# Patient Record
Sex: FEMALE | Race: BLACK OR AFRICAN AMERICAN | NOT HISPANIC OR LATINO | Employment: OTHER | URBAN - METROPOLITAN AREA
[De-identification: names, ages, dates, MRNs, and addresses within clinical notes are randomized per-mention and may not be internally consistent; named-entity substitution may affect disease eponyms.]

---

## 2017-01-20 ENCOUNTER — TRANSCRIBE ORDERS (OUTPATIENT)
Dept: ENDOCRINOLOGY | Facility: CLINIC | Age: 72
End: 2017-01-20

## 2017-01-20 DIAGNOSIS — E10.9 DIABETES MELLITUS TYPE 1, UNCOMPLICATED (HCC): Primary | ICD-10-CM

## 2017-08-15 DIAGNOSIS — E55.9 VITAMIN D DEFICIENCY: ICD-10-CM

## 2017-08-15 RX ORDER — ERGOCALCIFEROL 1.25 MG/1
CAPSULE ORAL
Qty: 4 CAPSULE | Refills: 0 | Status: SHIPPED | OUTPATIENT
Start: 2017-08-15 | End: 2017-10-12 | Stop reason: SDUPTHER

## 2017-10-12 DIAGNOSIS — E55.9 VITAMIN D DEFICIENCY: ICD-10-CM

## 2017-10-13 RX ORDER — ERGOCALCIFEROL 1.25 MG/1
CAPSULE ORAL
Qty: 4 CAPSULE | Refills: 0 | Status: SHIPPED | OUTPATIENT
Start: 2017-10-13 | End: 2020-09-08

## 2017-10-19 ENCOUNTER — OFFICE VISIT (OUTPATIENT)
Dept: GASTROENTEROLOGY | Facility: CLINIC | Age: 72
End: 2017-10-19

## 2017-10-19 ENCOUNTER — PREP FOR SURGERY (OUTPATIENT)
Dept: OTHER | Facility: HOSPITAL | Age: 72
End: 2017-10-19

## 2017-10-19 VITALS
TEMPERATURE: 98.2 F | HEIGHT: 63 IN | DIASTOLIC BLOOD PRESSURE: 84 MMHG | RESPIRATION RATE: 18 BRPM | BODY MASS INDEX: 36.32 KG/M2 | WEIGHT: 205 LBS | HEART RATE: 86 BPM | SYSTOLIC BLOOD PRESSURE: 166 MMHG

## 2017-10-19 DIAGNOSIS — K59.00 CONSTIPATION, UNSPECIFIED CONSTIPATION TYPE: Chronic | ICD-10-CM

## 2017-10-19 DIAGNOSIS — R12 HEARTBURN: Chronic | ICD-10-CM

## 2017-10-19 DIAGNOSIS — R13.14 PHARYNGOESOPHAGEAL DYSPHAGIA: Primary | Chronic | ICD-10-CM

## 2017-10-19 DIAGNOSIS — R12 HEARTBURN: Primary | ICD-10-CM

## 2017-10-19 DIAGNOSIS — R13.14 PHARYNGOESOPHAGEAL DYSPHAGIA: ICD-10-CM

## 2017-10-19 PROCEDURE — 99214 OFFICE O/P EST MOD 30 MIN: CPT | Performed by: NURSE PRACTITIONER

## 2017-10-19 RX ORDER — SODIUM CHLORIDE 9 MG/ML
70 INJECTION, SOLUTION INTRAVENOUS CONTINUOUS PRN
Status: CANCELLED | OUTPATIENT
Start: 2017-10-19

## 2017-10-19 RX ORDER — LISINOPRIL 20 MG/1
20 TABLET ORAL DAILY
COMMUNITY
End: 2018-03-07 | Stop reason: HOSPADM

## 2017-10-19 NOTE — PROGRESS NOTES
"Chief Complaint   Patient presents with   • Heartburn   • Difficulty Swallowing   • Constipation     The patient has a long-standing history of heartburn. It occurs daily. It is gradually worsening. Heartburn can be severe. It does wake her at night. She has been taking Pantoprazole and Zantac as needed, but it does not help. There is no history of abdominal pain.    The patient has a long-standing history of difficulty swallowing. The patient has a difficult time swallowing solids. She feels it becomes \"lodged\" in the bottom of her esophagus and eventually she will \"throw up\" the food in her esophagus. This can occur daily, depending on her diet. There is no history of nausea with vomiting.    The patient has a long-standing history of constipation. The patient may have 1 hard bowel movement every other day or so. She will at times take laxatives for the constipation. There is no history of bright red blood per rectum or melena.    The patient's last colonosocopy was about 5 years ago. There is no family history of colon cancer.    Heartburn   She complains of heartburn. She reports no abdominal pain, no chest pain, no coughing or no nausea. This is a chronic problem. The current episode started more than 1 year ago. The problem occurs frequently. The problem has been gradually worsening. The heartburn duration is an hour. The heartburn is located in the substernum. The heartburn is of severe intensity. The heartburn wakes her from sleep. Nothing aggravates the symptoms. Associated symptoms include fatigue. Pertinent negatives include no melena. There are no known risk factors. She has tried a PPI and a histamine-2 antagonist for the symptoms. The treatment provided mild relief.   Difficulty Swallowing   This is a chronic problem. The current episode started more than 1 year ago. The problem occurs 2 to 4 times per day. The problem has been gradually worsening. Associated symptoms include arthralgias and fatigue. " Pertinent negatives include no abdominal pain, chest pain, chills, coughing, fever, headaches, joint swelling, myalgias, nausea, rash or vomiting. The symptoms are aggravated by eating. She has tried nothing for the symptoms.   Constipation   This is a chronic problem. The current episode started more than 1 year ago. The problem is unchanged. The stool is described as firm. The patient is not on a high fiber diet. There has not been adequate water intake. Associated symptoms include back pain. Pertinent negatives include no abdominal pain, diarrhea, fever, hematochezia, melena, nausea or vomiting. She has tried laxatives for the symptoms. The treatment provided moderate relief.     Review of Systems   Constitutional: Positive for appetite change and fatigue. Negative for chills, fever and unexpected weight change.   HENT: Positive for trouble swallowing. Negative for mouth sores and nosebleeds.    Eyes: Negative for discharge and redness.   Respiratory: Negative for apnea, cough and shortness of breath.    Cardiovascular: Negative for chest pain, palpitations and leg swelling.   Gastrointestinal: Positive for constipation and heartburn. Negative for abdominal distention, abdominal pain, anal bleeding, blood in stool, diarrhea, hematochezia, melena, nausea and vomiting.   Endocrine: Negative for cold intolerance, heat intolerance and polydipsia.   Genitourinary: Negative for dysuria, hematuria and urgency.   Musculoskeletal: Positive for arthralgias and back pain. Negative for joint swelling and myalgias.   Skin: Negative for rash.   Allergic/Immunologic: Negative for food allergies and immunocompromised state.   Neurological: Negative for dizziness, seizures, syncope and headaches.   Hematological: Negative for adenopathy. Does not bruise/bleed easily.   Psychiatric/Behavioral: Negative for dysphoric mood. The patient is nervous/anxious. The patient is not hyperactive.      Patient Active Problem List   Diagnosis    • Atopic rhinitis   • Arthritis   • Benign paroxysmal positional vertigo   • Neck pain   • Anxiety and depression   • Uncontrolled type 2 diabetes mellitus   • Edema   • Gastroesophageal reflux disease with esophagitis   • Multiple-type hyperlipidemia   • Vitamin D deficiency   • Benign essential hypertension   • Obesity (BMI 30-39.9)   • Unstable angina   • History of COPD   • Hypercholesterolemia   • History of cataract   • History of osteoporosis   • History of restless legs syndrome   • History of rheumatoid arthritis   • Elevated serum creatinine   • Heartburn   • Pharyngoesophageal dysphagia   • Constipation     Past Medical History:   Diagnosis Date   • Acute bronchitis with bronchospasm    • Acute exacerbation of chronic obstructive pulmonary disease (COPD)    • Anxiety    • Arthritis    • B12 deficiency    • Back pain    • Cataract    • Cervicalgia    • Colonic polyp     History of colonic polyps    • COPD (chronic obstructive pulmonary disease)    • Depression    • Diverticulitis    • Dysphagia    • Edema    • Esophageal reflux    • Folic acid deficiency    • Herpes zoster    • High cholesterol    • History of kidney infection    • History of recurrent urinary tract infection    • HTN (hypertension)    • Hypercholesterolemia    • Kidney infection    • Malignant hypertension 4/26/2015     Accelerated essential hypertension   • Measles     rubeola   • Muscle spasm    • Neoplasm of uncertain behavior of skin    • Osteoporosis    • Recurrent urinary tract infection    • Rheumatoid arthritis    • RLS (restless legs syndrome)    • Stomach ulcer    • Type 2 diabetes mellitus    • Vitamin D deficiency      Past Surgical History:   Procedure Laterality Date   • CATARACT EXTRACTION Left 02/11/2013    with lens implant   • CATARACT EXTRACTION Right 04/15/2013    with lens implant   • CATARACT EXTRACTION WITH INTRAOCULAR LENS IMPLANT Left 02/11/2013   • CATARACT EXTRACTION WITH INTRAOCULAR LENS IMPLANT Right  04/15/2013   • COLONOSCOPY  2012   • HYSTERECTOMY  1979   • UPPER GASTROINTESTINAL ENDOSCOPY  12/09/2013     Family History   Problem Relation Age of Onset   • Arthritis Mother    • Diabetes Mother    • Hypertension Mother    • Arthritis Other    • Arthritis Other    • Diabetes Other      DM   • Hypertension Other      Social History   Substance Use Topics   • Smoking status: Former Smoker   • Smokeless tobacco: Never Used      Comment:  Denied: History of smoking cigarettes   • Alcohol use Yes      Comment: Occassionally       Current Outpatient Prescriptions:   •  amLODIPine (NORVASC) 10 MG tablet, Take 1 tablet by mouth Daily., Disp: 30 tablet, Rfl: 5  •  fluticasone-salmeterol (ADVAIR HFA) 115-21 MCG/ACT inhaler, 2 (two) times a day., Disp: , Rfl:   •  Insulin Glargine (LANTUS SOLOSTAR) 100 UNIT/ML injection pen, Inject 30 Units under the skin 2 (two) times a day., Disp: 3 pen, Rfl: 0  •  Insulin Lispro 200 UNIT/ML solution pen-injector, Inject 28 Units under the skin 2 (two) times a day., Disp: 2 pen, Rfl: 0  •  lisinopril (PRINIVIL,ZESTRIL) 20 MG tablet, Take 20 mg by mouth Daily., Disp: , Rfl:   •  loratadine (CLARITIN) 10 MG tablet, Take 1 tablet by mouth Daily., Disp: 30 tablet, Rfl: 5  •  losartan (COZAAR) 100 MG tablet, Take 100 mg by mouth daily., Disp: , Rfl:   •  mometasone (NASONEX) 50 MCG/ACT nasal spray, into each nostril daily., Disp: , Rfl:   •  pantoprazole (PROTONIX) 40 MG EC tablet, Take 1 tablet by mouth Daily., Disp: 30 tablet, Rfl: 5  •  ranitidine (ZANTAC) 150 MG tablet, Take  by mouth., Disp: , Rfl:   •  sertraline (ZOLOFT) 50 MG tablet, TAKE ONE TABLET EVERY DAY, Disp: 30 tablet, Rfl: 5  •  spironolactone (ALDACTONE) 50 MG tablet, Take 1 tablet by mouth 2 (two) times a day. (Patient taking differently: Take 50 mg by mouth Daily.), Disp: 30 tablet, Rfl: 5  •  vitamin D (ERGOCALCIFEROL) 32576 units capsule capsule, TAKE ONE CAPSULE BY MOUTH ONCE A WEEK, Disp: 4 capsule, Rfl: 0  •   "hydrochlorothiazide (HYDRODIURIL) 12.5 MG tablet, Take 1 tablet by mouth daily., Disp: 30 tablet, Rfl: 5  •  Insulin Glargine 300 UNIT/ML solution pen-injector, Inject 55 Units under the skin daily for 90 days., Disp: 12 pen, Rfl: 3  •  Insulin Pen Needle 31G X 5 MM misc, Inject insulin three times daily, Disp: 100 each, Rfl: 11  •  Insulin Pen Needle 31G X 8 MM misc, 5 shots daily, Disp: 2 each, Rfl: 11  •  lovastatin (MEVACOR) 10 MG tablet, Take  by mouth daily., Disp: , Rfl:     Allergies   Allergen Reactions   • Penicillins    • Sulfa Antibiotics      /84  Pulse 86  Temp 98.2 °F (36.8 °C)  Resp 18  Ht 63\" (160 cm)  Wt 205 lb (93 kg)  BMI 36.31 kg/m2    Physical Exam   Constitutional: She is oriented to person, place, and time. She appears well-developed and well-nourished. No distress.   HENT:   Head: Normocephalic and atraumatic.   Right Ear: Hearing and external ear normal.   Left Ear: Hearing and external ear normal.   Nose: Nose normal.   Mouth/Throat: Oropharynx is clear and moist and mucous membranes are normal. Mucous membranes are not pale, not dry and not cyanotic. No oral lesions. No oropharyngeal exudate.   Eyes: Conjunctivae and EOM are normal. Right eye exhibits no discharge. Left eye exhibits no discharge.   Neck: Trachea normal. Neck supple. No JVD present. No edema present. No thyroid mass and no thyromegaly present.   Cardiovascular: Normal rate, regular rhythm, S2 normal and normal heart sounds.  Exam reveals no gallop, no S3 and no friction rub.    No murmur heard.  Pulmonary/Chest: Effort normal and breath sounds normal. No respiratory distress. She exhibits no tenderness.   Abdominal: Normal appearance and bowel sounds are normal. She exhibits no distension, no ascites and no mass. There is no splenomegaly or hepatomegaly. There is no tenderness. There is no rigidity, no rebound and no guarding. No hernia.       Vascular Status -  Her exam exhibits no right foot edema. Her exam " exhibits no left foot edema.  Lymphadenopathy:     She has no cervical adenopathy.        Left: No supraclavicular adenopathy present.   Neurological: She is alert and oriented to person, place, and time. She has normal strength. No cranial nerve deficit or sensory deficit.   Skin: No rash noted. She is not diaphoretic. No cyanosis. No pallor. Nails show no clubbing.   Psychiatric: She has a normal mood and affect.   Nursing note and vitals reviewed.  Stigmata of chronic liver disease:  None.  Asterixis:  None.    Laboratory Results:  Upon review of records:    Dated 10/18/2016 glucose 468 BUN 26 creatinine 1.5 sodium 136 potassium 4.4 chloride 97 CO2 28 calcium 9.6 abdomen 3.8 ALT 17 AST 16 alkaline phosphatase 68 total bilirubin 0.3    Dated 7/31/2017 sodium 140 potassium 4.3 chloride 102 CO2 25 glucose 201 BUN 22 creatinine 1.22 total bilirubin 0.2 calcium 9.6 albumin 3.7 alkaline phosphatase 56 ALT 20 AST 16 hemoglobin A1c 12.6 vitamin D 20.7 WBC 7.8 hemoglobin 12.9 hematocrit 40 platelet count 266 MCV 90    Procedures:  Upon review of records:    EGD dated 12/9/2013 reveals grade 3 distal esophagitis.  Clean-based ulceration at the gastroesophageal junction.  Small sliding hiatal hernia, less than 3 cm.  Erythematous gastritis.  Possible underlying Schatzki's cannot be excluded.  Possible underlying Arcos's could not be excluded.  So a polypoid appearance of the mucosa was noted in the distal esophagus close to the gastroesophageal junction.  This was not biopsied. Biopsy results not available    Assessment and Plan:    Valarie was seen today for heartburn, difficulty swallowing and constipation.    Diagnoses and all orders for this visit:    Pharyngoesophageal dysphagia  Comments:  Differentials include Schatzki's ring, esophagitis, esophageal dysmotility.    Heartburn  Comments:  History of long-standing heartburn.  Not controlled with Zantac.  Concerns for underlying Arcos's.    Constipation, unspecified  constipation type  Comments:  History of long-standing constipation.  Moderately controlled with laxatives.        Plan  and Patient Instructions:  Patient Instructions   1. Antireflux measures: Avoid fried, fatty foods, alcohol, chocolate, coffee, tea,  soft drinks, peppermint and spearmint, spicy foods, tomatoes and tomato based foods, onion based foods, and smoking. Other antireflux measures include weight reduction if overweight, avoiding tight clothing around the abdomen, elevating the head of the bed 6 inches with blocks under the head board, and don't drink or eat before going to bed and avoid lying down immediately after meals.  2. Pantoprazole 40 mg 1 tablet by mouth in the am 30 minutes before breakfast.  3. May take Zantac 150 mg po twice a day as needed. May take for 1-2 days, then have holiday for 1-2 days before taking again.  4. High fiber diet with liberal water intake. Discussed in detail.  5. Upper endoscopy-EGD: Description of the procedure, risks, benefits, alternatives and options, including nonoperative options, were discussed with the patient in detail. The patient understands and wishes to proceed.  6. Avoid stimulant laxatives.  7. Patient will need colonoscopy in the future.    JOSEE Aviles

## 2017-10-19 NOTE — PATIENT INSTRUCTIONS
1. Antireflux measures: Avoid fried, fatty foods, alcohol, chocolate, coffee, tea,  soft drinks, peppermint and spearmint, spicy foods, tomatoes and tomato based foods, onion based foods, and smoking. Other antireflux measures include weight reduction if overweight, avoiding tight clothing around the abdomen, elevating the head of the bed 6 inches with blocks under the head board, and don't drink or eat before going to bed and avoid lying down immediately after meals.  2. Pantoprazole 40 mg 1 tablet by mouth in the am 30 minutes before breakfast.  3. May take Zantac 150 mg po twice a day as needed. May take for 1-2 days, then have holiday for 1-2 days before taking again.  4. High fiber diet with liberal water intake. Discussed in detail.  5. Upper endoscopy-EGD: Description of the procedure, risks, benefits, alternatives and options, including nonoperative options, were discussed with the patient in detail. The patient understands and wishes to proceed.  6. Avoid stimulant laxatives.  7. Patient will need colonoscopy in the future.

## 2017-11-13 ENCOUNTER — HOSPITAL ENCOUNTER (OUTPATIENT)
Facility: HOSPITAL | Age: 72
Setting detail: HOSPITAL OUTPATIENT SURGERY
Discharge: HOME | End: 2017-11-13
Attending: INTERNAL MEDICINE | Admitting: INTERNAL MEDICINE

## 2017-11-13 ENCOUNTER — ANESTHESIA (OUTPATIENT)
Dept: GASTROENTEROLOGY | Facility: HOSPITAL | Age: 72
End: 2017-11-13

## 2017-11-13 ENCOUNTER — ANESTHESIA EVENT (OUTPATIENT)
Dept: GASTROENTEROLOGY | Facility: HOSPITAL | Age: 72
End: 2017-11-13

## 2017-11-13 VITALS
WEIGHT: 200 LBS | HEIGHT: 63 IN | TEMPERATURE: 97.8 F | SYSTOLIC BLOOD PRESSURE: 208 MMHG | DIASTOLIC BLOOD PRESSURE: 82 MMHG | HEART RATE: 83 BPM | RESPIRATION RATE: 18 BRPM | OXYGEN SATURATION: 96 % | BODY MASS INDEX: 35.44 KG/M2

## 2017-11-13 DIAGNOSIS — R12 HEARTBURN: ICD-10-CM

## 2017-11-13 DIAGNOSIS — R13.14 PHARYNGOESOPHAGEAL DYSPHAGIA: ICD-10-CM

## 2017-11-13 LAB — GLUCOSE BLDC GLUCOMTR-MCNC: 261 MG/DL (ref 70–130)

## 2017-11-13 PROCEDURE — C1726 CATH, BAL DIL, NON-VASCULAR: HCPCS | Performed by: INTERNAL MEDICINE

## 2017-11-13 PROCEDURE — 88305 TISSUE EXAM BY PATHOLOGIST: CPT | Performed by: INTERNAL MEDICINE

## 2017-11-13 PROCEDURE — 82962 GLUCOSE BLOOD TEST: CPT

## 2017-11-13 PROCEDURE — 88342 IMHCHEM/IMCYTCHM 1ST ANTB: CPT | Performed by: INTERNAL MEDICINE

## 2017-11-13 PROCEDURE — 25010000002 PROPOFOL 200 MG/20ML EMULSION: Performed by: NURSE ANESTHETIST, CERTIFIED REGISTERED

## 2017-11-13 PROCEDURE — 43249 ESOPH EGD DILATION <30 MM: CPT | Performed by: INTERNAL MEDICINE

## 2017-11-13 PROCEDURE — 43239 EGD BIOPSY SINGLE/MULTIPLE: CPT | Performed by: INTERNAL MEDICINE

## 2017-11-13 RX ORDER — PANTOPRAZOLE SODIUM 40 MG/1
40 TABLET, DELAYED RELEASE ORAL
Status: DISCONTINUED | OUTPATIENT
Start: 2017-11-13 | End: 2017-11-13 | Stop reason: HOSPADM

## 2017-11-13 RX ORDER — SODIUM CHLORIDE 9 MG/ML
70 INJECTION, SOLUTION INTRAVENOUS CONTINUOUS PRN
Status: DISCONTINUED | OUTPATIENT
Start: 2017-11-13 | End: 2017-11-13 | Stop reason: HOSPADM

## 2017-11-13 RX ORDER — PROPOFOL 10 MG/ML
INJECTION, EMULSION INTRAVENOUS AS NEEDED
Status: DISCONTINUED | OUTPATIENT
Start: 2017-11-13 | End: 2017-11-13 | Stop reason: SURG

## 2017-11-13 RX ORDER — LIDOCAINE HYDROCHLORIDE 20 MG/ML
INJECTION, SOLUTION INFILTRATION; PERINEURAL AS NEEDED
Status: DISCONTINUED | OUTPATIENT
Start: 2017-11-13 | End: 2017-11-13 | Stop reason: SURG

## 2017-11-13 RX ADMIN — PROPOFOL 50 MG: 10 INJECTION, EMULSION INTRAVENOUS at 08:06

## 2017-11-13 RX ADMIN — SODIUM CHLORIDE 70 ML/HR: 9 INJECTION, SOLUTION INTRAVENOUS at 07:16

## 2017-11-13 RX ADMIN — LIDOCAINE HYDROCHLORIDE 40 MG: 20 INJECTION, SOLUTION INFILTRATION; PERINEURAL at 08:02

## 2017-11-13 RX ADMIN — PROPOFOL 50 MG: 10 INJECTION, EMULSION INTRAVENOUS at 08:04

## 2017-11-13 RX ADMIN — PROPOFOL 50 MG: 10 INJECTION, EMULSION INTRAVENOUS at 08:02

## 2017-11-13 RX ADMIN — PROPOFOL 50 MG: 10 INJECTION, EMULSION INTRAVENOUS at 08:00

## 2017-11-13 RX ADMIN — PROPOFOL 50 MG: 10 INJECTION, EMULSION INTRAVENOUS at 08:08

## 2017-11-13 RX ADMIN — SODIUM CHLORIDE: 9 INJECTION, SOLUTION INTRAVENOUS at 07:45

## 2017-11-13 NOTE — OP NOTE
PROCEDURE:  Upper Endoscopy with serial dilation of the distal esophagus using 15-16.5-18 mm CRE balloon to 16.5 mm and biopsies.    DATE OF PROCEDURE: November 13, 2017.    REFERRING PROVIDER:  JOSEE Quintana.     INSTRUMENT:    Olympus GIF H180 J video endoscope.      INDICATIONS OF THE PROCEDURE: This is a 72-year-old -American female with history of recurrent reflux and dysphagia.  Currently undergoing upper endoscopy for further evaluation        BIOPSIES:  Gastric antrum and body of the stomach.  Duodenal bulb polypoid area.     MEDICATIONS:  MAC.     PHOTOGRAPHS:  Photographs were included in the medical records.     CONSENT/PREPROCEDURE EVALUATION:  Risks, benefits, alternatives and options of the procedure including risks of anesthesia/sedation were discussed and informed consent was obtained prior to the procedure. History and physical examination were performed and nothing precluded the test.     REPORT:  The patient was placed in left lateral decubitus position. Once under the influence of IV sedation, the instrument was inserted into the mouth and esophagus was intubated under direct vision without difficulty.    Visualized portions of the hypopharyngeal, laryngopharyngeal areas including partial view of vocal cords appeared to be within normal limits.     Esophagus:  Upper esophageal sphincter was noted to be somewhat tortuous.  A Schatzki's ring was seen in the distal esophagus.  Z line was noted to be around  35 cm.  A small sliding hiatal hernia less than 3 cm was noted.  No Arcos's esophagus was seen.     Stomach:  Antrum:  Erythematous-erosive gastritis.  Angulus, lesser and greater curves: Normal.  Retroflex examination: Sliding hiatal hernia.  Cardia and fundus:  Normal.     Body of the stomach: Erythematous-erosive gastritis.  Good distensibility of the stomach was achieved no giant folds were noted.   Biopsies were obtained from the gastric antrum, angularis and body of the  stomach.    Pylorus and pyloric channel:  normal.     Duodenum:  Bulb: A 4-5 mm polypoid area in the duodenal bulb with small opening was seen.  This location is too high for minor ampulla.  Biopsies were obtained from the area.   No scalloping was seen in the second portion of duodenum.  Biopsies were obtained from the second portion of duodenum.    Intervention:  Distal esophagus was dilated using 15-16.5-18 mm CRE balloon to 16 mm under vision without difficulty.  Some blood was noted no active bleeding was seen.     The upper GI tract was decompressed and the scope was pulled out of the patient. The patient tolerated the procedure well.     DIAGNOSES:     1. Schatzki's ring.  Status post serial dilation to 16.5 mm.    2. Small sliding hiatal hernia less than 3 cm.  3. Erythematous-erosive gastritis.    4. Duodenal bulb polypoid area.    RECOMMENDATIONS:  1.  Dietary instructions.  2.  Pantoprazole 40 mg 1 p.o. q.a.m. 1/2 hour before breakfast..  Discontinue Zantac.    3.  Follow biopsies.  4.  Follow up in office.     Thank you very much for letting me participate in the care of this patient. Please do not hesitate to call me if you have any questions.

## 2017-11-13 NOTE — ANESTHESIA POSTPROCEDURE EVALUATION
Patient: Valarie Camara    Procedure Summary     Date Anesthesia Start Anesthesia Stop Room / Location    11/13/17 0745 0813 [facility] ENDOSCOPY 2 / [facility] ENDOSCOPY       Procedure Diagnosis Surgeon Provider    ESOPHAGOGASTRODUODENOSCOPY with biopsies and esophageal balloon dilitation (N/A Esophagus) Heartburn; Pharyngoesophageal dysphagia  (Heartburn [R12]; Pharyngoesophageal dysphagia [R13.14]) MD Heriberto Longoria CRNA          Anesthesia Type: MAC  Last vitals  BP   (!) 216/87 (11/13/17 0703)   Temp   97 °F (36.1 °C) (11/13/17 0703)   Pulse   75 (11/13/17 0703)   Resp   20 (11/13/17 0703)     SpO2   97 % (11/13/17 0703)     Post Anesthesia Care and Evaluation    Patient location during evaluation: PACU  Patient participation: complete - patient participated  Level of consciousness: awake and alert  Pain score: 0  Pain management: satisfactory to patient  Airway patency: patent  Anesthetic complications: No anesthetic complications  PONV Status: none  Cardiovascular status: acceptable and hemodynamically stable  Respiratory status: acceptable  Hydration status: acceptable

## 2017-11-13 NOTE — ANESTHESIA PREPROCEDURE EVALUATION
Anesthesia Evaluation     Patient summary reviewed and Nursing notes reviewed   NPO Solid Status: > 8 hours  NPO Liquid Status: > 8 hours     Airway   Mallampati: II  TM distance: >3 FB  Neck ROM: full  possible difficult intubation  Dental - normal exam     Pulmonary - normal exam   (+) COPD, sleep apnea ( ? snores melissa),   Cardiovascular - normal exam    (+) hypertension, angina, hyperlipidemia      Neuro/Psych  (+) psychiatric history Anxiety and Depression, poor historian.,    GI/Hepatic/Renal/Endo    (+) obesity, morbid obesity, GERD, diabetes mellitus type 2 using insulin,     Musculoskeletal     (+) back pain, neck pain,   Abdominal  - normal exam   Substance History      OB/GYN          Other   (+) arthritis   history of cancer    ROS/Med Hx Other: fbs 261                                  Anesthesia Plan    ASA 3     MAC   (Risks and benefits discussed including risk of aspiration, recall and dental damage. All patient questions answered. Will continue with POC.)  intravenous induction   Anesthetic plan and risks discussed with patient.

## 2017-11-13 NOTE — H&P (VIEW-ONLY)
"Chief Complaint   Patient presents with   • Heartburn   • Difficulty Swallowing   • Constipation     The patient has a long-standing history of heartburn. It occurs daily. It is gradually worsening. Heartburn can be severe. It does wake her at night. She has been taking Pantoprazole and Zantac as needed, but it does not help. There is no history of abdominal pain.    The patient has a long-standing history of difficulty swallowing. The patient has a difficult time swallowing solids. She feels it becomes \"lodged\" in the bottom of her esophagus and eventually she will \"throw up\" the food in her esophagus. This can occur daily, depending on her diet. There is no history of nausea with vomiting.    The patient has a long-standing history of constipation. The patient may have 1 hard bowel movement every other day or so. She will at times take laxatives for the constipation. There is no history of bright red blood per rectum or melena.    The patient's last colonosocopy was about 5 years ago. There is no family history of colon cancer.    Heartburn   She complains of heartburn. She reports no abdominal pain, no chest pain, no coughing or no nausea. This is a chronic problem. The current episode started more than 1 year ago. The problem occurs frequently. The problem has been gradually worsening. The heartburn duration is an hour. The heartburn is located in the substernum. The heartburn is of severe intensity. The heartburn wakes her from sleep. Nothing aggravates the symptoms. Associated symptoms include fatigue. Pertinent negatives include no melena. There are no known risk factors. She has tried a PPI and a histamine-2 antagonist for the symptoms. The treatment provided mild relief.   Difficulty Swallowing   This is a chronic problem. The current episode started more than 1 year ago. The problem occurs 2 to 4 times per day. The problem has been gradually worsening. Associated symptoms include arthralgias and fatigue. " Pertinent negatives include no abdominal pain, chest pain, chills, coughing, fever, headaches, joint swelling, myalgias, nausea, rash or vomiting. The symptoms are aggravated by eating. She has tried nothing for the symptoms.   Constipation   This is a chronic problem. The current episode started more than 1 year ago. The problem is unchanged. The stool is described as firm. The patient is not on a high fiber diet. There has not been adequate water intake. Associated symptoms include back pain. Pertinent negatives include no abdominal pain, diarrhea, fever, hematochezia, melena, nausea or vomiting. She has tried laxatives for the symptoms. The treatment provided moderate relief.     Review of Systems   Constitutional: Positive for appetite change and fatigue. Negative for chills, fever and unexpected weight change.   HENT: Positive for trouble swallowing. Negative for mouth sores and nosebleeds.    Eyes: Negative for discharge and redness.   Respiratory: Negative for apnea, cough and shortness of breath.    Cardiovascular: Negative for chest pain, palpitations and leg swelling.   Gastrointestinal: Positive for constipation and heartburn. Negative for abdominal distention, abdominal pain, anal bleeding, blood in stool, diarrhea, hematochezia, melena, nausea and vomiting.   Endocrine: Negative for cold intolerance, heat intolerance and polydipsia.   Genitourinary: Negative for dysuria, hematuria and urgency.   Musculoskeletal: Positive for arthralgias and back pain. Negative for joint swelling and myalgias.   Skin: Negative for rash.   Allergic/Immunologic: Negative for food allergies and immunocompromised state.   Neurological: Negative for dizziness, seizures, syncope and headaches.   Hematological: Negative for adenopathy. Does not bruise/bleed easily.   Psychiatric/Behavioral: Negative for dysphoric mood. The patient is nervous/anxious. The patient is not hyperactive.      Patient Active Problem List   Diagnosis    • Atopic rhinitis   • Arthritis   • Benign paroxysmal positional vertigo   • Neck pain   • Anxiety and depression   • Uncontrolled type 2 diabetes mellitus   • Edema   • Gastroesophageal reflux disease with esophagitis   • Multiple-type hyperlipidemia   • Vitamin D deficiency   • Benign essential hypertension   • Obesity (BMI 30-39.9)   • Unstable angina   • History of COPD   • Hypercholesterolemia   • History of cataract   • History of osteoporosis   • History of restless legs syndrome   • History of rheumatoid arthritis   • Elevated serum creatinine   • Heartburn   • Pharyngoesophageal dysphagia   • Constipation     Past Medical History:   Diagnosis Date   • Acute bronchitis with bronchospasm    • Acute exacerbation of chronic obstructive pulmonary disease (COPD)    • Anxiety    • Arthritis    • B12 deficiency    • Back pain    • Cataract    • Cervicalgia    • Colonic polyp     History of colonic polyps    • COPD (chronic obstructive pulmonary disease)    • Depression    • Diverticulitis    • Dysphagia    • Edema    • Esophageal reflux    • Folic acid deficiency    • Herpes zoster    • High cholesterol    • History of kidney infection    • History of recurrent urinary tract infection    • HTN (hypertension)    • Hypercholesterolemia    • Kidney infection    • Malignant hypertension 4/26/2015     Accelerated essential hypertension   • Measles     rubeola   • Muscle spasm    • Neoplasm of uncertain behavior of skin    • Osteoporosis    • Recurrent urinary tract infection    • Rheumatoid arthritis    • RLS (restless legs syndrome)    • Stomach ulcer    • Type 2 diabetes mellitus    • Vitamin D deficiency      Past Surgical History:   Procedure Laterality Date   • CATARACT EXTRACTION Left 02/11/2013    with lens implant   • CATARACT EXTRACTION Right 04/15/2013    with lens implant   • CATARACT EXTRACTION WITH INTRAOCULAR LENS IMPLANT Left 02/11/2013   • CATARACT EXTRACTION WITH INTRAOCULAR LENS IMPLANT Right  04/15/2013   • COLONOSCOPY  2012   • HYSTERECTOMY  1979   • UPPER GASTROINTESTINAL ENDOSCOPY  12/09/2013     Family History   Problem Relation Age of Onset   • Arthritis Mother    • Diabetes Mother    • Hypertension Mother    • Arthritis Other    • Arthritis Other    • Diabetes Other      DM   • Hypertension Other      Social History   Substance Use Topics   • Smoking status: Former Smoker   • Smokeless tobacco: Never Used      Comment:  Denied: History of smoking cigarettes   • Alcohol use Yes      Comment: Occassionally       Current Outpatient Prescriptions:   •  amLODIPine (NORVASC) 10 MG tablet, Take 1 tablet by mouth Daily., Disp: 30 tablet, Rfl: 5  •  fluticasone-salmeterol (ADVAIR HFA) 115-21 MCG/ACT inhaler, 2 (two) times a day., Disp: , Rfl:   •  Insulin Glargine (LANTUS SOLOSTAR) 100 UNIT/ML injection pen, Inject 30 Units under the skin 2 (two) times a day., Disp: 3 pen, Rfl: 0  •  Insulin Lispro 200 UNIT/ML solution pen-injector, Inject 28 Units under the skin 2 (two) times a day., Disp: 2 pen, Rfl: 0  •  lisinopril (PRINIVIL,ZESTRIL) 20 MG tablet, Take 20 mg by mouth Daily., Disp: , Rfl:   •  loratadine (CLARITIN) 10 MG tablet, Take 1 tablet by mouth Daily., Disp: 30 tablet, Rfl: 5  •  losartan (COZAAR) 100 MG tablet, Take 100 mg by mouth daily., Disp: , Rfl:   •  mometasone (NASONEX) 50 MCG/ACT nasal spray, into each nostril daily., Disp: , Rfl:   •  pantoprazole (PROTONIX) 40 MG EC tablet, Take 1 tablet by mouth Daily., Disp: 30 tablet, Rfl: 5  •  ranitidine (ZANTAC) 150 MG tablet, Take  by mouth., Disp: , Rfl:   •  sertraline (ZOLOFT) 50 MG tablet, TAKE ONE TABLET EVERY DAY, Disp: 30 tablet, Rfl: 5  •  spironolactone (ALDACTONE) 50 MG tablet, Take 1 tablet by mouth 2 (two) times a day. (Patient taking differently: Take 50 mg by mouth Daily.), Disp: 30 tablet, Rfl: 5  •  vitamin D (ERGOCALCIFEROL) 33026 units capsule capsule, TAKE ONE CAPSULE BY MOUTH ONCE A WEEK, Disp: 4 capsule, Rfl: 0  •   "hydrochlorothiazide (HYDRODIURIL) 12.5 MG tablet, Take 1 tablet by mouth daily., Disp: 30 tablet, Rfl: 5  •  Insulin Glargine 300 UNIT/ML solution pen-injector, Inject 55 Units under the skin daily for 90 days., Disp: 12 pen, Rfl: 3  •  Insulin Pen Needle 31G X 5 MM misc, Inject insulin three times daily, Disp: 100 each, Rfl: 11  •  Insulin Pen Needle 31G X 8 MM misc, 5 shots daily, Disp: 2 each, Rfl: 11  •  lovastatin (MEVACOR) 10 MG tablet, Take  by mouth daily., Disp: , Rfl:     Allergies   Allergen Reactions   • Penicillins    • Sulfa Antibiotics      /84  Pulse 86  Temp 98.2 °F (36.8 °C)  Resp 18  Ht 63\" (160 cm)  Wt 205 lb (93 kg)  BMI 36.31 kg/m2    Physical Exam   Constitutional: She is oriented to person, place, and time. She appears well-developed and well-nourished. No distress.   HENT:   Head: Normocephalic and atraumatic.   Right Ear: Hearing and external ear normal.   Left Ear: Hearing and external ear normal.   Nose: Nose normal.   Mouth/Throat: Oropharynx is clear and moist and mucous membranes are normal. Mucous membranes are not pale, not dry and not cyanotic. No oral lesions. No oropharyngeal exudate.   Eyes: Conjunctivae and EOM are normal. Right eye exhibits no discharge. Left eye exhibits no discharge.   Neck: Trachea normal. Neck supple. No JVD present. No edema present. No thyroid mass and no thyromegaly present.   Cardiovascular: Normal rate, regular rhythm, S2 normal and normal heart sounds.  Exam reveals no gallop, no S3 and no friction rub.    No murmur heard.  Pulmonary/Chest: Effort normal and breath sounds normal. No respiratory distress. She exhibits no tenderness.   Abdominal: Normal appearance and bowel sounds are normal. She exhibits no distension, no ascites and no mass. There is no splenomegaly or hepatomegaly. There is no tenderness. There is no rigidity, no rebound and no guarding. No hernia.       Vascular Status -  Her exam exhibits no right foot edema. Her exam " exhibits no left foot edema.  Lymphadenopathy:     She has no cervical adenopathy.        Left: No supraclavicular adenopathy present.   Neurological: She is alert and oriented to person, place, and time. She has normal strength. No cranial nerve deficit or sensory deficit.   Skin: No rash noted. She is not diaphoretic. No cyanosis. No pallor. Nails show no clubbing.   Psychiatric: She has a normal mood and affect.   Nursing note and vitals reviewed.  Stigmata of chronic liver disease:  None.  Asterixis:  None.    Laboratory Results:  Upon review of records:    Dated 10/18/2016 glucose 468 BUN 26 creatinine 1.5 sodium 136 potassium 4.4 chloride 97 CO2 28 calcium 9.6 abdomen 3.8 ALT 17 AST 16 alkaline phosphatase 68 total bilirubin 0.3    Dated 7/31/2017 sodium 140 potassium 4.3 chloride 102 CO2 25 glucose 201 BUN 22 creatinine 1.22 total bilirubin 0.2 calcium 9.6 albumin 3.7 alkaline phosphatase 56 ALT 20 AST 16 hemoglobin A1c 12.6 vitamin D 20.7 WBC 7.8 hemoglobin 12.9 hematocrit 40 platelet count 266 MCV 90    Procedures:  Upon review of records:    EGD dated 12/9/2013 reveals grade 3 distal esophagitis.  Clean-based ulceration at the gastroesophageal junction.  Small sliding hiatal hernia, less than 3 cm.  Erythematous gastritis.  Possible underlying Schatzki's cannot be excluded.  Possible underlying Arcos's could not be excluded.  So a polypoid appearance of the mucosa was noted in the distal esophagus close to the gastroesophageal junction.  This was not biopsied. Biopsy results not available    Assessment and Plan:    Valarie was seen today for heartburn, difficulty swallowing and constipation.    Diagnoses and all orders for this visit:    Pharyngoesophageal dysphagia  Comments:  Differentials include Schatzki's ring, esophagitis, esophageal dysmotility.    Heartburn  Comments:  History of long-standing heartburn.  Not controlled with Zantac.  Concerns for underlying Arcos's.    Constipation, unspecified  constipation type  Comments:  History of long-standing constipation.  Moderately controlled with laxatives.        Plan  and Patient Instructions:  Patient Instructions   1. Antireflux measures: Avoid fried, fatty foods, alcohol, chocolate, coffee, tea,  soft drinks, peppermint and spearmint, spicy foods, tomatoes and tomato based foods, onion based foods, and smoking. Other antireflux measures include weight reduction if overweight, avoiding tight clothing around the abdomen, elevating the head of the bed 6 inches with blocks under the head board, and don't drink or eat before going to bed and avoid lying down immediately after meals.  2. Pantoprazole 40 mg 1 tablet by mouth in the am 30 minutes before breakfast.  3. May take Zantac 150 mg po twice a day as needed. May take for 1-2 days, then have holiday for 1-2 days before taking again.  4. High fiber diet with liberal water intake. Discussed in detail.  5. Upper endoscopy-EGD: Description of the procedure, risks, benefits, alternatives and options, including nonoperative options, were discussed with the patient in detail. The patient understands and wishes to proceed.  6. Avoid stimulant laxatives.  7. Patient will need colonoscopy in the future.    JOSEE Aviles

## 2017-11-13 NOTE — DISCHARGE INSTRUCTIONS
Postprocedure instructions:  Nothing by mouth until fully alert.  Bedrest until fully alert.  Vital signs as routine.    Diet:   Nothing by mouth for 30 minutes, then if no chest pains the patient may have Clear liquids diet (No Sodas) for 2 hours.  May advance to soft diet in 2 hours if no chest pains, Fever or chills, nausea vomiting or bleeding.    The patient may eat in upright position, chew well, take small bites and take medications in upright position.   The patient should drink water after 3-4 bites, and liberally with medications.   The patient should remain upright for about 10 minutes after eating and taking oral medications.      Blood Thinner and other medications Directions:  Avoid Aspirin & other NSAIDS.  Tylenol is okay.    Other instructions:  The patient should avoid medications that have a potential to cause pill esophagitis including potassium pills, tetracycline capsules, iron pills, NSAIDs and bisphosphonates.      Follow-up:    DR. JOEL MOREIRA in 4 weeks.Office phone # [phone].

## 2017-11-13 NOTE — PLAN OF CARE
Problem: GI Endoscopy (Adult)  Goal: Signs and Symptoms of Listed Potential Problems Will be Absent or Manageable (GI Endoscopy)  Outcome: Ongoing (interventions implemented as appropriate)    11/13/17 0649   GI Endoscopy   Problems Assessed (GI Endoscopy) all   Problems Present (GI Endoscopy) situational response

## 2017-11-16 LAB
LAB AP CASE REPORT: NORMAL
Lab: NORMAL
PATH REPORT.FINAL DX SPEC: NORMAL

## 2017-12-12 ENCOUNTER — HOSPITAL ENCOUNTER (OUTPATIENT)
Dept: DIABETES SERVICES | Facility: HOSPITAL | Age: 72
Discharge: HOME | End: 2017-12-12
Admitting: NURSE PRACTITIONER

## 2017-12-12 PROCEDURE — G0108 DIAB MANAGE TRN  PER INDIV: HCPCS

## 2017-12-13 ENCOUNTER — OFFICE VISIT (OUTPATIENT)
Dept: GASTROENTEROLOGY | Facility: CLINIC | Age: 72
End: 2017-12-13

## 2017-12-13 VITALS
RESPIRATION RATE: 15 BRPM | HEART RATE: 103 BPM | WEIGHT: 203 LBS | SYSTOLIC BLOOD PRESSURE: 226 MMHG | BODY MASS INDEX: 35.97 KG/M2 | DIASTOLIC BLOOD PRESSURE: 83 MMHG | HEIGHT: 63 IN | TEMPERATURE: 97.8 F

## 2017-12-13 DIAGNOSIS — A04.8 HELICOBACTER PYLORI INFECTION: ICD-10-CM

## 2017-12-13 DIAGNOSIS — K29.70 GASTRITIS WITHOUT BLEEDING, UNSPECIFIED CHRONICITY, UNSPECIFIED GASTRITIS TYPE: ICD-10-CM

## 2017-12-13 DIAGNOSIS — K22.2 SCHATZKI'S RING: ICD-10-CM

## 2017-12-13 DIAGNOSIS — R13.14 PHARYNGOESOPHAGEAL DYSPHAGIA: Chronic | ICD-10-CM

## 2017-12-13 DIAGNOSIS — R12 HEARTBURN: Chronic | ICD-10-CM

## 2017-12-13 DIAGNOSIS — K29.80 DUODENITIS: ICD-10-CM

## 2017-12-13 DIAGNOSIS — K59.00 CONSTIPATION, UNSPECIFIED CONSTIPATION TYPE: Primary | Chronic | ICD-10-CM

## 2017-12-13 DIAGNOSIS — Z12.11 COLON CANCER SCREENING: ICD-10-CM

## 2017-12-13 PROCEDURE — 99214 OFFICE O/P EST MOD 30 MIN: CPT | Performed by: NURSE PRACTITIONER

## 2017-12-13 RX ORDER — METRONIDAZOLE 250 MG/1
250 TABLET ORAL 4 TIMES DAILY
Qty: 56 TABLET | Refills: 0 | Status: SHIPPED | OUTPATIENT
Start: 2017-12-13 | End: 2018-03-07 | Stop reason: HOSPADM

## 2017-12-13 RX ORDER — PANTOPRAZOLE SODIUM 40 MG/1
TABLET, DELAYED RELEASE ORAL
Qty: 28 TABLET | Refills: 0 | Status: SHIPPED | OUTPATIENT
Start: 2017-12-13 | End: 2018-03-07 | Stop reason: HOSPADM

## 2017-12-13 RX ORDER — CLARITHROMYCIN 500 MG/1
500 TABLET, COATED ORAL 2 TIMES DAILY
Qty: 28 TABLET | Refills: 0 | Status: SHIPPED | OUTPATIENT
Start: 2017-12-13 | End: 2017-12-27

## 2017-12-13 NOTE — PROGRESS NOTES
Chief Complaint   Patient presents with   • Follow-up   • Difficulty Swallowing   • Constipation     The patient has a long-standing history of heartburn. It occurs daily. It is gradually improving. Heartburn can be severe at times.  It does not wake her at night. She has been taking Pantoprazole and Zantac as needed, with improvement of symptoms. There is no history of abdominal pain.     The patient has a long-standing history of difficulty swallowing. The patient has a difficult time swallowing solids. Swallowing has improved. She has been watching her diet and trying to avoid foods that will cause difficulty swallowing, with improvement. This occurs intermittently, depending on her diet. There is no history of nausea with vomiting.     The patient has a long-standing history of constipation. The patient may have 1 hard bowel movement every other day or so. She will at times take laxatives for the constipation. She has been taking Milk of Magnesia daily with some improvement of constipation. There is no history of bright red blood per rectum or melena.     The patient's last colonosocopy was about 5 years ago. There is no family history of colon cancer.    Heartburn   She complains of heartburn. She reports no abdominal pain, no chest pain, no coughing or no nausea. This is a chronic problem. The current episode started more than 1 year ago. The problem occurs occasionally. The problem has been gradually improving. The heartburn duration is an hour. The heartburn is located in the substernum. The heartburn is of mild intensity. The heartburn does not wake her from sleep. Nothing aggravates the symptoms. Associated symptoms include fatigue. Pertinent negatives include no melena. There are no known risk factors. She has tried a PPI and a histamine-2 antagonist for the symptoms. The treatment provided significant relief. Past procedures include an EGD (2017).   Difficulty Swallowing   This is a chronic problem. The  current episode started more than 1 year ago. The problem occurs intermittently. The problem has been gradually improving. Associated symptoms include arthralgias, fatigue and neck pain. Pertinent negatives include no abdominal pain, chest pain, chills, coughing, fever, headaches, joint swelling, myalgias, nausea, rash or vomiting. The symptoms are aggravated by eating. She has tried nothing for the symptoms.   Constipation   This is a chronic problem. The current episode started more than 1 year ago. The problem has been gradually improving since onset. The stool is described as firm. The patient is not on a high fiber diet. There has not been adequate water intake. Associated symptoms include back pain. Pertinent negatives include no abdominal pain, diarrhea, fever, hematochezia, melena, nausea or vomiting. She has tried laxatives, diet changes and fiber for the symptoms. The treatment provided moderate relief.     Review of Systems   Constitutional: Positive for fatigue. Negative for appetite change, chills, fever and unexpected weight change.   HENT: Negative for mouth sores, nosebleeds and trouble swallowing.    Eyes: Negative for discharge and redness.   Respiratory: Negative for apnea, cough and shortness of breath.    Cardiovascular: Negative for chest pain, palpitations and leg swelling.   Gastrointestinal: Positive for constipation and heartburn. Negative for abdominal distention, abdominal pain, anal bleeding, blood in stool, diarrhea, hematochezia, melena, nausea and vomiting.   Endocrine: Negative for cold intolerance, heat intolerance and polydipsia.   Genitourinary: Negative for dysuria, hematuria and urgency.   Musculoskeletal: Positive for arthralgias, back pain and neck pain. Negative for joint swelling and myalgias.   Skin: Negative for rash.   Allergic/Immunologic: Negative for food allergies and immunocompromised state.   Neurological: Negative for dizziness, seizures, syncope and headaches.    Hematological: Negative for adenopathy. Does not bruise/bleed easily.   Psychiatric/Behavioral: Negative for dysphoric mood. The patient is nervous/anxious. The patient is not hyperactive.      Patient Active Problem List   Diagnosis   • Atopic rhinitis   • Arthritis   • Benign paroxysmal positional vertigo   • Neck pain   • Anxiety and depression   • Uncontrolled type 2 diabetes mellitus   • Edema   • Gastroesophageal reflux disease with esophagitis   • Multiple-type hyperlipidemia   • Vitamin D deficiency   • Benign essential hypertension   • Obesity (BMI 30-39.9)   • Unstable angina   • History of COPD   • Hypercholesterolemia   • History of cataract   • History of osteoporosis   • History of restless legs syndrome   • History of rheumatoid arthritis   • Elevated serum creatinine   • Heartburn   • Pharyngoesophageal dysphagia   • Constipation   • Schatzki's ring   • Gastritis without bleeding   • Helicobacter pylori infection   • Duodenitis     Past Medical History:   Diagnosis Date   • Acute bronchitis with bronchospasm    • Acute exacerbation of chronic obstructive pulmonary disease (COPD)    • Anxiety    • Arthritis    • B12 deficiency    • Back pain    • Cataract    • Cervicalgia    • Colonic polyp     History of colonic polyps    • COPD (chronic obstructive pulmonary disease)    • Depression    • Diverticulitis    • Dysphagia    • Edema    • Esophageal reflux    • Folic acid deficiency    • Herpes zoster    • High cholesterol    • History of kidney infection    • History of recurrent urinary tract infection    • HTN (hypertension)    • Hypercholesterolemia    • Kidney infection    • Malignant hypertension 4/26/2015     Accelerated essential hypertension   • Measles     rubeola   • Muscle spasm    • Neoplasm of uncertain behavior of skin    • Osteoporosis    • Recurrent urinary tract infection    • Rheumatoid arthritis    • RLS (restless legs syndrome)    • Stomach ulcer    • Type 2 diabetes mellitus    •  Vitamin D deficiency      Past Surgical History:   Procedure Laterality Date   • CATARACT EXTRACTION Left 02/11/2013    with lens implant   • CATARACT EXTRACTION Right 04/15/2013    with lens implant   • CATARACT EXTRACTION WITH INTRAOCULAR LENS IMPLANT Left 02/11/2013   • CATARACT EXTRACTION WITH INTRAOCULAR LENS IMPLANT Right 04/15/2013   • COLONOSCOPY  2012   • ENDOSCOPY N/A 11/13/2017    Procedure: ESOPHAGOGASTRODUODENOSCOPY with biopsies and esophageal balloon dilitation;  Surgeon: Wesley Stovall MD;  Location: [facility] ENDOSCOPY;  Service:    • HYSTERECTOMY  1979   • UPPER GASTROINTESTINAL ENDOSCOPY  12/09/2013   • UPPER GASTROINTESTINAL ENDOSCOPY  11/13/2017     Family History   Problem Relation Age of Onset   • Arthritis Mother    • Diabetes Mother    • Hypertension Mother    • Arthritis Other    • Arthritis Other    • Diabetes Other      DM   • Hypertension Other    • Colon cancer Neg Hx      Social History   Substance Use Topics   • Smoking status: Former Smoker   • Smokeless tobacco: Never Used      Comment:  Denied: History of smoking cigarettes   • Alcohol use Yes      Comment: Occassionally       Current Outpatient Prescriptions:   •  amLODIPine (NORVASC) 10 MG tablet, Take 1 tablet by mouth Daily., Disp: 30 tablet, Rfl: 5  •  fluticasone-salmeterol (ADVAIR HFA) 115-21 MCG/ACT inhaler, 2 (two) times a day., Disp: , Rfl:   •  Insulin Glargine (LANTUS SOLOSTAR) 100 UNIT/ML injection pen, Inject 30 Units under the skin 2 (two) times a day., Disp: 3 pen, Rfl: 0  •  Insulin Lispro 200 UNIT/ML solution pen-injector, Inject 28 Units under the skin 2 (two) times a day., Disp: 2 pen, Rfl: 0  •  lisinopril (PRINIVIL,ZESTRIL) 20 MG tablet, Take 20 mg by mouth Daily., Disp: , Rfl:   •  loratadine (CLARITIN) 10 MG tablet, Take 1 tablet by mouth Daily., Disp: 30 tablet, Rfl: 5  •  losartan (COZAAR) 100 MG tablet, Take 100 mg by mouth daily., Disp: , Rfl:   •  lovastatin (MEVACOR) 10 MG tablet, Take  by mouth daily.,  "Disp: , Rfl:   •  pantoprazole (PROTONIX) 40 MG EC tablet, Take 1 tablet by mouth Daily., Disp: 30 tablet, Rfl: 5  •  sertraline (ZOLOFT) 50 MG tablet, TAKE ONE TABLET EVERY DAY, Disp: 30 tablet, Rfl: 5  •  spironolactone (ALDACTONE) 50 MG tablet, Take 1 tablet by mouth 2 (two) times a day. (Patient taking differently: Take 50 mg by mouth Daily.), Disp: 30 tablet, Rfl: 5  •  vitamin D (ERGOCALCIFEROL) 96650 units capsule capsule, TAKE ONE CAPSULE BY MOUTH ONCE A WEEK, Disp: 4 capsule, Rfl: 0  •  clarithromycin (BIAXIN) 500 MG tablet, Take 1 tablet by mouth 2 (Two) Times a Day for 14 days. Take 1 tablet twice a day with food, Disp: 28 tablet, Rfl: 0  •  hydrochlorothiazide (HYDRODIURIL) 12.5 MG tablet, Take 1 tablet by mouth daily., Disp: 30 tablet, Rfl: 5  •  Insulin Glargine 300 UNIT/ML solution pen-injector, Inject 55 Units under the skin daily for 90 days., Disp: 12 pen, Rfl: 3  •  Insulin Pen Needle 31G X 5 MM misc, Inject insulin three times daily, Disp: 100 each, Rfl: 11  •  Insulin Pen Needle 31G X 8 MM misc, 5 shots daily, Disp: 2 each, Rfl: 11  •  metroNIDAZOLE (FLAGYL) 250 MG tablet, Take 1 tablet by mouth 4 (Four) Times a Day. 1 tablet po four times daily for 14 days, Disp: 56 tablet, Rfl: 0  •  mometasone (NASONEX) 50 MCG/ACT nasal spray, into each nostril daily., Disp: , Rfl:   •  pantoprazole (PROTONIX) 40 MG EC tablet, Take 1 tablet twice a day 30 minutes before meals x 14 days, Disp: 28 tablet, Rfl: 0    Allergies   Allergen Reactions   • Penicillins    • Sulfa Antibiotics      BP (!) 226/83  Pulse 103  Temp 97.8 °F (36.6 °C)  Resp 15  Ht 160 cm (63\")  Wt 92.1 kg (203 lb)  BMI 35.96 kg/m2    Physical Exam   Constitutional: She is oriented to person, place, and time. She appears well-developed and well-nourished. No distress.   HENT:   Head: Normocephalic and atraumatic.   Right Ear: Hearing and external ear normal.   Left Ear: Hearing and external ear normal.   Nose: Nose normal. "   Mouth/Throat: Oropharynx is clear and moist and mucous membranes are normal. Mucous membranes are not pale, not dry and not cyanotic. No oral lesions. No oropharyngeal exudate.   Eyes: Conjunctivae and EOM are normal. Right eye exhibits no discharge. Left eye exhibits no discharge.   Neck: Trachea normal. Neck supple. No JVD present. No edema present. No thyroid mass and no thyromegaly present.   Cardiovascular: Normal rate, regular rhythm, S2 normal and normal heart sounds.  Exam reveals no gallop, no S3 and no friction rub.    No murmur heard.  Pulmonary/Chest: Effort normal and breath sounds normal. No respiratory distress. She exhibits no tenderness.   Abdominal: Normal appearance and bowel sounds are normal. She exhibits no distension, no ascites and no mass. There is no splenomegaly or hepatomegaly. There is no tenderness. There is no rigidity, no rebound and no guarding. No hernia.       Vascular Status -  Her exam exhibits no right foot edema. Her exam exhibits no left foot edema.  Lymphadenopathy:     She has no cervical adenopathy.        Left: No supraclavicular adenopathy present.   Neurological: She is alert and oriented to person, place, and time. She has normal strength. No cranial nerve deficit or sensory deficit.   Skin: No rash noted. She is not diaphoretic. No cyanosis. No pallor. Nails show no clubbing.   Psychiatric: She has a normal mood and affect.   Nursing note and vitals reviewed.  Stigmata of chronic liver disease:  None.  Asterixis:  None.    Laboratory Results:  Upon review of records:     Dated 10/18/2016 glucose 468 BUN 26 creatinine 1.5 sodium 136 potassium 4.4 chloride 97 CO2 28 calcium 9.6 abdomen 3.8 ALT 17 AST 16 alkaline phosphatase 68 total bilirubin 0.3     Dated 7/31/2017 sodium 140 potassium 4.3 chloride 102 CO2 25 glucose 201 BUN 22 creatinine 1.22 total bilirubin 0.2 calcium 9.6 albumin 3.7 alkaline phosphatase 56 ALT 20 AST 16 hemoglobin A1c 12.6 vitamin D 20.7 WBC 7.8  hemoglobin 12.9 hematocrit 40 platelet count 266 MCV 90     Procedures:  Upon review of records:     EGD dated 12/9/2013 reveals grade 3 distal esophagitis.  Clean-based ulceration at the gastroesophageal junction.  Small sliding hiatal hernia, less than 3 cm.  Erythematous gastritis.  Possible underlying Schatzki's cannot be excluded.  Possible underlying Arcos's could not be excluded.  So a polypoid appearance of the mucosa was noted in the distal esophagus close to the gastroesophageal junction.  This was not biopsied. Biopsy results not available    EGD dated 11/13/2017 reveals Schatzki's ring.  Status post serial dilation to 16.5 mm.  Small sliding hiatal hernia less than 3 cm.  Erythematous erosive gastritis.  Duodenal bulb polypoid area.  Duodenum bulb biopsy reveals focal acute chronic peptic duodenitis with focal gastric foveolar metaplasia, increased chronic inflammation within the lamina propria, reactive epithelial changes, focal acute inflammation and patchy villous blunting.  No evidence of increased intraepithelial lymphocytes, dysplasia or malignancy.  Antrum body biopsy reveals H. pylori positive.  No evidence of dysplasia or malignancy.    Notes:  1. The patient was noted to have elevated blood pressure in office today. The patient denies headaches, dizziness, shortness of breath or chest pain. She does have a history of hypertension.    Assessment and Plan:    Valarie was seen today for follow-up, difficulty swallowing and constipation.    Diagnoses and all orders for this visit:    Constipation, unspecified constipation type  Comments:  History of long-standing constipation.    Heartburn  Comments:  No Acros's.    Pharyngoesophageal dysphagia  Comments:  Improving.  Secondary to Schatzki's ring. Dilated to 16.5 mm    Schatzki's ring  Comments:  Dilated to 16.5 mm.    Gastritis without bleeding, unspecified chronicity, unspecified gastritis type  Comments:  Positive for H.  "pylori.    Duodenitis  Comments:  Biopsy with focal acute chronic peptic duodenitis.    Helicobacter pylori infection  Comments:  Biopsies positive for H. pylori.  Orders:  -     clarithromycin (BIAXIN) 500 MG tablet; Take 1 tablet by mouth 2 (Two) Times a Day for 14 days. Take 1 tablet twice a day with food  -     pantoprazole (PROTONIX) 40 MG EC tablet; Take 1 tablet twice a day 30 minutes before meals x 14 days  -     metroNIDAZOLE (FLAGYL) 250 MG tablet; Take 1 tablet by mouth 4 (Four) Times a Day. 1 tablet po four times daily for 14 days    Colon cancer screening  Comments:  Last colonoscopy was in 2012. No family history of colon cancer.        Plan  and Patient Instructions:  Patient Instructions   1. Antireflux measures: Avoid fried, fatty foods, alcohol, chocolate, coffee, tea,  soft drinks, peppermint and spearmint, spicy foods, tomatoes and tomato based foods, onion based foods, and smoking. Other antireflux measures include weight reduction if overweight, avoiding tight clothing around the abdomen, elevating the head of the bed 6 inches with blocks under the head board, and don't drink or eat before going to bed and avoid lying down immediately after meals.  2. Pantoprazole 40 mg 1 tablet by mouth in the am 30 minutes before breakfast.  3. Treatment for H. Pylori:       A.  Metronidazole 250 mg mouth four times a day x 14 days.       B. Clarithromycin 500 mg 1 tablet by mouth twice a day x 14 days.       C. Pantoprazole 40 mg 1 tablet by mouth twice a day x 14 days.  4. Patient may take probiotics while taking antibiotics.  5. High fiber diet with liberal water intake.  6. Colonoscopy: Description of the procedure, risks, benefits, alternatives and options, including nonoperative options, were discussed with the patient in detail. The patient understands and does not want to schedule at this time. She wants to \"pay off my bills\" first.  7. The patient may need further evaluation of high blood " pressure.  8. Follow up: The patient wants to call back to schedule    JOSEE Aviles

## 2017-12-13 NOTE — PATIENT INSTRUCTIONS
"1. Antireflux measures: Avoid fried, fatty foods, alcohol, chocolate, coffee, tea,  soft drinks, peppermint and spearmint, spicy foods, tomatoes and tomato based foods, onion based foods, and smoking. Other antireflux measures include weight reduction if overweight, avoiding tight clothing around the abdomen, elevating the head of the bed 6 inches with blocks under the head board, and don't drink or eat before going to bed and avoid lying down immediately after meals.  2. Pantoprazole 40 mg 1 tablet by mouth in the am 30 minutes before breakfast.  3. Treatment for H. Pylori:       A.  Metronidazole 250 mg mouth four times a day x 14 days.       B. Clarithromycin 500 mg 1 tablet by mouth twice a day x 14 days.       C. Pantoprazole 40 mg 1 tablet by mouth twice a day x 14 days.  4. Patient may take probiotics while taking antibiotics.  5. High fiber diet with liberal water intake.  6. Colonoscopy: Description of the procedure, risks, benefits, alternatives and options, including nonoperative options, were discussed with the patient in detail. The patient understands and does not want to schedule at this time. She wants to \"pay off my bills\" first.  7. The patient may need further evaluation of high blood pressure.  8. Follow up: The patient wants to call back to schedule  "

## 2017-12-14 ENCOUNTER — TELEPHONE (OUTPATIENT)
Dept: GASTROENTEROLOGY | Facility: CLINIC | Age: 72
End: 2017-12-14

## 2017-12-14 NOTE — TELEPHONE ENCOUNTER
Patient will take the medications as originally prescribed. Spoke to both patient and [facility].     ----- Message from JOSEE Frias sent at 12/14/2017 10:22 AM EST -----  Unfortunately, no. They do not treat H. Pylori.       ----- Message -----     From: Rafaela Armas MA     Sent: 12/14/2017  10:13 AM       To: JOSEE Frias    After speaking to [facility] pharmacy the only things she is not allergic to that is on the 0 copay list is Keflex and Cipro. Not sure if either one of these or both would work?     Thanks!    ----- Message -----     From: JOSEE Frias     Sent: 12/14/2017   9:57 AM       To: Rafaela Armas MA    Does she tolerate Amoxicillin without allergic reaction? We can switch the Flagyl to the Amoxicillin 500 mg 2 tablets twice a day x 14 days, but the alternative for clarithromycin would be much more expensive. If she has taken amoxicillin without any reactions, this can be called in and discontinue the Flagyl. See if there is a program that will cover her clarithromycin.   Thank you!!      ----- Message -----     From: Rafaela Armas MA     Sent: 12/14/2017   9:46 AM       To: JOSEE Frias    Amoxicillin (allergic to penicillin) and Keflex are on the patients no copay pharmacy plan. She states that if we could switch to these she would be able to afford them. I told her we might not be able to switch because different medicines treat different things. She understood. If we can't switch I will look into patient assistance to help her pay for them. Thanks!

## 2018-01-04 DIAGNOSIS — E55.9 VITAMIN D DEFICIENCY: ICD-10-CM

## 2018-01-08 RX ORDER — ERGOCALCIFEROL 1.25 MG/1
CAPSULE ORAL
Qty: 4 CAPSULE | OUTPATIENT
Start: 2018-01-08

## 2018-01-26 ENCOUNTER — DOCUMENTATION (OUTPATIENT)
Dept: DIABETES SERVICES | Facility: HOSPITAL | Age: 73
End: 2018-01-26

## 2018-01-26 NOTE — PROGRESS NOTES
DIABETES EDUCATION FOLLOW UP NOTES FROM 1/26/2018 WERE FAXED TO THE REFERRING PROVIDER ON 1/26/2018 FOR REVIEW

## 2018-01-30 ENCOUNTER — TRANSCRIBE ORDERS (OUTPATIENT)
Dept: ENDOCRINOLOGY | Facility: CLINIC | Age: 73
End: 2018-01-30

## 2018-01-30 DIAGNOSIS — E11.9 TYPE 2 DIABETES MELLITUS WITHOUT COMPLICATION, UNSPECIFIED LONG TERM INSULIN USE STATUS: Primary | ICD-10-CM

## 2018-02-21 ENCOUNTER — TRANSCRIBE ORDERS (OUTPATIENT)
Dept: ADMINISTRATIVE | Facility: HOSPITAL | Age: 73
End: 2018-02-21

## 2018-02-21 ENCOUNTER — APPOINTMENT (OUTPATIENT)
Dept: LAB | Facility: HOSPITAL | Age: 73
End: 2018-02-21

## 2018-02-21 DIAGNOSIS — I10 HYPERTENSION, UNSPECIFIED TYPE: Primary | ICD-10-CM

## 2018-02-21 DIAGNOSIS — M79.602 LEFT ARM PAIN: ICD-10-CM

## 2018-02-21 LAB
ALBUMIN SERPL-MCNC: 3.8 G/DL (ref 3.5–5)
ALBUMIN/GLOB SERPL: 1.3 G/DL (ref 1–2)
ALP SERPL-CCNC: 65 U/L (ref 38–126)
ALT SERPL W P-5'-P-CCNC: 32 U/L (ref 13–69)
ANION GAP SERPL CALCULATED.3IONS-SCNC: 16.2 MMOL/L
AST SERPL-CCNC: 18 U/L (ref 15–46)
BASOPHILS # BLD AUTO: 0.03 10*3/MM3 (ref 0–0.2)
BASOPHILS NFR BLD AUTO: 0.4 % (ref 0–2.5)
BILIRUB SERPL-MCNC: 0.4 MG/DL (ref 0.2–1.3)
BUN BLD-MCNC: 24 MG/DL (ref 7–20)
BUN/CREAT SERPL: 20 (ref 7.1–23.5)
CALCIUM SPEC-SCNC: 9.1 MG/DL (ref 8.4–10.2)
CHLORIDE SERPL-SCNC: 99 MMOL/L (ref 98–107)
CK MB SERPL-CCNC: 1.56 NG/ML (ref 0.2–2.4)
CO2 SERPL-SCNC: 26 MMOL/L (ref 26–30)
CREAT BLD-MCNC: 1.2 MG/DL (ref 0.6–1.3)
DEPRECATED RDW RBC AUTO: 39.8 FL (ref 37–54)
EOSINOPHIL # BLD AUTO: 0.08 10*3/MM3 (ref 0–0.7)
EOSINOPHIL NFR BLD AUTO: 1.1 % (ref 0–7)
ERYTHROCYTE [DISTWIDTH] IN BLOOD BY AUTOMATED COUNT: 12.9 % (ref 11.5–14.5)
GFR SERPL CREATININE-BSD FRML MDRD: 53 ML/MIN/1.73
GLOBULIN UR ELPH-MCNC: 2.9 GM/DL
GLUCOSE BLD-MCNC: 467 MG/DL (ref 74–98)
HCT VFR BLD AUTO: 38.3 % (ref 37–47)
HGB BLD-MCNC: 12.7 G/DL (ref 12–16)
IMM GRANULOCYTES # BLD: 0.02 10*3/MM3 (ref 0–0.06)
IMM GRANULOCYTES NFR BLD: 0.3 % (ref 0–0.6)
LYMPHOCYTES # BLD AUTO: 3.04 10*3/MM3 (ref 0.6–3.4)
LYMPHOCYTES NFR BLD AUTO: 42.6 % (ref 10–50)
MCH RBC QN AUTO: 28.1 PG (ref 27–31)
MCHC RBC AUTO-ENTMCNC: 33.2 G/DL (ref 30–37)
MCV RBC AUTO: 84.7 FL (ref 81–99)
MONOCYTES # BLD AUTO: 0.45 10*3/MM3 (ref 0–0.9)
MONOCYTES NFR BLD AUTO: 6.3 % (ref 0–12)
NEUTROPHILS # BLD AUTO: 3.52 10*3/MM3 (ref 2–6.9)
NEUTROPHILS NFR BLD AUTO: 49.3 % (ref 37–80)
NRBC BLD MANUAL-RTO: 0 /100 WBC (ref 0–0)
PLATELET # BLD AUTO: 272 10*3/MM3 (ref 130–400)
PMV BLD AUTO: 10.7 FL (ref 6–12)
POTASSIUM BLD-SCNC: 4.2 MMOL/L (ref 3.5–5.1)
PROT SERPL-MCNC: 6.7 G/DL (ref 6.3–8.2)
RBC # BLD AUTO: 4.52 10*6/MM3 (ref 4.2–5.4)
SODIUM BLD-SCNC: 137 MMOL/L (ref 137–145)
TROPONIN I SERPL-MCNC: <0.012 NG/ML (ref 0–0.03)
WBC NRBC COR # BLD: 7.14 10*3/MM3 (ref 4.8–10.8)

## 2018-02-21 PROCEDURE — 82553 CREATINE MB FRACTION: CPT | Performed by: NURSE PRACTITIONER

## 2018-02-21 PROCEDURE — 80053 COMPREHEN METABOLIC PANEL: CPT | Performed by: NURSE PRACTITIONER

## 2018-02-21 PROCEDURE — 84484 ASSAY OF TROPONIN QUANT: CPT | Performed by: NURSE PRACTITIONER

## 2018-02-21 PROCEDURE — 36415 COLL VENOUS BLD VENIPUNCTURE: CPT | Performed by: NURSE PRACTITIONER

## 2018-02-21 PROCEDURE — 85025 COMPLETE CBC W/AUTO DIFF WBC: CPT | Performed by: NURSE PRACTITIONER

## 2018-03-03 ENCOUNTER — APPOINTMENT (OUTPATIENT)
Dept: CARDIOLOGY | Facility: HOSPITAL | Age: 73
End: 2018-03-03
Attending: PSYCHIATRY & NEUROLOGY

## 2018-03-03 ENCOUNTER — HOSPITAL ENCOUNTER (INPATIENT)
Facility: HOSPITAL | Age: 73
LOS: 4 days | Discharge: NURSING, REHAB OR LONG-TERM CARE | End: 2018-03-07
Attending: INTERNAL MEDICINE | Admitting: INTERNAL MEDICINE

## 2018-03-03 ENCOUNTER — APPOINTMENT (OUTPATIENT)
Dept: MRI IMAGING | Facility: HOSPITAL | Age: 73
End: 2018-03-03

## 2018-03-03 DIAGNOSIS — Z78.9 IMPAIRED MOBILITY AND ADLS: ICD-10-CM

## 2018-03-03 DIAGNOSIS — Z74.09 IMPAIRED MOBILITY AND ADLS: ICD-10-CM

## 2018-03-03 DIAGNOSIS — I63.81 LACUNAR STROKE (HCC): ICD-10-CM

## 2018-03-03 DIAGNOSIS — Z74.09 IMPAIRED FUNCTIONAL MOBILITY, BALANCE, GAIT, AND ENDURANCE: ICD-10-CM

## 2018-03-03 DIAGNOSIS — R47.1 DYSARTHRIA: Primary | ICD-10-CM

## 2018-03-03 PROBLEM — I10 HTN (HYPERTENSION): Status: ACTIVE | Noted: 2018-03-03

## 2018-03-03 PROBLEM — E04.2 MULTIPLE THYROID NODULES: Status: ACTIVE | Noted: 2018-03-03

## 2018-03-03 PROBLEM — I63.9 STROKE: Status: ACTIVE | Noted: 2018-03-03

## 2018-03-03 PROBLEM — G81.91 RIGHT HEMIPARESIS (HCC): Status: ACTIVE | Noted: 2018-03-03

## 2018-03-03 LAB
ALBUMIN SERPL-MCNC: 4 G/DL (ref 3.2–4.8)
ALBUMIN/GLOB SERPL: 1.5 G/DL (ref 1.5–2.5)
ALP SERPL-CCNC: 58 U/L (ref 25–100)
ALT SERPL W P-5'-P-CCNC: 23 U/L (ref 7–40)
ANION GAP SERPL CALCULATED.3IONS-SCNC: 5 MMOL/L (ref 3–11)
APTT PPP: 24.7 SECONDS (ref 24–31)
AST SERPL-CCNC: 18 U/L (ref 0–33)
BILIRUB SERPL-MCNC: 0.4 MG/DL (ref 0.3–1.2)
BUN BLD-MCNC: 19 MG/DL (ref 9–23)
BUN/CREAT SERPL: 17.3 (ref 7–25)
CALCIUM SPEC-SCNC: 9.5 MG/DL (ref 8.7–10.4)
CHLORIDE SERPL-SCNC: 100 MMOL/L (ref 99–109)
CO2 SERPL-SCNC: 30 MMOL/L (ref 20–31)
CREAT BLD-MCNC: 1.1 MG/DL (ref 0.6–1.3)
DEPRECATED RDW RBC AUTO: 41 FL (ref 37–54)
ERYTHROCYTE [DISTWIDTH] IN BLOOD BY AUTOMATED COUNT: 13.3 % (ref 11.3–14.5)
GFR SERPL CREATININE-BSD FRML MDRD: 59 ML/MIN/1.73
GLOBULIN UR ELPH-MCNC: 2.7 GM/DL
GLUCOSE BLD-MCNC: 301 MG/DL (ref 70–100)
GLUCOSE BLDC GLUCOMTR-MCNC: 266 MG/DL (ref 70–130)
GLUCOSE BLDC GLUCOMTR-MCNC: 302 MG/DL (ref 70–130)
GLUCOSE BLDC GLUCOMTR-MCNC: 323 MG/DL (ref 70–130)
GLUCOSE BLDC GLUCOMTR-MCNC: 403 MG/DL (ref 70–130)
HBA1C MFR BLD: 12.3 % (ref 4.8–5.6)
HCT VFR BLD AUTO: 40.6 % (ref 34.5–44)
HGB BLD-MCNC: 13.4 G/DL (ref 11.5–15.5)
INR PPP: 0.89 (ref 0.91–1.09)
MCH RBC QN AUTO: 28.1 PG (ref 27–31)
MCHC RBC AUTO-ENTMCNC: 33 G/DL (ref 32–36)
MCV RBC AUTO: 85.1 FL (ref 80–99)
PLATELET # BLD AUTO: 261 10*3/MM3 (ref 150–450)
PMV BLD AUTO: 11 FL (ref 6–12)
POTASSIUM BLD-SCNC: 4.3 MMOL/L (ref 3.5–5.5)
PROT SERPL-MCNC: 6.7 G/DL (ref 5.7–8.2)
PROTHROMBIN TIME: 9.3 SECONDS (ref 9.6–11.5)
RBC # BLD AUTO: 4.77 10*6/MM3 (ref 3.89–5.14)
SODIUM BLD-SCNC: 135 MMOL/L (ref 132–146)
TSH SERPL DL<=0.05 MIU/L-ACNC: 0.77 MIU/ML (ref 0.35–5.35)
WBC NRBC COR # BLD: 5.63 10*3/MM3 (ref 3.5–10.8)

## 2018-03-03 PROCEDURE — 99223 1ST HOSP IP/OBS HIGH 75: CPT | Performed by: PSYCHIATRY & NEUROLOGY

## 2018-03-03 PROCEDURE — 25010000002 HYDRALAZINE PER 20 MG: Performed by: INTERNAL MEDICINE

## 2018-03-03 PROCEDURE — 80053 COMPREHEN METABOLIC PANEL: CPT | Performed by: INTERNAL MEDICINE

## 2018-03-03 PROCEDURE — 83036 HEMOGLOBIN GLYCOSYLATED A1C: CPT | Performed by: INTERNAL MEDICINE

## 2018-03-03 PROCEDURE — 92523 SPEECH SOUND LANG COMPREHEN: CPT

## 2018-03-03 PROCEDURE — 85610 PROTHROMBIN TIME: CPT | Performed by: INTERNAL MEDICINE

## 2018-03-03 PROCEDURE — 82962 GLUCOSE BLOOD TEST: CPT

## 2018-03-03 PROCEDURE — 63710000001 INSULIN LISPRO (HUMAN) PER 5 UNITS: Performed by: INTERNAL MEDICINE

## 2018-03-03 PROCEDURE — 93010 ELECTROCARDIOGRAM REPORT: CPT | Performed by: INTERNAL MEDICINE

## 2018-03-03 PROCEDURE — 84443 ASSAY THYROID STIM HORMONE: CPT | Performed by: INTERNAL MEDICINE

## 2018-03-03 PROCEDURE — 93005 ELECTROCARDIOGRAM TRACING: CPT | Performed by: INTERNAL MEDICINE

## 2018-03-03 PROCEDURE — 92610 EVALUATE SWALLOWING FUNCTION: CPT

## 2018-03-03 PROCEDURE — 93306 TTE W/DOPPLER COMPLETE: CPT | Performed by: INTERNAL MEDICINE

## 2018-03-03 PROCEDURE — 93306 TTE W/DOPPLER COMPLETE: CPT

## 2018-03-03 PROCEDURE — 85027 COMPLETE CBC AUTOMATED: CPT | Performed by: INTERNAL MEDICINE

## 2018-03-03 PROCEDURE — 85730 THROMBOPLASTIN TIME PARTIAL: CPT | Performed by: INTERNAL MEDICINE

## 2018-03-03 PROCEDURE — 70551 MRI BRAIN STEM W/O DYE: CPT

## 2018-03-03 PROCEDURE — 63710000001 INSULIN DETEMIR PER 5 UNITS: Performed by: INTERNAL MEDICINE

## 2018-03-03 PROCEDURE — 25010000002 ENOXAPARIN PER 10 MG: Performed by: INTERNAL MEDICINE

## 2018-03-03 PROCEDURE — 99223 1ST HOSP IP/OBS HIGH 75: CPT | Performed by: INTERNAL MEDICINE

## 2018-03-03 RX ORDER — ROSUVASTATIN CALCIUM 10 MG/1
10 TABLET, COATED ORAL NIGHTLY
COMMUNITY
End: 2018-03-07 | Stop reason: HOSPADM

## 2018-03-03 RX ORDER — DEXTROSE MONOHYDRATE 25 G/50ML
25 INJECTION, SOLUTION INTRAVENOUS
Status: DISCONTINUED | OUTPATIENT
Start: 2018-03-03 | End: 2018-03-07 | Stop reason: HOSPADM

## 2018-03-03 RX ORDER — ASPIRIN 325 MG
325 TABLET, DELAYED RELEASE (ENTERIC COATED) ORAL DAILY
Status: DISCONTINUED | OUTPATIENT
Start: 2018-03-03 | End: 2018-03-03

## 2018-03-03 RX ORDER — INSULIN GLARGINE 100 [IU]/ML
50 INJECTION, SOLUTION SUBCUTANEOUS DAILY
COMMUNITY
End: 2018-03-07 | Stop reason: HOSPADM

## 2018-03-03 RX ORDER — SODIUM CHLORIDE 0.9 % (FLUSH) 0.9 %
1-10 SYRINGE (ML) INJECTION AS NEEDED
Status: DISCONTINUED | OUTPATIENT
Start: 2018-03-03 | End: 2018-03-07 | Stop reason: HOSPADM

## 2018-03-03 RX ORDER — ATORVASTATIN CALCIUM 40 MG/1
80 TABLET, FILM COATED ORAL NIGHTLY
Status: DISCONTINUED | OUTPATIENT
Start: 2018-03-03 | End: 2018-03-07 | Stop reason: HOSPADM

## 2018-03-03 RX ORDER — ASPIRIN 81 MG/1
81 TABLET ORAL DAILY
COMMUNITY
End: 2021-02-03 | Stop reason: SDDI

## 2018-03-03 RX ORDER — VALSARTAN 40 MG/1
40 TABLET ORAL 2 TIMES DAILY
COMMUNITY
End: 2018-03-07 | Stop reason: HOSPADM

## 2018-03-03 RX ORDER — ACETAMINOPHEN 325 MG/1
650 TABLET ORAL EVERY 4 HOURS PRN
Status: DISCONTINUED | OUTPATIENT
Start: 2018-03-03 | End: 2018-03-07 | Stop reason: HOSPADM

## 2018-03-03 RX ORDER — ASPIRIN 300 MG/1
300 SUPPOSITORY RECTAL DAILY
Status: DISCONTINUED | OUTPATIENT
Start: 2018-03-03 | End: 2018-03-07 | Stop reason: HOSPADM

## 2018-03-03 RX ORDER — ATORVASTATIN CALCIUM 40 MG/1
80 TABLET, FILM COATED ORAL NIGHTLY
Status: DISCONTINUED | OUTPATIENT
Start: 2018-03-03 | End: 2018-03-03

## 2018-03-03 RX ORDER — HYDRALAZINE HYDROCHLORIDE 20 MG/ML
10 INJECTION INTRAMUSCULAR; INTRAVENOUS EVERY 4 HOURS PRN
Status: DISCONTINUED | OUTPATIENT
Start: 2018-03-03 | End: 2018-03-03

## 2018-03-03 RX ORDER — RANITIDINE 150 MG/1
150 TABLET ORAL DAILY
COMMUNITY
End: 2018-03-07 | Stop reason: HOSPADM

## 2018-03-03 RX ORDER — VALSARTAN 80 MG/1
40 TABLET ORAL
Status: DISCONTINUED | OUTPATIENT
Start: 2018-03-03 | End: 2018-03-03

## 2018-03-03 RX ORDER — ASPIRIN 81 MG/1
81 TABLET, CHEWABLE ORAL DAILY
Status: DISCONTINUED | OUTPATIENT
Start: 2018-03-03 | End: 2018-03-07 | Stop reason: HOSPADM

## 2018-03-03 RX ORDER — NICOTINE POLACRILEX 4 MG
15 LOZENGE BUCCAL
Status: DISCONTINUED | OUTPATIENT
Start: 2018-03-03 | End: 2018-03-07 | Stop reason: HOSPADM

## 2018-03-03 RX ORDER — AMLODIPINE BESYLATE 5 MG/1
5 TABLET ORAL
Status: DISCONTINUED | OUTPATIENT
Start: 2018-03-03 | End: 2018-03-03

## 2018-03-03 RX ORDER — AMLODIPINE BESYLATE 10 MG/1
10 TABLET ORAL
Status: DISCONTINUED | OUTPATIENT
Start: 2018-03-03 | End: 2018-03-03

## 2018-03-03 RX ORDER — HYDRALAZINE HYDROCHLORIDE 20 MG/ML
5 INJECTION INTRAMUSCULAR; INTRAVENOUS EVERY 4 HOURS PRN
Status: DISCONTINUED | OUTPATIENT
Start: 2018-03-03 | End: 2018-03-07 | Stop reason: HOSPADM

## 2018-03-03 RX ORDER — HYDROCHLOROTHIAZIDE 12.5 MG/1
12.5 TABLET ORAL DAILY
Status: DISCONTINUED | OUTPATIENT
Start: 2018-03-03 | End: 2018-03-03

## 2018-03-03 RX ORDER — BUDESONIDE AND FORMOTEROL FUMARATE DIHYDRATE 160; 4.5 UG/1; UG/1
2 AEROSOL RESPIRATORY (INHALATION)
Status: DISCONTINUED | OUTPATIENT
Start: 2018-03-03 | End: 2018-03-07 | Stop reason: HOSPADM

## 2018-03-03 RX ORDER — PANTOPRAZOLE SODIUM 40 MG/1
40 TABLET, DELAYED RELEASE ORAL DAILY
Status: DISCONTINUED | OUTPATIENT
Start: 2018-03-03 | End: 2018-03-07 | Stop reason: HOSPADM

## 2018-03-03 RX ORDER — CLOPIDOGREL BISULFATE 75 MG/1
75 TABLET ORAL DAILY
Status: DISCONTINUED | OUTPATIENT
Start: 2018-03-03 | End: 2018-03-07 | Stop reason: HOSPADM

## 2018-03-03 RX ORDER — DIPHENHYDRAMINE HCL 50 MG
50 CAPSULE ORAL NIGHTLY PRN
COMMUNITY
End: 2018-03-07 | Stop reason: HOSPADM

## 2018-03-03 RX ORDER — FENOFIBRATE 160 MG/1
160 TABLET ORAL DAILY
COMMUNITY
End: 2018-03-07 | Stop reason: HOSPADM

## 2018-03-03 RX ORDER — ACETAMINOPHEN 650 MG/1
650 SUPPOSITORY RECTAL EVERY 4 HOURS PRN
Status: DISCONTINUED | OUTPATIENT
Start: 2018-03-03 | End: 2018-03-07 | Stop reason: HOSPADM

## 2018-03-03 RX ADMIN — CLOPIDOGREL BISULFATE 75 MG: 75 TABLET ORAL at 14:46

## 2018-03-03 RX ADMIN — INSULIN LISPRO 9 UNITS: 100 INJECTION, SOLUTION INTRAVENOUS; SUBCUTANEOUS at 21:07

## 2018-03-03 RX ADMIN — INSULIN LISPRO 7 UNITS: 100 INJECTION, SOLUTION INTRAVENOUS; SUBCUTANEOUS at 17:11

## 2018-03-03 RX ADMIN — INSULIN LISPRO 7 UNITS: 100 INJECTION, SOLUTION INTRAVENOUS; SUBCUTANEOUS at 11:23

## 2018-03-03 RX ADMIN — INSULIN DETEMIR 20 UNITS: 100 INJECTION, SOLUTION SUBCUTANEOUS at 14:47

## 2018-03-03 RX ADMIN — SODIUM CHLORIDE 500 ML: 9 INJECTION, SOLUTION INTRAVENOUS at 14:58

## 2018-03-03 RX ADMIN — ATORVASTATIN CALCIUM 80 MG: 40 TABLET, FILM COATED ORAL at 20:40

## 2018-03-03 RX ADMIN — ASPIRIN 81 MG 81 MG: 81 TABLET ORAL at 14:47

## 2018-03-03 RX ADMIN — ENOXAPARIN SODIUM 30 MG: 30 INJECTION SUBCUTANEOUS at 14:46

## 2018-03-03 RX ADMIN — PANTOPRAZOLE SODIUM 40 MG: 40 TABLET, DELAYED RELEASE ORAL at 14:46

## 2018-03-03 RX ADMIN — Medication 10 MG: at 11:24

## 2018-03-03 NOTE — THERAPY EVALUATION
Acute Care - Speech Language Pathology Initial Evaluation  [facility]   Cognitive-Communication Evaluation  Clinical Swallow Evaluation     Patient Name: Valarie Camara  : 1945  MRN: [id number]  Today's Date: 3/3/2018               Admit Date: 3/3/2018     Visit Dx:    ICD-10-CM ICD-9-CM   1. Dysarthria R47.1 784.51     Patient Active Problem List   Diagnosis   • Atopic rhinitis   • Arthritis   • Benign paroxysmal positional vertigo   • Neck pain   • Anxiety and depression   • DM (diabetes mellitus)   • Edema   • Gastroesophageal reflux disease with esophagitis   • Multiple-type hyperlipidemia   • Vitamin D deficiency   • Benign essential hypertension   • Obesity (BMI 30-39.9)   • Unstable angina   • History of COPD   • Hypercholesterolemia   • History of cataract   • History of osteoporosis   • History of restless legs syndrome   • History of rheumatoid arthritis   • Elevated serum creatinine   • Heartburn   • Pharyngoesophageal dysphagia   • Constipation   • Schatzki's ring   • Gastritis without bleeding   • Helicobacter pylori infection   • Duodenitis   • Dysarthria   • Right hemiparesis   • HTN (hypertension)   • Stroke   • Multiple thyroid nodules     Past Medical History:   Diagnosis Date   • Acute bronchitis with bronchospasm    • Acute exacerbation of chronic obstructive pulmonary disease (COPD)    • Anxiety    • Arthritis    • B12 deficiency    • Back pain    • Cataract    • Cervicalgia    • Colonic polyp     History of colonic polyps    • COPD (chronic obstructive pulmonary disease)    • Depression    • Diverticulitis    • Dysphagia    • Edema    • Esophageal reflux    • Folic acid deficiency    • Herpes zoster    • High cholesterol    • History of kidney infection    • History of recurrent urinary tract infection    • HTN (hypertension)    • Hypercholesterolemia    • Kidney infection    • Malignant hypertension 2015     Accelerated essential hypertension   • Measles     rubeola   •  Muscle spasm    • Neoplasm of uncertain behavior of skin    • Osteoporosis    • Recurrent urinary tract infection    • Rheumatoid arthritis    • RLS (restless legs syndrome)    • Stomach ulcer    • Type 2 diabetes mellitus    • Vitamin D deficiency      Past Surgical History:   Procedure Laterality Date   • CATARACT EXTRACTION Left 02/11/2013    with lens implant   • CATARACT EXTRACTION Right 04/15/2013    with lens implant   • CATARACT EXTRACTION WITH INTRAOCULAR LENS IMPLANT Left 02/11/2013   • CATARACT EXTRACTION WITH INTRAOCULAR LENS IMPLANT Right 04/15/2013   • COLONOSCOPY  2012   • ENDOSCOPY N/A 11/13/2017    Procedure: ESOPHAGOGASTRODUODENOSCOPY with biopsies and esophageal balloon dilitation;  Surgeon: Welsey Stovall MD;  Location: [facility] ENDOSCOPY;  Service:    • HYSTERECTOMY  1979   • UPPER GASTROINTESTINAL ENDOSCOPY  12/09/2013   • UPPER GASTROINTESTINAL ENDOSCOPY  11/13/2017          SLP EVALUATION (last 72 hours)      SLP Evaluation       03/03/18 1300                Clinical Impression    Criteria for Skilled Therapeutic Interventions Met skilled criteria for speech language intervention met  -        Rehab Potential good, to achieve stated therapy goals  -        Therapy Frequency 5 times/wk  -        Cognitive Assessment/Intervention    Short/Long Term Memory intact short term memory;intact long term memory  -        Additional Documentation Cognitive Assessment Intervention (Group)  -        Cognitive Assessment Intervention    Behavior/Mood Observations behavior appropriate to situation, WNL/WFL  -        Attention WNL/WFL  -SM        Communication Assessment/Intervention    Additional Documentation Auditory Comprehen Assess/Intervention (Group);Verbal Expression Assess/Intervention (Group);Motor Speech Assessment/Intervention (Group)  -        Auditory Comprehen Assess/Intervention    Answers Yes/No Questions successful, basic questions;successful, closed questions;successful,  concrete questions;successful, personal questions  -SM        Able to Follow Commands success, 1 step commands  -        Verbal Expression Assess/Intervention    Conversational Speech WNL/WFL  -SM        Conversational Speech Comment at simple level  -SM        Motor Speech Assess/Intervention    Motor Speech- Apraxia WNL/WFL  -SM        Motor Speech- Dysarthria Comment Mild flaccid dysarthria - imprecise articulation  -        Communication Treatment Objective and Progress    Dysarthria Treatment Objectives Improve articulation  -SM        Improve articulation    Improve articulation: by over-articulating at word level;by over-articulating at phrase level;by over-articulating in connected speech  -        Status: Improve articulation New  -SM        Articulation Progress continue to address  -          User Key  (r) = Recorded By, (t) = Taken By, (c) = Cosigned By    Initials Name Effective Dates    LUIS CARLOS Salgado MS CCC-SLP 06/22/15 -            EDUCATION  The patient has been educated in the following areas:   Cognitive Impairment Communication Impairment.    SLP Recommendation and Plan        Rehab Potential: good, to achieve stated therapy goals  Criteria for Skilled Therapeutic Interventions Met: skilled criteria for speech language intervention met        Therapy Frequency: 5 times/wk          Plan of Care Review  Plan Of Care Reviewed With: patient, family  Outcome Summary/Follow up Plan: Dysphagia Eval: Swallow WFL, safe for regular diet and thin liquids. No dysphagia needs. Cog-Comm Eval initiatied: Mild flaccid dysarthria c/b imprecise articulation though good speech intelligibility. Simple level communication skills WFL (DNT reading/writing). Ox4, good attention and insight. Good recall. Pt quite hungry and lunch had just arrived, will continue dx tx at next session.          IP SLP Goals       03/03/18 1421          Dysarthria- Optimal Particpation in Care    Dysarthria Optimal  Participation in Care- SLP, Date Established 18  -      Dysarthria Optimal Participation in Care- SLP, Time to Achieve by discharge  -      Dysarthria Optimal Participation in Care- SLP, Outcome goal ongoing  -        User Key  (r) = Recorded By, (t) = Taken By, (c) = Cosigned By    Initials Name Provider Type    LUIS CARLOS Salgado MS CCC-SLP Speech and Language Pathologist              Time Calculation:         Time Calculation- SLP       18 1423          Time Calculation- SLP    SLP Start Time 1300  -      SLP Received On 18  -        User Key  (r) = Recorded By, (t) = Taken By, (c) = Cosigned By    Initials Name Provider Type    LUIS CARLOS Salgado MS CCC-SLP Speech and Language Pathologist          Therapy Charges for Today     Code Description Service Date Service Provider Modifiers Qty    11416843796 HC ST EVAL ORAL PHARYNG SWALLOW 2 3/3/2018 Jeannette Salgado MS CCC-SLP GN 1    [id number] HC ST EVAL SPEECH AND PROD W LANG  2 3/3/2018 Jeannette Salgado MS CCC-SLP GN 1                     Jeannette Salgado MS CCC-SLP  3/3/2018 and Acute Care - Speech Language Pathology   Swallow Initial Evaluation  David     Patient Name: Valarie Camara  : 1945  MRN: [id number]  Today's Date: 3/3/2018               Admit Date: 3/3/2018    Visit Dx:     ICD-10-CM ICD-9-CM   1. Dysarthria R47.1 784.51     Patient Active Problem List   Diagnosis   • Atopic rhinitis   • Arthritis   • Benign paroxysmal positional vertigo   • Neck pain   • Anxiety and depression   • DM (diabetes mellitus)   • Edema   • Gastroesophageal reflux disease with esophagitis   • Multiple-type hyperlipidemia   • Vitamin D deficiency   • Benign essential hypertension   • Obesity (BMI 30-39.9)   • Unstable angina   • History of COPD   • Hypercholesterolemia   • History of cataract   • History of osteoporosis   • History of restless legs syndrome   • History of rheumatoid arthritis   • Elevated  serum creatinine   • Heartburn   • Pharyngoesophageal dysphagia   • Constipation   • Schatzki's ring   • Gastritis without bleeding   • Helicobacter pylori infection   • Duodenitis   • Dysarthria   • Right hemiparesis   • HTN (hypertension)   • Stroke   • Multiple thyroid nodules     Past Medical History:   Diagnosis Date   • Acute bronchitis with bronchospasm    • Acute exacerbation of chronic obstructive pulmonary disease (COPD)    • Anxiety    • Arthritis    • B12 deficiency    • Back pain    • Cataract    • Cervicalgia    • Colonic polyp     History of colonic polyps    • COPD (chronic obstructive pulmonary disease)    • Depression    • Diverticulitis    • Dysphagia    • Edema    • Esophageal reflux    • Folic acid deficiency    • Herpes zoster    • High cholesterol    • History of kidney infection    • History of recurrent urinary tract infection    • HTN (hypertension)    • Hypercholesterolemia    • Kidney infection    • Malignant hypertension 4/26/2015     Accelerated essential hypertension   • Measles     rubeola   • Muscle spasm    • Neoplasm of uncertain behavior of skin    • Osteoporosis    • Recurrent urinary tract infection    • Rheumatoid arthritis    • RLS (restless legs syndrome)    • Stomach ulcer    • Type 2 diabetes mellitus    • Vitamin D deficiency      Past Surgical History:   Procedure Laterality Date   • CATARACT EXTRACTION Left 02/11/2013    with lens implant   • CATARACT EXTRACTION Right 04/15/2013    with lens implant   • CATARACT EXTRACTION WITH INTRAOCULAR LENS IMPLANT Left 02/11/2013   • CATARACT EXTRACTION WITH INTRAOCULAR LENS IMPLANT Right 04/15/2013   • COLONOSCOPY  2012   • ENDOSCOPY N/A 11/13/2017    Procedure: ESOPHAGOGASTRODUODENOSCOPY with biopsies and esophageal balloon dilitation;  Surgeon: Wesley Stovall MD;  Location: [facility] ENDOSCOPY;  Service:    • HYSTERECTOMY  1979   • UPPER GASTROINTESTINAL ENDOSCOPY  12/09/2013   • UPPER GASTROINTESTINAL ENDOSCOPY  11/13/2017           SWALLOW EVALUATION (last 72 hours)      Swallow Evaluation       03/03/18 1300                Rehab Evaluation    Document Type evaluation  -        Subjective Information agree to therapy  -        General Information    Patient Profile Review yes  -        Subjective Patient Observations Alert and cooperative  -        Pertinent History Of Current Problem R weakness and slurred speech, MRI pending  -        Current Diet Limitations NPO  -        Prior Level of Function- Communication functional in all spheres  -        Prior Level of Function- Swallowing safe, efficient swallowing in all situations  -        Plans/Goals Discussed With patient and family;agreed upon  -        Barriers to Rehab none identified  -        Clinical Impression    Patient's Goals For Discharge return to regular diet  -        SLP Diet Recommendation regular textures;thin liquids  -        Recommended Feeding/Eating Techniques maintain upright posture during/after eating for 30 mins  -        SLP Rec. for Method of Medication Administration meds whole with thin liquid  -        Pain Assessment    Pain Assessment 0-10  -        Pain Score 7  -        Post Pain Score 7  -        Pain Location Neck  -        Pain Intervention(s) --   Notified RN  -        Cognitive Assessment/Intervention    Current Cognitive/Communication Assessment impaired   dysarthria  -        Orientation Status oriented x 4  -        Follows Commands/Answers Questions 100% of the time  -        Oral Motor Structure and Function    Oral Musculature General Assessment WFL (within functional limits)  -        Clinical Swallow Exam    Oral Phase Results intact oral phase without signs of dysfunction  -        Pharyngeal Phase Results no signs/symptoms of pharyngeal impairment  -        Summary of Clinical Exam Swallow WFL  -          User Key  (r) = Recorded By, (t) = Taken By, (c) = Cosigned By    Initials Name  Effective Dates    LUIS CARLOS Salgado MS CCC-SLP 06/22/15 -         EDUCATION  The patient has been educated in the following areas:   Dysphagia (Swallowing Impairment).    SLP Recommendation and Plan     SLP Diet Recommendation: regular textures, thin liquids  Recommended Feeding/Eating Techniques: maintain upright posture during/after eating for 30 mins  SLP Rec. for Method of Medication Administration: meds whole with thin liquid                               Plan of Care Review  Plan Of Care Reviewed With: patient, family  Outcome Summary/Follow up Plan: Dysphagia Eval: Swallow WFL, safe for regular diet and thin liquids. No dysphagia needs. Cog-Comm Eval initiatied: Mild flaccid dysarthria c/b imprecise articulation though good speech intelligibility. Simple level communication skills WFL (DNT reading/writing). Ox4, good attention and insight. Good recall. Pt quite hungry and lunch had just arrived, will continue dx tx at next session.          IP SLP Goals       03/03/18 1421          Dysarthria- Optimal Particpation in Care    Dysarthria Optimal Participation in Care- SLP, Date Established 03/03/18  -      Dysarthria Optimal Participation in Care- SLP, Time to Achieve by discharge  -      Dysarthria Optimal Participation in Care- SLP, Outcome goal ongoing  -        User Key  (r) = Recorded By, (t) = Taken By, (c) = Cosigned By    Initials Name Provider Type    LUIS CARLOS Salgado MS CCC-SLP Speech and Language Pathologist               Time Calculation:         Time Calculation- SLP       03/03/18 1423          Time Calculation- SLP    SLP Start Time 1300  -      SLP Received On 03/03/18  -        User Key  (r) = Recorded By, (t) = Taken By, (c) = Cosigned By    Initials Name Provider Type    LUIS CARLOS Salgado MS CCC-SLP Speech and Language Pathologist          Therapy Charges for Today     Code Description Service Date Service Provider Modifiers Qty    31684764864 HC ST EVAL ORAL  PHARYNG SWALLOW 2 3/3/2018 Jeannette Salgado, MS CCC-SLP GN 1    [id number]  ST EVAL SPEECH AND PROD W LANG  2 3/3/2018 Jeannette Salgado, MS CCC-SLP GN 1               Jeannette Salgado, MS CCC-SLP  3/3/2018

## 2018-03-03 NOTE — PLAN OF CARE
Problem: Patient Care Overview (Adult)  Goal: Plan of Care Review  Outcome: Ongoing (interventions implemented as appropriate)   03/03/18 1421   Coping/Psychosocial Response Interventions   Plan Of Care Reviewed With patient;family   Outcome Evaluation   Outcome Summary/Follow up Plan Dysphagia Eval: Swallow WFL, safe for regular diet and thin liquids. No dysphagia needs. Cog-Comm Eval initiatied: Mild flaccid dysarthria c/b imprecise articulation though good speech intelligibility. Simple level communication skills WFL (DNT reading/writing). Ox4, good attention and insight. Good recall. Pt quite hungry and lunch had just arrived, will continue dx tx at next session.       Problem: Inpatient SLP  Goal: Dysarthria-Patient will improve motor speech skills to allow optimal participation in care  Outcome: Ongoing (interventions implemented as appropriate)   03/03/18 1421   Dysarthria- Optimal Particpation in Care   Dysarthria Optimal Participation in Care- SLP, Date Established 03/03/18   Dysarthria Optimal Participation in Care- SLP, Time to Achieve by discharge   Dysarthria Optimal Participation in Care- SLP, Outcome goal ongoing

## 2018-03-03 NOTE — CONSULTS
Subjective     CC: stroke    History of Present Illness   Valarie Camara is a 72 y.o. female is seen today in consultation at the request of Dr. Dunn for suspected stroke. She was well upon going to bed last evening at about 11 pm. However, upon awakening this morning she felt clumsy on the right side. She has noted associated dysarthria, but no other associated symptoms, pain or discomfort. Her symptoms have been unchanged since onset.     I have reviewed and confirmed the past family, social and medical history as accurate on 3/3/18.     Review of Systems   Constitutional: Negative.    Respiratory: Negative.    Cardiovascular: Negative.    Gastrointestinal: Negative.    Genitourinary: Negative.    All other systems reviewed and are negative.      Objective   General appearance today is normal.   Peripheral pulses were present and symmetric.   The ophthalmoscopic exam today is unremarkable. This discs and posterior elements are unremarkable.    BP (!) 191/97 (BP Location: Right arm)  Pulse 83  Temp 98.3 °F (36.8 °C) (Oral)   Resp 18  SpO2 96%    Physical Exam   Constitutional: She is oriented to person, place, and time.   Neurological: She is oriented to person, place, and time. She has normal strength. She has an abnormal Finger-Nose-Finger Test (dysmetric on the right).   Reflex Scores:       Tricep reflexes are 2+ on the right side and 2+ on the left side.       Bicep reflexes are 2+ on the right side and 2+ on the left side.       Brachioradialis reflexes are 2+ on the right side and 2+ on the left side.       Patellar reflexes are 2+ on the right side and 2+ on the left side.       Achilles reflexes are 2+ on the right side and 2+ on the left side.  Psychiatric: Her speech is slurred.        Neurologic Exam     Mental Status   Oriented to person, place, and time.   Registration: recalls 3 of 3 objects. Recall at 5 minutes: recalls 3 of 3 objects. Follows 3 step commands.   Attention: normal.   Speech:  slurred   Level of consciousness: alert  Knowledge: good.   Able to repeat. Normal comprehension.        Mild dysarthria     Cranial Nerves   Cranial nerves II through XII intact.     Motor Exam   Muscle bulk: normal  Overall muscle tone: normal    Strength   Strength 5/5 throughout.     Sensory Exam   Light touch normal.     Gait, Coordination, and Reflexes     Gait  Gait: (mildly ataxic)    Coordination   Finger to nose coordination: abnormal (dysmetric on the right)    Reflexes   Right brachioradialis: 2+  Left brachioradialis: 2+  Right biceps: 2+  Left biceps: 2+  Right triceps: 2+  Left triceps: 2+  Right patellar: 2+  Left patellar: 2+  Right achilles: 2+  Left achilles: 2+      Laboratory and radiological testing are notable for a head CT from [facility] which was normal (I reviewed images personally). CMP and CBC are unremarkable. CmdC4h=11.30.      Assessment/Plan       DISCUSSION    Valarie Camara is admitted with suspected stroke. Based on her exam I am concerned about a right cerebellar infarct, potentially related to small vessel atherosclerosis. I will add Plavix to her regimen for now while we await her MRI to confirm the underlying pathology, along with ECHO and Carotid Dopplers. She will also be seen by PT/OT/SLP. This was all discussed.      As part of this visit I reviewed prior lab results, reviewed radiology images and obtained additional history from the family which is incorporated in the HPI. Please see above for details.

## 2018-03-03 NOTE — H&P
"    [facility] Medicine Services  HISTORY AND PHYSICAL    Patient Name: Valarie Camara  : 1945  MRN: [id number]  Primary Care Physician: Jeny Neri    Subjective   Subjective     Chief Complaint:  Dysarthria, ataxia, right sided weakness    HPI:  Valarie Camara is a 72 y.o. female with htn, uncontrolled dm, gerd/shatzki ring who presented with right sided leg >arm weakness, slurred speech and ataxia \"dragging right leg\". Last known well last night 11pm. Woke up 7am family noted dragging right foot, slurred speech. Went to [facility]. U/a negative for infection, ct head no acute dz. Sinus rhythm, troponin negative. Contacted neurology and neurointerventional service who recommended ct angio head and neck prior to transfer. Received asa 325 at 09:01 prior to transfer. Currently, patient feels still word finding difficulty, right  and leg raise improved. Hasn't tried to walk here. No chest pain, no dysuria. Poor historian, can't definitively tell me her insulin nor antihypertensive regimen. Says she took norvasc this morning.    Review of Systems   No fever, no chills, no dysuria, ataxia falling a little to right    Otherwise 10-system ROS reviewed and is negative except as mentioned in the HPI.    Personal History     Past Medical History:   Diagnosis Date   • Acute bronchitis with bronchospasm    • Acute exacerbation of chronic obstructive pulmonary disease (COPD)    • Anxiety    • Arthritis    • B12 deficiency    • Back pain    • Cataract    • Cervicalgia    • Colonic polyp     History of colonic polyps    • COPD (chronic obstructive pulmonary disease)    • Depression    • Diverticulitis    • Dysphagia    • Edema    • Esophageal reflux    • Folic acid deficiency    • Herpes zoster    • High cholesterol    • History of kidney infection    • History of recurrent urinary tract infection    • HTN (hypertension)    • Hypercholesterolemia    • Kidney infection    • Malignant " hypertension 4/26/2015     Accelerated essential hypertension   • Measles     rubeola   • Muscle spasm    • Neoplasm of uncertain behavior of skin    • Osteoporosis    • Recurrent urinary tract infection    • Rheumatoid arthritis    • RLS (restless legs syndrome)    • Stomach ulcer    • Type 2 diabetes mellitus    • Vitamin D deficiency        Past Surgical History:   Procedure Laterality Date   • CATARACT EXTRACTION Left 02/11/2013    with lens implant   • CATARACT EXTRACTION Right 04/15/2013    with lens implant   • CATARACT EXTRACTION WITH INTRAOCULAR LENS IMPLANT Left 02/11/2013   • CATARACT EXTRACTION WITH INTRAOCULAR LENS IMPLANT Right 04/15/2013   • COLONOSCOPY  2012   • ENDOSCOPY N/A 11/13/2017    Procedure: ESOPHAGOGASTRODUODENOSCOPY with biopsies and esophageal balloon dilitation;  Surgeon: Wesley Stovall MD;  Location: [facility] ENDOSCOPY;  Service:    • HYSTERECTOMY  1979   • UPPER GASTROINTESTINAL ENDOSCOPY  12/09/2013   • UPPER GASTROINTESTINAL ENDOSCOPY  11/13/2017       Family History: family history includes Arthritis in her mother, other, and other; Diabetes in her mother and other; Hypertension in her mother and other. There is no history of Colon cancer.     Social History:  reports that she has quit smoking. She has never used smokeless tobacco. She reports that she drinks alcohol. She reports that she does not use illicit drugs.  Social History     Social History Narrative       Medications:  Prescriptions Prior to Admission   Medication Sig Dispense Refill Last Dose   • aspirin 81 MG EC tablet Take 81 mg by mouth Daily.      • diphenhydrAMINE (BENADRYL) 50 MG capsule Take 50 mg by mouth At Night As Needed for Sleep.      • fenofibrate 160 MG tablet Take 160 mg by mouth Daily.      • Insulin Glargine (BASAGLAR KWIKPEN) 100 UNIT/ML injection pen Inject 50 Units under the skin Daily.      • raNITIdine (ZANTAC) 150 MG tablet Take 150 mg by mouth Daily.      • rosuvastatin (CRESTOR) 10 MG tablet Take  10 mg by mouth Every Night.      • valsartan (DIOVAN) 40 MG tablet Take 40 mg by mouth 2 (Two) Times a Day.      • amLODIPine (NORVASC) 10 MG tablet Take 1 tablet by mouth Daily. 30 tablet 5 Taking   • fluticasone-salmeterol (ADVAIR HFA) 115-21 MCG/ACT inhaler 2 (two) times a day.   Taking   • hydrochlorothiazide (HYDRODIURIL) 12.5 MG tablet Take 1 tablet by mouth daily. 30 tablet 5 Not Taking   • Insulin Glargine (LANTUS SOLOSTAR) 100 UNIT/ML injection pen Inject 30 Units under the skin 2 (two) times a day. 3 pen 0 Taking   • Insulin Glargine 300 UNIT/ML solution pen-injector Inject 55 Units under the skin daily for 90 days. 12 pen 3 Taking   • Insulin Lispro 200 UNIT/ML solution pen-injector Inject 28 Units under the skin 2 (two) times a day. 2 pen 0 Taking   • Insulin Pen Needle 31G X 5 MM misc Inject insulin three times daily 100 each 11 11/12/2017 at 1330   • Insulin Pen Needle 31G X 8 MM misc 5 shots daily 2 each 11 Unknown at Unknown time   • lisinopril (PRINIVIL,ZESTRIL) 20 MG tablet Take 20 mg by mouth Daily.   Taking   • loratadine (CLARITIN) 10 MG tablet Take 1 tablet by mouth Daily. 30 tablet 5 Taking   • losartan (COZAAR) 100 MG tablet Take 100 mg by mouth daily.   Taking   • lovastatin (MEVACOR) 10 MG tablet Take  by mouth daily.   Taking   • metroNIDAZOLE (FLAGYL) 250 MG tablet Take 1 tablet by mouth 4 (Four) Times a Day. 1 tablet po four times daily for 14 days 56 tablet 0    • mometasone (NASONEX) 50 MCG/ACT nasal spray into each nostril daily.   Not Taking   • pantoprazole (PROTONIX) 40 MG EC tablet Take 1 tablet by mouth Daily. 30 tablet 5 Taking   • pantoprazole (PROTONIX) 40 MG EC tablet Take 1 tablet twice a day 30 minutes before meals x 14 days 28 tablet 0    • sertraline (ZOLOFT) 50 MG tablet TAKE ONE TABLET EVERY DAY 30 tablet 5 Taking   • spironolactone (ALDACTONE) 50 MG tablet Take 1 tablet by mouth 2 (two) times a day. (Patient taking differently: Take 50 mg by mouth 2 (Two) Times a Day.)  30 tablet 5 Taking   • vitamin D (ERGOCALCIFEROL) 46715 units capsule capsule TAKE ONE CAPSULE BY MOUTH ONCE A WEEK 4 capsule 0 Taking       Allergies   Allergen Reactions   • Penicillins    • Sulfa Antibiotics        Objective   Objective     Vital Signs:   Temp:  [98.3 °F (36.8 °C)] 98.3 °F (36.8 °C)  Heart Rate:  [83] 83  Resp:  [18] 18  BP: (191)/(97) 191/97   Total (NIH Stroke Scale): 2    Physical Exam   Alert, oriented x 3  Ncat, oroph clear  Dysarthria, slurred speech, equal  and leg raise, but abnormal finger to nose right extremity  rrr  ctab  abd soft, nontender  No cce  No extremity rash  Normal affect    Results Reviewed:  I have personally reviewed current lab, radiology, and data and agree.      Results from last 7 days  Lab Units 03/03/18  1050   WBC 10*3/mm3 5.63   HEMOGLOBIN g/dL 13.4   HEMATOCRIT % 40.6   PLATELETS 10*3/mm3 261   INR  0.89*       Results from last 7 days  Lab Units 03/03/18  1050   SODIUM mmol/L 135   POTASSIUM mmol/L 4.3   CHLORIDE mmol/L 100   CO2 mmol/L 30.0   BUN mg/dL 19   CREATININE mg/dL 1.10   GLUCOSE mg/dL 301*   CALCIUM mg/dL 9.5   ALT (SGPT) U/L 23   AST (SGOT) U/L 18     CrCl cannot be calculated (Unknown ideal weight.).  Brief Urine Lab Results     None        No results found for: BNP  No results found for: PHART  Imaging Results (last 24 hours)     ** No results found for the last 24 hours. **             Assessment/Plan   Assessment / Plan     Hospital Problem List     * (Principal)Dysarthria    DM (diabetes mellitus) (Chronic)    Overview Addendum 8/1/2016 10:46 PM by Yesi Linder     Samples provided of Humalog 75/25 , 50 units twice a day   Her diabetes mellitus type 2 is unstable. The patient is adherent with her medication regimen. She denies medication side effects.   Diabetes mellitus with microalbuminuria or microproteinuria    She is also to follow-up in sugar. She needs samples of insulin as her co-pay through insurances very high and she cannot  "afford it. S         Multiple-type hyperlipidemia (Chronic)    Overview Signed 8/1/2016 10:39 PM by Yesi Linder      Mixed hyperlipidemia          Heartburn    Schatzki's ring    Right hemiparesis    HTN (hypertension)    Stroke    Multiple thyroid nodules    Overview Signed 3/3/2018 12:12 PM by Danie Dunn MD     Seen incidentally on ct angio head and neck (performed at [facility]) 3/3/18             -ekg sinus rhythm  -U/a negataive outside hospital  -Troponin outside negative  -CT angio head and neck: 50% left ica, mild left mca dz, basilar a. Possible chronic occlusion    --------------------------------------------------------------------------    Assessment & Plan:  -suspect small vessel cva  -asa 325mg daily, high dose statin  -dr. Crespo to see, he states he is going to enter \"cva order set\" (future imaging including mri, echo, duplex per dr. Crespo)  -tsh (thyroid nodules)   -likely follow up with pcp outpatient for thyroid nodules  -insulin dosing unclear, try levemir 20units sq daily w/ sliding scale for now and titrate, check a1c    DVT prophylaxis: sq lovenox    CODE STATUS:  Full Code    Admission Status:  Inpatient status    Electronically signed by Danie Dunn MD, 03/03/18, 12:10 PM.    Addendum at 1:09 pm:  -rapid response for slurred speech, right facial tingling and arm weakness. sbp was 90's. Had nitro paste still on since other hospital. We had given only 10mg of hydralazine. I suspect patient not taking any of her home bp meds.   -stop all antihypertensives  -allow permissive hypertension (only prn hydralazine 5mg for sbp >210; perhaps add back some meds over next 48 hours   "

## 2018-03-03 NOTE — NURSING NOTE
"  ACC REVIEW REPORT: [facility]        PATIENT NAME: Valarie Camara    PATIENT ID: [id number]    BED: [id number]    BED TYPE: TELE    BED GIVEN TO: SYDNEY EDGAR RN     TIME BED GIVEN: 0900    YOB: 1945    AGE: 72 YRS    GENDER:FEMALE     PREVIOUS ADMIT TO [facility]:     PREVIOUS ADMISSION DATE:     PATIENT CLASS:     TODAY'S DATE: 3/3/2018    TRANSFER DATE: 03/03/18    ETA:     TRANSFERRING FACILITY: [facility]    TRANSFERRING FACILITY PHONE # : [phone]    TRANSFERRING MD: DR RIYA LAM    DATE/TIME REQUEST RECEIVED: 03/03/18 @ 0829    [facility] RN: RASHARD BERMUDEZ     REPORT FROM: SYDNEY EDGAR RB     TIME REPORT TAKEN: 0900    DIAGNOSIS: STROKE    REASON FOR TRANSFER TO [facility]: HIGHER LEVEL CARE    TRANSPORTATION: GROUND    CLINICAL REASON FOR TRANSFER TO [facility]: LAST KNOWN WELL WAS ABOUT 11 PM LAST NIGHT.  PATIENT SAID THAT SHE DID GET UP TO GO TO THE  IN THE NIGHT, BUT DIDN'T NOTICE ANYTHING UNUSUAL.  HOWEVER WHEN SHE AWAKENED THIS AM, HER RIGHT LEG WAS NOT WORKING RIGHT.  SHE CAN LIFT IT BUT IT'S STILL NOT MOVING RIGHT.  SHE HAS ATAXIA IN HER RIGHT ARM AND RIGHT LEG AND SHE HAD SLIGHTLY SLURRED SPEECH.    CT HEAD SHOWED NOTHING ACUTE.  SHE JUST FINISHED THE CTA AT [facility], BUT RESULTS WERE NOT AVAILABLE AT THE TIME OF THIS REPORT.  HER NIH IS A \"6.\"  CREAT. IS 1.2 AND OTHER LABS ARE WNL PER REPORT.  SHE IS RUNNING NSR WITH OCCASIONAL PVC'S.  HISTORY OF HTN., DIABETES AND HYPERLIPIDEMIA.  LAST GLUCOSE  AND PATIENT SAYS THAT SHE USUALLY RUNS AROUND 300.        CLINICAL INFORMATION    HEIGHT:     WEIGHT:     ALLERGIES: PCN & SULFA ANTIBIOTICS    URIBE:     INFECTIOUS DISEASE:     ISOLATION:     1ST VITAL SIGNS:   TIME:   TEMP:   PULSE:   B/P:   RESP:     LAST VITAL SIGNS:  TIME: 0900  TEMP: 98/3  PULSE: 80  B/P: 220/98  RESP: 18    LAB INFORMATION: CREAT IS 1.2    CULTURE INFORMATION:     MEDS/IV FLUIDS: # 18 G RAC     SHE HAS RECEIVED  MG AND NITROPASTE 1 INCH TO HER LEFT CHEST " "WALL.        CARDIAC SYSTEM:    CHEST PAIN:     RATE:     SCALE:     RHYTHM: NSR WITH OCCASIONAL PVC'S    Is patient taking or has patient been given any drugs that could increase bleeding? YES  (Plavix, Brilinta, Effient, Eliquis, Xarelto, Warfarin, Integrilin, Angiomax)    DRUG:  MG    DOSE/FREQUENCY:     CARDIAC ENZYMES:    DATE:   TIME:   CK:   CKMB:   MALCOLM:   TROP:     DATE:   TIME:   CK:   CKMB:   MALCOLM:   TROP:     CARDIAC NOTES:  NSR WITH OCCASIONAL PVC'S      RESPIRATORY SYSTEM:    LUNG SOUNDS:    CLEAR: YES  CRACKLES:   WHEEZES:   RHONCHI:   DIMINISHED:     ABG DATE:         ABG TIME:     ABG RESULTS:    PH:   PO2:   PCO2:   HCO3:   O2 SAT:     OXYGEN: NO    O2 SAT: 93-95%    ADMINISTRATION ROUTE:     IMAGING FINDINGS:     PNEUMO LOCATION:     PNEUMO SIZE:     PNEUMO CHEST TUBE SEAL TYPE:     RADIOLOGY RESULTS:     RESPIRATORY STATUS:       CNS/MUSCULOSKELETAL    ALERT AND ORIENTED:    PERSON: YES  PLACE: YES  TIME: YES    INJURY:NO    WHERE:       STROKE SCALE:  \"5\"    SIZE OF HEMORRHAGE:     SYMPTOMS: (CHOOSE APPROPRIATE)    ASPHASIA:   ATAXIA: YES RIGHT LEG/RIGHT ARM  DYSARTHRIA:   DYSPHASIA:   HEADACHE:   PARALYSIS:   SEIZURE:   SYNCOPE:   VERTIGO:   VISION CHANGE:        EXTREMITY WEAKNESS:    LEFT ARM:   RIGHT ARM: YES  LEFT LEG:   RIGHT LEG: YES    CAT SCAN RESULTS: NO ACUTE PROCESS    MRI RESULTS:     CNS/MUSCULOSKELETAL NOTES: HAVING TROUBLE MOVING HER RIGHT LEG, ALTHOUGH SHE IS ABLE TO LIFT IT.        GI//GY- WNL      ABDOMINAL PAIN:     VOMITING:     DIARRHEA:     NAUSEA:     BOWEL SOUNDS:     OCCULT STOOL:     VAGINAL BLEEDING:     TESTICULAR PAIN:     HEMATURIA:     NG TUBE:    SIZE:   DATE INSERTED:       ULTRASOUND:     ULTRASOUND RESULTS:       ACUTE ABDOMEN:     ACUTE ABDOMEN RESULTS:       CT SCAN:     CT SCAN RESULTS:       GI//GY NOTES:     PAST MEDICAL HISTORY: DIABETES, HTN., AND HYPERLIPIDEMIA.      OTHER SYMPTOM NOTES:     ADDITIONAL NOTES:           Tara Carrasco, " RN  3/3/2018  8:46 AM

## 2018-03-04 ENCOUNTER — APPOINTMENT (OUTPATIENT)
Dept: CARDIOLOGY | Facility: HOSPITAL | Age: 73
End: 2018-03-04
Attending: INTERNAL MEDICINE

## 2018-03-04 PROBLEM — IMO0002 DM (DIABETES MELLITUS), TYPE 2, UNCONTROLLED W/NEUROLOGIC COMPLICATION: Status: ACTIVE | Noted: 2018-03-04

## 2018-03-04 PROBLEM — I63.81 LACUNAR STROKE: Status: ACTIVE | Noted: 2018-03-04

## 2018-03-04 LAB
ARTICHOKE IGE QN: 101 MG/DL (ref 0–130)
AV HCM GRAD VALS: 22 MMHG
AV LVOT PEAK GRADIENT: 5 MMHG
BH CV ECHO MEAS - AO MAX PG (FULL): 10.3 MMHG
BH CV ECHO MEAS - AO MAX PG: 15 MMHG
BH CV ECHO MEAS - AO MEAN PG (FULL): 5.5 MMHG
BH CV ECHO MEAS - AO MEAN PG: 8 MMHG
BH CV ECHO MEAS - AO ROOT AREA (BSA CORRECTED): 1.5
BH CV ECHO MEAS - AO ROOT AREA: 6.4 CM^2
BH CV ECHO MEAS - AO ROOT DIAM: 2.9 CM
BH CV ECHO MEAS - AO V2 MAX: 195.5 CM/SEC
BH CV ECHO MEAS - AO V2 MEAN: 131.4 CM/SEC
BH CV ECHO MEAS - AO V2 VTI: 36.8 CM
BH CV ECHO MEAS - AVA(I,A): 2.2 CM^2
BH CV ECHO MEAS - AVA(I,D): 2.2 CM^2
BH CV ECHO MEAS - AVA(V,A): 2 CM^2
BH CV ECHO MEAS - AVA(V,D): 2 CM^2
BH CV ECHO MEAS - BSA(HAYCOCK): 2.1 M^2
BH CV ECHO MEAS - BSA: 1.9 M^2
BH CV ECHO MEAS - BZI_BMI: 36 KILOGRAMS/M^2
BH CV ECHO MEAS - BZI_METRIC_HEIGHT: 160 CM
BH CV ECHO MEAS - BZI_METRIC_WEIGHT: 92.1 KG
BH CV ECHO MEAS - CONTRAST EF (2CH): 73.7 ML/M^2
BH CV ECHO MEAS - CONTRAST EF 4CH: 73.1 ML/M^2
BH CV ECHO MEAS - EDV(CUBED): 35.4 ML
BH CV ECHO MEAS - EDV(MOD-SP2): 57 ML
BH CV ECHO MEAS - EDV(MOD-SP4): 52 ML
BH CV ECHO MEAS - EDV(TEICH): 43.6 ML
BH CV ECHO MEAS - EF(CUBED): 72.5 %
BH CV ECHO MEAS - EF(MOD-SP2): 73.7 %
BH CV ECHO MEAS - EF(MOD-SP4): 73.1 %
BH CV ECHO MEAS - EF(TEICH): 65.6 %
BH CV ECHO MEAS - ESV(CUBED): 9.7 ML
BH CV ECHO MEAS - ESV(MOD-SP2): 15 ML
BH CV ECHO MEAS - ESV(MOD-SP4): 14 ML
BH CV ECHO MEAS - ESV(TEICH): 15 ML
BH CV ECHO MEAS - FS: 35 %
BH CV ECHO MEAS - IVS/LVPW: 1.2
BH CV ECHO MEAS - IVSD: 1.6 CM
BH CV ECHO MEAS - LA DIMENSION: 3.3 CM
BH CV ECHO MEAS - LA/AO: 1.1
BH CV ECHO MEAS - LAT PEAK E' VEL: 3.6 CM/SEC
BH CV ECHO MEAS - LV DIASTOLIC VOL/BSA (35-75): 26.7 ML/M^2
BH CV ECHO MEAS - LV MASS(C)D: 172.1 GRAMS
BH CV ECHO MEAS - LV MASS(C)DI: 88.4 GRAMS/M^2
BH CV ECHO MEAS - LV MAX PG: 4.7 MMHG
BH CV ECHO MEAS - LV MEAN PG: 2.5 MMHG
BH CV ECHO MEAS - LV SYSTOLIC VOL/BSA (12-30): 7.2 ML/M^2
BH CV ECHO MEAS - LV V1 MAX: 108.1 CM/SEC
BH CV ECHO MEAS - LV V1 MEAN: 71.6 CM/SEC
BH CV ECHO MEAS - LV V1 VTI: 22.1 CM
BH CV ECHO MEAS - LVIDD: 3.3 CM
BH CV ECHO MEAS - LVIDS: 2.1 CM
BH CV ECHO MEAS - LVLD AP2: 6.7 CM
BH CV ECHO MEAS - LVLD AP4: 7.6 CM
BH CV ECHO MEAS - LVLS AP2: 5.6 CM
BH CV ECHO MEAS - LVLS AP4: 6 CM
BH CV ECHO MEAS - LVOT AREA (M): 3.5 CM^2
BH CV ECHO MEAS - LVOT AREA: 3.6 CM^2
BH CV ECHO MEAS - LVOT DIAM: 2.1 CM
BH CV ECHO MEAS - LVPWD: 1.3 CM
BH CV ECHO MEAS - MED PEAK E' VEL: 3.83 CM/SEC
BH CV ECHO MEAS - MV A MAX VEL: 102.2 CM/SEC
BH CV ECHO MEAS - MV DEC TIME: 0.27 SEC
BH CV ECHO MEAS - MV E MAX VEL: 74 CM/SEC
BH CV ECHO MEAS - MV E/A: 0.72
BH CV ECHO MEAS - PA ACC SLOPE: 774.1 CM/SEC^2
BH CV ECHO MEAS - PA ACC TIME: 0.11 SEC
BH CV ECHO MEAS - PA PR(ACCEL): 27.9 MMHG
BH CV ECHO MEAS - RVDD: 2.7 CM
BH CV ECHO MEAS - SI(AO): 121.8 ML/M^2
BH CV ECHO MEAS - SI(CUBED): 13.2 ML/M^2
BH CV ECHO MEAS - SI(LVOT): 41.1 ML/M^2
BH CV ECHO MEAS - SI(MOD-SP2): 21.6 ML/M^2
BH CV ECHO MEAS - SI(MOD-SP4): 19.5 ML/M^2
BH CV ECHO MEAS - SI(TEICH): 14.7 ML/M^2
BH CV ECHO MEAS - SV(AO): 237.1 ML
BH CV ECHO MEAS - SV(CUBED): 25.7 ML
BH CV ECHO MEAS - SV(LVOT): 80 ML
BH CV ECHO MEAS - SV(MOD-SP2): 42 ML
BH CV ECHO MEAS - SV(MOD-SP4): 38 ML
BH CV ECHO MEAS - SV(TEICH): 28.6 ML
BH CV ECHO MEAS - TAPSE (>1.6): 2.3 CM2
BH CV XLRA - RV BASE: 2.6 CM
BH CV XLRA - RV LENGTH: 7.1 CM
BH CV XLRA - RV MID: 2.3 CM
BH CV XLRA - TDI S': 17.1 CM/SEC
CHOLEST SERPL-MCNC: 170 MG/DL (ref 0–200)
E/E' RATIO: 19.9
GLUCOSE BLDC GLUCOMTR-MCNC: 138 MG/DL (ref 70–130)
GLUCOSE BLDC GLUCOMTR-MCNC: 283 MG/DL (ref 70–130)
GLUCOSE BLDC GLUCOMTR-MCNC: 319 MG/DL (ref 70–130)
GLUCOSE BLDC GLUCOMTR-MCNC: 330 MG/DL (ref 70–130)
HDLC SERPL-MCNC: 41 MG/DL (ref 40–60)
LEFT ATRIUM VOLUME INDEX: 21 ML/M2
LV EF 2D ECHO EST: 75 %
MAXIMAL PREDICTED HEART RATE: 147 BPM
STRESS TARGET HR: 125 BPM
TRIGL SERPL-MCNC: 146 MG/DL (ref 0–150)

## 2018-03-04 PROCEDURE — 93880 EXTRACRANIAL BILAT STUDY: CPT | Performed by: INTERNAL MEDICINE

## 2018-03-04 PROCEDURE — 97162 PT EVAL MOD COMPLEX 30 MIN: CPT

## 2018-03-04 PROCEDURE — 93880 EXTRACRANIAL BILAT STUDY: CPT

## 2018-03-04 PROCEDURE — 63710000001 INSULIN LISPRO (HUMAN) PER 5 UNITS: Performed by: INTERNAL MEDICINE

## 2018-03-04 PROCEDURE — 63710000001 INSULIN DETEMIR PER 5 UNITS: Performed by: INTERNAL MEDICINE

## 2018-03-04 PROCEDURE — 99232 SBSQ HOSP IP/OBS MODERATE 35: CPT | Performed by: PSYCHIATRY & NEUROLOGY

## 2018-03-04 PROCEDURE — 97530 THERAPEUTIC ACTIVITIES: CPT | Performed by: OCCUPATIONAL THERAPIST

## 2018-03-04 PROCEDURE — 97166 OT EVAL MOD COMPLEX 45 MIN: CPT | Performed by: OCCUPATIONAL THERAPIST

## 2018-03-04 PROCEDURE — 82962 GLUCOSE BLOOD TEST: CPT

## 2018-03-04 PROCEDURE — 99233 SBSQ HOSP IP/OBS HIGH 50: CPT | Performed by: INTERNAL MEDICINE

## 2018-03-04 PROCEDURE — 80061 LIPID PANEL: CPT | Performed by: INTERNAL MEDICINE

## 2018-03-04 PROCEDURE — 25010000002 ENOXAPARIN PER 10 MG: Performed by: INTERNAL MEDICINE

## 2018-03-04 RX ADMIN — ASPIRIN 81 MG 81 MG: 81 TABLET ORAL at 08:43

## 2018-03-04 RX ADMIN — PANTOPRAZOLE SODIUM 40 MG: 40 TABLET, DELAYED RELEASE ORAL at 08:43

## 2018-03-04 RX ADMIN — INSULIN LISPRO 7 UNITS: 100 INJECTION, SOLUTION INTRAVENOUS; SUBCUTANEOUS at 12:27

## 2018-03-04 RX ADMIN — INSULIN LISPRO 4 UNITS: 100 INJECTION, SOLUTION INTRAVENOUS; SUBCUTANEOUS at 20:30

## 2018-03-04 RX ADMIN — ENOXAPARIN SODIUM 30 MG: 30 INJECTION SUBCUTANEOUS at 14:10

## 2018-03-04 RX ADMIN — INSULIN DETEMIR 20 UNITS: 100 INJECTION, SOLUTION SUBCUTANEOUS at 14:07

## 2018-03-04 RX ADMIN — INSULIN LISPRO 5 UNITS: 100 INJECTION, SOLUTION INTRAVENOUS; SUBCUTANEOUS at 12:27

## 2018-03-04 RX ADMIN — INSULIN LISPRO 7 UNITS: 100 INJECTION, SOLUTION INTRAVENOUS; SUBCUTANEOUS at 17:43

## 2018-03-04 RX ADMIN — INSULIN LISPRO 5 UNITS: 100 INJECTION, SOLUTION INTRAVENOUS; SUBCUTANEOUS at 17:43

## 2018-03-04 RX ADMIN — ATORVASTATIN CALCIUM 80 MG: 40 TABLET, FILM COATED ORAL at 20:30

## 2018-03-04 RX ADMIN — CLOPIDOGREL BISULFATE 75 MG: 75 TABLET ORAL at 08:43

## 2018-03-04 NOTE — PLAN OF CARE
Problem: Patient Care Overview (Adult)  Goal: Plan of Care Review  Outcome: Ongoing (interventions implemented as appropriate)   03/04/18 1010   Coping/Psychosocial Response Interventions   Plan Of Care Reviewed With patient   Outcome Evaluation   Outcome Summary/Follow up Plan Pt presents with dysarthria and R sided GM and strength deficits impacting balance, ADL and IADL performance. Pt lives alone & has only SPC at home. Recommend tub bench and elevated t-seat for increased safety along with continued skilled OT services to address deficits to promote independence and safety.        Problem: Stroke (Ischemic) (Adult)  Goal: Signs and Symptoms of Listed Potential Problems Will be Absent or Manageable (Stroke)  Outcome: Ongoing (interventions implemented as appropriate)   03/04/18 1010   Stroke (Ischemic)   Problems Assessed (Stroke (Ischemic)/TIA) motor/sensory impairment   Problems Present (Stroke (Ischemic)/TIA) motor/sensory impairment       Problem: Inpatient Occupational Therapy  Goal: Transfer Training Goal 1 LTG- OT  Outcome: Ongoing (interventions implemented as appropriate)   03/04/18 1010   Transfer Training OT LTG   Transfer Training OT LTG, Time to Achieve 5 days   Transfer Training OT LTG, Activity Type toilet;tub   Transfer Training OT LTG, [place] Level contact guard assist   Transfer Training OT LTG, Outcome goal ongoing     Goal: Strength Goal LTG- OT  Outcome: Ongoing (interventions implemented as appropriate)   03/04/18 1010   Strength OT LTG   Strength Goal OT LTG, Time to Achieve 5 days   Strength Goal OT LTG, Functional Goal Pt to be independent with BUE strengthening HEP by d/c to increase engagement and independence with daily tasks.   Strength Goal OT LTG, Outcome goal ongoing     Goal: Coordination Goal LTG- OT  Outcome: Ongoing (interventions implemented as appropriate)   03/04/18 1010   Coordination OT LTG   Coordination OT LTG, Time to Achieve 5 days   Coordination OT LTG,  Activity Type GM written ex program   Coordination OT LTG, [place] Level independent   Coordination OT LTG, Outcome goal ongoing

## 2018-03-04 NOTE — PROGRESS NOTES
[facility] Medicine Services  PROGRESS NOTE    Patient Name: Valarie Camara  : 1945  MRN: [id number]    Date of Admission: 3/3/2018  Length of Stay: 1  Primary Care Physician: Jeny Neri    Subjective   Subjective     CC:  stroke    HPI:  Seen this morning. Still some dysarthria and mild right weakness but otherwise had good night. No n/v/d, no chest pain    Review of Systems  No f/c, no dysuria    Otherwise ROS is negative except as mentioned in the HPI.    Objective   Objective     Vital Signs:   Temp:  [98.4 °F (36.9 °C)-98.7 °F (37.1 °C)] 98.7 °F (37.1 °C)  Heart Rate:  [] 78  Resp:  [16-18] 16  BP: ()/(64-85) 183/73  Total (NIH Stroke Scale): 2     Physical Exam:  Alert, oriented x 3  Ncat, oroph clear  Dysarthria, slurred speech, perhaps mildly decreased right  compared to left, grossly equal leg strait raise bilaterally, gait not assessed; still abnormal right finger to nose  rrr  ctab  abd soft, nontender  No cce  No extremity rash  Normal affect    Results Reviewed:  I have personally reviewed current lab, radiology, and data and agree.      Results from last 7 days  Lab Units 18  1050   WBC 10*3/mm3 5.63   HEMOGLOBIN g/dL 13.4   HEMATOCRIT % 40.6   PLATELETS 10*3/mm3 261   INR  0.89*       Results from last 7 days  Lab Units 18  1050   SODIUM mmol/L 135   POTASSIUM mmol/L 4.3   CHLORIDE mmol/L 100   CO2 mmol/L 30.0   BUN mg/dL 19   CREATININE mg/dL 1.10   GLUCOSE mg/dL 301*   CALCIUM mg/dL 9.5   ALT (SGPT) U/L 23   AST (SGOT) U/L 18     Estimated Creatinine Clearance: 48.7 mL/min (by C-G formula based on Cr of 1.1).  No results found for: BNP  No results found for: PHART    Microbiology Results Abnormal     None          Imaging Results (last 24 hours)     Procedure Component Value Units Date/Time    MRI Brain Without Contrast [[id number]] Collected:  18     Updated:  18    Addenda:        THIS REPORT CONTAINS  FINDINGS THAT MAY BE CRITICAL TO PATIENT CARE. The   findings were verbally communicated via telephone conference with Dr. Julian at   7:55 PM EST on 3/3/2018. The findings were acknowledged and understood.    THIS DOCUMENT HAS BEEN ELECTRONICALLY SIGNED BY ALEKSEY MONTALVO MD  Signed:  03/03/18 1955 by Aleksey Montalvo MD    Narrative:       EXAM:    MR Head Without Intravenous Contrast    CLINICAL HISTORY:    72 years old, female; Dysarthria and anarthria; Signs and symptoms;   Hemiplegia and hemiparesis; Right side, dominance unknown; Patient HX: Stroke   suspected stroke. She was well upon going to bed last evening at about 11 pm.   Upon awakening this morning she felt clumsy on the right side. She has noted   associated dysarthria, but no other associated symptoms, pain or discomfort.    TECHNIQUE:    Magnetic resonance images of the head/brain without intravenous contrast in   multiple planes.    COMPARISON:    No relevant prior studies available.    FINDINGS:    Brain:  7 mm acute infarct left thalamus.  No hemorrhage.    Ventricles:  Unremarkable.  No ventriculomegaly.    Bones/joints:  Unremarkable.    Sinuses:  Unremarkable as visualized.  No acute sinusitis.    Mastoid air cells:  Unremarkable as visualized.  No mastoid effusion.    Orbits:  Unremarkable as visualized.    Other findings:  Mild age-related atrophy.      Impression:         7 mm acute infarct left thalamus.    THIS DOCUMENT HAS BEEN ELECTRONICALLY SIGNED BY ALEKSEY MONTALVO MD        Results for orders placed during the hospital encounter of 03/03/18   Adult Transthoracic Echo Complete W/ Cont if Necessary Per Protocol (With Agitated Saline)    Narrative · Left ventricular systolic function is hyperdynamic (EF > 70).  · Left ventricular wall thickness is consistent with moderate concentric   hypertrophy.  · The cardiac valves are anatomically and functionally normal.          I have reviewed the medications.    Assessment/Plan  "  Assessment / Plan     Hospital Problem List     * (Principal)Lacunar stroke    Overview Signed 3/4/2018 12:17 PM by Danie Dunn MD     Left thalamus         DM (diabetes mellitus), type 2, uncontrolled w/neurologic complication    Multiple-type hyperlipidemia (Chronic)    Overview Signed 8/1/2016 10:39 PM by Yesi Linder      Mixed hyperlipidemia          Hyperlipidemia (Chronic)    Heartburn    Schatzki's ring    HTN (hypertension)    Multiple thyroid nodules    Overview Signed 3/3/2018 12:12 PM by Danie Dunn MD     Seen incidentally on ct angio head and neck (performed at [facility]) 3/3/18                  Brief Hospital Course to date:  Valarie Camara is a 72 y.o. female with htn, uncontrolled dm, gerd/shatzki ring who presented with right sided leg >arm weakness, slurred speech and ataxia \"dragging right leg\". Woke up 7am family noted dragging right foot, slurred speech. Went to [facility]. U/a negative for infection, ct head no acute dz. Sinus rhythm, troponin negative. Contacted neurology and neurointerventional service who recommended ct angio head and neck prior to transfer. Received asa 325 at 09:01 prior to transfer. Currently, patient feels still word finding difficulty, right  and leg raise improved. Hasn't tried to walk here. No chest pain, no dysuria. Poor historian, can't definitively tell me her insulin nor antihypertensive regimen. Says she took norvasc this morning.  -the ct angio head and neck from outsided showed 50% left ica dz and mild mca dz but no high grade lesion  -mri brain here showed left thalamic cva      Assessment & Plan:  -mr c/w left thalamic stroke  -echo ok  -carotids pending  -asa, plavix, high dose statin  -titrate insulins for better diabetes control (a1c >12)  -permissive hypertension w/ slow addition of antihypertensives    -day of admission dropped blood pressure to 90's systolic with hydralazine 10mg iv, patient's daughters not sure " patient has been taking her home bp meds    -continue pt ot    -will benefit from rehab at discharge, possibly medically ready 1-2 days; c.m. yet to see since was weekend    DVT Prophylaxis:  Sq lovenox    CODE STATUS: Full Code    Disposition: I expect the patient to be discharged 1-2 days    Electronically signed by Danie Dunn MD, 03/04/18, 12:19 PM.

## 2018-03-04 NOTE — PROGRESS NOTES
"Valarie Camara    Subjective     CC: stroke    History of Present Illness     Valarie Camara is admitted with stroke. She continues to note right sided ataxia unchanged. She denies any new complaints.      There have been no other changes in the patient's interval history since my consult note of 3/3/18.    I have reviewed and confirmed the past family, social and medical history as accurate on 3/4/18.     Review of Systems   Constitutional: Negative.        Objective     BP (!) 198/82  Pulse 78  Temp 98.7 °F (37.1 °C)  Resp 18  Ht 160 cm (63\")  Wt 90.7 kg (200 lb)  SpO2 96%  BMI 35.43 kg/m2    Physical Exam   Constitutional: She is oriented to person, place, and time.   Neurological: She is oriented to person, place, and time. She has normal strength.   Psychiatric: Her speech is normal.        Neurologic Exam     Mental Status   Oriented to person, place, and time.   Attention: normal.   Speech: speech is normal   Level of consciousness: alert  Knowledge: good.   Normal comprehension.     Cranial Nerves   Cranial nerves II through XII intact.     Motor Exam   Muscle bulk: normal  Overall muscle tone: normal    Strength   Strength 5/5 throughout.     Gait, Coordination, and Reflexes        Decreased coordination (dysmetria) on the right       Laboratory and radiological testing is notable for an MRI confirming a left thalamic infarct (lacunar). ECHO and Dopplers are pending. [id number].     Assessment/Plan       Valarie Camara is admitted with stroke. I suspect small vessel atherosclerotic disease as the etiology, and she will continue on ASA/Plavix/Lipitor. I stressed the importance of her DM management. She will work with PT/OT/SLP.         As part of this visit I reviewed prior lab results, reviewed radiology images and reviewed records from the current hospitalization which is incorporated in the HPI.  "

## 2018-03-04 NOTE — PLAN OF CARE
Problem: Patient Care Overview (Adult)  Goal: Plan of Care Review  Outcome: Ongoing (interventions implemented as appropriate)   03/04/18 0315   Coping/Psychosocial Response Interventions   Plan Of Care Reviewed With patient   Patient Care Overview   Progress improving

## 2018-03-04 NOTE — PLAN OF CARE
Problem: Patient Care Overview (Adult)  Goal: Plan of Care Review  Outcome: Ongoing (interventions implemented as appropriate)   03/04/18 0520   Coping/Psychosocial Response Interventions   Plan Of Care Reviewed With patient   Outcome Evaluation   Outcome Summary/Follow up Plan Pt appears to have slept for most of the 7p shift. Pt ambulates with standby assist (moves quickly). NIH 2. Pt alert, pleasant. VSS   Patient Care Overview   Progress progress toward functional goals as expected       Problem: Stroke (Ischemic) (Adult)  Goal: Signs and Symptoms of Listed Potential Problems Will be Absent or Manageable (Stroke)  Outcome: Ongoing (interventions implemented as appropriate)

## 2018-03-04 NOTE — THERAPY EVALUATION
Acute Care - Physical Therapy Initial Evaluation  [facility]     Patient Name: Valarie Camara  : 1945  MRN: [id number]  Today's Date: 3/4/2018   Onset of Illness/Injury or Date of Surgery Date: 18  Date of Referral to PT: 18  Referring Physician: Dr Crespo      Admit Date: 3/3/2018     Visit Dx:    ICD-10-CM ICD-9-CM   1. Dysarthria R47.1 784.51   2. Impaired mobility and ADLs Z74.09 799.89   3. Impaired functional mobility, balance, gait, and endurance Z74.09 V49.89     Patient Active Problem List   Diagnosis   • Atopic rhinitis   • Arthritis   • Benign paroxysmal positional vertigo   • Neck pain   • Anxiety and depression   • DM (diabetes mellitus)   • Edema   • Gastroesophageal reflux disease with esophagitis   • Multiple-type hyperlipidemia   • Vitamin D deficiency   • Benign essential hypertension   • Obesity (BMI 30-39.9)   • Unstable angina   • History of COPD   • Hypercholesterolemia   • History of cataract   • History of osteoporosis   • History of restless legs syndrome   • History of rheumatoid arthritis   • Elevated serum creatinine   • Heartburn   • Pharyngoesophageal dysphagia   • Constipation   • Schatzki's ring   • Gastritis without bleeding   • Helicobacter pylori infection   • Duodenitis   • Dysarthria   • Right hemiparesis   • HTN (hypertension)   • Stroke   • Multiple thyroid nodules     Past Medical History:   Diagnosis Date   • Acute bronchitis with bronchospasm    • Acute exacerbation of chronic obstructive pulmonary disease (COPD)    • Anxiety    • Arthritis    • B12 deficiency    • Back pain    • Cataract    • Cervicalgia    • Colonic polyp     History of colonic polyps    • COPD (chronic obstructive pulmonary disease)    • Depression    • Diverticulitis    • Dysphagia    • Edema    • Esophageal reflux    • Folic acid deficiency    • Herpes zoster    • High cholesterol    • History of kidney infection    • History of recurrent urinary tract infection    • HTN  "(hypertension)    • Hypercholesterolemia    • Kidney infection    • Malignant hypertension 4/26/2015     Accelerated essential hypertension   • Measles     rubeola   • Muscle spasm    • Neoplasm of uncertain behavior of skin    • Osteoporosis    • Recurrent urinary tract infection    • Rheumatoid arthritis    • RLS (restless legs syndrome)    • Stomach ulcer    • Type 2 diabetes mellitus    • Vitamin D deficiency      Past Surgical History:   Procedure Laterality Date   • CATARACT EXTRACTION Left 02/11/2013    with lens implant   • CATARACT EXTRACTION Right 04/15/2013    with lens implant   • CATARACT EXTRACTION WITH INTRAOCULAR LENS IMPLANT Left 02/11/2013   • CATARACT EXTRACTION WITH INTRAOCULAR LENS IMPLANT Right 04/15/2013   • COLONOSCOPY  2012   • ENDOSCOPY N/A 11/13/2017    Procedure: ESOPHAGOGASTRODUODENOSCOPY with biopsies and esophageal balloon dilitation;  Surgeon: Wesley Stovall MD;  Location: [facility] ENDOSCOPY;  Service:    • HYSTERECTOMY  1979   • UPPER GASTROINTESTINAL ENDOSCOPY  12/09/2013   • UPPER GASTROINTESTINAL ENDOSCOPY  11/13/2017          PT ASSESSMENT (last 72 hours)      PT Evaluation       03/04/18 1111 03/04/18 0904    Rehab Evaluation    Document Type evaluation  -KM evaluation;therapy note (daily note)  -ST    Subjective Information agree to therapy  -KM no complaints;agree to therapy  -ST    Patient Effort, Rehab Treatment good  -KM good  -ST    Symptoms Noted Comment  pt states, \"I still feel like I can't get my words out right, but it's better...the walking still ain't good\"  -ST    General Information    Patient Profile Review yes  -KM yes  -ST    Onset of Illness/Injury or Date of Surgery Date 03/03/18  -KM 03/03/18  -ST    Referring Physician Dr Crespo  -SANTO Crespo MD  -ST    General Observations  pt supine upon arrival  -ST    Pertinent History Of Current Problem Pt presented to OSH with c/o R sided weakness and slurred speech.Transferred to [facility] with suspected cerebellar infarct. " CT angio of head/neck revealed 50% L ICA, mild disease L MCA and basilar a.  -KM Pt presents w/RUE/LE weakness, slurred speech and ataxia to [facility] then transferred to [facility] to have CVA work-up; suspected cerebellar infarct   -ST    Precautions/Limitations fall precautions  -KM fall precautions;other (see comments)   dysarthria  -ST    Prior Level of Function independent:;gait;transfer;bed mobility;ADL's  -KM independent:;all household mobility;community mobility;transfer;bed mobility;feeding;grooming;dressing;bathing;home management;cleaning;cooking;shopping;using stairs  -ST    Equipment Currently Used at Home cane, straight   community use  -KM cane, straight   uses outside   -ST    Plans/Goals Discussed With patient and family;agreed upon  -KM patient;agreed upon  -ST    Risks Reviewed patient and family:;LOB  -KM patient:;LOB;nausea/vomiting;dizziness;increased discomfort;change in vital signs  -ST    Benefits Reviewed patient and family:;improve function  -KM patient:;improve function;increase independence;increase strength;increase balance;decrease pain;increase knowledge  -ST    Barriers to Rehab none identified  -KM none identified  -ST    Living Environment    Lives With alone  -KM alone  -ST    Living Arrangements house  -KM house  -ST    Home Accessibility bed and bath on same level  -KM bed and bath on same level;stairs to enter home;tub/shower is not walk in  -ST    Number of Stairs to Enter Home 4  -KM 4  -ST    Stair Railings at Home none  -KM none  -ST    Living Environment Comment --   children can assist evenings  -KM children that can assist; both work during the day  -ST    Clinical Impression    Date of Referral to PT 03/03/18  -KM     PT Diagnosis impaired mobility  -KM     Patient/Family Goals Statement regain PLOF  -KM     Criteria for Skilled Therapeutic Interventions Met yes  -KM     Rehab Potential good, to achieve stated therapy goals  -KM     Pain Assessment    Pain Assessment  0-10   "-ST    Pain Score  0  -ST    Post Pain Score  0  -ST    Vision Assessment/Intervention    Visual Impairment Comment  states her eyes feel \"off\"  -ST    Visual Field  visual fields intact  -ST    Visual Scanning  scanning, objects within room;WNL  -ST    Cognitive Assessment/Intervention    Current Cognitive/Communication Assessment impaired   dysrthria  -KM impaired   dysarthria  -ST    Orientation Status oriented x 4  -KM oriented x 4  -ST    Follows Commands/Answers Questions able to follow single-step instructions;100% of the time  -% of the time;able to follow multi-step instructions  -ST    Personal Safety WNL/WFL  -KM WNL/WFL  -ST    Personal Safety Interventions fall prevention program maintained  -KM fall prevention program maintained;gait belt;nonskid shoes/slippers when out of bed  -ST    ROM (Range of Motion)    General ROM no range of motion deficits identified  -KM no range of motion deficits identified  -ST    MMT (Manual Muscle Testing)    General MMT Assessment lower extremity strength deficits identified   R l/e grossly 4-/5, L l/e 5/5  -KM upper extremity strength deficits identified  -ST    Left Shoulder    Flexion Gross Movement  (4+/5) good plus  -ST    ABduction Gross Movement  (4+/5) good plus  -ST    Right Shoulder    Flexion Gross Movement  (4/5) good  -ST    ABduction Gross Movement  (3+/5) fair plus  -ST    Upper Extremity    Upper Ext Manual Muscle Testing  left shoulder strength deficit;right shoulder strength deficit;left elbow/forearm strength deficit;right elbow/forearm strength deficit;left wrist strength deficit;right wrist strength deficit  -ST    Left Elbow/Forearm    Elbow Flexion Gross Movement  (5/5) normal  -ST    Elbow Extension Gross Movement  (5/5) normal  -ST    Right Elbow/Forearm    Elbow Flexion Gross Movement  (4/5) good  -ST    Elbow Extension Gross Movement  (4/5) good  -ST    Left Wrist    Wrist Flexion Gross Movement  (4+/5) good plus  -ST    Wrist Extension " Gross Movement  (4+/5) good plus  -ST    Right Wrist    Wrist Flexion Gross Movement  (4/5) good  -ST    Wrist Extension Gross Movement  (4+/5) good plus  -ST    Bed Mobility, Assessment/Treatment    Bed Mobility, Assistive Device  head of bed elevated  -ST    Bed Mobility, Scoot/Bridge, [place]  supervision required  -ST    Bed Mob, Supine to Sit, [place]  supervision required  -ST    Bed Mobility, Comment UIC  -KM increased time to complete however no assist required   -ST    Transfer Assessment/Treatment    Transfers, Sit-Stand [place] contact guard assist  -KM contact guard assist  -ST    Transfers, Stand-Sit [place] contact guard assist  -KM contact guard assist  -ST    Transfers, Sit-Stand-Sit, Assist Device rolling walker  -KM     Transfer, Comment  no instances LOB  -ST    Gait Assessment/Treatment    Gait, [place] Level contact guard assist  -KM     Gait, Assistive Device rolling walker  -KM     Gait, Distance (Feet) 250  -KM     Gait, Gait Deviations right:;decreased heel strike;stride length decreased   cues for heel strike and improved push off  -KM     Gait, Impairments motor control impaired   slight impairment R l/e  -KM     Motor Skills/Interventions    Additional Documentation Balance Skills Training (Group)  -KM Balance Skills Training (Group);Fine Motor Coordination Training (Group);Gross Motor Coordination Training (Group)  -ST    Balance Skills Training    Sitting-Level of Assistance Independent  -KM Independent  -ST    Standing-Level of Assistance Close supervision  -KM Close supervision  -ST    Gait Balance-Level of Assistance Contact guard  -KM Contact guard  -ST    Gait Balance Support assistive device  -KM     Fine Motor Coordination Training    Opposition  Right:;Left:;intact  -ST    Gross Motor Coordination Training    Gross Motor Skill, Impairments Detail --   R heel to L shin mild impairment  -KM rapid alternating B hands: WFL; finger to nose: eyes open/closed:  R impaired, L WFL   -ST    Sensory Assessment/Intervention    Light Touch LLE;RLE  -KM LUE;RUE;LLE  -ST    LUE Light Touch  WNL  -ST    RUE Light Touch  WNL  -ST    LLE Light Touch WNL  -KM --   states N/T  -ST    RLE Light Touch WNL  -KM     Positioning and Restraints    Pre-Treatment Position sitting in chair/recliner  -KM in bed  -ST    Post Treatment Position chair  -KM chair  -ST    In Chair sitting;call light within reach;exit alarm on;with family/caregiver  -KM notified nsg;reclined;call light within reach;encouraged to call for assist;exit alarm on  -ST      03/03/18 1300 03/03/18 1038    Rehab Evaluation    Document Type evaluation  -     Subjective Information agree to therapy  -     General Information    Equipment Currently Used at Home  cane, straight  -ARA    Pain Assessment    Pain Assessment 0-10  -SM     Pain Score 7  -SM     Post Pain Score 7  -SM     Pain Location Neck  -     Pain Intervention(s) --   Notified RN  -SM     Cognitive Assessment/Intervention    Current Cognitive/Communication Assessment impaired   dysarthria  -     Orientation Status oriented x 4  -SM     Follows Commands/Answers Questions 100% of the time  -     Short/Long Term Memory intact short term memory;intact long term memory  -     Additional Documentation Cognitive Assessment Intervention (Group)  -     Cognitive Assessment Intervention    Behavior/Mood Observations behavior appropriate to situation, WNL/WFL  -SM     Attention WNL/WFL  -SM       User Key  (r) = Recorded By, (t) = Taken By, (c) = Cosigned By    Initials Name Provider Type     Joelle Sharp OTR Occupational Therapist    SANTO Alvarado, PT Physical Therapist     Jeannette Salgado, MS CCC-SLP Speech and Language Pathologist    ARA Bernard, RN Registered Nurse          Physical Therapy Education     Title: PT OT SLP Therapies (Done)     Topic: Physical Therapy (Done)     Point: Mobility training (Done)    Learning Progress  Summary    Learner Readiness Method Response Comment Documented by Status   Patient Acceptance E VU discussed plan of care and d/c planning KM 03/04/18 1153 Done               Point: Home exercise program (Done)    Learning Progress Summary    Learner Readiness Method Response Comment Documented by Status   Patient Acceptance E VU discussed plan of care and d/c planning KM 03/04/18 1153 Done               Point: Body mechanics (Done)    Learning Progress Summary    Learner Readiness Method Response Comment Documented by Status   Patient Acceptance E VU discussed plan of care and d/c planning KM 03/04/18 1153 Done               Point: Precautions (Done)    Learning Progress Summary    Learner Readiness Method Response Comment Documented by Status   Patient Acceptance E VU discussed plan of care and d/c planning KM 03/04/18 1153 Done                      User Key     Initials Effective Dates Name Provider Type Discipline    KM 06/19/15 -  Veronica Alvarado PT Physical Therapist PT                PT Recommendation and Plan  Anticipated Discharge Disposition: inpatient rehabilitation facility  PT Frequency: daily  Plan of Care Review  Plan Of Care Reviewed With: patient  Outcome Summary/Follow up Plan: Pt with r sided weakness affecting functional mobility with decline from independent PLOF. Recommend skilled svcs to regain PLOF, anticipate inpt rehab referral prior to return home alone with family assist prn          IP PT Goals       03/04/18 1150          Bed Mobility PT LTG    Bed Mobility PT LTG, Date Established 03/04/18  -KM      Bed Mobility PT LTG, Time to Achieve 2 wks  -KM      Bed Mobility PT LTG, Activity Type all bed mobility  -KM      Bed Mobility PT LTG, [place] Level independent  -KM      Transfer Training PT LTG    Transfer Training PT LTG, Date Established 03/04/18  -KM      Transfer Training PT LTG, Time to Achieve 2 wks  -KM      Transfer Training PT LTG, Activity Type all transfers  -KM       Transfer Training PT LTG, [place] Level independent  -KM      Gait Training PT LTG    Gait Training Goal PT LTG, Date Established 03/04/18  -KM      Gait Training Goal PT LTG, Time to Achieve 2 wks  -KM      Gait Training Goal PT LTG, [place] Level independent  -KM      Gait Training Goal PT LTG, Assist Device cane, straight  -KM      Gait Training Goal PT LTG, Distance to Achieve 250  -KM        User Key  (r) = Recorded By, (t) = Taken By, (c) = Cosigned By    Initials Name Provider Type    SANTO Alvraado, PT Physical Therapist                Outcome Measures       03/04/18 1111 03/04/18 0904       How much help from another person do you currently need...    Turning from your back to your side while in flat bed without using bedrails? 4  -KM      Moving from lying on back to sitting on the side of a flat bed without bedrails? 4  -KM      Moving to and from a bed to a chair (including a wheelchair)? 3  -KM      Standing up from a chair using your arms (e.g., wheelchair, bedside chair)? 3  -KM      Climbing 3-5 steps with a railing? 3  -KM      To walk in hospital room? 3  -KM      AM-PAC 6 Clicks Score 20  -KM      How much help from another is currently needed...    Putting on and taking off regular lower body clothing?  3  -ST     Bathing (including washing, rinsing, and drying)  3  -ST     Toileting (which includes using toilet bed pan or urinal)  3  -ST     Putting on and taking off regular upper body clothing  3  -ST     Taking care of personal grooming (such as brushing teeth)  3  -ST     Eating meals  4  -ST     Score  19  -ST     Modified [place] Scale    Modified [place] Scale 3 - Moderate disability.  Requiring some help, but able to walk without assistance.  -KM 3 - Moderate disability.  Requiring some help, but able to walk without assistance.  -ST     Functional Assessment    Outcome Measure Options AM-PAC 6 Clicks Basic Mobility (PT)  -KM AM-PAC 6 Clicks Daily Activity  (OT);Modified [place]  -ST       User Key  (r) = Recorded By, (t) = Taken By, (c) = Cosigned By    Initials Name Provider Type    ST Joelle Sharp, OTR Occupational Therapist    SANTO Alvarado PT Physical Therapist           Time Calculation:         PT Charges       03/04/18 1149          Time Calculation    Start Time 1111  -KM      PT Received On 03/04/18  -KM      PT Goal Re-Cert Due Date 03/14/18  -KM        User Key  (r) = Recorded By, (t) = Taken By, (c) = Cosigned By    Initials Name Provider Type    SANTO Alvarado, PT Physical Therapist          Therapy Charges for Today     Code Description Service Date Service Provider Modifiers Qty    75514003809 HC PT EVAL MOD COMPLEXITY 4 3/4/2018 Veronica Alvarado, PT GP 1          PT G-Codes  Outcome Measure Options: AM-PAC 6 Clicks Basic Mobility (PT)      Veronica Alvarado, PT  3/4/2018

## 2018-03-04 NOTE — PLAN OF CARE
Problem: Patient Care Overview (Adult)  Goal: Plan of Care Review  Outcome: Ongoing (interventions implemented as appropriate)   03/04/18 1150   Coping/Psychosocial Response Interventions   Plan Of Care Reviewed With patient   Outcome Evaluation   Outcome Summary/Follow up Plan Pt with r sided weakness affecting functional mobility with decline from independent PLOF. Recommend skilled svcs to regain PLOF, anticipate inpt rehab referral prior to return home alone with family assist prn       Problem: Inpatient Physical Therapy  Goal: Bed Mobility Goal LTG- PT  Outcome: Ongoing (interventions implemented as appropriate)   03/04/18 1150   Bed Mobility PT LTG   Bed Mobility PT LTG, Date Established 03/04/18   Bed Mobility PT LTG, Time to Achieve 2 wks   Bed Mobility PT LTG, Activity Type all bed mobility   Bed Mobility PT LTG, [place] Level independent     Goal: Transfer Training Goal 1 LTG- PT  Outcome: Ongoing (interventions implemented as appropriate)   03/04/18 1150   Transfer Training PT LTG   Transfer Training PT LTG, Date Established 03/04/18   Transfer Training PT LTG, Time to Achieve 2 wks   Transfer Training PT LTG, Activity Type all transfers   Transfer Training PT LTG, [place] Level independent     Goal: Gait Training Goal LTG- PT  Outcome: Ongoing (interventions implemented as appropriate)   03/04/18 1150   Gait Training PT LTG   Gait Training Goal PT LTG, Date Established 03/04/18   Gait Training Goal PT LTG, Time to Achieve 2 wks   Gait Training Goal PT LTG, [place] Level independent   Gait Training Goal PT LTG, Assist Device cane, straight   Gait Training Goal PT LTG, Distance to Achieve 250

## 2018-03-04 NOTE — THERAPY EVALUATION
Acute Care - Occupational Therapy Initial Evaluation  [facility]     Patient Name: Valarie Camara  : 1945  MRN: [id number]  Today's Date: 3/4/2018  Onset of Illness/Injury or Date of Surgery Date: 18  Date of Referral to OT: 18  Referring Physician: MD Zak    Admit Date: 3/3/2018       ICD-10-CM ICD-9-CM   1. Dysarthria R47.1 784.51   2. Impaired mobility and ADLs Z74.09 799.89     Patient Active Problem List   Diagnosis   • Atopic rhinitis   • Arthritis   • Benign paroxysmal positional vertigo   • Neck pain   • Anxiety and depression   • DM (diabetes mellitus)   • Edema   • Gastroesophageal reflux disease with esophagitis   • Multiple-type hyperlipidemia   • Vitamin D deficiency   • Benign essential hypertension   • Obesity (BMI 30-39.9)   • Unstable angina   • History of COPD   • Hypercholesterolemia   • History of cataract   • History of osteoporosis   • History of restless legs syndrome   • History of rheumatoid arthritis   • Elevated serum creatinine   • Heartburn   • Pharyngoesophageal dysphagia   • Constipation   • Schatzki's ring   • Gastritis without bleeding   • Helicobacter pylori infection   • Duodenitis   • Dysarthria   • Right hemiparesis   • HTN (hypertension)   • Stroke   • Multiple thyroid nodules     Past Medical History:   Diagnosis Date   • Acute bronchitis with bronchospasm    • Acute exacerbation of chronic obstructive pulmonary disease (COPD)    • Anxiety    • Arthritis    • B12 deficiency    • Back pain    • Cataract    • Cervicalgia    • Colonic polyp     History of colonic polyps    • COPD (chronic obstructive pulmonary disease)    • Depression    • Diverticulitis    • Dysphagia    • Edema    • Esophageal reflux    • Folic acid deficiency    • Herpes zoster    • High cholesterol    • History of kidney infection    • History of recurrent urinary tract infection    • HTN (hypertension)    • Hypercholesterolemia    • Kidney infection    • Malignant hypertension  "4/26/2015     Accelerated essential hypertension   • Measles     rubeola   • Muscle spasm    • Neoplasm of uncertain behavior of skin    • Osteoporosis    • Recurrent urinary tract infection    • Rheumatoid arthritis    • RLS (restless legs syndrome)    • Stomach ulcer    • Type 2 diabetes mellitus    • Vitamin D deficiency      Past Surgical History:   Procedure Laterality Date   • CATARACT EXTRACTION Left 02/11/2013    with lens implant   • CATARACT EXTRACTION Right 04/15/2013    with lens implant   • CATARACT EXTRACTION WITH INTRAOCULAR LENS IMPLANT Left 02/11/2013   • CATARACT EXTRACTION WITH INTRAOCULAR LENS IMPLANT Right 04/15/2013   • COLONOSCOPY  2012   • ENDOSCOPY N/A 11/13/2017    Procedure: ESOPHAGOGASTRODUODENOSCOPY with biopsies and esophageal balloon dilitation;  Surgeon: Wesley Stovall MD;  Location: [facility] ENDOSCOPY;  Service:    • HYSTERECTOMY  1979   • UPPER GASTROINTESTINAL ENDOSCOPY  12/09/2013   • UPPER GASTROINTESTINAL ENDOSCOPY  11/13/2017          OT ASSESSMENT FLOWSHEET (last 72 hours)      OT Evaluation       03/04/18 0904 03/03/18 1300 03/03/18 1038          Rehab Evaluation    Document Type evaluation;therapy note (daily note)  -ST evaluation  -SM       Subjective Information no complaints;agree to therapy  -ST agree to therapy  -SM       Patient Effort, Rehab Treatment good  -ST        Symptoms Noted Comment pt states, \"I still feel like I can't get my words out right, but it's better...the walking still ain't good\"  -ST        General Information    Patient Profile Review yes  -ST        Onset of Illness/Injury or Date of Surgery Date 03/03/18  -ST        Referring Physician MD Zak  -ST        General Observations pt supine upon arrival  -ST        Pertinent History Of Current Problem Pt presents w/RUE/LE weakness, slurred speech and ataxia to [facility] then transferred to [facility] to have CVA work-up; suspected cerebellar infarct   -ST        Precautions/Limitations fall " precautions;other (see comments)   dysarthria  -ST        Prior Level of Function independent:;all household mobility;community mobility;transfer;bed mobility;feeding;grooming;dressing;bathing;home management;cleaning;cooking;shopping;using stairs  -ST        Equipment Currently Used at Home cane, straight   uses outside   -ST  cane, straight  -ARA      Plans/Goals Discussed With patient;agreed upon  -ST        Risks Reviewed patient:;LOB;nausea/vomiting;dizziness;increased discomfort;change in vital signs  -ST        Benefits Reviewed patient:;improve function;increase independence;increase strength;increase balance;decrease pain;increase knowledge  -ST        Barriers to Rehab none identified  -ST        Living Environment    Lives With alone  -ST        Living Arrangements house  -ST        Home Accessibility bed and bath on same level;stairs to enter home;tub/shower is not walk in  -ST        Number of Stairs to Enter Home 4  -ST        Stair Railings at Home none  -ST        Living Environment Comment children that can assist; both work during the day  -ST        Clinical Impression    Date of Referral to OT 03/04/18  -ST        OT Diagnosis impaired ADLs  -ST        Prognosis good  -ST        Patient/Family Goals Statement return home/PLOF  -ST        Criteria for Skilled Therapeutic Interventions Met yes  -ST        Rehab Potential good, to achieve stated therapy goals  -ST        Therapy Frequency daily  -ST        Anticipated Equipment Needs At Discharge tub bench;raised toilet seat  -ST        Anticipated Discharge Disposition inpatient rehabilitation facility;home with assist;home with outpatient services   if returns home will need 24/7 assist  -ST        Functional Level Prior    Ambulation   0-->independent  -ARA      Transferring   0-->independent  -ARA      Toileting   0-->independent  -ARA      Bathing   0-->independent  -ARA      Dressing   0-->independent  -ARA      Eating   0-->independent  -ARA       "Communication   0-->understands/communicates without difficulty  -ARA      Swallowing   0-->swallows foods/liquids without difficulty  -ARA      Pain Assessment    Pain Assessment 0-10  -ST 0-10  -SM       Pain Score 0  -ST 7  -SM       Post Pain Score 0  -ST 7  -SM       Pain Location  Neck  -       Pain Intervention(s)  --   Notified RN  -       Vision Assessment/Intervention    Visual Impairment Comment states her eyes feel \"off\"  -        Visual Field visual fields intact  -ST        Visual Scanning scanning, objects within room;WNL  -ST        Cognitive Assessment/Intervention    Current Cognitive/Communication Assessment impaired   dysarthria  -ST impaired   dysarthria  -       Orientation Status oriented x 4  -ST oriented x 4  -SM       Follows Commands/Answers Questions 100% of the time;able to follow multi-step instructions  - 100% of the time  -       Personal Safety WNL/WFL  -ST        Personal Safety Interventions fall prevention program maintained;gait belt;nonskid shoes/slippers when out of bed  -ST        Short/Long Term Memory  intact short term memory;intact long term memory  -       Additional Documentation  Cognitive Assessment Intervention (Group)  -       Cognitive Assessment Intervention    Behavior/Mood Observations  behavior appropriate to situation, WNL/WFL  -       Attention  WNL/WFL  -       ROM (Range of Motion)    General ROM no range of motion deficits identified  -ST        MMT (Manual Muscle Testing)    General MMT Assessment upper extremity strength deficits identified  -ST        Left Shoulder    Flexion Gross Movement (4+/5) good plus  -ST        ABduction Gross Movement (4+/5) good plus  -ST        Right Shoulder    Flexion Gross Movement (4/5) good  -ST        ABduction Gross Movement (3+/5) fair plus  -ST        Upper Extremity    Upper Ext Manual Muscle Testing left shoulder strength deficit;right shoulder strength deficit;left elbow/forearm strength " deficit;right elbow/forearm strength deficit;left wrist strength deficit;right wrist strength deficit  -ST        Left Elbow/Forearm    Elbow Flexion Gross Movement (5/5) normal  -ST        Elbow Extension Gross Movement (5/5) normal  -ST        Right Elbow/Forearm    Elbow Flexion Gross Movement (4/5) good  -ST        Elbow Extension Gross Movement (4/5) good  -ST        Left Wrist    Wrist Flexion Gross Movement (4+/5) good plus  -ST        Wrist Extension Gross Movement (4+/5) good plus  -ST        Right Wrist    Wrist Flexion Gross Movement (4/5) good  -ST        Wrist Extension Gross Movement (4+/5) good plus  -ST        Bed Mobility, Assessment/Treatment    Bed Mobility, Assistive Device head of bed elevated  -ST        Bed Mobility, Scoot/Bridge, [place] supervision required  -ST        Bed Mob, Supine to Sit, [place] supervision required  -ST        Bed Mobility, Comment increased time to complete however no assist required   -ST        Transfer Assessment/Treatment    Transfers, Sit-Stand [place] contact guard assist  -ST        Transfers, Stand-Sit [place] contact guard assist  -ST        Transfer, Comment no instances LOB  -ST        Functional Mobility    Functional Mobility- Ind. Level contact guard assist  -ST        Functional Mobility- Device --   RUE support   -ST        Functional Mobility-Distance (Feet) 10  -ST        Functional Mobility- Comment in room, L lateral mild LOB, able to self-correct   -ST        Lower Body Dressing Assessment/Training    LB Dressing Assess/Train, Clothing Type doffing:;donning:  -ST        LB Dressing Assess/Train, Position edge of bed;sitting  -ST        LB Dressing Assess/Train, [place] independent  -ST        LB Dressing Assess/Train, Comment pt simulated LBD   -ST        Motor Skills/Interventions    Additional Documentation Balance Skills Training (Group);Fine Motor Coordination Training (Group);Gross Motor Coordination Training (Group)   -ST        Balance Skills Training    Sitting-Level of Assistance Independent  -ST        Standing-Level of Assistance Close supervision  -ST        Gait Balance-Level of Assistance Contact guard  -ST        Gross Motor Coordination Training    Gross Motor Skill, Impairments Detail rapid alternating B hands: WFL; finger to nose: eyes open/closed: R impaired, L WFL   -ST        Fine Motor Coordination Training    Opposition Right:;Left:;intact  -ST        Sensory Assessment/Intervention    Light Touch LUE;RUE;LLE  -ST        LUE Light Touch WNL  -ST        RUE Light Touch WNL  -ST        LLE Light Touch --   states N/T  -ST        General Therapy Interventions    Planned Therapy Interventions ADL retraining;IADL retraining;balance training;home exercise program;neuromuscular re-education;strengthening;transfer training  -ST        Positioning and Restraints    Pre-Treatment Position in bed  -ST        Post Treatment Position chair  -ST        In Chair notified nsg;reclined;call light within reach;encouraged to call for assist;exit alarm on  -ST          User Key  (r) = Recorded By, (t) = Taken By, (c) = Cosigned By    Initials Name Effective Dates     Joellecorinna Sharp, OTR 02/20/17 -     LUIS CARLOS Salgado, MS CCC-SLP 06/22/15 -     ARA Bernard RN 06/16/16 -            Occupational Therapy Education     Title: PT OT SLP Therapies (Done)     Topic: Occupational Therapy (Done)     Point: ADL training (Done)    Description: Instruct learner(s) on proper safety adaptation and remediation techniques during self care or transfers.   Instruct in proper use of assistive devices.    Learning Progress Summary    Learner Readiness Method Response Comment Documented by Status   Patient Acceptance E,TB,D KARLA,MARIUM role of OT, benefits of activity, safety w/transfers, mobility, POC ST 03/04/18 1009 Done               Point: Home exercise program (Done)    Description: Instruct learner(s) on appropriate technique for  monitoring, assisting and/or progressing therapeutic exercises/activities.    Learning Progress Summary    Learner Readiness Method Response Comment Documented by Status   Patient Acceptance E,TB,ASH ACOSTA,DU role of OT, benefits of activity, safety w/transfers, mobility, POC ST 03/04/18 1009 Done               Point: Precautions (Done)    Description: Instruct learner(s) on prescribed precautions during self-care and functional transfers.    Learning Progress Summary    Learner Readiness Method Response Comment Documented by Status   Patient Acceptance E,TB,D KARLA,DU role of OT, benefits of activity, safety w/transfers, mobility, POC ST 03/04/18 1009 Done               Point: Body mechanics (Done)    Description: Instruct learner(s) on proper positioning and spine alignment during self-care, functional mobility activities and/or exercises.    Learning Progress Summary    Learner Readiness Method Response Comment Documented by Status   Patient Acceptance E,TB,ASH ACOSTA,DU role of OT, benefits of activity, safety w/transfers, mobility, POC ST 03/04/18 1009 Done                      User Key     Initials Effective Dates Name Provider Type Discipline    ST 02/20/17 -  Joelle Sharp OTR Occupational Therapist OT                  OT Recommendation and Plan  Anticipated Equipment Needs At Discharge: tub bench, raised toilet seat  Anticipated Discharge Disposition: inpatient rehabilitation facility, home with assist, home with outpatient services (if returns home will need 24/7 assist)  Planned Therapy Interventions: ADL retraining, IADL retraining, balance training, home exercise program, neuromuscular re-education, strengthening, transfer training  Therapy Frequency: daily  Plan of Care Review  Plan Of Care Reviewed With: patient  Outcome Summary/Follow up Plan: Pt presents with dysarthria and R sided GM and strength deficits impacting balance, ADL and IADL performance. Pt lives alone & has only SPC at home. Recommend tub bench  and elevated t-seat for increased safety along with continued skilled OT services to address deficits to promote independence and safety.           OT Goals       03/04/18 1010          Transfer Training OT LTG    Transfer Training OT LTG, Time to Achieve 5 days  -ST      Transfer Training OT LTG, Activity Type toilet;tub  -ST      Transfer Training OT LTG, [place] Level contact guard assist  -ST      Transfer Training OT LTG, Outcome goal ongoing  -ST      Strength OT LTG    Strength Goal OT LTG, Time to Achieve 5 days  -ST      Strength Goal OT LTG, Functional Goal Pt to be independent with BUE strengthening HEP by d/c to increase engagement and independence with daily tasks.  -ST      Strength Goal OT LTG, Outcome goal ongoing  -ST      Coordination OT LTG    Coordination OT LTG, Time to Achieve 5 days  -ST      Coordination OT LTG, Activity Type GM written ex program  -ST      Coordination OT LTG, [place] Level independent  -ST      Coordination OT LTG, Outcome goal ongoing  -ST        User Key  (r) = Recorded By, (t) = Taken By, (c) = Cosigned By    Initials Name Provider Type    LIAS Gama Occupational Therapist                Outcome Measures       03/04/18 0904          How much help from another is currently needed...    Putting on and taking off regular lower body clothing? 3  -ST      Bathing (including washing, rinsing, and drying) 3  -ST      Toileting (which includes using toilet bed pan or urinal) 3  -ST      Putting on and taking off regular upper body clothing 3  -ST      Taking care of personal grooming (such as brushing teeth) 3  -ST      Eating meals 4  -ST      Score 19  -ST      Modified Liz Scale    Modified Johnson Scale 3 - Moderate disability.  Requiring some help, but able to walk without assistance.  -ST      Functional Assessment    Outcome Measure Options AM-PAC 6 Clicks Daily Activity (OT);Modified Johnson  -ST        User Key  (r) = Recorded By, (t) = Taken  By, (c) = Cosigned By    Initials Name Provider Type     Joelle Sharp, OTR Occupational Therapist          Time Calculation:   OT Start Time: 0904    Therapy Charges for Today     Code Description Service Date Service Provider Modifiers Qty    [id number]  OT THERAPEUTIC ACT EA 15 MIN 3/4/2018 Joelle Sharp OTR GO 1    [id number]  OT EVAL MOD COMPLEXITY 3 3/4/2018 LISA Martin GO 1               LISA Lundberg  3/4/2018

## 2018-03-05 LAB
BH CV ECHO MEAS - BSA(HAYCOCK): 2 M^2
BH CV ECHO MEAS - BSA: 1.9 M^2
BH CV ECHO MEAS - BZI_BMI: 35.4 KILOGRAMS/M^2
BH CV ECHO MEAS - BZI_METRIC_HEIGHT: 160 CM
BH CV ECHO MEAS - BZI_METRIC_WEIGHT: 90.7 KG
BH CV XLRA MEAS LEFT CCA RATIO VEL: 65.6 CM/SEC
BH CV XLRA MEAS LEFT DIST CCA EDV: 17.1 CM/SEC
BH CV XLRA MEAS LEFT DIST CCA PSV: 65.1 CM/SEC
BH CV XLRA MEAS LEFT DIST ICA EDV: 31.4 CM/SEC
BH CV XLRA MEAS LEFT DIST ICA PSV: 113.6 CM/SEC
BH CV XLRA MEAS LEFT ICA RATIO VEL: 110 CM/SEC
BH CV XLRA MEAS LEFT ICA/CCA RATIO: 1.7
BH CV XLRA MEAS LEFT MID CCA EDV: 16 CM/SEC
BH CV XLRA MEAS LEFT MID CCA PSV: 66.2 CM/SEC
BH CV XLRA MEAS LEFT MID ICA EDV: 40.8 CM/SEC
BH CV XLRA MEAS LEFT MID ICA PSV: 110.8 CM/SEC
BH CV XLRA MEAS LEFT PROX CCA EDV: 20.3 CM/SEC
BH CV XLRA MEAS LEFT PROX CCA PSV: 101.3 CM/SEC
BH CV XLRA MEAS LEFT PROX ECA PSV: 143.2 CM/SEC
BH CV XLRA MEAS LEFT PROX ICA EDV: 38.4 CM/SEC
BH CV XLRA MEAS LEFT PROX ICA PSV: 110 CM/SEC
BH CV XLRA MEAS LEFT PROX SCLA PSV: 224.7 CM/SEC
BH CV XLRA MEAS LEFT VERTEBRAL A EDV: 18.7 CM/SEC
BH CV XLRA MEAS LEFT VERTEBRAL A PSV: 98.2 CM/SEC
BH CV XLRA MEAS RIGHT CCA RATIO VEL: 66.2 CM/SEC
BH CV XLRA MEAS RIGHT DIST CCA EDV: 12.7 CM/SEC
BH CV XLRA MEAS RIGHT DIST CCA PSV: 68.9 CM/SEC
BH CV XLRA MEAS RIGHT DIST ICA EDV: 22.6 CM/SEC
BH CV XLRA MEAS RIGHT DIST ICA PSV: 62.9 CM/SEC
BH CV XLRA MEAS RIGHT ICA RATIO VEL: 84.4 CM/SEC
BH CV XLRA MEAS RIGHT ICA/CCA RATIO: 1.3
BH CV XLRA MEAS RIGHT MID CCA EDV: 14.9 CM/SEC
BH CV XLRA MEAS RIGHT MID CCA PSV: 66.7 CM/SEC
BH CV XLRA MEAS RIGHT MID ICA EDV: 30.9 CM/SEC
BH CV XLRA MEAS RIGHT MID ICA PSV: 84.9 CM/SEC
BH CV XLRA MEAS RIGHT PROX CCA EDV: 15.7 CM/SEC
BH CV XLRA MEAS RIGHT PROX CCA PSV: 89.6 CM/SEC
BH CV XLRA MEAS RIGHT PROX ECA PSV: 176 CM/SEC
BH CV XLRA MEAS RIGHT PROX ICA EDV: 23.2 CM/SEC
BH CV XLRA MEAS RIGHT PROX ICA PSV: 68.4 CM/SEC
BH CV XLRA MEAS RIGHT PROX SCLA PSV: 189.4 CM/SEC
BH CV XLRA MEAS RIGHT VERTEBRAL A EDV: 19.3 CM/SEC
BH CV XLRA MEAS RIGHT VERTEBRAL A PSV: 84.4 CM/SEC
GLUCOSE BLDC GLUCOMTR-MCNC: 100 MG/DL (ref 70–130)
GLUCOSE BLDC GLUCOMTR-MCNC: 201 MG/DL (ref 70–130)
GLUCOSE BLDC GLUCOMTR-MCNC: 201 MG/DL (ref 70–130)
GLUCOSE BLDC GLUCOMTR-MCNC: 315 MG/DL (ref 70–130)
GLUCOSE BLDC GLUCOMTR-MCNC: 347 MG/DL (ref 70–130)
LEFT ARM BP: NORMAL MMHG

## 2018-03-05 PROCEDURE — 99232 SBSQ HOSP IP/OBS MODERATE 35: CPT | Performed by: PSYCHIATRY & NEUROLOGY

## 2018-03-05 PROCEDURE — 97110 THERAPEUTIC EXERCISES: CPT

## 2018-03-05 PROCEDURE — G0108 DIAB MANAGE TRN  PER INDIV: HCPCS | Performed by: REGISTERED NURSE

## 2018-03-05 PROCEDURE — 94640 AIRWAY INHALATION TREATMENT: CPT

## 2018-03-05 PROCEDURE — 99233 SBSQ HOSP IP/OBS HIGH 50: CPT | Performed by: NURSE PRACTITIONER

## 2018-03-05 PROCEDURE — 82962 GLUCOSE BLOOD TEST: CPT

## 2018-03-05 PROCEDURE — 92507 TX SP LANG VOICE COMM INDIV: CPT

## 2018-03-05 PROCEDURE — 97116 GAIT TRAINING THERAPY: CPT

## 2018-03-05 PROCEDURE — 97530 THERAPEUTIC ACTIVITIES: CPT

## 2018-03-05 PROCEDURE — 63710000001 INSULIN DETEMIR PER 5 UNITS: Performed by: INTERNAL MEDICINE

## 2018-03-05 PROCEDURE — 25010000002 ENOXAPARIN PER 10 MG: Performed by: INTERNAL MEDICINE

## 2018-03-05 RX ORDER — BISACODYL 5 MG/1
10 TABLET, DELAYED RELEASE ORAL DAILY PRN
Status: DISCONTINUED | OUTPATIENT
Start: 2018-03-05 | End: 2018-03-07 | Stop reason: HOSPADM

## 2018-03-05 RX ORDER — DOCUSATE SODIUM 100 MG/1
100 CAPSULE, LIQUID FILLED ORAL 2 TIMES DAILY
Status: DISCONTINUED | OUTPATIENT
Start: 2018-03-05 | End: 2018-03-07 | Stop reason: HOSPADM

## 2018-03-05 RX ADMIN — DOCUSATE SODIUM 100 MG: 100 CAPSULE, LIQUID FILLED ORAL at 20:49

## 2018-03-05 RX ADMIN — INSULIN LISPRO 4 UNITS: 100 INJECTION, SOLUTION INTRAVENOUS; SUBCUTANEOUS at 08:51

## 2018-03-05 RX ADMIN — PANTOPRAZOLE SODIUM 40 MG: 40 TABLET, DELAYED RELEASE ORAL at 08:52

## 2018-03-05 RX ADMIN — INSULIN LISPRO 7 UNITS: 100 INJECTION, SOLUTION INTRAVENOUS; SUBCUTANEOUS at 12:33

## 2018-03-05 RX ADMIN — BISACODYL 10 MG: 5 TABLET, COATED ORAL at 06:46

## 2018-03-05 RX ADMIN — POLYETHYLENE GLYCOL 3350 17 G: 17 POWDER, FOR SOLUTION ORAL at 06:46

## 2018-03-05 RX ADMIN — INSULIN LISPRO 5 UNITS: 100 INJECTION, SOLUTION INTRAVENOUS; SUBCUTANEOUS at 17:31

## 2018-03-05 RX ADMIN — DOCUSATE SODIUM 100 MG: 100 CAPSULE, LIQUID FILLED ORAL at 08:52

## 2018-03-05 RX ADMIN — INSULIN LISPRO 5 UNITS: 100 INJECTION, SOLUTION INTRAVENOUS; SUBCUTANEOUS at 12:32

## 2018-03-05 RX ADMIN — INSULIN LISPRO 5 UNITS: 100 INJECTION, SOLUTION INTRAVENOUS; SUBCUTANEOUS at 08:54

## 2018-03-05 RX ADMIN — INSULIN LISPRO 7 UNITS: 100 INJECTION, SOLUTION INTRAVENOUS; SUBCUTANEOUS at 17:32

## 2018-03-05 RX ADMIN — INSULIN LISPRO 3 UNITS: 100 INJECTION, SOLUTION INTRAVENOUS; SUBCUTANEOUS at 20:50

## 2018-03-05 RX ADMIN — ASPIRIN 81 MG 81 MG: 81 TABLET ORAL at 08:52

## 2018-03-05 RX ADMIN — INSULIN DETEMIR 20 UNITS: 100 INJECTION, SOLUTION SUBCUTANEOUS at 08:50

## 2018-03-05 RX ADMIN — ENOXAPARIN SODIUM 30 MG: 30 INJECTION SUBCUTANEOUS at 14:17

## 2018-03-05 RX ADMIN — ATORVASTATIN CALCIUM 80 MG: 40 TABLET, FILM COATED ORAL at 20:48

## 2018-03-05 RX ADMIN — CLOPIDOGREL BISULFATE 75 MG: 75 TABLET ORAL at 08:52

## 2018-03-05 RX ADMIN — BUDESONIDE AND FORMOTEROL FUMARATE DIHYDRATE 2 PUFF: 160; 4.5 AEROSOL RESPIRATORY (INHALATION) at 08:34

## 2018-03-05 NOTE — THERAPY TREATMENT NOTE
Acute Care - Physical Therapy Treatment Note  [facility]     Patient Name: Valarie Camara  : 1945  MRN: [id number]  Today's Date: 3/5/2018  Onset of Illness/Injury or Date of Surgery Date: 18  Date of Referral to PT: 18  Referring Physician: Dr Crespo    Admit Date: 3/3/2018    Visit Dx:    ICD-10-CM ICD-9-CM   1. Dysarthria R47.1 784.51   2. Impaired mobility and ADLs Z74.09 799.89   3. Impaired functional mobility, balance, gait, and endurance Z74.09 V49.89     Patient Active Problem List   Diagnosis   • Atopic rhinitis   • Arthritis   • Benign paroxysmal positional vertigo   • Neck pain   • Anxiety and depression   • DM (diabetes mellitus)   • Edema   • Gastroesophageal reflux disease with esophagitis   • Multiple-type hyperlipidemia   • Vitamin D deficiency   • Benign essential hypertension   • Obesity (BMI 30-39.9)   • Unstable angina   • History of COPD   • Hyperlipidemia   • History of cataract   • History of osteoporosis   • History of restless legs syndrome   • History of rheumatoid arthritis   • Elevated serum creatinine   • Heartburn   • Pharyngoesophageal dysphagia   • Constipation   • Schatzki's ring   • Gastritis without bleeding   • Helicobacter pylori infection   • Duodenitis   • Dysarthria   • Right hemiparesis   • HTN (hypertension)   • Stroke   • Multiple thyroid nodules   • Lacunar stroke   • DM (diabetes mellitus), type 2, uncontrolled w/neurologic complication               Adult Rehabilitation Note       18 1050 18 0901 18 0900    Rehab Assessment/Intervention    Discipline physical therapist  -SC --  -AN occupational therapist  -AN    Document Type therapy note (daily note)  -SC --  -AN therapy note (daily note)  -AN    Subjective Information complains of;fatigue  -SC --  -AN no complaints;agree to therapy  -AN    Patient Effort, Rehab Treatment good  -SC --  -AN good  -AN    Symptoms Noted During/After Treatment significant change in vital  signs;fatigue   elevated bp   -SC --  -AN none  -AN    Precautions/Limitations fall precautions  -SC --  -AN fall precautions  -AN    Specific Treatment Considerations  --  -AN some dysmetria R hand  -AN    Recorded by [SC] Frederick Hwang, PT [AN] Aniya Harden, OT [AN] Aniya Harden, OT    Vital Signs    Post Systolic BP Rehab 177  -SC      Post Treatment Diastolic   -SC      Recorded by [SC] Frederick Hwang, PT      Pain Assessment    Pain Assessment No/denies pain  -SC --  -AN No/denies pain  -AN    Pain Score 0  -SC      Recorded by [SC] Frederick Hwang, PT [AN] Aniya Harden, OT [AN] Aniya Harden, OT    Cognitive Assessment/Intervention    Current Cognitive/Communication Assessment functional  -SC --  -AN impaired   much improved speech, minimal dysarthria present  -AN    Orientation Status oriented x 4  -SC --  -AN oriented x 4  -AN    Follows Commands/Answers Questions 100% of the time  -SC --  -% of the time  -AN    Personal Safety WNL/WFL  -SC --  -AN WNL/WFL  -AN    Personal Safety Interventions fall prevention program maintained;gait belt  -SC      Recorded by [SC] Frederick Hwang, PT [AN] Aniya Harden, OT [AN] Aniya Harden, OT    Cognitive Assessment Intervention    Behavior/Mood Observations alert;cooperative  -SC      Recorded by [SC] Frederick Hwang, PT      Right Elbow/Forearm    Elbow Flexion Gross Movement   (4+/5) good plus  -AN    Elbow Extension Gross Movement   (4+/5) good plus  -AN    Recorded by   [AN] Aniya Harden, OT    Bed Mobility, Assessment/Treatment    Bed Mobility, Comment uic  -SC  pt up in chair  -AN    Recorded by [SC] Frederick Hwang, PT  [AN] Aniya Harden, OT    Transfer Assessment/Treatment    Transfers, Sit-Stand [place] verbal cues required;supervision required  -SC  supervision required  -AN    Transfers, Stand-Sit [place] supervision required;verbal cues required  -SC  supervision required  -AN    Transfers, Sit-Stand-Sit, Assist Device rolling walker   -SC  rolling walker  -AN    Bathtub Transfer, [place]   minimum assist (75% patient effort)   simulated with grab bars  -AN    Transfer, Safety Issues balance decreased during turns  -SC      Transfer, Impairments coordination impaired;impaired balance  -SC      Transfer, Comment required some cues for safety when sitting down  -SC  pt needs cues for keeping walker close to body during transitions  -AN    Recorded by [SC] Frederick Hwang, PT  [LOU] Aniya Harden OT    Gait Assessment/Treatment    Gait, [place] Level contact guard assist  -SC      Gait, Assistive Device rolling walker  -SC      Gait, Distance (Feet) 350   100 feet with hand held assist  -SC      Gait, Gait Pattern Analysis swing-through gait   x3 standing breaks  -SC      Gait, Gait Deviations other (see comments)   sways R   -SC      Gait, Safety Issues balance decreased during turns  -SC      Gait, Impairments coordination impaired;impaired balance  -SC      Gait, Comment cues requried to walk slower to keep her balance. Sways R at times  -SC      Recorded by [SC] Frederick Hwang, PT      Functional Mobility    Functional Mobility- Ind. Level   supervision required;verbal cues required  -AN    Functional Mobility- Device   rolling walker  -AN    Functional Mobility-Distance (Feet)   20  -AN    Functional Mobility- Comment   cues to keep RW with her with position changes and doing IADL' tasks  -AN    Recorded by   [LOU] Aniya Harden OT    ADL Assessment/Intervention    Additional Documentation   --    and carry trash can, relocate small objects with sup  -AN    Recorded by   [LOU] Aniya Harden OT    Therapy Exercises    Bilateral Lower Extremities AROM:;10 reps;ankle pumps/circles;LAQ;hip flexion  -SC      Bilateral Upper Extremity 10 reps;AROM:;sitting;shoulder abduction/adduction;shoulder extension/flexion  -SC      BUE Resistance   theraputty;other (comment)   foam block ex  -AN    Recorded by [SC] Frederick Hwang, PT  [LOU] Aniya  AUDELIA Harden OT    Gross Motor Coordination Training    Gross Motor Skill, Impairments Detail   still impaired R finger to nose; some dysmetria with reaching for objects; performed slower speed reaching tasks with increased accuracy  -AN    Recorded by   [LOU] Aniya Harden OT    Fine Motor Coordination Training    Opposition   --   FM HEP reviewed  -AN    Recorded by   [LOU] Aniya Harden OT    Positioning and Restraints    Pre-Treatment Position   sitting in chair/recliner  -AN    Post Treatment Position chair  -SC  chair  -AN    In Chair sitting;call light within reach;encouraged to call for assist;with family/caregiver  -SC  reclined;call light within reach;encouraged to call for assist;with family/caregiver  -AN    Recorded by [SC] Frederick Hwang, PT  [AN] Aniya Harden OT      User Key  (r) = Recorded By, (t) = Taken By, (c) = Cosigned By    Initials Name Effective Dates    SC Frederick Hwang, PT 06/19/15 -     AN Aniya Harden OT 06/22/15 -                 IP PT Goals       03/05/18 1245 03/04/18 1150       Bed Mobility PT LTG    Bed Mobility PT LTG, Date Established  03/04/18  -KM     Bed Mobility PT LTG, Time to Achieve  2 wks  -KM     Bed Mobility PT LTG, Activity Type  all bed mobility  -KM     Bed Mobility PT LTG, [place] Level  independent  -KM     Bed Mobility PT LTG, Outcome goal ongoing  -SC      Transfer Training PT LTG    Transfer Training PT LTG, Date Established  03/04/18  -KM     Transfer Training PT LTG, Time to Achieve  2 wks  -KM     Transfer Training PT LTG, Activity Type  all transfers  -KM     Transfer Training PT LTG, [place] Level  independent  -KM     Transfer Training PT LTG, Outcome goal ongoing  -SC      Gait Training PT LTG    Gait Training Goal PT LTG, Date Established  03/04/18  -KM     Gait Training Goal PT LTG, Time to Achieve 5 days  -SC 2 wks  -KM     Gait Training Goal PT LTG, [place] Level  independent  -KM     Gait Training Goal PT LTG, Assist Device  cane,  straight  -KM     Gait Training Goal PT LTG, Distance to Achieve  250  -KM     Gait Training Goal PT LTG, Outcome goal ongoing  -SC        User Key  (r) = Recorded By, (t) = Taken By, (c) = Cosigned By    Initials Name Provider Type    SC Frederick Hwang, PT Physical Therapist     Veronica Alvarado, PT Physical Therapist          Physical Therapy Education     Title: PT OT SLP Therapies (Done)     Topic: Physical Therapy (Done)     Point: Mobility training (Done)    Learning Progress Summary    Learner Readiness Method Response Comment Documented by Status   Patient Eager E MARIUM ACOSTA,NR reviewed safety with mobility SC 03/05/18 1245 Done    Acceptance E VU discussed plan of care and d/c planning  03/04/18 1153 Done               Point: Home exercise program (Done)    Learning Progress Summary    Learner Readiness Method Response Comment Documented by Status   Patient Eager E MARIUM ACOSTA,NR reviewed safety with mobility SC 03/05/18 1245 Done    Acceptance E VU discussed plan of care and d/c planning  03/04/18 1153 Done               Point: Body mechanics (Done)    Learning Progress Summary    Learner Readiness Method Response Comment Documented by Status   Patient Eager E MARIUM ACOSTA,NR reviewed safety with mobility SC 03/05/18 1245 Done    Acceptance E VU discussed plan of care and d/c planning  03/04/18 1153 Done               Point: Precautions (Done)    Learning Progress Summary    Learner Readiness Method Response Comment Documented by Status   Patient Eager E MARIUM ACOSTA,NR reviewed safety with mobility SC 03/05/18 1245 Done    Acceptance E VU discussed plan of care and d/c planning  03/04/18 1153 Done                      User Key     Initials Effective Dates Name Provider Type Discipline    SC 06/19/15 -  Frederick Hwang, PT Physical Therapist PT     06/19/15 -  Veronica Alvarado PT Physical Therapist PT                    PT Recommendation and Plan  Anticipated Discharge Disposition: inpatient rehabilitation  facility  PT Frequency: daily  Plan of Care Review  Plan Of Care Reviewed With: patient  Progress: improving  Outcome Summary/Follow up Plan: Still required assist for safety reminders. Fatigues after 150 feet requiring standing breaks.          Outcome Measures       03/05/18 1050 03/05/18 0901 03/04/18 1111    How much help from another person do you currently need...    Turning from your back to your side while in flat bed without using bedrails? 4  -SC  4  -KM    Moving from lying on back to sitting on the side of a flat bed without bedrails? 4  -SC  4  -KM    Moving to and from a bed to a chair (including a wheelchair)? 3  -SC  3  -KM    Standing up from a chair using your arms (e.g., wheelchair, bedside chair)? 3  -SC  3  -KM    Climbing 3-5 steps with a railing? 3  -SC  3  -KM    To walk in hospital room? 3  -SC  3  -KM    AM-PAC 6 Clicks Score 20  -SC  20  -KM    How much help from another is currently needed...    Putting on and taking off regular lower body clothing?  3  -AN     Bathing (including washing, rinsing, and drying)  3  -AN     Toileting (which includes using toilet bed pan or urinal)  3  -AN     Putting on and taking off regular upper body clothing  3  -AN     Taking care of personal grooming (such as brushing teeth)  3  -AN     Eating meals  4  -AN     Score  19  -AN     Modified Liz Scale    Modified [place] Scale   3 - Moderate disability.  Requiring some help, but able to walk without assistance.  -KM    Functional Assessment    Outcome Measure Options AM-PAC 6 Clicks Basic Mobility (PT)  -SC  AM-PAC 6 Clicks Basic Mobility (PT)  -KM      03/04/18 0904          How much help from another is currently needed...    Putting on and taking off regular lower body clothing? 3  -ST      Bathing (including washing, rinsing, and drying) 3  -ST      Toileting (which includes using toilet bed pan or urinal) 3  -ST      Putting on and taking off regular upper body clothing 3  -ST      Taking care of  personal grooming (such as brushing teeth) 3  -ST      Eating meals 4  -ST      Score 19  -ST      Modified Liz Scale    Modified [place] Scale 3 - Moderate disability.  Requiring some help, but able to walk without assistance.  -ST      Functional Assessment    Outcome Measure Options AM-PAC 6 Clicks Daily Activity (OT);Modified [place]  -ST        User Key  (r) = Recorded By, (t) = Taken By, (c) = Cosigned By    Initials Name Provider Type    SC Frederick Hwang, PT Physical Therapist    ST Joelle Sharp, OTR Occupational Therapist    SANTO Alvarado, PT Physical Therapist    LOU Harden, OT Occupational Therapist           Time Calculation:         PT Charges       03/05/18 1248          Time Calculation    Start Time 1050  -SC      PT Received On 03/05/18  -SC      PT Goal Re-Cert Due Date 03/14/18  -SC      Time Calculation- PT    Total Timed Code Minutes- PT 23 minute(s)  -SC        User Key  (r) = Recorded By, (t) = Taken By, (c) = Cosigned By    Initials Name Provider Type    SC Frederick Hwang, PT Physical Therapist          Therapy Charges for Today     Code Description Service Date Service Provider Modifiers Qty    06310632931 HC GAIT TRAINING EA 15 MIN 3/5/2018 Frederick Hwang, PT GP 1    78311630293 HC PT THER PROC EA 15 MIN 3/5/2018 Frederick Hwang, PT GP 1          PT G-Codes  Outcome Measure Options: AM-PAC 6 Clicks Basic Mobility (PT)    Frederick Hwang PT  3/5/2018

## 2018-03-05 NOTE — PROGRESS NOTES
"Valarie Camara    Subjective     CC: stroke    History of Present Illness     Valarie Camara is admitted with stroke. She continues to note right sided ataxia unchanged. She denies any new complaints.      There have been no other changes in the patient's interval history since my consult note of 3/4/18.    I have reviewed and confirmed the past family, social and medical history as accurate on 3/5/18.     Review of Systems   Constitutional: Negative.        Objective     /78  Pulse 92  Temp 97.6 °F (36.4 °C)  Resp 16  Ht 160 cm (63\")  Wt 90.7 kg (200 lb)  SpO2 92%  BMI 35.43 kg/m2    Physical Exam   Constitutional: She is oriented to person, place, and time.   Neurological: She is oriented to person, place, and time. She has normal strength. She has an abnormal Finger-Nose-Finger Test (Mild dysmetria on the right, though significantly improved).   Psychiatric: Her speech is normal.        Neurologic Exam     Mental Status   Oriented to person, place, and time.   Attention: normal.   Speech: speech is normal   Level of consciousness: alert  Knowledge: good.   Normal comprehension.     Cranial Nerves   Cranial nerves II through XII intact.     Motor Exam   Muscle bulk: normal  Overall muscle tone: normal    Strength   Strength 5/5 throughout.     Gait, Coordination, and Reflexes     Coordination   Finger to nose coordination: abnormal (Mild dysmetria on the right, though significantly improved)      Laboratory and radiological testing is notable for an MRI confirming a left thalamic infarct (lacunar). NPY=439. ECHO shows no source of cardioembolism and Carotid Dopplers show 0-49% ICA stenosis bilaterally.    Assessment/Plan       Valarie Camara is admitted with lacunar stroke. I continue to suspect small vessel atherosclerotic disease as the etiology, and she will continue on ASA/Plavix/Lipitor. I stressed the importance of her DM management, along with vascular risk factor control in " general. She will work with PT/OT/SLP. Her workup is now complete and she could potentially be discharged to rehab (inpt or outpt) at any time from my standpoint.        As part of this visit I reviewed radiology results and reviewed records from the current hospitalization which is incorporated in the HPI.

## 2018-03-05 NOTE — PLAN OF CARE
Problem: Patient Care Overview (Adult)  Goal: Plan of Care Review  Outcome: Ongoing (interventions implemented as appropriate)   03/05/18 1245   Coping/Psychosocial Response Interventions   Plan Of Care Reviewed With patient   Outcome Evaluation   Outcome Summary/Follow up Plan Still required assist for safety reminders. Fatigues after 150 feet requiring standing breaks.   Patient Care Overview   Progress improving       Problem: Inpatient Physical Therapy  Goal: Bed Mobility Goal LTG- PT   03/04/18 1150 03/05/18 1245   Bed Mobility PT LTG   Bed Mobility PT LTG, Date Established 03/04/18 --    Bed Mobility PT LTG, Time to Achieve 2 wks --    Bed Mobility PT LTG, Activity Type all bed mobility --    Bed Mobility PT LTG, [place] Level independent --    Bed Mobility PT LTG, Outcome --  goal ongoing     Goal: Transfer Training Goal 1 LTG- PT  Outcome: Ongoing (interventions implemented as appropriate)   03/04/18 1150 03/05/18 1245   Transfer Training PT LTG   Transfer Training PT LTG, Date Established 03/04/18 --    Transfer Training PT LTG, Time to Achieve 2 wks --    Transfer Training PT LTG, Activity Type all transfers --    Transfer Training PT LTG, [place] Level independent --    Transfer Training PT LTG, Outcome --  goal ongoing     Goal: Gait Training Goal LTG- PT  Outcome: Ongoing (interventions implemented as appropriate)   03/04/18 1150 03/05/18 1245   Gait Training PT LTG   Gait Training Goal PT LTG, Date Established 03/04/18 --    Gait Training Goal PT LTG, Time to Achieve --  5 days   Gait Training Goal PT LTG, [place] Level independent --    Gait Training Goal PT LTG, Assist Device cane, straight --    Gait Training Goal PT LTG, Distance to Achieve 250 --    Gait Training Goal PT LTG, Outcome --  goal ongoing

## 2018-03-05 NOTE — PLAN OF CARE
Problem: Patient Care Overview (Adult)  Goal: Plan of Care Review  Outcome: Ongoing (interventions implemented as appropriate)   03/05/18 1600   Coping/Psychosocial Response Interventions   Plan Of Care Reviewed With patient   Outcome Evaluation   Outcome Summary/Follow up Plan Saw for cog/comm dx/tx. Pt's reading and writing skills were found to be WFL. Able to use overarticulation strategy to improve speech intelligibilty w/ 70% accuracy w/ inconsistent cues. Pt reports that she has the most difficulty w/ speech when on the phone and that current skills are not at baseline. Pt also reports occasional word-finding difficulty in conversation that is new since stroke. Needed consistent cues for divergent naming task, added new goal to address. Cont cog/comm tx.    Patient Care Overview   Progress improving       Problem: Inpatient SLP  Goal: Dysarthria-Patient will improve motor speech skills to allow optimal participation in care  Outcome: Ongoing (interventions implemented as appropriate)   03/03/18 1421 03/05/18 1600   Dysarthria- Optimal Particpation in Care   Dysarthria Optimal Participation in Care- SLP, Date Established 03/03/18 --    Dysarthria Optimal Participation in Care- SLP, Time to Achieve by discharge --    Dysarthria Optimal Participation in Care- SLP, Date Goal Reviewed --  03/05/18   Dysarthria Optimal Participation in Care- SLP, Outcome --  goal ongoing     Goal: Expressive-Patient will improve expressive language skills to allow optimal participation in care  Outcome: Ongoing (interventions implemented as appropriate)   03/05/18 1600   Expressive- Optimal Participation in Care   Expressive Optimal Participation in Care- SLP, Date Established 03/05/18   Expressive Optimal Participation in Care- SLP, Time to Achieve by discharge   Expressive Optimal Participation in Care- SLP, Activity Level Patient will improve word retrieval skills   Expressive Optimal Participation in Care- SLP, Date Goal  Reviewed 03/05/18   Expressive Optimal Participation in Care- SLP, Outcome goal ongoing

## 2018-03-05 NOTE — CONSULTS
"Diabetes Education  Assessment/Teaching    Patient Name:  Valarie Camara  YOB: 1945  MRN: [id number]  Admit Date:  3/3/2018      Assessment Date:  3/5/2018       Most Recent Value    General Information      Referral From:  A1c, Blood glucose, MD order    Height  160 cm (63\")    Weight  90.7 kg (200 lb)    Pregnancy Assessment     Diabetes History     What type of diabetes do you have?  Type 2    Length of Diabetes Diagnosis  10 + years    Current DM knowledge  fair    Have you had diabetes education/teaching in the past?  yes    Do you test your blood sugar at home?  yes    Frequency of checks  2 times daily    Who performs the test?  Self    Typical readings  300s    Have you had low blood sugar? (<70mg/dl)  yes    How often do you have low blood sugar?  rare    Have you had high blood sugar? (>140mg/dl)  yes    How often do you have high blood sugar?  frequently    Education Preferences     Nutrition Information     Assessment Topics     Healthy Eating - Assessment  Needs education    Being Active - Assessment  Needs education    Reducing Risk - Assessment  Needs education    DM Goals     Contact Plan  Follow-up medical care, Phone call, 0-30 days from discharge [FU with PCP at discharge]               Most Recent Value    DM Education Needs     Meter  Has own    Frequency of Testing  4 times a day    Blood Glucose Target Range  Ranges per ADA guidelines    Medication  Insulin    Problem Solving  Hypoglycemia, Hyperglycemia, Sick days, Signs, Symptoms    Reducing Risks  A1C testing, Lipids, Eye exam, Retinopathy, Immunizations, Foot care, Blood pressure    Physical Activity  None    Healthy Coping  Appropriate    Motivation  Moderate    Teaching Method  Explanation, Discussion, Handouts, Teach back    Patient Response  Verbalized understanding, Needs reinforcement        Ms. Camara and her daughter, who is also insulin dependent, were present for diabetes education. Ms. Camara states she " has been a Type 2 diabetic for 20+ years and on insulin for about 10 years. She is currently on Basaglar and Humalog at home. Her current A1c is 12.3%, discussed the significance of her result. She states her A1c usually runs around 12. She reports she checks her BG levels twice daily and runs in the 300s fasting and non fasting.  Discussed and taught patient about type 2 diabetes self-management, risk factors, and importance of blood glucose control to reduce complications. Target blood glucose readings and A1c goals per ADA were reviewed.Signs, symptoms and treatment of hyperglycemia and hypoglycemia were discussed. Lifestyle changes such as physical activity with MD approval and healthy eating were encouraged.Pt instructed to  check blood sugar 4 times per day and to call PCP if  Blood glucose is higher than 180 two times or more.  Patient was encouraged to keep record of blood glucose readings to take to follow up appointment with PCP.        Electronically signed by:  Saira Rich RN  03/05/18 1:28 PM

## 2018-03-05 NOTE — PROGRESS NOTES
Discharge Planning Assessment  Psychiatric     Patient Name: Valarie Camara  MRN: [id number]  Today's Date: 3/5/2018    Admit Date: 3/3/2018          Discharge Needs Assessment       03/05/18 1234    Living Environment    Lives With alone    Living Arrangements house    Home Accessibility bed and bath on same level;stairs to enter home   Daughter's home    Number of Stairs to Enter Home 4    Transportation Available car;family or friend will provide    Living Environment    Quality Of Family Relationships supportive;helpful;involved    Able to Return to Prior Living Arrangements yes    Living Arrangement Comments Ms. Camara lives alone in a one story home in [place].  She will be staying at her daughter's home, Cleo, in [place], after discharge.  Address is [address] [place] [address].    Discharge Needs Assessment    Readmission Within The Last 30 Days no previous admission in last 30 days    Equipment Currently Used at Home cane, straight;walker, rolling   Access to a rolling walker, but did not use PTA.    Equipment Needed After Discharge none    Discharge Facility/Level Of Care Needs home with home health    Discharge Disposition home healthcare service    Discharge Contact Information if Applicable DaughterCleo,  [phone]            Discharge Plan       03/05/18 1239    Case Management/Social Work Plan    Plan Home with [facility]    Patient/Family In Agreement With Plan yes    Additional Comments Met with Ms. Camara and her daughter, Cleo, at the bedside for discharge planning.  Ms. Camara is ambulating with PT approx. 350 feet this morning.  Patient and daughter requested [facility] for inpatient rehab.  Referred patient to [place] with [facility] for evaluation, but she will likely not be accepted by [facility] for admission or be approved by [employer] for rehab.  Ms. Camara will be staying at her daughter's home after discharge for Cleo to assist her.  No DME needs.   Referred Ms. Camara to [facility] with [facility] and [facility] will follow for discharge date.   will continue to follow.        Discharge Placement     No information found                Demographic Summary       03/05/18 1229    Primary Care Physician Information    Name Jeny TRIPP            Functional Status       03/05/18 1232    Functional Status Prior    Ambulation 0-->independent    Transferring 0-->independent    Toileting 0-->independent    Bathing 0-->independent    Dressing 0-->independent    Eating 0-->independent    Communication 0-->understands/communicates without difficulty    Swallowing 0-->swallows foods/liquids without difficulty    IADL    Medications independent    Meal Preparation independent    Housekeeping independent    Laundry independent    Shopping independent    Oral Care independent    Activity Tolerance    Current Activity Limitations --   See PT notes    Usual Activity Tolerance good    Cognitive/Perceptual/Developmental    Current Mental Status/Cognitive Functioning no deficits noted    Employment/Financial    Employment/Finance Comments Ms. Camara has prescription drug coverage with [employer].  Verified insurance with patient.  She fills scripts at [facility].            Psychosocial     None            Abuse/Neglect     None            Legal       03/05/18 1233    Legal    Legal Comments Copy of advance directives requested.            Substance Abuse       03/05/18 1233    Substance Use, Patient    Substance Use past tobacco use;current alcohol use    Tobacco Type cigarettes, tobacco            Patient Forms     None          Shirley Lucio

## 2018-03-05 NOTE — PLAN OF CARE
Problem: Patient Care Overview (Adult)  Goal: Plan of Care Review  Outcome: Ongoing (interventions implemented as appropriate)   03/05/18 0933   Coping/Psychosocial Response Interventions   Plan Of Care Reviewed With patient   Outcome Evaluation   Outcome Summary/Follow up Plan Pt increasing R UE strength and coordination. Pt Supv with transfers and mobiity this date. Pt demonstrates slight decreased hand/eye coordination in R UE. Pt anticipates IRF at discharge at this time.    Patient Care Overview   Progress improving       Problem: Stroke (Ischemic) (Adult)  Goal: Signs and Symptoms of Listed Potential Problems Will be Absent or Manageable (Stroke)  Outcome: Ongoing (interventions implemented as appropriate)   03/04/18 1010   Stroke (Ischemic)   Problems Assessed (Stroke (Ischemic)/TIA) motor/sensory impairment   Problems Present (Stroke (Ischemic)/TIA) motor/sensory impairment

## 2018-03-05 NOTE — PROGRESS NOTES
[facility] Medicine Services  PROGRESS NOTE    Patient Name: Valarie Camara  : 1945  MRN: [id number]    Date of Admission: 3/3/2018  Length of Stay: 2  Primary Care Physician: Jeny Neri    Subjective   Subjective     CC:  Hospital follow up for stroke    HPI:  Daughter at bedside.  No issues overnight    Review of Systems  Gen- No fevers, chills  CV- No chest pain, palpitations  Resp- No cough, dyspnea  GI- No N/V/D, abd pain      Otherwise ROS is negative except as mentioned in the HPI.    Objective   Objective     Vital Signs:   Temp:  [97.6 °F (36.4 °C)-98.6 °F (37 °C)] 98.4 °F (36.9 °C)  Heart Rate:  [86-92] 88  Resp:  [16] 16  BP: (161-197)/(78-98) 183/87  Total (NIH Stroke Scale): 2     Physical Exam:  Constitutional: No acute distress, awake, alert, up in chair  HENT: NCAT, mucous membranes moist  Respiratory: Clear to auscultation bilaterally, respiratory effort normal   Cardiovascular: RRR, no murmurs, rubs, or gallops, palpable pedal pulses bilaterally  Gastrointestinal: Positive bowel sounds, soft, nontender, nondistended  Musculoskeletal: No bilateral ankle edema  Psychiatric: Appropriate affect, cooperative  Neurologic: Oriented x 3, strength symmetric in all extremities, speech clear  Skin: No rashes      Results Reviewed:  I have personally reviewed current lab, radiology, and data and agree.      Results from last 7 days  Lab Units 18  1050   WBC 10*3/mm3 5.63   HEMOGLOBIN g/dL 13.4   HEMATOCRIT % 40.6   PLATELETS 10*3/mm3 261   INR  0.89*       Results from last 7 days  Lab Units 18  1050   SODIUM mmol/L 135   POTASSIUM mmol/L 4.3   CHLORIDE mmol/L 100   CO2 mmol/L 30.0   BUN mg/dL 19   CREATININE mg/dL 1.10   GLUCOSE mg/dL 301*   CALCIUM mg/dL 9.5   ALT (SGPT) U/L 23   AST (SGOT) U/L 18     Estimated Creatinine Clearance: 48.7 mL/min (by C-G formula based on Cr of 1.1).      Microbiology Results Abnormal     None          Imaging Results (last  "24 hours)     ** No results found for the last 24 hours. **        Results for orders placed during the hospital encounter of 03/03/18   Adult Transthoracic Echo Complete W/ Cont if Necessary Per Protocol (With Agitated Saline)    Narrative · Left ventricular systolic function is hyperdynamic (EF > 70).  · Left ventricular wall thickness is consistent with moderate concentric   hypertrophy.  · The cardiac valves are anatomically and functionally normal.        Bilateral Carotid Duplex:  · Right internal carotid artery stenosis of 0-49%.  · Proximal right internal carotid artery plaque without significant stenosis.  · Left internal carotid artery stenosis of 0-49%.  · Proximal left internal carotid artery plaque without significant stenosis    I have reviewed the medications.    Assessment/Plan   Assessment / Plan     Hospital Problem List     * (Principal)Lacunar stroke    Overview Signed 3/4/2018 12:17 PM by Danie Dunn MD     Left thalamus         Multiple-type hyperlipidemia (Chronic)    Overview Signed 8/1/2016 10:39 PM by Yesi Linder      Mixed hyperlipidemia          Hyperlipidemia (Chronic)    Heartburn    Schatzki's ring    HTN (hypertension)    Multiple thyroid nodules    Overview Signed 3/3/2018 12:12 PM by Danie Dunn MD     Seen incidentally on ct angio head and neck (performed at [facility]) 3/3/18         DM (diabetes mellitus), type 2, uncontrolled w/neurologic complication             Brief Hospital Course to date:  Valarie Camara is a 72 y.o. female with htn, uncontrolled dm, gerd/shatzki ring who presented with right sided leg >arm weakness, slurred speech and ataxia \"dragging right leg\". Woke up 7am family noted dragging right foot, slurred speech. Went to [facility]. U/a negative for infection, ct head no acute dz. Sinus rhythm, troponin negative. Contacted neurology and neurointerventional service who recommended ct angio head and neck prior to transfer. Received " asa 325 at 09:01 prior to transfer. Poor historian  -the ct angio head and neck from outsided showed 50% left ica dz and mild mca dz but no high grade lesion        Assessment & Plan:  -mr c/w left thalamic stroke  -echo ok  -carotids unremarkable  -asa, plavix, high dose statin  -titrate insulins for better diabetes control (a1c >12)  -permissive hypertension w/ slow addition of antihypertensives   -day of admission dropped blood pressure to 90's systolic with hydralazine 10mg iv, patient's daughters not sure patient has been taking her home bp meds    -continue pt/ot      DVT Prophylaxis:  SQ lovenox    CODE STATUS: Full Code    Disposition: I expect the patient to be discharged 1-2 days to [facility]    Electronically signed by JOSEE Kimble, 03/05/18, 12:50 PM.

## 2018-03-05 NOTE — PLAN OF CARE
Problem: Fall Risk (Adult)  Goal: Identify Related Risk Factors and Signs and Symptoms  Outcome: Outcome(s) achieved Date Met: 03/05/18 03/05/18 1812   Fall Risk   Fall Risk: Related Risk Factors age-related changes;environment unfamiliar;history of falls;objects hard to reach   Fall Risk: Signs and Symptoms presence of risk factors     Goal: Absence of Falls  Outcome: Ongoing (interventions implemented as appropriate)  call light within reach,  slipper socks on when up, bed exit and pad alarms in use. Pt instructed to call for help when needed. Pt instructed no to get up without assistance and verbalizes understanding   03/05/18 1812   Fall Risk (Adult)   Absence of Falls making progress toward outcome

## 2018-03-05 NOTE — PAYOR COMM NOTE
"Valarie Barajas (72 y.o. Female)     Date of Birth Social Security Number Address Home Phone MRN    1945  [address] [facility] [phone] [id number]    Zoroastrianism Marital Status          Druze        Admission Date Admission Type Admitting Provider Attending Provider Department, Room/Bed    3/3/18 Urgent Mirna Lopez II, Mirna Wilson II, DO [facility] 3G, S355/1    Discharge Date Discharge Disposition Discharge Destination                      Attending Provider: Mirna Lopez II, DO     Allergies:  Penicillins, Sulfa Antibiotics    Isolation:  None   Infection:  None   Code Status:  FULL    Ht:  160 cm (63\")   Wt:  90.7 kg (200 lb)    Admission Cmt:  None   Principal Problem:  Lacunar stroke [I63.9] More...                 Active Insurance as of 3/3/2018     Primary Coverage     Payor Plan Insurance Group Employer/Plan Group    [employer] [place] MEDICARE REPLACEMENT [facility] REPL      Payor Plan Address Payor Plan Phone Number Effective From Effective To    [address] [address] [phone] 2018     [place] [address]       Subscriber Name Subscriber Birth Date Member ID       VALARIE BARAJAS 1945 [id number]                 Emergency Contacts      (Rel.) Home Phone Work Phone Mobile Phone    Cleo Barajas (Daughter) [phone] -- --    Cleo Avalos Or (Daughter) [phone] -- --               History & Physical      Danie Dunn MD at 3/3/2018 12:10 PM              [facility] Medicine Services  HISTORY AND PHYSICAL    Patient Name: Valarie Barajas  : 1945  MRN: [id number]  Primary Care Physician: Jeny Neri    Subjective   Subjective     Chief Complaint:  Dysarthria, ataxia, right sided weakness    HPI:  Valarie Barajas is a 72 y.o. female with htn, uncontrolled dm, gerd/shatzki ring who presented with right sided leg >arm weakness, slurred speech and ataxia \"dragging right " "leg\". Last known well last night 11pm. Woke up 7am family noted dragging right foot, slurred speech. Went to [facility]. U/a negative for infection, ct head no acute dz. Sinus rhythm, troponin negative. Contacted neurology and neurointerventional service who recommended ct angio head and neck prior to transfer. Received asa 325 at 09:01 prior to transfer. Currently, patient feels still word finding difficulty, right  and leg raise improved. Hasn't tried to walk here. No chest pain, no dysuria. Poor historian, can't definitively tell me her insulin nor antihypertensive regimen. Says she took norvasc this morning.    Review of Systems   No fever, no chills, no dysuria, ataxia falling a little to right    Otherwise 10-system ROS reviewed and is negative except as mentioned in the HPI.    Personal History     Past Medical History:   Diagnosis Date   • Acute bronchitis with bronchospasm    • Acute exacerbation of chronic obstructive pulmonary disease (COPD)    • Anxiety    • Arthritis    • B12 deficiency    • Back pain    • Cataract    • Cervicalgia    • Colonic polyp     History of colonic polyps    • COPD (chronic obstructive pulmonary disease)    • Depression    • Diverticulitis    • Dysphagia    • Edema    • Esophageal reflux    • Folic acid deficiency    • Herpes zoster    • High cholesterol    • History of kidney infection    • History of recurrent urinary tract infection    • HTN (hypertension)    • Hypercholesterolemia    • Kidney infection    • Malignant hypertension 4/26/2015     Accelerated essential hypertension   • Measles     rubeola   • Muscle spasm    • Neoplasm of uncertain behavior of skin    • Osteoporosis    • Recurrent urinary tract infection    • Rheumatoid arthritis    • RLS (restless legs syndrome)    • Stomach ulcer    • Type 2 diabetes mellitus    • Vitamin D deficiency        Past Surgical History:   Procedure Laterality Date   • CATARACT EXTRACTION Left 02/11/2013    with lens implant "   • CATARACT EXTRACTION Right 04/15/2013    with lens implant   • CATARACT EXTRACTION WITH INTRAOCULAR LENS IMPLANT Left 02/11/2013   • CATARACT EXTRACTION WITH INTRAOCULAR LENS IMPLANT Right 04/15/2013   • COLONOSCOPY  2012   • ENDOSCOPY N/A 11/13/2017    Procedure: ESOPHAGOGASTRODUODENOSCOPY with biopsies and esophageal balloon dilitation;  Surgeon: Wesley Stovall MD;  Location: [facility] ENDOSCOPY;  Service:    • HYSTERECTOMY  1979   • UPPER GASTROINTESTINAL ENDOSCOPY  12/09/2013   • UPPER GASTROINTESTINAL ENDOSCOPY  11/13/2017       Family History: family history includes Arthritis in her mother, other, and other; Diabetes in her mother and other; Hypertension in her mother and other. There is no history of Colon cancer.     Social History:  reports that she has quit smoking. She has never used smokeless tobacco. She reports that she drinks alcohol. She reports that she does not use illicit drugs.  Social History     Social History Narrative       Medications:  Prescriptions Prior to Admission   Medication Sig Dispense Refill Last Dose   • aspirin 81 MG EC tablet Take 81 mg by mouth Daily.      • diphenhydrAMINE (BENADRYL) 50 MG capsule Take 50 mg by mouth At Night As Needed for Sleep.      • fenofibrate 160 MG tablet Take 160 mg by mouth Daily.      • Insulin Glargine (BASAGLAR KWIKPEN) 100 UNIT/ML injection pen Inject 50 Units under the skin Daily.      • raNITIdine (ZANTAC) 150 MG tablet Take 150 mg by mouth Daily.      • rosuvastatin (CRESTOR) 10 MG tablet Take 10 mg by mouth Every Night.      • valsartan (DIOVAN) 40 MG tablet Take 40 mg by mouth 2 (Two) Times a Day.      • amLODIPine (NORVASC) 10 MG tablet Take 1 tablet by mouth Daily. 30 tablet 5 Taking   • fluticasone-salmeterol (ADVAIR HFA) 115-21 MCG/ACT inhaler 2 (two) times a day.   Taking   • hydrochlorothiazide (HYDRODIURIL) 12.5 MG tablet Take 1 tablet by mouth daily. 30 tablet 5 Not Taking   • Insulin Glargine (LANTUS SOLOSTAR) 100 UNIT/ML injection  pen Inject 30 Units under the skin 2 (two) times a day. 3 pen 0 Taking   • Insulin Glargine 300 UNIT/ML solution pen-injector Inject 55 Units under the skin daily for 90 days. 12 pen 3 Taking   • Insulin Lispro 200 UNIT/ML solution pen-injector Inject 28 Units under the skin 2 (two) times a day. 2 pen 0 Taking   • Insulin Pen Needle 31G X 5 MM misc Inject insulin three times daily 100 each 11 11/12/2017 at 1330   • Insulin Pen Needle 31G X 8 MM misc 5 shots daily 2 each 11 Unknown at Unknown time   • lisinopril (PRINIVIL,ZESTRIL) 20 MG tablet Take 20 mg by mouth Daily.   Taking   • loratadine (CLARITIN) 10 MG tablet Take 1 tablet by mouth Daily. 30 tablet 5 Taking   • losartan (COZAAR) 100 MG tablet Take 100 mg by mouth daily.   Taking   • lovastatin (MEVACOR) 10 MG tablet Take  by mouth daily.   Taking   • metroNIDAZOLE (FLAGYL) 250 MG tablet Take 1 tablet by mouth 4 (Four) Times a Day. 1 tablet po four times daily for 14 days 56 tablet 0    • mometasone (NASONEX) 50 MCG/ACT nasal spray into each nostril daily.   Not Taking   • pantoprazole (PROTONIX) 40 MG EC tablet Take 1 tablet by mouth Daily. 30 tablet 5 Taking   • pantoprazole (PROTONIX) 40 MG EC tablet Take 1 tablet twice a day 30 minutes before meals x 14 days 28 tablet 0    • sertraline (ZOLOFT) 50 MG tablet TAKE ONE TABLET EVERY DAY 30 tablet 5 Taking   • spironolactone (ALDACTONE) 50 MG tablet Take 1 tablet by mouth 2 (two) times a day. (Patient taking differently: Take 50 mg by mouth 2 (Two) Times a Day.) 30 tablet 5 Taking   • vitamin D (ERGOCALCIFEROL) 70899 units capsule capsule TAKE ONE CAPSULE BY MOUTH ONCE A WEEK 4 capsule 0 Taking       Allergies   Allergen Reactions   • Penicillins    • Sulfa Antibiotics        Objective   Objective     Vital Signs:   Temp:  [98.3 °F (36.8 °C)] 98.3 °F (36.8 °C)  Heart Rate:  [83] 83  Resp:  [18] 18  BP: (191)/(97) 191/97   Total (NIH Stroke Scale): 2    Physical Exam   Alert, oriented x 3  Ncat, oroph  clear  Dysarthria, slurred speech, equal  and leg raise, but abnormal finger to nose right extremity  rrr  ctab  abd soft, nontender  No cce  No extremity rash  Normal affect    Results Reviewed:  I have personally reviewed current lab, radiology, and data and agree.      Results from last 7 days  Lab Units 03/03/18  1050   WBC 10*3/mm3 5.63   HEMOGLOBIN g/dL 13.4   HEMATOCRIT % 40.6   PLATELETS 10*3/mm3 261   INR  0.89*       Results from last 7 days  Lab Units 03/03/18  1050   SODIUM mmol/L 135   POTASSIUM mmol/L 4.3   CHLORIDE mmol/L 100   CO2 mmol/L 30.0   BUN mg/dL 19   CREATININE mg/dL 1.10   GLUCOSE mg/dL 301*   CALCIUM mg/dL 9.5   ALT (SGPT) U/L 23   AST (SGOT) U/L 18     CrCl cannot be calculated (Unknown ideal weight.).  Brief Urine Lab Results     None        No results found for: BNP  No results found for: PHART  Imaging Results (last 24 hours)     ** No results found for the last 24 hours. **             Assessment/Plan   Assessment / Plan     Hospital Problem List     * (Principal)Dysarthria    DM (diabetes mellitus) (Chronic)    Overview Addendum 8/1/2016 10:46 PM by Yesi Linder     Samples provided of Humalog 75/25 , 50 units twice a day   Her diabetes mellitus type 2 is unstable. The patient is adherent with her medication regimen. She denies medication side effects.   Diabetes mellitus with microalbuminuria or microproteinuria    She is also to follow-up in sugar. She needs samples of insulin as her co-pay through insurances very high and she cannot afford it. S         Multiple-type hyperlipidemia (Chronic)    Overview Signed 8/1/2016 10:39 PM by Yesi Linder      Mixed hyperlipidemia          Heartburn    Schatzki's ring    Right hemiparesis    HTN (hypertension)    Stroke    Multiple thyroid nodules    Overview Signed 3/3/2018 12:12 PM by Danie Dunn MD     Seen incidentally on ct angio head and neck (performed at [facility]) 3/3/18             -ekg sinus rhythm  -U/a  "negataive outside hospital  -Troponin outside negative  -CT angio head and neck: 50% left ica, mild left mca dz, basilar a. Possible chronic occlusion    --------------------------------------------------------------------------    Assessment & Plan:  -suspect small vessel cva  -asa 325mg daily, high dose statin  -dr. Crespo to see, he states he is going to enter \"cva order set\" (future imaging including mri, echo, duplex per dr. Crespo)  -tsh (thyroid nodules)   -likely follow up with pcp outpatient for thyroid nodules  -insulin dosing unclear, try levemir 20units sq daily w/ sliding scale for now and titrate, check a1c    DVT prophylaxis: sq lovenox    CODE STATUS:  Full Code    Admission Status:  Inpatient status    Electronically signed by Danie Dunn MD, 03/03/18, 12:10 PM.    Addendum at 1:09 pm:  -rapid response for slurred speech, right facial tingling and arm weakness. sbp was 90's. Had nitro paste still on since other hospital. We had given only 10mg of hydralazine. I suspect patient not taking any of her home bp meds.   -stop all antihypertensives  -allow permissive hypertension (only prn hydralazine 5mg for sbp >210; perhaps add back some meds over next 48 hours      Electronically signed by Danie Dunn MD at 3/3/2018  1:10 PM        Vital Signs (last 72 hrs)       03/02 0700  -  03/03 0659 03/03 0700  -  03/04 0659 03/04 0700  -  03/05 0659 03/05 0700  -  03/05 1044   Most Recent    Temp (°F)   98.3 -  98.6    98.2 -  98.7      97.6     97.6 (36.4)    Heart Rate   61 -  108    78 -  91    86 -  92     92    Resp     18      16       16    BP   92/64 -  (!) 191/97    173/86 -  (!) 198/82      161/78     161/78    SpO2 (%)   95 -  100    95 -  98      92     92          Hospital Medications (all)       Dose Frequency Start End    acetaminophen (TYLENOL) suppository 650 mg 650 mg Every 4 Hours PRN 3/3/2018     Sig - Route: Insert 1 suppository into the rectum Every 4 (Four) Hours As Needed " "for Mild Pain  or Fever (Temperature greater than or equal to 37 C). - Rectal    Linked Group 1:  \"Or\" Linked Group Details        acetaminophen (TYLENOL) tablet 650 mg 650 mg Every 4 Hours PRN 3/3/2018     Sig - Route: Take 2 tablets by mouth Every 4 (Four) Hours As Needed for Mild Pain  or Fever (Temperature greater than or equal to 37 C). - Oral    Linked Group 1:  \"Or\" Linked Group Details        aspirin chewable tablet 81 mg 81 mg Daily 3/3/2018     Sig - Route: Chew 1 tablet Daily. - Oral    Linked Group 2:  \"Or\" Linked Group Details        aspirin suppository 300 mg 300 mg Daily 3/3/2018     Sig - Route: Insert 1 suppository into the rectum Daily. - Rectal    Linked Group 2:  \"Or\" Linked Group Details        atorvastatin (LIPITOR) tablet 80 mg 80 mg Nightly 3/3/2018     Sig - Route: Take 2 tablets by mouth Every Night. - Oral    bisacodyl (DULCOLAX) EC tablet 10 mg 10 mg Daily PRN 3/5/2018     Sig - Route: Take 2 tablets by mouth Daily As Needed for Constipation. - Oral    budesonide-formoterol (SYMBICORT) 160-4.5 MCG/ACT inhaler 2 puff 2 puff 2 Times Daily - RT 3/3/2018     Sig - Route: Inhale 2 puffs 2 (Two) Times a Day. - Inhalation    clopidogrel (PLAVIX) tablet 75 mg 75 mg Daily 3/3/2018     Sig - Route: Take 1 tablet by mouth Daily. - Oral    dextrose (D50W) solution 25 g 25 g Every 15 Minutes PRN 3/3/2018     Sig - Route: Infuse 50 mL into a venous catheter Every 15 (Fifteen) Minutes As Needed for Low Blood Sugar (Blood Sugar Less Than 70). - Intravenous    dextrose (GLUTOSE) oral gel 15 g 15 g Every 15 Minutes PRN 3/3/2018     Sig - Route: Take 15 g by mouth Every 15 (Fifteen) Minutes As Needed for Low Blood Sugar (Blood sugar less than 70). - Oral    docusate sodium (COLACE) capsule 100 mg 100 mg 2 Times Daily 3/5/2018     Sig - Route: Take 1 capsule by mouth 2 (Two) Times a Day. - Oral    enoxaparin (LOVENOX) syringe 30 mg 30 mg Every 24 Hours 3/3/2018     Sig - Route: Inject 0.3 mL under the skin " Daily. - Subcutaneous    glucagon (human recombinant) (GLUCAGEN DIAGNOSTIC) injection 1 mg 1 mg As Needed 3/3/2018     Sig - Route: Inject 1 mg under the skin As Needed (Blood Glucose Less Than 70). - Subcutaneous    hydrALAZINE (APRESOLINE) injection 5 mg 5 mg Every 4 Hours PRN 3/3/2018     Sig - Route: Infuse 0.25 mL into a venous catheter Every 4 (Four) Hours As Needed for High Blood Pressure. - Intravenous    influenza vac split quad (FLUZONE,FLUARIX,AFLURIA) injection 0.5 mL 0.5 mL During Hospitalization 3/3/2018     Sig - Route: Inject 0.5 mL into the shoulder, thigh, or buttocks During Hospitalization for Immunization. - Intramuscular    Cosign for Ordering: Accepted by Danie Dunn MD on 3/3/2018  9:28 PM    insulin detemir (LEVEMIR) injection 20 Units 20 Units Once 3/3/2018 3/3/2018    Sig - Route: Inject 20 Units under the skin 1 (One) Time. - Subcutaneous    insulin detemir (LEVEMIR) injection 20 Units 20 Units Daily 3/4/2018     Sig - Route: Inject 20 Units under the skin Daily. - Subcutaneous    insulin lispro (humaLOG) injection 0-7 Units 0-7 Units Nightly 3/3/2018     Sig - Route: Inject 0-7 Units under the skin Every Night. - Subcutaneous    insulin lispro (humaLOG) injection 0-9 Units 0-9 Units 3 Times Daily With Meals 3/3/2018     Sig - Route: Inject 0-9 Units under the skin 3 (Three) Times a Day With Meals. - Subcutaneous    insulin lispro (humaLOG) injection 5 Units 5 Units 3 Times Daily With Meals 3/4/2018     Sig - Route: Inject 5 Units under the skin 3 (Three) Times a Day With Meals. - Subcutaneous    pantoprazole (PROTONIX) EC tablet 40 mg 40 mg Daily 3/3/2018     Sig - Route: Take 1 tablet by mouth Daily. - Oral    pneumococcal polysaccharide 23-valent (PNEUMOVAX-23) vaccine 0.5 mL 0.5 mL During Hospitalization 3/3/2018     Sig - Route: Inject 0.5 mL into the shoulder, thigh, or buttocks During Hospitalization for Immunization. - Intramuscular    Cosign for Ordering: Accepted by  Danie Dunn MD on 3/3/2018  9:28 PM    polyethylene glycol 3350 powder (packet) 17 g Daily PRN 3/5/2018     Sig - Route: Take 17 g by mouth Daily As Needed for Constipation. - Oral    sodium chloride 0.9 % bolus 1,000 mL 1,000 mL Once 3/3/2018 3/3/2018    Sig - Route: Infuse 1,000 mL into a venous catheter 1 (One) Time. - Intravenous    sodium chloride 0.9 % flush 1-10 mL 1-10 mL As Needed 3/3/2018     Sig - Route: Infuse 1-10 mL into a venous catheter As Needed for Line Care. - Intravenous    sodium chloride 0.9 % flush 1-10 mL 1-10 mL As Needed 3/3/2018     Sig - Route: Infuse 1-10 mL into a venous catheter As Needed for Line Care. - Intravenous    amLODIPine (NORVASC) tablet 10 mg (Discontinued) 10 mg Every 24 Hours Scheduled 3/3/2018 3/3/2018    Sig - Route: Take 1 tablet by mouth Daily. - Oral    amLODIPine (NORVASC) tablet 5 mg (Discontinued) 5 mg Every 24 Hours Scheduled 3/3/2018 3/3/2018    Sig - Route: Take 1 tablet by mouth Daily. - Oral    aspirin EC tablet 325 mg (Discontinued) 325 mg Daily 3/3/2018 3/3/2018    Sig - Route: Take 1 tablet by mouth Daily. - Oral    atorvastatin (LIPITOR) tablet 80 mg (Discontinued) 80 mg Nightly 3/3/2018 3/3/2018    Sig - Route: Take 2 tablets by mouth Every Night. - Oral    hydrALAZINE (APRESOLINE) injection 10 mg (Discontinued) 10 mg Every 4 Hours PRN 3/3/2018 3/3/2018    Sig - Route: Infuse 0.5 mL into a venous catheter Every 4 (Four) Hours As Needed for High Blood Pressure. - Intravenous    hydrochlorothiazide (HYDRODIURIL) tablet 12.5 mg (Discontinued) 12.5 mg Daily 3/3/2018 3/3/2018    Sig - Route: Take 1 tablet by mouth Daily. - Oral    insulin detemir (LEVEMIR) injection 20 Units (Discontinued) 20 Units Nightly 3/4/2018 3/4/2018    Sig - Route: Inject 20 Units under the skin Every Night. - Subcutaneous    Pharmacy to Dose enoxaparin (LOVENOX) (Discontinued)  Continuous PRN 3/3/2018 3/3/2018    Sig - Route: Continuous As Needed for Consult. - Does not apply     valsartan (DIOVAN) tablet 40 mg (Discontinued) 40 mg Every 24 Hours Scheduled 3/3/2018 3/3/2018    Sig - Route: Take 0.5 tablets by mouth Daily. - Oral          Lab Results (last 72 hours)     Procedure Component Value Units Date/Time    POC Glucose Once [[id number]]  (Abnormal) Collected:  03/03/18 1106    Specimen:  Blood Updated:  03/03/18 1107     Glucose 323 (H) mg/dL     Narrative:       Meter: [id number] : [id number] Jimenez Ridley    CBC (No Diff) [[id number]]  (Normal) Collected:  03/03/18 1050    Specimen:  Blood Updated:  03/03/18 1144     WBC 5.63 10*3/mm3      RBC 4.77 10*6/mm3      Hemoglobin 13.4 g/dL      Hematocrit 40.6 %      MCV 85.1 fL      MCH 28.1 pg      MCHC 33.0 g/dL      RDW 13.3 %      RDW-SD 41.0 fl      MPV 11.0 fL      Platelets 261 10*3/mm3     Comprehensive Metabolic Panel [[id number]]  (Abnormal) Collected:  03/03/18 1050    Specimen:  Blood Updated:  03/03/18 1155     Glucose 301 (H) mg/dL      BUN 19 mg/dL      Creatinine 1.10 mg/dL      Sodium 135 mmol/L      Potassium 4.3 mmol/L      Chloride 100 mmol/L      CO2 30.0 mmol/L      Calcium 9.5 mg/dL      Total Protein 6.7 g/dL      Albumin 4.00 g/dL      ALT (SGPT) 23 U/L      AST (SGOT) 18 U/L      Alkaline Phosphatase 58 U/L      Total Bilirubin 0.4 mg/dL      eGFR   Amer 59 (L) mL/min/1.73      Globulin 2.7 gm/dL      A/G Ratio 1.5 g/dL      BUN/Creatinine Ratio 17.3     Anion Gap 5.0 mmol/L     Narrative:       National Kidney Foundation Guidelines    Stage     Description        GFR  1         Normal or High     90+  2         Mild decrease      60-89  3         Moderate decrease  30-59  4         Severe decrease    15-29  5         Kidney failure     <15    Protime-INR [[id number]]  (Abnormal) Collected:  03/03/18 1050    Specimen:  Blood Updated:  03/03/18 1201     Protime 9.3 (L) Seconds      INR 0.89 (L)    aPTT [[id number]]  (Normal) Collected:  03/03/18 1050    Specimen:  Blood Updated:  03/03/18 1201     PTT  24.7 seconds     Narrative:       PTT = The equivalent PTT values for the therapeutic range of heparin levels at 0.3 to 0.5 U/ml are 55 to 70 seconds.    Hemoglobin A1c [[id number]]  (Abnormal) Collected:  03/03/18 1050    Specimen:  Blood Updated:  03/03/18 1217     Hemoglobin A1C 12.30 (H) %     Narrative:       The American Diabetes Association recommends maintenance of Hemoglobin A1C at 7.0% or lower. Goals for Hemoglobin A1C reduction may need to be modified if hypoglycemia is a problem.    POC Glucose Once [[id number]]  (Abnormal) Collected:  03/03/18 1232    Specimen:  Blood Updated:  03/03/18 1245     Glucose 266 (H) mg/dL     Narrative:       Meter: [id number] : [id number] Jimenez Ridley    TSH [[id number]]  (Normal) Collected:  03/03/18 1050    Specimen:  Blood Updated:  03/03/18 1251     TSH 0.765 mIU/mL     POC Glucose Once [[id number]]  (Abnormal) Collected:  03/03/18 1612    Specimen:  Blood Updated:  03/03/18 1613     Glucose 302 (H) mg/dL     Narrative:       Meter: [id number] : [id number] Jimneez Ridley    POC Glucose Once [[id number]]  (Abnormal) Collected:  03/03/18 2058    Specimen:  Blood Updated:  03/03/18 2111     Glucose 403 (H) mg/dL     Narrative:       Meter: [id number] : [id number] Arcadio Espinosa    Lipid Panel [[id number]]  (Normal) Collected:  03/04/18 0527    Specimen:  Blood Updated:  03/04/18 0622     Total Cholesterol 170 mg/dL      Triglycerides 146 mg/dL      HDL Cholesterol 41 mg/dL      LDL Cholesterol  101 mg/dL     Narrative:       Cholesterol Reference Ranges:   Desirable       < 200 mg/dL   Borderline    200-239 mg/dL   High Risk       > 239 mg/dL    Triglyceride Reference Ranges:   Normal          < 150 mg/dL   Borderline    150-199 mg/dL   High          200-499 mg/dL   Very High       > 499 mg/dL    HDL Reference Ranges:   Low              < 40 mg/dL   High             > 59 mg/dL    LDL Reference Ranges:   Optimal         < 100 mg/dL   Near Optimal  100-129 mg/dL    Borderline    130-159 mg/dL   High          160-189 mg/dL   Very High       > 189 mg/dL    POC Glucose Once [[id number]]  (Abnormal) Collected:  03/04/18 0703    Specimen:  Blood Updated:  03/04/18 0724     Glucose 138 (H) mg/dL     Narrative:       Meter: [id number] : [id number] [employer]    POC Glucose Once [[id number]]  (Abnormal) Collected:  03/04/18 1104    Specimen:  Blood Updated:  03/04/18 1114     Glucose 330 (H) mg/dL     Narrative:       Verify with Lab Meter: [id number] : [id number] flck.meberly    POC Glucose Once [[id number]]  (Abnormal) Collected:  03/04/18 1605    Specimen:  Blood Updated:  03/04/18 1610     Glucose 319 (H) mg/dL     Narrative:       Meter: [id number] : [id number] [employer]ck[employer]meberly    POC Glucose Once [[id number]]  (Abnormal) Collected:  03/04/18 1955    Specimen:  Blood Updated:  03/04/18 2015     Glucose 283 (H) mg/dL     Narrative:       Meter: [id number] : [id number] De Luna  [place]    POC Glucose Once [[id number]]  (Normal) Collected:  03/05/18 0205    Specimen:  Blood Updated:  03/05/18 0208     Glucose 100 mg/dL     Narrative:       Meter: [id number] : [id number] De Luna  [place]    POC Glucose Once [[id number]]  (Abnormal) Collected:  03/05/18 0728    Specimen:  Blood Updated:  03/05/18 0744     Glucose 201 (H) mg/dL     Narrative:       Verify with Lab Meter: [id number] : [id number] [employer]          Imaging Results (last 72 hours)     Procedure Component Value Units Date/Time    MRI Brain Without Contrast [[id number]] Collected:  03/03/18 1218     Updated:  03/03/18 1955    Addenda:        THIS REPORT CONTAINS FINDINGS THAT MAY BE CRITICAL TO PATIENT CARE. The   findings were verbally communicated via telephone conference with Dr. Julian at   7:55 PM EST on 3/3/2018. The findings were acknowledged and understood.    THIS DOCUMENT HAS BEEN ELECTRONICALLY SIGNED BY ALEKSEY MONTALVO MD  Signed:  03/03/18 1955 by Aleksey Montalvo MD    Narrative:     "   EXAM:    MR Head Without Intravenous Contrast    CLINICAL HISTORY:    72 years old, female; Dysarthria and anarthria; Signs and symptoms;   Hemiplegia and hemiparesis; Right side, dominance unknown; Patient HX: Stroke   suspected stroke. She was well upon going to bed last evening at about 11 pm.   Upon awakening this morning she felt clumsy on the right side. She has noted   associated dysarthria, but no other associated symptoms, pain or discomfort.    TECHNIQUE:    Magnetic resonance images of the head/brain without intravenous contrast in   multiple planes.    COMPARISON:    No relevant prior studies available.    FINDINGS:    Brain:  7 mm acute infarct left thalamus.  No hemorrhage.    Ventricles:  Unremarkable.  No ventriculomegaly.    Bones/joints:  Unremarkable.    Sinuses:  Unremarkable as visualized.  No acute sinusitis.    Mastoid air cells:  Unremarkable as visualized.  No mastoid effusion.    Orbits:  Unremarkable as visualized.    Other findings:  Mild age-related atrophy.      Impression:         7 mm acute infarct left thalamus.    THIS DOCUMENT HAS BEEN ELECTRONICALLY SIGNED BY ALEKSEY LEDEZMA MD             Physician Progress Notes (last 72 hours) (Notes from 3/2/2018 10:44 AM through 3/5/2018 10:44 AM)      Vic Crespo MD at 3/4/2018  8:07 AM  Version 1 of 1         Valarie Camara    Subjective     CC: stroke    History of Present Illness     Valarie Camara is admitted with stroke. She continues to note right sided ataxia unchanged. She denies any new complaints.      There have been no other changes in the patient's interval history since my consult note of 3/3/18.    I have reviewed and confirmed the past family, social and medical history as accurate on 3/4/18.     Review of Systems   Constitutional: Negative.        Objective     BP (!) 198/82  Pulse 78  Temp 98.7 °F (37.1 °C)  Resp 18  Ht 160 cm (63\")  Wt 90.7 kg (200 lb)  SpO2 96%  BMI 35.43 kg/m2    Physical Exam "   Constitutional: She is oriented to person, place, and time.   Neurological: She is oriented to person, place, and time. She has normal strength.   Psychiatric: Her speech is normal.        Neurologic Exam     Mental Status   Oriented to person, place, and time.   Attention: normal.   Speech: speech is normal   Level of consciousness: alert  Knowledge: good.   Normal comprehension.     Cranial Nerves   Cranial nerves II through XII intact.     Motor Exam   Muscle bulk: normal  Overall muscle tone: normal    Strength   Strength 5/5 throughout.     Gait, Coordination, and Reflexes        Decreased coordination (dysmetria) on the right       Laboratory and radiological testing is notable for an MRI confirming a left thalamic infarct (lacunar). ECHO and Dopplers are pending. EGC=930.     Assessment/Plan       Valarie Camara is admitted with stroke. I suspect small vessel atherosclerotic disease as the etiology, and she will continue on ASA/Plavix/Lipitor. I stressed the importance of her DM management. She will work with PT/OT/SLP.         As part of this visit I reviewed prior lab results, reviewed radiology images and reviewed records from the current hospitalization which is incorporated in the HPI.     Electronically signed by Vic Crespo MD at 3/4/2018  8:13 AM      Danie Dunn MD at 3/4/2018 12:19 PM  Version 1 of 1             [facility] Medicine Services  PROGRESS NOTE    Patient Name: Valarie Camara  : 1945  MRN: [id number]    Date of Admission: 3/3/2018  Length of Stay: 1  Primary Care Physician: Jeny Neri    Subjective   Subjective     CC:  stroke    HPI:  Seen this morning. Still some dysarthria and mild right weakness but otherwise had good night. No n/v/d, no chest pain    Review of Systems  No f/c, no dysuria    Otherwise ROS is negative except as mentioned in the HPI.    Objective   Objective     Vital Signs:   Temp:  [98.4 °F (36.9 °C)-98.7 °F  (37.1 °C)] 98.7 °F (37.1 °C)  Heart Rate:  [] 78  Resp:  [16-18] 16  BP: ()/(64-85) 183/73  Total (NIH Stroke Scale): 2     Physical Exam:  Alert, oriented x 3  Ncat, oroph clear  Dysarthria, slurred speech, perhaps mildly decreased right  compared to left, grossly equal leg strait raise bilaterally, gait not assessed; still abnormal right finger to nose  rrr  ctab  abd soft, nontender  No cce  No extremity rash  Normal affect    Results Reviewed:  I have personally reviewed current lab, radiology, and data and agree.      Results from last 7 days  Lab Units 03/03/18  1050   WBC 10*3/mm3 5.63   HEMOGLOBIN g/dL 13.4   HEMATOCRIT % 40.6   PLATELETS 10*3/mm3 261   INR  0.89*       Results from last 7 days  Lab Units 03/03/18  1050   SODIUM mmol/L 135   POTASSIUM mmol/L 4.3   CHLORIDE mmol/L 100   CO2 mmol/L 30.0   BUN mg/dL 19   CREATININE mg/dL 1.10   GLUCOSE mg/dL 301*   CALCIUM mg/dL 9.5   ALT (SGPT) U/L 23   AST (SGOT) U/L 18     Estimated Creatinine Clearance: 48.7 mL/min (by C-G formula based on Cr of 1.1).  No results found for: BNP  No results found for: PHART    Microbiology Results Abnormal     None          Imaging Results (last 24 hours)     Procedure Component Value Units Date/Time    MRI Brain Without Contrast [[id number]] Collected:  03/03/18 1218     Updated:  03/03/18 1955    Addenda:        THIS REPORT CONTAINS FINDINGS THAT MAY BE CRITICAL TO PATIENT CARE. The   findings were verbally communicated via telephone conference with Dr. Julian at   7:55 PM EST on 3/3/2018. The findings were acknowledged and understood.    THIS DOCUMENT HAS BEEN ELECTRONICALLY SIGNED BY ALEKSEY MONTALVO MD  Signed:  03/03/18 1955 by Aleksey Montalvo MD    Narrative:       EXAM:    MR Head Without Intravenous Contrast    CLINICAL HISTORY:    72 years old, female; Dysarthria and anarthria; Signs and symptoms;   Hemiplegia and hemiparesis; Right side, dominance unknown; Patient HX: Stroke   suspected  stroke. She was well upon going to bed last evening at about 11 pm.   Upon awakening this morning she felt clumsy on the right side. She has noted   associated dysarthria, but no other associated symptoms, pain or discomfort.    TECHNIQUE:    Magnetic resonance images of the head/brain without intravenous contrast in   multiple planes.    COMPARISON:    No relevant prior studies available.    FINDINGS:    Brain:  7 mm acute infarct left thalamus.  No hemorrhage.    Ventricles:  Unremarkable.  No ventriculomegaly.    Bones/joints:  Unremarkable.    Sinuses:  Unremarkable as visualized.  No acute sinusitis.    Mastoid air cells:  Unremarkable as visualized.  No mastoid effusion.    Orbits:  Unremarkable as visualized.    Other findings:  Mild age-related atrophy.      Impression:         7 mm acute infarct left thalamus.    THIS DOCUMENT HAS BEEN ELECTRONICALLY SIGNED BY ALEKSEY LEDEZMA MD        Results for orders placed during the hospital encounter of 03/03/18   Adult Transthoracic Echo Complete W/ Cont if Necessary Per Protocol (With Agitated Saline)    Narrative · Left ventricular systolic function is hyperdynamic (EF > 70).  · Left ventricular wall thickness is consistent with moderate concentric   hypertrophy.  · The cardiac valves are anatomically and functionally normal.          I have reviewed the medications.    Assessment/Plan   Assessment / Plan     Hospital Problem List     * (Principal)Lacunar stroke    Overview Signed 3/4/2018 12:17 PM by Danie Dunn MD     Left thalamus         DM (diabetes mellitus), type 2, uncontrolled w/neurologic complication    Multiple-type hyperlipidemia (Chronic)    Overview Signed 8/1/2016 10:39 PM by Yesi Linder      Mixed hyperlipidemia          Hyperlipidemia (Chronic)    Heartburn    Schatzki's ring    HTN (hypertension)    Multiple thyroid nodules    Overview Signed 3/3/2018 12:12 PM by Danie Dunn MD     Seen incidentally on ct angio head and  "neck (performed at [facility]) 3/3/18                  Brief Hospital Course to date:  Valarie Camara is a 72 y.o. female with htn, uncontrolled dm, gerd/shatzki ring who presented with right sided leg >arm weakness, slurred speech and ataxia \"dragging right leg\". Woke up 7am family noted dragging right foot, slurred speech. Went to [facility]. U/a negative for infection, ct head no acute dz. Sinus rhythm, troponin negative. Contacted neurology and neurointerventional service who recommended ct angio head and neck prior to transfer. Received asa 325 at 09:01 prior to transfer. Currently, patient feels still word finding difficulty, right  and leg raise improved. Hasn't tried to walk here. No chest pain, no dysuria. Poor historian, can't definitively tell me her insulin nor antihypertensive regimen. Says she took norvasc this morning.  -the ct angio head and neck from outsided showed 50% left ica dz and mild mca dz but no high grade lesion  -mri brain here showed left thalamic cva      Assessment & Plan:  -mr c/w left thalamic stroke  -echo ok  -carotids pending  -asa, plavix, high dose statin  -titrate insulins for better diabetes control (a1c >12)  -permissive hypertension w/ slow addition of antihypertensives    -day of admission dropped blood pressure to 90's systolic with hydralazine 10mg iv, patient's daughters not sure patient has been taking her home bp meds    -continue pt ot    -will benefit from rehab at discharge, possibly medically ready 1-2 days; c.m. yet to see since was weekend    DVT Prophylaxis:  Sq lovenox    CODE STATUS: Full Code    Disposition: I expect the patient to be discharged 1-2 days    Electronically signed by Danie Dunn MD, 03/04/18, 12:19 PM.         Electronically signed by Danie Dunn MD at 3/4/2018 12:29 PM      Vic Crespo MD at 3/5/2018  8:41 AM  Version 1 of 1         Valarie Camara    Subjective     CC: stroke    History of Present Illness " "    Valarie Camara is admitted with stroke. She continues to note right sided ataxia unchanged. She denies any new complaints.      There have been no other changes in the patient's interval history since my consult note of 3/4/18.    I have reviewed and confirmed the past family, social and medical history as accurate on 3/5/18.     Review of Systems   Constitutional: Negative.        Objective     /78  Pulse 92  Temp 97.6 °F (36.4 °C)  Resp 16  Ht 160 cm (63\")  Wt 90.7 kg (200 lb)  SpO2 92%  BMI 35.43 kg/m2    Physical Exam   Constitutional: She is oriented to person, place, and time.   Neurological: She is oriented to person, place, and time. She has normal strength. She has an abnormal Finger-Nose-Finger Test (Mild dysmetria on the right, though significantly improved).   Psychiatric: Her speech is normal.        Neurologic Exam     Mental Status   Oriented to person, place, and time.   Attention: normal.   Speech: speech is normal   Level of consciousness: alert  Knowledge: good.   Normal comprehension.     Cranial Nerves   Cranial nerves II through XII intact.     Motor Exam   Muscle bulk: normal  Overall muscle tone: normal    Strength   Strength 5/5 throughout.     Gait, Coordination, and Reflexes     Coordination   Finger to nose coordination: abnormal (Mild dysmetria on the right, though significantly improved)      Laboratory and radiological testing is notable for an MRI confirming a left thalamic infarct (lacunar). VXM=842. ECHO shows no source of cardioembolism and Carotid Dopplers show 0-49% ICA stenosis bilaterally.    Assessment/Plan       Valarie Camara is admitted with lacunar stroke. I continue to suspect small vessel atherosclerotic disease as the etiology, and she will continue on ASA/Plavix/Lipitor. I stressed the importance of her DM management, along with vascular risk factor control in general. She will work with PT/OT/SLP. Her workup is now complete and she could " potentially be discharged to rehab (inpt or outpt) at any time from my standpoint.        As part of this visit I reviewed radiology results and reviewed records from the current hospitalization which is incorporated in the HPI.     Electronically signed by Vic Crespo MD at 3/5/2018  8:45 AM           Consult Notes (last 72 hours) (Notes from 3/2/2018 10:44 AM through 3/5/2018 10:44 AM)      Vic Crespo MD at 3/3/2018 12:25 PM  Version 1 of 1         Subjective     CC: stroke    History of Present Illness   Valarie Camara is a 72 y.o. female is seen today in consultation at the request of Dr. Dunn for suspected stroke. She was well upon going to bed last evening at about 11 pm. However, upon awakening this morning she felt clumsy on the right side. She has noted associated dysarthria, but no other associated symptoms, pain or discomfort. Her symptoms have been unchanged since onset.     I have reviewed and confirmed the past family, social and medical history as accurate on 3/3/18.     Review of Systems   Constitutional: Negative.    Respiratory: Negative.    Cardiovascular: Negative.    Gastrointestinal: Negative.    Genitourinary: Negative.    All other systems reviewed and are negative.      Objective   General appearance today is normal.   Peripheral pulses were present and symmetric.   The ophthalmoscopic exam today is unremarkable. This discs and posterior elements are unremarkable.    BP (!) 191/97 (BP Location: Right arm)  Pulse 83  Temp 98.3 °F (36.8 °C) (Oral)   Resp 18  SpO2 96%    Physical Exam   Constitutional: She is oriented to person, place, and time.   Neurological: She is oriented to person, place, and time. She has normal strength. She has an abnormal Finger-Nose-Finger Test (dysmetric on the right).   Reflex Scores:       Tricep reflexes are 2+ on the right side and 2+ on the left side.       Bicep reflexes are 2+ on the right side and 2+ on the left side.        Brachioradialis reflexes are 2+ on the right side and 2+ on the left side.       Patellar reflexes are 2+ on the right side and 2+ on the left side.       Achilles reflexes are 2+ on the right side and 2+ on the left side.  Psychiatric: Her speech is slurred.        Neurologic Exam     Mental Status   Oriented to person, place, and time.   Registration: recalls 3 of 3 objects. Recall at 5 minutes: recalls 3 of 3 objects. Follows 3 step commands.   Attention: normal.   Speech: slurred   Level of consciousness: alert  Knowledge: good.   Able to repeat. Normal comprehension.        Mild dysarthria     Cranial Nerves   Cranial nerves II through XII intact.     Motor Exam   Muscle bulk: normal  Overall muscle tone: normal    Strength   Strength 5/5 throughout.     Sensory Exam   Light touch normal.     Gait, Coordination, and Reflexes     Gait  Gait: (mildly ataxic)    Coordination   Finger to nose coordination: abnormal (dysmetric on the right)    Reflexes   Right brachioradialis: 2+  Left brachioradialis: 2+  Right biceps: 2+  Left biceps: 2+  Right triceps: 2+  Left triceps: 2+  Right patellar: 2+  Left patellar: 2+  Right achilles: 2+  Left achilles: 2+      Laboratory and radiological testing are notable for a head CT from [facility] which was normal (I reviewed images personally). CMP and CBC are unremarkable. LpnO8p=99.30.      Assessment/Plan       DISCUSSION    Valarie Camara is admitted with suspected stroke. Based on her exam I am concerned about a right cerebellar infarct, potentially related to small vessel atherosclerosis. I will add Plavix to her regimen for now while we await her MRI to confirm the underlying pathology, along with ECHO and Carotid Dopplers. She will also be seen by PT/OT/SLP. This was all discussed.      As part of this visit I reviewed prior lab results, reviewed radiology images and obtained additional history from the family which is incorporated in the HPI. Please see above for  details.     Electronically signed by Vic Crespo MD at 3/3/2018 12:40 PM

## 2018-03-05 NOTE — THERAPY TREATMENT NOTE
Acute Care - Speech Language Pathology Treatment Note  [facility]     Patient Name: Valarie Camara  : 1945  MRN: [id number]  Today's Date: 3/5/2018         Admit Date: 3/3/2018    Visit Dx:      ICD-10-CM ICD-9-CM   1. Dysarthria R47.1 784.51   2. Impaired mobility and ADLs Z74.09 799.89   3. Impaired functional mobility, balance, gait, and endurance Z74.09 V49.89   4. Lacunar stroke I63.9 434.91     Patient Active Problem List   Diagnosis   • Atopic rhinitis   • Arthritis   • Benign paroxysmal positional vertigo   • Neck pain   • Anxiety and depression   • DM (diabetes mellitus)   • Edema   • Gastroesophageal reflux disease with esophagitis   • Multiple-type hyperlipidemia   • Vitamin D deficiency   • Benign essential hypertension   • Obesity (BMI 30-39.9)   • Unstable angina   • History of COPD   • Hyperlipidemia   • History of cataract   • History of osteoporosis   • History of restless legs syndrome   • History of rheumatoid arthritis   • Elevated serum creatinine   • Heartburn   • Pharyngoesophageal dysphagia   • Constipation   • Schatzki's ring   • Gastritis without bleeding   • Helicobacter pylori infection   • Duodenitis   • Dysarthria   • Right hemiparesis   • HTN (hypertension)   • Stroke   • Multiple thyroid nodules   • Lacunar stroke   • DM (diabetes mellitus), type 2, uncontrolled w/neurologic complication              Adult Rehabilitation Note       18 1415 18 1050 18 0901    Rehab Assessment/Intervention    Discipline speech language pathologist  -RD physical therapist  -SC --  -AN    Document Type therapy note (daily note)  -RD therapy note (daily note)  -SC --  -AN    Subjective Information no complaints;agree to therapy  -RD complains of;fatigue  -SC --  -AN    Patient Effort, Rehab Treatment good  -RD good  -SC --  -AN    Symptoms Noted During/After Treatment  significant change in vital signs;fatigue   elevated bp   -SC --  -AN    Precautions/Limitations  fall  precautions  -SC --  -AN    Specific Treatment Considerations   --  -AN    Recorded by [RD] Jodi Lynne, MS CCC-SLP [SC] Frederick Hwang, PT [AN] Aniya Harden, OT    Vital Signs    Post Systolic BP Rehab  177  -SC     Post Treatment Diastolic BP  105  -SC     Recorded by  [SC] Frederick Hwang, PT     Pain Assessment    Pain Assessment No/denies pain  -RD No/denies pain  -SC --  -AN    Pain Score  0  -SC     Recorded by [RD] Jodi Lynne, MS CCC-SLP [SC] Frederick Hwang, PT [AN] Aniya Harden, OT    Cognitive Assessment/Intervention    Current Cognitive/Communication Assessment  functional  -SC --  -AN    Orientation Status  oriented x 4  -SC --  -AN    Follows Commands/Answers Questions  100% of the time  -SC --  -AN    Personal Safety  WNL/WFL  -SC --  -AN    Personal Safety Interventions  fall prevention program maintained;gait belt  -SC     Recorded by  [SC] Frederick Hwang, PT [AN] Aniya Harden, OT    Cognitive Assessment Intervention    Behavior/Mood Observations  alert;cooperative  -SC     Recorded by  [SC] Frederick Hwang, PT     Verbal Expression Assess/Intervention    Automatic Speech WNL/WFL  -RD      Speech Repetition WNL/WFL  -RD      Speech Fluency fluent speech  -RD      Conversational Speech mild impairment;other (see comments)   occasional word-finding difficulty  -RD      Recorded by [RD] Jodi Lynne, MS CCC-SLP      Reading Assessment/Intervention    Reading Skills WNL/WFL  -RD      Oral Reading Ability WNL/WFL  -RD      Reading Comprehension WNL/WFL  -RD      Recorded by [RD] Jodi Lynne, MS CCC-SLP      Writing Assessment/Intervention    Writing Skills WNL/WFL  -RD      Recorded by [RD] Jodi Lynne, MS CCC-SLP      Improve word retrieval skills    Improve word retrieval skills by: completing functional word finding tasks;completing a divergent task;90%;without cues  -RD      Status: Improve word retrieval skills New  -RD      Word Retrieval Skills Progress continue to  address  -RD      Comments: Improve word retrieval skills 50%-divergent taks; reports occasional word-finding difficulty in conversation.  -RD      Recorded by [RD] Jodi Lynne MS CCC-SLP      Improve articulation    Improve articulation: by over-articulating at word level;by over-articulating at phrase level;by over-articulating in connected speech  -RD      Status: Improve articulation Progressing as expected  -RD      Articulation Progress 70%;with inconsistent cues;continue to address  -RD      Recorded by [RD] Jodi Lynne MS CCC-SLP      Bed Mobility, Assessment/Treatment    Bed Mobility, Comment  uic  -SC     Recorded by  [SC] Frederick Hwang, PT     Transfer Assessment/Treatment    Transfers, Sit-Stand Heard  verbal cues required;supervision required  -SC     Transfers, Stand-Sit Heard  supervision required;verbal cues required  -SC     Transfers, Sit-Stand-Sit, Assist Device  rolling walker  -SC     Transfer, Safety Issues  balance decreased during turns  -SC     Transfer, Impairments  coordination impaired;impaired balance  -SC     Transfer, Comment  required some cues for safety when sitting down  -SC     Recorded by  [SC] Frederick Hwagn, PT     Gait Assessment/Treatment    Gait, Heard Level  contact guard assist  -SC     Gait, Assistive Device  rolling walker  -SC     Gait, Distance (Feet)  350   100 feet with hand held assist  -SC     Gait, Gait Pattern Analysis  swing-through gait   x3 standing breaks  -SC     Gait, Gait Deviations  other (see comments)   sways R   -SC     Gait, Safety Issues  balance decreased during turns  -SC     Gait, Impairments  coordination impaired;impaired balance  -SC     Gait, Comment  cues requried to walk slower to keep her balance. Sways R at times  -SC     Recorded by  [SC] Frederick Hwang, PT     Therapy Exercises    Bilateral Lower Extremities  AROM:;10 reps;ankle pumps/circles;LAQ;hip flexion  -SC     Bilateral Upper Extremity  10  reps;AROM:;sitting;shoulder abduction/adduction;shoulder extension/flexion  -SC     Recorded by  [SC] Frederick Hwang, PT     Positioning and Restraints    Post Treatment Position  chair  -SC     In Chair  sitting;call light within reach;encouraged to call for assist;with family/caregiver  -SC     Recorded by  [SC] Frederick Hwang, PT       03/05/18 0900          Rehab Assessment/Intervention    Discipline occupational therapist  -AN      Document Type therapy note (daily note)  -AN      Subjective Information no complaints;agree to therapy  -AN      Patient Effort, Rehab Treatment good  -AN      Symptoms Noted During/After Treatment none  -AN      Precautions/Limitations fall precautions  -AN      Specific Treatment Considerations some dysmetria R hand  -AN      Recorded by [LOU] Aniya Harden OT      Pain Assessment    Pain Assessment No/denies pain  -AN      Recorded by [LOU] Aniya Harden OT      Cognitive Assessment/Intervention    Current Cognitive/Communication Assessment impaired   much improved speech, minimal dysarthria present  -AN      Orientation Status oriented x 4  -AN      Follows Commands/Answers Questions 100% of the time  -AN      Personal Safety WNL/WFL  -AN      Recorded by [LOU] Aniya Harden OT      Right Elbow/Forearm    Elbow Flexion Gross Movement (4+/5) good plus  -AN      Elbow Extension Gross Movement (4+/5) good plus  -AN      Recorded by [LOU] Aniya Harden OT      Bed Mobility, Assessment/Treatment    Bed Mobility, Comment pt up in chair  -AN      Recorded by [LOU] Aniya Harden OT      Transfer Assessment/Treatment    Transfers, Sit-Stand [place] supervision required  -AN      Transfers, Stand-Sit [place] supervision required  -AN      Transfers, Sit-Stand-Sit, Assist Device rolling walker  -AN      Bathtub Transfer, [place] minimum assist (75% patient effort)   simulated with grab bars  -AN      Transfer, Comment pt needs cues for keeping walker close to body during  transitions  -AN      Recorded by [LUO] Aniya Harden OT      Functional Mobility    Functional Mobility- Ind. Level supervision required;verbal cues required  -AN      Functional Mobility- Device rolling walker  -AN      Functional Mobility-Distance (Feet) 20  -AN      Functional Mobility- Comment cues to keep RW with her with position changes and doing IADL' tasks  -AN      Recorded by [LOU] Aniya Harden OT      ADL Assessment/Intervention    Additional Documentation --    and carry trash can, relocate small objects with sup  -AN      Recorded by [LOU] Aniya Harden OT      Therapy Exercises    BUE Resistance theraputty;other (comment)   foam block ex  -AN      Recorded by [LOU] Aniya Harden OT      Gross Motor Coordination Training    Gross Motor Skill, Impairments Detail still impaired R finger to nose; some dysmetria with reaching for objects; performed slower speed reaching tasks with increased accuracy  -AN      Recorded by [LOU] Aniya Harden OT      Fine Motor Coordination Training    Opposition --   FM HEP reviewed  -AN      Recorded by [LOU] Aniya Harden OT      Positioning and Restraints    Pre-Treatment Position sitting in chair/recliner  -AN      Post Treatment Position chair  -AN      In Chair reclined;call light within reach;encouraged to call for assist;with family/caregiver  -AN      Recorded by [LOU] Aniya Harden OT        User Key  (r) = Recorded By, (t) = Taken By, (c) = Cosigned By    Initials Name Effective Dates    ARLEY Hwang, PT 06/19/15 -     AN Aniya Harden OT 06/22/15 -     RD Jodi Lynne MS CCC-SLP 09/27/17 -               IP SLP Goals       03/05/18 1600 03/03/18 1421       Expressive- Optimal Participation in Care    Expressive Optimal Participation in Care- SLP, Date Established 03/05/18  -RD      Expressive Optimal Participation in Care- SLP, Time to Achieve by discharge  -RD      Expressive Optimal Participation in Care- SLP, Activity Level Patient will  improve word retrieval skills  -RD      Expressive Optimal Participation in Care- SLP, Date Goal Reviewed 03/05/18  -RD      Expressive Optimal Participation in Care- SLP, Outcome goal ongoing  -RD      Dysarthria- Optimal Particpation in Care    Dysarthria Optimal Participation in Care- SLP, Date Established  03/03/18  -SM     Dysarthria Optimal Participation in Care- SLP, Time to Achieve  by discharge  -SM     Dysarthria Optimal Participation in Care- SLP, Date Goal Reviewed 03/05/18  -RD      Dysarthria Optimal Participation in Care- SLP, Outcome goal ongoing  -RD goal ongoing  -SM       User Key  (r) = Recorded By, (t) = Taken By, (c) = Cosigned By    Initials Name Provider Type    LUIS CARLOS Salgado, MS CCC-SLP Speech and Language Pathologist    GEORGETTE Lynne MS CCC-SLP Speech and Language Pathologist          EDUCATION  The patient has been educated in the following areas:   Cognitive Impairment Communication Impairment.    SLP Recommendation and Plan                               Plan of Care Review  Plan Of Care Reviewed With: patient  Progress: improving  Outcome Summary/Follow up Plan: Saw for cog/comm dx/tx. Pt's reading and writing skills were found to be WFL. Able to use overarticulation strategy to improve speech intelligibilty w/ 70% accuracy w/ inconsistent cues. Pt reports that she has the most difficulty on the phone and that current skills are not at baseline. Pt also reports occasional word-finding difficulty in conversation. Needed consistent cues for divergent naming task, added new goal to address. Cont cog/comm tx.           Time Calculation:         Time Calculation- SLP       03/05/18 1617          Time Calculation- SLP    SLP Start Time 1415  -RD      SLP Received On 03/05/18  -RD        User Key  (r) = Recorded By, (t) = Taken By, (c) = Cosigned By    Initials Name Provider Type    GEORGETTE Lynne, MS CCC-SLP Speech and Language Pathologist          Therapy Charges for  Today     Code Description Service Date Service Provider Modifiers Qty    [id number]  ST TREATMENT SPEECH 4 3/5/2018 Jodi Lynne MS CCC-SLP GN 1               Jodi Lynne MS CCC-SLP  3/5/2018

## 2018-03-05 NOTE — THERAPY TREATMENT NOTE
Acute Care - Occupational Therapy Treatment Note  [facility]     Patient Name: Valarie Camara  : 1945  MRN: [id number]  Today's Date: 3/5/2018  Onset of Illness/Injury or Date of Surgery Date: 18  Date of Referral to OT: 18  Referring Physician: Dr Crespo      Admit Date: 3/3/2018    Visit Dx:     ICD-10-CM ICD-9-CM   1. Dysarthria R47.1 784.51   2. Impaired mobility and ADLs Z74.09 799.89   3. Impaired functional mobility, balance, gait, and endurance Z74.09 V49.89     Patient Active Problem List   Diagnosis   • Atopic rhinitis   • Arthritis   • Benign paroxysmal positional vertigo   • Neck pain   • Anxiety and depression   • DM (diabetes mellitus)   • Edema   • Gastroesophageal reflux disease with esophagitis   • Multiple-type hyperlipidemia   • Vitamin D deficiency   • Benign essential hypertension   • Obesity (BMI 30-39.9)   • Unstable angina   • History of COPD   • Hyperlipidemia   • History of cataract   • History of osteoporosis   • History of restless legs syndrome   • History of rheumatoid arthritis   • Elevated serum creatinine   • Heartburn   • Pharyngoesophageal dysphagia   • Constipation   • Schatzki's ring   • Gastritis without bleeding   • Helicobacter pylori infection   • Duodenitis   • Dysarthria   • Right hemiparesis   • HTN (hypertension)   • Stroke   • Multiple thyroid nodules   • Lacunar stroke   • DM (diabetes mellitus), type 2, uncontrolled w/neurologic complication             Adult Rehabilitation Note       18 0901 18 0900       Rehab Assessment/Intervention    Discipline --  -AN occupational therapist  -AN     Document Type --  -AN therapy note (daily note)  -AN     Subjective Information --  -AN no complaints;agree to therapy  -AN     Patient Effort, Rehab Treatment --  -AN good  -AN     Symptoms Noted During/After Treatment --  -AN none  -AN     Precautions/Limitations --  -AN fall precautions  -AN     Specific Treatment Considerations --  -AN some  dysmetria R hand  -AN     Recorded by [LOU] Aniya Harden OT [AN] Aniya Harden OT     Pain Assessment    Pain Assessment --  -AN No/denies pain  -AN     Recorded by [LOU] Aniya Harden OT [AN] Aniya Harden OT     Cognitive Assessment/Intervention    Current Cognitive/Communication Assessment --  -AN impaired   much improved speech, minimal dysarthria present  -AN     Orientation Status --  -AN oriented x 4  -AN     Follows Commands/Answers Questions --  -% of the time  -AN     Personal Safety --  -AN WNL/WFL  -AN     Recorded by [LOU] Aniya Harden OT [LOU] Aniya Harden OT     Right Elbow/Forearm    Elbow Flexion Gross Movement  (4+/5) good plus  -AN     Elbow Extension Gross Movement  (4+/5) good plus  -AN     Recorded by  [LOU] Aniya Harden OT     Bed Mobility, Assessment/Treatment    Bed Mobility, Comment  pt up in chair  -AN     Recorded by  [LOU] Aniya Harden OT     Transfer Assessment/Treatment    Transfers, Sit-Stand [place]  supervision required  -AN     Transfers, Stand-Sit [place]  supervision required  -AN     Transfers, Sit-Stand-Sit, Assist Device  rolling walker  -AN     Bathtub Transfer, [place]  minimum assist (75% patient effort)   simulated with grab bars  -AN     Transfer, Comment  pt needs cues for keeping walker close to body during transitions  -AN     Recorded by  [LOU] Aniya Harden OT     Functional Mobility    Functional Mobility- Ind. Level  supervision required;verbal cues required  -AN     Functional Mobility- Device  rolling walker  -AN     Functional Mobility-Distance (Feet)  20  -AN     Functional Mobility- Comment  cues to keep RW with her with position changes and doing IADL' tasks  -AN     Recorded by  [LOU] Aniya Harden OT     ADL Assessment/Intervention    Additional Documentation  --    and carry trash can, relocate small objects with sup  -AN     Recorded by  [LOU] Aniya Harden OT     Therapy Exercises    BUE Resistance  theraputty;other  (comment)   foam block ex  -AN     Recorded by  [LOU] Aniya Harden OT     Gross Motor Coordination Training    Gross Motor Skill, Impairments Detail  still impaired R finger to nose; some dysmetria with reaching for objects; performed slower speed reaching tasks with increased accuracy  -AN     Recorded by  [LOU] Aniya Harden OT     Fine Motor Coordination Training    Opposition  --   FM HEP reviewed  -AN     Recorded by  [LOU] Aniya Harden, CARYN     Positioning and Restraints    Pre-Treatment Position  sitting in chair/recliner  -AN     Post Treatment Position  chair  -AN     In Chair  reclined;call light within reach;encouraged to call for assist;with family/caregiver  -AN     Recorded by  [LOU] Aniya Harden, OT       User Key  (r) = Recorded By, (t) = Taken By, (c) = Cosigned By    Initials Name Effective Dates    AN Aniya Harden OT 06/22/15 -                 OT Goals       03/05/18 0932 03/04/18 1010       Transfer Training OT LTG    Transfer Training OT LTG, Time to Achieve  5 days  -ST     Transfer Training OT LTG, Activity Type  toilet;tub  -ST     Transfer Training OT LTG, [place] Level  contact guard assist  -ST     Transfer Training OT LTG, Outcome (P)  goal ongoing  -AN goal ongoing  -ST     Strength OT LTG    Strength Goal OT LTG, Time to Achieve  5 days  -ST     Strength Goal OT LTG, Functional Goal  Pt to be independent with BUE strengthening HEP by d/c to increase engagement and independence with daily tasks.  -ST     Strength Goal OT LTG, Outcome (P)  --   HEP introduced  -AN goal ongoing  -ST     Coordination OT LTG    Coordination OT LTG, Time to Achieve  5 days  -ST     Coordination OT LTG, Activity Type  GM written ex program  -ST     Coordination OT LTG, Wright Level  independent  -ST     Coordination OT LTG, Outcome (P)  goal ongoing   introduced HEP's this date  -AN goal ongoing  -ST       User Key  (r) = Recorded By, (t) = Taken By, (c) = Cosigned By    Initials Name Provider  Type    ST Joelle Sharp OTR Occupational Therapist    LOU Harden, OT Occupational Therapist          Occupational Therapy Education     Title: PT OT SLP Therapies (Done)     Topic: Occupational Therapy (Done)     Point: ADL training (Done)    Description: Instruct learner(s) on proper safety adaptation and remediation techniques during self care or transfers.   Instruct in proper use of assistive devices.    Learning Progress Summary    Learner Readiness Method Response Comment Documented by Status   Patient Acceptance E,D,TB VU,NR,DU Reviewed txfrs, IADL practice with RW, safety in home and DME for home. AN 03/05/18 0931 Done    Acceptance E,TB,D VU,DU role of OT, benefits of activity, safety w/transfers, mobility, POC ST 03/04/18 1009 Done   Family Acceptance E,D,TB VU,NR,DU Reviewed txfrs, IADL practice with RW, safety in home and DME for home. AN 03/05/18 0931 Done               Point: Home exercise program (Done)    Description: Instruct learner(s) on appropriate technique for monitoring, assisting and/or progressing therapeutic exercises/activities.    Learning Progress Summary    Learner Readiness Method Response Comment Documented by Status   Patient Acceptance E,D,TB VU,NR,DU Reviewed txfrs, IADL practice with RW, safety in home and DME for home. AN 03/05/18 0931 Done    Acceptance E,TB,D VU,DU role of OT, benefits of activity, safety w/transfers, mobility, POC ST 03/04/18 1009 Done   Family Acceptance E,D,TB VU,NR,DU Reviewed txfrs, IADL practice with RW, safety in home and DME for home. AN 03/05/18 0931 Done               Point: Precautions (Done)    Description: Instruct learner(s) on prescribed precautions during self-care and functional transfers.    Learning Progress Summary    Learner Readiness Method Response Comment Documented by Status   Patient Acceptance E,TB,D VU,DU role of OT, benefits of activity, safety w/transfers, mobility, POC ST 03/04/18 1009 Done               Point: Body  mechanics (Done)    Description: Instruct learner(s) on proper positioning and spine alignment during self-care, functional mobility activities and/or exercises.    Learning Progress Summary    Learner Readiness Method Response Comment Documented by Status   Patient Acceptance ASH ANTUNEZ TB VU,NOÉ,MARIUM Reviewed txfrs, IADL practice with RW, safety in home and DME for home. AN 03/05/18 0931 Done    Acceptance CHET ANTUNEZ D VU, DU role of OT, benefits of activity, safety w/transfers, mobility, POC ST 03/04/18 1009 Done   Family Acceptance ASH ANTUNEZ TB VU,NOÉ,MARIUM Reviewed txfrs, IADL practice with RW, safety in home and DME for home. AN 03/05/18 0931 Done                      User Key     Initials Effective Dates Name Provider Type Discipline     02/20/17 -  Joelle Sharp OTR Occupational Therapist OT     06/22/15 -  Aniya Harden, OT Occupational Therapist OT                  OT Recommendation and Plan  Anticipated Equipment Needs At Discharge: tub bench, raised toilet seat  Anticipated Discharge Disposition: inpatient rehabilitation facility, home with assist, home with outpatient services (if returns home will need 24/7 assist)  Planned Therapy Interventions: ADL retraining, IADL retraining, balance training, home exercise program, neuromuscular re-education, strengthening, transfer training  Therapy Frequency: daily  Plan of Care Review  Plan Of Care Reviewed With: patient  Progress: improving  Outcome Summary/Follow up Plan: Pt increasing R UE strength and coordination. Pt Supv with transfers and mobiity this date. Pt demonstrates slight decreased hand/eye coordination in R UE. Pt anticipates IRF at discharge at this time.         Outcome Measures       03/05/18 0901 03/04/18 1111 03/04/18 0904    How much help from another person do you currently need...    Turning from your back to your side while in flat bed without using bedrails?  4  -KM     Moving from lying on back to sitting on the side of a flat bed without bedrails?  4   -KM     Moving to and from a bed to a chair (including a wheelchair)?  3  -KM     Standing up from a chair using your arms (e.g., wheelchair, bedside chair)?  3  -KM     Climbing 3-5 steps with a railing?  3  -KM     To walk in hospital room?  3  -KM     AM-PAC 6 Clicks Score  20  -KM     How much help from another is currently needed...    Putting on and taking off regular lower body clothing? 3  -AN  3  -ST    Bathing (including washing, rinsing, and drying) 3  -AN  3  -ST    Toileting (which includes using toilet bed pan or urinal) 3  -AN  3  -ST    Putting on and taking off regular upper body clothing 3  -AN  3  -ST    Taking care of personal grooming (such as brushing teeth) 3  -AN  3  -ST    Eating meals 4  -AN  4  -ST    Score 19  -AN  19  -ST    Modified [place] Scale    Modified Liz Scale  3 - Moderate disability.  Requiring some help, but able to walk without assistance.  -KM 3 - Moderate disability.  Requiring some help, but able to walk without assistance.  -ST    Functional Assessment    Outcome Measure Options  AM-PAC 6 Clicks Basic Mobility (PT)  -KM AM-PAC 6 Clicks Daily Activity (OT);Modified [place]  -ST      User Key  (r) = Recorded By, (t) = Taken By, (c) = Cosigned By    Initials Name Provider Type    ST Joelle Sharp, OTR Occupational Therapist     Veronica Alvarado, PT Physical Therapist    LOU Harden OT Occupational Therapist           Time Calculation:         Time Calculation- OT       03/05/18 0938          Time Calculation- OT    OT Start Time 0900  -AN      Total Timed Code Minutes- OT 24 minute(s)  -AN      OT Received On 03/05/18  -AN      OT Goal Re-Cert Due Date 03/14/18  -AN        User Key  (r) = Recorded By, (t) = Taken By, (c) = Cosigned By    Initials Name Provider Type    LOU Harden OT Occupational Therapist           Therapy Charges for Today     Code Description Service Date Service Provider Modifiers Qty    [id number]  OT THERAPEUTIC ACT EA 15 MIN  3/5/2018 Aniya Harden, OT GO 2               Aniya Harden, OT  3/5/2018

## 2018-03-06 LAB
GLUCOSE BLDC GLUCOMTR-MCNC: 172 MG/DL (ref 70–130)
GLUCOSE BLDC GLUCOMTR-MCNC: 280 MG/DL (ref 70–130)
GLUCOSE BLDC GLUCOMTR-MCNC: 294 MG/DL (ref 70–130)
GLUCOSE BLDC GLUCOMTR-MCNC: 341 MG/DL (ref 70–130)

## 2018-03-06 PROCEDURE — 82962 GLUCOSE BLOOD TEST: CPT

## 2018-03-06 PROCEDURE — 97530 THERAPEUTIC ACTIVITIES: CPT

## 2018-03-06 PROCEDURE — 99231 SBSQ HOSP IP/OBS SF/LOW 25: CPT | Performed by: PSYCHIATRY & NEUROLOGY

## 2018-03-06 PROCEDURE — 97110 THERAPEUTIC EXERCISES: CPT

## 2018-03-06 PROCEDURE — 99233 SBSQ HOSP IP/OBS HIGH 50: CPT | Performed by: INTERNAL MEDICINE

## 2018-03-06 PROCEDURE — 92507 TX SP LANG VOICE COMM INDIV: CPT

## 2018-03-06 PROCEDURE — 25010000002 ENOXAPARIN PER 10 MG: Performed by: INTERNAL MEDICINE

## 2018-03-06 PROCEDURE — 97116 GAIT TRAINING THERAPY: CPT

## 2018-03-06 PROCEDURE — 63710000001 INSULIN DETEMIR PER 5 UNITS: Performed by: INTERNAL MEDICINE

## 2018-03-06 RX ORDER — CARVEDILOL 6.25 MG/1
6.25 TABLET ORAL EVERY 12 HOURS SCHEDULED
Status: DISCONTINUED | OUTPATIENT
Start: 2018-03-06 | End: 2018-03-07 | Stop reason: HOSPADM

## 2018-03-06 RX ORDER — AMLODIPINE BESYLATE 10 MG/1
10 TABLET ORAL
Status: DISCONTINUED | OUTPATIENT
Start: 2018-03-07 | End: 2018-03-07 | Stop reason: HOSPADM

## 2018-03-06 RX ORDER — AMLODIPINE BESYLATE 5 MG/1
5 TABLET ORAL
Status: DISCONTINUED | OUTPATIENT
Start: 2018-03-06 | End: 2018-03-06

## 2018-03-06 RX ORDER — VALSARTAN 160 MG/1
240 TABLET ORAL
Status: DISCONTINUED | OUTPATIENT
Start: 2018-03-06 | End: 2018-03-07 | Stop reason: HOSPADM

## 2018-03-06 RX ORDER — LISINOPRIL 20 MG/1
20 TABLET ORAL
Status: DISCONTINUED | OUTPATIENT
Start: 2018-03-06 | End: 2018-03-06

## 2018-03-06 RX ADMIN — ATORVASTATIN CALCIUM 80 MG: 40 TABLET, FILM COATED ORAL at 20:05

## 2018-03-06 RX ADMIN — VALSARTAN 240 MG: 160 TABLET, FILM COATED ORAL at 10:10

## 2018-03-06 RX ADMIN — ENOXAPARIN SODIUM 30 MG: 30 INJECTION SUBCUTANEOUS at 14:27

## 2018-03-06 RX ADMIN — INSULIN DETEMIR 20 UNITS: 100 INJECTION, SOLUTION SUBCUTANEOUS at 09:43

## 2018-03-06 RX ADMIN — INSULIN LISPRO 4 UNITS: 100 INJECTION, SOLUTION INTRAVENOUS; SUBCUTANEOUS at 08:07

## 2018-03-06 RX ADMIN — ASPIRIN 81 MG 81 MG: 81 TABLET ORAL at 09:43

## 2018-03-06 RX ADMIN — DOCUSATE SODIUM 100 MG: 100 CAPSULE, LIQUID FILLED ORAL at 09:43

## 2018-03-06 RX ADMIN — AMLODIPINE BESYLATE 5 MG: 5 TABLET ORAL at 10:10

## 2018-03-06 RX ADMIN — CARVEDILOL 6.25 MG: 6.25 TABLET, FILM COATED ORAL at 14:27

## 2018-03-06 RX ADMIN — CARVEDILOL 6.25 MG: 6.25 TABLET, FILM COATED ORAL at 20:05

## 2018-03-06 RX ADMIN — CLOPIDOGREL BISULFATE 75 MG: 75 TABLET ORAL at 09:43

## 2018-03-06 RX ADMIN — DOCUSATE SODIUM 100 MG: 100 CAPSULE, LIQUID FILLED ORAL at 20:05

## 2018-03-06 RX ADMIN — PANTOPRAZOLE SODIUM 40 MG: 40 TABLET, DELAYED RELEASE ORAL at 09:43

## 2018-03-06 RX ADMIN — INSULIN LISPRO 7 UNITS: 100 INJECTION, SOLUTION INTRAVENOUS; SUBCUTANEOUS at 17:21

## 2018-03-06 RX ADMIN — INSULIN LISPRO 7 UNITS: 100 INJECTION, SOLUTION INTRAVENOUS; SUBCUTANEOUS at 12:20

## 2018-03-06 RX ADMIN — INSULIN LISPRO 5 UNITS: 100 INJECTION, SOLUTION INTRAVENOUS; SUBCUTANEOUS at 12:21

## 2018-03-06 RX ADMIN — INSULIN LISPRO 5 UNITS: 100 INJECTION, SOLUTION INTRAVENOUS; SUBCUTANEOUS at 08:07

## 2018-03-06 NOTE — PROGRESS NOTES
Continued Stay Note  [facility]     Patient Name: Valarie Camara  MRN: [id number]  Today's Date: 3/6/2018    Admit Date: 3/3/2018          Discharge Plan       03/06/18 1359    Case Management/Social Work Plan    Plan [facility]     Patient/Family In Agreement With Plan yes    Additional Comments Ms. Camara has an inpatient rehab bed at [facility] tomorrow, Wednesday, 3/7/18, if medically ready.  Emailed extenders.  Notified Ms. Camara.  Ms. Camara said that her daughter, Cleo, will be transporting her to the facility.   Please call report tomorrow to [facility]roke [facility] at ph [phone].  Please have a copy of the transfer summary and any scripts in the discharge packet.  Thank you.              Discharge Codes     None            Shirley Lucio

## 2018-03-06 NOTE — THERAPY TREATMENT NOTE
Acute Care - Speech Language Pathology Treatment Note  [facility]     Patient Name: Valarie Camara  : 1945  MRN: [id number]  Today's Date: 3/6/2018         Admit Date: 3/3/2018    Visit Dx:      ICD-10-CM ICD-9-CM   1. Dysarthria R47.1 784.51   2. Impaired mobility and ADLs Z74.09 799.89   3. Impaired functional mobility, balance, gait, and endurance Z74.09 V49.89   4. Lacunar stroke I63.9 434.91     Patient Active Problem List   Diagnosis   • Atopic rhinitis   • Arthritis   • Benign paroxysmal positional vertigo   • Neck pain   • Anxiety and depression   • DM (diabetes mellitus)   • Edema   • Gastroesophageal reflux disease with esophagitis   • Multiple-type hyperlipidemia   • Vitamin D deficiency   • Benign essential hypertension   • Obesity (BMI 30-39.9)   • Unstable angina   • History of COPD   • Hyperlipidemia   • History of cataract   • History of osteoporosis   • History of restless legs syndrome   • History of rheumatoid arthritis   • Elevated serum creatinine   • Heartburn   • Pharyngoesophageal dysphagia   • Constipation   • Schatzki's ring   • Gastritis without bleeding   • Helicobacter pylori infection   • Duodenitis   • Dysarthria   • Right hemiparesis   • HTN (hypertension)   • Stroke   • Multiple thyroid nodules   • Lacunar stroke   • DM (diabetes mellitus), type 2, uncontrolled w/neurologic complication              Adult Rehabilitation Note       18 1325 18 0925 18 1415    Rehab Assessment/Intervention    Discipline physical therapist  -CD speech language pathologist  -LS speech language pathologist  -RD    Document Type therapy note (daily note)  -CD  therapy note (daily note)  -RD    Subjective Information no complaints  -CD no complaints;agree to therapy  -LS no complaints;agree to therapy  -RD    Patient Effort, Rehab Treatment good  -CD  good  -RD    Symptoms Noted During/After Treatment significant change in vital signs   /91 AFTER GAIT X 350 FEET. NSG  NOTIFIED AND TO RECHECK  -CD      Precautions/Limitations fall precautions  -CD      Recorded by [CD] Gracy Millan, PT [LS] Lizeth Jenkins, MS CCC-SLP [RD] Jodi Lynne, MS CCC-SLP    Vital Signs    Pre Systolic BP Rehab 176  -CD      Pre Treatment Diastolic BP 98  -CD      Post Systolic BP Rehab 225  -CD      Post Treatment Diastolic BP 91  -CD      Pretreatment Heart Rate (beats/min) 83  -CD      Posttreatment Heart Rate (beats/min) 79  -CD      Recorded by [CD] Gracy Millan PT      Pain Assessment    Pain Assessment 0-10  -CD No/denies pain  -LS No/denies pain  -RD    Pain Score 2  -CD      Post Pain Score 2  -CD      Pain Type Acute pain  -CD      Pain Location Head  -CD      Pain Intervention(s) Ambulation/increased activity  -CD      Response to Interventions TOLERATED.   -CD      Recorded by [CD] Gracy Mlilan, PT [LS] Lizeth Jenkins, MS CCC-SLP [RD] Jodi Lynne, MS CCC-SLP    Cognitive Assessment/Intervention    Current Cognitive/Communication Assessment functional  -CD      Orientation Status oriented x 4  -CD      Follows Commands/Answers Questions 100% of the time  -CD      Personal Safety WNL/WFL  -CD      Personal Safety Interventions gait belt;fall prevention program maintained;muscle strengthening facilitated;nonskid shoes/slippers when out of bed;supervised activity  -CD      Recorded by [CD] Gracy Millan PT      Verbal Expression Assess/Intervention    Automatic Speech   WNL/WFL  -RD    Speech Repetition   WNL/WFL  -RD    Speech Fluency   fluent speech  -RD    Conversational Speech   mild impairment;other (see comments)   occasional word-finding difficulty  -RD    Recorded by   [RD] Jodi Lynne MS CCC-SLP    Reading Assessment/Intervention    Reading Skills   WNL/WFL  -RD    Oral Reading Ability   WNL/WFL  -RD    Reading Comprehension   WNL/WFL  -RD    Recorded by   [RD] Jodi Lynne MS CCC-SLP    Writing Assessment/Intervention    Writing Skills   WNL/WFL  -RD     Recorded by   [RD] Jodi Lynne, MS CCC-SLP    Improve word retrieval skills    Improve word retrieval skills by:  completing functional word finding tasks;completing a divergent task;90%;without cues  -LS completing functional word finding tasks;completing a divergent task;90%;without cues  -RD    Status: Improve word retrieval skills  Progressing as expected  -LS New  -RD    Word Retrieval Skills Progress  50%;60%;with inconsistent cues  -LS continue to address  -RD    Comments: Improve word retrieval skills  50% divergent tasks,   -LS 50%-divergent taks; reports occasional word-finding difficulty in conversation.  -RD    Recorded by  [LS] Lizeth Jenkins, MS CCC-SLP [RD] Jodi Lynne, MS CCC-SLP    Improve articulation    Improve articulation:  by over-articulating at word level;by over-articulating at phrase level;by over-articulating in connected speech  -LS by over-articulating at word level;by over-articulating at phrase level;by over-articulating in connected speech  -RD    Status: Improve articulation  Progressing as expected  -LS Progressing as expected  -RD    Articulation Progress  80%;without cues  -LS 70%;with inconsistent cues;continue to address  -RD    Comments: Improve articulation  Pt intelligable in word and conversation level.   -LS     Recorded by  [LS] Lizeth Jenkins, MS CCC-SLP [RD] Jodi Lynne, MS CCC-SLP    Bed Mobility, Assessment/Treatment    Bed Mobility, Comment PT UIC AND RETURNED TO CHAIR.   -CD      Recorded by [CD] Gracy Millan, PT      Transfer Assessment/Treatment    Transfers, Sit-Stand [place] supervision required  -CD      Transfers, Stand-Sit [place] supervision required  -CD      Transfers, Sit-Stand-Sit, Assist Device rolling walker  -CD      Toilet Transfer, [place] supervision required  -CD      Toilet Transfer, Assistive Device elevated toilet seat;rolling walker  -CD      Transfer, Safety Issues balance decreased during turns;step length  decreased  -CD      Transfer, Impairments impaired balance  -CD      Recorded by [CD] Gracy Millan, PT      Gait Assessment/Treatment    Gait, [place] Level contact guard assist  -CD      Gait, Assistive Device rolling walker  -CD      Gait, Distance (Feet) 350   100 FEET WITH STRAIGHT CANE AND CGA.   -CD      Gait, Gait Deviations tyra decreased;step length decreased   VEERS R.   -CD      Gait, Safety Issues balance decreased during turns  -CD      Gait, Impairments impaired balance  -CD      Gait, Comment QUALITY OF GAIT BETTER WITH R WALKER VS CANE. RECOMMEND R WALKER AT D/C WITH PROGRESSION TO CANE.   -CD      Recorded by [CD] Gracy Millan PT      Motor Skills/Interventions    Additional Documentation Balance Skills Training (Group)  -CD      Recorded by [CD] Gracy Millan PT      Balance Skills Training    Sitting-Level of Assistance Independent  -CD      Standing-Level of Assistance Close supervision  -CD      Gait Balance-Level of Assistance Contact guard  -CD      Gait Balance Support assistive device  -CD      Recorded by [CD] Gracy Millan PT      Therapy Exercises    Bilateral Lower Extremities AROM:;10 reps;sitting;ankle pumps/circles;hip flexion;LAQ  -CD      Recorded by [CD] Gracy Millan PT      Positioning and Restraints    Pre-Treatment Position sitting in chair/recliner  -CD      Post Treatment Position chair  -CD      In Chair reclined;call light within reach;with family/caregiver;notified nsg;legs elevated;exit alarm on;encouraged to call for assist  -CD      Recorded by [CD] Gracy Millan PT        03/05/18 1050 03/05/18 0901 03/05/18 0900    Rehab Assessment/Intervention    Discipline physical therapist  -SC --  -AN occupational therapist  -AN    Document Type therapy note (daily note)  -SC --  -AN therapy note (daily note)  -AN    Subjective Information complains of;fatigue  -SC --  -AN no complaints;agree to therapy  -AN    Patient Effort, Rehab Treatment good  -SC --  -AN good   -AN    Symptoms Noted During/After Treatment significant change in vital signs;fatigue   elevated bp   -SC --  -AN none  -AN    Precautions/Limitations fall precautions  -SC --  -AN fall precautions  -AN    Specific Treatment Considerations  --  -AN some dysmetria R hand  -AN    Recorded by [SC] Frederick Hwang, PT [AN] Aniya Harden, OT [AN] Aniya Harden, OT    Vital Signs    Post Systolic BP Rehab 177  -SC      Post Treatment Diastolic   -SC      Recorded by [SC] Frederick Hwang, PT      Pain Assessment    Pain Assessment No/denies pain  -SC --  -AN No/denies pain  -AN    Pain Score 0  -SC      Recorded by [SC] Frederick Hwang, PT [AN] Aniay Harden, OT [AN] Aniya Harden, OT    Cognitive Assessment/Intervention    Current Cognitive/Communication Assessment functional  -SC --  -AN impaired   much improved speech, minimal dysarthria present  -AN    Orientation Status oriented x 4  -SC --  -AN oriented x 4  -AN    Follows Commands/Answers Questions 100% of the time  -SC --  -% of the time  -AN    Personal Safety WNL/WFL  -SC --  -AN WNL/WFL  -AN    Personal Safety Interventions fall prevention program maintained;gait belt  -SC      Recorded by [SC] Frederick Hwang, PT [AN] Aniya Harden, OT [AN] Aniya Harden, OT    Cognitive Assessment Intervention    Behavior/Mood Observations alert;cooperative  -SC      Recorded by [SC] Frederick Hwang, PT      Right Elbow/Forearm    Elbow Flexion Gross Movement   (4+/5) good plus  -AN    Elbow Extension Gross Movement   (4+/5) good plus  -AN    Recorded by   [AN] Aniya Harden, OT    Bed Mobility, Assessment/Treatment    Bed Mobility, Comment ui  -SC  pt up in chair  -AN    Recorded by [SC] Frederick Hwang, PT  [AN] Aniya Harden, OT    Transfer Assessment/Treatment    Transfers, Sit-Stand [place] verbal cues required;supervision required  -SC  supervision required  -AN    Transfers, Stand-Sit [place] supervision required;verbal cues required  -SC  supervision  required  -AN    Transfers, Sit-Stand-Sit, Assist Device rolling walker  -SC  rolling walker  -AN    Bathtub Transfer, [place]   minimum assist (75% patient effort)   simulated with grab bars  -AN    Transfer, Safety Issues balance decreased during turns  -SC      Transfer, Impairments coordination impaired;impaired balance  -SC      Transfer, Comment required some cues for safety when sitting down  -SC  pt needs cues for keeping walker close to body during transitions  -AN    Recorded by [SC] Frederick Hwang, PT  [AN] Aniya Harden OT    Gait Assessment/Treatment    Gait, [place] Level contact guard assist  -SC      Gait, Assistive Device rolling walker  -SC      Gait, Distance (Feet) 350   100 feet with hand held assist  -SC      Gait, Gait Pattern Analysis swing-through gait   x3 standing breaks  -SC      Gait, Gait Deviations other (see comments)   sways R   -SC      Gait, Safety Issues balance decreased during turns  -SC      Gait, Impairments coordination impaired;impaired balance  -SC      Gait, Comment cues requried to walk slower to keep her balance. Sways R at times  -SC      Recorded by [SC] Frederick Hwang, PT      Functional Mobility    Functional Mobility- Ind. Level   supervision required;verbal cues required  -AN    Functional Mobility- Device   rolling walker  -AN    Functional Mobility-Distance (Feet)   20  -AN    Functional Mobility- Comment   cues to keep RW with her with position changes and doing IADL' tasks  -AN    Recorded by   [LOU] Aniya Harden OT    ADL Assessment/Intervention    Additional Documentation   --    and carry trash can, relocate small objects with sup  -AN    Recorded by   [LOU] Aniya Harden OT    Therapy Exercises    Bilateral Lower Extremities AROM:;10 reps;ankle pumps/circles;LAQ;hip flexion  -SC      Bilateral Upper Extremity 10 reps;AROM:;sitting;shoulder abduction/adduction;shoulder extension/flexion  -SC      BUE Resistance   theraputty;other (comment)    foam block ex  -AN    Recorded by [SC] Frederick Hwang, PT  [AN] Aniya Harden, OT    Gross Motor Coordination Training    Gross Motor Skill, Impairments Detail   still impaired R finger to nose; some dysmetria with reaching for objects; performed slower speed reaching tasks with increased accuracy  -AN    Recorded by   [AN] Aniya Harden, OT    Fine Motor Coordination Training    Opposition   --   FM HEP reviewed  -AN    Recorded by   [LOU] Aniya Harden, CARYN    Positioning and Restraints    Pre-Treatment Position   sitting in chair/recliner  -AN    Post Treatment Position chair  -SC  chair  -AN    In Chair sitting;call light within reach;encouraged to call for assist;with family/caregiver  -SC  reclined;call light within reach;encouraged to call for assist;with family/caregiver  -AN    Recorded by [SC] Frederick Hwang, PT  [AN] Aniya Harden, OT      User Key  (r) = Recorded By, (t) = Taken By, (c) = Cosigned By    Initials Name Effective Dates    SC Frederick Hwang, PT 06/19/15 -     CD Gracy Millan, PT 06/19/15 -     AN Aniya Harden OT 06/22/15 -     LS Lizeth Jenkins, MS CCC-SLP 06/22/15 -     RD Jodi Lynne, MS CCC-SLP 09/27/17 -               IP SLP Goals       03/06/18 1408 03/05/18 1600 03/03/18 1421    Expressive- Optimal Participation in Care    Expressive Optimal Participation in Care- SLP, Date Established  03/05/18  -RD     Expressive Optimal Participation in Care- SLP, Time to Achieve  by discharge  -RD     Expressive Optimal Participation in Care- SLP, Activity Level  Patient will improve word retrieval skills  -RD     Expressive Optimal Participation in Care- SLP, Date Goal Reviewed 03/06/18  -LS 03/05/18  -RD     Expressive Optimal Participation in Care- SLP, Outcome  goal ongoing  -RD     Dysarthria- Optimal Particpation in Care    Dysarthria Optimal Participation in Care- SLP, Date Established   03/03/18  -SM    Dysarthria Optimal Participation in Care- SLP, Time to Achieve   by discharge   -SM    Dysarthria Optimal Participation in Care- SLP, Date Goal Reviewed  03/05/18  -RD     Dysarthria Optimal Participation in Care- SLP, Outcome  goal ongoing  -RD goal ongoing  -SM      User Key  (r) = Recorded By, (t) = Taken By, (c) = Cosigned By    Initials Name Provider Type    LUIS CARLOS Salgado, MS CCC-SLP Speech and Language Pathologist    HOWARD Jenkins, MS CCC-SLP Speech and Language Pathologist    GEORGETTE Lynne, MS CCC-SLP Speech and Language Pathologist          EDUCATION  The patient has been educated in the following areas:   Communication Impairment.    SLP Recommendation and Plan                               Plan of Care Review  Plan Of Care Reviewed With: patient, daughter  Progress: progress toward functional goals as expected  Outcome Summary/Follow up Plan: Communication tx complete. Pt is very motivated to return to previous roles/activities. Good participation in tx. Pt continues to have difficulty w/ expressive aphasia c/b word finding in conversation and structured divergent tasks. Dysarthria is improving  and intelligible at conversation level. Pt would benefit from continued SLP services on this admit and at discharge.          Time Calculation:         Time Calculation- SLP       03/06/18 1410          Time Calculation- SLP    SLP Start Time 0920  -      SLP Received On 03/06/18  -        User Key  (r) = Recorded By, (t) = Taken By, (c) = Cosigned By    Initials Name Provider Type    HOWARD Jenkins MS CCC-SLP Speech and Language Pathologist          Therapy Charges for Today     Code Description Service Date Service Provider Modifiers Qty    [id number]  ST TREATMENT SPEECH 3 3/6/2018 Lizeth Jenkins MS CCC-SLP GN 1               Lizeth Jenkins MS CCC-SLP  3/6/2018

## 2018-03-06 NOTE — PLAN OF CARE
Problem: Patient Care Overview (Adult)  Goal: Plan of Care Review  Outcome: Ongoing (interventions implemented as appropriate)   03/06/18 1408   Coping/Psychosocial Response Interventions   Plan Of Care Reviewed With patient;daughter   Outcome Evaluation   Outcome Summary/Follow up Plan Communication tx complete. Pt is very motivated to return to previous roles/activities. Good participation in tx. Pt continues to have difficulty w/ expressive aphasia c/b word finding in conversation and structured divergent tasks. Dysarthria is improving and intelligible at conversation level. Pt would benefit from continued SLP services on this admit and at discharge.   Patient Care Overview   Progress progress toward functional goals as expected       Problem: Inpatient SLP  Goal: Dysarthria-Patient will improve motor speech skills to allow optimal participation in care  Outcome: Ongoing (interventions implemented as appropriate)   03/03/18 1421 03/05/18 1600   Dysarthria- Optimal Particpation in Care   Dysarthria Optimal Participation in Care- SLP, Date Established 03/03/18 --    Dysarthria Optimal Participation in Care- SLP, Time to Achieve by discharge --    Dysarthria Optimal Participation in Care- SLP, Date Goal Reviewed --  03/05/18   Dysarthria Optimal Participation in Care- SLP, Outcome --  goal ongoing     Goal: Expressive-Patient will improve expressive language skills to allow optimal participation in care  Outcome: Ongoing (interventions implemented as appropriate)   03/05/18 1600 03/06/18 1408   Expressive- Optimal Participation in Care   Expressive Optimal Participation in Care- SLP, Date Established 03/05/18 --    Expressive Optimal Participation in Care- SLP, Time to Achieve by discharge --    Expressive Optimal Participation in Care- SLP, Activity Level Patient will improve word retrieval skills --    Expressive Optimal Participation in Care- SLP, Date Goal Reviewed --  03/06/18   Expressive Optimal Participation  in Care- SLP, Outcome goal ongoing --

## 2018-03-06 NOTE — PROGRESS NOTES
"Valarie Camara    Subjective     CC: stroke    History of Present Illness     Valarie Camara is admitted with stroke. She continues to note right sided ataxia, though is overall improving. She denies new complaints this morning.      There have been no other changes in the patient's interval history since my consult note of 3/5/18.    I have reviewed and confirmed the past family, social and medical history as accurate on 3/6/18.     Review of Systems   Constitutional: Negative.        Objective     BP (!) 188/101 (BP Location: Right arm, Patient Position: Lying)  Pulse 87  Temp 98 °F (36.7 °C) (Oral)   Resp 16  Ht 160 cm (63\")  Wt 90.7 kg (200 lb)  SpO2 97%  BMI 35.43 kg/m2    Physical Exam   Constitutional: She is oriented to person, place, and time.   Neurological: She is oriented to person, place, and time. She has normal strength. She has an abnormal Finger-Nose-Finger Test (Mild dysmetria on the right, though significantly improved).   Psychiatric: Her speech is normal.        Neurologic Exam     Mental Status   Oriented to person, place, and time.   Attention: normal.   Speech: speech is normal   Level of consciousness: alert  Knowledge: good.   Normal comprehension.     Cranial Nerves   Cranial nerves II through XII intact.     Motor Exam   Muscle bulk: normal  Overall muscle tone: normal    Strength   Strength 5/5 throughout.     Gait, Coordination, and Reflexes     Coordination   Finger to nose coordination: abnormal (Mild dysmetria on the right, though significantly improved)      Laboratory and radiological testing is notable for an MRI confirming a left thalamic infarct (lacunar). QNP=001. ECHO shows no source of cardioembolism and Carotid Dopplers show 0-49% ICA stenosis bilaterally.    Assessment/Plan       Valarie Camara is admitted with lacunar stroke. I continue to suspect small vessel atherosclerotic disease as the etiology, and she will continue on ASA/Plavix/Lipitor. I " stressed the importance of her DM management, along with vascular risk factor control in general. She will work with PT/OT/SLP. Her workup is now complete and she could potentially be discharged to rehab (inpt or outpt) at any time from my standpoint. I have nothing further to add and so will sign off for now.        As part of this visit I reviewed records from the current hospitalization which is incorporated in the HPI.

## 2018-03-06 NOTE — THERAPY TREATMENT NOTE
Acute Care - Occupational Therapy Treatment Note  [facility]     Patient Name: Valarie Camara  : 1945  MRN: [id number]  Today's Date: 3/6/2018  Onset of Illness/Injury or Date of Surgery Date: 18  Date of Referral to OT: 18  Referring Physician: Dr Crespo      Admit Date: 3/3/2018    Visit Dx:     ICD-10-CM ICD-9-CM   1. Dysarthria R47.1 784.51   2. Impaired mobility and ADLs Z74.09 799.89   3. Impaired functional mobility, balance, gait, and endurance Z74.09 V49.89   4. Lacunar stroke I63.9 434.91     Patient Active Problem List   Diagnosis   • Atopic rhinitis   • Arthritis   • Benign paroxysmal positional vertigo   • Neck pain   • Anxiety and depression   • DM (diabetes mellitus)   • Edema   • Gastroesophageal reflux disease with esophagitis   • Multiple-type hyperlipidemia   • Vitamin D deficiency   • Benign essential hypertension   • Obesity (BMI 30-39.9)   • Unstable angina   • History of COPD   • Hyperlipidemia   • History of cataract   • History of osteoporosis   • History of restless legs syndrome   • History of rheumatoid arthritis   • Elevated serum creatinine   • Heartburn   • Pharyngoesophageal dysphagia   • Constipation   • Schatzki's ring   • Gastritis without bleeding   • Helicobacter pylori infection   • Duodenitis   • Dysarthria   • Right hemiparesis   • HTN (hypertension)   • Stroke   • Multiple thyroid nodules   • Lacunar stroke   • DM (diabetes mellitus), type 2, uncontrolled w/neurologic complication             Adult Rehabilitation Note       18 1325 18 1238 18 0925    Rehab Assessment/Intervention    Discipline physical therapist  -CD (P)  occupational therapist  -SS speech language pathologist  -LS    Document Type therapy note (daily note)  -CD (P)  therapy note (daily note)  -SS     Subjective Information no complaints  -CD (P)  no complaints;agree to therapy  -SS no complaints;agree to therapy  -LS    Patient Effort, Rehab Treatment good  -CD (P)   good  -SS     Symptoms Noted During/After Treatment significant change in vital signs   /91 AFTER GAIT X 350 FEET. NSG NOTIFIED AND TO RECHECK  -CD (P)  none  -SS     Precautions/Limitations fall precautions  -CD (P)  fall precautions  -SS     Patient Response to Treatment  (P)  Tolerated.  -SS     Recorded by [CD] Gracy Millan, PT [SS] Rex Enamorado, OT Student [LS] Lizeth Jenkins, MS CCC-SLP    Vital Signs    Pre Systolic BP Rehab 176  -CD (P)  --   Vitals stable during treatment. RN approved treatment.  -SS     Pre Treatment Diastolic BP 98  -CD      Post Systolic BP Rehab 225  -CD (P)  176  -SS     Post Treatment Diastolic BP 91  -CD (P)  98  -SS     Pretreatment Heart Rate (beats/min) 83  -CD      Posttreatment Heart Rate (beats/min) 79  -CD      Posttreatment Resp Rate (breaths/min)  (P)  83  -SS     Recorded by [CD] Gracy Millan, PT [SS] Rex Enamorado, OT Student     Pain Assessment    Pain Assessment 0-10  -CD (P)  0-10  -SS No/denies pain  -LS    [place] FACES Pain Rating  (P)  2  -SS     Pain Score 2  -CD (P)  7  -SS     Post Pain Score 2  -CD (P)  7  -SS     Pain Type Acute pain  -CD (P)  Acute pain  -SS     Pain Location Head  -CD (P)  Neck  -SS     Pain Intervention(s) Ambulation/increased activity  -CD (P)  Ambulation/increased activity;Repositioned  -SS     Response to Interventions TOLERATED.   -CD (P)  Tolerated  -SS     Recorded by [CD] Gracy Millan, PT [SS] Rex Enamorado, OT Student [LS] Lizeth Jenkins, MS CCC-SLP    Cognitive Assessment/Intervention    Current Cognitive/Communication Assessment functional  -CD (P)  functional  -SS     Orientation Status oriented x 4  -CD (P)  oriented x 4  -SS     Follows Commands/Answers Questions 100% of the time  -CD (P)  100% of the time;able to follow single-step instructions  -SS     Personal Safety WNL/WFL  -CD (P)  mild impairment;decreased awareness, need for safety;decreased insight to deficits;decreased awareness,  need for assist  -SS     Personal Safety Interventions gait belt;fall prevention program maintained;muscle strengthening facilitated;nonskid shoes/slippers when out of bed;supervised activity  -CD (P)  gait belt;muscle strengthening facilitated;fall prevention program maintained;supervised activity  -SS     Recorded by [CD] Gracy Millan, PT [SS] Rex Enamorado, OT Student     Improve word retrieval skills    Improve word retrieval skills by:   completing functional word finding tasks;completing a divergent task;90%;without cues  -LS    Status: Improve word retrieval skills   Progressing as expected  -LS    Word Retrieval Skills Progress   50%;60%;with inconsistent cues  -LS    Comments: Improve word retrieval skills   50% divergent tasks,   -LS    Recorded by   [LS] Lizeth Jenkins MS CCC-SLP    Improve articulation    Improve articulation:   by over-articulating at word level;by over-articulating at phrase level;by over-articulating in connected speech  -LS    Status: Improve articulation   Progressing as expected  -LS    Articulation Progress   80%;without cues  -LS    Comments: Improve articulation   Pt intelligable in word and conversation level.   -LS    Recorded by   [LS] Lizeth Jenkins MS CCC-SLP    Bed Mobility, Assessment/Treatment    Bed Mobility, Comment PT UIC AND RETURNED TO CHAIR.   -CD (P)  Pt UIC on arrival  -SS     Recorded by [CD] Gracy Millan, PT [SS] Rex Enamorado, OT Student     Transfer Assessment/Treatment    Transfers, Sit-Stand [place] supervision required  -CD (P)  supervision required  -SS     Transfers, Stand-Sit [place] supervision required  -CD (P)  supervision required  -SS     Transfers, Sit-Stand-Sit, Assist Device rolling walker  -CD      Toilet Transfer, [place] supervision required  -CD (P)  supervision required  -SS     Toilet Transfer, Assistive Device elevated toilet seat;rolling walker  -CD (P)  elevated toilet seat  -SS     Transfer, Safety  Issues balance decreased during turns;step length decreased  -CD (P)  step length decreased;balance decreased during turns  -SS     Transfer, Impairments impaired balance  -CD (P)  impaired balance  -SS     Transfer, Comment  (P)  Cues for hand placement during transfers.  -SS     Recorded by [CD] Gracy Millan, PT [SS] Rex Enamorado, OT Student     Gait Assessment/Treatment    Gait, [place] Level contact guard assist  -CD      Gait, Assistive Device rolling walker  -CD      Gait, Distance (Feet) 350   100 FEET WITH STRAIGHT CANE AND CGA.   -CD      Gait, Gait Deviations tyra decreased;step length decreased   VEERS R.   -CD      Gait, Safety Issues balance decreased during turns  -CD      Gait, Impairments impaired balance  -CD      Gait, Comment QUALITY OF GAIT BETTER WITH R WALKER VS CANE. RECOMMEND R WALKER AT D/C WITH PROGRESSION TO CANE.   -CD      Recorded by [CD] Gracy Millan, PT      Functional Mobility    Functional Mobility- Ind. Level  (P)  contact guard assist  -SS     Functional Mobility-Distance (Feet)  (P)  20  -SS     Functional Mobility- Safety Issues  (P)  step length decreased;balance decreased during turns  -     Functional Mobility- Comment  (P)  Functional mobility to bathroom and back to chair. Moments of unsteadiness requiring CGA. No RW used for functional mobility per pt request.  -SS     Recorded by  [SS] Rex Enamorado, OT Student     Lower Body Dressing Assessment/Training    LB Dressing Assess/Train, Clothing Type  (P)  donning:;socks  -     LB Dressing Assess/Train, Position  (P)  sitting  -     LB Dressing Assess/Train, [place]  (P)  conditional independence  -     LB Dressing Assess/Train, Comment  (P)  Pt able to safely don socks from recliner  -     Recorded by  [SS] Rex Enamorado, OT Student     Motor Skills/Interventions    Additional Documentation Balance Skills Training (Group)  -CD (P)  Balance Skills Training (Group)  -      Recorded by [CD] Gracy Millan, PT [SS] Rex Enamorado, OT Student     Balance Skills Training    Sitting-Level of Assistance Independent  -CD (P)  Independent  -SS     Sitting-Balance Support  (P)  Feet supported  -SS     Sitting-Balance Activities  (P)  Forward lean;Right UE Weight Bearing;Left UE Weight Bearing  -SS     Sitting # of Minutes  (P)  10  -SS     Standing-Level of Assistance Close supervision  -CD (P)  Contact guard  -SS     Static Standing Balance Support  (P)  No upper extremity supported  -SS     Standing-Balance Activities  (P)  Weight Shift A-P;Weight Shift R-L;Forward lean  -SS     Standing Balance # of Minutes  (P)  3  -SS     Gait Balance-Level of Assistance Contact guard  -CD      Gait Balance Support assistive device  -CD      Recorded by [CD] Gracy Millan, PT [SS] Rex Enamorado, OT Student     Therapy Exercises    Bilateral Lower Extremities AROM:;10 reps;sitting;ankle pumps/circles;hip flexion;LAQ  -CD      Bilateral Upper Extremity  (P)  AROM:;10 reps;elbow flexion/extension;shoulder extension/flexion;shoulder rolls/shrugs;shoulder protraction/retraction;hand pumps  -SS     Recorded by [CD] Gracy Millan, PT [SS] Rex Enamorado, OT Student     Gross Motor Coordination Training    Gross Motor Skill, Impairments Detail  (P)  alternating movements with both hands. finger to nose w/ eyes open/closed.  -SS     Recorded by  [SS] Rex Enamorado, OT Student     Fine Motor Coordination Training    Opposition  (P)  --   Pt demonstarted use of theraputy to facilitate fine motor  -SS     Recorded by  [SS] Rex Enamorado, OT Student     Positioning and Restraints    Pre-Treatment Position sitting in chair/recliner  -CD (P)  sitting in chair/recliner  -SS     Post Treatment Position chair  -CD (P)  chair  -SS     In Chair reclined;call light within reach;with family/caregiver;notified nsg;legs elevated;exit alarm on;encouraged to call for assist  -CD (P)  call light  within reach;encouraged to call for assist;exit alarm on;legs elevated  -SS     Recorded by [CD] Gracy Millan, PT [SS] Rex Enamorado OT Student       03/05/18 1415 03/05/18 1050 03/05/18 0901    Rehab Assessment/Intervention    Discipline speech language pathologist  -RD physical therapist  -SC --  -AN    Document Type therapy note (daily note)  -RD therapy note (daily note)  -SC --  -AN    Subjective Information no complaints;agree to therapy  -RD complains of;fatigue  -SC --  -AN    Patient Effort, Rehab Treatment good  -RD good  -SC --  -AN    Symptoms Noted During/After Treatment  significant change in vital signs;fatigue   elevated bp   -SC --  -AN    Precautions/Limitations  fall precautions  -SC --  -AN    Specific Treatment Considerations   --  -AN    Recorded by [RD] Jodi Lynne, MS CCC-SLP [SC] Frederick Hwang, PT [AN] Aniya Harden, OT    Vital Signs    Post Systolic BP Rehab  177  -SC     Post Treatment Diastolic BP  105  -SC     Recorded by  [SC] Frederick Hwang, PT     Pain Assessment    Pain Assessment No/denies pain  -RD No/denies pain  -SC --  -AN    Pain Score  0  -SC     Recorded by [RD] Jodi Lynne, MS CCC-SLP [SC] Frederick Hwang, PT [AN] Aniya Harden, OT    Cognitive Assessment/Intervention    Current Cognitive/Communication Assessment  functional  -SC --  -AN    Orientation Status  oriented x 4  -SC --  -AN    Follows Commands/Answers Questions  100% of the time  -SC --  -AN    Personal Safety  WNL/WFL  -SC --  -AN    Personal Safety Interventions  fall prevention program maintained;gait belt  -SC     Recorded by  [SC] Frederick Hwang, PT [AN] Aniya Harden, OT    Cognitive Assessment Intervention    Behavior/Mood Observations  alert;cooperative  -SC     Recorded by  [SC] Frederick Hwnag, PT     Verbal Expression Assess/Intervention    Automatic Speech WNL/WFL  -RD      Speech Repetition WNL/WFL  -RD      Speech Fluency fluent speech  -RD      Conversational Speech mild  impairment;other (see comments)   occasional word-finding difficulty  -RD      Recorded by [RD] Jodi Lynne MS CCC-SLP      Reading Assessment/Intervention    Reading Skills WNL/WFL  -RD      Oral Reading Ability WNL/WFL  -RD      Reading Comprehension WNL/WFL  -RD      Recorded by [GEORGETTE] Jodi Lynne MS CCC-SLP      Writing Assessment/Intervention    Writing Skills WNL/WFL  -RD      Recorded by [RD] Jodi Lynne MS CCC-SLP      Improve word retrieval skills    Improve word retrieval skills by: completing functional word finding tasks;completing a divergent task;90%;without cues  -RD      Status: Improve word retrieval skills New  -RD      Word Retrieval Skills Progress continue to address  -RD      Comments: Improve word retrieval skills 50%-divergent taks; reports occasional word-finding difficulty in conversation.  -RD      Recorded by [RD] Jodi Lynne MS CCC-SLP      Improve articulation    Improve articulation: by over-articulating at word level;by over-articulating at phrase level;by over-articulating in connected speech  -RD      Status: Improve articulation Progressing as expected  -RD      Articulation Progress 70%;with inconsistent cues;continue to address  -RD      Recorded by [RD] Jodi Lynne MS CCC-SLP      Bed Mobility, Assessment/Treatment    Bed Mobility, Comment  uic  -SC     Recorded by  [SC] Frederick Hwang, PT     Transfer Assessment/Treatment    Transfers, Sit-Stand [place]  verbal cues required;supervision required  -SC     Transfers, Stand-Sit [place]  supervision required;verbal cues required  -SC     Transfers, Sit-Stand-Sit, Assist Device  rolling walker  -SC     Transfer, Safety Issues  balance decreased during turns  -SC     Transfer, Impairments  coordination impaired;impaired balance  -SC     Transfer, Comment  required some cues for safety when sitting down  -SC     Recorded by  [SC] Frederick Hwang, PT     Gait Assessment/Treatment    Gait,  [place] Level  contact guard assist  -SC     Gait, Assistive Device  rolling walker  -SC     Gait, Distance (Feet)  350   100 feet with hand held assist  -SC     Gait, Gait Pattern Analysis  swing-through gait   x3 standing breaks  -SC     Gait, Gait Deviations  other (see comments)   sways R   -SC     Gait, Safety Issues  balance decreased during turns  -SC     Gait, Impairments  coordination impaired;impaired balance  -SC     Gait, Comment  cues requried to walk slower to keep her balance. Sways R at times  -SC     Recorded by  [SC] Frederick Hwang, PT     Therapy Exercises    Bilateral Lower Extremities  AROM:;10 reps;ankle pumps/circles;LAQ;hip flexion  -SC     Bilateral Upper Extremity  10 reps;AROM:;sitting;shoulder abduction/adduction;shoulder extension/flexion  -SC     Recorded by  [SC] Frederick Hwang PT     Positioning and Restraints    Post Treatment Position  chair  -SC     In Chair  sitting;call light within reach;encouraged to call for assist;with family/caregiver  -SC     Recorded by  [SC] Frederick Hwang PT       03/05/18 0900          Rehab Assessment/Intervention    Discipline occupational therapist  -AN      Document Type therapy note (daily note)  -AN      Subjective Information no complaints;agree to therapy  -AN      Patient Effort, Rehab Treatment good  -AN      Symptoms Noted During/After Treatment none  -AN      Precautions/Limitations fall precautions  -AN      Specific Treatment Considerations some dysmetria R hand  -AN      Recorded by [LOU] Aniya Harden OT      Pain Assessment    Pain Assessment No/denies pain  -AN      Recorded by [OLU] Aniya Harden OT      Cognitive Assessment/Intervention    Current Cognitive/Communication Assessment impaired   much improved speech, minimal dysarthria present  -AN      Orientation Status oriented x 4  -AN      Follows Commands/Answers Questions 100% of the time  -AN      Personal Safety WNL/WFL  -AN      Recorded by [LOU] Aniya Harden OT       Right Elbow/Forearm    Elbow Flexion Gross Movement (4+/5) good plus  -AN      Elbow Extension Gross Movement (4+/5) good plus  -AN      Recorded by [LOU] Aniya Harden OT      Bed Mobility, Assessment/Treatment    Bed Mobility, Comment pt up in chair  -AN      Recorded by [LOU] Aniya Harden OT      Transfer Assessment/Treatment    Transfers, Sit-Stand [place] supervision required  -AN      Transfers, Stand-Sit [place] supervision required  -AN      Transfers, Sit-Stand-Sit, Assist Device rolling walker  -AN      Bathtub Transfer, [place] minimum assist (75% patient effort)   simulated with grab bars  -AN      Transfer, Comment pt needs cues for keeping walker close to body during transitions  -AN      Recorded by [LOU] Aniya Harden OT      Functional Mobility    Functional Mobility- Ind. Level supervision required;verbal cues required  -AN      Functional Mobility- Device rolling walker  -AN      Functional Mobility-Distance (Feet) 20  -AN      Functional Mobility- Comment cues to keep RW with her with position changes and doing IADL' tasks  -AN      Recorded by [LOU] Aniya Harden OT      ADL Assessment/Intervention    Additional Documentation --    and carry trash can, relocate small objects with sup  -AN      Recorded by [LOU] Aniya Harden OT      Therapy Exercises    BUE Resistance theraputty;other (comment)   foam block ex  -AN      Recorded by [LOU] Aniya Harden OT      Gross Motor Coordination Training    Gross Motor Skill, Impairments Detail still impaired R finger to nose; some dysmetria with reaching for objects; performed slower speed reaching tasks with increased accuracy  -AN      Recorded by [LOU] Aniya Harden OT      Fine Motor Coordination Training    Opposition --   FM HEP reviewed  -AN      Recorded by [LOU] Aniya Harden OT      Positioning and Restraints    Pre-Treatment Position sitting in chair/recliner  -AN      Post Treatment Position chair  -AN      In Chair  reclined;call light within reach;encouraged to call for assist;with family/caregiver  -AN      Recorded by [AN] Aniya Harden, OT        User Key  (r) = Recorded By, (t) = Taken By, (c) = Cosigned By    Initials Name Effective Dates    SC Frederick Hwang, PT 06/19/15 -     CD Gracy Millan, PT 06/19/15 -     AN Aniya Harden, OT 06/22/15 -     LS Lizeth Jenkins, MS CCC-SLP 06/22/15 -     RD Jodi Lynne, MS CCC-SLP 09/27/17 -     SS Rex Enamorado, OT Student 12/11/17 -                 OT Goals       03/06/18 1451 03/05/18 0932 03/04/18 1010    Transfer Training OT LTG    Transfer Training OT LTG, Time to Achieve   5 days  -ST    Transfer Training OT LTG, Activity Type   toilet;tub  -ST    Transfer Training OT LTG, [place] Level   contact guard assist  -ST    Transfer Training OT LTG, Date Goal Reviewed (P)  --   Pt supervision with StoS transfer.  -SS      Transfer Training OT LTG, Outcome (P)  goal ongoing  -SS (P)  goal ongoing  -AN goal ongoing  -ST    Strength OT LTG    Strength Goal OT LTG, Time to Achieve   5 days  -ST    Strength Goal OT LTG, Functional Goal   Pt to be independent with BUE strengthening HEP by d/c to increase engagement and independence with daily tasks.  -ST    Strength Goal OT LTG, Outcome (P)  goal ongoing  -SS (P)  --   HEP introduced  -AN goal ongoing  -ST    Coordination OT LTG    Coordination OT LTG, Time to Achieve   5 days  -ST    Coordination OT LTG, Activity Type   GM written ex program  -ST    Coordination OT LTG, [place] Level   independent  -ST    Coordination OT LTG, Outcome (P)  goal ongoing  -SS (P)  goal ongoing   introduced HEP's this date  -AN goal ongoing  -ST      User Key  (r) = Recorded By, (t) = Taken By, (c) = Cosigned By    Initials Name Provider Type    ST Joelle Sharp, OTR Occupational Therapist    AN Aniya Harden, OT Occupational Therapist    SS Rex Enamorado, OT Student OT Student          Occupational Therapy Education      Title: PT OT SLP Therapies (Done)     Topic: Occupational Therapy (Done)     Point: ADL training (Done)    Description: Instruct learner(s) on proper safety adaptation and remediation techniques during self care or transfers.   Instruct in proper use of assistive devices.    Learning Progress Summary    Learner Readiness Method Response Comment Documented by Status   Patient Acceptance E VU Pt educated on body mechanics during transfers, AROM, GM/FM HEP to facilitate participation in ADL's.  03/06/18 1450 Done    Acceptance E,D,TB VU,NR,DU Reviewed txfrs, IADL practice with RW, safety in home and DME for home. AN 03/05/18 0931 Done    Acceptance E,TB,D VU,DU role of OT, benefits of activity, safety w/transfers, mobility, POC ST 03/04/18 1009 Done   Family Acceptance E,D,TB VU,NR,DU Reviewed txfrs, IADL practice with RW, safety in home and DME for home. AN 03/05/18 0931 Done               Point: Home exercise program (Done)    Description: Instruct learner(s) on appropriate technique for monitoring, assisting and/or progressing therapeutic exercises/activities.    Learning Progress Summary    Learner Readiness Method Response Comment Documented by Status   Patient Acceptance E VU Pt educated on body mechanics during transfers, AROM, GM/FM HEP to facilitate participation in ADL's.  03/06/18 1450 Done    Acceptance E,D,TB VU,NR,DU Reviewed txfrs, IADL practice with RW, safety in home and DME for home. AN 03/05/18 0931 Done    Acceptance E,TB,D VU,DU role of OT, benefits of activity, safety w/transfers, mobility, POC ST 03/04/18 1009 Done   Family Acceptance E,D,TB VU,NR,DU Reviewed txfrs, IADL practice with RW, safety in home and DME for home. AN 03/05/18 0931 Done               Point: Precautions (Done)    Description: Instruct learner(s) on prescribed precautions during self-care and functional transfers.    Learning Progress Summary    Learner Readiness Method Response Comment Documented by Status   Patient  Acceptance E,TB,D VU,DU role of OT, benefits of activity, safety w/transfers, mobility, POC  03/04/18 1009 Done               Point: Body mechanics (Done)    Description: Instruct learner(s) on proper positioning and spine alignment during self-care, functional mobility activities and/or exercises.    Learning Progress Summary    Learner Readiness Method Response Comment Documented by Status   Patient Acceptance E VU Pt educated on body mechanics during transfers, AROM, GM/FM HEP to facilitate participation in ADL's.  03/06/18 1450 Done    Acceptance E,D,TB VU,NR,DU Reviewed txfrs, IADL practice with RW, safety in home and DME for home.  03/05/18 0931 Done    Acceptance E,TB,D VU,DU role of OT, benefits of activity, safety w/transfers, mobility, POC  03/04/18 1009 Done   Family Acceptance E,D,TB VU,NR,DU Reviewed txfrs, IADL practice with RW, safety in home and DME for home.  03/05/18 0931 Done                      User Key     Initials Effective Dates Name Provider Type Discipline     02/20/17 -  Joelle Sharp, OTR Occupational Therapist OT     06/22/15 -  Aniya Harden, OT Occupational Therapist OT     12/11/17 -  Rex Enamorado, OT Student OT Student OT                  OT Recommendation and Plan  Anticipated Equipment Needs At Discharge: tub bench, raised toilet seat  Anticipated Discharge Disposition: inpatient rehabilitation facility, home with assist, home with outpatient services (if returns home will need 24/7 assist)  Planned Therapy Interventions: ADL retraining, IADL retraining, balance training, home exercise program, neuromuscular re-education, strengthening, transfer training  Therapy Frequency: daily  Plan of Care Review  Outcome Summary/Follow up Plan: (P) Pt supervision for StoS transfers, CGA for functional mobility and able to don socks with conditional I this session. Recommend IRF at discharge. Continue OT per POC.         Outcome Measures       03/06/18 1325 03/06/18  1238 03/05/18 1050    How much help from another person do you currently need...    Turning from your back to your side while in flat bed without using bedrails? 4  -CD  4  -SC    Moving from lying on back to sitting on the side of a flat bed without bedrails? 4  -CD  4  -SC    Moving to and from a bed to a chair (including a wheelchair)? 3  -CD  3  -SC    Standing up from a chair using your arms (e.g., wheelchair, bedside chair)? 3  -CD  3  -SC    Climbing 3-5 steps with a railing? 3  -CD  3  -SC    To walk in hospital room? 3  -CD  3  -SC    AM-PAC 6 Clicks Score 20  -CD  20  -SC    How much help from another is currently needed...    Putting on and taking off regular lower body clothing?  (P)  3  -SS     Bathing (including washing, rinsing, and drying)  (P)  3  -SS     Toileting (which includes using toilet bed pan or urinal)  (P)  3  -SS     Putting on and taking off regular upper body clothing  (P)  4  -SS     Taking care of personal grooming (such as brushing teeth)  (P)  4  -SS     Eating meals  (P)  4  -SS     Score  (P)  21  -SS     Modified Oakwood Scale    Modified Oakwood Scale 3 - Moderate disability.  Requiring some help, but able to walk without assistance.  -CD (P)  3 - Moderate disability.  Requiring some help, but able to walk without assistance.  -SS     Functional Assessment    Outcome Measure Options AM-PAC 6 Clicks Basic Mobility (PT);Modified Liz  -CD  -PAC 6 Clicks Basic Mobility (PT)  -SC      03/05/18 0901 03/04/18 1111 03/04/18 0904    How much help from another person do you currently need...    Turning from your back to your side while in flat bed without using bedrails?  4  -KM     Moving from lying on back to sitting on the side of a flat bed without bedrails?  4  -KM     Moving to and from a bed to a chair (including a wheelchair)?  3  -KM     Standing up from a chair using your arms (e.g., wheelchair, bedside chair)?  3  -KM     Climbing 3-5 steps with a railing?  3  -KM     To  walk in hospital room?  3  -KM     AM-PAC 6 Clicks Score  20  -KM     How much help from another is currently needed...    Putting on and taking off regular lower body clothing? 3  -AN  3  -ST    Bathing (including washing, rinsing, and drying) 3  -AN  3  -ST    Toileting (which includes using toilet bed pan or urinal) 3  -AN  3  -ST    Putting on and taking off regular upper body clothing 3  -AN  3  -ST    Taking care of personal grooming (such as brushing teeth) 3  -AN  3  -ST    Eating meals 4  -AN  4  -ST    Score 19  -AN  19  -ST    Modified Phelps Scale    Modified Liz Scale  3 - Moderate disability.  Requiring some help, but able to walk without assistance.  -KM 3 - Moderate disability.  Requiring some help, but able to walk without assistance.  -ST    Functional Assessment    Outcome Measure Options  AM-PAC 6 Clicks Basic Mobility (PT)  -KM AM-PAC 6 Clicks Daily Activity (OT);Modified Phelps  -ST      User Key  (r) = Recorded By, (t) = Taken By, (c) = Cosigned By    Initials Name Provider Type    SC Frederick Hwang, PT Physical Therapist    CD Gracy Millan, PT Physical Therapist    ST Joelle Sharp, OTR Occupational Therapist    KM Veronica Alvarado, PT Physical Therapist    LOU Harden, OT Occupational Therapist    KALINA Enamorado, OT Student OT Student           Time Calculation:         Time Calculation- OT       03/06/18 1454          Time Calculation- OT    OT Start Time (P)  1238  -      Total Timed Code Minutes- OT (P)  0 minute(s)  -      OT Received On (P)  03/06/18  -      OT Goal Re-Cert Due Date (P)  03/14/18  -        User Key  (r) = Recorded By, (t) = Taken By, (c) = Cosigned By    Initials Name Provider Type    SS Rex Enamorado, OT Student OT Student           Therapy Charges for Today     Code Description Service Date Service Provider Modifiers Qty    [id number]  OT THERAPEUTIC ACT EA 15 MIN 3/6/2018 Rex Enamorado OT Student GO 1                Rex Enamorado, OT Student  3/6/2018

## 2018-03-06 NOTE — PLAN OF CARE
Problem: Patient Care Overview (Adult)  Goal: Plan of Care Review  Outcome: Ongoing (interventions implemented as appropriate)   03/06/18 1358   Coping/Psychosocial Response Interventions   Plan Of Care Reviewed With patient   Outcome Evaluation   Outcome Summary/Follow up Plan PT IS STEADIER WITH R WALKER VS CANE AT THIS TIME. AMBULATED 350 FEET WITH CGA AND R WALKER. RECOMMEND D/C HOME WITH DTR AND HHPT.    Patient Care Overview   Progress improving       Problem: Stroke (Ischemic) (Adult)  Goal: Signs and Symptoms of Listed Potential Problems Will be Absent or Manageable (Stroke)  Outcome: Ongoing (interventions implemented as appropriate)   03/06/18 1358   Stroke (Ischemic)   Problems Assessed (Stroke (Ischemic)/TIA) motor/sensory impairment   Problems Present (Stroke (Ischemic)/TIA) motor/sensory impairment       Problem: Inpatient Physical Therapy  Goal: Bed Mobility Goal LTG- PT  Outcome: Ongoing (interventions implemented as appropriate)   03/04/18 1150 03/06/18 1358   Bed Mobility PT LTG   Bed Mobility PT LTG, Date Established 03/04/18 --    Bed Mobility PT LTG, Time to Achieve 2 wks --    Bed Mobility PT LTG, Activity Type all bed mobility --    Bed Mobility PT LTG, Williams Level independent --    Bed Mobility PT LTG, Outcome --  goal ongoing     Goal: Transfer Training Goal 1 LTG- PT  Outcome: Ongoing (interventions implemented as appropriate)    Goal: Gait Training Goal LTG- PT  Outcome: Ongoing (interventions implemented as appropriate)   03/04/18 1150 03/05/18 1245 03/06/18 1358   Gait Training PT LTG   Gait Training Goal PT LTG, Date Established 03/04/18 --  --    Gait Training Goal PT LTG, Time to Achieve --  5 days --    Gait Training Goal PT LTG, Williams Level independent --  --    Gait Training Goal PT LTG, Assist Device cane, straight --  --    Gait Training Goal PT LTG, Distance to Achieve 250 --  --    Gait Training Goal PT LTG, Outcome --  --  goal ongoing

## 2018-03-06 NOTE — PLAN OF CARE
Problem: Patient Care Overview (Adult)  Goal: Plan of Care Review  Outcome: Ongoing (interventions implemented as appropriate)   03/06/18 0339   Coping/Psychosocial Response Interventions   Plan Of Care Reviewed With patient   Outcome Evaluation   Outcome Summary/Follow up Plan Pt VSS. NIH of 2. NSR on tele. Rested well.    Patient Care Overview   Progress improving       Problem: Stroke (Ischemic) (Adult)  Goal: Signs and Symptoms of Listed Potential Problems Will be Absent or Manageable (Stroke)  Outcome: Ongoing (interventions implemented as appropriate)      Problem: Fall Risk (Adult)  Goal: Absence of Falls  Outcome: Ongoing (interventions implemented as appropriate)

## 2018-03-06 NOTE — THERAPY TREATMENT NOTE
Acute Care - Physical Therapy Treatment Note  [facility]     Patient Name: Valarie Camara  : 1945  MRN: [id number]  Today's Date: 3/6/2018  Onset of Illness/Injury or Date of Surgery Date: 18  Date of Referral to PT: 18  Referring Physician: Dr Crespo    Admit Date: 3/3/2018    Visit Dx:    ICD-10-CM ICD-9-CM   1. Dysarthria R47.1 784.51   2. Impaired mobility and ADLs Z74.09 799.89   3. Impaired functional mobility, balance, gait, and endurance Z74.09 V49.89   4. Lacunar stroke I63.9 434.91     Patient Active Problem List   Diagnosis   • Atopic rhinitis   • Arthritis   • Benign paroxysmal positional vertigo   • Neck pain   • Anxiety and depression   • DM (diabetes mellitus)   • Edema   • Gastroesophageal reflux disease with esophagitis   • Multiple-type hyperlipidemia   • Vitamin D deficiency   • Benign essential hypertension   • Obesity (BMI 30-39.9)   • Unstable angina   • History of COPD   • Hyperlipidemia   • History of cataract   • History of osteoporosis   • History of restless legs syndrome   • History of rheumatoid arthritis   • Elevated serum creatinine   • Heartburn   • Pharyngoesophageal dysphagia   • Constipation   • Schatzki's ring   • Gastritis without bleeding   • Helicobacter pylori infection   • Duodenitis   • Dysarthria   • Right hemiparesis   • HTN (hypertension)   • Stroke   • Multiple thyroid nodules   • Lacunar stroke   • DM (diabetes mellitus), type 2, uncontrolled w/neurologic complication               Adult Rehabilitation Note       18 1325 18 1415 18 1050    Rehab Assessment/Intervention    Discipline physical therapist  -CD speech language pathologist  -RD physical therapist  -SC    Document Type therapy note (daily note)  -CD therapy note (daily note)  -RD therapy note (daily note)  -SC    Subjective Information no complaints  -CD no complaints;agree to therapy  -RD complains of;fatigue  -SC    Patient Effort, Rehab Treatment good  -CD good   -RD good  -SC    Symptoms Noted During/After Treatment significant change in vital signs   /91 AFTER GAIT X 350 FEET. NSG NOTIFIED AND TO RECHECK  -CD  significant change in vital signs;fatigue   elevated bp   -SC    Precautions/Limitations fall precautions  -CD  fall precautions  -SC    Recorded by [CD] Gracy Millan, PT [RD] Jodi Lynne, MS CCC-SLP [SC] Frederick Hwang, PT    Vital Signs    Pre Systolic BP Rehab 176  -CD      Pre Treatment Diastolic BP 98  -CD      Post Systolic BP Rehab 225  -CD  177  -SC    Post Treatment Diastolic BP 91  -CD  105  -SC    Pretreatment Heart Rate (beats/min) 83  -CD      Posttreatment Heart Rate (beats/min) 79  -CD      Recorded by [CD] Gracy Millan PT  [SC] Frederick Hwang PT    Pain Assessment    Pain Assessment 0-10  -CD No/denies pain  -RD No/denies pain  -SC    Pain Score 2  -CD  0  -SC    Post Pain Score 2  -CD      Pain Type Acute pain  -CD      Pain Location Head  -CD      Pain Intervention(s) Ambulation/increased activity  -CD      Response to Interventions TOLERATED.   -CD      Recorded by [CD] Gracy Millan, PT [RD] Jodi Lynne, MS CCC-SLP [SC] Frederick Hwang PT    Cognitive Assessment/Intervention    Current Cognitive/Communication Assessment functional  -CD  functional  -SC    Orientation Status oriented x 4  -CD  oriented x 4  -SC    Follows Commands/Answers Questions 100% of the time  -CD  100% of the time  -SC    Personal Safety WNL/WFL  -CD  WNL/WFL  -SC    Personal Safety Interventions gait belt;fall prevention program maintained;muscle strengthening facilitated;nonskid shoes/slippers when out of bed;supervised activity  -CD  fall prevention program maintained;gait belt  -SC    Recorded by [CD] Gracy Millan, PT  [SC] Frederick Hwang PT    Cognitive Assessment Intervention    Behavior/Mood Observations   alert;cooperative  -SC    Recorded by   [SC] Frederick Hwang PT    Verbal Expression Assess/Intervention    Automatic Speech  WNL/WFL  -RD      Speech Repetition  WNL/WFL  -RD     Speech Fluency  fluent speech  -RD     Conversational Speech  mild impairment;other (see comments)   occasional word-finding difficulty  -RD     Recorded by  [RD] Jodi Lynne MS CCC-SLP     Reading Assessment/Intervention    Reading Skills  WNL/WFL  -RD     Oral Reading Ability  WNL/WFL  -RD     Reading Comprehension  WNL/WFL  -RD     Recorded by  [GEORGETTE] Jodi Lynne MS CCC-SLP     Writing Assessment/Intervention    Writing Skills  WNL/WFL  -RD     Recorded by  [RD] Jodi Lynne MS CCC-SLP     Improve word retrieval skills    Improve word retrieval skills by:  completing functional word finding tasks;completing a divergent task;90%;without cues  -RD     Status: Improve word retrieval skills  New  -RD     Word Retrieval Skills Progress  continue to address  -RD     Comments: Improve word retrieval skills  50%-divergent taks; reports occasional word-finding difficulty in conversation.  -RD     Recorded by  [RD] Jodi Lynne MS CCC-SLP     Improve articulation    Improve articulation:  by over-articulating at word level;by over-articulating at phrase level;by over-articulating in connected speech  -RD     Status: Improve articulation  Progressing as expected  -RD     Articulation Progress  70%;with inconsistent cues;continue to address  -RD     Recorded by  [RD] Jodi Lynne MS CCC-SLP     Bed Mobility, Assessment/Treatment    Bed Mobility, Comment PT UIC AND RETURNED TO CHAIR.   -CD  uic  -SC    Recorded by [CD] Gracy Millan, PT  [SC] Frederick Hwang, PT    Transfer Assessment/Treatment    Transfers, Sit-Stand [place] supervision required  -CD  verbal cues required;supervision required  -SC    Transfers, Stand-Sit [place] supervision required  -CD  supervision required;verbal cues required  -SC    Transfers, Sit-Stand-Sit, Assist Device rolling walker  -CD  rolling walker  -SC    Toilet Transfer, [place] supervision required  -CD       Toilet Transfer, Assistive Device elevated toilet seat;rolling walker  -CD      Transfer, Safety Issues balance decreased during turns;step length decreased  -CD  balance decreased during turns  -SC    Transfer, Impairments impaired balance  -CD  coordination impaired;impaired balance  -SC    Transfer, Comment   required some cues for safety when sitting down  -SC    Recorded by [CD] Gracy Millan, PT  [SC] Frederick Hwang, PT    Gait Assessment/Treatment    Gait, [place] Level contact guard assist  -CD  contact guard assist  -SC    Gait, Assistive Device rolling walker  -CD  rolling walker  -SC    Gait, Distance (Feet) 350   100 FEET WITH STRAIGHT CANE AND CGA.   -CD  350   100 feet with hand held assist  -SC    Gait, Gait Pattern Analysis   swing-through gait   x3 standing breaks  -SC    Gait, Gait Deviations tyra decreased;step length decreased   VEERS R.   -CD  other (see comments)   sways R   -SC    Gait, Safety Issues balance decreased during turns  -CD  balance decreased during turns  -SC    Gait, Impairments impaired balance  -CD  coordination impaired;impaired balance  -SC    Gait, Comment QUALITY OF GAIT BETTER WITH R WALKER VS CANE. RECOMMEND R WALKER AT D/C WITH PROGRESSION TO CANE.   -CD  cues requried to walk slower to keep her balance. Sways R at times  -SC    Recorded by [CD] Gracy Millan PT  [SC] Frederick Hwang, PT    Motor Skills/Interventions    Additional Documentation Balance Skills Training (Group)  -CD      Recorded by [CD] Gracy Millan PT      Balance Skills Training    Sitting-Level of Assistance Independent  -CD      Standing-Level of Assistance Close supervision  -CD      Gait Balance-Level of Assistance Contact guard  -CD      Gait Balance Support assistive device  -CD      Recorded by [CD] Gracy Millan PT      Therapy Exercises    Bilateral Lower Extremities AROM:;10 reps;sitting;ankle pumps/circles;hip flexion;LAQ  -CD  AROM:;10 reps;ankle pumps/circles;LAQ;hip flexion  -SC     Bilateral Upper Extremity   10 reps;AROM:;sitting;shoulder abduction/adduction;shoulder extension/flexion  -SC    Recorded by [CD] Gracy Millan, PT  [SC] Frederick Hwang PT    Positioning and Restraints    Pre-Treatment Position sitting in chair/recliner  -CD      Post Treatment Position chair  -CD  chair  -SC    In Chair reclined;call light within reach;with family/caregiver;notified nsg;legs elevated;exit alarm on;encouraged to call for assist  -CD  sitting;call light within reach;encouraged to call for assist;with family/caregiver  -SC    Recorded by [CD] Gracy Millan PT  [SC] Frederick Hwang, MARY      03/05/18 0901 03/05/18 0900       Rehab Assessment/Intervention    Discipline --  -AN occupational therapist  -AN     Document Type --  -AN therapy note (daily note)  -AN     Subjective Information --  -AN no complaints;agree to therapy  -AN     Patient Effort, Rehab Treatment --  -AN good  -AN     Symptoms Noted During/After Treatment --  -AN none  -AN     Precautions/Limitations --  -AN fall precautions  -AN     Specific Treatment Considerations --  -AN some dysmetria R hand  -AN     Recorded by [LOU] Aniya Harden OT [AN] Aniya Harden, OT     Pain Assessment    Pain Assessment --  -AN No/denies pain  -AN     Recorded by [LOU] Aniya Harden OT [AN] Aniya Harden OT     Cognitive Assessment/Intervention    Current Cognitive/Communication Assessment --  -AN impaired   much improved speech, minimal dysarthria present  -AN     Orientation Status --  -AN oriented x 4  -AN     Follows Commands/Answers Questions --  -% of the time  -AN     Personal Safety --  -AN WNL/WFL  -AN     Recorded by [LOU] Aniya Harden OT [AN] Aniya Harden OT     Right Elbow/Forearm    Elbow Flexion Gross Movement  (4+/5) good plus  -AN     Elbow Extension Gross Movement  (4+/5) good plus  -AN     Recorded by  [LOU] Aniya Harden OT     Bed Mobility, Assessment/Treatment    Bed Mobility, Comment  pt up in chair  -AN     Recorded  by  [LOU] Aniya Harden OT     Transfer Assessment/Treatment    Transfers, Sit-Stand [place]  supervision required  -AN     Transfers, Stand-Sit [place]  supervision required  -AN     Transfers, Sit-Stand-Sit, Assist Device  rolling walker  -AN     Bathtub Transfer, [place]  minimum assist (75% patient effort)   simulated with grab bars  -AN     Transfer, Comment  pt needs cues for keeping walker close to body during transitions  -AN     Recorded by  [LOU] Aniya Harden OT     Functional Mobility    Functional Mobility- Ind. Level  supervision required;verbal cues required  -AN     Functional Mobility- Device  rolling walker  -AN     Functional Mobility-Distance (Feet)  20  -AN     Functional Mobility- Comment  cues to keep RW with her with position changes and doing IADL' tasks  -AN     Recorded by  [LOU] Aniya Harden OT     ADL Assessment/Intervention    Additional Documentation  --    and carry trash can, relocate small objects with sup  -AN     Recorded by  [LOU] Aniya Harden OT     Therapy Exercises    BUE Resistance  theraputty;other (comment)   foam block ex  -AN     Recorded by  [LOU] Aniya Harden OT     Gross Motor Coordination Training    Gross Motor Skill, Impairments Detail  still impaired R finger to nose; some dysmetria with reaching for objects; performed slower speed reaching tasks with increased accuracy  -AN     Recorded by  [LOU] Aniya Harden OT     Fine Motor Coordination Training    Opposition  --   FM HEP reviewed  -AN     Recorded by  [LOU] Aniya Harden OT     Positioning and Restraints    Pre-Treatment Position  sitting in chair/recliner  -AN     Post Treatment Position  chair  -AN     In Chair  reclined;call light within reach;encouraged to call for assist;with family/caregiver  -AN     Recorded by  [LOU] Aniya Harden OT       User Key  (r) = Recorded By, (t) = Taken By, (c) = Cosigned By    Initials Name Effective Dates    ARLEY Hwang, PT 06/19/15 -     CD  Gracy Millan, PT 06/19/15 -     LOU Harden, OT 06/22/15 -     GEORGETTE Lynne, MS CCC-SLP 09/27/17 -                 IP PT Goals       03/06/18 1358 03/05/18 1245 03/04/18 1150    Bed Mobility PT LTG    Bed Mobility PT LTG, Date Established   03/04/18  -KM    Bed Mobility PT LTG, Time to Achieve   2 wks  -KM    Bed Mobility PT LTG, Activity Type   all bed mobility  -KM    Bed Mobility PT LTG, [place] Level   independent  -KM    Bed Mobility PT LTG, Outcome goal ongoing  -CD goal ongoing  -SC     Transfer Training PT LTG    Transfer Training PT LTG, Date Established   03/04/18  -KM    Transfer Training PT LTG, Time to Achieve   2 wks  -KM    Transfer Training PT LTG, Activity Type   all transfers  -KM    Transfer Training PT LTG, [place] Level   independent  -KM    Transfer Training PT LTG, Outcome goal ongoing  -CD goal ongoing  -SC     Gait Training PT LTG    Gait Training Goal PT LTG, Date Established   03/04/18  -KM    Gait Training Goal PT LTG, Time to Achieve  5 days  -SC 2 wks  -KM    Gait Training Goal PT LTG, [place] Level   independent  -KM    Gait Training Goal PT LTG, Assist Device   cane, straight  -KM    Gait Training Goal PT LTG, Distance to Achieve   250  -KM    Gait Training Goal PT LTG, Outcome goal ongoing  -CD goal ongoing  -SC       User Key  (r) = Recorded By, (t) = Taken By, (c) = Cosigned By    Initials Name Provider Type    SC Frederick Hwang, PT Physical Therapist    CD Gracy Millan, PT Physical Therapist     Veronica Alvarado, PT Physical Therapist          Physical Therapy Education     Title: PT OT SLP Therapies (Done)     Topic: Physical Therapy (Done)     Point: Mobility training (Done)    Learning Progress Summary    Learner Readiness Method Response Comment Documented by Status   Patient Acceptance TULIO ACOSTANR SAFETY WITH MOBILITY, REC'S FOR D/C TO INCLUDE R WALKER AND HHPT. GAIT TRAINING WITH R WALKER AND CANE. CD 03/06/18 1357 Done    Eager MARIUM FLORES,NR  reviewed safety with mobility SC 03/05/18 1245 Done    Acceptance E VU discussed plan of care and d/c planning  03/04/18 1153 Done   Caregiver Acceptance E VU,NR SAFETY WITH MOBILITY, REC'S FOR D/C TO INCLUDE R WALKER AND HHPT. GAIT TRAINING WITH R WALKER AND CANE.  03/06/18 1357 Done               Point: Home exercise program (Done)    Learning Progress Summary    Learner Readiness Method Response Comment Documented by Status   Patient Acceptance E VU,NR SAFETY WITH MOBILITY, REC'S FOR D/C TO INCLUDE R WALKER AND HHPT. GAIT TRAINING WITH R WALKER AND CANE.  03/06/18 1357 Done    Eager E KARLA,MARIUM,NR reviewed safety with mobility SC 03/05/18 1245 Done    Acceptance E VU discussed plan of care and d/c planning  03/04/18 1153 Done   Caregiver Acceptance E VU,NR SAFETY WITH MOBILITY, REC'S FOR D/C TO INCLUDE R WALKER AND HHPT. GAIT TRAINING WITH R WALKER AND CANE.  03/06/18 1357 Done               Point: Body mechanics (Done)    Learning Progress Summary    Learner Readiness Method Response Comment Documented by Status   Patient Acceptance E VU,NR SAFETY WITH MOBILITY, REC'S FOR D/C TO INCLUDE R WALKER AND HHPT. GAIT TRAINING WITH R WALKER AND CANE.  03/06/18 1357 Done    Eager E MARIUM ACOSTA,NR reviewed safety with mobility SC 03/05/18 1245 Done    Acceptance E VU discussed plan of care and d/c planning  03/04/18 1153 Done   Caregiver Acceptance E VU,NR SAFETY WITH MOBILITY, REC'S FOR D/C TO INCLUDE R WALKER AND HHPT. GAIT TRAINING WITH R WALKER AND CANE.  03/06/18 1357 Done               Point: Precautions (Done)    Learning Progress Summary    Learner Readiness Method Response Comment Documented by Status   Patient Acceptance E VU,NR SAFETY WITH MOBILITY, REC'S FOR D/C TO INCLUDE R WALKER AND HHPT. GAIT TRAINING WITH R WALKER AND CANE.  03/06/18 1357 Done    Eager E KARLA,MARIUM,NR reviewed safety with mobility SC 03/05/18 1245 Done    Acceptance E VU discussed plan of care and d/c planning  03/04/18 1153 Done    Caregiver Acceptance E VU,NR SAFETY WITH MOBILITY, REC'S FOR D/C TO INCLUDE R WALKER AND HHPT. GAIT TRAINING WITH R WALKER AND CANE. CD 03/06/18 1357 Done                      User Key     Initials Effective Dates Name Provider Type Discipline    SC 06/19/15 -  Frederick Hwang, PT Physical Therapist PT    CD 06/19/15 -  Gracy Millan, PT Physical Therapist PT     06/19/15 -  Veronica Alvarado, PT Physical Therapist PT                    PT Recommendation and Plan  Anticipated Discharge Disposition: inpatient rehabilitation facility  PT Frequency: daily  Plan of Care Review  Plan Of Care Reviewed With: patient  Progress: improving  Outcome Summary/Follow up Plan: PT IS STEADIER WITH R WALKER VS CANE AT THIS TIME. AMBULATED 350 FEET WITH CGA AND R WALKER. RECOMMEND D/C HOME WITH DTR AND HHPT.           Outcome Measures       03/06/18 1325 03/05/18 1050 03/05/18 0901    How much help from another person do you currently need...    Turning from your back to your side while in flat bed without using bedrails? 4  -CD 4  -SC     Moving from lying on back to sitting on the side of a flat bed without bedrails? 4  -CD 4  -SC     Moving to and from a bed to a chair (including a wheelchair)? 3  -CD 3  -SC     Standing up from a chair using your arms (e.g., wheelchair, bedside chair)? 3  -CD 3  -SC     Climbing 3-5 steps with a railing? 3  -CD 3  -SC     To walk in hospital room? 3  -CD 3  -SC     AM-PAC 6 Clicks Score 20  -CD 20  -SC     How much help from another is currently needed...    Putting on and taking off regular lower body clothing?   3  -AN    Bathing (including washing, rinsing, and drying)   3  -AN    Toileting (which includes using toilet bed pan or urinal)   3  -AN    Putting on and taking off regular upper body clothing   3  -AN    Taking care of personal grooming (such as brushing teeth)   3  -AN    Eating meals   4  -AN    Score   19  -AN    Modified Liz Scale    Modified [place] Scale 3 - Moderate  disability.  Requiring some help, but able to walk without assistance.  -CD      Functional Assessment    Outcome Measure Options AM-PAC 6 Clicks Basic Mobility (PT);Modified [place]  -CD AM-PAC 6 Clicks Basic Mobility (PT)  -SC       03/04/18 1111 03/04/18 0904       How much help from another person do you currently need...    Turning from your back to your side while in flat bed without using bedrails? 4  -KM      Moving from lying on back to sitting on the side of a flat bed without bedrails? 4  -KM      Moving to and from a bed to a chair (including a wheelchair)? 3  -KM      Standing up from a chair using your arms (e.g., wheelchair, bedside chair)? 3  -KM      Climbing 3-5 steps with a railing? 3  -KM      To walk in hospital room? 3  -KM      AM-PAC 6 Clicks Score 20  -KM      How much help from another is currently needed...    Putting on and taking off regular lower body clothing?  3  -ST     Bathing (including washing, rinsing, and drying)  3  -ST     Toileting (which includes using toilet bed pan or urinal)  3  -ST     Putting on and taking off regular upper body clothing  3  -ST     Taking care of personal grooming (such as brushing teeth)  3  -ST     Eating meals  4  -ST     Score  19  -ST     Modified [place] Scale    Modified [place] Scale 3 - Moderate disability.  Requiring some help, but able to walk without assistance.  -KM 3 - Moderate disability.  Requiring some help, but able to walk without assistance.  -ST     Functional Assessment    Outcome Measure Options AM-PAC 6 Clicks Basic Mobility (PT)  -KM AM-PAC 6 Clicks Daily Activity (OT);Modified [place]  -ST       User Key  (r) = Recorded By, (t) = Taken By, (c) = Cosigned By    Initials Name Provider Type    SC Frederick Hwang, PT Physical Therapist    ADRIAN Millan, PT Physical Therapist    ST Joelle Sharp, OTR Occupational Therapist    SANTO Alvarado, PT Physical Therapist    LOU Harden, OT Occupational Therapist            Time Calculation:         PT Charges       03/06/18 1400          Time Calculation    Start Time 1325  -CD      PT Received On 03/06/18  -CD      PT Goal Re-Cert Due Date 03/14/18  -CD      Time Calculation- PT    Total Timed Code Minutes- PT 23 minute(s)  -CD        User Key  (r) = Recorded By, (t) = Taken By, (c) = Cosigned By    Initials Name Provider Type    CD Gracy Millan, PT Physical Therapist          Therapy Charges for Today     Code Description Service Date Service Provider Modifiers Qty    36951169135 HC GAIT TRAINING EA 15 MIN 3/6/2018 Gracy Millan, PT GP 1    10938893272 HC PT THER PROC EA 15 MIN 3/6/2018 Gracy Millan, PT GP 1          PT G-Codes  Outcome Measure Options: AM-PAC 6 Clicks Basic Mobility (PT), Modified Liz Millan, PT  3/6/2018

## 2018-03-06 NOTE — PLAN OF CARE
Problem: Patient Care Overview (Adult)  Goal: Plan of Care Review  Outcome: Ongoing (interventions implemented as appropriate)   03/06/18 1451   Outcome Evaluation   Outcome Summary/Follow up Plan Pt supervision for StoS transfers, CGA for functional mobility and able to don socks with conditional I this session. Recommend IRF at discharge. Continue OT per POC.        Problem: Inpatient Occupational Therapy  Goal: Transfer Training Goal 1 LTG- OT  Outcome: Ongoing (interventions implemented as appropriate)   03/04/18 1010 03/06/18 1451   Transfer Training OT LTG   Transfer Training OT LTG, Time to Achieve 5 days --    Transfer Training OT LTG, Activity Type toilet;tub --    Transfer Training OT LTG, [place] Level contact guard assist --    Transfer Training OT LTG, Date Goal Reviewed --  (Pt supervision with StoS transfer.)   Transfer Training OT LTG, Outcome --  goal ongoing     Goal: Strength Goal LTG- OT  Outcome: Ongoing (interventions implemented as appropriate)   03/04/18 1010 03/06/18 1451   Strength OT LTG   Strength Goal OT LTG, Time to Achieve 5 days --    Strength Goal OT LTG, Functional Goal Pt to be independent with BUE strengthening HEP by d/c to increase engagement and independence with daily tasks. --    Strength Goal OT LTG, Outcome --  goal ongoing     Goal: Coordination Goal LTG- OT  Outcome: Ongoing (interventions implemented as appropriate)   03/04/18 1010 03/06/18 1451   Coordination OT LTG   Coordination OT LTG, Time to Achieve 5 days --    Coordination OT LTG, Activity Type GM written ex program --    Coordination OT LTG, [place] Level independent --    Coordination OT LTG, Outcome --  goal ongoing

## 2018-03-06 NOTE — PROGRESS NOTES
"    [facility] Medicine Services  PROGRESS NOTE    Patient Name: Valarie Camara  : 1945  MRN: [id number]    Date of Admission: 3/3/2018  Length of Stay: 3  Primary Care Physician: Jeny Neri    Subjective   Subjective     CC: f/u CVA    HPI: Sitting up on edge of bed. Doing very well aside from right face \"feels funny.\"     Review of Systems  Gen- No fevers, chills  CV- No chest pain, palpitations  Resp- No cough, dyspnea  GI- No N/V/D, abd pain    Otherwise ROS is negative except as mentioned in the HPI.    Objective   Objective     Vital Signs:   Temp:  [97.6 °F (36.4 °C)-98.3 °F (36.8 °C)] 98.2 °F (36.8 °C)  Heart Rate:  [78-93] 78  Resp:  [16] 16  BP: (165-198)/() 176/98  Total (NIH Stroke Scale): 2     Physical Exam:  Constitutional: No acute distress, awake, alert, obese  HENT: NCAT, mucous membranes moist  Respiratory: Clear to auscultation bilaterally, respiratory effort normal   Cardiovascular: RRR, no murmurs, rubs, or gallops, palpable pedal pulses bilaterally  Gastrointestinal: Positive bowel sounds, soft, nontender, nondistended  Musculoskeletal: No bilateral ankle edema  Psychiatric: Appropriate affect, cooperative  Neurologic: Oriented x 3, strength symmetric in all extremities, Cranial Nerves grossly intact to confrontation, speech clear  Skin: No rashes    Results Reviewed:  I have personally reviewed current lab, radiology, and data and agree.      Results from last 7 days  Lab Units 18  1050   WBC 10*3/mm3 5.63   HEMOGLOBIN g/dL 13.4   HEMATOCRIT % 40.6   PLATELETS 10*3/mm3 261   INR  0.89*       Results from last 7 days  Lab Units 18  1050   SODIUM mmol/L 135   POTASSIUM mmol/L 4.3   CHLORIDE mmol/L 100   CO2 mmol/L 30.0   BUN mg/dL 19   CREATININE mg/dL 1.10   GLUCOSE mg/dL 301*   CALCIUM mg/dL 9.5   ALT (SGPT) U/L 23   AST (SGOT) U/L 18     Estimated Creatinine Clearance: 48.7 mL/min (by C-G formula based on Cr of 1.1).  No results found " "for: BNP  No results found for: PHART    Microbiology Results Abnormal     None        Personally reviewed MRI brain with tiny left sided infarct. Agree with interpretation.     Results for orders placed during the hospital encounter of 03/03/18   Adult Transthoracic Echo Complete W/ Cont if Necessary Per Protocol (With Agitated Saline)    Narrative · Left ventricular systolic function is hyperdynamic (EF > 70).  · Left ventricular wall thickness is consistent with moderate concentric   hypertrophy.  · The cardiac valves are anatomically and functionally normal.          I have reviewed the medications.    Assessment/Plan   Assessment / Plan     Hospital Problem List     * (Principal)Lacunar stroke    Overview Signed 3/4/2018 12:17 PM by Danie Dunn MD     Left thalamus         Multiple-type hyperlipidemia (Chronic)    Overview Signed 8/1/2016 10:39 PM by Yesi Linder      Mixed hyperlipidemia          Hyperlipidemia (Chronic)    Heartburn    Schatzki's ring    HTN (hypertension)    Multiple thyroid nodules    Overview Signed 3/3/2018 12:12 PM by Danie Dunn MD     Seen incidentally on ct angio head and neck (performed at [facility]) 3/3/18         DM (diabetes mellitus), type 2, uncontrolled w/neurologic complication        Brief Hospital Course to date:  Valarie Camara is a 72 y.o. female with htn, uncontrolled dm, gerd/shatzki ring who presented with right sided leg >arm weakness, slurred speech and ataxia \"dragging right leg\". Woke up 7am family noted dragging right foot, slurred speech. Went to [facility]. U/a negative for infection, ct head no acute dz. Noted to have thalamic infarct on MRI brain.     Assessment & Plan:    Left thalamic infarct  -On asa, plavix, lipitor. Increase BP control as needed. Neuro has seen and has signed off.    HTN  --Amlodipine, valsartan. Added coreg.    DM2  --Increase basal bolus insulin again today.    Obesity  Medical noncompliance     I think " patient is well enough to go home however [facility] considering rehab. PT/OT recommends home.     DVT Prophylaxis:  SQ lovenox     CODE STATUS: Full Code     Disposition: I expect the patient to be discharged 1 days to [facility] vs home.    Electronically signed by Mirna Lopez II, DO, 03/06/18, 1:04 PM.

## 2018-03-07 VITALS
TEMPERATURE: 98.5 F | DIASTOLIC BLOOD PRESSURE: 72 MMHG | RESPIRATION RATE: 17 BRPM | BODY MASS INDEX: 35.44 KG/M2 | SYSTOLIC BLOOD PRESSURE: 164 MMHG | WEIGHT: 200 LBS | HEART RATE: 74 BPM | OXYGEN SATURATION: 94 % | HEIGHT: 63 IN

## 2018-03-07 PROBLEM — R12 HEARTBURN: Status: RESOLVED | Noted: 2017-10-19 | Resolved: 2018-03-07

## 2018-03-07 LAB
GLUCOSE BLDC GLUCOMTR-MCNC: 244 MG/DL (ref 70–130)
GLUCOSE BLDC GLUCOMTR-MCNC: 292 MG/DL (ref 70–130)

## 2018-03-07 PROCEDURE — 99239 HOSP IP/OBS DSCHRG MGMT >30: CPT | Performed by: NURSE PRACTITIONER

## 2018-03-07 PROCEDURE — 63710000001 INSULIN DETEMIR PER 5 UNITS: Performed by: INTERNAL MEDICINE

## 2018-03-07 PROCEDURE — 92507 TX SP LANG VOICE COMM INDIV: CPT

## 2018-03-07 PROCEDURE — 82962 GLUCOSE BLOOD TEST: CPT

## 2018-03-07 RX ORDER — VALSARTAN 80 MG/1
240 TABLET ORAL
Start: 2018-03-08 | End: 2019-11-28 | Stop reason: HOSPADM

## 2018-03-07 RX ORDER — CARVEDILOL 6.25 MG/1
6.25 TABLET ORAL EVERY 12 HOURS SCHEDULED
Start: 2018-03-07 | End: 2019-11-28 | Stop reason: HOSPADM

## 2018-03-07 RX ORDER — ATORVASTATIN CALCIUM 80 MG/1
80 TABLET, FILM COATED ORAL NIGHTLY
Start: 2018-03-07 | End: 2018-04-09

## 2018-03-07 RX ORDER — PSEUDOEPHEDRINE HCL 30 MG
100 TABLET ORAL 2 TIMES DAILY
Status: ON HOLD
Start: 2018-03-07 | End: 2019-11-24

## 2018-03-07 RX ORDER — HYDROCHLOROTHIAZIDE 12.5 MG/1
12.5 TABLET ORAL DAILY
Status: DISCONTINUED | OUTPATIENT
Start: 2018-03-07 | End: 2018-03-07 | Stop reason: HOSPADM

## 2018-03-07 RX ORDER — CLOPIDOGREL BISULFATE 75 MG/1
75 TABLET ORAL DAILY
Start: 2018-03-08 | End: 2020-09-08 | Stop reason: SDUPTHER

## 2018-03-07 RX ADMIN — VALSARTAN 240 MG: 160 TABLET, FILM COATED ORAL at 08:52

## 2018-03-07 RX ADMIN — CLOPIDOGREL BISULFATE 75 MG: 75 TABLET ORAL at 08:52

## 2018-03-07 RX ADMIN — DOCUSATE SODIUM 100 MG: 100 CAPSULE, LIQUID FILLED ORAL at 08:52

## 2018-03-07 RX ADMIN — INSULIN LISPRO 6 UNITS: 100 INJECTION, SOLUTION INTRAVENOUS; SUBCUTANEOUS at 12:13

## 2018-03-07 RX ADMIN — INSULIN LISPRO 4 UNITS: 100 INJECTION, SOLUTION INTRAVENOUS; SUBCUTANEOUS at 08:51

## 2018-03-07 RX ADMIN — INSULIN DETEMIR 25 UNITS: 100 INJECTION, SOLUTION SUBCUTANEOUS at 09:02

## 2018-03-07 RX ADMIN — PANTOPRAZOLE SODIUM 40 MG: 40 TABLET, DELAYED RELEASE ORAL at 08:52

## 2018-03-07 RX ADMIN — ASPIRIN 81 MG 81 MG: 81 TABLET ORAL at 08:52

## 2018-03-07 RX ADMIN — HYDROCHLOROTHIAZIDE 12.5 MG: 12.5 TABLET ORAL at 12:13

## 2018-03-07 RX ADMIN — CARVEDILOL 6.25 MG: 6.25 TABLET, FILM COATED ORAL at 08:52

## 2018-03-07 RX ADMIN — AMLODIPINE BESYLATE 10 MG: 10 TABLET ORAL at 08:52

## 2018-03-07 NOTE — PROGRESS NOTES
Continued Stay Note  [facility]     Patient Name: Valarie Camara  MRN: [id number]  Today's Date: 3/7/2018    Admit Date: 3/3/2018          Discharge Plan     Consent obtained for the participation in the Psychiatric Transitions Program. Shala Rivera RN                Discharge Codes       03/07/18 1206    Discharge Codes    Discharge Codes 62  Discharged/transferred to an inpatient rehabilitation facility including distinct parts of units of a hospital        Expected Discharge Date and Time     Expected Discharge Date Expected Discharge Time    Mar 7, 2018             Shala Rivera RN

## 2018-03-07 NOTE — PLAN OF CARE
Problem: Patient Care Overview (Adult)  Goal: Plan of Care Review  Outcome: Ongoing (interventions implemented as appropriate)   03/07/18 1023   Coping/Psychosocial Response Interventions   Plan Of Care Reviewed With patient   Outcome Evaluation   Outcome Summary/Follow up Plan S/L tx completed this am. Pt alert and cooperative, highly motivated to participate in tx. Dysarthria appears to be resolved and pt expressed that she feels that her speech has returned to baseline. Pt demonstrates con't difficulty w/ word finding in conversation and when completing divergent tasks, however states that she feels this has improved as well. More successful w/ divergent tasks when prompted to consider more specific categories (ex: when naming animals, think of zoo animals, farm animals, etc). Pt would benefit from con't ST following d/c.    Patient Care Overview   Progress progress toward functional goals as expected       Problem: Inpatient SLP  Goal: Dysarthria-Patient will improve motor speech skills to allow optimal participation in care  Outcome: Outcome(s) achieved Date Met: 03/07/18 03/03/18 1421 03/07/18 1023   Dysarthria- Optimal Particpation in Care   Dysarthria Optimal Participation in Care- SLP, Date Established 03/03/18 --    Dysarthria Optimal Participation in Care- SLP, Time to Achieve by discharge --    Dysarthria Optimal Participation in Care- SLP, Date Goal Reviewed --  03/07/18   Dysarthria Optimal Participation in Care- SLP, Outcome --  goal met     Goal: Expressive-Patient will improve expressive language skills to allow optimal participation in care  Outcome: Ongoing (interventions implemented as appropriate)   03/05/18 1600 03/07/18 1023   Expressive- Optimal Participation in Care   Expressive Optimal Participation in Care- SLP, Date Established 03/05/18 --    Expressive Optimal Participation in Care- SLP, Time to Achieve by discharge --    Expressive Optimal Participation in Care- SLP, Activity Level --   Patient will improve word retrieval skills   Expressive Optimal Participation in Care- SLP, Date Goal Reviewed --  03/07/18   Expressive Optimal Participation in Care- SLP, Outcome --  goal ongoing       Problem: Stroke (Ischemic) (Adult)  Goal: Signs and Symptoms of Listed Potential Problems Will be Absent or Manageable (Stroke)  Outcome: Ongoing (interventions implemented as appropriate)   03/07/18 1023   Stroke (Ischemic)   Problems Assessed (Stroke (Ischemic)/TIA) communication impairment   Problems Present (Stroke (Ischemic)/TIA) communication impairment

## 2018-03-07 NOTE — THERAPY TREATMENT NOTE
Acute Care - Speech Language Pathology Treatment Note  [facility]     Patient Name: Valarie Camara  : 1945  MRN: [id number]  Today's Date: 3/7/2018         Admit Date: 3/3/2018    Visit Dx:      ICD-10-CM ICD-9-CM   1. Dysarthria R47.1 784.51   2. Impaired mobility and ADLs Z74.09 799.89   3. Impaired functional mobility, balance, gait, and endurance Z74.09 V49.89   4. Lacunar stroke I63.9 434.91     Patient Active Problem List   Diagnosis   • Atopic rhinitis   • Arthritis   • Benign paroxysmal positional vertigo   • Neck pain   • Anxiety and depression   • DM (diabetes mellitus)   • Edema   • Gastroesophageal reflux disease with esophagitis   • Multiple-type hyperlipidemia   • Vitamin D deficiency   • Benign essential hypertension   • Obesity (BMI 30-39.9)   • Unstable angina   • History of COPD   • Hyperlipidemia   • History of cataract   • History of osteoporosis   • History of restless legs syndrome   • History of rheumatoid arthritis   • Elevated serum creatinine   • Heartburn   • Pharyngoesophageal dysphagia   • Constipation   • Schatzki's ring   • Gastritis without bleeding   • Helicobacter pylori infection   • Duodenitis   • Dysarthria   • Right hemiparesis   • HTN (hypertension)   • Stroke   • Multiple thyroid nodules   • Lacunar stroke   • DM (diabetes mellitus), type 2, uncontrolled w/neurologic complication              Adult Rehabilitation Note       18 1000 18 1325 18 1238    Rehab Assessment/Intervention    Discipline (P)  speech language pathologist  -MD physical therapist  -CD occupational therapist  -AN,SS,AN2    Document Type (P)  therapy note (daily note)  -MD therapy note (daily note)  -CD therapy note (daily note)  -AN,SS,AN2    Subjective Information (P)  no complaints;agree to therapy  -MD no complaints  -CD no complaints;agree to therapy  -AN,SS,AN2    Patient Effort, Rehab Treatment (P)  excellent  -MD good  -CD good  -AN,SS,AN2    Symptoms Noted During/After  Treatment  significant change in vital signs   /91 AFTER GAIT X 350 FEET. NSG NOTIFIED AND TO RECHECK  -CD none  -AN,SS,AN2    Precautions/Limitations  fall precautions  -CD fall precautions  -AN,SS,AN2    Patient Response to Treatment   Tolerated.  -AN,SS,AN2    Recorded by [MD] Benita Reilly, Speech Therapy Student [CD] Gracy Millan PT [AN,SS,AN2] Aniya Harden OT (r) Rex Enamorado OT Student (t) Aniya Harden OT (c)    Vital Signs    Pre Systolic BP Rehab  176  -CD --   Vitals stable during treatment. RN approved treatment.  -AN,SS,AN2    Pre Treatment Diastolic BP  98  -CD     Post Systolic BP Rehab  225  -  -AN,SS,AN2    Post Treatment Diastolic BP  91  -CD 98  -AN,SS,AN2    Pretreatment Heart Rate (beats/min)  83  -CD     Posttreatment Heart Rate (beats/min)  79  -CD     Posttreatment Resp Rate (breaths/min)   83  -AN,SS,AN2    Recorded by  [CD] Gracy Millan, PT [AN,SS,AN2] Aniya Harden OT (r) Rex Enamorado OT Student (t) Aniya Harden OT (c)    Pain Assessment    Pain Assessment (P)  GracielaCroft DEWAYNE  -MD 0-10  -CD 0-10  -AN,SS,AN2    Merrill-Croft FACES Pain Rating (P)  0  -MD  2  -AN,SS,AN2    Pain Score  2  -CD 7  -AN,SS,AN2    Post Pain Score  2  -CD 7  -AN,SS,AN2    Pain Type  Acute pain  -CD Acute pain  -AN,SS,AN2    Pain Location  Head  -CD Neck  -AN,SS,AN2    Pain Intervention(s)  Ambulation/increased activity  -CD Ambulation/increased activity;Repositioned  -AN,SS,AN2    Response to Interventions  TOLERATED.   -CD Tolerated  -AN,SS,AN2    Recorded by [MD] Benita Reilly, Speech Therapy Student [CD] Gracy Millan PT [AN,SS,AN2] Aniya Harden OT (r) Rex Enamorado OT Student (t) Aniya Harden OT (c)    Cognitive Assessment/Intervention    Current Cognitive/Communication Assessment  functional  -CD functional  -AN,SS,AN2    Orientation Status  oriented x 4  -CD oriented x 4  -AN,SS,AN2    Follows Commands/Answers Questions  100% of the time  -% of  the time;able to follow single-step instructions  -AN,SS,AN2    Personal Safety  WNL/WFL  -CD mild impairment;decreased awareness, need for safety;decreased insight to deficits;decreased awareness, need for assist  -AN,SS,AN2    Personal Safety Interventions  gait belt;fall prevention program maintained;muscle strengthening facilitated;nonskid shoes/slippers when out of bed;supervised activity  -CD gait belt;muscle strengthening facilitated;fall prevention program maintained;supervised activity  -AN,SS,AN2    Recorded by  [CD] Gracy Millan, PT [AN,SS,AN2] Aniya Harden, OT (r) Rex Enamorado, OT Student (t) Aniya Harden OT (c)    Improve word retrieval skills    Improve word retrieval skills by: (P)  completing functional word finding tasks;completing a divergent task;90%;without cues  -MD      Status: Improve word retrieval skills (P)  Progressing as expected  -MD      Word Retrieval Skills Progress (P)  70%;80%;with inconsistent cues;continue to address  -MD      Comments: Improve word retrieval skills (P)  75% w/ divergent tasks; improved w/ basic cues. Some residual word-finding difficulty in conversation.  -MD      Recorded by [MD] Benita Reilly, Speech Therapy Student      Improve articulation    Improve articulation: (P)  by over-articulating at word level;by over-articulating at phrase level;by over-articulating in connected speech  -MD      Status: Improve articulation (P)  Achieved  -MD      Articulation Progress (P)  100%;without cues  -MD      Comments: Improve articulation (P)  pt intelligible in conversation  -MD      Recorded by [MD] Benita Reilly Speech Therapy Student      Bed Mobility, Assessment/Treatment    Bed Mobility, Comment  PT UIC AND RETURNED TO CHAIR.   -CD Pt UIC on arrival  -AN,SS,AN2    Recorded by  [CD] Gracy Millan, PT [AN,SS,AN2] Aniya Harden, OT (r) Rex Enamorado, CARYN Student (t) Aniya Harden OT (c)    Transfer Assessment/Treatment    Transfers, Sit-Stand  [place]  supervision required  -CD supervision required  -AN,SS,AN2    Transfers, Stand-Sit [place]  supervision required  -CD supervision required  -AN,SS,AN2    Transfers, Sit-Stand-Sit, Assist Device  rolling walker  -CD     Toilet Transfer, [place]  supervision required  -CD supervision required  -AN,SS,AN2    Toilet Transfer, Assistive Device  elevated toilet seat;rolling walker  -CD elevated toilet seat  -AN,SS,AN2    Transfer, Safety Issues  balance decreased during turns;step length decreased  -CD step length decreased;balance decreased during turns  -AN,SS,AN2    Transfer, Impairments  impaired balance  -CD impaired balance  -AN,SS,AN2    Transfer, Comment   Cues for hand placement during transfers.  -AN,SS,AN2    Recorded by  [CD] Gracy Millan, PT [AN,SS,AN2] Aniya Harden, OT (r) Rex Enamorado OT Student (t) Aniya Harden, OT (c)    Gait Assessment/Treatment    Gait, [place] Level  contact guard assist  -CD     Gait, Assistive Device  rolling walker  -CD     Gait, Distance (Feet)  350   100 FEET WITH STRAIGHT CANE AND CGA.   -CD     Gait, Gait Deviations  tyra decreased;step length decreased   VEERS R.   -CD     Gait, Safety Issues  balance decreased during turns  -CD     Gait, Impairments  impaired balance  -CD     Gait, Comment  QUALITY OF GAIT BETTER WITH R WALKER VS CANE. RECOMMEND R WALKER AT D/C WITH PROGRESSION TO CANE.   -CD     Recorded by  [CD] Gracy Millan, PT     Functional Mobility    Functional Mobility- Ind. Level   contact guard assist  -AN,SS,AN2    Functional Mobility-Distance (Feet)   20  -AN,SS,AN2    Functional Mobility- Safety Issues   step length decreased;balance decreased during turns  -AN,SS,AN2    Functional Mobility- Comment   Functional mobility to bathroom and back to chair. Moments of unsteadiness requiring CGA. No RW used for functional mobility per pt request.  -AN,SS,AN2    Recorded by   [AN,SS,AN2] Aniya Harden OT (r) Rex Claudio  CARYN Enamorado Student (t) Aniya Harden OT (c)    Lower Body Dressing Assessment/Training    LB Dressing Assess/Train, Clothing Type   donning:;socks  -AN,SS,AN2    LB Dressing Assess/Train, Position   sitting  -AN,SS,AN2    LB Dressing Assess/Train, [place]   conditional independence  -AN,SS,AN2    LB Dressing Assess/Train, Comment   Pt able to safely don socks from recliner  -AN,SS,AN2    Recorded by   [AN,SS,AN2] Aniya Harden OT (r) Rex Enamorado OT Student (t) Aniya Harden OT (c)    Motor Skills/Interventions    Additional Documentation  Balance Skills Training (Group)  -CD Balance Skills Training (Group)  -AN,SS,AN2    Recorded by  [CD] Gracy Millan, PT [AN,SS,AN2] Aniya Harden OT (r) Rex Enamorado OT Student (t) Aniya Harden OT (c)    Balance Skills Training    Sitting-Level of Assistance  Independent  -CD Independent  -AN,SS,AN2    Sitting-Balance Support   Feet supported  -AN,SS,AN2    Sitting-Balance Activities   Forward lean;Right UE Weight Bearing;Left UE Weight Bearing  -AN,SS,AN2    Sitting # of Minutes   10  -AN,SS,AN2    Standing-Level of Assistance  Close supervision  -CD Contact guard  -AN,SS,AN2    Static Standing Balance Support   No upper extremity supported  -AN,SS,AN2    Standing-Balance Activities   Weight Shift A-P;Weight Shift R-L;Forward lean  -AN,SS,AN2    Standing Balance # of Minutes   3  -AN,SS,AN2    Gait Balance-Level of Assistance  Contact guard  -CD     Gait Balance Support  assistive device  -CD     Recorded by  [CD] Gracy Millan, PT [AN,SS,AN2] Aniya Harden OT (r) Rex Enamorado OT Student (t) Aniya Harden OT (c)    Therapy Exercises    Bilateral Lower Extremities  AROM:;10 reps;sitting;ankle pumps/circles;hip flexion;LAQ  -CD     Bilateral Upper Extremity   AROM:;10 reps;elbow flexion/extension;shoulder extension/flexion;shoulder rolls/shrugs;shoulder protraction/retraction;hand pumps  -AN,SS,AN2    Recorded by  [CD] Gracy Millan, PT  [AN,SS,AN2] Aniya Harden OT (r) Rex Enamorado OT Student (t) Aniya Harden OT (c)    Gross Motor Coordination Training    Gross Motor Skill, Impairments Detail   alternating movements with both hands. finger to nose w/ eyes open/closed.  -AN,SS,AN2    Recorded by   [AN,SS,AN2] Aniya Harden OT (r) Rex Enamorado OT Student (t) Aniya Harden OT (c)    Fine Motor Coordination Training    Opposition   --   Pt demonstarted use of theraputy to facilitate fine motor  -AN,SS,AN2    Recorded by   [AN,SS,AN2] Aniya Harden OT (r) Rex Enamorado OT Student (t) Aniya Harden OT (c)    Positioning and Restraints    Pre-Treatment Position  sitting in chair/recliner  -CD sitting in chair/recliner  -AN,SS,AN2    Post Treatment Position  chair  -CD chair  -AN,SS,AN2    In Chair  reclined;call light within reach;with family/caregiver;notified nsg;legs elevated;exit alarm on;encouraged to call for assist  -CD call light within reach;encouraged to call for assist;exit alarm on;legs elevated  -AN,SS,AN2    Recorded by  [CD] Gracy Millan, PT [AN,SS,AN2] Aniya Harden OT (r) Rex Enamorado OT Student (t) Aniya Harden OT (c)      03/06/18 0925 03/05/18 1415 03/05/18 1050    Rehab Assessment/Intervention    Discipline speech language pathologist  -LS speech language pathologist  -RD physical therapist  -SC    Document Type  therapy note (daily note)  -RD therapy note (daily note)  -SC    Subjective Information no complaints;agree to therapy  -LS no complaints;agree to therapy  -RD complains of;fatigue  -SC    Patient Effort, Rehab Treatment  good  -RD good  -SC    Symptoms Noted During/After Treatment   significant change in vital signs;fatigue   elevated bp   -SC    Precautions/Limitations   fall precautions  -SC    Recorded by [LS] Lizeth Jenkins, MS CCC-SLP [RD] Jodi Lynne, MS CCC-SLP [SC] Frederick Hwang, PT    Vital Signs    Post Systolic BP Rehab   177  -SC    Post Treatment Diastolic BP    105  -SC    Recorded by   [SC] Frederick Hwang, PT    Pain Assessment    Pain Assessment No/denies pain  -LS No/denies pain  -RD No/denies pain  -SC    Pain Score   0  -SC    Recorded by [LS] Lizeth Jenkins, MS CCC-SLP [RD] Jodi Lynne, MS CCC-SLP [SC] Frederick Hwang, PT    Cognitive Assessment/Intervention    Current Cognitive/Communication Assessment   functional  -SC    Orientation Status   oriented x 4  -SC    Follows Commands/Answers Questions   100% of the time  -SC    Personal Safety   WNL/WFL  -SC    Personal Safety Interventions   fall prevention program maintained;gait belt  -SC    Recorded by   [SC] Frederick Hwang, PT    Cognitive Assessment Intervention    Behavior/Mood Observations   alert;cooperative  -SC    Recorded by   [SC] Frederick Hwang, PT    Verbal Expression Assess/Intervention    Automatic Speech  WNL/WFL  -RD     Speech Repetition  WNL/WFL  -RD     Speech Fluency  fluent speech  -RD     Conversational Speech  mild impairment;other (see comments)   occasional word-finding difficulty  -RD     Recorded by  [RD] Jodi Lynne, MS CCC-SLP     Reading Assessment/Intervention    Reading Skills  WNL/WFL  -RD     Oral Reading Ability  WNL/WFL  -RD     Reading Comprehension  WNL/WFL  -RD     Recorded by  [RD] Jodi Lynne, MS CCC-SLP     Writing Assessment/Intervention    Writing Skills  WNL/WFL  -RD     Recorded by  [RD] Jodi Lynne, MS CCC-SLP     Improve word retrieval skills    Improve word retrieval skills by: completing functional word finding tasks;completing a divergent task;90%;without cues  -LS completing functional word finding tasks;completing a divergent task;90%;without cues  -RD     Status: Improve word retrieval skills Progressing as expected  -LS New  -RD     Word Retrieval Skills Progress 50%;60%;with inconsistent cues  -LS continue to address  -RD     Comments: Improve word retrieval skills 50% divergent tasks,   -LS 50%-divergent taks; reports occasional  word-finding difficulty in conversation.  -RD     Recorded by [LS] Lizeth Jenkins, MS CCC-SLP [RD] Jodi Lynne, MS CCC-SLP     Improve articulation    Improve articulation: by over-articulating at word level;by over-articulating at phrase level;by over-articulating in connected speech  -LS by over-articulating at word level;by over-articulating at phrase level;by over-articulating in connected speech  -RD     Status: Improve articulation Progressing as expected  -LS Progressing as expected  -RD     Articulation Progress 80%;without cues  -LS 70%;with inconsistent cues;continue to address  -RD     Comments: Improve articulation Pt intelligable in word and conversation level.   -LS      Recorded by [LS] Lizeth Jenkins, MS CCC-SLP [RD] Jodi Lynne, MS CCC-SLP     Bed Mobility, Assessment/Treatment    Bed Mobility, Comment   uic  -SC    Recorded by   [SC] Frederick Hwang, PT    Transfer Assessment/Treatment    Transfers, Sit-Stand [place]   verbal cues required;supervision required  -SC    Transfers, Stand-Sit [place]   supervision required;verbal cues required  -SC    Transfers, Sit-Stand-Sit, Assist Device   rolling walker  -SC    Transfer, Safety Issues   balance decreased during turns  -SC    Transfer, Impairments   coordination impaired;impaired balance  -SC    Transfer, Comment   required some cues for safety when sitting down  -SC    Recorded by   [SC] Frederick Hwang, PT    Gait Assessment/Treatment    Gait, [place] Level   contact guard assist  -SC    Gait, Assistive Device   rolling walker  -SC    Gait, Distance (Feet)   350   100 feet with hand held assist  -SC    Gait, Gait Pattern Analysis   swing-through gait   x3 standing breaks  -SC    Gait, Gait Deviations   other (see comments)   sways R   -SC    Gait, Safety Issues   balance decreased during turns  -SC    Gait, Impairments   coordination impaired;impaired balance  -SC    Gait, Comment   cues requried to walk slower to keep  her balance. Sways R at times  -SC    Recorded by   [SC] Frederick Hwang, PT    Therapy Exercises    Bilateral Lower Extremities   AROM:;10 reps;ankle pumps/circles;LAQ;hip flexion  -SC    Bilateral Upper Extremity   10 reps;AROM:;sitting;shoulder abduction/adduction;shoulder extension/flexion  -SC    Recorded by   [SC] Frederick Hwang, PT    Positioning and Restraints    Post Treatment Position   chair  -SC    In Chair   sitting;call light within reach;encouraged to call for assist;with family/caregiver  -SC    Recorded by   [SC] Frederick Hwang PT      03/05/18 0901 03/05/18 0900       Rehab Assessment/Intervention    Discipline --  -AN occupational therapist  -AN     Document Type --  -AN therapy note (daily note)  -AN     Subjective Information --  -AN no complaints;agree to therapy  -AN     Patient Effort, Rehab Treatment --  -AN good  -AN     Symptoms Noted During/After Treatment --  -AN none  -AN     Precautions/Limitations --  -AN fall precautions  -AN     Specific Treatment Considerations --  -AN some dysmetria R hand  -AN     Recorded by [LOU] Aniya Harden OT [LOU] Aniya Harden OT     Pain Assessment    Pain Assessment --  -AN No/denies pain  -AN     Recorded by [LOU] Aniya Harden OT [LOU] Aniya Harden OT     Cognitive Assessment/Intervention    Current Cognitive/Communication Assessment --  -AN impaired   much improved speech, minimal dysarthria present  -AN     Orientation Status --  -AN oriented x 4  -AN     Follows Commands/Answers Questions --  -% of the time  -AN     Personal Safety --  -AN WNL/WFL  -AN     Recorded by [LOU] Aniya Harden OT [LOU] Aniya Harden OT     Right Elbow/Forearm    Elbow Flexion Gross Movement  (4+/5) good plus  -AN     Elbow Extension Gross Movement  (4+/5) good plus  -AN     Recorded by  [LOU] Aniya Harden OT     Bed Mobility, Assessment/Treatment    Bed Mobility, Comment  pt up in chair  -AN     Recorded by  [LOU] Aniya Harden OT     Transfer  Assessment/Treatment    Transfers, Sit-Stand [place]  supervision required  -AN     Transfers, Stand-Sit [place]  supervision required  -AN     Transfers, Sit-Stand-Sit, Assist Device  rolling walker  -AN     Bathtub Transfer, [place]  minimum assist (75% patient effort)   simulated with grab bars  -AN     Transfer, Comment  pt needs cues for keeping walker close to body during transitions  -AN     Recorded by  [LOU] Aniya Harden OT     Functional Mobility    Functional Mobility- Ind. Level  supervision required;verbal cues required  -AN     Functional Mobility- Device  rolling walker  -AN     Functional Mobility-Distance (Feet)  20  -AN     Functional Mobility- Comment  cues to keep RW with her with position changes and doing IADL' tasks  -AN     Recorded by  [LOU] Aniya Harden OT     ADL Assessment/Intervention    Additional Documentation  --    and carry trash can, relocate small objects with sup  -AN     Recorded by  [LOU] Aniya Harden OT     Therapy Exercises    BUE Resistance  theraputty;other (comment)   foam block ex  -AN     Recorded by  [LOU] Aniya Harden OT     Gross Motor Coordination Training    Gross Motor Skill, Impairments Detail  still impaired R finger to nose; some dysmetria with reaching for objects; performed slower speed reaching tasks with increased accuracy  -AN     Recorded by  [LOU] Aniya Harden OT     Fine Motor Coordination Training    Opposition  --   FM HEP reviewed  -AN     Recorded by  [LOU] Aniya Harden OT     Positioning and Restraints    Pre-Treatment Position  sitting in chair/recliner  -AN     Post Treatment Position  chair  -AN     In Chair  reclined;call light within reach;encouraged to call for assist;with family/caregiver  -AN     Recorded by  [LOU] Aniya Harden OT       User Key  (r) = Recorded By, (t) = Taken By, (c) = Cosigned By    Initials Name Effective Dates    ARLEY Hwang, PT 06/19/15 -     ADRIAN Millan, PT 06/19/15 -     AN  Aniya Harden, OT 06/22/15 -     LS Lizeth Jenkins, MS CCC-SLP 06/22/15 -     RD Jodi Lynne, MS CCC-SLP 09/27/17 -     SS Rex Enamorado, OT Student 12/11/17 -     MD Benita Reilly, Speech Therapy Student 01/05/18 -               IP SLP Goals       03/07/18 1023 03/06/18 1408 03/05/18 1600    Expressive- Optimal Participation in Care    Expressive Optimal Participation in Care- SLP, Date Established   03/05/18  -RD    Expressive Optimal Participation in Care- SLP, Time to Achieve   by discharge  -RD    Expressive Optimal Participation in Care- SLP, Activity Level (P)  Patient will improve word retrieval skills  -MD  Patient will improve word retrieval skills  -RD    Expressive Optimal Participation in Care- SLP, Date Goal Reviewed (P)  03/07/18  -MD 03/06/18  -LS 03/05/18  -RD    Expressive Optimal Participation in Care- SLP, Outcome (P)  goal ongoing  -MD  goal ongoing  -RD    Dysarthria- Optimal Particpation in Care    Dysarthria Optimal Participation in Care- SLP, Date Goal Reviewed (P)  03/07/18  -MD  03/05/18  -RD    Dysarthria Optimal Participation in Care- SLP, Outcome (P)  goal met  -MD  goal ongoing  -RD      03/03/18 1421          Dysarthria- Optimal Particpation in Care    Dysarthria Optimal Participation in Care- SLP, Date Established 03/03/18  -SM      Dysarthria Optimal Participation in Care- SLP, Time to Achieve by discharge  -SM      Dysarthria Optimal Participation in Care- SLP, Outcome goal ongoing  -SM        User Key  (r) = Recorded By, (t) = Taken By, (c) = Cosigned By    Initials Name Provider Type    SM Jeannette Salgado, MS CCC-SLP Speech and Language Pathologist    HOWARD Jenkins, MS CCC-SLP Speech and Language Pathologist    RD Jodi Lynne, MS CCC-SLP Speech and Language Pathologist    MD Benita Reilly, Speech Therapy Student Speech Therapy Student          EDUCATION  The patient has been educated in the following areas:   Communication Impairment.    SLP  Recommendation and Plan                               Plan of Care Review  Plan Of Care Reviewed With: (P) patient  Progress: (P) progress toward functional goals as expected  Outcome Summary/Follow up Plan: (P) S/L tx completed this am. Pt alert and cooperative, highly motivated to participate in tx. Dysarthria appears to be resolved and pt expressed that she feels that her speech has returned to baseline. Pt demonstrates con't difficulty w/ word finding in conversation and when completing divergent tasks, however states that she feels this has improved as well. More successful w/ divergent tasks when prompted to consider more specific categories (ex: when naming animals, think of zoo animals, farm animals, etc). Pt would benefit from con't ST following d/c.           Time Calculation:         Time Calculation- SLP       03/07/18 1031          Time Calculation- SLP    SLP Start Time (P)  1000  -MD      SLP Received On (P)  03/07/18  -MD        User Key  (r) = Recorded By, (t) = Taken By, (c) = Cosigned By    Initials Name Provider Type    MD Benita Reilly, Speech Therapy Student Speech Therapy Student          Therapy Charges for Today     Code Description Service Date Service Provider Modifiers Qty    [id number]  ST TREATMENT SPEECH 2 3/7/2018 Benita Reilly, Speech Therapy Student GN 1               Benita Reilly Speech Therapy Student  3/7/2018

## 2018-03-07 NOTE — PLAN OF CARE
Problem: Patient Care Overview (Adult)  Goal: Plan of Care Review  Outcome: Ongoing (interventions implemented as appropriate)   03/07/18 0438   Coping/Psychosocial Response Interventions   Plan Of Care Reviewed With patient   Outcome Evaluation   Outcome Summary/Follow up Plan Pt VSS. BP better this shift. NIH of 2. Rested well.    Patient Care Overview   Progress progress toward functional goals as expected       Problem: Stroke (Ischemic) (Adult)  Goal: Signs and Symptoms of Listed Potential Problems Will be Absent or Manageable (Stroke)  Outcome: Ongoing (interventions implemented as appropriate)      Problem: Fall Risk (Adult)  Goal: Absence of Falls  Outcome: Ongoing (interventions implemented as appropriate)

## 2018-03-07 NOTE — PLAN OF CARE
Problem: Stroke (Ischemic) (Adult)  Goal: Signs and Symptoms of Listed Potential Problems Will be Absent or Manageable (Stroke)  Outcome: Ongoing (interventions implemented as appropriate)      Problem: Fall Risk (Adult)  Goal: Absence of Falls  Outcome: Ongoing (interventions implemented as appropriate)

## 2018-03-08 NOTE — PAYOR COMM NOTE
"Valarie Barajas (72 y.o. Female)     Date of Birth Social Security Number Address Home Phone MRN    1945  [address]  [facility] [address] [phone] [id number]    Latter day Marital Status          Latter-day        Admission Date Admission Type Admitting Provider Attending Provider Department, Room/Bed    3/3/18 Urgent Mirna Lopez II, DO  [facility] 3G, S355/1    Discharge Date Discharge Disposition Discharge Destination        3/7/2018 Rehab Facility or Unit (DC - External)             Attending Provider: (none)    Allergies:  Penicillins, Sulfa Antibiotics    Isolation:  None   Infection:  None   Code Status:  Prior    Ht:  160 cm (63\")   Wt:  90.7 kg (200 lb)    Admission Cmt:  None   Principal Problem:  Lacunar stroke [I63.9] More...                 Active Insurance as of 3/3/2018     Primary Coverage     Payor Plan Insurance Group Employer/Plan Group    [employer] [place] MEDICARE REPLACEMENT [facility] REPL      Payor Plan Address Payor Plan Phone Number Effective From Effective To    [address] [address] [phone] 2018     [place] [address]       Subscriber Name Subscriber Birth Date Member ID       VALARIE BARAJAS 1945 [id number]                 Emergency Contacts      (Rel.) Home Phone Work Phone Mobile Phone    Cleo Barajas (Daughter) [phone] -- --    Cleo Avalos Or (Daughter) [phone] -- --               Discharge Summary      JOSEE Kimble at 3/7/2018 10:30 AM     Attestation signed by Mirna Lopez II, DO at 3/7/2018  1:45 PM        Attending Giovana CHAO supervised care of the patient on day of discharge with direct care provided by the advanced care provider (APC).    Electronically signed by Mirna Lopez II, DO, 18, 1:45 PM.                                     [facility] Medicine Services  DISCHARGE SUMMARY    Patient Name: Valarie Barajas  : 1945  MRN: " "[id number]    Date of Admission: 3/3/2018  Date of Discharge: 3/7/2018  Primary Care Physician: Jeny Neri    Consults     Date and Time Order Name Status Description    3/3/2018 1038 Inpatient Neurology Consult          Hospital Course     Presenting Problem:   Stroke [I63.9]    Active Hospital Problems (** Indicates Principal Problem)    Diagnosis Date Noted   • **Lacunar stroke [I63.9] 03/04/2018   • DM (diabetes mellitus), type 2, uncontrolled w/neurologic complication [E11.49, E11.65] 03/04/2018   • HTN (hypertension) [I10] 03/03/2018   • Multiple thyroid nodules [E04.2] 03/03/2018   • Schatzki's ring [K22.2] 12/13/2017   • Hyperlipidemia [E78.5]    • Multiple-type hyperlipidemia [E78.4] 04/26/2016      Resolved Hospital Problems    Diagnosis Date Noted Date Resolved   • Heartburn [R12] 10/19/2017 03/07/2018          Hospital Course:  Valarie Camara is a 72 y.o. female  female with htn, uncontrolled dm, gerd/shatzki ring who presented with right sided leg >arm weakness, slurred speech and ataxia \"dragging right leg\". Last known well last night 11pm. OSH imaging was negative (other than multiple thyroid nodules which she should follow up with PCP for) but transferred to our facility for further neuro work-up.  MRI confirmed left thalamus infarct.  ECHO/carotids were unremarkable.  Neurology was consulted and recommended asa/plavix/statin.  Her symptoms have continued to improve but is still not at baseline.  Her BP remains slightly elevated and meds have been adjusted.  It is unclear whether patient was compliant with medications at home as her home med list included 3 ARBs and an ace inhibitor.  She is felt to be stable and ready for transfer to [facility] for continued rehab.  Patient is in agreement with plan.            Day of Discharge     HPI:   Hospital follow up for stroke  Reports she feels her symptoms are improving but still having trouble recognizing familiar voices on the phone.      Review of " Systems  Gen- No fevers, chills  CV- No chest pain, palpitations  Resp- No cough, dyspnea  GI- No N/V/D, abd pain      Otherwise ROS is negative except as mentioned in the HPI.    Vital Signs:   Temp:  [97.8 °F (36.6 °C)-98.9 °F (37.2 °C)] 98.5 °F (36.9 °C)  Heart Rate:  [71-91] 71  Resp:  [16-18] 17  BP: (144-214)/() 186/76     Physical Exam:  Constitutional: No acute distress, awake, alert, up in bed  HENT: NCAT, mucous membranes moist  Respiratory: Clear to auscultation bilaterally, respiratory effort normal   Cardiovascular: RRR, no murmurs, rubs, or gallops, palpable pedal pulses bilaterally  Gastrointestinal: Positive bowel sounds, soft, nontender, nondistended  Musculoskeletal: No bilateral ankle edema  Psychiatric: Appropriate affect, cooperative  Neurologic: Oriented x 3, minimal right sided weakness, mild dysarthria  Skin: No rashes      Pertinent  and/or Most Recent Results       Results from last 7 days  Lab Units 03/03/18  1050   WBC 10*3/mm3 5.63   HEMOGLOBIN g/dL 13.4   HEMATOCRIT % 40.6   PLATELETS 10*3/mm3 261   SODIUM mmol/L 135   POTASSIUM mmol/L 4.3   CHLORIDE mmol/L 100   CO2 mmol/L 30.0   BUN mg/dL 19   CREATININE mg/dL 1.10   GLUCOSE mg/dL 301*   CALCIUM mg/dL 9.5       Results from last 7 days  Lab Units 03/03/18  1050   BILIRUBIN mg/dL 0.4   ALK PHOS U/L 58   ALT (SGPT) U/L 23   AST (SGOT) U/L 18   PROTIME Seconds 9.3*   INR  0.89*   APTT seconds 24.7       Results from last 7 days  Lab Units 03/04/18  0527   CHOLESTEROL mg/dL 170   TRIGLYCERIDES mg/dL 146   HDL CHOL mg/dL 41       Results from last 7 days  Lab Units 03/03/18  1050   TSH mIU/mL 0.765   HEMOGLOBIN A1C % 12.30*       Imaging Results (all)     Procedure Component Value Units Date/Time    MRI Brain Without Contrast [[id number]] Collected:  03/03/18 1218     Updated:  03/03/18 1955    Addenda:        THIS REPORT CONTAINS FINDINGS THAT MAY BE CRITICAL TO PATIENT CARE. The   findings were verbally communicated via telephone  conference with Dr. Julian at   7:55 PM EST on 3/3/2018. The findings were acknowledged and understood.    THIS DOCUMENT HAS BEEN ELECTRONICALLY SIGNED BY ALEKSEY MONTALVO MD  Signed:  03/03/18 1955 by Aleksey Montalvo MD    Narrative:       EXAM:    MR Head Without Intravenous Contrast    CLINICAL HISTORY:    72 years old, female; Dysarthria and anarthria; Signs and symptoms;   Hemiplegia and hemiparesis; Right side, dominance unknown; Patient HX: Stroke   suspected stroke. She was well upon going to bed last evening at about 11 pm.   Upon awakening this morning she felt clumsy on the right side. She has noted   associated dysarthria, but no other associated symptoms, pain or discomfort.    TECHNIQUE:    Magnetic resonance images of the head/brain without intravenous contrast in   multiple planes.    COMPARISON:    No relevant prior studies available.    FINDINGS:    Brain:  7 mm acute infarct left thalamus.  No hemorrhage.    Ventricles:  Unremarkable.  No ventriculomegaly.    Bones/joints:  Unremarkable.    Sinuses:  Unremarkable as visualized.  No acute sinusitis.    Mastoid air cells:  Unremarkable as visualized.  No mastoid effusion.    Orbits:  Unremarkable as visualized.    Other findings:  Mild age-related atrophy.      Impression:         7 mm acute infarct left thalamus.    THIS DOCUMENT HAS BEEN ELECTRONICALLY SIGNED BY ALEKSEY MONTALVO MD          Results for orders placed during the hospital encounter of 03/03/18   Duplex Carotid Ultrasound CAR    Narrative · Right internal carotid artery stenosis of 0-49%.  · Proximal right internal carotid artery plaque without significant   stenosis.  · Left internal carotid artery stenosis of 0-49%.  · Proximal left internal carotid artery plaque without significant   stenosis.          Results for orders placed during the hospital encounter of 03/03/18   Duplex Carotid Ultrasound CAR    Narrative · Right internal carotid artery stenosis of 0-49%.  · Proximal  right internal carotid artery plaque without significant   stenosis.  · Left internal carotid artery stenosis of 0-49%.  · Proximal left internal carotid artery plaque without significant   stenosis.          Results for orders placed during the hospital encounter of 03/03/18   Adult Transthoracic Echo Complete W/ Cont if Necessary Per Protocol (With Agitated Saline)    Narrative · Left ventricular systolic function is hyperdynamic (EF > 70).  · Left ventricular wall thickness is consistent with moderate concentric   hypertrophy.  · The cardiac valves are anatomically and functionally normal.            Discharge Details      Valarie Camara   Home Medication Instructions VIGNESH:[id number]    Printed on:03/07/18 1126   Medication Information                      amLODIPine (NORVASC) 10 MG tablet  Take 1 tablet by mouth Daily.             aspirin 81 MG EC tablet  Take 81 mg by mouth Daily.             atorvastatin (LIPITOR) 80 MG tablet  Take 1 tablet by mouth Every Night.             carvedilol (COREG) 6.25 MG tablet  Take 1 tablet by mouth Every 12 (Twelve) Hours.             clopidogrel (PLAVIX) 75 MG tablet  Take 1 tablet by mouth Daily.             docusate sodium 100 MG capsule  Take 100 mg by mouth 2 (Two) Times a Day.             fluticasone-salmeterol (ADVAIR HFA) 115-21 MCG/ACT inhaler  2 (two) times a day.             hydrochlorothiazide (HYDRODIURIL) 12.5 MG tablet  Take 1 tablet by mouth daily.             insulin detemir (LEVEMIR) 100 UNIT/ML injection  Inject 25 Units under the skin Daily.             insulin lispro (humaLOG) 100 UNIT/ML injection  Inject 10 Units under the skin 3 (Three) Times a Day With Meals.             insulin lispro (humaLOG) 100 UNIT/ML injection  Inject 0-9 Units under the skin 3 (Three) Times a Day With Meals.             Insulin Pen Needle 31G X 5 MM misc  Inject insulin three times daily             Insulin Pen Needle 31G X 8 MM misc  5 shots daily             mometasone  (NASONEX) 50 MCG/ACT nasal spray  into each nostril daily.             pantoprazole (PROTONIX) 40 MG EC tablet  Take 1 tablet by mouth Daily.             sertraline (ZOLOFT) 50 MG tablet  TAKE ONE TABLET EVERY DAY             valsartan (DIOVAN) 80 MG tablet  Take 3 tablets by mouth Daily.             vitamin D (ERGOCALCIFEROL) 66553 units capsule capsule  TAKE ONE CAPSULE BY MOUTH ONCE A WEEK                   Discharge Disposition:  Rehab Facility or Unit (DC - External)    Discharge Diet:  Diet Instructions     Diet: Regular, Consistent Carbohydrate, Renal; Thin       Discharge Diet:   Regular  Consistent Carbohydrate  Renal      Fluid Consistency:  Thin                 Discharge Activity:   Activity Instructions     Activity as Tolerated                       No future appointments.    Additional Instructions for the Follow-ups that You Need to Schedule     Ambulatory Referral to Home Health    As directed    Face to Face Visit Date:  3/5/2018    Follow-up Provider for Plan of Care?:  I treated the patient in an acute care facility and will not continue treatment after discharge.    Follow-up Provider:  CARTER RODGERS [[id number]]    Reason/Clinical Findings:  s/p CVA    Describe mobility limitations that make leaving home difficult:  Impaired functional mobility, balance, gait and endurance    Nursing/Therapeutic Services Requested:  Physical Therapy    PT orders:  Therapeutic exercise Gait Training Transfer training Strengthening Home safety assessment    Weight Bearing Status:  As Tolerated    Frequency:  Other           Discharge Follow-up with PCP    As directed    Follow Up Details:  after dc from rehab           Discharge Follow-up with Specified Provider: Alka Philip neurology; 3 Weeks    As directed    To:  leah Melendrez    Follow Up:  3 Weeks                     Time Spent on Discharge:  40 minutes    Electronically signed by JOSEE Kimble, 03/07/18, 11:26 AM.       Electronically signed  by Mirna Lopez II, DO at 3/7/2018  1:45 PM

## 2018-04-09 ENCOUNTER — OFFICE VISIT (OUTPATIENT)
Dept: NEUROLOGY | Facility: CLINIC | Age: 73
End: 2018-04-09

## 2018-04-09 VITALS
OXYGEN SATURATION: 98 % | SYSTOLIC BLOOD PRESSURE: 148 MMHG | WEIGHT: 204 LBS | HEART RATE: 77 BPM | BODY MASS INDEX: 36.14 KG/M2 | DIASTOLIC BLOOD PRESSURE: 72 MMHG

## 2018-04-09 DIAGNOSIS — E11.40 UNCONTROLLED TYPE 2 DIABETES MELLITUS WITH DIABETIC NEUROPATHY, UNSPECIFIED LONG TERM INSULIN USE STATUS: ICD-10-CM

## 2018-04-09 DIAGNOSIS — G81.91 RIGHT HEMIPARESIS (HCC): ICD-10-CM

## 2018-04-09 DIAGNOSIS — I63.81 LACUNAR STROKE (HCC): Primary | ICD-10-CM

## 2018-04-09 DIAGNOSIS — E11.65 UNCONTROLLED TYPE 2 DIABETES MELLITUS WITH DIABETIC NEUROPATHY, UNSPECIFIED LONG TERM INSULIN USE STATUS: ICD-10-CM

## 2018-04-09 PROCEDURE — 99214 OFFICE O/P EST MOD 30 MIN: CPT | Performed by: NURSE PRACTITIONER

## 2018-04-09 RX ORDER — LOSARTAN POTASSIUM AND HYDROCHLOROTHIAZIDE 25; 100 MG/1; MG/1
1 TABLET ORAL DAILY
COMMUNITY
End: 2019-03-19

## 2018-04-09 RX ORDER — LOSARTAN POTASSIUM 50 MG/1
100 TABLET ORAL DAILY
Status: ON HOLD | COMMUNITY
End: 2019-11-24

## 2018-04-09 RX ORDER — ROSUVASTATIN CALCIUM 20 MG/1
20 TABLET, COATED ORAL DAILY
Status: ON HOLD | COMMUNITY
End: 2019-11-24

## 2018-04-09 NOTE — PROGRESS NOTES
Subjective:     Patient ID: Valarie Camara is a 72 y.o. female.    CC:   Chief Complaint   Patient presents with   • Stroke       HPI:   History of Present Illness     This is Jax is a 72-year-old patient here for hospital follow-up for CVA.  She was admitted to [facility] on 3/3/18-3/7/18 with complaints of right-sided facial droop, right-sided upper and lower extremity weakness and slurred speech.  Inpatient workup included echocardiogram showing hyperdynamic left ventricular function and concentric LVH with normal valvular structures.  Carotid artery studies revealed 0-49% stenosis.  She was thought to have small vessel atherosclerotic left lacunar CVA.  Upon discharge from the hospital she did have inpatient rehabilitation at [facility] for 7 days.  She reports that overall she is doing well however she still has some issues with mild speech hesitancy and slurred speech at times with some right-sided residual weakness mostly in the evenings when she is tired.  She is getting around well at home and staying with her daughter.  She is currently receiving home PT and OT.  She denies any dysphasia or any new stroke symptoms.  Since discharge she has been taking aspirin, Plavix and atorvastatin regularly.  She and her daughter admit that prior to her CVA she was not taking her prescribed medications regularly.  Hemoglobin A1c while inpatient was 12.3%.  They report to me that since her discharge from the hospital her blood sugars have been running in the 100s to highs of 140s.  The following portions of the patient's history were reviewed and updated as appropriate: allergies, current medications, past family history, past medical history, past social history, past surgical history and problem list.    Past Medical History:   Diagnosis Date   • Acute bronchitis with bronchospasm    • Acute exacerbation of chronic obstructive pulmonary disease (COPD)    • Anxiety    • Arthritis    • B12 deficiency     • Back pain    • Cataract    • Cervicalgia    • Colonic polyp     History of colonic polyps    • COPD (chronic obstructive pulmonary disease)    • Depression    • Diverticulitis    • Dysphagia    • Edema    • Esophageal reflux    • Folic acid deficiency    • Herpes zoster    • High cholesterol    • History of kidney infection    • History of recurrent urinary tract infection    • HTN (hypertension)    • Hypercholesterolemia    • Kidney infection    • Malignant hypertension 4/26/2015     Accelerated essential hypertension   • Measles     rubeola   • Muscle spasm    • Neoplasm of uncertain behavior of skin    • Osteoporosis    • Recurrent urinary tract infection    • Rheumatoid arthritis    • RLS (restless legs syndrome)    • Stomach ulcer    • Type 2 diabetes mellitus    • Vitamin D deficiency        Past Surgical History:   Procedure Laterality Date   • CATARACT EXTRACTION Left 02/11/2013    with lens implant   • CATARACT EXTRACTION Right 04/15/2013    with lens implant   • CATARACT EXTRACTION WITH INTRAOCULAR LENS IMPLANT Left 02/11/2013   • CATARACT EXTRACTION WITH INTRAOCULAR LENS IMPLANT Right 04/15/2013   • COLONOSCOPY  2012   • ENDOSCOPY N/A 11/13/2017    Procedure: ESOPHAGOGASTRODUODENOSCOPY with biopsies and esophageal balloon dilitation;  Surgeon: Wesley Stovall MD;  Location: [facility] ENDOSCOPY;  Service:    • HYSTERECTOMY  1979   • UPPER GASTROINTESTINAL ENDOSCOPY  12/09/2013   • UPPER GASTROINTESTINAL ENDOSCOPY  11/13/2017       Social History     Social History   • Marital status:      Spouse name: N/A   • Number of children: N/A   • Years of education: N/A     Occupational History   • Not on file.     Social History Main Topics   • Smoking status: Former Smoker   • Smokeless tobacco: Never Used      Comment:  Denied: History of smoking cigarettes   • Alcohol use Yes      Comment: Occassionally   • Drug use: No   • Sexual activity: Defer     Other Topics Concern   • Not on file     Social  History Narrative   • No narrative on file       Family History   Problem Relation Age of Onset   • Arthritis Mother    • Diabetes Mother    • Hypertension Mother    • Arthritis Other    • Arthritis Other    • Diabetes Other      DM   • Hypertension Other    • Colon cancer Neg Hx         Review of Systems   Eyes: Negative.    Respiratory: Negative.  Negative for cough, chest tightness, shortness of breath and wheezing.    Cardiovascular: Negative.  Negative for chest pain, palpitations and leg swelling.   Gastrointestinal: Negative.    Endocrine: Negative.    Genitourinary: Negative.    Musculoskeletal: Negative.    Skin: Negative.    Allergic/Immunologic: Negative.    Neurological: Positive for weakness. Negative for dizziness, tremors, seizures, syncope, facial asymmetry, speech difficulty, light-headedness, numbness and headaches.   Hematological: Negative.    Psychiatric/Behavioral: Negative.         Objective:    Neurologic Exam     Mental Status   Oriented to person, place, and time.   Registration: recalls 3 of 3 objects. Follows 3 step commands.   Attention: normal. Concentration: normal.   Speech: speech is normal   Level of consciousness: alert  Knowledge: consistent with education.   Able to read. Able to write. Normal comprehension.     Cranial Nerves   Cranial nerves II through XII intact.     CN III, IV, VI   Pupils are equal, round, and reactive to light.  Extraocular motions are normal.     Motor Exam   Muscle bulk: normal  Overall muscle tone: normal  Right arm tone: normal  Left arm tone: normal  Right arm pronator drift: absent  Left arm pronator drift: absent  Right leg tone: normal  Left leg tone: normal    Strength   Strength 5/5 throughout.     Sensory Exam   Light touch normal.   Vibration normal.     Gait, Coordination, and Reflexes     Gait  Gait: normal    Coordination   Romberg: negative  Finger to nose coordination: normal  Heel to shin coordination: normal  Tandem walking coordination:  normal    Tremor   Resting tremor: absent  Intention tremor: absent  Action tremor: absent    Reflexes   Reflexes 2+ except as noted.   Right plantar: normal  Left plantar: normal  Right Ruiz: absent  Left Ruiz: absent      Physical Exam   Constitutional: She is oriented to person, place, and time. She appears well-developed and well-nourished. No distress.   HENT:   Head: Normocephalic and atraumatic.   Eyes: EOM are normal. Pupils are equal, round, and reactive to light.   Neck: Normal range of motion. Neck supple.   Cardiovascular: Normal rate and regular rhythm.    Pulmonary/Chest: Effort normal and breath sounds normal. No respiratory distress.   Musculoskeletal: Normal range of motion.   Neurological: She is alert and oriented to person, place, and time. She has normal strength and normal reflexes. She displays normal reflexes. No cranial nerve deficit. She exhibits normal muscle tone. She has a normal Finger-Nose-Finger Test, a normal Heel to Shin Test, a normal Romberg Test and a normal Tandem Gait Test. Gait normal.   Psychiatric: She has a normal mood and affect. Her speech is normal and behavior is normal. Judgment and thought content normal.   Vitals reviewed.      Assessment/Plan:       Valarie was seen today for stroke.    Diagnoses and all orders for this visit:    Lacunar stroke    Right hemiparesis    Uncontrolled type 2 diabetes mellitus with diabetic neuropathy, unspecified long term insulin use status    Mrs. Camara is a very pleasant 72-year-old here for hospital follow-up for CVA.  She tells me she feels a bit weak on the right side with some persistent dysarthria mostly in the evenings when she started however I see no deficits on exam at all today.  She is actually quite active and dances around the room with gait.  She is doing well overall and has continued outpatient physical and occupational therapy, she is no longer doing speech therapy at this point.  I have advised her that is  very important she maintain strict glucose control as well as blood pressure and control of her hyperlipidemia.  She had her daughter voiced that she has been taking her medications consistently since discharge from the hospital, her PCP has switched her from atorvastatin 80 to Crestor 20 mg.  She is following closely with her new PCP Dr. Brown at [facility].  I recommend that she meet with a diabetes educator and nutritionist, she has seen one in the past and would like to hold on this for now.  We did discuss secondary stroke prevention as noted above.  I will see her back in 6 months or sooner if needed.   Discussed signs and symptoms of stroke and when to call 911. Instructed to follow a low fat diet including the Mediterranean diet. Instructed to take all medications daily as prescribed. Encouraged 30 minutes of exercise 3-4 times a week as tolerated. Stay well hydrated. Discussed potential side effects of new medications and signs and symptoms to report.. Reviewed stroke risk factors and stroke prevention plan. Patient and/or family verbalizes understanding and agrees with plan.   Reviewed medications, potential side effects and signs and symptoms to report. Discussed risk versus benefits of treatment plan with patient and/or family-including medications, labs and radiology that may be ordered. Addressed questions and concerns during visit. Patient and/or family verbalized understanding and agree with plan.  During this visit the following were done:  Labs Reviewed [x]    Labs Ordered []    Radiology Reports Reviewed [x]    Radiology Ordered []    PCP Records Reviewed []    Referring Provider Records Reviewed []    ER Records Reviewed [x]    Hospital Records Reviewed [x]    History Obtained From Family []    Radiology Images Reviewed [x]    Other Reviewed []    Records Requested []       EMR Dragon/Transcription Disclaimer:  Much of this encounter note is an electronic transcription of spoken language to  printed text. Electronic transcription of spoken language may permit erroneous words or phrases to be inadvertently transcribed. Although I have reviewed the note for such errors, some may still exist in this documentation.           Steffi Oreilly, APRN  4/9/2018

## 2018-04-10 ENCOUNTER — HOSPITAL ENCOUNTER (EMERGENCY)
Facility: HOSPITAL | Age: 73
Discharge: HOME | End: 2018-04-10
Attending: STUDENT IN AN ORGANIZED HEALTH CARE EDUCATION/TRAINING PROGRAM | Admitting: STUDENT IN AN ORGANIZED HEALTH CARE EDUCATION/TRAINING PROGRAM

## 2018-04-10 VITALS
DIASTOLIC BLOOD PRESSURE: 79 MMHG | SYSTOLIC BLOOD PRESSURE: 186 MMHG | BODY MASS INDEX: 35.44 KG/M2 | OXYGEN SATURATION: 97 % | TEMPERATURE: 98.7 F | HEART RATE: 74 BPM | HEIGHT: 63 IN | RESPIRATION RATE: 18 BRPM | WEIGHT: 200 LBS

## 2018-04-10 DIAGNOSIS — I15.9 SECONDARY HYPERTENSION: Primary | ICD-10-CM

## 2018-04-10 PROCEDURE — 96374 THER/PROPH/DIAG INJ IV PUSH: CPT

## 2018-04-10 PROCEDURE — 99283 EMERGENCY DEPT VISIT LOW MDM: CPT

## 2018-04-10 PROCEDURE — 25010000002 HYDRALAZINE PER 20 MG: Performed by: NURSE PRACTITIONER

## 2018-04-10 RX ORDER — HYDRALAZINE HYDROCHLORIDE 20 MG/ML
10 INJECTION INTRAMUSCULAR; INTRAVENOUS ONCE
Status: COMPLETED | OUTPATIENT
Start: 2018-04-10 | End: 2018-04-10

## 2018-04-10 RX ORDER — ATORVASTATIN CALCIUM 80 MG/1
80 TABLET, FILM COATED ORAL DAILY
COMMUNITY
End: 2020-09-08 | Stop reason: SDUPTHER

## 2018-04-10 RX ADMIN — Medication 10 MG: at 16:54

## 2018-04-10 NOTE — ED PROVIDER NOTES
Subjective   History of Present Illness  This is a 72 year old female who comes in today complaining of hypertension. She reports she had a TIA on March 3rd this year. She since that time has had [facility] coming to her home for bp monitoring. Today when they came out her blood pressure was elevated and they sent her here for evaluation. She denies any symptoms. She reports yesterday she saw her neurologist and her blood pressure was her normal around 150 systolic.   Review of Systems   Constitutional: Negative.    HENT: Negative.    Eyes: Negative.    Respiratory: Negative.    Cardiovascular: Negative.    Gastrointestinal: Negative.    Endocrine: Negative.    Genitourinary: Negative.    Musculoskeletal: Negative.    Skin: Negative.    Allergic/Immunologic: Negative.    Neurological: Negative.    Hematological: Negative.    Psychiatric/Behavioral: Negative.    All other systems reviewed and are negative.      Past Medical History:   Diagnosis Date   • Acute bronchitis with bronchospasm    • Acute exacerbation of chronic obstructive pulmonary disease (COPD)    • Anxiety    • Arthritis    • B12 deficiency    • Back pain    • Cataract    • Cervicalgia    • Colonic polyp     History of colonic polyps    • COPD (chronic obstructive pulmonary disease)    • Depression    • Diverticulitis    • Dysphagia    • Edema    • Esophageal reflux    • Folic acid deficiency    • Herpes zoster    • High cholesterol    • History of kidney infection    • History of recurrent urinary tract infection    • HTN (hypertension)    • Hypercholesterolemia    • Kidney infection    • Malignant hypertension 4/26/2015     Accelerated essential hypertension   • Measles     rubeola   • Muscle spasm    • Neoplasm of uncertain behavior of skin    • Osteoporosis    • Recurrent urinary tract infection    • Rheumatoid arthritis    • RLS (restless legs syndrome)    • Stomach ulcer    • Type 2 diabetes mellitus    • Vitamin D deficiency        Allergies    Allergen Reactions   • Penicillins Rash   • Sulfa Antibiotics Rash       Past Surgical History:   Procedure Laterality Date   • CATARACT EXTRACTION Left 02/11/2013    with lens implant   • CATARACT EXTRACTION Right 04/15/2013    with lens implant   • CATARACT EXTRACTION WITH INTRAOCULAR LENS IMPLANT Left 02/11/2013   • CATARACT EXTRACTION WITH INTRAOCULAR LENS IMPLANT Right 04/15/2013   • COLONOSCOPY  2012   • ENDOSCOPY N/A 11/13/2017    Procedure: ESOPHAGOGASTRODUODENOSCOPY with biopsies and esophageal balloon dilitation;  Surgeon: Wesley Stovall MD;  Location: [facility] ENDOSCOPY;  Service:    • HYSTERECTOMY  1979   • UPPER GASTROINTESTINAL ENDOSCOPY  12/09/2013   • UPPER GASTROINTESTINAL ENDOSCOPY  11/13/2017       Family History   Problem Relation Age of Onset   • Arthritis Mother    • Diabetes Mother    • Hypertension Mother    • Arthritis Other    • Arthritis Other    • Diabetes Other      DM   • Hypertension Other    • Colon cancer Neg Hx        Social History     Social History   • Marital status:      Social History Main Topics   • Smoking status: Former Smoker   • Smokeless tobacco: Never Used      Comment:  Denied: History of smoking cigarettes   • Alcohol use Yes      Comment: Occassionally   • Drug use: No   • Sexual activity: Defer     Other Topics Concern   • Not on file           Objective   Physical Exam   Constitutional: She appears well-developed and well-nourished.   Nursing note and vitals reviewed.  GEN: No acute distress  Head: Normocephalic, atraumatic  Eyes: Pupils equal round reactive to light  ENT: Posterior pharynx normal in appearance, oral mucosa is moist  Chest: Nontender to palpation  Cardiovascular: Regular rate  Lungs: Clear to auscultation bilaterally  Abdomen: Soft, nontender, nondistended, no peritoneal signs  Extremities: No edema, normal appearance  Neuro: GCS 15  Psych: Mood and affect are appropriate      Procedures         ED Course  ED Course                   MDM  Number of Diagnoses or Management Options  Diagnosis management comments: Given her recent history we will go ahead and treat her blood pressure today. Since she is not having any symptoms no further testing is neccessary. I will have her follow up with her PCP tomorrow for possible medication adjustment.        Amount and/or Complexity of Data Reviewed  Review and summarize past medical records: yes  Discuss the patient with other providers: yes    Risk of Complications, Morbidity, and/or Mortality  Presenting problems: low  Diagnostic procedures: low  Management options: minimal        Final diagnoses:   Secondary hypertension            Candelaria Lux, JOSEE  04/10/18 2957

## 2018-08-06 ENCOUNTER — DOCUMENTATION (OUTPATIENT)
Dept: DIABETES SERVICES | Facility: HOSPITAL | Age: 73
End: 2018-08-06

## 2018-08-06 NOTE — PROGRESS NOTES
NOTE WAS SENT/FAXED TO THE REFERRING PROVIDER TODAY INFORMING THEM THAT WE HAVE BEEN UNABLE TO REACH THE PATIENT BY PHONE OR BY MAIL FOR DIABETES FOLLOW UP AND THAT THE DM CHART IS BEING CLOSED AT THIS TIME.

## 2019-03-19 ENCOUNTER — HOSPITAL ENCOUNTER (EMERGENCY)
Facility: HOSPITAL | Age: 74
Discharge: HOME | End: 2019-03-19
Attending: EMERGENCY MEDICINE | Admitting: EMERGENCY MEDICINE

## 2019-03-19 ENCOUNTER — APPOINTMENT (OUTPATIENT)
Dept: ULTRASOUND IMAGING | Facility: HOSPITAL | Age: 74
End: 2019-03-19

## 2019-03-19 ENCOUNTER — APPOINTMENT (OUTPATIENT)
Dept: GENERAL RADIOLOGY | Facility: HOSPITAL | Age: 74
End: 2019-03-19

## 2019-03-19 VITALS
HEART RATE: 74 BPM | HEIGHT: 63 IN | OXYGEN SATURATION: 90 % | RESPIRATION RATE: 18 BRPM | DIASTOLIC BLOOD PRESSURE: 73 MMHG | SYSTOLIC BLOOD PRESSURE: 171 MMHG | TEMPERATURE: 98.5 F | BODY MASS INDEX: 35.86 KG/M2 | WEIGHT: 202.4 LBS

## 2019-03-19 DIAGNOSIS — I10 ESSENTIAL HYPERTENSION: Primary | ICD-10-CM

## 2019-03-19 LAB
ALBUMIN SERPL-MCNC: 3.7 G/DL (ref 3.5–5)
ALBUMIN/GLOB SERPL: 1.2 G/DL (ref 1–2)
ALP SERPL-CCNC: 41 U/L (ref 38–126)
ALT SERPL W P-5'-P-CCNC: 27 U/L (ref 13–69)
ANION GAP SERPL CALCULATED.3IONS-SCNC: 7.8 MMOL/L (ref 10–20)
AST SERPL-CCNC: 25 U/L (ref 15–46)
BASOPHILS # BLD AUTO: 0.04 10*3/MM3 (ref 0–0.2)
BASOPHILS NFR BLD AUTO: 0.5 % (ref 0–2.5)
BILIRUB SERPL-MCNC: 0.3 MG/DL (ref 0.2–1.3)
BUN BLD-MCNC: 19 MG/DL (ref 7–20)
BUN/CREAT SERPL: 13.6 (ref 7.1–23.5)
CALCIUM SPEC-SCNC: 9.3 MG/DL (ref 8.4–10.2)
CHLORIDE SERPL-SCNC: 108 MMOL/L (ref 98–107)
CO2 SERPL-SCNC: 28 MMOL/L (ref 26–30)
CREAT BLD-MCNC: 1.4 MG/DL (ref 0.6–1.3)
DEPRECATED RDW RBC AUTO: 43.9 FL (ref 37–54)
EOSINOPHIL # BLD AUTO: 0.35 10*3/MM3 (ref 0–0.7)
EOSINOPHIL NFR BLD AUTO: 4.5 % (ref 0–7)
ERYTHROCYTE [DISTWIDTH] IN BLOOD BY AUTOMATED COUNT: 13.9 % (ref 11.5–14.5)
GFR SERPL CREATININE-BSD FRML MDRD: 45 ML/MIN/1.73
GLOBULIN UR ELPH-MCNC: 3.2 GM/DL
GLUCOSE BLD-MCNC: 79 MG/DL (ref 74–98)
HCT VFR BLD AUTO: 34 % (ref 37–47)
HGB BLD-MCNC: 11.1 G/DL (ref 12–16)
IMM GRANULOCYTES # BLD AUTO: 0.01 10*3/MM3 (ref 0–0.06)
IMM GRANULOCYTES NFR BLD AUTO: 0.1 % (ref 0–0.6)
LIPASE SERPL-CCNC: 222 U/L (ref 23–300)
LYMPHOCYTES # BLD AUTO: 2.82 10*3/MM3 (ref 0.6–3.4)
LYMPHOCYTES NFR BLD AUTO: 36.3 % (ref 10–50)
MCH RBC QN AUTO: 28.4 PG (ref 27–31)
MCHC RBC AUTO-ENTMCNC: 32.6 G/DL (ref 30–37)
MCV RBC AUTO: 87 FL (ref 81–99)
MONOCYTES # BLD AUTO: 0.67 10*3/MM3 (ref 0–0.9)
MONOCYTES NFR BLD AUTO: 8.6 % (ref 0–12)
NEUTROPHILS # BLD AUTO: 3.88 10*3/MM3 (ref 2–6.9)
NEUTROPHILS NFR BLD AUTO: 50 % (ref 37–80)
NRBC BLD AUTO-RTO: 0 /100 WBC (ref 0–0)
PLATELET # BLD AUTO: 353 10*3/MM3 (ref 130–400)
PMV BLD AUTO: 9.8 FL (ref 6–12)
POTASSIUM BLD-SCNC: 3.8 MMOL/L (ref 3.5–5.1)
PROT SERPL-MCNC: 6.9 G/DL (ref 6.3–8.2)
RBC # BLD AUTO: 3.91 10*6/MM3 (ref 4.2–5.4)
SODIUM BLD-SCNC: 140 MMOL/L (ref 137–145)
TROPONIN I SERPL-MCNC: <0.012 NG/ML (ref 0–0.03)
WBC NRBC COR # BLD: 7.77 10*3/MM3 (ref 4.8–10.8)

## 2019-03-19 PROCEDURE — 80053 COMPREHEN METABOLIC PANEL: CPT | Performed by: PHYSICIAN ASSISTANT

## 2019-03-19 PROCEDURE — 96376 TX/PRO/DX INJ SAME DRUG ADON: CPT

## 2019-03-19 PROCEDURE — 85025 COMPLETE CBC W/AUTO DIFF WBC: CPT | Performed by: PHYSICIAN ASSISTANT

## 2019-03-19 PROCEDURE — 99284 EMERGENCY DEPT VISIT MOD MDM: CPT

## 2019-03-19 PROCEDURE — 83690 ASSAY OF LIPASE: CPT | Performed by: PHYSICIAN ASSISTANT

## 2019-03-19 PROCEDURE — 25010000002 HYDRALAZINE PER 20 MG: Performed by: PHYSICIAN ASSISTANT

## 2019-03-19 PROCEDURE — 96361 HYDRATE IV INFUSION ADD-ON: CPT

## 2019-03-19 PROCEDURE — 71046 X-RAY EXAM CHEST 2 VIEWS: CPT

## 2019-03-19 PROCEDURE — 93975 VASCULAR STUDY: CPT

## 2019-03-19 PROCEDURE — 93005 ELECTROCARDIOGRAM TRACING: CPT | Performed by: PHYSICIAN ASSISTANT

## 2019-03-19 PROCEDURE — 96374 THER/PROPH/DIAG INJ IV PUSH: CPT

## 2019-03-19 PROCEDURE — 84484 ASSAY OF TROPONIN QUANT: CPT | Performed by: PHYSICIAN ASSISTANT

## 2019-03-19 RX ORDER — VALSARTAN 80 MG/1
40 TABLET ORAL ONCE
Status: COMPLETED | OUTPATIENT
Start: 2019-03-19 | End: 2019-03-19

## 2019-03-19 RX ORDER — CARVEDILOL 6.25 MG/1
6.25 TABLET ORAL ONCE
Status: COMPLETED | OUTPATIENT
Start: 2019-03-19 | End: 2019-03-19

## 2019-03-19 RX ORDER — SODIUM CHLORIDE 9 MG/ML
125 INJECTION, SOLUTION INTRAVENOUS CONTINUOUS
Status: DISCONTINUED | OUTPATIENT
Start: 2019-03-19 | End: 2019-03-19 | Stop reason: HOSPADM

## 2019-03-19 RX ORDER — AMLODIPINE BESYLATE 5 MG/1
5 TABLET ORAL ONCE
Status: COMPLETED | OUTPATIENT
Start: 2019-03-19 | End: 2019-03-19

## 2019-03-19 RX ORDER — HYDRALAZINE HYDROCHLORIDE 20 MG/ML
5 INJECTION INTRAMUSCULAR; INTRAVENOUS ONCE
Status: COMPLETED | OUTPATIENT
Start: 2019-03-19 | End: 2019-03-19

## 2019-03-19 RX ORDER — HYDRALAZINE HYDROCHLORIDE 20 MG/ML
10 INJECTION INTRAMUSCULAR; INTRAVENOUS ONCE
Status: COMPLETED | OUTPATIENT
Start: 2019-03-19 | End: 2019-03-19

## 2019-03-19 RX ORDER — SODIUM CHLORIDE 0.9 % (FLUSH) 0.9 %
10 SYRINGE (ML) INJECTION AS NEEDED
Status: DISCONTINUED | OUTPATIENT
Start: 2019-03-19 | End: 2019-03-19 | Stop reason: HOSPADM

## 2019-03-19 RX ADMIN — HYDRALAZINE HYDROCHLORIDE 10 MG: 20 INJECTION INTRAMUSCULAR; INTRAVENOUS at 18:24

## 2019-03-19 RX ADMIN — CARVEDILOL 6.25 MG: 6.25 TABLET, FILM COATED ORAL at 17:10

## 2019-03-19 RX ADMIN — HYDRALAZINE HYDROCHLORIDE 5 MG: 20 INJECTION INTRAMUSCULAR; INTRAVENOUS at 18:02

## 2019-03-19 RX ADMIN — AMLODIPINE BESYLATE 5 MG: 5 TABLET ORAL at 18:26

## 2019-03-19 RX ADMIN — HYDRALAZINE HYDROCHLORIDE 5 MG: 20 INJECTION INTRAMUSCULAR; INTRAVENOUS at 16:25

## 2019-03-19 RX ADMIN — SODIUM CHLORIDE 125 ML/HR: 9 INJECTION, SOLUTION INTRAVENOUS at 16:26

## 2019-03-19 RX ADMIN — VALSARTAN 40 MG: 80 TABLET, FILM COATED ORAL at 18:02

## 2019-03-19 NOTE — ED PROVIDER NOTES
Subjective   The patient is here with complaint of elevated blood pressure patient denies any chest pain shortness of air no headache patient was at the doctor's office and apparently he had medication clonidine which was all he had to give and apparently she has sensitivity to this so he sent her here for evaluation again she denies any systemic complaints no abdominal pain nausea or vomiting reported .....patient apparently has not taken some of her blood pressure medication today apparently scheduled to take some at 5:00.        History provided by:  Patient and relative      Review of Systems   Constitutional: Negative.    HENT: Negative.    Eyes: Negative.  Negative for photophobia and pain.   Respiratory: Negative.  Negative for chest tightness.    Cardiovascular: Negative.  Negative for chest pain.   Gastrointestinal: Negative.  Negative for abdominal pain and nausea.   Musculoskeletal: Positive for arthralgias.   Skin: Negative.    Neurological: Negative for weakness, numbness and headaches.   Psychiatric/Behavioral: The patient is nervous/anxious.    All other systems reviewed and are negative.      Past Medical History:   Diagnosis Date   • Acute bronchitis with bronchospasm    • Acute exacerbation of chronic obstructive pulmonary disease (COPD) (CMS/[facility])    • Anxiety    • Arthritis    • B12 deficiency    • Back pain    • Cataract    • Cervicalgia    • Colonic polyp     History of colonic polyps    • COPD (chronic obstructive pulmonary disease) (CMS/[facility])    • Depression    • Diverticulitis    • Dysphagia    • Edema    • Esophageal reflux    • Folic acid deficiency    • Herpes zoster    • High cholesterol    • History of kidney infection    • History of recurrent urinary tract infection    • HTN (hypertension)    • Hypercholesterolemia    • Kidney infection    • Malignant hypertension 4/26/2015     Accelerated essential hypertension   • Measles     rubeola   • Muscle spasm    • Neoplasm of uncertain  behavior of skin    • Osteoporosis    • Recurrent urinary tract infection    • Rheumatoid arthritis (CMS/HCC)    • RLS (restless legs syndrome)    • Stomach ulcer    • Type 2 diabetes mellitus (CMS/HCC)    • Vitamin D deficiency        Allergies   Allergen Reactions   • Clonidine Derivatives Other (See Comments)     Pt reports extreme hypotension     • Penicillins Rash   • Sulfa Antibiotics Rash       Past Surgical History:   Procedure Laterality Date   • CATARACT EXTRACTION Left 02/11/2013    with lens implant   • CATARACT EXTRACTION Right 04/15/2013    with lens implant   • CATARACT EXTRACTION WITH INTRAOCULAR LENS IMPLANT Left 02/11/2013   • CATARACT EXTRACTION WITH INTRAOCULAR LENS IMPLANT Right 04/15/2013   • COLONOSCOPY  2012   • ENDOSCOPY N/A 11/13/2017    Procedure: ESOPHAGOGASTRODUODENOSCOPY with biopsies and esophageal balloon dilitation;  Surgeon: Wesley Stovall MD;  Location: [facility] ENDOSCOPY;  Service:    • HYSTERECTOMY  1979   • UPPER GASTROINTESTINAL ENDOSCOPY  12/09/2013   • UPPER GASTROINTESTINAL ENDOSCOPY  11/13/2017       Family History   Problem Relation Age of Onset   • Arthritis Mother    • Diabetes Mother    • Hypertension Mother    • Arthritis Other    • Arthritis Other    • Diabetes Other         DM   • Hypertension Other    • Colon cancer Neg Hx        Social History     Socioeconomic History   • Marital status:      Spouse name: Not on file   • Number of children: Not on file   • Years of education: Not on file   • Highest education level: Not on file   Tobacco Use   • Smoking status: Former Smoker   • Smokeless tobacco: Never Used   • Tobacco comment:  Denied: History of smoking cigarettes   Substance and Sexual Activity   • Alcohol use: Yes     Comment: Occassionally   • Drug use: No   • Sexual activity: Defer           Objective   Physical Exam   Constitutional: She is oriented to person, place, and time. She appears well-developed and well-nourished. No distress.   Afebrile  nontoxic no acute distress   HENT:   Head: Normocephalic and atraumatic.   Mouth/Throat: Oropharynx is clear and moist.   Eyes: Conjunctivae and EOM are normal. Pupils are equal, round, and reactive to light.   Neck: Normal range of motion. Neck supple. No tracheal deviation present.   Cardiovascular: Normal rate, regular rhythm and intact distal pulses.   Pulmonary/Chest: Effort normal and breath sounds normal.   Abdominal: Soft. There is no tenderness.   Musculoskeletal: Normal range of motion. She exhibits no edema.   Lymphadenopathy:     She has no cervical adenopathy.   Neurological: She is alert and oriented to person, place, and time. She has normal strength. No cranial nerve deficit or sensory deficit. She exhibits normal muscle tone. Coordination normal. GCS eye subscore is 4. GCS verbal subscore is 5. GCS motor subscore is 6.   Skin: Skin is warm and dry. Capillary refill takes less than 2 seconds. No rash noted. She is not diaphoretic. No erythema.   Psychiatric: She has a normal mood and affect. Her behavior is normal. Judgment and thought content normal.   Nursing note and vitals reviewed.      Procedures           ED Course  ED Course as of Mar 19 2203   Tue Mar 19, 2019   1647 EKG reviewed Dr Downing  [SC]   1723 Patient resting comfortably no distress  [SC]   1723 Patient case labs management reviewed with Dr. Downing  [SC]   1819 Dr Downing recommends giving 10 more hydralazine and 5 of Norvasc  [SC]   1833 Pt resting no acute distress  [SC]   1848 Patient resting no acute distress  [SC]   1926 Patient's case management reviewed with Dr. Chávez including ultrasound report will have patient follow-up with PCP... continue current meds return here if there is any problems with worsening hypertension or any chest pain or shortness or other systemic complaints patient agreeable with plan  [SC]   1931 Patient advised not to take any further blood pressure medication this evening follow-up with Dr. Brown  tomorrow  [SC]      ED Course User Index  [SC] Gregory Jacome, LESLIE                  MDM  Number of Diagnoses or Management Options     Amount and/or Complexity of Data Reviewed  Decide to obtain previous medical records or to obtain history from someone other than the patient: yes  Review and summarize past medical records: yes  Discuss the patient with other providers: yes    Risk of Complications, Morbidity, and/or Mortality  Presenting problems: moderate  Diagnostic procedures: low  Management options: low          Final diagnoses:   Essential hypertension            Gregory Jacome PA-C  03/19/19 1928       Gregory Jacome PA-C  03/19/19 2203

## 2019-08-21 ENCOUNTER — TRANSCRIBE ORDERS (OUTPATIENT)
Dept: ULTRASOUND IMAGING | Facility: HOSPITAL | Age: 74
End: 2019-08-21

## 2019-08-21 DIAGNOSIS — I15.9 SECONDARY HYPERTENSION: Primary | ICD-10-CM

## 2019-08-21 DIAGNOSIS — I70.1 BENIGN SECONDARY HYPERTENSION DUE TO RENAL ARTERY STENOSIS (HCC): ICD-10-CM

## 2019-08-21 DIAGNOSIS — I15.0 BENIGN SECONDARY HYPERTENSION DUE TO RENAL ARTERY STENOSIS (HCC): ICD-10-CM

## 2019-08-27 ENCOUNTER — APPOINTMENT (OUTPATIENT)
Dept: ULTRASOUND IMAGING | Facility: HOSPITAL | Age: 74
End: 2019-08-27

## 2019-09-11 ENCOUNTER — TRANSCRIBE ORDERS (OUTPATIENT)
Dept: ADMINISTRATIVE | Facility: HOSPITAL | Age: 74
End: 2019-09-11

## 2019-09-11 DIAGNOSIS — I70.1 LEFT RENAL ARTERY STENOSIS (HCC): Primary | ICD-10-CM

## 2019-09-12 ENCOUNTER — APPOINTMENT (OUTPATIENT)
Dept: CT IMAGING | Facility: HOSPITAL | Age: 74
End: 2019-09-12

## 2019-09-20 ENCOUNTER — TRANSCRIBE ORDERS (OUTPATIENT)
Dept: LAB | Facility: HOSPITAL | Age: 74
End: 2019-09-20

## 2019-09-20 ENCOUNTER — LAB (OUTPATIENT)
Dept: LAB | Facility: HOSPITAL | Age: 74
End: 2019-09-20

## 2019-09-20 DIAGNOSIS — I70.1 LEFT RENAL ARTERY STENOSIS (HCC): Primary | ICD-10-CM

## 2019-09-20 DIAGNOSIS — I70.1 LEFT RENAL ARTERY STENOSIS (HCC): ICD-10-CM

## 2019-09-20 LAB
ALBUMIN SERPL-MCNC: 3.9 G/DL (ref 3.5–5.2)
ALBUMIN/GLOB SERPL: 1.5 G/DL
ALP SERPL-CCNC: 38 U/L (ref 39–117)
ALT SERPL W P-5'-P-CCNC: 11 U/L (ref 1–33)
ANION GAP SERPL CALCULATED.3IONS-SCNC: 13 MMOL/L (ref 5–15)
AST SERPL-CCNC: 13 U/L (ref 1–32)
BILIRUB SERPL-MCNC: 0.2 MG/DL (ref 0.2–1.2)
BUN BLD-MCNC: 28 MG/DL (ref 8–23)
BUN/CREAT SERPL: 15 (ref 7–25)
CALCIUM SPEC-SCNC: 8.9 MG/DL (ref 8.6–10.5)
CHLORIDE SERPL-SCNC: 103 MMOL/L (ref 98–107)
CO2 SERPL-SCNC: 24 MMOL/L (ref 22–29)
CREAT BLD-MCNC: 1.87 MG/DL (ref 0.57–1)
GFR SERPL CREATININE-BSD FRML MDRD: 32 ML/MIN/1.73
GLOBULIN UR ELPH-MCNC: 2.6 GM/DL
GLUCOSE BLD-MCNC: 181 MG/DL (ref 65–99)
POTASSIUM BLD-SCNC: 4.6 MMOL/L (ref 3.5–5.2)
PROT SERPL-MCNC: 6.5 G/DL (ref 6–8.5)
SODIUM BLD-SCNC: 140 MMOL/L (ref 136–145)

## 2019-09-20 PROCEDURE — 80053 COMPREHEN METABOLIC PANEL: CPT

## 2019-09-20 PROCEDURE — 36415 COLL VENOUS BLD VENIPUNCTURE: CPT

## 2019-10-07 ENCOUNTER — LAB (OUTPATIENT)
Dept: LAB | Facility: HOSPITAL | Age: 74
End: 2019-10-07

## 2019-10-07 ENCOUNTER — TRANSCRIBE ORDERS (OUTPATIENT)
Dept: LAB | Facility: HOSPITAL | Age: 74
End: 2019-10-07

## 2019-10-07 DIAGNOSIS — D64.9 ANEMIA, UNSPECIFIED TYPE: ICD-10-CM

## 2019-10-07 DIAGNOSIS — E11.9 DIABETES MELLITUS WITHOUT COMPLICATION (HCC): ICD-10-CM

## 2019-10-07 DIAGNOSIS — N18.9 CHRONIC KIDNEY DISEASE, UNSPECIFIED CKD STAGE: ICD-10-CM

## 2019-10-07 DIAGNOSIS — D64.9 ANEMIA, UNSPECIFIED TYPE: Primary | ICD-10-CM

## 2019-10-07 LAB
ALBUMIN SERPL-MCNC: 3.7 G/DL (ref 3.5–5.2)
ANION GAP SERPL CALCULATED.3IONS-SCNC: 8.3 MMOL/L (ref 5–15)
BACTERIA UR QL AUTO: ABNORMAL /HPF
BASOPHILS # BLD AUTO: 0.03 10*3/MM3 (ref 0–0.2)
BASOPHILS NFR BLD AUTO: 0.5 % (ref 0–1.5)
BILIRUB UR QL STRIP: NEGATIVE
BUN BLD-MCNC: 29 MG/DL (ref 8–23)
BUN/CREAT SERPL: 17.3 (ref 7–25)
CALCIUM SPEC-SCNC: 8.8 MG/DL (ref 8.6–10.5)
CHLORIDE SERPL-SCNC: 104 MMOL/L (ref 98–107)
CLARITY UR: CLEAR
CO2 SERPL-SCNC: 27.7 MMOL/L (ref 22–29)
COLOR UR: YELLOW
CREAT BLD-MCNC: 1.68 MG/DL (ref 0.57–1)
CREAT UR-MCNC: 128 MG/DL
DEPRECATED RDW RBC AUTO: 45.3 FL (ref 37–54)
EOSINOPHIL # BLD AUTO: 0.26 10*3/MM3 (ref 0–0.4)
EOSINOPHIL NFR BLD AUTO: 4.5 % (ref 0.3–6.2)
ERYTHROCYTE [DISTWIDTH] IN BLOOD BY AUTOMATED COUNT: 14.3 % (ref 12.3–15.4)
FERRITIN SERPL-MCNC: 127 NG/ML (ref 13–150)
GFR SERPL CREATININE-BSD FRML MDRD: 36 ML/MIN/1.73
GLUCOSE BLD-MCNC: 203 MG/DL (ref 65–99)
GLUCOSE UR STRIP-MCNC: NEGATIVE MG/DL
HBA1C MFR BLD: 10 % (ref 4.8–5.6)
HCT VFR BLD AUTO: 32.9 % (ref 34–46.6)
HGB BLD-MCNC: 10.4 G/DL (ref 12–15.9)
HGB UR QL STRIP.AUTO: NEGATIVE
HYALINE CASTS UR QL AUTO: ABNORMAL /LPF
IMM GRANULOCYTES # BLD AUTO: 0.01 10*3/MM3 (ref 0–0.05)
IMM GRANULOCYTES NFR BLD AUTO: 0.2 % (ref 0–0.5)
IRON 24H UR-MRATE: 67 MCG/DL (ref 37–145)
IRON SATN MFR SERPL: 15 % (ref 20–50)
KETONES UR QL STRIP: NEGATIVE
LEUKOCYTE ESTERASE UR QL STRIP.AUTO: ABNORMAL
LYMPHOCYTES # BLD AUTO: 2.59 10*3/MM3 (ref 0.7–3.1)
LYMPHOCYTES NFR BLD AUTO: 45.3 % (ref 19.6–45.3)
MCH RBC QN AUTO: 27.4 PG (ref 26.6–33)
MCHC RBC AUTO-ENTMCNC: 31.6 G/DL (ref 31.5–35.7)
MCV RBC AUTO: 86.6 FL (ref 79–97)
MONOCYTES # BLD AUTO: 0.47 10*3/MM3 (ref 0.1–0.9)
MONOCYTES NFR BLD AUTO: 8.2 % (ref 5–12)
NEUTROPHILS # BLD AUTO: 2.36 10*3/MM3 (ref 1.7–7)
NEUTROPHILS NFR BLD AUTO: 41.3 % (ref 42.7–76)
NITRITE UR QL STRIP: NEGATIVE
NRBC BLD AUTO-RTO: 0 /100 WBC (ref 0–0.2)
PH UR STRIP.AUTO: 5.5 [PH] (ref 5–8)
PHOSPHATE SERPL-MCNC: 3 MG/DL (ref 2.5–4.5)
PLATELET # BLD AUTO: 291 10*3/MM3 (ref 140–450)
PMV BLD AUTO: 10.7 FL (ref 6–12)
POTASSIUM BLD-SCNC: 4.7 MMOL/L (ref 3.5–5.2)
PROT UR QL STRIP: ABNORMAL
PROT UR-MCNC: 70 MG/DL
RBC # BLD AUTO: 3.8 10*6/MM3 (ref 3.77–5.28)
RBC # UR: ABNORMAL /HPF
REF LAB TEST METHOD: ABNORMAL
SODIUM BLD-SCNC: 140 MMOL/L (ref 136–145)
SP GR UR STRIP: 1.02 (ref 1–1.03)
SQUAMOUS #/AREA URNS HPF: ABNORMAL /HPF
TIBC SERPL-MCNC: 437 MCG/DL (ref 298–536)
TRANSFERRIN SERPL-MCNC: 293 MG/DL (ref 200–360)
UROBILINOGEN UR QL STRIP: ABNORMAL
VIT B12 BLD-MCNC: 252 PG/ML (ref 211–946)
WBC NRBC COR # BLD: 5.72 10*3/MM3 (ref 3.4–10.8)
WBC UR QL AUTO: ABNORMAL /HPF

## 2019-10-07 PROCEDURE — 84166 PROTEIN E-PHORESIS/URINE/CSF: CPT

## 2019-10-07 PROCEDURE — 36415 COLL VENOUS BLD VENIPUNCTURE: CPT

## 2019-10-07 PROCEDURE — 83540 ASSAY OF IRON: CPT

## 2019-10-07 PROCEDURE — 84466 ASSAY OF TRANSFERRIN: CPT

## 2019-10-07 PROCEDURE — 84156 ASSAY OF PROTEIN URINE: CPT

## 2019-10-07 PROCEDURE — 85025 COMPLETE CBC W/AUTO DIFF WBC: CPT

## 2019-10-07 PROCEDURE — 81001 URINALYSIS AUTO W/SCOPE: CPT

## 2019-10-07 PROCEDURE — 82607 VITAMIN B-12: CPT

## 2019-10-07 PROCEDURE — 80069 RENAL FUNCTION PANEL: CPT

## 2019-10-07 PROCEDURE — 82728 ASSAY OF FERRITIN: CPT

## 2019-10-07 PROCEDURE — 86335 IMMUNFIX E-PHORSIS/URINE/CSF: CPT

## 2019-10-07 PROCEDURE — 82570 ASSAY OF URINE CREATININE: CPT

## 2019-10-07 PROCEDURE — 83036 HEMOGLOBIN GLYCOSYLATED A1C: CPT

## 2019-10-09 LAB
ALBUMIN 24H MFR UR ELPH: 61.9 %
ALPHA1 GLOB 24H MFR UR ELPH: 3.2 %
ALPHA2 GLOB 24H MFR UR ELPH: 7.8 %
B-GLOBULIN MFR UR ELPH: 15 %
GAMMA GLOB 24H MFR UR ELPH: 12.1 %
HIV 1 & 2 AB SER-IMP: NORMAL
INTERPRETATION UR IFE-IMP: NORMAL
M PROTEIN 24H MFR UR ELPH: NORMAL %
PROT UR-MCNC: 70.6 MG/DL

## 2019-10-14 ENCOUNTER — LAB (OUTPATIENT)
Dept: LAB | Facility: HOSPITAL | Age: 74
End: 2019-10-14

## 2019-10-14 ENCOUNTER — TRANSCRIBE ORDERS (OUTPATIENT)
Dept: INTERVENTIONAL RADIOLOGY/VASCULAR | Facility: HOSPITAL | Age: 74
End: 2019-10-14

## 2019-10-14 DIAGNOSIS — R82.81 PYURIA: ICD-10-CM

## 2019-10-14 DIAGNOSIS — R82.81 PYURIA: Primary | ICD-10-CM

## 2019-10-14 PROCEDURE — 87086 URINE CULTURE/COLONY COUNT: CPT

## 2019-10-15 LAB — BACTERIA SPEC AEROBE CULT: NORMAL

## 2019-11-23 ENCOUNTER — APPOINTMENT (OUTPATIENT)
Dept: GENERAL RADIOLOGY | Facility: HOSPITAL | Age: 74
End: 2019-11-23

## 2019-11-23 ENCOUNTER — HOSPITAL ENCOUNTER (INPATIENT)
Facility: HOSPITAL | Age: 74
LOS: 5 days | Discharge: HOME HEALTH | End: 2019-11-28
Attending: EMERGENCY MEDICINE | Admitting: INTERNAL MEDICINE

## 2019-11-23 ENCOUNTER — APPOINTMENT (OUTPATIENT)
Dept: CT IMAGING | Facility: HOSPITAL | Age: 74
End: 2019-11-23

## 2019-11-23 DIAGNOSIS — K21.00 REFLUX ESOPHAGITIS: ICD-10-CM

## 2019-11-23 DIAGNOSIS — Z87.39 HISTORY OF RHEUMATOID ARTHRITIS: Chronic | ICD-10-CM

## 2019-11-23 DIAGNOSIS — D64.9 ACUTE ON CHRONIC ANEMIA: ICD-10-CM

## 2019-11-23 DIAGNOSIS — R13.10 DYSPHAGIA, UNSPECIFIED TYPE: ICD-10-CM

## 2019-11-23 DIAGNOSIS — Z79.01 CHRONIC ANTICOAGULATION: ICD-10-CM

## 2019-11-23 DIAGNOSIS — G81.91 RIGHT HEMIPARESIS (HCC): ICD-10-CM

## 2019-11-23 DIAGNOSIS — D64.9 ANEMIA, UNSPECIFIED TYPE: ICD-10-CM

## 2019-11-23 DIAGNOSIS — R11.2 NAUSEA VOMITING AND DIARRHEA: ICD-10-CM

## 2019-11-23 DIAGNOSIS — Z79.4 TYPE 2 DIABETES MELLITUS WITHOUT COMPLICATION, WITH LONG-TERM CURRENT USE OF INSULIN (HCC): ICD-10-CM

## 2019-11-23 DIAGNOSIS — Z74.09 IMPAIRED FUNCTIONAL MOBILITY, BALANCE, GAIT, AND ENDURANCE: ICD-10-CM

## 2019-11-23 DIAGNOSIS — Z74.09 IMPAIRED MOBILITY AND ADLS: ICD-10-CM

## 2019-11-23 DIAGNOSIS — R19.7 NAUSEA VOMITING AND DIARRHEA: ICD-10-CM

## 2019-11-23 DIAGNOSIS — E11.9 TYPE 2 DIABETES MELLITUS WITHOUT COMPLICATION, WITH LONG-TERM CURRENT USE OF INSULIN (HCC): ICD-10-CM

## 2019-11-23 DIAGNOSIS — I95.1 ORTHOSTATIC HYPOTENSION: ICD-10-CM

## 2019-11-23 DIAGNOSIS — Z86.19 HISTORY OF HELICOBACTER PYLORI INFECTION: ICD-10-CM

## 2019-11-23 DIAGNOSIS — R19.5 POSITIVE OCCULT STOOL BLOOD TEST: ICD-10-CM

## 2019-11-23 DIAGNOSIS — Z78.9 IMPAIRED MOBILITY AND ADLS: ICD-10-CM

## 2019-11-23 DIAGNOSIS — E86.0 DEHYDRATION: ICD-10-CM

## 2019-11-23 DIAGNOSIS — R55 SYNCOPE AND COLLAPSE: Primary | ICD-10-CM

## 2019-11-23 DIAGNOSIS — R13.19 ESOPHAGEAL DYSPHAGIA: ICD-10-CM

## 2019-11-23 DIAGNOSIS — Z86.73 HISTORY OF STROKE: ICD-10-CM

## 2019-11-23 PROBLEM — I69.30 HISTORY OF HEMORRHAGIC CEREBROVASCULAR ACCIDENT (CVA) WITH RESIDUAL DEFICIT: Status: ACTIVE | Noted: 2018-03-03

## 2019-11-23 PROBLEM — M79.672 LEFT FOOT PAIN: Status: ACTIVE | Noted: 2019-11-23

## 2019-11-23 PROBLEM — R47.1 DYSARTHRIA: Status: RESOLVED | Noted: 2018-03-03 | Resolved: 2019-11-23

## 2019-11-23 LAB
ALBUMIN SERPL-MCNC: 3.4 G/DL (ref 3.5–5.2)
ALBUMIN/GLOB SERPL: 1.3 G/DL
ALP SERPL-CCNC: 27 U/L (ref 39–117)
ALT SERPL W P-5'-P-CCNC: 21 U/L (ref 1–33)
ANION GAP SERPL CALCULATED.3IONS-SCNC: 12 MMOL/L (ref 5–15)
AST SERPL-CCNC: 24 U/L (ref 1–32)
BACTERIA UR QL AUTO: ABNORMAL /HPF
BASOPHILS # BLD AUTO: 0.04 10*3/MM3 (ref 0–0.2)
BASOPHILS NFR BLD AUTO: 0.4 % (ref 0–1.5)
BILIRUB SERPL-MCNC: 0.3 MG/DL (ref 0.2–1.2)
BILIRUB UR QL STRIP: NEGATIVE
BUN BLD-MCNC: 33 MG/DL (ref 8–23)
BUN/CREAT SERPL: 16.3 (ref 7–25)
CALCIUM SPEC-SCNC: 8.8 MG/DL (ref 8.6–10.5)
CHLORIDE SERPL-SCNC: 107 MMOL/L (ref 98–107)
CLARITY UR: CLEAR
CO2 SERPL-SCNC: 21 MMOL/L (ref 22–29)
COLOR UR: YELLOW
CREAT BLD-MCNC: 2.03 MG/DL (ref 0.57–1)
DEPRECATED RDW RBC AUTO: 50.4 FL (ref 37–54)
DEVELOPER EXPIRATION DATE: ABNORMAL
DEVELOPER LOT NUMBER: ABNORMAL
EOSINOPHIL # BLD AUTO: 0.12 10*3/MM3 (ref 0–0.4)
EOSINOPHIL NFR BLD AUTO: 1.3 % (ref 0.3–6.2)
ERYTHROCYTE [DISTWIDTH] IN BLOOD BY AUTOMATED COUNT: 15 % (ref 12.3–15.4)
EXPIRATION DATE: ABNORMAL
FECAL OCCULT BLOOD SCREEN, POC: POSITIVE
GFR SERPL CREATININE-BSD FRML MDRD: 29 ML/MIN/1.73
GLOBULIN UR ELPH-MCNC: 2.6 GM/DL
GLUCOSE BLD-MCNC: 145 MG/DL (ref 65–99)
GLUCOSE UR STRIP-MCNC: NEGATIVE MG/DL
HCT VFR BLD AUTO: 29.8 % (ref 34–46.6)
HGB BLD-MCNC: 9.3 G/DL (ref 12–15.9)
HGB UR QL STRIP.AUTO: NEGATIVE
HOLD SPECIMEN: NORMAL
HOLD SPECIMEN: NORMAL
HYALINE CASTS UR QL AUTO: ABNORMAL /LPF
IMM GRANULOCYTES # BLD AUTO: 0.04 10*3/MM3 (ref 0–0.05)
IMM GRANULOCYTES NFR BLD AUTO: 0.4 % (ref 0–0.5)
KETONES UR QL STRIP: NEGATIVE
LEUKOCYTE ESTERASE UR QL STRIP.AUTO: NEGATIVE
LYMPHOCYTES # BLD AUTO: 3.1 10*3/MM3 (ref 0.7–3.1)
LYMPHOCYTES NFR BLD AUTO: 33.1 % (ref 19.6–45.3)
Lab: ABNORMAL
MAGNESIUM SERPL-MCNC: 1.6 MG/DL (ref 1.6–2.4)
MCH RBC QN AUTO: 28.4 PG (ref 26.6–33)
MCHC RBC AUTO-ENTMCNC: 31.2 G/DL (ref 31.5–35.7)
MCV RBC AUTO: 91.1 FL (ref 79–97)
MONOCYTES # BLD AUTO: 0.59 10*3/MM3 (ref 0.1–0.9)
MONOCYTES NFR BLD AUTO: 6.3 % (ref 5–12)
NEGATIVE CONTROL: NEGATIVE
NEUTROPHILS # BLD AUTO: 5.48 10*3/MM3 (ref 1.7–7)
NEUTROPHILS NFR BLD AUTO: 58.5 % (ref 42.7–76)
NITRITE UR QL STRIP: NEGATIVE
NRBC BLD AUTO-RTO: 0 /100 WBC (ref 0–0.2)
PH UR STRIP.AUTO: <=5 [PH] (ref 5–8)
PLATELET # BLD AUTO: 305 10*3/MM3 (ref 140–450)
PMV BLD AUTO: 10.3 FL (ref 6–12)
POSITIVE CONTROL: POSITIVE
POTASSIUM BLD-SCNC: 5 MMOL/L (ref 3.5–5.2)
PROT SERPL-MCNC: 6 G/DL (ref 6–8.5)
PROT UR QL STRIP: ABNORMAL
RBC # BLD AUTO: 3.27 10*6/MM3 (ref 3.77–5.28)
RBC # UR: ABNORMAL /HPF
REF LAB TEST METHOD: ABNORMAL
SODIUM BLD-SCNC: 140 MMOL/L (ref 136–145)
SP GR UR STRIP: 1.01 (ref 1–1.03)
SQUAMOUS #/AREA URNS HPF: ABNORMAL /HPF
TROPONIN T SERPL-MCNC: 0.01 NG/ML (ref 0–0.03)
TROPONIN T SERPL-MCNC: <0.01 NG/ML (ref 0–0.03)
UROBILINOGEN UR QL STRIP: ABNORMAL
WBC NRBC COR # BLD: 9.37 10*3/MM3 (ref 3.4–10.8)
WBC UR QL AUTO: ABNORMAL /HPF
WHOLE BLOOD HOLD SPECIMEN: NORMAL
WHOLE BLOOD HOLD SPECIMEN: NORMAL

## 2019-11-23 PROCEDURE — 73630 X-RAY EXAM OF FOOT: CPT

## 2019-11-23 PROCEDURE — 85025 COMPLETE CBC W/AUTO DIFF WBC: CPT | Performed by: EMERGENCY MEDICINE

## 2019-11-23 PROCEDURE — 70450 CT HEAD/BRAIN W/O DYE: CPT

## 2019-11-23 PROCEDURE — 84443 ASSAY THYROID STIM HORMONE: CPT | Performed by: NURSE PRACTITIONER

## 2019-11-23 PROCEDURE — 80053 COMPREHEN METABOLIC PANEL: CPT | Performed by: EMERGENCY MEDICINE

## 2019-11-23 PROCEDURE — 71045 X-RAY EXAM CHEST 1 VIEW: CPT

## 2019-11-23 PROCEDURE — 80061 LIPID PANEL: CPT | Performed by: NURSE PRACTITIONER

## 2019-11-23 PROCEDURE — 82270 OCCULT BLOOD FECES: CPT | Performed by: PHYSICIAN ASSISTANT

## 2019-11-23 PROCEDURE — 99285 EMERGENCY DEPT VISIT HI MDM: CPT

## 2019-11-23 PROCEDURE — 25010000002 ONDANSETRON PER 1 MG: Performed by: EMERGENCY MEDICINE

## 2019-11-23 PROCEDURE — 93005 ELECTROCARDIOGRAM TRACING: CPT | Performed by: EMERGENCY MEDICINE

## 2019-11-23 PROCEDURE — 83880 ASSAY OF NATRIURETIC PEPTIDE: CPT | Performed by: NURSE PRACTITIONER

## 2019-11-23 PROCEDURE — 93005 ELECTROCARDIOGRAM TRACING: CPT | Performed by: PHYSICIAN ASSISTANT

## 2019-11-23 PROCEDURE — 83735 ASSAY OF MAGNESIUM: CPT | Performed by: EMERGENCY MEDICINE

## 2019-11-23 PROCEDURE — 84484 ASSAY OF TROPONIN QUANT: CPT | Performed by: EMERGENCY MEDICINE

## 2019-11-23 PROCEDURE — 84484 ASSAY OF TROPONIN QUANT: CPT | Performed by: PHYSICIAN ASSISTANT

## 2019-11-23 PROCEDURE — 81001 URINALYSIS AUTO W/SCOPE: CPT | Performed by: EMERGENCY MEDICINE

## 2019-11-23 RX ORDER — ONDANSETRON 2 MG/ML
4 INJECTION INTRAMUSCULAR; INTRAVENOUS ONCE
Status: COMPLETED | OUTPATIENT
Start: 2019-11-23 | End: 2019-11-23

## 2019-11-23 RX ORDER — SODIUM CHLORIDE 0.9 % (FLUSH) 0.9 %
10 SYRINGE (ML) INJECTION AS NEEDED
Status: DISCONTINUED | OUTPATIENT
Start: 2019-11-23 | End: 2019-11-28 | Stop reason: HOSPADM

## 2019-11-23 RX ADMIN — SODIUM CHLORIDE 1000 ML: 9 INJECTION, SOLUTION INTRAVENOUS at 22:41

## 2019-11-23 RX ADMIN — ONDANSETRON 4 MG: 2 INJECTION INTRAMUSCULAR; INTRAVENOUS at 20:34

## 2019-11-23 RX ADMIN — SODIUM CHLORIDE 1000 ML: 9 INJECTION, SOLUTION INTRAVENOUS at 20:05

## 2019-11-24 ENCOUNTER — APPOINTMENT (OUTPATIENT)
Dept: CARDIOLOGY | Facility: HOSPITAL | Age: 74
End: 2019-11-24

## 2019-11-24 PROBLEM — E83.42 HYPOMAGNESEMIA: Status: ACTIVE | Noted: 2019-11-24

## 2019-11-24 LAB
ABO GROUP BLD: NORMAL
ABO GROUP BLD: NORMAL
ANION GAP SERPL CALCULATED.3IONS-SCNC: 12 MMOL/L (ref 5–15)
B PARAPERT DNA SPEC QL NAA+PROBE: NOT DETECTED
B PERT DNA SPEC QL NAA+PROBE: NOT DETECTED
BASOPHILS # BLD AUTO: 0.02 10*3/MM3 (ref 0–0.2)
BASOPHILS NFR BLD AUTO: 0.2 % (ref 0–1.5)
BLD GP AB SCN SERPL QL: NEGATIVE
BUN BLD-MCNC: 29 MG/DL (ref 8–23)
BUN/CREAT SERPL: 16.4 (ref 7–25)
C PNEUM DNA NPH QL NAA+NON-PROBE: NOT DETECTED
CALCIUM SPEC-SCNC: 7.8 MG/DL (ref 8.6–10.5)
CHLORIDE SERPL-SCNC: 110 MMOL/L (ref 98–107)
CHOLEST SERPL-MCNC: 110 MG/DL (ref 0–200)
CO2 SERPL-SCNC: 18 MMOL/L (ref 22–29)
CREAT BLD-MCNC: 1.77 MG/DL (ref 0.57–1)
DEPRECATED RDW RBC AUTO: 50.9 FL (ref 37–54)
EOSINOPHIL # BLD AUTO: 0.03 10*3/MM3 (ref 0–0.4)
EOSINOPHIL NFR BLD AUTO: 0.3 % (ref 0.3–6.2)
ERYTHROCYTE [DISTWIDTH] IN BLOOD BY AUTOMATED COUNT: 14.9 % (ref 12.3–15.4)
FLUAV H1 2009 PAND RNA NPH QL NAA+PROBE: NOT DETECTED
FLUAV H1 HA GENE NPH QL NAA+PROBE: NOT DETECTED
FLUAV H3 RNA NPH QL NAA+PROBE: NOT DETECTED
FLUAV SUBTYP SPEC NAA+PROBE: NOT DETECTED
FLUBV RNA ISLT QL NAA+PROBE: NOT DETECTED
GFR SERPL CREATININE-BSD FRML MDRD: 34 ML/MIN/1.73
GLUCOSE BLD-MCNC: 81 MG/DL (ref 65–99)
GLUCOSE BLDC GLUCOMTR-MCNC: 124 MG/DL (ref 70–130)
GLUCOSE BLDC GLUCOMTR-MCNC: 188 MG/DL (ref 70–130)
GLUCOSE BLDC GLUCOMTR-MCNC: 214 MG/DL (ref 70–130)
GLUCOSE BLDC GLUCOMTR-MCNC: 83 MG/DL (ref 70–130)
HADV DNA SPEC NAA+PROBE: NOT DETECTED
HCOV 229E RNA SPEC QL NAA+PROBE: NOT DETECTED
HCOV HKU1 RNA SPEC QL NAA+PROBE: NOT DETECTED
HCOV NL63 RNA SPEC QL NAA+PROBE: NOT DETECTED
HCOV OC43 RNA SPEC QL NAA+PROBE: NOT DETECTED
HCT VFR BLD AUTO: 26.2 % (ref 34–46.6)
HCT VFR BLD AUTO: 26.4 % (ref 34–46.6)
HCT VFR BLD AUTO: 28.2 % (ref 34–46.6)
HCT VFR BLD AUTO: 30.2 % (ref 34–46.6)
HDLC SERPL-MCNC: 33 MG/DL (ref 40–60)
HGB BLD-MCNC: 8 G/DL (ref 12–15.9)
HGB BLD-MCNC: 8.3 G/DL (ref 12–15.9)
HGB BLD-MCNC: 8.4 G/DL (ref 12–15.9)
HGB BLD-MCNC: 9.3 G/DL (ref 12–15.9)
HMPV RNA NPH QL NAA+NON-PROBE: NOT DETECTED
HPIV1 RNA SPEC QL NAA+PROBE: NOT DETECTED
HPIV2 RNA SPEC QL NAA+PROBE: NOT DETECTED
HPIV3 RNA NPH QL NAA+PROBE: NOT DETECTED
HPIV4 P GENE NPH QL NAA+PROBE: NOT DETECTED
IMM GRANULOCYTES # BLD AUTO: 0.03 10*3/MM3 (ref 0–0.05)
IMM GRANULOCYTES NFR BLD AUTO: 0.3 % (ref 0–0.5)
LDLC SERPL CALC-MCNC: 60 MG/DL (ref 0–100)
LDLC/HDLC SERPL: 1.81 {RATIO}
LYMPHOCYTES # BLD AUTO: 1.21 10*3/MM3 (ref 0.7–3.1)
LYMPHOCYTES NFR BLD AUTO: 11 % (ref 19.6–45.3)
M PNEUMO IGG SER IA-ACNC: NOT DETECTED
MAGNESIUM SERPL-MCNC: 1.9 MG/DL (ref 1.6–2.4)
MCH RBC QN AUTO: 29.3 PG (ref 26.6–33)
MCHC RBC AUTO-ENTMCNC: 31.4 G/DL (ref 31.5–35.7)
MCV RBC AUTO: 93.3 FL (ref 79–97)
MONOCYTES # BLD AUTO: 0.63 10*3/MM3 (ref 0.1–0.9)
MONOCYTES NFR BLD AUTO: 5.7 % (ref 5–12)
NEUTROPHILS # BLD AUTO: 9.07 10*3/MM3 (ref 1.7–7)
NEUTROPHILS NFR BLD AUTO: 82.5 % (ref 42.7–76)
NRBC BLD AUTO-RTO: 0 /100 WBC (ref 0–0.2)
NT-PROBNP SERPL-MCNC: 518.4 PG/ML (ref 5–1800)
PLATELET # BLD AUTO: 242 10*3/MM3 (ref 140–450)
PMV BLD AUTO: 10.4 FL (ref 6–12)
POTASSIUM BLD-SCNC: 4.4 MMOL/L (ref 3.5–5.2)
RBC # BLD AUTO: 2.83 10*6/MM3 (ref 3.77–5.28)
RH BLD: POSITIVE
RH BLD: POSITIVE
RHINOVIRUS RNA SPEC NAA+PROBE: DETECTED
RSV RNA NPH QL NAA+NON-PROBE: NOT DETECTED
SODIUM BLD-SCNC: 140 MMOL/L (ref 136–145)
T&S EXPIRATION DATE: NORMAL
TRIGL SERPL-MCNC: 87 MG/DL (ref 0–150)
TSH SERPL DL<=0.05 MIU/L-ACNC: 1.27 UIU/ML (ref 0.27–4.2)
VLDLC SERPL-MCNC: 17.4 MG/DL
WBC NRBC COR # BLD: 10.99 10*3/MM3 (ref 3.4–10.8)

## 2019-11-24 PROCEDURE — 25010000002 MAGNESIUM SULFATE 2 GM/50ML SOLUTION: Performed by: NURSE PRACTITIONER

## 2019-11-24 PROCEDURE — 92610 EVALUATE SWALLOWING FUNCTION: CPT

## 2019-11-24 PROCEDURE — 82962 GLUCOSE BLOOD TEST: CPT

## 2019-11-24 PROCEDURE — 93306 TTE W/DOPPLER COMPLETE: CPT

## 2019-11-24 PROCEDURE — 97116 GAIT TRAINING THERAPY: CPT

## 2019-11-24 PROCEDURE — 99223 1ST HOSP IP/OBS HIGH 75: CPT | Performed by: INTERNAL MEDICINE

## 2019-11-24 PROCEDURE — 85025 COMPLETE CBC W/AUTO DIFF WBC: CPT | Performed by: NURSE PRACTITIONER

## 2019-11-24 PROCEDURE — 86900 BLOOD TYPING SEROLOGIC ABO: CPT | Performed by: INTERNAL MEDICINE

## 2019-11-24 PROCEDURE — 99222 1ST HOSP IP/OBS MODERATE 55: CPT | Performed by: INTERNAL MEDICINE

## 2019-11-24 PROCEDURE — 85018 HEMOGLOBIN: CPT | Performed by: INTERNAL MEDICINE

## 2019-11-24 PROCEDURE — G0378 HOSPITAL OBSERVATION PER HR: HCPCS

## 2019-11-24 PROCEDURE — 94799 UNLISTED PULMONARY SVC/PX: CPT

## 2019-11-24 PROCEDURE — 93880 EXTRACRANIAL BILAT STUDY: CPT

## 2019-11-24 PROCEDURE — 86900 BLOOD TYPING SEROLOGIC ABO: CPT

## 2019-11-24 PROCEDURE — 97530 THERAPEUTIC ACTIVITIES: CPT

## 2019-11-24 PROCEDURE — 93306 TTE W/DOPPLER COMPLETE: CPT | Performed by: INTERNAL MEDICINE

## 2019-11-24 PROCEDURE — 99222 1ST HOSP IP/OBS MODERATE 55: CPT | Performed by: ORTHOPAEDIC SURGERY

## 2019-11-24 PROCEDURE — 86901 BLOOD TYPING SEROLOGIC RH(D): CPT

## 2019-11-24 PROCEDURE — 86901 BLOOD TYPING SEROLOGIC RH(D): CPT | Performed by: INTERNAL MEDICINE

## 2019-11-24 PROCEDURE — 97162 PT EVAL MOD COMPLEX 30 MIN: CPT

## 2019-11-24 PROCEDURE — 94640 AIRWAY INHALATION TREATMENT: CPT

## 2019-11-24 PROCEDURE — 83735 ASSAY OF MAGNESIUM: CPT | Performed by: NURSE PRACTITIONER

## 2019-11-24 PROCEDURE — 97110 THERAPEUTIC EXERCISES: CPT

## 2019-11-24 PROCEDURE — 80048 BASIC METABOLIC PNL TOTAL CA: CPT | Performed by: NURSE PRACTITIONER

## 2019-11-24 PROCEDURE — 0100U HC BIOFIRE FILMARRAY RESP PANEL 2: CPT | Performed by: NURSE PRACTITIONER

## 2019-11-24 PROCEDURE — 85014 HEMATOCRIT: CPT | Performed by: INTERNAL MEDICINE

## 2019-11-24 PROCEDURE — 86850 RBC ANTIBODY SCREEN: CPT | Performed by: INTERNAL MEDICINE

## 2019-11-24 PROCEDURE — 63710000001 INSULIN LISPRO (HUMAN) PER 5 UNITS: Performed by: NURSE PRACTITIONER

## 2019-11-24 RX ORDER — FERROUS SULFATE 325(65) MG
325 TABLET ORAL EVERY OTHER DAY
COMMUNITY
End: 2020-09-08

## 2019-11-24 RX ORDER — BENZONATATE 100 MG/1
100 CAPSULE ORAL 3 TIMES DAILY PRN
Status: DISCONTINUED | OUTPATIENT
Start: 2019-11-24 | End: 2019-11-28 | Stop reason: HOSPADM

## 2019-11-24 RX ORDER — BISACODYL 10 MG
10 SUPPOSITORY, RECTAL RECTAL DAILY PRN
Status: DISCONTINUED | OUTPATIENT
Start: 2019-11-24 | End: 2019-11-28 | Stop reason: HOSPADM

## 2019-11-24 RX ORDER — CAPSAICIN 0.75 MG/G
CREAM TOPICAL EVERY 8 HOURS PRN
Status: DISCONTINUED | OUTPATIENT
Start: 2019-11-24 | End: 2019-11-28 | Stop reason: HOSPADM

## 2019-11-24 RX ORDER — SODIUM CHLORIDE 0.9 % (FLUSH) 0.9 %
10 SYRINGE (ML) INJECTION EVERY 12 HOURS SCHEDULED
Status: DISCONTINUED | OUTPATIENT
Start: 2019-11-24 | End: 2019-11-28 | Stop reason: HOSPADM

## 2019-11-24 RX ORDER — CARVEDILOL 6.25 MG/1
6.25 TABLET ORAL EVERY 12 HOURS SCHEDULED
Status: DISCONTINUED | OUTPATIENT
Start: 2019-11-24 | End: 2019-11-24

## 2019-11-24 RX ORDER — MAGNESIUM SULFATE HEPTAHYDRATE 40 MG/ML
2 INJECTION, SOLUTION INTRAVENOUS ONCE
Status: COMPLETED | OUTPATIENT
Start: 2019-11-24 | End: 2019-11-24

## 2019-11-24 RX ORDER — DEXTROSE MONOHYDRATE 25 G/50ML
25 INJECTION, SOLUTION INTRAVENOUS
Status: DISCONTINUED | OUTPATIENT
Start: 2019-11-24 | End: 2019-11-28 | Stop reason: HOSPADM

## 2019-11-24 RX ORDER — METOPROLOL TARTRATE 5 MG/5ML
5 INJECTION INTRAVENOUS ONCE AS NEEDED
Status: COMPLETED | OUTPATIENT
Start: 2019-11-24 | End: 2019-11-25

## 2019-11-24 RX ORDER — ATORVASTATIN CALCIUM 40 MG/1
80 TABLET, FILM COATED ORAL DAILY
Status: DISCONTINUED | OUTPATIENT
Start: 2019-11-24 | End: 2019-11-28 | Stop reason: HOSPADM

## 2019-11-24 RX ORDER — SPIRONOLACTONE 50 MG/1
50 TABLET, FILM COATED ORAL DAILY
COMMUNITY
End: 2019-11-28 | Stop reason: HOSPADM

## 2019-11-24 RX ORDER — ACETAMINOPHEN 650 MG/1
650 SUPPOSITORY RECTAL EVERY 4 HOURS PRN
Status: DISCONTINUED | OUTPATIENT
Start: 2019-11-24 | End: 2019-11-28 | Stop reason: HOSPADM

## 2019-11-24 RX ORDER — SODIUM CHLORIDE 0.9 % (FLUSH) 0.9 %
10 SYRINGE (ML) INJECTION AS NEEDED
Status: DISCONTINUED | OUTPATIENT
Start: 2019-11-24 | End: 2019-11-28 | Stop reason: HOSPADM

## 2019-11-24 RX ORDER — CARVEDILOL 12.5 MG/1
12.5 TABLET ORAL EVERY 12 HOURS SCHEDULED
Status: DISCONTINUED | OUTPATIENT
Start: 2019-11-24 | End: 2019-11-28 | Stop reason: HOSPADM

## 2019-11-24 RX ORDER — IPRATROPIUM BROMIDE AND ALBUTEROL SULFATE 2.5; .5 MG/3ML; MG/3ML
3 SOLUTION RESPIRATORY (INHALATION)
Status: DISCONTINUED | OUTPATIENT
Start: 2019-11-24 | End: 2019-11-28 | Stop reason: HOSPADM

## 2019-11-24 RX ORDER — LOSARTAN POTASSIUM 50 MG/1
100 TABLET ORAL DAILY
Status: DISCONTINUED | OUTPATIENT
Start: 2019-11-24 | End: 2019-11-24

## 2019-11-24 RX ORDER — ACETAMINOPHEN 325 MG/1
650 TABLET ORAL EVERY 4 HOURS PRN
Status: DISCONTINUED | OUTPATIENT
Start: 2019-11-24 | End: 2019-11-28 | Stop reason: HOSPADM

## 2019-11-24 RX ORDER — ACETAMINOPHEN 160 MG/5ML
650 SOLUTION ORAL EVERY 4 HOURS PRN
Status: DISCONTINUED | OUTPATIENT
Start: 2019-11-24 | End: 2019-11-28 | Stop reason: HOSPADM

## 2019-11-24 RX ORDER — CHOLECALCIFEROL (VITAMIN D3) 125 MCG
5 CAPSULE ORAL NIGHTLY PRN
Status: DISCONTINUED | OUTPATIENT
Start: 2019-11-24 | End: 2019-11-28 | Stop reason: HOSPADM

## 2019-11-24 RX ORDER — SODIUM CHLORIDE 9 MG/ML
100 INJECTION, SOLUTION INTRAVENOUS CONTINUOUS
Status: DISCONTINUED | OUTPATIENT
Start: 2019-11-24 | End: 2019-11-28 | Stop reason: HOSPADM

## 2019-11-24 RX ORDER — HYDRALAZINE HYDROCHLORIDE 20 MG/ML
10 INJECTION INTRAMUSCULAR; INTRAVENOUS EVERY 6 HOURS PRN
Status: DISCONTINUED | OUTPATIENT
Start: 2019-11-24 | End: 2019-11-24

## 2019-11-24 RX ORDER — NICOTINE POLACRILEX 4 MG
15 LOZENGE BUCCAL
Status: DISCONTINUED | OUTPATIENT
Start: 2019-11-24 | End: 2019-11-28 | Stop reason: HOSPADM

## 2019-11-24 RX ORDER — PANTOPRAZOLE SODIUM 40 MG/10ML
40 INJECTION, POWDER, LYOPHILIZED, FOR SOLUTION INTRAVENOUS EVERY 12 HOURS SCHEDULED
Status: DISCONTINUED | OUTPATIENT
Start: 2019-11-24 | End: 2019-11-28 | Stop reason: HOSPADM

## 2019-11-24 RX ADMIN — PANTOPRAZOLE SODIUM 40 MG: 40 INJECTION, POWDER, FOR SOLUTION INTRAVENOUS at 01:58

## 2019-11-24 RX ADMIN — IPRATROPIUM BROMIDE AND ALBUTEROL SULFATE 3 ML: 2.5; .5 SOLUTION RESPIRATORY (INHALATION) at 19:40

## 2019-11-24 RX ADMIN — CARVEDILOL 12.5 MG: 12.5 TABLET, FILM COATED ORAL at 20:31

## 2019-11-24 RX ADMIN — SODIUM CHLORIDE, PRESERVATIVE FREE 10 ML: 5 INJECTION INTRAVENOUS at 08:47

## 2019-11-24 RX ADMIN — IPRATROPIUM BROMIDE AND ALBUTEROL SULFATE 3 ML: 2.5; .5 SOLUTION RESPIRATORY (INHALATION) at 15:28

## 2019-11-24 RX ADMIN — IPRATROPIUM BROMIDE AND ALBUTEROL SULFATE 3 ML: 2.5; .5 SOLUTION RESPIRATORY (INHALATION) at 08:54

## 2019-11-24 RX ADMIN — DOXYCYCLINE 100 MG: 100 INJECTION, POWDER, LYOPHILIZED, FOR SOLUTION INTRAVENOUS at 01:57

## 2019-11-24 RX ADMIN — PANTOPRAZOLE SODIUM 40 MG: 40 INJECTION, POWDER, FOR SOLUTION INTRAVENOUS at 20:31

## 2019-11-24 RX ADMIN — CARVEDILOL 12.5 MG: 12.5 TABLET, FILM COATED ORAL at 08:42

## 2019-11-24 RX ADMIN — SODIUM CHLORIDE, PRESERVATIVE FREE 10 ML: 5 INJECTION INTRAVENOUS at 01:59

## 2019-11-24 RX ADMIN — INSULIN LISPRO 4 UNITS: 100 INJECTION, SOLUTION INTRAVENOUS; SUBCUTANEOUS at 20:31

## 2019-11-24 RX ADMIN — INSULIN LISPRO 2 UNITS: 100 INJECTION, SOLUTION INTRAVENOUS; SUBCUTANEOUS at 18:06

## 2019-11-24 RX ADMIN — PANTOPRAZOLE SODIUM 40 MG: 40 INJECTION, POWDER, FOR SOLUTION INTRAVENOUS at 08:47

## 2019-11-24 RX ADMIN — GUAIFENESIN 200 MG: 200 SOLUTION ORAL at 01:59

## 2019-11-24 RX ADMIN — DOXYCYCLINE 100 MG: 100 INJECTION, POWDER, LYOPHILIZED, FOR SOLUTION INTRAVENOUS at 12:06

## 2019-11-24 RX ADMIN — SERTRALINE HYDROCHLORIDE 50 MG: 50 TABLET ORAL at 08:42

## 2019-11-24 RX ADMIN — SODIUM CHLORIDE 100 ML/HR: 9 INJECTION, SOLUTION INTRAVENOUS at 00:47

## 2019-11-24 RX ADMIN — IPRATROPIUM BROMIDE AND ALBUTEROL SULFATE 3 ML: 2.5; .5 SOLUTION RESPIRATORY (INHALATION) at 11:44

## 2019-11-24 RX ADMIN — SODIUM CHLORIDE, PRESERVATIVE FREE 10 ML: 5 INJECTION INTRAVENOUS at 20:32

## 2019-11-24 RX ADMIN — MAGNESIUM SULFATE 2 G: 2 INJECTION INTRAVENOUS at 03:01

## 2019-11-24 RX ADMIN — ATORVASTATIN CALCIUM 80 MG: 40 TABLET, FILM COATED ORAL at 08:42

## 2019-11-25 ENCOUNTER — ANESTHESIA EVENT (OUTPATIENT)
Dept: GASTROENTEROLOGY | Facility: HOSPITAL | Age: 74
End: 2019-11-25

## 2019-11-25 ENCOUNTER — ANESTHESIA (OUTPATIENT)
Dept: GASTROENTEROLOGY | Facility: HOSPITAL | Age: 74
End: 2019-11-25

## 2019-11-25 PROBLEM — D64.9 ACUTE ON CHRONIC ANEMIA: Status: ACTIVE | Noted: 2019-11-23

## 2019-11-25 PROBLEM — K21.00 REFLUX ESOPHAGITIS: Status: ACTIVE | Noted: 2019-11-23

## 2019-11-25 PROBLEM — R13.19 ESOPHAGEAL DYSPHAGIA: Status: ACTIVE | Noted: 2019-11-23

## 2019-11-25 LAB
ANION GAP SERPL CALCULATED.3IONS-SCNC: 10 MMOL/L (ref 5–15)
BH CV ECHO MEAS - AO MAX PG (FULL): 9.7 MMHG
BH CV ECHO MEAS - AO MAX PG: 13.3 MMHG
BH CV ECHO MEAS - AO MEAN PG (FULL): 4.9 MMHG
BH CV ECHO MEAS - AO MEAN PG: 6.9 MMHG
BH CV ECHO MEAS - AO ROOT AREA (BSA CORRECTED): 1.6
BH CV ECHO MEAS - AO ROOT AREA: 7.4 CM^2
BH CV ECHO MEAS - AO ROOT DIAM: 3.1 CM
BH CV ECHO MEAS - AO V2 MAX: 182.2 CM/SEC
BH CV ECHO MEAS - AO V2 MEAN: 120.6 CM/SEC
BH CV ECHO MEAS - AO V2 VTI: 41.2 CM
BH CV ECHO MEAS - ASC AORTA: 3.3 CM
BH CV ECHO MEAS - AVA(I,A): 2.3 CM^2
BH CV ECHO MEAS - AVA(I,D): 2.3 CM^2
BH CV ECHO MEAS - AVA(V,A): 1.8 CM^2
BH CV ECHO MEAS - AVA(V,D): 1.8 CM^2
BH CV ECHO MEAS - BSA(HAYCOCK): 2 M^2
BH CV ECHO MEAS - BSA(HAYCOCK): 2 M^2
BH CV ECHO MEAS - BSA: 1.9 M^2
BH CV ECHO MEAS - BSA: 1.9 M^2
BH CV ECHO MEAS - BZI_BMI: 34.9 KILOGRAMS/M^2
BH CV ECHO MEAS - BZI_BMI: 35.4 KILOGRAMS/M^2
BH CV ECHO MEAS - BZI_METRIC_HEIGHT: 160 CM
BH CV ECHO MEAS - BZI_METRIC_HEIGHT: 160 CM
BH CV ECHO MEAS - BZI_METRIC_WEIGHT: 89.4 KG
BH CV ECHO MEAS - BZI_METRIC_WEIGHT: 90.7 KG
BH CV ECHO MEAS - EDV(CUBED): 56.1 ML
BH CV ECHO MEAS - EDV(TEICH): 63.1 ML
BH CV ECHO MEAS - EF(CUBED): 82.7 %
BH CV ECHO MEAS - EF(TEICH): 76.2 %
BH CV ECHO MEAS - ESV(CUBED): 9.7 ML
BH CV ECHO MEAS - ESV(TEICH): 15 ML
BH CV ECHO MEAS - FS: 44.3 %
BH CV ECHO MEAS - IVS/LVPW: 1.1
BH CV ECHO MEAS - IVSD: 1.3 CM
BH CV ECHO MEAS - LA DIMENSION: 4 CM
BH CV ECHO MEAS - LA/AO: 1.3
BH CV ECHO MEAS - LAD MAJOR: 4.4 CM
BH CV ECHO MEAS - LAT PEAK E' VEL: 6.9 CM/SEC
BH CV ECHO MEAS - LATERAL E/E' RATIO: 10.5
BH CV ECHO MEAS - LV MASS(C)D: 157.2 GRAMS
BH CV ECHO MEAS - LV MASS(C)DI: 81.8 GRAMS/M^2
BH CV ECHO MEAS - LV MAX PG: 3.6 MMHG
BH CV ECHO MEAS - LV MEAN PG: 2 MMHG
BH CV ECHO MEAS - LV V1 MAX: 93.5 CM/SEC
BH CV ECHO MEAS - LV V1 MEAN: 64.1 CM/SEC
BH CV ECHO MEAS - LV V1 VTI: 27.2 CM
BH CV ECHO MEAS - LVIDD: 3.8 CM
BH CV ECHO MEAS - LVIDS: 2.1 CM
BH CV ECHO MEAS - LVOT AREA (M): 3.5 CM^2
BH CV ECHO MEAS - LVOT AREA: 3.5 CM^2
BH CV ECHO MEAS - LVOT DIAM: 2.1 CM
BH CV ECHO MEAS - LVPWD: 1.2 CM
BH CV ECHO MEAS - MED PEAK E' VEL: 7 CM/SEC
BH CV ECHO MEAS - MEDIAL E/E' RATIO: 10.4
BH CV ECHO MEAS - MV A MAX VEL: 90.5 CM/SEC
BH CV ECHO MEAS - MV DEC TIME: 0.13 SEC
BH CV ECHO MEAS - MV E MAX VEL: 74.7 CM/SEC
BH CV ECHO MEAS - MV E/A: 0.83
BH CV ECHO MEAS - PA ACC SLOPE: 706.9 CM/SEC^2
BH CV ECHO MEAS - PA ACC TIME: 0.13 SEC
BH CV ECHO MEAS - PA MAX PG: 5.9 MMHG
BH CV ECHO MEAS - PA PR(ACCEL): 18.8 MMHG
BH CV ECHO MEAS - PA V2 MAX: 120.8 CM/SEC
BH CV ECHO MEAS - RAP SYSTOLE: 8 MMHG
BH CV ECHO MEAS - RVDD: 2.4 CM
BH CV ECHO MEAS - RVSP: 36 MMHG
BH CV ECHO MEAS - SI(AO): 158.4 ML/M^2
BH CV ECHO MEAS - SI(CUBED): 24.2 ML/M^2
BH CV ECHO MEAS - SI(LVOT): 49.1 ML/M^2
BH CV ECHO MEAS - SI(TEICH): 25 ML/M^2
BH CV ECHO MEAS - SV(AO): 304.3 ML
BH CV ECHO MEAS - SV(CUBED): 46.4 ML
BH CV ECHO MEAS - SV(LVOT): 94.4 ML
BH CV ECHO MEAS - SV(TEICH): 48.1 ML
BH CV ECHO MEAS - TR MAX PG: 28 MMHG
BH CV ECHO MEAS - TR MAX VEL: 263.6 CM/SEC
BH CV ECHO MEAS - TV MAX PG: 1.1 MMHG
BH CV ECHO MEAS - TV V2 MAX: 51.5 CM/SEC
BH CV ECHO MEASUREMENTS AVERAGE E/E' RATIO: 10.75
BH CV VAS BP RIGHT ARM: NORMAL MMHG
BH CV XLRA - RV BASE: 3.7 CM
BH CV XLRA - RV MID: 2.7 CM
BH CV XLRA MEAS LEFT DIST CCA EDV: 20.4 CM/SEC
BH CV XLRA MEAS LEFT DIST CCA PSV: 91 CM/SEC
BH CV XLRA MEAS LEFT DIST ICA EDV: 39.3 CM/SEC
BH CV XLRA MEAS LEFT DIST ICA PSV: 124.9 CM/SEC
BH CV XLRA MEAS LEFT ICA/CCA RATIO: 2.2
BH CV XLRA MEAS LEFT MID CCA EDV: 21.5 CM/SEC
BH CV XLRA MEAS LEFT MID CCA PSV: 91.5 CM/SEC
BH CV XLRA MEAS LEFT MID ICA EDV: 39.3 CM/SEC
BH CV XLRA MEAS LEFT MID ICA PSV: 200.6 CM/SEC
BH CV XLRA MEAS LEFT PROX CCA EDV: 21.5 CM/SEC
BH CV XLRA MEAS LEFT PROX CCA PSV: 95.4 CM/SEC
BH CV XLRA MEAS LEFT PROX ECA EDV: 0 CM/SEC
BH CV XLRA MEAS LEFT PROX ECA PSV: 125 CM/SEC
BH CV XLRA MEAS LEFT PROX ICA EDV: 46.3 CM/SEC
BH CV XLRA MEAS LEFT PROX ICA PSV: 144.5 CM/SEC
BH CV XLRA MEAS LEFT PROX SCLA EDV: 0 CM/SEC
BH CV XLRA MEAS LEFT PROX SCLA PSV: 82.2 CM/SEC
BH CV XLRA MEAS LEFT VERTEBRAL A EDV: 29.5 CM/SEC
BH CV XLRA MEAS LEFT VERTEBRAL A PSV: 102.4 CM/SEC
BH CV XLRA MEAS RIGHT DIST CCA EDV: 15.4 CM/SEC
BH CV XLRA MEAS RIGHT DIST CCA PSV: 76.6 CM/SEC
BH CV XLRA MEAS RIGHT DIST ICA EDV: 19.6 CM/SEC
BH CV XLRA MEAS RIGHT DIST ICA PSV: 71.6 CM/SEC
BH CV XLRA MEAS RIGHT ICA/CCA RATIO: 1.6
BH CV XLRA MEAS RIGHT MID CCA EDV: 23.7 CM/SEC
BH CV XLRA MEAS RIGHT MID CCA PSV: 90.4 CM/SEC
BH CV XLRA MEAS RIGHT MID ICA EDV: 43.7 CM/SEC
BH CV XLRA MEAS RIGHT MID ICA PSV: 123.1 CM/SEC
BH CV XLRA MEAS RIGHT PROX CCA EDV: 18.2 CM/SEC
BH CV XLRA MEAS RIGHT PROX CCA PSV: 97.6 CM/SEC
BH CV XLRA MEAS RIGHT PROX ECA EDV: 14 CM/SEC
BH CV XLRA MEAS RIGHT PROX ECA PSV: 151.9 CM/SEC
BH CV XLRA MEAS RIGHT PROX ICA EDV: 26.5 CM/SEC
BH CV XLRA MEAS RIGHT PROX ICA PSV: 82.7 CM/SEC
BH CV XLRA MEAS RIGHT PROX SCLA EDV: 0 CM/SEC
BH CV XLRA MEAS RIGHT PROX SCLA PSV: 86 CM/SEC
BH CV XLRA MEAS RIGHT VERTEBRAL A EDV: 17.5 CM/SEC
BH CV XLRA MEAS RIGHT VERTEBRAL A PSV: 81.2 CM/SEC
BUN BLD-MCNC: 30 MG/DL (ref 8–23)
BUN/CREAT SERPL: 17.4 (ref 7–25)
CALCIUM SPEC-SCNC: 7.9 MG/DL (ref 8.6–10.5)
CHLORIDE SERPL-SCNC: 112 MMOL/L (ref 98–107)
CO2 SERPL-SCNC: 19 MMOL/L (ref 22–29)
CREAT BLD-MCNC: 1.72 MG/DL (ref 0.57–1)
DEPRECATED RDW RBC AUTO: 51.6 FL (ref 37–54)
ERYTHROCYTE [DISTWIDTH] IN BLOOD BY AUTOMATED COUNT: 15.2 % (ref 12.3–15.4)
GFR SERPL CREATININE-BSD FRML MDRD: 35 ML/MIN/1.73
GLUCOSE BLD-MCNC: 125 MG/DL (ref 65–99)
GLUCOSE BLDC GLUCOMTR-MCNC: 108 MG/DL (ref 70–130)
GLUCOSE BLDC GLUCOMTR-MCNC: 117 MG/DL (ref 70–130)
GLUCOSE BLDC GLUCOMTR-MCNC: 123 MG/DL (ref 70–130)
GLUCOSE BLDC GLUCOMTR-MCNC: 143 MG/DL (ref 70–130)
GLUCOSE BLDC GLUCOMTR-MCNC: 223 MG/DL (ref 70–130)
HCT VFR BLD AUTO: 24.6 % (ref 34–46.6)
HGB BLD-MCNC: 7.5 G/DL (ref 12–15.9)
MAXIMAL PREDICTED HEART RATE: 145 BPM
MCH RBC QN AUTO: 28.2 PG (ref 26.6–33)
MCHC RBC AUTO-ENTMCNC: 30.5 G/DL (ref 31.5–35.7)
MCV RBC AUTO: 92.5 FL (ref 79–97)
PLATELET # BLD AUTO: 217 10*3/MM3 (ref 140–450)
PMV BLD AUTO: 10.1 FL (ref 6–12)
POTASSIUM BLD-SCNC: 4.4 MMOL/L (ref 3.5–5.2)
RBC # BLD AUTO: 2.66 10*6/MM3 (ref 3.77–5.28)
SODIUM BLD-SCNC: 141 MMOL/L (ref 136–145)
STRESS TARGET HR: 123 BPM
WBC NRBC COR # BLD: 7.3 10*3/MM3 (ref 3.4–10.8)

## 2019-11-25 PROCEDURE — 85027 COMPLETE CBC AUTOMATED: CPT | Performed by: HOSPITALIST

## 2019-11-25 PROCEDURE — 99232 SBSQ HOSP IP/OBS MODERATE 35: CPT | Performed by: INTERNAL MEDICINE

## 2019-11-25 PROCEDURE — 88342 IMHCHEM/IMCYTCHM 1ST ANTB: CPT | Performed by: INTERNAL MEDICINE

## 2019-11-25 PROCEDURE — 99231 SBSQ HOSP IP/OBS SF/LOW 25: CPT | Performed by: ORTHOPAEDIC SURGERY

## 2019-11-25 PROCEDURE — 94799 UNLISTED PULMONARY SVC/PX: CPT

## 2019-11-25 PROCEDURE — 97110 THERAPEUTIC EXERCISES: CPT

## 2019-11-25 PROCEDURE — 0DB58ZX EXCISION OF ESOPHAGUS, VIA NATURAL OR ARTIFICIAL OPENING ENDOSCOPIC, DIAGNOSTIC: ICD-10-PCS | Performed by: INTERNAL MEDICINE

## 2019-11-25 PROCEDURE — 97165 OT EVAL LOW COMPLEX 30 MIN: CPT

## 2019-11-25 PROCEDURE — 82962 GLUCOSE BLOOD TEST: CPT

## 2019-11-25 PROCEDURE — 97116 GAIT TRAINING THERAPY: CPT

## 2019-11-25 PROCEDURE — 92610 EVALUATE SWALLOWING FUNCTION: CPT

## 2019-11-25 PROCEDURE — 80048 BASIC METABOLIC PNL TOTAL CA: CPT | Performed by: HOSPITALIST

## 2019-11-25 PROCEDURE — 25010000002 PROPOFOL 10 MG/ML EMULSION: Performed by: NURSE ANESTHETIST, CERTIFIED REGISTERED

## 2019-11-25 PROCEDURE — 88305 TISSUE EXAM BY PATHOLOGIST: CPT | Performed by: INTERNAL MEDICINE

## 2019-11-25 PROCEDURE — 0DB68ZX EXCISION OF STOMACH, VIA NATURAL OR ARTIFICIAL OPENING ENDOSCOPIC, DIAGNOSTIC: ICD-10-PCS | Performed by: INTERNAL MEDICINE

## 2019-11-25 RX ORDER — PEG-3350, SODIUM SULFATE, SODIUM CHLORIDE, POTASSIUM CHLORIDE, SODIUM ASCORBATE AND ASCORBIC ACID 7.5-2.691G
1000 KIT ORAL
Status: COMPLETED | OUTPATIENT
Start: 2019-11-25 | End: 2019-11-25

## 2019-11-25 RX ORDER — LIDOCAINE HYDROCHLORIDE 10 MG/ML
INJECTION, SOLUTION EPIDURAL; INFILTRATION; INTRACAUDAL; PERINEURAL AS NEEDED
Status: DISCONTINUED | OUTPATIENT
Start: 2019-11-25 | End: 2019-11-25 | Stop reason: SURG

## 2019-11-25 RX ORDER — ONDANSETRON 2 MG/ML
4 INJECTION INTRAMUSCULAR; INTRAVENOUS ONCE AS NEEDED
Status: DISCONTINUED | OUTPATIENT
Start: 2019-11-25 | End: 2019-11-25 | Stop reason: HOSPADM

## 2019-11-25 RX ORDER — HYDRALAZINE HYDROCHLORIDE 20 MG/ML
10 INJECTION INTRAMUSCULAR; INTRAVENOUS EVERY 6 HOURS PRN
Status: DISCONTINUED | OUTPATIENT
Start: 2019-11-25 | End: 2019-11-28 | Stop reason: HOSPADM

## 2019-11-25 RX ORDER — PEG-3350, SODIUM SULFATE, SODIUM CHLORIDE, POTASSIUM CHLORIDE, SODIUM ASCORBATE AND ASCORBIC ACID 7.5-2.691G
1000 KIT ORAL
Status: ACTIVE | OUTPATIENT
Start: 2019-11-26 | End: 2019-11-26

## 2019-11-25 RX ORDER — FENTANYL CITRATE 50 UG/ML
50 INJECTION, SOLUTION INTRAMUSCULAR; INTRAVENOUS
Status: DISCONTINUED | OUTPATIENT
Start: 2019-11-25 | End: 2019-11-25 | Stop reason: HOSPADM

## 2019-11-25 RX ORDER — AMLODIPINE BESYLATE 10 MG/1
5 TABLET ORAL
Status: DISCONTINUED | OUTPATIENT
Start: 2019-11-26 | End: 2019-11-26

## 2019-11-25 RX ORDER — AMLODIPINE BESYLATE 10 MG/1
5 TABLET ORAL ONCE
Status: COMPLETED | OUTPATIENT
Start: 2019-11-25 | End: 2019-11-25

## 2019-11-25 RX ORDER — AMLODIPINE BESYLATE 10 MG/1
5 TABLET ORAL ONCE
Status: COMPLETED | OUTPATIENT
Start: 2019-11-26 | End: 2019-11-25

## 2019-11-25 RX ADMIN — SODIUM CHLORIDE, PRESERVATIVE FREE 10 ML: 5 INJECTION INTRAVENOUS at 08:52

## 2019-11-25 RX ADMIN — DOXYCYCLINE 100 MG: 100 INJECTION, POWDER, LYOPHILIZED, FOR SOLUTION INTRAVENOUS at 00:53

## 2019-11-25 RX ADMIN — ACETAMINOPHEN 650 MG: 325 TABLET ORAL at 23:55

## 2019-11-25 RX ADMIN — AMLODIPINE BESYLATE 5 MG: 10 TABLET ORAL at 23:55

## 2019-11-25 RX ADMIN — INSULIN LISPRO 4 UNITS: 100 INJECTION, SOLUTION INTRAVENOUS; SUBCUTANEOUS at 20:23

## 2019-11-25 RX ADMIN — SERTRALINE HYDROCHLORIDE 50 MG: 50 TABLET ORAL at 08:52

## 2019-11-25 RX ADMIN — AMLODIPINE BESYLATE 5 MG: 10 TABLET ORAL at 22:10

## 2019-11-25 RX ADMIN — CARVEDILOL 12.5 MG: 12.5 TABLET, FILM COATED ORAL at 20:23

## 2019-11-25 RX ADMIN — CARVEDILOL 12.5 MG: 12.5 TABLET, FILM COATED ORAL at 08:51

## 2019-11-25 RX ADMIN — IPRATROPIUM BROMIDE AND ALBUTEROL SULFATE 3 ML: 2.5; .5 SOLUTION RESPIRATORY (INHALATION) at 16:12

## 2019-11-25 RX ADMIN — PANTOPRAZOLE SODIUM 40 MG: 40 INJECTION, POWDER, FOR SOLUTION INTRAVENOUS at 08:51

## 2019-11-25 RX ADMIN — SODIUM CHLORIDE, PRESERVATIVE FREE 10 ML: 5 INJECTION INTRAVENOUS at 20:24

## 2019-11-25 RX ADMIN — METOPROLOL TARTRATE 5 MG: 5 INJECTION INTRAVENOUS at 20:23

## 2019-11-25 RX ADMIN — IPRATROPIUM BROMIDE AND ALBUTEROL SULFATE 3 ML: 2.5; .5 SOLUTION RESPIRATORY (INHALATION) at 07:59

## 2019-11-25 RX ADMIN — LIDOCAINE HYDROCHLORIDE 50 MG: 10 INJECTION, SOLUTION EPIDURAL; INFILTRATION; INTRACAUDAL; PERINEURAL at 13:49

## 2019-11-25 RX ADMIN — PANTOPRAZOLE SODIUM 40 MG: 40 INJECTION, POWDER, FOR SOLUTION INTRAVENOUS at 20:23

## 2019-11-25 RX ADMIN — PROPOFOL 150 MCG/KG/MIN: 10 INJECTION, EMULSION INTRAVENOUS at 13:49

## 2019-11-25 RX ADMIN — IPRATROPIUM BROMIDE AND ALBUTEROL SULFATE 3 ML: 2.5; .5 SOLUTION RESPIRATORY (INHALATION) at 20:47

## 2019-11-25 RX ADMIN — ATORVASTATIN CALCIUM 80 MG: 40 TABLET, FILM COATED ORAL at 08:52

## 2019-11-25 RX ADMIN — POLYETHYLENE GLYCOL 3350, SODIUM SULFATE, SODIUM CHLORIDE, POTASSIUM CHLORIDE, ASCORBIC ACID, SODIUM ASCORBATE 1000 ML: KIT at 17:58

## 2019-11-25 RX ADMIN — DOXYCYCLINE 100 MG: 100 INJECTION, POWDER, LYOPHILIZED, FOR SOLUTION INTRAVENOUS at 12:03

## 2019-11-25 NOTE — ANESTHESIA POSTPROCEDURE EVALUATION
Patient: Valarie Camara    Procedure Summary     Date:  11/25/19 Room / Location:   [facility] /  [facility]    Anesthesia Start:  1343 Anesthesia Stop:      Procedure:  ESOPHAGOGASTRODUODENOSCOPY (N/A ) Diagnosis:       Acute on chronic anemia      Esophageal dysphagia      Reflux esophagitis      History of Helicobacter pylori infection      (Acute on chronic anemia [D64.9])      (Esophageal dysphagia [R13.10])      (Reflux esophagitis [K21.0])      (History of Helicobacter pylori infection [Z86.19])    Surgeon:  Chidi Downing MD Provider:  Chi Mendez MD    Anesthesia Type:  general ASA Status:  3          Anesthesia Type: general  Last vitals  BP   160/70 (11/25/19 1335)   Temp   97 °F (36.1 °C) (11/25/19 1420)   Pulse   77 (11/25/19 1420)   Resp   16 (11/25/19 1420)     SpO2   100 % (11/25/19 1420)     Post Anesthesia Care and Evaluation    Patient location during evaluation: PACU  Patient participation: complete - patient participated  Level of consciousness: awake and alert  Pain management: adequate  Airway patency: patent  Anesthetic complications: No anesthetic complications  PONV Status: none  Cardiovascular status: acceptable  Respiratory status: acceptable  Hydration status: acceptable

## 2019-11-25 NOTE — ANESTHESIA PREPROCEDURE EVALUATION
Anesthesia Evaluation     Patient summary reviewed and Nursing notes reviewed                Airway   Mallampati: II  TM distance: >3 FB  Neck ROM: full  No difficulty expected  Dental - normal exam   (+) upper dentures and lower dentures    Pulmonary - normal exam   (+) COPD,   Cardiovascular - normal exam    (+) hypertension, hyperlipidemia,       Neuro/Psych- negative ROS  GI/Hepatic/Renal/Endo    (+) morbid obesity, GERD,  renal disease, diabetes mellitus,     Musculoskeletal     Abdominal  - normal exam    Bowel sounds: normal.   Substance History - negative use     OB/GYN negative ob/gyn ROS         Other   arthritis,                      Anesthesia Plan    ASA 3     general     intravenous induction     Anesthetic plan, all risks, benefits, and alternatives have been provided, discussed and informed consent has been obtained with: patient.    Plan discussed with CRNA.

## 2019-11-26 ENCOUNTER — ANESTHESIA (OUTPATIENT)
Dept: GASTROENTEROLOGY | Facility: HOSPITAL | Age: 74
End: 2019-11-26

## 2019-11-26 ENCOUNTER — ANESTHESIA EVENT (OUTPATIENT)
Dept: GASTROENTEROLOGY | Facility: HOSPITAL | Age: 74
End: 2019-11-26

## 2019-11-26 ENCOUNTER — APPOINTMENT (OUTPATIENT)
Dept: GASTROENTEROLOGY | Facility: HOSPITAL | Age: 74
End: 2019-11-26

## 2019-11-26 LAB
ANION GAP SERPL CALCULATED.3IONS-SCNC: 11 MMOL/L (ref 5–15)
BUN BLD-MCNC: 23 MG/DL (ref 8–23)
BUN/CREAT SERPL: 16.1 (ref 7–25)
CALCIUM SPEC-SCNC: 8.2 MG/DL (ref 8.6–10.5)
CHLORIDE SERPL-SCNC: 109 MMOL/L (ref 98–107)
CO2 SERPL-SCNC: 19 MMOL/L (ref 22–29)
CREAT BLD-MCNC: 1.43 MG/DL (ref 0.57–1)
DEPRECATED RDW RBC AUTO: 49.1 FL (ref 37–54)
ERYTHROCYTE [DISTWIDTH] IN BLOOD BY AUTOMATED COUNT: 14.6 % (ref 12.3–15.4)
GFR SERPL CREATININE-BSD FRML MDRD: 43 ML/MIN/1.73
GLUCOSE BLD-MCNC: 114 MG/DL (ref 65–99)
GLUCOSE BLDC GLUCOMTR-MCNC: 106 MG/DL (ref 70–130)
GLUCOSE BLDC GLUCOMTR-MCNC: 116 MG/DL (ref 70–130)
GLUCOSE BLDC GLUCOMTR-MCNC: 116 MG/DL (ref 70–130)
GLUCOSE BLDC GLUCOMTR-MCNC: 118 MG/DL (ref 70–130)
GLUCOSE BLDC GLUCOMTR-MCNC: 119 MG/DL (ref 70–130)
HCT VFR BLD AUTO: 25.9 % (ref 34–46.6)
HGB BLD-MCNC: 8.1 G/DL (ref 12–15.9)
MCH RBC QN AUTO: 28.7 PG (ref 26.6–33)
MCHC RBC AUTO-ENTMCNC: 31.3 G/DL (ref 31.5–35.7)
MCV RBC AUTO: 91.8 FL (ref 79–97)
PLATELET # BLD AUTO: 239 10*3/MM3 (ref 140–450)
PMV BLD AUTO: 10.4 FL (ref 6–12)
POTASSIUM BLD-SCNC: 4 MMOL/L (ref 3.5–5.2)
RBC # BLD AUTO: 2.82 10*6/MM3 (ref 3.77–5.28)
SODIUM BLD-SCNC: 139 MMOL/L (ref 136–145)
WBC NRBC COR # BLD: 6.27 10*3/MM3 (ref 3.4–10.8)

## 2019-11-26 PROCEDURE — 25010000002 HYDRALAZINE PER 20 MG: Performed by: NURSE PRACTITIONER

## 2019-11-26 PROCEDURE — 25010000002 PROPOFOL 10 MG/ML EMULSION: Performed by: NURSE ANESTHETIST, CERTIFIED REGISTERED

## 2019-11-26 PROCEDURE — 97116 GAIT TRAINING THERAPY: CPT

## 2019-11-26 PROCEDURE — 85027 COMPLETE CBC AUTOMATED: CPT | Performed by: INTERNAL MEDICINE

## 2019-11-26 PROCEDURE — 82962 GLUCOSE BLOOD TEST: CPT

## 2019-11-26 PROCEDURE — 0DJD8ZZ INSPECTION OF LOWER INTESTINAL TRACT, VIA NATURAL OR ARTIFICIAL OPENING ENDOSCOPIC: ICD-10-PCS | Performed by: INTERNAL MEDICINE

## 2019-11-26 PROCEDURE — 25010000002 ONDANSETRON PER 1 MG: Performed by: NURSE PRACTITIONER

## 2019-11-26 PROCEDURE — 25010000003 LIDOCAINE 1 % SOLUTION: Performed by: NURSE ANESTHETIST, CERTIFIED REGISTERED

## 2019-11-26 PROCEDURE — 99232 SBSQ HOSP IP/OBS MODERATE 35: CPT | Performed by: INTERNAL MEDICINE

## 2019-11-26 PROCEDURE — 97110 THERAPEUTIC EXERCISES: CPT

## 2019-11-26 PROCEDURE — 91110 GI TRC IMG INTRAL ESOPH-ILE: CPT

## 2019-11-26 PROCEDURE — 80048 BASIC METABOLIC PNL TOTAL CA: CPT | Performed by: INTERNAL MEDICINE

## 2019-11-26 PROCEDURE — 0DJ07ZZ INSPECTION OF UPPER INTESTINAL TRACT, VIA NATURAL OR ARTIFICIAL OPENING: ICD-10-PCS | Performed by: INTERNAL MEDICINE

## 2019-11-26 PROCEDURE — 94799 UNLISTED PULMONARY SVC/PX: CPT

## 2019-11-26 PROCEDURE — 99222 1ST HOSP IP/OBS MODERATE 55: CPT | Performed by: INTERNAL MEDICINE

## 2019-11-26 RX ORDER — SODIUM CHLORIDE 0.9 % (FLUSH) 0.9 %
3-10 SYRINGE (ML) INJECTION AS NEEDED
Status: DISCONTINUED | OUTPATIENT
Start: 2019-11-26 | End: 2019-11-28 | Stop reason: HOSPADM

## 2019-11-26 RX ORDER — PROPOFOL 10 MG/ML
VIAL (ML) INTRAVENOUS AS NEEDED
Status: DISCONTINUED | OUTPATIENT
Start: 2019-11-26 | End: 2019-11-26 | Stop reason: SURG

## 2019-11-26 RX ORDER — SODIUM CHLORIDE, SODIUM LACTATE, POTASSIUM CHLORIDE, CALCIUM CHLORIDE 600; 310; 30; 20 MG/100ML; MG/100ML; MG/100ML; MG/100ML
INJECTION, SOLUTION INTRAVENOUS CONTINUOUS PRN
Status: DISCONTINUED | OUTPATIENT
Start: 2019-11-26 | End: 2019-11-26 | Stop reason: SURG

## 2019-11-26 RX ORDER — SODIUM CHLORIDE, SODIUM LACTATE, POTASSIUM CHLORIDE, CALCIUM CHLORIDE 600; 310; 30; 20 MG/100ML; MG/100ML; MG/100ML; MG/100ML
9 INJECTION, SOLUTION INTRAVENOUS CONTINUOUS
Status: DISCONTINUED | OUTPATIENT
Start: 2019-11-26 | End: 2019-11-28 | Stop reason: HOSPADM

## 2019-11-26 RX ORDER — AMLODIPINE BESYLATE 10 MG/1
10 TABLET ORAL
Status: DISCONTINUED | OUTPATIENT
Start: 2019-11-26 | End: 2019-11-28 | Stop reason: HOSPADM

## 2019-11-26 RX ORDER — PROPOFOL 10 MG/ML
VIAL (ML) INTRAVENOUS CONTINUOUS PRN
Status: DISCONTINUED | OUTPATIENT
Start: 2019-11-26 | End: 2019-11-26 | Stop reason: SURG

## 2019-11-26 RX ORDER — SIMETHICONE 20 MG/.3ML
200 EMULSION ORAL ONCE
Status: COMPLETED | OUTPATIENT
Start: 2019-11-26 | End: 2019-11-26

## 2019-11-26 RX ORDER — SODIUM CHLORIDE 0.9 % (FLUSH) 0.9 %
3 SYRINGE (ML) INJECTION EVERY 12 HOURS SCHEDULED
Status: DISCONTINUED | OUTPATIENT
Start: 2019-11-26 | End: 2019-11-28 | Stop reason: HOSPADM

## 2019-11-26 RX ORDER — LIDOCAINE HYDROCHLORIDE 10 MG/ML
INJECTION, SOLUTION INFILTRATION; PERINEURAL AS NEEDED
Status: DISCONTINUED | OUTPATIENT
Start: 2019-11-26 | End: 2019-11-26 | Stop reason: SURG

## 2019-11-26 RX ORDER — HYDRALAZINE HYDROCHLORIDE 10 MG/1
10 TABLET, FILM COATED ORAL ONCE
Status: COMPLETED | OUTPATIENT
Start: 2019-11-26 | End: 2019-11-26

## 2019-11-26 RX ORDER — HYDRALAZINE HYDROCHLORIDE 20 MG/ML
10 INJECTION INTRAMUSCULAR; INTRAVENOUS ONCE
Status: COMPLETED | OUTPATIENT
Start: 2019-11-26 | End: 2019-11-26

## 2019-11-26 RX ORDER — ONDANSETRON 2 MG/ML
4 INJECTION INTRAMUSCULAR; INTRAVENOUS ONCE
Status: COMPLETED | OUTPATIENT
Start: 2019-11-26 | End: 2019-11-26

## 2019-11-26 RX ORDER — LIDOCAINE HYDROCHLORIDE 10 MG/ML
0.5 INJECTION, SOLUTION EPIDURAL; INFILTRATION; INTRACAUDAL; PERINEURAL ONCE AS NEEDED
Status: DISCONTINUED | OUTPATIENT
Start: 2019-11-26 | End: 2019-11-28 | Stop reason: HOSPADM

## 2019-11-26 RX ADMIN — HYDRALAZINE HYDROCHLORIDE 10 MG: 10 TABLET ORAL at 02:18

## 2019-11-26 RX ADMIN — SODIUM CHLORIDE, PRESERVATIVE FREE 3 ML: 5 INJECTION INTRAVENOUS at 20:18

## 2019-11-26 RX ADMIN — IPRATROPIUM BROMIDE AND ALBUTEROL SULFATE 3 ML: 2.5; .5 SOLUTION RESPIRATORY (INHALATION) at 19:28

## 2019-11-26 RX ADMIN — IPRATROPIUM BROMIDE AND ALBUTEROL SULFATE 3 ML: 2.5; .5 SOLUTION RESPIRATORY (INHALATION) at 07:22

## 2019-11-26 RX ADMIN — SERTRALINE HYDROCHLORIDE 50 MG: 50 TABLET ORAL at 09:09

## 2019-11-26 RX ADMIN — PROPOFOL 100 MCG/KG/MIN: 10 INJECTION, EMULSION INTRAVENOUS at 16:21

## 2019-11-26 RX ADMIN — AMLODIPINE BESYLATE 10 MG: 10 TABLET ORAL at 09:08

## 2019-11-26 RX ADMIN — DOXYCYCLINE 100 MG: 100 INJECTION, POWDER, LYOPHILIZED, FOR SOLUTION INTRAVENOUS at 11:47

## 2019-11-26 RX ADMIN — PANTOPRAZOLE SODIUM 40 MG: 40 INJECTION, POWDER, FOR SOLUTION INTRAVENOUS at 09:09

## 2019-11-26 RX ADMIN — ATORVASTATIN CALCIUM 80 MG: 40 TABLET, FILM COATED ORAL at 09:08

## 2019-11-26 RX ADMIN — HYDRALAZINE HYDROCHLORIDE 10 MG: 20 INJECTION INTRAMUSCULAR; INTRAVENOUS at 20:54

## 2019-11-26 RX ADMIN — SODIUM CHLORIDE, PRESERVATIVE FREE 10 ML: 5 INJECTION INTRAVENOUS at 20:18

## 2019-11-26 RX ADMIN — CARVEDILOL 12.5 MG: 12.5 TABLET, FILM COATED ORAL at 20:17

## 2019-11-26 RX ADMIN — ONDANSETRON 4 MG: 2 INJECTION INTRAMUSCULAR; INTRAVENOUS at 21:53

## 2019-11-26 RX ADMIN — IPRATROPIUM BROMIDE AND ALBUTEROL SULFATE 3 ML: 2.5; .5 SOLUTION RESPIRATORY (INHALATION) at 11:49

## 2019-11-26 RX ADMIN — LIDOCAINE HYDROCHLORIDE 50 MG: 10 INJECTION, SOLUTION INFILTRATION; PERINEURAL at 16:22

## 2019-11-26 RX ADMIN — SODIUM CHLORIDE, PRESERVATIVE FREE 10 ML: 5 INJECTION INTRAVENOUS at 09:09

## 2019-11-26 RX ADMIN — CARVEDILOL 12.5 MG: 12.5 TABLET, FILM COATED ORAL at 09:09

## 2019-11-26 RX ADMIN — SIMETHICONE 200 MG: 20 SUSPENSION/ DROPS ORAL at 17:18

## 2019-11-26 RX ADMIN — PROPOFOL 100 MG: 10 INJECTION, EMULSION INTRAVENOUS at 16:22

## 2019-11-26 RX ADMIN — SODIUM CHLORIDE, POTASSIUM CHLORIDE, SODIUM LACTATE AND CALCIUM CHLORIDE: 600; 310; 30; 20 INJECTION, SOLUTION INTRAVENOUS at 16:22

## 2019-11-26 RX ADMIN — HYDRALAZINE HYDROCHLORIDE 10 MG: 20 INJECTION INTRAMUSCULAR; INTRAVENOUS at 19:33

## 2019-11-26 RX ADMIN — DOXYCYCLINE 100 MG: 100 INJECTION, POWDER, LYOPHILIZED, FOR SOLUTION INTRAVENOUS at 02:18

## 2019-11-26 RX ADMIN — ACETAMINOPHEN 650 MG: 325 TABLET ORAL at 21:53

## 2019-11-26 RX ADMIN — PANTOPRAZOLE SODIUM 40 MG: 40 INJECTION, POWDER, FOR SOLUTION INTRAVENOUS at 20:17

## 2019-11-26 NOTE — ANESTHESIA PREPROCEDURE EVALUATION
Anesthesia Evaluation     Patient summary reviewed and Nursing notes reviewed   NPO Solid Status: > 8 hours             Airway   Mallampati: II  TM distance: >3 FB  Neck ROM: full  No difficulty expected  Dental      Pulmonary    (+) COPD, decreased breath sounds,   Cardiovascular     ECG reviewed  Rhythm: regular  Rate: normal    (+) hypertension, hyperlipidemia,       Neuro/Psych  GI/Hepatic/Renal/Endo    (+) obesity,  GERD,  renal disease, diabetes mellitus using insulin,     Musculoskeletal     (+) neck pain,   Abdominal    Substance History      OB/GYN          Other   arthritis,      ROS/Med Hx Other: TTE; normal EF                  Anesthesia Plan    ASA 3     general   total IV anesthesia  intravenous induction     Anesthetic plan, all risks, benefits, and alternatives have been provided, discussed and informed consent has been obtained with: patient.    Plan discussed with CRNA.

## 2019-11-26 NOTE — ANESTHESIA POSTPROCEDURE EVALUATION
Patient: Valarie Camara    Procedure Summary     Date:  11/26/19 [facility]    Anesthesia Start:  1620 Anesthesia Stop:  1648    Procedure:  COLONOSCOPY (N/A ) Diagnosis:       Acute on chronic anemia      (Acute on chronic anemia [D64.9])    Surgeon:  Chidi Downing MD Provider:  Sergio Wilson MD    Anesthesia Type:  general ASA Status:  3          Anesthesia Type: general  Last vitals  BP   97/78   Temp   99.2   Pulse   77   Resp   18    SpO2   98     Post Anesthesia Care and Evaluation    Patient location during evaluation: PACU  Patient participation: complete - patient participated  Level of consciousness: awake and alert  Pain score: 0  Pain management: adequate  Airway patency: patent  Anesthetic complications: No anesthetic complications  PONV Status: none  Cardiovascular status: hemodynamically stable and acceptable  Respiratory status: nonlabored ventilation, acceptable and nasal cannula  Hydration status: acceptable

## 2019-11-27 ENCOUNTER — APPOINTMENT (OUTPATIENT)
Dept: GENERAL RADIOLOGY | Facility: HOSPITAL | Age: 74
End: 2019-11-27

## 2019-11-27 LAB
CYTO UR: NORMAL
GLUCOSE BLDC GLUCOMTR-MCNC: 101 MG/DL (ref 70–130)
GLUCOSE BLDC GLUCOMTR-MCNC: 120 MG/DL (ref 70–130)
GLUCOSE BLDC GLUCOMTR-MCNC: 197 MG/DL (ref 70–130)
GLUCOSE BLDC GLUCOMTR-MCNC: 203 MG/DL (ref 70–130)
LAB AP CASE REPORT: NORMAL
LAB AP CLINICAL INFORMATION: NORMAL
PATH REPORT.FINAL DX SPEC: NORMAL
PATH REPORT.GROSS SPEC: NORMAL

## 2019-11-27 PROCEDURE — 97530 THERAPEUTIC ACTIVITIES: CPT

## 2019-11-27 PROCEDURE — 99232 SBSQ HOSP IP/OBS MODERATE 35: CPT | Performed by: INTERNAL MEDICINE

## 2019-11-27 PROCEDURE — 99232 SBSQ HOSP IP/OBS MODERATE 35: CPT | Performed by: ORTHOPAEDIC SURGERY

## 2019-11-27 PROCEDURE — 73630 X-RAY EXAM OF FOOT: CPT

## 2019-11-27 PROCEDURE — 82962 GLUCOSE BLOOD TEST: CPT

## 2019-11-27 PROCEDURE — 99231 SBSQ HOSP IP/OBS SF/LOW 25: CPT | Performed by: INTERNAL MEDICINE

## 2019-11-27 PROCEDURE — 92526 ORAL FUNCTION THERAPY: CPT

## 2019-11-27 PROCEDURE — 94799 UNLISTED PULMONARY SVC/PX: CPT

## 2019-11-27 RX ORDER — HYDRALAZINE HYDROCHLORIDE 25 MG/1
25 TABLET, FILM COATED ORAL EVERY 8 HOURS SCHEDULED
Status: DISCONTINUED | OUTPATIENT
Start: 2019-11-27 | End: 2019-11-28

## 2019-11-27 RX ORDER — FERROUS SULFATE 325(65) MG
325 TABLET ORAL
Status: DISCONTINUED | OUTPATIENT
Start: 2019-11-28 | End: 2019-11-28 | Stop reason: HOSPADM

## 2019-11-27 RX ADMIN — AMLODIPINE BESYLATE 10 MG: 10 TABLET ORAL at 08:30

## 2019-11-27 RX ADMIN — IPRATROPIUM BROMIDE AND ALBUTEROL SULFATE 3 ML: 2.5; .5 SOLUTION RESPIRATORY (INHALATION) at 07:37

## 2019-11-27 RX ADMIN — HYDRALAZINE HYDROCHLORIDE 25 MG: 25 TABLET, FILM COATED ORAL at 13:14

## 2019-11-27 RX ADMIN — CARVEDILOL 12.5 MG: 12.5 TABLET, FILM COATED ORAL at 21:35

## 2019-11-27 RX ADMIN — CARVEDILOL 12.5 MG: 12.5 TABLET, FILM COATED ORAL at 08:29

## 2019-11-27 RX ADMIN — SERTRALINE HYDROCHLORIDE 50 MG: 50 TABLET ORAL at 08:29

## 2019-11-27 RX ADMIN — INSULIN LISPRO 4 UNITS: 100 INJECTION, SOLUTION INTRAVENOUS; SUBCUTANEOUS at 16:50

## 2019-11-27 RX ADMIN — ATORVASTATIN CALCIUM 80 MG: 40 TABLET, FILM COATED ORAL at 08:29

## 2019-11-27 RX ADMIN — PANTOPRAZOLE SODIUM 40 MG: 40 INJECTION, POWDER, FOR SOLUTION INTRAVENOUS at 08:30

## 2019-11-27 RX ADMIN — IPRATROPIUM BROMIDE AND ALBUTEROL SULFATE 3 ML: 2.5; .5 SOLUTION RESPIRATORY (INHALATION) at 12:41

## 2019-11-27 RX ADMIN — SODIUM CHLORIDE 100 ML/HR: 9 INJECTION, SOLUTION INTRAVENOUS at 16:13

## 2019-11-27 RX ADMIN — IPRATROPIUM BROMIDE AND ALBUTEROL SULFATE 3 ML: 2.5; .5 SOLUTION RESPIRATORY (INHALATION) at 19:09

## 2019-11-27 RX ADMIN — SODIUM CHLORIDE, PRESERVATIVE FREE 3 ML: 5 INJECTION INTRAVENOUS at 21:35

## 2019-11-27 RX ADMIN — HYDRALAZINE HYDROCHLORIDE 25 MG: 25 TABLET, FILM COATED ORAL at 21:35

## 2019-11-27 RX ADMIN — HYDRALAZINE HYDROCHLORIDE 25 MG: 25 TABLET, FILM COATED ORAL at 09:50

## 2019-11-27 RX ADMIN — PANTOPRAZOLE SODIUM 40 MG: 40 INJECTION, POWDER, FOR SOLUTION INTRAVENOUS at 21:35

## 2019-11-27 RX ADMIN — IPRATROPIUM BROMIDE AND ALBUTEROL SULFATE 3 ML: 2.5; .5 SOLUTION RESPIRATORY (INHALATION) at 16:17

## 2019-11-27 RX ADMIN — INSULIN LISPRO 2 UNITS: 100 INJECTION, SOLUTION INTRAVENOUS; SUBCUTANEOUS at 21:40

## 2019-11-28 VITALS
SYSTOLIC BLOOD PRESSURE: 178 MMHG | DIASTOLIC BLOOD PRESSURE: 78 MMHG | WEIGHT: 197 LBS | RESPIRATION RATE: 16 BRPM | TEMPERATURE: 98.6 F | HEIGHT: 63 IN | BODY MASS INDEX: 34.91 KG/M2 | HEART RATE: 79 BPM | OXYGEN SATURATION: 97 %

## 2019-11-28 LAB
ANION GAP SERPL CALCULATED.3IONS-SCNC: 12 MMOL/L (ref 5–15)
BUN BLD-MCNC: 27 MG/DL (ref 8–23)
BUN/CREAT SERPL: 18.6 (ref 7–25)
CALCIUM SPEC-SCNC: 8.3 MG/DL (ref 8.6–10.5)
CHLORIDE SERPL-SCNC: 110 MMOL/L (ref 98–107)
CO2 SERPL-SCNC: 18 MMOL/L (ref 22–29)
CREAT BLD-MCNC: 1.45 MG/DL (ref 0.57–1)
FERRITIN SERPL-MCNC: 195.1 NG/ML (ref 13–150)
GFR SERPL CREATININE-BSD FRML MDRD: 43 ML/MIN/1.73
GLUCOSE BLD-MCNC: 205 MG/DL (ref 65–99)
GLUCOSE BLDC GLUCOMTR-MCNC: 182 MG/DL (ref 70–130)
GLUCOSE BLDC GLUCOMTR-MCNC: 221 MG/DL (ref 70–130)
IRON 24H UR-MRATE: 31 MCG/DL (ref 37–145)
IRON SATN MFR SERPL: 12 % (ref 20–50)
POTASSIUM BLD-SCNC: 4 MMOL/L (ref 3.5–5.2)
SODIUM BLD-SCNC: 140 MMOL/L (ref 136–145)
TIBC SERPL-MCNC: 265 MCG/DL (ref 298–536)
TRANSFERRIN SERPL-MCNC: 178 MG/DL (ref 200–360)

## 2019-11-28 PROCEDURE — 84466 ASSAY OF TRANSFERRIN: CPT | Performed by: INTERNAL MEDICINE

## 2019-11-28 PROCEDURE — 82962 GLUCOSE BLOOD TEST: CPT

## 2019-11-28 PROCEDURE — 94799 UNLISTED PULMONARY SVC/PX: CPT

## 2019-11-28 PROCEDURE — 99239 HOSP IP/OBS DSCHRG MGMT >30: CPT | Performed by: INTERNAL MEDICINE

## 2019-11-28 PROCEDURE — 82728 ASSAY OF FERRITIN: CPT | Performed by: INTERNAL MEDICINE

## 2019-11-28 PROCEDURE — 80048 BASIC METABOLIC PNL TOTAL CA: CPT | Performed by: INTERNAL MEDICINE

## 2019-11-28 PROCEDURE — 83540 ASSAY OF IRON: CPT | Performed by: INTERNAL MEDICINE

## 2019-11-28 RX ORDER — HYDRALAZINE HYDROCHLORIDE 50 MG/1
50 TABLET, FILM COATED ORAL EVERY 8 HOURS SCHEDULED
Qty: 90 TABLET | Refills: 0 | Status: SHIPPED | OUTPATIENT
Start: 2019-11-28 | End: 2019-11-28 | Stop reason: HOSPADM

## 2019-11-28 RX ORDER — HYDRALAZINE HYDROCHLORIDE 50 MG/1
50 TABLET, FILM COATED ORAL EVERY 8 HOURS SCHEDULED
Status: DISCONTINUED | OUTPATIENT
Start: 2019-11-28 | End: 2019-11-28 | Stop reason: HOSPADM

## 2019-11-28 RX ORDER — CARVEDILOL 12.5 MG/1
12.5 TABLET ORAL 2 TIMES DAILY WITH MEALS
Qty: 60 TABLET | Refills: 0 | Status: SHIPPED | OUTPATIENT
Start: 2019-11-28 | End: 2020-08-17 | Stop reason: ALTCHOICE

## 2019-11-28 RX ORDER — HYDRALAZINE HYDROCHLORIDE 25 MG/1
25 TABLET, FILM COATED ORAL ONCE
Status: DISCONTINUED | OUTPATIENT
Start: 2019-11-28 | End: 2019-11-28 | Stop reason: HOSPADM

## 2019-11-28 RX ORDER — HYDRALAZINE HYDROCHLORIDE 25 MG/1
25 TABLET, FILM COATED ORAL EVERY 8 HOURS
Qty: 90 TABLET | Refills: 0 | Status: SHIPPED | OUTPATIENT
Start: 2019-11-28 | End: 2020-08-26 | Stop reason: SDUPTHER

## 2019-11-28 RX ORDER — AMLODIPINE BESYLATE 10 MG/1
10 TABLET ORAL
Qty: 30 TABLET | Refills: 0 | Status: SHIPPED | OUTPATIENT
Start: 2019-11-29 | End: 2020-08-17 | Stop reason: ALTCHOICE

## 2019-11-28 RX ADMIN — IPRATROPIUM BROMIDE AND ALBUTEROL SULFATE 3 ML: 2.5; .5 SOLUTION RESPIRATORY (INHALATION) at 06:18

## 2019-11-28 RX ADMIN — HYDRALAZINE HYDROCHLORIDE 25 MG: 25 TABLET, FILM COATED ORAL at 05:28

## 2019-11-28 RX ADMIN — ATORVASTATIN CALCIUM 80 MG: 40 TABLET, FILM COATED ORAL at 08:00

## 2019-11-28 RX ADMIN — INSULIN LISPRO 2 UNITS: 100 INJECTION, SOLUTION INTRAVENOUS; SUBCUTANEOUS at 08:01

## 2019-11-28 RX ADMIN — FERROUS SULFATE TAB 325 MG (65 MG ELEMENTAL FE) 325 MG: 325 (65 FE) TAB at 08:00

## 2019-11-28 RX ADMIN — PANTOPRAZOLE SODIUM 40 MG: 40 INJECTION, POWDER, FOR SOLUTION INTRAVENOUS at 08:01

## 2019-11-28 RX ADMIN — SERTRALINE HYDROCHLORIDE 50 MG: 50 TABLET ORAL at 08:00

## 2019-11-28 RX ADMIN — AMLODIPINE BESYLATE 10 MG: 10 TABLET ORAL at 08:00

## 2019-11-28 RX ADMIN — CARVEDILOL 12.5 MG: 12.5 TABLET, FILM COATED ORAL at 08:00

## 2019-11-29 ENCOUNTER — READMISSION MANAGEMENT (OUTPATIENT)
Dept: CALL CENTER | Facility: HOSPITAL | Age: 74
End: 2019-11-29

## 2019-11-30 NOTE — OUTREACH NOTE
Prep Survey      Responses   Facility patient discharged from?  [place]   Is patient eligible?  Yes   Discharge diagnosis  syncope & collapse, left toe fractures   Does the patient have one of the following disease processes/diagnoses(primary or secondary)?  Other   Does the patient have Home health ordered?  Yes   What is the Home health agency?   [facility]   Is there a DME ordered?  Yes   What DME was ordered?  rolling walker from [employer]   Prep survey completed?  Yes          Nidhi Saunders RN

## 2019-12-02 ENCOUNTER — TELEPHONE (OUTPATIENT)
Dept: TELEMETRY | Facility: HOSPITAL | Age: 74
End: 2019-12-02

## 2019-12-02 ENCOUNTER — READMISSION MANAGEMENT (OUTPATIENT)
Dept: CALL CENTER | Facility: HOSPITAL | Age: 74
End: 2019-12-02

## 2019-12-02 NOTE — OUTREACH NOTE
Medical Week 1 Survey      Responses   Facility patient discharged from?  [place]   Does the patient have one of the following disease processes/diagnoses(primary or secondary)?  Other   Is there a successful TCM telephone encounter documented?  No   Week 1 attempt successful?  Yes   Call start time  0745   Rescheduled  Revoked   Revoke  Decline to participate [Number for nurse call center given.]   Discharge diagnosis  syncope & collapse, left toe fractures          Deisy Damon RN

## 2019-12-09 ENCOUNTER — LAB (OUTPATIENT)
Dept: LAB | Facility: HOSPITAL | Age: 74
End: 2019-12-09

## 2019-12-09 ENCOUNTER — TRANSCRIBE ORDERS (OUTPATIENT)
Dept: LAB | Facility: HOSPITAL | Age: 74
End: 2019-12-09

## 2019-12-09 DIAGNOSIS — Z01.812 PRE-OPERATIVE LABORATORY EXAMINATION: Primary | ICD-10-CM

## 2019-12-09 DIAGNOSIS — Z01.812 PRE-OPERATIVE LABORATORY EXAMINATION: ICD-10-CM

## 2019-12-09 LAB
BASOPHILS # BLD AUTO: 0.03 10*3/MM3 (ref 0–0.2)
BASOPHILS NFR BLD AUTO: 0.5 % (ref 0–1.5)
DEPRECATED RDW RBC AUTO: 43.7 FL (ref 37–54)
EOSINOPHIL # BLD AUTO: 0.23 10*3/MM3 (ref 0–0.4)
EOSINOPHIL NFR BLD AUTO: 4.2 % (ref 0.3–6.2)
ERYTHROCYTE [DISTWIDTH] IN BLOOD BY AUTOMATED COUNT: 13.6 % (ref 12.3–15.4)
FERRITIN SERPL-MCNC: 125.9 NG/ML (ref 13–150)
HCT VFR BLD AUTO: 27.2 % (ref 34–46.6)
HGB BLD-MCNC: 8.8 G/DL (ref 12–15.9)
IMM GRANULOCYTES # BLD AUTO: 0.02 10*3/MM3 (ref 0–0.05)
IMM GRANULOCYTES NFR BLD AUTO: 0.4 % (ref 0–0.5)
IRON 24H UR-MRATE: 48 MCG/DL (ref 37–145)
IRON SATN MFR SERPL: 14 % (ref 20–50)
LYMPHOCYTES # BLD AUTO: 2.18 10*3/MM3 (ref 0.7–3.1)
LYMPHOCYTES NFR BLD AUTO: 39.4 % (ref 19.6–45.3)
MCH RBC QN AUTO: 28.9 PG (ref 26.6–33)
MCHC RBC AUTO-ENTMCNC: 32.4 G/DL (ref 31.5–35.7)
MCV RBC AUTO: 89.5 FL (ref 79–97)
MONOCYTES # BLD AUTO: 0.53 10*3/MM3 (ref 0.1–0.9)
MONOCYTES NFR BLD AUTO: 9.6 % (ref 5–12)
NEUTROPHILS # BLD AUTO: 2.54 10*3/MM3 (ref 1.7–7)
NEUTROPHILS NFR BLD AUTO: 45.9 % (ref 42.7–76)
NRBC BLD AUTO-RTO: 0 /100 WBC (ref 0–0.2)
PLATELET # BLD AUTO: 401 10*3/MM3 (ref 140–450)
PMV BLD AUTO: 10.1 FL (ref 6–12)
RBC # BLD AUTO: 3.04 10*6/MM3 (ref 3.77–5.28)
RETICS # AUTO: 0.1 10*6/MM3 (ref 0.02–0.13)
RETICS/RBC NFR AUTO: 3.32 % (ref 0.7–1.9)
TIBC SERPL-MCNC: 355 MCG/DL (ref 298–536)
TRANSFERRIN SERPL-MCNC: 238 MG/DL (ref 200–360)
WBC NRBC COR # BLD: 5.53 10*3/MM3 (ref 3.4–10.8)

## 2019-12-09 PROCEDURE — 82728 ASSAY OF FERRITIN: CPT

## 2019-12-09 PROCEDURE — 84466 ASSAY OF TRANSFERRIN: CPT

## 2019-12-09 PROCEDURE — 36415 COLL VENOUS BLD VENIPUNCTURE: CPT

## 2019-12-09 PROCEDURE — 82668 ASSAY OF ERYTHROPOIETIN: CPT

## 2019-12-09 PROCEDURE — 83540 ASSAY OF IRON: CPT

## 2019-12-09 PROCEDURE — 85045 AUTOMATED RETICULOCYTE COUNT: CPT

## 2019-12-09 PROCEDURE — 85025 COMPLETE CBC W/AUTO DIFF WBC: CPT

## 2019-12-10 LAB — ETHNIC BACKGROUND STATED: 30 MIU/ML (ref 2.6–18.5)

## 2019-12-13 ENCOUNTER — APPOINTMENT (OUTPATIENT)
Dept: CT IMAGING | Facility: HOSPITAL | Age: 74
End: 2019-12-13

## 2019-12-13 ENCOUNTER — HOSPITAL ENCOUNTER (EMERGENCY)
Facility: HOSPITAL | Age: 74
Discharge: HOME | End: 2019-12-13
Attending: EMERGENCY MEDICINE | Admitting: EMERGENCY MEDICINE

## 2019-12-13 VITALS
OXYGEN SATURATION: 98 % | TEMPERATURE: 98.1 F | RESPIRATION RATE: 18 BRPM | HEART RATE: 75 BPM | BODY MASS INDEX: 34.05 KG/M2 | DIASTOLIC BLOOD PRESSURE: 68 MMHG | SYSTOLIC BLOOD PRESSURE: 146 MMHG | WEIGHT: 192.2 LBS | HEIGHT: 63 IN

## 2019-12-13 DIAGNOSIS — K62.5 RECTAL BLEEDING: Primary | ICD-10-CM

## 2019-12-13 LAB
ALBUMIN SERPL-MCNC: 3.3 G/DL (ref 3.5–5.2)
ALBUMIN/GLOB SERPL: 1.1 G/DL
ALP SERPL-CCNC: 35 U/L (ref 39–117)
ALT SERPL W P-5'-P-CCNC: 11 U/L (ref 1–33)
ANION GAP SERPL CALCULATED.3IONS-SCNC: 11.1 MMOL/L (ref 5–15)
AST SERPL-CCNC: 15 U/L (ref 1–32)
BASOPHILS # BLD AUTO: 0.04 10*3/MM3 (ref 0–0.2)
BASOPHILS NFR BLD AUTO: 0.7 % (ref 0–1.5)
BILIRUB SERPL-MCNC: 0.3 MG/DL (ref 0.2–1.2)
BUN BLD-MCNC: 31 MG/DL (ref 8–23)
BUN/CREAT SERPL: 22 (ref 7–25)
CALCIUM SPEC-SCNC: 8.8 MG/DL (ref 8.6–10.5)
CHLORIDE SERPL-SCNC: 106 MMOL/L (ref 98–107)
CO2 SERPL-SCNC: 23.9 MMOL/L (ref 22–29)
CREAT BLD-MCNC: 1.41 MG/DL (ref 0.57–1)
DEPRECATED RDW RBC AUTO: 51.9 FL (ref 37–54)
EOSINOPHIL # BLD AUTO: 0.26 10*3/MM3 (ref 0–0.4)
EOSINOPHIL NFR BLD AUTO: 4.8 % (ref 0.3–6.2)
ERYTHROCYTE [DISTWIDTH] IN BLOOD BY AUTOMATED COUNT: 15.7 % (ref 12.3–15.4)
GFR SERPL CREATININE-BSD FRML MDRD: 44 ML/MIN/1.73
GLOBULIN UR ELPH-MCNC: 2.9 GM/DL
GLUCOSE BLD-MCNC: 120 MG/DL (ref 65–99)
HCT VFR BLD AUTO: 27.2 % (ref 34–46.6)
HGB BLD-MCNC: 8.7 G/DL (ref 12–15.9)
IMM GRANULOCYTES # BLD AUTO: 0.02 10*3/MM3 (ref 0–0.05)
IMM GRANULOCYTES NFR BLD AUTO: 0.4 % (ref 0–0.5)
LYMPHOCYTES # BLD AUTO: 2.29 10*3/MM3 (ref 0.7–3.1)
LYMPHOCYTES NFR BLD AUTO: 42.1 % (ref 19.6–45.3)
MCH RBC QN AUTO: 29.4 PG (ref 26.6–33)
MCHC RBC AUTO-ENTMCNC: 32 G/DL (ref 31.5–35.7)
MCV RBC AUTO: 91.9 FL (ref 79–97)
MONOCYTES # BLD AUTO: 0.48 10*3/MM3 (ref 0.1–0.9)
MONOCYTES NFR BLD AUTO: 8.8 % (ref 5–12)
NEUTROPHILS # BLD AUTO: 2.35 10*3/MM3 (ref 1.7–7)
NEUTROPHILS NFR BLD AUTO: 43.2 % (ref 42.7–76)
NRBC BLD AUTO-RTO: 0 /100 WBC (ref 0–0.2)
PLATELET # BLD AUTO: 355 10*3/MM3 (ref 140–450)
PMV BLD AUTO: 9.7 FL (ref 6–12)
POTASSIUM BLD-SCNC: 4.2 MMOL/L (ref 3.5–5.2)
PROT SERPL-MCNC: 6.2 G/DL (ref 6–8.5)
RBC # BLD AUTO: 2.96 10*6/MM3 (ref 3.77–5.28)
SODIUM BLD-SCNC: 141 MMOL/L (ref 136–145)
WBC NRBC COR # BLD: 5.44 10*3/MM3 (ref 3.4–10.8)

## 2019-12-13 PROCEDURE — 80053 COMPREHEN METABOLIC PANEL: CPT | Performed by: EMERGENCY MEDICINE

## 2019-12-13 PROCEDURE — 99283 EMERGENCY DEPT VISIT LOW MDM: CPT

## 2019-12-13 PROCEDURE — 85025 COMPLETE CBC W/AUTO DIFF WBC: CPT | Performed by: EMERGENCY MEDICINE

## 2019-12-13 PROCEDURE — 74176 CT ABD & PELVIS W/O CONTRAST: CPT

## 2019-12-13 RX ORDER — SODIUM CHLORIDE 0.9 % (FLUSH) 0.9 %
10 SYRINGE (ML) INJECTION AS NEEDED
Status: DISCONTINUED | OUTPATIENT
Start: 2019-12-13 | End: 2019-12-13 | Stop reason: HOSPADM

## 2019-12-13 NOTE — ED PROVIDER NOTES
Subjective   History of Present Illness    Chief Complaint: Rectal bleeding  History of Present Illness: Bright red blood per rectum, similar episode over [place] was admitted had EGD and colonoscopy at [facility]. Recurrent bleeding today.   Onset: this am.   Duration: mulitple episodes.   Exacerbating / Alleviating factors: none  Associated symptoms: lower abd. cRamping.   Reports maroon blood with clots.     Nurses Notes reviewed and agree, including vitals, allergies, social history and prior medical history.     REVIEW OF SYSTEMS: All systems reviewed and not pertinent unless noted.    Positive for:  Rectal bleeding with clots.     Negative for fever. Flank pain.   Review of Systems    Past Medical History:   Diagnosis Date   • Acute bronchitis with bronchospasm    • Acute exacerbation of chronic obstructive pulmonary disease (COPD) (CMS/HCC)    • Anxiety    • Arthritis    • B12 deficiency    • Back pain    • Cataract    • Cervicalgia    • Colonic polyp     History of colonic polyps    • COPD (chronic obstructive pulmonary disease) (CMS/HCC)    • Depression    • Diverticulitis    • Dysphagia    • Edema    • Esophageal reflux    • Folic acid deficiency    • Herpes zoster    • High cholesterol    • History of kidney infection    • History of recurrent urinary tract infection    • HTN (hypertension)    • Hypercholesterolemia    • Kidney infection    • Malignant hypertension 4/26/2015     Accelerated essential hypertension   • Measles     rubeola   • Muscle spasm    • Neoplasm of uncertain behavior of skin    • Osteoporosis    • Recurrent urinary tract infection    • Rheumatoid arthritis (CMS/HCC)    • RLS (restless legs syndrome)    • Stomach ulcer    • Type 2 diabetes mellitus (CMS/HCC)    • Vitamin D deficiency        Allergies   Allergen Reactions   • Clonidine Derivatives Other (See Comments)     Pt reports extreme hypotension     • Penicillins Rash   • Sulfa Antibiotics Rash       Past Surgical  History:   Procedure Laterality Date   • CATARACT EXTRACTION Left 02/11/2013    with lens implant   • CATARACT EXTRACTION Right 04/15/2013    with lens implant   • CATARACT EXTRACTION WITH INTRAOCULAR LENS IMPLANT Left 02/11/2013   • CATARACT EXTRACTION WITH INTRAOCULAR LENS IMPLANT Right 04/15/2013   • COLONOSCOPY  2012   • COLONOSCOPY N/A 11/26/2019    Procedure: COLONOSCOPY;  Surgeon: Chidi Downing MD;  Location:  [facility];  Service: Gastroenterology   • ENDOSCOPY N/A 11/13/2017    Procedure: ESOPHAGOGASTRODUODENOSCOPY with biopsies and esophageal balloon dilitation;  Surgeon: Wesley Stovall MD;  Location:  [facility];  Service:    • ENDOSCOPY N/A 11/25/2019    Procedure: ESOPHAGOGASTRODUODENOSCOPY;  Surgeon: Chidi Downing MD;  Location:  [facility];  Service: Gastroenterology   • HYSTERECTOMY  1979   • UPPER GASTROINTESTINAL ENDOSCOPY  12/09/2013   • UPPER GASTROINTESTINAL ENDOSCOPY  11/13/2017       Family History   Problem Relation Age of Onset   • Arthritis Mother    • Diabetes Mother    • Hypertension Mother    • Arthritis Other    • Arthritis Other    • Diabetes Other         DM   • Hypertension Other    • Colon cancer Neg Hx        Social History     Socioeconomic History   • Marital status:      Spouse name: Not on file   • Number of children: Not on file   • Years of education: Not on file   • Highest education level: Not on file   Tobacco Use   • Smoking status: Former Smoker   • Smokeless tobacco: Never Used   • Tobacco comment:  Denied: History of smoking cigarettes   Substance and Sexual Activity   • Alcohol use: Yes     Comment: Occassionally   • Drug use: No   • Sexual activity: Defer           Objective   Physical Exam      GENERAL APPEARANCE: Well developed, well nourished, in no acute distress.  VITAL SIGNS: per nursing, reviewed and noted  SKIN: no rashes, ulcerations or petechiae.  Head: Normocephalic, atraumatic.   EYES: perrla. EOMI.  ENT:  TM clear, posterior  pharynx patent.  LUNGS:  normal breath sounds. No retractions.   CARDIOVASCULAR:  regular rate and rhythm, no murmurs.  Good Peripheral pulses.  ABDOMEN: Soft, nontender, normal bowel sounds. No hernia. No ascites.  MUSCULOSKELETAL:  No tenderness. Full ROM. Strength and tone normal.  NEUROLOGIC: Alert, oriented x 3. No gross deficits.   NECK: Supple, symmetric. No tenderness, no masses. Full ROM  Back: full rom, no paraspinal spasm. No CVA tenderness.   PSYCH: appropriate affect, no suicidal or homicidal ideation.  : no bladder tenderness or distention, no CVA tenderness      Procedures     No attending provider procedures were performed      ED Course  ED Course as of Dec 13 0710   Fri Dec 13, 2019   0704 Glucose(!): 120 [PF]   0704 BUN(!): 31 [PF]   0704 Creatinine(!): 1.41 [PF]   0704 Albumin(!): 3.30 [PF]   0704 WBC: 5.44 [PF]   0704 Hemoglobin(!): 8.7 [PF]   0704 Hematocrit(!): 27.2 [PF]   0704 Platelets: 355 [PF]      ED Course User Index  [PF] Zurdo Massey,                       No data recorded                        MDM  Discussed with hospitalist at family request.  Patient had extensive work-up done on last admission.  No indications for blood transfusion was hemodynamically stable.  Dr. Craven recommended outpatient follow-up and return if progressive worsening symptoms  Final diagnoses:   Rectal bleeding              Zurdo Msasey,   12/13/19 0710

## 2020-01-02 ENCOUNTER — APPOINTMENT (OUTPATIENT)
Dept: GENERAL RADIOLOGY | Facility: HOSPITAL | Age: 75
End: 2020-01-02

## 2020-01-02 ENCOUNTER — HOSPITAL ENCOUNTER (EMERGENCY)
Facility: HOSPITAL | Age: 75
Discharge: HOME | End: 2020-01-02
Attending: EMERGENCY MEDICINE | Admitting: EMERGENCY MEDICINE

## 2020-01-02 VITALS
RESPIRATION RATE: 18 BRPM | DIASTOLIC BLOOD PRESSURE: 89 MMHG | OXYGEN SATURATION: 95 % | HEART RATE: 70 BPM | TEMPERATURE: 98.4 F | BODY MASS INDEX: 35.33 KG/M2 | WEIGHT: 199.4 LBS | SYSTOLIC BLOOD PRESSURE: 208 MMHG | HEIGHT: 63 IN

## 2020-01-02 DIAGNOSIS — I10 HYPERTENSION, UNSPECIFIED TYPE: Primary | ICD-10-CM

## 2020-01-02 LAB
ALBUMIN SERPL-MCNC: 3.4 G/DL (ref 3.5–5.2)
ALBUMIN/GLOB SERPL: 1.3 G/DL
ALP SERPL-CCNC: 32 U/L (ref 39–117)
ALT SERPL W P-5'-P-CCNC: 9 U/L (ref 1–33)
ANION GAP SERPL CALCULATED.3IONS-SCNC: 11.9 MMOL/L (ref 5–15)
AST SERPL-CCNC: 16 U/L (ref 1–32)
BASOPHILS # BLD AUTO: 0.03 10*3/MM3 (ref 0–0.2)
BASOPHILS NFR BLD AUTO: 0.6 % (ref 0–1.5)
BILIRUB SERPL-MCNC: 0.3 MG/DL (ref 0.2–1.2)
BUN BLD-MCNC: 14 MG/DL (ref 8–23)
BUN/CREAT SERPL: 10.4 (ref 7–25)
CALCIUM SPEC-SCNC: 8.7 MG/DL (ref 8.6–10.5)
CHLORIDE SERPL-SCNC: 103 MMOL/L (ref 98–107)
CO2 SERPL-SCNC: 23.1 MMOL/L (ref 22–29)
CREAT BLD-MCNC: 1.34 MG/DL (ref 0.57–1)
DEPRECATED RDW RBC AUTO: 55.4 FL (ref 37–54)
EOSINOPHIL # BLD AUTO: 0.22 10*3/MM3 (ref 0–0.4)
EOSINOPHIL NFR BLD AUTO: 4.3 % (ref 0.3–6.2)
ERYTHROCYTE [DISTWIDTH] IN BLOOD BY AUTOMATED COUNT: 15.7 % (ref 12.3–15.4)
GFR SERPL CREATININE-BSD FRML MDRD: 47 ML/MIN/1.73
GLOBULIN UR ELPH-MCNC: 2.7 GM/DL
GLUCOSE BLD-MCNC: 147 MG/DL (ref 65–99)
HCT VFR BLD AUTO: 25.1 % (ref 34–46.6)
HGB BLD-MCNC: 7.5 G/DL (ref 12–15.9)
HOLD SPECIMEN: NORMAL
HOLD SPECIMEN: NORMAL
HYPOCHROMIA BLD QL: NORMAL
IMM GRANULOCYTES # BLD AUTO: 0.01 10*3/MM3 (ref 0–0.05)
IMM GRANULOCYTES NFR BLD AUTO: 0.2 % (ref 0–0.5)
LYMPHOCYTES # BLD AUTO: 1.51 10*3/MM3 (ref 0.7–3.1)
LYMPHOCYTES NFR BLD AUTO: 29.8 % (ref 19.6–45.3)
MCH RBC QN AUTO: 28.8 PG (ref 26.6–33)
MCHC RBC AUTO-ENTMCNC: 29.9 G/DL (ref 31.5–35.7)
MCV RBC AUTO: 96.5 FL (ref 79–97)
MONOCYTES # BLD AUTO: 0.44 10*3/MM3 (ref 0.1–0.9)
MONOCYTES NFR BLD AUTO: 8.7 % (ref 5–12)
NEUTROPHILS # BLD AUTO: 2.86 10*3/MM3 (ref 1.7–7)
NEUTROPHILS NFR BLD AUTO: 56.4 % (ref 42.7–76)
NRBC BLD AUTO-RTO: 0 /100 WBC (ref 0–0.2)
NT-PROBNP SERPL-MCNC: 864.8 PG/ML (ref 5–1800)
PLATELET # BLD AUTO: 302 10*3/MM3 (ref 140–450)
PMV BLD AUTO: 9.6 FL (ref 6–12)
POLYCHROMASIA BLD QL SMEAR: NORMAL
POTASSIUM BLD-SCNC: 4.2 MMOL/L (ref 3.5–5.2)
PROT SERPL-MCNC: 6.1 G/DL (ref 6–8.5)
RBC # BLD AUTO: 2.6 10*6/MM3 (ref 3.77–5.28)
SMALL PLATELETS BLD QL SMEAR: ADEQUATE
SODIUM BLD-SCNC: 138 MMOL/L (ref 136–145)
TROPONIN T SERPL-MCNC: 0.01 NG/ML (ref 0–0.03)
WBC MORPH BLD: NORMAL
WBC NRBC COR # BLD: 5.07 10*3/MM3 (ref 3.4–10.8)
WHOLE BLOOD HOLD SPECIMEN: NORMAL
WHOLE BLOOD HOLD SPECIMEN: NORMAL

## 2020-01-02 PROCEDURE — 83880 ASSAY OF NATRIURETIC PEPTIDE: CPT

## 2020-01-02 PROCEDURE — 85007 BL SMEAR W/DIFF WBC COUNT: CPT

## 2020-01-02 PROCEDURE — 85025 COMPLETE CBC W/AUTO DIFF WBC: CPT

## 2020-01-02 PROCEDURE — 80053 COMPREHEN METABOLIC PANEL: CPT

## 2020-01-02 PROCEDURE — 84484 ASSAY OF TROPONIN QUANT: CPT

## 2020-01-02 PROCEDURE — 99284 EMERGENCY DEPT VISIT MOD MDM: CPT

## 2020-01-02 PROCEDURE — 71046 X-RAY EXAM CHEST 2 VIEWS: CPT

## 2020-01-02 PROCEDURE — 93005 ELECTROCARDIOGRAM TRACING: CPT

## 2020-01-02 RX ORDER — AMLODIPINE BESYLATE 5 MG/1
10 TABLET ORAL
Status: DISCONTINUED | OUTPATIENT
Start: 2020-01-02 | End: 2020-01-02 | Stop reason: HOSPADM

## 2020-01-02 RX ORDER — SODIUM CHLORIDE 0.9 % (FLUSH) 0.9 %
10 SYRINGE (ML) INJECTION AS NEEDED
Status: DISCONTINUED | OUTPATIENT
Start: 2020-01-02 | End: 2020-01-02 | Stop reason: HOSPADM

## 2020-01-02 RX ORDER — HYDRALAZINE HYDROCHLORIDE 20 MG/ML
10 INJECTION INTRAMUSCULAR; INTRAVENOUS ONCE
Status: DISCONTINUED | OUTPATIENT
Start: 2020-01-02 | End: 2020-01-02 | Stop reason: HOSPADM

## 2020-01-02 RX ADMIN — AMLODIPINE BESYLATE 10 MG: 5 TABLET ORAL at 12:14

## 2020-01-02 NOTE — ED PROVIDER NOTES
Subjective   74-year-old female presents to the ED for chief complaint of elevated blood pressure.  She states that this morning she took her medications and noticed that her systolic blood pressure was greater than 200.  At that time she states that she was having very minimal/mild shortness of air.  She does know that she has had a cough for the last few days.  She denies chest pain.  She also notes that she felt slightly dizzy and has some right-sided ear pain.  She states that currently she is not experiencing any symptoms.  She denies nausea vomiting diarrhea or abdominal pain.  No fever chills.  No prior treatments or limiting factors.  No other complaints at this time.          Review of Systems   Constitutional: Negative for fatigue and fever.   Respiratory: Positive for cough and shortness of breath.    Neurological: Positive for dizziness.   All other systems reviewed and are negative.      Past Medical History:   Diagnosis Date   • Acute bronchitis with bronchospasm    • Acute exacerbation of chronic obstructive pulmonary disease (COPD) (CMS/[facility])    • Anxiety    • Arthritis    • B12 deficiency    • Back pain    • Cataract    • Cervicalgia    • Colonic polyp     History of colonic polyps    • COPD (chronic obstructive pulmonary disease) (CMS/[facility])    • Depression    • Diverticulitis    • Dysphagia    • Edema    • Esophageal reflux    • Folic acid deficiency    • Herpes zoster    • High cholesterol    • History of kidney infection    • History of recurrent urinary tract infection    • HTN (hypertension)    • Hypercholesterolemia    • Kidney infection    • Malignant hypertension 4/26/2015     Accelerated essential hypertension   • Measles     rubeola   • Muscle spasm    • Neoplasm of uncertain behavior of skin    • Osteoporosis    • Recurrent urinary tract infection    • Rheumatoid arthritis (CMS/[facility])    • RLS (restless legs syndrome)    • Stomach ulcer    • Type 2 diabetes mellitus (CMS/[facility])    • Vitamin D  deficiency        Allergies   Allergen Reactions   • Clonidine Derivatives Other (See Comments)     Pt reports extreme hypotension     • Penicillins Rash   • Sulfa Antibiotics Rash       Past Surgical History:   Procedure Laterality Date   • CATARACT EXTRACTION Left 02/11/2013    with lens implant   • CATARACT EXTRACTION Right 04/15/2013    with lens implant   • CATARACT EXTRACTION WITH INTRAOCULAR LENS IMPLANT Left 02/11/2013   • CATARACT EXTRACTION WITH INTRAOCULAR LENS IMPLANT Right 04/15/2013   • COLONOSCOPY  2012   • COLONOSCOPY N/A 11/26/2019    Procedure: COLONOSCOPY;  Surgeon: Chidi Downing MD;  Location:  [facility];  Service: Gastroenterology   • ENDOSCOPY N/A 11/13/2017    Procedure: ESOPHAGOGASTRODUODENOSCOPY with biopsies and esophageal balloon dilitation;  Surgeon: Wesley Stovall MD;  Location:  [facility];  Service:    • ENDOSCOPY N/A 11/25/2019    Procedure: ESOPHAGOGASTRODUODENOSCOPY;  Surgeon: Chidi Downing MD;  Location:  [facility];  Service: Gastroenterology   • HYSTERECTOMY  1979   • UPPER GASTROINTESTINAL ENDOSCOPY  12/09/2013   • UPPER GASTROINTESTINAL ENDOSCOPY  11/13/2017       Family History   Problem Relation Age of Onset   • Arthritis Mother    • Diabetes Mother    • Hypertension Mother    • Arthritis Other    • Arthritis Other    • Diabetes Other         DM   • Hypertension Other    • Colon cancer Neg Hx        Social History     Socioeconomic History   • Marital status:      Spouse name: Not on file   • Number of children: Not on file   • Years of education: Not on file   • Highest education level: Not on file   Tobacco Use   • Smoking status: Former Smoker   • Smokeless tobacco: Never Used   • Tobacco comment:  Denied: History of smoking cigarettes   Substance and Sexual Activity   • Alcohol use: Yes     Comment: Occassionally   • Drug use: No   • Sexual activity: Defer           Objective   Physical Exam   Constitutional: She is oriented to person, place,  and time. No distress.   Elderly appearing   HENT:   Head: Normocephalic and atraumatic.   Nose: Nose normal.   Eyes: Conjunctivae and EOM are normal.   Cardiovascular: Normal rate, regular rhythm and intact distal pulses.   Pulmonary/Chest: Effort normal and breath sounds normal. No respiratory distress.   Abdominal: Soft. She exhibits no distension. There is no tenderness. There is no guarding.   Musculoskeletal: She exhibits no edema or deformity.   Neurological: She is alert and oriented to person, place, and time. She displays normal reflexes. No cranial nerve deficit.   NIHSS of 0   Skin: She is not diaphoretic.   Nursing note and vitals reviewed.      Procedures           ED Course  ED Course as of Jan 03 0712   Thu Jan 02, 2020   1140 Hgb near baseline.     [CG]   1145 EKG interpreted by me.  Sinus rhythm.  Rate of 68.  Normal sinus rhythm.  Nonspecific T wave changes.  No ST segment changes.  Abnormal EKG    [CG]   1218 Patient stating that she is currently asymptomatic.  Blood pressure did increase to 208/90.  At this point patient states that she has not been taking all of her antihypertensive medications secondary to her blood pressure running low so.  She has not seen a PCP recently.  She denies chest pain or shortness of breath.  Work-up was unremarkable.  Hemoglobin was at baseline.  She is appropriate for discharge at this time.  Strict return precautions given.  Follow-up as needed..  Family agreeable to this plan.    [CG]      ED Course User Index  [CG] Brendan Brooks, DO                                               MDM  74-year-old female presented for elevated blood pressure.  She noted that she had some mild dizziness and shortness of breath earlier during the day.  EKG nonischemic.  Physical exam unremarkable.  Troponin negative.  Chest x-ray negative for acute process.  Hemodynamically stable.  Blood pressure slightly elevated.  Patient admits to me that she has not been taking her Norvasc  at home.  We will give her Norvasc and discharged to follow-up as needed.  Patient and family are agreeable to this plan.      Final diagnoses:   Hypertension, unspecified type            Brendna Brooks, DO  01/03/20 0712

## 2020-01-02 NOTE — ED NOTES
Pts SBP 200s. Dr. Brooks notified and awaiting new orders.      Angelina Carrillo, RN  01/02/20 9083

## 2020-08-17 ENCOUNTER — OFFICE VISIT (OUTPATIENT)
Dept: INTERNAL MEDICINE | Facility: CLINIC | Age: 75
End: 2020-08-17

## 2020-08-17 VITALS
HEART RATE: 61 BPM | OXYGEN SATURATION: 100 % | RESPIRATION RATE: 18 BRPM | TEMPERATURE: 98.4 F | DIASTOLIC BLOOD PRESSURE: 90 MMHG | SYSTOLIC BLOOD PRESSURE: 184 MMHG | BODY MASS INDEX: 36.57 KG/M2 | WEIGHT: 206.4 LBS | HEIGHT: 63 IN

## 2020-08-17 DIAGNOSIS — E78.2 MIXED HYPERLIPIDEMIA: ICD-10-CM

## 2020-08-17 DIAGNOSIS — I10 ACCELERATED ESSENTIAL HYPERTENSION: Primary | ICD-10-CM

## 2020-08-17 DIAGNOSIS — R60.0 LOCALIZED EDEMA: ICD-10-CM

## 2020-08-17 DIAGNOSIS — N18.30 CKD (CHRONIC KIDNEY DISEASE) STAGE 3, GFR 30-59 ML/MIN (HCC): ICD-10-CM

## 2020-08-17 DIAGNOSIS — E11.65 UNCONTROLLED TYPE 2 DIABETES MELLITUS WITH HYPERGLYCEMIA (HCC): ICD-10-CM

## 2020-08-17 PROCEDURE — 99204 OFFICE O/P NEW MOD 45 MIN: CPT | Performed by: INTERNAL MEDICINE

## 2020-08-17 RX ORDER — OXYBUTYNIN CHLORIDE 10 MG/1
10 TABLET, EXTENDED RELEASE ORAL DAILY
COMMUNITY
End: 2020-09-08

## 2020-08-17 RX ORDER — GLIMEPIRIDE 4 MG/1
4 TABLET ORAL
COMMUNITY
End: 2020-09-08

## 2020-08-17 RX ORDER — CARVEDILOL 25 MG/1
25 TABLET ORAL 2 TIMES DAILY WITH MEALS
COMMUNITY
End: 2020-08-18 | Stop reason: SDUPTHER

## 2020-08-17 RX ORDER — FUROSEMIDE 20 MG/1
20 TABLET ORAL 2 TIMES DAILY PRN
COMMUNITY

## 2020-08-17 RX ORDER — FENOFIBRATE 160 MG/1
160 TABLET ORAL DAILY
COMMUNITY
End: 2020-09-08

## 2020-08-17 RX ORDER — OMEPRAZOLE 40 MG/1
40 CAPSULE, DELAYED RELEASE ORAL DAILY
COMMUNITY
End: 2020-09-08

## 2020-08-17 NOTE — PROGRESS NOTES
Subjective   Valarie Camara is a 75 y.o. female.     Chief Complaint   Patient presents with   • Establish Care     DM, history of stroke, edema in legs,    • Hypertension   • Hyperlipidemia   • Diabetes       History of Present Illness   HPI: Patient is here for an initial visit, patient is here to follow up on the blood pressure which is noted to be elevated, she states she did not take her blood pressure pills but the patient states that she is taking the blood pressure medications as prescribed and has had no side effects. The patient is also here to follow up on the cholesterol and sugar is trying to follow a diet. The patient  is  due to get lab work done . The patient also needs refills on medications .  Patient states she also has history of stroke a few years ago, she also complains of chronic leg swelling and she is on a diuretic , last labs also show anemia and she underwent EGD and colonoscopy during her hospitalization in 2019  Hypertension   Pertinent negatives include no chest pain, palpitations or shortness of breath.   Hyperlipidemia   Pertinent negatives include no chest pain or shortness of breath.   Diabetes   Pertinent negatives for hypoglycemia include no dizziness, nervousness/anxiousness or pallor. Pertinent negatives for diabetes include no chest pain, no shortness of breath  Patient is on acei/arb     Review of Systems   Constitutional: Negative for appetite change, fatigue and fever.   HENT: Negative for congestion, ear discharge, ear pain, sinus pressure and sore throat.    Eyes: Negative for pain and discharge.   Respiratory: Negative for cough, chest tightness, shortness of breath and wheezing.    Cardiovascular: Positive for leg swelling. Negative for chest pain and palpitations.   Gastrointestinal: Negative for abdominal pain, blood in stool, constipation, diarrhea and nausea.   Endocrine: Negative for cold intolerance and heat intolerance.   Genitourinary: Negative for dysuria,  flank pain and frequency.   Musculoskeletal: Negative for back pain and joint swelling.   Skin: Negative for color change.   Allergic/Immunologic: Negative for environmental allergies and food allergies.   Neurological: Negative for dizziness, weakness, numbness and headaches.   Hematological: Negative for adenopathy. Does not bruise/bleed easily.   Psychiatric/Behavioral: Negative for behavioral problems and dysphoric mood. The patient is not nervous/anxious.        Past Medical History:   Diagnosis Date   • Acute bronchitis with bronchospasm    • Acute exacerbation of chronic obstructive pulmonary disease (COPD) (CMS/[facility])    • Anxiety    • Arthritis    • B12 deficiency    • Back pain    • Cataract    • Cervicalgia    • Colonic polyp     History of colonic polyps    • COPD (chronic obstructive pulmonary disease) (CMS/[facility])    • Depression    • Diverticulitis    • Dysphagia    • Edema    • Esophageal reflux    • Folic acid deficiency    • Herpes zoster    • High cholesterol    • History of kidney infection    • History of recurrent urinary tract infection    • HTN (hypertension)    • Hypercholesterolemia    • Kidney infection    • Malignant hypertension 4/26/2015     Accelerated essential hypertension   • Measles     rubeola   • Muscle spasm    • Neoplasm of uncertain behavior of skin    • Osteoporosis    • Recurrent urinary tract infection    • Rheumatoid arthritis (CMS/H[facility])    • RLS (restless legs syndrome)    • Stomach ulcer    • Type 2 diabetes mellitus (CMS/[facility])    • Vitamin D deficiency        Past Surgical History:   Procedure Laterality Date   • CATARACT EXTRACTION Left 02/11/2013    with lens implant   • CATARACT EXTRACTION Right 04/15/2013    with lens implant   • CATARACT EXTRACTION WITH INTRAOCULAR LENS IMPLANT Left 02/11/2013   • CATARACT EXTRACTION WITH INTRAOCULAR LENS IMPLANT Right 04/15/2013   • COLONOSCOPY  2012   • COLONOSCOPY N/A 11/26/2019    Procedure: COLONOSCOPY;  Surgeon: Chidi Downing MD;   Location:  [facility];  Service: Gastroenterology   • ENDOSCOPY N/A 11/13/2017    Procedure: ESOPHAGOGASTRODUODENOSCOPY with biopsies and esophageal balloon dilitation;  Surgeon: Wesley Stovall MD;  Location:  [facility];  Service:    • ENDOSCOPY N/A 11/25/2019    Procedure: ESOPHAGOGASTRODUODENOSCOPY;  Surgeon: Chidi Downing MD;  Location:  [facility];  Service: Gastroenterology   • HYSTERECTOMY  1979   • UPPER GASTROINTESTINAL ENDOSCOPY  12/09/2013   • UPPER GASTROINTESTINAL ENDOSCOPY  11/13/2017       Family History   Problem Relation Age of Onset   • Arthritis Mother    • Diabetes Mother    • Hypertension Mother    • Arthritis Other    • Arthritis Other    • Diabetes Other         DM   • Hypertension Other    • Colon cancer Neg Hx         reports that she quit smoking about 8 years ago. She has a 15.00 pack-year smoking history. She has never used smokeless tobacco. She reports that she drinks alcohol. She reports that she does not use drugs.    Allergies   Allergen Reactions   • Penicillin G Anaphylaxis   • Clonidine Derivatives Other (See Comments)     Pt reports extreme hypotension     • Penicillins Rash   • Sulfa Antibiotics Rash           Current Outpatient Medications:   •  aspirin 81 MG EC tablet, Take 81 mg by mouth Daily., Disp: , Rfl:   •  atorvastatin (LIPITOR) 80 MG tablet, Take 80 mg by mouth Daily., Disp: , Rfl:   •  carvedilol (COREG) 25 MG tablet, Take 25 mg by mouth 2 (Two) Times a Day With Meals., Disp: , Rfl:   •  fenofibrate 160 MG tablet, Take 160 mg by mouth Daily., Disp: , Rfl:   •  ferrous sulfate 325 (65 FE) MG tablet, Take 325 mg by mouth Every Other Day., Disp: , Rfl:   •  furosemide (LASIX) 20 MG tablet, Take 20 mg by mouth 2 (Two) Times a Day As Needed (edema)., Disp: , Rfl:   •  glimepiride (AMARYL) 4 MG tablet, Take 4 mg by mouth Every Morning Before Breakfast., Disp: , Rfl:   •  insulin degludec (Tresiba FlexTouch) 100 UNIT/ML solution pen-injector injection,  "Inject 20 Units under the skin into the appropriate area as directed 2 (two) times a day. PT HAS BEEN USING SAMPLES, Disp: , Rfl:   •  Insulin Pen Needle 31G X 5 MM misc, Inject insulin three times daily, Disp: 100 each, Rfl: 11  •  omeprazole (priLOSEC) 40 MG capsule, Take 40 mg by mouth Daily., Disp: , Rfl:   •  oxybutynin XL (DITROPAN-XL) 10 MG 24 hr tablet, Take 10 mg by mouth Daily., Disp: , Rfl:   •  vitamin D (ERGOCALCIFEROL) 45470 units capsule capsule, TAKE ONE CAPSULE BY MOUTH ONCE A WEEK, Disp: 4 capsule, Rfl: 0  •  clopidogrel (PLAVIX) 75 MG tablet, Take 1 tablet by mouth Daily., Disp: , Rfl:   •  hydrALAZINE (APRESOLINE) 25 MG tablet, Take 1 tablet by mouth Every 8 (Eight) Hours., Disp: 90 tablet, Rfl: 0  •  insulin lispro (humaLOG) 100 UNIT/ML injection, Inject 10 Units under the skin 3 (Three) Times a Day With Meals., Disp: , Rfl:       Objective   Blood pressure (!) 184/90, pulse 61, temperature 98.4 °F (36.9 °C), resp. rate 18, height 160 cm (63\"), weight 93.6 kg (206 lb 6.4 oz), SpO2 100 %, not currently breastfeeding.  Vitals:    08/17/20 1122 08/17/20 1158   BP: (!) 233/98 (!) 184/90   Pulse: 61    Resp: 18    Temp: 98.4 °F (36.9 °C)    SpO2: 100%    Weight: 93.6 kg (206 lb 6.4 oz)    Height: 160 cm (63\")        Physical Exam   Constitutional: She is oriented to person, place, and time. She appears well-developed and well-nourished. No distress.   HENT:   Head: Normocephalic and atraumatic.   Right Ear: External ear normal.   Left Ear: External ear normal.   Nose: Nose normal.   Mouth/Throat: Oropharynx is clear and moist.   Eyes: Pupils are equal, round, and reactive to light. Conjunctivae and EOM are normal.   Neck: Neck supple. No thyromegaly present.   Cardiovascular: Normal rate, regular rhythm and normal heart sounds.   Pulmonary/Chest: Effort normal and breath sounds normal. No respiratory distress.   Abdominal: Soft. Bowel sounds are normal. She exhibits no distension. There is no " tenderness. There is no rebound.   Musculoskeletal: She exhibits edema and deformity.   Lymphadenopathy:     She has no cervical adenopathy.   Neurological: She is alert and oriented to person, place, and time.   right hemiparesis   Skin: Skin is warm. She is not diaphoretic.   Psychiatric: She has a normal mood and affect.   Nursing note and vitals reviewed.      Patient's Body mass index is 36.56 kg/m². BMI is above normal parameters. Recommendations include: educational material, exercise counseling and nutrition counseling.      Results for orders placed or performed during the hospital encounter of 01/02/20   Comprehensive Metabolic Panel   Result Value Ref Range    Glucose 147 (H) 65 - 99 mg/dL    BUN 14 8 - 23 mg/dL    Creatinine 1.34 (H) 0.57 - 1.00 mg/dL    Sodium 138 136 - 145 mmol/L    Potassium 4.2 3.5 - 5.2 mmol/L    Chloride 103 98 - 107 mmol/L    CO2 23.1 22.0 - 29.0 mmol/L    Calcium 8.7 8.6 - 10.5 mg/dL    Total Protein 6.1 6.0 - 8.5 g/dL    Albumin 3.40 (L) 3.50 - 5.20 g/dL    ALT (SGPT) 9 1 - 33 U/L    AST (SGOT) 16 1 - 32 U/L    Alkaline Phosphatase 32 (L) 39 - 117 U/L    Total Bilirubin 0.3 0.2 - 1.2 mg/dL    eGFR  African Amer 47 (L) >60 mL/min/1.73    Globulin 2.7 gm/dL    A/G Ratio 1.3 g/dL    BUN/Creatinine Ratio 10.4 7.0 - 25.0    Anion Gap 11.9 5.0 - 15.0 mmol/L   BNP   Result Value Ref Range    proBNP 864.8 5.0-1,800.0 pg/mL   Troponin   Result Value Ref Range    Troponin T 0.015 0.000 - 0.030 ng/mL   CBC Auto Differential   Result Value Ref Range    WBC 5.07 3.40 - 10.80 10*3/mm3    RBC 2.60 (L) 3.77 - 5.28 10*6/mm3    Hemoglobin 7.5 (L) 12.0 - 15.9 g/dL    Hematocrit 25.1 (L) 34.0 - 46.6 %    MCV 96.5 79.0 - 97.0 fL    MCH 28.8 26.6 - 33.0 pg    MCHC 29.9 (L) 31.5 - 35.7 g/dL    RDW 15.7 (H) 12.3 - 15.4 %    RDW-SD 55.4 (H) 37.0 - 54.0 fl    MPV 9.6 6.0 - 12.0 fL    Platelets 302 140 - 450 10*3/mm3    Neutrophil % 56.4 42.7 - 76.0 %    Lymphocyte % 29.8 19.6 - 45.3 %    Monocyte % 8.7  5.0 - 12.0 %    Eosinophil % 4.3 0.3 - 6.2 %    Basophil % 0.6 0.0 - 1.5 %    Immature Grans % 0.2 0.0 - 0.5 %    Neutrophils, Absolute 2.86 1.70 - 7.00 10*3/mm3    Lymphocytes, Absolute 1.51 0.70 - 3.10 10*3/mm3    Monocytes, Absolute 0.44 0.10 - 0.90 10*3/mm3    Eosinophils, Absolute 0.22 0.00 - 0.40 10*3/mm3    Basophils, Absolute 0.03 0.00 - 0.20 10*3/mm3    Immature Grans, Absolute 0.01 0.00 - 0.05 10*3/mm3    nRBC 0.0 0.0 - 0.2 /100 WBC   Scan Slide   Result Value Ref Range    Hypochromia Mod/2+ None Seen    Polychromasia Slight/1+ None Seen    WBC Morphology Normal Normal    Platelet Estimate Adequate Normal   Light Blue Top   Result Value Ref Range    Extra Tube hold for add-on    Green Top (Gel)   Result Value Ref Range    Extra Tube Hold for add-ons.    Lavender Top   Result Value Ref Range    Extra Tube hold for add-on    Gold Top - SST   Result Value Ref Range    Extra Tube Hold for add-ons.          Assessment/Plan   Valarie was seen today for establish care, hypertension, hyperlipidemia and diabetes.    Diagnoses and all orders for this visit:    Accelerated essential hypertension  -     Ambulatory Referral to Cardiology    Mixed hyperlipidemia  -     CBC & Differential  -     Comprehensive Metabolic Panel  -     Lipid Panel    Uncontrolled type 2 diabetes mellitus with hyperglycemia (CMS/[facility])  -     Hemoglobin A1c  -     Microalbumin / Creatinine Urine Ratio - Urine, Clean Catch    CKD (chronic kidney disease) stage 3, GFR 30-59 ml/min (CMS/[facility])  -     Ambulatory Referral to Nephrology    Localized edema  -     Ambulatory Referral to Nephrology    Plan:  1.  Accelerated essential hypertension: Will continue current medication, low-sodium diet advised, Counseled to regularly check BP at home with goal averaging <130/80.  Refer patient to cardiology  2.mixed hyperlipidemia: will obtain   fasting CMP and lipid panel.  Diet and exercise counseled,  Will continue current medications  3. Diabetes  Mellitus   : will obtain   fasting CMP  and hba1c 10.0 October 2019, diet and exercise counseled , Will continue current medications  4.  CRI stage III: We will refer patient to nephrology  5.  Edema: On diuretics, We will refer patient to nephrology  6.  Anemia: We will obtain labs         Arminda Evans MD

## 2020-08-18 ENCOUNTER — TELEPHONE (OUTPATIENT)
Dept: INTERNAL MEDICINE | Facility: CLINIC | Age: 75
End: 2020-08-18

## 2020-08-18 RX ORDER — CARVEDILOL 25 MG/1
25 TABLET ORAL 2 TIMES DAILY WITH MEALS
Qty: 180 TABLET | Refills: 0 | Status: SHIPPED | OUTPATIENT
Start: 2020-08-18 | End: 2020-09-08 | Stop reason: SDUPTHER

## 2020-08-18 RX ORDER — HYDRALAZINE HYDROCHLORIDE 25 MG/1
25 TABLET, FILM COATED ORAL EVERY 8 HOURS
Qty: 90 TABLET | Refills: 0 | Status: CANCELLED | OUTPATIENT
Start: 2020-08-18

## 2020-08-19 LAB
ALBUMIN SERPL-MCNC: 3.7 G/DL (ref 3.5–5.2)
ALBUMIN/CREAT UR: 1254 MG/G CREAT (ref 0–29)
ALBUMIN/GLOB SERPL: 1.8 G/DL
ALP SERPL-CCNC: 38 U/L (ref 39–117)
ALT SERPL-CCNC: 20 U/L (ref 1–33)
AST SERPL-CCNC: 18 U/L (ref 1–32)
BASOPHILS # BLD AUTO: 0.03 10*3/MM3 (ref 0–0.2)
BASOPHILS NFR BLD AUTO: 0.6 % (ref 0–1.5)
BILIRUB SERPL-MCNC: 0.3 MG/DL (ref 0–1.2)
BUN SERPL-MCNC: 26 MG/DL (ref 8–23)
BUN/CREAT SERPL: 17 (ref 7–25)
CALCIUM SERPL-MCNC: 8.7 MG/DL (ref 8.6–10.5)
CHLORIDE SERPL-SCNC: 104 MMOL/L (ref 98–107)
CHOLEST SERPL-MCNC: 157 MG/DL (ref 0–200)
CO2 SERPL-SCNC: 27.8 MMOL/L (ref 22–29)
CREAT SERPL-MCNC: 1.53 MG/DL (ref 0.57–1)
CREAT UR-MCNC: 45.4 MG/DL
EOSINOPHIL # BLD AUTO: 0.19 10*3/MM3 (ref 0–0.4)
EOSINOPHIL NFR BLD AUTO: 3.9 % (ref 0.3–6.2)
ERYTHROCYTE [DISTWIDTH] IN BLOOD BY AUTOMATED COUNT: 12.7 % (ref 12.3–15.4)
GLOBULIN SER CALC-MCNC: 2.1 GM/DL
GLUCOSE SERPL-MCNC: 93 MG/DL (ref 65–99)
HBA1C MFR BLD: 9.6 % (ref 4.8–5.6)
HCT VFR BLD AUTO: 34.8 % (ref 34–46.6)
HDLC SERPL-MCNC: 50 MG/DL (ref 40–60)
HGB BLD-MCNC: 11.5 G/DL (ref 12–15.9)
IMM GRANULOCYTES # BLD AUTO: 0 10*3/MM3 (ref 0–0.05)
IMM GRANULOCYTES NFR BLD AUTO: 0 % (ref 0–0.5)
LDLC SERPL CALC-MCNC: 95 MG/DL (ref 0–100)
LYMPHOCYTES # BLD AUTO: 1.76 10*3/MM3 (ref 0.7–3.1)
LYMPHOCYTES NFR BLD AUTO: 36.3 % (ref 19.6–45.3)
MCH RBC QN AUTO: 27.8 PG (ref 26.6–33)
MCHC RBC AUTO-ENTMCNC: 33 G/DL (ref 31.5–35.7)
MCV RBC AUTO: 84.1 FL (ref 79–97)
MICROALBUMIN UR-MCNC: 569.1 UG/ML
MONOCYTES # BLD AUTO: 0.5 10*3/MM3 (ref 0.1–0.9)
MONOCYTES NFR BLD AUTO: 10.3 % (ref 5–12)
NEUTROPHILS # BLD AUTO: 2.37 10*3/MM3 (ref 1.7–7)
NEUTROPHILS NFR BLD AUTO: 48.9 % (ref 42.7–76)
NRBC BLD AUTO-RTO: 0 /100 WBC (ref 0–0.2)
PLATELET # BLD AUTO: 301 10*3/MM3 (ref 140–450)
POTASSIUM SERPL-SCNC: 4.2 MMOL/L (ref 3.5–5.2)
PROT SERPL-MCNC: 5.8 G/DL (ref 6–8.5)
RBC # BLD AUTO: 4.14 10*6/MM3 (ref 3.77–5.28)
SODIUM SERPL-SCNC: 140 MMOL/L (ref 136–145)
TRIGL SERPL-MCNC: 58 MG/DL (ref 0–150)
VLDLC SERPL CALC-MCNC: 11.6 MG/DL
WBC # BLD AUTO: 4.85 10*3/MM3 (ref 3.4–10.8)

## 2020-08-26 ENCOUNTER — TELEPHONE (OUTPATIENT)
Dept: INTERNAL MEDICINE | Facility: CLINIC | Age: 75
End: 2020-08-26

## 2020-08-26 RX ORDER — HYDRALAZINE HYDROCHLORIDE 25 MG/1
25 TABLET, FILM COATED ORAL EVERY 8 HOURS
Qty: 90 TABLET | Refills: 4 | Status: SHIPPED | OUTPATIENT
Start: 2020-08-26 | End: 2020-09-08 | Stop reason: SDUPTHER

## 2020-08-26 NOTE — TELEPHONE ENCOUNTER
Caller: Valarie Camara    Relationship: Self    Best call back number: [phone]     Medication needed:   Requested Prescriptions     Pending Prescriptions Disp Refills   • hydrALAZINE (APRESOLINE) 25 MG tablet 90 tablet 0     Sig: Take 1 tablet by mouth Every 8 (Eight) Hours.       When do you need the refill by: TODAY    What details did the patient provide when requesting the medication: PT STATED THAT SHE IS OUT OF THE MED.     Does the patient have less than a 3 day supply:  [x] Yes  [] No    What is the patient's preferred pharmacy: [facility] - [facility] [address] - [phone]  - [phone] FX       PT ALSO WANTED TO KNOW HOW SHE CAN GET HOME HEALTH.  PT STATED SHE NEEDS SOME HELP.

## 2020-08-31 ENCOUNTER — TELEPHONE (OUTPATIENT)
Dept: INTERNAL MEDICINE | Facility: CLINIC | Age: 75
End: 2020-08-31

## 2020-08-31 RX ORDER — FUROSEMIDE 20 MG/1
20 TABLET ORAL 2 TIMES DAILY PRN
Status: CANCELLED | OUTPATIENT
Start: 2020-08-31

## 2020-08-31 NOTE — TELEPHONE ENCOUNTER
Patient requested a call back.    Patient would like to know if she could get a referral to home health for physical therapy. Patient stated she has pain in her right leg, hip, and back that has been gradually getting worse over the last 1-2 years. Patient stated this is causing her trouble walking intermittently and she thinks she would benefit from physical therapy.    Patient requested a refill of furosemide (LASIX) 20 MG tablet.    Please call and advise. Patient call back [phone]    [facility] - [facility] [address] - [phone] [facility] [phone] FX

## 2020-09-08 ENCOUNTER — OFFICE VISIT (OUTPATIENT)
Dept: INTERNAL MEDICINE | Facility: CLINIC | Age: 75
End: 2020-09-08

## 2020-09-08 VITALS
TEMPERATURE: 97.3 F | DIASTOLIC BLOOD PRESSURE: 110 MMHG | OXYGEN SATURATION: 98 % | SYSTOLIC BLOOD PRESSURE: 200 MMHG | HEIGHT: 63 IN | HEART RATE: 69 BPM | RESPIRATION RATE: 16 BRPM | BODY MASS INDEX: 37.39 KG/M2 | WEIGHT: 211 LBS

## 2020-09-08 DIAGNOSIS — E78.2 MIXED HYPERLIPIDEMIA: ICD-10-CM

## 2020-09-08 DIAGNOSIS — E11.65 UNCONTROLLED TYPE 2 DIABETES MELLITUS WITH HYPERGLYCEMIA (HCC): ICD-10-CM

## 2020-09-08 DIAGNOSIS — N18.30 CKD (CHRONIC KIDNEY DISEASE) STAGE 3, GFR 30-59 ML/MIN (HCC): ICD-10-CM

## 2020-09-08 DIAGNOSIS — I10 ACCELERATED ESSENTIAL HYPERTENSION: Primary | ICD-10-CM

## 2020-09-08 PROCEDURE — 99214 OFFICE O/P EST MOD 30 MIN: CPT | Performed by: INTERNAL MEDICINE

## 2020-09-08 RX ORDER — CARVEDILOL 25 MG/1
25 TABLET ORAL 2 TIMES DAILY WITH MEALS
Qty: 180 TABLET | Refills: 0 | Status: SHIPPED | OUTPATIENT
Start: 2020-09-08 | End: 2020-10-19 | Stop reason: SDUPTHER

## 2020-09-08 RX ORDER — HYDRALAZINE HYDROCHLORIDE 100 MG/1
100 TABLET, FILM COATED ORAL 3 TIMES DAILY
Qty: 90 TABLET | Refills: 4 | Status: SHIPPED | OUTPATIENT
Start: 2020-09-08 | End: 2021-02-03 | Stop reason: SDDI

## 2020-09-08 RX ORDER — CLOPIDOGREL BISULFATE 75 MG/1
75 TABLET ORAL DAILY
Qty: 90 TABLET | Refills: 0 | Status: SHIPPED | OUTPATIENT
Start: 2020-09-08 | End: 2020-10-19 | Stop reason: SDUPTHER

## 2020-09-08 RX ORDER — ATORVASTATIN CALCIUM 80 MG/1
80 TABLET, FILM COATED ORAL DAILY
Qty: 90 TABLET | Refills: 0 | Status: SHIPPED | OUTPATIENT
Start: 2020-09-08 | End: 2020-10-19 | Stop reason: SDUPTHER

## 2020-09-08 NOTE — PROGRESS NOTES
Subjective   Valarie Camara is a 75 y.o. female.     Chief Complaint   Patient presents with   • Hypertension   • Hyperlipidemia   • Diabetes   • Chronic Kidney Disease       History of Present Illness   Patient is here to follow-up on her blood pressure, her blood pressure was noted to be elevated, she states has not been taking her blood pressure pills, she is also to follow-up on sugar and had lab work, patient is also to follow-up on CRI and states that she has not seen any of the specialist that she was referred to, I have stressed with the patient the importance of compliance with prescribed medication and specialists    The following portions of the patient's history were reviewed and updated as appropriate: allergies, current medications, past family history, past medical history, past social history, past surgical history and problem list.    Review of Systems   Constitutional: Negative for appetite change, fatigue and fever.   HENT: Negative for congestion, ear discharge, ear pain, sinus pressure and sore throat.    Eyes: Negative for pain and discharge.   Respiratory: Negative for cough, chest tightness, shortness of breath and wheezing.    Cardiovascular: Negative for chest pain, palpitations and leg swelling.   Gastrointestinal: Negative for abdominal pain, blood in stool, constipation, diarrhea and nausea.   Endocrine: Negative for cold intolerance and heat intolerance.   Genitourinary: Negative for dysuria, flank pain and frequency.   Musculoskeletal: Negative for back pain and joint swelling.   Skin: Negative for color change.   Allergic/Immunologic: Negative for environmental allergies and food allergies.   Neurological: Negative for dizziness, weakness, numbness and headaches.   Hematological: Negative for adenopathy. Does not bruise/bleed easily.   Psychiatric/Behavioral: Negative for behavioral problems and dysphoric mood. The patient is not nervous/anxious.          Current Outpatient  "Medications:   •  aspirin 81 MG EC tablet, Take 81 mg by mouth Daily., Disp: , Rfl:   •  atorvastatin (LIPITOR) 80 MG tablet, Take 1 tablet by mouth Daily., Disp: 90 tablet, Rfl: 0  •  carvedilol (COREG) 25 MG tablet, Take 1 tablet by mouth 2 (Two) Times a Day With Meals., Disp: 180 tablet, Rfl: 0  •  clopidogrel (PLAVIX) 75 MG tablet, Take 1 tablet by mouth Daily., Disp: 90 tablet, Rfl: 0  •  furosemide (LASIX) 20 MG tablet, Take 20 mg by mouth 2 (Two) Times a Day As Needed (edema)., Disp: , Rfl:   •  hydrALAZINE (APRESOLINE) 100 MG tablet, Take 1 tablet by mouth 3 (Three) Times a Day., Disp: 90 tablet, Rfl: 4  •  insulin degludec (Tresiba FlexTouch) 100 UNIT/ML solution pen-injector injection, Inject 25 Units under the skin into the appropriate area as directed every night at bedtime., Disp: 5 pen, Rfl: 0  •  Insulin Pen Needle 31G X 5 MM misc, Inject insulin three times daily, Disp: 100 each, Rfl: 11  •  Insulin Lispro (HumaLOG KwikPen) 200 UNIT/ML solution pen-injector, Inject 25 Units under the skin into the appropriate area as directed 3 (Three) Times a Day., Disp: 5 pen, Rfl: 0    Objective     Blood pressure (!) 200/110, pulse 69, temperature 97.3 °F (36.3 °C), resp. rate 16, height 160 cm (62.99\"), weight 95.7 kg (211 lb), SpO2 98 %, not currently breastfeeding.    Physical Exam   Constitutional: She is oriented to person, place, and time. She appears well-developed and well-nourished. No distress.   HENT:   Head: Normocephalic and atraumatic.   Right Ear: External ear normal.   Left Ear: External ear normal.   Nose: Nose normal.   Mouth/Throat: Oropharynx is clear and moist.   Eyes: Pupils are equal, round, and reactive to light. Conjunctivae and EOM are normal.   Neck: Neck supple. No thyromegaly present.   Cardiovascular: Normal rate, regular rhythm and normal heart sounds.   Pulmonary/Chest: Effort normal and breath sounds normal. No respiratory distress.   Abdominal: Soft. Bowel sounds are normal. She " exhibits no distension. There is no tenderness. There is no rebound.   Musculoskeletal: Normal range of motion. She exhibits no edema.   Lymphadenopathy:     She has no cervical adenopathy.   Neurological: She is alert and oriented to person, place, and time.   Skin: Skin is warm. She is not diaphoretic.   Psychiatric: She has a normal mood and affect.   Nursing note and vitals reviewed.    Patient's Body mass index is 37.39 kg/m². BMI is above normal parameters. Recommendations include: educational material, exercise counseling and nutrition counseling.      Results for orders placed or performed in visit on 08/17/20   Comprehensive Metabolic Panel   Result Value Ref Range    Glucose 93 65 - 99 mg/dL    BUN 26 (H) 8 - 23 mg/dL    Creatinine 1.53 (H) 0.57 - 1.00 mg/dL    eGFR Non African Am 33 (L) >60 mL/min/1.73    eGFR African Am 40 (L) >60 mL/min/1.73    BUN/Creatinine Ratio 17.0 7.0 - 25.0    Sodium 140 136 - 145 mmol/L    Potassium 4.2 3.5 - 5.2 mmol/L    Chloride 104 98 - 107 mmol/L    Total CO2 27.8 22.0 - 29.0 mmol/L    Calcium 8.7 8.6 - 10.5 mg/dL    Total Protein 5.8 (L) 6.0 - 8.5 g/dL    Albumin 3.70 3.50 - 5.20 g/dL    Globulin 2.1 gm/dL    A/G Ratio 1.8 g/dL    Total Bilirubin 0.3 0.0 - 1.2 mg/dL    Alkaline Phosphatase 38 (L) 39 - 117 U/L    AST (SGOT) 18 1 - 32 U/L    ALT (SGPT) 20 1 - 33 U/L   Lipid Panel   Result Value Ref Range    Total Cholesterol 157 0 - 200 mg/dL    Triglycerides 58 0 - 150 mg/dL    HDL Cholesterol 50 40 - 60 mg/dL    VLDL Cholesterol 11.6 mg/dL    LDL Cholesterol  95 0 - 100 mg/dL   Hemoglobin A1c   Result Value Ref Range    Hemoglobin A1C 9.60 (H) 4.80 - 5.60 %   Microalbumin / Creatinine Urine Ratio - Urine, Clean Catch   Result Value Ref Range    Creatinine, Urine 45.4 Not Estab. mg/dL    Microalbumin, Urine 569.1 Not Estab. ug/mL    Microalbumin/Creatinine Ratio 1,254 (H) 0 - 29 mg/g creat   CBC & Differential   Result Value Ref Range    WBC 4.85 3.40 - 10.80 10*3/mm3     RBC 4.14 3.77 - 5.28 10*6/mm3    Hemoglobin 11.5 (L) 12.0 - 15.9 g/dL    Hematocrit 34.8 34.0 - 46.6 %    MCV 84.1 79.0 - 97.0 fL    MCH 27.8 26.6 - 33.0 pg    MCHC 33.0 31.5 - 35.7 g/dL    RDW 12.7 12.3 - 15.4 %    Platelets 301 140 - 450 10*3/mm3    Neutrophil Rel % 48.9 42.7 - 76.0 %    Lymphocyte Rel % 36.3 19.6 - 45.3 %    Monocyte Rel % 10.3 5.0 - 12.0 %    Eosinophil Rel % 3.9 0.3 - 6.2 %    Basophil Rel % 0.6 0.0 - 1.5 %    Neutrophils Absolute 2.37 1.70 - 7.00 10*3/mm3    Lymphocytes Absolute 1.76 0.70 - 3.10 10*3/mm3    Monocytes Absolute 0.50 0.10 - 0.90 10*3/mm3    Eosinophils Absolute 0.19 0.00 - 0.40 10*3/mm3    Basophils Absolute 0.03 0.00 - 0.20 10*3/mm3    Immature Granulocyte Rel % 0.0 0.0 - 0.5 %    Immature Grans Absolute 0.00 0.00 - 0.05 10*3/mm3    nRBC 0.0 0.0 - 0.2 /100 WBC         Assessment/Plan   Valarie was seen today for hypertension, hyperlipidemia, diabetes and chronic kidney disease.    Diagnoses and all orders for this visit:    Accelerated essential hypertension  -     Ambulatory Referral to Cardiology    Mixed hyperlipidemia    Uncontrolled type 2 diabetes mellitus with hyperglycemia (CMS/[facility])  -     Insulin Pen Needle 31G X 5 MM misc; Inject insulin three times daily    CKD (chronic kidney disease) stage 3, GFR 30-59 ml/min (CMS/[facility])  -     Ambulatory Referral to Nephrology    Other orders  -     insulin degludec (Tresiba FlexTouch) 100 UNIT/ML solution pen-injector injection; Inject 25 Units under the skin into the appropriate area as directed every night at bedtime.  -     hydrALAZINE (APRESOLINE) 100 MG tablet; Take 1 tablet by mouth 3 (Three) Times a Day.  -     Insulin Lispro (HumaLOG KwikPen) 200 UNIT/ML solution pen-injector; Inject 25 Units under the skin into the appropriate area as directed 3 (Three) Times a Day.  -     atorvastatin (LIPITOR) 80 MG tablet; Take 1 tablet by mouth Daily.  -     carvedilol (COREG) 25 MG tablet; Take 1 tablet by mouth 2 (Two) Times a Day  With Meals.  -     clopidogrel (PLAVIX) 75 MG tablet; Take 1 tablet by mouth Daily.    Plan:  1.  Accelerated essential hypertension: Will continue current medication, low-sodium diet advised, Counseled to regularly check BP at home with goal averaging <130/80.  Refer patient to cardiology, plan ,stressed with prescribed medication and keeping appointments  2.mixed hyperlipidemia: Reviewed fasting CMP and lipid panel.  Diet and exercise counseled,  Will continue current medications  3. Diabetes  Mellitus  : Reviewed fasting CMP  and hba1c 9.6, diet and exercise counseled , Will start Tresiba 25 units at bedtime and Humalog 25 units 3 times daily  4.  CRI stage III: Labs reviewed, refer patient to nephrology, oral hydration             Arminda Evans MD

## 2020-10-06 RX ORDER — CLOPIDOGREL BISULFATE 75 MG/1
75 TABLET ORAL DAILY
Qty: 90 TABLET | Refills: 0 | Status: CANCELLED | OUTPATIENT
Start: 2020-10-06

## 2020-10-06 RX ORDER — INSULIN DEGLUDEC INJECTION 100 U/ML
25 INJECTION, SOLUTION SUBCUTANEOUS
Qty: 5 PEN | Refills: 0 | Status: CANCELLED | OUTPATIENT
Start: 2020-10-06

## 2020-10-06 RX ORDER — CARVEDILOL 25 MG/1
25 TABLET ORAL 2 TIMES DAILY WITH MEALS
Qty: 180 TABLET | Refills: 0 | Status: CANCELLED | OUTPATIENT
Start: 2020-10-06

## 2020-10-06 RX ORDER — ATORVASTATIN CALCIUM 80 MG/1
80 TABLET, FILM COATED ORAL DAILY
Qty: 90 TABLET | Refills: 0 | Status: CANCELLED | OUTPATIENT
Start: 2020-10-06

## 2020-10-06 NOTE — TELEPHONE ENCOUNTER
PATIENT IS REQUESTING A CALL BACK TO DISCUSS HER MEDICATIONS    PATIENT HAS NOT RECEIVED HER REFILLS THROUGH MAIL ORDER.    PATIENT IS NEEDING TO DISCUSS WITH SOMEONE ABOUT  A MEDICATION THAT IS NOT WORKING WELL FOR HER    PATIENT IS CURRENTLY OUT OF SOME MEDICATIONS     PLEASE ADVISE    PATIENT CALL BACK NUMBER [phone]

## 2020-10-06 NOTE — TELEPHONE ENCOUNTER
Caller: Valarie Camara    Relationship: Self    Best call back number: [phone]    Medication needed:   Requested Prescriptions     Pending Prescriptions Disp Refills   • carvedilol (COREG) 25 MG tablet 180 tablet 0     Sig: Take 1 tablet by mouth 2 (Two) Times a Day With Meals.   • clopidogrel (PLAVIX) 75 MG tablet 90 tablet 0     Sig: Take 1 tablet by mouth Daily.   • atorvastatin (LIPITOR) 80 MG tablet 90 tablet 0     Sig: Take 1 tablet by mouth Daily.   • insulin degludec (Tresiba FlexTouch) 100 UNIT/ML solution pen-injector injection 5 pen 0     Sig: Inject 25 Units under the skin into the appropriate area as directed every night at bedtime.       When do you need the refill by: ASAP    What details did the patient provide when requesting the medication: PATIENT WANTS THESE SENT TO THE [facility] IN [place] AND STATED SHE WOULD LIKE TO PICK THEM UP TODAY IF POSSIBLE    Does the patient have less than a 3 day supply:  [x] Yes  [] No    What is the patient's preferred pharmacy: [facility] [address] [place] [address] [address] [place] [address] [phone] [facility] [phone] FX

## 2020-10-06 NOTE — TELEPHONE ENCOUNTER
Pt stated that Hydralazine makes her feel funny  I advised pt to contact pharmacy on her prescriptions, pt verbalized understanding

## 2020-10-07 NOTE — TELEPHONE ENCOUNTER
Please inform the patient to call her pharmacy as these were all sent as a 90-day supply to the pharmacy 4 weeks ago

## 2020-10-16 NOTE — TELEPHONE ENCOUNTER
Caller: Valarie Camara    Relationship: Self    Best call back number: [phone]    Medication needed:   PATIENT COULD NOT NAME THE MEDICATIONS BUT STATES ONE WAS A MIST, ONE WAS CALLED IN THE LAST APPOINTMENT AND WAS A NEW MEDICATION TO THE PATIENT, AND insulin degludec (Tresiba FlexTouch) 100 UNIT/ML solution pen-injector injection  When do you need the refill by: ASAP    What details did the patient provide when requesting the medication: PATIENT HAS BEEN TRYING TO GET THESE MEDICATIONS FOR A WEEK. PATIENT HAS BEEN OUT THE TRISEBA AND MIST FOR 2 WEEKS.    Does the patient have less than a 3 day supply:  [x] Yes  [] No    What is the patient's preferred pharmacy: [facility] [address] [place] [address] [phone] [facility] [phone] FX     PLEASE GIVE THE PATIENT A PHONE CALL WHEN THE MEDICATION IS SEN TO THE PHARMACY

## 2020-10-19 RX ORDER — ATORVASTATIN CALCIUM 80 MG/1
80 TABLET, FILM COATED ORAL DAILY
Qty: 90 TABLET | Refills: 0 | Status: SHIPPED | OUTPATIENT
Start: 2020-10-19

## 2020-10-19 RX ORDER — CLOPIDOGREL BISULFATE 75 MG/1
75 TABLET ORAL DAILY
Qty: 90 TABLET | Refills: 0 | Status: SHIPPED | OUTPATIENT
Start: 2020-10-19 | End: 2021-03-05

## 2020-10-19 RX ORDER — CARVEDILOL 25 MG/1
25 TABLET ORAL 2 TIMES DAILY WITH MEALS
Qty: 180 TABLET | Refills: 0 | Status: SHIPPED | OUTPATIENT
Start: 2020-10-19

## 2020-10-19 RX ORDER — INSULIN DEGLUDEC INJECTION 100 U/ML
25 INJECTION, SOLUTION SUBCUTANEOUS
Qty: 5 PEN | Refills: 0 | Status: SHIPPED | OUTPATIENT
Start: 2020-10-19 | End: 2020-10-20

## 2020-10-19 NOTE — TELEPHONE ENCOUNTER
Patient requesting these refills be sent to [facility] instead of [facility]. Patient has switched pharmacies.

## 2020-10-20 ENCOUNTER — TELEPHONE (OUTPATIENT)
Dept: INTERNAL MEDICINE | Facility: CLINIC | Age: 75
End: 2020-10-20

## 2020-10-20 DIAGNOSIS — E11.65 UNCONTROLLED TYPE 2 DIABETES MELLITUS WITH HYPERGLYCEMIA: Primary | ICD-10-CM

## 2020-10-20 RX ORDER — HUMAN INSULIN 100 [USP'U]/ML
50 INJECTION, SUSPENSION SUBCUTANEOUS 2 TIMES DAILY WITH MEALS
Qty: 10 ML | Refills: 12 | Status: SHIPPED | OUTPATIENT
Start: 2020-10-20 | End: 2021-02-03 | Stop reason: SDDI

## 2020-10-20 RX ORDER — ALBUTEROL SULFATE 90 UG/1
2 AEROSOL, METERED RESPIRATORY (INHALATION) EVERY 4 HOURS PRN
Qty: 18 G | Refills: 5 | Status: SHIPPED | OUTPATIENT
Start: 2020-10-20 | End: 2021-05-25 | Stop reason: HOSPADM

## 2020-10-20 NOTE — TELEPHONE ENCOUNTER
Pt stated she can not afford her insulin, I relayed earlier message per MARTIN Rivero of [facility] or endo, pt refused stating she can not afford the medication that she used to be able to get samples from the [facility] and she can not get anymore  Stated if medication was more than 10.00 she could not afford it  Stated she was going to talk with someone else to see if they could help her get her medicine cheaper

## 2020-10-20 NOTE — TELEPHONE ENCOUNTER
PT CALLED BACK REGARDING MEDICATION    SHE IS REQUESTING A CALL BACK FROM CLINICAL STAFF    [phone]

## 2020-10-20 NOTE — TELEPHONE ENCOUNTER
Patient has been informed several times that we dont have samples     We can send rx for her to get [facility] - as discussed    Or refer her to endo - as discussed

## 2020-10-20 NOTE — TELEPHONE ENCOUNTER
I spoke with Cleo Sheppard was unavailable.    I requested they think about the options then give us a call back to let us know how they want to proceed

## 2020-10-20 NOTE — TELEPHONE ENCOUNTER
Pt said she is on Tresiba and can't afford it.  Pt is wanting to know if the office has samples she can have.  Pt requesting call back to discuss further.

## 2020-10-20 NOTE — TELEPHONE ENCOUNTER
Spoke with patient and told her that we did not have any samples of Tresiba at this time. She said that she is not able to afford this medication as it's about $70/month. Wanted to know if there was anything else that she could be switched to that was more affordable. She also was requesting a Rx for Primeatene Mist be sent to her pharmacy. When I asked why she needed this medication she states that at times she was having shortness of breath when she exerted herself and that this had been going on for several months. Please advise.

## 2020-10-26 ENCOUNTER — TELEPHONE (OUTPATIENT)
Dept: INTERNAL MEDICINE | Facility: CLINIC | Age: 75
End: 2020-10-26

## 2020-10-28 ENCOUNTER — TELEPHONE (OUTPATIENT)
Dept: INTERNAL MEDICINE | Facility: CLINIC | Age: 75
End: 2020-10-28

## 2020-10-28 NOTE — TELEPHONE ENCOUNTER
CALLER: Valarie DAVILA (Self)  PHONE: [phone] (H)    PATIENT WOULD LIKE TO KNOW IF SHE CAN GET ANY SAMPLES OF TRESIBA.   SHE CANNOT AFFORD THE TRESIBA BUT STATES IT THE BEST MEDICATION TO MANAGE HER DIABETES.     PATIENT WOULD TO KNOW IF THERE IS ANOTHER INSULIN THAT CAN BE CALLED INTO hER PHARMACY THAT WOULD BE MORE COST EFFECTIVE FOR HER. SHE STATES that SHE NEEDS SOMETHING WITH A ZERO DOLLAR CO PAY.        PATIENT WOULD LIKE TO INFORM YOU THAT SHE HAS SWITCHED FROM [facility] TO [facility].

## 2020-11-04 NOTE — TELEPHONE ENCOUNTER
Tried calling patient, no answer, and no vm to leave a message. If patient calls back please inform that Dr. Villa wants patient to see Endocrinology to discuss her insulin. We also do not have any tresiba samples at this time.  Hub may deliver message.

## 2020-11-05 NOTE — TELEPHONE ENCOUNTER
Patient called back, patient is asking for another referral to be placed to endocrinology so she can follow up with them.

## 2020-11-19 ENCOUNTER — TELEPHONE (OUTPATIENT)
Dept: INTERNAL MEDICINE | Facility: CLINIC | Age: 75
End: 2020-11-19

## 2020-11-19 DIAGNOSIS — E11.65 UNCONTROLLED TYPE 2 DIABETES MELLITUS WITH HYPERGLYCEMIA (HCC): Primary | ICD-10-CM

## 2020-11-19 RX ORDER — BLOOD-GLUCOSE METER
1 KIT MISCELLANEOUS DAILY
Qty: 1 EACH | Refills: 1 | Status: SHIPPED | OUTPATIENT
Start: 2020-11-19

## 2020-11-19 NOTE — TELEPHONE ENCOUNTER
MIREILLE FROM [facility] CALLED WANTING TO KNOW IF A FAX HAS BEEN RECEIVED FOR A PRESCRIPTION REQUEST FOR SUPPLIES. MIREILLE STATED THIS WAS FAXED 11/13 AND NEEDS TO BE FILLED OUT AND RETURNED AS SOON AS POSSBLE.     CALL BACK [phone]

## 2020-12-12 ENCOUNTER — TELEPHONE (OUTPATIENT)
Dept: INTERNAL MEDICINE | Facility: CLINIC | Age: 75
End: 2020-12-12

## 2020-12-12 NOTE — TELEPHONE ENCOUNTER
PATIENT IS WANTING TO KNOW IF SHE CAN GET SOMETHING CALLED IN FOR LEG CRAMPS. SHE IS EXPERIENCING THEM AT NIGHT.

## 2020-12-14 ENCOUNTER — TELEPHONE (OUTPATIENT)
Dept: INTERNAL MEDICINE | Facility: CLINIC | Age: 75
End: 2020-12-14

## 2020-12-14 NOTE — TELEPHONE ENCOUNTER
Pt scheduled, wants to know if she can change to a telephone visit, not comfortable coming to office

## 2020-12-14 NOTE — TELEPHONE ENCOUNTER
After Visit Summary   12/4/2018    Cricket Miguel    MRN: [id number]           Patient Information     Date Of Birth          1954        Visit Information        Provider Department      12/4/2018 12:00 PM Dipak Gordillo MD [facility] [place]        Today's Diagnoses     Congestive heart failure, NYHA class 2, unspecified congestive heart failure type (H)    -  1    Endocarditis and heart valve disorders in diseases classified elsewhere        Hypertension goal BP (blood pressure) < 140/90        Major depressive disorder, single episode, moderate (HCC)        COPD, mild (H)        Hyperlipidemia LDL goal <70        S/P AVR (aortic valve replacement)        H/O aortic root repair        Cerebrovascular accident (CVA), unspecified mechanism (H)        Anxiety        History of CVA (cerebrovascular accident)        History of endocarditis        Constipation, unspecified constipation type        Medication monitoring encounter        Need for pneumococcal vaccination          Care Instructions    Recommend 2 cap fulls of Miralax every day for a week for constipation management. Recommend Zofran use 30 minutes before PT. Coreg dosage increased to 12.5 mg or 1 tablet twice per day. Received Prevnar 13. Recommend Shingrix vaccine for shingles. Check with insurance to see where the Shingrix vaccine is the cheapest. Follow up 2-6 months after receiving the first shot for the second shot.    [facility] [place]                        To reach your care team during and after hours:   [phone]  To reach our pharmacy:        [phone]    Clinic Hours                        Our clinic hours are:    Monday   7:30 am to 7:00 pm                  Tuesday through Friday 7:30 am to 5:00 pm                             Saturday   8:00 am to 12:00 pm      Sunday   Closed      Pharmacy Hours                        Our pharmacy hours are:    Monday   8:30 am to 7:00 pm       Tuesday to  Triplicate request please see other messages   Friday  8:30 am to 6:00 pm                       Saturday    9:00 am to 1:00 pm              Sunday    Closed              There is also information available at our web site:  [email or web address]    If your provider ordered any lab tests and you do not receive the results within 10 business days, please call the clinic.    If you need a medication refill please contact your pharmacy.  Please allow 2-3 business days for your refill to be completed.    Our clinic offers telephone visits and e visits.  Please ask one of your team members to explain more.      Use [employer] (secure email communication and access to your chart) to send your primary care provider a message or make an appointment. Ask someone on your Team how to sign up for [employer].  Immunizations                      Immunization History   Administered Date(s) Administered     Influenza (High Dose) 3 valent vaccine 09/04/2018     Influenza Vaccine IM 3yrs+ 4 Valent IIV4 10/28/2017     Pneumococcal 23 valent 11/04/2017     TD (ADULT, 7+) 04/14/1999     TDAP Vaccine (Adacel) 10/08/2018        Health Maintenance                         Health Maintenance Due   Topic Date Due     Heart Failure Action Plan Reviewed Every 3 Years  1954     COPD ACTION PLAN Q1 YR  1954     Microalbumin Lab - yearly  02/07/2013     Pneumococcal Vaccine (1 of 2 - PCV13) 11/04/2018     Depression Assessment - every 6 months  11/15/2018               Follow-ups after your visit        Follow-up notes from your care team     Return in about 2 months (around 2/4/2019), or if symptoms worsen or fail to improve, for Medication Recheck.      Your next 10 appointments already scheduled     Jan 02, 2019 12:45 PM CST   Echo Complete with [id number]   [facility] CV Echocardiography (Cardiovascular Imaging at [facility])    [address] [place] [id number] [phone]           1. Please bring or wear a comfortable two-piece outfit. 2. You  "may eat, drink and take your normal medicines. 3. For any questions that cannot be answered, please contact the ordering physician            Jan 02, 2019  3:50 PM CST   Core Return with BIBI Bettencourt CNP   [facility] ([facility])    [address] [place], [place] [address]  [facility] [address] [phone] OPT 2              Who to contact     If you have questions or need follow up information about today's clinic visit or your schedule please contact [facility] directly at [phone].  Normal or non-critical lab and imaging results will be communicated to you by [employer], letter or phone within 4 business days after the clinic has received the results. If you do not hear from us within 7 days, please contact the clinic through [employer] or phone. If you have a critical or abnormal lab result, we will notify you by phone as soon as possible.  Submit refill requests through [employer] or call your pharmacy and they will forward the refill request to us. Please allow 3 business days for your refill to be completed.          Additional Information About Your Visit        MyChart Information     [employer] gives you secure access to your electronic health record. If you see a primary care provider, you can also send messages to your care team and make appointments. If you have questions, please call your primary care clinic.  If you do not have a primary care provider, please call [phone] and they will assist you.        Care EveryWhere ID     This is your Care EveryWhere ID. This could be used by other organizations to access your [place] medical records  [id number]        Your Vitals Were     Pulse Temperature Height Pulse Oximetry BMI (Body Mass Index)       57 97  F (36.1  C) (Tympanic) 5' 7\" (1.702 m) 98% 28.82 kg/m2        Blood Pressure from Last 3 Encounters:   12/04/18 (!) 154/99   10/24/18 (!) 143/92   10/08/18 130/84    Weight from Last " 3 Encounters:   12/04/18 184 lb (83.5 kg)   10/24/18 183 lb 11.2 oz (83.3 kg)   10/08/18 173 lb (78.5 kg)              We Performed the Following     PNEUMOCOCCAL CONJ VACCINE 13 VALENT IM          Today's Medication Changes          These changes are accurate as of 12/4/18  1:06 PM.  If you have any questions, ask your nurse or doctor.               These medicines have changed or have updated prescriptions.        Dose/Directions    carvedilol 12.5 MG tablet   Commonly known as:  COREG   This may have changed:  how much to take   Used for:  Endocarditis and heart valve disorders in diseases classified elsewhere, Hypertension goal BP (blood pressure) < 140/90   Changed by:  Dipak Gordillo MD        Dose:  12.5 mg   Take 1 tablet (12.5 mg) by mouth 2 times daily   Quantity:  180 tablet   Refills:  3            Where to get your medicines      These medications were sent to [facility] [id number] [facility] - [place], [place] - [address] [facility] [address]  [address] [facility] [address] [facility] [address]     Phone:  [phone]     carvedilol 12.5 MG tablet                Primary Care Provider Office Phone # Fax #    Dipak Gordillo [phone]       [address] [place] [address]        Equal Access to Services     DANIEL BILLS : Hadii jenise nova hadasho Soomaali, waaxda luqadaha, qaybta kaalmada adeegyada, freddie waggoner. So [facility] [phone].    ATENCIÓN: Si habla español, tiene a mendiola disposición servicios gratuitos de asistencia lingüística. Llame al [phone].    We comply with applicable federal civil rights laws and [place] laws. We do not discriminate on the basis of race, color, national origin, age, disability, sex, sexual orientation, or gender identity.            Thank you!     Thank you for choosing [facility]  for your care. Our goal is always to provide you with excellent care. Hearing back from our patients is one way we can continue to improve our services. Please take  a few minutes to complete the written survey that you may receive in the mail after your visit with us. Thank you!             Your Updated Medication List - Protect others around you: Learn how to safely use, store and throw away your medicines at www.disposemymeds.org.          This list is accurate as of 12/4/18  1:06 PM.  Always use your most recent med list.                   Brand Name Dispense Instructions for use Diagnosis    albuterol 108 (90 Base) MCG/ACT inhaler    PROAIR HFA/PROVENTIL HFA/VENTOLIN HFA    1 Inhaler    Inhale 2 puffs into the lungs every 6 hours as needed for shortness of breath / dyspnea or wheezing    Upper respiratory tract infection, unspecified type       aspirin 81 MG chewable tablet    ASA    60 tablet    1 tablet (81 mg) by Oral or Feeding Tube route daily    Endocarditis and heart valve disorders in diseases classified elsewhere, Cerebrovascular accident (CVA) due to other mechanism       atorvastatin 40 MG tablet    LIPITOR    60 tablet    Take 1 tablet (40 mg) by mouth daily    Cerebrovascular accident (CVA) due to other mechanism       Carboxymethylcellulose Sod PF 0.5 % Soln ophthalmic solution    REFRESH PLUS    1 Bottle    Place 1 drop Into the left eye 3 times daily as needed (to flush debris from eye)    Endocarditis and heart valve disorders in diseases classified elsewhere       carvedilol 12.5 MG tablet    COREG    180 tablet    Take 1 tablet (12.5 mg) by mouth 2 times daily    Endocarditis and heart valve disorders in diseases classified elsewhere, Hypertension goal BP (blood pressure) < 140/90       dantrolene 25 MG capsule    DANTRIUM     Take 25 mg by mouth every other day        famotidine 20 MG tablet    PEPCID    90 tablet    Take 1 tablet (20 mg) by mouth daily    Medication monitoring encounter       FLUoxetine 20 MG capsule    PROzac    90 capsule    Take 1 capsule (20 mg) by mouth daily    Major depressive disorder, single episode, moderate (H), Anxiety        losartan 50 MG tablet    COZAAR    180 tablet    Take 1 tablet (50 mg) by mouth 2 times daily    Hypertension goal BP (blood pressure) < 140/90, Congestive heart failure, NYHA class 2, unspecified congestive heart failure type (H), Cardiomyopathy, unspecified type (H)       multivitamin w/minerals tablet     30 each    Take 1 tablet by mouth daily    Cerebrovascular accident (CVA) due to other mechanism       ondansetron 4 MG tablet    ZOFRAN    20 tablet    Take 1-2 tablets (4-8mg) every 8 hours as needed for nausea    Nausea

## 2020-12-14 NOTE — TELEPHONE ENCOUNTER
Caller: Valarie Camara    Relationship: Self    Best call back number: [phone]    What medication are you requesting: N/A    What are your current symptoms: Leg cramps     How long have you been experiencing symptoms:  Roughly a month     Have you had these symptoms before:    [x] Yes  [] No    Have you been treated for these symptoms before:   [] Yes  [x] No    If a prescription is needed, what is your preferred pharmacy and phone number: [facility] [address] [place] [address] [phone] [facility] [phone]      Additional notes: Patient states she has been having leg cramps for a month now but they are slowly getting worse. The patient is curious if there is a medication that she can take to help with this issue. Please advise.

## 2020-12-14 NOTE — TELEPHONE ENCOUNTER
Caller: Valarie Camara    Relationship: Self    Best call back number: [phone]    What medication are you requesting: something to help with cramps    What are your current symptoms: leg cramps    How long have you been experiencing symptoms: 2 weeks    Have you had these symptoms before:    [] Yes  [x] No    Have you been treated for these symptoms before:   [] Yes  [x] No    If a prescription is needed, what is your preferred pharmacy and phone number: [facility] [address] [place] [address] [phone] [facility] [phone] FX     Additional notes:  Patient would like a call back about this request. Patient stated she has requested this multiple times and not heard back.

## 2020-12-17 ENCOUNTER — OFFICE VISIT (OUTPATIENT)
Dept: INTERNAL MEDICINE | Facility: CLINIC | Age: 75
End: 2020-12-17

## 2020-12-17 VITALS
HEART RATE: 88 BPM | RESPIRATION RATE: 16 BRPM | WEIGHT: 203 LBS | DIASTOLIC BLOOD PRESSURE: 102 MMHG | TEMPERATURE: 97.7 F | HEIGHT: 63 IN | OXYGEN SATURATION: 95 % | BODY MASS INDEX: 35.97 KG/M2 | SYSTOLIC BLOOD PRESSURE: 200 MMHG

## 2020-12-17 DIAGNOSIS — M62.838 MUSCLE SPASM: ICD-10-CM

## 2020-12-17 DIAGNOSIS — N39.0 ACUTE URINARY TRACT INFECTION: ICD-10-CM

## 2020-12-17 DIAGNOSIS — E11.65 UNCONTROLLED TYPE 2 DIABETES MELLITUS WITH HYPERGLYCEMIA (HCC): ICD-10-CM

## 2020-12-17 DIAGNOSIS — I10 ACCELERATED ESSENTIAL HYPERTENSION: Primary | ICD-10-CM

## 2020-12-17 DIAGNOSIS — E78.2 MIXED HYPERLIPIDEMIA: ICD-10-CM

## 2020-12-17 DIAGNOSIS — N18.30 STAGE 3 CHRONIC KIDNEY DISEASE, UNSPECIFIED WHETHER STAGE 3A OR 3B CKD (HCC): ICD-10-CM

## 2020-12-17 PROCEDURE — 99214 OFFICE O/P EST MOD 30 MIN: CPT | Performed by: INTERNAL MEDICINE

## 2020-12-17 RX ORDER — LOSARTAN POTASSIUM 50 MG/1
50 TABLET ORAL 2 TIMES DAILY
Qty: 60 TABLET | Refills: 5 | Status: SHIPPED | OUTPATIENT
Start: 2020-12-17 | End: 2021-05-25 | Stop reason: HOSPADM

## 2020-12-17 NOTE — PROGRESS NOTES
Subjective   Valarie Camara is a 75 y.o. female.     Chief Complaint   Patient presents with   • Spasms     legs bilateral   • Hypertension   • Hyperlipidemia   • Diabetes   • Chronic Kidney Disease   • Urinary Tract Infection       History of Present Illness   HPI: Patient is here to follow up on the blood pressure  The patient is taking the blood pressure medications as prescribed and has had no side effects. The patient is also here to follow up on the cholesterol and is trying to follow a diet. The patient is also here to follow on sugar and cri  and is  due to get lab work done .  Patient was referred to several specialist but she has not seen any of them as yet, she is advised to keep all her specialist appointments, the patient also needs refills on medications .  She also complains of pain in both her legs which is mostly in her muscles and she describes it as muscle spasm, patient also complains of problems urinating with burning and frequency  Hypertension   Pertinent negatives include no chest pain, palpitations or shortness of breath.   Hyperlipidemia   Pertinent negatives include no chest pain or shortness of breath.   Diabetes   Pertinent negatives for hypoglycemia include no dizziness, nervousness/anxiousness or pallor. Pertinent negatives for diabetes include no chest pain, no shortness of breath  Patient is on acei/arb     The following portions of the patient's history were reviewed and updated as appropriate: allergies, current medications, past family history, past medical history, past social history, past surgical history and problem list.    Review of Systems   Constitutional: Negative for appetite change, fatigue and fever.   HENT: Negative for congestion, ear discharge, ear pain, sinus pressure and sore throat.    Eyes: Negative for pain and discharge.   Respiratory: Negative for cough, chest tightness, shortness of breath and wheezing.    Cardiovascular: Negative for chest pain,  palpitations and leg swelling.   Gastrointestinal: Negative for abdominal pain, blood in stool, constipation, diarrhea and nausea.   Endocrine: Negative for cold intolerance and heat intolerance.   Genitourinary: Positive for dysuria and frequency. Negative for flank pain.   Musculoskeletal: Negative for back pain and joint swelling.        Muscle spasm   Skin: Negative for color change.   Allergic/Immunologic: Negative for environmental allergies and food allergies.   Neurological: Negative for dizziness, weakness, numbness and headaches.   Hematological: Negative for adenopathy. Does not bruise/bleed easily.   Psychiatric/Behavioral: Negative for behavioral problems and dysphoric mood. The patient is not nervous/anxious.          Current Outpatient Medications:   •  albuterol sulfate  (90 Base) MCG/ACT inhaler, Inhale 2 puffs Every 4 (Four) Hours As Needed for Wheezing., Disp: 18 g, Rfl: 5  •  aspirin 81 MG EC tablet, Take 81 mg by mouth Daily., Disp: , Rfl:   •  atorvastatin (LIPITOR) 80 MG tablet, Take 1 tablet by mouth Daily., Disp: 90 tablet, Rfl: 0  •  carvedilol (COREG) 25 MG tablet, Take 1 tablet by mouth 2 (Two) Times a Day With Meals., Disp: 180 tablet, Rfl: 0  •  clopidogrel (PLAVIX) 75 MG tablet, Take 1 tablet by mouth Daily., Disp: 90 tablet, Rfl: 0  •  furosemide (LASIX) 20 MG tablet, Take 20 mg by mouth 2 (Two) Times a Day As Needed (edema)., Disp: , Rfl:   •  glucose blood test strip, Check sugar once a day, Disp: 30 each, Rfl: 12  •  glucose monitor monitoring kit, 1 each Daily., Disp: 1 each, Rfl: 1  •  hydrALAZINE (APRESOLINE) 100 MG tablet, Take 1 tablet by mouth 3 (Three) Times a Day., Disp: 90 tablet, Rfl: 4  •  Insulin Lispro (HumaLOG KwikPen) 200 UNIT/ML solution pen-injector, Inject 25 Units under the skin into the appropriate area as directed 3 (Three) Times a Day., Disp: 5 pen, Rfl: 0  •  insulin NPH-insulin regular (NovoLIN 70/30) (70-30) 100 UNIT/ML injection, Inject 50 Units  "under the skin into the appropriate area as directed 2 (Two) Times a Day With Meals., Disp: 10 mL, Rfl: 12  •  Insulin Pen Needle 31G X 5 MM misc, Inject insulin three times daily, Disp: 100 each, Rfl: 11  •  Lancets 30G misc, Check sugar daily, Disp: 30 each, Rfl: 12  •  baclofen (LIORESAL) 10 MG tablet, Take 1 tablet by mouth At Night As Needed for Muscle Spasms., Disp: 30 tablet, Rfl: 3  •  losartan (Cozaar) 50 MG tablet, Take 1 tablet by mouth 2 (Two) Times a Day., Disp: 60 tablet, Rfl: 5  •  nitrofurantoin, macrocrystal-monohydrate, (Macrobid) 100 MG capsule, Take 1 capsule by mouth 2 (Two) Times a Day., Disp: 14 capsule, Rfl: 0    Objective     Blood pressure (!) 200/102, pulse 88, temperature 97.7 °F (36.5 °C), resp. rate 16, height 160 cm (62.99\"), weight 92.1 kg (203 lb), SpO2 95 %, not currently breastfeeding.    Physical Exam  Vitals signs and nursing note reviewed.   Constitutional:       General: She is not in acute distress.     Appearance: Normal appearance. She is not diaphoretic.   HENT:      Head: Normocephalic and atraumatic.      Right Ear: External ear normal.      Left Ear: External ear normal.      Nose: Nose normal.   Eyes:      Extraocular Movements: Extraocular movements intact.      Conjunctiva/sclera: Conjunctivae normal.   Neck:      Musculoskeletal: Neck supple.      Trachea: Trachea normal.   Cardiovascular:      Rate and Rhythm: Normal rate and regular rhythm.      Heart sounds: Normal heart sounds.   Pulmonary:      Effort: Pulmonary effort is normal. No respiratory distress.      Breath sounds: Normal breath sounds.   Abdominal:      General: Abdomen is flat.   Musculoskeletal:      Comments: Moves all limbs   Skin:     General: Skin is warm.   Neurological:      Mental Status: She is alert and oriented to person, place, and time.      Comments: No gross motor or sensory deficits       Patient's Body mass index is 35.97 kg/m². BMI is above normal parameters. Recommendations include: " educational material, exercise counseling and nutrition counseling.      Results for orders placed or performed during the hospital encounter of 10/28/20   COVID-19,[facility] ROUTINE, NP/OP SWAB IN TRANSPORT MEDIA OR ESWAB 72 HR TAT - Swab, Nasopharynx    Specimen: Nasopharynx; Swab   Result Value Ref Range    SARS-CoV-2, ROSANA Not Detected Not Detected         Assessment/Plan   Diagnoses and all orders for this visit:    1. Accelerated essential hypertension (Primary)  -     Ambulatory Referral to Cardiology    2. Uncontrolled type 2 diabetes mellitus with hyperglycemia (CMS/[facility])    3. Stage 3 chronic kidney disease, unspecified whether stage 3a or 3b CKD  -     Ambulatory Referral to Nephrology    4. Mixed hyperlipidemia  -     CBC & Differential  -     Comprehensive Metabolic Panel  -     Lipid Panel    5. Muscle spasm    6. Acute urinary tract infection  -     Urine Culture - Urine, Urine, Clean Catch    Other orders  -     losartan (Cozaar) 50 MG tablet; Take 1 tablet by mouth 2 (Two) Times a Day.  Dispense: 60 tablet; Refill: 5  -     baclofen (LIORESAL) 10 MG tablet; Take 1 tablet by mouth At Night As Needed for Muscle Spasms.  Dispense: 30 tablet; Refill: 3      Plan:  1.  Benign essential hypertension: Will continue current medication and increase to Cozaar 50 mg p.o. twice daily, low-sodium diet advised, Counseled to regularly check BP at home with goal averaging <130/80.  Patient has been referred to cardiology and she is advised to keep the appointment  2.mixed hyperlipidemia: will obtain   fasting CMP and lipid panel.  Diet and exercise counseled,  Will continue current medications  3. Diabetes  Mellitus  : will obtain   fasting CMP  and hba1c  , diet and exercise counseled , Will continue current medications, keep endocrinology appointment  4.  CRI stage III: We will refer patient to nephrology and obtain labs, oral hydration  5.  Muscle spasm: Trial with baclofen  6.  Urinary tract infection: We will  obtain urine culture, oral hydration, will start oral antibiotics         Arminda Evans MD

## 2020-12-17 NOTE — PATIENT INSTRUCTIONS
MyPlate from USDA    MyPlate is an outline of a general healthy diet based on the 2010 Dietary Guidelines for Americans, from the U.S. Department of Agriculture (USDA). It sets guidelines for how much food you should eat from each food group based on your age, sex, and level of physical activity.  What are tips for following MyPlate?  To follow MyPlate recommendations:  · Eat a wide variety of fruits and vegetables, grains, and protein foods.  · Serve smaller portions and eat less food throughout the day.  · Limit portion sizes to avoid overeating.  · Enjoy your food.  · Get at least 150 minutes of exercise every week. This is about 30 minutes each day, 5 or more days per week.  It can be difficult to have every meal look like MyPlate. Think about MyPlate as eating guidelines for an entire day, rather than each individual meal.  Fruits and vegetables  · Make half of your plate fruits and vegetables.  · Eat many different colors of fruits and vegetables each day.  · For a 2,000 calorie daily food plan, eat:  ? 2½ cups of vegetables every day.  ? 2 cups of fruit every day.  · 1 cup is equal to:  ? 1 cup raw or cooked vegetables.  ? 1 cup raw fruit.  ? 1 medium-sized orange, apple, or banana.  ? 1 cup 100% fruit or vegetable juice.  ? 2 cups raw leafy greens, such as lettuce, spinach, or kale.  ? ½ cup dried fruit.  Grains  · One fourth of your plate should be grains.  · Make at least half of the grains you eat each day whole grains.  · For a 2,000 calorie daily food plan, eat 6 oz of grains every day.  · 1 oz is equal to:  ? 1 slice bread.  ? 1 cup cereal.  ? ½ cup cooked rice, cereal, or pasta.  Protein  · One fourth of your plate should be protein.  · Eat a wide variety of protein foods, including meat, poultry, fish, eggs, beans, nuts, and tofu.  · For a 2,000 calorie daily food plan, eat 5½ oz of protein every day.  · 1 oz is equal to:  ? 1 oz meat, poultry, or fish.  ? ¼ cup cooked beans.  ? 1 egg.  ? ½ oz nuts  or seeds.  ? 1 Tbsp peanut butter.  Dairy  · Drink fat-free or low-fat (1%) milk.  · Eat or drink dairy as a side to meals.  · For a 2,000 calorie daily food plan, eat or drink 3 cups of dairy every day.  · 1 cup is equal to:  ? 1 cup milk, yogurt, cottage cheese, or soy milk (soy beverage).  ? 2 oz processed cheese.  ? 1½ oz natural cheese.  Fats, oils, salt, and sugars  · Only small amounts of oils are recommended.  · Avoid foods that are high in calories and low in nutritional value (empty calories), like foods high in fat or added sugars.  · Choose foods that are low in salt (sodium). Choose foods that have less than 140 milligrams (mg) of sodium per serving.  · Drink water instead of sugary drinks. Drink enough water each day to keep your urine pale yellow.  Where to find support  · Work with your health care provider or a nutrition specialist (dietitian) to develop a customized eating plan that is right for you.  · Download an bryce (mobile application) to help you track your daily food intake.  Where to find more information  · Go to ChooseMyPlate.gov for more information.  Summary  · MyPlate is a general guideline for healthy eating from the USDA. It is based on the 2010 Dietary Guidelines for Americans.  · In general, fruits and vegetables should take up ½ of your plate, grains should take up ¼ of your plate, and protein should take up ¼ of your plate.  This information is not intended to replace advice given to you by your health care provider. Make sure you discuss any questions you have with your health care provider.  Document Revised: 05/21/2020 Document Reviewed: 03/19/2018  Elsevier Patient Education © 2020 [employer]vier Inc.

## 2020-12-18 ENCOUNTER — TELEPHONE (OUTPATIENT)
Dept: INTERNAL MEDICINE | Facility: CLINIC | Age: 75
End: 2020-12-18

## 2020-12-18 ENCOUNTER — CLINICAL SUPPORT (OUTPATIENT)
Dept: INTERNAL MEDICINE | Facility: CLINIC | Age: 75
End: 2020-12-18

## 2020-12-18 DIAGNOSIS — E78.5 HYPERLIPIDEMIA, UNSPECIFIED HYPERLIPIDEMIA TYPE: Primary | ICD-10-CM

## 2020-12-19 LAB
ALBUMIN SERPL-MCNC: 3.1 G/DL (ref 3.5–5.2)
ALBUMIN/GLOB SERPL: 1.1 G/DL
ALP SERPL-CCNC: 70 U/L (ref 39–117)
ALT SERPL-CCNC: 21 U/L (ref 1–33)
AST SERPL-CCNC: 17 U/L (ref 1–32)
BASOPHILS # BLD AUTO: 0.04 10*3/MM3 (ref 0–0.2)
BASOPHILS NFR BLD AUTO: 0.7 % (ref 0–1.5)
BILIRUB SERPL-MCNC: 0.4 MG/DL (ref 0–1.2)
BUN SERPL-MCNC: 19 MG/DL (ref 8–23)
BUN/CREAT SERPL: 12.3 (ref 7–25)
CALCIUM SERPL-MCNC: 8.9 MG/DL (ref 8.6–10.5)
CHLORIDE SERPL-SCNC: 99 MMOL/L (ref 98–107)
CHOLEST SERPL-MCNC: 163 MG/DL (ref 0–200)
CO2 SERPL-SCNC: 28.3 MMOL/L (ref 22–29)
CREAT SERPL-MCNC: 1.54 MG/DL (ref 0.57–1)
EOSINOPHIL # BLD AUTO: 0.19 10*3/MM3 (ref 0–0.4)
EOSINOPHIL NFR BLD AUTO: 3.5 % (ref 0.3–6.2)
ERYTHROCYTE [DISTWIDTH] IN BLOOD BY AUTOMATED COUNT: 12.4 % (ref 12.3–15.4)
GLOBULIN SER CALC-MCNC: 2.8 GM/DL
GLUCOSE SERPL-MCNC: 400 MG/DL (ref 65–99)
HCT VFR BLD AUTO: 36.4 % (ref 34–46.6)
HDLC SERPL-MCNC: 52 MG/DL (ref 40–60)
HGB BLD-MCNC: 12.2 G/DL (ref 12–15.9)
IMM GRANULOCYTES # BLD AUTO: 0.02 10*3/MM3 (ref 0–0.05)
IMM GRANULOCYTES NFR BLD AUTO: 0.4 % (ref 0–0.5)
LDLC SERPL CALC-MCNC: 93 MG/DL (ref 0–100)
LYMPHOCYTES # BLD AUTO: 1.73 10*3/MM3 (ref 0.7–3.1)
LYMPHOCYTES NFR BLD AUTO: 32 % (ref 19.6–45.3)
MCH RBC QN AUTO: 28 PG (ref 26.6–33)
MCHC RBC AUTO-ENTMCNC: 33.5 G/DL (ref 31.5–35.7)
MCV RBC AUTO: 83.7 FL (ref 79–97)
MONOCYTES # BLD AUTO: 0.46 10*3/MM3 (ref 0.1–0.9)
MONOCYTES NFR BLD AUTO: 8.5 % (ref 5–12)
NEUTROPHILS # BLD AUTO: 2.97 10*3/MM3 (ref 1.7–7)
NEUTROPHILS NFR BLD AUTO: 54.9 % (ref 42.7–76)
NRBC BLD AUTO-RTO: 0 /100 WBC (ref 0–0.2)
PLATELET # BLD AUTO: 230 10*3/MM3 (ref 140–450)
POTASSIUM SERPL-SCNC: 4.6 MMOL/L (ref 3.5–5.2)
PROT SERPL-MCNC: 5.9 G/DL (ref 6–8.5)
RBC # BLD AUTO: 4.35 10*6/MM3 (ref 3.77–5.28)
SODIUM SERPL-SCNC: 133 MMOL/L (ref 136–145)
TRIGL SERPL-MCNC: 100 MG/DL (ref 0–150)
VLDLC SERPL CALC-MCNC: 18 MG/DL (ref 5–40)
WBC # BLD AUTO: 5.41 10*3/MM3 (ref 3.4–10.8)

## 2020-12-20 LAB
BACTERIA UR CULT: ABNORMAL
BACTERIA UR CULT: ABNORMAL
OTHER ANTIBIOTIC SUSC ISLT: ABNORMAL

## 2020-12-20 RX ORDER — BACLOFEN 10 MG/1
10 TABLET ORAL NIGHTLY PRN
Qty: 30 TABLET | Refills: 3 | Status: ON HOLD | OUTPATIENT
Start: 2020-12-20 | End: 2021-05-20

## 2020-12-20 RX ORDER — NITROFURANTOIN 25; 75 MG/1; MG/1
100 CAPSULE ORAL 2 TIMES DAILY
Qty: 14 CAPSULE | Refills: 0 | Status: SHIPPED | OUTPATIENT
Start: 2020-12-20 | End: 2021-01-13 | Stop reason: SDUPTHER

## 2021-01-05 ENCOUNTER — TELEPHONE (OUTPATIENT)
Dept: INTERNAL MEDICINE | Facility: CLINIC | Age: 76
End: 2021-01-05

## 2021-01-05 NOTE — TELEPHONE ENCOUNTER
PATIENT HAS CALLED REQUESTING FOR A PRESCRIPTION FOR VITAMIN D 22843. TO BE CALLED INTO PHARMACY.    PATIENT USES THE [facility] [address]    PATIENT IS REQUESTING A CALL BACK ONCE PRESCRIPTION IS CALLED IN    CALL BACK NUMBER [phone]

## 2021-01-07 ENCOUNTER — TELEPHONE (OUTPATIENT)
Dept: INTERNAL MEDICINE | Facility: CLINIC | Age: 76
End: 2021-01-07

## 2021-01-07 NOTE — TELEPHONE ENCOUNTER
Angelina requested a call back. Angelina stated she has sent multiple faxes over the last month requesting an order for a continuous glucose monitor for the patient. Angelina would like to make sure this has been received and would like to know if this will be returned for the patient. Angelina stated she will need a prescription and chart notes from the last 6 months.    Please call and advise. Angelina's call back [phone]  fax number [phone]

## 2021-01-07 NOTE — TELEPHONE ENCOUNTER
Caller: Valarie Camara    Relationship: Self    Best call back number: [phone]    What medication are you requesting: VITAMIN D    What are your current symptoms: NA    How long have you been experiencing symptoms: NA    Have you had these symptoms before:    [x] Yes  [] No    Have you been treated for these symptoms before:   [x] Yes  [] No    If a prescription is needed, what is your preferred pharmacy and phone number:  [facility] [address] [place] [address] [phone] [facility] [phone]         Additional notes: PATIENT REPORTS SHE HAS BEEN TAKING VITAMIN D FOR ABOUT 2 YEARS AND NOW HER PRESCRIPTION HAS .

## 2021-01-11 ENCOUNTER — TRANSCRIBE ORDERS (OUTPATIENT)
Dept: ADMINISTRATIVE | Facility: HOSPITAL | Age: 76
End: 2021-01-11

## 2021-01-11 ENCOUNTER — LAB (OUTPATIENT)
Dept: LAB | Facility: HOSPITAL | Age: 76
End: 2021-01-11

## 2021-01-11 DIAGNOSIS — D50.9 IRON DEFICIENCY ANEMIA, UNSPECIFIED IRON DEFICIENCY ANEMIA TYPE: Primary | ICD-10-CM

## 2021-01-11 DIAGNOSIS — D50.9 IRON DEFICIENCY ANEMIA, UNSPECIFIED IRON DEFICIENCY ANEMIA TYPE: ICD-10-CM

## 2021-01-11 DIAGNOSIS — E09.22: ICD-10-CM

## 2021-01-11 LAB
ALBUMIN SERPL-MCNC: 3.2 G/DL (ref 3.5–5.2)
ALBUMIN/GLOB SERPL: 1.3 G/DL
ALP SERPL-CCNC: 63 U/L (ref 39–117)
ALT SERPL W P-5'-P-CCNC: 14 U/L (ref 1–33)
ANION GAP SERPL CALCULATED.3IONS-SCNC: 9.1 MMOL/L (ref 5–15)
AST SERPL-CCNC: 17 U/L (ref 1–32)
BACTERIA UR QL AUTO: ABNORMAL /HPF
BASOPHILS # BLD AUTO: 0.03 10*3/MM3 (ref 0–0.2)
BASOPHILS NFR BLD AUTO: 0.7 % (ref 0–1.5)
BILIRUB SERPL-MCNC: 0.4 MG/DL (ref 0–1.2)
BILIRUB UR QL STRIP: NEGATIVE
BUN SERPL-MCNC: 25 MG/DL (ref 8–23)
BUN/CREAT SERPL: 17.5 (ref 7–25)
CALCIUM SPEC-SCNC: 8.7 MG/DL (ref 8.6–10.5)
CHLORIDE SERPL-SCNC: 102 MMOL/L (ref 98–107)
CHOLEST SERPL-MCNC: 147 MG/DL (ref 0–200)
CLARITY UR: ABNORMAL
CO2 SERPL-SCNC: 25.9 MMOL/L (ref 22–29)
COLOR UR: YELLOW
CREAT SERPL-MCNC: 1.43 MG/DL (ref 0.57–1)
DEPRECATED RDW RBC AUTO: 38.2 FL (ref 37–54)
EOSINOPHIL # BLD AUTO: 0.19 10*3/MM3 (ref 0–0.4)
EOSINOPHIL NFR BLD AUTO: 4.1 % (ref 0.3–6.2)
ERYTHROCYTE [DISTWIDTH] IN BLOOD BY AUTOMATED COUNT: 12.4 % (ref 12.3–15.4)
FERRITIN SERPL-MCNC: 172 NG/ML (ref 13–150)
GFR SERPL CREATININE-BSD FRML MDRD: 43 ML/MIN/1.73
GLOBULIN UR ELPH-MCNC: 2.4 GM/DL
GLUCOSE SERPL-MCNC: 343 MG/DL (ref 65–99)
GLUCOSE UR STRIP-MCNC: ABNORMAL MG/DL
HCT VFR BLD AUTO: 36.2 % (ref 34–46.6)
HDLC SERPL-MCNC: 44 MG/DL (ref 40–60)
HGB BLD-MCNC: 12 G/DL (ref 12–15.9)
HGB UR QL STRIP.AUTO: ABNORMAL
HYALINE CASTS UR QL AUTO: ABNORMAL /LPF
IMM GRANULOCYTES # BLD AUTO: 0.01 10*3/MM3 (ref 0–0.05)
IMM GRANULOCYTES NFR BLD AUTO: 0.2 % (ref 0–0.5)
IRON 24H UR-MRATE: 63 MCG/DL (ref 37–145)
IRON SATN MFR SERPL: 23 % (ref 20–50)
KETONES UR QL STRIP: NEGATIVE
LDLC SERPL CALC-MCNC: 86 MG/DL (ref 0–100)
LDLC/HDLC SERPL: 1.93 {RATIO}
LEUKOCYTE ESTERASE UR QL STRIP.AUTO: ABNORMAL
LYMPHOCYTES # BLD AUTO: 1.61 10*3/MM3 (ref 0.7–3.1)
LYMPHOCYTES NFR BLD AUTO: 35.1 % (ref 19.6–45.3)
MCH RBC QN AUTO: 28.3 PG (ref 26.6–33)
MCHC RBC AUTO-ENTMCNC: 33.1 G/DL (ref 31.5–35.7)
MCV RBC AUTO: 85.4 FL (ref 79–97)
MONOCYTES # BLD AUTO: 0.41 10*3/MM3 (ref 0.1–0.9)
MONOCYTES NFR BLD AUTO: 8.9 % (ref 5–12)
NEUTROPHILS NFR BLD AUTO: 2.34 10*3/MM3 (ref 1.7–7)
NEUTROPHILS NFR BLD AUTO: 51 % (ref 42.7–76)
NITRITE UR QL STRIP: NEGATIVE
NRBC BLD AUTO-RTO: 0 /100 WBC (ref 0–0.2)
PH UR STRIP.AUTO: 5.5 [PH] (ref 5–8)
PHOSPHATE SERPL-MCNC: 3.5 MG/DL (ref 2.5–4.5)
PLATELET # BLD AUTO: 215 10*3/MM3 (ref 140–450)
PMV BLD AUTO: 11.9 FL (ref 6–12)
POTASSIUM SERPL-SCNC: 4.6 MMOL/L (ref 3.5–5.2)
PROT SERPL-MCNC: 5.6 G/DL (ref 6–8.5)
PROT UR QL STRIP: ABNORMAL
RBC # BLD AUTO: 4.24 10*6/MM3 (ref 3.77–5.28)
RBC # UR: ABNORMAL /HPF
REF LAB TEST METHOD: ABNORMAL
SODIUM SERPL-SCNC: 137 MMOL/L (ref 136–145)
SP GR UR STRIP: 1.02 (ref 1–1.03)
SQUAMOUS #/AREA URNS HPF: ABNORMAL /HPF
TIBC SERPL-MCNC: 274 MCG/DL (ref 298–536)
TRANSFERRIN SERPL-MCNC: 184 MG/DL (ref 200–360)
TRIGL SERPL-MCNC: 90 MG/DL (ref 0–150)
UROBILINOGEN UR QL STRIP: ABNORMAL
VLDLC SERPL-MCNC: 17 MG/DL (ref 5–40)
WBC # BLD AUTO: 4.59 10*3/MM3 (ref 3.4–10.8)
WBC UR QL AUTO: ABNORMAL /HPF

## 2021-01-11 PROCEDURE — 87186 SC STD MICRODIL/AGAR DIL: CPT | Performed by: INTERNAL MEDICINE

## 2021-01-11 PROCEDURE — 83540 ASSAY OF IRON: CPT

## 2021-01-11 PROCEDURE — 81001 URINALYSIS AUTO W/SCOPE: CPT

## 2021-01-11 PROCEDURE — 82728 ASSAY OF FERRITIN: CPT

## 2021-01-11 PROCEDURE — 84466 ASSAY OF TRANSFERRIN: CPT

## 2021-01-11 PROCEDURE — 80061 LIPID PANEL: CPT | Performed by: INTERNAL MEDICINE

## 2021-01-11 PROCEDURE — 84100 ASSAY OF PHOSPHORUS: CPT | Performed by: INTERNAL MEDICINE

## 2021-01-11 PROCEDURE — 80053 COMPREHEN METABOLIC PANEL: CPT | Performed by: INTERNAL MEDICINE

## 2021-01-11 PROCEDURE — 36415 COLL VENOUS BLD VENIPUNCTURE: CPT | Performed by: INTERNAL MEDICINE

## 2021-01-11 PROCEDURE — 85025 COMPLETE CBC W/AUTO DIFF WBC: CPT | Performed by: INTERNAL MEDICINE

## 2021-01-11 PROCEDURE — 87077 CULTURE AEROBIC IDENTIFY: CPT | Performed by: INTERNAL MEDICINE

## 2021-01-11 PROCEDURE — 87086 URINE CULTURE/COLONY COUNT: CPT | Performed by: INTERNAL MEDICINE

## 2021-01-13 LAB — BACTERIA SPEC AEROBE CULT: ABNORMAL

## 2021-01-13 RX ORDER — NITROFURANTOIN 25; 75 MG/1; MG/1
100 CAPSULE ORAL 2 TIMES DAILY
Qty: 14 CAPSULE | Refills: 0 | Status: ON HOLD | OUTPATIENT
Start: 2021-01-13 | End: 2021-05-20

## 2021-01-14 ENCOUNTER — IMMUNIZATION (OUTPATIENT)
Dept: VACCINE CLINIC | Facility: HOSPITAL | Age: 76
End: 2021-01-14

## 2021-01-14 PROCEDURE — 0011A: CPT | Performed by: INTERNAL MEDICINE

## 2021-01-14 PROCEDURE — 91301 HC SARSCO02 VAC 100MCG/0.5ML IM: CPT | Performed by: INTERNAL MEDICINE

## 2021-01-18 RX ORDER — NITROFURANTOIN 25; 75 MG/1; MG/1
CAPSULE ORAL
Qty: 14 CAPSULE | Refills: 0 | OUTPATIENT
Start: 2021-01-18

## 2021-01-21 ENCOUNTER — TELEPHONE (OUTPATIENT)
Dept: INTERNAL MEDICINE | Facility: CLINIC | Age: 76
End: 2021-01-21

## 2021-01-21 RX ORDER — OMEPRAZOLE 40 MG/1
40 CAPSULE, DELAYED RELEASE ORAL DAILY
Qty: 90 CAPSULE | Refills: 3 | Status: CANCELLED | OUTPATIENT
Start: 2021-01-21

## 2021-01-21 NOTE — TELEPHONE ENCOUNTER
Please advise; this is not currently on active med list and was discontinued 09/08/2020 (therapy complete)      Medication pended for provider to review and sign if appropriate.

## 2021-01-21 NOTE — TELEPHONE ENCOUNTER
PT CALLED TO REQUEST REFILL FOR RX  OMEPRAZOLE AND WOULD LIKE TO KNOW THE REASON IT WAS PRESCRIBED TO HER.  PT STATED THAT DR PRABHAKAR PRESCRIBED HER RX.    PLEASE ADVISE.  CALL BACK:[phone]      [facility] [address] [place] [address] - [address] [facility]

## 2021-02-03 ENCOUNTER — CONSULT (OUTPATIENT)
Dept: CARDIOLOGY | Facility: CLINIC | Age: 76
End: 2021-02-03

## 2021-02-03 VITALS
SYSTOLIC BLOOD PRESSURE: 224 MMHG | HEART RATE: 81 BPM | WEIGHT: 197 LBS | OXYGEN SATURATION: 94 % | DIASTOLIC BLOOD PRESSURE: 104 MMHG | BODY MASS INDEX: 34.91 KG/M2 | HEIGHT: 63 IN

## 2021-02-03 DIAGNOSIS — I10 ESSENTIAL HYPERTENSION: Primary | ICD-10-CM

## 2021-02-03 DIAGNOSIS — E78.00 PURE HYPERCHOLESTEROLEMIA: ICD-10-CM

## 2021-02-03 DIAGNOSIS — E11.65 TYPE 2 DIABETES MELLITUS WITH HYPERGLYCEMIA, WITH LONG-TERM CURRENT USE OF INSULIN (HCC): ICD-10-CM

## 2021-02-03 DIAGNOSIS — I67.9 CVD (CEREBROVASCULAR DISEASE): ICD-10-CM

## 2021-02-03 DIAGNOSIS — Z79.4 TYPE 2 DIABETES MELLITUS WITH HYPERGLYCEMIA, WITH LONG-TERM CURRENT USE OF INSULIN (HCC): ICD-10-CM

## 2021-02-03 PROCEDURE — 93000 ELECTROCARDIOGRAM COMPLETE: CPT | Performed by: INTERNAL MEDICINE

## 2021-02-03 PROCEDURE — 99213 OFFICE O/P EST LOW 20 MIN: CPT | Performed by: INTERNAL MEDICINE

## 2021-02-03 RX ORDER — INSULIN GLARGINE 100 [IU]/ML
20 INJECTION, SOLUTION SUBCUTANEOUS 2 TIMES DAILY
Status: ON HOLD | COMMUNITY
End: 2021-05-20

## 2021-02-03 RX ORDER — AMLODIPINE BESYLATE 5 MG/1
5 TABLET ORAL DAILY
Qty: 90 TABLET | Refills: 3 | Status: SHIPPED | OUTPATIENT
Start: 2021-02-03 | End: 2021-02-17 | Stop reason: SINTOL

## 2021-02-03 NOTE — PROGRESS NOTES
"[facility] Cardiology  Consultation H&P  Valarie Camara  1945  [address] [address]     VISIT DATE:  02/03/21    PCP: Arminda Evans MD  [address] [facility] [address]  [place] [address]    IDENTIFICATION: A 75 y.o. female retired , black female from [place], [place].     PROBLEM LIST:  1.  Chest pain:  a. [facility], 02/26/2012, with near normal coronaries and  normal LVEF.   2. Accelerated hypertension. The patient is noncompliant  secondary to finances:  a. Improved pressures on hydrochlorothiazide.   Patient continues inconsistent dosages secondary to finances.    b. 11/19 echo EF 65% LVH rvsp 36  c. 3/19 RA us \"equivocal\" LRAS distal velocity 250cm/s  3.  Dyslipidemia:   a. In 2012, total cholesterol 171, triglycerides  145, HDL 37, .   b. In October 2015, total cholesterol 192, triglycerides 144, HDL 56, .   4.  Diabetes, type 2, onset 1990, insulin initiation 2007:   a. In February 2012, hemoglobin A1c 7.7.  b. In 8/20 A1c 9.6  5. Chronic  obstructive pulmonary disease.  6. 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 HL  1/21 147/90/44/86  8. Surgery history:  a.  Hysterectomy.  b. Cataract removal.       CC:  Chief Complaint   Patient presents with   • Hypertension       Allergies  Allergies   Allergen Reactions   • Penicillin G Anaphylaxis   • Clonidine Derivatives Other (See Comments)     Pt reports extreme hypotension     • Penicillins Rash   • Sulfa Antibiotics Rash       Current Medications    Current Outpatient Medications:   •  albuterol sulfate  (90 Base) MCG/ACT inhaler, Inhale 2 puffs Every 4 (Four) Hours As Needed for Wheezing., Disp: 18 g, Rfl: 5  •  atorvastatin (LIPITOR) 80 MG tablet, Take 1 tablet by mouth Daily., Disp: 90 tablet, Rfl: 0  •  baclofen (LIORESAL) 10 MG tablet, Take 1 tablet by mouth At Night As Needed for Muscle Spasms., Disp: 30 tablet, Rfl: 3  •  carvedilol (COREG) 25 MG tablet, Take 1 tablet by mouth 2 " (Two) Times a Day With Meals., Disp: 180 tablet, Rfl: 0  •  clopidogrel (PLAVIX) 75 MG tablet, Take 1 tablet by mouth Daily., Disp: 90 tablet, Rfl: 0  •  furosemide (LASIX) 20 MG tablet, Take 20 mg by mouth 2 (Two) Times a Day As Needed (edema)., Disp: , Rfl:   •  glucose blood test strip, Check sugar once a day, Disp: 30 each, Rfl: 12  •  glucose monitor monitoring kit, 1 each Daily., Disp: 1 each, Rfl: 1  •  insulin glargine (LANTUS, SEMGLEE) 100 UNIT/ML injection, Inject 20 Units under the skin into the appropriate area as directed 2 (Two) Times a Day., Disp: , Rfl:   •  Insulin Pen Needle 31G X 5 MM misc, Inject insulin three times daily, Disp: 100 each, Rfl: 11  •  Lancets 30G misc, Check sugar daily, Disp: 30 each, Rfl: 12  •  losartan (Cozaar) 50 MG tablet, Take 1 tablet by mouth 2 (Two) Times a Day., Disp: 60 tablet, Rfl: 5  •  nitrofurantoin, macrocrystal-monohydrate, (Macrobid) 100 MG capsule, Take 1 capsule by mouth 2 (Two) Times a Day., Disp: 14 capsule, Rfl: 0     History of Present Illness   HPI  Valarie Camara is a 75 y.o. year old female with the above mentioned PMH who presents for consult from Arminda Evans MD for evaluation for accelerated hypertension.  It has been 5 years since her last visit.    She states that she intermittently checks blood pressure at home and is not controlled.  She states the only medication that drop her blood pressure historically was clonidine and this made her pass out.  She is continues to have residual right sided numbness and weakness after her CVA        ROS  ROS    SOCIAL HX  Social History     Socioeconomic History   • Marital status:      Spouse name: Not on file   • Number of children: Not on file   • Years of education: Not on file   • Highest education level: Not on file   Tobacco Use   • Smoking status: Former Smoker     Packs/day: 1.00     Years: 15.00     Pack years: 15.00     Quit date:      Years since quittin.0   • Smokeless tobacco:  "Never Used   • Tobacco comment:  Denied: History of smoking cigarettes   Substance and Sexual Activity   • Alcohol use: Yes     Comment: Occassionally   • Drug use: No   • Sexual activity: Defer       FAMILY HX  Family History   Problem Relation Age of Onset   • Arthritis Mother    • Diabetes Mother    • Hypertension Mother    • Arthritis Other    • Arthritis Other    • Diabetes Other         DM   • Hypertension Other    • Colon cancer Neg Hx        Vitals:    02/03/21 1507   BP: (!) 224/104   BP Location: Right arm   Patient Position: Sitting   Pulse: 81   SpO2: 94%   Weight: 89.4 kg (197 lb)   Height: 160 cm (63\")     Body mass index is 34.9 kg/m².     PHYSICAL EXAMINATION:  Constitutional:       Appearance: Healthy appearance. Not in distress.   Neck:      Vascular: No JVR. JVD normal.   Pulmonary:      Effort: Pulmonary effort is normal.      Breath sounds: Normal breath sounds. No wheezing. No rhonchi. No rales.   Chest:      Chest wall: Not tender to palpatation.   Cardiovascular:      PMI at left midclavicular line. Normal rate. Regular rhythm. Normal S1. Normal S2.      Murmurs: There is no murmur.      No gallop. No click. No rub.   Pulses:     Intact distal pulses.   Edema:     Peripheral edema absent.   Abdominal:      General: Bowel sounds are normal.      Palpations: Abdomen is soft.      Tenderness: There is no abdominal tenderness.   Musculoskeletal: Normal range of motion.         General: No tenderness.   Skin:     General: Skin is warm and dry.   Neurological:      General: No focal deficit present.      Mental Status: Alert and oriented to person, place and time.         Diagnostic Data:    ECG 12 Lead    Date/Time: 2/3/2021 3:18 PM  Performed by: Vidal Low MD  Authorized by: Vidal Low MD   Previous ECG: no previous ECG available  Rhythm: sinus rhythm  BPM: 78  Other findings: poor R wave progression             Lab Results   Component Value Date    CHLPL 163 12/18/2020    TRIG 90 " 01/11/2021    HDL 44 01/11/2021     Lab Results   Component Value Date    GLUCOSE 343 (H) 01/11/2021    BUN 25 (H) 01/11/2021    CREATININE 1.43 (H) 01/11/2021     01/11/2021    K 4.6 01/11/2021     01/11/2021    CO2 25.9 01/11/2021     Lab Results   Component Value Date    HGBA1C 9.60 (H) 08/18/2020     Lab Results   Component Value Date    WBC 4.59 01/11/2021    HGB 12.0 01/11/2021    HCT 36.2 01/11/2021     01/11/2021       ASSESSMENT:   Diagnosis Plan   1. Essential hypertension     2. Pure hypercholesterolemia     3. Type 2 diabetes mellitus with hyperglycemia, with long-term current use of insulin (CMS/[facility])     4. CVD (cerebrovascular disease)         PLAN:  Hypertension uncontrolled I discussed with Ms. Camara at length today regarding her high risk for recurrent stroke if we do not get her blood pressure controlled.  She is not interested in rechallenge with clonidine.  Trial of amlodipine 5 mg and she will follow her blood pressure 1 week and contact me.    Dyslipidemia on statin therapy    Diabetes poorly controlled insulin requiring    CVD prior stroke again aggressive risk factor modification medical plan        Arminda Evans MD, thank you for referring Ms. Camara for evaluation.  I have forwarded my electronically generated recommendations to you for review.  Please do not hesitate to call with any questions.      Vidal Low MD, [facility]

## 2021-02-04 ENCOUNTER — TELEPHONE (OUTPATIENT)
Dept: INTERNAL MEDICINE | Facility: CLINIC | Age: 76
End: 2021-02-04

## 2021-02-04 NOTE — TELEPHONE ENCOUNTER
Patient requested a call back. Patient would like to know if a FreeStyle Nabil glucometer could be prescribed for her.    Please call and advise. Patient call back [phone]    [address] [phone] [facility] [phone] FX

## 2021-02-05 NOTE — TELEPHONE ENCOUNTER
PT CALLED AGAIN ASKING FOR HER DIABETIC SUPPLIES, HUNG UP WHILE TRYING TO GET AN ANSWER FOR HER.   PLEASE ADVISE.

## 2021-02-05 NOTE — TELEPHONE ENCOUNTER
I TOLD PATIENT THAT DR PARK WOULD NOT PRESCRIBE THE METER. SHE ASKED WHY AND I STATED THAT I WASN'T SURE. SHE ALSO ASKED HOW SHE WAS SUPPOSED TO GET ONE AND WHAT SHE SHOULD DO? PLEASE ADVISE.

## 2021-02-08 NOTE — TELEPHONE ENCOUNTER
Pt advised per Dr. MARTIN Rivero that insurance does not cover this meter, pt verbalized understanding

## 2021-02-12 ENCOUNTER — TELEPHONE (OUTPATIENT)
Dept: CARDIOLOGY | Facility: CLINIC | Age: 76
End: 2021-02-12

## 2021-02-12 NOTE — TELEPHONE ENCOUNTER
PT called and is unhappy with the amlodipine as it is making her legs swell and she would like a new script for something else. Please advise

## 2021-02-15 ENCOUNTER — TELEPHONE (OUTPATIENT)
Dept: CARDIOLOGY | Facility: CLINIC | Age: 76
End: 2021-02-15

## 2021-02-15 NOTE — TELEPHONE ENCOUNTER
"Rn called to advised patient to cut amlodipine in half and she states \" nope I ain't doing that sharon emilee been on this drug before and id rather be on something totally different and preferably something cheap as well sent [facility] the [facility] please\". Please advise  "

## 2021-02-17 ENCOUNTER — IMMUNIZATION (OUTPATIENT)
Dept: VACCINE CLINIC | Facility: HOSPITAL | Age: 76
End: 2021-02-17

## 2021-02-17 PROCEDURE — 0012A: CPT | Performed by: INTERNAL MEDICINE

## 2021-02-17 PROCEDURE — 91301 HC SARSCO02 VAC 100MCG/0.5ML IM: CPT | Performed by: INTERNAL MEDICINE

## 2021-02-17 RX ORDER — NIFEDIPINE 60 MG/1
60 TABLET, EXTENDED RELEASE ORAL DAILY
Qty: 90 TABLET | Refills: 0 | Status: SHIPPED | OUTPATIENT
Start: 2021-02-17 | End: 2021-05-25 | Stop reason: HOSPADM

## 2021-02-18 ENCOUNTER — APPOINTMENT (OUTPATIENT)
Dept: VACCINE CLINIC | Facility: HOSPITAL | Age: 76
End: 2021-02-18

## 2021-02-23 ENCOUNTER — TELEPHONE (OUTPATIENT)
Dept: INTERNAL MEDICINE | Facility: CLINIC | Age: 76
End: 2021-02-23

## 2021-02-23 NOTE — TELEPHONE ENCOUNTER
Pt appears to be somewhat noncompliant. Did not go to endocrinology referral. A1C >9 x 3 years. I decline.

## 2021-02-23 NOTE — TELEPHONE ENCOUNTER
Caller: Valarie Camara    Relationship: Self    Best call back number: [phone]     Who is your current provider: DR AYALA    Who would you like your new provider to be: DR VAUGHN    What are your reasons for transferring care: PATIENT STATED THAT SHE IS NOT GETTING ANYTHING DONE WITH DR AYALA.     Additional notes: PATIENT WANTS TO MAKE AN APPOINTMENT WITH DR VAUGHN AS SOON AS POSSIBLE.

## 2021-02-24 NOTE — TELEPHONE ENCOUNTER
Patient says that she is wanting a female provider. Can you ask Dr. Vermeesch if she is willing to accept this patient since Dr. Qiu declined. Thanks.

## 2021-03-05 RX ORDER — CLOPIDOGREL BISULFATE 75 MG/1
TABLET ORAL
Qty: 90 TABLET | Refills: 0 | Status: SHIPPED | OUTPATIENT
Start: 2021-03-05

## 2021-03-16 ENCOUNTER — TELEPHONE (OUTPATIENT)
Dept: INTERNAL MEDICINE | Facility: CLINIC | Age: 76
End: 2021-03-16

## 2021-03-16 DIAGNOSIS — E11.65 UNCONTROLLED TYPE 2 DIABETES MELLITUS WITH HYPERGLYCEMIA (HCC): Primary | ICD-10-CM

## 2021-03-16 DIAGNOSIS — E78.2 MIXED HYPERLIPIDEMIA: ICD-10-CM

## 2021-03-16 NOTE — TELEPHONE ENCOUNTER
PATIENT WOULD LIKE A SCRIPT FOR TRESIBA FOR HER DIABETES. PATIENT WAS ON IT BEFORE. PATIENT CURRENTLY TAKES LISPRO AND WOULD LIKE TO GO BACK ON TRESIBA.  PATIENT IS ALMOST OUT OF INSULIN.    [facility] IN [place]    ANY QUESTIONS CAN CALL PATIENT BACK [phone]

## 2021-03-17 NOTE — TELEPHONE ENCOUNTER
"Pt reports it was Rx'd by another Dr. HARRELL while back but she doesn't remember who, did not keep Endo appointment because she stated \"she didn't know she had one\". Also stated that she keeps getting a paper in the mail about Tresiba but she can seem to get it?  "

## 2021-03-17 NOTE — TELEPHONE ENCOUNTER
"Detailed information relayed to pt, states she just had blood work done with Dr. Low's office and for us to contact their office for those results. Pt stated she is not well and does not want to go to  Office or for blood work with the \"[place] running around\". Stated that Dr. Low's office called her today about \"Tresiba or something\" so call that office to get the blood work because she doesn't want to be out running around. When advised of Endo referral she asked why? I advised for her diabetes and she questioned why Dr. MARTIN Rivero just couldn't write the Rx? I reiterated that we needed blood work pt then said \"umm ok bye\"   "

## 2021-04-19 ENCOUNTER — TELEPHONE (OUTPATIENT)
Dept: INTERNAL MEDICINE | Facility: CLINIC | Age: 76
End: 2021-04-19

## 2021-04-19 NOTE — TELEPHONE ENCOUNTER
Attempted to contact pt for telehealth visit, spoke with daughter who stated that pt was not at home and she had probably forgotten about appointment.

## 2021-05-20 ENCOUNTER — APPOINTMENT (OUTPATIENT)
Dept: ULTRASOUND IMAGING | Facility: HOSPITAL | Age: 76
End: 2021-05-20

## 2021-05-20 ENCOUNTER — HOSPITAL ENCOUNTER (INPATIENT)
Facility: HOSPITAL | Age: 76
LOS: 5 days | Discharge: HOME | End: 2021-05-25
Attending: EMERGENCY MEDICINE | Admitting: EMERGENCY MEDICINE

## 2021-05-20 ENCOUNTER — APPOINTMENT (OUTPATIENT)
Dept: GENERAL RADIOLOGY | Facility: HOSPITAL | Age: 76
End: 2021-05-20

## 2021-05-20 ENCOUNTER — APPOINTMENT (OUTPATIENT)
Dept: CT IMAGING | Facility: HOSPITAL | Age: 76
End: 2021-05-20

## 2021-05-20 ENCOUNTER — APPOINTMENT (OUTPATIENT)
Dept: CARDIOLOGY | Facility: HOSPITAL | Age: 76
End: 2021-05-20

## 2021-05-20 DIAGNOSIS — I10 HYPERTENSION, UNSPECIFIED TYPE: ICD-10-CM

## 2021-05-20 DIAGNOSIS — R77.8 ELEVATED TROPONIN: ICD-10-CM

## 2021-05-20 DIAGNOSIS — N39.0 URINARY TRACT INFECTION WITHOUT HEMATURIA, SITE UNSPECIFIED: ICD-10-CM

## 2021-05-20 DIAGNOSIS — R55 SYNCOPE, UNSPECIFIED SYNCOPE TYPE: Primary | ICD-10-CM

## 2021-05-20 LAB
ALBUMIN SERPL-MCNC: 3.3 G/DL (ref 3.5–5.2)
ALBUMIN/GLOB SERPL: 1.1 G/DL
ALP SERPL-CCNC: 83 U/L (ref 39–117)
ALT SERPL W P-5'-P-CCNC: 14 U/L (ref 1–33)
ANION GAP SERPL CALCULATED.3IONS-SCNC: 9.3 MMOL/L (ref 5–15)
AST SERPL-CCNC: 14 U/L (ref 1–32)
BACTERIA UR QL AUTO: ABNORMAL /HPF
BASOPHILS # BLD AUTO: 0.04 10*3/MM3 (ref 0–0.2)
BASOPHILS NFR BLD AUTO: 0.7 % (ref 0–1.5)
BH CV ECHO MEAS - % IVS THICK: 37 %
BH CV ECHO MEAS - % LVPW THICK: 5.7 %
BH CV ECHO MEAS - AO MAX PG (FULL): 12.1 MMHG
BH CV ECHO MEAS - AO MAX PG: 16 MMHG
BH CV ECHO MEAS - AO MEAN PG (FULL): 6.3 MMHG
BH CV ECHO MEAS - AO MEAN PG: 8.3 MMHG
BH CV ECHO MEAS - AO ROOT AREA (BSA CORRECTED): 1.4
BH CV ECHO MEAS - AO ROOT AREA: 6 CM^2
BH CV ECHO MEAS - AO ROOT DIAM: 2.8 CM
BH CV ECHO MEAS - AO V2 MAX: 198.3 CM/SEC
BH CV ECHO MEAS - AO V2 MEAN: 135.7 CM/SEC
BH CV ECHO MEAS - AO V2 VTI: 45.4 CM
BH CV ECHO MEAS - ASC AORTA: 3 CM
BH CV ECHO MEAS - AVA(I,A): 1.6 CM^2
BH CV ECHO MEAS - AVA(I,D): 1.6 CM^2
BH CV ECHO MEAS - AVA(V,A): 1.6 CM^2
BH CV ECHO MEAS - AVA(V,D): 1.6 CM^2
BH CV ECHO MEAS - BSA(HAYCOCK): 2 M^2
BH CV ECHO MEAS - BSA: 1.9 M^2
BH CV ECHO MEAS - BZI_BMI: 35.4 KILOGRAMS/M^2
BH CV ECHO MEAS - BZI_METRIC_HEIGHT: 160 CM
BH CV ECHO MEAS - BZI_METRIC_WEIGHT: 90.7 KG
BH CV ECHO MEAS - EDV(CUBED): 33.4 ML
BH CV ECHO MEAS - EDV(MOD-SP2): 88.8 ML
BH CV ECHO MEAS - EDV(MOD-SP4): 108 ML
BH CV ECHO MEAS - EDV(TEICH): 41.6 ML
BH CV ECHO MEAS - EF(CUBED): 85.8 %
BH CV ECHO MEAS - EF(MOD-BP): 51.8 %
BH CV ECHO MEAS - EF(MOD-SP2): 54.3 %
BH CV ECHO MEAS - EF(MOD-SP4): 47.8 %
BH CV ECHO MEAS - EF(TEICH): 80.4 %
BH CV ECHO MEAS - ESV(CUBED): 4.7 ML
BH CV ECHO MEAS - ESV(MOD-SP2): 40.6 ML
BH CV ECHO MEAS - ESV(MOD-SP4): 56.4 ML
BH CV ECHO MEAS - ESV(TEICH): 8.1 ML
BH CV ECHO MEAS - FS: 47.8 %
BH CV ECHO MEAS - IVS/LVPW: 0.59
BH CV ECHO MEAS - IVSD: 1.5 CM
BH CV ECHO MEAS - IVSS: 2.1 CM
BH CV ECHO MEAS - LA DIMENSION: 4.6 CM
BH CV ECHO MEAS - LA/AO: 1.7
BH CV ECHO MEAS - LAD MAJOR: 6.2 CM
BH CV ECHO MEAS - LV DIASTOLIC VOL/BSA (35-75): 55.8 ML/M^2
BH CV ECHO MEAS - LV MASS(C)D: 308.6 GRAMS
BH CV ECHO MEAS - LV MASS(C)DI: 159.6 GRAMS/M^2
BH CV ECHO MEAS - LV MASS(C)S: 232.6 GRAMS
BH CV ECHO MEAS - LV MASS(C)SI: 120.3 GRAMS/M^2
BH CV ECHO MEAS - LV MAX PG: 3.9 MMHG
BH CV ECHO MEAS - LV MEAN PG: 2 MMHG
BH CV ECHO MEAS - LV SYSTOLIC VOL/BSA (12-30): 29.2 ML/M^2
BH CV ECHO MEAS - LV V1 MAX: 99.1 CM/SEC
BH CV ECHO MEAS - LV V1 MEAN: 69.1 CM/SEC
BH CV ECHO MEAS - LV V1 VTI: 22.4 CM
BH CV ECHO MEAS - LVIDD: 3.2 CM
BH CV ECHO MEAS - LVIDS: 1.7 CM
BH CV ECHO MEAS - LVLD AP2: 8.5 CM
BH CV ECHO MEAS - LVLD AP4: 8.3 CM
BH CV ECHO MEAS - LVLS AP2: 7.2 CM
BH CV ECHO MEAS - LVLS AP4: 7.1 CM
BH CV ECHO MEAS - LVOT AREA (M): 3.3 CM^2
BH CV ECHO MEAS - LVOT AREA: 3.3 CM^2
BH CV ECHO MEAS - LVOT DIAM: 2 CM
BH CV ECHO MEAS - LVPWD: 2.6 CM
BH CV ECHO MEAS - LVPWS: 2.8 CM
BH CV ECHO MEAS - MV A MAX VEL: 130.5 CM/SEC
BH CV ECHO MEAS - MV DEC TIME: 0.28 SEC
BH CV ECHO MEAS - MV E MAX VEL: 63.9 CM/SEC
BH CV ECHO MEAS - MV E/A: 0.49
BH CV ECHO MEAS - MV MAX PG: 6.3 MMHG
BH CV ECHO MEAS - MV MEAN PG: 2 MMHG
BH CV ECHO MEAS - MV V2 MAX: 125 CM/SEC
BH CV ECHO MEAS - MV V2 MEAN: 66.8 CM/SEC
BH CV ECHO MEAS - MV V2 VTI: 24.8 CM
BH CV ECHO MEAS - MVA(VTI): 2.9 CM^2
BH CV ECHO MEAS - PA ACC TIME: 0.09 SEC
BH CV ECHO MEAS - PA MAX PG (FULL): 1.2 MMHG
BH CV ECHO MEAS - PA MAX PG: 2.8 MMHG
BH CV ECHO MEAS - PA MEAN PG (FULL): 1 MMHG
BH CV ECHO MEAS - PA MEAN PG: 2 MMHG
BH CV ECHO MEAS - PA PR(ACCEL): 40.8 MMHG
BH CV ECHO MEAS - PA V2 MAX: 83.2 CM/SEC
BH CV ECHO MEAS - PA V2 MEAN: 59 CM/SEC
BH CV ECHO MEAS - PA V2 VTI: 21.9 CM
BH CV ECHO MEAS - RAP SYSTOLE: 3 MMHG
BH CV ECHO MEAS - RV MAX PG: 1.6 MMHG
BH CV ECHO MEAS - RV MEAN PG: 1 MMHG
BH CV ECHO MEAS - RV V1 MAX: 62.5 CM/SEC
BH CV ECHO MEAS - RV V1 MEAN: 45.9 CM/SEC
BH CV ECHO MEAS - RV V1 VTI: 18.4 CM
BH CV ECHO MEAS - RVDD: 0.82 CM
BH CV ECHO MEAS - SI(AO): 140.4 ML/M^2
BH CV ECHO MEAS - SI(CUBED): 14.8 ML/M^2
BH CV ECHO MEAS - SI(LVOT): 37.8 ML/M^2
BH CV ECHO MEAS - SI(MOD-SP2): 24.9 ML/M^2
BH CV ECHO MEAS - SI(MOD-SP4): 26.7 ML/M^2
BH CV ECHO MEAS - SI(TEICH): 17.3 ML/M^2
BH CV ECHO MEAS - SV(AO): 271.4 ML
BH CV ECHO MEAS - SV(CUBED): 28.6 ML
BH CV ECHO MEAS - SV(LVOT): 73.1 ML
BH CV ECHO MEAS - SV(MOD-SP2): 48.2 ML
BH CV ECHO MEAS - SV(MOD-SP4): 51.6 ML
BH CV ECHO MEAS - SV(TEICH): 33.4 ML
BH CV ECHO MEAS - TAPSE (>1.6): 3 CM
BH CV XLRA - RV BASE: 4 CM
BH CV XLRA - RV LENGTH: 7.1 CM
BH CV XLRA - RV MID: 2.7 CM
BILIRUB SERPL-MCNC: 0.2 MG/DL (ref 0–1.2)
BILIRUB UR QL STRIP: NEGATIVE
BUN SERPL-MCNC: 31 MG/DL (ref 8–23)
BUN/CREAT SERPL: 20.3 (ref 7–25)
CALCIUM SPEC-SCNC: 8.7 MG/DL (ref 8.6–10.5)
CHLORIDE SERPL-SCNC: 101 MMOL/L (ref 98–107)
CHOLEST SERPL-MCNC: 195 MG/DL (ref 0–200)
CLARITY UR: ABNORMAL
CO2 SERPL-SCNC: 26.7 MMOL/L (ref 22–29)
COLOR UR: YELLOW
CREAT SERPL-MCNC: 1.53 MG/DL (ref 0.57–1)
DEPRECATED RDW RBC AUTO: 40.7 FL (ref 37–54)
EOSINOPHIL # BLD AUTO: 0.24 10*3/MM3 (ref 0–0.4)
EOSINOPHIL NFR BLD AUTO: 3.9 % (ref 0.3–6.2)
ERYTHROCYTE [DISTWIDTH] IN BLOOD BY AUTOMATED COUNT: 12.9 % (ref 12.3–15.4)
GFR SERPL CREATININE-BSD FRML MDRD: 40 ML/MIN/1.73
GLOBULIN UR ELPH-MCNC: 2.9 GM/DL
GLUCOSE BLDC GLUCOMTR-MCNC: 231 MG/DL (ref 70–130)
GLUCOSE BLDC GLUCOMTR-MCNC: 282 MG/DL (ref 70–130)
GLUCOSE BLDC GLUCOMTR-MCNC: 288 MG/DL (ref 70–130)
GLUCOSE SERPL-MCNC: 345 MG/DL (ref 65–99)
GLUCOSE UR STRIP-MCNC: ABNORMAL MG/DL
HBA1C MFR BLD: 13.2 % (ref 4.8–5.6)
HCT VFR BLD AUTO: 36.7 % (ref 34–46.6)
HDLC SERPL-MCNC: 54 MG/DL (ref 40–60)
HGB BLD-MCNC: 12.1 G/DL (ref 12–15.9)
HGB UR QL STRIP.AUTO: ABNORMAL
HYALINE CASTS UR QL AUTO: ABNORMAL /LPF
IMM GRANULOCYTES # BLD AUTO: 0.02 10*3/MM3 (ref 0–0.05)
IMM GRANULOCYTES NFR BLD AUTO: 0.3 % (ref 0–0.5)
KETONES UR QL STRIP: NEGATIVE
LDLC SERPL CALC-MCNC: 119 MG/DL (ref 0–100)
LDLC/HDLC SERPL: 2.15 {RATIO}
LEFT ATRIUM VOLUME INDEX: 37.1 ML/M^2
LEFT ATRIUM VOLUME: 71.8 ML
LEUKOCYTE ESTERASE UR QL STRIP.AUTO: ABNORMAL
LYMPHOCYTES # BLD AUTO: 2.25 10*3/MM3 (ref 0.7–3.1)
LYMPHOCYTES NFR BLD AUTO: 36.8 % (ref 19.6–45.3)
MAGNESIUM SERPL-MCNC: 1.5 MG/DL (ref 1.6–2.4)
MAXIMAL PREDICTED HEART RATE: 144 BPM
MCH RBC QN AUTO: 28.5 PG (ref 26.6–33)
MCHC RBC AUTO-ENTMCNC: 33 G/DL (ref 31.5–35.7)
MCV RBC AUTO: 86.6 FL (ref 79–97)
MONOCYTES # BLD AUTO: 0.58 10*3/MM3 (ref 0.1–0.9)
MONOCYTES NFR BLD AUTO: 9.5 % (ref 5–12)
NEUTROPHILS NFR BLD AUTO: 2.99 10*3/MM3 (ref 1.7–7)
NEUTROPHILS NFR BLD AUTO: 48.8 % (ref 42.7–76)
NITRITE UR QL STRIP: POSITIVE
NRBC BLD AUTO-RTO: 0 /100 WBC (ref 0–0.2)
NT-PROBNP SERPL-MCNC: 211.2 PG/ML (ref 0–1800)
PH UR STRIP.AUTO: 6 [PH] (ref 5–8)
PLATELET # BLD AUTO: 215 10*3/MM3 (ref 140–450)
PMV BLD AUTO: 11.2 FL (ref 6–12)
POTASSIUM SERPL-SCNC: 4 MMOL/L (ref 3.5–5.2)
PROCALCITONIN SERPL-MCNC: 0.07 NG/ML (ref 0–0.25)
PROT SERPL-MCNC: 6.2 G/DL (ref 6–8.5)
PROT UR QL STRIP: ABNORMAL
RBC # BLD AUTO: 4.24 10*6/MM3 (ref 3.77–5.28)
RBC # UR: ABNORMAL /HPF
REF LAB TEST METHOD: ABNORMAL
SODIUM SERPL-SCNC: 137 MMOL/L (ref 136–145)
SP GR UR STRIP: 1.01 (ref 1–1.03)
SQUAMOUS #/AREA URNS HPF: ABNORMAL /HPF
STRESS TARGET HR: 122 BPM
TRIGL SERPL-MCNC: 124 MG/DL (ref 0–150)
TROPONIN T SERPL-MCNC: 0.04 NG/ML (ref 0–0.03)
TROPONIN T SERPL-MCNC: 0.05 NG/ML (ref 0–0.03)
TSH SERPL DL<=0.05 MIU/L-ACNC: 2.08 UIU/ML (ref 0.27–4.2)
UROBILINOGEN UR QL STRIP: ABNORMAL
VLDLC SERPL-MCNC: 22 MG/DL (ref 5–40)
WBC # BLD AUTO: 6.12 10*3/MM3 (ref 3.4–10.8)
WBC UR QL AUTO: ABNORMAL /HPF

## 2021-05-20 PROCEDURE — 71045 X-RAY EXAM CHEST 1 VIEW: CPT

## 2021-05-20 PROCEDURE — 84484 ASSAY OF TROPONIN QUANT: CPT | Performed by: EMERGENCY MEDICINE

## 2021-05-20 PROCEDURE — 93306 TTE W/DOPPLER COMPLETE: CPT

## 2021-05-20 PROCEDURE — 73610 X-RAY EXAM OF ANKLE: CPT

## 2021-05-20 PROCEDURE — 99284 EMERGENCY DEPT VISIT MOD MDM: CPT

## 2021-05-20 PROCEDURE — 99223 1ST HOSP IP/OBS HIGH 75: CPT | Performed by: EMERGENCY MEDICINE

## 2021-05-20 PROCEDURE — 83735 ASSAY OF MAGNESIUM: CPT | Performed by: EMERGENCY MEDICINE

## 2021-05-20 PROCEDURE — 87086 URINE CULTURE/COLONY COUNT: CPT | Performed by: EMERGENCY MEDICINE

## 2021-05-20 PROCEDURE — 83036 HEMOGLOBIN GLYCOSYLATED A1C: CPT | Performed by: EMERGENCY MEDICINE

## 2021-05-20 PROCEDURE — 63710000001 INSULIN DETEMIR PER 5 UNITS: Performed by: EMERGENCY MEDICINE

## 2021-05-20 PROCEDURE — 85025 COMPLETE CBC W/AUTO DIFF WBC: CPT | Performed by: EMERGENCY MEDICINE

## 2021-05-20 PROCEDURE — 63710000001 INSULIN ASPART PER 5 UNITS: Performed by: EMERGENCY MEDICINE

## 2021-05-20 PROCEDURE — 80053 COMPREHEN METABOLIC PANEL: CPT | Performed by: EMERGENCY MEDICINE

## 2021-05-20 PROCEDURE — 84484 ASSAY OF TROPONIN QUANT: CPT | Performed by: NURSE PRACTITIONER

## 2021-05-20 PROCEDURE — 93306 TTE W/DOPPLER COMPLETE: CPT | Performed by: INTERNAL MEDICINE

## 2021-05-20 PROCEDURE — 70450 CT HEAD/BRAIN W/O DYE: CPT

## 2021-05-20 PROCEDURE — 80061 LIPID PANEL: CPT | Performed by: EMERGENCY MEDICINE

## 2021-05-20 PROCEDURE — 93971 EXTREMITY STUDY: CPT

## 2021-05-20 PROCEDURE — 81001 URINALYSIS AUTO W/SCOPE: CPT | Performed by: EMERGENCY MEDICINE

## 2021-05-20 PROCEDURE — 84443 ASSAY THYROID STIM HORMONE: CPT | Performed by: EMERGENCY MEDICINE

## 2021-05-20 PROCEDURE — 25010000002 ENOXAPARIN PER 10 MG: Performed by: EMERGENCY MEDICINE

## 2021-05-20 PROCEDURE — 93005 ELECTROCARDIOGRAM TRACING: CPT | Performed by: EMERGENCY MEDICINE

## 2021-05-20 PROCEDURE — 84145 PROCALCITONIN (PCT): CPT | Performed by: EMERGENCY MEDICINE

## 2021-05-20 PROCEDURE — 82962 GLUCOSE BLOOD TEST: CPT

## 2021-05-20 PROCEDURE — 83880 ASSAY OF NATRIURETIC PEPTIDE: CPT | Performed by: EMERGENCY MEDICINE

## 2021-05-20 RX ORDER — LABETALOL HYDROCHLORIDE 5 MG/ML
10 INJECTION, SOLUTION INTRAVENOUS ONCE
Status: COMPLETED | OUTPATIENT
Start: 2021-05-20 | End: 2021-05-20

## 2021-05-20 RX ORDER — ACETAMINOPHEN 160 MG/5ML
650 SOLUTION ORAL EVERY 4 HOURS PRN
Status: DISCONTINUED | OUTPATIENT
Start: 2021-05-20 | End: 2021-05-25 | Stop reason: HOSPADM

## 2021-05-20 RX ORDER — IPRATROPIUM BROMIDE AND ALBUTEROL SULFATE 2.5; .5 MG/3ML; MG/3ML
3 SOLUTION RESPIRATORY (INHALATION) EVERY 4 HOURS PRN
Status: DISCONTINUED | OUTPATIENT
Start: 2021-05-20 | End: 2021-05-25 | Stop reason: HOSPADM

## 2021-05-20 RX ORDER — NIFEDIPINE 30 MG/1
60 TABLET, EXTENDED RELEASE ORAL DAILY
Status: DISCONTINUED | OUTPATIENT
Start: 2021-05-20 | End: 2021-05-23

## 2021-05-20 RX ORDER — DEXTROSE MONOHYDRATE 25 G/50ML
25 INJECTION, SOLUTION INTRAVENOUS
Status: DISCONTINUED | OUTPATIENT
Start: 2021-05-20 | End: 2021-05-25 | Stop reason: HOSPADM

## 2021-05-20 RX ORDER — HYDRALAZINE HYDROCHLORIDE 20 MG/ML
10 INJECTION INTRAMUSCULAR; INTRAVENOUS EVERY 4 HOURS PRN
Status: DISCONTINUED | OUTPATIENT
Start: 2021-05-20 | End: 2021-05-20

## 2021-05-20 RX ORDER — LOSARTAN POTASSIUM 50 MG/1
50 TABLET ORAL ONCE
Status: COMPLETED | OUTPATIENT
Start: 2021-05-20 | End: 2021-05-20

## 2021-05-20 RX ORDER — SODIUM CHLORIDE 0.9 % (FLUSH) 0.9 %
10 SYRINGE (ML) INJECTION EVERY 12 HOURS SCHEDULED
Status: DISCONTINUED | OUTPATIENT
Start: 2021-05-20 | End: 2021-05-25 | Stop reason: HOSPADM

## 2021-05-20 RX ORDER — CARVEDILOL 25 MG/1
25 TABLET ORAL 2 TIMES DAILY WITH MEALS
Status: DISCONTINUED | OUTPATIENT
Start: 2021-05-20 | End: 2021-05-25 | Stop reason: HOSPADM

## 2021-05-20 RX ORDER — LABETALOL HYDROCHLORIDE 5 MG/ML
10 INJECTION, SOLUTION INTRAVENOUS EVERY 4 HOURS PRN
Status: DISCONTINUED | OUTPATIENT
Start: 2021-05-20 | End: 2021-05-25 | Stop reason: HOSPADM

## 2021-05-20 RX ORDER — BACLOFEN 10 MG/1
10 TABLET ORAL NIGHTLY PRN
Status: DISCONTINUED | OUTPATIENT
Start: 2021-05-20 | End: 2021-05-25 | Stop reason: HOSPADM

## 2021-05-20 RX ORDER — ATORVASTATIN CALCIUM 80 MG/1
80 TABLET, FILM COATED ORAL NIGHTLY
Status: DISCONTINUED | OUTPATIENT
Start: 2021-05-20 | End: 2021-05-25 | Stop reason: HOSPADM

## 2021-05-20 RX ORDER — SODIUM CHLORIDE 0.9 % (FLUSH) 0.9 %
10 SYRINGE (ML) INJECTION AS NEEDED
Status: DISCONTINUED | OUTPATIENT
Start: 2021-05-20 | End: 2021-05-25 | Stop reason: HOSPADM

## 2021-05-20 RX ORDER — ACETAMINOPHEN 325 MG/1
650 TABLET ORAL EVERY 4 HOURS PRN
Status: DISCONTINUED | OUTPATIENT
Start: 2021-05-20 | End: 2021-05-25 | Stop reason: HOSPADM

## 2021-05-20 RX ORDER — NITROFURANTOIN 25; 75 MG/1; MG/1
100 CAPSULE ORAL ONCE
Status: COMPLETED | OUTPATIENT
Start: 2021-05-20 | End: 2021-05-20

## 2021-05-20 RX ORDER — NICOTINE POLACRILEX 4 MG
1 LOZENGE BUCCAL
Status: DISCONTINUED | OUTPATIENT
Start: 2021-05-20 | End: 2021-05-25 | Stop reason: HOSPADM

## 2021-05-20 RX ORDER — ONDANSETRON 2 MG/ML
4 INJECTION INTRAMUSCULAR; INTRAVENOUS EVERY 6 HOURS PRN
Status: DISCONTINUED | OUTPATIENT
Start: 2021-05-20 | End: 2021-05-25 | Stop reason: HOSPADM

## 2021-05-20 RX ORDER — CARVEDILOL 25 MG/1
25 TABLET ORAL ONCE
Status: COMPLETED | OUTPATIENT
Start: 2021-05-20 | End: 2021-05-20

## 2021-05-20 RX ORDER — CLOPIDOGREL BISULFATE 75 MG/1
75 TABLET ORAL DAILY
Status: DISCONTINUED | OUTPATIENT
Start: 2021-05-20 | End: 2021-05-25 | Stop reason: HOSPADM

## 2021-05-20 RX ORDER — ACETAMINOPHEN 650 MG/1
650 SUPPOSITORY RECTAL EVERY 4 HOURS PRN
Status: DISCONTINUED | OUTPATIENT
Start: 2021-05-20 | End: 2021-05-25 | Stop reason: HOSPADM

## 2021-05-20 RX ADMIN — INSULIN ASPART 5 UNITS: 100 INJECTION, SOLUTION INTRAVENOUS; SUBCUTANEOUS at 12:49

## 2021-05-20 RX ADMIN — LOSARTAN POTASSIUM 50 MG: 50 TABLET, FILM COATED ORAL at 06:36

## 2021-05-20 RX ADMIN — SODIUM CHLORIDE, PRESERVATIVE FREE 10 ML: 5 INJECTION INTRAVENOUS at 20:37

## 2021-05-20 RX ADMIN — CARVEDILOL 25 MG: 25 TABLET, FILM COATED ORAL at 06:36

## 2021-05-20 RX ADMIN — INSULIN DETEMIR 15 UNITS: 100 INJECTION, SOLUTION SUBCUTANEOUS at 20:37

## 2021-05-20 RX ADMIN — CARVEDILOL 25 MG: 25 TABLET, FILM COATED ORAL at 17:46

## 2021-05-20 RX ADMIN — NITROFURANTOIN MONOHYDRATE/MACROCRYSTALLINE 100 MG: 25; 75 CAPSULE ORAL at 05:57

## 2021-05-20 RX ADMIN — LABETALOL 20 MG/4 ML (5 MG/ML) INTRAVENOUS SYRINGE 10 MG: at 10:12

## 2021-05-20 RX ADMIN — ATORVASTATIN CALCIUM 80 MG: 80 TABLET, FILM COATED ORAL at 20:37

## 2021-05-20 RX ADMIN — SODIUM CHLORIDE, PRESERVATIVE FREE 10 ML: 5 INJECTION INTRAVENOUS at 08:50

## 2021-05-20 RX ADMIN — LABETALOL 20 MG/4 ML (5 MG/ML) INTRAVENOUS SYRINGE 10 MG: at 06:36

## 2021-05-20 RX ADMIN — INSULIN ASPART 5 UNITS: 100 INJECTION, SOLUTION INTRAVENOUS; SUBCUTANEOUS at 17:47

## 2021-05-20 RX ADMIN — CARVEDILOL 25 MG: 25 TABLET, FILM COATED ORAL at 08:48

## 2021-05-20 RX ADMIN — INSULIN ASPART 8 UNITS: 100 INJECTION, SOLUTION INTRAVENOUS; SUBCUTANEOUS at 12:50

## 2021-05-20 RX ADMIN — CLOPIDOGREL BISULFATE 75 MG: 75 TABLET ORAL at 08:48

## 2021-05-20 RX ADMIN — INSULIN ASPART 10 UNITS: 100 INJECTION, SOLUTION INTRAVENOUS; SUBCUTANEOUS at 08:49

## 2021-05-20 RX ADMIN — INSULIN ASPART 8 UNITS: 100 INJECTION, SOLUTION INTRAVENOUS; SUBCUTANEOUS at 17:46

## 2021-05-20 RX ADMIN — NIFEDIPINE 60 MG: 30 TABLET, FILM COATED, EXTENDED RELEASE ORAL at 08:48

## 2021-05-20 RX ADMIN — ENOXAPARIN SODIUM 40 MG: 40 INJECTION SUBCUTANEOUS at 08:47

## 2021-05-20 RX ADMIN — SODIUM CHLORIDE 1000 ML: 9 INJECTION, SOLUTION INTRAVENOUS at 04:30

## 2021-05-21 ENCOUNTER — APPOINTMENT (OUTPATIENT)
Dept: ULTRASOUND IMAGING | Facility: HOSPITAL | Age: 76
End: 2021-05-21

## 2021-05-21 LAB
ANION GAP SERPL CALCULATED.3IONS-SCNC: 9 MMOL/L (ref 5–15)
BACTERIA SPEC AEROBE CULT: NORMAL
BACTERIA UR QL AUTO: ABNORMAL /HPF
BASOPHILS # BLD AUTO: 0.03 10*3/MM3 (ref 0–0.2)
BASOPHILS NFR BLD AUTO: 0.5 % (ref 0–1.5)
BILIRUB UR QL STRIP: NEGATIVE
BUN SERPL-MCNC: 30 MG/DL (ref 8–23)
BUN/CREAT SERPL: 21.1 (ref 7–25)
CALCIUM SPEC-SCNC: 8.8 MG/DL (ref 8.6–10.5)
CHLORIDE SERPL-SCNC: 107 MMOL/L (ref 98–107)
CLARITY UR: ABNORMAL
CO2 SERPL-SCNC: 26 MMOL/L (ref 22–29)
COLOR UR: YELLOW
CREAT SERPL-MCNC: 1.42 MG/DL (ref 0.57–1)
DEPRECATED RDW RBC AUTO: 40.4 FL (ref 37–54)
EOSINOPHIL # BLD AUTO: 0.18 10*3/MM3 (ref 0–0.4)
EOSINOPHIL NFR BLD AUTO: 2.9 % (ref 0.3–6.2)
ERYTHROCYTE [DISTWIDTH] IN BLOOD BY AUTOMATED COUNT: 13.1 % (ref 12.3–15.4)
GFR SERPL CREATININE-BSD FRML MDRD: 44 ML/MIN/1.73
GLUCOSE BLDC GLUCOMTR-MCNC: 104 MG/DL (ref 70–130)
GLUCOSE BLDC GLUCOMTR-MCNC: 150 MG/DL (ref 70–130)
GLUCOSE BLDC GLUCOMTR-MCNC: 219 MG/DL (ref 70–130)
GLUCOSE BLDC GLUCOMTR-MCNC: 385 MG/DL (ref 70–130)
GLUCOSE SERPL-MCNC: 105 MG/DL (ref 65–99)
GLUCOSE UR STRIP-MCNC: NEGATIVE MG/DL
HCT VFR BLD AUTO: 33.7 % (ref 34–46.6)
HGB BLD-MCNC: 11.3 G/DL (ref 12–15.9)
HGB UR QL STRIP.AUTO: ABNORMAL
HYALINE CASTS UR QL AUTO: ABNORMAL /LPF
IMM GRANULOCYTES # BLD AUTO: 0.02 10*3/MM3 (ref 0–0.05)
IMM GRANULOCYTES NFR BLD AUTO: 0.3 % (ref 0–0.5)
KETONES UR QL STRIP: NEGATIVE
LEUKOCYTE ESTERASE UR QL STRIP.AUTO: ABNORMAL
LYMPHOCYTES # BLD AUTO: 2.13 10*3/MM3 (ref 0.7–3.1)
LYMPHOCYTES NFR BLD AUTO: 34.6 % (ref 19.6–45.3)
MCH RBC QN AUTO: 28.4 PG (ref 26.6–33)
MCHC RBC AUTO-ENTMCNC: 33.5 G/DL (ref 31.5–35.7)
MCV RBC AUTO: 84.7 FL (ref 79–97)
MONOCYTES # BLD AUTO: 0.58 10*3/MM3 (ref 0.1–0.9)
MONOCYTES NFR BLD AUTO: 9.4 % (ref 5–12)
NEUTROPHILS NFR BLD AUTO: 3.22 10*3/MM3 (ref 1.7–7)
NEUTROPHILS NFR BLD AUTO: 52.3 % (ref 42.7–76)
NITRITE UR QL STRIP: POSITIVE
NRBC BLD AUTO-RTO: 0 /100 WBC (ref 0–0.2)
PH UR STRIP.AUTO: <=5 [PH] (ref 5–8)
PLATELET # BLD AUTO: 219 10*3/MM3 (ref 140–450)
PMV BLD AUTO: 11.2 FL (ref 6–12)
POTASSIUM SERPL-SCNC: 3.7 MMOL/L (ref 3.5–5.2)
PROT UR QL STRIP: ABNORMAL
RBC # BLD AUTO: 3.98 10*6/MM3 (ref 3.77–5.28)
RBC # UR: ABNORMAL /HPF
REF LAB TEST METHOD: ABNORMAL
SARS-COV-2 RNA PNL SPEC NAA+PROBE: NOT DETECTED
SODIUM SERPL-SCNC: 142 MMOL/L (ref 136–145)
SP GR UR STRIP: 1.02 (ref 1–1.03)
SQUAMOUS #/AREA URNS HPF: ABNORMAL /HPF
UROBILINOGEN UR QL STRIP: ABNORMAL
WBC # BLD AUTO: 6.16 10*3/MM3 (ref 3.4–10.8)
WBC UR QL AUTO: ABNORMAL /HPF

## 2021-05-21 PROCEDURE — 87077 CULTURE AEROBIC IDENTIFY: CPT | Performed by: NURSE PRACTITIONER

## 2021-05-21 PROCEDURE — 85025 COMPLETE CBC W/AUTO DIFF WBC: CPT | Performed by: EMERGENCY MEDICINE

## 2021-05-21 PROCEDURE — 87186 SC STD MICRODIL/AGAR DIL: CPT | Performed by: NURSE PRACTITIONER

## 2021-05-21 PROCEDURE — 81001 URINALYSIS AUTO W/SCOPE: CPT | Performed by: NURSE PRACTITIONER

## 2021-05-21 PROCEDURE — 25010000002 ENOXAPARIN PER 10 MG: Performed by: EMERGENCY MEDICINE

## 2021-05-21 PROCEDURE — 63710000001 INSULIN ASPART PER 5 UNITS: Performed by: EMERGENCY MEDICINE

## 2021-05-21 PROCEDURE — 87086 URINE CULTURE/COLONY COUNT: CPT | Performed by: NURSE PRACTITIONER

## 2021-05-21 PROCEDURE — 63710000001 INSULIN DETEMIR PER 5 UNITS: Performed by: EMERGENCY MEDICINE

## 2021-05-21 PROCEDURE — 99232 SBSQ HOSP IP/OBS MODERATE 35: CPT | Performed by: NURSE PRACTITIONER

## 2021-05-21 PROCEDURE — 93975 VASCULAR STUDY: CPT

## 2021-05-21 PROCEDURE — 82962 GLUCOSE BLOOD TEST: CPT

## 2021-05-21 PROCEDURE — 87635 SARS-COV-2 COVID-19 AMP PRB: CPT | Performed by: EMERGENCY MEDICINE

## 2021-05-21 PROCEDURE — 97161 PT EVAL LOW COMPLEX 20 MIN: CPT

## 2021-05-21 PROCEDURE — 80048 BASIC METABOLIC PNL TOTAL CA: CPT | Performed by: EMERGENCY MEDICINE

## 2021-05-21 RX ORDER — NITROFURANTOIN 25; 75 MG/1; MG/1
100 CAPSULE ORAL EVERY 12 HOURS SCHEDULED
Status: COMPLETED | OUTPATIENT
Start: 2021-05-21 | End: 2021-05-23

## 2021-05-21 RX ADMIN — INSULIN ASPART 5 UNITS: 100 INJECTION, SOLUTION INTRAVENOUS; SUBCUTANEOUS at 12:24

## 2021-05-21 RX ADMIN — NIFEDIPINE 60 MG: 30 TABLET, FILM COATED, EXTENDED RELEASE ORAL at 08:42

## 2021-05-21 RX ADMIN — INSULIN ASPART 5 UNITS: 100 INJECTION, SOLUTION INTRAVENOUS; SUBCUTANEOUS at 16:53

## 2021-05-21 RX ADMIN — CARVEDILOL 25 MG: 25 TABLET, FILM COATED ORAL at 18:21

## 2021-05-21 RX ADMIN — SODIUM CHLORIDE, PRESERVATIVE FREE 10 ML: 5 INJECTION INTRAVENOUS at 20:26

## 2021-05-21 RX ADMIN — NITROFURANTOIN MONOHYDRATE/MACROCRYSTALLINE 100 MG: 25; 75 CAPSULE ORAL at 20:10

## 2021-05-21 RX ADMIN — INSULIN ASPART 5 UNITS: 100 INJECTION, SOLUTION INTRAVENOUS; SUBCUTANEOUS at 18:19

## 2021-05-21 RX ADMIN — NITROFURANTOIN MONOHYDRATE/MACROCRYSTALLINE 100 MG: 25; 75 CAPSULE ORAL at 10:50

## 2021-05-21 RX ADMIN — ENOXAPARIN SODIUM 40 MG: 40 INJECTION SUBCUTANEOUS at 08:43

## 2021-05-21 RX ADMIN — ATORVASTATIN CALCIUM 80 MG: 80 TABLET, FILM COATED ORAL at 20:10

## 2021-05-21 RX ADMIN — CARVEDILOL 25 MG: 25 TABLET, FILM COATED ORAL at 08:43

## 2021-05-21 RX ADMIN — LABETALOL 20 MG/4 ML (5 MG/ML) INTRAVENOUS SYRINGE 10 MG: at 04:55

## 2021-05-21 RX ADMIN — CLOPIDOGREL BISULFATE 75 MG: 75 TABLET ORAL at 08:43

## 2021-05-21 RX ADMIN — INSULIN DETEMIR 15 UNITS: 100 INJECTION, SOLUTION SUBCUTANEOUS at 20:10

## 2021-05-21 RX ADMIN — SODIUM CHLORIDE, PRESERVATIVE FREE 10 ML: 5 INJECTION INTRAVENOUS at 08:43

## 2021-05-22 LAB
GLUCOSE BLDC GLUCOMTR-MCNC: 270 MG/DL (ref 70–130)
GLUCOSE BLDC GLUCOMTR-MCNC: 279 MG/DL (ref 70–130)
GLUCOSE BLDC GLUCOMTR-MCNC: 323 MG/DL (ref 70–130)
GLUCOSE BLDC GLUCOMTR-MCNC: 346 MG/DL (ref 70–130)

## 2021-05-22 PROCEDURE — 25010000002 ENOXAPARIN PER 10 MG: Performed by: EMERGENCY MEDICINE

## 2021-05-22 PROCEDURE — 97116 GAIT TRAINING THERAPY: CPT

## 2021-05-22 PROCEDURE — 63710000001 INSULIN DETEMIR PER 5 UNITS: Performed by: EMERGENCY MEDICINE

## 2021-05-22 PROCEDURE — 99233 SBSQ HOSP IP/OBS HIGH 50: CPT | Performed by: EMERGENCY MEDICINE

## 2021-05-22 PROCEDURE — 97110 THERAPEUTIC EXERCISES: CPT

## 2021-05-22 PROCEDURE — 63710000001 INSULIN ASPART PER 5 UNITS: Performed by: EMERGENCY MEDICINE

## 2021-05-22 PROCEDURE — 82962 GLUCOSE BLOOD TEST: CPT

## 2021-05-22 RX ORDER — LOSARTAN POTASSIUM 50 MG/1
50 TABLET ORAL 2 TIMES DAILY
Status: DISCONTINUED | OUTPATIENT
Start: 2021-05-22 | End: 2021-05-23

## 2021-05-22 RX ADMIN — NITROFURANTOIN MONOHYDRATE/MACROCRYSTALLINE 100 MG: 25; 75 CAPSULE ORAL at 20:30

## 2021-05-22 RX ADMIN — SODIUM CHLORIDE, PRESERVATIVE FREE 10 ML: 5 INJECTION INTRAVENOUS at 20:34

## 2021-05-22 RX ADMIN — ENOXAPARIN SODIUM 40 MG: 40 INJECTION SUBCUTANEOUS at 08:41

## 2021-05-22 RX ADMIN — NIFEDIPINE 60 MG: 30 TABLET, FILM COATED, EXTENDED RELEASE ORAL at 08:40

## 2021-05-22 RX ADMIN — LOSARTAN POTASSIUM 50 MG: 50 TABLET, FILM COATED ORAL at 08:40

## 2021-05-22 RX ADMIN — SODIUM CHLORIDE, PRESERVATIVE FREE 10 ML: 5 INJECTION INTRAVENOUS at 08:41

## 2021-05-22 RX ADMIN — LOSARTAN POTASSIUM 50 MG: 50 TABLET, FILM COATED ORAL at 20:30

## 2021-05-22 RX ADMIN — INSULIN ASPART 5 UNITS: 100 INJECTION, SOLUTION INTRAVENOUS; SUBCUTANEOUS at 17:56

## 2021-05-22 RX ADMIN — CARVEDILOL 25 MG: 25 TABLET, FILM COATED ORAL at 08:40

## 2021-05-22 RX ADMIN — ATORVASTATIN CALCIUM 80 MG: 80 TABLET, FILM COATED ORAL at 20:30

## 2021-05-22 RX ADMIN — INSULIN ASPART 8 UNITS: 100 INJECTION, SOLUTION INTRAVENOUS; SUBCUTANEOUS at 07:23

## 2021-05-22 RX ADMIN — INSULIN ASPART 10 UNITS: 100 INJECTION, SOLUTION INTRAVENOUS; SUBCUTANEOUS at 17:56

## 2021-05-22 RX ADMIN — CARVEDILOL 25 MG: 25 TABLET, FILM COATED ORAL at 17:56

## 2021-05-22 RX ADMIN — INSULIN ASPART 5 UNITS: 100 INJECTION, SOLUTION INTRAVENOUS; SUBCUTANEOUS at 13:04

## 2021-05-22 RX ADMIN — INSULIN DETEMIR 15 UNITS: 100 INJECTION, SOLUTION SUBCUTANEOUS at 20:30

## 2021-05-22 RX ADMIN — INSULIN ASPART 5 UNITS: 100 INJECTION, SOLUTION INTRAVENOUS; SUBCUTANEOUS at 08:41

## 2021-05-22 RX ADMIN — CLOPIDOGREL BISULFATE 75 MG: 75 TABLET ORAL at 08:40

## 2021-05-22 RX ADMIN — NITROFURANTOIN MONOHYDRATE/MACROCRYSTALLINE 100 MG: 25; 75 CAPSULE ORAL at 08:40

## 2021-05-22 NOTE — PLAN OF CARE
Goal Outcome Evaluation:        Outcome Summary: Patient stood from recliner with SBA and ambulated to and from the bathroom with HHA.  Ambulated in the hallway 142 feet with HHA. (cane was unavailable for trial)  Enaged patient in B LE therex in sitting.

## 2021-05-22 NOTE — PROGRESS NOTES
[facility] HOSPITALIST    PROGRESS NOTE    Name:  Valarie Camara   Age:  75 y.o.  Sex:  female  :  1945  MRN:  [id number]   Visit Number:  [id number]  Admission Date:  2021  Date Of Service:  21  Primary Care Physician:  Provider, No Known     LOS: 2 days :  Chief Complaint:  Syncope    Subjective: Patient seen and examined this morning sitting on the recliner.  She is still weak, unsteady on her feet, only ambulating to the door of her room.  However, she is not interested in rehab.  I have encouraged her to discuss it with her daughter further.  She still has some dysuria.  Her A1c was 13; I discussed this with her and she attributes it to lack of Tresiba which she used to get from her doctor's office, but is unable to afford it now.  She has no chest pain.  Her last stress test was a few years ago at [facility] and she cannot recall the results.    Hospital Course: 75 F with h/o CVA, COPD, uncontrolled DM complicated by neuropathy/retinopathy who was brought into the ED by her daughter whom she lives with after a syncopal episode at home.  Per the ED physician's report, daughter had a different story compared to the pt. Patient reports that she was walking when her left ankle twisted on her and she landed on it. She denied any cp, dizziness, cough, fever, or other acute symptoms leading to the fall.  She has been vaccinated against COVID-19. The daughter reports that the patient passed out, became diaphoretic, and then woke up and then passed out again. Pt is alert and oriented and cannot recall this.  Her BP was noted to be very elevated in the ED, requiring IV antihypertensives and her home p.o. meds to be restarted. She had a LLE ultrasound to rule out DVT, left foot x-ray which was negative for fracture.  She admits to polyuria and some dysuria. CTH was also obtained, no acute abnormality. CXR revealed pulmonary htn, however, this was not found on patient's  echo.  Her BP has been elevated. Renal ultrasound rule out renal artery stenosis.    Review of Systems: All systems were reviewed and negative except as mentioned in subjective, assessment and plan.    Vital Signs:Temp:  [97.9 °F (36.6 °C)-98.9 °F (37.2 °C)] 98.4 °F (36.9 °C)  Heart Rate:  [70-74] 70  Resp:  [16-22] 20  BP: (155-188)/(64-92) 182/65    Intake and output:  I/O last 3 completed shifts:  In: 440 [P.O.:440]  Out: 1050 [Urine:1050]  No intake/output data recorded.    Physical Exam:   General: NAD, resting in bed  HEENT: EOMI, NC/AT  Heart: regular  Lungs: nonlabored  Abdomen: Soft, nondistended, nontender  Extremities: No edema  Neurological: A&O x3, moves all extremities  Psychological: Mood and affect appropriate, speech is coherent  Skin: warm, dry    Laboratory results:    Results from last 7 days   Lab Units 05/21/21  0622 05/20/21  0432   SODIUM mmol/L 142 137   POTASSIUM mmol/L 3.7 4.0   CHLORIDE mmol/L 107 101   CO2 mmol/L 26.0 26.7   BUN mg/dL 30* 31*   CREATININE mg/dL 1.42* 1.53*   CALCIUM mg/dL 8.8 8.7   BILIRUBIN mg/dL  --  0.2   ALK PHOS U/L  --  83   ALT (SGPT) U/L  --  14   AST (SGOT) U/L  --  14   GLUCOSE mg/dL 105* 345*     Results from last 7 days   Lab Units 05/21/21  0622 05/20/21  0432   WBC 10*3/mm3 6.16 6.12   HEMOGLOBIN g/dL 11.3* 12.1   HEMATOCRIT % 33.7* 36.7   PLATELETS 10*3/mm3 219 215         Results from last 7 days   Lab Units 05/20/21  1016 05/20/21  0432   TROPONIN T ng/mL 0.038* 0.048*     Results from last 7 days   Lab Units 05/20/21  0448   URINECX  >100,000 CFU/mL Mixed Makenzie Isolated   I have reviewed the patient's laboratory results.    Radiology results:Adult Transthoracic Echo Complete W/ Cont if Necessary Per Protocol    Result Date: 5/20/2021  1.  Normal left ventricular size and systolic function, LVEF 55-60%. 2.  Moderate concentric LVH. 3.  Grade 1 diastolic dysfunction. 4.  Moderately increased left atrial volume index. 5.  Normal right ventricular size and  systolic function. 6.  Mild calcification of aortic valve without significant stenosis. 7.  Small circumferential pericardial effusion without tamponade physiology.    US Renal Artery Complete    Result Date: 5/21/2021  PROCEDURE: US RENAL ARTERY COMPLETE-  HISTORY: rule out left renal artery stenosis; R55-Syncope and collapse; I10-Essential (primary) hypertension; N39.0-Urinary tract infection, site not specified; R77.8-Other specified abnormalities of plasma proteins  PROCEDURE: Ultrasound images of the kidneys were obtained with color flow and postoperative used to assess the vasculature.  FINDINGS:.  Limited grayscale images of the kidneys demonstrate renal cysts. There is no hydronephrosis. Normal low resistance waveforms are seen in the renal arteries. The systolic velocities are within the range of normal. The renal artery to aortic ratio measures 1.4 on the right and 1.3 on the left. The resistive indices average 0.64 on the right and 0.66 on the left.      Impression: No sonographic evidence of renal artery stenosis.  This report was finalized on 5/21/2021 12:20 PM by Adina Frausto M.D..    I have reviewed the patient's radiology reports.    Medication Review: I have reviewed the patient's active and prn medications.     Problem List:  Syncope    Arthritis    Anxiety and depression    Gastroesophageal reflux disease with esophagitis    Multiple-type hyperlipidemia    Benign essential hypertension    Obesity (BMI 30-39.9)    History of COPD    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    DM (diabetes mellitus), type 2, uncontrolled w/neurologic complication (CMS/HCC)    Assessment:  Syncope  Hypertensive crisis  Elevated troponin  UTI  Type 2 Diabetes Mellitus complicated by hyperglycemia and neuropathy  History of stroke  Obesity BMI 35  CKD 3B    Plan:  -Given the fact that she has evidence of microvascular disease already, with some elevated troponins, and unclear  source of syncope, I think it is prudent we rule out underlying coronary artery disease.  We will get a stress test. If this is negative, and she continues to want to go home, this will be arranged in the morning.  -Diabetes education, insulin, case management consult for insulin at home  -Orthostatics pending, continue Macrobid for UTI  -Echo reviewed reveals normal EF, mild diastolic dysfunction  -resume home ARB with her antihypertensive regimen    DVT Prophylaxis: Lovenox  Code Status: Full Code  Diet: Consistent Carb  Discharge Plan: Home PT OT vs rehab    Gail Peña DO  05/22/21  07:43 EDT    Dictated utilizing Dragon dictation.

## 2021-05-22 NOTE — PLAN OF CARE
Goal Outcome Evaluation:         A&O x 3. Educated on risk of falls. Reminded to call for assistance as needed. Verbalized understanding. Denies pain. VSS. BP remains slightly elevated. Remains on antibiotic therapy related to UTI with no adverse reactions noted. Continue to monitor patient progress and vital signs.

## 2021-05-22 NOTE — NURSING NOTE
Orthostatic blood pressures-    Lyin/77 HR 78  Sittin/85 HR 73  Standin/99 HR 76    Pt nonsymptomatic when went from sitting to standing.

## 2021-05-22 NOTE — THERAPY TREATMENT NOTE
Patient Name: Valarie Camara  : 1945    MRN: [id number]                              Today's Date: 2021       Admit Date: 2021    Visit Dx:     ICD-10-CM ICD-9-CM   1. Syncope, unspecified syncope type  R55 780.2   2. Hypertension, unspecified type  I10 401.9   3. Urinary tract infection without hematuria, site unspecified  N39.0 599.0   4. Elevated troponin  R77.8 790.6     Patient Active Problem List   Diagnosis   • Atopic rhinitis   • Arthritis   • Chronic neck pain   • Anxiety and depression   • Edema   • Gastroesophageal reflux disease with esophagitis   • Multiple-type hyperlipidemia   • Vitamin D deficiency   • Benign essential hypertension   • Obesity (BMI 30-39.9)   • History of COPD   • History of cataract   • History of osteoporosis   • History of restless legs syndrome   • History of rheumatoid arthritis   • Elevated serum creatinine   • Pharyngoesophageal dysphagia   • Constipation   • Schatzki's ring   • Gastritis without bleeding   • History of Helicobacter pylori infection   • Duodenitis   • Right hemiparesis (CMS/HCC)   • 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   • Multiple thyroid nodules   • Lacunar stroke (CMS/HCC)   • DM (diabetes mellitus), type 2, uncontrolled w/neurologic complication (CMS/HCC)   • Syncope and collapse   • Positive fecal occult blood test   • Left foot pain   • Hypomagnesemia   • Acute on chronic anemia   • Esophageal dysphagia   • Reflux esophagitis   • Syncope     Past Medical History:   Diagnosis Date   • Acute bronchitis with bronchospasm    • Acute exacerbation of chronic obstructive pulmonary disease (COPD) (CMS/HCC)    • Anxiety    • Arthritis    • B12 deficiency    • Back pain    • Cataract    • Cervicalgia    • Colonic polyp     History of colonic polyps    • COPD (chronic obstructive pulmonary disease) (CMS/HCC)    • Depression    • Diverticulitis    • Dysphagia    • Edema    • Esophageal reflux    • Folic  acid deficiency    • Herpes zoster    • High cholesterol    • History of kidney infection    • History of recurrent urinary tract infection    • HTN (hypertension)    • Hypercholesterolemia    • Impaired functional mobility, balance, gait, and endurance    • Kidney infection    • Malignant hypertension 4/26/2015     Accelerated essential hypertension   • Measles     rubeola   • Muscle spasm    • Neoplasm of uncertain behavior of skin    • Osteoporosis    • Recurrent urinary tract infection    • Rheumatoid arthritis (CMS/HCC)    • RLS (restless legs syndrome)    • Stomach ulcer    • Type 2 diabetes mellitus (CMS/HCC)    • Vitamin D deficiency      Past Surgical History:   Procedure Laterality Date   • CATARACT EXTRACTION Left 02/11/2013    with lens implant   • CATARACT EXTRACTION Right 04/15/2013    with lens implant   • CATARACT EXTRACTION WITH INTRAOCULAR LENS IMPLANT Left 02/11/2013   • CATARACT EXTRACTION WITH INTRAOCULAR LENS IMPLANT Right 04/15/2013   • COLONOSCOPY  2012   • COLONOSCOPY N/A 11/26/2019    Procedure: COLONOSCOPY;  Surgeon: Chidi Downing MD;  Location: [facility] ENDOSCOPY;  Service: Gastroenterology   • ENDOSCOPY N/A 11/13/2017    Procedure: ESOPHAGOGASTRODUODENOSCOPY with biopsies and esophageal balloon dilitation;  Surgeon: Wesley Stovall MD;  Location:  [facility] ENDOSCOPY;  Service:    • ENDOSCOPY N/A 11/25/2019    Procedure: ESOPHAGOGASTRODUODENOSCOPY;  Surgeon: Chidi Downing MD;  Location: [facility] ENDOSCOPY;  Service: Gastroenterology   • HYSTERECTOMY  1979   • UPPER GASTROINTESTINAL ENDOSCOPY  12/09/2013   • UPPER GASTROINTESTINAL ENDOSCOPY  11/13/2017     General Information     Row Name 05/22/21 1237          Physical Therapy Time and Intention    Document Type  therapy note (daily note)  -LI     Mode of Treatment  physical therapy  -     Row Name 05/22/21 1237          General Information    Patient Profile Reviewed  yes  -LI     Existing Precautions/Restrictions  fall  -LI       User  Key  (r) = Recorded By, (t) = Taken By, (c) = Cosigned By    Initials Name Provider Type    Palmira Calvert PTA Physical Therapy Assistant        Mobility     Row Name 05/22/21 1237          Sit-Stand Transfer    Sit-Stand [place] (Transfers)  verbal cues;standby assist  -LI     Assistive Device (Sit-Stand Transfers)  -- HHA, cane unavailable  -LI     Row Name 05/22/21 1237          Gait/Stairs (Locomotion)    [place] Level (Gait)  verbal cues;contact guard  -HAYLEE     Assistive Device (Gait)  -- HHA, cane unavailable  -LI     Distance in Feet (Gait)  162 feet  -LI     Bilateral Gait Deviations  heel strike decreased;weight shift ability decreased  -HAYLEE       User Key  (r) = Recorded By, (t) = Taken By, (c) = Cosigned By    Initials Name Provider Type    Palmira Calvert PTA Physical Therapy Assistant        Obj/Interventions     Row Name 05/22/21 1239          Motor Skills    Therapeutic Exercise  hip;knee;ankle Engaged patient in B LE therex in sitting  -HAYLEE       User Key  (r) = Recorded By, (t) = Taken By, (c) = Cosigned By    Initials Name Provider Type    Palmira Calvert PTA Physical Therapy Assistant        Goals/Plan    No documentation.       Clinical Impression     Row Name 05/22/21 1239          Pain Scale: Numbers Pre/Post-Treatment    Pretreatment Pain Rating  0/10 - no pain  -LI     Posttreatment Pain Rating  0/10 - no pain  -LI     Row Name 05/22/21 1241          Plan of Care Review    Outcome Summary  Patient stood from recliner with SBA and ambulated to and from the bathroom with HHA.  Ambulated in the hallway 142 feet with HHA. (cane was unavailable for trial)  Enaged patient in B LE therex in sitting.  -LI     Row Name 05/22/21 1241          Vital Signs    Pre Patient Position  Sitting  -LI     Intra Patient Position  Standing  -LI     Post Patient Position  Sitting  -LI     [facility] Name 05/22/21 1241          Positioning and Restraints    Pre-Treatment Position  sitting in chair/recliner   -LI     Post Treatment Position  chair  -LI     In Chair  reclined;call light within reach;encouraged to call for assist;exit alarm on  -LI       User Key  (r) = Recorded By, (t) = Taken By, (c) = Cosigned By    Initials Name Provider Type    Palmira Calvert PTA Physical Therapy Assistant        Outcome Measures     Row Name 05/22/21 1239          How much help from another person do you currently need...    Turning from your back to your side while in flat bed without using bedrails?  3  -LI     Moving from lying on back to sitting on the side of a flat bed without bedrails?  3  -LI     Moving to and from a bed to a chair (including a wheelchair)?  3  -LI     Standing up from a chair using your arms (e.g., wheelchair, bedside chair)?  3  -LI     Climbing 3-5 steps with a railing?  3  -LI     To walk in hospital room?  3  -LI     AM-PAC 6 Clicks Score (PT)  18  -LI       User Key  (r) = Recorded By, (t) = Taken By, (c) = Cosigned By    Initials Name Provider Type    Palmira Calvert PTA Physical Therapy Assistant        Physical Therapy Education                 Title: PT OT SLP Therapies (In Progress)     Topic: Physical Therapy (Done)     Point: Mobility training (Done)     Learning Progress Summary           Patient Acceptance, E,TB,D, VU,DU,NR by HAYLEE at 5/22/2021 1240    Comment: Safety awareness with ambulation    Acceptance, E,TB, VU by CHARLES at 5/21/2021 1411    Comment: Purpose of PT/POC.                   Point: Home exercise program (Done)     Learning Progress Summary           Patient Acceptance, E,TB,D, VU,DU,NR by HAYLEE at 5/22/2021 1240    Comment: Safety awareness with ambulation    Acceptance, E,TB, VU by CHARLES at 5/21/2021 1411    Comment: Purpose of PT/POC.                   Point: Body mechanics (Done)     Learning Progress Summary           Patient Acceptance, E,TB,D, VU,DU,NR by HAYLEE at 5/22/2021 1240    Comment: Safety awareness with ambulation                   Point: Precautions (Done)     Learning  Progress Summary           Patient Acceptance, E,TB,D, VU,DU,NR by HAYLEE at 5/22/2021 1240    Comment: Safety awareness with ambulation    Acceptance, E,TB, VU by CHARLES at 5/21/2021 1411    Comment: Purpose of PT/POC.                               User Key     Initials Effective Dates Name Provider Type Discipline     04/03/18 -  Brissa Doe, PT Physical Therapist PT    HAYLEE 02/12/18 -  Palmira Brady PTA Physical Therapy Assistant PT              PT Recommendation and Plan     Outcome Summary: Patient stood from recliner with SBA and ambulated to and from the bathroom with HHA.  Ambulated in the hallway 142 feet with HHA. (cane was unavailable for trial)  Enaged patient in B LE therex in sitting.     Time Calculation:   PT Charges     Row Name 05/22/21 1245 05/22/21 1241          Time Calculation    Start Time  --  1036  -LI     Stop Time  --  1110  -LI     Time Calculation (min)  --  34 min  -LI     PT Received On  --  05/22/21  -LI        Timed Charges    45181 - PT Therapeutic Exercise Minutes  21  -LI  --     31038 - Gait Training Minutes   13  -LI  --        Total Minutes    Timed Charges Total Minutes  34  -LI  --      Total Minutes  34  -LI  --       User Key  (r) = Recorded By, (t) = Taken By, (c) = Cosigned By    Initials Name Provider Type    Palmira Calvert PTA Physical Therapy Assistant        Therapy Charges for Today     Code Description Service Date Service Provider Modifiers Qty    50166175629 HC PT THER PROC EA 15 MIN 5/22/2021 Palmira Brady, EVELYN GP 1    71961089036 HC GAIT TRAINING EA 15 MIN 5/22/2021 Palmira Brady PTA GP 1          PT G-Codes  Outcome Measure Options: AM-PAC 6 Clicks Basic Mobility (PT)  AM-PAC 6 Clicks Score (PT): 18    Palmira Brady PTA  5/22/2021

## 2021-05-23 LAB
ALBUMIN SERPL-MCNC: 3 G/DL (ref 3.5–5.2)
ANION GAP SERPL CALCULATED.3IONS-SCNC: 8.8 MMOL/L (ref 5–15)
BACTERIA SPEC AEROBE CULT: ABNORMAL
BASOPHILS # BLD AUTO: 0.03 10*3/MM3 (ref 0–0.2)
BASOPHILS NFR BLD AUTO: 0.5 % (ref 0–1.5)
BUN SERPL-MCNC: 33 MG/DL (ref 8–23)
BUN/CREAT SERPL: 22.6 (ref 7–25)
CALCIUM SPEC-SCNC: 9.2 MG/DL (ref 8.6–10.5)
CHLORIDE SERPL-SCNC: 106 MMOL/L (ref 98–107)
CO2 SERPL-SCNC: 25.2 MMOL/L (ref 22–29)
CREAT SERPL-MCNC: 1.46 MG/DL (ref 0.57–1)
DEPRECATED RDW RBC AUTO: 41 FL (ref 37–54)
EOSINOPHIL # BLD AUTO: 0.2 10*3/MM3 (ref 0–0.4)
EOSINOPHIL NFR BLD AUTO: 3.7 % (ref 0.3–6.2)
ERYTHROCYTE [DISTWIDTH] IN BLOOD BY AUTOMATED COUNT: 13.2 % (ref 12.3–15.4)
GFR SERPL CREATININE-BSD FRML MDRD: 42 ML/MIN/1.73
GLUCOSE BLDC GLUCOMTR-MCNC: 185 MG/DL (ref 70–130)
GLUCOSE BLDC GLUCOMTR-MCNC: 185 MG/DL (ref 70–130)
GLUCOSE BLDC GLUCOMTR-MCNC: 190 MG/DL (ref 70–130)
GLUCOSE BLDC GLUCOMTR-MCNC: 276 MG/DL (ref 70–130)
GLUCOSE SERPL-MCNC: 205 MG/DL (ref 65–99)
HCT VFR BLD AUTO: 36.5 % (ref 34–46.6)
HGB BLD-MCNC: 12.1 G/DL (ref 12–15.9)
IMM GRANULOCYTES # BLD AUTO: 0.02 10*3/MM3 (ref 0–0.05)
IMM GRANULOCYTES NFR BLD AUTO: 0.4 % (ref 0–0.5)
LYMPHOCYTES # BLD AUTO: 2.13 10*3/MM3 (ref 0.7–3.1)
LYMPHOCYTES NFR BLD AUTO: 39 % (ref 19.6–45.3)
MCH RBC QN AUTO: 28.2 PG (ref 26.6–33)
MCHC RBC AUTO-ENTMCNC: 33.2 G/DL (ref 31.5–35.7)
MCV RBC AUTO: 85.1 FL (ref 79–97)
MONOCYTES # BLD AUTO: 0.47 10*3/MM3 (ref 0.1–0.9)
MONOCYTES NFR BLD AUTO: 8.6 % (ref 5–12)
NEUTROPHILS NFR BLD AUTO: 2.61 10*3/MM3 (ref 1.7–7)
NEUTROPHILS NFR BLD AUTO: 47.8 % (ref 42.7–76)
NRBC BLD AUTO-RTO: 0 /100 WBC (ref 0–0.2)
PHOSPHATE SERPL-MCNC: 4 MG/DL (ref 2.5–4.5)
PLATELET # BLD AUTO: 224 10*3/MM3 (ref 140–450)
PMV BLD AUTO: 10.7 FL (ref 6–12)
POTASSIUM SERPL-SCNC: 4 MMOL/L (ref 3.5–5.2)
RBC # BLD AUTO: 4.29 10*6/MM3 (ref 3.77–5.28)
SODIUM SERPL-SCNC: 140 MMOL/L (ref 136–145)
WBC # BLD AUTO: 5.46 10*3/MM3 (ref 3.4–10.8)

## 2021-05-23 PROCEDURE — 25010000002 ENOXAPARIN PER 10 MG: Performed by: EMERGENCY MEDICINE

## 2021-05-23 PROCEDURE — 97116 GAIT TRAINING THERAPY: CPT

## 2021-05-23 PROCEDURE — 80069 RENAL FUNCTION PANEL: CPT | Performed by: EMERGENCY MEDICINE

## 2021-05-23 PROCEDURE — 85025 COMPLETE CBC W/AUTO DIFF WBC: CPT | Performed by: EMERGENCY MEDICINE

## 2021-05-23 PROCEDURE — 63710000001 INSULIN ASPART PER 5 UNITS: Performed by: EMERGENCY MEDICINE

## 2021-05-23 PROCEDURE — 25010000002 ERTAPENEM PER 500 MG: Performed by: INTERNAL MEDICINE

## 2021-05-23 PROCEDURE — 82962 GLUCOSE BLOOD TEST: CPT

## 2021-05-23 PROCEDURE — 63710000001 INSULIN DETEMIR PER 5 UNITS: Performed by: EMERGENCY MEDICINE

## 2021-05-23 PROCEDURE — 99233 SBSQ HOSP IP/OBS HIGH 50: CPT | Performed by: INTERNAL MEDICINE

## 2021-05-23 RX ORDER — AMLODIPINE BESYLATE 5 MG/1
10 TABLET ORAL
Status: DISCONTINUED | OUTPATIENT
Start: 2021-05-23 | End: 2021-05-25 | Stop reason: HOSPADM

## 2021-05-23 RX ORDER — CEFEPIME HYDROCHLORIDE 2 G/50ML
2 INJECTION, SOLUTION INTRAVENOUS ONCE
Status: DISCONTINUED | OUTPATIENT
Start: 2021-05-23 | End: 2021-05-23 | Stop reason: ALTCHOICE

## 2021-05-23 RX ORDER — CEFEPIME HYDROCHLORIDE 1 G/50ML
1 INJECTION, SOLUTION INTRAVENOUS EVERY 8 HOURS
Status: DISCONTINUED | OUTPATIENT
Start: 2021-05-23 | End: 2021-05-23 | Stop reason: ALTCHOICE

## 2021-05-23 RX ADMIN — INSULIN ASPART 3 UNITS: 100 INJECTION, SOLUTION INTRAVENOUS; SUBCUTANEOUS at 07:25

## 2021-05-23 RX ADMIN — INSULIN ASPART 5 UNITS: 100 INJECTION, SOLUTION INTRAVENOUS; SUBCUTANEOUS at 09:16

## 2021-05-23 RX ADMIN — LOSARTAN POTASSIUM 50 MG: 50 TABLET, FILM COATED ORAL at 09:15

## 2021-05-23 RX ADMIN — NITROFURANTOIN MONOHYDRATE/MACROCRYSTALLINE 100 MG: 25; 75 CAPSULE ORAL at 09:15

## 2021-05-23 RX ADMIN — LOSARTAN POTASSIUM: 50 TABLET, FILM COATED ORAL at 13:30

## 2021-05-23 RX ADMIN — CARVEDILOL 25 MG: 25 TABLET, FILM COATED ORAL at 09:15

## 2021-05-23 RX ADMIN — INSULIN ASPART 3 UNITS: 100 INJECTION, SOLUTION INTRAVENOUS; SUBCUTANEOUS at 17:28

## 2021-05-23 RX ADMIN — CLOPIDOGREL BISULFATE 75 MG: 75 TABLET ORAL at 09:16

## 2021-05-23 RX ADMIN — INSULIN ASPART 5 UNITS: 100 INJECTION, SOLUTION INTRAVENOUS; SUBCUTANEOUS at 17:28

## 2021-05-23 RX ADMIN — ERTAPENEM SODIUM 1 G: 1 INJECTION, POWDER, LYOPHILIZED, FOR SOLUTION INTRAMUSCULAR; INTRAVENOUS at 13:29

## 2021-05-23 RX ADMIN — SODIUM CHLORIDE, PRESERVATIVE FREE 10 ML: 5 INJECTION INTRAVENOUS at 09:16

## 2021-05-23 RX ADMIN — CARVEDILOL 25 MG: 25 TABLET, FILM COATED ORAL at 17:28

## 2021-05-23 RX ADMIN — INSULIN ASPART 8 UNITS: 100 INJECTION, SOLUTION INTRAVENOUS; SUBCUTANEOUS at 12:28

## 2021-05-23 RX ADMIN — AMLODIPINE BESYLATE 10 MG: 5 TABLET ORAL at 12:27

## 2021-05-23 RX ADMIN — INSULIN DETEMIR 15 UNITS: 100 INJECTION, SOLUTION SUBCUTANEOUS at 20:03

## 2021-05-23 RX ADMIN — ENOXAPARIN SODIUM 40 MG: 40 INJECTION SUBCUTANEOUS at 09:16

## 2021-05-23 RX ADMIN — ATORVASTATIN CALCIUM 80 MG: 80 TABLET, FILM COATED ORAL at 20:03

## 2021-05-23 RX ADMIN — NIFEDIPINE 60 MG: 30 TABLET, FILM COATED, EXTENDED RELEASE ORAL at 09:15

## 2021-05-23 RX ADMIN — INSULIN ASPART 5 UNITS: 100 INJECTION, SOLUTION INTRAVENOUS; SUBCUTANEOUS at 12:27

## 2021-05-23 NOTE — PROGRESS NOTES
[facility]    Name:  Valarie Camara   Age:  75 y.o.  Sex:  female  :  1945  MRN:  [id number]   Visit Number:  [id number]  Admission Date:  2021  Date Of Service:  21  Primary Care Physician:  Provider, No Known     LOS: 3 days :  Chief Complaint:  Syncope    Subjective: Patient seen and examined today on May 23.  She has been having uncontrolled hypertension through the course of stay and still her blood pressure is 160/76 on the current medications.  Patient is able to walk better there has been no dizziness and she did walk with the therapist or more than 200 feet.    Hospital Course: 75 F with h/o CVA, COPD, uncontrolled DM complicated by neuropathy/retinopathy who was brought into the ED by her daughter whom she lives with after a syncopal episode at home.  Per the ED physician's report, daughter had a different story compared to the pt. Patient reports that she was walking when her left ankle twisted on her and she landed on it. She denied any cp, dizziness, cough, fever, or other acute symptoms leading to the fall.  She has been vaccinated against COVID-19. The daughter reports that the patient passed out, became diaphoretic, and then woke up and then passed out again. Pt is alert and oriented and cannot recall this.  Her BP was noted to be very elevated in the ED, requiring IV antihypertensives and her home p.o. meds to be restarted. She had a LLE ultrasound to rule out DVT, left foot x-ray which was negative for fracture.  She admits to polyuria and some dysuria. CTH was also obtained, no acute abnormality. CXR revealed pulmonary htn, however, this was not found on patient's echo.  Her BP has been elevated. Renal ultrasound rule out renal artery stenosis.    Review of Systems: All systems were reviewed and negative except as mentioned in subjective, assessment and plan.    Vital Signs:Temp:  [97.7 °F (36.5 °C)-98.2 °F (36.8 °C)] 97.9 °F  (36.6 °C)  Heart Rate:  [69-76] 76  Resp:  [18-20] 18  BP: (144-209)/(55-77) 160/76    Intake and output:  I/O last 3 completed shifts:  In: 720 [P.O.:720]  Out: 1000 [Urine:1000]  I/O this shift:  In: 480 [P.O.:480]  Out: -     Physical Exam:   General: NAD, resting in bed  HEENT: EOMI, NC/AT  Heart: regular  Lungs: nonlabored  Abdomen: Soft, nondistended, nontender  Extremities: No edema  Neurological: A&O x3, moves all extremities  Psychological: Mood and affect appropriate, speech is coherent  Skin: warm, dry    Laboratory results:    Results from last 7 days   Lab Units 05/23/21  0721 05/21/21  0622 05/20/21  0432   SODIUM mmol/L 140 142 137   POTASSIUM mmol/L 4.0 3.7 4.0   CHLORIDE mmol/L 106 107 101   CO2 mmol/L 25.2 26.0 26.7   BUN mg/dL 33* 30* 31*   CREATININE mg/dL 1.46* 1.42* 1.53*   CALCIUM mg/dL 9.2 8.8 8.7   BILIRUBIN mg/dL  --   --  0.2   ALK PHOS U/L  --   --  83   ALT (SGPT) U/L  --   --  14   AST (SGOT) U/L  --   --  14   GLUCOSE mg/dL 205* 105* 345*     Results from last 7 days   Lab Units 05/23/21  0721 05/21/21  0622 05/20/21  0432   WBC 10*3/mm3 5.46 6.16 6.12   HEMOGLOBIN g/dL 12.1 11.3* 12.1   HEMATOCRIT % 36.5 33.7* 36.7   PLATELETS 10*3/mm3 224 219 215         Results from last 7 days   Lab Units 05/20/21  1016 05/20/21  0432   TROPONIN T ng/mL 0.038* 0.048*     Results from last 7 days   Lab Units 05/21/21  1347 05/20/21  0448   URINECX  >100,000 CFU/mL Gram Negative Bacilli* >100,000 CFU/mL Mixed Makenzie Isolated   I have reviewed the patient's laboratory results.    Radiology results:No radiology results from the last 24 hrs  I have reviewed the patient's radiology reports.    Medication Review: I have reviewed the patient's active and prn medications.     Problem List:  Syncope    Arthritis    Anxiety and depression    Gastroesophageal reflux disease with esophagitis    Multiple-type hyperlipidemia    Benign essential hypertension    Obesity (BMI 30-39.9)    History of COPD    History of  hemorrhagic cerebrovascular accident (CVA) with residual deficit    DM (diabetes mellitus), type 2, uncontrolled w/neurologic complication (CMS/HCC)    Assessment:  Syncope  Hypertensive crisis  Elevated troponin  UTI secondary to E. coli which is multidrug-resistant  Type 2 Diabetes Mellitus complicated by hyperglycemia and neuropathy  History of stroke  Obesity BMI 35  CKD 3B    Plan:  Syncope etiology unclear and the history is also not clear whether it was a true syncope and there is no other issues with walking high.  There has been no other postural dizziness or any cardiac arrhythmias noted during the course of stay.  Will need further evaluation she has further symptom    Hypertension uncontrolled-at present she is on max dose of Coreg along with Procardia and losartan 100 mg.  It is not controlled well so we will stop the Procardia with losartan and change it to Hyzaar and Norvasc along with higher dose of Coreg.  If she remains stable and pressures are in the 150 range and below she should be stable to be discharged in a.m.    UTI due to E. coli-this is multidrug-resistant.  Patient has been on Macrobid and it is resistant so I will change her to cefepime while she is inpatient and based on the sensitivity she cannot even tolerate Bactrim due to sulfa allergies and would do possible oral tetracycline on discharge with close follow-up    Diabetes type 2-uncontrolled with A1c of 13.  She needs to be aggressively treated as outpatient with diet changes and close follow-up.    Outpatient follow-up with her primary care to be set up and patient stated that would want to follow-up with me and I will be glad to see her in the office and to further management and changes.  She can have a follow-up appointment done upon discharge with me    DVT Prophylaxis: Lovenox  Code Status: Full Code  Diet: Consistent Carb  Discharge Plan: Home PT OT vs rehab    Omar Romero MD  05/23/21  11:38 EDT    Dictated utilizing  Dianne dictation.

## 2021-05-23 NOTE — THERAPY TREATMENT NOTE
Patient Name: Valarie Camara  : 1945    MRN: [id number]                              Today's Date: 2021       Admit Date: 2021    Visit Dx:     ICD-10-CM ICD-9-CM   1. Syncope, unspecified syncope type  R55 780.2   2. Hypertension, unspecified type  I10 401.9   3. Urinary tract infection without hematuria, site unspecified  N39.0 599.0   4. Elevated troponin  R77.8 790.6     Patient Active Problem List   Diagnosis   • Atopic rhinitis   • Arthritis   • Chronic neck pain   • Anxiety and depression   • Edema   • Gastroesophageal reflux disease with esophagitis   • Multiple-type hyperlipidemia   • Vitamin D deficiency   • Benign essential hypertension   • Obesity (BMI 30-39.9)   • History of COPD   • History of cataract   • History of osteoporosis   • History of restless legs syndrome   • History of rheumatoid arthritis   • Elevated serum creatinine   • Pharyngoesophageal dysphagia   • Constipation   • Schatzki's ring   • Gastritis without bleeding   • History of Helicobacter pylori infection   • Duodenitis   • Right hemiparesis (CMS/HCC)   • 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   • Multiple thyroid nodules   • Lacunar stroke (CMS/HCC)   • DM (diabetes mellitus), type 2, uncontrolled w/neurologic complication (CMS/HCC)   • Syncope and collapse   • Positive fecal occult blood test   • Left foot pain   • Hypomagnesemia   • Acute on chronic anemia   • Esophageal dysphagia   • Reflux esophagitis   • Syncope     Past Medical History:   Diagnosis Date   • Acute bronchitis with bronchospasm    • Acute exacerbation of chronic obstructive pulmonary disease (COPD) (CMS/HCC)    • Anxiety    • Arthritis    • B12 deficiency    • Back pain    • Cataract    • Cervicalgia    • Colonic polyp     History of colonic polyps    • COPD (chronic obstructive pulmonary disease) (CMS/HCC)    • Depression    • Diverticulitis    • Dysphagia    • Edema    • Esophageal reflux    • Folic  acid deficiency    • Herpes zoster    • High cholesterol    • History of kidney infection    • History of recurrent urinary tract infection    • HTN (hypertension)    • Hypercholesterolemia    • Impaired functional mobility, balance, gait, and endurance    • Kidney infection    • Malignant hypertension 4/26/2015     Accelerated essential hypertension   • Measles     rubeola   • Muscle spasm    • Neoplasm of uncertain behavior of skin    • Osteoporosis    • Recurrent urinary tract infection    • Rheumatoid arthritis (CMS/HCC)    • RLS (restless legs syndrome)    • Stomach ulcer    • Type 2 diabetes mellitus (CMS/HCC)    • Vitamin D deficiency      Past Surgical History:   Procedure Laterality Date   • CATARACT EXTRACTION Left 02/11/2013    with lens implant   • CATARACT EXTRACTION Right 04/15/2013    with lens implant   • CATARACT EXTRACTION WITH INTRAOCULAR LENS IMPLANT Left 02/11/2013   • CATARACT EXTRACTION WITH INTRAOCULAR LENS IMPLANT Right 04/15/2013   • COLONOSCOPY  2012   • COLONOSCOPY N/A 11/26/2019    Procedure: COLONOSCOPY;  Surgeon: Chidi Downing MD;  Location:  [facility];  Service: Gastroenterology   • ENDOSCOPY N/A 11/13/2017    Procedure: ESOPHAGOGASTRODUODENOSCOPY with biopsies and esophageal balloon dilitation;  Surgeon: Wesley Stovall MD;  Location:  [facility];  Service:    • ENDOSCOPY N/A 11/25/2019    Procedure: ESOPHAGOGASTRODUODENOSCOPY;  Surgeon: Chidi Downing MD;  Location:  [facility];  Service: Gastroenterology   • HYSTERECTOMY  1979   • UPPER GASTROINTESTINAL ENDOSCOPY  12/09/2013   • UPPER GASTROINTESTINAL ENDOSCOPY  11/13/2017     General Information     Row Name 05/23/21 1014          Physical Therapy Time and Intention    Document Type  therapy note (daily note)  -ERNIE     Mode of Treatment  physical therapy  -ERNIE     Row Name 05/23/21 1014          General Information    Patient Profile Reviewed  yes  -ERNIE       User Key  (r) = Recorded By, (t) = Taken By, (c) =  Cosigned By    Initials Name Provider Type    Vidal Hansen Physical Therapy Assistant        Mobility     Row Name 05/23/21 1014          Sit-Stand Transfer    Sit-Stand [place] (Transfers)  supervision  -     Assistive Device (Sit-Stand Transfers)  cane, straight  -ERNIE     Row Name 05/23/21 1014          Gait/Stairs (Locomotion)    [place] Level (Gait)  supervision  -ERNIE     Assistive Device (Gait)  cane, straight  -ERNIE     Distance in Feet (Gait)  210ft  -ERNIE     Deviations/Abnormal Patterns (Gait)  base of support, wide  -ERNIE     [place] Level (Stairs)  stand by assist  -ERNIE     Assistive Device (Stairs)  cane, straight  -ERNIE     Handrail Location (Stairs)  left side (descending);right side (ascending)  -     Number of Steps (Stairs)  9 stairs up and down completed.  -ERNIE     Ascending Technique (Stairs)  step-over-step  -ERNIE     Descending Technique (Stairs)  step-over-step  -ERNIE       User Key  (r) = Recorded By, (t) = Taken By, (c) = Cosigned By    Initials Name Provider Type    Vidal Hansen Physical Therapy Assistant        Obj/Interventions     Row Name 05/23/21 1017          Motor Skills    Therapeutic Exercise  ankle;knee  -[facility] Name 05/23/21 1017          Knee (Therapeutic Exercise)    Knee (Therapeutic Exercise)  AROM (active range of motion)  -     Knee AROM (Therapeutic Exercise)  LAQ (long arc quad);10 repetitions  -[facility] Name 05/23/21 1017          Ankle (Therapeutic Exercise)    Ankle (Therapeutic Exercise)  AROM (active range of motion)  -     Ankle AROM (Therapeutic Exercise)  bilateral;dorsiflexion;plantarflexion  -     Row Name 05/23/21 1017          Balance    Balance Assessment  sitting static balance;sitting dynamic balance  -     Static Sitting Balance  WFL  -ERNIE     Dynamic Sitting Balance  WFL  -ERNIE     Static Standing Balance  WFL  -ERNIE     Dynamic Standing Balance  WFL  -ERNIE       User Key  (r) = Recorded By, (t) = Taken By, (c) = Cosigned By    Initials  Name Provider Type    Vidal Hansen Physical Therapy Assistant        Goals/Plan    No documentation.       Clinical Impression     Row Name 05/23/21 1018          Pain Scale: Numbers Pre/Post-Treatment    Pretreatment Pain Rating  0/10 - no pain  -ERNIE     Posttreatment Pain Rating  0/10 - no pain  -ERNIE     Row Name 05/23/21 1018          Plan of Care Review    Outcome Summary  pt tolerated treatment well today, seated LE ther ex to promote warm up and circulation, pt performed sit to stand from chair with SUP, and performed ambulation of ~160ft with spc, pt demos steady tyra speed and consistent step length. Practiced 9 stairs, pt descend using L hand rail and SBA, ascend with R hand rail and SBA.  -ERNIE     Row Name 05/23/21 1018          Positioning and Restraints    Pre-Treatment Position  sitting in chair/recliner  -ERNIE     Post Treatment Position  chair  -ERNIE     In Chair  notified nsg;sitting;encouraged to call for assist  -ERNIE       User Key  (r) = Recorded By, (t) = Taken By, (c) = Cosigned By    Initials Name Provider Type    Vidal Hansen Physical Therapy Assistant        Outcome Measures     Row Name 05/23/21 1021          How much help from another person do you currently need...    Turning from your back to your side while in flat bed without using bedrails?  3  -ERNIE     Moving from lying on back to sitting on the side of a flat bed without bedrails?  3  -ERNIE     Moving to and from a bed to a chair (including a wheelchair)?  4  -ERNIE     Standing up from a chair using your arms (e.g., wheelchair, bedside chair)?  4  -ERNIE     Climbing 3-5 steps with a railing?  4  -ERNIE     To walk in hospital room?  4  -ERNIE     AM-PAC 6 Clicks Score (PT)  22  -ERNIE     Row Name 05/23/21 1021          Functional Assessment    Outcome Measure Options  AM-PAC 6 Clicks Basic Mobility (PT)  -ERNIE       User Key  (r) = Recorded By, (t) = Taken By, (c) = Cosigned By    Initials Name Provider Type    Vidal Hansen Physical Therapy  Assistant        Physical Therapy Education                 Title: PT OT SLP Therapies (In Progress)     Topic: Physical Therapy (Done)     Point: Mobility training (Done)     Learning Progress Summary           Patient Acceptance, E, VU by ERNIE at 5/23/2021 1022    Acceptance, E,TB,D, VU,DU,NR by HAYLEE at 5/22/2021 1240    Comment: Safety awareness with ambulation    Acceptance, E,TB, VU by CHARLES at 5/21/2021 1411    Comment: Purpose of PT/POC.                   Point: Home exercise program (Done)     Learning Progress Summary           Patient Acceptance, E, VU by ERNIE at 5/23/2021 1022    Acceptance, E,TB,D, VU,DU,NR by HAYLEE at 5/22/2021 1240    Comment: Safety awareness with ambulation    Acceptance, E,TB, VU by CHARLES at 5/21/2021 1411    Comment: Purpose of PT/POC.                   Point: Body mechanics (Done)     Learning Progress Summary           Patient Acceptance, E, VU by ERNIE at 5/23/2021 1022    Acceptance, E,TB,D, VU,DU,NR by HAYLEE at 5/22/2021 1240    Comment: Safety awareness with ambulation                   Point: Precautions (Done)     Learning Progress Summary           Patient Acceptance, E, VU by ERNIE at 5/23/2021 1022    Acceptance, E,TB,D, VU,DU,NR by HAYLEE at 5/22/2021 1240    Comment: Safety awareness with ambulation    Acceptance, E,TB, VU by  at 5/21/2021 1411    Comment: Purpose of PT/POC.                               User Key     Initials Effective Dates Name Provider Type Discipline     04/03/18 -  Brissa Doe, PT Physical Therapist PT    HAYLEE 02/12/18 -  Palmira Brady PTA Physical Therapy Assistant PT    ERNIE 03/29/21 -  Vidal Rush Physical Therapy Assistant PT              PT Recommendation and Plan     Outcome Summary: pt tolerated treatment well today, seated LE ther ex to promote warm up and circulation, pt performed sit to stand from chair with SUP, and performed ambulation of ~160ft with spc, pt demos steady tyra speed and consistent step length. Practiced 9 stairs, pt descend using L hand  rail and SBA, ascend with R hand rail and SBA.     Time Calculation:   PT Charges     Row Name 05/23/21 1022             Time Calculation    Start Time  0924  -ERNIE      Stop Time  0942  -ERNIE      Time Calculation (min)  18 min  -ERNIE      PT Received On  05/23/21  -ERNIE      PT Goal Re-Cert Due Date  05/31/21  -ERNIE        User Key  (r) = Recorded By, (t) = Taken By, (c) = Cosigned By    Initials Name Provider Type    Vidal Hansen Physical Therapy Assistant        Therapy Charges for Today     Code Description Service Date Service Provider Modifiers Qty    [id number] HC GAIT TRAINING EA 15 MIN 5/23/2021 Vidal Rush GP 1          PT G-Codes  Outcome Measure Options: AM-PAC 6 Clicks Basic Mobility (PT)  AM-PAC 6 Clicks Score (PT): 22    Vidal Rush  5/23/2021

## 2021-05-23 NOTE — PLAN OF CARE
Problem: Diabetes Comorbidity  Goal: Blood Glucose Level Within Desired Range  Outcome: Ongoing, Progressing   Goal Outcome Evaluation:  Plan of Care Reviewed With: patient     Outcome Summary: NO C/O.NO DISTRESS .RESTED WELL THIS SHIFT.

## 2021-05-23 NOTE — PLAN OF CARE
Problem: Adult Inpatient Plan of Care  Goal: Plan of Care Review  Recent Flowsheet Documentation  Taken 5/23/2021 1018 by Vidal Rush  Outcome Summary: pt tolerated treatment well today, seated LE ther ex to promote warm up and circulation, pt performed sit to stand from chair with SUP, and performed ambulation of ~160ft with spc, pt demos steady tyra speed and consistent step length. Practiced 9 stairs, pt descend using L hand rail and SBA, ascend with R hand rail and SBA.   Goal Outcome Evaluation:        Outcome Summary: pt tolerated treatment well today, seated LE ther ex to promote warm up and circulation, pt performed sit to stand from chair with SUP, and performed ambulation of ~160ft with spc, pt demos steady tyra speed and consistent step length. Practiced 9 stairs, pt descend using L hand rail and SBA, ascend with R hand rail and SBA.

## 2021-05-23 NOTE — PLAN OF CARE
Problem: Diabetes Comorbidity  Goal: Blood Glucose Level Within Desired Range  5/23/2021 0055 by He Amaya, RN  Outcome: Ongoing, Progressing   Goal Outcome Evaluation:  Plan of Care Reviewed With: patient     Outcome Summary: NO C/O.NO DISTRESS .RESTED WELL THIS SHIFT.

## 2021-05-24 ENCOUNTER — APPOINTMENT (OUTPATIENT)
Dept: NUCLEAR MEDICINE | Facility: HOSPITAL | Age: 76
End: 2021-05-24

## 2021-05-24 LAB
GLUCOSE BLDC GLUCOMTR-MCNC: 135 MG/DL (ref 70–130)
GLUCOSE BLDC GLUCOMTR-MCNC: 300 MG/DL (ref 70–130)

## 2021-05-24 PROCEDURE — 25010000002 ERTAPENEM PER 500 MG: Performed by: INTERNAL MEDICINE

## 2021-05-24 PROCEDURE — 63710000001 INSULIN DETEMIR PER 5 UNITS: Performed by: INTERNAL MEDICINE

## 2021-05-24 PROCEDURE — 82962 GLUCOSE BLOOD TEST: CPT

## 2021-05-24 PROCEDURE — 93017 CV STRESS TEST TRACING ONLY: CPT

## 2021-05-24 PROCEDURE — 99231 SBSQ HOSP IP/OBS SF/LOW 25: CPT | Performed by: INTERNAL MEDICINE

## 2021-05-24 PROCEDURE — 25010000002 ENOXAPARIN PER 10 MG: Performed by: EMERGENCY MEDICINE

## 2021-05-24 PROCEDURE — 25010000002 REGADENOSON 0.4 MG/5ML SOLUTION: Performed by: EMERGENCY MEDICINE

## 2021-05-24 PROCEDURE — 78452 HT MUSCLE IMAGE SPECT MULT: CPT

## 2021-05-24 PROCEDURE — A9500 TC99M SESTAMIBI: HCPCS | Performed by: EMERGENCY MEDICINE

## 2021-05-24 PROCEDURE — 0 TECHNETIUM SESTAMIBI: Performed by: EMERGENCY MEDICINE

## 2021-05-24 PROCEDURE — 63710000001 INSULIN ASPART PER 5 UNITS: Performed by: EMERGENCY MEDICINE

## 2021-05-24 RX ADMIN — SODIUM CHLORIDE, PRESERVATIVE FREE 10 ML: 5 INJECTION INTRAVENOUS at 10:54

## 2021-05-24 RX ADMIN — CARVEDILOL 25 MG: 25 TABLET, FILM COATED ORAL at 10:04

## 2021-05-24 RX ADMIN — AMLODIPINE BESYLATE 10 MG: 5 TABLET ORAL at 10:04

## 2021-05-24 RX ADMIN — SODIUM CHLORIDE, PRESERVATIVE FREE 10 ML: 5 INJECTION INTRAVENOUS at 20:34

## 2021-05-24 RX ADMIN — SODIUM CHLORIDE, PRESERVATIVE FREE 10 ML: 5 INJECTION INTRAVENOUS at 04:21

## 2021-05-24 RX ADMIN — TECHNETIUM TC 99M SESTAMIBI 1 DOSE: 1 INJECTION INTRAVENOUS at 11:21

## 2021-05-24 RX ADMIN — INSULIN ASPART 5 UNITS: 100 INJECTION, SOLUTION INTRAVENOUS; SUBCUTANEOUS at 10:11

## 2021-05-24 RX ADMIN — ERTAPENEM SODIUM 1 G: 1 INJECTION, POWDER, LYOPHILIZED, FOR SOLUTION INTRAMUSCULAR; INTRAVENOUS at 13:42

## 2021-05-24 RX ADMIN — INSULIN ASPART 5 UNITS: 100 INJECTION, SOLUTION INTRAVENOUS; SUBCUTANEOUS at 17:55

## 2021-05-24 RX ADMIN — CARVEDILOL 25 MG: 25 TABLET, FILM COATED ORAL at 17:54

## 2021-05-24 RX ADMIN — REGADENOSON 0.4 MG: 0.08 INJECTION, SOLUTION INTRAVENOUS at 11:21

## 2021-05-24 RX ADMIN — INSULIN ASPART 8 UNITS: 100 INJECTION, SOLUTION INTRAVENOUS; SUBCUTANEOUS at 17:53

## 2021-05-24 RX ADMIN — ATORVASTATIN CALCIUM 80 MG: 80 TABLET, FILM COATED ORAL at 20:34

## 2021-05-24 RX ADMIN — ENOXAPARIN SODIUM 40 MG: 40 INJECTION SUBCUTANEOUS at 10:04

## 2021-05-24 RX ADMIN — LABETALOL 20 MG/4 ML (5 MG/ML) INTRAVENOUS SYRINGE 10 MG: at 04:21

## 2021-05-24 RX ADMIN — LOSARTAN POTASSIUM: 50 TABLET, FILM COATED ORAL at 10:04

## 2021-05-24 RX ADMIN — TECHNETIUM TC 99M SESTAMIBI 1 DOSE: 1 INJECTION INTRAVENOUS at 09:30

## 2021-05-24 RX ADMIN — INSULIN DETEMIR 20 UNITS: 100 INJECTION, SOLUTION SUBCUTANEOUS at 20:34

## 2021-05-24 RX ADMIN — INSULIN ASPART 8 UNITS: 100 INJECTION, SOLUTION INTRAVENOUS; SUBCUTANEOUS at 17:54

## 2021-05-24 RX ADMIN — CLOPIDOGREL BISULFATE 75 MG: 75 TABLET ORAL at 10:04

## 2021-05-24 NOTE — PROGRESS NOTES
[facility]    Name:  Valarie Camara   Age:  75 y.o.  Sex:  female  :  1945  MRN:  [id number]   Visit Number:  [id number]  Admission Date:  2021  Date Of Service:  21  Primary Care Physician:  Provider, No Known     LOS: 4 days :  Chief Complaint:  Syncope    Subjective:     Patient seen and examined today on May 24th.  Patient has been having better blood pressure control with the changes yesterday.  She was started on cefepime because of the multidrug-resistant UTI and she has had no reaction to the cefepime    Hospital Course: 75 F with h/o CVA, COPD, uncontrolled DM complicated by neuropathy/retinopathy who was brought into the ED by her daughter whom she lives with after a syncopal episode at home.  Per the ED physician's report, daughter had a different story compared to the pt. Patient reports that she was walking when her left ankle twisted on her and she landed on it. She denied any cp, dizziness, cough, fever, or other acute symptoms leading to the fall.  She has been vaccinated against COVID-19. The daughter reports that the patient passed out, became diaphoretic, and then woke up and then passed out again. Pt is alert and oriented and cannot recall this.  Her BP was noted to be very elevated in the ED, requiring IV antihypertensives and her home p.o. meds to be restarted. She had a LLE ultrasound to rule out DVT, left foot x-ray which was negative for fracture.  She admits to polyuria and some dysuria. CTH was also obtained, no acute abnormality. CXR revealed pulmonary htn, however, this was not found on patient's echo.  Her BP has been elevated. Renal ultrasound rule out renal artery stenosis.    Review of Systems: All systems were reviewed and negative except as mentioned in subjective, assessment and plan.    Vital Signs:Temp:  [96.9 °F (36.1 °C)-99 °F (37.2 °C)] 99 °F (37.2 °C)  Heart Rate:  [67-80] 80  Resp:  [16-18] 16  BP:  (156-183)/(58-76) 183/71    Intake and output:  I/O last 3 completed shifts:  In: 1440 [P.O.:1440]  Out: 400 [Urine:400]  No intake/output data recorded.    Physical Exam:   General: NAD, resting in bed  HEENT: EOMI, NC/AT  Heart: regular  Lungs: nonlabored  Abdomen: Soft, nondistended, nontender  Extremities: No edema  Neurological: A&O x3, moves all extremities  Psychological: Mood and affect appropriate, speech is coherent  Skin: warm, dry    Laboratory results:    Results from last 7 days   Lab Units 05/23/21  0721 05/21/21  0622 05/20/21  0432   SODIUM mmol/L 140 142 137   POTASSIUM mmol/L 4.0 3.7 4.0   CHLORIDE mmol/L 106 107 101   CO2 mmol/L 25.2 26.0 26.7   BUN mg/dL 33* 30* 31*   CREATININE mg/dL 1.46* 1.42* 1.53*   CALCIUM mg/dL 9.2 8.8 8.7   BILIRUBIN mg/dL  --   --  0.2   ALK PHOS U/L  --   --  83   ALT (SGPT) U/L  --   --  14   AST (SGOT) U/L  --   --  14   GLUCOSE mg/dL 205* 105* 345*     Results from last 7 days   Lab Units 05/23/21  0721 05/21/21  0622 05/20/21  0432   WBC 10*3/mm3 5.46 6.16 6.12   HEMOGLOBIN g/dL 12.1 11.3* 12.1   HEMATOCRIT % 36.5 33.7* 36.7   PLATELETS 10*3/mm3 224 219 215         Results from last 7 days   Lab Units 05/20/21  1016 05/20/21  0432   TROPONIN T ng/mL 0.038* 0.048*     Results from last 7 days   Lab Units 05/21/21  1347 05/20/21  0448   URINECX  >100,000 CFU/mL Escherichia coli* >100,000 CFU/mL Mixed Makenzie Isolated   I have reviewed the patient's laboratory results.    Radiology results:No radiology results from the last 24 hrs  I have reviewed the patient's radiology reports.    Medication Review: I have reviewed the patient's active and prn medications.     Problem List:  Syncope    Arthritis    Anxiety and depression    Gastroesophageal reflux disease with esophagitis    Multiple-type hyperlipidemia    Benign essential hypertension    Obesity (BMI 30-39.9)    History of COPD    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    DM  (diabetes mellitus), type 2, uncontrolled w/neurologic complication (CMS/[facility])    Assessment:  Syncope  Hypertensive crisis  Elevated troponin  UTI secondary to E. coli which is multidrug-resistant  Type 2 Diabetes Mellitus complicated by hyperglycemia and neuropathy  History of stroke  Obesity BMI 35  CKD 3B    Plan:  Syncope etiology unclear and the history is also not clear whether it was a true syncope and there is no other issues with walking high.  There has been no other postural dizziness or any cardiac arrhythmias noted during the course of stay.  Will need further evaluation she has further symptom    Hypertension uncontrolled-at present she is on max dose of Coreg along with her Norvasc and Hyzaar max dose.  Her blood pressure is coming down is is in the 150s    UTI due to E. coli-this is multidrug-resistant.  Patient has been on Macrobid and it is resistant so I will change her to cefepime while she is inpatient and based on the sensitivity she cannot even tolerate Bactrim due to sulfa allergies and would do possible oral tetracycline on discharge with close follow-up.  She has tolerated cefepime well    Diabetes type 2-uncontrolled with A1c of 13.  She needs to be aggressively treated as outpatient with diet changes and close follow-up..  We will start with 20 units of Levemir and adjust the dose as per the daily today's needs    If patient remains steady and stable will be able to discharge her in the next 24hours after she gets today's dose of cefepime which will complete 36 hours of IV antibiotic    DVT Prophylaxis: Lovenox  Code Status: Full Code  Diet: Consistent Carb  Discharge Plan: Home PT OT vs rehab    Omar Romero MD  05/24/21  08:09 EDT    Dictated utilizing Dragon dictation.

## 2021-05-24 NOTE — PLAN OF CARE
Goal Outcome Evaluation:        Outcome Summary: Pt resting comfortably through the night. No acute episodes

## 2021-05-25 VITALS
SYSTOLIC BLOOD PRESSURE: 201 MMHG | RESPIRATION RATE: 20 BRPM | TEMPERATURE: 98.5 F | WEIGHT: 204.15 LBS | HEART RATE: 77 BPM | BODY MASS INDEX: 36.17 KG/M2 | HEIGHT: 63 IN | DIASTOLIC BLOOD PRESSURE: 69 MMHG | OXYGEN SATURATION: 99 %

## 2021-05-25 LAB
GLUCOSE BLDC GLUCOMTR-MCNC: 173 MG/DL (ref 70–130)
GLUCOSE BLDC GLUCOMTR-MCNC: 278 MG/DL (ref 70–130)

## 2021-05-25 PROCEDURE — 63710000001 INSULIN ASPART PER 5 UNITS: Performed by: EMERGENCY MEDICINE

## 2021-05-25 PROCEDURE — 97110 THERAPEUTIC EXERCISES: CPT

## 2021-05-25 PROCEDURE — 25010000002 ERTAPENEM PER 500 MG: Performed by: INTERNAL MEDICINE

## 2021-05-25 PROCEDURE — 25010000002 ENOXAPARIN PER 10 MG: Performed by: EMERGENCY MEDICINE

## 2021-05-25 PROCEDURE — 82962 GLUCOSE BLOOD TEST: CPT

## 2021-05-25 PROCEDURE — 99238 HOSP IP/OBS DSCHRG MGMT 30/<: CPT | Performed by: NURSE PRACTITIONER

## 2021-05-25 RX ORDER — AMLODIPINE BESYLATE 10 MG/1
10 TABLET ORAL
Qty: 30 TABLET | Refills: 0 | Status: SHIPPED | OUTPATIENT
Start: 2021-05-26 | End: 2021-06-25

## 2021-05-25 RX ADMIN — AMLODIPINE BESYLATE 10 MG: 5 TABLET ORAL at 08:32

## 2021-05-25 RX ADMIN — ERTAPENEM SODIUM 1 G: 1 INJECTION, POWDER, LYOPHILIZED, FOR SOLUTION INTRAMUSCULAR; INTRAVENOUS at 12:31

## 2021-05-25 RX ADMIN — ENOXAPARIN SODIUM 40 MG: 40 INJECTION SUBCUTANEOUS at 08:33

## 2021-05-25 RX ADMIN — LABETALOL 20 MG/4 ML (5 MG/ML) INTRAVENOUS SYRINGE 10 MG: at 15:45

## 2021-05-25 RX ADMIN — SODIUM CHLORIDE, PRESERVATIVE FREE 10 ML: 5 INJECTION INTRAVENOUS at 08:32

## 2021-05-25 RX ADMIN — INSULIN ASPART 3 UNITS: 100 INJECTION, SOLUTION INTRAVENOUS; SUBCUTANEOUS at 07:12

## 2021-05-25 RX ADMIN — INSULIN ASPART 5 UNITS: 100 INJECTION, SOLUTION INTRAVENOUS; SUBCUTANEOUS at 07:12

## 2021-05-25 RX ADMIN — LOSARTAN POTASSIUM: 50 TABLET, FILM COATED ORAL at 08:32

## 2021-05-25 RX ADMIN — INSULIN ASPART 8 UNITS: 100 INJECTION, SOLUTION INTRAVENOUS; SUBCUTANEOUS at 12:28

## 2021-05-25 RX ADMIN — CLOPIDOGREL BISULFATE 75 MG: 75 TABLET ORAL at 08:32

## 2021-05-25 RX ADMIN — INSULIN ASPART 5 UNITS: 100 INJECTION, SOLUTION INTRAVENOUS; SUBCUTANEOUS at 12:29

## 2021-05-25 RX ADMIN — CARVEDILOL 25 MG: 25 TABLET, FILM COATED ORAL at 08:32

## 2021-05-25 NOTE — DISCHARGE SUMMARY
[facility]   DISCHARGE SUMMARY      Name:  Valarie Camara   Age:  75 y.o.  Sex:  female  :  1945  MRN:  [id number]   Visit Number:  [id number]    Admission Date:  2021  Date of Discharge:  2021  Primary Care Physician:  Provider, No Known    Important issues to note:    1.  Admitted for syncope and hypertensive crisis.    2.  Noted to have UTI on admission that was multi-drug resistant.  Treated with IV Invantz.    Discharge Diagnoses:     Active Hospital Problems    Diagnosis  POA   • **Syncope [R55]  Yes   • DM (diabetes mellitus), type 2, uncontrolled w/neurologic complication (CMS/HCC) [E11.49, E11.65]  Yes   • 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 [I69.30]  Not Applicable   • Arthritis [M19.90]  Yes   • Anxiety and depression [F41.9, F32.9]  Yes   • Gastroesophageal reflux disease with esophagitis [K21.00]  Yes   • Multiple-type hyperlipidemia [E78.2]  Yes   • Benign essential hypertension [I10]  Yes   • Obesity (BMI 30-39.9) [E66.9]  Yes   • History of COPD [Z87.09]  Not Applicable      Resolved Hospital Problems   No resolved problems to display.     Presenting Problem:    Chief Complaint   Patient presents with   • Leg Pain      Consults:     Consulting Physician(s)             None            History of presenting illness/Hospital Course:    75 F with h/o CVA, COPD, uncontrolled DM complicated by neuropathy/retinopathy who was brought into the ED by her daughter whom she lives with after a syncopal episode at home.  Per the ED physician's report, daughter had a different story compared to the pt. Patient reported that she was walking when her left ankle twisted on her and she landed on it. She denied any cp, dizziness, cough, fever, or other acute symptoms leading to the fall.  She has been vaccinated against COVID-19. The daughter reported that the patient passed out, became diaphoretic, and then woke up and then  passed out again. Pt is alert and oriented and cannot recall this.  Her BP was noted to be very elevated in the ED, requiring IV antihypertensives and her home p.o. meds to be restarted. She had a LLE ultrasound to rule out DVT, left foot x-ray which was negative for fracture.  She admits to polyuria and some dysuria. CT of the head was also obtained, no acute abnormality. CXR revealed pulmonary htn, however, this was not found on patient's echo.  Her BP has been elevated. Renal ultrasound was done to rule out renal artery stenosis and was negative.    During admission patient was found to have UTI and started on oral antibiotics, however, sensitivities revealed multi-drug resistant UTI.  Patient received 3 days of IV Invantz for UTI treatment and will need a repeat UA this week at PCP follow-up.  Patient's syncope work up was negative for any cardiac or neurologic etiology.  Echo noted LVEF-50-55%.  Creatinine does appear to be at baseline at this time.  Patient's likely had vasovagal syncope prior to admission.  Troponins were mildly elevated and trended down, and this was likely due to poorly controlled hypertension.  Patient is stable for discharge home today with close PCP follow up with week.  She was started on Amlodipine 10mg daily, and Losartan-HCTZ increased to 100mg-25 daily.  Patient will need a UA recheck.  Patient will be instructed to keep blood pressure log and take to PCP follow up.  Patient will also need to keep a blood sugar log to take to same follow up.  Previously noted to only be on short-acting.  Long acting insulin initiated at 20 units and will need to be up-titrated to effect.  Patient admittedly noncompliant with diabetic diet and did meet with diabetes educator as well.  Patient is stable for discharge home today with daughter with close PCP follow up this week.      Vital Signs:    Temp:  [96.8 °F (36 °C)-98.4 °F (36.9 °C)] 98.4 °F (36.9 °C)  Heart Rate:  [67-85] 85  Resp:  [16-20]  20  BP: (157-193)/(54-81) 167/64    Physical Exam:    General Appearance:  Alert and cooperative, resting in recliner on exam without any complaints.  Eager for discharge home, pleasant elderly female   Head:  Atraumatic and normocephalic.   Eyes: Conjunctivae and sclerae normal, no icterus. No pallor.   Ears:  Ears with no abnormalities noted.   Throat: No oral lesions, no thrush, oral mucosa moist.   Neck: Supple, trachea midline   Back:   No kyphoscoliosis present. No tenderness to palpation.   Lungs:   Breath sounds heard bilaterally equally.  No crackles or wheezing. No Pleural rub or bronchial breathing.   Heart:  Normal S1 and S2, no murmur, no gallop, no rub. No JVD.   Abdomen:   Normal bowel sounds, no masses, no organomegaly. Soft, nontender, nondistended, no rebound tenderness.   Extremities: Supple, no edema, no cyanosis, no clubbing.   Pulses: Pulses palpable bilaterally.   Skin: No bleeding or rash.   Neurologic: Alert and oriented x 3. No facial asymmetry. Moves all four limbs. No tremors.     Pertinent Lab Results:     Results from last 7 days   Lab Units 05/23/21  0721 05/21/21  0622 05/20/21  0432   SODIUM mmol/L 140 142 137   POTASSIUM mmol/L 4.0 3.7 4.0   CHLORIDE mmol/L 106 107 101   CO2 mmol/L 25.2 26.0 26.7   BUN mg/dL 33* 30* 31*   CREATININE mg/dL 1.46* 1.42* 1.53*   CALCIUM mg/dL 9.2 8.8 8.7   BILIRUBIN mg/dL  --   --  0.2   ALK PHOS U/L  --   --  83   ALT (SGPT) U/L  --   --  14   AST (SGOT) U/L  --   --  14   GLUCOSE mg/dL 205* 105* 345*     Results from last 7 days   Lab Units 05/23/21  0721 05/21/21  0622 05/20/21  0432   WBC 10*3/mm3 5.46 6.16 6.12   HEMOGLOBIN g/dL 12.1 11.3* 12.1   HEMATOCRIT % 36.5 33.7* 36.7   PLATELETS 10*3/mm3 224 219 215         Results from last 7 days   Lab Units 05/20/21  1016 05/20/21  0432   TROPONIN T ng/mL 0.038* 0.048*     Results from last 7 days   Lab Units 05/20/21  0432   PROBNP pg/mL 211.2                 Results from last 7 days   Lab Units  05/21/21  1347 05/20/21  0448   URINECX  >100,000 CFU/mL Escherichia coli* >100,000 CFU/mL Mixed Makenzie Isolated       Pertinent Radiology Results:    Imaging Results (All)     Procedure Component Value Units Date/Time    US Renal Artery Complete [[id number]] Collected: 05/21/21 1219     Updated: 05/21/21 1222    Narrative:      PROCEDURE: US RENAL ARTERY COMPLETE-     HISTORY: rule out left renal artery stenosis; R55-Syncope and collapse;  I10-Essential (primary) hypertension; N39.0-Urinary tract infection,  site not specified; R77.8-Other specified abnormalities of plasma  proteins     PROCEDURE: Ultrasound images of the kidneys were obtained with color  flow and postoperative used to assess the vasculature.     FINDINGS:.    Limited grayscale images of the kidneys demonstrate renal cysts. There  is no hydronephrosis. Normal low resistance waveforms are seen in the  renal arteries. The systolic velocities are within the range of normal.  The renal artery to aortic ratio measures 1.4 on the right and 1.3 on  the left. The resistive indices average 0.64 on the right and 0.66 on  the left.       Impression:      No sonographic evidence of renal artery stenosis.     This report was finalized on 5/21/2021 12:20 PM by Adina Frausto M.D..    XR Ankle 3+ View Left [[id number]] Collected: 05/20/21 0942     Updated: 05/20/21 1211    Narrative:      PROCEDURE:  XR CHEST 1 VW-        HISTORY: ankle pain, fall     COMPARISON: January 2020.     FINDINGS: The heart is enlarged. There is pulmonary venous hypertension.  There is no pneumothorax. There are no acute osseous abnormalities.       Impression:      Pulmonary venous hypertension.                 PROCEDURE: XR ANKLE 3+ VW LEFT-, XR CHEST 1 VW-     History: ankle pain, fall     COMPARISON: None.     FINDINGS:  A 3 view exam demonstrates no acute fracture or dislocation.  The joint spaces are preserved. There is soft tissue swelling.     IMPRESSION: No acute fracture.  Soft tissue swelling.                    Images were reviewed, interpreted, and dictated by Dr. Adina Frausto M.D.  Transcribed by Lisa Lyons PA-C.     This report was finalized on 5/20/2021 12:09 PM by Adina Frausto M.D..    XR Chest 1 View [[id number]] Collected: 05/20/21 0942     Updated: 05/20/21 1211    Narrative:      PROCEDURE:  XR CHEST 1 VW-        HISTORY: ankle pain, fall     COMPARISON: January 2020.     FINDINGS: The heart is enlarged. There is pulmonary venous hypertension.  There is no pneumothorax. There are no acute osseous abnormalities.       Impression:      Pulmonary venous hypertension.                 PROCEDURE: XR ANKLE 3+ VW LEFT-, XR CHEST 1 VW-     History: ankle pain, fall     COMPARISON: None.     FINDINGS:  A 3 view exam demonstrates no acute fracture or dislocation.  The joint spaces are preserved. There is soft tissue swelling.     IMPRESSION: No acute fracture. Soft tissue swelling.                    Images were reviewed, interpreted, and dictated by Dr. Adina Frausto M.D.  Transcribed by Lisa Lyons PA-C.     This report was finalized on 5/20/2021 12:09 PM by Adina Frausto M.D..    CT Head Without Contrast [[id number]] Collected: 05/20/21 0545     Updated: 05/20/21 0546    Narrative:      FINAL REPORT    TECHNIQUE:  null    CLINICAL HISTORY:  syncope, weakness    COMPARISON:  null    FINDINGS:  CT head without contrast    Comparison: None    Findings:    Likely old lacunar infarct within the left thalamus.    No intracranial mass, midline shift, hydrocephalus, or acute hemorrhage.    Subcortical/paraventricular white matter hypoattenuation statistically representing changes of chronic microvascular ischemia.  Global parenchymal volume loss which is commensurate with reported age.    The visualized paranasal sinuses and mastoid air cells are normal.    Osteoma within the left frontal sinus.    The orbits are unremarkable.    No skull fracture.       Impression:      IMPRESSION:    1. No acute intracranial process.    2. Subcortical/paraventricular white matter hypoattenuation statistically representing changes of chronic microvascular ischemia.  Global parenchymal volume loss which is commensurate with reported age.  Likely old lacunar infarct involving the left   thalamus.    Authenticated by Cornelio Cochran DO on 05/20/2021 05:45:04 AM    US Venous Doppler Lower Extremity Left (duplex) [[id number]] Collected: 05/20/21 0514     Updated: 05/20/21 0515    Narrative:      FINAL REPORT    TECHNIQUE:  null    CLINICAL HISTORY:  LLE EDEMA.    COMPARISON:  null    FINDINGS:  Venous duplex ultrasound left lower extremity    Comparison: None    Findings:    The visualized deep veins are fully compressible with normal flow and waveforms.    No popliteal cyst.      Impression:      IMPRESSION:    1. Negative for left lower extremity deep vein thrombosis    Authenticated by Cornelio Cochran DO on 05/20/2021 05:14:09 AM          Echo:    Results for orders placed during the hospital encounter of 05/20/21    Adult Transthoracic Echo Complete W/ Cont if Necessary Per Protocol    Interpretation Summary  1.  Normal left ventricular size and systolic function, LVEF 55-60%.  2.  Moderate concentric LVH.  3.  Grade 1 diastolic dysfunction.  4.  Moderately increased left atrial volume index.  5.  Normal right ventricular size and systolic function.  6.  Mild calcification of aortic valve without significant stenosis.  7.  Small circumferential pericardial effusion without tamponade physiology.    Condition on Discharge:      Stable.    Code status during the hospital stay:    Code Status and Medical Interventions:   Ordered at: 05/20/21 0650     Level Of Support Discussed With:    Patient     Code Status:    CPR     Medical Interventions (Level of Support Prior to Arrest):    Full     Discharge Disposition:    Home or Self Care    Discharge Medications:       Discharge Medications       New Medications      Instructions Start Date   amLODIPine 10 MG tablet  Commonly known as: NORVASC   10 mg, Oral, Every 24 Hours Scheduled   Start Date: May 26, 2021     insulin detemir 100 UNIT/ML injection  Commonly known as: LEVEMIR   20 Units, Subcutaneous, Nightly      losartan 50 MG tablet 100 mg, hydroCHLOROthiazide 25 MG tablet 25 mg   1 dose, Oral, Daily   Start Date: May 26, 2021        Continue These Medications      Instructions Start Date   atorvastatin 80 MG tablet  Commonly known as: LIPITOR   80 mg, Oral, Daily      carvedilol 25 MG tablet  Commonly known as: COREG   25 mg, Oral, 2 Times Daily With Meals      clopidogrel 75 MG tablet  Commonly known as: PLAVIX   Take 1 tablet by mouth once daily      furosemide 20 MG tablet  Commonly known as: LASIX   20 mg, Oral, 2 Times Daily PRN      glucose blood test strip   Check sugar once a day      glucose monitor monitoring kit   1 each, Does not apply, Daily      insulin lispro 100 UNIT/ML injection  Commonly known as: humaLOG   15 Units, Subcutaneous, 2 Times Daily With Meals      Insulin Pen Needle 31G X 5 MM misc   Inject insulin three times daily      Lancets 30G misc   Check sugar daily         Stop These Medications    albuterol sulfate  (90 Base) MCG/ACT inhaler  Commonly known as: PROVENTIL HFA;VENTOLIN HFA;PROAIR HFA     losartan 50 MG tablet  Commonly known as: Cozaar     NIFEdipine XL 60 MG 24 hr tablet  Commonly known as: PROCARDIA XL          Discharge Diet:     Diet Instructions     Advance Diet As Tolerated          Activity at Discharge:     Activity Instructions     Activity as Tolerated      Measure Blood Pressure          Follow-up Appointments:    Follow-up Information     Provider, No Known Follow up.    Why: [facility]--Needs a mandatory follow up this week--please schedule prior to discharge  Contact information:  [facility] [id number] [phone]                 Future Appointments   Date Time  Provider Department [place]   9/15/2021 10:00 AM Vidal Low MD [facility]            Yesi Hancock, APRN  05/25/21  14:52 EDT    Time: I spent 28 minutes on this discharge activity which included: face-to-face encounter with the patient, reviewing the data in the system, coordination of the care with the nursing staff as well as consultants, documentation, and entering orders.     Dictated utilizing Dragon dictation.

## 2021-05-25 NOTE — PLAN OF CARE
Goal Outcome Evaluation:  Plan of Care Reviewed With: patient  Progress: improving  Outcome Summary: Pt tolerated treatment well. Pt was able to tolerate ambulation 412' sba without AAD. See flowsheet for details.

## 2021-05-25 NOTE — DISCHARGE INSTRUCTIONS
You are being discharged home today.  Continue medications as directed.  You have been started on long acting insulin at night.  Continue your short acting that you were taking.  Keep a log of your sugars to take to your PCP this week for a recheck.  Your A1C was 13.  Follow a diabetic diet.  Continue your blood pressure medications as directed.  Keep a blood pressure log and take this to your PCP follow up as well.  You have been started on Amlodipine 10mg daily and increased your Losartan to 100-25 daily.  Use caution when changing positions and take your time when getting up from a seated position.

## 2021-05-25 NOTE — PROGRESS NOTES
Adult Nutrition  Assessment/PES    Patient Name:  Valarie Camara  YOB: 1945  MRN: [id number]  Admit Date:  5/20/2021    Assessment Date:  5/25/2021    Comments:    Recommend:  1. Consider adding cardiac modifications to current diet order as medically appropriate and tolerated.  2. Maintain PO intake. Average intake ~78% x 9 meals.   3. Consider a multivitamin with minerals daily.      RD to follow pt and available PRN.    Reason for Assessment     Row Name 05/25/21 1400          Reason for Assessment    Reason For Assessment  follow-up protocol     Diagnosis  diabetes diagnosis/complications;pulmonary disease;other (see comments);infection/sepsis COPD, DM 2, Syncope, Asymptomatic bacteriuria     Identified At Risk by Screening Criteria  BMI             Labs/Tests/Procedures/Meds     Row Name 05/25/21 1400          Labs/Procedures/Meds    Lab Results Reviewed  reviewed, pertinent     Lab Results Comments  Low: Mg, Alb; High: BUN, HgbA1c, Gluc, Cr        Medications    Pertinent Medications Reviewed  reviewed, pertinent     Pertinent Medications Comments  Lipitor, Novolog, Levemir         Physical Findings     Row Name 05/25/21 1401          Physical Findings    Overall Physical Appearance  obese           Nutrition Prescription Ordered     Row Name 05/25/21 1401          Nutrition Prescription PO    Current PO Diet  Regular     Common Modifiers  Consistent Carbohydrate         Evaluation of Received Nutrient/Fluid Intake     Row Name 05/25/21 1401          PO Evaluation    Number of Days PO Intake Evaluated  3 days     Number of Meals  9     % PO Intake  78               Problem/Interventions:  Problem 1     Row Name 05/25/21 1403          Nutrition Diagnoses Problem 1    Problem 1  Impaired Nutrient Utilization     Etiology (related to)  Medical Diagnosis     Endocrine  DM2     Signs/Symptoms (evidenced by)  Biochemical     Specific Labs Noted  Glucose;HgbA1C         Problem 2     Row Name  05/25/21 1403          Nutrition Diagnoses Problem 2    Problem 2  Overweight/Obesity     Etiology (related to)  Factors Affecting Nutrition     Food Habit/Preferences  Large Meals     Signs/Symptoms (evidenced by)  BMI     BMI  30 - 34.9             Intervention Goal     Row Name 05/25/21 1403          Intervention Goal    General  Meet nutritional needs for age/condition;Improved nutrition related lab(s)     PO  Meet estimated needs;Maintain intake     Weight  No significant weight loss         Nutrition Intervention     Row Name 05/25/21 1403          Nutrition Intervention    RD/Tech Action  Follow Tx progress;Encourage intake;Recommend/ordered     Recommended/Ordered  Diet         Nutrition Prescription     Row Name 05/25/21 1403          Nutrition Prescription PO    PO Prescription  Begin/change diet     Begin/Change Diet to  Regular     Common Modifiers  Cardiac;Consistent Carbohydrate     New PO Prescription Ordered?  No, recommended        Other Orders    Obtain Weight  Daily     Obtain Weight Ordered?  No, recommended     Supplement  Vitamin mineral supplement     Supplement Ordered?  No, recommended         Education/Evaluation     Row Name 05/25/21 1404          Education    Education  Will Instruct as appropriate        Monitor/Evaluation    Monitor  Per protocol;I&O;PO intake;Pertinent labs;Weight;Skin status           Electronically signed by:  Penny Kincaid RD  05/25/21 14:04 EDT

## 2021-05-25 NOTE — THERAPY TREATMENT NOTE
Patient Name: Valarie Camara  : 1945    MRN: [id number]                              Today's Date: 2021       Admit Date: 2021    Visit Dx:     ICD-10-CM ICD-9-CM   1. Syncope, unspecified syncope type  R55 780.2   2. Hypertension, unspecified type  I10 401.9   3. Urinary tract infection without hematuria, site unspecified  N39.0 599.0   4. Elevated troponin  R77.8 790.6     Patient Active Problem List   Diagnosis   • Atopic rhinitis   • Arthritis   • Chronic neck pain   • Anxiety and depression   • Edema   • Gastroesophageal reflux disease with esophagitis   • Multiple-type hyperlipidemia   • Vitamin D deficiency   • Benign essential hypertension   • Obesity (BMI 30-39.9)   • History of COPD   • History of cataract   • History of osteoporosis   • History of restless legs syndrome   • History of rheumatoid arthritis   • Elevated serum creatinine   • Pharyngoesophageal dysphagia   • Constipation   • Schatzki's ring   • Gastritis without bleeding   • History of Helicobacter pylori infection   • Duodenitis   • Right hemiparesis (CMS/HCC)   • 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   • Multiple thyroid nodules   • Lacunar stroke (CMS/HCC)   • DM (diabetes mellitus), type 2, uncontrolled w/neurologic complication (CMS/HCC)   • Syncope and collapse   • Positive fecal occult blood test   • Left foot pain   • Hypomagnesemia   • Acute on chronic anemia   • Esophageal dysphagia   • Reflux esophagitis   • Syncope     Past Medical History:   Diagnosis Date   • Acute bronchitis with bronchospasm    • Acute exacerbation of chronic obstructive pulmonary disease (COPD) (CMS/HCC)    • Anxiety    • Arthritis    • B12 deficiency    • Back pain    • Cataract    • Cervicalgia    • Colonic polyp     History of colonic polyps    • COPD (chronic obstructive pulmonary disease) (CMS/HCC)    • Depression    • Diverticulitis    • Dysphagia    • Edema    • Esophageal reflux    • Folic  acid deficiency    • Herpes zoster    • High cholesterol    • History of kidney infection    • History of recurrent urinary tract infection    • HTN (hypertension)    • Hypercholesterolemia    • Impaired functional mobility, balance, gait, and endurance    • Kidney infection    • Malignant hypertension 4/26/2015     Accelerated essential hypertension   • Measles     rubeola   • Muscle spasm    • Neoplasm of uncertain behavior of skin    • Osteoporosis    • Recurrent urinary tract infection    • Rheumatoid arthritis (CMS/HCC)    • RLS (restless legs syndrome)    • Stomach ulcer    • Type 2 diabetes mellitus (CMS/HCC)    • Vitamin D deficiency      Past Surgical History:   Procedure Laterality Date   • CATARACT EXTRACTION Left 02/11/2013    with lens implant   • CATARACT EXTRACTION Right 04/15/2013    with lens implant   • CATARACT EXTRACTION WITH INTRAOCULAR LENS IMPLANT Left 02/11/2013   • CATARACT EXTRACTION WITH INTRAOCULAR LENS IMPLANT Right 04/15/2013   • COLONOSCOPY  2012   • COLONOSCOPY N/A 11/26/2019    Procedure: COLONOSCOPY;  Surgeon: Chidi Downing MD;  Location: [facility];  Service: Gastroenterology   • ENDOSCOPY N/A 11/13/2017    Procedure: ESOPHAGOGASTRODUODENOSCOPY with biopsies and esophageal balloon dilitation;  Surgeon: Wesley Stovall MD;  Location:  [facility];  Service:    • ENDOSCOPY N/A 11/25/2019    Procedure: ESOPHAGOGASTRODUODENOSCOPY;  Surgeon: Chidi Downing MD;  Location:  [facility];  Service: Gastroenterology   • HYSTERECTOMY  1979   • UPPER GASTROINTESTINAL ENDOSCOPY  12/09/2013   • UPPER GASTROINTESTINAL ENDOSCOPY  11/13/2017     General Information     Row Name 05/25/21 1533          Physical Therapy Time and Intention    Document Type  therapy note (daily note)  -RM     Mode of Treatment  physical therapy  -RM     Row Name 05/25/21 1533          General Information    Patient Profile Reviewed  yes  -RM     Existing Precautions/Restrictions  fall  -RM     Row Name  05/25/21 1533          Cognition    Orientation Status (Cognition)  oriented x 4  -RM     Row Name 05/25/21 1533          Safety Issues, Functional Mobility    Safety Issues Affecting Function (Mobility)  safety precaution awareness  -RM     Impairments Affecting Function (Mobility)  balance  -RM       User Key  (r) = Recorded By, (t) = Taken By, (c) = Cosigned By    Initials Name Provider Type    Jessee De La Torre, EVELYN Physical Therapy Assistant        Mobility     Row Name 05/25/21 1533          Sit-Stand Transfer    Sit-Stand [place] (Transfers)  supervision  -RM     Row Name 05/25/21 1533          Gait/Stairs (Locomotion)    [place] Level (Gait)  supervision  -RM     Assistive Device (Gait)  other (see comments) gt belt  -RM     Distance in Feet (Gait)  412'  -RM     Deviations/Abnormal Patterns (Gait)  base of support, wide  -RM       User Key  (r) = Recorded By, (t) = Taken By, (c) = Cosigned By    Initials Name Provider Type    Jessee De La Torre, PTA Physical Therapy Assistant        Obj/Interventions    No documentation.       Goals/Plan    No documentation.       Clinical Impression     Row Name 05/25/21 1534          Pain Scale: Numbers Pre/Post-Treatment    Pretreatment Pain Rating  0/10 - no pain  -RM     Posttreatment Pain Rating  0/10 - no pain  -RM     Row Name 05/25/21 1534          Plan of Care Review    Plan of Care Reviewed With  patient  -RM     Progress  improving  -RM     Outcome Summary  Pt tolerated treatment well. Pt was able to tolerate ambulation 412' sba without AAD. See flowsheet for details.  -RM     Row Name 05/25/21 1534          Positioning and Restraints    Pre-Treatment Position  sitting in chair/recliner  -RM     Post Treatment Position  chair  -RM     In Chair  sitting;call light within reach;encouraged to call for assist  -RM       User Key  (r) = Recorded By, (t) = Taken By, (c) = Cosigned By    Initials Name Provider Type    Jessee De La Torre, EVELYN Physical  Therapy Assistant        Outcome Measures     Row Name 05/25/21 1536          How much help from another person do you currently need...    Turning from your back to your side while in flat bed without using bedrails?  4  -RM     Moving from lying on back to sitting on the side of a flat bed without bedrails?  4  -RM     Moving to and from a bed to a chair (including a wheelchair)?  4  -RM     Standing up from a chair using your arms (e.g., wheelchair, bedside chair)?  4  -RM     Climbing 3-5 steps with a railing?  4  -RM     To walk in hospital room?  4  -RM     AM-PAC 6 Clicks Score (PT)  24  -RM     Row Name 05/25/21 1536          Functional Assessment    Outcome Measure Options  AM-PAC 6 Clicks Basic Mobility (PT)  -RM       User Key  (r) = Recorded By, (t) = Taken By, (c) = Cosigned By    Initials Name Provider Type    Jessee De La Torre, PTA Physical Therapy Assistant        Physical Therapy Education                 Title: PT OT SLP Therapies (In Progress)     Topic: Physical Therapy (Done)     Point: Mobility training (Done)     Learning Progress Summary           Patient Acceptance, E,TB,D, VU,NR by  at 5/25/2021 1536    Comment: Decrease pace during ambulation    Acceptance, E, VU by ERNIE at 5/23/2021 1022    Acceptance, E,TB,D, VU,DU,NR by HAYLEE at 5/22/2021 1240    Comment: Safety awareness with ambulation    Acceptance, E,TB, VU by CHARLES at 5/21/2021 1411    Comment: Purpose of PT/POC.                   Point: Home exercise program (Done)     Learning Progress Summary           Patient Acceptance, E, VU by ERNIE at 5/23/2021 1022    Acceptance, E,TB,D, VU,DU,NR by HAYLEE at 5/22/2021 1240    Comment: Safety awareness with ambulation    Acceptance, E,TB, VU by CHARLES at 5/21/2021 1411    Comment: Purpose of PT/POC.                   Point: Body mechanics (Done)     Learning Progress Summary           Patient Acceptance, E, VU by ERNIE at 5/23/2021 1022    Acceptance, E,TB,D, VU,DU,NR by HAYLEE at 5/22/2021 1240    Comment:  Safety awareness with ambulation                   Point: Precautions (Done)     Learning Progress Summary           Patient Acceptance, E, VU by ERNIE at 5/23/2021 1022    Acceptance, E,TB,D, VU,DU,NR by HAYLEE at 5/22/2021 1240    Comment: Safety awareness with ambulation    Acceptance, E,TB, VU by CHARLES at 5/21/2021 1411    Comment: Purpose of PT/POC.                               User Key     Initials Effective Dates Name Provider Type Discipline     04/03/18 -  Brissa Doe, PT Physical Therapist PT    HAYLEE 02/12/18 -  Palmira Brady, EVELYN Physical Therapy Assistant PT     03/07/18 -  Jessee Vegas, EVELYN Physical Therapy Assistant PT    ERNIE 03/29/21 -  Vidal Rush Physical Therapy Assistant PT              PT Recommendation and Plan     Plan of Care Reviewed With: patient  Progress: improving  Outcome Summary: Pt tolerated treatment well. Pt was able to tolerate ambulation 412' sba without AAD. See flowsheet for details.     Time Calculation:   PT Charges     Row Name 05/25/21 1536             Time Calculation    Start Time  1346  -RM      Stop Time  1354  -RM      Time Calculation (min)  8 min  -RM      PT Received On  05/25/21  -RM      PT Goal Re-Cert Due Date  05/31/21  -RM         Time Calculation- PT    Total Timed Code Minutes- PT  8 minute(s)  -RM         Timed Charges    89071 - PT Therapeutic Exercise Minutes  8  -RM         Total Minutes    Timed Charges Total Minutes  8  -RM       Total Minutes  8  -RM        User Key  (r) = Recorded By, (t) = Taken By, (c) = Cosigned By    Initials Name Provider Type     Jessee Vegas, EVELYN Physical Therapy Assistant        Therapy Charges for Today     Code Description Service Date Service Provider Modifiers Qty    [id number] HC PT THER PROC EA 15 MIN 5/25/2021 Jessee Vegas, PTA GP 1          PT G-Codes  Outcome Measure Options: AM-PAC 6 Clicks Basic Mobility (PT)  AM-PAC 6 Clicks Score (PT): 24    Jessee Vegas PTA  5/25/2021

## 2021-05-25 NOTE — PLAN OF CARE
Goal Outcome Evaluation:  Plan of Care Reviewed With: patient     Outcome Summary: Pt resting comfortably through the night. No acute episodes

## 2021-05-25 NOTE — CASE MANAGEMENT/SOCIAL WORK
Continued Stay Note  WILLIAM Bethea     Patient Name: Valarie Camara  MRN: [id number]  Today's Date: 5/25/2021    Admit Date: 5/20/2021    Discharge Plan     Row Name 05/25/21 1115       Plan    Plan  spoke to pt regarding discharge plans she is planning on discharge ing home under care of family Daughter to transport home        Discharge Codes    No documentation.       Expected Discharge Date and Time     Expected Discharge Date Expected Discharge Time    May 24, 2021             Christine Fisher RN

## 2021-05-26 ENCOUNTER — READMISSION MANAGEMENT (OUTPATIENT)
Dept: CALL CENTER | Facility: HOSPITAL | Age: 76
End: 2021-05-26

## 2021-05-26 NOTE — OUTREACH NOTE
Prep Survey      Responses   [facility]?  [facility]   Is LACE score < 7 ?  No   Emergency Room discharge w/ pulse ox?  No   Eligibility  Readm Mgmt   Discharge diagnosis  Admitted for syncope and hypertensive crisis   Does the patient have one of the following disease processes/diagnoses(primary or secondary)?  Other   Does the patient have Home health ordered?  No   Is there a DME ordered?  No   Prep survey completed?  Yes          Katerin Romero RN

## 2021-06-01 ENCOUNTER — READMISSION MANAGEMENT (OUTPATIENT)
Dept: CALL CENTER | Facility: HOSPITAL | Age: 76
End: 2021-06-01

## 2021-06-01 NOTE — OUTREACH NOTE
Medical Week 1 Survey      Responses   [facility] patient discharged from?  [facility]   Does the patient have one of the following disease processes/diagnoses(primary or secondary)?  Other   Week 1 attempt successful?  No   Unsuccessful attempts  Attempt 1          Tisha Burr RN  
Full/Upper/Lower

## 2021-06-02 ENCOUNTER — READMISSION MANAGEMENT (OUTPATIENT)
Dept: CALL CENTER | Facility: HOSPITAL | Age: 76
End: 2021-06-02

## 2021-06-02 NOTE — OUTREACH NOTE
Medical Week 1 Survey      Responses   [facility] patient discharged from?  [facility]   Does the patient have one of the following disease processes/diagnoses(primary or secondary)?  Other   Week 1 attempt successful?  Yes   Call start time  1326   Rescheduled  Rescheduled-pt requested   Call end time  1327          Gloria Farmer RN

## 2021-06-07 ENCOUNTER — READMISSION MANAGEMENT (OUTPATIENT)
Dept: CALL CENTER | Facility: HOSPITAL | Age: 76
End: 2021-06-07

## 2021-06-07 NOTE — OUTREACH NOTE
Medical Week 1 Survey      Responses   [facility] patient discharged from?  [facility]   Does the patient have one of the following disease processes/diagnoses(primary or secondary)?  Other   Week 1 attempt successful?  Yes   Call start time  1243   Call end time  1302   Discharge diagnosis  Admitted for syncope and hypertensive crisis   Meds reviewed with patient/caregiver?  Yes   Is the patient having any side effects they believe may be caused by any medication additions or changes?  No   Does the patient have all medications ordered at discharge?  Yes   Prescription comments  antibiotic added 6/4/21 for UTI   Is the patient taking all medications as directed (includes completed medication regime)?  Yes   Does the patient have a primary care provider?   Yes   Does the patient have an appointment with their PCP within 7 days of discharge?  Yes   Has the patient kept scheduled appointments due by today?  Yes   Has home health visited the patient within 72 hours of discharge?  N/A   Psychosocial issues?  Yes   Psychosocial comments  states current home having environmental issues, is being worked on, states interested in HH when returning to home alone, states will need assistance preparing meals, advised pt to inquire with insurance for eligibility for meals, and to discuss HH with [facility], pt's PCP, pt agreed with plan   Comments  pt states has been guarded in activities due to syncopy, has been staying with others to help watch for dizziness   Did the patient receive a copy of their discharge instructions?  Yes   Nursing interventions  Reviewed instructions with patient   What is the patient's perception of their health status since discharge?  Improving   Is the patient/caregiver able to teach back signs and symptoms related to disease process for when to call PCP?  Yes   Is the patient/caregiver able to teach back signs and symptoms related to disease process for when to call 911?  Yes   Is the  patient/caregiver able to teach back the hierarchy of who to call/visit for symptoms/problems? PCP, Specialist, Home health nurse, Urgent Care, ED, 911  Yes   If the patient is a current smoker, are they able to teach back resources for cessation?  Not a smoker   Additional teach back comments  pt states is being treated for uTI with antibiotics, asked what can be done to prevent future UTI's, advised pt to drink copious amounts of water and add cranberry juice, pt states has been drinking diet pomegranate juice, states glucoses run high up to 400, advised pt to watch diet to be more DM controlled and discuss glucoses with PCP at next appt, pt agreed with plan   Week 1 call completed?  Yes          Vanessa Duncan RN

## 2021-06-15 ENCOUNTER — READMISSION MANAGEMENT (OUTPATIENT)
Dept: CALL CENTER | Facility: HOSPITAL | Age: 76
End: 2021-06-15

## 2021-06-15 NOTE — OUTREACH NOTE
Medical Week 2 Survey      Responses   [facility] patient discharged from?  [facility]   Does the patient have one of the following disease processes/diagnoses(primary or secondary)?  Other   Week 2 attempt successful?  No   Unsuccessful attempts  Attempt 1   Discharge diagnosis  Admitted for syncope and hypertensive crisis          Tisha Burr RN

## 2021-06-16 ENCOUNTER — READMISSION MANAGEMENT (OUTPATIENT)
Dept: CALL CENTER | Facility: HOSPITAL | Age: 76
End: 2021-06-16

## 2021-06-16 NOTE — OUTREACH NOTE
Medical Week 2 Survey      Responses   [facility] patient discharged from?  [facility]   Does the patient have one of the following disease processes/diagnoses(primary or secondary)?  Other   Week 2 attempt successful?  No   Unsuccessful attempts  Attempt 2          Richelle Gilbert LPN

## 2021-11-24 ENCOUNTER — OFFICE VISIT (OUTPATIENT)
Dept: GASTROENTEROLOGY | Facility: CLINIC | Age: 76
End: 2021-11-24

## 2021-11-24 VITALS
BODY MASS INDEX: 33.66 KG/M2 | TEMPERATURE: 97.5 F | HEART RATE: 82 BPM | SYSTOLIC BLOOD PRESSURE: 183 MMHG | DIASTOLIC BLOOD PRESSURE: 91 MMHG | HEIGHT: 63 IN | RESPIRATION RATE: 16 BRPM | WEIGHT: 190 LBS

## 2021-11-24 DIAGNOSIS — Z12.11 ENCOUNTER FOR SCREENING FOR MALIGNANT NEOPLASM OF COLON: ICD-10-CM

## 2021-11-24 DIAGNOSIS — K21.00 GASTROESOPHAGEAL REFLUX DISEASE WITH ESOPHAGITIS WITHOUT HEMORRHAGE: ICD-10-CM

## 2021-11-24 DIAGNOSIS — R13.19 ESOPHAGEAL DYSPHAGIA: Primary | ICD-10-CM

## 2021-11-24 DIAGNOSIS — K59.00 CONSTIPATION, UNSPECIFIED CONSTIPATION TYPE: ICD-10-CM

## 2021-11-24 DIAGNOSIS — R11.0 NAUSEA: ICD-10-CM

## 2021-11-24 DIAGNOSIS — IMO0002 DM (DIABETES MELLITUS), TYPE 2, UNCONTROLLED W/NEUROLOGIC COMPLICATION: ICD-10-CM

## 2021-11-24 DIAGNOSIS — K22.2 SCHATZKI'S RING: ICD-10-CM

## 2021-11-24 DIAGNOSIS — Z01.812 PRE-PROCEDURE LAB EXAM: Primary | ICD-10-CM

## 2021-11-24 DIAGNOSIS — R10.13 EPIGASTRIC PAIN: ICD-10-CM

## 2021-11-24 PROCEDURE — 99214 OFFICE O/P EST MOD 30 MIN: CPT | Performed by: NURSE PRACTITIONER

## 2021-11-24 RX ORDER — AMLODIPINE BESYLATE 10 MG/1
10 TABLET ORAL DAILY
COMMUNITY

## 2021-11-24 RX ORDER — PANTOPRAZOLE SODIUM 40 MG/1
TABLET, DELAYED RELEASE ORAL
Qty: 30 TABLET | Refills: 3 | Status: SHIPPED | OUTPATIENT
Start: 2021-11-24 | End: 2022-12-05

## 2021-11-24 RX ORDER — ERGOCALCIFEROL 1.25 MG/1
50000 CAPSULE ORAL WEEKLY
COMMUNITY

## 2021-11-24 RX ORDER — DOCUSATE SODIUM 100 MG/1
100 CAPSULE, LIQUID FILLED ORAL 2 TIMES DAILY
COMMUNITY

## 2021-11-24 RX ORDER — ONDANSETRON 4 MG/1
4 TABLET, FILM COATED ORAL EVERY 8 HOURS PRN
COMMUNITY
End: 2021-11-24 | Stop reason: SDUPTHER

## 2021-11-24 RX ORDER — ONDANSETRON 4 MG/1
4 TABLET, FILM COATED ORAL EVERY 8 HOURS PRN
Qty: 30 TABLET | Refills: 1 | Status: SHIPPED | OUTPATIENT
Start: 2021-11-24

## 2021-11-24 RX ORDER — SODIUM CHLORIDE 9 MG/ML
70 INJECTION, SOLUTION INTRAVENOUS CONTINUOUS PRN
Status: CANCELLED | OUTPATIENT
Start: 2021-11-24

## 2021-11-24 RX ORDER — OMEPRAZOLE 20 MG/1
20 CAPSULE, DELAYED RELEASE ORAL DAILY
COMMUNITY
End: 2021-11-24 | Stop reason: SDUPTHER

## 2021-11-24 NOTE — PATIENT INSTRUCTIONS
Antireflux measures: Avoid fried, fatty foods, alcohol, chocolate, coffee, tea,  soft drinks, peppermint and spearmint, spicy foods, tomatoes and tomato based foods, onion based foods, and smoking.   Other antireflux measures include weight reduction if overweight, avoiding tight clothing around the abdomen, elevating the head of the bed 6 inches with blocks under the head board, and don't drink or eat before going to bed and avoid lying down immediately after meals.  Pantoprazole 40 mg 1 po daily 30 minutes before breakfast.   Stop Omeprazole OTC.  Zofran 4 mg 1 po every 8 hours as needed for nausea.   The patient should eat 4-5 very small meals throughout the day. Avoid large meals.   It is recommended to eat a softer diet. Meats are best consumed ground. Fruits and vegetables are best consumed cooked or steamed and then mashed.   The patient should eat in upright position and chew well.  The patient should drink water after 2-3 bites and take medications with liberal amounts of water. Furthermore after eating and taking medications, the patient should remain in upright position for 5-10 minutes.  Low fiber, low fat diet with liberal water intake.   Metamucil 1 scoop daily.   May take stool softeners 1-2 per day for constipation.   Upper endoscopy-EGD: The indications, technique, alternatives and potential risk and complications were discussed with the patient including but not limited to bleeding, perforations, missing lesions and anesthetic complications. The patient understands and wishes to proceed with the procedure and has given their verbal consent. Written patient education information was given to the patient.   The patient will call if they have further questions before procedure.   COVID-19 testing prior to procedure. The patient will need to self-quarantine after testing until the procedure. Instructions given to patient.

## 2021-11-24 NOTE — PROGRESS NOTES
Follow Up Note     Date: 2021   Patient Name: Valarie Camara  MRN: [id number]  : 1945     Primary Care Provider: Zurdo Ricks MD     Chief Complaint   Patient presents with   • Difficulty Swallowing     History of present illness:   2021  Valarie Camara is a 76 y.o. female who is here today for follow up for difficulty swallowing.    She has been having difficulty swallowing for several years and it has recently worsened. She has a difficult time getting food down and getting ti stay down when she eats breakfast. She typically does not have difficulty swallowing the rest of the day. She has a history of reflux that is severe.She will wake at night with severe reflux. She is taking Omeprazole OTC daily but it doesn't help much. She has nausea that is worse when she lays down and she has increased reflux.  She has a history of epigastric pain that is worse with eating. No history of melena or bright red blood per rectum.      She has a history of constipation for many years. She takes docusate and Metamucil as needed with reasonable control.     Interval History:  2019 per Dr. Chidi Roman  Valarie Camara is a 74 y.o. female who was admitted with syncope. Had Pill Cam yesterday.  Feels better . No further BM .     2019 per Dr. Chidi Roman  Colon - pan diverticulosis.  No blood or bleeding site  Pill Cam to follow    2019 per Dr. Chidi Downing  EGD- small HH with Schatzki's ring and LA Grade A GERD.  Erosive gastritis.  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 Chidi Downing  Valarie Camara is a 74 y.o. female who is admitted after 2 syncopal episodes at home.  There was associated injury to her left foot.  Patient has had near syncopal episodes in the last month.  There has recent fatigue and diarrhea in the past week.  Symptoms have improved with IV hydration.  She cannot describe her stools (does  not check them), though she is heme-positive in the emergency room.  The patient had a EGD by Dr. batres in November 2017, which showed Schatzki's ring (balloon dilated to 16.5 mm), a small hiatal hernia, and erosive H. pylori-associated gastritis.  EGD in 2013 by Dr. Stovall showed grade 3 reflux esophagitis, clean-based ulcers at the GE junction, and erythematous gastritis.  Patient denies use of OTC NSAIDs.  She is on dual antiplatelet therapy.  Last colonoscopy was apparently in 2012.    12/13/2017  The patient has a long-standing history of heartburn. It occurs daily. It is gradually improving. Heartburn can be severe at times.  It does not wake her at night. She has been taking Pantoprazole and Zantac as needed, with improvement of symptoms. There is no history of abdominal pain.     The patient has a long-standing history of difficulty swallowing. The patient has a difficult time swallowing solids. Swallowing has improved. She has been watching her diet and trying to avoid foods that will cause difficulty swallowing, with improvement. This occurs intermittently, depending on her diet. There is no history of nausea with vomiting.     The patient has a long-standing history of constipation. The patient may have 1 hard bowel movement every other day or so. She will at times take laxatives for the constipation. She has been taking Milk of Magnesia daily with some improvement of constipation. There is no history of bright red blood per rectum or melena.     The patient's last colonosocopy was about 5 years ago. There is no family history of colon cancer.    Subjective      Past Medical History:   Diagnosis Date   • Acute bronchitis with bronchospasm    • Acute exacerbation of chronic obstructive pulmonary disease (COPD) (HCC)    • Anxiety    • Arthritis    • B12 deficiency    • Back pain    • Cataract    • Cervicalgia    • Colonic polyp     History of colonic polyps    • COPD (chronic obstructive pulmonary disease)  (HCC)    • Depression    • Diverticulitis    • Dysphagia    • Edema    • Esophageal reflux    • Folic acid deficiency    • Herpes zoster    • High cholesterol    • History of kidney infection    • History of recurrent urinary tract infection    • HTN (hypertension)    • Hypercholesterolemia    • Impaired functional mobility, balance, gait, and endurance    • Kidney infection    • Malignant hypertension 4/26/2015     Accelerated essential hypertension   • Measles     rubeola   • Muscle spasm    • Neoplasm of uncertain behavior of skin    • Osteoporosis    • Recurrent urinary tract infection    • Rheumatoid arthritis (HCC)    • RLS (restless legs syndrome)    • Stomach ulcer    • Type 2 diabetes mellitus (HCC)    • Vitamin D deficiency      Past Surgical History:   Procedure Laterality Date   • CATARACT EXTRACTION Left 02/11/2013    with lens implant   • CATARACT EXTRACTION Right 04/15/2013    with lens implant   • CATARACT EXTRACTION WITH INTRAOCULAR LENS IMPLANT Left 02/11/2013   • CATARACT EXTRACTION WITH INTRAOCULAR LENS IMPLANT Right 04/15/2013   • COLONOSCOPY  2012   • COLONOSCOPY N/A 11/26/2019    Procedure: COLONOSCOPY;  Surgeon: Chidi Downing MD;  Location: [facility] ENDOSCOPY;  Service: Gastroenterology   • ENDOSCOPY N/A 11/13/2017    Procedure: ESOPHAGOGASTRODUODENOSCOPY with biopsies and esophageal balloon dilitation;  Surgeon: Wesley Stovall MD;  Location:  [facility];  Service:    • ENDOSCOPY N/A 11/25/2019    Procedure: ESOPHAGOGASTRODUODENOSCOPY;  Surgeon: Chidi Downing MD;  Location:  [facility];  Service: Gastroenterology   • HYSTERECTOMY  1979   • UPPER GASTROINTESTINAL ENDOSCOPY  12/09/2013   • UPPER GASTROINTESTINAL ENDOSCOPY  11/13/2017     Family History   Problem Relation Age of Onset   • Arthritis Mother    • Diabetes Mother    • Hypertension Mother    • Arthritis Other    • Arthritis Other    • Diabetes Other         DM   • Hypertension Other    • Colon cancer Neg Hx      Social  History     Socioeconomic History   • Marital status:    Tobacco Use   • Smoking status: Former Smoker     Packs/day: 1.00     Years: 15.00     Pack years: 15.00     Quit date:      Years since quittin.9   • Smokeless tobacco: Never Used   • Tobacco comment:  Denied: History of smoking cigarettes   Vaping Use   • Vaping Use: Never used   Substance and Sexual Activity   • Alcohol use: Yes     Comment: Occassionally   • Drug use: No   • Sexual activity: Defer       Current Outpatient Medications:   •  amLODIPine (NORVASC) 10 MG tablet, Take 10 mg by mouth Daily., Disp: , Rfl:   •  atorvastatin (LIPITOR) 80 MG tablet, Take 1 tablet by mouth Daily., Disp: 90 tablet, Rfl: 0  •  carvedilol (COREG) 25 MG tablet, Take 1 tablet by mouth 2 (Two) Times a Day With Meals., Disp: 180 tablet, Rfl: 0  •  clopidogrel (PLAVIX) 75 MG tablet, Take 1 tablet by mouth once daily, Disp: 90 tablet, Rfl: 0  •  docusate sodium (COLACE) 100 MG capsule, Take 100 mg by mouth 2 (Two) Times a Day., Disp: , Rfl:   •  furosemide (LASIX) 20 MG tablet, Take 20 mg by mouth 2 (Two) Times a Day As Needed (edema)., Disp: , Rfl:   •  Insulin Glargine (LANTUS SOLOSTAR SC), Inject  under the skin into the appropriate area as directed., Disp: , Rfl:   •  insulin lispro (humaLOG) 100 UNIT/ML injection, Inject 15 Units under the skin into the appropriate area as directed 2 (Two) Times a Day With Meals., Disp: , Rfl:   •  losartan-hydrochlorothiazide (HYZAAR) 100-25 MG per tablet, Take 1 tablet by mouth Daily., Disp: 30 tablet, Rfl: 0  •  POTASSIUM CHLORIDE PO, Take 10 mg by mouth., Disp: , Rfl:   •  vitamin D (ERGOCALCIFEROL) 1.25 MG (52128 UT) capsule capsule, Take 50,000 Units by mouth 1 (One) Time Per Week., Disp: , Rfl:   •  glucose blood test strip, Check sugar once a day, Disp: 30 each, Rfl: 12  •  glucose monitor monitoring kit, 1 each Daily., Disp: 1 each, Rfl: 1  •  insulin detemir (LEVEMIR) 100 UNIT/ML injection, Inject 20 Units under  the skin into the appropriate area as directed Every Night, Disp: 10 mL, Rfl: 0  •  Insulin Pen Needle 31G X 5 MM misc, Inject insulin three times daily, Disp: 100 each, Rfl: 11  •  Lancets 30G misc, Check sugar daily, Disp: 30 each, Rfl: 12  •  ondansetron (ZOFRAN) 4 MG tablet, Take 1 tablet by mouth Every 8 (Eight) Hours As Needed for Nausea or Vomiting., Disp: 30 tablet, Rfl: 1  •  pantoprazole (PROTONIX) 40 MG EC tablet, 1 po daily in the am 30 minutes before breakfast, Disp: 30 tablet, Rfl: 3     Allergies   Allergen Reactions   • Penicillin G Anaphylaxis   • Clonidine Derivatives Other (See Comments)     Pt reports extreme hypotension     • Hydralazine Nausea And Vomiting   • Penicillins Rash   • Sulfa Antibiotics Rash     The following portions of the patient's history were reviewed and updated as appropriate: allergies, current medications, past family history, past medical history, past social history, past surgical history and problem list.  Objective     Physical Exam  Vitals and nursing note reviewed.   Constitutional:       General: She is not in acute distress.     Appearance: Normal appearance. She is well-developed.   HENT:      Head: Normocephalic and atraumatic.      Right Ear: Hearing normal.      Left Ear: Hearing normal.      Mouth/Throat:      Mouth: Mucous membranes are not pale, not dry and not cyanotic.   Eyes:      General: Lids are normal.      Conjunctiva/sclera: Conjunctivae normal.   Neck:      Trachea: Trachea normal.   Cardiovascular:      Rate and Rhythm: Normal rate and regular rhythm.      Heart sounds: Normal heart sounds.   Pulmonary:      Effort: Pulmonary effort is normal. No respiratory distress.      Breath sounds: Normal breath sounds.   Abdominal:      General: Bowel sounds are normal.      Palpations: Abdomen is soft. There is no mass.      Tenderness: There is no abdominal tenderness.      Hernia: No hernia is present.   Skin:     General: Skin is warm and dry.  "  Neurological:      Mental Status: She is alert and oriented to person, place, and time.   Psychiatric:         Mood and Affect: Mood normal.         Speech: Speech normal.         Behavior: Behavior normal. Behavior is cooperative.       Vitals:    11/24/21 1543   BP: (!) 183/91   Pulse: 82   Resp: 16   Temp: 97.5 °F (36.4 °C)   Weight: 86.2 kg (190 lb)   Height: 160 cm (63\")     Body mass index is 33.66 kg/m².     Results Review:   I reviewed the patient's new clinical results.    No visits with results within 90 Day(s) from this visit.   Latest known visit with results is:   No results displayed because visit has over 200 results.         Comprehensive Metabolic Panel (05/20/2021 04:32)  CBC & Differential (05/20/2021 04:32)  TSH (05/20/2021 04:32)     CT Abdomen Pelvis Without Contrast (12/13/2019 05:58)    EGD dated 11/25/2019 per Dr. Chidi Downing  - LA Grade A reflux esophagitis. Biopsied.  - Mild Schatzki ring.  - Gastritis. Biopsied.  - Normal examined duodenum.  -Gastric antrum biopsies with moderate chronic gastritis, H. pylori negative.  Distal esophagus biopsies with mild chronic gastritis, negative for intestinal metaplasia.    Colonoscopy dated 11/26/2019 per Dr. Chidi Downing  - The entire examined colon is normal.  - Diverticulosis in the sigmoid colon, in the descending colon, in the transverse colon and in the ascending colon.  - No specimens collected.    PillCam 11/26/2019 per Dr. Chidi Downing, results are not available in the chart. Program was unable to load after procedure per chart.    Assessment / Plan      1. Esophageal dysphagia  2. Schatzki's ring  3. Gastroesophageal reflux disease with esophagitis without hemorrhage  She has a history of reflux for several years that can be severe.  She has a history of difficulty swallowing solids for several years that is gradually worsened.  When she eats breakfast, she has a difficult time getting food to go down and stay down.  She typically does " not have difficulty swallowing the rest of the day.  She had EGD per Dr. Chidi Downing in 2019 with mild Schatzki's ring noted.  She was advised at that time to have EGD with possible dilation in the future if symptoms continued according to records. She is on Plavix.  Antireflux measures.  Pantoprazole 40 mg daily.  Advised to eat a softer diet, chew food very well, take sips of water between bites.  EGD to rule out esophageal stricture.    - Case Request; Standing  - Case Request  - pantoprazole (PROTONIX) 40 MG EC tablet; 1 po daily in the am 30 minutes before breakfast  Dispense: 30 tablet; Refill: 3    4. Epigastric pain  5. Nausea  She has a history of nausea and epigastric pain for the past few years that is unchanged.  Eating makes symptoms worse.  No history of melena.  Basic labs unremarkable.  TSH normal. She is a longstanding diabetic and is not well controlled.  EGD in 2019 with gastric biopsies with moderate chronic gastritis, H. pylori negative.  She had capsule endoscopy in November 2019 per Dr. Spurling, but results are not available. Suspect symptoms related to diabetes/diabetes medications, with or without gastroparesis.  Advised to eat a softer diet with 4-5 very small meals throughout the day.  Zofran 4 mg one p.o. every 8 hours as needed for nausea.    - ondansetron (ZOFRAN) 4 MG tablet; Take 1 tablet by mouth Every 8 (Eight) Hours As Needed for Nausea or Vomiting.  Dispense: 30 tablet; Refill: 1    6. Constipation, unspecified constipation type  Patient has a history of constipation for many years.  She takes docusate and Metamucil as needed with reasonable control.  No history of rectal bleeding.  Colonoscopy in 2019 per Dr. Downing unremarkable.  High-fiber, low-fat diet with liberal water intake.  Metamucil one scoop daily.  Stool softeners 1 to 2/day as needed for constipation.    7. Encounter for screening for malignant neoplasm of colon  The patient had a colonoscopy dated 11/26/2019 per  Dr. Chidi Downing with entire examined colon normal.  No specimens were collected.  No family history of colon cancer.  Colonoscopy for screening in 10 years, 2029, depending on health status.    8. DM (diabetes mellitus), type 2, uncontrolled w/neurologic complication (HCC)    Patient Instructions   Antireflux measures: Avoid fried, fatty foods, alcohol, chocolate, coffee, tea,  soft drinks, peppermint and spearmint, spicy foods, tomatoes and tomato based foods, onion based foods, and smoking.   Other antireflux measures include weight reduction if overweight, avoiding tight clothing around the abdomen, elevating the head of the bed 6 inches with blocks under the head board, and don't drink or eat before going to bed and avoid lying down immediately after meals.  Pantoprazole 40 mg 1 po daily 30 minutes before breakfast.   Stop Omeprazole OTC.  Zofran 4 mg 1 po every 8 hours as needed for nausea.   The patient should eat 4-5 very small meals throughout the day. Avoid large meals.   It is recommended to eat a softer diet. Meats are best consumed ground. Fruits and vegetables are best consumed cooked or steamed and then mashed.   The patient should eat in upright position and chew well.  The patient should drink water after 2-3 bites and take medications with liberal amounts of water. Furthermore after eating and taking medications, the patient should remain in upright position for 5-10 minutes.  Low fiber, low fat diet with liberal water intake.   Metamucil 1 scoop daily.   May take stool softeners 1-2 per day for constipation.   Upper endoscopy-EGD: The indications, technique, alternatives and potential risk and complications were discussed with the patient including but not limited to bleeding, perforations, missing lesions and anesthetic complications. The patient understands and wishes to proceed with the procedure and has given their verbal consent. Written patient education information was given to the patient.    The patient will call if they have further questions before procedure.   COVID-19 testing prior to procedure. The patient will need to self-quarantine after testing until the procedure. Instructions given to patient.    Jocelin Roper, APRN  11/24/2021    Please note that portions of this note may have been completed with a voice recognition program. Efforts were made to edit the dictations, but occasionally words are mistranscribed.

## 2021-11-26 ENCOUNTER — LAB (OUTPATIENT)
Dept: LAB | Facility: HOSPITAL | Age: 76
End: 2021-11-26

## 2021-11-26 DIAGNOSIS — Z01.812 PRE-PROCEDURE LAB EXAM: ICD-10-CM

## 2021-11-26 LAB — SARS-COV-2 RNA PNL SPEC NAA+PROBE: NOT DETECTED

## 2021-11-26 PROCEDURE — U0004 COV-19 TEST NON-CDC HGH THRU: HCPCS

## 2021-11-26 PROCEDURE — C9803 HOPD COVID-19 SPEC COLLECT: HCPCS

## 2021-11-29 ENCOUNTER — HOSPITAL ENCOUNTER (OUTPATIENT)
Facility: HOSPITAL | Age: 76
Setting detail: HOSPITAL OUTPATIENT SURGERY
Discharge: HOME | End: 2021-11-29
Attending: INTERNAL MEDICINE | Admitting: INTERNAL MEDICINE

## 2021-11-29 ENCOUNTER — ANESTHESIA (OUTPATIENT)
Dept: GASTROENTEROLOGY | Facility: HOSPITAL | Age: 76
End: 2021-11-29

## 2021-11-29 ENCOUNTER — TELEPHONE (OUTPATIENT)
Dept: GASTROENTEROLOGY | Facility: CLINIC | Age: 76
End: 2021-11-29

## 2021-11-29 ENCOUNTER — ANESTHESIA EVENT (OUTPATIENT)
Dept: GASTROENTEROLOGY | Facility: HOSPITAL | Age: 76
End: 2021-11-29

## 2021-11-29 VITALS
DIASTOLIC BLOOD PRESSURE: 74 MMHG | HEIGHT: 63 IN | SYSTOLIC BLOOD PRESSURE: 169 MMHG | WEIGHT: 195 LBS | HEART RATE: 70 BPM | RESPIRATION RATE: 18 BRPM | BODY MASS INDEX: 34.55 KG/M2 | OXYGEN SATURATION: 97 % | TEMPERATURE: 97 F

## 2021-11-29 DIAGNOSIS — R13.19 ESOPHAGEAL DYSPHAGIA: ICD-10-CM

## 2021-11-29 DIAGNOSIS — IMO0002 DM (DIABETES MELLITUS), TYPE 2, UNCONTROLLED W/NEUROLOGIC COMPLICATION: ICD-10-CM

## 2021-11-29 PROCEDURE — 88305 TISSUE EXAM BY PATHOLOGIST: CPT | Performed by: INTERNAL MEDICINE

## 2021-11-29 PROCEDURE — 25010000002 PROPOFOL 1000 MG/100ML EMULSION: Performed by: NURSE ANESTHETIST, CERTIFIED REGISTERED

## 2021-11-29 PROCEDURE — 43239 EGD BIOPSY SINGLE/MULTIPLE: CPT | Performed by: INTERNAL MEDICINE

## 2021-11-29 PROCEDURE — 63710000001 INSULIN REGULAR HUMAN PER 5 UNITS: Performed by: NURSE ANESTHETIST, CERTIFIED REGISTERED

## 2021-11-29 PROCEDURE — 45349 SIGMOIDOSCOPY W/RESECTION: CPT | Performed by: INTERNAL MEDICINE

## 2021-11-29 PROCEDURE — C1726 CATH, BAL DIL, NON-VASCULAR: HCPCS | Performed by: INTERNAL MEDICINE

## 2021-11-29 PROCEDURE — 25010000002 FENTANYL CITRATE (PF) 100 MCG/2ML SOLUTION: Performed by: NURSE ANESTHETIST, CERTIFIED REGISTERED

## 2021-11-29 RX ORDER — SODIUM CHLORIDE 9 MG/ML
70 INJECTION, SOLUTION INTRAVENOUS CONTINUOUS PRN
Status: DISCONTINUED | OUTPATIENT
Start: 2021-11-29 | End: 2021-11-29 | Stop reason: HOSPADM

## 2021-11-29 RX ORDER — PROPOFOL 10 MG/ML
INJECTION, EMULSION INTRAVENOUS AS NEEDED
Status: DISCONTINUED | OUTPATIENT
Start: 2021-11-29 | End: 2021-11-29 | Stop reason: SURG

## 2021-11-29 RX ORDER — NICOTINE POLACRILEX 4 MG
1 LOZENGE BUCCAL
Status: DISCONTINUED | OUTPATIENT
Start: 2021-11-29 | End: 2021-11-29 | Stop reason: HOSPADM

## 2021-11-29 RX ORDER — DEXTROSE MONOHYDRATE 25 G/50ML
25 INJECTION, SOLUTION INTRAVENOUS
Status: DISCONTINUED | OUTPATIENT
Start: 2021-11-29 | End: 2021-11-29 | Stop reason: HOSPADM

## 2021-11-29 RX ORDER — FENTANYL CITRATE 50 UG/ML
INJECTION, SOLUTION INTRAMUSCULAR; INTRAVENOUS AS NEEDED
Status: DISCONTINUED | OUTPATIENT
Start: 2021-11-29 | End: 2021-11-29 | Stop reason: SURG

## 2021-11-29 RX ORDER — LIDOCAINE HYDROCHLORIDE 20 MG/ML
INJECTION, SOLUTION INTRAVENOUS AS NEEDED
Status: DISCONTINUED | OUTPATIENT
Start: 2021-11-29 | End: 2021-11-29 | Stop reason: SURG

## 2021-11-29 RX ADMIN — FENTANYL CITRATE 50 MCG: 50 INJECTION, SOLUTION INTRAMUSCULAR; INTRAVENOUS at 07:50

## 2021-11-29 RX ADMIN — FENTANYL CITRATE 50 MCG: 50 INJECTION, SOLUTION INTRAMUSCULAR; INTRAVENOUS at 07:40

## 2021-11-29 RX ADMIN — PROPOFOL 100 MG: 10 INJECTION, EMULSION INTRAVENOUS at 07:47

## 2021-11-29 RX ADMIN — SODIUM CHLORIDE 70 ML/HR: 9 INJECTION, SOLUTION INTRAVENOUS at 07:13

## 2021-11-29 RX ADMIN — HUMAN INSULIN 14 UNITS: 100 INJECTION, SOLUTION SUBCUTANEOUS at 08:25

## 2021-11-29 RX ADMIN — PROPOFOL 50 MG: 10 INJECTION, EMULSION INTRAVENOUS at 07:40

## 2021-11-29 RX ADMIN — LIDOCAINE HYDROCHLORIDE 40 MG: 20 INJECTION, SOLUTION INTRAVENOUS at 07:40

## 2021-11-29 RX ADMIN — HUMAN INSULIN 12 UNITS: 100 INJECTION, SOLUTION SUBCUTANEOUS at 07:16

## 2021-11-29 NOTE — ANESTHESIA PREPROCEDURE EVALUATION
Anesthesia Evaluation     Patient summary reviewed and Nursing notes reviewed   NPO Solid Status: > 8 hours  NPO Liquid Status: > 8 hours           Airway   Mallampati: II  TM distance: >3 FB  Neck ROM: full  possible difficult intubation  Dental - normal exam     Pulmonary - normal exam   (+) COPD moderate, sleep apnea ( ? snores melissa) on CPAP,   Cardiovascular - normal exam    (+) hypertension well controlled 2 medications or greater, angina, hyperlipidemia,       Neuro/Psych  (+) psychiatric history Anxiety and Depression, poor historian.,     GI/Hepatic/Renal/Endo    (+) obesity,  GERD well controlled, PUD,  renal disease, diabetes mellitus type 2 using insulin,     Musculoskeletal     (+) back pain, neck pain,   Abdominal  - normal exam   Substance History      OB/GYN          Other   arthritis,    history of cancer    ROS/Med Hx Other: fbs 261                      Anesthesia Plan    ASA 3     MAC   (Risks and benefits discussed including risk of aspiration, recall and dental damage. All patient questions answered. Will continue with POC.)  intravenous induction     Anesthetic plan, all risks, benefits, and alternatives have been provided, discussed and informed consent has been obtained with: patient.

## 2021-11-29 NOTE — TELEPHONE ENCOUNTER
Pharmacy told  ----- Message from JOSEE Frias sent at 11/29/2021 10:37 AM EST -----  Yes, Protonix was refilled as it is the only PPI that can be taken with Plavix.   ----- Message -----  From: Zabrina Watson MA  Sent: 11/29/2021  10:12 AM EST  To: JOSEE Frias    [facility] pharmacy called and ask if you knew patient was on plavix, because of the protonix script

## 2021-11-29 NOTE — ANESTHESIA POSTPROCEDURE EVALUATION
Patient: Valarie Camara    Procedure Summary     Date: 11/29/21 Room / Location: [facility] ENDOSCOPY 2 / [facility] ENDOSCOPY    Anesthesia Start: 0742 Anesthesia Stop:     Procedure: ESOPHAGOGASTRODUODENOSCOPY with dilation and biopsies (N/A Esophagus) Diagnosis:       Esophageal dysphagia      (Esophageal dysphagia [R13.19])    Surgeons: Gonzalez Zapata MD Provider: Stew Marquez CRNA    Anesthesia Type: MAC ASA Status: 3          Anesthesia Type: MAC    Vitals  Vitals Value Taken Time   /62 11/29/21 0806   Temp 97 °F (36.1 °C) 11/29/21 0806   Pulse 71 11/29/21 0806   Resp 18 11/29/21 0806   SpO2 97 % 11/29/21 0806           Post Anesthesia Care and Evaluation    Patient location during evaluation: bedside  Patient participation: complete - patient participated  Level of consciousness: awake  Pain score: 0  Pain management: adequate  Airway patency: patent  Anesthetic complications: No anesthetic complications  PONV Status: controlled  Cardiovascular status: acceptable and stable  Respiratory status: acceptable and room air  Hydration status: acceptable

## 2021-12-01 LAB
LAB AP CASE REPORT: NORMAL
PATH REPORT.FINAL DX SPEC: NORMAL

## 2021-12-02 ENCOUNTER — TELEPHONE (OUTPATIENT)
Dept: GASTROENTEROLOGY | Facility: CLINIC | Age: 76
End: 2021-12-02

## 2021-12-02 DIAGNOSIS — A04.8 H. PYLORI INFECTION: Primary | ICD-10-CM

## 2021-12-02 RX ORDER — NICOTINE POLACRILEX 4 MG/1
20 GUM, CHEWING ORAL
Qty: 28 EACH | Refills: 0 | Status: SHIPPED | OUTPATIENT
Start: 2021-12-02 | End: 2021-12-16

## 2021-12-02 RX ORDER — METRONIDAZOLE 250 MG/1
250 TABLET ORAL 4 TIMES DAILY
Qty: 56 TABLET | Refills: 0 | Status: SHIPPED | OUTPATIENT
Start: 2021-12-02 | End: 2021-12-16

## 2021-12-02 RX ORDER — TETRACYCLINE HYDROCHLORIDE 500 MG/1
500 CAPSULE ORAL 4 TIMES DAILY
Qty: 56 CAPSULE | Refills: 0 | Status: SHIPPED | OUTPATIENT
Start: 2021-12-02 | End: 2021-12-16

## 2021-12-02 NOTE — TELEPHONE ENCOUNTER
Patient informed, however the patient would like for me to speak with her daughter about the medications.  My name and number given to patient for this reason.

## 2021-12-02 NOTE — TELEPHONE ENCOUNTER
Patient has H pylori on EGD biopsies. She has been treated for this back in 2017 but not sure of the regimen she took. She is allergic to PCN. I have prescribed quadruple therapy for her. It is important that she take this exactly as prescribed for treatment. She will hold protonix during the treatment and resume after.

## 2021-12-03 NOTE — TELEPHONE ENCOUNTER
Pharmacy notified, chewable tablets ok.  Spoke with patient's daughter, information given.  Patient is currently on antibiotic nitrofurantoin for uti.  Explained to the daughter that patient can start the treatment for H. Pylori once she is done with current antibiotic, which is due to be complete by Saturday.  Daughter states understanding.

## 2022-01-27 LAB
BH CV REST NUCLEAR ISOTOPE DOSE: 10.5 MCI
BH CV STRESS COMMENTS STAGE 1: NORMAL
BH CV STRESS DOSE REGADENOSON STAGE 1: 0.4
BH CV STRESS DURATION MIN STAGE 1: 0
BH CV STRESS DURATION SEC STAGE 1: 10
BH CV STRESS NUCLEAR ISOTOPE DOSE: 33.4 MCI
BH CV STRESS PROTOCOL 1: NORMAL
BH CV STRESS RECOVERY BP: NORMAL MMHG
BH CV STRESS RECOVERY HR: 71 BPM
BH CV STRESS STAGE 1: 1
LV EF NUC BP: 71 %
MAXIMAL PREDICTED HEART RATE: 144 BPM
PERCENT MAX PREDICTED HR: 57.64 %
STRESS BASELINE BP: NORMAL MMHG
STRESS BASELINE HR: 69 BPM
STRESS PERCENT HR: 68 %
STRESS POST PEAK BP: NORMAL MMHG
STRESS POST PEAK HR: 83 BPM
STRESS TARGET HR: 122 BPM

## 2022-01-31 ENCOUNTER — OFFICE VISIT (OUTPATIENT)
Dept: GASTROENTEROLOGY | Facility: CLINIC | Age: 77
End: 2022-01-31

## 2022-01-31 VITALS
BODY MASS INDEX: 34.16 KG/M2 | SYSTOLIC BLOOD PRESSURE: 166 MMHG | WEIGHT: 192.8 LBS | TEMPERATURE: 98.2 F | DIASTOLIC BLOOD PRESSURE: 72 MMHG | HEIGHT: 63 IN

## 2022-01-31 DIAGNOSIS — R13.19 ESOPHAGEAL DYSPHAGIA: ICD-10-CM

## 2022-01-31 DIAGNOSIS — K58.1 IRRITABLE BOWEL SYNDROME WITH CONSTIPATION: ICD-10-CM

## 2022-01-31 DIAGNOSIS — A04.8 H. PYLORI INFECTION: ICD-10-CM

## 2022-01-31 DIAGNOSIS — R11.0 NAUSEA: ICD-10-CM

## 2022-01-31 DIAGNOSIS — R10.13 EPIGASTRIC PAIN: Primary | ICD-10-CM

## 2022-01-31 DIAGNOSIS — K21.00 GASTROESOPHAGEAL REFLUX DISEASE WITH ESOPHAGITIS WITHOUT HEMORRHAGE: ICD-10-CM

## 2022-01-31 DIAGNOSIS — Z12.11 ENCOUNTER FOR SCREENING FOR MALIGNANT NEOPLASM OF COLON: ICD-10-CM

## 2022-01-31 PROCEDURE — 99214 OFFICE O/P EST MOD 30 MIN: CPT | Performed by: NURSE PRACTITIONER

## 2022-01-31 RX ORDER — PEN NEEDLE, DIABETIC 31 GX5/16"
NEEDLE, DISPOSABLE MISCELLANEOUS
COMMUNITY
Start: 2022-01-26

## 2022-01-31 RX ORDER — NITROFURANTOIN 25; 75 MG/1; MG/1
CAPSULE ORAL
COMMUNITY
Start: 2021-11-29

## 2022-01-31 RX ORDER — INSULIN LISPRO 100 [IU]/ML
INJECTION, SOLUTION INTRAVENOUS; SUBCUTANEOUS
COMMUNITY
Start: 2022-01-26

## 2022-01-31 RX ORDER — INSULIN GLARGINE 100 [IU]/ML
INJECTION, SOLUTION SUBCUTANEOUS
COMMUNITY
Start: 2022-01-26

## 2022-01-31 NOTE — PATIENT INSTRUCTIONS
Obtain recent labs from PCP office.   Antireflux measures: Avoid fried, fatty foods, alcohol, chocolate, coffee, tea,  soft drinks, peppermint and spearmint, spicy foods, tomatoes and tomato based foods, onions, peppers, and smoking.   Other antireflux measures include weight reduction if overweight, avoiding tight clothing around the abdomen, elevating the head of the bed 6 inches with blocks under the head board, and don't drink or eat before going to bed and avoid lying down immediately after meals.  Pantoprazole 40 mg 1 by mouth in the am 30 minutes before breakfast. Hold for 14 days prior to testing for H. Pylori clearance, may restart after completing breath test at lab.  Zofran 4 mg 1 po every 8 hours as needed for nausea.   The patient should eat 4-5 very small meals throughout the day. Avoid large meals.   It is recommended to eat a softer diet. Meats are best consumed ground. Fruits and vegetables are best consumed cooked or steamed and then mashed.   Low fiber, low fat diet with liberal water intake.   May take stool softeners as needed for constipation.   Advised to exercise 30 minutes 4-5 days per week.   Advised to lose 20 pounds in the next 6-12 months.  H. Pylori breath test - see directions below  Follow up: 6 months or sooner if needed    H. Pylori Breath Test Instructions:  -If you have had a recent treatment of H. pylori, you should do the breath testing 4 to 6 weeks after finishing the antibiotics.    General Recommendations Before Testing:    • Do not take PPI (Protonix-pantoprazole) for 2 weeks prior to doing the test.    • Do not take any antibiotics for 2 weeks prior to testing.  If you need to take an antibiotic, do not do the testing until 2 weeks after you have finished the antibiotics.      • It is okay to take Pepcid before the testing only if needed.    • Do not eat or drink anything 1 hour prior to doing the test.    To Complete the test:    • Go to the back of the hospital to  outpatient registration and let them know you need to complete the lab ordered for you.  The registration and lab staff will assist you from this point forward.      Heartburn  Heartburn is a type of pain or discomfort that can happen in the throat or chest. It is often described as a burning pain. It may also cause a bad, acid-like taste in the mouth. Heartburn may feel worse when you lie down or bend over, and it is often worse at night. Heartburn may be caused by stomach contents that move back up into the esophagus (reflux).  Follow these instructions at home:  Eating and drinking    · Avoid certain foods and drinks as told by your health care provider. This may include:  ? Coffee and tea, with or without caffeine.  ? Drinks that contain alcohol.  ? Energy drinks and sports drinks.  ? Carbonated drinks or sodas.  ? Chocolate and cocoa.  ? Peppermint and mint flavorings.  ? Garlic and onions.  ? Horseradish.  ? Spicy and acidic foods, including peppers, chili powder, gutierrez powder, vinegar, hot sauces, and barbecue sauce.  ? Citrus fruit juices and citrus fruits, such as oranges, kiran, and limes.  ? Tomato-based foods, such as red sauce, chili, salsa, and pizza with red sauce.  ? Fried and fatty foods, such as donuts, french fries, potato chips, and high-fat dressings.  ? High-fat meats, such as hot dogs and fatty cuts of red and white meats, such as rib eye steak, sausage, ham, and briceño.  ? High-fat dairy items, such as whole milk, butter, and cream cheese.  · Eat small, frequent meals instead of large meals.  · Avoid drinking large amounts of liquid with your meals.  · Avoid eating meals during the 2-3 hours before bedtime.  · Avoid lying down right after you eat.  · Do not exercise right after you eat.    Lifestyle         · If you are overweight, reduce your weight to an amount that is healthy for you. Ask your health care provider for guidance about a safe weight loss goal.  · Do not use any products that  contain nicotine or tobacco. These products include cigarettes, chewing tobacco, and vaping devices, such as e-cigarettes. These can make symptoms worse. If you need help quitting, ask your health care provider.  · Wear loose-fitting clothing. Do not wear anything tight around your waist that causes pressure on your abdomen.  · Raise (elevate) the head of your bed about 6 inches (15 cm) when you sleep. You can use a wedge to do this.  · Try to reduce your stress, such as with yoga or meditation. If you need help reducing stress, ask your health care provider.  Medicines  · Take over-the-counter and prescription medicines only as told by your health care provider.  · Do not take aspirin or NSAIDs, such as ibuprofen, unless your health care provider told you to do so.  · Stop medicines only as told by your health care provider. If you stop taking some medicines too quickly, your symptoms may get worse.  General instructions  · Pay attention to any changes in your symptoms.  · Keep all follow-up visits. This is important.  Contact a health care provider if:  · You have new symptoms.  · You have unexplained weight loss.  · You have difficulty swallowing, or it hurts to swallow.  · You have wheezing or a persistent cough.  · Your symptoms do not improve with treatment.  · You have frequent heartburn for more than 2 weeks.  Get help right away if:  · You suddenly have pain in your arms, neck, jaw, teeth, or back.  · You suddenly feel sweaty, dizzy, or light-headed.  · You have chest pain or shortness of breath.  · You vomit and your vomit looks like blood or coffee grounds.  · Your stool is bloody or black.  These symptoms may represent a serious problem that is an emergency. Do not wait to see if the symptoms will go away. Get medical help right away. Call your local emergency services (911 in the [place] Do not drive yourself to the hospital.  Summary  · Heartburn is a type of pain or discomfort that can happen in the  throat or chest. It is often described as a burning pain. It may also cause a bad, acid-like taste in the mouth.  · Avoid certain foods and drinks as told by your health care provider.  · Take over-the-counter and prescription medicines only as told by your health care provider. Do not take aspirin or NSAIDs, such as ibuprofen, unless your health care provider told you to do so.  · Contact a health care provider if your symptoms do not improve or they get worse.  This information is not intended to replace advice given to you by your health care provider. Make sure you discuss any questions you have with your health care provider.  Document Revised: 06/23/2021 Document Reviewed: 06/23/2021  [employer] Patient Education © 2021 [employer]

## 2022-01-31 NOTE — PROGRESS NOTES
Follow Up Note     Date: 2022   Patient Name: Valarie Camara  MRN: [id number]  : 1945     Primary Care Provider: Zurdo Ricks MD     Chief Complaint   Patient presents with   • Follow-up     History of present illness:   2022  Valarie Camara is a 76 y.o. female who is here today for follow up for difficulty swallowing.     Swallowing is doing better. Reflux is better with Pantoprazole. She completed treatment of H. Pylori about 2 weeks ago. Constipation is better. No rectal bleeding. She had labs recently at PCP office, but does not know the results.     Interval History:  2021  She has been having difficulty swallowing for several years and it has recently worsened. She has a difficult time getting food down and getting ti stay down when she eats breakfast. She typically does not have difficulty swallowing the rest of the day. She has a history of reflux that is severe.She will wake at night with severe reflux. She is taking Omeprazole OTC daily but it doesn't help much. She has nausea that is worse when she lays down and she has increased reflux.  She has a history of epigastric pain that is worse with eating. No history of melena or bright red blood per rectum.       She has a history of constipation for many years. She takes docusate and Metamucil as needed with reasonable control.     2019 per Dr. Chidi DAVILA Jax is a 74 y.o. female who was admitted with syncope. Had Pill Cam yesterday.  Feels better . No further BM .      2019 per Dr. Chidi Roman  Colon - pan diverticulosis.  No blood or bleeding site  Pill Cam to follow     2019 per Dr. Chidi Downing  EGD- small HH with Schatzki's ring and LA Grade A GERD.  Erosive gastritis.  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 Chidi Littleinocente Camara is a 74 y.o. female who is admitted after 2 syncopal episodes at home.   There was associated injury to her left foot.  Patient has had near syncopal episodes in the last month.  There has recent fatigue and diarrhea in the past week.  Symptoms have improved with IV hydration.  She cannot describe her stools (does not check them), though she is heme-positive in the emergency room.  The patient had a EGD by Dr. batres in November 2017, which showed Schatzki's ring (balloon dilated to 16.5 mm), a small hiatal hernia, and erosive H. pylori-associated gastritis.  EGD in 2013 by Dr. Stovall showed grade 3 reflux esophagitis, clean-based ulcers at the GE junction, and erythematous gastritis.  Patient denies use of OTC NSAIDs.  She is on dual antiplatelet therapy.  Last colonoscopy was apparently in 2012.     12/13/2017  The patient has a long-standing history of heartburn. It occurs daily. It is gradually improving. Heartburn can be severe at times.  It does not wake her at night. She has been taking Pantoprazole and Zantac as needed, with improvement of symptoms. There is no history of abdominal pain.     The patient has a long-standing history of difficulty swallowing. The patient has a difficult time swallowing solids. Swallowing has improved. She has been watching her diet and trying to avoid foods that will cause difficulty swallowing, with improvement. This occurs intermittently, depending on her diet. There is no history of nausea with vomiting.     The patient has a long-standing history of constipation. The patient may have 1 hard bowel movement every other day or so. She will at times take laxatives for the constipation. She has been taking Milk of Magnesia daily with some improvement of constipation. There is no history of bright red blood per rectum or melena.     The patient's last colonosocopy was about 5 years ago. There is no family history of colon cancer.    Subjective      Past Medical History:   Diagnosis Date   • Acute bronchitis with bronchospasm    • Acute exacerbation of  "chronic obstructive pulmonary disease (COPD) ([facility])    • Anxiety    • Arthritis    • B12 deficiency    • Back pain    • Body piercing     ears   • Cataract    • Cervicalgia    • Colonic polyp     History of colonic polyps    • Constipation    • COPD (chronic obstructive pulmonary disease) ([facility])    • Depression    • Diverticulitis    • Dysphagia     Patient reported \"it won't go all the way down\" when eating solid foods first thing in the mornings   • Edema    • Elevated cholesterol    • Esophageal reflux    • Folic acid deficiency    • Full dentures     Advised no adhesives DOS   • Herpes zoster    • High cholesterol    • History of kidney infection    • History of recurrent urinary tract infection    • HTN (hypertension)    • Hypercholesterolemia    • Impaired functional mobility, balance, gait, and endurance    • Kidney infection    • Malignant hypertension 4/26/2015     Accelerated essential hypertension   • Measles     rubeola   • Muscle spasm    • Neoplasm of uncertain behavior of skin    • Osteoporosis    • Poor historian    • Recurrent urinary tract infection    • Rheumatoid arthritis ([facility])    • RLS (restless legs syndrome)    • Stomach ulcer    • Stroke ([facility])     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   • Type 2 diabetes mellitus ([facility])    • Vitamin D deficiency      Past Surgical History:   Procedure Laterality Date   • CATARACT EXTRACTION Left 02/11/2013    with lens implant   • CATARACT EXTRACTION Right 04/15/2013    with lens implant   • CATARACT EXTRACTION WITH INTRAOCULAR LENS IMPLANT Left 02/11/2013   • CATARACT EXTRACTION WITH INTRAOCULAR LENS IMPLANT Right 04/15/2013   • COLONOSCOPY  2012   • COLONOSCOPY N/A 11/26/2019    Procedure: COLONOSCOPY;  Surgeon: Chidi Downing MD;  Location: [facility] ENDOSCOPY;  Service: Gastroenterology   • ENDOSCOPY N/A 11/13/2017    Procedure: ESOPHAGOGASTRODUODENOSCOPY with biopsies and esophageal balloon dilitation;  Surgeon: Wesley" JULIO CESAR Stovall MD;  Location: [facility] ENDOSCOPY;  Service:    • ENDOSCOPY N/A 11/25/2019    Procedure: ESOPHAGOGASTRODUODENOSCOPY;  Surgeon: Chidi Downing MD;  Location: [facility] ENDOSCOPY;  Service: Gastroenterology   • ENDOSCOPY N/A 11/29/2021    Procedure: ESOPHAGOGASTRODUODENOSCOPY with dilation and biopsies;  Surgeon: Gonzalez Zapata MD;  Location: [facility] ENDOSCOPY;  Service: Gastroenterology;  Laterality: N/A;   • HYSTERECTOMY  1979   • UPPER GASTROINTESTINAL ENDOSCOPY  12/09/2013   • UPPER GASTROINTESTINAL ENDOSCOPY  11/13/2017     Family History   Problem Relation Age of Onset   • Arthritis Mother    • Diabetes Mother    • Hypertension Mother    • Arthritis Other    • Arthritis Other    • Diabetes Other         DM   • Hypertension Other    • Colon cancer Neg Hx      Social History     Socioeconomic History   • Marital status:    Tobacco Use   • Smoking status: Former Smoker     Packs/day: 1.00     Years: 15.00     Pack years: 15.00     Quit date: 2012     Years since quitting: 10.0   • Smokeless tobacco: Never Used   • Tobacco comment:  Denied: History of smoking cigarettes   Vaping Use   • Vaping Use: Never used   Substance and Sexual Activity   • Alcohol use: Yes     Comment: Occassionally   • Drug use: No   • Sexual activity: Defer       Current Outpatient Medications:   •  amLODIPine (NORVASC) 10 MG tablet, Take 10 mg by mouth Daily., Disp: , Rfl:   •  atorvastatin (LIPITOR) 80 MG tablet, Take 1 tablet by mouth Daily., Disp: 90 tablet, Rfl: 0  •  carvedilol (COREG) 25 MG tablet, Take 1 tablet by mouth 2 (Two) Times a Day With Meals., Disp: 180 tablet, Rfl: 0  •  clopidogrel (PLAVIX) 75 MG tablet, Take 1 tablet by mouth once daily (Patient taking differently: Take 75 mg by mouth Daily.), Disp: 90 tablet, Rfl: 0  •  docusate sodium (COLACE) 100 MG capsule, Take 100 mg by mouth 2 (Two) Times a Day., Disp: , Rfl:   •  Easy Touch Pen Needles 31G X 8 MM misc, , Disp: , Rfl:   •  furosemide (LASIX) 20  MG tablet, Take 20 mg by mouth 2 (Two) Times a Day As Needed (edema)., Disp: , Rfl:   •  glucose blood test strip, Check sugar once a day (Patient taking differently: 1 each by Other route As Needed (FBS). Check sugar once a day), Disp: 30 each, Rfl: 12  •  glucose monitor monitoring kit, 1 each Daily., Disp: 1 each, Rfl: 1  •  HumaLOG KwikPen 100 UNIT/ML solution pen-injector, , Disp: , Rfl:   •  Insulin Glargine (LANTUS SOLOSTAR SC), Inject 20 Units under the skin into the appropriate area as directed Every Night., Disp: , Rfl:   •  insulin lispro (humaLOG) 100 UNIT/ML injection, Inject 10 Units under the skin into the appropriate area as directed 3 (Three) Times a Day Before Meals., Disp: , Rfl:   •  insulin lispro (humaLOG) 100 UNIT/ML injection, Inject 15 Units under the skin into the appropriate area as directed., Disp: , Rfl:   •  Insulin Pen Needle 31G X 5 MM misc, Inject insulin three times daily, Disp: 100 each, Rfl: 11  •  Lancets 30G misc, Check sugar daily, Disp: 30 each, Rfl: 12  •  Lantus SoloStar 100 UNIT/ML injection pen, , Disp: , Rfl:   •  losartan-hydrochlorothiazide (HYZAAR) 100-25 MG per tablet, Take 1 tablet by mouth Daily., Disp: 30 tablet, Rfl: 0  •  nitrofurantoin, macrocrystal-monohydrate, (MACROBID) 100 MG capsule, , Disp: , Rfl:   •  ondansetron (ZOFRAN) 4 MG tablet, Take 1 tablet by mouth Every 8 (Eight) Hours As Needed for Nausea or Vomiting., Disp: 30 tablet, Rfl: 1  •  pantoprazole (PROTONIX) 40 MG EC tablet, 1 po daily in the am 30 minutes before breakfast (Patient taking differently: Take 40 mg by mouth Daily. 1 po daily in the am 30 minutes before breakfast), Disp: 30 tablet, Rfl: 3  •  POTASSIUM CHLORIDE PO, Take 10 mEq by mouth Take As Directed. To take as directed when taking Lasix, Disp: , Rfl:   •  vitamin D (ERGOCALCIFEROL) 1.25 MG (69629 UT) capsule capsule, Take 50,000 Units by mouth 1 (One) Time Per Week., Disp: , Rfl:   •  insulin detemir (LEVEMIR) 100 UNIT/ML injection,  "Inject 20 Units under the skin into the appropriate area as directed Every Night, Disp: 10 mL, Rfl: 0  Allergies   Allergen Reactions   • Penicillins Shortness Of Breath and Rash   • Clonidine Derivatives Other (See Comments)     Pt reports extreme hypotension     • Hydralazine Nausea And Vomiting   • Sulfa Antibiotics Rash     The following portions of the patient's history were reviewed and updated as appropriate: allergies, current medications, past family history, past medical history, past social history, past surgical history and problem list.  Objective     Physical Exam  Vitals and nursing note reviewed.   Constitutional:       General: She is not in acute distress.     Appearance: Normal appearance. She is well-developed.   HENT:      Head: Normocephalic and atraumatic.      Mouth/Throat:      Mouth: Mucous membranes are not pale, not dry and not cyanotic.   Eyes:      General: Lids are normal.   Neck:      Trachea: Trachea normal.   Cardiovascular:      Rate and Rhythm: Normal rate.      Heart sounds: Normal heart sounds.   Pulmonary:      Effort: Pulmonary effort is normal. No respiratory distress.      Breath sounds: Normal breath sounds.   Abdominal:      General: Bowel sounds are normal.      Palpations: Abdomen is soft. There is no mass.      Tenderness: There is no abdominal tenderness.      Hernia: No hernia is present.   Skin:     General: Skin is warm and dry.   Neurological:      Mental Status: She is alert and oriented to person, place, and time.   Psychiatric:         Mood and Affect: Mood normal.         Speech: Speech normal.         Behavior: Behavior normal. Behavior is cooperative.       Vitals:    01/31/22 1433   BP: 166/72   BP Location: Right arm   Patient Position: Sitting   Cuff Size: Adult   Temp: 98.2 °F (36.8 °C)   Weight: 87.5 kg (192 lb 12.8 oz)   Height: 160 cm (63\")     Body mass index is 34.15 kg/m².     Results Review:   I reviewed the patient's new clinical " results.    Admission on 11/29/2021, Discharged on 11/29/2021   Component Date Value Ref Range Status   • Case Report 11/29/2021    Final                    Value:Surgical Pathology Report                         Case: [id number]                                  Authorizing Provider:  Gonzalez Zapata MD  Collected:           11/29/2021 07:49 AM          Ordering Location:     [facility] Received:            11/29/2021 02:37 PM          Pathologist:           Cornelio Rubin MD                                                             Specimens:   1) - Small Intestine, duodenum for change in bowel habits and rule out celiac                       2) - Small Intestine, duodenal bulb?pancreatic crest biopsy                                         3) - Gastric, Antrum, gastric antrum biopsy for h. pylori                                           4) - Esophagus, esophageal random biopsies distal, mid and proximal for dysphagia         • Final Diagnosis 11/29/2021    Final                    Value:This result contains rich text formatting which cannot be displayed here.   Lab on 11/26/2021   Component Date Value Ref Range Status   • COVID19 11/26/2021 Not Detected  Not Detected - Ref. Range Final       EGD dated 11/25/2019 per Dr. Chidi Downing  - LA Grade A reflux esophagitis. Biopsied.  - Mild Schatzki ring.  - Gastritis. Biopsied.  - Normal examined duodenum.  -Gastric antrum biopsies with moderate chronic gastritis, H. pylori negative.  Distal esophagus biopsies with mild chronic gastritis, negative for intestinal metaplasia.    Colonoscopy dated 11/26/2019 per Dr. Chidi Downing  - The entire examined colon is normal.  - Diverticulosis in the sigmoid colon, in the descending colon, in the transverse colon and in the ascending colon.  - No specimens collected.    PillCam 11/26/2019 per Dr. Chidi Downing, results are not available in the chart.    EGD dated 11/29/2021 per Dr. Zapata  - Normal  oropharynx.  - Z-line regular, 35 cm from the incisors.  - 2 cm hiatal hernia.  - LA Grade A reflux esophagitis with no bleeding.  - Low-grade of narrowing and mild Schatzki ring. Dilated. Biopsied.  - Erythematous mucosa in the posterior wall of the stomach, incisura and antrum. Biopsied.  - A single lesion suspicious for aberrant pancreas was found in the duodenum. Biopsied.  - Normal first portion of the duodenum, second portion of the duodenum and third portion of the duodenum. Biopsied.  - Patient may have some degree of esophageal dysmotility associated with DM  - Duodenum biopsy unremarkable. Duodenum biopsy bulb?/pancreatic crest biopsy with typical duodenal bulb site changes. Gastric antrum biopsy with H. pylori, no intestinal metaplasia. Esophagus biopsy random distal, mid and proximal with features suggestive of reflux esophagitis.    Assessment / Plan      1. Epigastric pain  2. Nausea  3. H. pylori infection  Epigastric pain and nausea improved. EGD with biopsies consistent with H. Pylori. She has completed 2 week quadruple therapy a couple of weeks ago.   H. Pylori breath test to check for clearance - hold Pantoprazole for 14 days prior to going to lab to collect sample.  Zofran 4 mg 1 po every 8 hours as needed for nausea.    - H. Pylori Breath Test - Breath, Lung; Future    11/24/2021  She has a history of nausea and epigastric pain for the past few years that is unchanged.  Eating makes symptoms worse.  No history of melena.  Basic labs unremarkable.  TSH normal. She is a longstanding diabetic and is not well controlled.  EGD in 2019 with gastric biopsies with moderate chronic gastritis, H. pylori negative.  She had capsule endoscopy in November 2019 per Dr. Spurling, but results are not available. Suspect symptoms related to diabetes/diabetes medications, with or without gastroparesis.  Advised to eat a softer diet with 4-5 very small meals throughout the day.  Zofran 4 mg one p.o. every 8 hours as  needed for nausea.    4. Esophageal dysphagia  5. Gastroesophageal reflux disease with esophagitis without hemorrhage  Reflux is better with pantoprazole.  Difficulty swallowing is better.  EGD with low-grade narrowing and mild Schatzki's ring, dilated.  Biopsies suggestive of reflux esophagitis, no Arcos's.  Antireflux measures.  Continue pantoprazole 40 mg daily for now.  Patient may have some degree of esophageal dysmotility associated with diabetes.  Advised to eat a softer diet, chew food very well and take sips of water between bites.    11/24/2021  She has a history of reflux for several years that can be severe.  She has a history of difficulty swallowing solids for several years that is gradually worsened.  When she eats breakfast, she has a difficult time getting food to go down and stay down.  She typically does not have difficulty swallowing the rest of the day.  She had EGD per Dr. Chidi Downing in 2019 with mild Schatzki's ring noted.  She was advised at that time to have EGD with possible dilation in the future if symptoms continued according to records. She is on Plavix.  Antireflux measures.  Pantoprazole 40 mg daily.  Advised to eat a softer diet, chew food very well, take sips of water between bites.  EGD to rule out esophageal stricture.    6. Irritable bowel syndrome with constipation  Constipation is better with stool softeners. Continue same. No rectal bleeding.  High fiber, low fat diet with liberal water intake.  Monitor for now.    11/24/2021  Patient has a history of constipation for many years.  She takes docusate and Metamucil as needed with reasonable control.  No history of rectal bleeding.  Colonoscopy in 2019 per Dr. Downing unremarkable.  High-fiber, low-fat diet with liberal water intake.  Metamucil one scoop daily.  Stool softeners 1 to 2/day as needed for constipation.    7. Encounter for screening for malignant neoplasm of colon  The patient had a colonoscopy dated 11/26/2019  per Dr. Chidi Downing with entire examined colon normal.  No specimens were collected.  No family history of colon cancer.  Colonoscopy for screening in 10 years, 2029, depending on health status.    Patient Instructions   Obtain recent labs from PCP office.   Antireflux measures: Avoid fried, fatty foods, alcohol, chocolate, coffee, tea,  soft drinks, peppermint and spearmint, spicy foods, tomatoes and tomato based foods, onions, peppers, and smoking.   Other antireflux measures include weight reduction if overweight, avoiding tight clothing around the abdomen, elevating the head of the bed 6 inches with blocks under the head board, and don't drink or eat before going to bed and avoid lying down immediately after meals.  Pantoprazole 40 mg 1 by mouth in the am 30 minutes before breakfast. Hold for 14 days prior to testing for H. Pylori clearance, may restart after completing breath test at lab.  Zofran 4 mg 1 po every 8 hours as needed for nausea.   The patient should eat 4-5 very small meals throughout the day. Avoid large meals.   It is recommended to eat a softer diet. Meats are best consumed ground. Fruits and vegetables are best consumed cooked or steamed and then mashed.   Low fiber, low fat diet with liberal water intake.   May take stool softeners as needed for constipation.   Advised to exercise 30 minutes 4-5 days per week.   Advised to lose 20 pounds in the next 6-12 months.  H. Pylori breath test - see directions below  Follow up: 6 months or sooner if needed    H. Pylori Breath Test Instructions:  -If you have had a recent treatment of H. pylori, you should do the breath testing 4 to 6 weeks after finishing the antibiotics.    General Recommendations Before Testing:    • Do not take PPI (Protonix-pantoprazole) for 2 weeks prior to doing the test.    • Do not take any antibiotics for 2 weeks prior to testing.  If you need to take an antibiotic, do not do the testing until 2 weeks after you have finished  the antibiotics.      • It is okay to take Pepcid before the testing only if needed.    • Do not eat or drink anything 1 hour prior to doing the test.    To Complete the test:    • Go to the back of the hospital to outpatient registration and let them know you need to complete the lab ordered for you.  The registration and lab staff will assist you from this point forward.      Heartburn  Heartburn is a type of pain or discomfort that can happen in the throat or chest. It is often described as a burning pain. It may also cause a bad, acid-like taste in the mouth. Heartburn may feel worse when you lie down or bend over, and it is often worse at night. Heartburn may be caused by stomach contents that move back up into the esophagus (reflux).  Follow these instructions at home:  Eating and drinking    · Avoid certain foods and drinks as told by your health care provider. This may include:  ? Coffee and tea, with or without caffeine.  ? Drinks that contain alcohol.  ? Energy drinks and sports drinks.  ? Carbonated drinks or sodas.  ? Chocolate and cocoa.  ? Peppermint and mint flavorings.  ? Garlic and onions.  ? Horseradish.  ? Spicy and acidic foods, including peppers, chili powder, gutierrez powder, vinegar, hot sauces, and barbecue sauce.  ? Citrus fruit juices and citrus fruits, such as oranges, kiran, and limes.  ? Tomato-based foods, such as red sauce, chili, salsa, and pizza with red sauce.  ? Fried and fatty foods, such as donuts, french fries, potato chips, and high-fat dressings.  ? High-fat meats, such as hot dogs and fatty cuts of red and white meats, such as rib eye steak, sausage, ham, and briceño.  ? High-fat dairy items, such as whole milk, butter, and cream cheese.  · Eat small, frequent meals instead of large meals.  · Avoid drinking large amounts of liquid with your meals.  · Avoid eating meals during the 2-3 hours before bedtime.  · Avoid lying down right after you eat.  · Do not exercise right after you  eat.    Lifestyle         · If you are overweight, reduce your weight to an amount that is healthy for you. Ask your health care provider for guidance about a safe weight loss goal.  · Do not use any products that contain nicotine or tobacco. These products include cigarettes, chewing tobacco, and vaping devices, such as e-cigarettes. These can make symptoms worse. If you need help quitting, ask your health care provider.  · Wear loose-fitting clothing. Do not wear anything tight around your waist that causes pressure on your abdomen.  · Raise (elevate) the head of your bed about 6 inches (15 cm) when you sleep. You can use a wedge to do this.  · Try to reduce your stress, such as with yoga or meditation. If you need help reducing stress, ask your health care provider.  Medicines  · Take over-the-counter and prescription medicines only as told by your health care provider.  · Do not take aspirin or NSAIDs, such as ibuprofen, unless your health care provider told you to do so.  · Stop medicines only as told by your health care provider. If you stop taking some medicines too quickly, your symptoms may get worse.  General instructions  · Pay attention to any changes in your symptoms.  · Keep all follow-up visits. This is important.  Contact a health care provider if:  · You have new symptoms.  · You have unexplained weight loss.  · You have difficulty swallowing, or it hurts to swallow.  · You have wheezing or a persistent cough.  · Your symptoms do not improve with treatment.  · You have frequent heartburn for more than 2 weeks.  Get help right away if:  · You suddenly have pain in your arms, neck, jaw, teeth, or back.  · You suddenly feel sweaty, dizzy, or light-headed.  · You have chest pain or shortness of breath.  · You vomit and your vomit looks like blood or coffee grounds.  · Your stool is bloody or black.  These symptoms may represent a serious problem that is an emergency. Do not wait to see if the symptoms  will go away. Get medical help right away. Call your local emergency services (911 in the [place] Do not drive yourself to the hospital.  Summary  · Heartburn is a type of pain or discomfort that can happen in the throat or chest. It is often described as a burning pain. It may also cause a bad, acid-like taste in the mouth.  · Avoid certain foods and drinks as told by your health care provider.  · Take over-the-counter and prescription medicines only as told by your health care provider. Do not take aspirin or NSAIDs, such as ibuprofen, unless your health care provider told you to do so.  · Contact a health care provider if your symptoms do not improve or they get worse.  This information is not intended to replace advice given to you by your health care provider. Make sure you discuss any questions you have with your health care provider.  Document Revised: 06/23/2021 Document Reviewed: 06/23/2021  [employer] Patient Education © 2021 [employer]    Jocelin Roper, APRN  1/31/2022    Please note that portions of this note may have been completed with a voice recognition program. Efforts were made to edit the dictations, but occasionally words are mistranscribed.

## 2022-07-22 ENCOUNTER — TELEPHONE (OUTPATIENT)
Dept: GASTROENTEROLOGY | Facility: CLINIC | Age: 77
End: 2022-07-22

## 2022-07-22 NOTE — TELEPHONE ENCOUNTER
Patient called and left voicemail stating that she wants to know exactly what she is being treated for before she will schedule her follow up. Returned call to patient. Phone rang multiple times. No answer and no voicemail.

## 2022-10-27 ENCOUNTER — TELEPHONE (OUTPATIENT)
Dept: GASTROENTEROLOGY | Facility: CLINIC | Age: 77
End: 2022-10-27

## 2022-10-27 DIAGNOSIS — A04.8 H. PYLORI INFECTION: ICD-10-CM

## 2022-12-02 DIAGNOSIS — K21.00 GASTROESOPHAGEAL REFLUX DISEASE WITH ESOPHAGITIS WITHOUT HEMORRHAGE: ICD-10-CM

## 2022-12-05 RX ORDER — PANTOPRAZOLE SODIUM 40 MG/1
TABLET, DELAYED RELEASE ORAL
Qty: 30 TABLET | Refills: 0 | Status: SHIPPED | OUTPATIENT
Start: 2022-12-05

## 2023-02-13 DIAGNOSIS — K21.00 GASTROESOPHAGEAL REFLUX DISEASE WITH ESOPHAGITIS WITHOUT HEMORRHAGE: ICD-10-CM

## 2023-02-13 RX ORDER — PANTOPRAZOLE SODIUM 40 MG/1
TABLET, DELAYED RELEASE ORAL
Qty: 30 TABLET | Refills: 0 | OUTPATIENT
Start: 2023-02-13

## 2023-03-23 ENCOUNTER — TRANSCRIBE ORDERS (OUTPATIENT)
Dept: ADMINISTRATIVE | Facility: HOSPITAL | Age: 78
End: 2023-03-23
Payer: MEDICARE

## 2023-03-23 DIAGNOSIS — N18.32 CHRONIC KIDNEY DISEASE (CKD) STAGE G3B/A1, MODERATELY DECREASED GLOMERULAR FILTRATION RATE (GFR) BETWEEN 30-44 ML/MIN/1.73 SQUARE METER AND ALBUMINURIA CREATININE RATIO LESS THAN 30 MG/G (CMS/H*: Primary | ICD-10-CM

## 2023-03-23 DIAGNOSIS — D50.8 IRON DEFICIENCY ANEMIA SECONDARY TO INADEQUATE DIETARY IRON INTAKE: ICD-10-CM

## 2023-08-17 ENCOUNTER — OFFICE VISIT (OUTPATIENT)
Dept: GASTROENTEROLOGY | Facility: CLINIC | Age: 78
End: 2023-08-17
Payer: MEDICARE

## 2023-08-17 VITALS
WEIGHT: 201 LBS | DIASTOLIC BLOOD PRESSURE: 96 MMHG | TEMPERATURE: 98.1 F | OXYGEN SATURATION: 94 % | RESPIRATION RATE: 16 BRPM | HEART RATE: 76 BPM | BODY MASS INDEX: 35.61 KG/M2 | SYSTOLIC BLOOD PRESSURE: 182 MMHG | HEIGHT: 63 IN

## 2023-08-17 DIAGNOSIS — K21.00 GASTROESOPHAGEAL REFLUX DISEASE WITH ESOPHAGITIS WITHOUT HEMORRHAGE: Primary | Chronic | ICD-10-CM

## 2023-08-17 DIAGNOSIS — R13.19 ESOPHAGEAL DYSPHAGIA: Chronic | ICD-10-CM

## 2023-08-17 DIAGNOSIS — Z12.11 ENCOUNTER FOR SCREENING FOR MALIGNANT NEOPLASM OF COLON: ICD-10-CM

## 2023-08-17 DIAGNOSIS — Z86.19 HISTORY OF HELICOBACTER PYLORI INFECTION: ICD-10-CM

## 2023-08-17 DIAGNOSIS — K58.1 IRRITABLE BOWEL SYNDROME WITH CONSTIPATION: Chronic | ICD-10-CM

## 2023-08-17 PROCEDURE — 99214 OFFICE O/P EST MOD 30 MIN: CPT | Performed by: NURSE PRACTITIONER

## 2023-08-17 PROCEDURE — 3077F SYST BP >= 140 MM HG: CPT | Performed by: NURSE PRACTITIONER

## 2023-08-17 PROCEDURE — 1159F MED LIST DOCD IN RCRD: CPT | Performed by: NURSE PRACTITIONER

## 2023-08-17 PROCEDURE — 1160F RVW MEDS BY RX/DR IN RCRD: CPT | Performed by: NURSE PRACTITIONER

## 2023-08-17 PROCEDURE — 3080F DIAST BP >= 90 MM HG: CPT | Performed by: NURSE PRACTITIONER

## 2023-08-17 RX ORDER — FERROUS SULFATE 325(65) MG
325 TABLET ORAL
COMMUNITY

## 2023-08-17 RX ORDER — PANTOPRAZOLE SODIUM 40 MG/1
TABLET, DELAYED RELEASE ORAL
Qty: 30 TABLET | Refills: 5 | Status: SHIPPED | OUTPATIENT
Start: 2023-08-17

## 2023-08-17 NOTE — PROGRESS NOTES
Follow Up Note     Date: 2023   Patient Name: Valarie Camara  MRN: [id number]  : 1945     Primary Care Provider: Zurdo Ricks MD     Chief Complaint   Patient presents with    Heartburn     History of present illness:   2023  Valarie Camara is a 78 y.o. female who is here today for follow up for reflux. She is taking Pantoprazole and some days she still has some reflux. It is worse at bedtime or if she eats a large meals. She still feels like she can't swallow. It is not any better and not any worse. It comes and goes. No epigastric pain or nausea at this time. She did not get the testing to check for clearance of H. Pylori after treatment. She still has some constipation. She is taking a stool softener on occasion, does not take anything on a regular basis. Has a bowel movement every 1-3 days. No GI bleeding.     Interval History:  2022  Valarie Camara is a 76 y.o. female who is here today for follow up for difficulty swallowing.    Swallowing is doing better. Reflux is better with Pantoprazole. She completed treatment of H. Pylori about 2 weeks ago. Constipation is better. No rectal bleeding. She had labs recently at PCP office, but does not know the results.      2017  The patient has a long-standing history of heartburn. It occurs daily. It is gradually improving. Heartburn can be severe at times.  It does not wake her at night. She has been taking Pantoprazole and Zantac as needed, with improvement of symptoms. There is no history of abdominal pain.     The patient has a long-standing history of difficulty swallowing. The patient has a difficult time swallowing solids. Swallowing has improved. She has been watching her diet and trying to avoid foods that will cause difficulty swallowing, with improvement. This occurs intermittently, depending on her diet. There is no history of nausea with vomiting.     The patient has a long-standing history of  "constipation. The patient may have 1 hard bowel movement every other day or so. She will at times take laxatives for the constipation. She has been taking Milk of Magnesia daily with some improvement of constipation. There is no history of bright red blood per rectum or melena.     The patient's last colonosocopy was about 5 years ago. There is no family history of colon cancer.    Subjective      Past Medical History:   Diagnosis Date    Acute bronchitis with bronchospasm     Acute exacerbation of chronic obstructive pulmonary disease (COPD)     Anxiety     Arthritis     B12 deficiency     Back pain     Body piercing     ears    Cataract     Cervicalgia     Colonic polyp     History of colonic polyps     Constipation     COPD (chronic obstructive pulmonary disease)     Depression     Diverticulitis     Dysphagia     Patient reported \"it won't go all the way down\" when eating solid foods first thing in the mornings    Edema     Elevated cholesterol     Esophageal reflux     Folic acid deficiency     Full dentures     Advised no adhesives DOS    Herpes zoster     High cholesterol     History of kidney infection     History of recurrent urinary tract infection     HTN (hypertension)     Hypercholesterolemia     Impaired functional mobility, balance, gait, and endurance     Kidney infection     Malignant hypertension 4/26/2015     Accelerated essential hypertension    Measles     rubeola    Muscle spasm     Neoplasm of uncertain behavior of skin     Osteoporosis     Poor historian     Recurrent urinary tract infection     Rheumatoid arthritis     RLS (restless legs syndrome)     Stomach ulcer     Stroke     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    Type 2 diabetes mellitus     Vitamin D deficiency      Past Surgical History:   Procedure Laterality Date    CATARACT EXTRACTION Left 02/11/2013    with lens implant    CATARACT EXTRACTION Right 04/15/2013    with lens implant    " CATARACT EXTRACTION WITH INTRAOCULAR LENS IMPLANT Left 2013    CATARACT EXTRACTION WITH INTRAOCULAR LENS IMPLANT Right 04/15/2013    COLONOSCOPY  2012    COLONOSCOPY N/A 2019    Procedure: COLONOSCOPY;  Surgeon: Chidi Downing MD;  Location:  [facility];  Service: Gastroenterology    ENDOSCOPY N/A 2017    Procedure: ESOPHAGOGASTRODUODENOSCOPY with biopsies and esophageal balloon dilitation;  Surgeon: Wesley Stovall MD;  Location:  [facility];  Service:     ENDOSCOPY N/A 2019    Procedure: ESOPHAGOGASTRODUODENOSCOPY;  Surgeon: Cihdi Downing MD;  Location:  [facility];  Service: Gastroenterology    ENDOSCOPY N/A 2021    Procedure: ESOPHAGOGASTRODUODENOSCOPY with dilation and biopsies;  Surgeon: Gonzalez Zapata MD;  Location: [facility];  Service: Gastroenterology;  Laterality: N/A;    HYSTERECTOMY  1979    UPPER GASTROINTESTINAL ENDOSCOPY  2013    UPPER GASTROINTESTINAL ENDOSCOPY  2017     Family History   Problem Relation Age of Onset    Arthritis Mother     Diabetes Mother     Hypertension Mother     Arthritis Other     Arthritis Other     Diabetes Other         DM    Hypertension Other     Colon cancer Neg Hx      Social History     Socioeconomic History    Marital status:    Tobacco Use    Smoking status: Former     Packs/day: 1.00     Years: 15.00     Pack years: 15.00     Types: Cigarettes     Quit date:      Years since quittin.6    Smokeless tobacco: Never    Tobacco comments:      Denied: History of smoking cigarettes   Vaping Use    Vaping Use: Never used   Substance and Sexual Activity    Alcohol use: Yes     Comment: Occassionally    Drug use: No    Sexual activity: Defer       Current Outpatient Medications:     atorvastatin (LIPITOR) 80 MG tablet, Take 1 tablet by mouth Daily., Disp: 90 tablet, Rfl: 0    carvedilol (COREG) 25 MG tablet, Take 1 tablet by mouth 2 (Two) Times a Day With Meals., Disp: 180 tablet, Rfl:  0    clopidogrel (PLAVIX) 75 MG tablet, Take 1 tablet by mouth once daily (Patient taking differently: Take 1 tablet by mouth Daily.), Disp: 90 tablet, Rfl: 0    docusate sodium (COLACE) 100 MG capsule, Take 1 capsule by mouth 2 (Two) Times a Day., Disp: , Rfl:     Easy Touch Pen Needles 31G X 8 MM misc, , Disp: , Rfl:     ferrous sulfate 325 (65 FE) MG tablet, Take 1 tablet by mouth., Disp: , Rfl:     glucose blood test strip, Check sugar once a day (Patient taking differently: 1 each by Other route As Needed (FBS). Check sugar once a day), Disp: 30 each, Rfl: 12    glucose monitor monitoring kit, 1 each Daily., Disp: 1 each, Rfl: 1    HumaLOG KwikPen 100 UNIT/ML solution pen-injector, , Disp: , Rfl:     Insulin Glargine (LANTUS SOLOSTAR SC), Inject 20 Units under the skin into the appropriate area as directed Every Night., Disp: , Rfl:     insulin lispro (humaLOG) 100 UNIT/ML injection, Inject 10 Units under the skin into the appropriate area as directed 3 (Three) Times a Day Before Meals., Disp: , Rfl:     insulin lispro (humaLOG) 100 UNIT/ML injection, Inject 15 Units under the skin into the appropriate area as directed., Disp: , Rfl:     Insulin Pen Needle 31G X 5 MM misc, Inject insulin three times daily, Disp: 100 each, Rfl: 11    Lancets 30G misc, Check sugar daily, Disp: 30 each, Rfl: 12    Lantus SoloStar 100 UNIT/ML injection pen, , Disp: , Rfl:     losartan-hydrochlorothiazide (HYZAAR) 100-25 MG per tablet, Take 1 tablet by mouth Daily., Disp: 30 tablet, Rfl: 0    nitrofurantoin, macrocrystal-monohydrate, (MACROBID) 100 MG capsule, , Disp: , Rfl:     pantoprazole (PROTONIX) 40 MG EC tablet, TAKE 1 TABLET BY MOUTH 30 MINUTES BEFORE BREAKFAST. Needs follow up appointment for any further refills., Disp: 30 tablet, Rfl: 5    POTASSIUM CHLORIDE PO, Take 10 mEq by mouth Take As Directed. To take as directed when taking Lasix, Disp: , Rfl:     vitamin D (ERGOCALCIFEROL) 1.25 MG (40214 UT) capsule capsule, Take  "1 capsule by mouth 1 (One) Time Per Week., Disp: , Rfl:     Allergies   Allergen Reactions    Penicillins Rash, Shortness Of Breath, Anaphylaxis and Other (See Comments)    Clonidine Derivatives Other (See Comments)     Pt reports extreme hypotension    Pt reports extreme hypotension   Pt reports extreme hypotension   Pt reports extreme hypotension    Hydralazine Nausea And Vomiting and Other (See Comments)    Elemental Sulfur Hives    Sulfa Antibiotics Rash     The following portions of the patient's history were reviewed and updated as appropriate: allergies, current medications, past family history, past medical history, past social history, past surgical history and problem list.  Objective     Physical Exam  Vitals and nursing note reviewed.   Constitutional:       General: She is not in acute distress.     Appearance: Normal appearance. She is well-developed.   HENT:      Head: Normocephalic and atraumatic.      Mouth/Throat:      Mouth: Mucous membranes are not pale, not dry and not cyanotic.   Eyes:      General: Lids are normal.   Neck:      Trachea: Trachea normal.   Cardiovascular:      Rate and Rhythm: Normal rate.   Pulmonary:      Effort: Pulmonary effort is normal. No respiratory distress.      Breath sounds: Normal breath sounds.   Abdominal:      Tenderness: There is no abdominal tenderness.   Skin:     General: Skin is warm and dry.   Neurological:      Mental Status: She is alert and oriented to person, place, and time.   Psychiatric:         Mood and Affect: Mood normal.         Speech: Speech normal.         Behavior: Behavior normal. Behavior is cooperative.     Vitals:    08/17/23 1331   BP: (!) 182/96   Pulse: 76   Resp: 16   Temp: 98.1 øF (36.7 øC)   SpO2: 94%   Weight: 91.2 kg (201 lb)   Height: 160 cm (63\")     Body mass index is 35.61 kg/mý.     Results Review:   I reviewed the patient's new clinical results.    No visits with results within 90 Day(s) from this visit.   Latest known visit " with results is:   Admission on 11/29/2021, Discharged on 11/29/2021   Component Date Value Ref Range Status    Case Report 11/29/2021    Final                    Value:Surgical Pathology Report                         Case: [id number]                                  Authorizing Provider:  Gonzalez Zapata MD  Collected:           11/29/2021 07:49 AM          Ordering Location:     [facility] OR Received:            11/29/2021 02:37 PM          Pathologist:           Cornelio Rubin MD                                                             Specimens:   1) - Small Intestine, duodenum for change in bowel habits and rule out celiac                       2) - Small Intestine, duodenal bulb?pancreatic crest biopsy                                         3) - Gastric, Antrum, gastric antrum biopsy for h. pylori                                           4) - Esophagus, esophageal random biopsies distal, mid and proximal for dysphagia          Final Diagnosis 11/29/2021    Final                    Value:This result contains rich text formatting which cannot be displayed here.      SCANNED - LABS (05/05/2022)     Dated 3/23/2023 Hgb 12.9, Hct 40.0, platelets 293, iron 90, iron saturation 37%    EGD dated 11/25/2019 per Dr. Chidi Downing  - LA Grade A reflux esophagitis. Biopsied.  - Mild Schatzki ring.  - Gastritis. Biopsied.  - Normal examined duodenum.  -Gastric antrum biopsies with moderate chronic gastritis, H. pylori negative.  Distal esophagus biopsies with mild chronic gastritis, negative for intestinal metaplasia.     Colonoscopy dated 11/26/2019 per Dr. Chidi Downing  - The entire examined colon is normal.  - Diverticulosis in the sigmoid colon, in the descending colon, in the transverse colon and in the ascending colon.  - No specimens collected.     PillCam 11/26/2019 per Dr. Chidi Downing, results are not available in the chart.     EGD dated 11/29/2021 per Dr. Zapata  - Normal  oropharynx.  - Z-line regular, 35 cm from the incisors.  - 2 cm hiatal hernia.  - LA Grade A reflux esophagitis with no bleeding.  - Low-grade of narrowing and mild Schatzki ring. Dilated. Biopsied.  - Erythematous mucosa in the posterior wall of the stomach, incisura and antrum. Biopsied.  - A single lesion suspicious for aberrant pancreas was found in the duodenum. Biopsied.  - Normal first portion of the duodenum, second portion of the duodenum and third portion of the duodenum. Biopsied.  - Patient may have some degree of esophageal dysmotility associated with DM  - Duodenum biopsy unremarkable. Duodenum biopsy bulb?/pancreatic crest biopsy with typical duodenal bulb site changes. Gastric antrum biopsy with H. pylori, no intestinal metaplasia. Esophagus biopsy random distal, mid and proximal with features suggestive of reflux esophagitis.    Assessment / Plan      1. Gastroesophageal reflux disease with esophagitis without hemorrhage  2. Esophageal dysphagia  She has a history of reflux for many years and still has breakthrough at bedtime on occasion or if she eats large meals.  She has a history of difficulty swallowing since 2017 per records that is unchanged.  EGD dated 11/29/2021 with a low-grade narrowing and mild Schatzki's ring dilated.  Biopsies with no Arcos's.  Patient may have some degree of esophageal dysmotility associated with her diabetes, as well as age-related changes.  Long discussion regarding antireflux measures.  Advise eat a softer diet, chew food very well and take sips of water between bites.  Esophagram  EGD if stricture noted on esophagram or if no improvement/symptoms worsen.    - pantoprazole (PROTONIX) 40 MG EC tablet; TAKE 1 TABLET BY MOUTH 30 MINUTES BEFORE BREAKFAST. Needs follow up appointment for any further refills.  Dispense: 30 tablet; Refill: 5  - FL Esophagram Complete Single Contrast; Future    3. History of Helicobacter pylori infection  She is not having any nausea or  epigastric pain at this time.  She was treated for H. pylori after her EGD and November 2021.  Testing for clearance was ordered at last visit, but she did not have this done.  Discussed H. pylori breath test for H. pylori clearance-patient wishes to pursue this through PCP office.    4. Irritable bowel syndrome with constipation  She is having a bowel movement every 1 to 3 days.  She does not take anything on a regular basis for constipation.  She takes a stool softener on occasion and it helps.  Denies rectal bleeding.  Colonoscopy dated 11/26/2019 unremarkable.  Low FODMAP diet-avoid all dairy  Stool softeners 2/day.  May take MiraLAX 17 g daily as needed for constipation.    5. Encounter for screening for malignant neoplasm of colon  The patient had a colonoscopy dated 11/26/2019 per Dr. Chidi Downing with entire examined colon normal.  No specimens were collected.  No family history of colon cancer.  Colonoscopy for screening in 10 years, 2029, depending on health status, or sooner if needed.    Patient Instructions   Antireflux measures: Avoid fried, fatty foods, alcohol, chocolate, coffee, tea,  soft drinks, all carbonated beverages (including sparkling water), peppermint and spearmint, spicy foods, tomatoes and tomato based foods, onions, peppers, and garlic.   Other antireflux measures include weight reduction if overweight, avoiding tight clothing around the abdomen, elevating the head of the bed 6 inches with blocks under the head board, and don't drink or eat before going to bed and avoid lying down immediately after meals.  Pantoprazole 40 mg 1 by mouth in the am 30 minutes before breakfast.   Eat relatively soft diet, eat in upright position and chew food well.    Drink water after 2-3 bites and take medications with liberal amounts of water.   After eating and taking medications, remain in upright position for 5-10 minutes.  High fiber, low fat diet with liberal water intake.   Stool softeners 2 per  day.   Miralax 17 grams daily as needed for constipation.  Low FODMAP diet - avoid all dairy. May use lactose free/dairy free alternatives such as almond milk, rice milk, oat milk, etc.   Esophagram  Follow up: 3 months      Low-FODMAP Eating Plan    FODMAP stands for fermentable oligosaccharides, disaccharides, monosaccharides, and polyols. These are sugars that are hard for some people to digest. A low-FODMAP eating plan may help some people who have irritable bowel syndrome (IBS) and certain other bowel (intestinal) diseases to manage their symptoms.  This meal plan can be complicated to follow. Work with a diet and nutrition specialist (dietitian) to make a low-FODMAP eating plan that is right for you. A dietitian can help make sure that you get enough nutrition from this diet.  What are tips for following this plan?  Reading food labels  Check labels for hidden FODMAPs such as:  High-fructose syrup.  Honey.  Agave.  Natural fruit flavors.  Onion or garlic powder.  Choose low-FODMAP foods that contain 3-4 grams of fiber per serving.  Check food labels for serving sizes. Eat only one serving at a time to make sure FODMAP levels stay low.  Shopping  Shop with a list of foods that are recommended on this diet and make a meal plan.  Meal planning  Follow a low-FODMAP eating plan for up to 6 weeks, or as told by your health care provider or dietitian.  To follow the eating plan:  Eliminate high-FODMAP foods from your diet completely. Choose only low-FODMAP foods to eat. You will do this for 2-6 weeks.  Gradually reintroduce high-FODMAP foods into your diet one at a time. Most people should wait a few days before introducing the next new high-FODMAP food into their meal plan. Your dietitian can recommend how quickly you may reintroduce foods.  Keep a daily record of what and how much you eat and drink. Make note of any symptoms that you have after eating.  Review your daily record with a dietitian regularly to  "identify which foods you can eat and which foods you should avoid.  General tips  Drink enough fluid each day to keep your urine pale yellow.  Avoid processed foods. These often have added sugar and may be high in FODMAPs.  Avoid most dairy products, whole grains, and sweeteners.  Work with a dietitian to make sure you get enough fiber in your diet.  Avoid high FODMAP foods at meals to manage symptoms.    Recommended foods  Fruits  Bananas, oranges, tangerines, kiran, limes, blueberries, raspberries, strawberries, grapes, cantaloupe, honeydew melon, kiwi, papaya, passion fruit, and pineapple. Limited amounts of dried cranberries, banana chips, and shredded coconut.  Vegetables  Eggplant, zucchini, cucumber, peppers, green beans, bean sprouts, lettuce, arugula, kale, Swiss chard, spinach, mark greens, bok imelda, summer squash, potato, and tomato. Limited amounts of corn, carrot, and sweet potato. Green parts of scallions.  Grains  Gluten-free grains, such as rice, oats, buckwheat, quinoa, corn, polenta, and millet. Gluten-free pasta, bread, or cereal. Rice noodles. Corn tortillas.  Meats and other proteins  Unseasoned beef, pork, poultry, or fish. Eggs. Dave. Tofu (firm) and tempeh. Limited amounts of nuts and seeds, such as almonds, walnuts, brazil nuts, pecans, peanuts, nut butters, pumpkin seeds, ralph seeds, and sunflower seeds.  Dairy  Lactose-free milk, yogurt, and kefir. Lactose-free cottage cheese and ice cream. Non-dairy milks, such as almond, coconut, hemp, and rice milk. Non-dairy yogurt. Limited amounts of goat cheese, brie, mozzarella, parmesan, swiss, and other hard cheeses.  Fats and oils  Butter-free spreads. Vegetable oils, such as olive, canola, and sunflower oil.  Seasoning and other foods  Artificial sweeteners with names that do not end in \"ol,\" such as aspartame, saccharine, and stevia. Maple syrup, white table sugar, raw sugar, brown sugar, and molasses. Mayonnaise, soy sauce, and tamari. " Fresh basil, coriander, parsley, rosemary, and thyme.  Beverages  Water and mineral water. Sugar-sweetened soft drinks. Small amounts of orange juice or cranberry juice. Black and green tea. Most dry augustina. Coffee.  The items listed above may not be a complete list of foods and beverages you can eat. Contact a dietitian for more information.    Foods to avoid  Fruits  Fresh, dried, and juiced forms of apple, pear, watermelon, peach, plum, cherries, apricots, blackberries, boysenberries, figs, nectarines, and derrell. Avocado.  Vegetables  Chicory root, artichoke, asparagus, cabbage, snow peas, [place] sprouts, broccoli, sugar snap peas, mushrooms, celery, and cauliflower. Onions, garlic, leeks, and the white part of scallions.  Grains  Wheat, including kamut, durum, and semolina. Barley and bulgur. Couscous. Wheat-based cereals. Wheat noodles, bread, crackers, and pastries.  Meats and other proteins  Fried or fatty meat. Sausage. Cashews and pistachios. Soybeans, baked beans, black beans, chickpeas, kidney beans, leydi beans, navy beans, lentils, black-eyed peas, and split peas.  Dairy  Milk, yogurt, ice cream, and soft cheese. Cream and sour cream. Milk-based sauces. Custard. Buttermilk. Soy milk.  Seasoning and other foods  Any sugar-free gum or candy. Foods that contain artificial sweeteners such as sorbitol, mannitol, isomalt, or xylitol. Foods that contain honey, high-fructose corn syrup, or agave. Bouillon, vegetable stock, beef stock, and chicken stock. Garlic and onion powder. Condiments made with onion, such as hummus, chutney, pickles, relish, salad dressing, and salsa. Tomato paste.  Beverages  Chicory-based drinks. Coffee substitutes. Chamomile tea. Fennel tea. Sweet or fortified augustina such as port or tereza. Diet soft drinks made with isomalt, mannitol, maltitol, sorbitol, or xylitol. Apple, pear, and derrell juice. Juices with high-fructose corn syrup.  The items listed above may not be a complete list of  foods and beverages you should avoid. Contact a dietitian for more information.    Summary  FODMAP stands for fermentable oligosaccharides, disaccharides, monosaccharides, and polyols. These are sugars that are hard for some people to digest.  A low-FODMAP eating plan is a short-term diet that helps to ease symptoms of certain bowel diseases.  The eating plan usually lasts up to 6 weeks. After that, high-FODMAP foods are reintroduced gradually and one at a time. This can help you find out which foods may be causing symptoms.  A low-FODMAP eating plan can be complicated. It is best to work with a dietitian who has experience with this type of plan.  This information is not intended to replace advice given to you by your health care provider. Make sure you discuss any questions you have with your health care provider.  Document Revised: 05/06/2021 Document Reviewed: 05/06/2021  [employer] Patient Education c 2023 [employer]    Jocelin Roper, APRN  8/17/2023    Please note that portions of this note were completed with a voice recognition program.

## 2023-08-17 NOTE — PATIENT INSTRUCTIONS
Antireflux measures: Avoid fried, fatty foods, alcohol, chocolate, coffee, tea,  soft drinks, all carbonated beverages (including sparkling water), peppermint and spearmint, spicy foods, tomatoes and tomato based foods, onions, peppers, and garlic.   Other antireflux measures include weight reduction if overweight, avoiding tight clothing around the abdomen, elevating the head of the bed 6 inches with blocks under the head board, and don't drink or eat before going to bed and avoid lying down immediately after meals.  Pantoprazole 40 mg 1 by mouth in the am 30 minutes before breakfast.   Eat relatively soft diet, eat in upright position and chew food well.    Drink water after 2-3 bites and take medications with liberal amounts of water.   After eating and taking medications, remain in upright position for 5-10 minutes.  High fiber, low fat diet with liberal water intake.   Stool softeners 2 per day.   Miralax 17 grams daily as needed for constipation.  Low FODMAP diet - avoid all dairy. May use lactose free/dairy free alternatives such as almond milk, rice milk, oat milk, etc.   Esophagram  Follow up: 3 months      Low-FODMAP Eating Plan    FODMAP stands for fermentable oligosaccharides, disaccharides, monosaccharides, and polyols. These are sugars that are hard for some people to digest. A low-FODMAP eating plan may help some people who have irritable bowel syndrome (IBS) and certain other bowel (intestinal) diseases to manage their symptoms.  This meal plan can be complicated to follow. Work with a diet and nutrition specialist (dietitian) to make a low-FODMAP eating plan that is right for you. A dietitian can help make sure that you get enough nutrition from this diet.  What are tips for following this plan?  Reading food labels  Check labels for hidden FODMAPs such as:  High-fructose syrup.  Honey.  Agave.  Natural fruit flavors.  Onion or garlic powder.  Choose low-FODMAP foods that contain 3-4 grams of fiber  per serving.  Check food labels for serving sizes. Eat only one serving at a time to make sure FODMAP levels stay low.  Shopping  Shop with a list of foods that are recommended on this diet and make a meal plan.  Meal planning  Follow a low-FODMAP eating plan for up to 6 weeks, or as told by your health care provider or dietitian.  To follow the eating plan:  Eliminate high-FODMAP foods from your diet completely. Choose only low-FODMAP foods to eat. You will do this for 2-6 weeks.  Gradually reintroduce high-FODMAP foods into your diet one at a time. Most people should wait a few days before introducing the next new high-FODMAP food into their meal plan. Your dietitian can recommend how quickly you may reintroduce foods.  Keep a daily record of what and how much you eat and drink. Make note of any symptoms that you have after eating.  Review your daily record with a dietitian regularly to identify which foods you can eat and which foods you should avoid.  General tips  Drink enough fluid each day to keep your urine pale yellow.  Avoid processed foods. These often have added sugar and may be high in FODMAPs.  Avoid most dairy products, whole grains, and sweeteners.  Work with a dietitian to make sure you get enough fiber in your diet.  Avoid high FODMAP foods at meals to manage symptoms.    Recommended foods  Fruits  Bananas, oranges, tangerines, kiran, limes, blueberries, raspberries, strawberries, grapes, cantaloupe, honeydew melon, kiwi, papaya, passion fruit, and pineapple. Limited amounts of dried cranberries, banana chips, and shredded coconut.  Vegetables  Eggplant, zucchini, cucumber, peppers, green beans, bean sprouts, lettuce, arugula, kale, Swiss chard, spinach, mark greens, bok imelda, summer squash, potato, and tomato. Limited amounts of corn, carrot, and sweet potato. Green parts of scallions.  Grains  Gluten-free grains, such as rice, oats, buckwheat, quinoa, corn, polenta, and millet. Gluten-free  "pasta, bread, or cereal. Rice noodles. Corn tortillas.  Meats and other proteins  Unseasoned beef, pork, poultry, or fish. Eggs. Dave. Tofu (firm) and tempeh. Limited amounts of nuts and seeds, such as almonds, walnuts, brazil nuts, pecans, peanuts, nut butters, pumpkin seeds, ralph seeds, and sunflower seeds.  Dairy  Lactose-free milk, yogurt, and kefir. Lactose-free cottage cheese and ice cream. Non-dairy milks, such as almond, coconut, hemp, and rice milk. Non-dairy yogurt. Limited amounts of goat cheese, brie, mozzarella, parmesan, swiss, and other hard cheeses.  Fats and oils  Butter-free spreads. Vegetable oils, such as olive, canola, and sunflower oil.  Seasoning and other foods  Artificial sweeteners with names that do not end in \"ol,\" such as aspartame, saccharine, and stevia. Maple syrup, white table sugar, raw sugar, brown sugar, and molasses. Mayonnaise, soy sauce, and tamari. Fresh basil, coriander, parsley, rosemary, and thyme.  Beverages  Water and mineral water. Sugar-sweetened soft drinks. Small amounts of orange juice or cranberry juice. Black and green tea. Most dry augustina. Coffee.  The items listed above may not be a complete list of foods and beverages you can eat. Contact a dietitian for more information.    Foods to avoid  Fruits  Fresh, dried, and juiced forms of apple, pear, watermelon, peach, plum, cherries, apricots, blackberries, boysenberries, figs, nectarines, and derrell. Avocado.  Vegetables  Chicory root, artichoke, asparagus, cabbage, snow peas, [place] sprouts, broccoli, sugar snap peas, mushrooms, celery, and cauliflower. Onions, garlic, leeks, and the white part of scallions.  Grains  Wheat, including kamut, durum, and semolina. Barley and bulgur. Couscous. Wheat-based cereals. Wheat noodles, bread, crackers, and pastries.  Meats and other proteins  Fried or fatty meat. Sausage. Cashews and pistachios. Soybeans, baked beans, black beans, chickpeas, kidney beans, leydi beans, navy " beans, lentils, black-eyed peas, and split peas.  Dairy  Milk, yogurt, ice cream, and soft cheese. Cream and sour cream. Milk-based sauces. Custard. Buttermilk. Soy milk.  Seasoning and other foods  Any sugar-free gum or candy. Foods that contain artificial sweeteners such as sorbitol, mannitol, isomalt, or xylitol. Foods that contain honey, high-fructose corn syrup, or agave. Bouillon, vegetable stock, beef stock, and chicken stock. Garlic and onion powder. Condiments made with onion, such as hummus, chutney, pickles, relish, salad dressing, and salsa. Tomato paste.  Beverages  Chicory-based drinks. Coffee substitutes. Chamomile tea. Fennel tea. Sweet or fortified augustina such as port or tereza. Diet soft drinks made with isomalt, mannitol, maltitol, sorbitol, or xylitol. Apple, pear, and derrell juice. Juices with high-fructose corn syrup.  The items listed above may not be a complete list of foods and beverages you should avoid. Contact a dietitian for more information.    Summary  FODMAP stands for fermentable oligosaccharides, disaccharides, monosaccharides, and polyols. These are sugars that are hard for some people to digest.  A low-FODMAP eating plan is a short-term diet that helps to ease symptoms of certain bowel diseases.  The eating plan usually lasts up to 6 weeks. After that, high-FODMAP foods are reintroduced gradually and one at a time. This can help you find out which foods may be causing symptoms.  A low-FODMAP eating plan can be complicated. It is best to work with a dietitian who has experience with this type of plan.  This information is not intended to replace advice given to you by your health care provider. Make sure you discuss any questions you have with your health care provider.  Document Revised: 05/06/2021 Document Reviewed: 05/06/2021  [email or web address] Patient Education c 2023 [email or web address]

## 2023-08-21 ENCOUNTER — APPOINTMENT (OUTPATIENT)
Dept: GENERAL RADIOLOGY | Facility: HOSPITAL | Age: 78
End: 2023-08-21
Payer: MEDICARE

## 2023-08-21 ENCOUNTER — HOSPITAL ENCOUNTER (EMERGENCY)
Facility: HOSPITAL | Age: 78
Discharge: HOME | End: 2023-08-21
Attending: FAMILY MEDICINE
Payer: MEDICARE

## 2023-08-21 VITALS
BODY MASS INDEX: 35.44 KG/M2 | WEIGHT: 200 LBS | DIASTOLIC BLOOD PRESSURE: 85 MMHG | OXYGEN SATURATION: 94 % | TEMPERATURE: 98 F | HEART RATE: 70 BPM | HEIGHT: 63 IN | RESPIRATION RATE: 18 BRPM | SYSTOLIC BLOOD PRESSURE: 191 MMHG

## 2023-08-21 DIAGNOSIS — I10 HYPERTENSION, UNSPECIFIED TYPE: ICD-10-CM

## 2023-08-21 DIAGNOSIS — N18.9 CHRONIC KIDNEY DISEASE, UNSPECIFIED CKD STAGE: Primary | ICD-10-CM

## 2023-08-21 LAB
ALBUMIN SERPL-MCNC: 3.6 G/DL (ref 3.5–5.2)
ALBUMIN/GLOB SERPL: 1.2 G/DL
ALP SERPL-CCNC: 74 U/L (ref 39–117)
ALT SERPL W P-5'-P-CCNC: 13 U/L (ref 1–33)
ANION GAP SERPL CALCULATED.3IONS-SCNC: 11.4 MMOL/L (ref 5–15)
AST SERPL-CCNC: 19 U/L (ref 1–32)
BASOPHILS # BLD AUTO: 0.07 10*3/MM3 (ref 0–0.2)
BASOPHILS NFR BLD AUTO: 0.8 % (ref 0–1.5)
BILIRUB SERPL-MCNC: 0.2 MG/DL (ref 0–1.2)
BUN SERPL-MCNC: 40 MG/DL (ref 8–23)
BUN/CREAT SERPL: 20.6 (ref 7–25)
CALCIUM SPEC-SCNC: 9.3 MG/DL (ref 8.6–10.5)
CHLORIDE SERPL-SCNC: 105 MMOL/L (ref 98–107)
CO2 SERPL-SCNC: 23.6 MMOL/L (ref 22–29)
CREAT SERPL-MCNC: 1.94 MG/DL (ref 0.57–1)
DEPRECATED RDW RBC AUTO: 44.7 FL (ref 37–54)
EGFRCR SERPLBLD CKD-EPI 2021: 25.9 ML/MIN/1.73
EOSINOPHIL # BLD AUTO: 0.23 10*3/MM3 (ref 0–0.4)
EOSINOPHIL NFR BLD AUTO: 2.7 % (ref 0.3–6.2)
ERYTHROCYTE [DISTWIDTH] IN BLOOD BY AUTOMATED COUNT: 13.7 % (ref 12.3–15.4)
GEN 5 2HR TROPONIN T REFLEX: 68 NG/L
GLOBULIN UR ELPH-MCNC: 3.1 GM/DL
GLUCOSE SERPL-MCNC: 54 MG/DL (ref 65–99)
HCT VFR BLD AUTO: 39.5 % (ref 34–46.6)
HGB BLD-MCNC: 12.9 G/DL (ref 12–15.9)
HOLD SPECIMEN: NORMAL
HOLD SPECIMEN: NORMAL
IMM GRANULOCYTES # BLD AUTO: 0.03 10*3/MM3 (ref 0–0.05)
IMM GRANULOCYTES NFR BLD AUTO: 0.4 % (ref 0–0.5)
LYMPHOCYTES # BLD AUTO: 4.23 10*3/MM3 (ref 0.7–3.1)
LYMPHOCYTES NFR BLD AUTO: 49.6 % (ref 19.6–45.3)
MCH RBC QN AUTO: 29.1 PG (ref 26.6–33)
MCHC RBC AUTO-ENTMCNC: 32.7 G/DL (ref 31.5–35.7)
MCV RBC AUTO: 89 FL (ref 79–97)
MONOCYTES # BLD AUTO: 0.84 10*3/MM3 (ref 0.1–0.9)
MONOCYTES NFR BLD AUTO: 9.8 % (ref 5–12)
NEUTROPHILS NFR BLD AUTO: 3.13 10*3/MM3 (ref 1.7–7)
NEUTROPHILS NFR BLD AUTO: 36.7 % (ref 42.7–76)
NRBC BLD AUTO-RTO: 0 /100 WBC (ref 0–0.2)
PLATELET # BLD AUTO: 300 10*3/MM3 (ref 140–450)
PMV BLD AUTO: 9.9 FL (ref 6–12)
POTASSIUM SERPL-SCNC: 4 MMOL/L (ref 3.5–5.2)
PROT SERPL-MCNC: 6.7 G/DL (ref 6–8.5)
RBC # BLD AUTO: 4.44 10*6/MM3 (ref 3.77–5.28)
SODIUM SERPL-SCNC: 140 MMOL/L (ref 136–145)
TROPONIN T DELTA: -9 NG/L
TROPONIN T SERPL HS-MCNC: 77 NG/L
WBC NRBC COR # BLD: 8.53 10*3/MM3 (ref 3.4–10.8)
WHOLE BLOOD HOLD COAG: NORMAL
WHOLE BLOOD HOLD SPECIMEN: NORMAL

## 2023-08-21 PROCEDURE — 80053 COMPREHEN METABOLIC PANEL: CPT | Performed by: FAMILY MEDICINE

## 2023-08-21 PROCEDURE — 99284 EMERGENCY DEPT VISIT MOD MDM: CPT

## 2023-08-21 PROCEDURE — 85025 COMPLETE CBC W/AUTO DIFF WBC: CPT | Performed by: FAMILY MEDICINE

## 2023-08-21 PROCEDURE — 36415 COLL VENOUS BLD VENIPUNCTURE: CPT

## 2023-08-21 PROCEDURE — 93005 ELECTROCARDIOGRAM TRACING: CPT

## 2023-08-21 PROCEDURE — 93005 ELECTROCARDIOGRAM TRACING: CPT | Performed by: FAMILY MEDICINE

## 2023-08-21 PROCEDURE — 84484 ASSAY OF TROPONIN QUANT: CPT | Performed by: FAMILY MEDICINE

## 2023-08-21 PROCEDURE — 71045 X-RAY EXAM CHEST 1 VIEW: CPT

## 2023-08-21 RX ORDER — NIFEDIPINE 30 MG/1
30 TABLET, EXTENDED RELEASE ORAL ONCE
Status: COMPLETED | OUTPATIENT
Start: 2023-08-21 | End: 2023-08-21

## 2023-08-21 RX ORDER — SODIUM CHLORIDE 0.9 % (FLUSH) 0.9 %
10 SYRINGE (ML) INJECTION AS NEEDED
Status: DISCONTINUED | OUTPATIENT
Start: 2023-08-21 | End: 2023-08-21 | Stop reason: HOSPADM

## 2023-08-21 RX ORDER — LOSARTAN POTASSIUM 50 MG/1
100 TABLET ORAL
Status: DISCONTINUED | OUTPATIENT
Start: 2023-08-21 | End: 2023-08-21 | Stop reason: HOSPADM

## 2023-08-21 RX ORDER — CARVEDILOL 25 MG/1
25 TABLET ORAL ONCE
Status: COMPLETED | OUTPATIENT
Start: 2023-08-21 | End: 2023-08-21

## 2023-08-21 RX ORDER — NIFEDIPINE 30 MG/1
30 TABLET, EXTENDED RELEASE ORAL DAILY
Qty: 7 TABLET | Refills: 0 | Status: SHIPPED | OUTPATIENT
Start: 2023-08-21 | End: 2023-08-28

## 2023-08-21 RX ORDER — HYDROCHLOROTHIAZIDE 25 MG/1
25 TABLET ORAL
Status: DISCONTINUED | OUTPATIENT
Start: 2023-08-21 | End: 2023-08-21 | Stop reason: HOSPADM

## 2023-08-21 RX ORDER — ASPIRIN 325 MG
325 TABLET ORAL ONCE
Status: COMPLETED | OUTPATIENT
Start: 2023-08-21 | End: 2023-08-21

## 2023-08-21 RX ADMIN — NIFEDIPINE 30 MG: 30 TABLET, FILM COATED, EXTENDED RELEASE ORAL at 20:18

## 2023-08-21 RX ADMIN — LOSARTAN POTASSIUM 100 MG: 50 TABLET, FILM COATED ORAL at 17:09

## 2023-08-21 RX ADMIN — ASPIRIN 325 MG: 325 TABLET ORAL at 16:11

## 2023-08-21 RX ADMIN — HYDROCHLOROTHIAZIDE 25 MG: 25 TABLET ORAL at 17:10

## 2023-08-21 RX ADMIN — CARVEDILOL 25 MG: 25 TABLET, FILM COATED ORAL at 17:10

## 2023-08-21 NOTE — ED PROVIDER NOTES
"Subjective:  History of Present Illness:    Patient is a 78-year-old female with history of COPD, HLD, HTN, CKD and T2DM.  Patient presents to the ER today for hypertension x1 month.  Patient reports that she is not compliant with home blood pressure medications.  Is unsure if she took her blood pressure medications this morning.  She denies OTC medication or home remedy.  Denies alleviating or exacerbating factors.    Nurses Notes reviewed and agree, including vitals, allergies, social history and prior medical history.     REVIEW OF SYSTEMS: All systems reviewed and not pertinent unless noted.  Review of Systems   Cardiovascular:         Hypertension   All other systems reviewed and are negative.    Past Medical History:   Diagnosis Date    Acute bronchitis with bronchospasm     Acute exacerbation of chronic obstructive pulmonary disease (COPD)     Anxiety     Arthritis     B12 deficiency     Back pain     Body piercing     ears    Cataract     Cervicalgia     Colonic polyp     History of colonic polyps     Constipation     COPD (chronic obstructive pulmonary disease)     Depression     Diverticulitis     Dysphagia     Patient reported \"it won't go all the way down\" when eating solid foods first thing in the mornings    Edema     Elevated cholesterol     Esophageal reflux     Folic acid deficiency     Full dentures     Advised no adhesives DOS    Herpes zoster     High cholesterol     History of kidney infection     History of recurrent urinary tract infection     HTN (hypertension)     Hypercholesterolemia     Impaired functional mobility, balance, gait, and endurance     Kidney infection     Malignant hypertension 4/26/2015     Accelerated essential hypertension    Measles     rubeola    Muscle spasm     Neoplasm of uncertain behavior of skin     Osteoporosis     Poor historian     Recurrent urinary tract infection     Rheumatoid arthritis     RLS (restless legs syndrome)     Stomach ulcer     Stroke     Patient " 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 apx  and that she has residual right sided weakness    Type 2 diabetes mellitus     Vitamin D deficiency        Allergies:    Penicillins, Clonidine derivatives, Hydralazine, Elemental sulfur, and Sulfa antibiotics      Past Surgical History:   Procedure Laterality Date    CATARACT EXTRACTION Left 2013    with lens implant    CATARACT EXTRACTION Right 04/15/2013    with lens implant    CATARACT EXTRACTION WITH INTRAOCULAR LENS IMPLANT Left 2013    CATARACT EXTRACTION WITH INTRAOCULAR LENS IMPLANT Right 04/15/2013    COLONOSCOPY      COLONOSCOPY N/A 2019    Procedure: COLONOSCOPY;  Surgeon: Chidi Downing MD;  Location:  [facility];  Service: Gastroenterology    ENDOSCOPY N/A 2017    Procedure: ESOPHAGOGASTRODUODENOSCOPY with biopsies and esophageal balloon dilitation;  Surgeon: Wesley Stovall MD;  Location: [facility];  Service:     ENDOSCOPY N/A 2019    Procedure: ESOPHAGOGASTRODUODENOSCOPY;  Surgeon: Chidi Downing MD;  Location:  [facility];  Service: Gastroenterology    ENDOSCOPY N/A 2021    Procedure: ESOPHAGOGASTRODUODENOSCOPY with dilation and biopsies;  Surgeon: Gonzalez Zapata MD;  Location: [facility] ENDOSCOPY;  Service: Gastroenterology;  Laterality: N/A;    HYSTERECTOMY  1979    UPPER GASTROINTESTINAL ENDOSCOPY  2013    UPPER GASTROINTESTINAL ENDOSCOPY  2017         Social History     Socioeconomic History    Marital status:    Tobacco Use    Smoking status: Former     Packs/day: 1.00     Years: 15.00     Pack years: 15.00     Types: Cigarettes     Quit date:      Years since quittin.6    Smokeless tobacco: Never    Tobacco comments:      Denied: History of smoking cigarettes   Vaping Use    Vaping Use: Never used   Substance and Sexual Activity    Alcohol use: Yes     Comment: Occassionally    Drug use: No    Sexual activity: Defer         Family History   Problem Relation Age of Onset     "Arthritis Mother     Diabetes Mother     Hypertension Mother     Arthritis Other     Arthritis Other     Diabetes Other         DM    Hypertension Other     Colon cancer Neg Hx        Objective  Physical Exam:  BP (!) 191/85   Pulse 70   Temp 98 øF (36.7 øC) (Oral)   Resp 18   Ht 160 cm (63\")   Wt 90.7 kg (200 lb)   SpO2 94%   BMI 35.43 kg/mý      Physical Exam  Vitals and nursing note reviewed.   Constitutional:       Appearance: Normal appearance. She is normal weight.   HENT:      Head: Normocephalic and atraumatic.      Nose: Nose normal.      Mouth/Throat:      Mouth: Mucous membranes are moist.      Pharynx: Oropharynx is clear.   Eyes:      Extraocular Movements: Extraocular movements intact.      Conjunctiva/sclera: Conjunctivae normal.      Pupils: Pupils are equal, round, and reactive to light.   Cardiovascular:      Rate and Rhythm: Normal rate.   Pulmonary:      Effort: Pulmonary effort is normal.   Abdominal:      General: Abdomen is flat. Bowel sounds are normal.      Palpations: Abdomen is soft.   Musculoskeletal:         General: Normal range of motion.      Cervical back: Normal range of motion and neck supple.   Skin:     General: Skin is warm and dry.      Capillary Refill: Capillary refill takes less than 2 seconds.   Neurological:      General: No focal deficit present.      Mental Status: She is alert and oriented to person, place, and time. Mental status is at baseline.   Psychiatric:         Mood and Affect: Mood normal.         Behavior: Behavior normal.         Thought Content: Thought content normal.         Judgment: Judgment normal.       Procedures    ED Course:    ED Course as of 08/21/23 2012   Mon Aug 21, 2023   1555 EKG interpreted by me-sinus rhythm, rate 81 intervals within normal limits, no ST or T wave changes concerning for ischemia.  Normal EKG [HR]      ED Course User Index  [HR] Dean Salguero MD       Lab Results (last 24 hours)       Procedure Component Value " Units Date/Time    Comprehensive Metabolic Panel [[id number]]  (Abnormal) Collected: 08/21/23 1610    Specimen: Blood Updated: 08/21/23 1647     Glucose 54 mg/dL      BUN 40 mg/dL      Creatinine 1.94 mg/dL      Sodium 140 mmol/L      Potassium 4.0 mmol/L      Comment: Slight hemolysis detected by analyzer. Results may be affected.        Chloride 105 mmol/L      CO2 23.6 mmol/L      Calcium 9.3 mg/dL      Total Protein 6.7 g/dL      Albumin 3.6 g/dL      ALT (SGPT) 13 U/L      AST (SGOT) 19 U/L      Comment: Slight hemolysis detected by analyzer. Results may be affected.        Alkaline Phosphatase 74 U/L      Total Bilirubin 0.2 mg/dL      Globulin 3.1 gm/dL      A/G Ratio 1.2 g/dL      BUN/Creatinine Ratio 20.6     Anion Gap 11.4 mmol/L      eGFR 25.9 mL/min/1.73     Narrative:      GFR Normal >60  Chronic Kidney Disease <60  Kidney Failure <15    The GFR formula is only valid for adults with stable renal function between ages 18 and 70.    CBC & Differential [[id number]]  (Abnormal) Collected: 08/21/23 1610    Specimen: Blood Updated: 08/21/23 1617    Narrative:      The following orders were created for panel order CBC & Differential.  Procedure                               Abnormality         Status                     ---------                               -----------         ------                     CBC Auto Differential[[id number]]        Abnormal            Final result                 Please view results for these tests on the individual orders.    CBC Auto Differential [[id number]]  (Abnormal) Collected: 08/21/23 1610    Specimen: Blood Updated: 08/21/23 1617     WBC 8.53 10*3/mm3      RBC 4.44 10*6/mm3      Hemoglobin 12.9 g/dL      Hematocrit 39.5 %      MCV 89.0 fL      MCH 29.1 pg      MCHC 32.7 g/dL      RDW 13.7 %      RDW-SD 44.7 fl      MPV 9.9 fL      Platelets 300 10*3/mm3      Neutrophil % 36.7 %      Lymphocyte % 49.6 %      Monocyte % 9.8 %      Eosinophil % 2.7 %      Basophil % 0.8 %       Immature Grans % 0.4 %      Neutrophils, Absolute 3.13 10*3/mm3      Lymphocytes, Absolute 4.23 10*3/mm3      Monocytes, Absolute 0.84 10*3/mm3      Eosinophils, Absolute 0.23 10*3/mm3      Basophils, Absolute 0.07 10*3/mm3      Immature Grans, Absolute 0.03 10*3/mm3      nRBC 0.0 /100 WBC     High Sensitivity Troponin T [[id number]]  (Abnormal) Collected: 08/21/23 1610    Specimen: Blood Updated: 08/21/23 1657     HS Troponin T 77 ng/L     Narrative:      High Sensitive Troponin T Reference Range:  <10.0 ng/L- Negative Female for AMI  <15.0 ng/L- Negative Male for AMI  >=10 - Abnormal Female indicating possible myocardial injury.  >=15 - Abnormal Male indicating possible myocardial injury.   Clinicians would have to utilize clinical acumen, EKG, Troponin, and serial changes to determine if it is an Acute Myocardial Infarction or myocardial injury due to an underlying chronic condition.         High Sensitivity Troponin T 2Hr [[id number]]  (Abnormal) Collected: 08/21/23 1808    Specimen: Blood Updated: 08/21/23 1847     HS Troponin T 68 ng/L      Troponin T Delta -9 ng/L     Narrative:      High Sensitive Troponin T Reference Range:  <10.0 ng/L- Negative Female for AMI  <15.0 ng/L- Negative Male for AMI  >=10 - Abnormal Female indicating possible myocardial injury.  >=15 - Abnormal Male indicating possible myocardial injury.   Clinicians would have to utilize clinical acumen, EKG, Troponin, and serial changes to determine if it is an Acute Myocardial Infarction or myocardial injury due to an underlying chronic condition.                  XR Chest 1 View    Result Date: 8/21/2023  PROCEDURE: XR CHEST 1 VW-  HISTORY: Chest Pain Triage Protocol,  COMPARISON: 5/20/2021  FINDINGS:  Portable view of the chest demonstrates the lungs to be grossly clear. Tiny bilateral pleural effusions are noted. The mediastinum is unremarkable.  The heart size is normal.      Impression: Tiny bilateral pleural effusions without focal  infiltrate or obvious edema    This report was signed and finalized on 8/21/2023 4:29 PM by Cornelio You MD.          [facility]      Initial impression of presenting illness: Patient is a 78-year-old female with history of COPD, HLD, HTN, CKD and T2DM.  Patient presents to the ER today for hypertension x1 month.  Patient reports that she is not compliant with home blood pressure medications.  Is unsure if she took her blood pressure medications this morning.  She denies OTC medication or home remedy.  Denies alleviating or exacerbating factors.    DDX: includes but is not limited to: Renal stenosis, noncompliance with blood pressure medication, benign essential hypertension.    Patient arrives stable with vitals interpreted by myself.     Pertinent features from physical exam: Physical examination essentially unremarkable.  Breath sounds are clear bilaterally throughout.  Abdomen soft nontender.  Bowel sounds are within normal limit.  Cardiac sounds are within normal limit..    Initial diagnostic plan: CBC, CMP, troponin EKG, chest x-ray    Results from initial plan were reviewed and interpreted by me revealing Diarrhea CBC is within normal limit.  CMP shows mild elevation from baseline regarding creatinine level.  First troponin was 77.  Repeat troponin was 68 with a delta of -9.  Chest x-ray remarkable for tiny bilateral pleural effusions without local infiltrate or obvious edema.    Diagnostic information from other sources: Chart review    Interventions / Re-evaluation: Patient was given regular home blood pressure medications and also nifedipine 30.  Some improvement noted in blood pressure.    Results/clinical rationale were discussed with patient    Consultations/Discussion of results with other physicians: Spoke with Dr. Hendricks regarding elevation in troponin.  Dr. Hendricks believes this most likely due to renal etiology.  But also wants her to follow-up with him in outpatient clinic for cardiac work-up.    Disposition  plan: Patient is hemodynamically stable nontoxic-appearing and appropriate for discharge.  Patient has been historically noncompliant with home blood pressure medications.  While in ER patient was provided regular home blood pressure medication.  Was given 1 dose of nifedipine 30.  Have instructed patient to keep home log of blood pressure in the morning and at night.  We will send patient with 7 days worth of nifedipine until she can get back in with her primary care provider.  Please follow-up with PCP within the week.  Follow-up with ER as needed.  We will also have patient follow-up with Dr. Hendricks for outpatient cardiac work-up  -----        Final diagnoses:   Chronic kidney disease, unspecified CKD stage   Hypertension, unspecified type          Jarrett Mendez, APRN  08/21/23 2012

## 2023-08-22 NOTE — DISCHARGE INSTRUCTIONS
Please note there is elevation in troponin level.  This is most likely result of poor kidney function.  Please continue to drink plenty of fluid.  Please take your regular home blood pressure medications.  Have also added 7 days worth of nifedipine until you can get in with your primary care physician.  He believes you are okay to be discharged but want you to follow-up in his clinic for outpatient cardiac work-up.

## 2023-09-06 ENCOUNTER — HOSPITAL ENCOUNTER (OUTPATIENT)
Dept: GENERAL RADIOLOGY | Facility: HOSPITAL | Age: 78
Discharge: HOME | End: 2023-09-06
Admitting: NURSE PRACTITIONER
Payer: MEDICARE

## 2023-09-06 ENCOUNTER — PREP FOR SURGERY (OUTPATIENT)
Dept: OTHER | Facility: HOSPITAL | Age: 78
End: 2023-09-06
Payer: MEDICARE

## 2023-09-06 DIAGNOSIS — R13.19 ESOPHAGEAL DYSPHAGIA: Chronic | ICD-10-CM

## 2023-09-06 DIAGNOSIS — R13.19 ESOPHAGEAL DYSPHAGIA: Primary | ICD-10-CM

## 2023-09-06 PROCEDURE — 74220 X-RAY XM ESOPHAGUS 1CNTRST: CPT

## 2023-09-06 RX ORDER — SODIUM CHLORIDE 9 MG/ML
70 INJECTION, SOLUTION INTRAVENOUS CONTINUOUS PRN
OUTPATIENT
Start: 2023-09-06

## 2023-09-15 ENCOUNTER — TELEPHONE (OUTPATIENT)
Dept: GASTROENTEROLOGY | Facility: CLINIC | Age: 78
End: 2023-09-15
Payer: MEDICARE

## 2023-09-15 NOTE — TELEPHONE ENCOUNTER
----- Message from Lor Porter MA sent at 9/8/2023  2:16 PM EDT -----  Called patient, no answer, phone rings several times without roll over to .     ----- Message -----  From: Jocelin Roper APRN  Sent: 9/6/2023   1:09 PM EDT  To: Lor Porter MA    Can you call and let her know the esophagram shows she has a narrowing in the esophagus as well as moderate reflux and some dysmotility. I have put in orders for an EGD. She just needs to be scheduled.

## 2023-10-31 RX ORDER — DAPAGLIFLOZIN 10 MG/1
10 TABLET, FILM COATED ORAL DAILY
COMMUNITY
Start: 2023-03-23

## 2023-10-31 RX ORDER — NIFEDIPINE 90 MG/1
90 TABLET, EXTENDED RELEASE ORAL DAILY
COMMUNITY
Start: 2023-09-01

## 2023-10-31 RX ORDER — INSULIN GLARGINE 100 [IU]/ML
20 INJECTION, SOLUTION SUBCUTANEOUS 2 TIMES DAILY
COMMUNITY

## 2023-10-31 NOTE — PRE-PROCEDURE INSTRUCTIONS
PAT phone history completed with patient for upcoming procedure on 11/1/23 with Dr. Zapata.    PAT PASS reviewed with patient and they verbalize understanding of the following:     Do not eat or drink anything after midnight the night before procedure unless otherwise instructed by physician/surgeon's office, this includes no gum, candy, mints, tobacco products or e-cigarettes.  Do not shave the area to be operated on at least 48 hours prior to procedure.  Do not wear makeup, lotion, hair products, or nail polish.  Do not wear any jewelry and remove all piercings.  Do not wear any adhesive if you wear dentures.  Do not wear contacts; bring in glasses if needed.  Only take medications on the morning of procedure as instructed by PAT nurse per anesthesia guidelines or as instructed by physician's office.  If you are on any blood thinners reach out to the physician/surgeon's office for instructions on when/if they will need to be stopped prior to procedure.  Bring in picture ID and insurance card, advanced directive copies if applicable, CPAP/BIPAP/Inhalers if indicated morning of procedure, leave any other valuables at home.  Ensure you have arranged for someone to drive you home the day of your procedure and someone to care for you at home afterwards. It is recommended that you do not drive, drink alcohol, or make any major legal decisions for at least 24 hours after your procedure is complete.    Instructions given on hospital entrance and registration location.

## 2023-11-01 ENCOUNTER — ANESTHESIA EVENT (OUTPATIENT)
Dept: GASTROENTEROLOGY | Facility: HOSPITAL | Age: 78
End: 2023-11-01
Payer: MEDICARE

## 2023-11-01 ENCOUNTER — ANESTHESIA (OUTPATIENT)
Dept: GASTROENTEROLOGY | Facility: HOSPITAL | Age: 78
End: 2023-11-01
Payer: MEDICARE

## 2023-11-01 ENCOUNTER — HOSPITAL ENCOUNTER (OUTPATIENT)
Facility: HOSPITAL | Age: 78
Setting detail: HOSPITAL OUTPATIENT SURGERY
Discharge: HOME | End: 2023-11-01
Attending: INTERNAL MEDICINE | Admitting: INTERNAL MEDICINE
Payer: MEDICARE

## 2023-11-01 VITALS
OXYGEN SATURATION: 96 % | HEART RATE: 91 BPM | SYSTOLIC BLOOD PRESSURE: 194 MMHG | TEMPERATURE: 100 F | HEIGHT: 62 IN | WEIGHT: 200 LBS | RESPIRATION RATE: 16 BRPM | BODY MASS INDEX: 36.8 KG/M2 | DIASTOLIC BLOOD PRESSURE: 86 MMHG

## 2023-11-01 DIAGNOSIS — R13.19 ESOPHAGEAL DYSPHAGIA: ICD-10-CM

## 2023-11-01 LAB — GLUCOSE BLDC GLUCOMTR-MCNC: 123 MG/DL (ref 70–130)

## 2023-11-01 PROCEDURE — 43239 EGD BIOPSY SINGLE/MULTIPLE: CPT | Performed by: INTERNAL MEDICINE

## 2023-11-01 PROCEDURE — 88305 TISSUE EXAM BY PATHOLOGIST: CPT

## 2023-11-01 PROCEDURE — 25810000003 SODIUM CHLORIDE 0.9 % SOLUTION: Performed by: NURSE PRACTITIONER

## 2023-11-01 PROCEDURE — C1726 CATH, BAL DIL, NON-VASCULAR: HCPCS | Performed by: INTERNAL MEDICINE

## 2023-11-01 PROCEDURE — 82948 REAGENT STRIP/BLOOD GLUCOSE: CPT

## 2023-11-01 PROCEDURE — 43249 ESOPH EGD DILATION <30 MM: CPT | Performed by: INTERNAL MEDICINE

## 2023-11-01 PROCEDURE — 25010000002 PROPOFOL 200 MG/20ML EMULSION: Performed by: NURSE ANESTHETIST, CERTIFIED REGISTERED

## 2023-11-01 RX ORDER — PROPOFOL 10 MG/ML
INJECTION, EMULSION INTRAVENOUS AS NEEDED
Status: DISCONTINUED | OUTPATIENT
Start: 2023-11-01 | End: 2023-11-01 | Stop reason: SURG

## 2023-11-01 RX ORDER — LABETALOL HYDROCHLORIDE 5 MG/ML
10 INJECTION, SOLUTION INTRAVENOUS ONCE
Status: DISCONTINUED | OUTPATIENT
Start: 2023-11-01 | End: 2023-11-01

## 2023-11-01 RX ORDER — LIDOCAINE HCL/PF 100 MG/5ML
SYRINGE (ML) INJECTION AS NEEDED
Status: DISCONTINUED | OUTPATIENT
Start: 2023-11-01 | End: 2023-11-01 | Stop reason: SURG

## 2023-11-01 RX ORDER — SODIUM CHLORIDE 9 MG/ML
70 INJECTION, SOLUTION INTRAVENOUS CONTINUOUS PRN
Status: DISCONTINUED | OUTPATIENT
Start: 2023-11-01 | End: 2023-11-01 | Stop reason: HOSPADM

## 2023-11-01 RX ADMIN — PROPOFOL 200 MG: 10 INJECTION, EMULSION INTRAVENOUS at 14:04

## 2023-11-01 RX ADMIN — SODIUM CHLORIDE 70 ML/HR: 9 INJECTION, SOLUTION INTRAVENOUS at 11:55

## 2023-11-01 RX ADMIN — Medication 40 MG: at 14:04

## 2023-11-01 NOTE — DISCHARGE INSTRUCTIONS
- Discharge patient to home (ambulatory).   - Mechanical soft diet today.   - Continue present medications.   - PPI daily  - Antireflux measures  - Await pathology results.   - Return to my office in 8 weeks    No pushing, pulling, tugging,  heavy lifting, or strenuous activity.  No major decision making, driving, or drinking alcoholic beverages for 24 hours. ( due to the medications you have  received)  Always use good hand hygiene/washing techniques.  NO driving while taking pain medications.    To assist you in voiding:  Drink plenty of fluids  Listen to running water while attempting to void.    If you are unable to urinate and you have an uncomfortable urge to void or it has been   6 hours since you were discharged, return to the Emergency Room

## 2023-11-01 NOTE — H&P
"    [facility]  HISTORY AND PHYSICAL    Patient Name: Valarie Camara  : 1945  MRN: [id number]    Chief Complaint:   For EGD    History Of Presenting Illness:    Dysphagia      Past Medical History:   Diagnosis Date    Acute bronchitis with bronchospasm     Anxiety     Arthritis     Asthma     B12 deficiency     Back pain     Body piercing     ears    Cataract     Cervicalgia     Chronic kidney disease     stage 3b    Colonic polyp     History of colonic polyps     Constipation     COPD (chronic obstructive pulmonary disease)     Depression     Diverticulitis     Dysphagia     Patient reported \"it won't go all the way down\" when eating solid foods first thing in the mornings    Edema     Elevated cholesterol     Esophageal reflux     Folic acid deficiency     Full dentures     Advised no adhesives DOS    Herpes zoster     High cholesterol     History of kidney infection     History of recurrent urinary tract infection     HTN (hypertension)     Hypercholesterolemia     Impaired functional mobility, balance, gait, and endurance     Kidney infection     Malignant hypertension 2015     Accelerated essential hypertension    Measles     rubeola    Muscle spasm     Neoplasm of uncertain behavior of skin     Osteoporosis     Poor historian     Poor historian     Recurrent urinary tract infection     Rheumatoid arthritis     RLS (restless legs syndrome)     Stomach ulcer     Stroke     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  and that she has residual right sided weakness    Type 2 diabetes mellitus     Vitamin D deficiency        Past Surgical History:   Procedure Laterality Date    CATARACT EXTRACTION WITH INTRAOCULAR LENS IMPLANT Left 2013    CATARACT EXTRACTION WITH INTRAOCULAR LENS IMPLANT Right 04/15/2013    COLONOSCOPY  2012    COLONOSCOPY N/A 2019    Procedure: COLONOSCOPY;  Surgeon: Chidi Downing MD;  Location: [facility] ENDOSCOPY;  Service: Gastroenterology    ENDOSCOPY N/A " 2017    Procedure: ESOPHAGOGASTRODUODENOSCOPY with biopsies and esophageal balloon dilitation;  Surgeon: Wesley Stovall MD;  Location: [facility] ENDOSCOPY;  Service:     ENDOSCOPY N/A 2019    Procedure: ESOPHAGOGASTRODUODENOSCOPY;  Surgeon: Chidi Downing MD;  Location: [facility] ENDOSCOPY;  Service: Gastroenterology    ENDOSCOPY N/A 2021    Procedure: ESOPHAGOGASTRODUODENOSCOPY with dilation and biopsies;  Surgeon: Gonzalez Zapata MD;  Location: [facility] ENDOSCOPY;  Service: Gastroenterology;  Laterality: N/A;    HYSTERECTOMY  1979    UPPER GASTROINTESTINAL ENDOSCOPY  2013    UPPER GASTROINTESTINAL ENDOSCOPY  2017       Social History     Socioeconomic History    Marital status:    Tobacco Use    Smoking status: Former     Packs/day: 1.00     Years: 15.00     Additional pack years: 0.00     Total pack years: 15.00     Types: Cigarettes     Quit date:      Years since quittin.8    Smokeless tobacco: Never    Tobacco comments:      Denied: History of smoking cigarettes   Vaping Use    Vaping Use: Never used   Substance and Sexual Activity    Alcohol use: Not Currently     Comment: Occassionally    Drug use: Never    Sexual activity: Defer       Family History   Problem Relation Age of Onset    Arthritis Mother     Diabetes Mother     Hypertension Mother     Arthritis Other     Arthritis Other     Diabetes Other         DM    Hypertension Other     Colon cancer Neg Hx        Prior to Admission Medications:  Medications Prior to Admission   Medication Sig Dispense Refill Last Dose    atorvastatin (LIPITOR) 80 MG tablet Take 1 tablet by mouth Daily. 90 tablet 0 10/31/2023    carvedilol (COREG) 25 MG tablet Take 1 tablet by mouth 2 (Two) Times a Day With Meals. 180 tablet 0 2023 at 0800    Cholecalciferol 50 MCG ( UT) capsule Take 1 capsule by mouth Daily.   10/31/2023    Easy Touch Pen Needles 31G X 8 MM misc    10/31/2023    Farxiga 10 MG tablet Take 10 mg by  mouth Daily.   10/31/2023    ferrous sulfate 325 (65 FE) MG tablet Take 1 tablet by mouth Daily With Breakfast.   10/31/2023    glucose blood test strip Check sugar once a day (Patient taking differently: 1 each by Other route As Needed (FBS). Check sugar once a day) 30 each 12 10/31/2023    glucose monitor monitoring kit 1 each Daily. 1 each 1 10/31/2023    HumaLOG KwikPen 100 UNIT/ML solution pen-injector    10/31/2023    insulin glargine (LANTUS, SEMGLEE) 100 UNIT/ML injection Inject 20 Units under the skin into the appropriate area as directed 2 (Two) Times a Day. Based on sugar   11/1/2023    insulin lispro (humaLOG) 100 UNIT/ML injection Inject 15 Units under the skin into the appropriate area as directed 3 (Three) Times a Day Before Meals.   10/31/2023    Insulin Pen Needle 31G X 5 MM misc Inject insulin three times daily 100 each 11 10/31/2023    Lancets 30G misc Check sugar daily 30 each 12 10/31/2023    losartan-hydrochlorothiazide (HYZAAR) 100-25 MG per tablet Take 1 tablet by mouth Daily. 30 tablet 0 10/31/2023    NIFEdipine XL (PROCARDIA XL) 90 MG 24 hr tablet Take 1 tablet by mouth Daily.   10/31/2023    pantoprazole (PROTONIX) 40 MG EC tablet TAKE 1 TABLET BY MOUTH 30 MINUTES BEFORE BREAKFAST. Needs follow up appointment for any further refills. (Patient taking differently: Take 1 tablet by mouth Daily. TAKE 1 TABLET BY MOUTH 30 MINUTES BEFORE BREAKFAST. Needs follow up appointment for any further refills.) 30 tablet 5 10/31/2023       Allergies:  Allergies   Allergen Reactions    Penicillins Rash, Shortness Of Breath, Anaphylaxis and Other (See Comments)    Clonidine Derivatives Other (See Comments)     Pt reports extreme hypotension    Pt reports extreme hypotension   Pt reports extreme hypotension   Pt reports extreme hypotension    Hydralazine Nausea And Vomiting and Other (See Comments)    Sulfa Antibiotics Rash        Vitals: Temp:  [97.2 °F (36.2 °C)] 97.2 °F (36.2 °C)  Heart Rate:  [83]  83  Resp:  [20] 20  BP: (210)/(104) 210/104    Review Of Systems:  Constitutional:  Negative for chills, fever, and unexpected weight change.  Respiratory:  Negative for cough, chest tightness, shortness of breath, and wheezing.  Cardiovascular:  Negative for chest pain, palpitations, and leg swelling.  Gastrointestinal:  Negative for abdominal distention, abdominal pain, nausea, vomiting.  Neurological:  Negative for weakness, numbness, and headaches.     Physical Exam:    General Appearance:  Alert, cooperative, in no acute distress.   Lungs:   Clear to auscultation, respirations regular, even and                 unlabored.   Heart:  Regular rhythm and normal rate.   Abdomen:   Normal bowel sounds, no masses, no organomegaly. Soft, nontender, nondistended   Neurologic: Alert and oriented x 3. Moves all four limbs equally       Assessment & Plan     Assessment:  Principal Problem:    Esophageal dysphagia      Plan: ESOPHAGOGASTRODUODENOSCOPY (N/A)     Gonzalez Zapata MD  11/1/2023

## 2023-11-01 NOTE — NURSING NOTE
The patient informs me that she can not take Labetalol. She states her doctor took her off of the medication because it did not do any good for her. She refused the medication. I called Mohamud Head CRNA and informed him of what patient said. He said he would take care of it in the back.

## 2023-11-01 NOTE — ANESTHESIA POSTPROCEDURE EVALUATION
Patient: Valraie Camara    Procedure Summary       Date: 11/01/23 Room / Location: [facility] ENDOSCOPY 2 / [facility] ENDOSCOPY    Anesthesia Start: 1356 Anesthesia Stop: 1413    Procedure: ESOPHAGOGASTRODUODENOSCOPY WITH BIOPSY AND DILATATION (Esophagus) Diagnosis:       Esophageal dysphagia      (Esophageal dysphagia [R13.19])    Surgeons: Gonzalez Zapata MD Provider: Lan Ruiz CRNA    Anesthesia Type: MAC ASA Status: 3            Anesthesia Type: MAC    Vitals    Sat 100  /84  Resp 18  Temp 98      Post Anesthesia Care and Evaluation    Patient location during evaluation: bedside  Patient participation: complete - patient participated  Level of consciousness: awake and alert and sleepy but conscious  Pain score: 0  Pain management: adequate    Airway patency: patent  Anesthetic complications: No anesthetic complications  PONV Status: none  Cardiovascular status: acceptable  Respiratory status: acceptable and nasal cannula  Hydration status: acceptable

## 2023-11-01 NOTE — ANESTHESIA PREPROCEDURE EVALUATION
Anesthesia Evaluation     Patient summary reviewed and Nursing notes reviewed   no history of anesthetic complications:   NPO Solid Status: > 8 hours  NPO Liquid Status: > 8 hours           Airway   Mallampati: II  TM distance: >3 FB  Neck ROM: full  Possible difficult intubation  Dental - normal exam   (+) upper dentures and lower dentures    Pulmonary    (+) a smoker Former, COPD moderate, asthma,sleep apnea ( ? snores melissa) on CPAP, decreased breath sounds  Cardiovascular     PT is on anticoagulation therapy  Patient on routine beta blocker    (+) hypertension well controlled 2 medications or greater, angina, peripheral edema, hyperlipidemia      Neuro/Psych  (+) CVA, syncope, weakness (Right hemiparesis), psychiatric history Anxiety and Depression, poor historian.  GI/Hepatic/Renal/Endo    (+) obesity, GERD well controlled, PUD, renal disease-, diabetes mellitus type 2 using insulin, thyroid problem thyroid nodules    Musculoskeletal     (+) back pain, chronic pain, neck pain  Abdominal  - normal exam   Substance History      OB/GYN          Other   arthritis, blood dyscrasia anemia,   history of cancer                  Anesthesia Plan    ASA 3     MAC     (Risks and benefits discussed including risk of aspiration, recall and dental damage. All patient questions answered. Will continue with POC.)  intravenous induction     Anesthetic plan, risks, benefits, and alternatives have been provided, discussed and informed consent has been obtained with: patient.  Pre-procedure education provided

## 2023-11-03 LAB — REF LAB TEST METHOD: NORMAL

## 2023-12-04 ENCOUNTER — HOSPITAL ENCOUNTER (EMERGENCY)
Facility: HOSPITAL | Age: 78
Discharge: HOME | End: 2023-12-04
Attending: EMERGENCY MEDICINE | Admitting: EMERGENCY MEDICINE
Payer: MEDICARE

## 2023-12-04 VITALS
SYSTOLIC BLOOD PRESSURE: 131 MMHG | HEIGHT: 63 IN | OXYGEN SATURATION: 92 % | HEART RATE: 69 BPM | BODY MASS INDEX: 35.44 KG/M2 | WEIGHT: 200 LBS | RESPIRATION RATE: 16 BRPM | DIASTOLIC BLOOD PRESSURE: 62 MMHG | TEMPERATURE: 98.2 F

## 2023-12-04 DIAGNOSIS — U07.1 COVID-19: Primary | ICD-10-CM

## 2023-12-04 LAB
FLUAV SUBTYP SPEC NAA+PROBE: NOT DETECTED
FLUBV RNA ISLT QL NAA+PROBE: NOT DETECTED
SARS-COV-2 RNA RESP QL NAA+PROBE: DETECTED

## 2023-12-04 PROCEDURE — 99283 EMERGENCY DEPT VISIT LOW MDM: CPT

## 2023-12-04 PROCEDURE — 87636 SARSCOV2 & INF A&B AMP PRB: CPT | Performed by: EMERGENCY MEDICINE

## 2023-12-05 NOTE — ED PROVIDER NOTES
"Subjective   History of Present Illness  78-year-old female nasal congestion, fevers, chills, body aches patient concerned because she was exposed to COVID several days ago    History provided by:  Patient   used: No        Review of Systems   Constitutional:  Positive for chills and fever.   HENT:  Positive for congestion.    All other systems reviewed and are negative.      Past Medical History:   Diagnosis Date    Acute bronchitis with bronchospasm     Anxiety     Arthritis     Asthma     B12 deficiency     Back pain     Body piercing     ears    Cataract     Cervicalgia     Chronic kidney disease     stage 3b    Colonic polyp     History of colonic polyps     Constipation     COPD (chronic obstructive pulmonary disease)     Depression     Diverticulitis     Dysphagia     Patient reported \"it won't go all the way down\" when eating solid foods first thing in the mornings    Edema     Elevated cholesterol     Esophageal reflux     Folic acid deficiency     Full dentures     Advised no adhesives DOS    Herpes zoster     High cholesterol     History of kidney infection     History of recurrent urinary tract infection     HTN (hypertension)     Hypercholesterolemia     Impaired functional mobility, balance, gait, and endurance     Kidney infection     Malignant hypertension 04/26/2015     Accelerated essential hypertension    Measles     rubeola    Muscle spasm     Neoplasm of uncertain behavior of skin     Osteoporosis     Poor historian     Poor historian     Recurrent urinary tract infection     Rheumatoid arthritis     RLS (restless legs syndrome)     Stomach ulcer     Stroke     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    Type 2 diabetes mellitus     Vitamin D deficiency        Allergies   Allergen Reactions    Penicillins Rash, Shortness Of Breath, Anaphylaxis and Other (See Comments)    Clonidine Derivatives Other (See Comments)     Pt reports extreme " hypotension    Pt reports extreme hypotension   Pt reports extreme hypotension   Pt reports extreme hypotension    Hydralazine Nausea And Vomiting and Other (See Comments)    Sulfa Antibiotics Rash       Past Surgical History:   Procedure Laterality Date    CATARACT EXTRACTION WITH INTRAOCULAR LENS IMPLANT Left 02/11/2013    CATARACT EXTRACTION WITH INTRAOCULAR LENS IMPLANT Right 04/15/2013    COLONOSCOPY  2012    COLONOSCOPY N/A 11/26/2019    Procedure: COLONOSCOPY;  Surgeon: Chidi Downing MD;  Location:  [facility] ENDOSCOPY;  Service: Gastroenterology    ENDOSCOPY N/A 11/13/2017    Procedure: ESOPHAGOGASTRODUODENOSCOPY with biopsies and esophageal balloon dilitation;  Surgeon: Wesley Stovall MD;  Location: [facility] ENDOSCOPY;  Service:     ENDOSCOPY N/A 11/25/2019    Procedure: ESOPHAGOGASTRODUODENOSCOPY;  Surgeon: Chidi Downing MD;  Location:  [facility] ENDOSCOPY;  Service: Gastroenterology    ENDOSCOPY N/A 11/29/2021    Procedure: ESOPHAGOGASTRODUODENOSCOPY with dilation and biopsies;  Surgeon: Gonzalez Zapata MD;  Location: [facility] ENDOSCOPY;  Service: Gastroenterology;  Laterality: N/A;    ENDOSCOPY N/A 11/1/2023    Procedure: ESOPHAGOGASTRODUODENOSCOPY WITH BIOPSY AND DILATATION;  Surgeon: Gonzalez Zapata MD;  Location: [facility] ENDOSCOPY;  Service: Gastroenterology;  Laterality: N/A;    HYSTERECTOMY  1979    UPPER GASTROINTESTINAL ENDOSCOPY  12/09/2013    UPPER GASTROINTESTINAL ENDOSCOPY  11/13/2017       Family History   Problem Relation Age of Onset    Arthritis Mother     Diabetes Mother     Hypertension Mother     Arthritis Other     Arthritis Other     Diabetes Other         DM    Hypertension Other     Colon cancer Neg Hx        Social History     Socioeconomic History    Marital status:    Tobacco Use    Smoking status: Former     Packs/day: 1.00     Years: 15.00     Additional pack years: 0.00     Total pack years: 15.00     Types: Cigarettes     Quit date: 2012     Years since  quittin.9    Smokeless tobacco: Never    Tobacco comments:      Denied: History of smoking cigarettes   Vaping Use    Vaping Use: Never used   Substance and Sexual Activity    Alcohol use: Not Currently     Comment: Occassionally    Drug use: Never    Sexual activity: Defer           Objective   Physical Exam  Vitals and nursing note reviewed.   Constitutional:       Appearance: She is well-developed.   HENT:      Head: Normocephalic and atraumatic.   Cardiovascular:      Rate and Rhythm: Normal rate and regular rhythm.   Pulmonary:      Effort: Pulmonary effort is normal.      Breath sounds: Normal breath sounds.   Musculoskeletal:         General: Normal range of motion.      Cervical back: Normal range of motion and neck supple.   Skin:     General: Skin is warm and dry.   Neurological:      Mental Status: She is alert and oriented to person, place, and time.      Deep Tendon Reflexes: Reflexes are normal and symmetric.         Procedures           ED Course                                             Medical Decision Making      Final diagnoses:   COVID-19       ED Disposition  ED Disposition       ED Disposition   Discharge    Condition   Stable    Comment   --               [facility] EMERGENCY DEPARTMENT  [address] [place] [address] [phone]    If symptoms worsen         Medication List        Changed      glucose blood test strip  Check sugar once a day  What changed:   how much to take  how to take this  when to take this  reasons to take this     pantoprazole 40 MG EC tablet  Commonly known as: PROTONIX  TAKE 1 TABLET BY MOUTH 30 MINUTES BEFORE BREAKFAST. Needs follow up appointment for any further refills.  What changed:   how much to take  how to take this  when to take this                 Joe Nielsen Jr. PAWARREN  23 [phone]

## 2023-12-21 DIAGNOSIS — K21.00 GASTROESOPHAGEAL REFLUX DISEASE WITH ESOPHAGITIS WITHOUT HEMORRHAGE: Chronic | ICD-10-CM

## 2023-12-21 RX ORDER — PANTOPRAZOLE SODIUM 40 MG/1
TABLET, DELAYED RELEASE ORAL
Qty: 30 TABLET | Refills: 5 | Status: SHIPPED | OUTPATIENT
Start: 2023-12-21

## 2024-08-06 ENCOUNTER — HOSPITAL ENCOUNTER (OUTPATIENT)
Dept: ULTRASOUND IMAGING | Facility: HOSPITAL | Age: 79
Discharge: HOME | End: 2024-08-06
Admitting: FAMILY MEDICINE
Payer: MEDICARE

## 2024-08-06 ENCOUNTER — TRANSCRIBE ORDERS (OUTPATIENT)
Dept: ULTRASOUND IMAGING | Facility: HOSPITAL | Age: 79
End: 2024-08-06
Payer: MEDICARE

## 2024-08-06 DIAGNOSIS — R60.0 LOCALIZED EDEMA: Primary | ICD-10-CM

## 2024-08-06 DIAGNOSIS — R60.0 LOCALIZED EDEMA: ICD-10-CM

## 2024-08-06 PROCEDURE — 93971 EXTREMITY STUDY: CPT

## 2024-10-10 ENCOUNTER — HOSPITAL ENCOUNTER (OUTPATIENT)
Dept: GENERAL RADIOLOGY | Facility: HOSPITAL | Age: 79
Discharge: HOME | End: 2024-10-10
Admitting: FAMILY MEDICINE
Payer: MEDICARE

## 2024-10-10 ENCOUNTER — TRANSCRIBE ORDERS (OUTPATIENT)
Dept: GENERAL RADIOLOGY | Facility: HOSPITAL | Age: 79
End: 2024-10-10
Payer: MEDICARE

## 2024-10-10 ENCOUNTER — TRANSCRIBE ORDERS (OUTPATIENT)
Dept: ADMINISTRATIVE | Facility: HOSPITAL | Age: 79
End: 2024-10-10
Payer: MEDICARE

## 2024-10-10 DIAGNOSIS — G89.29 CHRONIC BILATERAL LOW BACK PAIN WITH RIGHT-SIDED SCIATICA: Primary | ICD-10-CM

## 2024-10-10 DIAGNOSIS — M54.41 CHRONIC BILATERAL LOW BACK PAIN WITH RIGHT-SIDED SCIATICA: Primary | ICD-10-CM

## 2024-10-10 DIAGNOSIS — M54.41 CHRONIC BILATERAL LOW BACK PAIN WITH RIGHT-SIDED SCIATICA: ICD-10-CM

## 2024-10-10 DIAGNOSIS — G89.29 CHRONIC BILATERAL LOW BACK PAIN WITH RIGHT-SIDED SCIATICA: ICD-10-CM

## 2024-10-10 PROCEDURE — 72100 X-RAY EXAM L-S SPINE 2/3 VWS: CPT

## 2024-10-21 ENCOUNTER — TRANSCRIBE ORDERS (OUTPATIENT)
Dept: ADMINISTRATIVE | Facility: HOSPITAL | Age: 79
End: 2024-10-21
Payer: MEDICARE

## 2024-10-21 DIAGNOSIS — M54.41 CHRONIC BILATERAL LOW BACK PAIN WITH RIGHT-SIDED SCIATICA: Primary | ICD-10-CM

## 2024-10-21 DIAGNOSIS — G89.29 CHRONIC BILATERAL LOW BACK PAIN WITH RIGHT-SIDED SCIATICA: Primary | ICD-10-CM

## 2024-11-04 NOTE — PROGRESS NOTES
[facility] Cardiology  Consultation H&P  Valarie Camara  1945  [address]  [place] [address]     VISIT DATE:  11/06/24    PCP: Tricia Nichols, JOSEE  [address] [place] [address]    IDENTIFICATION: A 79 y.o. female retired , from [place], [place].   JKC 2021    PROBLEM LIST:   Near syncope  PACs  CAD  2/12 LHC: near normal coronaries, normal LVEF  1/22 Maria G MPS: no ischemia, EF 71%  HTN, hx of noncompliance/financial  11/19 Echo: EF 65%, LVH, RVSP 36  2021 renal art US wnl  CVD  3/18 Left thalamic CVA  11/20 CUS: LICA 50-69% stenosis  HLD   2021 195/124/54/119  T2DM, onset 1990 2021 A1c 13.2    2024 >10  CKD 2.0   COPD  GERD, multiple esophageal dilatation  Surgical history:  Hysterectomy  Cataract extraction    CC:  Chief Complaint   Patient presents with    PAC       Allergies  Allergies   Allergen Reactions    Penicillins Rash, Shortness Of Breath, Anaphylaxis and Other (See Comments)    Clonidine Derivatives Other (See Comments)     Pt reports extreme hypotension    Pt reports extreme hypotension   Pt reports extreme hypotension   Pt reports extreme hypotension    Hydralazine Nausea And Vomiting and Other (See Comments)    Sulfa Antibiotics Rash       Current Medications  Current Outpatient Medications   Medication Instructions    atorvastatin (LIPITOR) 80 mg, Oral, Daily    carvedilol (COREG) 25 mg, Oral, 2 Times Daily With Meals    Cholecalciferol 2,000 Units, Daily    doxazosin (CARDURA) 2 mg, Oral, Daily PRN    Easy Touch Pen Needles 31G X 8 MM misc No dose, route, or frequency recorded.    empagliflozin (JARDIANCE) 10 mg, Daily    ferrous sulfate 325 mg, Daily With Breakfast    glucose blood test strip Check sugar once a day    glucose monitor monitoring kit 1 each, Does not apply, Daily    insulin lispro (HUMALOG) 15 Units, 3 Times Daily Before Meals    Insulin Pen Needle 31G X 5 MM misc Inject insulin three times daily    isosorbide mononitrate  (IMDUR) 60 MG 24 hr tablet     Januvia 25 mg    Lancets 30G misc Check sugar daily    losartan-hydrochlorothiazide (HYZAAR) 100-25 MG per tablet 1 tablet, Oral, Daily    NIFEdipine XL (PROCARDIA XL) 90 mg, Daily    pantoprazole (PROTONIX) 40 MG EC tablet TAKE 1 TABLET BY MOUTH 30 MINUTES BEFORE BREAKFAST.        History of Present Illness   HPI  Valarie Camara is a 79 y.o. year old female with the above mentioned PMH who presents for consult from Lay Cox MD for evaluation of palpitations.  Reevaluation after lost to follow-up with the last 4 years.  She is accompanied by her daughter.  She states she has labile blood pressures with systolic of 1 20-1 90.  Historically she has been intolerant to several agents.  She states she only feels bad when her blood pressure is controlled less than 130  Trouble controlling her diabetes states she enjoys eating banana pudding her most recent A1c to her knowledge was around the 8 range    Pt denies any chest pain, dyspnea at rest, dyspnea on exertion, orthopnea, PND,  lower extremity edema, or claudication. Pt denies history of CHF, DVT, PE, MI, CVA, TIA, or rheumatic fever.       ROS  Review of Systems   Cardiovascular:  Positive for palpitations.   Musculoskeletal:  Positive for joint pain.   All other systems reviewed and are negative.      SOCIAL HX  Social History     Socioeconomic History    Marital status:    Tobacco Use    Smoking status: Former     Current packs/day: 0.00     Average packs/day: 1 pack/day for 15.0 years (15.0 ttl pk-yrs)     Types: Cigarettes     Start date: 1997     Quit date: 2012     Years since quittin.8     Passive exposure: Past    Smokeless tobacco: Never    Tobacco comments:      Denied: History of smoking cigarettes   Vaping Use    Vaping status: Never Used   Substance and Sexual Activity    Alcohol use: Not Currently     Comment: Occassionally    Drug use: Never    Sexual activity: Not Currently  "      FAMILY HX  Family History   Problem Relation Age of Onset    Arthritis Mother     Diabetes Mother     Hypertension Mother     Arthritis Other     Arthritis Other     Diabetes Other         DM    Hypertension Other     Colon cancer Neg Hx        Vitals:    11/06/24 1049   BP: (!) 194/70   BP Location: Right arm   Patient Position: Sitting   Pulse: 62   SpO2: 97%   Weight: 80.6 kg (177 lb 12.8 oz)   Height: 160 cm (63\")     Body mass index is 31.5 kg/m².     PHYSICAL EXAMINATION:  Constitutional:       Appearance: Healthy appearance. Not in distress.   Neck:      Vascular: Carotid bruit present. No JVR. JVD normal.   Pulmonary:      Effort: Pulmonary effort is normal.      Breath sounds: Normal breath sounds. No wheezing. No rhonchi. No rales.   Chest:      Chest wall: Not tender to palpatation.   Cardiovascular:      PMI at left midclavicular line. Normal rate. Regular rhythm. Normal S1. Normal S2.       Murmurs: There is a systolic murmur.      No gallop.  No click. No rub.   Pulses:     Intact distal pulses.   Edema:     Peripheral edema absent.   Abdominal:      General: Bowel sounds are normal.      Palpations: Abdomen is soft.      Tenderness: There is no abdominal tenderness.   Musculoskeletal: Normal range of motion.         General: No tenderness. Skin:     General: Skin is warm and dry.   Neurological:      General: No focal deficit present.      Mental Status: Alert and oriented to person, place and time.         Diagnostic Data:  Procedures    3/13/2024  CMP  , BUN 48, CR 2.13, , K 4.2, CO2 23, ALB 4.0, ALT 8, AST 6    4/24/2024  RFP  GLU 73, BUN 38, CR 2.01, , K 3.8, PHOS 3.7, ALB 4.0  Lab Results   Component Value Date    WBC 8.74 04/24/2024    HGB 13.0 04/24/2024    HCT 40.9 04/24/2024    MCV 90 04/24/2024     04/24/2024     Lab Results   Component Value Date    HGBA1C 13.20 (H) 05/20/2021        Advance Care Planning   ACP discussion was held with the patient during this " visit. Patient has an advance directive (not in EMR), copy requested.         ASSESSMENT:     Diagnosis Plan   1. CVD (cerebrovascular disease)  Duplex Carotid Ultrasound CAR      2. Cerebral infarction due to unspecified occlusion or stenosis of right cerebellar artery  Duplex Carotid Ultrasound CAR      3. Primary hypertension        4. Mixed hyperlipidemia        5. Type 2 diabetes mellitus with hyperglycemia, with long-term current use of insulin            PLAN:    CVD with no recent evaluation redocument carotid duplex    Hypertension uncontrolled we will add as needed Cardura 2 mg systolic greater than 160    Mixed dyslipidemia controlled on statin therapy    Diabetes poorly controlled on insulin and Jardiance recommended dietary discretion          Lay Cox MD, thank you for referring Ms. Camara for evaluation.  I have forwarded my electronically generated recommendations to you for review.  Please do not hesitate to call with any questions.      Vidal Low MD, FACC

## 2024-11-06 ENCOUNTER — OFFICE VISIT (OUTPATIENT)
Dept: CARDIOLOGY | Facility: CLINIC | Age: 79
End: 2024-11-06
Payer: MEDICARE

## 2024-11-06 ENCOUNTER — PATIENT ROUNDING (BHMG ONLY) (OUTPATIENT)
Dept: CARDIOLOGY | Facility: CLINIC | Age: 79
End: 2024-11-06
Payer: MEDICARE

## 2024-11-06 VITALS
DIASTOLIC BLOOD PRESSURE: 70 MMHG | OXYGEN SATURATION: 97 % | HEART RATE: 62 BPM | WEIGHT: 177.8 LBS | BODY MASS INDEX: 31.5 KG/M2 | SYSTOLIC BLOOD PRESSURE: 194 MMHG | HEIGHT: 63 IN

## 2024-11-06 DIAGNOSIS — I63.541 CEREBRAL INFARCTION DUE TO UNSPECIFIED OCCLUSION OR STENOSIS OF RIGHT CEREBELLAR ARTERY: ICD-10-CM

## 2024-11-06 DIAGNOSIS — I10 PRIMARY HYPERTENSION: ICD-10-CM

## 2024-11-06 DIAGNOSIS — E11.65 TYPE 2 DIABETES MELLITUS WITH HYPERGLYCEMIA, WITH LONG-TERM CURRENT USE OF INSULIN: ICD-10-CM

## 2024-11-06 DIAGNOSIS — E78.2 MIXED HYPERLIPIDEMIA: ICD-10-CM

## 2024-11-06 DIAGNOSIS — I67.9 CVD (CEREBROVASCULAR DISEASE): Primary | ICD-10-CM

## 2024-11-06 DIAGNOSIS — Z79.4 TYPE 2 DIABETES MELLITUS WITH HYPERGLYCEMIA, WITH LONG-TERM CURRENT USE OF INSULIN: ICD-10-CM

## 2024-11-06 PROCEDURE — 99203 OFFICE O/P NEW LOW 30 MIN: CPT | Performed by: INTERNAL MEDICINE

## 2024-11-06 PROCEDURE — 3078F DIAST BP <80 MM HG: CPT | Performed by: INTERNAL MEDICINE

## 2024-11-06 PROCEDURE — 3077F SYST BP >= 140 MM HG: CPT | Performed by: INTERNAL MEDICINE

## 2024-11-06 RX ORDER — SITAGLIPTIN 25 MG/1
25 TABLET, FILM COATED ORAL
COMMUNITY
Start: 2024-08-06

## 2024-11-06 RX ORDER — HYDRALAZINE HYDROCHLORIDE 25 MG/1
25 TABLET, FILM COATED ORAL 2 TIMES DAILY PRN
Qty: 90 TABLET | Refills: 3 | Status: SHIPPED | OUTPATIENT
Start: 2024-11-06 | End: 2024-11-06

## 2024-11-06 RX ORDER — DOXAZOSIN 2 MG/1
2 TABLET ORAL DAILY PRN
Qty: 30 TABLET | Refills: 3 | Status: SHIPPED | OUTPATIENT
Start: 2024-11-06 | End: 2024-11-08 | Stop reason: SDUPTHER

## 2024-11-06 RX ORDER — ISOSORBIDE MONONITRATE 60 MG/1
TABLET, EXTENDED RELEASE ORAL
COMMUNITY
Start: 2024-10-21

## 2024-11-06 NOTE — PROGRESS NOTES
November 6, 2024    Hello, may I speak with Valarie Camara?    My name is STIVEN KABA      I am  with [facility]IOLOG[facility]  [address]  [facility] [address] [phone].    Before we get started may I verify your date of birth? 1945    I am calling to officially welcome you to our practice and ask about your recent visit. Is this a good time to talk? yes    Tell me about your visit with us. What things went well?  EVERYTHING WENT WELL TODAY.        We're always looking for ways to make our patients' experiences even better. Do you have recommendations on ways we may improve?  no    Overall were you satisfied with your first visit to our practice? yes       I appreciate you taking the time to speak with me today. Is there anything else I can do for you? no      Thank you, and have a great day.

## 2024-11-08 RX ORDER — DOXAZOSIN 2 MG/1
2 TABLET ORAL DAILY PRN
Qty: 30 TABLET | Refills: 3 | Status: SHIPPED | OUTPATIENT
Start: 2024-11-08

## 2024-11-26 ENCOUNTER — HOSPITAL ENCOUNTER (OUTPATIENT)
Facility: HOSPITAL | Age: 79
Discharge: HOME | End: 2024-11-26
Admitting: INTERNAL MEDICINE
Payer: MEDICARE

## 2024-11-26 ENCOUNTER — TELEPHONE (OUTPATIENT)
Dept: CARDIOLOGY | Facility: CLINIC | Age: 79
End: 2024-11-26

## 2024-11-26 DIAGNOSIS — I63.541 CEREBRAL INFARCTION DUE TO UNSPECIFIED OCCLUSION OR STENOSIS OF RIGHT CEREBELLAR ARTERY: ICD-10-CM

## 2024-11-26 DIAGNOSIS — I67.9 CVD (CEREBROVASCULAR DISEASE): ICD-10-CM

## 2024-11-26 LAB
BH CV XLRA MEAS LEFT DIST CCA EDV: 13.7 CM/SEC
BH CV XLRA MEAS LEFT DIST CCA PSV: 65.2 CM/SEC
BH CV XLRA MEAS LEFT DIST ICA EDV: 26.6 CM/SEC
BH CV XLRA MEAS LEFT DIST ICA PSV: 101 CM/SEC
BH CV XLRA MEAS LEFT ICA/CCA RATIO: 3.09
BH CV XLRA MEAS LEFT MID CCA EDV: 9.9 CM/SEC
BH CV XLRA MEAS LEFT MID CCA PSV: 67.7 CM/SEC
BH CV XLRA MEAS LEFT MID ICA EDV: 34.1 CM/SEC
BH CV XLRA MEAS LEFT MID ICA PSV: 144 CM/SEC
BH CV XLRA MEAS LEFT PROX CCA EDV: 15.5 CM/SEC
BH CV XLRA MEAS LEFT PROX CCA PSV: 77 CM/SEC
BH CV XLRA MEAS LEFT PROX ECA PSV: 115 CM/SEC
BH CV XLRA MEAS LEFT PROX ICA EDV: 73.2 CM/SEC
BH CV XLRA MEAS LEFT PROX ICA PSV: 209 CM/SEC
BH CV XLRA MEAS LEFT PROX SCLA PSV: 194 CM/SEC
BH CV XLRA MEAS LEFT VERTEBRAL A EDV: 14.7 CM/SEC
BH CV XLRA MEAS LEFT VERTEBRAL A PSV: 64.5 CM/SEC
BH CV XLRA MEAS RIGHT DIST CCA EDV: 12.4 CM/SEC
BH CV XLRA MEAS RIGHT DIST CCA PSV: 61.5 CM/SEC
BH CV XLRA MEAS RIGHT DIST ICA EDV: 25.5 CM/SEC
BH CV XLRA MEAS RIGHT DIST ICA PSV: 91.3 CM/SEC
BH CV XLRA MEAS RIGHT ICA/CCA RATIO: 1.59
BH CV XLRA MEAS RIGHT MID CCA EDV: 12.5 CM/SEC
BH CV XLRA MEAS RIGHT MID CCA PSV: 64.3 CM/SEC
BH CV XLRA MEAS RIGHT MID ICA EDV: 28.6 CM/SEC
BH CV XLRA MEAS RIGHT MID ICA PSV: 102 CM/SEC
BH CV XLRA MEAS RIGHT PROX CCA EDV: 15.5 CM/SEC
BH CV XLRA MEAS RIGHT PROX CCA PSV: 75.5 CM/SEC
BH CV XLRA MEAS RIGHT PROX ECA PSV: 180 CM/SEC
BH CV XLRA MEAS RIGHT PROX ICA EDV: 23 CM/SEC
BH CV XLRA MEAS RIGHT PROX ICA PSV: 94.4 CM/SEC
BH CV XLRA MEAS RIGHT PROX SCLA PSV: 194 CM/SEC
BH CV XLRA MEAS RIGHT VERTEBRAL A EDV: 10.6 CM/SEC
BH CV XLRA MEAS RIGHT VERTEBRAL A PSV: 49.7 CM/SEC
RIGHT ARM BP: NORMAL MMHG

## 2024-11-26 PROCEDURE — 93880 EXTRACRANIAL BILAT STUDY: CPT

## 2024-11-26 NOTE — TELEPHONE ENCOUNTER
Vidal Low MD Jackson, Kristina, RN  No high grade carotid stenosis    Pt called and informed of the above results, verbalized understanding.

## 2024-12-10 ENCOUNTER — PREP FOR SURGERY (OUTPATIENT)
Dept: OTHER | Facility: HOSPITAL | Age: 79
End: 2024-12-10
Payer: MEDICARE

## 2024-12-10 ENCOUNTER — TELEPHONE (OUTPATIENT)
Dept: GASTROENTEROLOGY | Facility: CLINIC | Age: 79
End: 2024-12-10
Payer: MEDICARE

## 2024-12-10 DIAGNOSIS — R13.19 ESOPHAGEAL DYSPHAGIA: Primary | ICD-10-CM

## 2024-12-10 RX ORDER — SODIUM CHLORIDE 9 MG/ML
70 INJECTION, SOLUTION INTRAVENOUS CONTINUOUS PRN
OUTPATIENT
Start: 2024-12-10 | End: 2024-12-11

## 2024-12-10 NOTE — TELEPHONE ENCOUNTER
----- Message from Jocelin Roper sent at 12/10/2024  1:32 PM EST -----  Regarding: FW: Possible EGD Without Office Visit  Can you call her and schedule her for EGD? Let her know Dr. Zapata said he can do her EGD if her insurance will approve. The  hospital does the authorization after it has been scheduled once it gets closer to the procedure date.  ----- Message -----  From: Radha Landis  Sent: 12/10/2024   1:16 PM EST  To: JOSEE Frias  Subject: RE: Possible EGD Without Office Visit            Forward to medical assistant for scheduling  ----- Message -----  From: Jocelin Roper APRN  Sent: 12/10/2024  12:55 PM EST  To: Radha Landis  Subject: FW: Possible EGD Without Office Visit            I went ahead and put in the case request. I'm not sure if you call the patient to schedule or if this needs to be forwarded to [facility] to call the patient to schedule.  ----- Message -----  From: Gonzalez Zapata MD  Sent: 12/10/2024  12:33 PM EST  To: JOSEE Frias; Radha Landis  Subject: RE: Possible EGD Without Office Visit            Yes we can schedule an EGD with esophageal dilatation if her insurance approves that  ----- Message -----  From: Jocelin Roper APRN  Sent: 12/10/2024  11:57 AM EST  To: Gonzalez Zapata MD  Subject: FW: Possible EGD Without Office Visit            The patient would like to have EGD without office visit... Just let me know  ----- Message -----  From: Lori Smith, [facility]  Sent: 12/10/2024  11:43 AM EST  To: JOSEE Frias  Subject: Possible EGD Without Office Visit                Jocelin    Spoke with patient to reschedule an office visit.  She states that she is on a fixed income and could not afford to pay for a lot.  She is asking if she could bypass an office visit and schedule a procedure.  She states she feels she is needing an upper endoscopy due to having trouble swallowing at times; sometimes the food will not go down and comes  back up.  Her last procedure was November 2023.  Her last office visit was 8/17/2023.    Thank you.  Lori

## 2025-01-05 ENCOUNTER — APPOINTMENT (OUTPATIENT)
Dept: CT IMAGING | Facility: HOSPITAL | Age: 80
DRG: 305 | End: 2025-01-05
Payer: MEDICARE

## 2025-01-05 ENCOUNTER — HOSPITAL ENCOUNTER (INPATIENT)
Facility: HOSPITAL | Age: 80
LOS: 1 days | Discharge: HOME HEALTH | DRG: 305 | End: 2025-01-06
Attending: EMERGENCY MEDICINE | Admitting: INTERNAL MEDICINE
Payer: MEDICARE

## 2025-01-05 ENCOUNTER — APPOINTMENT (OUTPATIENT)
Dept: GENERAL RADIOLOGY | Facility: HOSPITAL | Age: 80
DRG: 305 | End: 2025-01-05
Payer: MEDICARE

## 2025-01-05 DIAGNOSIS — I16.0 HYPERTENSIVE URGENCY: ICD-10-CM

## 2025-01-05 DIAGNOSIS — I50.32 DIASTOLIC CHF, CHRONIC: ICD-10-CM

## 2025-01-05 DIAGNOSIS — B37.49 CANDIDIASIS OF UROGENITAL SITE: ICD-10-CM

## 2025-01-05 DIAGNOSIS — N30.00 ACUTE CYSTITIS WITHOUT HEMATURIA: Primary | ICD-10-CM

## 2025-01-05 LAB
ALBUMIN SERPL-MCNC: 4 G/DL (ref 3.5–5.2)
ALBUMIN/GLOB SERPL: 1.3 G/DL
ALP SERPL-CCNC: 93 U/L (ref 39–117)
ALT SERPL W P-5'-P-CCNC: 6 U/L (ref 1–33)
ANION GAP SERPL CALCULATED.3IONS-SCNC: 11.3 MMOL/L (ref 5–15)
AST SERPL-CCNC: 11 U/L (ref 1–32)
BACTERIA UR QL AUTO: ABNORMAL /HPF
BASOPHILS # BLD AUTO: 0.03 10*3/MM3 (ref 0–0.2)
BASOPHILS NFR BLD AUTO: 0.5 % (ref 0–1.5)
BILIRUB SERPL-MCNC: 0.4 MG/DL (ref 0–1.2)
BILIRUB UR QL STRIP: NEGATIVE
BUN SERPL-MCNC: 28 MG/DL (ref 8–23)
BUN/CREAT SERPL: 16.1 (ref 7–25)
CALCIUM SPEC-SCNC: 9.2 MG/DL (ref 8.6–10.5)
CHLORIDE SERPL-SCNC: 104 MMOL/L (ref 98–107)
CLARITY UR: ABNORMAL
CO2 SERPL-SCNC: 24.7 MMOL/L (ref 22–29)
COLOR UR: YELLOW
CREAT SERPL-MCNC: 1.74 MG/DL (ref 0.57–1)
DEPRECATED RDW RBC AUTO: 40.4 FL (ref 37–54)
EGFRCR SERPLBLD CKD-EPI 2021: 29.4 ML/MIN/1.73
EOSINOPHIL # BLD AUTO: 0.08 10*3/MM3 (ref 0–0.4)
EOSINOPHIL NFR BLD AUTO: 1.4 % (ref 0.3–6.2)
ERYTHROCYTE [DISTWIDTH] IN BLOOD BY AUTOMATED COUNT: 12.9 % (ref 12.3–15.4)
FLUAV RNA RESP QL NAA+PROBE: NOT DETECTED
FLUBV RNA RESP QL NAA+PROBE: NOT DETECTED
GEN 5 1HR TROPONIN T REFLEX: 47 NG/L
GLOBULIN UR ELPH-MCNC: 3.2 GM/DL
GLUCOSE BLDC GLUCOMTR-MCNC: 141 MG/DL (ref 70–130)
GLUCOSE BLDC GLUCOMTR-MCNC: 175 MG/DL (ref 70–130)
GLUCOSE SERPL-MCNC: 178 MG/DL (ref 65–99)
GLUCOSE UR STRIP-MCNC: ABNORMAL MG/DL
HCT VFR BLD AUTO: 40.5 % (ref 34–46.6)
HGB BLD-MCNC: 13.3 G/DL (ref 12–15.9)
HGB UR QL STRIP.AUTO: ABNORMAL
HOLD SPECIMEN: NORMAL
HOLD SPECIMEN: NORMAL
HYALINE CASTS UR QL AUTO: ABNORMAL /LPF
IMM GRANULOCYTES # BLD AUTO: 0.01 10*3/MM3 (ref 0–0.05)
IMM GRANULOCYTES NFR BLD AUTO: 0.2 % (ref 0–0.5)
KETONES UR QL STRIP: NEGATIVE
LEUKOCYTE ESTERASE UR QL STRIP.AUTO: ABNORMAL
LYMPHOCYTES # BLD AUTO: 1.89 10*3/MM3 (ref 0.7–3.1)
LYMPHOCYTES NFR BLD AUTO: 32.9 % (ref 19.6–45.3)
MAGNESIUM SERPL-MCNC: 2 MG/DL (ref 1.6–2.4)
MCH RBC QN AUTO: 28.4 PG (ref 26.6–33)
MCHC RBC AUTO-ENTMCNC: 32.8 G/DL (ref 31.5–35.7)
MCV RBC AUTO: 86.5 FL (ref 79–97)
MONOCYTES # BLD AUTO: 0.47 10*3/MM3 (ref 0.1–0.9)
MONOCYTES NFR BLD AUTO: 8.2 % (ref 5–12)
NEUTROPHILS NFR BLD AUTO: 3.27 10*3/MM3 (ref 1.7–7)
NEUTROPHILS NFR BLD AUTO: 56.8 % (ref 42.7–76)
NITRITE UR QL STRIP: NEGATIVE
NRBC BLD AUTO-RTO: 0 /100 WBC (ref 0–0.2)
PH UR STRIP.AUTO: 7.5 [PH] (ref 5–8)
PLATELET # BLD AUTO: 262 10*3/MM3 (ref 140–450)
PMV BLD AUTO: 10 FL (ref 6–12)
POTASSIUM SERPL-SCNC: 4.1 MMOL/L (ref 3.5–5.2)
PROT SERPL-MCNC: 7.2 G/DL (ref 6–8.5)
PROT UR QL STRIP: ABNORMAL
RBC # BLD AUTO: 4.68 10*6/MM3 (ref 3.77–5.28)
RBC # UR STRIP: ABNORMAL /HPF
REF LAB TEST METHOD: ABNORMAL
RSV RNA RESP QL NAA+PROBE: NOT DETECTED
SARS-COV-2 RNA RESP QL NAA+PROBE: NOT DETECTED
SODIUM SERPL-SCNC: 140 MMOL/L (ref 136–145)
SP GR UR STRIP: 1.01 (ref 1–1.03)
SQUAMOUS #/AREA URNS HPF: ABNORMAL /HPF
T4 FREE SERPL-MCNC: 1.32 NG/DL (ref 0.92–1.68)
TROPONIN T % DELTA: -4 %
TROPONIN T NUMERIC DELTA: -2 NG/L
TROPONIN T SERPL HS-MCNC: 49 NG/L
TSH SERPL DL<=0.05 MIU/L-ACNC: 0.08 UIU/ML (ref 0.27–4.2)
UROBILINOGEN UR QL STRIP: ABNORMAL
WBC # UR STRIP: ABNORMAL /HPF
WBC NRBC COR # BLD AUTO: 5.75 10*3/MM3 (ref 3.4–10.8)
WHOLE BLOOD HOLD COAG: NORMAL
WHOLE BLOOD HOLD SPECIMEN: NORMAL
YEAST URNS QL MICRO: ABNORMAL /HPF

## 2025-01-05 PROCEDURE — 25010000002 NICARDIPINE 2.5 MG/ML SOLUTION 10 ML VIAL: Performed by: EMERGENCY MEDICINE

## 2025-01-05 PROCEDURE — 63710000001 INSULIN LISPRO (HUMAN) PER 5 UNITS: Performed by: INTERNAL MEDICINE

## 2025-01-05 PROCEDURE — 82948 REAGENT STRIP/BLOOD GLUCOSE: CPT

## 2025-01-05 PROCEDURE — 84439 ASSAY OF FREE THYROXINE: CPT | Performed by: EMERGENCY MEDICINE

## 2025-01-05 PROCEDURE — 83735 ASSAY OF MAGNESIUM: CPT | Performed by: EMERGENCY MEDICINE

## 2025-01-05 PROCEDURE — 25010000002 HEPARIN (PORCINE) PER 1000 UNITS: Performed by: INTERNAL MEDICINE

## 2025-01-05 PROCEDURE — 85025 COMPLETE CBC W/AUTO DIFF WBC: CPT | Performed by: EMERGENCY MEDICINE

## 2025-01-05 PROCEDURE — 80053 COMPREHEN METABOLIC PANEL: CPT | Performed by: EMERGENCY MEDICINE

## 2025-01-05 PROCEDURE — 81001 URINALYSIS AUTO W/SCOPE: CPT | Performed by: EMERGENCY MEDICINE

## 2025-01-05 PROCEDURE — 25810000003 SODIUM CHLORIDE 0.9 % SOLUTION: Performed by: EMERGENCY MEDICINE

## 2025-01-05 PROCEDURE — 84484 ASSAY OF TROPONIN QUANT: CPT | Performed by: EMERGENCY MEDICINE

## 2025-01-05 PROCEDURE — 84443 ASSAY THYROID STIM HORMONE: CPT | Performed by: EMERGENCY MEDICINE

## 2025-01-05 PROCEDURE — 93005 ELECTROCARDIOGRAM TRACING: CPT | Performed by: EMERGENCY MEDICINE

## 2025-01-05 PROCEDURE — 25010000002 NICARDIPINE 2.5 MG/ML SOLUTION 10 ML VIAL: Performed by: INTERNAL MEDICINE

## 2025-01-05 PROCEDURE — 25810000003 SODIUM CHLORIDE 0.9 % SOLUTION 250 ML FLEX CONT: Performed by: INTERNAL MEDICINE

## 2025-01-05 PROCEDURE — 99285 EMERGENCY DEPT VISIT HI MDM: CPT | Performed by: EMERGENCY MEDICINE

## 2025-01-05 PROCEDURE — 25810000003 SODIUM CHLORIDE 0.9 % SOLUTION 250 ML FLEX CONT: Performed by: EMERGENCY MEDICINE

## 2025-01-05 PROCEDURE — 70450 CT HEAD/BRAIN W/O DYE: CPT

## 2025-01-05 PROCEDURE — 99222 1ST HOSP IP/OBS MODERATE 55: CPT | Performed by: INTERNAL MEDICINE

## 2025-01-05 PROCEDURE — 71045 X-RAY EXAM CHEST 1 VIEW: CPT

## 2025-01-05 PROCEDURE — 87637 SARSCOV2&INF A&B&RSV AMP PRB: CPT | Performed by: EMERGENCY MEDICINE

## 2025-01-05 RX ORDER — TETRACAINE HYDROCHLORIDE 5 MG/ML
SOLUTION OPHTHALMIC
Status: COMPLETED
Start: 2025-01-05 | End: 2025-01-05

## 2025-01-05 RX ORDER — NIFEDIPINE 90 MG/1
90 TABLET, EXTENDED RELEASE ORAL DAILY
Status: DISCONTINUED | OUTPATIENT
Start: 2025-01-06 | End: 2025-01-06 | Stop reason: HOSPADM

## 2025-01-05 RX ORDER — CIPROFLOXACIN 500 MG/1
500 TABLET, FILM COATED ORAL ONCE
Status: COMPLETED | OUTPATIENT
Start: 2025-01-05 | End: 2025-01-05

## 2025-01-05 RX ORDER — ONDANSETRON 2 MG/ML
4 INJECTION INTRAMUSCULAR; INTRAVENOUS EVERY 6 HOURS PRN
Status: DISCONTINUED | OUTPATIENT
Start: 2025-01-05 | End: 2025-01-06 | Stop reason: HOSPADM

## 2025-01-05 RX ORDER — SODIUM CHLORIDE 0.9 % (FLUSH) 0.9 %
10 SYRINGE (ML) INJECTION AS NEEDED
Status: DISCONTINUED | OUTPATIENT
Start: 2025-01-05 | End: 2025-01-06 | Stop reason: HOSPADM

## 2025-01-05 RX ORDER — TERAZOSIN 1 MG/1
2 CAPSULE ORAL NIGHTLY
Status: DISCONTINUED | OUTPATIENT
Start: 2025-01-05 | End: 2025-01-06 | Stop reason: HOSPADM

## 2025-01-05 RX ORDER — PANTOPRAZOLE SODIUM 40 MG/1
40 TABLET, DELAYED RELEASE ORAL
Status: DISCONTINUED | OUTPATIENT
Start: 2025-01-06 | End: 2025-01-06 | Stop reason: HOSPADM

## 2025-01-05 RX ORDER — ISOSORBIDE MONONITRATE 60 MG/1
60 TABLET, EXTENDED RELEASE ORAL
Status: DISCONTINUED | OUTPATIENT
Start: 2025-01-05 | End: 2025-01-05

## 2025-01-05 RX ORDER — INSULIN LISPRO 100 [IU]/ML
1-200 INJECTION, SOLUTION INTRAVENOUS; SUBCUTANEOUS
Status: DISCONTINUED | OUTPATIENT
Start: 2025-01-05 | End: 2025-01-06 | Stop reason: HOSPADM

## 2025-01-05 RX ORDER — POLYETHYLENE GLYCOL 3350 17 G/17G
17 POWDER, FOR SOLUTION ORAL DAILY PRN
Status: DISCONTINUED | OUTPATIENT
Start: 2025-01-05 | End: 2025-01-06 | Stop reason: HOSPADM

## 2025-01-05 RX ORDER — FLUCONAZOLE 100 MG/1
200 TABLET ORAL ONCE
Status: COMPLETED | OUTPATIENT
Start: 2025-01-05 | End: 2025-01-05

## 2025-01-05 RX ORDER — SODIUM CHLORIDE 0.9 % (FLUSH) 0.9 %
10 SYRINGE (ML) INJECTION EVERY 12 HOURS SCHEDULED
Status: DISCONTINUED | OUTPATIENT
Start: 2025-01-05 | End: 2025-01-06 | Stop reason: HOSPADM

## 2025-01-05 RX ORDER — HEPARIN SODIUM 5000 [USP'U]/ML
5000 INJECTION, SOLUTION INTRAVENOUS; SUBCUTANEOUS EVERY 12 HOURS SCHEDULED
Status: DISCONTINUED | OUTPATIENT
Start: 2025-01-05 | End: 2025-01-06 | Stop reason: HOSPADM

## 2025-01-05 RX ORDER — ACETAMINOPHEN 650 MG/1
650 SUPPOSITORY RECTAL EVERY 4 HOURS PRN
Status: DISCONTINUED | OUTPATIENT
Start: 2025-01-05 | End: 2025-01-06 | Stop reason: HOSPADM

## 2025-01-05 RX ORDER — SODIUM CHLORIDE 9 MG/ML
40 INJECTION, SOLUTION INTRAVENOUS AS NEEDED
Status: DISCONTINUED | OUTPATIENT
Start: 2025-01-05 | End: 2025-01-06 | Stop reason: HOSPADM

## 2025-01-05 RX ORDER — ISOSORBIDE MONONITRATE 60 MG/1
60 TABLET, EXTENDED RELEASE ORAL
Status: DISCONTINUED | OUTPATIENT
Start: 2025-01-06 | End: 2025-01-06 | Stop reason: HOSPADM

## 2025-01-05 RX ORDER — INSULIN LISPRO 100 [IU]/ML
1-200 INJECTION, SOLUTION INTRAVENOUS; SUBCUTANEOUS AS NEEDED
Status: DISCONTINUED | OUTPATIENT
Start: 2025-01-05 | End: 2025-01-06 | Stop reason: HOSPADM

## 2025-01-05 RX ORDER — NICOTINE POLACRILEX 4 MG
15 LOZENGE BUCCAL
Status: DISCONTINUED | OUTPATIENT
Start: 2025-01-05 | End: 2025-01-06 | Stop reason: HOSPADM

## 2025-01-05 RX ORDER — ATORVASTATIN CALCIUM 80 MG/1
80 TABLET, FILM COATED ORAL DAILY
Status: DISCONTINUED | OUTPATIENT
Start: 2025-01-05 | End: 2025-01-06 | Stop reason: HOSPADM

## 2025-01-05 RX ORDER — IBUPROFEN 600 MG/1
1 TABLET ORAL
Status: DISCONTINUED | OUTPATIENT
Start: 2025-01-05 | End: 2025-01-06 | Stop reason: HOSPADM

## 2025-01-05 RX ORDER — AMOXICILLIN 250 MG
2 CAPSULE ORAL 2 TIMES DAILY
Status: DISCONTINUED | OUTPATIENT
Start: 2025-01-05 | End: 2025-01-06 | Stop reason: HOSPADM

## 2025-01-05 RX ORDER — CARVEDILOL 25 MG/1
25 TABLET ORAL 2 TIMES DAILY WITH MEALS
Status: DISCONTINUED | OUTPATIENT
Start: 2025-01-05 | End: 2025-01-06 | Stop reason: HOSPADM

## 2025-01-05 RX ORDER — DEXTROSE MONOHYDRATE 25 G/50ML
10-50 INJECTION, SOLUTION INTRAVENOUS
Status: DISCONTINUED | OUTPATIENT
Start: 2025-01-05 | End: 2025-01-06 | Stop reason: HOSPADM

## 2025-01-05 RX ORDER — ONDANSETRON 4 MG/1
4 TABLET, ORALLY DISINTEGRATING ORAL EVERY 6 HOURS PRN
Status: DISCONTINUED | OUTPATIENT
Start: 2025-01-05 | End: 2025-01-06 | Stop reason: HOSPADM

## 2025-01-05 RX ORDER — ACETAMINOPHEN 325 MG/1
650 TABLET ORAL EVERY 4 HOURS PRN
Status: DISCONTINUED | OUTPATIENT
Start: 2025-01-05 | End: 2025-01-06 | Stop reason: HOSPADM

## 2025-01-05 RX ORDER — FLUCONAZOLE 150 MG/1
150 TABLET ORAL ONCE
Qty: 1 TABLET | Refills: 0 | Status: SHIPPED | OUTPATIENT
Start: 2025-01-05 | End: 2025-01-05

## 2025-01-05 RX ORDER — NITROGLYCERIN 0.4 MG/1
0.4 TABLET SUBLINGUAL
Status: DISCONTINUED | OUTPATIENT
Start: 2025-01-05 | End: 2025-01-06 | Stop reason: HOSPADM

## 2025-01-05 RX ORDER — NIFEDIPINE 30 MG/1
60 TABLET, EXTENDED RELEASE ORAL
Status: DISCONTINUED | OUTPATIENT
Start: 2025-01-05 | End: 2025-01-05

## 2025-01-05 RX ORDER — BISACODYL 5 MG/1
5 TABLET, DELAYED RELEASE ORAL DAILY PRN
Status: DISCONTINUED | OUTPATIENT
Start: 2025-01-05 | End: 2025-01-06 | Stop reason: HOSPADM

## 2025-01-05 RX ORDER — TETRACAINE HYDROCHLORIDE 5 MG/ML
2 SOLUTION OPHTHALMIC ONCE
Status: COMPLETED | OUTPATIENT
Start: 2025-01-05 | End: 2025-01-05

## 2025-01-05 RX ORDER — CIPROFLOXACIN 500 MG/1
500 TABLET, FILM COATED ORAL 2 TIMES DAILY
Qty: 14 TABLET | Refills: 0 | Status: SHIPPED | OUTPATIENT
Start: 2025-01-05 | End: 2025-01-12

## 2025-01-05 RX ORDER — BISACODYL 10 MG
10 SUPPOSITORY, RECTAL RECTAL DAILY PRN
Status: DISCONTINUED | OUTPATIENT
Start: 2025-01-05 | End: 2025-01-06 | Stop reason: HOSPADM

## 2025-01-05 RX ADMIN — EMPAGLIFLOZIN 10 MG: 10 TABLET, FILM COATED ORAL at 12:51

## 2025-01-05 RX ADMIN — CARVEDILOL 25 MG: 25 TABLET, FILM COATED ORAL at 18:42

## 2025-01-05 RX ADMIN — TETRACAINE HYDROCHLORIDE 2 DROP: 5 SOLUTION OPHTHALMIC at 14:43

## 2025-01-05 RX ADMIN — SODIUM CHLORIDE 500 ML: 9 INJECTION, SOLUTION INTRAVENOUS at 11:34

## 2025-01-05 RX ADMIN — Medication 10 ML: at 21:29

## 2025-01-05 RX ADMIN — HEPARIN SODIUM 5000 UNITS: 5000 INJECTION INTRAVENOUS; SUBCUTANEOUS at 21:28

## 2025-01-05 RX ADMIN — NIFEDIPINE 60 MG: 30 TABLET, FILM COATED, EXTENDED RELEASE ORAL at 12:51

## 2025-01-05 RX ADMIN — SODIUM CHLORIDE 5 MG/HR: 0.9 INJECTION, SOLUTION INTRAVENOUS at 18:46

## 2025-01-05 RX ADMIN — INSULIN LISPRO 5 UNITS: 100 INJECTION, SOLUTION INTRAVENOUS; SUBCUTANEOUS at 18:44

## 2025-01-05 RX ADMIN — LINAGLIPTIN 5 MG: 5 TABLET, FILM COATED ORAL at 18:42

## 2025-01-05 RX ADMIN — ATORVASTATIN CALCIUM 80 MG: 80 TABLET, FILM COATED ORAL at 18:42

## 2025-01-05 RX ADMIN — FLUCONAZOLE 200 MG: 100 TABLET ORAL at 14:43

## 2025-01-05 RX ADMIN — SENNOSIDES AND DOCUSATE SODIUM 2 TABLET: 50; 8.6 TABLET ORAL at 21:29

## 2025-01-05 RX ADMIN — SODIUM CHLORIDE 5 MG/HR: 0.9 INJECTION, SOLUTION INTRAVENOUS at 15:07

## 2025-01-05 RX ADMIN — TERAZOSIN HYDROCHLORIDE 2 MG: 1 CAPSULE ORAL at 21:28

## 2025-01-05 RX ADMIN — CIPROFLOXACIN 500 MG: 500 TABLET, FILM COATED ORAL at 14:14

## 2025-01-05 RX ADMIN — ISOSORBIDE MONONITRATE 60 MG: 30 TABLET, EXTENDED RELEASE ORAL at 12:51

## 2025-01-05 RX ADMIN — MUPIROCIN 1 APPLICATION: 20 OINTMENT TOPICAL at 18:42

## 2025-01-05 NOTE — ED PROVIDER NOTES
"     [facility] EMERGENCY DEPARTMENT  Emergency Department Encounter  Emergency Medicine Physician Note     Pt Name:Valarie Camara  MRN: [id number]  Birthdate 1945  Date of evaluation: 1/5/2025  PCP:  Lay Cox MD  Note Started: 9:23 AM EST      CHIEF COMPLAINT       Chief Complaint   Patient presents with    Dizziness       HISTORY OF PRESENT ILLNESS  (Location/Symptom, Timing/Onset, Context/Setting, Quality, Duration, Modifying Factors, Severity.)      Valarie Camara is a 79 y.o. female who presents with multiple complaints.  Patient describes generalized fatigue, lightheadedness, overall myalgias, blurry vision, lightheadedness, and change in gait.  Patient describes weakness with walking, does not describe any neurologic changes and inability to move her legs.  Patient does describe some lateralization with the right leg.  Patient describes generalized chills, no specific fevers, no headache, cough, change in urination or bobs.  Patient denies any chest pain, does describe some generalized palpitations when asked about chest pain.    PAST MEDICAL / SURGICAL / SOCIAL / FAMILY HISTORY     Past Medical History:   Diagnosis Date    Acute bronchitis with bronchospasm     Anxiety     Arthritis     Asthma     B12 deficiency     Back pain     Body piercing     ears    Cataract     Cervicalgia     Chronic kidney disease     stage 3b    Colonic polyp     History of colonic polyps     Constipation     COPD (chronic obstructive pulmonary disease)     Depression     Diverticulitis     Dysphagia     Patient reported \"it won't go all the way down\" when eating solid foods first thing in the mornings    Edema     Elevated cholesterol     Esophageal reflux     Folic acid deficiency     Full dentures     Advised no adhesives DOS    Herpes zoster     High cholesterol     History of kidney infection     History of recurrent urinary tract infection     HTN (hypertension)     " Hypercholesterolemia     Impaired functional mobility, balance, gait, and endurance     Kidney infection     Malignant hypertension 04/26/2015     Accelerated essential hypertension    Measles     rubeola    Muscle spasm     Neoplasm of uncertain behavior of skin     Osteoporosis     Poor historian     Poor historian     Recurrent urinary tract infection     Rheumatoid arthritis     RLS (restless legs syndrome)     Stomach ulcer     Stroke     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    Type 2 diabetes mellitus     Vitamin D deficiency      No additional pertinent       Past Surgical History:   Procedure Laterality Date    CATARACT EXTRACTION WITH INTRAOCULAR LENS IMPLANT Left 02/11/2013    CATARACT EXTRACTION WITH INTRAOCULAR LENS IMPLANT Right 04/15/2013    COLONOSCOPY  2012    COLONOSCOPY N/A 11/26/2019    Procedure: COLONOSCOPY;  Surgeon: Chidi Downing MD;  Location:  [facility];  Service: Gastroenterology    ENDOSCOPY N/A 11/13/2017    Procedure: ESOPHAGOGASTRODUODENOSCOPY with biopsies and esophageal balloon dilitation;  Surgeon: Wesley Stovall MD;  Location: [facility];  Service:     ENDOSCOPY N/A 11/25/2019    Procedure: ESOPHAGOGASTRODUODENOSCOPY;  Surgeon: Chidi Downing MD;  Location:  [facility];  Service: Gastroenterology    ENDOSCOPY N/A 11/29/2021    Procedure: ESOPHAGOGASTRODUODENOSCOPY with dilation and biopsies;  Surgeon: Gonzalez Zapata MD;  Location:  [facility];  Service: Gastroenterology;  Laterality: N/A;    ENDOSCOPY N/A 11/1/2023    Procedure: ESOPHAGOGASTRODUODENOSCOPY WITH BIOPSY AND DILATATION;  Surgeon: Gonzalez Zapata MD;  Location: [facility];  Service: Gastroenterology;  Laterality: N/A;    HYSTERECTOMY  1979    UPPER GASTROINTESTINAL ENDOSCOPY  12/09/2013    UPPER GASTROINTESTINAL ENDOSCOPY  11/13/2017     No additional pertinent       Social History     Socioeconomic History    Marital status:     Tobacco Use    Smoking status: Former     Current packs/day: 0.00     Average packs/day: 1 pack/day for 15.0 years (15.0 ttl pk-yrs)     Types: Cigarettes     Start date: 1997     Quit date: 2012     Years since quittin.0     Passive exposure: Past    Smokeless tobacco: Never    Tobacco comments:      Denied: History of smoking cigarettes   Vaping Use    Vaping status: Never Used   Substance and Sexual Activity    Alcohol use: Not Currently     Comment: Occassionally    Drug use: Never    Sexual activity: Not Currently       Family History   Problem Relation Age of Onset    Arthritis Mother     Diabetes Mother     Hypertension Mother     Arthritis Other     Arthritis Other     Diabetes Other         DM    Hypertension Other     Colon cancer Neg Hx        Allergies:  Penicillins, Clonidine derivatives, Hydralazine, and Sulfa antibiotics    Home Medications:  Prior to Admission medications    Medication Sig Start Date End Date Taking? Authorizing Provider   doxazosin (Cardura) 2 MG tablet Take 1 tablet by mouth Daily As Needed (sbp > 160). 24   Vidal Low MD   atorvastatin (LIPITOR) 80 MG tablet Take 1 tablet by mouth Daily. 10/19/20   Arminda Evans MD   carvedilol (COREG) 25 MG tablet Take 1 tablet by mouth 2 (Two) Times a Day With Meals. 10/19/20   Arminda Evans MD   Cholecalciferol 50 MCG (2000 UT) capsule Take 1 capsule by mouth Daily.    Bruce Mata MD   Easy Touch Pen Needles 31G X 8 MM misc  22   Bruce Mata MD   empagliflozin (JARDIANCE) 10 MG tablet tablet Take 1 tablet by mouth Daily.    Bruce Mata MD   ferrous sulfate 325 (65 FE) MG tablet Take 1 tablet by mouth Daily With Breakfast.    Bruce Mata MD   glucose blood test strip Check sugar once a day  Patient taking differently: 1 each by Other route As Needed (FBS). Check sugar once a day 20   Arminda Evans MD   glucose monitor monitoring kit 1 each Daily. 20   D'Costa,  "MD Arminda   insulin lispro (humaLOG) 100 UNIT/ML injection Inject 15 Units under the skin into the appropriate area as directed 3 (Three) Times a Day Before Meals.    Bruce Mata MD   Insulin Pen Needle 31G X 5 MM misc Inject insulin three times daily 9/8/20   Arminda Evans MD   isosorbide mononitrate (IMDUR) 60 MG 24 hr tablet  10/21/24   Bruce Mata MD   Januvia 25 MG tablet 1 tablet. 8/6/24   Bruce Mata MD   Lancets 30G misc Check sugar daily 11/19/20   Arminda Evans MD   losartan-hydrochlorothiazide (HYZAAR) 100-25 MG per tablet Take 1 tablet by mouth Daily. 5/25/21   Yesi Hancock APRN   NIFEdipine XL (PROCARDIA XL) 90 MG 24 hr tablet Take 1 tablet by mouth Daily. 9/1/23   Bruce Mata MD   pantoprazole (PROTONIX) 40 MG EC tablet TAKE 1 TABLET BY MOUTH 30 MINUTES BEFORE BREAKFAST. 12/21/23   Jocelin Roper APRN         REVIEW OF SYSTEMS       Review of Systems   Constitutional:  Negative for diaphoresis and fever.   Eyes:  Positive for visual disturbance. Negative for pain, discharge and itching.   Respiratory:  Negative for chest tightness and shortness of breath.    Cardiovascular:  Negative for chest pain.   Gastrointestinal:  Negative for abdominal pain, nausea and vomiting.   Endocrine: Negative for polyuria.   Genitourinary:  Negative for difficulty urinating and frequency.   Neurological:  Positive for dizziness and weakness.   Psychiatric/Behavioral:  Negative for confusion.        PHYSICAL EXAM      INITIAL VITALS:   BP (!) 247/102   Pulse 67   Temp 98.5 °F (36.9 °C) (Oral)   Resp 18   Ht 160 cm (63\")   Wt 79.4 kg (175 lb 0.7 oz)   SpO2 97%   BMI 31.01 kg/m²     Physical Exam  Constitutional:       Appearance: Normal appearance.   HENT:      Head: Normocephalic and atraumatic.      Mouth/Throat:      Mouth: Mucous membranes are moist.      Pharynx: Oropharynx is clear.   Eyes:      Intraocular pressure: Right eye pressure is 10 mmHg. Left eye " pressure is 14 mmHg.   Cardiovascular:      Rate and Rhythm: Normal rate and regular rhythm.      Pulses: Normal pulses.   Pulmonary:      Effort: Pulmonary effort is normal.      Breath sounds: Normal breath sounds.   Abdominal:      General: Abdomen is flat. There is no distension.      Palpations: Abdomen is soft.      Tenderness: There is no abdominal tenderness. There is no guarding.   Musculoskeletal:         General: Normal range of motion.      Right lower leg: No edema.      Left lower leg: No edema.   Skin:     General: Skin is warm and dry.   Neurological:      General: No focal deficit present.      Mental Status: She is alert and oriented to person, place, and time.      Motor: No weakness.      Comments: INITIAL NIH STROKE SCALE    Time Performed: On arrival    Administer stroke scale items in the order listed. Record performance in each category after each subscale exam. Do not go back and change scores. Follow directions provided for each exam technique. Scores should reflect what the patient does, not what the clinician thinks the patient can do. The clinician should record answers while administering the exam and work quickly. Except where indicated, the patient should not be coached (i.e., repeated requests to patient to make a special effort).     1a.  Level of consciousness:  0  1b.  Level of consciousness questions:  0  1c.  Level of consciousness questions:  0  2.    Best Gaze:  0  3.    Visual:  0  4.    Facial Palsy:  0  5a.  Motor left arm:  0  5b.  Motor right arm:  0  6a.  Motor left le  6b.  Motor right le  7.    Limb Ataxia:  0  8.    Sensory:  0  9.    Best Language:  0  10.  Dysarthria:  0  11.  Extinction and Inattention:  0    TOTAL:  0    Psychiatric:         Mood and Affect: Mood normal.         Behavior: Behavior normal.           DDX/DIAGNOSTIC RESULTS / EMERGENCY DEPARTMENT COURSE / MDM     Differential Diagnosis included but not limited: Viral infection, pneumonia,  acute cystitis, glaucoma, intracranial mass or intracranial hemorrhage, CVA, hypertensive emergency versus urgency    Diagnoses Considered but Do Not Suspect: Acute CVA of low concern as patient is described insidious in symptoms over the last couple days to week with negative NIH scale.    Decision Rules/Scores utilized: N/A     Tests considered but not ordered and why:  N/A    MIPS: N/A     Code Status Discussion:  Not Discussed    Additional Patient Education Provided: None     Medical Decision Making    Medical Decision Making  Patient is a 79-year-old female presenting with generalized fatigue and weakness.  On initial evaluation her concern for more viral-like infectious process versus ACS.  Workup as demonstrated patient to be hypertensive after home medications that she did not take this morning.  Concern for hypertensive emergency causing bilateral blurry vision and generalized weakness.  No concerns for CVA ,Patient grossly neurologically tact with negative NIH.   Patient visual acuity 20/40 in 20/50 with 20/30 both eyes.  Patient with normal pressures bilaterally in her eye.  No obvious cause of patient's blurry vision other than diabetes and hypertension.  Patient with negative CT head other than old infarct that was noted on previous imaging.  Patient did demonstrate concerns for borderline acute cystitis and yeast infection of the urine, given Cipro and Diflucan.  Initially had plans to discharge her, blood pressure continued to be refractory to oral medications with what she described concerning of her blurry vision vehicle patient needs to come in for hypertensive emergency.      Problems Addressed:  Acute cystitis without hematuria: complicated acute illness or injury  Candidiasis of urogenital site: complicated acute illness or injury  Hypertensive urgency: complicated acute illness or injury    Amount and/or Complexity of Data Reviewed  External Data Reviewed: labs and notes.  Labs: ordered.  Decision-making details documented in ED Course.  Radiology: ordered. Decision-making details documented in ED Course.  ECG/medicine tests: ordered.  Discussion of management or test interpretation with external provider(s): Hospitalist for admission    Risk  Prescription drug management.  Decision regarding hospitalization.        See ED COURSE for additional MDM statements    EKG    EKG Interpretation    Evaluated and interpreted by emergency department physician    Rhythm: Normal Sinus Rhythm  Rate: Normal  Axis: Right  Ectopy: none  Conduction: Normal  ST Segments: Normal  T Waves: Nonspecific T wave flattening multiple leads  Q Waves: None    Clinical Impression: Normal Sinus Rhythm    Danie Tolliver,       All EKG's are interpreted by the Emergency Department Physician who either signs or Co-signs this chart in the absence of a cardiologist.    Additional Scores                   EMERGENCY DEPARTMENT COURSE:    ED Course as of 01/05/25 1538   Sun Jan 05, 2025   1013 Person evaluated the CT head, no signs of intracranial hemorrhage or obvious ischemia or masses [CR]   1013 Personally evaluated chest x-ray, no signs of obvious pneumonia, no hemopneumothorax, no signs of acute cardiopulmonary processes. [CR]   1026 HS Troponin T(!): 49  Improved from patient's baseline. [CR]   1026 Creatinine(!): 1.74  Improved from previous labs. [CR]   1107 BP(!): 189/81 [CR]   1140 WBC: 5.75  Patient with no leukocytosis or severe infectious processes. [CR]   1228 Discussed patient's results with patient and daughter.  Plan to give home medications that she was post take this morning she did not take.  Daughter requests evaluation of thyroid, we will add that on.  Plan to get urine.  Do feel patient stable at this time for discharge if negative additional workup. [CR]   1356 Free T4: 1.32  T4 noted be within normal limits. [CR]   1441 Patient still severely hypertensive, describing blurriness in her eyes, workup  grossly negative for hypertensive emergency I do feel neurologic changes the patient needs to be admitted at this time.  Plan to start IV drip as patient did not respond to oral medications. [CR]      ED Course User Index  [CR] Danie Tolliver,        PROCEDURES:  None Performed   Procedures    DATA FOR LAB AND RADIOLOGY TESTS ORDERED BELOW ARE REVIEWED BY THE ED CLINICIAN:    RADIOLOGY: All x-rays, CT, MRI, and formal ultrasound images (except ED bedside ultrasound) are read by the radiologist, see reports below, unless otherwise noted in MDM or here.  Reports below are reviewed by myself.  CT Head Without Contrast   Final Result   Stable chronic change.                   CTDI: 36.35 mGy   DLP:627.1 mGy.cm       This report was signed and finalized on 1/5/2025 10:29 AM by Genna Walker MD.          XR Chest 1 View   Final Result   Stable cardiomegaly without acute infiltrate..       .               This report was signed and finalized on 1/5/2025 10:26 AM by Genna Walker MD.              LABS: Lab orders shown below, the results are reviewed by myself, and all abnormals are listed below.  Labs Reviewed   COMPREHENSIVE METABOLIC PANEL - Abnormal; Notable for the following components:       Result Value    Glucose 178 (*)     BUN 28 (*)     Creatinine 1.74 (*)     eGFR 29.4 (*)     All other components within normal limits    Narrative:     GFR Categories in Chronic Kidney Disease (CKD)      GFR Category          GFR (mL/min/1.73)    Interpretation  G1                     90 or greater         Normal or high (1)  G2                      60-89                Mild decrease (1)  G3a                   45-59                Mild to moderate decrease  G3b                   30-44                Moderate to severe decrease  G4                    15-29                Severe decrease  G5                    14 or less           Kidney failure          (1)In the absence of evidence of kidney disease, neither GFR  category G1 or G2 fulfill the criteria for CKD.    eGFR calculation 2021 CKD-EPI creatinine equation, which does not include race as a factor   TROPONIN - Abnormal; Notable for the following components:    HS Troponin T 49 (*)     All other components within normal limits    Narrative:     High Sensitive Troponin T Reference Range:  <14.0 ng/L- Negative Female for AMI  <22.0 ng/L- Negative Male for AMI  >=14 - Abnormal Female indicating possible myocardial injury.  >=22 - Abnormal Male indicating possible myocardial injury.   Clinicians would have to utilize clinical acumen, EKG, Troponin, and serial changes to determine if it is an Acute Myocardial Infarction or myocardial injury due to an underlying chronic condition.        URINALYSIS W/ MICROSCOPIC IF INDICATED (NO CULTURE) - Abnormal; Notable for the following components:    Appearance, UA Cloudy (*)     Glucose, UA >=1000 mg/dL (3+) (*)     Blood, UA Small (1+) (*)     Protein,  mg/dL (2+) (*)     Leuk Esterase, UA Small (1+) (*)     All other components within normal limits   HIGH SENSITIVITIY TROPONIN T 1HR - Abnormal; Notable for the following components:    HS Troponin T 47 (*)     All other components within normal limits    Narrative:     High Sensitive Troponin T Reference Range:  <14.0 ng/L- Negative Female for AMI  <22.0 ng/L- Negative Male for AMI  >=14 - Abnormal Female indicating possible myocardial injury.  >=22 - Abnormal Male indicating possible myocardial injury.   Clinicians would have to utilize clinical acumen, EKG, Troponin, and serial changes to determine if it is an Acute Myocardial Infarction or myocardial injury due to an underlying chronic condition.        TSH RFX ON ABNORMAL TO FREE T4 - Abnormal; Notable for the following components:    TSH 0.083 (*)     All other components within normal limits   URINALYSIS, MICROSCOPIC ONLY - Abnormal; Notable for the following components:    WBC, UA 3-5 (*)     Bacteria, UA 3+ (*)      Yeast, UA Small/1+ Budding Yeast (*)     All other components within normal limits   POCT GLUCOSE FINGERSTICK - Abnormal; Notable for the following components:    Glucose 175 (*)     All other components within normal limits   COVID-19/FLUA&B/RSV, NP SWAB IN TRANSPORT MEDIA 1 HR TAT - Normal    Narrative:     Fact sheet for providers: https://www.fda.gov/media/140745/download    Fact sheet for patients: https://www.fda.gov/media/915037/download    Test performed by PCR.   MAGNESIUM - Normal   CBC WITH AUTO DIFFERENTIAL - Normal   T4, FREE - Normal   RAINBOW DRAW    Narrative:     The following orders were created for panel order [employer] Draw.  Procedure                               Abnormality         Status                     ---------                               -----------         ------                     Green Top (Gel)[[id number]]                                  Final result               Lavender Top[[id number]]                                     Final result               Gold Top - SST[[id number]]                                   Final result               Light Blue Top[[id number]]                                   Final result                 Please view results for these tests on the individual orders.   POCT GLUCOSE FINGERSTICK   CBC AND DIFFERENTIAL    Narrative:     The following orders were created for panel order CBC & Differential.  Procedure                               Abnormality         Status                     ---------                               -----------         ------                     CBC Auto Differential[[id number]]        Normal              Final result                 Please view results for these tests on the individual orders.   GREEN TOP   LAVENDER TOP   GOLD TOP - SST   LIGHT BLUE TOP       Vitals Reviewed:    Vitals:    01/05/25 1434 01/05/25 1437 01/05/25 1507 01/05/25 1515   BP: (!) 224/96  (!) 224/96 (!) 247/102   BP Location:       Patient Position:       Pulse:  63 68 70 67   Resp:       Temp:       TempSrc:       SpO2: 98% 96%  97%   Weight:       Height:           MEDICATIONS GIVEN TO PATIENT THIS ENCOUNTER:  Medications   sodium chloride 0.9 % flush 10 mL (has no administration in time range)   empagliflozin (JARDIANCE) tablet 10 mg (10 mg Oral Given 1/5/25 1251)   isosorbide mononitrate (IMDUR) 24 hr tablet 60 mg (60 mg Oral Given 1/5/25 1251)   niCARdipine (CARDENE) 25mg in 250mL NS vial to bag (5 mg/hr Intravenous New Bag 1/5/25 1507)   sodium chloride 0.9 % bolus 500 mL (0 mL Intravenous Stopped 1/5/25 1437)   fluconazole (DIFLUCAN) tablet 200 mg (200 mg Oral Given 1/5/25 1443)   ciprofloxacin (CIPRO) tablet 500 mg (500 mg Oral Given 1/5/25 1414)   tetracaine (ALTACAINE) 0.5 % ophthalmic solution 2 drop (2 drops Both Eyes Given 1/5/25 1443)       CONSULTS:  None    CRITICAL CARE:  There was significant risk of life threatening deterioration of patient's condition requiring my direct management. Critical care time 45 minutes, excluding any documented procedures.    FINAL IMPRESSION      1. Acute cystitis without hematuria    2. Candidiasis of urogenital site    3. Hypertensive urgency          DISPOSITION / PLAN     ED Disposition       ED Disposition   Decision to Admit    Condition   --    Comment   Level of Care: Critical Care [6]   Diagnosis: Hypertensive urgency [054762]   Certification: I Certify That Inpatient Hospital Services Are Medically Necessary For Greater Than 2 Midnights                 PATIENT REFERRED TO:  No follow-up provider specified.    DISCHARGE MEDICATIONS:     Medication List        START taking these medications      ciprofloxacin 500 MG tablet  Commonly known as: CIPRO  Take 1 tablet by mouth 2 (Two) Times a Day for 7 days.     fluconazole 150 MG tablet  Commonly known as: DIFLUCAN  Take 1 tablet by mouth 1 (One) Time for 1 dose.            CONTINUE taking these medications      glucose monitor monitoring kit  1 each Daily.     * Insulin  Pen Needle 31G X 5 MM misc  Inject insulin three times daily     * Easy Touch Pen Needles 31G X 8 MM misc  Generic drug: Insulin Pen Needle     Lancets 30G misc  Check sugar daily           * This list has 2 medication(s) that are the same as other medications prescribed for you. Read the directions carefully, and ask your doctor or other care provider to review them with you.                ASK your doctor about these medications      atorvastatin 80 MG tablet  Commonly known as: LIPITOR  Take 1 tablet by mouth Daily.     carvedilol 25 MG tablet  Commonly known as: COREG  Take 1 tablet by mouth 2 (Two) Times a Day With Meals.     Cholecalciferol 50 MCG (2000 UT) capsule     doxazosin 2 MG tablet  Commonly known as: Cardura  Take 1 tablet by mouth Daily As Needed (sbp > 160).     empagliflozin 10 MG tablet tablet  Commonly known as: JARDIANCE     ferrous sulfate 325 (65 FE) MG tablet     glucose blood test strip  Check sugar once a day     insulin lispro 100 UNIT/ML injection  Commonly known as: humaLOG     isosorbide mononitrate 60 MG 24 hr tablet  Commonly known as: IMDUR     Januvia 25 MG tablet  Generic drug: SITagliptin     losartan-hydrochlorothiazide 100-25 MG per tablet  Commonly known as: HYZAAR  Take 1 tablet by mouth Daily.     NIFEdipine XL 90 MG 24 hr tablet  Commonly known as: PROCARDIA XL     pantoprazole 40 MG EC tablet  Commonly known as: PROTONIX  TAKE 1 TABLET BY MOUTH 30 MINUTES BEFORE BREAKFAST.               Where to Get Your Medications        These medications were sent to [id number] - [place] - [address] AT [facility] - [phone] PH - [phone] FX  [address] [place] [address]      Phone: [phone]   ciprofloxacin 500 MG tablet  fluconazole 150 MG tablet         Electronically signed by Danie Tolliver DO, 01/05/25, 9:23 AM EST.    Emergency Medicine Physician  [facility]  (Please note that  portions of thisnote were completed with a voice recognition program.  Efforts were made to edit the dictations but occasionally words are mis-transcribed.)           Danie Tloliver,   01/05/25 1531       Danie Tolliver,   01/05/25 1538       Danie Tolliver,   01/06/25 0613

## 2025-01-05 NOTE — H&P
[facility]      Name:  Valarie Camara   Age:  79 y.o.  Sex:  female  :  1945  MRN:  [id number]   Visit Number:  [id number]  Admission Date:  2025  Date Of Service:  25  Primary Care Physician:  Lay Cox MD    Chief Complaint:     Multiple complaint    History Of Presenting Illness:      Patient is a chronically ill 79-year-old female with history significant for CKD, hypertension, hyperlipidemia, depression who presents to the emergency room from home with multiple complaints.  Patient stated that she was having significant fatigue, lightheadedness, blurry vision and bodyaches.  Denied focal deficits.  Stated vision has chronically been blurry.  Pressure in the eyes obtained in the emergency room which was appropriate bilaterally.  Denied fever, chest pain, shortness of breath, nausea/vomiting.  Also denied dysuria or change in urinary frequency.  No known sick contacts.  Patient did not take her medication prior to coming to the emergency room.    ED summary: Patient afebrile, hypertensive 247/102 and nonhypoxic on room air.  Troponins plateaued 49-47.  Creatinine 1.7 for which appears near baseline.  Glucose 178.  CBC unremarkable.  Urinalysis with presence of leukocytes and bacteria.  Patient denies symptoms.  COVID and flu negative.  CT head showed no acute process.  Chest x-ray reviewed, stable cardiomegaly without acute process.  Patient was given dose of her chronic antihypertensives and was planned to be discharged home.  Unfortunately, hypertension continued requiring initiation of a Cardene drip and hospitalist asked to admit.    Review Of Systems:    All systems were reviewed and negative except as mentioned in history of presenting illness, assessment and plan.    Past Medical History: Patient  has a past medical history of Acute bronchitis with bronchospasm, Anxiety, Arthritis, Asthma, B12 deficiency, Back  pain, Body piercing, Cataract, Cervicalgia, Chronic kidney disease, Colonic polyp, Constipation, COPD (chronic obstructive pulmonary disease), Depression, Diverticulitis, Dysphagia, Edema, Elevated cholesterol, Esophageal reflux, Folic acid deficiency, Full dentures, Herpes zoster, High cholesterol, History of kidney infection, History of recurrent urinary tract infection, HTN (hypertension), Hypercholesterolemia, Impaired functional mobility, balance, gait, and endurance, Kidney infection, Malignant hypertension (04/26/2015), Measles, Muscle spasm, Neoplasm of uncertain behavior of skin, Osteoporosis, Poor historian, Poor historian, Recurrent urinary tract infection, Rheumatoid arthritis, RLS (restless legs syndrome), Stomach ulcer, Stroke, Type 2 diabetes mellitus, and Vitamin D deficiency.    Past Surgical History: Patient  has a past surgical history that includes Hysterectomy (1979); Cataract extraction w/  intraocular lens implant (Left, 02/11/2013); Cataract extraction w/  intraocular lens implant (Right, 04/15/2013); Colonoscopy (2012); Esophagogastroduodenoscopy (N/A, 11/13/2017); Upper gastrointestinal endoscopy (12/09/2013); Upper gastrointestinal endoscopy (11/13/2017); Esophagogastroduodenoscopy (N/A, 11/25/2019); Colonoscopy (N/A, 11/26/2019); Esophagogastroduodenoscopy (N/A, 11/29/2021); and Esophagogastroduodenoscopy (N/A, 11/1/2023).    Social History: Patient  reports that she quit smoking about 13 years ago. Her smoking use included cigarettes. She started smoking about 28 years ago. She has a 15 pack-year smoking history. She has been exposed to tobacco smoke. She has never used smokeless tobacco. She reports that she does not currently use alcohol. She reports that she does not use drugs.    Family History:  Patient's family history has been reviewed and found to be noncontributory.     Allergies:      Penicillins, Clonidine derivatives, Hydralazine, and Sulfa antibiotics    Home  Medications:    Prior to Admission Medications       Prescriptions Last Dose Informant Patient Reported? Taking?    doxazosin (Cardura) 2 MG tablet   No No    Take 1 tablet by mouth Daily As Needed (sbp > 160).    atorvastatin (LIPITOR) 80 MG tablet  Self No No    Take 1 tablet by mouth Daily.    carvedilol (COREG) 25 MG tablet  Self No No    Take 1 tablet by mouth 2 (Two) Times a Day With Meals.    Cholecalciferol 50 MCG (2000 UT) capsule   Yes No    Take 1 capsule by mouth Daily.    Easy Touch Pen Needles 31G X 8 MM misc   Yes No    empagliflozin (JARDIANCE) 10 MG tablet tablet   Yes No    Take 1 tablet by mouth Daily.    ferrous sulfate 325 (65 FE) MG tablet   Yes No    Take 1 tablet by mouth Daily With Breakfast.    glucose blood test strip  Self No No    Check sugar once a day    Patient taking differently:  1 each by Other route As Needed (FBS). Check sugar once a day    glucose monitor monitoring kit   No No    1 each Daily.    insulin lispro (humaLOG) 100 UNIT/ML injection   Yes No    Inject 15 Units under the skin into the appropriate area as directed 3 (Three) Times a Day Before Meals.    Insulin Pen Needle 31G X 5 MM misc  Self No No    Inject insulin three times daily    isosorbide mononitrate (IMDUR) 60 MG 24 hr tablet   Yes No    Januvia 25 MG tablet   Yes No    1 tablet.    Lancets 30G misc  Self No No    Check sugar daily    losartan-hydrochlorothiazide (HYZAAR) 100-25 MG per tablet  Self No No    Take 1 tablet by mouth Daily.    NIFEdipine XL (PROCARDIA XL) 90 MG 24 hr tablet   Yes No    Take 1 tablet by mouth Daily.    pantoprazole (PROTONIX) 40 MG EC tablet   No No    TAKE 1 TABLET BY MOUTH 30 MINUTES BEFORE BREAKFAST.          ED Medications:    Medications   sodium chloride 0.9 % flush 10 mL (has no administration in time range)   empagliflozin (JARDIANCE) tablet 10 mg (10 mg Oral Given 1/5/25 1251)   isosorbide mononitrate (IMDUR) 24 hr tablet 60 mg (60 mg Oral Given 1/5/25 1251)   niCARdipine  "(CARDENE) 25mg in 250mL NS vial to bag (10 mg/hr Intravenous Rate/Dose Change 1/5/25 1601)   sodium chloride 0.9 % bolus 500 mL (0 mL Intravenous Stopped 1/5/25 1437)   fluconazole (DIFLUCAN) tablet 200 mg (200 mg Oral Given 1/5/25 1443)   ciprofloxacin (CIPRO) tablet 500 mg (500 mg Oral Given 1/5/25 1414)   tetracaine (ALTACAINE) 0.5 % ophthalmic solution 2 drop (2 drops Both Eyes Given 1/5/25 1443)     Vital Signs:  Temp:  [98.1 °F (36.7 °C)-98.5 °F (36.9 °C)] 98.4 °F (36.9 °C)  Heart Rate:  [56-75] 75  Resp:  [16-18] 16  BP: (189-247)/() 192/78        01/05/25  0921 01/05/25  1102   Weight: 79.4 kg (175 lb) 79.4 kg (175 lb 0.7 oz)     Body mass index is 31.01 kg/m².    Physical Exam:     Most recent vital Signs: BP (!) 192/78   Pulse 75   Temp 98.4 °F (36.9 °C) (Oral)   Resp 16   Ht 160 cm (63\")   Wt 79.4 kg (175 lb 0.7 oz)   SpO2 97%   BMI 31.01 kg/m²     Physical Exam  Vitals reviewed.   Constitutional:       General: She is not in acute distress.     Appearance: She is normal weight.   HENT:      Head: Normocephalic and atraumatic.      Right Ear: External ear normal.      Left Ear: External ear normal.      Mouth/Throat:      Mouth: Mucous membranes are moist.      Pharynx: Oropharynx is clear.   Eyes:      Extraocular Movements: Extraocular movements intact.      Conjunctiva/sclera: Conjunctivae normal.   Cardiovascular:      Rate and Rhythm: Normal rate and regular rhythm.      Pulses: Normal pulses.      Heart sounds: Normal heart sounds.   Pulmonary:      Effort: Pulmonary effort is normal.      Breath sounds: Normal breath sounds.   Abdominal:      General: Bowel sounds are normal.      Palpations: Abdomen is soft.   Musculoskeletal:      Right lower leg: No edema.      Left lower leg: No edema.   Skin:     General: Skin is warm and dry.   Neurological:      Mental Status: She is alert and oriented to person, place, and time. Mental status is at baseline.         Laboratory data:    I have " reviewed the labs done in the emergency room.    Results from last 7 days   Lab Units 01/05/25  0939   SODIUM mmol/L 140   POTASSIUM mmol/L 4.1   CHLORIDE mmol/L 104   CO2 mmol/L 24.7   BUN mg/dL 28*   CREATININE mg/dL 1.74*   CALCIUM mg/dL 9.2   BILIRUBIN mg/dL 0.4   ALK PHOS U/L 93   ALT (SGPT) U/L 6   AST (SGOT) U/L 11   GLUCOSE mg/dL 178*     Results from last 7 days   Lab Units 01/05/25  0939   WBC 10*3/mm3 5.75   HEMOGLOBIN g/dL 13.3   HEMATOCRIT % 40.5   PLATELETS 10*3/mm3 262         Results from last 7 days   Lab Units 01/05/25  1054 01/05/25  0939   HSTROP T ng/L 47* 49*                     Results from last 7 days   Lab Units 01/05/25  1300   COLOR UA  Yellow   GLUCOSE UA  >=1000 mg/dL (3+)*   KETONES UA  Negative   BLOOD UA  Small (1+)*   LEUKOCYTES UA  Small (1+)*   PH, URINE  7.5   BILIRUBIN UA  Negative   UROBILINOGEN UA  0.2 E.U./dL   RBC UA /HPF 0-2   WBC UA /HPF 3-5*       Pain Management Panel  More data may exist         Latest Ref Rng & Units 8/18/2020 10/7/2019   Pain Management Panel   Creatinine, Urine Not Estab. mg/dL 45.4  128.0       Details                   EKG:      EKG personally reviewed, sinus rhythm with a rate of 67 bpm.  No acute ST or T wave changes.    Radiology:    CT Head Without Contrast    Result Date: 1/5/2025  PROCEDURE: CT HEAD WO CONTRAST-  HISTORY: eval for many days of blurry vision and fatigue  COMPARISON: May 20, 2021..  TECHNIQUE: Multiple axial CT images were performed from the foramen magnum to the vertex. Individualized dose reduction techniques using automated exposure control or adjustment of mA and/or kV according to the patient size were employed.  FINDINGS: There is moderate, age-appropriate, stable generalized cerebral atrophy. The ventricles are enlarged. There is no evidence of edema or hemorrhage. There is a small area of encephalomalacia in the left thalamus consistent with remote CVA, stable from prior exam. No masses are identified. No extra-axial  fluid is seen. The paranasal sinuses are unremarkable except for a stable osteoma left frontal sinus..      Stable chronic change.     CTDI: 36.35 mGy DLP:627.1 mGy.cm  This report was signed and finalized on 1/5/2025 10:29 AM by Genna Walker MD.      XR Chest 1 View    Result Date: 1/5/2025   PROCEDURE: XR CHEST 1 VW-  HISTORY: Weak/Dizzy/AMS triage protocol, palpitations, hypertension, sciatica  COMPARISON: August 21, 2023..  FINDINGS: The heart is mildly enlarged, stable from prior.. The mediastinum is unremarkable. Decreased inspiratory effort noted with crowding of the lung markings in both lung bases. No acute infiltrate noted.. There is no pneumothorax.  There are no acute osseous abnormalities. There is evidence of old calcified granulomatous disease.      Stable cardiomegaly without acute infiltrate..  .    This report was signed and finalized on 1/5/2025 10:26 AM by Genna Walker MD.       Assessment:    Hypertensive urgency  Elevated troponin  Asymptomatic bacteriuria  CKD  Hyperlipidemia  Anxiety/depression  Impaired mobility and ADL    Plan:    Hypertensive urgency  Elevated troponin  Cardene drip; titrate off as able  Continue patient's chronic antihypertensive medication  Symptoms including lightheadedness and blurry vision already improving with normalization of her blood pressure  Suspect elevated troponin secondary to demand ischemia from hypertension.  Low suspicion for ACS  Asymptomatic bacteriuria  Hold on antibiotic      Risk Assessment: High  DVT Prophylaxis: Heparin sq  Code Status: Full  Diet: Cardiac/Diabetic             Ronny Lara DO  01/05/25  16:16 EST    Dictated utilizing Dragon dictation.

## 2025-01-06 ENCOUNTER — HOME HEALTH ADMISSION (OUTPATIENT)
Dept: HOME HEALTH SERVICES | Facility: HOME HEALTHCARE | Age: 80
End: 2025-01-06
Payer: MEDICARE

## 2025-01-06 ENCOUNTER — READMISSION MANAGEMENT (OUTPATIENT)
Dept: CALL CENTER | Facility: HOSPITAL | Age: 80
End: 2025-01-06
Payer: MEDICARE

## 2025-01-06 ENCOUNTER — DOCUMENTATION (OUTPATIENT)
Dept: HOME HEALTH SERVICES | Facility: HOME HEALTHCARE | Age: 80
End: 2025-01-06
Payer: COMMERCIAL

## 2025-01-06 VITALS
WEIGHT: 168.21 LBS | SYSTOLIC BLOOD PRESSURE: 145 MMHG | BODY MASS INDEX: 29.8 KG/M2 | TEMPERATURE: 98.2 F | DIASTOLIC BLOOD PRESSURE: 81 MMHG | HEART RATE: 74 BPM | OXYGEN SATURATION: 96 % | HEIGHT: 63 IN | RESPIRATION RATE: 18 BRPM

## 2025-01-06 LAB
ANION GAP SERPL CALCULATED.3IONS-SCNC: 9.6 MMOL/L (ref 5–15)
BUN SERPL-MCNC: 28 MG/DL (ref 8–23)
BUN/CREAT SERPL: 15.4 (ref 7–25)
CALCIUM SPEC-SCNC: 8.7 MG/DL (ref 8.6–10.5)
CHLORIDE SERPL-SCNC: 105 MMOL/L (ref 98–107)
CO2 SERPL-SCNC: 22.4 MMOL/L (ref 22–29)
CREAT SERPL-MCNC: 1.82 MG/DL (ref 0.57–1)
DEPRECATED RDW RBC AUTO: 41.1 FL (ref 37–54)
EGFRCR SERPLBLD CKD-EPI 2021: 27.8 ML/MIN/1.73
ERYTHROCYTE [DISTWIDTH] IN BLOOD BY AUTOMATED COUNT: 12.9 % (ref 12.3–15.4)
GLUCOSE BLDC GLUCOMTR-MCNC: 132 MG/DL (ref 70–130)
GLUCOSE BLDC GLUCOMTR-MCNC: 150 MG/DL (ref 70–130)
GLUCOSE SERPL-MCNC: 152 MG/DL (ref 65–99)
HCT VFR BLD AUTO: 34.3 % (ref 34–46.6)
HGB BLD-MCNC: 11.5 G/DL (ref 12–15.9)
MCH RBC QN AUTO: 29.3 PG (ref 26.6–33)
MCHC RBC AUTO-ENTMCNC: 33.5 G/DL (ref 31.5–35.7)
MCV RBC AUTO: 87.3 FL (ref 79–97)
PLATELET # BLD AUTO: 214 10*3/MM3 (ref 140–450)
PMV BLD AUTO: 10.1 FL (ref 6–12)
POTASSIUM SERPL-SCNC: 4.1 MMOL/L (ref 3.5–5.2)
RBC # BLD AUTO: 3.93 10*6/MM3 (ref 3.77–5.28)
SODIUM SERPL-SCNC: 137 MMOL/L (ref 136–145)
WBC NRBC COR # BLD AUTO: 5.79 10*3/MM3 (ref 3.4–10.8)

## 2025-01-06 PROCEDURE — 82948 REAGENT STRIP/BLOOD GLUCOSE: CPT

## 2025-01-06 PROCEDURE — 80048 BASIC METABOLIC PNL TOTAL CA: CPT | Performed by: INTERNAL MEDICINE

## 2025-01-06 PROCEDURE — 99239 HOSP IP/OBS DSCHRG MGMT >30: CPT | Performed by: INTERNAL MEDICINE

## 2025-01-06 PROCEDURE — 25010000002 HEPARIN (PORCINE) PER 1000 UNITS: Performed by: INTERNAL MEDICINE

## 2025-01-06 PROCEDURE — 85027 COMPLETE CBC AUTOMATED: CPT | Performed by: INTERNAL MEDICINE

## 2025-01-06 PROCEDURE — 63710000001 INSULIN LISPRO (HUMAN) PER 5 UNITS: Performed by: INTERNAL MEDICINE

## 2025-01-06 PROCEDURE — 82948 REAGENT STRIP/BLOOD GLUCOSE: CPT | Performed by: INTERNAL MEDICINE

## 2025-01-06 RX ORDER — DAPAGLIFLOZIN 10 MG/1
10 TABLET, FILM COATED ORAL DAILY
COMMUNITY
End: 2025-01-06 | Stop reason: HOSPADM

## 2025-01-06 RX ORDER — TRAZODONE HYDROCHLORIDE 50 MG/1
50 TABLET, FILM COATED ORAL NIGHTLY
COMMUNITY
Start: 2025-01-02 | End: 2025-01-06 | Stop reason: HOSPADM

## 2025-01-06 RX ORDER — CHLORTHALIDONE 50 MG/1
50 TABLET ORAL DAILY
COMMUNITY
End: 2025-01-06 | Stop reason: HOSPADM

## 2025-01-06 RX ORDER — LOSARTAN POTASSIUM AND HYDROCHLOROTHIAZIDE 25; 100 MG/1; MG/1
1 TABLET ORAL DAILY
Qty: 30 TABLET | Refills: 0 | Status: SHIPPED | OUTPATIENT
Start: 2025-01-06

## 2025-01-06 RX ORDER — HYDRALAZINE HYDROCHLORIDE 10 MG/1
10 TABLET, FILM COATED ORAL 3 TIMES DAILY
COMMUNITY
End: 2025-01-06 | Stop reason: HOSPADM

## 2025-01-06 RX ORDER — LOSARTAN POTASSIUM 100 MG/1
100 TABLET ORAL DAILY
COMMUNITY
End: 2025-01-06 | Stop reason: HOSPADM

## 2025-01-06 RX ADMIN — MUPIROCIN 1 APPLICATION: 20 OINTMENT TOPICAL at 08:36

## 2025-01-06 RX ADMIN — Medication 10 ML: at 08:38

## 2025-01-06 RX ADMIN — ISOSORBIDE MONONITRATE 60 MG: 60 TABLET, EXTENDED RELEASE ORAL at 08:36

## 2025-01-06 RX ADMIN — SENNOSIDES AND DOCUSATE SODIUM 2 TABLET: 50; 8.6 TABLET ORAL at 08:36

## 2025-01-06 RX ADMIN — ACETAMINOPHEN 650 MG: 325 TABLET, FILM COATED ORAL at 11:34

## 2025-01-06 RX ADMIN — ATORVASTATIN CALCIUM 80 MG: 80 TABLET, FILM COATED ORAL at 08:37

## 2025-01-06 RX ADMIN — PANTOPRAZOLE SODIUM 40 MG: 40 TABLET, DELAYED RELEASE ORAL at 06:52

## 2025-01-06 RX ADMIN — INSULIN LISPRO 2 UNITS: 100 INJECTION, SOLUTION INTRAVENOUS; SUBCUTANEOUS at 08:33

## 2025-01-06 RX ADMIN — INSULIN LISPRO 1 UNITS: 100 INJECTION, SOLUTION INTRAVENOUS; SUBCUTANEOUS at 11:33

## 2025-01-06 RX ADMIN — HEPARIN SODIUM 5000 UNITS: 5000 INJECTION INTRAVENOUS; SUBCUTANEOUS at 08:35

## 2025-01-06 RX ADMIN — ACETAMINOPHEN 650 MG: 325 TABLET, FILM COATED ORAL at 00:18

## 2025-01-06 RX ADMIN — NIFEDIPINE 90 MG: 90 TABLET, FILM COATED, EXTENDED RELEASE ORAL at 08:37

## 2025-01-06 RX ADMIN — CARVEDILOL 25 MG: 25 TABLET, FILM COATED ORAL at 08:36

## 2025-01-06 RX ADMIN — Medication 5 MG: at 01:09

## 2025-01-06 NOTE — CASE MANAGEMENT/SOCIAL WORK
"Discharge Planning Assessment  [facility]     Patient Name: Valarie Camara  MRN: [id number]  Today's Date: 1/6/2025    Admit Date: 1/5/2025    Plan: DCP is to return to her daughters house at [address] [place] [address] [place] [address]. Spoke with pt at bedside. Permission given to  discuss DCP with sharon Camara.  Pt confirmed demographics. States no to Living Will/POA. PCP; [facility], pharmacy is [facility] . Agreed to meds to bed. Pt requested a hospital bed, hospitalist placed order. Pt states is mostly independent with ADL's and mobility and her daughter assists if needed. Pt does have some financial strain and food insecurity. Pathways book provided and she will receive a food bag at discharge. Family to transport home when medically stable for discharge.   Discharge Needs Assessment       Row Name 01/06/25 1030       Living Environment    People in Home child(sindy), adult    Name(s) of People in Home Staying with sharon Camara    Current Living Arrangements home    Duration at Residence \"all my life\"    Potentially Unsafe Housing Conditions none    In the past 12 months has the electric, gas, oil, or water company threatened to shut off services in your home? No    Primary Care Provided by self    Provides Primary Care For no one, unable/limited ability to care for self    Family Caregiver if Needed child(sindy), adult    Family Caregiver Names sharon Camara    Quality of Family Relationships helpful;involved;supportive    Able to Return to Prior Arrangements yes       Resource/Environmental Concerns    Resource/Environmental Concerns none    Transportation Concerns none       Transportation Needs    In the past 12 months, has lack of transportation kept you from medical appointments or from getting medications? no    In the past 12 months, has lack of transportation kept you from meetings, work, or from getting things needed for daily living? No       Food Insecurity    Within the past " 12 months, you worried that your food would run out before you got the money to buy more. Sometimes    Within the past 12 months, the food you bought just didn't last and you didn't have money to get more. Sometimes       Transition Planning    Patient/Family Anticipates Transition to home with family    Patient/Family Anticipated Services at Transition home health care    Transportation Anticipated family or friend will provide       Discharge Needs Assessment    Readmission Within the Last 30 Days no previous admission in last 30 days    Equipment Currently Used at Home bp cuff;pulse ox;glucometer;cane, quad tip    Concerns to be Addressed discharge planning    Do you want help finding or keeping work or a job? I do not need or want help    Do you want help with school or training? For example, starting or completing job training or getting a high school diploma, GED or equivalent No    Anticipated Changes Related to Illness none    Equipment Needed After Discharge hospital bed    Outpatient/Agency/Support Group Needs homecare agency                   Discharge Plan       Row Name 01/06/25 1037       Plan    Plan DCP is to return to her daughters house at [address] [place] [address]. Spoke with pt at bedside. Permission given to  discuss DCP with daughter Cleo Camara.  Pt confirmed demographics. States no to Living Will/POA. PCP; [facility], pharmacy is [facility] . Agreed to meds to bed. Pt requested a hospital bed, hospitalist placed order. Pt states is mostly independent with ADL's and mobility and her daughter assists if needed. Pt does have some financial strain and food insecurity. Pathways book provided and she will receive a food bag at discharge. Family to transport home when medically stable for discharge.                  Continued Care and Services - Admitted Since 1/5/2025    No active coordination exists for this encounter.       Expected Discharge Date and Time       Expected Discharge Date  Expected Discharge Time    Jan 6, 2025            Demographic Summary       Row Name 01/06/25 1027       General Information    Admission Type inpatient    Arrived From emergency department    Required Notices Provided Important Message from Medicare  IMM delivered 1/5    Referral Source admission list    Reason for Consult discharge planning    Preferred Language English       Contact Information    Permission Granted to Share Info With ;family/designee                   Functional Status       Row Name 01/06/25 1028       Functional Status    Usual Activity Tolerance moderate    Current Activity Tolerance moderate       Physical Activity    On average, how many days per week do you engage in moderate to strenuous exercise (like a brisk walk)? 0 days    On average, how many minutes do you engage in exercise at this level? 0 min    Number of minutes of exercise per week 0       Assessment of Health Literacy    How often do you have someone help you read hospital materials? Never    How often do you have problems learning about your medical condition because of difficulty understanding written information? Never    How often do you have a problem understanding what is told to you about your medical condition? Never    How confident are you filling out medical forms by yourself? Extremely    Health Literacy Good       Functional Status, IADL    Medications independent    Meal Preparation independent;assistive person  Daughter assists sometimes    Housekeeping assistive person    Laundry assistive person    Shopping assistive person       Mental Status    General Appearance WDL WDL       Mental Status Summary    Recent Changes in Mental Status/Cognitive Functioning no changes       Employment/    Employment Status retired                   Psychosocial    No documentation.                  Abuse/Neglect    No documentation.                  Legal    No documentation.                  Substance Abuse     No documentation.                  Patient Forms    No documentation.                     Atif Zelaya, RN

## 2025-01-06 NOTE — PAYOR COMM NOTE
"TO:BC  FROM:DAMIR DUMAS, RN PHONE [phone]  CLINICALS REF# [id number]    Valarie Barajas (79 y.o. Female)       Date of Birth   1945    Social Security Number       Address   [address] [address]    Home Phone   [phone]    MRN   [id number]       Muslim   Anabaptism    Marital Status                               Admission Date   25    Admission Type   Emergency    Admitting Provider   Ronny Lara DO    Attending Provider   Wil Rader DO    Department, Room/Bed   [facility] INTENSIVE CARE, I04/       Discharge Date       Discharge Disposition       Discharge Destination                                 Attending Provider: Wil Rader DO    Allergies: Penicillins, Clonidine Derivatives, Hydralazine, Sulfa Antibiotics    Isolation: None   Infection: None   Code Status: CPR    Ht: 160 cm (63\")   Wt: 76.3 kg (168 lb 3.4 oz)    Admission Cmt: None   Principal Problem: Hypertensive urgency [I16.0]                   Active Insurance as of 2025       Primary Coverage       Payor Plan Insurance Group Employer/Plan Group    ANTHEM MEDICARE REPLACEMENT ANTHEM MED ADV SNP [id number]       Payor Plan Address Payor Plan Phone Number Payor Plan Fax Number Effective Dates    [id number] [phone]  2025 - None Entered    [facility] [address]         Subscriber Name Subscriber Birth Date Member ID       VALARIE BARAJAS 1945 [id number]                     Emergency Contacts        (Rel.) Home Phone Work Phone Mobile Phone    Cleo Barajas (Daughter) [phone] -- --    BARAJASELROY (Daughter) [phone] -- --                 History & Physical        Ronny Lara DO at [address] [facility]            [facility] HOSPITALIST   HISTORY AND PHYSICAL      Name:  Valarie Barajas   Age:  79 y.o.  Sex:  female  :  1945  MRN:  [id number]   Visit Number:  [id number]  Admission " Date:  1/5/2025  Date Of Service:  01/05/25  Primary Care Physician:  Lay Cox MD    Chief Complaint:     Multiple complaint    History Of Presenting Illness:      Patient is a chronically ill 79-year-old female with history significant for CKD, hypertension, hyperlipidemia, depression who presents to the emergency room from home with multiple complaints.  Patient stated that she was having significant fatigue, lightheadedness, blurry vision and bodyaches.  Denied focal deficits.  Stated vision has chronically been blurry.  Pressure in the eyes obtained in the emergency room which was appropriate bilaterally.  Denied fever, chest pain, shortness of breath, nausea/vomiting.  Also denied dysuria or change in urinary frequency.  No known sick contacts.  Patient did not take her medication prior to coming to the emergency room.    ED summary: Patient afebrile, hypertensive 247/102 and nonhypoxic on room air.  Troponins plateaued 49-47.  Creatinine 1.7 for which appears near baseline.  Glucose 178.  CBC unremarkable.  Urinalysis with presence of leukocytes and bacteria.  Patient denies symptoms.  COVID and flu negative.  CT head showed no acute process.  Chest x-ray reviewed, stable cardiomegaly without acute process.  Patient was given dose of her chronic antihypertensives and was planned to be discharged home.  Unfortunately, hypertension continued requiring initiation of a Cardene drip and hospitalist asked to admit.    Review Of Systems:    All systems were reviewed and negative except as mentioned in history of presenting illness, assessment and plan.    Past Medical History: Patient  has a past medical history of Acute bronchitis with bronchospasm, Anxiety, Arthritis, Asthma, B12 deficiency, Back pain, Body piercing, Cataract, Cervicalgia, Chronic kidney disease, Colonic polyp, Constipation, COPD (chronic obstructive pulmonary disease), Depression, Diverticulitis, Dysphagia, Edema, Elevated  cholesterol, Esophageal reflux, Folic acid deficiency, Full dentures, Herpes zoster, High cholesterol, History of kidney infection, History of recurrent urinary tract infection, HTN (hypertension), Hypercholesterolemia, Impaired functional mobility, balance, gait, and endurance, Kidney infection, Malignant hypertension (04/26/2015), Measles, Muscle spasm, Neoplasm of uncertain behavior of skin, Osteoporosis, Poor historian, Poor historian, Recurrent urinary tract infection, Rheumatoid arthritis, RLS (restless legs syndrome), Stomach ulcer, Stroke, Type 2 diabetes mellitus, and Vitamin D deficiency.    Past Surgical History: Patient  has a past surgical history that includes Hysterectomy (1979); Cataract extraction w/  intraocular lens implant (Left, 02/11/2013); Cataract extraction w/  intraocular lens implant (Right, 04/15/2013); Colonoscopy (2012); Esophagogastroduodenoscopy (N/A, 11/13/2017); Upper gastrointestinal endoscopy (12/09/2013); Upper gastrointestinal endoscopy (11/13/2017); Esophagogastroduodenoscopy (N/A, 11/25/2019); Colonoscopy (N/A, 11/26/2019); Esophagogastroduodenoscopy (N/A, 11/29/2021); and Esophagogastroduodenoscopy (N/A, 11/1/2023).    Social History: Patient  reports that she quit smoking about 13 years ago. Her smoking use included cigarettes. She started smoking about 28 years ago. She has a 15 pack-year smoking history. She has been exposed to tobacco smoke. She has never used smokeless tobacco. She reports that she does not currently use alcohol. She reports that she does not use drugs.    Family History:  Patient's family history has been reviewed and found to be noncontributory.     Allergies:      Penicillins, Clonidine derivatives, Hydralazine, and Sulfa antibiotics    Home Medications:    Prior to Admission Medications       Prescriptions Last Dose Informant Patient Reported? Taking?    doxazosin (Cardura) 2 MG tablet   No No    Take 1 tablet by mouth Daily As Needed (sbp > 160).     atorvastatin (LIPITOR) 80 MG tablet  Self No No    Take 1 tablet by mouth Daily.    carvedilol (COREG) 25 MG tablet  Self No No    Take 1 tablet by mouth 2 (Two) Times a Day With Meals.    Cholecalciferol 50 MCG (2000 UT) capsule   Yes No    Take 1 capsule by mouth Daily.    Easy Touch Pen Needles 31G X 8 MM misc   Yes No    empagliflozin (JARDIANCE) 10 MG tablet tablet   Yes No    Take 1 tablet by mouth Daily.    ferrous sulfate 325 (65 FE) MG tablet   Yes No    Take 1 tablet by mouth Daily With Breakfast.    glucose blood test strip  Self No No    Check sugar once a day    Patient taking differently:  1 each by Other route As Needed (FBS). Check sugar once a day    glucose monitor monitoring kit   No No    1 each Daily.    insulin lispro (humaLOG) 100 UNIT/ML injection   Yes No    Inject 15 Units under the skin into the appropriate area as directed 3 (Three) Times a Day Before Meals.    Insulin Pen Needle 31G X 5 MM misc  Self No No    Inject insulin three times daily    isosorbide mononitrate (IMDUR) 60 MG 24 hr tablet   Yes No    Januvia 25 MG tablet   Yes No    1 tablet.    Lancets 30G misc  Self No No    Check sugar daily    losartan-hydrochlorothiazide (HYZAAR) 100-25 MG per tablet  Self No No    Take 1 tablet by mouth Daily.    NIFEdipine XL (PROCARDIA XL) 90 MG 24 hr tablet   Yes No    Take 1 tablet by mouth Daily.    pantoprazole (PROTONIX) 40 MG EC tablet   No No    TAKE 1 TABLET BY MOUTH 30 MINUTES BEFORE BREAKFAST.          ED Medications:    Medications   sodium chloride 0.9 % flush 10 mL (has no administration in time range)   empagliflozin (JARDIANCE) tablet 10 mg (10 mg Oral Given 1/5/25 1251)   isosorbide mononitrate (IMDUR) 24 hr tablet 60 mg (60 mg Oral Given 1/5/25 1251)   niCARdipine (CARDENE) 25mg in 250mL NS vial to bag (10 mg/hr Intravenous Rate/Dose Change 1/5/25 1601)   sodium chloride 0.9 % bolus 500 mL (0 mL Intravenous Stopped 1/5/25 1437)   fluconazole (DIFLUCAN) tablet 200 mg (200 mg  "Oral Given 1/5/25 1443)   ciprofloxacin (CIPRO) tablet 500 mg (500 mg Oral Given 1/5/25 1414)   tetracaine (ALTACAINE) 0.5 % ophthalmic solution 2 drop (2 drops Both Eyes Given 1/5/25 1443)     Vital Signs:  Temp:  [98.1 °F (36.7 °C)-98.5 °F (36.9 °C)] 98.4 °F (36.9 °C)  Heart Rate:  [56-75] 75  Resp:  [16-18] 16  BP: (189-247)/() 192/78        01/05/25  0921 01/05/25  1102   Weight: 79.4 kg (175 lb) 79.4 kg (175 lb 0.7 oz)     Body mass index is 31.01 kg/m².    Physical Exam:     Most recent vital Signs: BP (!) 192/78   Pulse 75   Temp 98.4 °F (36.9 °C) (Oral)   Resp 16   Ht 160 cm (63\")   Wt 79.4 kg (175 lb 0.7 oz)   SpO2 97%   BMI 31.01 kg/m²     Physical Exam  Vitals reviewed.   Constitutional:       General: She is not in acute distress.     Appearance: She is normal weight.   HENT:      Head: Normocephalic and atraumatic.      Right Ear: External ear normal.      Left Ear: External ear normal.      Mouth/Throat:      Mouth: Mucous membranes are moist.      Pharynx: Oropharynx is clear.   Eyes:      Extraocular Movements: Extraocular movements intact.      Conjunctiva/sclera: Conjunctivae normal.   Cardiovascular:      Rate and Rhythm: Normal rate and regular rhythm.      Pulses: Normal pulses.      Heart sounds: Normal heart sounds.   Pulmonary:      Effort: Pulmonary effort is normal.      Breath sounds: Normal breath sounds.   Abdominal:      General: Bowel sounds are normal.      Palpations: Abdomen is soft.   Musculoskeletal:      Right lower leg: No edema.      Left lower leg: No edema.   Skin:     General: Skin is warm and dry.   Neurological:      Mental Status: She is alert and oriented to person, place, and time. Mental status is at baseline.         Laboratory data:    I have reviewed the labs done in the emergency room.    Results from last 7 days   Lab Units 01/05/25  0939   SODIUM mmol/L 140   POTASSIUM mmol/L 4.1   CHLORIDE mmol/L 104   CO2 mmol/L 24.7   BUN mg/dL 28*   CREATININE " mg/dL 1.74*   CALCIUM mg/dL 9.2   BILIRUBIN mg/dL 0.4   ALK PHOS U/L 93   ALT (SGPT) U/L 6   AST (SGOT) U/L 11   GLUCOSE mg/dL 178*     Results from last 7 days   Lab Units 01/05/25  0939   WBC 10*3/mm3 5.75   HEMOGLOBIN g/dL 13.3   HEMATOCRIT % 40.5   PLATELETS 10*3/mm3 262         Results from last 7 days   Lab Units 01/05/25  1054 01/05/25  0939   HSTROP T ng/L 47* 49*                     Results from last 7 days   Lab Units 01/05/25  1300   COLOR UA  Yellow   GLUCOSE UA  >=1000 mg/dL (3+)*   KETONES UA  Negative   BLOOD UA  Small (1+)*   LEUKOCYTES UA  Small (1+)*   PH, URINE  7.5   BILIRUBIN UA  Negative   UROBILINOGEN UA  0.2 E.U./dL   RBC UA /HPF 0-2   WBC UA /HPF 3-5*       Pain Management Panel  More data may exist         Latest Ref Rng & Units 8/18/2020 10/7/2019   Pain Management Panel   Creatinine, Urine Not Estab. mg/dL 45.4  128.0       Details                   EKG:      EKG personally reviewed, sinus rhythm with a rate of 67 bpm.  No acute ST or T wave changes.    Radiology:    CT Head Without Contrast    Result Date: 1/5/2025  PROCEDURE: CT HEAD WO CONTRAST-  HISTORY: eval for many days of blurry vision and fatigue  COMPARISON: May 20, 2021..  TECHNIQUE: Multiple axial CT images were performed from the foramen magnum to the vertex. Individualized dose reduction techniques using automated exposure control or adjustment of mA and/or kV according to the patient size were employed.  FINDINGS: There is moderate, age-appropriate, stable generalized cerebral atrophy. The ventricles are enlarged. There is no evidence of edema or hemorrhage. There is a small area of encephalomalacia in the left thalamus consistent with remote CVA, stable from prior exam. No masses are identified. No extra-axial fluid is seen. The paranasal sinuses are unremarkable except for a stable osteoma left frontal sinus..      Stable chronic change.     CTDI: 36.35 mGy DLP:627.1 mGy.cm  This report was signed and finalized on  1/5/2025 10:29 AM by Genna Walker MD.      XR Chest 1 View    Result Date: 1/5/2025   PROCEDURE: XR CHEST 1 VW-  HISTORY: Weak/Dizzy/AMS triage protocol, palpitations, hypertension, sciatica  COMPARISON: August 21, 2023..  FINDINGS: The heart is mildly enlarged, stable from prior.. The mediastinum is unremarkable. Decreased inspiratory effort noted with crowding of the lung markings in both lung bases. No acute infiltrate noted.. There is no pneumothorax.  There are no acute osseous abnormalities. There is evidence of old calcified granulomatous disease.      Stable cardiomegaly without acute infiltrate..  .    This report was signed and finalized on 1/5/2025 10:26 AM by Genna Walker MD.       Assessment:    Hypertensive urgency  Elevated troponin  Asymptomatic bacteriuria  CKD  Hyperlipidemia  Anxiety/depression  Impaired mobility and ADL    Plan:    Hypertensive urgency  Elevated troponin  Cardene drip; titrate off as able  Continue patient's chronic antihypertensive medication  Symptoms including lightheadedness and blurry vision already improving with normalization of her blood pressure  Suspect elevated troponin secondary to demand ischemia from hypertension.  Low suspicion for ACS  Asymptomatic bacteriuria  Hold on antibiotic      Risk Assessment: High  DVT Prophylaxis: Heparin sq  Code Status: Full  Diet: Cardiac/Diabetic             Ronny Lara DO  01/05/25  16:16 EST    Dictated utilizing Dragon dictation.      Electronically signed by Ronny Lara DO at 01/05/25 1816          Emergency Department Notes        Danie Tolliver DO at 01/05/25 0923                 [facility] EMERGENCY DEPARTMENT  Emergency Department Encounter  Emergency Medicine Physician Note     Pt Name:Valarie Camara  MRN: [id number]  Birthdate 1945  Date of evaluation: 1/5/2025  PCP:  Lay Cox MD  Note Started: 9:23 AM EST      CHIEF COMPLAINT       Chief Complaint   Patient  "presents with    Dizziness       HISTORY OF PRESENT ILLNESS  (Location/Symptom, Timing/Onset, Context/Setting, Quality, Duration, Modifying Factors, Severity.)      Valarie Camara is a 79 y.o. female who presents with multiple complaints.  Patient describes generalized fatigue, lightheadedness, overall myalgias, blurry vision, lightheadedness, and change in gait.  Patient describes weakness with walking, does not describe any neurologic changes and inability to move her legs.  Patient does describe some lateralization with the right leg.  Patient describes generalized chills, no specific fevers, no headache, cough, change in urination or bobs.  Patient denies any chest pain, does describe some generalized palpitations when asked about chest pain.    PAST MEDICAL / SURGICAL / SOCIAL / FAMILY HISTORY     Past Medical History:   Diagnosis Date    Acute bronchitis with bronchospasm     Anxiety     Arthritis     Asthma     B12 deficiency     Back pain     Body piercing     ears    Cataract     Cervicalgia     Chronic kidney disease     stage 3b    Colonic polyp     History of colonic polyps     Constipation     COPD (chronic obstructive pulmonary disease)     Depression     Diverticulitis     Dysphagia     Patient reported \"it won't go all the way down\" when eating solid foods first thing in the mornings    Edema     Elevated cholesterol     Esophageal reflux     Folic acid deficiency     Full dentures     Advised no adhesives DOS    Herpes zoster     High cholesterol     History of kidney infection     History of recurrent urinary tract infection     HTN (hypertension)     Hypercholesterolemia     Impaired functional mobility, balance, gait, and endurance     Kidney infection     Malignant hypertension 04/26/2015     Accelerated essential hypertension    Measles     rubeola    Muscle spasm     Neoplasm of uncertain behavior of skin     Osteoporosis     Poor historian     Poor historian     Recurrent urinary tract " infection     Rheumatoid arthritis     RLS (restless legs syndrome)     Stomach ulcer     Stroke     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  and that she has residual right sided weakness    Type 2 diabetes mellitus     Vitamin D deficiency      No additional pertinent       Past Surgical History:   Procedure Laterality Date    CATARACT EXTRACTION WITH INTRAOCULAR LENS IMPLANT Left 2013    CATARACT EXTRACTION WITH INTRAOCULAR LENS IMPLANT Right 04/15/2013    COLONOSCOPY  2012    COLONOSCOPY N/A 2019    Procedure: COLONOSCOPY;  Surgeon: Chidi Downing MD;  Location:  [facility];  Service: Gastroenterology    ENDOSCOPY N/A 2017    Procedure: ESOPHAGOGASTRODUODENOSCOPY with biopsies and esophageal balloon dilitation;  Surgeon: Wesley Stovall MD;  Location:  [facility];  Service:     ENDOSCOPY N/A 2019    Procedure: ESOPHAGOGASTRODUODENOSCOPY;  Surgeon: Chidi Downing MD;  Location:  [facility];  Service: Gastroenterology    ENDOSCOPY N/A 2021    Procedure: ESOPHAGOGASTRODUODENOSCOPY with dilation and biopsies;  Surgeon: Gonzalez Zapata MD;  Location: [facility];  Service: Gastroenterology;  Laterality: N/A;    ENDOSCOPY N/A 2023    Procedure: ESOPHAGOGASTRODUODENOSCOPY WITH BIOPSY AND DILATATION;  Surgeon: Gonzalez Zapata MD;  Location: [facility]OSCOPY;  Service: Gastroenterology;  Laterality: N/A;    HYSTERECTOMY  1979    UPPER GASTROINTESTINAL ENDOSCOPY  2013    UPPER GASTROINTESTINAL ENDOSCOPY  2017     No additional pertinent       Social History     Socioeconomic History    Marital status:    Tobacco Use    Smoking status: Former     Current packs/day: 0.00     Average packs/day: 1 pack/day for 15.0 years (15.0 ttl pk-yrs)     Types: Cigarettes     Start date: 1997     Quit date:      Years since quittin.0     Passive exposure: Past    Smokeless tobacco: Never    Tobacco comments:      Denied: History of smoking  cigarettes   Vaping Use    Vaping status: Never Used   Substance and Sexual Activity    Alcohol use: Not Currently     Comment: Occassionally    Drug use: Never    Sexual activity: Not Currently       Family History   Problem Relation Age of Onset    Arthritis Mother     Diabetes Mother     Hypertension Mother     Arthritis Other     Arthritis Other     Diabetes Other         DM    Hypertension Other     Colon cancer Neg Hx        Allergies:  Penicillins, Clonidine derivatives, Hydralazine, and Sulfa antibiotics    Home Medications:  Prior to Admission medications    Medication Sig Start Date End Date Taking? Authorizing Provider   doxazosin (Cardura) 2 MG tablet Take 1 tablet by mouth Daily As Needed (sbp > 160). 11/8/24   Vidal Low MD   atorvastatin (LIPITOR) 80 MG tablet Take 1 tablet by mouth Daily. 10/19/20   Arminda Evans MD   carvedilol (COREG) 25 MG tablet Take 1 tablet by mouth 2 (Two) Times a Day With Meals. 10/19/20   Arminda Evans MD   Cholecalciferol 50 MCG (2000 UT) capsule Take 1 capsule by mouth Daily.    Bruce Mata MD   Easy Touch Pen Needles 31G X 8 MM misc  1/26/22   Bruce Mata MD   empagliflozin (JARDIANCE) 10 MG tablet tablet Take 1 tablet by mouth Daily.    Bruce Mata MD   ferrous sulfate 325 (65 FE) MG tablet Take 1 tablet by mouth Daily With Breakfast.    Bruce Mata MD   glucose blood test strip Check sugar once a day  Patient taking differently: 1 each by Other route As Needed (FBS). Check sugar once a day 11/19/20   Arminda Evans MD   glucose monitor monitoring kit 1 each Daily. 11/19/20   Arminda Evans MD   insulin lispro (humaLOG) 100 UNIT/ML injection Inject 15 Units under the skin into the appropriate area as directed 3 (Three) Times a Day Before Meals.    Bruce Mata MD   Insulin Pen Needle 31G X 5 MM misc Inject insulin three times daily 9/8/20   Arminda Evans MD   isosorbide mononitrate (IMDUR) 60 MG 24 hr tablet   "10/21/24   Bruce Mata MD   Januvia 25 MG tablet 1 tablet. 8/6/24   Bruce Mata MD   Lancets 30G misc Check sugar daily 11/19/20   Arminda Evans MD   losartan-hydrochlorothiazide (HYZAAR) 100-25 MG per tablet Take 1 tablet by mouth Daily. 5/25/21   Yesi Hancock APRN   NIFEdipine XL (PROCARDIA XL) 90 MG 24 hr tablet Take 1 tablet by mouth Daily. 9/1/23   Bruce Mata MD   pantoprazole (PROTONIX) 40 MG EC tablet TAKE 1 TABLET BY MOUTH 30 MINUTES BEFORE BREAKFAST. 12/21/23   Jocelin Roper APRN         REVIEW OF SYSTEMS       Review of Systems   Constitutional:  Negative for diaphoresis and fever.   Eyes:  Positive for visual disturbance. Negative for pain, discharge and itching.   Respiratory:  Negative for chest tightness and shortness of breath.    Cardiovascular:  Negative for chest pain.   Gastrointestinal:  Negative for abdominal pain, nausea and vomiting.   Endocrine: Negative for polyuria.   Genitourinary:  Negative for difficulty urinating and frequency.   Neurological:  Positive for dizziness and weakness.   Psychiatric/Behavioral:  Negative for confusion.        PHYSICAL EXAM      INITIAL VITALS:   BP (!) 247/102   Pulse 67   Temp 98.5 °F (36.9 °C) (Oral)   Resp 18   Ht 160 cm (63\")   Wt 79.4 kg (175 lb 0.7 oz)   SpO2 97%   BMI 31.01 kg/m²     Physical Exam  Constitutional:       Appearance: Normal appearance.   HENT:      Head: Normocephalic and atraumatic.      Mouth/Throat:      Mouth: Mucous membranes are moist.      Pharynx: Oropharynx is clear.   Eyes:      Intraocular pressure: Right eye pressure is 10 mmHg. Left eye pressure is 14 mmHg.   Cardiovascular:      Rate and Rhythm: Normal rate and regular rhythm.      Pulses: Normal pulses.   Pulmonary:      Effort: Pulmonary effort is normal.      Breath sounds: Normal breath sounds.   Abdominal:      General: Abdomen is flat. There is no distension.      Palpations: Abdomen is soft.      Tenderness: There is " no abdominal tenderness. There is no guarding.   Musculoskeletal:         General: Normal range of motion.      Right lower leg: No edema.      Left lower leg: No edema.   Skin:     General: Skin is warm and dry.   Neurological:      General: No focal deficit present.      Mental Status: She is alert and oriented to person, place, and time.      Motor: No weakness.      Comments: INITIAL NIH STROKE SCALE    Time Performed: On arrival    Administer stroke scale items in the order listed. Record performance in each category after each subscale exam. Do not go back and change scores. Follow directions provided for each exam technique. Scores should reflect what the patient does, not what the clinician thinks the patient can do. The clinician should record answers while administering the exam and work quickly. Except where indicated, the patient should not be coached (i.e., repeated requests to patient to make a special effort).     1a.  Level of consciousness:  0  1b.  Level of consciousness questions:  0  1c.  Level of consciousness questions:  0  2.    Best Gaze:  0  3.    Visual:  0  4.    Facial Palsy:  0  5a.  Motor left arm:  0  5b.  Motor right arm:  0  6a.  Motor left le  6b.  Motor right le  7.    Limb Ataxia:  0  8.    Sensory:  0  9.    Best Language:  0  10.  Dysarthria:  0  11.  Extinction and Inattention:  0    TOTAL:  0    Psychiatric:         Mood and Affect: Mood normal.         Behavior: Behavior normal.           DDX/DIAGNOSTIC RESULTS / EMERGENCY DEPARTMENT COURSE / MDM     Differential Diagnosis included but not limited: Viral infection, pneumonia, acute cystitis, glaucoma, intracranial mass or intracranial hemorrhage, CVA, hypertensive emergency versus urgency    Diagnoses Considered but Do Not Suspect: Acute CVA of low concern as patient is described insidious in symptoms over the last couple days to week with negative NIH scale.    Decision Rules/Scores utilized: N/A     Tests  considered but not ordered and why:  N/A    MIPS: N/A     Code Status Discussion:  Not Discussed    Additional Patient Education Provided: None     Medical Decision Making    Medical Decision Making  Patient is a 79-year-old female presenting with generalized fatigue and weakness.  On initial evaluation her concern for more viral-like infectious process versus ACS.  Workup as demonstrated patient to be hypertensive after home medications that she did not take this morning.  Concern for hypertensive emergency causing bilateral blurry vision and generalized weakness.  No concerns for CVA ,Patient grossly neurologically tact with negative NIH.   Patient visual acuity 20/40 in 20/50 with 20/30 both eyes.  Patient with normal pressures bilaterally in her eye.  No obvious cause of patient's blurry vision other than diabetes and hypertension.  Patient with negative CT head other than old infarct that was noted on previous imaging.  Patient did demonstrate concerns for borderline acute cystitis and yeast infection of the urine, given Cipro and Diflucan.  Initially had plans to discharge her, blood pressure continued to be refractory to oral medications with what she described concerning of her blurry vision vehicle patient needs to come in for hypertensive emergency.      Problems Addressed:  Acute cystitis without hematuria: complicated acute illness or injury  Candidiasis of urogenital site: complicated acute illness or injury  Hypertensive urgency: complicated acute illness or injury    Amount and/or Complexity of Data Reviewed  External Data Reviewed: labs and notes.  Labs: ordered. Decision-making details documented in ED Course.  Radiology: ordered. Decision-making details documented in ED Course.  ECG/medicine tests: ordered.  Discussion of management or test interpretation with external provider(s): Hospitalist for admission    Risk  Prescription drug management.  Decision regarding hospitalization.        See ED  COURSE for additional MDM statements    EKG    EKG Interpretation    Evaluated and interpreted by emergency department physician    Rhythm: Normal Sinus Rhythm  Rate: Normal  Axis: Right  Ectopy: none  Conduction: Normal  ST Segments: Normal  T Waves: Nonspecific T wave flattening multiple leads  Q Waves: None    Clinical Impression: Normal Sinus Rhythm    Danie Tolliver DO      All EKG's are interpreted by the Emergency Department Physician who either signs or Co-signs this chart in the absence of a cardiologist.    Additional Scores                   EMERGENCY DEPARTMENT COURSE:    ED Course as of 01/05/25 1538   Sun Jan 05, 2025   1013 Person evaluated the CT head, no signs of intracranial hemorrhage or obvious ischemia or masses [CR]   1013 Personally evaluated chest x-ray, no signs of obvious pneumonia, no hemopneumothorax, no signs of acute cardiopulmonary processes. [CR]   1026 HS Troponin T(!): 49  Improved from patient's baseline. [CR]   1026 Creatinine(!): 1.74  Improved from previous labs. [CR]   1107 BP(!): 189/81 [CR]   1140 WBC: 5.75  Patient with no leukocytosis or severe infectious processes. [CR]   1228 Discussed patient's results with patient and daughter.  Plan to give home medications that she was post take this morning she did not take.  Daughter requests evaluation of thyroid, we will add that on.  Plan to get urine.  Do feel patient stable at this time for discharge if negative additional workup. [CR]   1356 Free T4: 1.32  T4 noted be within normal limits. [CR]   1441 Patient still severely hypertensive, describing blurriness in her eyes, workup grossly negative for hypertensive emergency I do feel neurologic changes the patient needs to be admitted at this time.  Plan to start IV drip as patient did not respond to oral medications. [CR]      ED Course User Index  [CR] Danie Tolliver DO       PROCEDURES:  None Performed   Procedures    DATA FOR LAB AND RADIOLOGY TESTS  ORDERED BELOW ARE REVIEWED BY THE ED CLINICIAN:    RADIOLOGY: All x-rays, CT, MRI, and formal ultrasound images (except ED bedside ultrasound) are read by the radiologist, see reports below, unless otherwise noted in MDM or here.  Reports below are reviewed by myself.  CT Head Without Contrast   Final Result   Stable chronic change.                   CTDI: 36.35 mGy   DLP:627.1 mGy.cm       This report was signed and finalized on 1/5/2025 10:29 AM by Genna Walker MD.          XR Chest 1 View   Final Result   Stable cardiomegaly without acute infiltrate..       .               This report was signed and finalized on 1/5/2025 10:26 AM by Genna Walker MD.              LABS: Lab orders shown below, the results are reviewed by myself, and all abnormals are listed below.  Labs Reviewed   COMPREHENSIVE METABOLIC PANEL - Abnormal; Notable for the following components:       Result Value    Glucose 178 (*)     BUN 28 (*)     Creatinine 1.74 (*)     eGFR 29.4 (*)     All other components within normal limits    Narrative:     GFR Categories in Chronic Kidney Disease (CKD)      GFR Category          GFR (mL/min/1.73)    Interpretation  G1                     90 or greater         Normal or high (1)  G2                      60-89                Mild decrease (1)  G3a                   45-59                Mild to moderate decrease  G3b                   30-44                Moderate to severe decrease  G4                    15-29                Severe decrease  G5                    14 or less           Kidney failure          (1)In the absence of evidence of kidney disease, neither GFR category G1 or G2 fulfill the criteria for CKD.    eGFR calculation 2021 CKD-EPI creatinine equation, which does not include race as a factor   TROPONIN - Abnormal; Notable for the following components:    HS Troponin T 49 (*)     All other components within normal limits    Narrative:     High Sensitive Troponin T Reference Range:  <14.0  ng/L- Negative Female for AMI  <22.0 ng/L- Negative Male for AMI  >=14 - Abnormal Female indicating possible myocardial injury.  >=22 - Abnormal Male indicating possible myocardial injury.   Clinicians would have to utilize clinical acumen, EKG, Troponin, and serial changes to determine if it is an Acute Myocardial Infarction or myocardial injury due to an underlying chronic condition.        URINALYSIS W/ MICROSCOPIC IF INDICATED (NO CULTURE) - Abnormal; Notable for the following components:    Appearance, UA Cloudy (*)     Glucose, UA >=1000 mg/dL (3+) (*)     Blood, UA Small (1+) (*)     Protein,  mg/dL (2+) (*)     Leuk Esterase, UA Small (1+) (*)     All other components within normal limits   HIGH SENSITIVITIY TROPONIN T 1HR - Abnormal; Notable for the following components:    HS Troponin T 47 (*)     All other components within normal limits    Narrative:     High Sensitive Troponin T Reference Range:  <14.0 ng/L- Negative Female for AMI  <22.0 ng/L- Negative Male for AMI  >=14 - Abnormal Female indicating possible myocardial injury.  >=22 - Abnormal Male indicating possible myocardial injury.   Clinicians would have to utilize clinical acumen, EKG, Troponin, and serial changes to determine if it is an Acute Myocardial Infarction or myocardial injury due to an underlying chronic condition.        TSH RFX ON ABNORMAL TO FREE T4 - Abnormal; Notable for the following components:    TSH 0.083 (*)     All other components within normal limits   URINALYSIS, MICROSCOPIC ONLY - Abnormal; Notable for the following components:    WBC, UA 3-5 (*)     Bacteria, UA 3+ (*)     Yeast, UA Small/1+ Budding Yeast (*)     All other components within normal limits   POCT GLUCOSE FINGERSTICK - Abnormal; Notable for the following components:    Glucose 175 (*)     All other components within normal limits   COVID-19/FLUA&B/RSV, NP SWAB IN TRANSPORT MEDIA 1 HR TAT - Normal    Narrative:     Fact sheet for providers:  https://www.fda.gov/media/750079/download    Fact sheet for patients: https://www.fda.gov/media/380221/download    Test performed by PCR.   MAGNESIUM - Normal   CBC WITH AUTO DIFFERENTIAL - Normal   T4, FREE - Normal   RAINBOW DRAW    Narrative:     The following orders were created for panel order [place] Draw.  Procedure                               Abnormality         Status                     ---------                               -----------         ------                     Green Top (Gel)[[id number]]                                  Final result               Lavender Top[[id number]]                                     Final result               Gold Top - SST[[id number]]                                   Final result               Light Blue Top[[id number]]                                   Final result                 Please view results for these tests on the individual orders.   POCT GLUCOSE FINGERSTICK   CBC AND DIFFERENTIAL    Narrative:     The following orders were created for panel order CBC & Differential.  Procedure                               Abnormality         Status                     ---------                               -----------         ------                     CBC Auto Differential[[id number]]        Normal              Final result                 Please view results for these tests on the individual orders.   GREEN TOP   LAVENDER TOP   GOLD TOP - SST   LIGHT BLUE TOP       Vitals Reviewed:    Vitals:    01/05/25 1434 01/05/25 1437 01/05/25 1507 01/05/25 1515   BP: (!) 224/96  (!) 224/96 (!) 247/102   BP Location:       Patient Position:       Pulse: 63 68 70 67   Resp:       Temp:       TempSrc:       SpO2: 98% 96%  97%   Weight:       Height:           MEDICATIONS GIVEN TO PATIENT THIS ENCOUNTER:  Medications   sodium chloride 0.9 % flush 10 mL (has no administration in time range)   empagliflozin (JARDIANCE) tablet 10 mg (10 mg Oral Given 1/5/25 1251)   isosorbide mononitrate  (IMDUR) 24 hr tablet 60 mg (60 mg Oral Given 1/5/25 1251)   niCARdipine (CARDENE) 25mg in 250mL NS vial to bag (5 mg/hr Intravenous New Bag 1/5/25 1507)   sodium chloride 0.9 % bolus 500 mL (0 mL Intravenous Stopped 1/5/25 1437)   fluconazole (DIFLUCAN) tablet 200 mg (200 mg Oral Given 1/5/25 1443)   ciprofloxacin (CIPRO) tablet 500 mg (500 mg Oral Given 1/5/25 1414)   tetracaine (ALTACAINE) 0.5 % ophthalmic solution 2 drop (2 drops Both Eyes Given 1/5/25 1443)       CONSULTS:  None    CRITICAL CARE:  There was significant risk of life threatening deterioration of patient's condition requiring my direct management. Critical care time 45 minutes, excluding any documented procedures.    FINAL IMPRESSION      1. Acute cystitis without hematuria    2. Candidiasis of urogenital site    3. Hypertensive urgency          DISPOSITION / PLAN     ED Disposition       ED Disposition   Decision to Admit    Condition   --    Comment   Level of Care: Critical Care [6]   Diagnosis: Hypertensive urgency [564960]   Certification: I Certify That Inpatient Hospital Services Are Medically Necessary For Greater Than 2 Midnights                 PATIENT REFERRED TO:  No follow-up provider specified.    DISCHARGE MEDICATIONS:     Medication List        START taking these medications      ciprofloxacin 500 MG tablet  Commonly known as: CIPRO  Take 1 tablet by mouth 2 (Two) Times a Day for 7 days.     fluconazole 150 MG tablet  Commonly known as: DIFLUCAN  Take 1 tablet by mouth 1 (One) Time for 1 dose.            CONTINUE taking these medications      glucose monitor monitoring kit  1 each Daily.     * Insulin Pen Needle 31G X 5 MM misc  Inject insulin three times daily     * Easy Touch Pen Needles 31G X 8 MM misc  Generic drug: Insulin Pen Needle     Lancets 30G misc  Check sugar daily           * This list has 2 medication(s) that are the same as other medications prescribed for you. Read the directions carefully, and ask your doctor or  other care provider to review them with you.                ASK your doctor about these medications      atorvastatin 80 MG tablet  Commonly known as: LIPITOR  Take 1 tablet by mouth Daily.     carvedilol 25 MG tablet  Commonly known as: COREG  Take 1 tablet by mouth 2 (Two) Times a Day With Meals.     Cholecalciferol 50 MCG (2000 UT) capsule     doxazosin 2 MG tablet  Commonly known as: Cardura  Take 1 tablet by mouth Daily As Needed (sbp > 160).     empagliflozin 10 MG tablet tablet  Commonly known as: JARDIANCE     ferrous sulfate 325 (65 FE) MG tablet     glucose blood test strip  Check sugar once a day     insulin lispro 100 UNIT/ML injection  Commonly known as: humaLOG     isosorbide mononitrate 60 MG 24 hr tablet  Commonly known as: IMDUR     Januvia 25 MG tablet  Generic drug: SITagliptin     losartan-hydrochlorothiazide 100-25 MG per tablet  Commonly known as: HYZAAR  Take 1 tablet by mouth Daily.     NIFEdipine XL 90 MG 24 hr tablet  Commonly known as: PROCARDIA XL     pantoprazole 40 MG EC tablet  Commonly known as: PROTONIX  TAKE 1 TABLET BY MOUTH 30 MINUTES BEFORE BREAKFAST.               Where to Get Your Medications        These medications were sent to [id number] - [place], [place] - [facility] [address] AT [facility] [address] - [phone] PH - [phone] FX  [address], [facility] [address]      Phone: [phone]   ciprofloxacin 500 MG tablet  fluconazole 150 MG tablet         Electronically signed by Danie Tolliver DO, 01/05/25, 9:23 AM EST.    Emergency Medicine Physician  [facility]  (Please note that portions of thisnote were completed with a voice recognition program.  Efforts were made to edit the dictations but occasionally words are mis-transcribed.)           Danie Tolliver DO  01/05/25 1531       Danie Tolliver DO  01/05/25 1538       Danie Tolliver DO  01/06/25 0615      Electronically  signed by Danie Tolliver DO at 01/06/25 0613       Vital Signs (last day)       Date/Time Temp Temp src Pulse Resp BP Patient Position SpO2    01/06/25 0836 -- -- 70 -- 128/69 -- --    01/06/25 0800 -- -- 75 -- 128/69 -- 94    01/06/25 0700 97.7 (36.5) Oral 80 -- 123/67 -- 96    01/06/25 0600 -- -- 71 -- 119/58 -- 95    01/06/25 0500 -- -- 61 -- 110/47 -- 92    01/06/25 0400 97.8 (36.6) -- 62 16 131/66 Lying 92    01/06/25 0300 -- -- 70 -- 119/64 -- 92    01/06/25 0200 -- -- 62 -- 126/64 -- 90    01/06/25 0100 -- -- 66 -- 124/52 -- 94    01/06/25 0000 -- -- 64 16 120/57 Lying 95    01/05/25 2300 -- -- 76 -- 123/63 -- 93    01/05/25 2200 -- -- 69 -- 134/61 -- 94    01/05/25 2100 -- -- 74 -- 134/62 -- 95    01/05/25 2000 98.4 (36.9) Oral 77 14 129/63 Lying 92    01/05/25 1900 -- -- 76 -- 129/63 -- 94    01/05/25 1800 -- -- 92 18 156/73 Lying 94    01/05/25 1745 -- -- -- -- 138/67 -- --    01/05/25 1730 -- -- -- -- 143/63 -- --    01/05/25 1715 -- -- -- -- 150/65 -- --    01/05/25 1700 98.6 (37) Oral 71 17 152/69 Lying 95    01/05/25 1645 -- -- -- -- 156/70 -- --    01/05/25 1628 -- -- -- 16 -- Lying --    01/05/25 1602 -- -- 75 -- 192/78 -- 97    01/05/25 15:59:36 98.4 (36.9) Oral -- 16 -- -- --    01/05/25 1559 -- -- 70 -- -- -- 94    01/05/25 1554 -- -- 70 -- -- -- 96    01/05/25 1549 -- -- 68 -- -- -- 97    01/05/25 1544 -- -- 66 -- 206/88 -- 97    01/05/25 1515 -- -- 67 -- 247/102 -- 97    01/05/25 1507 -- -- 70 -- 224/96 -- --    01/05/25 1437 -- -- 68 -- -- -- 96    01/05/25 1434 -- -- 63 -- 224/96 -- 98    01/05/25 1432 -- -- 65 -- 228/93 -- 95    01/05/25 1352 -- -- 65 -- -- -- 99    01/05/25 1333 -- -- 65 -- -- -- 98    01/05/25 1323 -- -- 67 -- -- -- 97    01/05/25 1253 -- -- 75 -- 229/89 -- 97    01/05/25 1251 -- -- 70 -- 229/89 -- --    01/05/25 1243 -- -- 66 -- -- -- 93    01/05/25 1232 -- -- 66 -- -- -- 96    01/05/25 1227 -- -- 68 -- -- -- --    01/05/25 1148 -- -- 56 -- -- -- 96    01/05/25  11:02:01 98.5 (36.9) Oral 62 18 189/81 Sitting 95    01/05/25 1051 -- -- 65 -- -- -- 95    01/05/25 0921 98.1 (36.7) -- 69 18 219/99 Sitting 98          Current Facility-Administered Medications   Medication Dose Route Frequency Provider Last Rate Last Admin    acetaminophen (TYLENOL) tablet 650 mg  650 mg Oral Q4H PRN Ronny Lara DO   650 mg at 01/06/25 0018    Or    acetaminophen (TYLENOL) suppository 650 mg  650 mg Rectal Q4H PRN Ronny Lara DO        atorvastatin (LIPITOR) tablet 80 mg  80 mg Oral Daily Ronny Lara DO   80 mg at 01/06/25 0837    sennosides-docusate (PERICOLACE) 8.6-50 MG per tablet 2 tablet  2 tablet Oral BID Ronny Lara DO   2 tablet at 01/06/25 0836    And    polyethylene glycol (MIRALAX) packet 17 g  17 g Oral Daily PRN Ronny Lara DO        And    bisacodyl (DULCOLAX) EC tablet 5 mg  5 mg Oral Daily PRN Ronny Lara DO        And    bisacodyl (DULCOLAX) suppository 10 mg  10 mg Rectal Daily PRN Ronny Lara DO        carvedilol (COREG) tablet 25 mg  25 mg Oral BID With Meals Ronny Lara DO   25 mg at 01/06/25 0836    dextrose (D50W) (25 g/50 mL) IV injection 10-50 mL  10-50 mL Intravenous Q15 Min PRN Ronny Lara DO        dextrose (GLUTOSE) oral gel 15 g  15 g Oral Q15 Min PRN Ronny Lara DO        empagliflozin (JARDIANCE) tablet 10 mg  10 mg Oral Daily Ronny Lara DO        Glucagon (GLUCAGEN) injection 1 mg  1 mg Intramuscular Q15 Min PRN Ronny Lara DO        heparin (porcine) 5000 UNIT/ML injection 5,000 Units  5,000 Units Subcutaneous Q12H Ronny Lara DO   5,000 Units at 01/06/25 0835    insulin glargine (LANTUS, SEMGLEE) injection 1-200 Units  1-200 Units Subcutaneous Nightly - [employer] Jane, Ronny, DO        insulin lispro (humaLOG) injection 1-200 Units  1-200 Units Subcutaneous 4x Daily With Meals & Nightly Ronny Lara DO   2 Units at 01/06/25 0833    insulin lispro (humaLOG) injection 1-200 Units  1-200  Units Subcutaneous PRN Ronny Lara DO        isosorbide mononitrate (IMDUR) 24 hr tablet 60 mg  60 mg Oral Q24H Ronny Lara DO   60 mg at 01/06/25 0836    linagliptin (TRADJENTA) tablet 5 mg  5 mg Oral Daily Ronny Lara DO   5 mg at 01/05/25 1842    melatonin tablet 5 mg  5 mg Oral Nightly PRN Ezequiel August MD   5 mg at 01/06/25 0109    mupirocin (BACTROBAN) 2 % nasal ointment 1 Application  1 Application Each Nare BID Ronny Lara DO   1 Application at 01/06/25 0836    niCARdipine (CARDENE) 25mg in 250mL NS vial to bag  5-15 mg/hr Intravenous Titrated Ronny Lara DO 50 mL/hr at 01/05/25 1846 5 mg/hr at 01/05/25 1846    NIFEdipine XL (PROCARDIA XL) 24 hr tablet 90 mg  90 mg Oral Daily Ronny Lara DO   90 mg at 01/06/25 0837    nitroglycerin (NITROSTAT) SL tablet 0.4 mg  0.4 mg Sublingual Q5 Min PRN Ronny Lara DO        ondansetron ODT (ZOFRAN-ODT) disintegrating tablet 4 mg  4 mg Oral Q6H PRN Ronny Lara DO        Or    ondansetron (ZOFRAN) injection 4 mg  4 mg Intravenous Q6H PRN Ronny Lara DO        pantoprazole (PROTONIX) EC tablet 40 mg  40 mg Oral Q AM Ronny Lara DO   40 mg at 01/06/25 0652    sodium chloride 0.9 % flush 10 mL  10 mL Intravenous PRN Danie Tolliver DO        sodium chloride 0.9 % flush 10 mL  10 mL Intravenous Q12H Ronny Lara DO   10 mL at 01/06/25 0838    sodium chloride 0.9 % flush 10 mL  10 mL Intravenous PRN Ronny Lara DO        sodium chloride 0.9 % infusion 40 mL  40 mL Intravenous PRN Ronny Lara DO        terazosin (HYTRIN) capsule 2 mg  2 mg Oral Nightly Ronny Lara DO   2 mg at 01/05/25 2128     Lab Results (last 24 hours)       Procedure Component Value Units Date/Time    POC Glucose 4x Daily Before Meals & at Bedtime [[id number]]  (Abnormal) Collected: 01/06/25 0726    Specimen: Blood Updated: 01/06/25 0736     Glucose 150 mg/dL      Comment: Serial Number: [id number]:  [id number]       Basic  Metabolic Panel [[id number]]  (Abnormal) Collected: 01/06/25 0425    Specimen: Blood Updated: 01/06/25 0540     Glucose 152 mg/dL      BUN 28 mg/dL      Creatinine 1.82 mg/dL      Sodium 137 mmol/L      Potassium 4.1 mmol/L      Chloride 105 mmol/L      CO2 22.4 mmol/L      Calcium 8.7 mg/dL      BUN/Creatinine Ratio 15.4     Anion Gap 9.6 mmol/L      eGFR 27.8 mL/min/1.73     Narrative:      GFR Categories in Chronic Kidney Disease (CKD)      GFR Category          GFR (mL/min/1.73)    Interpretation  G1                     90 or greater         Normal or high (1)  G2                      60-89                Mild decrease (1)  G3a                   45-59                Mild to moderate decrease  G3b                   30-44                Moderate to severe decrease  G4                    15-29                Severe decrease  G5                    14 or less           Kidney failure          (1)In the absence of evidence of kidney disease, neither GFR category G1 or G2 fulfill the criteria for CKD.    eGFR calculation 2021 CKD-EPI creatinine equation, which does not include race as a factor    CBC (No Diff) [[id number]]  (Abnormal) Collected: 01/06/25 0425    Specimen: Blood Updated: 01/06/25 0513     WBC 5.79 10*3/mm3      RBC 3.93 10*6/mm3      Hemoglobin 11.5 g/dL      Hematocrit 34.3 %      MCV 87.3 fL      MCH 29.3 pg      MCHC 33.5 g/dL      RDW 12.9 %      RDW-SD 41.1 fl      MPV 10.1 fL      Platelets 214 10*3/mm3     POC Glucose Once [[id number]]  (Abnormal) Collected: 01/05/25 1639    Specimen: Blood Updated: 01/05/25 1650     Glucose 141 mg/dL      Comment: Serial Number: [id number]:  [id number]       Urinalysis, Microscopic Only - Urine, Clean Catch [[id number]]  (Abnormal) Collected: 01/05/25 1300    Specimen: Urine, Clean Catch Updated: 01/05/25 1333     RBC, UA 0-2 /HPF      WBC, UA 3-5 /HPF      Bacteria, UA 3+ /HPF      Squamous Epithelial Cells, UA 0-2 /HPF      Yeast, UA Small/1+ Budding Yeast  /HPF      Hyaline Casts, UA None Seen /LPF      Methodology Manual Light Microscopy    T4, Free [[id number]]  (Normal) Collected: 01/05/25 1054    Specimen: Blood Updated: 01/05/25 1325     Free T4 1.32 ng/dL     Urinalysis With Microscopic If Indicated (No Culture) - Urine, Clean Catch [[id number]]  (Abnormal) Collected: 01/05/25 1300    Specimen: Urine, Clean Catch Updated: 01/05/25 1322     Color, UA Yellow     Appearance, UA Cloudy     pH, UA 7.5     Specific Gravity, UA 1.014     Glucose, UA >=1000 mg/dL (3+)     Ketones, UA Negative     Bilirubin, UA Negative     Blood, UA Small (1+)     Protein,  mg/dL (2+)     Leuk Esterase, UA Small (1+)     Nitrite, UA Negative     Urobilinogen, UA 0.2 E.U./dL    TSH Rfx On Abnormal To Free T4 [[id number]]  (Abnormal) Collected: 01/05/25 1054    Specimen: Blood Updated: 01/05/25 1301     TSH 0.083 uIU/mL     High Sensitivity Troponin T 1Hr [[id number]]  (Abnormal) Collected: 01/05/25 1054    Specimen: Blood Updated: 01/05/25 1126     HS Troponin T 47 ng/L      Troponin T Numeric Delta -2 ng/L      Troponin T % Delta -4 %     Narrative:      High Sensitive Troponin T Reference Range:  <14.0 ng/L- Negative Female for AMI  <22.0 ng/L- Negative Male for AMI  >=14 - Abnormal Female indicating possible myocardial injury.  >=22 - Abnormal Male indicating possible myocardial injury.   Clinicians would have to utilize clinical acumen, EKG, Troponin, and serial changes to determine if it is an Acute Myocardial Infarction or myocardial injury due to an underlying chronic condition.         COVID-19, FLU A/B, RSV PCR 1 HR TAT - Swab, Nasopharynx [[id number]]  (Normal) Collected: 01/05/25 0950    Specimen: Swab from Nasopharynx Updated: 01/05/25 1029     COVID19 Not Detected     Influenza A PCR Not Detected     Influenza B PCR Not Detected     RSV, PCR Not Detected    Narrative:      Fact sheet for providers: https://www.fda.gov/media/507056/download    Fact sheet for patients:  https://www.fda.gov/media/146716/download    Test performed by PCR.    High Sensitivity Troponin T [[id number]]  (Abnormal) Collected: 01/05/25 0939    Specimen: Blood Updated: 01/05/25 1017     HS Troponin T 49 ng/L     Narrative:      High Sensitive Troponin T Reference Range:  <14.0 ng/L- Negative Female for AMI  <22.0 ng/L- Negative Male for AMI  >=14 - Abnormal Female indicating possible myocardial injury.  >=22 - Abnormal Male indicating possible myocardial injury.   Clinicians would have to utilize clinical acumen, EKG, Troponin, and serial changes to determine if it is an Acute Myocardial Infarction or myocardial injury due to an underlying chronic condition.         Comprehensive Metabolic Panel [[id number]]  (Abnormal) Collected: 01/05/25 0939    Specimen: Blood Updated: 01/05/25 1015     Glucose 178 mg/dL      BUN 28 mg/dL      Creatinine 1.74 mg/dL      Sodium 140 mmol/L      Potassium 4.1 mmol/L      Chloride 104 mmol/L      CO2 24.7 mmol/L      Calcium 9.2 mg/dL      Total Protein 7.2 g/dL      Albumin 4.0 g/dL      ALT (SGPT) 6 U/L      AST (SGOT) 11 U/L      Alkaline Phosphatase 93 U/L      Total Bilirubin 0.4 mg/dL      Globulin 3.2 gm/dL      A/G Ratio 1.3 g/dL      BUN/Creatinine Ratio 16.1     Anion Gap 11.3 mmol/L      eGFR 29.4 mL/min/1.73     Narrative:      GFR Categories in Chronic Kidney Disease (CKD)      GFR Category          GFR (mL/min/1.73)    Interpretation  G1                     90 or greater         Normal or high (1)  G2                      60-89                Mild decrease (1)  G3a                   45-59                Mild to moderate decrease  G3b                   30-44                Moderate to severe decrease  G4                    15-29                Severe decrease  G5                    14 or less           Kidney failure          (1)In the absence of evidence of kidney disease, neither GFR category G1 or G2 fulfill the criteria for CKD.    eGFR calculation 2021  CKD-EPI creatinine equation, which does not include race as a factor    Magnesium [[id number]]  (Normal) Collected: 01/05/25 0939    Specimen: Blood Updated: 01/05/25 1015     Magnesium 2.0 mg/dL     [place] Draw [[id number]] Collected: 01/05/25 0939    Specimen: Blood Updated: 01/05/25 1000    Narrative:      The following orders were created for panel order [place] Draw.  Procedure                               Abnormality         Status                     ---------                               -----------         ------                     Green Top (Gel)[[id number]]                                  Final result               Lavender Top[[id number]]                                     Final result               Gold Top - SST[[id number]]                                   Final result               Light Blue Top[[id number]]                                   Final result                 Please view results for these tests on the individual orders.    Green Top (Gel) [[id number]] Collected: 01/05/25 0939    Specimen: Blood Updated: 01/05/25 1000     Extra Tube Hold for add-ons.     Comment: Auto resulted.       Lavender Top [[id number]] Collected: 01/05/25 0939    Specimen: Blood Updated: 01/05/25 1000     Extra Tube hold for add-on     Comment: Auto resulted       Gold Top - SST [[id number]] Collected: 01/05/25 0939    Specimen: Blood Updated: 01/05/25 1000     Extra Tube Hold for add-ons.     Comment: Auto resulted.       Light Blue Top [[id number]] Collected: 01/05/25 0939    Specimen: Blood Updated: 01/05/25 1000     Extra Tube Hold for add-ons.     Comment: Auto resulted       CBC & Differential [[id number]]  (Normal) Collected: 01/05/25 0939    Specimen: Blood Updated: 01/05/25 0955    Narrative:      The following orders were created for panel order CBC & Differential.  Procedure                               Abnormality         Status                     ---------                               -----------          ------                     CBC Auto Differential[[id number]]        Normal              Final result                 Please view results for these tests on the individual orders.    CBC Auto Differential [[id number]]  (Normal) Collected: 01/05/25 0939    Specimen: Blood Updated: 01/05/25 0955     WBC 5.75 10*3/mm3      RBC 4.68 10*6/mm3      Hemoglobin 13.3 g/dL      Hematocrit 40.5 %      MCV 86.5 fL      MCH 28.4 pg      MCHC 32.8 g/dL      RDW 12.9 %      RDW-SD 40.4 fl      MPV 10.0 fL      Platelets 262 10*3/mm3      Neutrophil % 56.8 %      Lymphocyte % 32.9 %      Monocyte % 8.2 %      Eosinophil % 1.4 %      Basophil % 0.5 %      Immature Grans % 0.2 %      Neutrophils, Absolute 3.27 10*3/mm3      Lymphocytes, Absolute 1.89 10*3/mm3      Monocytes, Absolute 0.47 10*3/mm3      Eosinophils, Absolute 0.08 10*3/mm3      Basophils, Absolute 0.03 10*3/mm3      Immature Grans, Absolute 0.01 10*3/mm3      nRBC 0.0 /100 WBC     POC Glucose Once [[id number]]  (Abnormal) Collected: 01/05/25 0926    Specimen: Blood Updated: 01/05/25 0928     Glucose 175 mg/dL      Comment: Serial Number: [id number]:  [id number]             Imaging Results (Last 24 Hours)       Procedure Component Value Units Date/Time    CT Head Without Contrast [[id number]] Collected: 01/05/25 1027     Updated: 01/05/25 1031    Narrative:      PROCEDURE: CT HEAD WO CONTRAST-     HISTORY: eval for many days of blurry vision and fatigue     COMPARISON: May 20, 2021..     TECHNIQUE: Multiple axial CT images were performed from the foramen  magnum to the vertex. Individualized dose reduction techniques using  automated exposure control or adjustment of mA and/or kV according to  the patient size were employed.     FINDINGS: There is moderate, age-appropriate, stable generalized  cerebral atrophy. The ventricles are enlarged. There is no evidence of  edema or hemorrhage. There is a small area of encephalomalacia in the  left thalamus consistent with remote  CVA, stable from prior exam. No  masses are identified. No extra-axial fluid is seen. The paranasal  sinuses are unremarkable except for a stable osteoma left frontal  sinus..       Impression:      Stable chronic change.              CTDI: 36.35 mGy  DLP:627.1 mGy.cm     This report was signed and finalized on 1/5/2025 10:29 AM by Genna Walker MD.       XR Chest 1 View [[id number]] Collected: 01/05/25 1025     Updated: 01/05/25 1028    Narrative:       PROCEDURE: XR CHEST 1 VW-     HISTORY: Weak/Dizzy/AMS triage protocol, palpitations, hypertension,  sciatica     COMPARISON: August 21, 2023..     FINDINGS: The heart is mildly enlarged, stable from prior.. The  mediastinum is unremarkable. Decreased inspiratory effort noted with  crowding of the lung markings in both lung bases. No acute infiltrate  noted.. There is no pneumothorax.  There are no acute osseous  abnormalities. There is evidence of old calcified granulomatous disease.       Impression:      Stable cardiomegaly without acute infiltrate..     .           This report was signed and finalized on 1/5/2025 10:26 AM by Genna Walker MD.             Physician Progress Notes (last 24 hours)  Notes from 01/05/25 0917 through 01/06/25 0917   No notes of this type exist for this encounter.       Consult Notes (last 24 hours)  Notes from 01/05/25 0917 through 01/06/25 0917   No notes of this type exist for this encounter.

## 2025-01-06 NOTE — DISCHARGE SUMMARY
[facility]   DISCHARGE SUMMARY      Name:  Valarie Camara   Age:  79 y.o.  Sex:  female  :  1945  MRN:  [id number]   Visit Number:  [id number]    Admission Date:  2025  Date of Discharge:  2025  Primary Care Physician:  Lay Cox MD    Important issues to note:    Start: Cipro, Tradjenta  Stop: Nothing  Follow up: PCP and cardiology  Brief Summary: Presented with hypertension due to unclear etiology. Initially had very high blood pressure requiring IV medicines which improved to normotensive by the time of discharge with only her home medication regimen..    Discharge Diagnoses:     Hypertensive urgency  Elevated troponin  Asymptomatic bacteriuria  CKD  Hyperlipidemia  Anxiety/depression  Impaired mobility and ADL    Problem List:     Active Hospital Problems    Diagnosis  POA    **Hypertensive urgency [I16.0]  Yes      Resolved Hospital Problems   No resolved problems to display.     Presenting Problem:    Chief Complaint   Patient presents with    Dizziness      Consults:     Consulting Physician(s)                     None                      DME:  The patient has a medical condition which requires frequent changes in body positioning in ways not feasibly achieved with an ordinary bed.     History Of Presenting Illness:       Patient is a chronically ill 79-year-old female with history significant for CKD, hypertension, hyperlipidemia, depression who presents to the emergency room from home with multiple complaints.  Patient stated that she was having significant fatigue, lightheadedness, blurry vision and bodyaches.  Denied focal deficits.  Stated vision has chronically been blurry.  Pressure in the eyes obtained in the emergency room which was appropriate bilaterally.  Denied fever, chest pain, shortness of breath, nausea/vomiting.  Also denied dysuria or change in urinary frequency.  No known sick contacts.  Patient did not take her  medication prior to coming to the emergency room.     ED summary: Patient afebrile, hypertensive 247/102 and nonhypoxic on room air.  Troponins plateaued 49-47.  Creatinine 1.7 for which appears near baseline.  Glucose 178.  CBC unremarkable.  Urinalysis with presence of leukocytes and bacteria.  Patient denies symptoms.  COVID and flu negative.  CT head showed no acute process.  Chest x-ray reviewed, stable cardiomegaly without acute process.  Patient was given dose of her chronic antihypertensives and was planned to be discharged home.  Unfortunately, hypertension continued requiring initiation of a Cardene drip and hospitalist asked to admit.    Hospital Course:       Hypertensive urgency  Elevated troponin  Cardene drip; titrated off   Continue patient's chronic antihypertensive medication  Symptoms including lightheadedness and blurry vision already improving with normalization of her blood pressure  Suspect elevated troponin secondary to demand ischemia from hypertension.  Low suspicion for ACS  1/6/2025: BP improved.  Weaned off Cardene.  Was able to ambulate around the unit.  Cystitis  Continue Cipro as prescribed     DVT Prophylaxis: Heparin sq  Code Status: Full  Diet: Cardiac/Diabetic  Disposition: home with home health and hospital bed due to CHF and significant dyspnea when laying flat.  Edited by: Wil Rader,  at 1/6/2025 0958      Vital Signs:    Temp:  [97.7 °F (36.5 °C)-98.6 °F (37 °C)] 97.7 °F (36.5 °C)  Heart Rate:  [56-92] 70  Resp:  [14-18] 16  BP: (110-247)/() 131/58    Physical Exam:    Constitutional: No acute distress, awake, alert  HENT: NCAT, mucous membranes moist  Respiratory: Clear to auscultation bilaterally, respiratory effort normal   Cardiovascular: RRR, murmur noted  Gastrointestinal: Positive bowel sounds, soft, nontender, nondistended  Musculoskeletal: No bilateral ankle edema, signs of prior swelling  Psychiatric: Appropriate affect, cooperative  Neurologic:  Oriented x 3, speech clear  Skin: No rashes  Edited by: Wil Rader DO at 1/6/2025 0702    Pertinent Lab Results:     Results from last 7 days   Lab Units 01/06/25  0425 01/05/25  0939   SODIUM mmol/L 137 140   POTASSIUM mmol/L 4.1 4.1   CHLORIDE mmol/L 105 104   CO2 mmol/L 22.4 24.7   BUN mg/dL 28* 28*   CREATININE mg/dL 1.82* 1.74*   CALCIUM mg/dL 8.7 9.2   BILIRUBIN mg/dL  --  0.4   ALK PHOS U/L  --  93   ALT (SGPT) U/L  --  6   AST (SGOT) U/L  --  11   GLUCOSE mg/dL 152* 178*     Results from last 7 days   Lab Units 01/06/25  0425 01/05/25  0939   WBC 10*3/mm3 5.79 5.75   HEMOGLOBIN g/dL 11.5* 13.3   HEMATOCRIT % 34.3 40.5   PLATELETS 10*3/mm3 214 262         Results from last 7 days   Lab Units 01/05/25  1054 01/05/25  0939   HSTROP T ng/L 47* 49*                           Pertinent Radiology Results:    Imaging Results (All)       Procedure Component Value Units Date/Time    CT Head Without Contrast [[id number]] Collected: 01/05/25 1027     Updated: 01/05/25 1031    Narrative:      PROCEDURE: CT HEAD WO CONTRAST-     HISTORY: eval for many days of blurry vision and fatigue     COMPARISON: May 20, 2021..     TECHNIQUE: Multiple axial CT images were performed from the foramen  magnum to the vertex. Individualized dose reduction techniques using  automated exposure control or adjustment of mA and/or kV according to  the patient size were employed.     FINDINGS: There is moderate, age-appropriate, stable generalized  cerebral atrophy. The ventricles are enlarged. There is no evidence of  edema or hemorrhage. There is a small area of encephalomalacia in the  left thalamus consistent with remote CVA, stable from prior exam. No  masses are identified. No extra-axial fluid is seen. The paranasal  sinuses are unremarkable except for a stable osteoma left frontal  sinus..       Impression:      Stable chronic change.              CTDI: 36.35 mGy  DLP:627.1 mGy.cm     This report was signed and finalized on  1/5/2025 10:29 AM by Genna Walker MD.       XR Chest 1 View [[id number]] Collected: 01/05/25 1025     Updated: 01/05/25 1028    Narrative:       PROCEDURE: XR CHEST 1 VW-     HISTORY: Weak/Dizzy/AMS triage protocol, palpitations, hypertension,  sciatica     COMPARISON: August 21, 2023..     FINDINGS: The heart is mildly enlarged, stable from prior.. The  mediastinum is unremarkable. Decreased inspiratory effort noted with  crowding of the lung markings in both lung bases. No acute infiltrate  noted.. There is no pneumothorax.  There are no acute osseous  abnormalities. There is evidence of old calcified granulomatous disease.       Impression:      Stable cardiomegaly without acute infiltrate..     .           This report was signed and finalized on 1/5/2025 10:26 AM by Genna Walker MD.               Echo:    Results for orders placed during the hospital encounter of 05/20/21    Adult Transthoracic Echo Complete W/ Cont if Necessary Per Protocol    Interpretation Summary  1.  Normal left ventricular size and systolic function, LVEF 55-60%.  2.  Moderate concentric LVH.  3.  Grade 1 diastolic dysfunction.  4.  Moderately increased left atrial volume index.  5.  Normal right ventricular size and systolic function.  6.  Mild calcification of aortic valve without significant stenosis.  7.  Small circumferential pericardial effusion without tamponade physiology.    Condition on Discharge:      Stable.    Code status during the hospital stay:    Code Status and Medical Interventions: CPR (Attempt to Resuscitate); Full Support   Ordered at: 01/05/25 1616     Code Status (Patient has no pulse and is not breathing):    CPR (Attempt to Resuscitate)     Medical Interventions (Patient has pulse or is breathing):    Full Support     Discharge Disposition:        Discharge Medications:       Discharge Medications        New Medications        Instructions Start Date   ciprofloxacin 500 MG tablet  Commonly known as: CIPRO   500  mg, Oral, 2 Times Daily             Continue These Medications        Instructions Start Date   atorvastatin 80 MG tablet  Commonly known as: LIPITOR   80 mg, Oral, Daily      carvedilol 25 MG tablet  Commonly known as: COREG   25 mg, Oral, 2 Times Daily With Meals      doxazosin 2 MG tablet  Commonly known as: Cardura   2 mg, Oral, Daily PRN      glucose monitor monitoring kit   1 each, Not Applicable, Daily      Insulin Pen Needle 31G X 5 MM misc   Inject insulin three times daily      Easy Touch Pen Needles 31G X 8 MM misc  Generic drug: Insulin Pen Needle   No dose, route, or frequency recorded.      Lancets 30G misc   Check sugar daily             Stop These Medications      dapagliflozin Propanediol 10 MG tablet     empagliflozin 10 MG tablet tablet  Commonly known as: JARDIANCE            ASK your doctor about these medications        Instructions Start Date   chlorthalidone 50 MG tablet  Commonly known as: HYGROTEN   50 mg, Oral, Daily      Cholecalciferol 50 MCG (2000 UT) capsule   2,000 Units, Daily      ferrous sulfate 325 (65 FE) MG tablet   325 mg, Daily With Breakfast      fluconazole 150 MG tablet  Commonly known as: DIFLUCAN  Ask about: Should I take this medication?   150 mg, Oral, Once      glucose blood test strip   Check sugar once a day      hydrALAZINE 10 MG tablet  Commonly known as: APRESOLINE   10 mg, Oral, 3 Times Daily      insulin lispro 100 UNIT/ML injection  Commonly known as: humaLOG   15 Units, 3 Times Daily Before Meals      isosorbide mononitrate 60 MG 24 hr tablet  Commonly known as: IMDUR       Januvia 25 MG tablet  Generic drug: SITagliptin   25 mg      Linzess 72 MCG capsule capsule  Generic drug: linaclotide   72 mcg, Oral, Every Morning Before Breakfast      losartan 100 MG tablet  Commonly known as: COZAAR   100 mg, Oral, Daily      losartan-hydrochlorothiazide 100-25 MG per tablet  Commonly known as: HYZAAR   1 tablet, Oral, Daily      NIFEdipine XL 90 MG 24 hr  tablet  Commonly known as: PROCARDIA XL   90 mg, Daily      pantoprazole 40 MG EC tablet  Commonly known as: PROTONIX   TAKE 1 TABLET BY MOUTH 30 MINUTES BEFORE BREAKFAST.      traZODone 50 MG tablet  Commonly known as: DESYREL   50 mg, Nightly             Discharge Diet:       Activity at Discharge:       Follow-up Appointments:    Additional Instructions for the Follow-ups that You Need to Schedule       Ambulatory Referral to Home Health (Hospital)   As directed      Face to Face Visit Date: 1/6/2025   Follow-up provider for Plan of Care?: I treated the patient in an acute care facility and will not continue treatment after discharge.   Follow-up provider: ADARSH HATFIELD [[id number]]   Reason/Clinical Findings: weakness deconditioning, hypertension, diastolic CHF   Describe mobility limitations that make leaving home difficult: she doesn't go out   Nursing/Therapeutic Services Requested: Skilled Nursing Physical Therapy Occupational Therapy   Skilled nursing orders: CHF management Medication education Cardiopulmonary assessments   PT orders: Strengthening Home safety assessment   Occupational orders: Strengthening Home safety assessment   Frequency: 1 Week 1              Future Appointments   Date Time Provider Department Center   3/2/2026  1:30 PM Vidal Low MD [facility]     Test Results Pending at Discharge:           Wil Rader DO  01/06/25  09:59 EST    Time: I spent 45 minutes on this discharge activity which included: face-to-face encounter with the patient, reviewing the data in the system, coordination of the care with the nursing staff as well as consultants, documentation, and entering orders.     Dictated utilizing Dragon dictation.

## 2025-01-06 NOTE — OUTREACH NOTE
Prep Survey      Flowsheet Row Responses   [facility] facility patient discharged from? [facility]   Is LACE score < 7 ? No   Eligibility Readm Mgmt   Discharge diagnosis Hypertensive urgency   Does the patient have one of the following disease processes/diagnoses(primary or secondary)? Other   Prep survey completed? Yes            Noa ALEMAN - Registered Nurse

## 2025-01-07 NOTE — CASE MANAGEMENT/SOCIAL WORK
Case Management Discharge Note      Final Note: Pt discharged home by daughter via private car. [facility]  will follow for HH/PT/OT. [employer] will deliver hospital bed to daughters address where pt is staying ([address] [place] [address] ). Daughter Cleo updated. Food bag and information on how to contact food bank delivered to pt at bedside prior to discharge.         Selected Continued Care - Discharged on 1/6/2025 Admission date: 1/5/2025 - Discharge disposition: Home-Health Care c      Destination    No services have been selected for the patient.                Durable Medical Equipment       Service Provider Services Address Phone Fax Patient Preferred    [facility] [address] SELF [address] [place] [address] [phone] --       Internal Comment last updated by Atif Zelaya, RN 1/7/2025 0934    [employer] to deliver hospital bed to daughters house at [address] [place] [address], where pt is staying.                         Dialysis/Infusion    No services have been selected for the patient.                Home Medical Care Coordination complete.      Service Provider Services Address Phone Fax Patient Preferred     [facility], Home Rehabilitation, Home Nursing [address] [facility] [address] [phone] --              Therapy    No services have been selected for the patient.                Community Resources Coordination complete.      Service Provider Services Address Phone Fax Patient Preferred    [facility] PATIENTS ONLY FOOD [employer], [employer] [address] [facility] [address] [phone] -- --              Community & DME    No services have been selected for the patient.                    Transportation Services  Private: Car    Final Discharge Disposition Code: 06 - home with home health care

## 2025-01-07 NOTE — PAYOR COMM NOTE
"To:  [place]  From: Adela Bowden RN  Phone: [phone]  Fax: [phone]  NPI: [id number]  TIN: [id number]  Member ID: [id number]   MRN: [id number]    Valarie Barajas (79 y.o. Female)       Date of Birth   1945    Social Security Number       Address   [address] [place] [address]    Home Phone   [phone]    MRN   [id number]       [facility]abaptism   [facility]    Marital Status                               Admission Date   25    Admission Type   Emergency    Admitting Provider   Ronny Lara DO    Attending Provider       Department, Room/Bed   [facility] INTENSIVE CARE, I04/       Discharge Date   2025    [place] [facility] [place]   [place]                              Attending Provider: (none)   Allergies: Penicillins, Clonidine Derivatives, Hydralazine, Sulfa Antibiotics    Isolation: None   Infection: None   Code Status: Prior    Ht: 160 cm (63\")   Wt: 76.3 kg (168 lb 3.4 oz)    Admission Cmt: None   Principal Problem: Hypertensive urgency [I16.0]                   Active Insurance as of 2025       Primary Coverage       Payor Plan Insurance Group Employer/Plan Group    ANTHEM MEDICARE REPLACEMENT ANTHEM MED ADV SNP [id number]       Payor Plan Address Payor Plan Phone Number Payor Plan Fax Number Effective Dates    [address] [phone]  2025 - None Entered    [facility] [id number]         Subscriber Name Subscriber Birth Date Member ID       VALARIE BARAJAS 1945 [id number]                     Emergency Contacts        (Rel.) Home Phone Work Phone Mobile Phone    Cleo Barajas (Daughter) [phone] -- --    ELROY BARAJAS (Daughter) [phone] -- --                 Discharge Summary        Wil Rader DO at 25 0959              [facility] HOSPITALIST   DISCHARGE SUMMARY      Name:  Valarie Barajas   Age:  79 y.o.  Sex:  female  :  1945  MRN: "  [id number]   Visit Number:  [id number]    Admission Date:  1/5/2025  Date of Discharge:  1/6/2025  Primary Care Physician:  Lay Cox MD    Important issues to note:    Start: Cipro, Tradjenta  Stop: Nothing  Follow up: PCP and cardiology  Brief Summary: Presented with hypertension due to unclear etiology. Initially had very high blood pressure requiring IV medicines which improved to normotensive by the time of discharge with only her home medication regimen..    Discharge Diagnoses:     Hypertensive urgency  Elevated troponin  Asymptomatic bacteriuria  CKD  Hyperlipidemia  Anxiety/depression  Impaired mobility and ADL    Problem List:     Active Hospital Problems    Diagnosis  POA    **Hypertensive urgency [I16.0]  Yes      Resolved Hospital Problems   No resolved problems to display.     Presenting Problem:    Chief Complaint   Patient presents with    Dizziness      Consults:     Consulting Physician(s)                     None                      DME:  The patient has a medical condition which requires frequent changes in body positioning in ways not feasibly achieved with an ordinary bed.     History Of Presenting Illness:       Patient is a chronically ill 79-year-old female with history significant for CKD, hypertension, hyperlipidemia, depression who presents to the emergency room from home with multiple complaints.  Patient stated that she was having significant fatigue, lightheadedness, blurry vision and bodyaches.  Denied focal deficits.  Stated vision has chronically been blurry.  Pressure in the eyes obtained in the emergency room which was appropriate bilaterally.  Denied fever, chest pain, shortness of breath, nausea/vomiting.  Also denied dysuria or change in urinary frequency.  No known sick contacts.  Patient did not take her medication prior to coming to the emergency room.     ED summary: Patient afebrile, hypertensive 247/102 and nonhypoxic on room air.  Troponins  plateaued 49-47.  Creatinine 1.7 for which appears near baseline.  Glucose 178.  CBC unremarkable.  Urinalysis with presence of leukocytes and bacteria.  Patient denies symptoms.  COVID and flu negative.  CT head showed no acute process.  Chest x-ray reviewed, stable cardiomegaly without acute process.  Patient was given dose of her chronic antihypertensives and was planned to be discharged home.  Unfortunately, hypertension continued requiring initiation of a Cardene drip and hospitalist asked to admit.    Hospital Course:       Hypertensive urgency  Elevated troponin  Cardene drip; titrated off   Continue patient's chronic antihypertensive medication  Symptoms including lightheadedness and blurry vision already improving with normalization of her blood pressure  Suspect elevated troponin secondary to demand ischemia from hypertension.  Low suspicion for ACS  1/6/2025: BP improved.  Weaned off Cardene.  Was able to ambulate around the unit.  Cystitis  Continue Cipro as prescribed     DVT Prophylaxis: Heparin sq  Code Status: Full  Diet: Cardiac/Diabetic  Disposition: home with home health and hospital bed due to CHF and significant dyspnea when laying flat.  Edited by: Wil Rader DO at 1/6/2025 0958      Vital Signs:    Temp:  [97.7 °F (36.5 °C)-98.6 °F (37 °C)] 97.7 °F (36.5 °C)  Heart Rate:  [56-92] 70  Resp:  [14-18] 16  BP: (110-247)/() 131/58    Physical Exam:    Constitutional: No acute distress, awake, alert  HENT: NCAT, mucous membranes moist  Respiratory: Clear to auscultation bilaterally, respiratory effort normal   Cardiovascular: RRR, murmur noted  Gastrointestinal: Positive bowel sounds, soft, nontender, nondistended  Musculoskeletal: No bilateral ankle edema, signs of prior swelling  Psychiatric: Appropriate affect, cooperative  Neurologic: Oriented x 3, speech clear  Skin: No rashes  Edited by: Wil Rader DO at 1/6/2025 0702    Pertinent Lab Results:     Results from last 7  days   Lab Units 01/06/25  0425 01/05/25  0939   SODIUM mmol/L 137 140   POTASSIUM mmol/L 4.1 4.1   CHLORIDE mmol/L 105 104   CO2 mmol/L 22.4 24.7   BUN mg/dL 28* 28*   CREATININE mg/dL 1.82* 1.74*   CALCIUM mg/dL 8.7 9.2   BILIRUBIN mg/dL  --  0.4   ALK PHOS U/L  --  93   ALT (SGPT) U/L  --  6   AST (SGOT) U/L  --  11   GLUCOSE mg/dL 152* 178*     Results from last 7 days   Lab Units 01/06/25  0425 01/05/25  0939   WBC 10*3/mm3 5.79 5.75   HEMOGLOBIN g/dL 11.5* 13.3   HEMATOCRIT % 34.3 40.5   PLATELETS 10*3/mm3 214 262         Results from last 7 days   Lab Units 01/05/25  1054 01/05/25  0939   HSTROP T ng/L 47* 49*                           Pertinent Radiology Results:    Imaging Results (All)       Procedure Component Value Units Date/Time    CT Head Without Contrast [[id number]] Collected: 01/05/25 1027     Updated: 01/05/25 1031    Narrative:      PROCEDURE: CT HEAD WO CONTRAST-     HISTORY: eval for many days of blurry vision and fatigue     COMPARISON: May 20, 2021..     TECHNIQUE: Multiple axial CT images were performed from the foramen  magnum to the vertex. Individualized dose reduction techniques using  automated exposure control or adjustment of mA and/or kV according to  the patient size were employed.     FINDINGS: There is moderate, age-appropriate, stable generalized  cerebral atrophy. The ventricles are enlarged. There is no evidence of  edema or hemorrhage. There is a small area of encephalomalacia in the  left thalamus consistent with remote CVA, stable from prior exam. No  masses are identified. No extra-axial fluid is seen. The paranasal  sinuses are unremarkable except for a stable osteoma left frontal  sinus..       Impression:      Stable chronic change.              CTDI: 36.35 mGy  DLP:627.1 mGy.cm     This report was signed and finalized on 1/5/2025 10:29 AM by Genna Walker MD.       XR Chest 1 View [[id number]] Collected: 01/05/25 1025     Updated: 01/05/25 1028    Narrative:        PROCEDURE: XR CHEST 1 VW-     HISTORY: Weak/Dizzy/AMS triage protocol, palpitations, hypertension,  sciatica     COMPARISON: August 21, 2023..     FINDINGS: The heart is mildly enlarged, stable from prior.. The  mediastinum is unremarkable. Decreased inspiratory effort noted with  crowding of the lung markings in both lung bases. No acute infiltrate  noted.. There is no pneumothorax.  There are no acute osseous  abnormalities. There is evidence of old calcified granulomatous disease.       Impression:      Stable cardiomegaly without acute infiltrate..     .           This report was signed and finalized on 1/5/2025 10:26 AM by Genna Walker MD.               Echo:    Results for orders placed during the hospital encounter of 05/20/21    Adult Transthoracic Echo Complete W/ Cont if Necessary Per Protocol    Interpretation Summary  1.  Normal left ventricular size and systolic function, LVEF 55-60%.  2.  Moderate concentric LVH.  3.  Grade 1 diastolic dysfunction.  4.  Moderately increased left atrial volume index.  5.  Normal right ventricular size and systolic function.  6.  Mild calcification of aortic valve without significant stenosis.  7.  Small circumferential pericardial effusion without tamponade physiology.    Condition on Discharge:      Stable.    Code status during the hospital stay:    Code Status and Medical Interventions: CPR (Attempt to Resuscitate); Full Support   Ordered at: 01/05/25 1616     Code Status (Patient has no pulse and is not breathing):    CPR (Attempt to Resuscitate)     Medical Interventions (Patient has pulse or is breathing):    Full Support     Discharge Disposition:        Discharge Medications:       Discharge Medications        New Medications        Instructions Start Date   ciprofloxacin 500 MG tablet  Commonly known as: CIPRO   500 mg, Oral, 2 Times Daily             Continue These Medications        Instructions Start Date   atorvastatin 80 MG tablet  Commonly known as:  LIPITOR   80 mg, Oral, Daily      carvedilol 25 MG tablet  Commonly known as: COREG   25 mg, Oral, 2 Times Daily With Meals      doxazosin 2 MG tablet  Commonly known as: Cardura   2 mg, Oral, Daily PRN      glucose monitor monitoring kit   1 each, Not Applicable, Daily      Insulin Pen Needle 31G X 5 MM misc   Inject insulin three times daily      Easy Touch Pen Needles 31G X 8 MM misc  Generic drug: Insulin Pen Needle   No dose, route, or frequency recorded.      Lancets 30G misc   Check sugar daily             Stop These Medications      dapagliflozin Propanediol 10 MG tablet     empagliflozin 10 MG tablet tablet  Commonly known as: JARDIANCE            ASK your doctor about these medications        Instructions Start Date   chlorthalidone 50 MG tablet  Commonly known as: HYGROTEN   50 mg, Oral, Daily      Cholecalciferol 50 MCG (2000 UT) capsule   2,000 Units, Daily      ferrous sulfate 325 (65 FE) MG tablet   325 mg, Daily With Breakfast      fluconazole 150 MG tablet  Commonly known as: DIFLUCAN  Ask about: Should I take this medication?   150 mg, Oral, Once      glucose blood test strip   Check sugar once a day      hydrALAZINE 10 MG tablet  Commonly known as: APRESOLINE   10 mg, Oral, 3 Times Daily      insulin lispro 100 UNIT/ML injection  Commonly known as: humaLOG   15 Units, 3 Times Daily Before Meals      isosorbide mononitrate 60 MG 24 hr tablet  Commonly known as: IMDUR       Januvia 25 MG tablet  Generic drug: SITagliptin   25 mg      Linzess 72 MCG capsule capsule  Generic drug: linaclotide   72 mcg, Oral, Every Morning Before Breakfast      losartan 100 MG tablet  Commonly known as: COZAAR   100 mg, Oral, Daily      losartan-hydrochlorothiazide 100-25 MG per tablet  Commonly known as: HYZAAR   1 tablet, Oral, Daily      NIFEdipine XL 90 MG 24 hr tablet  Commonly known as: PROCARDIA XL   90 mg, Daily      pantoprazole 40 MG EC tablet  Commonly known as: PROTONIX   TAKE 1 TABLET BY MOUTH 30 MINUTES  BEFORE BREAKFAST.      traZODone 50 MG tablet  Commonly known as: DESYREL   50 mg, Nightly             Discharge Diet:       Activity at Discharge:       Follow-up Appointments:    Additional Instructions for the Follow-ups that You Need to Schedule       Ambulatory Referral to Home Health (Hospital)   As directed      Face to Face Visit Date: 1/6/2025   Follow-up provider for Plan of Care?: I treated the patient in an acute care facility and will not continue treatment after discharge.   Follow-up provider: ADARSH HATFIELD [[id number]]   Reason/Clinical Findings: weakness deconditioning, hypertension, diastolic CHF   Describe mobility limitations that make leaving home difficult: she doesn't go out   Nursing/Therapeutic Services Requested: Skilled Nursing Physical Therapy Occupational Therapy   Skilled nursing orders: CHF management Medication education Cardiopulmonary assessments   PT orders: Strengthening Home safety assessment   Occupational orders: Strengthening Home safety assessment   Frequency: 1 Week 1              Future Appointments   Date Time Provider Department Center   3/2/2026  1:30 PM Vidal Low MD [facility]     Test Results Pending at Discharge:           Wil Rader DO  01/06/25  09:59 EST    Time: I spent 45 minutes on this discharge activity which included: face-to-face encounter with the patient, reviewing the data in the system, coordination of the care with the nursing staff as well as consultants, documentation, and entering orders.     Dictated utilizing Dragon dictation.        Electronically signed by Wil Rader DO at 01/06/25 0792

## 2025-01-08 LAB — GLUCOSE BLDC GLUCOMTR-MCNC: 164 MG/DL (ref 70–130)

## 2025-01-10 ENCOUNTER — READMISSION MANAGEMENT (OUTPATIENT)
Dept: CALL CENTER | Facility: HOSPITAL | Age: 80
End: 2025-01-10
Payer: MEDICARE

## 2025-01-10 ENCOUNTER — HOME CARE VISIT (OUTPATIENT)
Dept: HOME HEALTH SERVICES | Facility: HOME HEALTHCARE | Age: 80
End: 2025-01-10
Payer: COMMERCIAL

## 2025-01-10 ENCOUNTER — HOME CARE VISIT (OUTPATIENT)
Dept: HOME HEALTH SERVICES | Facility: HOME HEALTHCARE | Age: 80
End: 2025-01-10
Payer: MEDICARE

## 2025-01-10 VITALS
SYSTOLIC BLOOD PRESSURE: 136 MMHG | HEART RATE: 72 BPM | OXYGEN SATURATION: 96 % | DIASTOLIC BLOOD PRESSURE: 62 MMHG | RESPIRATION RATE: 16 BRPM | TEMPERATURE: 98.2 F

## 2025-01-10 VITALS
TEMPERATURE: 96.1 F | BODY MASS INDEX: 31.36 KG/M2 | HEIGHT: 63 IN | RESPIRATION RATE: 20 BRPM | HEART RATE: 79 BPM | OXYGEN SATURATION: 95 % | WEIGHT: 177 LBS | DIASTOLIC BLOOD PRESSURE: 70 MMHG | SYSTOLIC BLOOD PRESSURE: 160 MMHG

## 2025-01-10 PROCEDURE — G0152 HHCP-SERV OF OT,EA 15 MIN: HCPCS

## 2025-01-10 PROCEDURE — G0299 HHS/HOSPICE OF RN EA 15 MIN: HCPCS

## 2025-01-10 NOTE — OUTREACH NOTE
Medical Week 1 Survey      Flowsheet Row Responses   [facility] patient discharged from? [facility]   Does the patient have one of the following disease processes/diagnoses(primary or secondary)? Other   Week 1 attempt successful? Yes   Call start time 0957   Call end time 1002   Discharge diagnosis Hypertensive urgency   Person spoke with today (if not patient) and relationship Daughter- Cleo Hos reviewed with patient/caregiver? Yes   Does the patient have all medications ordered at discharge? Yes   Prescription comments No concerns or questions noted. BP WDL   Is the patient taking all medications as directed (includes completed medication regime)? Yes   Does the patient have a primary care provider?  Yes   Comments regarding PCP Monday 01/13   Has home health visited the patient within 72 hours of discharge? N/A   Psychosocial issues? No   Did the patient receive a copy of their discharge instructions? Yes   Nursing interventions Reviewed instructions with patient   What is the patient's perception of their health status since discharge? Improving   Is the patient/caregiver able to teach back signs and symptoms related to disease process for when to call PCP? Yes   Is the patient/caregiver able to teach back signs and symptoms related to disease process for when to call 911? Yes   Is the patient/caregiver able to teach back the hierarchy of who to call/visit for symptoms/problems? PCP, Specialist, Home health nurse, Urgent Care, ED, 911 Yes   Additional teach back comments Patient lost her sister yesterday.   Week 1 call completed? Yes   Graduated Yes   Wrap up additional comments Daughter reports patient is doing well. Patient just lost her sister and grieving. But overall doing well. Sees her pcp on monday. No concerns or questions noted.   Call end time 1002            Noa ALEMAN - Registered Nurse

## 2025-01-10 NOTE — Clinical Note
"SOC Note: Patient recently hospitilized at [facility] for HTN, with complaints of increased weakness, lightheadedness, bodyaches and blurred vision. Patient's BP at admission was 247/102. Patient was put on a cardine drip and eventually weaned off. BP today at  admission is 160/70. Patient also was positive for a UTI and was started on po ABX. Patient is without complaints of UTI at this time. Patient has complaints of pain in neck, constipation, and insomnia. Patient admitted to  services on this date 01/10/24, agreeable to services.     Home Health ordered for: disciplines SN, PT, OT    Reason for Hosp/Primary Dx/Co-morbidities: HTN, UTI, with Hx of CVA, T2 diabetes, anxiety, depression, arthritis, insomnia, chronic neck pain, constipation, COPD, restless legs, RA, CHF and esophageal dysphagia.     Focus of Care: Cardiopulmonary assessment, BP monitoring and recording, medication management, constipation management, pain management, stregthening, UTI management and education.     Patient's goal(s): \"I want to sleep, not have as much pain and be stronger\"    Current Functional status/mobility/DME: minimal assistance with mobility, uses cane.     HB status/Living Arrangements: Patient lives with daughter in a one-story home with steps for entry    Skin Integrity/wound status: Skin intact, no wounds.     Code Status: Full code    Fall Risk/Safety concerns: Fall risk due to age, weakness.     Educated on Emergency Plan, steps to take prior to going to the ER and when to Call Home Health First:  Education complete     Medication issues/Concerns: SN made recommendations for Patient's constipation. Daughter states med is suppose to be called in by PCP to help patient sleep and for depression,  Daughter unsure what it is. SN to follow-up and record.     Additional Problems/Concerns: None    SDOH Barriers (i.e. caregiver concerns, social isolation, transportation, food insecurity, environment, income etc.)/Need for MSW: none  "

## 2025-01-11 NOTE — HOME HEALTH
"SOC Note: Patient recently hospitilized at [facility] for HTN, with complaints of increased weakness, lightheadedness, bodyaches and blurred vision. Patient's BP at admission was 247/102. Patient was put on a cardine drip and eventually weaned off. BP today at  admission is 160/70. Patient also was positive for a UTI and was started on po ABX. Patient is without complaints of UTI at this time. Patient has complaints of pain in neck, constipation, and insomnia. Patient admitted to  services on this date 01/10/24, agreeable to services.     Home Health ordered for: disciplines SN, PT, OT    Reason for Hosp/Primary Dx/Co-morbidities: HTN, UTI, with Hx of CVA, T2 diabetes, anxiety, depression, arthritis, insomnia, chronic neck pain, constipation, COPD, restless legs, RA, CHF and esophageal dysphagia.     Focus of Care: Cardiopulmonary assessment, BP monitoring and recording, medication management, constipation management, pain management, stregthening, UTI management and education.     Patient's goal(s): \"I want to sleep, not have as much pain and be stronger\"    Current Functional status/mobility/DME: minimal assistance with mobility, uses cane.     HB status/Living Arrangements: Patient lives with daughter in a one-story home with steps for entry    Skin Integrity/wound status: Skin intact, no wounds.     Code Status: Full code    Fall Risk/Safety concerns: Fall risk due to age, weakness.     Educated on Emergency Plan, steps to take prior to going to the ER and when to Call Home Health First:  Education complete     Medication issues/Concerns: SN made recommendations for Patient's constipation. Daughter states med is suppose to be called in by PCP to help patient sleep and for depression,  Daughter unsure what it is. SN to follow-up and record.     Additional Problems/Concerns: None    SDOH Barriers (i.e. caregiver concerns, social isolation, transportation, food insecurity, environment, income etc.)/Need for MSW: none"

## 2025-01-13 ENCOUNTER — HOME CARE VISIT (OUTPATIENT)
Dept: HOME HEALTH SERVICES | Facility: HOME HEALTHCARE | Age: 80
End: 2025-01-13
Payer: MEDICARE

## 2025-01-13 VITALS
OXYGEN SATURATION: 99 % | SYSTOLIC BLOOD PRESSURE: 154 MMHG | TEMPERATURE: 97.6 F | DIASTOLIC BLOOD PRESSURE: 64 MMHG | HEART RATE: 64 BPM | RESPIRATION RATE: 16 BRPM

## 2025-01-13 PROCEDURE — G0151 HHCP-SERV OF PT,EA 15 MIN: HCPCS

## 2025-01-14 ENCOUNTER — HOME CARE VISIT (OUTPATIENT)
Dept: HOME HEALTH SERVICES | Facility: HOME HEALTHCARE | Age: 80
End: 2025-01-14
Payer: MEDICARE

## 2025-01-14 PROCEDURE — G0299 HHS/HOSPICE OF RN EA 15 MIN: HCPCS

## 2025-01-15 ENCOUNTER — HOME CARE VISIT (OUTPATIENT)
Dept: HOME HEALTH SERVICES | Facility: HOME HEALTHCARE | Age: 80
End: 2025-01-15
Payer: MEDICARE

## 2025-01-15 VITALS
RESPIRATION RATE: 16 BRPM | HEART RATE: 82 BPM | SYSTOLIC BLOOD PRESSURE: 150 MMHG | DIASTOLIC BLOOD PRESSURE: 70 MMHG | OXYGEN SATURATION: 97 % | TEMPERATURE: 97.9 F

## 2025-01-15 PROCEDURE — G0155 HHCP-SVS OF CSW,EA 15 MIN: HCPCS

## 2025-01-15 NOTE — HOME HEALTH
Met with MsLyn Jax and provided resources.  She is going to call the home delivered meals service in [place].  I also referred her for [facility]  and [facility] will call her.  Her daughter, Cleo, was there but works from home.  SHe said her other daughter, Bailey, lives across the street but works during the day.  She asked about grab bars for the shower and OT will bring her one tomorrow at her visit.  She also asked about a life chair.  I explained that the insurance doesn't cover the chair, usually only the motor.  She asked about the [facility] place for donation but they do not currently have one available.  She denies further needs or concerns at this time.

## 2025-01-16 ENCOUNTER — HOME CARE VISIT (OUTPATIENT)
Dept: HOME HEALTH SERVICES | Facility: HOME HEALTHCARE | Age: 80
End: 2025-01-16
Payer: MEDICARE

## 2025-01-20 ENCOUNTER — HOME CARE VISIT (OUTPATIENT)
Dept: HOME HEALTH SERVICES | Facility: HOME HEALTHCARE | Age: 80
End: 2025-01-20
Payer: MEDICARE

## 2025-01-20 PROCEDURE — G0299 HHS/HOSPICE OF RN EA 15 MIN: HCPCS

## 2025-01-21 ENCOUNTER — TRANSCRIBE ORDERS (OUTPATIENT)
Dept: ADMINISTRATIVE | Facility: HOSPITAL | Age: 80
End: 2025-01-21
Payer: MEDICARE

## 2025-01-21 VITALS
SYSTOLIC BLOOD PRESSURE: 160 MMHG | HEART RATE: 83 BPM | RESPIRATION RATE: 16 BRPM | DIASTOLIC BLOOD PRESSURE: 70 MMHG | OXYGEN SATURATION: 95 % | TEMPERATURE: 97.7 F

## 2025-01-21 DIAGNOSIS — R10.13 EPIGASTRIC ABDOMINAL PAIN: Primary | ICD-10-CM

## 2025-01-22 ENCOUNTER — HOME CARE VISIT (OUTPATIENT)
Dept: HOME HEALTH SERVICES | Facility: HOME HEALTHCARE | Age: 80
End: 2025-01-22
Payer: MEDICARE

## 2025-01-22 ENCOUNTER — HOME CARE VISIT (OUTPATIENT)
Dept: HOME HEALTH SERVICES | Facility: HOME HEALTHCARE | Age: 80
End: 2025-01-22
Payer: COMMERCIAL

## 2025-01-22 VITALS
OXYGEN SATURATION: 95 % | SYSTOLIC BLOOD PRESSURE: 133 MMHG | TEMPERATURE: 98 F | RESPIRATION RATE: 16 BRPM | HEART RATE: 85 BPM | DIASTOLIC BLOOD PRESSURE: 66 MMHG

## 2025-01-22 PROCEDURE — G0152 HHCP-SERV OF OT,EA 15 MIN: HCPCS

## 2025-01-23 ENCOUNTER — HOME CARE VISIT (OUTPATIENT)
Dept: HOME HEALTH SERVICES | Facility: HOME HEALTHCARE | Age: 80
End: 2025-01-23
Payer: MEDICARE

## 2025-01-23 NOTE — CASE COMMUNICATION
"Reports difficulty swallowing \"every night\" some coughing when you lay down. Pt reports discomfort in midline base of sternum. Discribes as pressure, not pain. Worsening, had \"for a while\" plans to see an MD about it. Does not know if she has a hiatal hernia.     Advised pt to call MD/or seek ED treatment with changes. Pt has our 24/7 number for our HHSN.    10/10 pain with movement in back sternum base, and rt hip. Pt told if she has 1 0/10 to ED. Pt states she is not going to do that.      \"it's the not sleeping thats really bothering me..\"    Reports have esophagus stretched in past and it may be time for that again...."

## 2025-01-24 RX ORDER — PANTOPRAZOLE SODIUM 40 MG/1
40 TABLET, DELAYED RELEASE ORAL DAILY
COMMUNITY

## 2025-01-24 NOTE — PRE-PROCEDURE INSTRUCTIONS
PAT phone history completed with patient for upcoming procedure on 1/27/25 with Dr. Zapata.    PAT PASS reviewed with patient and they verbalize understanding of the following:     Do not eat or drink anything after midnight the night before procedure unless otherwise instructed by physician/surgeon's office, this includes no gum, candy, mints, tobacco products or e-cigarettes.  Do not shave the area to be operated on at least 48 hours prior to procedure.  Do not wear makeup, lotion, hair products, or nail polish.  Do not wear any jewelry and remove all piercings.  Do not wear any adhesive if you wear dentures.  Do not wear contacts; bring in glasses if needed.  Only take medications on the morning of procedure as instructed by PAT nurse per anesthesia guidelines or as instructed by physician's office.  If you are on any blood thinners reach out to the physician/surgeon's office for instructions on when/if they will need to be stopped prior to procedure.  Bring in picture ID and insurance card, advanced directive copies if applicable, CPAP/BIPAP/Inhalers if indicated morning of procedure, leave any other valuables at home.  Ensure you have arranged for someone to drive you home the day of your procedure and someone to care for you at home afterwards. It is recommended that you do not drive, drink alcohol, or make any major legal decisions for at least 24 hours after your procedure is complete.  ERAS instructions given unless otherwise instructed per surgeon's orders.    Instructions given on hospital entrance and registration location.

## 2025-01-27 ENCOUNTER — ANESTHESIA (OUTPATIENT)
Dept: GASTROENTEROLOGY | Facility: HOSPITAL | Age: 80
End: 2025-01-27
Payer: MEDICARE

## 2025-01-27 ENCOUNTER — APPOINTMENT (OUTPATIENT)
Dept: GENERAL RADIOLOGY | Facility: HOSPITAL | Age: 80
End: 2025-01-27
Payer: MEDICARE

## 2025-01-27 ENCOUNTER — HOSPITAL ENCOUNTER (OUTPATIENT)
Facility: HOSPITAL | Age: 80
Setting detail: HOSPITAL OUTPATIENT SURGERY
Discharge: HOME | End: 2025-01-27
Attending: INTERNAL MEDICINE | Admitting: INTERNAL MEDICINE
Payer: MEDICARE

## 2025-01-27 ENCOUNTER — ANESTHESIA EVENT (OUTPATIENT)
Dept: GASTROENTEROLOGY | Facility: HOSPITAL | Age: 80
End: 2025-01-27
Payer: MEDICARE

## 2025-01-27 ENCOUNTER — HOSPITAL ENCOUNTER (EMERGENCY)
Facility: HOSPITAL | Age: 80
Discharge: HOME | End: 2025-01-27
Attending: EMERGENCY MEDICINE | Admitting: EMERGENCY MEDICINE
Payer: MEDICARE

## 2025-01-27 ENCOUNTER — APPOINTMENT (OUTPATIENT)
Dept: CT IMAGING | Facility: HOSPITAL | Age: 80
End: 2025-01-27
Payer: MEDICARE

## 2025-01-27 VITALS
SYSTOLIC BLOOD PRESSURE: 126 MMHG | TEMPERATURE: 97.4 F | BODY MASS INDEX: 29.41 KG/M2 | WEIGHT: 166 LBS | HEIGHT: 63 IN | HEART RATE: 80 BPM | RESPIRATION RATE: 16 BRPM | DIASTOLIC BLOOD PRESSURE: 74 MMHG | OXYGEN SATURATION: 96 %

## 2025-01-27 VITALS
HEART RATE: 94 BPM | BODY MASS INDEX: 30.12 KG/M2 | OXYGEN SATURATION: 98 % | HEIGHT: 63 IN | SYSTOLIC BLOOD PRESSURE: 148 MMHG | WEIGHT: 170 LBS | DIASTOLIC BLOOD PRESSURE: 92 MMHG | TEMPERATURE: 97.8 F | RESPIRATION RATE: 18 BRPM

## 2025-01-27 DIAGNOSIS — R13.19 ESOPHAGEAL DYSPHAGIA: ICD-10-CM

## 2025-01-27 DIAGNOSIS — K44.9 HIATAL HERNIA: Primary | ICD-10-CM

## 2025-01-27 LAB
ALBUMIN SERPL-MCNC: 3.8 G/DL (ref 3.5–5.2)
ALBUMIN/GLOB SERPL: 1.3 G/DL
ALP SERPL-CCNC: 80 U/L (ref 39–117)
ALT SERPL W P-5'-P-CCNC: 8 U/L (ref 1–33)
ANION GAP SERPL CALCULATED.3IONS-SCNC: 13.2 MMOL/L (ref 5–15)
AST SERPL-CCNC: 13 U/L (ref 1–32)
BASOPHILS # BLD AUTO: 0.04 10*3/MM3 (ref 0–0.2)
BASOPHILS NFR BLD AUTO: 0.5 % (ref 0–1.5)
BILIRUB SERPL-MCNC: 0.4 MG/DL (ref 0–1.2)
BUN SERPL-MCNC: 21 MG/DL (ref 8–23)
BUN/CREAT SERPL: 12 (ref 7–25)
CALCIUM SPEC-SCNC: 8.7 MG/DL (ref 8.6–10.5)
CHLORIDE SERPL-SCNC: 102 MMOL/L (ref 98–107)
CO2 SERPL-SCNC: 23.8 MMOL/L (ref 22–29)
CREAT SERPL-MCNC: 1.75 MG/DL (ref 0.57–1)
DEPRECATED RDW RBC AUTO: 39.3 FL (ref 37–54)
EGFRCR SERPLBLD CKD-EPI 2021: 29.2 ML/MIN/1.73
EOSINOPHIL # BLD AUTO: 0.15 10*3/MM3 (ref 0–0.4)
EOSINOPHIL NFR BLD AUTO: 1.8 % (ref 0.3–6.2)
ERYTHROCYTE [DISTWIDTH] IN BLOOD BY AUTOMATED COUNT: 12.8 % (ref 12.3–15.4)
GEN 5 1HR TROPONIN T REFLEX: 50 NG/L
GLOBULIN UR ELPH-MCNC: 2.9 GM/DL
GLUCOSE BLDC GLUCOMTR-MCNC: 165 MG/DL (ref 70–130)
GLUCOSE SERPL-MCNC: 238 MG/DL (ref 65–99)
HCT VFR BLD AUTO: 36.5 % (ref 34–46.6)
HGB BLD-MCNC: 12.7 G/DL (ref 12–15.9)
HOLD SPECIMEN: NORMAL
HOLD SPECIMEN: NORMAL
IMM GRANULOCYTES # BLD AUTO: 0.04 10*3/MM3 (ref 0–0.05)
IMM GRANULOCYTES NFR BLD AUTO: 0.5 % (ref 0–0.5)
LIPASE SERPL-CCNC: 52 U/L (ref 13–60)
LYMPHOCYTES # BLD AUTO: 2.66 10*3/MM3 (ref 0.7–3.1)
LYMPHOCYTES NFR BLD AUTO: 31.6 % (ref 19.6–45.3)
MCH RBC QN AUTO: 29.5 PG (ref 26.6–33)
MCHC RBC AUTO-ENTMCNC: 34.8 G/DL (ref 31.5–35.7)
MCV RBC AUTO: 84.9 FL (ref 79–97)
MONOCYTES # BLD AUTO: 0.67 10*3/MM3 (ref 0.1–0.9)
MONOCYTES NFR BLD AUTO: 8 % (ref 5–12)
NEUTROPHILS NFR BLD AUTO: 4.86 10*3/MM3 (ref 1.7–7)
NEUTROPHILS NFR BLD AUTO: 57.6 % (ref 42.7–76)
NRBC BLD AUTO-RTO: 0 /100 WBC (ref 0–0.2)
PLATELET # BLD AUTO: 272 10*3/MM3 (ref 140–450)
PMV BLD AUTO: 9.7 FL (ref 6–12)
POTASSIUM SERPL-SCNC: 3.6 MMOL/L (ref 3.5–5.2)
PROT SERPL-MCNC: 6.7 G/DL (ref 6–8.5)
RBC # BLD AUTO: 4.3 10*6/MM3 (ref 3.77–5.28)
SODIUM SERPL-SCNC: 139 MMOL/L (ref 136–145)
TROPONIN T % DELTA: -17
TROPONIN T NUMERIC DELTA: -10 NG/L
TROPONIN T SERPL HS-MCNC: 60 NG/L
WBC NRBC COR # BLD AUTO: 8.42 10*3/MM3 (ref 3.4–10.8)
WHOLE BLOOD HOLD COAG: NORMAL
WHOLE BLOOD HOLD SPECIMEN: NORMAL

## 2025-01-27 PROCEDURE — 43249 ESOPH EGD DILATION <30 MM: CPT | Performed by: INTERNAL MEDICINE

## 2025-01-27 PROCEDURE — 80053 COMPREHEN METABOLIC PANEL: CPT | Performed by: EMERGENCY MEDICINE

## 2025-01-27 PROCEDURE — 25810000003 SODIUM CHLORIDE 0.9 % SOLUTION

## 2025-01-27 PROCEDURE — 43239 EGD BIOPSY SINGLE/MULTIPLE: CPT | Performed by: INTERNAL MEDICINE

## 2025-01-27 PROCEDURE — 74176 CT ABD & PELVIS W/O CONTRAST: CPT

## 2025-01-27 PROCEDURE — 93005 ELECTROCARDIOGRAM TRACING: CPT

## 2025-01-27 PROCEDURE — 25010000002 PROPOFOL 10 MG/ML EMULSION: Performed by: NURSE ANESTHETIST, CERTIFIED REGISTERED

## 2025-01-27 PROCEDURE — 71045 X-RAY EXAM CHEST 1 VIEW: CPT

## 2025-01-27 PROCEDURE — 83690 ASSAY OF LIPASE: CPT | Performed by: EMERGENCY MEDICINE

## 2025-01-27 PROCEDURE — 85025 COMPLETE CBC W/AUTO DIFF WBC: CPT | Performed by: EMERGENCY MEDICINE

## 2025-01-27 PROCEDURE — C1726 CATH, BAL DIL, NON-VASCULAR: HCPCS | Performed by: INTERNAL MEDICINE

## 2025-01-27 PROCEDURE — 82948 REAGENT STRIP/BLOOD GLUCOSE: CPT

## 2025-01-27 PROCEDURE — 99284 EMERGENCY DEPT VISIT MOD MDM: CPT | Performed by: EMERGENCY MEDICINE

## 2025-01-27 PROCEDURE — 88305 TISSUE EXAM BY PATHOLOGIST: CPT

## 2025-01-27 PROCEDURE — 84484 ASSAY OF TROPONIN QUANT: CPT | Performed by: EMERGENCY MEDICINE

## 2025-01-27 RX ORDER — LIDOCAINE HCL/PF 100 MG/5ML
SYRINGE (ML) INJECTION AS NEEDED
Status: DISCONTINUED | OUTPATIENT
Start: 2025-01-27 | End: 2025-01-27 | Stop reason: SURG

## 2025-01-27 RX ORDER — PROPOFOL 10 MG/ML
VIAL (ML) INTRAVENOUS AS NEEDED
Status: DISCONTINUED | OUTPATIENT
Start: 2025-01-27 | End: 2025-01-27 | Stop reason: SURG

## 2025-01-27 RX ORDER — ONDANSETRON 2 MG/ML
4 INJECTION INTRAMUSCULAR; INTRAVENOUS ONCE
Status: DISCONTINUED | OUTPATIENT
Start: 2025-01-27 | End: 2025-01-27 | Stop reason: HOSPADM

## 2025-01-27 RX ORDER — SODIUM CHLORIDE 9 MG/ML
70 INJECTION, SOLUTION INTRAVENOUS CONTINUOUS PRN
Status: DISCONTINUED | OUTPATIENT
Start: 2025-01-27 | End: 2025-01-27 | Stop reason: HOSPADM

## 2025-01-27 RX ORDER — SODIUM CHLORIDE 0.9 % (FLUSH) 0.9 %
10 SYRINGE (ML) INJECTION AS NEEDED
Status: DISCONTINUED | OUTPATIENT
Start: 2025-01-27 | End: 2025-01-27 | Stop reason: HOSPADM

## 2025-01-27 RX ADMIN — Medication 60 MG: at 10:44

## 2025-01-27 RX ADMIN — PROPOFOL 50 MG: 10 INJECTION, EMULSION INTRAVENOUS at 10:48

## 2025-01-27 RX ADMIN — PROPOFOL 50 MG: 10 INJECTION, EMULSION INTRAVENOUS at 10:57

## 2025-01-27 RX ADMIN — PROPOFOL 50 MG: 10 INJECTION, EMULSION INTRAVENOUS at 10:50

## 2025-01-27 RX ADMIN — SODIUM CHLORIDE 1000 ML: 9 INJECTION, SOLUTION INTRAVENOUS at 02:00

## 2025-01-27 NOTE — ED PROVIDER NOTES
I evaluated this patient in conjunction with the OLU.  I personally performed a history and physical examination.  I agree with OLU's documented history, physical and medical decision making.    Patient presents emerged part complaining of abdominal pain.  Patient has past history significant for diverticulosis.    EKG obtained and based on my independent reading shows normal sinus rhythm with intermittent premature junctional complexes.  No acute ischemic changes.  Q waves in leads V1 through V3.  Q waves are old in comparison to her previous EKG from January 5 of this year.    Labs reviewed which demonstrate chronically elevated baseline high-sensitivity troponin.  Repeat has decreased.  Otherwise, hyperglycemic but no acidosis or elevated anion gap.  Creatinine at baseline.    CT imaging per radiology shows small hiatal hernia, small pericardial effusion and diverticulosis.    At the time of my evaluation patient is well-appearing and nontoxic.  Abdomen is soft with no peritonitis.  Patient is scheduled for EGD today.  Patient instructed to have this performed as scheduled.  Also instructed to discuss CT scan findings with her primary care provider.  Patient instructed to return to the emergency department before then for any concerning symptoms, worsening symptoms or new concerns.     Diagnosis Plan   1. Hiatal hernia            ED Disposition       ED Disposition   Discharge    Condition   Stable    Comment   --                  Julius Gupta MD  01/27/25 0641

## 2025-01-27 NOTE — H&P
"    [facility]  HISTORY AND PHYSICAL    Patient Name: Valarie Camara  : 1945  MRN: [id number]    Chief Complaint:   For EGD    History Of Presenting Illness:    Dysphagia   Abnormal esophagogram    Past Medical History:   Diagnosis Date    Acute bronchitis with bronchospasm     Anxiety     Arthritis     Asthma     B12 deficiency     Back pain     Body piercing     ears    Cataract     Cerebrovascular disease     Cervicalgia     Chronic kidney disease     stage 3b    Colonic polyp     History of colonic polyps     Constipation     COPD (chronic obstructive pulmonary disease)     Coronary artery disease     Depression     Diverticulitis     Dysphagia     Patient reported \"it won't go all the way down\" when eating solid foods first thing in the mornings    Edema     Elevated cholesterol     Esophageal reflux     Folic acid deficiency     Full dentures     Advised no adhesives DOS    Heart murmur     Herpes zoster     High cholesterol     History of kidney infection     History of recurrent urinary tract infection     HTN (hypertension)     Hypercholesterolemia     Impaired functional mobility, balance, gait, and endurance     Insomnia     Kidney infection     Malignant hypertension 2015     Accelerated essential hypertension    Measles     rubeola    Muscle spasm     Neoplasm of uncertain behavior of skin     dtr denies    Osteoporosis     Poor historian     Recurrent urinary tract infection     Rheumatoid arthritis     RLS (restless legs syndrome)     Stomach ulcer     Stroke     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  and that she has residual right sided weakness    Type 2 diabetes mellitus     Vitamin D deficiency        Past Surgical History:   Procedure Laterality Date    CATARACT EXTRACTION WITH INTRAOCULAR LENS IMPLANT Left 2013    CATARACT EXTRACTION WITH INTRAOCULAR LENS IMPLANT Right 04/15/2013    COLONOSCOPY      COLONOSCOPY N/A 2019    Procedure: COLONOSCOPY;  Surgeon: " Chidi Downing MD;  Location:  [facility];  Service: Gastroenterology    ENDOSCOPY N/A 2017    Procedure: ESOPHAGOGASTRODUODENOSCOPY with biopsies and esophageal balloon dilitation;  Surgeon: Wesley Stovall MD;  Location:  [facility];  Service:     ENDOSCOPY N/A 2019    Procedure: ESOPHAGOGASTRODUODENOSCOPY;  Surgeon: Chidi Downing MD;  Location:  [facility];  Service: Gastroenterology    ENDOSCOPY N/A 2021    Procedure: ESOPHAGOGASTRODUODENOSCOPY with dilation and biopsies;  Surgeon: Gonzalez Zapata MD;  Location:  [facility];  Service: Gastroenterology;  Laterality: N/A;    ENDOSCOPY N/A 2023    Procedure: ESOPHAGOGASTRODUODENOSCOPY WITH BIOPSY AND DILATATION;  Surgeon: Gonzalez Zapata MD;  Location: [facility] END[facility]COPY;  Service: Gastroenterology;  Laterality: N/A;    HYSTERECTOMY  1979    UPPER GASTROINTESTINAL ENDOSCOPY  2013    UPPER GASTROINTESTINAL ENDOSCOPY  2017       Social History     Socioeconomic History    Marital status:    Tobacco Use    Smoking status: Former     Current packs/day: 0.00     Average packs/day: 1 pack/day for 15.0 years (15.0 ttl pk-yrs)     Types: Cigarettes     Start date: 1997     Quit date:      Years since quittin.0     Passive exposure: Past    Smokeless tobacco: Never    Tobacco comments:      Denied: History of smoking cigarettes   Vaping Use    Vaping status: Never Used   Substance and Sexual Activity    Alcohol use: Not Currently    Drug use: Never    Sexual activity: Defer       Family History   Problem Relation Age of Onset    Arthritis Mother     Diabetes Mother     Hypertension Mother     Arthritis Other     Arthritis Other     Diabetes Other         DM    Hypertension Other     Colon cancer Neg Hx        Prior to Admission Medications:  Medications Prior to Admission   Medication Sig Dispense Refill Last Dose/Taking    atorvastatin (LIPITOR) 80 MG tablet Take 1 tablet by mouth  Daily. 90 tablet 0 1/26/2025 Evening    carvedilol (COREG) 25 MG tablet Take 1 tablet by mouth 2 (Two) Times a Day With Meals. 180 tablet 0 1/27/2025 at  6:45 AM    CHOLECALCIFEROL PO Take 5,000 Int'l Units/day by mouth Daily. Indications: Vitamin D Deficiency   1/26/2025 Morning    ferrous sulfate 325 (65 FE) MG tablet Take 1 tablet by mouth 3 (Three) Times a Week. Indications: Iron Deficiency, Restless Leg Syndrome   Past Week    insulin lispro (humaLOG) 100 UNIT/ML injection Inject 2 Units under the skin into the appropriate area as directed 3 (Three) Times a Day As Needed for High Blood Sugar. Daughter only gives if BS is greater than 200 two hrs after eating.    Indications: Type 2 Diabetes   1/26/2025 Evening    isosorbide mononitrate (IMDUR) 60 MG 24 hr tablet Take 1 tablet by mouth Daily. Indications: Stable Angina Pectoris   1/26/2025 Morning    linagliptin (TRADJENTA) 5 MG tablet tablet Take 1 tablet by mouth Daily. 30 tablet 0 1/26/2025 Morning    losartan-hydrochlorothiazide (HYZAAR) 100-25 MG per tablet Take 1 tablet by mouth Daily. (Patient taking differently: Take 1 tablet by mouth Daily.) 30 tablet 0 1/27/2025 Morning    NIFEdipine XL (PROCARDIA XL) 90 MG 24 hr tablet Take 1 tablet by mouth Daily. Indications: High Blood Pressure   1/26/2025 Morning    pantoprazole (PROTONIX) 40 MG EC tablet Take 1 tablet by mouth Daily.   1/26/2025 Morning    traZODone (DESYREL) 50 MG tablet Take 1 tablet by mouth At Night As Needed. Indications: Trouble Sleeping   1/26/2025 Evening    acetaminophen (TYLENOL) 325 MG tablet Take 1 tablet by mouth Every 6 (Six) Hours As Needed for Headache or Mild Pain. Indications: Pain   Unknown    Easy Touch Pen Needles 31G X 8 MM misc Indications: Diabetes   Unknown    glucose blood test strip Check sugar once a day (Patient taking differently: 1 each by Other route As Needed (FBS). Check sugar once a day) 30 each 12 Unknown    glucose monitor monitoring kit 1 each Daily. 1 each 1  Unknown    Insulin Pen Needle 31G X 5 MM misc Inject insulin three times daily 100 each 11 Unknown    Lancets 30G misc Check sugar daily 30 each 12 Unknown       Allergies:  Allergies   Allergen Reactions    Penicillins Rash, Shortness Of Breath, Anaphylaxis and Other (See Comments)    Clonidine Derivatives Other (See Comments)     Pt reports extreme hypotension      Hydralazine Nausea And Vomiting and Other (See Comments)    Sulfa Antibiotics Rash        Vitals: Temp:  [97.1 °F (36.2 °C)-97.8 °F (36.6 °C)] 97.1 °F (36.2 °C)  Heart Rate:  [78-97] 80  Resp:  [18] 18  BP: (146-179)/(67-92) 179/81    Review Of Systems:  Constitutional:  Negative for chills, fever, and unexpected weight change.  Respiratory:  Negative for cough, chest tightness, shortness of breath, and wheezing.  Cardiovascular:  Negative for chest pain, palpitations, and leg swelling.  Gastrointestinal:  Negative for abdominal distention, abdominal pain, nausea, vomiting.  Neurological:  Negative for weakness, numbness, and headaches.     Physical Exam:    General Appearance:  Alert, cooperative, in no acute distress.   Lungs:   Clear to auscultation, respirations regular, even and                 unlabored.   Heart:  Regular rhythm and normal rate.   Abdomen:   Normal bowel sounds, no masses, no organomegaly. Soft, nontender, nondistended   Neurologic: Alert and oriented x 3. Moves all four limbs equally       Assessment & Plan     Assessment:  Principal Problem:    Esophageal dysphagia      Plan: Esophagogastroduodenoscopy with possible biopsy, polypectomy, dilatation, endoscopic band ligation, ablation of arteriovenous malformations or control of bleeding (N/A)     Gonzalez Zapata MD  1/27/2025

## 2025-01-27 NOTE — ED PROVIDER NOTES
" EMERGENCY DEPARTMENT ENCOUNTER    Pt Name: Valarie Camara  MRN: [id number]  Pt :   1945  Room Number:    Date of encounter:  2025  PCP: Lay Cox MD  ED Provider: Bill Farah PA-C    Historian: Patient, patient's daughter at bedside, nursing      HPI:  Chief Complaint: Abdominal        Context: Valarie Camara is a 79 y.o. female who presents to the ED c/o abdominal pain for the past several weeks.  Patient states however the pain worsened suddenly during the night and described the pain is mostly epigastric.  She denies any chest pain or shortness of breath, lightheadedness or dizziness, numbness or tingling, radiation of pain to her back, dysuria, urinary frequency or urgency, or any other complaint today.      PAST MEDICAL HISTORY  Past Medical History:   Diagnosis Date    Acute bronchitis with bronchospasm     Anxiety     Arthritis     Asthma     B12 deficiency     Back pain     Body piercing     ears    Cataract     Cerebrovascular disease     Cervicalgia     Chronic kidney disease     stage 3b    Colonic polyp     History of colonic polyps     Constipation     COPD (chronic obstructive pulmonary disease)     Coronary artery disease     Depression     Diverticulitis     Dysphagia     Patient reported \"it won't go all the way down\" when eating solid foods first thing in the mornings    Edema     Elevated cholesterol     Esophageal reflux     Folic acid deficiency     Full dentures     Advised no adhesives DOS    Heart murmur     Herpes zoster     High cholesterol     History of kidney infection     History of recurrent urinary tract infection     HTN (hypertension)     Hypercholesterolemia     Impaired functional mobility, balance, gait, and endurance     Insomnia     Kidney infection     Malignant hypertension 2015     Accelerated essential hypertension    Measles     rubeola    Muscle spasm     Neoplasm of uncertain behavior of skin     dtr denies    " Osteoporosis     Poor historian     Recurrent urinary tract infection     Rheumatoid arthritis     RLS (restless legs syndrome)     Stomach ulcer     Stroke     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    Type 2 diabetes mellitus     Vitamin D deficiency          PAST SURGICAL HISTORY  Past Surgical History:   Procedure Laterality Date    CATARACT EXTRACTION WITH INTRAOCULAR LENS IMPLANT Left 02/11/2013    CATARACT EXTRACTION WITH INTRAOCULAR LENS IMPLANT Right 04/15/2013    COLONOSCOPY  2012    COLONOSCOPY N/A 11/26/2019    Procedure: COLONOSCOPY;  Surgeon: Chidi Downing MD;  Location:  [facility];  Service: Gastroenterology    ENDOSCOPY N/A 11/13/2017    Procedure: ESOPHAGOGASTRODUODENOSCOPY with biopsies and esophageal balloon dilitation;  Surgeon: Wesley Stovall MD;  Location:  [facility];  Service:     ENDOSCOPY N/A 11/25/2019    Procedure: ESOPHAGOGASTRODUODENOSCOPY;  Surgeon: Chidi Downing MD;  Location:  [facility];  Service: Gastroenterology    ENDOSCOPY N/A 11/29/2021    Procedure: ESOPHAGOGASTRODUODENOSCOPY with dilation and biopsies;  Surgeon: Gonzalez Zapata MD;  Location: [facility] END[facility]COPY;  Service: Gastroenterology;  Laterality: N/A;    ENDOSCOPY N/A 11/1/2023    Procedure: ESOPHAGOGASTRODUODENOSCOPY WITH BIOPSY AND DILATATION;  Surgeon: Gonzalez Zapata MD;  Location: [facility];  Service: Gastroenterology;  Laterality: N/A;    ENDOSCOPY N/A 1/27/2025    Procedure: Esophagogastroduodenoscopy with dilatation and biopsies;  Surgeon: Gonzalez Zapata MD;  Location: [facility] ENDOSCOPY;  Service: Gastroenterology;  Laterality: N/A;    HYSTERECTOMY  1979    UPPER GASTROINTESTINAL ENDOSCOPY  12/09/2013    UPPER GASTROINTESTINAL ENDOSCOPY  11/13/2017         FAMILY HISTORY  Family History   Problem Relation Age of Onset    Arthritis Mother     Diabetes Mother     Hypertension Mother     Arthritis Other     Arthritis Other      Diabetes Other         DM    Hypertension Other     Colon cancer Neg Hx          SOCIAL HISTORY  Social History     Socioeconomic History    Marital status:    Tobacco Use    Smoking status: Former     Current packs/day: 0.00     Average packs/day: 1 pack/day for 15.0 years (15.0 ttl pk-yrs)     Types: Cigarettes     Start date: 1997     Quit date:      Years since quittin.0     Passive exposure: Past    Smokeless tobacco: Never    Tobacco comments:      Denied: History of smoking cigarettes   Vaping Use    Vaping status: Never Used   Substance and Sexual Activity    Alcohol use: Not Currently    Drug use: Never    Sexual activity: Defer         ALLERGIES  Penicillins, Clonidine derivatives, Hydralazine, and Sulfa antibiotics        REVIEW OF SYSTEMS  Review of Systems   Constitutional:  Negative for chills and fever.   HENT:  Negative for congestion and sore throat.    Respiratory:  Negative for cough.    Cardiovascular:  Negative for chest pain.   Gastrointestinal:  Positive for abdominal pain. Negative for nausea and vomiting.   Genitourinary:  Negative for dysuria.   Musculoskeletal:  Negative for back pain.   Skin:  Negative for wound.   Neurological:  Negative for dizziness and headaches.   Psychiatric/Behavioral:  Negative for confusion.    All other systems reviewed and are negative.         All systems reviewed and negative except for those discussed in HPI.       PHYSICAL EXAM    I have reviewed the triage vital signs and nursing notes.    ED Triage Vitals [25 0139]   Temp Heart Rate Resp BP SpO2   97.8 °F (36.6 °C) 97 18 151/80 98 %      Temp src Heart Rate Source Patient Position BP Location FiO2 (%)   Oral -- Sitting Left arm --       Physical Exam  Vitals and nursing note reviewed.   Constitutional:       General: She is not in acute distress.     Appearance: She is not ill-appearing, toxic-appearing or diaphoretic.   HENT:      Head: Normocephalic and atraumatic.       Mouth/Throat:      Mouth: Mucous membranes are moist.      Pharynx: Oropharynx is clear.   Eyes:      Extraocular Movements: Extraocular movements intact.   Cardiovascular:      Rate and Rhythm: Normal rate.      Heart sounds: Normal heart sounds.   Pulmonary:      Effort: Pulmonary effort is normal. No respiratory distress.      Breath sounds: Normal breath sounds.   Abdominal:      Tenderness: There is abdominal tenderness in the epigastric area. There is no right CVA tenderness, left CVA tenderness, guarding or rebound.   Skin:     General: Skin is warm and dry.      Findings: No rash.   Neurological:      Mental Status: She is alert.             LAB RESULTS  No results found for this or any previous visit (from the past 24 hours).    If labs were ordered, I independently reviewed the results and considered them in treating the patient.        RADIOLOGY  No Radiology Exams Resulted Within Past 24 Hours    I ordered and independently reviewed the above noted radiographic studies.          PROCEDURES    Procedures    ECG 12 Lead Chest Pain   Final Result          MEDICATIONS GIVEN IN ER    Medications   sodium chloride 0.9 % bolus 1,000 mL (0 mL Intravenous Stopped 1/27/25 0230)         MEDICAL DECISION MAKING, PROGRESS, and CONSULTS    All labs, if obtained, have been independently reviewed by me.  All radiology studies, if obtained, have been reviewed by me and the radiologist dictating the report.  All EKG's, if obtained, have been independently viewed and interpreted by me/my attending physician.      Discussion below represents my analysis of pertinent findings related to patient's condition, differential diagnosis, treatment plan and final disposition.    Patient evaluating today for worsening epigastric pain which has been chronic issue over the last several weeks but worsened during the night tonight, patient is currently n.p.o. after midnight for expected EGD procedure by Dr. Zapata this morning at this  facility.  Her pain is solely epigastric no radiation no chest pain or shortness of breath reported and vital signs and pulse oximetry on arrival are stable and within normal limits.  Cardiac workup was initiated along with abdominal pain labs, CT abdomen pelvis and chest x-ray ordered.    Pending workup results, patient's care transferred to ED attending Dr. Gupta at 3 AM.                     Differential diagnosis:    Differential diagnosis included but was not limited to gastritis, GERD, peptic ulcer disease, ACS, anxiety, colitis      Additional sources:    - Discussed/ obtained information from independent historians: Daughter at bedside    - External (non-ED) record review: Previous medical records from gastroenterologist Dr. Zapata and hospital medicine records reviewed    - Chronic or social conditions impacting care: None    Orders placed during this visit:  Orders Placed This Encounter   Procedures    XR Chest 1 View    CT Abdomen Pelvis Without Contrast    Rowdy Draw    Comprehensive Metabolic Panel    Lipase    High Sensitivity Troponin T    CBC Auto Differential    High Sensitivity Troponin T 1Hr    Undress & Gown    Continuous Pulse Oximetry    Vital Signs    ECG 12 Lead Chest Pain    CBC & Differential    Green Top (Gel)    Lavender Top    Gold Top - SST    Light Blue Top         Additional orders considered but not ordered: None      ED Course:    Consultants: None    ED Course as of 01/29/25 1934 Mon Jan 27, 2025   0202 EKG obtained and based on my independent reading shows normal sinus rhythm with intermittent premature junctional complexes.  No acute ischemic changes.  Q waves in leads V1 through V3.  Q waves are old in comparison to her previous EKG from January 5 of this year. [WP]      ED Course User Index  [WP] Julius Gupta MD              Shared Decision Making:  After my consideration of clinical presentation and any laboratory/radiology studies obtained, I discussed the findings with  the patient/patient representative who is in agreement with the treatment plan and the final disposition.   Risks and benefits of discharge and/or observation/admission were discussed.       AS OF 19:34 EST VITALS:    BP - 148/92  HR - 94  TEMP - 97.8 °F (36.6 °C) (Oral)  O2 SATS - 98%                  DIAGNOSIS  Final diagnoses:   Hiatal hernia         DISPOSITION  Discharge      Please note that portions of this document were completed with voice recognition software.      Bill Farah PA-C  01/29/25 1935

## 2025-01-27 NOTE — DISCHARGE INSTRUCTIONS
- Discharge patient to home (ambulatory).   - Mechanical soft diet today.   - Antireflux measures  - Continue present medications.   - PPI daily  - Await pathology results.   - Return to my office in 8 weeks    To assist you in voiding:  Drink plenty of fluids  Listen to running water while attempting to void.    If you are unable to urinate and you have an uncomfortable urge to void or it has been   6 hours since you were discharged, return to the Emergency RoomRest today  No pushing,pulling,tugging,heavy lifting, or strenuous activity   No major decision making,driving,or drinking alcoholic beverages for 24 hours due to the sedation you received  Always use good hand hygiene/washing technique  No driving on pain medication.No pushing, pulling,tugging,  heavy lifting, or strenuous activity.    No major decision making, driving, or drinking alcoholic beverages for 24 hours. ( due to the medications you have  received)  Always use good hand hygiene/washing techniques.  NO driving while taking pain medications.    * if you have an incision:  Check your incision area every day for signs of infection.   Check for:  * more redness, swelling, or pain  *more fluid or blood  *warmth  *pus or bad smellNo pushing, pulling, tugging,  heavy lifting, or strenuous activity.  No major decision making, driving, or drinking alcoholic beverages for 24 hours. ( due to the medications you have  received)  Always use good hand hygiene/washing techniques.  NO driving while taking pain medications.    * if you have an incision:  Check your incision area every day for signs of infection.   Check for:  * more redness, swelling, or pain  *more fluid or blood  *warmth  *pus or bad smell

## 2025-01-27 NOTE — DISCHARGE INSTRUCTIONS
As we discussed, CT scan shows evidence of small fluid around your heart, hiatal hernia and diverticulosis.  It is important for you to discuss the CT scan findings with your primary care provider within 1 week.  Have your EGD today as scheduled.  Return to the Emergency Department before then for any concerning symptoms or new concerns.

## 2025-01-27 NOTE — ANESTHESIA PREPROCEDURE EVALUATION
Anesthesia Evaluation     Patient summary reviewed and Nursing notes reviewed   no history of anesthetic complications:   NPO Solid Status: > 8 hours  NPO Liquid Status: > 8 hours           Airway   Mallampati: II  TM distance: >3 FB  Neck ROM: full  Possible difficult intubation  Dental    (+) upper dentures and lower dentures    Pulmonary    (+) a smoker Former, COPD moderate, asthma,sleep apnea ( ? snores melissa) on CPAP, decreased breath sounds  Cardiovascular   Exercise tolerance: good (4-7 METS)    PT is on anticoagulation therapy  Patient on routine beta blocker    (+) hypertension well controlled 2 medications or greater, valvular problems/murmurs, CAD, angina, peripheral edema, hyperlipidemia    ROS comment: 1/27/25 EKG - sinus w/ frequent ectopic beats      Neuro/Psych  (+) CVA, syncope, weakness (Right hemiparesis), psychiatric history Anxiety and Depression, poor historian.  GI/Hepatic/Renal/Endo    (+) obesity, GERD well controlled, PUD, renal disease-, diabetes mellitus type 2 using insulin, thyroid problem thyroid nodules  (-) morbid obesity    Musculoskeletal     (+) back pain, chronic pain, neck pain  Abdominal  - normal exam   Substance History - negative use     OB/GYN negative ob/gyn ROS         Other   arthritis, blood dyscrasia anemia,   history of cancer                      Anesthesia Plan    ASA 3     MAC     (Risks and benefits discussed including risk of aspiration, recall and dental damage. All patient questions answered. Will continue with POC.)  intravenous induction     Anesthetic plan, risks, benefits, and alternatives have been provided, discussed and informed consent has been obtained with: patient.  Pre-procedure education provided

## 2025-01-27 NOTE — ANESTHESIA POSTPROCEDURE EVALUATION
Patient: Valarie Camara    Procedure Summary       Date: 01/27/25 Room / Location: [facility] ENDOSCOPY 2 / [facility]COPY    Anesthesia Start: 1044 Anesthesia Stop: 1103    Procedure: Esophagogastroduodenoscopy with dilatation and biopsies (Esophagus) Diagnosis:       Esophageal dysphagia      (Esophageal dysphagia [R13.19])    Surgeons: Gonzalez Zapata MD Provider: Aguila Be CRNA    Anesthesia Type: MAC ASA Status: 3            Anesthesia Type: MAC    Vitals  No vitals data found for the desired time range.          Post Anesthesia Care and Evaluation    Patient location during evaluation: bedside  Patient participation: complete - patient participated  Level of consciousness: awake  Pain score: 0  Pain management: adequate    Airway patency: patent  Anesthetic complications: No anesthetic complications  PONV Status: controlled  Cardiovascular status: acceptable and stable  Respiratory status: acceptable and room air  Hydration status: acceptable    Comments: See nursing documentation for post op vital signs

## 2025-01-28 ENCOUNTER — HOME CARE VISIT (OUTPATIENT)
Dept: HOME HEALTH SERVICES | Facility: HOME HEALTHCARE | Age: 80
End: 2025-01-28
Payer: COMMERCIAL

## 2025-01-28 ENCOUNTER — HOME CARE VISIT (OUTPATIENT)
Dept: HOME HEALTH SERVICES | Facility: HOME HEALTHCARE | Age: 80
End: 2025-01-28
Payer: MEDICARE

## 2025-01-28 VITALS
DIASTOLIC BLOOD PRESSURE: 60 MMHG | HEART RATE: 82 BPM | SYSTOLIC BLOOD PRESSURE: 130 MMHG | TEMPERATURE: 97.8 F | RESPIRATION RATE: 16 BRPM | OXYGEN SATURATION: 96 %

## 2025-01-28 LAB — REF LAB TEST METHOD: NORMAL

## 2025-01-28 PROCEDURE — G0299 HHS/HOSPICE OF RN EA 15 MIN: HCPCS

## 2025-01-29 ENCOUNTER — HOME CARE VISIT (OUTPATIENT)
Dept: HOME HEALTH SERVICES | Facility: HOME HEALTHCARE | Age: 80
End: 2025-01-29
Payer: COMMERCIAL

## 2025-02-03 ENCOUNTER — HOME CARE VISIT (OUTPATIENT)
Dept: HOME HEALTH SERVICES | Facility: HOME HEALTHCARE | Age: 80
End: 2025-02-03
Payer: MEDICARE

## 2025-02-04 ENCOUNTER — HOME CARE VISIT (OUTPATIENT)
Dept: HOME HEALTH SERVICES | Facility: HOME HEALTHCARE | Age: 80
End: 2025-02-04
Payer: MEDICARE

## 2025-02-04 VITALS
OXYGEN SATURATION: 98 % | RESPIRATION RATE: 16 BRPM | SYSTOLIC BLOOD PRESSURE: 122 MMHG | HEART RATE: 76 BPM | DIASTOLIC BLOOD PRESSURE: 56 MMHG | TEMPERATURE: 99.6 F

## 2025-02-04 PROCEDURE — G0153 HHCP-SVS OF S/L PATH,EA 15MN: HCPCS

## 2025-02-04 RX ORDER — DOXAZOSIN 2 MG/1
TABLET ORAL
Qty: 90 TABLET | OUTPATIENT
Start: 2025-02-04

## 2025-02-05 ENCOUNTER — HOME CARE VISIT (OUTPATIENT)
Dept: HOME HEALTH SERVICES | Facility: HOME HEALTHCARE | Age: 80
End: 2025-02-05
Payer: MEDICARE

## 2025-02-05 VITALS
SYSTOLIC BLOOD PRESSURE: 170 MMHG | DIASTOLIC BLOOD PRESSURE: 80 MMHG | HEART RATE: 82 BPM | TEMPERATURE: 97.8 F | OXYGEN SATURATION: 95 % | RESPIRATION RATE: 20 BRPM

## 2025-02-05 PROCEDURE — G0495 RN CARE TRAIN/EDU IN HH: HCPCS

## 2025-02-06 ENCOUNTER — HOME CARE VISIT (OUTPATIENT)
Dept: HOME HEALTH SERVICES | Facility: HOME HEALTHCARE | Age: 80
End: 2025-02-06
Payer: COMMERCIAL

## 2025-02-06 VITALS
SYSTOLIC BLOOD PRESSURE: 196 MMHG | RESPIRATION RATE: 16 BRPM | TEMPERATURE: 97 F | HEART RATE: 80 BPM | DIASTOLIC BLOOD PRESSURE: 82 MMHG | OXYGEN SATURATION: 100 %

## 2025-02-06 PROCEDURE — G0152 HHCP-SERV OF OT,EA 15 MIN: HCPCS

## 2025-02-08 ENCOUNTER — HOSPITAL ENCOUNTER (EMERGENCY)
Facility: HOSPITAL | Age: 80
Discharge: HOME | End: 2025-02-08
Attending: EMERGENCY MEDICINE
Payer: MEDICARE

## 2025-02-08 ENCOUNTER — APPOINTMENT (OUTPATIENT)
Dept: CT IMAGING | Facility: HOSPITAL | Age: 80
End: 2025-02-08
Payer: MEDICARE

## 2025-02-08 ENCOUNTER — APPOINTMENT (OUTPATIENT)
Dept: GENERAL RADIOLOGY | Facility: HOSPITAL | Age: 80
End: 2025-02-08
Payer: MEDICARE

## 2025-02-08 VITALS
WEIGHT: 169.97 LBS | RESPIRATION RATE: 20 BRPM | DIASTOLIC BLOOD PRESSURE: 81 MMHG | OXYGEN SATURATION: 97 % | SYSTOLIC BLOOD PRESSURE: 174 MMHG | BODY MASS INDEX: 30.12 KG/M2 | HEART RATE: 79 BPM | HEIGHT: 63 IN | TEMPERATURE: 98.8 F

## 2025-02-08 DIAGNOSIS — T88.7XXA MEDICATION SIDE EFFECT: Primary | ICD-10-CM

## 2025-02-08 LAB
ALBUMIN SERPL-MCNC: 3.3 G/DL (ref 3.5–5.2)
ALBUMIN/GLOB SERPL: 1.2 G/DL
ALP SERPL-CCNC: 64 U/L (ref 39–117)
ALT SERPL W P-5'-P-CCNC: 12 U/L (ref 1–33)
ANION GAP SERPL CALCULATED.3IONS-SCNC: 10.8 MMOL/L (ref 5–15)
AST SERPL-CCNC: 16 U/L (ref 1–32)
BASOPHILS # BLD AUTO: 0.04 10*3/MM3 (ref 0–0.2)
BASOPHILS NFR BLD AUTO: 0.7 % (ref 0–1.5)
BILIRUB SERPL-MCNC: 0.3 MG/DL (ref 0–1.2)
BILIRUB UR QL STRIP: NEGATIVE
BUN SERPL-MCNC: 34 MG/DL (ref 8–23)
BUN/CREAT SERPL: 17.4 (ref 7–25)
CALCIUM SPEC-SCNC: 8.7 MG/DL (ref 8.6–10.5)
CHLORIDE SERPL-SCNC: 102 MMOL/L (ref 98–107)
CLARITY UR: CLEAR
CO2 SERPL-SCNC: 24.2 MMOL/L (ref 22–29)
COLOR UR: YELLOW
CREAT SERPL-MCNC: 1.95 MG/DL (ref 0.57–1)
DEPRECATED RDW RBC AUTO: 41.2 FL (ref 37–54)
EGFRCR SERPLBLD CKD-EPI 2021: 25.6 ML/MIN/1.73
EOSINOPHIL # BLD AUTO: 0.15 10*3/MM3 (ref 0–0.4)
EOSINOPHIL NFR BLD AUTO: 2.6 % (ref 0.3–6.2)
ERYTHROCYTE [DISTWIDTH] IN BLOOD BY AUTOMATED COUNT: 13.3 % (ref 12.3–15.4)
FLUAV SUBTYP SPEC NAA+PROBE: NOT DETECTED
FLUBV RNA ISLT QL NAA+PROBE: NOT DETECTED
GLOBULIN UR ELPH-MCNC: 2.7 GM/DL
GLUCOSE SERPL-MCNC: 151 MG/DL (ref 65–99)
GLUCOSE UR STRIP-MCNC: NEGATIVE MG/DL
HCT VFR BLD AUTO: 33.5 % (ref 34–46.6)
HGB BLD-MCNC: 11.4 G/DL (ref 12–15.9)
HGB UR QL STRIP.AUTO: NEGATIVE
HOLD SPECIMEN: NORMAL
HOLD SPECIMEN: NORMAL
IMM GRANULOCYTES # BLD AUTO: 0.03 10*3/MM3 (ref 0–0.05)
IMM GRANULOCYTES NFR BLD AUTO: 0.5 % (ref 0–0.5)
KETONES UR QL STRIP: NEGATIVE
LEUKOCYTE ESTERASE UR QL STRIP.AUTO: NEGATIVE
LYMPHOCYTES # BLD AUTO: 2.24 10*3/MM3 (ref 0.7–3.1)
LYMPHOCYTES NFR BLD AUTO: 39.1 % (ref 19.6–45.3)
MCH RBC QN AUTO: 29.3 PG (ref 26.6–33)
MCHC RBC AUTO-ENTMCNC: 34 G/DL (ref 31.5–35.7)
MCV RBC AUTO: 86.1 FL (ref 79–97)
MONOCYTES # BLD AUTO: 0.62 10*3/MM3 (ref 0.1–0.9)
MONOCYTES NFR BLD AUTO: 10.8 % (ref 5–12)
NEUTROPHILS NFR BLD AUTO: 2.65 10*3/MM3 (ref 1.7–7)
NEUTROPHILS NFR BLD AUTO: 46.3 % (ref 42.7–76)
NITRITE UR QL STRIP: NEGATIVE
NRBC BLD AUTO-RTO: 0 /100 WBC (ref 0–0.2)
PH UR STRIP.AUTO: 6.5 [PH] (ref 5–8)
PLATELET # BLD AUTO: 216 10*3/MM3 (ref 140–450)
PMV BLD AUTO: 10.1 FL (ref 6–12)
POTASSIUM SERPL-SCNC: 3.9 MMOL/L (ref 3.5–5.2)
PROT SERPL-MCNC: 6 G/DL (ref 6–8.5)
PROT UR QL STRIP: NEGATIVE
RBC # BLD AUTO: 3.89 10*6/MM3 (ref 3.77–5.28)
SARS-COV-2 RNA RESP QL NAA+PROBE: NOT DETECTED
SODIUM SERPL-SCNC: 137 MMOL/L (ref 136–145)
SP GR UR STRIP: <=1.005 (ref 1–1.03)
UROBILINOGEN UR QL STRIP: NORMAL
WBC NRBC COR # BLD AUTO: 5.73 10*3/MM3 (ref 3.4–10.8)
WHOLE BLOOD HOLD COAG: NORMAL
WHOLE BLOOD HOLD SPECIMEN: NORMAL

## 2025-02-08 PROCEDURE — 93005 ELECTROCARDIOGRAM TRACING: CPT | Performed by: EMERGENCY MEDICINE

## 2025-02-08 PROCEDURE — 99284 EMERGENCY DEPT VISIT MOD MDM: CPT | Performed by: EMERGENCY MEDICINE

## 2025-02-08 PROCEDURE — 80053 COMPREHEN METABOLIC PANEL: CPT | Performed by: EMERGENCY MEDICINE

## 2025-02-08 PROCEDURE — 81003 URINALYSIS AUTO W/O SCOPE: CPT | Performed by: EMERGENCY MEDICINE

## 2025-02-08 PROCEDURE — 85025 COMPLETE CBC W/AUTO DIFF WBC: CPT | Performed by: EMERGENCY MEDICINE

## 2025-02-08 PROCEDURE — 70450 CT HEAD/BRAIN W/O DYE: CPT

## 2025-02-08 PROCEDURE — 87636 SARSCOV2 & INF A&B AMP PRB: CPT | Performed by: EMERGENCY MEDICINE

## 2025-02-08 PROCEDURE — 74176 CT ABD & PELVIS W/O CONTRAST: CPT

## 2025-02-08 PROCEDURE — 71045 X-RAY EXAM CHEST 1 VIEW: CPT

## 2025-02-08 RX ORDER — SODIUM CHLORIDE 0.9 % (FLUSH) 0.9 %
10 SYRINGE (ML) INJECTION AS NEEDED
Status: DISCONTINUED | OUTPATIENT
Start: 2025-02-08 | End: 2025-02-08 | Stop reason: HOSPADM

## 2025-02-08 RX ORDER — ACETAMINOPHEN 325 MG/1
975 TABLET ORAL ONCE
Status: COMPLETED | OUTPATIENT
Start: 2025-02-08 | End: 2025-02-08

## 2025-02-08 RX ADMIN — ACETAMINOPHEN 975 MG: 325 TABLET ORAL at 03:00

## 2025-02-08 NOTE — ED PROVIDER NOTES
" EMERGENCY DEPARTMENT ENCOUNTER    Pt Name: Valarie Camara  MRN: [id number]  Pt :   1945  Room Number:    Date of encounter:  2025  PCP: Lay Cox MD  ED Provider: Julius Gupta MD    Historian: Daughter, patient      HPI:  Chief Complaint: Altered mental status        Context: Valarie Camara is a 79 y.o. female who presents to the ED c/o altered mental status.  Daughter says patient was recently started on Seroquel for sleeping difficulty.  She says she woke up this morning sounding confused and complaining of throat tightness.  Patient complains of bilateral lower abdominal pain.  She denies vomiting or diarrhea.  Daughter also reports patient is currently being treated for UTI with oral antibiotics. Daughter reports patient is no longer confused.  Patient currently denies having any throat discomfort.      PAST MEDICAL HISTORY  Past Medical History:   Diagnosis Date    Acute bronchitis with bronchospasm     Anxiety     Arthritis     Asthma     B12 deficiency     Back pain     Body piercing     ears    Cataract     Cerebrovascular disease     Cervicalgia     Chronic kidney disease     stage 3b    Colonic polyp     History of colonic polyps     Constipation     COPD (chronic obstructive pulmonary disease)     Coronary artery disease     Depression     Diverticulitis     Dysphagia     Patient reported \"it won't go all the way down\" when eating solid foods first thing in the mornings    Edema     Elevated cholesterol     Esophageal reflux     Folic acid deficiency     Full dentures     Advised no adhesives DOS    Heart murmur     Herpes zoster     High cholesterol     History of kidney infection     History of recurrent urinary tract infection     HTN (hypertension)     Hypercholesterolemia     Impaired functional mobility, balance, gait, and endurance     Insomnia     Kidney infection     Malignant hypertension 2015     Accelerated essential hypertension    Measles "     rubeola    Muscle spasm     Neoplasm of uncertain behavior of skin     dtr denies    Osteoporosis     Poor historian     Recurrent urinary tract infection     Rheumatoid arthritis     RLS (restless legs syndrome)     Stomach ulcer     Stroke     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    Type 2 diabetes mellitus     Vitamin D deficiency          PAST SURGICAL HISTORY  Past Surgical History:   Procedure Laterality Date    CATARACT EXTRACTION WITH INTRAOCULAR LENS IMPLANT Left 02/11/2013    CATARACT EXTRACTION WITH INTRAOCULAR LENS IMPLANT Right 04/15/2013    COLONOSCOPY  2012    COLONOSCOPY N/A 11/26/2019    Procedure: COLONOSCOPY;  Surgeon: Chidi Downing MD;  Location:  [facility];  Service: Gastroenterology    ENDOSCOPY N/A 11/13/2017    Procedure: ESOPHAGOGASTRODUODENOSCOPY with biopsies and esophageal balloon dilitation;  Surgeon: Wesley Stovall MD;  Location: [facility];  Service:     ENDOSCOPY N/A 11/25/2019    Procedure: ESOPHAGOGASTRODUODENOSCOPY;  Surgeon: Chidi Downing MD;  Location:  [facility];  Service: Gastroenterology    ENDOSCOPY N/A 11/29/2021    Procedure: ESOPHAGOGASTRODUODENOSCOPY with dilation and biopsies;  Surgeon: Gonzalez Zapata MD;  Location: [facility];  Service: Gastroenterology;  Laterality: N/A;    ENDOSCOPY N/A 11/1/2023    Procedure: ESOPHAGOGASTRODUODENOSCOPY WITH BIOPSY AND DILATATION;  Surgeon: Gonzalez Zapata MD;  Location: [facility] ENDOSCOPY;  Service: Gastroenterology;  Laterality: N/A;    ENDOSCOPY N/A 1/27/2025    Procedure: Esophagogastroduodenoscopy with dilatation and biopsies;  Surgeon: Gonzalez Zapata MD;  Location: [facility];  Service: Gastroenterology;  Laterality: N/A;    HYSTERECTOMY  1979    UPPER GASTROINTESTINAL ENDOSCOPY  12/09/2013    UPPER GASTROINTESTINAL ENDOSCOPY  11/13/2017         FAMILY HISTORY  Family History   Problem Relation Age of Onset    Arthritis Mother      Diabetes Mother     Hypertension Mother     Arthritis Other     Arthritis Other     Diabetes Other         DM    Hypertension Other     Colon cancer Neg Hx          SOCIAL HISTORY  Social History     Socioeconomic History    Marital status:    Tobacco Use    Smoking status: Former     Current packs/day: 0.00     Average packs/day: 1 pack/day for 15.0 years (15.0 ttl pk-yrs)     Types: Cigarettes     Start date: 1997     Quit date:      Years since quittin.1     Passive exposure: Past    Smokeless tobacco: Never    Tobacco comments:      Denied: History of smoking cigarettes   Vaping Use    Vaping status: Never Used   Substance and Sexual Activity    Alcohol use: Not Currently    Drug use: Never    Sexual activity: Defer         ALLERGIES  Penicillins, Clonidine derivatives, Hydralazine, and Sulfa antibiotics        REVIEW OF SYSTEMS    All systems reviewed and negative except for those discussed in HPI.       PHYSICAL EXAM    I have reviewed the triage vital signs and nursing notes.    ED Triage Vitals [25 0115]   Temp Heart Rate Resp BP SpO2   98.8 °F (37.1 °C) 90 20 135/69 98 %      Temp src Heart Rate Source Patient Position BP Location FiO2 (%)   Oral Monitor Sitting Right arm --         General: no acute distress, well-appearing, non-toxic  Skin: normal color, warm and dry.  No rash.  No urticaria.  Head: normocephalic, atraumatic  Eyes: Pupils equally round and reactive to light.  Nose: normal nasal mucosa, no visible deformity.  Mouth: moist mucous membranes. No posterior pharyngeal erythema, swelling or exudates.  No angioedema.  Neck: supple.  Chest: no retractions, no visible deformity  Cardiovascular: Regular rate and rhythm.  Lungs: clear to auscultation bilaterally.  Back: no contusions, wounds or abrasions.  Abdomen: soft, minimal tenderness to palpation in the bilateral lower abdomen, non-distended. No rebound tenderness, no guarding.  Extremities: no cyanosis or edema.  Palpable radial pulses bilaterally.  Neuro:  alert and oriented x3, no focal neurological deficits.  Moves all extremity spontaneously.  5 out of 5 strength about the bilateral upper and lower extremities.  No dysarthria, no aphasia.  Symmetrical smile.  Psych:  appropriate mood and behavior.        LAB RESULTS  Recent Results (from the past 24 hours)   Comprehensive Metabolic Panel    Collection Time: 02/08/25  1:54 AM    Specimen: Blood   Result Value Ref Range    Glucose 151 (H) 65 - 99 mg/dL    BUN 34 (H) 8 - 23 mg/dL    Creatinine 1.95 (H) 0.57 - 1.00 mg/dL    Sodium 137 136 - 145 mmol/L    Potassium 3.9 3.5 - 5.2 mmol/L    Chloride 102 98 - 107 mmol/L    CO2 24.2 22.0 - 29.0 mmol/L    Calcium 8.7 8.6 - 10.5 mg/dL    Total Protein 6.0 6.0 - 8.5 g/dL    Albumin 3.3 (L) 3.5 - 5.2 g/dL    ALT (SGPT) 12 1 - 33 U/L    AST (SGOT) 16 1 - 32 U/L    Alkaline Phosphatase 64 39 - 117 U/L    Total Bilirubin 0.3 0.0 - 1.2 mg/dL    Globulin 2.7 gm/dL    A/G Ratio 1.2 g/dL    BUN/Creatinine Ratio 17.4 7.0 - 25.0    Anion Gap 10.8 5.0 - 15.0 mmol/L    eGFR 25.6 (L) >60.0 mL/min/1.73   Green Top (Gel)    Collection Time: 02/08/25  1:54 AM   Result Value Ref Range    Extra Tube Hold for add-ons.    Lavender Top    Collection Time: 02/08/25  1:54 AM   Result Value Ref Range    Extra Tube hold for add-on    Gold Top - SST    Collection Time: 02/08/25  1:54 AM   Result Value Ref Range    Extra Tube Hold for add-ons.    Light Blue Top    Collection Time: 02/08/25  1:54 AM   Result Value Ref Range    Extra Tube Hold for add-ons.    CBC Auto Differential    Collection Time: 02/08/25  1:54 AM    Specimen: Blood   Result Value Ref Range    WBC 5.73 3.40 - 10.80 10*3/mm3    RBC 3.89 3.77 - 5.28 10*6/mm3    Hemoglobin 11.4 (L) 12.0 - 15.9 g/dL    Hematocrit 33.5 (L) 34.0 - 46.6 %    MCV 86.1 79.0 - 97.0 fL    MCH 29.3 26.6 - 33.0 pg    MCHC 34.0 31.5 - 35.7 g/dL    RDW 13.3 12.3 - 15.4 %    RDW-SD 41.2 37.0 - 54.0 fl    MPV 10.1 6.0 -  12.0 fL    Platelets 216 140 - 450 10*3/mm3    Neutrophil % 46.3 42.7 - 76.0 %    Lymphocyte % 39.1 19.6 - 45.3 %    Monocyte % 10.8 5.0 - 12.0 %    Eosinophil % 2.6 0.3 - 6.2 %    Basophil % 0.7 0.0 - 1.5 %    Immature Grans % 0.5 0.0 - 0.5 %    Neutrophils, Absolute 2.65 1.70 - 7.00 10*3/mm3    Lymphocytes, Absolute 2.24 0.70 - 3.10 10*3/mm3    Monocytes, Absolute 0.62 0.10 - 0.90 10*3/mm3    Eosinophils, Absolute 0.15 0.00 - 0.40 10*3/mm3    Basophils, Absolute 0.04 0.00 - 0.20 10*3/mm3    Immature Grans, Absolute 0.03 0.00 - 0.05 10*3/mm3    nRBC 0.0 0.0 - 0.2 /100 WBC   Urinalysis With Microscopic If Indicated (No Culture) - Urine, Clean Catch    Collection Time: 02/08/25  2:32 AM    Specimen: Urine, Clean Catch   Result Value Ref Range    Color, UA Yellow Yellow, Straw    Appearance, UA Clear Clear    pH, UA 6.5 5.0 - 8.0    Specific Gravity, UA <=1.005 1.005 - 1.030    Glucose, UA Negative Negative    Ketones, UA Negative Negative    Bilirubin, UA Negative Negative    Blood, UA Negative Negative    Protein, UA Negative Negative    Leuk Esterase, UA Negative Negative    Nitrite, UA Negative Negative    Urobilinogen, UA 0.2 E.U./dL 0.2 - 1.0 E.U./dL   COVID-19 and FLU A/B PCR, 1 HR TAT - Swab, Nasopharynx    Collection Time: 02/08/25  2:32 AM    Specimen: Nasopharynx; Swab   Result Value Ref Range    COVID19 Not Detected Not Detected - Ref. Range    Influenza A PCR Not Detected Not Detected    Influenza B PCR Not Detected Not Detected       If labs were ordered, I independently reviewed the results and considered them in treating the patient.  See medical decision making discussion section for my interpretation of lab results.        RADIOLOGY  CT Abdomen Pelvis Without Contrast    Result Date: 2/8/2025  FINAL REPORT TECHNIQUE: null CLINICAL HISTORY: B/L lower abd pain, weakness, body aches COMPARISON: null FINDINGS: CT abdomen and pelvis without contrast Comparison: None Findings: Enlarged heart with small  ventral/dorsal pericardial effusion. Subsegmental atelectasis involving the lung bases. Moderate-sized hiatal hernia. Intraluminal fluid within the stomach. Atherosclerosis of the abdominal aorta extending into the iliac vasculature. A small hypodensity within the superior left lobe of the liver statistically representing a cyst. The gallbladder is mildly distended without radiopaque cholelithiasis. Pancreatic atrophy. The spleen is unremarkable. Chronic thickening of the adrenal glands. Renal atrophy with numerous bilateral renal cysts. No hydronephrosis. The contour of the urinary bladder is normal. The urinary bladder is mildly distended. Hysterectomy. No bowel obstruction, pneumoperitoneum, or pneumatosis. Scattered colonic diverticula without diverticulitis. Moderate stool burden throughout the colon. The appendix is not identified. The bones are intact. Degenerative changes of the lumbar spine.     IMPRESSION: 1. Moderate-sized hiatal hernia. 2. Renal atrophy with numerous bilateral renal cysts. No hydronephrosis. 3. Advanced scattered colonic diverticula without diverticulitis. 4. Moderate stool burden throughout the colon may represent constipation, no obstruction. 5. The appendix is not identified with certainty. Authenticated and Electronically Signed by Cornelio Cochran DO on 02/08/2025 03:30:43 AM    CT Head Without Contrast    Result Date: 2/8/2025  FINAL REPORT TECHNIQUE: null CLINICAL HISTORY: Altered mental status, weakness COMPARISON: null FINDINGS: CT head without contrast Comparison: CT/SR - CT HEAD WO CONTRAST - 1/5/25 10:16 EST Findings: No intracranial mass, midline shift, hydrocephalus, or acute hemorrhage. Subcortical/periventricular white matter hypoattenuation statistically representing changes of chronic microvascular ischemia. Age related global parenchymal volume loss. Old tiny lacunar infarct of the left thalamus Mild mucosal thickening of the right maxillary sinus. The orbits are  unremarkable. No skull fracture.     IMPRESSION: 1. No acute intracranial process. 2. Subcortical/periventricular white matter hypoattenuation statistically representing changes of chronic microvascular ischemia. Age related global parenchymal volume loss. Old tiny lacunar infarct of the left thalamus. Authenticated and Electronically Signed by Cornelio Cochran DO on 02/08/2025 03:24:53 AM     I ordered and independently reviewed the above noted radiographic studies.  See radiologist's dictation for official interpretation.    Per my independent reading: Chest radiograph shows cardiomegaly but otherwise negative for acute cardiopulmonary findings based on my independent reading.      PROCEDURES    Procedures    ECG 12 Lead Altered Mental Status   Final Result          MEDICATIONS GIVEN IN ER    Medications   sodium chloride 0.9 % flush 10 mL (has no administration in time range)   acetaminophen (TYLENOL) tablet 975 mg (975 mg Oral Given 2/8/25 0300)         MEDICAL DECISION MAKING, PROGRESS, and CONSULTS    All labs, if obtained, have been independently reviewed by me.  All radiology studies, if obtained, have been reviewed by me and the radiologist dictating the report.  All EKG's, if obtained, have been independently viewed and interpreted by me/my attending physician.      Discussion below represents my analysis of pertinent findings related to patient's condition, differential diagnosis, treatment plan and final disposition.                         Differential diagnosis:    Differential clues hypoglycemia, intra-abdominal surgical emergency, medication side effect, other acute emergency.    Medical Decision Making Discussion:    Vitals reviewed and are normal.    EKG obtained and based on my independent reading shows normal sinus rhythm.  No acute ischemic changes.    CBC shows chronic baseline anemia.  Chemistry shows baseline elevated BUN and creatinine.  UA negative for infection.  Viral panel negative.    Per  radiology head without negative for acute findings.  CT abdomen pelvis shows known hiatal hernia and otherwise negative for acute abdominal or pelvic findings.    Considered CVA given daughter's report of altered mental status this morning however patient has a normal neurological exam.  Daughter says the patient has since returned to baseline mental status.  Patient has been alert and oriented while in the emergency department.  Clinically, since patient is starting Seroquel I think the most likely etiology of her transient symptoms was adverse reaction to Seroquel given her advanced age.  Workup otherwise unremarkable.    Patient discharged home with instructions to follow-up closely with primary care and to return to the emergency department before then for any concerning symptoms, worsening symptoms or new concerns.      Additional sources:    - Discussed/ obtained information from independent historians: Daughter    - External (non-ED) record review: History physical from January 5, 2025 documenting chronic kidney disease, hypertension, hyperlipidemia and depression.  Patient hospitalized for hypertensive urgency and elevated troponin.    Reviewed discharge summary from January 6.  Patient presented with hypertension due to unclear etiology.  Home blood pressure medication regimen restarted with resolution of hypertension.    - Chronic or social conditions impacting care: Chronic kidney disease, hypertension    Shared Decision Making:  After my consideration of clinical presentation and any laboratory/radiology studies obtained, I discussed the findings with the patient/patient representative who is in agreement with the treatment plan and the final disposition.   Risks and benefits of discharge and/or observation/admission were discussed.    Orders placed during this visit:  Orders Placed This Encounter   Procedures    COVID-19 and FLU A/B PCR, 1 HR TAT - Swab, Nasopharynx    XR Chest 1 View    CT Head Without  Contrast    CT Abdomen Pelvis Without Contrast    [place] Draw    Comprehensive Metabolic Panel    Urinalysis With Microscopic If Indicated (No Culture) - Urine, Catheter    CBC Auto Differential    Continuous Pulse Oximetry    Vital Signs    Oxygen Therapy- Nasal Cannula; Titrate 1-6 LPM Per SpO2; 90 - 95%    POC Glucose Once    ECG 12 Lead Altered Mental Status    Insert Peripheral IV    Fall Precautions    CBC & Differential    Green Top (Gel)    Lavender Top    Gold Top - SST    Light Blue Top         AS OF 06:07 EST VITALS:    BP - 174/81  HR - 79  TEMP - 98.8 °F (37.1 °C) (Oral)  O2 SATS - 97%                  DIAGNOSIS  Final diagnoses:   Medication side effect         DISPOSITION  Discharge      Please note that portions of this document were completed with voice recognition software.        Julius Gupta MD  02/08/25 6050

## 2025-02-11 ENCOUNTER — HOME CARE VISIT (OUTPATIENT)
Dept: HOME HEALTH SERVICES | Facility: HOME HEALTHCARE | Age: 80
End: 2025-02-11
Payer: MEDICARE

## 2025-02-12 ENCOUNTER — HOME CARE VISIT (OUTPATIENT)
Dept: HOME HEALTH SERVICES | Facility: HOME HEALTHCARE | Age: 80
End: 2025-02-12
Payer: MEDICARE

## 2025-02-12 ENCOUNTER — HOME CARE VISIT (OUTPATIENT)
Dept: HOME HEALTH SERVICES | Facility: HOME HEALTHCARE | Age: 80
End: 2025-02-12
Payer: COMMERCIAL

## 2025-02-12 VITALS
TEMPERATURE: 98.8 F | RESPIRATION RATE: 16 BRPM | DIASTOLIC BLOOD PRESSURE: 84 MMHG | HEART RATE: 86 BPM | OXYGEN SATURATION: 98 % | SYSTOLIC BLOOD PRESSURE: 148 MMHG

## 2025-02-12 PROCEDURE — G0152 HHCP-SERV OF OT,EA 15 MIN: HCPCS

## 2025-02-12 NOTE — Clinical Note
Contacted pts PCP following visit in home, left message about cont'ed insomnia, anxiety, and interest in food delivery. Pt does not want any out of pocket expense and wishes to have support covered by her insurance.                        Pt is agreeble to having a counseling session, prefers in home if possible. Pt is greiving the loss of a family member as well.

## 2025-02-13 ENCOUNTER — HOME CARE VISIT (OUTPATIENT)
Dept: HOME HEALTH SERVICES | Facility: HOME HEALTHCARE | Age: 80
End: 2025-02-13
Payer: MEDICARE

## 2025-02-25 ENCOUNTER — HOME CARE VISIT (OUTPATIENT)
Dept: HOME HEALTH SERVICES | Facility: HOME HEALTHCARE | Age: 80
End: 2025-02-25
Payer: COMMERCIAL

## 2025-02-25 PROCEDURE — G0299 HHS/HOSPICE OF RN EA 15 MIN: HCPCS

## 2025-02-26 VITALS
DIASTOLIC BLOOD PRESSURE: 66 MMHG | SYSTOLIC BLOOD PRESSURE: 150 MMHG | HEART RATE: 83 BPM | RESPIRATION RATE: 16 BRPM | OXYGEN SATURATION: 96 % | TEMPERATURE: 97.4 F

## 2025-02-28 ENCOUNTER — APPOINTMENT (OUTPATIENT)
Dept: CT IMAGING | Facility: HOSPITAL | Age: 80
End: 2025-02-28
Payer: MEDICARE

## 2025-02-28 ENCOUNTER — APPOINTMENT (OUTPATIENT)
Dept: GENERAL RADIOLOGY | Facility: HOSPITAL | Age: 80
End: 2025-02-28
Payer: MEDICARE

## 2025-02-28 ENCOUNTER — HOSPITAL ENCOUNTER (OUTPATIENT)
Facility: HOSPITAL | Age: 80
Setting detail: OBSERVATION
Discharge: NURSING, REHAB OR LONG-TERM CARE | End: 2025-03-04
Attending: STUDENT IN AN ORGANIZED HEALTH CARE EDUCATION/TRAINING PROGRAM | Admitting: STUDENT IN AN ORGANIZED HEALTH CARE EDUCATION/TRAINING PROGRAM
Payer: MEDICARE

## 2025-02-28 ENCOUNTER — APPOINTMENT (OUTPATIENT)
Dept: CARDIOLOGY | Facility: HOSPITAL | Age: 80
End: 2025-02-28
Payer: MEDICARE

## 2025-02-28 DIAGNOSIS — I31.39 PERICARDIAL EFFUSION: ICD-10-CM

## 2025-02-28 DIAGNOSIS — R53.1 WEAKNESS: ICD-10-CM

## 2025-02-28 DIAGNOSIS — E11.65 UNCONTROLLED TYPE 2 DIABETES MELLITUS WITH HYPERGLYCEMIA: ICD-10-CM

## 2025-02-28 DIAGNOSIS — R26.89 DECREASED MOBILITY: ICD-10-CM

## 2025-02-28 DIAGNOSIS — I26.99 ACUTE PULMONARY EMBOLISM WITHOUT ACUTE COR PULMONALE, UNSPECIFIED PULMONARY EMBOLISM TYPE: Primary | ICD-10-CM

## 2025-02-28 DIAGNOSIS — I51.7 CARDIOMEGALY: ICD-10-CM

## 2025-02-28 LAB
ALBUMIN SERPL-MCNC: 3.9 G/DL (ref 3.5–5.2)
ALBUMIN/GLOB SERPL: 1.4 G/DL
ALP SERPL-CCNC: 54 U/L (ref 39–117)
ALT SERPL W P-5'-P-CCNC: 7 U/L (ref 1–33)
ANION GAP SERPL CALCULATED.3IONS-SCNC: 12.5 MMOL/L (ref 5–15)
APTT PPP: 26.8 SECONDS (ref 70–100)
AST SERPL-CCNC: 14 U/L (ref 1–32)
BACTERIA UR QL AUTO: ABNORMAL /HPF
BASOPHILS # BLD AUTO: 0.04 10*3/MM3 (ref 0–0.2)
BASOPHILS NFR BLD AUTO: 0.6 % (ref 0–1.5)
BILIRUB SERPL-MCNC: 0.4 MG/DL (ref 0–1.2)
BILIRUB UR QL STRIP: NEGATIVE
BUN SERPL-MCNC: 32 MG/DL (ref 8–23)
BUN/CREAT SERPL: 18.4 (ref 7–25)
CALCIUM SPEC-SCNC: 9.1 MG/DL (ref 8.6–10.5)
CHLORIDE SERPL-SCNC: 103 MMOL/L (ref 98–107)
CLARITY UR: CLEAR
CO2 SERPL-SCNC: 22.5 MMOL/L (ref 22–29)
COLOR UR: YELLOW
CREAT SERPL-MCNC: 1.74 MG/DL (ref 0.57–1)
DEPRECATED RDW RBC AUTO: 43.6 FL (ref 37–54)
EGFRCR SERPLBLD CKD-EPI 2021: 29.4 ML/MIN/1.73
EOSINOPHIL # BLD AUTO: 0.11 10*3/MM3 (ref 0–0.4)
EOSINOPHIL NFR BLD AUTO: 1.7 % (ref 0.3–6.2)
ERYTHROCYTE [DISTWIDTH] IN BLOOD BY AUTOMATED COUNT: 14 % (ref 12.3–15.4)
FLUAV RNA RESP QL NAA+PROBE: NOT DETECTED
FLUBV RNA RESP QL NAA+PROBE: NOT DETECTED
GEN 5 1HR TROPONIN T REFLEX: 61 NG/L
GLOBULIN UR ELPH-MCNC: 2.8 GM/DL
GLUCOSE BLDC GLUCOMTR-MCNC: 121 MG/DL (ref 70–130)
GLUCOSE SERPL-MCNC: 218 MG/DL (ref 65–99)
GLUCOSE UR STRIP-MCNC: NEGATIVE MG/DL
HCT VFR BLD AUTO: 38.2 % (ref 34–46.6)
HGB BLD-MCNC: 13.1 G/DL (ref 12–15.9)
HGB UR QL STRIP.AUTO: NEGATIVE
HOLD SPECIMEN: NORMAL
HOLD SPECIMEN: NORMAL
HYALINE CASTS UR QL AUTO: ABNORMAL /LPF
IMM GRANULOCYTES # BLD AUTO: 0.04 10*3/MM3 (ref 0–0.05)
IMM GRANULOCYTES NFR BLD AUTO: 0.6 % (ref 0–0.5)
INR PPP: 0.91 (ref 0.9–1.1)
KETONES UR QL STRIP: NEGATIVE
LEUKOCYTE ESTERASE UR QL STRIP.AUTO: NEGATIVE
LYMPHOCYTES # BLD AUTO: 2.24 10*3/MM3 (ref 0.7–3.1)
LYMPHOCYTES NFR BLD AUTO: 33.8 % (ref 19.6–45.3)
MAGNESIUM SERPL-MCNC: 1.7 MG/DL (ref 1.6–2.4)
MCH RBC QN AUTO: 29.1 PG (ref 26.6–33)
MCHC RBC AUTO-ENTMCNC: 34.3 G/DL (ref 31.5–35.7)
MCV RBC AUTO: 84.9 FL (ref 79–97)
MONOCYTES # BLD AUTO: 0.65 10*3/MM3 (ref 0.1–0.9)
MONOCYTES NFR BLD AUTO: 9.8 % (ref 5–12)
NEUTROPHILS NFR BLD AUTO: 3.54 10*3/MM3 (ref 1.7–7)
NEUTROPHILS NFR BLD AUTO: 53.5 % (ref 42.7–76)
NITRITE UR QL STRIP: NEGATIVE
NRBC BLD AUTO-RTO: 0 /100 WBC (ref 0–0.2)
PH UR STRIP.AUTO: 6 [PH] (ref 5–8)
PLATELET # BLD AUTO: 299 10*3/MM3 (ref 140–450)
PMV BLD AUTO: 10.1 FL (ref 6–12)
POTASSIUM SERPL-SCNC: 4 MMOL/L (ref 3.5–5.2)
PROT SERPL-MCNC: 6.7 G/DL (ref 6–8.5)
PROT UR QL STRIP: ABNORMAL
PROTHROMBIN TIME: 12.7 SECONDS (ref 12.3–15.1)
RBC # BLD AUTO: 4.5 10*6/MM3 (ref 3.77–5.28)
RBC # UR STRIP: ABNORMAL /HPF
REF LAB TEST METHOD: ABNORMAL
RSV RNA RESP QL NAA+PROBE: NOT DETECTED
SARS-COV-2 RNA RESP QL NAA+PROBE: NOT DETECTED
SODIUM SERPL-SCNC: 138 MMOL/L (ref 136–145)
SP GR UR STRIP: 1.02 (ref 1–1.03)
SQUAMOUS #/AREA URNS HPF: ABNORMAL /HPF
TROPONIN T % DELTA: 2
TROPONIN T NUMERIC DELTA: 1 NG/L
TROPONIN T SERPL HS-MCNC: 60 NG/L
UFH PPP CHRO-ACNC: 0.1 IU/ML (ref 0.3–0.7)
UFH PPP CHRO-ACNC: >1.1 IU/ML (ref 0.3–0.7)
UROBILINOGEN UR QL STRIP: ABNORMAL
WBC # UR STRIP: ABNORMAL /HPF
WBC NRBC COR # BLD AUTO: 6.62 10*3/MM3 (ref 3.4–10.8)
WHOLE BLOOD HOLD COAG: NORMAL
WHOLE BLOOD HOLD SPECIMEN: NORMAL

## 2025-02-28 PROCEDURE — 74177 CT ABD & PELVIS W/CONTRAST: CPT

## 2025-02-28 PROCEDURE — 99291 CRITICAL CARE FIRST HOUR: CPT | Performed by: STUDENT IN AN ORGANIZED HEALTH CARE EDUCATION/TRAINING PROGRAM

## 2025-02-28 PROCEDURE — 83735 ASSAY OF MAGNESIUM: CPT | Performed by: STUDENT IN AN ORGANIZED HEALTH CARE EDUCATION/TRAINING PROGRAM

## 2025-02-28 PROCEDURE — 85610 PROTHROMBIN TIME: CPT | Performed by: PHYSICIAN ASSISTANT

## 2025-02-28 PROCEDURE — 25510000001 IOPAMIDOL 61 % SOLUTION: Performed by: STUDENT IN AN ORGANIZED HEALTH CARE EDUCATION/TRAINING PROGRAM

## 2025-02-28 PROCEDURE — 96365 THER/PROPH/DIAG IV INF INIT: CPT

## 2025-02-28 PROCEDURE — 25010000002 HEPARIN (PORCINE) PER 1000 UNITS: Performed by: FAMILY MEDICINE

## 2025-02-28 PROCEDURE — G0378 HOSPITAL OBSERVATION PER HR: HCPCS

## 2025-02-28 PROCEDURE — 25010000002 CEFTRIAXONE PER 250 MG: Performed by: FAMILY MEDICINE

## 2025-02-28 PROCEDURE — 82948 REAGENT STRIP/BLOOD GLUCOSE: CPT

## 2025-02-28 PROCEDURE — 80053 COMPREHEN METABOLIC PANEL: CPT | Performed by: STUDENT IN AN ORGANIZED HEALTH CARE EDUCATION/TRAINING PROGRAM

## 2025-02-28 PROCEDURE — 99285 EMERGENCY DEPT VISIT HI MDM: CPT

## 2025-02-28 PROCEDURE — 85520 HEPARIN ASSAY: CPT | Performed by: PHYSICIAN ASSISTANT

## 2025-02-28 PROCEDURE — 71045 X-RAY EXAM CHEST 1 VIEW: CPT

## 2025-02-28 PROCEDURE — 85730 THROMBOPLASTIN TIME PARTIAL: CPT | Performed by: PHYSICIAN ASSISTANT

## 2025-02-28 PROCEDURE — 85025 COMPLETE CBC W/AUTO DIFF WBC: CPT | Performed by: STUDENT IN AN ORGANIZED HEALTH CARE EDUCATION/TRAINING PROGRAM

## 2025-02-28 PROCEDURE — 25010000002 HEPARIN (PORCINE) PER 1000 UNITS: Performed by: PHYSICIAN ASSISTANT

## 2025-02-28 PROCEDURE — 71275 CT ANGIOGRAPHY CHEST: CPT

## 2025-02-28 PROCEDURE — 84484 ASSAY OF TROPONIN QUANT: CPT | Performed by: STUDENT IN AN ORGANIZED HEALTH CARE EDUCATION/TRAINING PROGRAM

## 2025-02-28 PROCEDURE — 93306 TTE W/DOPPLER COMPLETE: CPT

## 2025-02-28 PROCEDURE — 81001 URINALYSIS AUTO W/SCOPE: CPT | Performed by: STUDENT IN AN ORGANIZED HEALTH CARE EDUCATION/TRAINING PROGRAM

## 2025-02-28 PROCEDURE — 96376 TX/PRO/DX INJ SAME DRUG ADON: CPT

## 2025-02-28 PROCEDURE — 96366 THER/PROPH/DIAG IV INF ADDON: CPT

## 2025-02-28 PROCEDURE — 25010000002 MAGNESIUM SULFATE 2 GM/50ML SOLUTION: Performed by: FAMILY MEDICINE

## 2025-02-28 PROCEDURE — 93005 ELECTROCARDIOGRAM TRACING: CPT | Performed by: STUDENT IN AN ORGANIZED HEALTH CARE EDUCATION/TRAINING PROGRAM

## 2025-02-28 PROCEDURE — 93306 TTE W/DOPPLER COMPLETE: CPT | Performed by: INTERNAL MEDICINE

## 2025-02-28 PROCEDURE — 87637 SARSCOV2&INF A&B&RSV AMP PRB: CPT | Performed by: STUDENT IN AN ORGANIZED HEALTH CARE EDUCATION/TRAINING PROGRAM

## 2025-02-28 RX ORDER — IOPAMIDOL 612 MG/ML
100 INJECTION, SOLUTION INTRAVASCULAR
Status: COMPLETED | OUTPATIENT
Start: 2025-02-28 | End: 2025-02-28

## 2025-02-28 RX ORDER — HEPARIN SODIUM 10000 [USP'U]/100ML
18 INJECTION, SOLUTION INTRAVENOUS
Status: DISCONTINUED | OUTPATIENT
Start: 2025-02-28 | End: 2025-02-28

## 2025-02-28 RX ORDER — TRAZODONE HYDROCHLORIDE 50 MG/1
50 TABLET ORAL NIGHTLY PRN
Status: DISCONTINUED | OUTPATIENT
Start: 2025-02-28 | End: 2025-03-04 | Stop reason: HOSPADM

## 2025-02-28 RX ORDER — SODIUM CHLORIDE 0.9 % (FLUSH) 0.9 %
10 SYRINGE (ML) INJECTION AS NEEDED
Status: DISCONTINUED | OUTPATIENT
Start: 2025-02-28 | End: 2025-03-04 | Stop reason: HOSPADM

## 2025-02-28 RX ORDER — ACETAMINOPHEN 325 MG/1
650 TABLET ORAL EVERY 4 HOURS PRN
Status: DISCONTINUED | OUTPATIENT
Start: 2025-02-28 | End: 2025-03-04 | Stop reason: HOSPADM

## 2025-02-28 RX ORDER — BISACODYL 5 MG/1
5 TABLET, DELAYED RELEASE ORAL DAILY PRN
Status: DISCONTINUED | OUTPATIENT
Start: 2025-02-28 | End: 2025-03-04 | Stop reason: HOSPADM

## 2025-02-28 RX ORDER — CARVEDILOL 25 MG/1
25 TABLET ORAL 2 TIMES DAILY WITH MEALS
Status: DISCONTINUED | OUTPATIENT
Start: 2025-02-28 | End: 2025-03-04 | Stop reason: HOSPADM

## 2025-02-28 RX ORDER — NIFEDIPINE 90 MG/1
90 TABLET, EXTENDED RELEASE ORAL DAILY
Status: DISCONTINUED | OUTPATIENT
Start: 2025-03-01 | End: 2025-03-04 | Stop reason: HOSPADM

## 2025-02-28 RX ORDER — POLYETHYLENE GLYCOL 3350 17 G/17G
17 POWDER, FOR SOLUTION ORAL DAILY PRN
Status: DISCONTINUED | OUTPATIENT
Start: 2025-02-28 | End: 2025-03-04 | Stop reason: HOSPADM

## 2025-02-28 RX ORDER — NITROGLYCERIN 0.4 MG/1
0.4 TABLET SUBLINGUAL
Status: DISCONTINUED | OUTPATIENT
Start: 2025-02-28 | End: 2025-03-04 | Stop reason: HOSPADM

## 2025-02-28 RX ORDER — BISACODYL 10 MG
10 SUPPOSITORY, RECTAL RECTAL DAILY PRN
Status: DISCONTINUED | OUTPATIENT
Start: 2025-02-28 | End: 2025-03-04 | Stop reason: HOSPADM

## 2025-02-28 RX ORDER — ACETAMINOPHEN 650 MG/1
650 SUPPOSITORY RECTAL EVERY 4 HOURS PRN
Status: DISCONTINUED | OUTPATIENT
Start: 2025-02-28 | End: 2025-03-04 | Stop reason: HOSPADM

## 2025-02-28 RX ORDER — SODIUM CHLORIDE 9 MG/ML
40 INJECTION, SOLUTION INTRAVENOUS AS NEEDED
Status: DISCONTINUED | OUTPATIENT
Start: 2025-02-28 | End: 2025-03-04 | Stop reason: HOSPADM

## 2025-02-28 RX ORDER — ACETAMINOPHEN 160 MG/5ML
650 SOLUTION ORAL EVERY 4 HOURS PRN
Status: DISCONTINUED | OUTPATIENT
Start: 2025-02-28 | End: 2025-03-04 | Stop reason: HOSPADM

## 2025-02-28 RX ORDER — HEPARIN SODIUM 1000 [USP'U]/ML
50 INJECTION, SOLUTION INTRAVENOUS; SUBCUTANEOUS AS NEEDED
Status: DISCONTINUED | OUTPATIENT
Start: 2025-02-28 | End: 2025-02-28

## 2025-02-28 RX ORDER — SODIUM CHLORIDE 0.9 % (FLUSH) 0.9 %
10 SYRINGE (ML) INJECTION EVERY 12 HOURS SCHEDULED
Status: DISCONTINUED | OUTPATIENT
Start: 2025-02-28 | End: 2025-03-04 | Stop reason: HOSPADM

## 2025-02-28 RX ORDER — ATORVASTATIN CALCIUM 80 MG/1
80 TABLET, FILM COATED ORAL DAILY
Status: DISCONTINUED | OUTPATIENT
Start: 2025-03-01 | End: 2025-03-04 | Stop reason: HOSPADM

## 2025-02-28 RX ORDER — AMOXICILLIN 250 MG
2 CAPSULE ORAL 2 TIMES DAILY PRN
Status: DISCONTINUED | OUTPATIENT
Start: 2025-02-28 | End: 2025-03-04 | Stop reason: HOSPADM

## 2025-02-28 RX ORDER — HEPARIN SODIUM 1000 [USP'U]/ML
25 INJECTION, SOLUTION INTRAVENOUS; SUBCUTANEOUS AS NEEDED
Status: DISCONTINUED | OUTPATIENT
Start: 2025-02-28 | End: 2025-02-28

## 2025-02-28 RX ORDER — PANTOPRAZOLE SODIUM 40 MG/1
40 TABLET, DELAYED RELEASE ORAL DAILY
Status: DISCONTINUED | OUTPATIENT
Start: 2025-03-01 | End: 2025-03-04 | Stop reason: HOSPADM

## 2025-02-28 RX ORDER — MAGNESIUM SULFATE HEPTAHYDRATE 40 MG/ML
2 INJECTION, SOLUTION INTRAVENOUS ONCE
Status: COMPLETED | OUTPATIENT
Start: 2025-02-28 | End: 2025-02-28

## 2025-02-28 RX ORDER — HEPARIN SODIUM 10000 [USP'U]/100ML
18 INJECTION, SOLUTION INTRAVENOUS
Status: DISCONTINUED | OUTPATIENT
Start: 2025-02-28 | End: 2025-03-01

## 2025-02-28 RX ORDER — HEPARIN SODIUM 1000 [USP'U]/ML
80 INJECTION, SOLUTION INTRAVENOUS; SUBCUTANEOUS ONCE
Status: COMPLETED | OUTPATIENT
Start: 2025-02-28 | End: 2025-02-28

## 2025-02-28 RX ORDER — ONDANSETRON 2 MG/ML
4 INJECTION INTRAMUSCULAR; INTRAVENOUS EVERY 6 HOURS PRN
Status: DISCONTINUED | OUTPATIENT
Start: 2025-02-28 | End: 2025-03-04 | Stop reason: HOSPADM

## 2025-02-28 RX ADMIN — HEPARIN SODIUM 18 UNITS/KG/HR: 10000 INJECTION, SOLUTION INTRAVENOUS at 18:18

## 2025-02-28 RX ADMIN — IOPAMIDOL 100 ML: 612 INJECTION, SOLUTION INTRAVENOUS at 14:41

## 2025-02-28 RX ADMIN — HEPARIN SODIUM 18 UNITS/HR: 10000 INJECTION, SOLUTION INTRAVENOUS at 16:54

## 2025-02-28 RX ADMIN — CARVEDILOL 25 MG: 25 TABLET, FILM COATED ORAL at 20:11

## 2025-02-28 RX ADMIN — Medication 10 ML: at 20:12

## 2025-02-28 RX ADMIN — CEFTRIAXONE 1000 MG: 1 INJECTION, POWDER, FOR SOLUTION INTRAMUSCULAR; INTRAVENOUS at 18:25

## 2025-02-28 RX ADMIN — MAGNESIUM SULFATE HEPTAHYDRATE 2 G: 40 INJECTION, SOLUTION INTRAVENOUS at 20:11

## 2025-02-28 RX ADMIN — HEPARIN SODIUM 6160 UNITS: 1000 INJECTION, SOLUTION INTRAVENOUS; SUBCUTANEOUS at 16:56

## 2025-02-28 RX ADMIN — ACETAMINOPHEN 650 MG: 325 TABLET, FILM COATED ORAL at 20:20

## 2025-02-28 NOTE — PROGRESS NOTES
Pharmacy to Dose Heparin Infusion    Valarie Camara is a  79 y.o. female receiving heparin infusion.     Therapy for (VTE/Cardiac):   VTE  Patient Weight: 77 kg (169 lb 12.1 oz)   Initial Bolus (Y/N):   Y  Any Bolus (Y/N):   Y        Signs or Symptoms of Bleeding: N    VTE (PE/DVT)   Initial Bolus: 80 units/kg (Max 10,000 units)  Initial rate: 18 units/kg/hr (Max 1,500 units/hr)    Anti Xa Rebolus Infusion Hold time Change infusion Dose (Units/kg/hr) Next Anti Xa Level Due   < 0.1 50 Units/kg  (4000 Units Max) None Increase by  4 Units/kg/hr 6 hours   0.1 - 0.19 25 Units/kg  (2000 Units Max) None Increase by  3 Units/kg/hr 6 hours   0.2 - 0.29 0 None Increase by  2 Units/kg/hr 6 hours   0.3 - 0.7 0 None No Change 6 hours (after 2 consecutive levels in range check qAM)   0.71 - 0.8 0 None Decrease by  1 Units/kg/hr 6 hours   0.81 - 0.9 0 None Decrease by  2 Units/kg/hr 6 hours   0.91 - 1 0 60 Minutes Decrease by  3 Units/kg/hr 6 hours   >1 0 Hold  After Anti Xa less than 0.7 decrease previous rate by  4 Units/kg/hr  Every 2 hours until Anti Xa is less than 0.7 then when infusion restarts in 6 hours     Recommend anti-Xa every 6 hours.         Lab 02/28/25  1628 02/28/25  1200   HEMOGLOBIN  --  13.1   HEMATOCRIT  --  38.2   PLATELETS  --  299   PROTIME  --  12.7   APTT 26.8*  --    HEPARIN ANTI-XA 0.10*  --    CREATININE  --  1.74*   EGFR  --  29.4*         Date   Time   Anti-Xa Current Rate (Unit/kg/hr) Bolus   (Units) Rate Change   (Unit/kg/hr) New Rate (Unit/kg/hr) Next   Anti-Xa Comments  Pump Check Daily   2/28/25 1656 0.10 0 6160 +18 18 2/28 @2300 D/W AIDAN Maurer                                                                                                                                                                                                                                 Pharmacy will continue to follow anti-Xa results and monitor for signs and symptoms of bleeding or thrombosis.    Thank you for  the consult,    Satish BryantD, BCPS   02/28/25 18:17 EST

## 2025-02-28 NOTE — H&P
[facility]      Name:  Valarie Camara   Age:  79 y.o.  Sex:  female  :  1945  MRN:  [id number]   Visit Number:  [id number]  Admission Date:  2025  Date Of Service:  25  Primary Care Physician:  Lay Cox MD    Chief Complaint:     Generalized weakness, dyspnea    History Of Presenting Illness:      Patient is an 79-year-old female brought to the emergency department with her daughter with generalized weakness and dyspnea complaints.  Patient has a known history of dementia, unfortunately poor historian.  History of present illness was obtained from discussion with daughter at bedside.  This has reportedly been ongoing for weeks duration.  The patient has reportedly been moaning and groaning in pain throughout most of the evening the last few days.  She was recently seen in her PCPs office earlier this week, diagnosed with dementia at that time.  The patient has reportedly been more bedfast, complaining of bilateral feet pain, numbness, tingling due to her neuropathy.  Patient is unfortunate unable to be taken care of adequately at home, and ultimately brought into the emergency department for evaluation.    In the emergency department, laboratory workup was unremarkable.  CT abdomen pelvis with and without contrast did not show any acute process.  CTA of the chest showed a small single tubular defect of the right lower lobe pulmonary artery suggesting PE.  Patient started on heparin drip in the ED.  Hospitalist services consulted for admission.    Review Of Systems:    All systems were reviewed and negative except as mentioned in history of presenting illness, assessment and plan.    Past Medical History: Patient  has a past medical history of Acute bronchitis with bronchospasm, Anxiety, Arthritis, Asthma, B12 deficiency, Back pain, Body piercing, Cataract, Cerebrovascular disease, Cervicalgia, Chronic kidney disease,  Colonic polyp, Constipation, COPD (chronic obstructive pulmonary disease), Coronary artery disease, Depression, Diverticulitis, Dysphagia, Edema, Elevated cholesterol, Esophageal reflux, Folic acid deficiency, Full dentures, Heart murmur, Herpes zoster, High cholesterol, History of kidney infection, History of recurrent urinary tract infection, HTN (hypertension), Hypercholesterolemia, Impaired functional mobility, balance, gait, and endurance, Insomnia, Kidney infection, Malignant hypertension (04/26/2015), Measles, Muscle spasm, Neoplasm of uncertain behavior of skin, Osteoporosis, Poor historian, Recurrent urinary tract infection, Rheumatoid arthritis, RLS (restless legs syndrome), Stomach ulcer, Stroke, Type 2 diabetes mellitus, and Vitamin D deficiency.    Past Surgical History: Patient  has a past surgical history that includes Hysterectomy (1979); Cataract extraction w/  intraocular lens implant (Left, 02/11/2013); Cataract extraction w/  intraocular lens implant (Right, 04/15/2013); Colonoscopy (2012); Esophagogastroduodenoscopy (N/A, 11/13/2017); Upper gastrointestinal endoscopy (12/09/2013); Upper gastrointestinal endoscopy (11/13/2017); Esophagogastroduodenoscopy (N/A, 11/25/2019); Colonoscopy (N/A, 11/26/2019); Esophagogastroduodenoscopy (N/A, 11/29/2021); Esophagogastroduodenoscopy (N/A, 11/1/2023); and Esophagogastroduodenoscopy (N/A, 1/27/2025).    Social History: Patient  reports that she quit smoking about 13 years ago. Her smoking use included cigarettes. She started smoking about 28 years ago. She has a 15 pack-year smoking history. She has been exposed to tobacco smoke. She has never used smokeless tobacco. She reports that she does not currently use alcohol. She reports that she does not use drugs.    Family History:  Patient's family history has been reviewed and found to be noncontributory.     Allergies:      Penicillins, Clonidine derivatives, Hydralazine, and Sulfa antibiotics    Home  Medications:    Prior to Admission Medications       Prescriptions Last Dose Informant Patient Reported? Taking?    acetaminophen (TYLENOL) 325 MG tablet  Child Yes No    Take 1 tablet by mouth Every 6 (Six) Hours As Needed for Headache or Mild Pain. Indications: Pain    atorvastatin (LIPITOR) 80 MG tablet  Child No No    Take 1 tablet by mouth Daily.    carvedilol (COREG) 25 MG tablet  Child No No    Take 1 tablet by mouth 2 (Two) Times a Day With Meals.    CHOLECALCIFEROL PO  Child Yes No    Take 5,000 Int'l Units/day by mouth Daily. Indications: Vitamin D Deficiency    Easy Touch Pen Needles 31G X 8 MM misc  Child Yes No    Indications: Diabetes    ferrous sulfate 325 (65 FE) MG tablet  Child Yes No    Take 1 tablet by mouth 3 (Three) Times a Week. Indications: Iron Deficiency, Restless Leg Syndrome    glucose blood test strip  Child No No    Check sugar once a day    Patient taking differently:  1 each by Other route As Needed (FBS). Check sugar once a day    glucose monitor monitoring kit  Child No No    1 each Daily.    insulin lispro (humaLOG) 100 UNIT/ML injection  Child Yes No    Inject 2 Units under the skin into the appropriate area as directed 3 (Three) Times a Day As Needed for High Blood Sugar. Daughter only gives if BS is greater than 200 two hrs after eating.    Indications: Type 2 Diabetes    Insulin Pen Needle 31G X 5 MM misc  Child No No    Inject insulin three times daily    isosorbide mononitrate (IMDUR) 60 MG 24 hr tablet  Child Yes No    Take 1 tablet by mouth Daily. Indications: Stable Angina Pectoris    Lancets 30G misc  Child No No    Check sugar daily    linagliptin (TRADJENTA) 5 MG tablet tablet  Child No No    Take 1 tablet by mouth Daily.    losartan-hydrochlorothiazide (HYZAAR) 100-25 MG per tablet  Child No No    Take 1 tablet by mouth Daily.    Patient taking differently:  Take 1 tablet by mouth Daily.    NIFEdipine XL (PROCARDIA XL) 90 MG 24 hr tablet  Child Yes No    Take 1 tablet  by mouth Daily. Indications: High Blood Pressure    pantoprazole (PROTONIX) 40 MG EC tablet  Child Yes No    Take 1 tablet by mouth Daily.    traZODone (DESYREL) 50 MG tablet  Child Yes No    Take 1 tablet by mouth At Night As Needed. Indications: Trouble Sleeping          ED Medications:    Medications   sodium chloride 0.9 % flush 10 mL (has no administration in time range)   heparin 50227 units/250 ml (100 units/ml) in D5W (18 Units/hr Intravenous New Bag 2/28/25 3495)   Pharmacy to Dose Heparin (has no administration in time range)   sodium chloride 0.9 % flush 10 mL (has no administration in time range)   sodium chloride 0.9 % flush 10 mL (has no administration in time range)   sodium chloride 0.9 % infusion 40 mL (has no administration in time range)   apixaban (ELIQUIS) tablet 10 mg (has no administration in time range)     Followed by   apixaban (ELIQUIS) tablet 5 mg (has no administration in time range)   nitroglycerin (NITROSTAT) SL tablet 0.4 mg (has no administration in time range)   Potassium Replacement - Follow Nurse / BPA Driven Protocol (has no administration in time range)   Magnesium Cardiology Dose Replacement - Follow Nurse / BPA Driven Protocol (has no administration in time range)   Phosphorus Replacement - Follow Nurse / BPA Driven Protocol (has no administration in time range)   Calcium Replacement - Follow Nurse / BPA Driven Protocol (has no administration in time range)   sennosides-docusate (PERICOLACE) 8.6-50 MG per tablet 2 tablet (has no administration in time range)     And   polyethylene glycol (MIRALAX) packet 17 g (has no administration in time range)     And   bisacodyl (DULCOLAX) EC tablet 5 mg (has no administration in time range)     And   bisacodyl (DULCOLAX) suppository 10 mg (has no administration in time range)   acetaminophen (TYLENOL) tablet 650 mg (has no administration in time range)     Or   acetaminophen (TYLENOL) 160 MG/5ML oral solution 650 mg (has no administration  "in time range)     Or   acetaminophen (TYLENOL) suppository 650 mg (has no administration in time range)   ondansetron (ZOFRAN) injection 4 mg (has no administration in time range)   cefTRIAXone (ROCEPHIN) 1,000 mg in sodium chloride 0.9 % 100 mL IVPB-VTB (has no administration in time range)   iopamidol (ISOVUE-300) 61 % injection 100 mL (100 mL Intravenous Given 2/28/25 1441)   heparin (porcine) injection 6,160 Units (6,160 Units Intravenous Given 2/28/25 1656)     Vital Signs:  Temp:  [97.5 °F (36.4 °C)] 97.5 °F (36.4 °C)  Heart Rate:  [78-97] 80  Resp:  [14-16] 14  BP: (131-142)/(67-75) 131/68        02/28/25  1152   Weight: 77 kg (169 lb 12.1 oz)     Body mass index is 30.07 kg/m².    Physical Exam:     Most recent vital Signs: /68   Pulse 80   Temp 97.5 °F (36.4 °C) (Oral)   Resp 14   Ht 160 cm (63\")   Wt 77 kg (169 lb 12.1 oz)   SpO2 97%   BMI 30.07 kg/m²     Physical Exam  Constitutional: Awake, alert.  Uncomfortable appearing.  Eyes: PERRLA, sclerae anicteric, no conjunctival injection  HENT: NCAT, mucous membranes moist  Neck: Supple, no thyromegaly, no lymphadenopathy, trachea midline  Respiratory: Clear to auscultation bilaterally, nonlabored respirations   Cardiovascular: RRR, no murmurs, rubs, or gallops, palpable pedal pulses bilaterally  Gastrointestinal: Positive bowel sounds, soft, nontender, nondistended  Musculoskeletal: No bilateral ankle edema, no clubbing or cyanosis to extremities  Psychiatric: Appropriate affect, cooperative  Neurologic: Oriented x 1, strength symmetric in all extremities, Cranial Nerves grossly intact to confrontation, speech clear.  Very sensitive to touch diffusely, winces in pain with even light touch.  Skin: No rashes     Laboratory data:    I have reviewed the labs done in the emergency room.    Results from last 7 days   Lab Units 02/28/25  1200   SODIUM mmol/L 138   POTASSIUM mmol/L 4.0   CHLORIDE mmol/L 103   CO2 mmol/L 22.5   BUN mg/dL 32*   CREATININE " mg/dL 1.74*   CALCIUM mg/dL 9.1   BILIRUBIN mg/dL 0.4   ALK PHOS U/L 54   ALT (SGPT) U/L 7   AST (SGOT) U/L 14   GLUCOSE mg/dL 218*     Results from last 7 days   Lab Units 02/28/25  1200   WBC 10*3/mm3 6.62   HEMOGLOBIN g/dL 13.1   HEMATOCRIT % 38.2   PLATELETS 10*3/mm3 299     Results from last 7 days   Lab Units 02/28/25  1200   INR  0.91     Results from last 7 days   Lab Units 02/28/25  1307 02/28/25  1200   HSTROP T ng/L 61* 60*     Results from last 7 days   Lab Units 02/28/25  1545   COLOR UA  Yellow   GLUCOSE UA  Negative   KETONES UA  Negative   BLOOD UA  Negative   LEUKOCYTES UA  Negative   PH, URINE  6.0   BILIRUBIN UA  Negative   UROBILINOGEN UA  0.2 E.U./dL   RBC UA /HPF None Seen   WBC UA /HPF None Seen     Radiology:    CT Angiogram Chest Pulmonary Embolism    Result Date: 2/28/2025  PROCEDURE: CT ANGIOGRAM CHEST PULMONARY EMBOLISM-  HISTORY: Leg swelling, SOA, weakness  COMPARISON: None.  TECHNIQUE: The patient was injected with  IV contrast. Axial images were obtained through the chest in a CTA/ PE protocol. 3 D Reconstruction images were also performed. This study was performed with techniques to keep radiation doses as low as reasonably achievable, (ALARA). Individualized dose reduction techniques using automated exposure control or adjustment of mA and/or kV according to the patient size were employed.  FINDINGS: Inhomogeneous thyroid noted. There is no axillary adenopathy. There is mildly prominent subcarinal lymph nodes, etiology unclear. The heart is mildly enlarged. There is a mild-to-moderate pericardial effusion but no pleural effusions. There is a single, small tubular defect in the the right lower lobe pulmonary artery identified on series 12 image 54, series 7 image 44, series 11 image 59, and series 10 image 45. A very small solitary pulmonary embolus is not excluded. There is no evidence of thoracic aortic aneurysm or dissection. Limited images of the upper abdomen partially  demonstrate bilateral renal cysts. Moderate hiatal hernia noted. No suspicious infiltrate or nodule is identified. There are linear densities in the left lower lobe consistent with atelectasis or scar. No areas of consolidation seen. Vascular calcifications noted.      1. A single, small, tubular defect in the right lower lobe pulmonary artery as described, suggesting small pulmonary embolus. Recommend repeat study after treatment to document resolution.  2. Cardiomegaly and pericardial effusion with no prior studies for comparison.   CTDI: 7.82 mGy DLP:448.04 mGy.cm   This report was signed and finalized on 2/28/2025 3:49 PM by Genna Walker MD.      CT Abdomen Pelvis With Contrast    Result Date: 2/28/2025  PROCEDURE: CT ABDOMEN PELVIS W CONTRAST-  HISTORY:  abdominal pain, generalized weakness  COMPARISON: February 8, 2025.  TECHNIQUE: Multiple axial CT images were obtained from the lung bases through the pubic symphysis following the administration of Isovue 300 contrast.   FINDINGS:  ABDOMEN: A hiatal hernia is stable. The liver is normal. The spleen is unremarkable. No adrenal mass is present.  The pancreas is normal. There are several well-circumscribed hypodense renal cysts bilaterally. The aorta is normal in caliber. There are vascular calcifications. There is no free fluid or adenopathy.  PELVIS: The appendix is not identified. There are no secondary signs to suggest appendicitis. There is colonic diverticulosis. There is no evidence of acute diverticulitis. There is no evidence of bowel obstruction. The uterus is diminutive or absent. The urinary bladder is unremarkable. There is no significant free fluid or adenopathy.      No evidence of acute intra-abdominal process.  Stable chronic changes as above.   CTDI: 7.82 mGy DLP: 448.04 mGy.cm   This study was performed with techniques to keep radiation doses as low as reasonably achievable (ALARA). Individualized dose reduction techniques using automated  exposure control or adjustment of mA and/or kV according to the patient size were employed.      Images were reviewed, interpreted, and dictated by Dr. Genna Walker MD Transcribed by Lisa Lyons PA-C.  This report was signed and finalized on 2/28/2025 3:22 PM by Genna Walker MD.      XR Chest 1 View    Result Date: 2/28/2025  PROCEDURE: XR CHEST 1 VW-  HISTORY: Weak/Dizzy/AMS triage protocol, weakness for 2 to 3 weeks.  COMPARISON: 4/8/2025..  FINDINGS: The heart is mildly enlarged, stable.. The lungs are clear. The mediastinum is unremarkable. There is no pneumothorax.  There are no acute osseous abnormalities.      Cardiomegaly without acute infiltrate..      This report was signed and finalized on 2/28/2025 1:02 PM by Genna Walker MD.       Assessment:    Acute pulmonary embolus  Urinary tract infection  Dementia  COPD  Hypertension  Hyperlipidemia  Restless leg syndrome    Plan:    Acute pulmonary embolus  Lower extremity edema  Pericardial effusion  Patient is not tachycardic, not requiring oxygen at this time.  Heparin drip started in the ED.  Transition to Eliquis in a.m., that is oral option patient can take at home.  Telemetry.  2D echo for pericardial effusion evaluation of lower extremity edema.  Does not appear to be in acute congestive heart failure.  Urinary tract infection  Patient is not septic.    IV Rocephin.  Dementia  PT/OT consults for functional eval  Case management consult for possible rehab versus long-term care placement.  Patient's daughter, who is co-power of  with her sibling, to discuss longitudinal care options.  COPD  Hypertension  Hyperlipidemia  Restless leg syndrome    Risk Assessment: High  DVT Prophylaxis: Heparin  Code Status: Full code  Diet: N.p.o. dysphagia screening    Aki Rodriguez DO  02/28/25  17:25 EST    Dictated utilizing Dragon dictation.

## 2025-02-28 NOTE — ED PROVIDER NOTES
"Subjective:  Chief Complaint:  Abdominal pain, weakness, SOA    History of Present Illness:  Patient is an 79-year-old female with history of anxiety, bronchitis, cerebrovascular disease, chronic kidney disease, COPD, CAD, depression, diverticulitis, among others presenting to the ER with complaints of abdominal pain, weakness, shortness of breath.  Patient is a difficult historian and much of the history was obtained from the daughter.  Patient states she has gotten weak to the point where she can barely talk.  States that she feels like she is dying.  Daughter states that she has progressively declined over the last 3 weeks and gotten much worse over the last 2 to 3 days.  She states that she normally takes care of the patient at home but does not feel she can take care of her in this condition.  States that the patient is extremely weak.      Nurses Notes reviewed and agree, including vitals, allergies, social history and prior medical history.     REVIEW OF SYSTEMS: All systems reviewed and not pertinent unless noted.  Review of Systems   Respiratory:  Positive for shortness of breath.    Gastrointestinal:  Positive for abdominal pain.   Neurological:  Positive for weakness.   All other systems reviewed and are negative.      Past Medical History:   Diagnosis Date    Acute bronchitis with bronchospasm     Anxiety     Arthritis     Asthma     B12 deficiency     Back pain     Body piercing     ears    Cataract     Cerebrovascular disease     Cervicalgia     Chronic kidney disease     stage 3b    Colonic polyp     History of colonic polyps     Constipation     COPD (chronic obstructive pulmonary disease)     Coronary artery disease     Depression     Diverticulitis     Dysphagia     Patient reported \"it won't go all the way down\" when eating solid foods first thing in the mornings    Edema     Elevated cholesterol     Esophageal reflux     Folic acid deficiency     Full dentures     Advised no adhesives DOS    " Heart murmur     Herpes zoster     High cholesterol     History of kidney infection     History of recurrent urinary tract infection     HTN (hypertension)     Hypercholesterolemia     Impaired functional mobility, balance, gait, and endurance     Insomnia     Kidney infection     Malignant hypertension 04/26/2015     Accelerated essential hypertension    Measles     rubeola    Muscle spasm     Neoplasm of uncertain behavior of skin     dtr denies    Osteoporosis     Poor historian     Recurrent urinary tract infection     Rheumatoid arthritis     RLS (restless legs syndrome)     Stomach ulcer     Stroke     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    Type 2 diabetes mellitus     Vitamin D deficiency        Allergies:    Penicillins, Clonidine derivatives, Hydralazine, and Sulfa antibiotics      Past Surgical History:   Procedure Laterality Date    CATARACT EXTRACTION WITH INTRAOCULAR LENS IMPLANT Left 02/11/2013    CATARACT EXTRACTION WITH INTRAOCULAR LENS IMPLANT Right 04/15/2013    COLONOSCOPY  2012    COLONOSCOPY N/A 11/26/2019    Procedure: COLONOSCOPY;  Surgeon: Chidi Downing MD;  Location:  [facility];  Service: Gastroenterology    ENDOSCOPY N/A 11/13/2017    Procedure: ESOPHAGOGASTRODUODENOSCOPY with biopsies and esophageal balloon dilitation;  Surgeon: Wesley Stovall MD;  Location: [facility] END[facility]COPY;  Service:     ENDOSCOPY N/A 11/25/2019    Procedure: ESOPHAGOGASTRODUODENOSCOPY;  Surgeon: Chidi Downing MD;  Location:  [facility];  Service: Gastroenterology    ENDOSCOPY N/A 11/29/2021    Procedure: ESOPHAGOGASTRODUODENOSCOPY with dilation and biopsies;  Surgeon: Gonzalez Zapata MD;  Location: [facility] ENDOSCOPY;  Service: Gastroenterology;  Laterality: N/A;    ENDOSCOPY N/A 11/1/2023    Procedure: ESOPHAGOGASTRODUODENOSCOPY WITH BIOPSY AND DILATATION;  Surgeon: Gonzalez Zapata MD;  Location: [facility] ENDOSCOPY;  Service: Gastroenterology;   "Laterality: N/A;    ENDOSCOPY N/A 2025    Procedure: Esophagogastroduodenoscopy with dilatation and biopsies;  Surgeon: Gonzalez Zapata MD;  Location: [facility] ENDOSCOPY;  Service: Gastroenterology;  Laterality: N/A;    HYSTERECTOMY  1979    UPPER GASTROINTESTINAL ENDOSCOPY  2013    UPPER GASTROINTESTINAL ENDOSCOPY  2017         Social History     Socioeconomic History    Marital status:    Tobacco Use    Smoking status: Former     Current packs/day: 0.00     Average packs/day: 1 pack/day for 15.0 years (15.0 ttl pk-yrs)     Types: Cigarettes     Start date: 1997     Quit date:      Years since quittin.1     Passive exposure: Past    Smokeless tobacco: Never    Tobacco comments:      Denied: History of smoking cigarettes   Vaping Use    Vaping status: Never Used   Substance and Sexual Activity    Alcohol use: Not Currently    Drug use: Never    Sexual activity: Defer         Family History   Problem Relation Age of Onset    Arthritis Mother     Diabetes Mother     Hypertension Mother     Arthritis Other     Arthritis Other     Diabetes Other         DM    Hypertension Other     Colon cancer Neg Hx        Objective  Physical Exam:  /68   Pulse 80   Temp 97.5 °F (36.4 °C) (Oral)   Resp 14   Ht 160 cm (63\")   Wt 77 kg (169 lb 12.1 oz)   SpO2 97%   BMI 30.07 kg/m²      Physical Exam  Vitals and nursing note reviewed.   Constitutional:       General: She is not in acute distress.     Appearance: She is not toxic-appearing.   HENT:      Head: Normocephalic and atraumatic.      Right Ear: External ear normal.      Left Ear: External ear normal.      Nose: Nose normal.   Eyes:      Extraocular Movements: Extraocular movements intact.      Conjunctiva/sclera: Conjunctivae normal.   Cardiovascular:      Rate and Rhythm: Normal rate.   Pulmonary:      Effort: Pulmonary effort is normal. No respiratory distress.   Abdominal:      General: There is no distension.      " Palpations: Abdomen is soft.      Tenderness: There is abdominal tenderness. There is no guarding or rebound.   Musculoskeletal:         General: Normal range of motion.      Cervical back: Normal range of motion and neck supple.   Skin:     General: Skin is warm and dry.   Neurological:      General: No focal deficit present.      Mental Status: She is alert and oriented to person, place, and time.   Psychiatric:         Mood and Affect: Mood normal.         Behavior: Behavior normal.         Critical Care    Performed by: Sho Spain PA-C  Authorized by: Memo Bryant MD    Critical care provider statement:     Critical care time (minutes):  32    Critical care was necessary to treat or prevent imminent or life-threatening deterioration of the following conditions:  Circulatory failure    Critical care was time spent personally by me on the following activities:  Development of treatment plan with patient or surrogate, discussions with consultants, discussions with primary provider, evaluation of patient's response to treatment, examination of patient, obtaining history from patient or surrogate, ordering and performing treatments and interventions, ordering and review of laboratory studies, ordering and review of radiographic studies, pulse oximetry, re-evaluation of patient's condition and review of old charts    Care discussed with: admitting provider        ED Course:         Lab Results (last 24 hours)       Procedure Component Value Units Date/Time    CBC & Differential [[id number]]  (Abnormal) Collected: 02/28/25 1200    Specimen: Blood Updated: 02/28/25 1211    Narrative:      The following orders were created for panel order CBC & Differential.  Procedure                               Abnormality         Status                     ---------                               -----------         ------                     CBC Auto Differential[[id number]]        Abnormal            Final result                  Please view results for these tests on the individual orders.    Comprehensive Metabolic Panel [[id number]]  (Abnormal) Collected: 02/28/25 1200    Specimen: Blood Updated: 02/28/25 1227     Glucose 218 mg/dL      Comment: Glucose >180, Hemoglobin A1C recommended.        BUN 32 mg/dL      Creatinine 1.74 mg/dL      Sodium 138 mmol/L      Potassium 4.0 mmol/L      Chloride 103 mmol/L      CO2 22.5 mmol/L      Calcium 9.1 mg/dL      Total Protein 6.7 g/dL      Albumin 3.9 g/dL      ALT (SGPT) 7 U/L      AST (SGOT) 14 U/L      Alkaline Phosphatase 54 U/L      Total Bilirubin 0.4 mg/dL      Globulin 2.8 gm/dL      A/G Ratio 1.4 g/dL      BUN/Creatinine Ratio 18.4     Anion Gap 12.5 mmol/L      eGFR 29.4 mL/min/1.73     Narrative:      GFR Categories in Chronic Kidney Disease (CKD)      GFR Category          GFR (mL/min/1.73)    Interpretation  G1                     90 or greater         Normal or high (1)  G2                      60-89                Mild decrease (1)  G3a                   45-59                Mild to moderate decrease  G3b                   30-44                Moderate to severe decrease  G4                    15-29                Severe decrease  G5                    14 or less           Kidney failure          (1)In the absence of evidence of kidney disease, neither GFR category G1 or G2 fulfill the criteria for CKD.    eGFR calculation 2021 CKD-EPI creatinine equation, which does not include race as a factor    High Sensitivity Troponin T [[id number]]  (Abnormal) Collected: 02/28/25 1200    Specimen: Blood Updated: 02/28/25 1244     HS Troponin T 60 ng/L     Narrative:      High Sensitive Troponin T Reference Range:  <14.0 ng/L- Negative Female for AMI  <22.0 ng/L- Negative Male for AMI  >=14 - Abnormal Female indicating possible myocardial injury.  >=22 - Abnormal Male indicating possible myocardial injury.   Clinicians would have to utilize clinical acumen, EKG, Troponin, and serial changes  to determine if it is an Acute Myocardial Infarction or myocardial injury due to an underlying chronic condition.         Magnesium [[id number]]  (Normal) Collected: 02/28/25 1200    Specimen: Blood Updated: 02/28/25 1227     Magnesium 1.7 mg/dL     CBC Auto Differential [[id number]]  (Abnormal) Collected: 02/28/25 1200    Specimen: Blood Updated: 02/28/25 1211     WBC 6.62 10*3/mm3      RBC 4.50 10*6/mm3      Hemoglobin 13.1 g/dL      Hematocrit 38.2 %      MCV 84.9 fL      MCH 29.1 pg      MCHC 34.3 g/dL      RDW 14.0 %      RDW-SD 43.6 fl      MPV 10.1 fL      Platelets 299 10*3/mm3      Neutrophil % 53.5 %      Lymphocyte % 33.8 %      Monocyte % 9.8 %      Eosinophil % 1.7 %      Basophil % 0.6 %      Immature Grans % 0.6 %      Neutrophils, Absolute 3.54 10*3/mm3      Lymphocytes, Absolute 2.24 10*3/mm3      Monocytes, Absolute 0.65 10*3/mm3      Eosinophils, Absolute 0.11 10*3/mm3      Basophils, Absolute 0.04 10*3/mm3      Immature Grans, Absolute 0.04 10*3/mm3      nRBC 0.0 /100 WBC     Protime-INR [[id number]]  (Normal) Collected: 02/28/25 1200    Specimen: Blood Updated: 02/28/25 1608     Protime 12.7 Seconds      INR 0.91    Narrative:      Suggested INR therapeutic range for stable oral anticoagulant therapy:    Low Intensity therapy:   1.5-2.0  Moderate Intensity therapy:   2.0-3.0  High Intensity therapy:   2.5-4.0    COVID-19, FLU A/B, RSV PCR 1 HR TAT - Swab, Nasopharynx [[id number]]  (Normal) Collected: 02/28/25 1201    Specimen: Swab from Nasopharynx Updated: 02/28/25 1247     COVID19 Not Detected     Influenza A PCR Not Detected     Influenza B PCR Not Detected     RSV, PCR Not Detected    Narrative:      Fact sheet for providers: https://www.fda.gov/media/809824/download    Fact sheet for patients: https://www.fda.gov/media/908058/download    Test performed by PCR.    High Sensitivity Troponin T 1Hr [[id number]]  (Abnormal) Collected: 02/28/25 1307    Specimen: Blood Updated: 02/28/25 1351     HS  Troponin T 61 ng/L      Troponin T Numeric Delta 1 ng/L      Troponin T % Delta 2    Narrative:      High Sensitive Troponin T Reference Range:  <14.0 ng/L- Negative Female for AMI  <22.0 ng/L- Negative Male for AMI  >=14 - Abnormal Female indicating possible myocardial injury.  >=22 - Abnormal Male indicating possible myocardial injury.   Clinicians would have to utilize clinical acumen, EKG, Troponin, and serial changes to determine if it is an Acute Myocardial Infarction or myocardial injury due to an underlying chronic condition.         Urinalysis With Microscopic If Indicated (No Culture) - Urine, Clean Catch [[id number]]  (Abnormal) Collected: 02/28/25 1545    Specimen: Urine, Clean Catch Updated: 02/28/25 1556     Color, UA Yellow     Appearance, UA Clear     pH, UA 6.0     Specific Gravity, UA 1.018     Glucose, UA Negative     Ketones, UA Negative     Bilirubin, UA Negative     Blood, UA Negative     Protein,  mg/dL (2+)     Leuk Esterase, UA Negative     Nitrite, UA Negative     Urobilinogen, UA 0.2 E.U./dL    Urinalysis, Microscopic Only - Urine, Clean Catch [[id number]]  (Abnormal) Collected: 02/28/25 1545    Specimen: Urine, Clean Catch Updated: 02/28/25 1610     RBC, UA None Seen /HPF      WBC, UA None Seen /HPF      Bacteria, UA 1+ /HPF      Squamous Epithelial Cells, UA 3-6 /HPF      Hyaline Casts, UA None Seen /LPF      Methodology Manual Light Microscopy    Heparin Anti-Xa [[id number]] Collected: 02/28/25 1628    Specimen: Blood Updated: 02/28/25 1631    aPTT [[id number]] Collected: 02/28/25 1628    Specimen: Blood Updated: 02/28/25 1631             CT Angiogram Chest Pulmonary Embolism    Result Date: 2/28/2025  PROCEDURE: CT ANGIOGRAM CHEST PULMONARY EMBOLISM-  HISTORY: Leg swelling, SOA, weakness  COMPARISON: None.  TECHNIQUE: The patient was injected with  IV contrast. Axial images were obtained through the chest in a CTA/ PE protocol. 3 D Reconstruction images were also performed. This  study was performed with techniques to keep radiation doses as low as reasonably achievable, (ALARA). Individualized dose reduction techniques using automated exposure control or adjustment of mA and/or kV according to the patient size were employed.  FINDINGS: Inhomogeneous thyroid noted. There is no axillary adenopathy. There is mildly prominent subcarinal lymph nodes, etiology unclear. The heart is mildly enlarged. There is a mild-to-moderate pericardial effusion but no pleural effusions. There is a single, small tubular defect in the the right lower lobe pulmonary artery identified on series 12 image 54, series 7 image 44, series 11 image 59, and series 10 image 45. A very small solitary pulmonary embolus is not excluded. There is no evidence of thoracic aortic aneurysm or dissection. Limited images of the upper abdomen partially demonstrate bilateral renal cysts. Moderate hiatal hernia noted. No suspicious infiltrate or nodule is identified. There are linear densities in the left lower lobe consistent with atelectasis or scar. No areas of consolidation seen. Vascular calcifications noted.      Impression: 1. A single, small, tubular defect in the right lower lobe pulmonary artery as described, suggesting small pulmonary embolus. Recommend repeat study after treatment to document resolution.  2. Cardiomegaly and pericardial effusion with no prior studies for comparison.   CTDI: 7.82 mGy DLP:448.04 mGy.cm   This report was signed and finalized on 2/28/2025 3:49 PM by Genna Walker MD.      CT Abdomen Pelvis With Contrast    Result Date: 2/28/2025  PROCEDURE: CT ABDOMEN PELVIS W CONTRAST-  HISTORY:  abdominal pain, generalized weakness  COMPARISON: February 8, 2025.  TECHNIQUE: Multiple axial CT images were obtained from the lung bases through the pubic symphysis following the administration of Isovue 300 contrast.   FINDINGS:  ABDOMEN: A hiatal hernia is stable. The liver is normal. The spleen is unremarkable. No  adrenal mass is present.  The pancreas is normal. There are several well-circumscribed hypodense renal cysts bilaterally. The aorta is normal in caliber. There are vascular calcifications. There is no free fluid or adenopathy.  PELVIS: The appendix is not identified. There are no secondary signs to suggest appendicitis. There is colonic diverticulosis. There is no evidence of acute diverticulitis. There is no evidence of bowel obstruction. The uterus is diminutive or absent. The urinary bladder is unremarkable. There is no significant free fluid or adenopathy.      Impression: No evidence of acute intra-abdominal process.  Stable chronic changes as above.   CTDI: 7.82 mGy DLP: 448.04 mGy.cm   This study was performed with techniques to keep radiation doses as low as reasonably achievable (ALARA). Individualized dose reduction techniques using automated exposure control or adjustment of mA and/or kV according to the patient size were employed.      Images were reviewed, interpreted, and dictated by Dr. Genna Walker MD Transcribed by Lisa Lyons PA-C.  This report was signed and finalized on 2/28/2025 3:22 PM by Genna Walker MD.      XR Chest 1 View    Result Date: 2/28/2025  PROCEDURE: XR CHEST 1 VW-  HISTORY: Weak/Dizzy/AMS triage protocol, weakness for 2 to 3 weeks.  COMPARISON: 4/8/2025..  FINDINGS: The heart is mildly enlarged, stable.. The lungs are clear. The mediastinum is unremarkable. There is no pneumothorax.  There are no acute osseous abnormalities.      Impression: Cardiomegaly without acute infiltrate..      This report was signed and finalized on 2/28/2025 1:02 PM by Genna Walker MD.          MDM     Amount and/or Complexity of Data Reviewed  Decide to obtain previous medical records or to obtain history from someone other than the patient: yes      Patient evaluated in the ER for abdominal pain, shortness of breath, generalized weakness over the last 3 weeks that is specifically worsened over the  last 2 to 3 days.  Daughter also endorses bilateral lower extremity swelling.  Patient is hemodynamically stable, afebrile, nontoxic-appearing on exam.  Alert and oriented but difficult historian that she states she can barely talk, mumbling, difficult to understand.  Differential diagnosis includes was not limited to colitis, small bowel obstruction, ACS, cardiac arrhythmia, PE, among others.  Initial plan includes CBC, CMP, magnesium, troponins, COVID/flu/RSV swab, urinalysis, CT PE protocol and CT abdomen pelvis with contrast.    Labs remarkable for chronically elevated troponins of 60 that did increase to 61.  COVID, flu, RSV negative.  Labs largely unremarkable.  CMP is consistent with CKD with creatinine of 1.74 and GFR of 29.4.  CT abdomen pelvis reveals no acute process.  CT chest reveals cardiomegaly, pericardial effusion, and small PE.  Discussed with hospitalist requesting admission.  They came to the ED and evaluated the patient and have accepted her for admission for further evaluation and management.    Final diagnoses:   Acute pulmonary embolism without acute cor pulmonale, unspecified pulmonary embolism type   Cardiomegaly   Pericardial effusion   Weakness   Decreased mobility          Sho Spain PA-C  02/28/25 1645       Sho Spain PA-C  02/28/25 1643

## 2025-03-01 LAB
ANION GAP SERPL CALCULATED.3IONS-SCNC: 13.9 MMOL/L (ref 5–15)
AV MEAN PRESS GRAD SYS DOP V1V2: 10.5 MMHG
AV VMAX SYS DOP: 209.5 CM/SEC
BASOPHILS # BLD AUTO: 0.04 10*3/MM3 (ref 0–0.2)
BASOPHILS NFR BLD AUTO: 0.6 % (ref 0–1.5)
BH CV ECHO MEAS - AO MAX PG: 17.6 MMHG
BH CV ECHO MEAS - AO ROOT DIAM: 2.8 CM
BH CV ECHO MEAS - AO V2 VTI: 42.2 CM
BH CV ECHO MEAS - AVA(I,D): 1.93 CM2
BH CV ECHO MEAS - EDV(CUBED): 68.9 ML
BH CV ECHO MEAS - EDV(MOD-SP2): 44.4 ML
BH CV ECHO MEAS - EDV(MOD-SP4): 48.5 ML
BH CV ECHO MEAS - EF(MOD-SP2): 69.1 %
BH CV ECHO MEAS - EF(MOD-SP4): 66 %
BH CV ECHO MEAS - ESV(CUBED): 12.5 ML
BH CV ECHO MEAS - ESV(MOD-SP2): 13.7 ML
BH CV ECHO MEAS - ESV(MOD-SP4): 16.5 ML
BH CV ECHO MEAS - FS: 43.4 %
BH CV ECHO MEAS - IVS/LVPW: 0.81 CM
BH CV ECHO MEAS - IVSD: 1.22 CM
BH CV ECHO MEAS - LA DIMENSION: 3.2 CM
BH CV ECHO MEAS - LAT PEAK E' VEL: 6.9 CM/SEC
BH CV ECHO MEAS - LV DIASTOLIC VOL/BSA (35-75): 26.9 CM2
BH CV ECHO MEAS - LV MASS(C)D: 207.2 GRAMS
BH CV ECHO MEAS - LV MAX PG: 5.1 MMHG
BH CV ECHO MEAS - LV MEAN PG: 3 MMHG
BH CV ECHO MEAS - LV SYSTOLIC VOL/BSA (12-30): 9.2 CM2
BH CV ECHO MEAS - LV V1 MAX: 113 CM/SEC
BH CV ECHO MEAS - LV V1 VTI: 27.2 CM
BH CV ECHO MEAS - LVIDD: 4.1 CM
BH CV ECHO MEAS - LVIDS: 2.32 CM
BH CV ECHO MEAS - LVOT AREA: 3 CM2
BH CV ECHO MEAS - LVOT DIAM: 1.95 CM
BH CV ECHO MEAS - LVPWD: 1.5 CM
BH CV ECHO MEAS - MED PEAK E' VEL: 4.6 CM/SEC
BH CV ECHO MEAS - MV A MAX VEL: 109 CM/SEC
BH CV ECHO MEAS - MV DEC SLOPE: 184 CM/SEC2
BH CV ECHO MEAS - MV DEC TIME: 0.34 SEC
BH CV ECHO MEAS - MV E MAX VEL: 62.2 CM/SEC
BH CV ECHO MEAS - MV E/A: 0.57
BH CV ECHO MEAS - MV MAX PG: 5.4 MMHG
BH CV ECHO MEAS - MV MEAN PG: 2 MMHG
BH CV ECHO MEAS - MV V2 VTI: 20.5 CM
BH CV ECHO MEAS - MVA(VTI): 4 CM2
BH CV ECHO MEAS - PA ACC TIME: 0.13 SEC
BH CV ECHO MEAS - PA V2 MAX: 97.4 CM/SEC
BH CV ECHO MEAS - RAP SYSTOLE: 3 MMHG
BH CV ECHO MEAS - RV MAX PG: 2.8 MMHG
BH CV ECHO MEAS - RV V1 MAX: 84 CM/SEC
BH CV ECHO MEAS - RV V1 VTI: 16.5 CM
BH CV ECHO MEAS - RVDD: 2.6 CM
BH CV ECHO MEAS - SV(LVOT): 81.2 ML
BH CV ECHO MEAS - SV(MOD-SP2): 30.7 ML
BH CV ECHO MEAS - SV(MOD-SP4): 32 ML
BH CV ECHO MEAS - SVI(LVOT): 45.1 ML/M2
BH CV ECHO MEAS - SVI(MOD-SP2): 17.1 ML/M2
BH CV ECHO MEAS - SVI(MOD-SP4): 17.8 ML/M2
BH CV ECHO MEAS - TAPSE (>1.6): 2.33 CM
BH CV ECHO MEASUREMENTS AVERAGE E/E' RATIO: 10.82
BH CV XLRA - TDI S': 21.8 CM/SEC
BUN SERPL-MCNC: 31 MG/DL (ref 8–23)
BUN/CREAT SERPL: 19.6 (ref 7–25)
CALCIUM SPEC-SCNC: 9.4 MG/DL (ref 8.6–10.5)
CHLORIDE SERPL-SCNC: 102 MMOL/L (ref 98–107)
CO2 SERPL-SCNC: 22.1 MMOL/L (ref 22–29)
CREAT SERPL-MCNC: 1.58 MG/DL (ref 0.57–1)
DEPRECATED RDW RBC AUTO: 43.5 FL (ref 37–54)
EGFRCR SERPLBLD CKD-EPI 2021: 33 ML/MIN/1.73
EOSINOPHIL # BLD AUTO: 0.13 10*3/MM3 (ref 0–0.4)
EOSINOPHIL NFR BLD AUTO: 1.9 % (ref 0.3–6.2)
ERYTHROCYTE [DISTWIDTH] IN BLOOD BY AUTOMATED COUNT: 14.2 % (ref 12.3–15.4)
GLUCOSE BLDC GLUCOMTR-MCNC: 160 MG/DL (ref 70–130)
GLUCOSE BLDC GLUCOMTR-MCNC: 190 MG/DL (ref 70–130)
GLUCOSE BLDC GLUCOMTR-MCNC: 93 MG/DL (ref 70–130)
GLUCOSE SERPL-MCNC: 108 MG/DL (ref 65–99)
HCT VFR BLD AUTO: 39.1 % (ref 34–46.6)
HGB BLD-MCNC: 13.5 G/DL (ref 12–15.9)
IMM GRANULOCYTES # BLD AUTO: 0.02 10*3/MM3 (ref 0–0.05)
IMM GRANULOCYTES NFR BLD AUTO: 0.3 % (ref 0–0.5)
LEFT ATRIUM VOLUME INDEX: 28.4 ML/M2
LV EF BIPLANE MOD: 65 %
LYMPHOCYTES # BLD AUTO: 2.22 10*3/MM3 (ref 0.7–3.1)
LYMPHOCYTES NFR BLD AUTO: 32.8 % (ref 19.6–45.3)
MAGNESIUM SERPL-MCNC: 2.2 MG/DL (ref 1.6–2.4)
MCH RBC QN AUTO: 29 PG (ref 26.6–33)
MCHC RBC AUTO-ENTMCNC: 34.5 G/DL (ref 31.5–35.7)
MCV RBC AUTO: 83.9 FL (ref 79–97)
MONOCYTES # BLD AUTO: 0.49 10*3/MM3 (ref 0.1–0.9)
MONOCYTES NFR BLD AUTO: 7.2 % (ref 5–12)
NEUTROPHILS NFR BLD AUTO: 3.87 10*3/MM3 (ref 1.7–7)
NEUTROPHILS NFR BLD AUTO: 57.2 % (ref 42.7–76)
NRBC BLD AUTO-RTO: 0 /100 WBC (ref 0–0.2)
PHOSPHATE SERPL-MCNC: 3.5 MG/DL (ref 2.5–4.5)
PLATELET # BLD AUTO: 272 10*3/MM3 (ref 140–450)
PMV BLD AUTO: 10 FL (ref 6–12)
POTASSIUM SERPL-SCNC: 3.7 MMOL/L (ref 3.5–5.2)
RBC # BLD AUTO: 4.66 10*6/MM3 (ref 3.77–5.28)
SODIUM SERPL-SCNC: 138 MMOL/L (ref 136–145)
UFH PPP CHRO-ACNC: 0.61 IU/ML (ref 0.3–0.7)
UFH PPP CHRO-ACNC: >1.1 IU/ML (ref 0.3–0.7)
WBC NRBC COR # BLD AUTO: 6.77 10*3/MM3 (ref 3.4–10.8)

## 2025-03-01 PROCEDURE — 84100 ASSAY OF PHOSPHORUS: CPT | Performed by: FAMILY MEDICINE

## 2025-03-01 PROCEDURE — G0378 HOSPITAL OBSERVATION PER HR: HCPCS

## 2025-03-01 PROCEDURE — 25010000002 LABETALOL 5 MG/ML SOLUTION: Performed by: FAMILY MEDICINE

## 2025-03-01 PROCEDURE — 93005 ELECTROCARDIOGRAM TRACING: CPT | Performed by: FAMILY MEDICINE

## 2025-03-01 PROCEDURE — 96366 THER/PROPH/DIAG IV INF ADDON: CPT

## 2025-03-01 PROCEDURE — 85520 HEPARIN ASSAY: CPT | Performed by: FAMILY MEDICINE

## 2025-03-01 PROCEDURE — 82948 REAGENT STRIP/BLOOD GLUCOSE: CPT

## 2025-03-01 PROCEDURE — 99232 SBSQ HOSP IP/OBS MODERATE 35: CPT | Performed by: INTERNAL MEDICINE

## 2025-03-01 PROCEDURE — 85025 COMPLETE CBC W/AUTO DIFF WBC: CPT | Performed by: FAMILY MEDICINE

## 2025-03-01 PROCEDURE — 25010000002 ONDANSETRON PER 1 MG: Performed by: FAMILY MEDICINE

## 2025-03-01 PROCEDURE — 25010000002 HEPARIN (PORCINE) PER 1000 UNITS: Performed by: FAMILY MEDICINE

## 2025-03-01 PROCEDURE — 96375 TX/PRO/DX INJ NEW DRUG ADDON: CPT

## 2025-03-01 PROCEDURE — 93010 ELECTROCARDIOGRAM REPORT: CPT | Performed by: STUDENT IN AN ORGANIZED HEALTH CARE EDUCATION/TRAINING PROGRAM

## 2025-03-01 PROCEDURE — 83735 ASSAY OF MAGNESIUM: CPT | Performed by: FAMILY MEDICINE

## 2025-03-01 PROCEDURE — 63710000001 INSULIN LISPRO (HUMAN) PER 5 UNITS: Performed by: INTERNAL MEDICINE

## 2025-03-01 PROCEDURE — 80048 BASIC METABOLIC PNL TOTAL CA: CPT | Performed by: FAMILY MEDICINE

## 2025-03-01 RX ORDER — HEPARIN SODIUM 10000 [USP'U]/100ML
14 INJECTION, SOLUTION INTRAVENOUS
Status: DISPENSED | OUTPATIENT
Start: 2025-03-01 | End: 2025-03-01

## 2025-03-01 RX ORDER — CALCIUM CARBONATE 500 MG/1
2 TABLET, CHEWABLE ORAL 3 TIMES DAILY PRN
Status: DISCONTINUED | OUTPATIENT
Start: 2025-03-01 | End: 2025-03-04 | Stop reason: HOSPADM

## 2025-03-01 RX ORDER — NICOTINE POLACRILEX 4 MG
15 LOZENGE BUCCAL
Status: DISCONTINUED | OUTPATIENT
Start: 2025-03-01 | End: 2025-03-04 | Stop reason: HOSPADM

## 2025-03-01 RX ORDER — DEXTROSE MONOHYDRATE 25 G/50ML
25 INJECTION, SOLUTION INTRAVENOUS
Status: DISCONTINUED | OUTPATIENT
Start: 2025-03-01 | End: 2025-03-04 | Stop reason: HOSPADM

## 2025-03-01 RX ORDER — LABETALOL HYDROCHLORIDE 5 MG/ML
10 INJECTION, SOLUTION INTRAVENOUS EVERY 4 HOURS PRN
Status: DISCONTINUED | OUTPATIENT
Start: 2025-03-01 | End: 2025-03-04 | Stop reason: HOSPADM

## 2025-03-01 RX ORDER — INSULIN LISPRO 100 [IU]/ML
2-7 INJECTION, SOLUTION INTRAVENOUS; SUBCUTANEOUS
Status: DISCONTINUED | OUTPATIENT
Start: 2025-03-01 | End: 2025-03-04 | Stop reason: HOSPADM

## 2025-03-01 RX ADMIN — LABETALOL HYDROCHLORIDE 10 MG: 5 INJECTION, SOLUTION INTRAVENOUS at 23:54

## 2025-03-01 RX ADMIN — TRAZODONE HYDROCHLORIDE 50 MG: 50 TABLET ORAL at 21:11

## 2025-03-01 RX ADMIN — APIXABAN 10 MG: 5 TABLET, FILM COATED ORAL at 08:57

## 2025-03-01 RX ADMIN — INSULIN LISPRO 2 UNITS: 100 INJECTION, SOLUTION INTRAVENOUS; SUBCUTANEOUS at 14:46

## 2025-03-01 RX ADMIN — ACETAMINOPHEN 650 MG: 325 TABLET, FILM COATED ORAL at 21:11

## 2025-03-01 RX ADMIN — CARVEDILOL 25 MG: 25 TABLET, FILM COATED ORAL at 08:56

## 2025-03-01 RX ADMIN — NIFEDIPINE 90 MG: 90 TABLET, FILM COATED, EXTENDED RELEASE ORAL at 08:57

## 2025-03-01 RX ADMIN — PANTOPRAZOLE SODIUM 40 MG: 40 TABLET, DELAYED RELEASE ORAL at 08:57

## 2025-03-01 RX ADMIN — ATORVASTATIN CALCIUM 80 MG: 80 TABLET, FILM COATED ORAL at 08:57

## 2025-03-01 RX ADMIN — Medication 10 ML: at 08:57

## 2025-03-01 RX ADMIN — Medication 10 ML: at 21:12

## 2025-03-01 RX ADMIN — APIXABAN 10 MG: 5 TABLET, FILM COATED ORAL at 21:11

## 2025-03-01 RX ADMIN — HEPARIN SODIUM 14 UNITS/KG/HR: 10000 INJECTION, SOLUTION INTRAVENOUS at 05:03

## 2025-03-01 RX ADMIN — ONDANSETRON 4 MG: 2 INJECTION INTRAMUSCULAR; INTRAVENOUS at 21:11

## 2025-03-01 RX ADMIN — CALCIUM CARBONATE (ANTACID) CHEW TAB 500 MG 2 TABLET: 500 CHEW TAB at 21:11

## 2025-03-01 RX ADMIN — CARVEDILOL 25 MG: 25 TABLET, FILM COATED ORAL at 18:33

## 2025-03-01 RX ADMIN — ACETAMINOPHEN 650 MG: 325 TABLET, FILM COATED ORAL at 06:18

## 2025-03-01 NOTE — PROGRESS NOTES
[facility] HOSPITALIST    PROGRESS NOTE    Name:  Valarie Camara   Age:  79 y.o.  Sex:  female  :  1945  MRN:  [id number]   Visit Number:  [id number]  Admission Date:  2025  Date Of Service:  25  Primary Care Physician:  Lay Cox MD     LOS: 0 days :    Chief Complaint:      weakness    Subjective:    3/1/2025: Admitting provider documentation reviewed.  Vital signs stable. D/w Daughter Tiny and patient interested in rehab.    History Of Presenting Illness:       Patient is an 79-year-old female brought to the emergency department with her daughter with generalized weakness and dyspnea complaints.  Patient has a known history of dementia, unfortunately poor historian.  History of present illness was obtained from discussion with daughter at bedside.  This has reportedly been ongoing for weeks duration.  The patient has reportedly been moaning and groaning in pain throughout most of the evening the last few days.  She was recently seen in her PCPs office earlier this week, diagnosed with dementia at that time.  The patient has reportedly been more bedfast, complaining of bilateral feet pain, numbness, tingling due to her neuropathy.  Patient is unfortunate unable to be taken care of adequately at home, and ultimately brought into the emergency department for evaluation.     In the emergency department, laboratory workup was unremarkable.  CT abdomen pelvis with and without contrast did not show any acute process.  CTA of the chest showed a small single tubular defect of the right lower lobe pulmonary artery suggesting PE.  Patient started on heparin drip in the ED.  Hospitalist services consulted for admission.  Edited by: Wil Rader DO at 3/1/2025 6359     Review of Systems:     All systems were reviewed and negative except as mentioned in subjective, assessment and plan.    Vital Signs:    Temp:  [98 °F (36.7 °C)-98.5 °F (36.9 °C)] 98.1 °F  "(36.7 °C)  Heart Rate:  [72-94] 94  Resp:  [14-18] 18  BP: (116-194)/(67-85) 194/85    Intake and output:    I/O last 3 completed shifts:  In: 0.3 [I.V.:0.3]  Out: -   I/O this shift:  In: 240 [P.O.:240]  Out: 240 [Urine:240]    Physical Examination:    Constitutional: No acute distress, awake, alert  HENT: NCAT, mucous membranes moist  Respiratory: Clear to auscultation bilaterally, respiratory effort normal   Cardiovascular: RRR, no murmurs, rubs, or gallops  Gastrointestinal: Positive bowel sounds, soft, nontender, nondistended  Musculoskeletal: +1 bilateral ankle edema  Psychiatric: Appropriate affect, cooperative  Neurologic: Oriented to self, allodynia noted, speech clear  Skin: No rashes  Edited by: Wil Rader DO at 3/1/2025 1323     Laboratory results:    Results from last 7 days   Lab Units 03/01/25  0350 02/28/25  1200   SODIUM mmol/L 138 138   POTASSIUM mmol/L 3.7 4.0   CHLORIDE mmol/L 102 103   CO2 mmol/L 22.1 22.5   BUN mg/dL 31* 32*   CREATININE mg/dL 1.58* 1.74*   CALCIUM mg/dL 9.4 9.1   BILIRUBIN mg/dL  --  0.4   ALK PHOS U/L  --  54   ALT (SGPT) U/L  --  7   AST (SGOT) U/L  --  14   GLUCOSE mg/dL 108* 218*     Results from last 7 days   Lab Units 03/01/25  0350 02/28/25  1200   WBC 10*3/mm3 6.77 6.62   HEMOGLOBIN g/dL 13.5 13.1   HEMATOCRIT % 39.1 38.2   PLATELETS 10*3/mm3 272 299     Results from last 7 days   Lab Units 02/28/25  1200   INR  0.91     Results from last 7 days   Lab Units 02/28/25  1307 02/28/25  1200   HSTROP T ng/L 61* 60*         No results for input(s): \"PHART\", \"PGU1MJN\", \"PO2ART\", \"FPJ4IZV\", \"BASEEXCESS\" in the last 8760 hours.   I have reviewed the patient's laboratory results.    Radiology results:    CT Angiogram Chest Pulmonary Embolism    Result Date: 2/28/2025  PROCEDURE: CT ANGIOGRAM CHEST PULMONARY EMBOLISM-  HISTORY: Leg swelling, SOA, weakness  COMPARISON: None.  TECHNIQUE: The patient was injected with  IV contrast. Axial images were obtained through the " home chest in a CTA/ PE protocol. 3 D Reconstruction images were also performed. This study was performed with techniques to keep radiation doses as low as reasonably achievable, (ALARA). Individualized dose reduction techniques using automated exposure control or adjustment of mA and/or kV according to the patient size were employed.  FINDINGS: Inhomogeneous thyroid noted. There is no axillary adenopathy. There is mildly prominent subcarinal lymph nodes, etiology unclear. The heart is mildly enlarged. There is a mild-to-moderate pericardial effusion but no pleural effusions. There is a single, small tubular defect in the the right lower lobe pulmonary artery identified on series 12 image 54, series 7 image 44, series 11 image 59, and series 10 image 45. A very small solitary pulmonary embolus is not excluded. There is no evidence of thoracic aortic aneurysm or dissection. Limited images of the upper abdomen partially demonstrate bilateral renal cysts. Moderate hiatal hernia noted. No suspicious infiltrate or nodule is identified. There are linear densities in the left lower lobe consistent with atelectasis or scar. No areas of consolidation seen. Vascular calcifications noted.      Impression: 1. A single, small, tubular defect in the right lower lobe pulmonary artery as described, suggesting small pulmonary embolus. Recommend repeat study after treatment to document resolution.  2. Cardiomegaly and pericardial effusion with no prior studies for comparison.   CTDI: 7.82 mGy DLP:448.04 mGy.cm   This report was signed and finalized on 2/28/2025 3:49 PM by Genna Walker MD.      CT Abdomen Pelvis With Contrast    Result Date: 2/28/2025  PROCEDURE: CT ABDOMEN PELVIS W CONTRAST-  HISTORY:  abdominal pain, generalized weakness  COMPARISON: February 8, 2025.  TECHNIQUE: Multiple axial CT images were obtained from the lung bases through the pubic symphysis following the administration of Isovue 300 contrast.   FINDINGS:   ABDOMEN: A hiatal hernia is stable. The liver is normal. The spleen is unremarkable. No adrenal mass is present.  The pancreas is normal. There are several well-circumscribed hypodense renal cysts bilaterally. The aorta is normal in caliber. There are vascular calcifications. There is no free fluid or adenopathy.  PELVIS: The appendix is not identified. There are no secondary signs to suggest appendicitis. There is colonic diverticulosis. There is no evidence of acute diverticulitis. There is no evidence of bowel obstruction. The uterus is diminutive or absent. The urinary bladder is unremarkable. There is no significant free fluid or adenopathy.      Impression: No evidence of acute intra-abdominal process.  Stable chronic changes as above.   CTDI: 7.82 mGy DLP: 448.04 mGy.cm   This study was performed with techniques to keep radiation doses as low as reasonably achievable (ALARA). Individualized dose reduction techniques using automated exposure control or adjustment of mA and/or kV according to the patient size were employed.      Images were reviewed, interpreted, and dictated by Dr. Genna Walker MD Transcribed by Lisa Lyons PA-C.  This report was signed and finalized on 2/28/2025 3:22 PM by Genna Walker MD.      XR Chest 1 View    Result Date: 2/28/2025  PROCEDURE: XR CHEST 1 VW-  HISTORY: Weak/Dizzy/AMS triage protocol, weakness for 2 to 3 weeks.  COMPARISON: 4/8/2025..  FINDINGS: The heart is mildly enlarged, stable.. The lungs are clear. The mediastinum is unremarkable. There is no pneumothorax.  There are no acute osseous abnormalities.      Impression: Cardiomegaly without acute infiltrate..      This report was signed and finalized on 2/28/2025 1:02 PM by Genna Walker MD.     I have reviewed the patient's radiology reports.    Medication Review:     I have reviewed the patient's active and prn medications.     Problem List:      Pulmonary emboli      Assessment/Plan:    Acute pulmonary  embolus  Urinary tract infection  Dementia  COPD  Hypertension  Hyperlipidemia  Restless leg syndrome     Plan:     Acute pulmonary embolus  Lower extremity edema- no evidence of DVT  Pericardial effusion  Patient is not tachycardic, not requiring oxygen at this time.  Heparin drip transitioned to eliquis  2D echo shows grade 1 diastolic dysfunction.  Urinary tract infection ruled out has asymptomatic bacteriuria  Patient is not septic.    IV Rocephin.  Dementia  PT/OT consults for functional eval  Case management consult for possible rehab versus long-term care placement.  Patient's daughter, who is co-power of  with her sibling, to discuss longitudinal care options.  COPD  Hypertension  Hyperlipidemia  Restless leg syndrome       DVT Prophylaxis: Heparin transitioned to Eliquis  Code Status: Full code  Diet: Diabetic  Disposition: looking at [facility] starting Monday morning  Edited by: Wil Rader DO at 3/1/2025 1337           Wil Rader DO  03/01/25  13:37 EST    Dictated utilizing Dragon dictation.

## 2025-03-01 NOTE — PLAN OF CARE
Problem: Adult Inpatient Plan of Care  Goal: Plan of Care Review  Outcome: Progressing  Goal: Patient-Specific Goal (Individualized)  Outcome: Progressing  Goal: Absence of Hospital-Acquired Illness or Injury  Outcome: Progressing  Intervention: Identify and Manage Fall Risk  Recent Flowsheet Documentation  Taken 3/1/2025 0400 by Benita Rodriguez RN  Safety Promotion/Fall Prevention: safety round/check completed  Taken 3/1/2025 0000 by Benita Rodriguez RN  Safety Promotion/Fall Prevention: safety round/check completed  Taken 2/28/2025 2200 by Benita Rodriguez RN  Safety Promotion/Fall Prevention: safety round/check completed  Taken 2/28/2025 2000 by Benita Rodriguez RN  Safety Promotion/Fall Prevention: safety round/check completed  Intervention: Prevent Skin Injury  Recent Flowsheet Documentation  Taken 3/1/2025 0400 by Benita Rodriguez RN  Body Position: supine  Taken 3/1/2025 0000 by Benita Rodriguez RN  Body Position: supine  Taken 2/28/2025 2200 by Benita Rodriguez RN  Body Position:   tilted   left  Taken 2/28/2025 2000 by Benita Rodriguez RN  Body Position: supine  Goal: Optimal Comfort and Wellbeing  Outcome: Progressing  Goal: Readiness for Transition of Care  Outcome: Progressing  Intervention: Mutually Develop Transition Plan  Recent Flowsheet Documentation  Taken 2/28/2025 1934 by Benita Rodriguez RN  Transportation Anticipated: family or friend will provide  Patient/Family Anticipated Services at Transition: home health care  Patient/Family Anticipates Transition to: home with family  Taken 2/28/2025 1933 by Benita Rodriguez RN  Equipment Currently Used at Home: cane, straight     Problem: Skin Injury Risk Increased  Goal: Skin Health and Integrity  Outcome: Progressing  Intervention: Optimize Skin Protection  Recent Flowsheet Documentation  Taken 3/1/2025 0400 by Benita Rodriguez RN  Activity Management: activity encouraged  Head of Bed (HOB) Positioning: HOB lowered  Taken 3/1/2025 0000 by Benita Rodriguez  RN  Activity Management: activity encouraged  Head of Bed (HOB) Positioning: HOB elevated  Taken 2/28/2025 2200 by Benita Rodriguez, RN  Activity Management: activity encouraged  Head of Bed (HOB) Positioning: HOB elevated  Taken 2/28/2025 2000 by Benita Rodriguez, RN  Activity Management: activity encouraged  Head of Bed (HOB) Positioning: HOB elevated   Goal Outcome Evaluation:

## 2025-03-01 NOTE — CASE MANAGEMENT/SOCIAL WORK
Discharge Planning Assessment   [facility]     Patient Name: Valarie Camara  MRN: [id number]  Today's Date: 3/1/2025    Admit Date: 2/28/2025    Plan: DCP-Family wants STR then Home. List given to daughter.   Discharge Needs Assessment       Row Name 03/01/25 1414       Living Environment    People in Home child(sindy), adult    Name(s) of People in Home Cleo Camara/Andrew    Current Living Arrangements home    Duration at Residence Less than a1 year    Potentially Unsafe Housing Conditions unable to assess    In the past 12 months has the electric, gas, oil, or water company threatened to shut off services in your home? No    Primary Care Provided by self    Provides Primary Care For no one, unable/limited ability to care for self    Family Caregiver if Needed child(sindy), adult    Family Caregiver Names Cleo/Daughter    Quality of Family Relationships supportive    Able to Return to Prior Arrangements other (see comments)  Daughter requesting STR then Home.       Resource/Environmental Concerns    Resource/Environmental Concerns none    Transportation Concerns none       Transportation Needs    In the past 12 months, has lack of transportation kept you from medical appointments or from getting medications? no    In the past 12 months, has lack of transportation kept you from meetings, work, or from getting things needed for daily living? No       Food Insecurity    Within the past 12 months, you worried that your food would run out before you got the money to buy more. Sometimes    Within the past 12 months, the food you bought just didn't last and you didn't have money to get more. Sometimes       Transition Planning    Patient/Family Anticipates Transition to inpatient rehabilitation facility    Patient/Family Anticipated Services at Transition rehabilitation services    Transportation Anticipated family or friend will provide       Discharge Needs Assessment    Readmission Within the Last 30 Days no  "previous admission in last 30 days    Current Outpatient/Agency/Support Group inpatient rehabilitation facility    Equipment Currently Used at Home bp cuff;shower chair;scales;cane, straight    Concerns to be Addressed discharge planning    Do you want help finding or keeping work or a job? I do not need or want help    Do you want help with school or training? For example, starting or completing job training or getting a high school diploma, GED or equivalent No    Anticipated Changes Related to Illness inability to care for self    Equipment Needed After Discharge other (see comments)  To be determined.    Outpatient/Agency/Support Group Needs inpatient rehabilitation facility    Discharge Facility/Level of Care Needs rehabilitation facility    Provided Post Acute Provider List? Yes    Post Acute Provider List Inpatient Rehab    Delivered To Support Person    Support Person Jordan Camara/Daughter                   Discharge Plan       Row Name 03/01/25 0153       Plan    Plan DCP-Family wants STR then Home. List given to daughter.    Plan Comments CM Spoke with pt and daughter at bedside. Permission given to  discuss DCP with daughter Cleo Camara and pt.  Pt confirmed demographics. States no to Living Will/POA. PCP is SAUMYA Cox, last seen \"Last Thursday\".  Pharmacy used [facility] . Agreed to meds to bed. DME listed above. Pt lives with daughter/Cleo and states is needing more assistance with ADL's and with her mobility. Daughter assists when needed. Pt does have some financial strain and food insecurity. Family wants her to have STR then home. List given to daughter/Cleo and contact us with decisions for facilities/STR.                  Continued Care and Services - Admitted Since 2/28/2025    No active coordination exists for this encounter.       Selected Continued Care - Prior Encounters Includes continued care and service providers with selected services from prior encounters " from 11/30/2024 to 3/1/2025      Discharged on 1/6/2025 Admission date: 1/5/2025 - Discharge disposition: Home-Health Care Svc      Durable Medical Equipment       Service Provider Services Address Phone Fax Patient Preferred    [facility] [address] [address] [place] [address] [phone] --       Internal Comment last updated by Atif Zelaya, RN 1/7/2025 0934    [facility] to deliver hospital bed to [facility] at [address] [place] [address], where pt is staying.                         Home Medical Care       Service Provider Services Address Phone Fax Patient Preferred     [facility], [facility] [address] [facility] [address] [phone] --              Community Resources       Service Provider Services Address Phone Fax Patient Preferred    [facility], [facility] [address] [facility] [address] [phone] -- --                             Demographic Summary       Row Name 03/01/25 1412       General Information    Admission Type observation    Arrived From emergency department    Required Notices Provided Observation Status Notice    Referral Source admission list    Reason for Consult discharge planning    Preferred Language English       Contact Information    Permission Granted to Share Info With                    Functional Status       Row Name 03/01/25 1413       Functional Status    Usual Activity Tolerance moderate    Current Activity Tolerance fair       Physical Activity    On average, how many days per week do you engage in moderate to strenuous exercise (like a brisk walk)? 0 days    On average, how many minutes do you engage in exercise at this level? 0 min    Number of minutes of exercise per week 0       Assessment of Health Literacy    How often do you have someone help you read  hospital materials? Occasionally    How often do you have problems learning about your medical condition because of difficulty understanding written information? Occasionally    How often do you have a problem understanding what is told to you about your medical condition? Occasionally    How confident are you filling out medical forms by yourself? Quite a bit    Health Literacy Good       Functional Status, IADL    Medications assistive person    Meal Preparation assistive person    Housekeeping assistive person    Laundry assistive person    Shopping assistive person    If for any reason you need help with day-to-day activities such as bathing, preparing meals, shopping, managing finances, etc., do you get the help you need? I don't need any help       Mental Status    General Appearance WDL WDL       Mental Status Summary    Recent Changes in Mental Status/Cognitive Functioning no changes       Employment/    Employment Status retired                   Psychosocial       Row Name 03/01/25 5718       Developmental Stage (Eriksson's Stages of Development)    Developmental Stage Stage 8 (64 years-death/Late Adulthood) Integrity vs. Despair                   Abuse/Neglect    No documentation.                  Legal    No documentation.                  Substance Abuse    No documentation.                  Patient Forms    No documentation.                     Sendy Avila RN

## 2025-03-02 LAB
ANION GAP SERPL CALCULATED.3IONS-SCNC: 11.1 MMOL/L (ref 5–15)
BASOPHILS # BLD AUTO: 0.03 10*3/MM3 (ref 0–0.2)
BASOPHILS NFR BLD AUTO: 0.5 % (ref 0–1.5)
BUN SERPL-MCNC: 27 MG/DL (ref 8–23)
BUN/CREAT SERPL: 15.3 (ref 7–25)
CALCIUM SPEC-SCNC: 9 MG/DL (ref 8.6–10.5)
CHLORIDE SERPL-SCNC: 104 MMOL/L (ref 98–107)
CO2 SERPL-SCNC: 22.9 MMOL/L (ref 22–29)
CREAT SERPL-MCNC: 1.77 MG/DL (ref 0.57–1)
DEPRECATED RDW RBC AUTO: 43.7 FL (ref 37–54)
EGFRCR SERPLBLD CKD-EPI 2021: 28.8 ML/MIN/1.73
EOSINOPHIL # BLD AUTO: 0.14 10*3/MM3 (ref 0–0.4)
EOSINOPHIL NFR BLD AUTO: 2.5 % (ref 0.3–6.2)
ERYTHROCYTE [DISTWIDTH] IN BLOOD BY AUTOMATED COUNT: 14 % (ref 12.3–15.4)
GLUCOSE BLDC GLUCOMTR-MCNC: 111 MG/DL (ref 70–130)
GLUCOSE BLDC GLUCOMTR-MCNC: 133 MG/DL (ref 70–130)
GLUCOSE BLDC GLUCOMTR-MCNC: 200 MG/DL (ref 70–130)
GLUCOSE BLDC GLUCOMTR-MCNC: 203 MG/DL (ref 70–130)
GLUCOSE SERPL-MCNC: 136 MG/DL (ref 65–99)
HCT VFR BLD AUTO: 36.4 % (ref 34–46.6)
HGB BLD-MCNC: 12.2 G/DL (ref 12–15.9)
IMM GRANULOCYTES # BLD AUTO: 0.02 10*3/MM3 (ref 0–0.05)
IMM GRANULOCYTES NFR BLD AUTO: 0.4 % (ref 0–0.5)
LYMPHOCYTES # BLD AUTO: 1.24 10*3/MM3 (ref 0.7–3.1)
LYMPHOCYTES NFR BLD AUTO: 22.3 % (ref 19.6–45.3)
MAGNESIUM SERPL-MCNC: 1.9 MG/DL (ref 1.6–2.4)
MCH RBC QN AUTO: 28.8 PG (ref 26.6–33)
MCHC RBC AUTO-ENTMCNC: 33.5 G/DL (ref 31.5–35.7)
MCV RBC AUTO: 85.8 FL (ref 79–97)
MONOCYTES # BLD AUTO: 0.47 10*3/MM3 (ref 0.1–0.9)
MONOCYTES NFR BLD AUTO: 8.4 % (ref 5–12)
NEUTROPHILS NFR BLD AUTO: 3.67 10*3/MM3 (ref 1.7–7)
NEUTROPHILS NFR BLD AUTO: 65.9 % (ref 42.7–76)
NRBC BLD AUTO-RTO: 0 /100 WBC (ref 0–0.2)
PHOSPHATE SERPL-MCNC: 3.4 MG/DL (ref 2.5–4.5)
PLATELET # BLD AUTO: 233 10*3/MM3 (ref 140–450)
PMV BLD AUTO: 10 FL (ref 6–12)
POTASSIUM SERPL-SCNC: 4.2 MMOL/L (ref 3.5–5.2)
QT INTERVAL: 372 MS
QTC INTERVAL: 452 MS
RBC # BLD AUTO: 4.24 10*6/MM3 (ref 3.77–5.28)
SODIUM SERPL-SCNC: 138 MMOL/L (ref 136–145)
WBC NRBC COR # BLD AUTO: 5.57 10*3/MM3 (ref 3.4–10.8)

## 2025-03-02 PROCEDURE — G0378 HOSPITAL OBSERVATION PER HR: HCPCS

## 2025-03-02 PROCEDURE — 96366 THER/PROPH/DIAG IV INF ADDON: CPT

## 2025-03-02 PROCEDURE — 83735 ASSAY OF MAGNESIUM: CPT | Performed by: FAMILY MEDICINE

## 2025-03-02 PROCEDURE — 25010000002 MAGNESIUM SULFATE 2 GM/50ML SOLUTION: Performed by: INTERNAL MEDICINE

## 2025-03-02 PROCEDURE — 80048 BASIC METABOLIC PNL TOTAL CA: CPT | Performed by: FAMILY MEDICINE

## 2025-03-02 PROCEDURE — 97161 PT EVAL LOW COMPLEX 20 MIN: CPT

## 2025-03-02 PROCEDURE — 96367 TX/PROPH/DG ADDL SEQ IV INF: CPT

## 2025-03-02 PROCEDURE — 99232 SBSQ HOSP IP/OBS MODERATE 35: CPT | Performed by: INTERNAL MEDICINE

## 2025-03-02 PROCEDURE — 85025 COMPLETE CBC W/AUTO DIFF WBC: CPT | Performed by: FAMILY MEDICINE

## 2025-03-02 PROCEDURE — 63710000001 INSULIN LISPRO (HUMAN) PER 5 UNITS: Performed by: INTERNAL MEDICINE

## 2025-03-02 PROCEDURE — 82948 REAGENT STRIP/BLOOD GLUCOSE: CPT

## 2025-03-02 PROCEDURE — 84100 ASSAY OF PHOSPHORUS: CPT | Performed by: FAMILY MEDICINE

## 2025-03-02 RX ORDER — QUETIAPINE FUMARATE 25 MG/1
25 TABLET, FILM COATED ORAL NIGHTLY
Status: DISCONTINUED | OUTPATIENT
Start: 2025-03-02 | End: 2025-03-04 | Stop reason: HOSPADM

## 2025-03-02 RX ORDER — MAGNESIUM SULFATE HEPTAHYDRATE 40 MG/ML
2 INJECTION, SOLUTION INTRAVENOUS ONCE
Status: COMPLETED | OUTPATIENT
Start: 2025-03-02 | End: 2025-03-02

## 2025-03-02 RX ADMIN — Medication 10 ML: at 22:24

## 2025-03-02 RX ADMIN — CARVEDILOL 25 MG: 25 TABLET, FILM COATED ORAL at 17:50

## 2025-03-02 RX ADMIN — NIFEDIPINE 90 MG: 90 TABLET, FILM COATED, EXTENDED RELEASE ORAL at 09:07

## 2025-03-02 RX ADMIN — BISACODYL 5 MG: 5 TABLET, COATED ORAL at 14:08

## 2025-03-02 RX ADMIN — CARVEDILOL 25 MG: 25 TABLET, FILM COATED ORAL at 09:03

## 2025-03-02 RX ADMIN — Medication 5 MG: at 22:23

## 2025-03-02 RX ADMIN — MAGNESIUM SULFATE HEPTAHYDRATE 2 G: 40 INJECTION, SOLUTION INTRAVENOUS at 09:04

## 2025-03-02 RX ADMIN — APIXABAN 10 MG: 5 TABLET, FILM COATED ORAL at 09:03

## 2025-03-02 RX ADMIN — PANTOPRAZOLE SODIUM 40 MG: 40 TABLET, DELAYED RELEASE ORAL at 09:04

## 2025-03-02 RX ADMIN — APIXABAN 10 MG: 5 TABLET, FILM COATED ORAL at 22:23

## 2025-03-02 RX ADMIN — QUETIAPINE FUMARATE 25 MG: 25 TABLET ORAL at 22:23

## 2025-03-02 RX ADMIN — ACETAMINOPHEN 650 MG: 325 TABLET, FILM COATED ORAL at 03:47

## 2025-03-02 RX ADMIN — Medication 10 ML: at 09:04

## 2025-03-02 RX ADMIN — ATORVASTATIN CALCIUM 80 MG: 80 TABLET, FILM COATED ORAL at 09:03

## 2025-03-02 RX ADMIN — INSULIN LISPRO 3 UNITS: 100 INJECTION, SOLUTION INTRAVENOUS; SUBCUTANEOUS at 22:23

## 2025-03-02 RX ADMIN — INSULIN LISPRO 3 UNITS: 100 INJECTION, SOLUTION INTRAVENOUS; SUBCUTANEOUS at 12:24

## 2025-03-02 NOTE — PAYOR COMM NOTE
"TO:BC  FROM:DAMIR DUMAS RN PHONE [phone]  OBSERVATION NOTIFICATION  TAX ID [id number] NPI [id number]    Valarie Barajas (79 y.o. Female)       Date of Birth   1945    Social Security Number       Address   [address] [facility] [address]    Home Phone   [phone]    MRN   [id number]       [employer]    Marital Status                               Admission Date   25    Admission Type   Emergency    Admitting Provider   Aki Rodriguez DO    Attending Provider   Wil Rader DO    Department, Room/Bed   [facility] TELEMETRY SDS OVERFLOW, T08       Discharge Date       Discharge Disposition       Discharge Destination                                 Attending Provider: Wil Rader DO    Allergies: Penicillins, Clonidine Derivatives, Hydralazine, Sulfa Antibiotics    Isolation: None   Infection: None   Code Status: CPR    Ht: 160 cm (62.99\")   Wt: 70.5 kg (155 lb 6.8 oz)    Admission Cmt: None   Principal Problem: Pulmonary emboli [I26.99]                   Active Insurance as of 2025       Primary Coverage       Payor Plan Insurance Group Employer/Plan Group    ANTHEM MEDICARE REPLACEMENT ANTHEM MEDICARE ADVANTAGE SNP [id number]       Payor Plan Address Payor Plan Phone Number Payor Plan Fax Number Effective Dates    [address] [phone]  2025 - None Entered    [facility] [id number]         Subscriber Name Subscriber Birth Date Member ID       VALARIE BARAJAS 1945 [id number]                     Emergency Contacts        (Rel.) Home Phone Work Phone Mobile Phone    Cleo Barajas (Daughter) [phone] -- --    JENNELROY (Daughter) [phone] -- --                 History & Physical        Aki Rodriguez DO at 25 1725            [facility] HOSPITALIST   HISTORY AND PHYSICAL      Name:  Valarie Barajas   Age:  79 y.o.  Sex:  female  :  1945  MRN:  " [id number]   Visit Number:  [id number]  Admission Date:  2/28/2025  Date Of Service:  02/28/25  Primary Care Physician:  Lay Cox MD    Chief Complaint:     Generalized weakness, dyspnea    History Of Presenting Illness:      Patient is an 79-year-old female brought to the emergency department with her daughter with generalized weakness and dyspnea complaints.  Patient has a known history of dementia, unfortunately poor historian.  History of present illness was obtained from discussion with daughter at bedside.  This has reportedly been ongoing for weeks duration.  The patient has reportedly been moaning and groaning in pain throughout most of the evening the last few days.  She was recently seen in her PCPs office earlier this week, diagnosed with dementia at that time.  The patient has reportedly been more bedfast, complaining of bilateral feet pain, numbness, tingling due to her neuropathy.  Patient is unfortunate unable to be taken care of adequately at home, and ultimately brought into the emergency department for evaluation.    In the emergency department, laboratory workup was unremarkable.  CT abdomen pelvis with and without contrast did not show any acute process.  CTA of the chest showed a small single tubular defect of the right lower lobe pulmonary artery suggesting PE.  Patient started on heparin drip in the ED.  Hospitalist services consulted for admission.    Review Of Systems:    All systems were reviewed and negative except as mentioned in history of presenting illness, assessment and plan.    Past Medical History: Patient  has a past medical history of Acute bronchitis with bronchospasm, Anxiety, Arthritis, Asthma, B12 deficiency, Back pain, Body piercing, Cataract, Cerebrovascular disease, Cervicalgia, Chronic kidney disease, Colonic polyp, Constipation, COPD (chronic obstructive pulmonary disease), Coronary artery disease, Depression, Diverticulitis, Dysphagia, Edema,  Elevated cholesterol, Esophageal reflux, Folic acid deficiency, Full dentures, Heart murmur, Herpes zoster, High cholesterol, History of kidney infection, History of recurrent urinary tract infection, HTN (hypertension), Hypercholesterolemia, Impaired functional mobility, balance, gait, and endurance, Insomnia, Kidney infection, Malignant hypertension (04/26/2015), Measles, Muscle spasm, Neoplasm of uncertain behavior of skin, Osteoporosis, Poor historian, Recurrent urinary tract infection, Rheumatoid arthritis, RLS (restless legs syndrome), Stomach ulcer, Stroke, Type 2 diabetes mellitus, and Vitamin D deficiency.    Past Surgical History: Patient  has a past surgical history that includes Hysterectomy (1979); Cataract extraction w/  intraocular lens implant (Left, 02/11/2013); Cataract extraction w/  intraocular lens implant (Right, 04/15/2013); Colonoscopy (2012); Esophagogastroduodenoscopy (N/A, 11/13/2017); Upper gastrointestinal endoscopy (12/09/2013); Upper gastrointestinal endoscopy (11/13/2017); Esophagogastroduodenoscopy (N/A, 11/25/2019); Colonoscopy (N/A, 11/26/2019); Esophagogastroduodenoscopy (N/A, 11/29/2021); Esophagogastroduodenoscopy (N/A, 11/1/2023); and Esophagogastroduodenoscopy (N/A, 1/27/2025).    Social History: Patient  reports that she quit smoking about 13 years ago. Her smoking use included cigarettes. She started smoking about 28 years ago. She has a 15 pack-year smoking history. She has been exposed to tobacco smoke. She has never used smokeless tobacco. She reports that she does not currently use alcohol. She reports that she does not use drugs.    Family History:  Patient's family history has been reviewed and found to be noncontributory.     Allergies:      Penicillins, Clonidine derivatives, Hydralazine, and Sulfa antibiotics    Home Medications:    Prior to Admission Medications       Prescriptions Last Dose Informant Patient Reported? Taking?    acetaminophen (TYLENOL) 325 MG  tablet  Child Yes No    Take 1 tablet by mouth Every 6 (Six) Hours As Needed for Headache or Mild Pain. Indications: Pain    atorvastatin (LIPITOR) 80 MG tablet  Child No No    Take 1 tablet by mouth Daily.    carvedilol (COREG) 25 MG tablet  Child No No    Take 1 tablet by mouth 2 (Two) Times a Day With Meals.    CHOLECALCIFEROL PO  Child Yes No    Take 5,000 Int'l Units/day by mouth Daily. Indications: Vitamin D Deficiency    Easy Touch Pen Needles 31G X 8 MM misc  Child Yes No    Indications: Diabetes    ferrous sulfate 325 (65 FE) MG tablet  Child Yes No    Take 1 tablet by mouth 3 (Three) Times a Week. Indications: Iron Deficiency, Restless Leg Syndrome    glucose blood test strip  Child No No    Check sugar once a day    Patient taking differently:  1 each by Other route As Needed (FBS). Check sugar once a day    glucose monitor monitoring kit  Child No No    1 each Daily.    insulin lispro (humaLOG) 100 UNIT/ML injection  Child Yes No    Inject 2 Units under the skin into the appropriate area as directed 3 (Three) Times a Day As Needed for High Blood Sugar. Daughter only gives if BS is greater than 200 two hrs after eating.    Indications: Type 2 Diabetes    Insulin Pen Needle 31G X 5 MM misc  Child No No    Inject insulin three times daily    isosorbide mononitrate (IMDUR) 60 MG 24 hr tablet  Child Yes No    Take 1 tablet by mouth Daily. Indications: Stable Angina Pectoris    Lancets 30G misc  Child No No    Check sugar daily    linagliptin (TRADJENTA) 5 MG tablet tablet  Child No No    Take 1 tablet by mouth Daily.    losartan-hydrochlorothiazide (HYZAAR) 100-25 MG per tablet  Child No No    Take 1 tablet by mouth Daily.    Patient taking differently:  Take 1 tablet by mouth Daily.    NIFEdipine XL (PROCARDIA XL) 90 MG 24 hr tablet  Child Yes No    Take 1 tablet by mouth Daily. Indications: High Blood Pressure    pantoprazole (PROTONIX) 40 MG EC tablet  Child Yes No    Take 1 tablet by mouth Daily.     traZODone (DESYREL) 50 MG tablet  Child Yes No    Take 1 tablet by mouth At Night As Needed. Indications: Trouble Sleeping          ED Medications:    Medications   sodium chloride 0.9 % flush 10 mL (has no administration in time range)   heparin 69331 units/250 ml (100 units/ml) in D5W (18 Units/hr Intravenous New Bag 2/28/25 5482)   Pharmacy to Dose Heparin (has no administration in time range)   sodium chloride 0.9 % flush 10 mL (has no administration in time range)   sodium chloride 0.9 % flush 10 mL (has no administration in time range)   sodium chloride 0.9 % infusion 40 mL (has no administration in time range)   apixaban (ELIQUIS) tablet 10 mg (has no administration in time range)     Followed by   apixaban (ELIQUIS) tablet 5 mg (has no administration in time range)   nitroglycerin (NITROSTAT) SL tablet 0.4 mg (has no administration in time range)   Potassium Replacement - Follow Nurse / BPA Driven Protocol (has no administration in time range)   Magnesium Cardiology Dose Replacement - Follow Nurse / BPA Driven Protocol (has no administration in time range)   Phosphorus Replacement - Follow Nurse / BPA Driven Protocol (has no administration in time range)   Calcium Replacement - Follow Nurse / BPA Driven Protocol (has no administration in time range)   sennosides-docusate (PERICOLACE) 8.6-50 MG per tablet 2 tablet (has no administration in time range)     And   polyethylene glycol (MIRALAX) packet 17 g (has no administration in time range)     And   bisacodyl (DULCOLAX) EC tablet 5 mg (has no administration in time range)     And   bisacodyl (DULCOLAX) suppository 10 mg (has no administration in time range)   acetaminophen (TYLENOL) tablet 650 mg (has no administration in time range)     Or   acetaminophen (TYLENOL) 160 MG/5ML oral solution 650 mg (has no administration in time range)     Or   acetaminophen (TYLENOL) suppository 650 mg (has no administration in time range)   ondansetron (ZOFRAN) injection 4  "mg (has no administration in time range)   cefTRIAXone (ROCEPHIN) 1,000 mg in sodium chloride 0.9 % 100 mL IVPB-VTB (has no administration in time range)   iopamidol (ISOVUE-300) 61 % injection 100 mL (100 mL Intravenous Given 2/28/25 1441)   heparin (porcine) injection 6,160 Units (6,160 Units Intravenous Given 2/28/25 1656)     Vital Signs:  Temp:  [97.5 °F (36.4 °C)] 97.5 °F (36.4 °C)  Heart Rate:  [78-97] 80  Resp:  [14-16] 14  BP: (131-142)/(67-75) 131/68        02/28/25  1152   Weight: 77 kg (169 lb 12.1 oz)     Body mass index is 30.07 kg/m².    Physical Exam:     Most recent vital Signs: /68   Pulse 80   Temp 97.5 °F (36.4 °C) (Oral)   Resp 14   Ht 160 cm (63\")   Wt 77 kg (169 lb 12.1 oz)   SpO2 97%   BMI 30.07 kg/m²     Physical Exam  Constitutional: Awake, alert.  Uncomfortable appearing.  Eyes: PERRLA, sclerae anicteric, no conjunctival injection  HENT: NCAT, mucous membranes moist  Neck: Supple, no thyromegaly, no lymphadenopathy, trachea midline  Respiratory: Clear to auscultation bilaterally, nonlabored respirations   Cardiovascular: RRR, no murmurs, rubs, or gallops, palpable pedal pulses bilaterally  Gastrointestinal: Positive bowel sounds, soft, nontender, nondistended  Musculoskeletal: No bilateral ankle edema, no clubbing or cyanosis to extremities  Psychiatric: Appropriate affect, cooperative  Neurologic: Oriented x 1, strength symmetric in all extremities, Cranial Nerves grossly intact to confrontation, speech clear.  Very sensitive to touch diffusely, winces in pain with even light touch.  Skin: No rashes     Laboratory data:    I have reviewed the labs done in the emergency room.    Results from last 7 days   Lab Units 02/28/25  1200   SODIUM mmol/L 138   POTASSIUM mmol/L 4.0   CHLORIDE mmol/L 103   CO2 mmol/L 22.5   BUN mg/dL 32*   CREATININE mg/dL 1.74*   CALCIUM mg/dL 9.1   BILIRUBIN mg/dL 0.4   ALK PHOS U/L 54   ALT (SGPT) U/L 7   AST (SGOT) U/L 14   GLUCOSE mg/dL 218* "     Results from last 7 days   Lab Units 02/28/25  1200   WBC 10*3/mm3 6.62   HEMOGLOBIN g/dL 13.1   HEMATOCRIT % 38.2   PLATELETS 10*3/mm3 299     Results from last 7 days   Lab Units 02/28/25  1200   INR  0.91     Results from last 7 days   Lab Units 02/28/25  1307 02/28/25  1200   HSTROP T ng/L 61* 60*     Results from last 7 days   Lab Units 02/28/25  1545   COLOR UA  Yellow   GLUCOSE UA  Negative   KETONES UA  Negative   BLOOD UA  Negative   LEUKOCYTES UA  Negative   PH, URINE  6.0   BILIRUBIN UA  Negative   UROBILINOGEN UA  0.2 E.U./dL   RBC UA /HPF None Seen   WBC UA /HPF None Seen     Radiology:    CT Angiogram Chest Pulmonary Embolism    Result Date: 2/28/2025  PROCEDURE: CT ANGIOGRAM CHEST PULMONARY EMBOLISM-  HISTORY: Leg swelling, SOA, weakness  COMPARISON: None.  TECHNIQUE: The patient was injected with  IV contrast. Axial images were obtained through the chest in a CTA/ PE protocol. 3 D Reconstruction images were also performed. This study was performed with techniques to keep radiation doses as low as reasonably achievable, (ALARA). Individualized dose reduction techniques using automated exposure control or adjustment of mA and/or kV according to the patient size were employed.  FINDINGS: Inhomogeneous thyroid noted. There is no axillary adenopathy. There is mildly prominent subcarinal lymph nodes, etiology unclear. The heart is mildly enlarged. There is a mild-to-moderate pericardial effusion but no pleural effusions. There is a single, small tubular defect in the the right lower lobe pulmonary artery identified on series 12 image 54, series 7 image 44, series 11 image 59, and series 10 image 45. A very small solitary pulmonary embolus is not excluded. There is no evidence of thoracic aortic aneurysm or dissection. Limited images of the upper abdomen partially demonstrate bilateral renal cysts. Moderate hiatal hernia noted. No suspicious infiltrate or nodule is identified. There are linear densities  in the left lower lobe consistent with atelectasis or scar. No areas of consolidation seen. Vascular calcifications noted.      1. A single, small, tubular defect in the right lower lobe pulmonary artery as described, suggesting small pulmonary embolus. Recommend repeat study after treatment to document resolution.  2. Cardiomegaly and pericardial effusion with no prior studies for comparison.   CTDI: 7.82 mGy DLP:448.04 mGy.cm   This report was signed and finalized on 2/28/2025 3:49 PM by Genna Walker MD.      CT Abdomen Pelvis With Contrast    Result Date: 2/28/2025  PROCEDURE: CT ABDOMEN PELVIS W CONTRAST-  HISTORY:  abdominal pain, generalized weakness  COMPARISON: February 8, 2025.  TECHNIQUE: Multiple axial CT images were obtained from the lung bases through the pubic symphysis following the administration of Isovue 300 contrast.   FINDINGS:  ABDOMEN: A hiatal hernia is stable. The liver is normal. The spleen is unremarkable. No adrenal mass is present.  The pancreas is normal. There are several well-circumscribed hypodense renal cysts bilaterally. The aorta is normal in caliber. There are vascular calcifications. There is no free fluid or adenopathy.  PELVIS: The appendix is not identified. There are no secondary signs to suggest appendicitis. There is colonic diverticulosis. There is no evidence of acute diverticulitis. There is no evidence of bowel obstruction. The uterus is diminutive or absent. The urinary bladder is unremarkable. There is no significant free fluid or adenopathy.      No evidence of acute intra-abdominal process.  Stable chronic changes as above.   CTDI: 7.82 mGy DLP: 448.04 mGy.cm   This study was performed with techniques to keep radiation doses as low as reasonably achievable (ALARA). Individualized dose reduction techniques using automated exposure control or adjustment of mA and/or kV according to the patient size were employed.      Images were reviewed, interpreted, and dictated by  Dr. Genna Walker MD Transcribed by Lisa Lyons PA-C.  This report was signed and finalized on 2/28/2025 3:22 PM by Genna Walker MD.      XR Chest 1 View    Result Date: 2/28/2025  PROCEDURE: XR CHEST 1 VW-  HISTORY: Weak/Dizzy/AMS triage protocol, weakness for 2 to 3 weeks.  COMPARISON: 4/8/2025..  FINDINGS: The heart is mildly enlarged, stable.. The lungs are clear. The mediastinum is unremarkable. There is no pneumothorax.  There are no acute osseous abnormalities.      Cardiomegaly without acute infiltrate..      This report was signed and finalized on 2/28/2025 1:02 PM by Genna Walker MD.       Assessment:    Acute pulmonary embolus  Urinary tract infection  Dementia  COPD  Hypertension  Hyperlipidemia  Restless leg syndrome    Plan:    Acute pulmonary embolus  Lower extremity edema  Pericardial effusion  Patient is not tachycardic, not requiring oxygen at this time.  Heparin drip started in the ED.  Transition to Eliquis in a.m., that is oral option patient can take at home.  Telemetry.  2D echo for pericardial effusion evaluation of lower extremity edema.  Does not appear to be in acute congestive heart failure.  Urinary tract infection  Patient is not septic.    IV Rocephin.  Dementia  PT/OT consults for functional eval  Case management consult for possible rehab versus long-term care placement.  Patient's daughter, who is co-power of  with her sibling, to discuss longitudinal care options.  COPD  Hypertension  Hyperlipidemia  Restless leg syndrome    Risk Assessment: High  DVT Prophylaxis: Heparin  Code Status: Full code  Diet: N.p.o. dysphagia screening    Aki Rodriguez DO  02/28/25  17:25 EST    Dictated utilizing Dragon dictation.    Electronically signed by Aki Rodriguez DO at 02/28/25 1736          Emergency Department Notes        Sho Spain PA-C at 02/28/25 1640        Procedure Orders    1. Critical Care [[id number]] ordered by Sho Spain PA-C                "  Subjective:  Chief Complaint:  Abdominal pain, weakness, SOA    History of Present Illness:  Patient is an 79-year-old female with history of anxiety, bronchitis, cerebrovascular disease, chronic kidney disease, COPD, CAD, depression, diverticulitis, among others presenting to the ER with complaints of abdominal pain, weakness, shortness of breath.  Patient is a difficult historian and much of the history was obtained from the daughter.  Patient states she has gotten weak to the point where she can barely talk.  States that she feels like she is dying.  Daughter states that she has progressively declined over the last 3 weeks and gotten much worse over the last 2 to 3 days.  She states that she normally takes care of the patient at home but does not feel she can take care of her in this condition.  States that the patient is extremely weak.      Nurses Notes reviewed and agree, including vitals, allergies, social history and prior medical history.     REVIEW OF SYSTEMS: All systems reviewed and not pertinent unless noted.  Review of Systems   Respiratory:  Positive for shortness of breath.    Gastrointestinal:  Positive for abdominal pain.   Neurological:  Positive for weakness.   All other systems reviewed and are negative.      Past Medical History:   Diagnosis Date    Acute bronchitis with bronchospasm     Anxiety     Arthritis     Asthma     B12 deficiency     Back pain     Body piercing     ears    Cataract     Cerebrovascular disease     Cervicalgia     Chronic kidney disease     stage 3b    Colonic polyp     History of colonic polyps     Constipation     COPD (chronic obstructive pulmonary disease)     Coronary artery disease     Depression     Diverticulitis     Dysphagia     Patient reported \"it won't go all the way down\" when eating solid foods first thing in the mornings    Edema     Elevated cholesterol     Esophageal reflux     Folic acid deficiency     Full dentures     Advised no adhesives DOS    " Heart murmur     Herpes zoster     High cholesterol     History of kidney infection     History of recurrent urinary tract infection     HTN (hypertension)     Hypercholesterolemia     Impaired functional mobility, balance, gait, and endurance     Insomnia     Kidney infection     Malignant hypertension 04/26/2015     Accelerated essential hypertension    Measles     rubeola    Muscle spasm     Neoplasm of uncertain behavior of skin     dtr denies    Osteoporosis     Poor historian     Recurrent urinary tract infection     Rheumatoid arthritis     RLS (restless legs syndrome)     Stomach ulcer     Stroke     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    Type 2 diabetes mellitus     Vitamin D deficiency        Allergies:    Penicillins, Clonidine derivatives, Hydralazine, and Sulfa antibiotics      Past Surgical History:   Procedure Laterality Date    CATARACT EXTRACTION WITH INTRAOCULAR LENS IMPLANT Left 02/11/2013    CATARACT EXTRACTION WITH INTRAOCULAR LENS IMPLANT Right 04/15/2013    COLONOSCOPY  2012    COLONOSCOPY N/A 11/26/2019    Procedure: COLONOSCOPY;  Surgeon: Chidi Downing MD;  Location:  [facility];  Service: Gastroenterology    ENDOSCOPY N/A 11/13/2017    Procedure: ESOPHAGOGASTRODUODENOSCOPY with biopsies and esophageal balloon dilitation;  Surgeon: Wesley Stovall MD;  Location: [facility] ENDOSCOPY;  Service:     ENDOSCOPY N/A 11/25/2019    Procedure: ESOPHAGOGASTRODUODENOSCOPY;  Surgeon: Chidi Downing MD;  Location:  [facility];  Service: Gastroenterology    ENDOSCOPY N/A 11/29/2021    Procedure: ESOPHAGOGASTRODUODENOSCOPY with dilation and biopsies;  Surgeon: Gonzalez Zapata MD;  Location: [facility] ENDOSCOPY;  Service: Gastroenterology;  Laterality: N/A;    ENDOSCOPY N/A 11/1/2023    Procedure: ESOPHAGOGASTRODUODENOSCOPY WITH BIOPSY AND DILATATION;  Surgeon: Gonzalez Zapata MD;  Location: [facility] ENDOSCOPY;  Service: Gastroenterology;   "Laterality: N/A;    ENDOSCOPY N/A 2025    Procedure: Esophagogastroduodenoscopy with dilatation and biopsies;  Surgeon: Gonzalez Zapata MD;  Location: [facility] ENDOSCOPY;  Service: Gastroenterology;  Laterality: N/A;    HYSTERECTOMY  1979    UPPER GASTROINTESTINAL ENDOSCOPY  2013    UPPER GASTROINTESTINAL ENDOSCOPY  2017         Social History     Socioeconomic History    Marital status:    Tobacco Use    Smoking status: Former     Current packs/day: 0.00     Average packs/day: 1 pack/day for 15.0 years (15.0 ttl pk-yrs)     Types: Cigarettes     Start date: 1997     Quit date:      Years since quittin.1     Passive exposure: Past    Smokeless tobacco: Never    Tobacco comments:      Denied: History of smoking cigarettes   Vaping Use    Vaping status: Never Used   Substance and Sexual Activity    Alcohol use: Not Currently    Drug use: Never    Sexual activity: Defer         Family History   Problem Relation Age of Onset    Arthritis Mother     Diabetes Mother     Hypertension Mother     Arthritis Other     Arthritis Other     Diabetes Other         DM    Hypertension Other     Colon cancer Neg Hx        Objective  Physical Exam:  /68   Pulse 80   Temp 97.5 °F (36.4 °C) (Oral)   Resp 14   Ht 160 cm (63\")   Wt 77 kg (169 lb 12.1 oz)   SpO2 97%   BMI 30.07 kg/m²      Physical Exam  Vitals and nursing note reviewed.   Constitutional:       General: She is not in acute distress.     Appearance: She is not toxic-appearing.   HENT:      Head: Normocephalic and atraumatic.      Right Ear: External ear normal.      Left Ear: External ear normal.      Nose: Nose normal.   Eyes:      Extraocular Movements: Extraocular movements intact.      Conjunctiva/sclera: Conjunctivae normal.   Cardiovascular:      Rate and Rhythm: Normal rate.   Pulmonary:      Effort: Pulmonary effort is normal. No respiratory distress.   Abdominal:      General: There is no distension.      " Palpations: Abdomen is soft.      Tenderness: There is abdominal tenderness. There is no guarding or rebound.   Musculoskeletal:         General: Normal range of motion.      Cervical back: Normal range of motion and neck supple.   Skin:     General: Skin is warm and dry.   Neurological:      General: No focal deficit present.      Mental Status: She is alert and oriented to person, place, and time.   Psychiatric:         Mood and Affect: Mood normal.         Behavior: Behavior normal.         Critical Care    Performed by: Sho Spain PA-C  Authorized by: Memo Bryant MD    Critical care provider statement:     Critical care time (minutes):  32    Critical care was necessary to treat or prevent imminent or life-threatening deterioration of the following conditions:  Circulatory failure    Critical care was time spent personally by me on the following activities:  Development of treatment plan with patient or surrogate, discussions with consultants, discussions with primary provider, evaluation of patient's response to treatment, examination of patient, obtaining history from patient or surrogate, ordering and performing treatments and interventions, ordering and review of laboratory studies, ordering and review of radiographic studies, pulse oximetry, re-evaluation of patient's condition and review of old charts    Care discussed with: admitting provider        ED Course:         Lab Results (last 24 hours)       Procedure Component Value Units Date/Time    CBC & Differential [[id number]]  (Abnormal) Collected: 02/28/25 1200    Specimen: Blood Updated: 02/28/25 1211    Narrative:      The following orders were created for panel order CBC & Differential.  Procedure                               Abnormality         Status                     ---------                               -----------         ------                     CBC Auto Differential[[id number]]        Abnormal            Final result                  Please view results for these tests on the individual orders.    Comprehensive Metabolic Panel [[id number]]  (Abnormal) Collected: 02/28/25 1200    Specimen: Blood Updated: 02/28/25 1227     Glucose 218 mg/dL      Comment: Glucose >180, Hemoglobin A1C recommended.        BUN 32 mg/dL      Creatinine 1.74 mg/dL      Sodium 138 mmol/L      Potassium 4.0 mmol/L      Chloride 103 mmol/L      CO2 22.5 mmol/L      Calcium 9.1 mg/dL      Total Protein 6.7 g/dL      Albumin 3.9 g/dL      ALT (SGPT) 7 U/L      AST (SGOT) 14 U/L      Alkaline Phosphatase 54 U/L      Total Bilirubin 0.4 mg/dL      Globulin 2.8 gm/dL      A/G Ratio 1.4 g/dL      BUN/Creatinine Ratio 18.4     Anion Gap 12.5 mmol/L      eGFR 29.4 mL/min/1.73     Narrative:      GFR Categories in Chronic Kidney Disease (CKD)      GFR Category          GFR (mL/min/1.73)    Interpretation  G1                     90 or greater         Normal or high (1)  G2                      60-89                Mild decrease (1)  G3a                   45-59                Mild to moderate decrease  G3b                   30-44                Moderate to severe decrease  G4                    15-29                Severe decrease  G5                    14 or less           Kidney failure          (1)In the absence of evidence of kidney disease, neither GFR category G1 or G2 fulfill the criteria for CKD.    eGFR calculation 2021 CKD-EPI creatinine equation, which does not include race as a factor    High Sensitivity Troponin T [[id number]]  (Abnormal) Collected: 02/28/25 1200    Specimen: Blood Updated: 02/28/25 1244     HS Troponin T 60 ng/L     Narrative:      High Sensitive Troponin T Reference Range:  <14.0 ng/L- Negative Female for AMI  <22.0 ng/L- Negative Male for AMI  >=14 - Abnormal Female indicating possible myocardial injury.  >=22 - Abnormal Male indicating possible myocardial injury.   Clinicians would have to utilize clinical acumen, EKG, Troponin, and serial changes  to determine if it is an Acute Myocardial Infarction or myocardial injury due to an underlying chronic condition.         Magnesium [[id number]]  (Normal) Collected: 02/28/25 1200    Specimen: Blood Updated: 02/28/25 1227     Magnesium 1.7 mg/dL     CBC Auto Differential [[id number]]  (Abnormal) Collected: 02/28/25 1200    Specimen: Blood Updated: 02/28/25 1211     WBC 6.62 10*3/mm3      RBC 4.50 10*6/mm3      Hemoglobin 13.1 g/dL      Hematocrit 38.2 %      MCV 84.9 fL      MCH 29.1 pg      MCHC 34.3 g/dL      RDW 14.0 %      RDW-SD 43.6 fl      MPV 10.1 fL      Platelets 299 10*3/mm3      Neutrophil % 53.5 %      Lymphocyte % 33.8 %      Monocyte % 9.8 %      Eosinophil % 1.7 %      Basophil % 0.6 %      Immature Grans % 0.6 %      Neutrophils, Absolute 3.54 10*3/mm3      Lymphocytes, Absolute 2.24 10*3/mm3      Monocytes, Absolute 0.65 10*3/mm3      Eosinophils, Absolute 0.11 10*3/mm3      Basophils, Absolute 0.04 10*3/mm3      Immature Grans, Absolute 0.04 10*3/mm3      nRBC 0.0 /100 WBC     Protime-INR [[id number]]  (Normal) Collected: 02/28/25 1200    Specimen: Blood Updated: 02/28/25 1608     Protime 12.7 Seconds      INR 0.91    Narrative:      Suggested INR therapeutic range for stable oral anticoagulant therapy:    Low Intensity therapy:   1.5-2.0  Moderate Intensity therapy:   2.0-3.0  High Intensity therapy:   2.5-4.0    COVID-19, FLU A/B, RSV PCR 1 HR TAT - Swab, Nasopharynx [[id number]]  (Normal) Collected: 02/28/25 1201    Specimen: Swab from Nasopharynx Updated: 02/28/25 1247     COVID19 Not Detected     Influenza A PCR Not Detected     Influenza B PCR Not Detected     RSV, PCR Not Detected    Narrative:      Fact sheet for providers: https://www.fda.gov/media/681193/download    Fact sheet for patients: https://www.fda.gov/media/493641/download    Test performed by PCR.    High Sensitivity Troponin T 1Hr [[id number]]  (Abnormal) Collected: 02/28/25 1307    Specimen: Blood Updated: 02/28/25 1351     HS  Troponin T 61 ng/L      Troponin T Numeric Delta 1 ng/L      Troponin T % Delta 2    Narrative:      High Sensitive Troponin T Reference Range:  <14.0 ng/L- Negative Female for AMI  <22.0 ng/L- Negative Male for AMI  >=14 - Abnormal Female indicating possible myocardial injury.  >=22 - Abnormal Male indicating possible myocardial injury.   Clinicians would have to utilize clinical acumen, EKG, Troponin, and serial changes to determine if it is an Acute Myocardial Infarction or myocardial injury due to an underlying chronic condition.         Urinalysis With Microscopic If Indicated (No Culture) - Urine, Clean Catch [[id number]]  (Abnormal) Collected: 02/28/25 1545    Specimen: Urine, Clean Catch Updated: 02/28/25 1556     Color, UA Yellow     Appearance, UA Clear     pH, UA 6.0     Specific Gravity, UA 1.018     Glucose, UA Negative     Ketones, UA Negative     Bilirubin, UA Negative     Blood, UA Negative     Protein,  mg/dL (2+)     Leuk Esterase, UA Negative     Nitrite, UA Negative     Urobilinogen, UA 0.2 E.U./dL    Urinalysis, Microscopic Only - Urine, Clean Catch [[id number]]  (Abnormal) Collected: 02/28/25 1545    Specimen: Urine, Clean Catch Updated: 02/28/25 1610     RBC, UA None Seen /HPF      WBC, UA None Seen /HPF      Bacteria, UA 1+ /HPF      Squamous Epithelial Cells, UA 3-6 /HPF      Hyaline Casts, UA None Seen /LPF      Methodology Manual Light Microscopy    Heparin Anti-Xa [[id number]] Collected: 02/28/25 1628    Specimen: Blood Updated: 02/28/25 1631    aPTT [[id number]] Collected: 02/28/25 1628    Specimen: Blood Updated: 02/28/25 1631             CT Angiogram Chest Pulmonary Embolism    Result Date: 2/28/2025  PROCEDURE: CT ANGIOGRAM CHEST PULMONARY EMBOLISM-  HISTORY: Leg swelling, SOA, weakness  COMPARISON: None.  TECHNIQUE: The patient was injected with  IV contrast. Axial images were obtained through the chest in a CTA/ PE protocol. 3 D Reconstruction images were also performed. This  study was performed with techniques to keep radiation doses as low as reasonably achievable, (ALARA). Individualized dose reduction techniques using automated exposure control or adjustment of mA and/or kV according to the patient size were employed.  FINDINGS: Inhomogeneous thyroid noted. There is no axillary adenopathy. There is mildly prominent subcarinal lymph nodes, etiology unclear. The heart is mildly enlarged. There is a mild-to-moderate pericardial effusion but no pleural effusions. There is a single, small tubular defect in the the right lower lobe pulmonary artery identified on series 12 image 54, series 7 image 44, series 11 image 59, and series 10 image 45. A very small solitary pulmonary embolus is not excluded. There is no evidence of thoracic aortic aneurysm or dissection. Limited images of the upper abdomen partially demonstrate bilateral renal cysts. Moderate hiatal hernia noted. No suspicious infiltrate or nodule is identified. There are linear densities in the left lower lobe consistent with atelectasis or scar. No areas of consolidation seen. Vascular calcifications noted.      Impression: 1. A single, small, tubular defect in the right lower lobe pulmonary artery as described, suggesting small pulmonary embolus. Recommend repeat study after treatment to document resolution.  2. Cardiomegaly and pericardial effusion with no prior studies for comparison.   CTDI: 7.82 mGy DLP:448.04 mGy.cm   This report was signed and finalized on 2/28/2025 3:49 PM by Genna Walker MD.      CT Abdomen Pelvis With Contrast    Result Date: 2/28/2025  PROCEDURE: CT ABDOMEN PELVIS W CONTRAST-  HISTORY:  abdominal pain, generalized weakness  COMPARISON: February 8, 2025.  TECHNIQUE: Multiple axial CT images were obtained from the lung bases through the pubic symphysis following the administration of Isovue 300 contrast.   FINDINGS:  ABDOMEN: A hiatal hernia is stable. The liver is normal. The spleen is unremarkable. No  adrenal mass is present.  The pancreas is normal. There are several well-circumscribed hypodense renal cysts bilaterally. The aorta is normal in caliber. There are vascular calcifications. There is no free fluid or adenopathy.  PELVIS: The appendix is not identified. There are no secondary signs to suggest appendicitis. There is colonic diverticulosis. There is no evidence of acute diverticulitis. There is no evidence of bowel obstruction. The uterus is diminutive or absent. The urinary bladder is unremarkable. There is no significant free fluid or adenopathy.      Impression: No evidence of acute intra-abdominal process.  Stable chronic changes as above.   CTDI: 7.82 mGy DLP: 448.04 mGy.cm   This study was performed with techniques to keep radiation doses as low as reasonably achievable (ALARA). Individualized dose reduction techniques using automated exposure control or adjustment of mA and/or kV according to the patient size were employed.      Images were reviewed, interpreted, and dictated by Dr. Genna Walker MD Transcribed by Lisa Lyons PA-C.  This report was signed and finalized on 2/28/2025 3:22 PM by Genna Walker MD.      XR Chest 1 View    Result Date: 2/28/2025  PROCEDURE: XR CHEST 1 VW-  HISTORY: Weak/Dizzy/AMS triage protocol, weakness for 2 to 3 weeks.  COMPARISON: 4/8/2025..  FINDINGS: The heart is mildly enlarged, stable.. The lungs are clear. The mediastinum is unremarkable. There is no pneumothorax.  There are no acute osseous abnormalities.      Impression: Cardiomegaly without acute infiltrate..      This report was signed and finalized on 2/28/2025 1:02 PM by Genna Walker MD.          MDM     Amount and/or Complexity of Data Reviewed  Decide to obtain previous medical records or to obtain history from someone other than the patient: yes      Patient evaluated in the ER for abdominal pain, shortness of breath, generalized weakness over the last 3 weeks that is specifically worsened over the  last 2 to 3 days.  Daughter also endorses bilateral lower extremity swelling.  Patient is hemodynamically stable, afebrile, nontoxic-appearing on exam.  Alert and oriented but difficult historian that she states she can barely talk, mumbling, difficult to understand.  Differential diagnosis includes was not limited to colitis, small bowel obstruction, ACS, cardiac arrhythmia, PE, among others.  Initial plan includes CBC, CMP, magnesium, troponins, COVID/flu/RSV swab, urinalysis, CT PE protocol and CT abdomen pelvis with contrast.    Labs remarkable for chronically elevated troponins of 60 that did increase to 61.  COVID, flu, RSV negative.  Labs largely unremarkable.  CMP is consistent with CKD with creatinine of 1.74 and GFR of 29.4.  CT abdomen pelvis reveals no acute process.  CT chest reveals cardiomegaly, pericardial effusion, and small PE.  Discussed with hospitalist requesting admission.  They came to the ED and evaluated the patient and have accepted her for admission for further evaluation and management.    Final diagnoses:   Acute pulmonary embolism without acute cor pulmonale, unspecified pulmonary embolism type   Cardiomegaly   Pericardial effusion   Weakness   Decreased mobility          Sho Spain PA-C  02/28/25 1645       Sho Spain PA-C  02/28/25 1646      Electronically signed by Sho Spain PA-C at 02/28/25 1646       Vital Signs (last day)       Date/Time Temp Temp src Pulse Resp BP Patient Position SpO2    03/02/25 0946 -- -- -- -- 137/69 -- --    03/02/25 0914 -- -- -- -- 205/91 -- --    03/02/25 0902 98 (36.7) Oral -- 20 221/114 Lying --    03/02/25 0900 -- -- 81 -- 221/114 -- --    03/02/25 0348 98.6 (37) Oral 85 18 174/92 Lying 97    03/02/25 0028 -- -- -- -- 178/71 -- --    03/01/25 2325 -- -- 88 -- 186/85 -- 96    03/01/25 2315 98.7 (37.1) Oral 77 16 183/95 Lying 94    03/1945 98.6 (37) Oral 93 16 170/72 Lying 97    03/01/25 1833 97.8 (36.6) Oral 81 18 178/98 Lying  95    03/01/25 1215 98.4 (36.9) Oral 77 18 130/64 Lying 95    03/01/25 0800 98.1 (36.7) Oral 94 18 194/85 Lying 98    03/01/25 0415 98 (36.7) Oral 81 15 169/81 Lying 98          Current Facility-Administered Medications   Medication Dose Route Frequency Provider Last Rate Last Admin    acetaminophen (TYLENOL) tablet 650 mg  650 mg Oral Q4H PRN Aki Rodriguez DO   650 mg at 03/02/25 0347    Or    acetaminophen (TYLENOL) 160 MG/5ML oral solution 650 mg  650 mg Oral Q4H PRN Aki Rodriguez DO        Or    acetaminophen (TYLENOL) suppository 650 mg  650 mg Rectal Q4H PRN Aki Rodriguez DO        apixaban (ELIQUIS) tablet 10 mg  10 mg Oral Q12H Aki Rodriguez DO   10 mg at 03/02/25 0903    Followed by    [START ON 3/8/2025] apixaban (ELIQUIS) tablet 5 mg  5 mg Oral Q12H Aki Rodriguez DO        atorvastatin (LIPITOR) tablet 80 mg  80 mg Oral Daily Aki Rodriguez DO   80 mg at 03/02/25 0903    sennosides-docusate (PERICOLACE) 8.6-50 MG per tablet 2 tablet  2 tablet Oral BID PRN Aki Rodriguez DO        And    polyethylene glycol (MIRALAX) packet 17 g  17 g Oral Daily PRN Aki Rodriguez DO        And    bisacodyl (DULCOLAX) EC tablet 5 mg  5 mg Oral Daily PRN Aki Rodriguez DO        And    bisacodyl (DULCOLAX) suppository 10 mg  10 mg Rectal Daily PRN Aki Rodriguez DO        calcium carbonate (TUMS) chewable tablet 500 mg (200 mg elemental)  2 tablet Oral TID PRN Aki Rodriguez DO   2 tablet at 03/01/25 2111    Calcium Replacement - Follow Nurse / BPA Driven Protocol   Not Applicable PRN Aki Rodriguez DO        carvedilol (COREG) tablet 25 mg  25 mg Oral BID With Meals Aki Rodriguez DO   25 mg at 03/02/25 0903    dextrose (D50W) (25 g/50 mL) IV injection 25 g  25 g Intravenous Q15 Min PRN Wil Rader DO        dextrose (GLUTOSE) oral gel 15 g  15 g Oral Q15 Min PRN Wil Rader,         glucagon (GLUCAGEN) injection 1 mg  1 mg Intramuscular Q15 Min PRN Wil Rader,         Insulin Lispro  (humaLOG) injection 2-7 Units  2-7 Units Subcutaneous 4x Daily AC & at Bedtime Wil Rader DO   2 Units at 03/01/25 1446    labetalol (NORMODYNE,TRANDATE) injection 10 mg  10 mg Intravenous Q4H PRN Edgardo Burnette MD   10 mg at 03/01/25 2354    Magnesium Cardiology Dose Replacement - Follow Nurse / BPA Driven Protocol   Not Applicable PRN Aki Rodriguez DO        magnesium sulfate 2g/50 mL (PREMIX) infusion  2 g Intravenous Once Wil Rader DO   2 g at 03/02/25 0904    NIFEdipine XL (PROCARDIA XL) 24 hr tablet 90 mg  90 mg Oral Daily Aki Rodriguez DO   90 mg at 03/02/25 0907    nitroglycerin (NITROSTAT) SL tablet 0.4 mg  0.4 mg Sublingual Q5 Min PRN Aki Rodriguez DO        ondansetron (ZOFRAN) injection 4 mg  4 mg Intravenous Q6H PRN Aki Rodriguez DO   4 mg at 03/01/25 2111    pantoprazole (PROTONIX) EC tablet 40 mg  40 mg Oral Daily Aki Rodriguez DO   40 mg at 03/02/25 0904    Phosphorus Replacement - Follow Nurse / BPA Driven Protocol   Not Applicable PRN Aki Rodriguez DO        Potassium Replacement - Follow Nurse / BPA Driven Protocol   Not Applicable PRN Aki Rodriguez DO        sodium chloride 0.9 % flush 10 mL  10 mL Intravenous PRN Memo Bryant MD        sodium chloride 0.9 % flush 10 mL  10 mL Intravenous Q12H Aki Rodriguez DO   10 mL at 03/02/25 0904    sodium chloride 0.9 % flush 10 mL  10 mL Intravenous PRN Aki Rodriguez DO        sodium chloride 0.9 % infusion 40 mL  40 mL Intravenous PRN Aki Rodriguez DO        traZODone (DESYREL) tablet 50 mg  50 mg Oral Nightly PRN Aki Rodriguez DO   50 mg at 03/01/25 2111     Lab Results (last 24 hours)       Procedure Component Value Units Date/Time    Basic Metabolic Panel [[id number]]  (Abnormal) Collected: 03/02/25 0724    Specimen: Blood Updated: 03/02/25 0806     Glucose 136 mg/dL      BUN 27 mg/dL      Creatinine 1.77 mg/dL      Sodium 138 mmol/L      Potassium 4.2 mmol/L      Chloride 104 mmol/L      CO2 22.9 mmol/L      Calcium  9.0 mg/dL      BUN/Creatinine Ratio 15.3     Anion Gap 11.1 mmol/L      eGFR 28.8 mL/min/1.73     Narrative:      GFR Categories in Chronic Kidney Disease (CKD)      GFR Category          GFR (mL/min/1.73)    Interpretation  G1                     90 or greater         Normal or high (1)  G2                      60-89                Mild decrease (1)  G3a                   45-59                Mild to moderate decrease  G3b                   30-44                Moderate to severe decrease  G4                    15-29                Severe decrease  G5                    14 or less           Kidney failure          (1)In the absence of evidence of kidney disease, neither GFR category G1 or G2 fulfill the criteria for CKD.    eGFR calculation 2021 CKD-EPI creatinine equation, which does not include race as a factor    Magnesium [[id number]]  (Normal) Collected: 03/02/25 0724    Specimen: Blood Updated: 03/02/25 0806     Magnesium 1.9 mg/dL     Phosphorus [[id number]]  (Normal) Collected: 03/02/25 0724    Specimen: Blood Updated: 03/02/25 0806     Phosphorus 3.4 mg/dL     POC Glucose Once [[id number]]  (Normal) Collected: 03/02/25 0742    Specimen: Blood Updated: 03/02/25 0747     Glucose 111 mg/dL      Comment: Serial Number: [id number]:  [id number]       CBC & Differential [[id number]]  (Normal) Collected: 03/02/25 0724    Specimen: Blood Updated: 03/02/25 0742    Narrative:      The following orders were created for panel order CBC & Differential.  Procedure                               Abnormality         Status                     ---------                               -----------         ------                     CBC Auto Differential[[id number]]        Normal              Final result                 Please view results for these tests on the individual orders.    CBC Auto Differential [[id number]]  (Normal) Collected: 03/02/25 0724    Specimen: Blood Updated: 03/02/25 0742     WBC 5.57 10*3/mm3      RBC 4.24  10*6/mm3      Hemoglobin 12.2 g/dL      Hematocrit 36.4 %      MCV 85.8 fL      MCH 28.8 pg      MCHC 33.5 g/dL      RDW 14.0 %      RDW-SD 43.7 fl      MPV 10.0 fL      Platelets 233 10*3/mm3      Neutrophil % 65.9 %      Lymphocyte % 22.3 %      Monocyte % 8.4 %      Eosinophil % 2.5 %      Basophil % 0.5 %      Immature Grans % 0.4 %      Neutrophils, Absolute 3.67 10*3/mm3      Lymphocytes, Absolute 1.24 10*3/mm3      Monocytes, Absolute 0.47 10*3/mm3      Eosinophils, Absolute 0.14 10*3/mm3      Basophils, Absolute 0.03 10*3/mm3      Immature Grans, Absolute 0.02 10*3/mm3      nRBC 0.0 /100 WBC     POC Glucose Once [[id number]]  (Normal) Collected: 25    Specimen: Blood Updated: 25 2019     Glucose 93 mg/dL      Comment: Serial Number: [id number]:  [id number]       POC Glucose Once [[id number]]  (Abnormal) Collected: 25 1442    Specimen: Blood Updated: 25 1600     Glucose 190 mg/dL      Comment: Serial Number: [id number]:  [id number]       POC Glucose Once [[id number]]  (Abnormal) Collected: 25 1058    Specimen: Blood Updated: 25 1559     Glucose 160 mg/dL      Comment: Serial Number: [id number]:  [id number]             Imaging Results (Last 24 Hours)       ** No results found for the last 24 hours. **             Physician Progress Notes (last 24 hours)        Wil Rader DO at 25 1337              [facility]    Name:  Valarie Camara   Age:  79 y.o.  Sex:  female  :  1945  MRN:  [id number]   Visit Number:  [id number]  Admission Date:  2025  Date Of Service:  25  Primary Care Physician:  Lay Cox MD     LOS: 0 days :    Chief Complaint:      weakness    Subjective:    3/1/2025: Admitting provider documentation reviewed.  Vital signs stable. D/w Daughter Tiny and patient interested in rehab.    History Of Presenting Illness:       Patient is an 79-year-old  female brought to the emergency department with her daughter with generalized weakness and dyspnea complaints.  Patient has a known history of dementia, unfortunately poor historian.  History of present illness was obtained from discussion with daughter at bedside.  This has reportedly been ongoing for weeks duration.  The patient has reportedly been moaning and groaning in pain throughout most of the evening the last few days.  She was recently seen in her PCPs office earlier this week, diagnosed with dementia at that time.  The patient has reportedly been more bedfast, complaining of bilateral feet pain, numbness, tingling due to her neuropathy.  Patient is unfortunate unable to be taken care of adequately at home, and ultimately brought into the emergency department for evaluation.     In the emergency department, laboratory workup was unremarkable.  CT abdomen pelvis with and without contrast did not show any acute process.  CTA of the chest showed a small single tubular defect of the right lower lobe pulmonary artery suggesting PE.  Patient started on heparin drip in the ED.  Hospitalist services consulted for admission.  Edited by: Wil Rader DO at 3/1/2025 5099     Review of Systems:     All systems were reviewed and negative except as mentioned in subjective, assessment and plan.    Vital Signs:    Temp:  [98 °F (36.7 °C)-98.5 °F (36.9 °C)] 98.1 °F (36.7 °C)  Heart Rate:  [72-94] 94  Resp:  [14-18] 18  BP: (116-194)/(67-85) 194/85    Intake and output:    I/O last 3 completed shifts:  In: 0.3 [I.V.:0.3]  Out: -   I/O this shift:  In: 240 [P.O.:240]  Out: 240 [Urine:240]    Physical Examination:    Constitutional: No acute distress, awake, alert  HENT: NCAT, mucous membranes moist  Respiratory: Clear to auscultation bilaterally, respiratory effort normal   Cardiovascular: RRR, no murmurs, rubs, or gallops  Gastrointestinal: Positive bowel sounds, soft, nontender, nondistended  Musculoskeletal: +1  "bilateral ankle edema  Psychiatric: Appropriate affect, cooperative  Neurologic: Oriented to self, allodynia noted, speech clear  Skin: No rashes  Edited by: Wil Rader,  at 3/1/2025 1323     Laboratory results:    Results from last 7 days   Lab Units 03/01/25  0350 02/28/25  1200   SODIUM mmol/L 138 138   POTASSIUM mmol/L 3.7 4.0   CHLORIDE mmol/L 102 103   CO2 mmol/L 22.1 22.5   BUN mg/dL 31* 32*   CREATININE mg/dL 1.58* 1.74*   CALCIUM mg/dL 9.4 9.1   BILIRUBIN mg/dL  --  0.4   ALK PHOS U/L  --  54   ALT (SGPT) U/L  --  7   AST (SGOT) U/L  --  14   GLUCOSE mg/dL 108* 218*     Results from last 7 days   Lab Units 03/01/25  0350 02/28/25  1200   WBC 10*3/mm3 6.77 6.62   HEMOGLOBIN g/dL 13.5 13.1   HEMATOCRIT % 39.1 38.2   PLATELETS 10*3/mm3 272 299     Results from last 7 days   Lab Units 02/28/25  1200   INR  0.91     Results from last 7 days   Lab Units 02/28/25  1307 02/28/25  1200   HSTROP T ng/L 61* 60*         No results for input(s): \"PHART\", \"GTV8LJP\", \"PO2ART\", \"MCK6SKH\", \"BASEEXCESS\" in the last 8760 hours.   I have reviewed the patient's laboratory results.    Radiology results:    CT Angiogram Chest Pulmonary Embolism    Result Date: 2/28/2025  PROCEDURE: CT ANGIOGRAM CHEST PULMONARY EMBOLISM-  HISTORY: Leg swelling, SOA, weakness  COMPARISON: None.  TECHNIQUE: The patient was injected with  IV contrast. Axial images were obtained through the chest in a CTA/ PE protocol. 3 D Reconstruction images were also performed. This study was performed with techniques to keep radiation doses as low as reasonably achievable, (ALARA). Individualized dose reduction techniques using automated exposure control or adjustment of mA and/or kV according to the patient size were employed.  FINDINGS: Inhomogeneous thyroid noted. There is no axillary adenopathy. There is mildly prominent subcarinal lymph nodes, etiology unclear. The heart is mildly enlarged. There is a mild-to-moderate pericardial effusion but no " pleural effusions. There is a single, small tubular defect in the the right lower lobe pulmonary artery identified on series 12 image 54, series 7 image 44, series 11 image 59, and series 10 image 45. A very small solitary pulmonary embolus is not excluded. There is no evidence of thoracic aortic aneurysm or dissection. Limited images of the upper abdomen partially demonstrate bilateral renal cysts. Moderate hiatal hernia noted. No suspicious infiltrate or nodule is identified. There are linear densities in the left lower lobe consistent with atelectasis or scar. No areas of consolidation seen. Vascular calcifications noted.      Impression: 1. A single, small, tubular defect in the right lower lobe pulmonary artery as described, suggesting small pulmonary embolus. Recommend repeat study after treatment to document resolution.  2. Cardiomegaly and pericardial effusion with no prior studies for comparison.   CTDI: 7.82 mGy DLP:448.04 mGy.cm   This report was signed and finalized on 2/28/2025 3:49 PM by Genna Walker MD.      CT Abdomen Pelvis With Contrast    Result Date: 2/28/2025  PROCEDURE: CT ABDOMEN PELVIS W CONTRAST-  HISTORY:  abdominal pain, generalized weakness  COMPARISON: February 8, 2025.  TECHNIQUE: Multiple axial CT images were obtained from the lung bases through the pubic symphysis following the administration of Isovue 300 contrast.   FINDINGS:  ABDOMEN: A hiatal hernia is stable. The liver is normal. The spleen is unremarkable. No adrenal mass is present.  The pancreas is normal. There are several well-circumscribed hypodense renal cysts bilaterally. The aorta is normal in caliber. There are vascular calcifications. There is no free fluid or adenopathy.  PELVIS: The appendix is not identified. There are no secondary signs to suggest appendicitis. There is colonic diverticulosis. There is no evidence of acute diverticulitis. There is no evidence of bowel obstruction. The uterus is diminutive or  absent. The urinary bladder is unremarkable. There is no significant free fluid or adenopathy.      Impression: No evidence of acute intra-abdominal process.  Stable chronic changes as above.   CTDI: 7.82 mGy DLP: 448.04 mGy.cm   This study was performed with techniques to keep radiation doses as low as reasonably achievable (ALARA). Individualized dose reduction techniques using automated exposure control or adjustment of mA and/or kV according to the patient size were employed.      Images were reviewed, interpreted, and dictated by Dr. Genna Walker MD Transcribed by Lisa Lyons PA-C.  This report was signed and finalized on 2/28/2025 3:22 PM by Genna Walker MD.      XR Chest 1 View    Result Date: 2/28/2025  PROCEDURE: XR CHEST 1 VW-  HISTORY: Weak/Dizzy/AMS triage protocol, weakness for 2 to 3 weeks.  COMPARISON: 4/8/2025..  FINDINGS: The heart is mildly enlarged, stable.. The lungs are clear. The mediastinum is unremarkable. There is no pneumothorax.  There are no acute osseous abnormalities.      Impression: Cardiomegaly without acute infiltrate..      This report was signed and finalized on 2/28/2025 1:02 PM by Genna Walker MD.     I have reviewed the patient's radiology reports.    Medication Review:     I have reviewed the patient's active and prn medications.     Problem List:      Pulmonary emboli      Assessment/Plan:    Acute pulmonary embolus  Urinary tract infection  Dementia  COPD  Hypertension  Hyperlipidemia  Restless leg syndrome     Plan:     Acute pulmonary embolus  Lower extremity edema- no evidence of DVT  Pericardial effusion  Patient is not tachycardic, not requiring oxygen at this time.  Heparin drip transitioned to eliquis  2D echo shows grade 1 diastolic dysfunction.  Urinary tract infection ruled out has asymptomatic bacteriuria  Patient is not septic.    IV Rocephin.  Dementia  PT/OT consults for functional eval  Case management consult for possible rehab versus long-term care  placement.  Patient's daughter, who is co-power of  with her sibling, to discuss longitudinal care options.  COPD  Hypertension  Hyperlipidemia  Restless leg syndrome       DVT Prophylaxis: Heparin transitioned to Eliquis  Code Status: Full code  Diet: Diabetic  Disposition: looking at [facility] starting Monday morning  Edited by: Wil Rader DO at 3/1/2025 1337           Wil Rader DO  03/01/25  13:37 EST    Dictated utilizing Dragon dictation.        Electronically signed by Wil Rader DO at 03/01/25 1338       Consult Notes (last 24 hours)  Notes from 03/01/25 0958 through 03/02/25 0958   No notes of this type exist for this encounter.

## 2025-03-02 NOTE — PLAN OF CARE
Goal Outcome Evaluation:  Plan of Care Reviewed With: patient        Progress: no change  Outcome Evaluation: Pt participated in PT initial evaluation this date. Pt presents supine in bed, pleasant and agreeable to PT evaluation. Pt AOx4, reports 3/10 generalized pain at rest. Pt reports at baseline, she lives at home with her daughter in a H with 3 CLAIR. Pt reports she is normally (I) with household mobility using a SPC. Pt reports increasing generalized weakness over the last 2-3 weeks, states she has mostly been in the bed or recliner. Pt denies reports of falls at home. Pt performed supine > sit on EOB with SBA. Pt performed STS transfer with CGA and use of a RW. During standing, pt reported dizziness. BP checked 117/78mmHg. Pt ambulated x3' toward  HOB, declined further attempt at ambualtion d/t continued dizziness. Pt returned to supine with SBA. Following evaluation, pt left semi-fowlers position in bed with bed alarm active, call light and all needs within reach. Recommend skilled PT intervention during IP admission to address identified deficits, reduce risk of falls and prevent functional decline. Once medically stable, recommend pt to d/c to STR (subacute) to address remaining impairments.    Anticipated Discharge Disposition (PT): sub acute care setting

## 2025-03-02 NOTE — PLAN OF CARE
Problem: Adult Inpatient Plan of Care  Goal: Plan of Care Review  Outcome: Progressing  Goal: Patient-Specific Goal (Individualized)  Outcome: Progressing  Goal: Absence of Hospital-Acquired Illness or Injury  Outcome: Progressing  Intervention: Identify and Manage Fall Risk  Recent Flowsheet Documentation  Taken 3/2/2025 0200 by Benita Rodriguez RN  Safety Promotion/Fall Prevention: safety round/check completed  Taken 3/2/2025 0000 by Benita Rodriguez RN  Safety Promotion/Fall Prevention: safety round/check completed  Taken 3/1/2025 2200 by Benita Rodriguez RN  Safety Promotion/Fall Prevention: safety round/check completed  Taken 3/1/2025 2000 by Benita Rodriguez RN  Safety Promotion/Fall Prevention: safety round/check completed  Intervention: Prevent Skin Injury  Recent Flowsheet Documentation  Taken 3/2/2025 0200 by Benita Rodriguez RN  Body Position:   side-lying   right  Taken 3/2/2025 0000 by Benita Rodriguez RN  Body Position: supine  Taken 3/1/2025 2200 by Benita Rodriguez RN  Body Position: sitting up in bed  Taken 3/1/2025 2000 by Benita Rodriguez RN  Body Position: side-lying  Goal: Optimal Comfort and Wellbeing  Outcome: Progressing  Goal: Readiness for Transition of Care  Outcome: Progressing     Problem: Skin Injury Risk Increased  Goal: Skin Health and Integrity  Outcome: Progressing  Intervention: Optimize Skin Protection  Recent Flowsheet Documentation  Taken 3/2/2025 0200 by Benita Rodriguez RN  Activity Management: activity encouraged  Head of Bed (HOB) Positioning: HOB elevated  Taken 3/2/2025 0000 by Benita Rodriguez RN  Activity Management: activity encouraged  Head of Bed (HOB) Positioning: HOB elevated  Taken 3/1/2025 2200 by Benita Rodriguez RN  Activity Management: activity encouraged  Head of Bed (HOB) Positioning: HOB elevated  Taken 3/1/2025 2000 by Benita Rodriguez RN  Activity Management: activity encouraged  Head of Bed (HOB) Positioning: HOB lowered     Problem: Fall Injury Risk  Goal: Absence  of Fall and Fall-Related Injury  Outcome: Progressing  Intervention: Promote Injury-Free Environment  Recent Flowsheet Documentation  Taken 3/2/2025 0200 by Benita Rodriguez, RN  Safety Promotion/Fall Prevention: safety round/check completed  Taken 3/2/2025 0000 by Benita Rodriguez, RN  Safety Promotion/Fall Prevention: safety round/check completed  Taken 3/1/2025 2200 by Benita Rodriguez, RN  Safety Promotion/Fall Prevention: safety round/check completed  Taken 3/1/2025 2000 by Benita Rodriguez, RN  Safety Promotion/Fall Prevention: safety round/check completed   Goal Outcome Evaluation:

## 2025-03-02 NOTE — PLAN OF CARE
Goal Outcome Evaluation:                 Interventions implemented as appropriate

## 2025-03-02 NOTE — THERAPY EVALUATION
Patient Name: Valarie Camara  : 1945    MRN: [id number]                              Today's Date: 3/2/2025       Admit Date: 2025    Visit Dx:     ICD-10-CM ICD-9-CM   1. Acute pulmonary embolism without acute cor pulmonale, unspecified pulmonary embolism type  I26.99 415.19   2. Cardiomegaly  I51.7 429.3   3. Pericardial effusion  I31.39 423.9   4. Weakness  R53.1 780.79   5. Decreased mobility  R26.89 781.99     Patient Active Problem List   Diagnosis    Atopic rhinitis    Arthritis    Chronic neck pain    Anxiety and depression    Edema    Gastroesophageal reflux disease with esophagitis    Multiple-type hyperlipidemia    Vitamin D deficiency    Benign essential hypertension    Obesity (BMI 30-39.9)    History of COPD    History of cataract    History of osteoporosis    History of restless legs syndrome    History of rheumatoid arthritis    Elevated serum creatinine    Esophageal dysphagia    Constipation    Schatzki's ring    Gastritis without bleeding    History of Helicobacter pylori infection    Duodenitis    Right hemiparesis    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    Multiple thyroid nodules    Lacunar stroke    DM (diabetes mellitus), type 2, uncontrolled w/neurologic complication    Syncope and collapse    Positive fecal occult blood test    Left foot pain    Hypomagnesemia    Acute on chronic anemia    Esophageal dysphagia    Reflux esophagitis    Syncope    Irritable bowel syndrome with constipation    Hypertensive urgency    Pulmonary emboli     Past Medical History:   Diagnosis Date    Acute bronchitis with bronchospasm     Anxiety     Arthritis     Asthma     B12 deficiency     Back pain     Body piercing     ears    Cataract     Cerebrovascular disease     Cervicalgia     Chronic kidney disease     stage 3b    Colonic polyp     History of colonic polyps     Constipation     COPD (chronic obstructive pulmonary disease)     Coronary artery  "disease     Depression     Diverticulitis     Dysphagia     Patient reported \"it won't go all the way down\" when eating solid foods first thing in the mornings    Edema     Elevated cholesterol     Esophageal reflux     Folic acid deficiency     Full dentures     Advised no adhesives DOS    Heart murmur     Herpes zoster     High cholesterol     History of kidney infection     History of recurrent urinary tract infection     HTN (hypertension)     Hypercholesterolemia     Impaired functional mobility, balance, gait, and endurance     Insomnia     Kidney infection     Malignant hypertension 04/26/2015     Accelerated essential hypertension    Measles     rubeola    Muscle spasm     Neoplasm of uncertain behavior of skin     dtr denies    Osteoporosis     Poor historian     Recurrent urinary tract infection     Rheumatoid arthritis     RLS (restless legs syndrome)     Stomach ulcer     Stroke     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    Type 2 diabetes mellitus     Vitamin D deficiency      Past Surgical History:   Procedure Laterality Date    CATARACT EXTRACTION WITH INTRAOCULAR LENS IMPLANT Left 02/11/2013    CATARACT EXTRACTION WITH INTRAOCULAR LENS IMPLANT Right 04/15/2013    COLONOSCOPY  2012    COLONOSCOPY N/A 11/26/2019    Procedure: COLONOSCOPY;  Surgeon: Chidi Downing MD;  Location:  [facility];  Service: Gastroenterology    ENDOSCOPY N/A 11/13/2017    Procedure: ESOPHAGOGASTRODUODENOSCOPY with biopsies and esophageal balloon dilitation;  Surgeon: Wesley Stovall MD;  Location:  [facility];  Service:     ENDOSCOPY N/A 11/25/2019    Procedure: ESOPHAGOGASTRODUODENOSCOPY;  Surgeon: Chidi Downing MD;  Location:  [facility];  Service: Gastroenterology    ENDOSCOPY N/A 11/29/2021    Procedure: ESOPHAGOGASTRODUODENOSCOPY with dilation and biopsies;  Surgeon: Gonzalez Zapata MD;  Location:  [facility];  Service: Gastroenterology;  Laterality: " N/A;    ENDOSCOPY N/A 11/1/2023    Procedure: ESOPHAGOGASTRODUODENOSCOPY WITH BIOPSY AND DILATATION;  Surgeon: Gonzalez Zapata MD;  Location: [facility];  Service: Gastroenterology;  Laterality: N/A;    ENDOSCOPY N/A 1/27/2025    Procedure: Esophagogastroduodenoscopy with dilatation and biopsies;  Surgeon: Gonzalez Zapata MD;  Location: [facility];  Service: Gastroenterology;  Laterality: N/A;    HYSTERECTOMY  1979    UPPER GASTROINTESTINAL ENDOSCOPY  12/09/2013    UPPER GASTROINTESTINAL ENDOSCOPY  11/13/2017      General Information       Row Name 03/02/25 1047          Physical Therapy Time and Intention    Document Type evaluation  -     Mode of Treatment physical therapy  -       Row Name 03/02/25 1048 03/02/25 1047       General Information    Patient Profile Reviewed -- yes  -    Prior Level of Function independent:;all household mobility  reports increasing weakness over the last 2-3 weeks  - --    Existing Precautions/Restrictions fall  - --    Barriers to Rehab medically complex;previous functional deficit  - --      Row Name 03/02/25 1048          Living Environment    People in Home child(sindy), adult  -       Row Name 03/02/25 1048          Home Main Entrance    Number of Stairs, Main Entrance three  -     Stair Railings, Main Entrance railings safe and in good condition  -       Row Name 03/02/25 1048          Stairs Within Home, Primary    Number of Stairs, Within Home, Primary none  -       Row Name 03/02/25 1048          Cognition    Orientation Status (Cognition) oriented x 4  -       Row Name 03/02/25 1048          Safety Issues/Impairments Affecting Functional Mobility    Safety Issues Affecting Function (Mobility) ability to follow commands;insight into deficits/self-awareness;awareness of need for assistance;positioning of assistive device;safety precautions follow-through/compliance;safety precaution awareness;problem-solving  -     Impairments  Affecting Function (Mobility) balance;endurance/activity tolerance;postural/trunk control;strength;other (see comments)  dizziness  -               User Key  (r) = Recorded By, (t) = Taken By, (c) = Cosigned By      Initials Name Provider Type    Richelle Schwartz PT Physical Therapist                   Mobility       Row Name 03/02/25 1050          Bed Mobility    Bed Mobility supine-sit;sit-supine  -     Supine-Sit [place] (Bed Mobility) standby assist;verbal cues  -     Sit-Supine [place] (Bed Mobility) standby assist;verbal cues  -     Assistive Device (Bed Mobility) head of bed elevated;bed rails  -       Row Name 03/02/25 1050          Sit-Stand Transfer    Sit-Stand [place] (Transfers) contact guard;verbal cues  -     Assistive Device (Sit-Stand Transfers) walker, front-wheeled  -       Row Name 03/02/25 1050          Gait/Stairs (Locomotion)    [place] Level (Gait) verbal cues;contact guard  -     Assistive Device (Gait) walker, front-wheeled  -     Patient was able to Ambulate yes  -     Distance in Feet (Gait) 3  -     Deviations/Abnormal Patterns (Gait) bilateral deviations;tyra decreased;base of support, wide;stride length decreased;gait speed decreased;festinating/shuffling  -     Bilateral Gait Deviations forward flexed posture;heel strike decreased  -     [place] Level (Stairs) not tested  -               User Key  (r) = Recorded By, (t) = Taken By, (c) = Cosigned By      Initials Name Provider Type    Richelle Schwartz PT Physical Therapist                   Obj/Interventions       Row Name 03/02/25 1051          Range of Motion Comprehensive    General Range of Motion bilateral lower extremity ROM WFL  -       Row Name 03/02/25 1051          Strength Comprehensive (MMT)    General Manual Muscle Testing (MMT) Assessment lower extremity strength deficits identified  -     Comment, General Manual Muscle Testing (MMT) Assessment BLE MMT grossly  4-/5  -       Row Name 03/02/25 1051          Balance    Balance Assessment sitting static balance;sitting dynamic balance;standing static balance;standing dynamic balance  -     Static Sitting Balance standby assist  -     Dynamic Sitting Balance standby assist  -HW     Position, Sitting Balance unsupported;sitting edge of bed  -     Static Standing Balance contact guard  -HW     Dynamic Standing Balance contact guard  -HW     Position/Device Used, Standing Balance supported;walker, front-wheeled  -       Row Name 03/02/25 1051          Sensory Assessment (Somatosensory)    Sensory Assessment (Somatosensory) LE sensation intact  -               User Key  (r) = Recorded By, (t) = Taken By, (c) = Cosigned By      Initials Name Provider Type     Richelle Zayas, PT Physical Therapist                   Goals/Plan       Row Name 03/02/25 1103          Bed Mobility Goal 1 (PT)    Activity/Assistive Device (Bed Mobility Goal 1, PT) bed mobility activities, all  -HW     [place] Level/Cues Needed (Bed Mobility Goal 1, PT) modified independence  -HW     Time Frame (Bed Mobility Goal 1, PT) long term goal (LTG);10 days  -HW     Progress/Outcomes (Bed Mobility Goal 1, PT) new goal  -       Row Name 03/02/25 1103          Transfer Goal 1 (PT)    Activity/Assistive Device (Transfer Goal 1, PT) transfers, all  -HW     [place] Level/Cues Needed (Transfer Goal 1, PT) supervision required  -HW     Time Frame (Transfer Goal 1, PT) long term goal (LTG);10 days  -HW     Progress/Outcome (Transfer Goal 1, PT) new goal  -       Row Name 03/02/25 1103          Gait Training Goal 1 (PT)    Activity/Assistive Device (Gait Training Goal 1, PT) gait (walking locomotion)  -HW     [place] Level (Gait Training Goal 1, PT) contact guard required  -HW     Distance (Gait Training Goal 1, PT) 100  -HW     Time Frame (Gait Training Goal 1, PT) long term goal (LTG);10 days  -HW     Progress/Outcome (Gait Training Goal 1,  PT) new goal  -       Row Name 03/02/25 1103          Patient Education Goal (PT)    Activity (Patient Education Goal, PT) Pt will perform 1x10 BLE TE with VC to allow for improved LE strength and endurance  -HW     [place]/Cues/Accuracy (Memory Goal 2, PT) demonstrates adequately  -HW     Time Frame (Patient Education Goal, PT) short term goal (STG);5 days  -HW     Progress/Outcome (Patient Education Goal, PT) new goal  -       Row Name 03/02/25 1103          Therapy Assessment/Plan (PT)    Planned Therapy Interventions (PT) balance training;bed mobility training;gait training;home exercise program;motor coordination training;postural re-education;neuromuscular re-education;stretching;strengthening;patient/family education;ROM (range of motion);transfer training  -               User Key  (r) = Recorded By, (t) = Taken By, (c) = Cosigned By      Initials Name Provider Type     Richelle Zayas, PT Physical Therapist                   Clinical Impression       Row Name 03/02/25 1052          Pain    Pretreatment Pain Rating 3/10  -HW     Posttreatment Pain Rating 3/10  -HW     Pain Side/Orientation generalized  -     Pain Management Interventions exercise or physical activity utilized  -     Response to Pain Interventions activity level improved;mobility function improved;functional ability improved;activity participation with tolerable pain  -       Row Name 03/02/25 1052          Plan of Care Review    Plan of Care Reviewed With patient  -HW     Progress no change  -HW     Outcome Evaluation Pt participated in PT initial evaluation this date. Pt presents supine in bed, pleasant and agreeable to PT evaluation. Pt AOx4, reports 3/10 generalized pain at rest. Pt reports at baseline, she lives at home with her daughter in a [facility] with 3 CLAIR. Pt reports she is normally (I) with household mobility using a SPC. Pt reports increasing generalized weakness over the last 2-3 weeks, states she has mostly been in  the bed or recliner. Pt denies reports of falls at home. Pt performed supine > sit on EOB with SBA. Pt performed STS transfer with CGA and use of a RW. During standing, pt reported dizziness. BP checked 117/78mmHg. Pt ambulated x3' toward  HOB, declined further attempt at ambualtion d/t continued dizziness. Pt returned to supine with SBA. Following evaluation, pt left semi-fowlers position in bed with bed alarm active, call light and all needs within reach. Recommend skilled PT intervention during IP admission to address identified deficits, reduce risk of falls and prevent functional decline. Once medically stable, recommend pt to d/c to STR (subacute) to address remaining impairments.  -       Row Name 03/02/25 1052          Therapy Assessment/Plan (PT)    Patient/Family Therapy Goals Statement (PT) Pt is eager to [facility] to [facility]  -     Rehab Potential (PT) good  -HW     Criteria for Skilled Interventions Met (PT) yes  -HW     Therapy Frequency (PT) 5 times/wk  -     Predicted Duration of Therapy Intervention (PT) 10 days  -       Row Name 03/02/25 1052          Vital Signs    Pre Systolic BP Rehab 137  -HW     Pre Treatment Diastolic BP 69  -HW     Intra Systolic BP Rehab 117  -HW     Intra Treatment Diastolic BP 78  -HW     Post Systolic BP Rehab 120  -HW     Post Treatment Diastolic BP 84  -HW     Pretreatment Heart Rate (beats/min) 80  -HW     Pre SpO2 (%) 97  -HW     O2 Delivery Pre Treatment room air  -HW     O2 Delivery Intra Treatment room air  -HW     O2 Delivery Post Treatment room air  -HW     Pre Patient Position Supine  -HW     Intra Patient Position Standing  -HW     Post Patient Position Supine  -HW       Row Name 03/02/25 1052          Positioning and Restraints    Pre-Treatment Position in bed  -HW     Post Treatment Position bed  -HW     In Bed supine;call light within reach;encouraged to call for assist;exit alarm on;notified [facility]  -               User Key  (r) = Recorded By, (t) = Taken  By, (c) = Cosigned By      Initials Name Provider Type     Richelle Zayas, MARY Physical Therapist                   Outcome Measures       Row Name 03/02/25 1104 03/02/25 0800       How much help from another person do you currently need...    Turning from your back to your side while in flat bed without using bedrails? 4  - 3  -SC    Moving from lying on back to sitting on the side of a flat bed without bedrails? 4  - 3  -SC    Moving to and from a bed to a chair (including a wheelchair)? 3  - 3  -SC    Standing up from a chair using your arms (e.g., wheelchair, bedside chair)? 3  - 2  -SC    Climbing 3-5 steps with a railing? 3  - 1  -SC    To walk in hospital room? 3  - 2  -SC    AM-PAC 6 Clicks Score (PT) 20  - 14  -SC    Highest Level of Mobility Goal 6 --> Walk 10 steps or more  - 4 --> Transfer to chair/commode  -SC      Row Name 03/02/25 1104          Functional Assessment    Outcome Measure Options AM-PAC 6 Clicks Basic Mobility (PT)  -               User Key  (r) = Recorded By, (t) = Taken By, (c) = Cosigned By      Initials Name Provider Type    SC Cristy Moraels, RN Registered Nurse     Richelle Zayas PT Physical Therapist                                 Physical Therapy Education       Title: PT OT SLP Therapies (In Progress)       Topic: Physical Therapy (In Progress)       Point: Mobility training (Done)       Learning Progress Summary            Patient Acceptance, E,TB, VU by  at 3/2/2025 1105    Comment: role of PT during IP admission                      Point: Home exercise program (Not Started)       Learner Progress:  Not documented in this visit.              Point: Body mechanics (Not Started)       Learner Progress:  Not documented in this visit.              Point: Precautions (Not Started)       Learner Progress:  Not documented in this visit.                              User Key       Initials Effective Dates Name Provider Type [facility] 11/29/23 -  Marquez  Richelle, PT Physical Therapist PT                  PT Recommendation and Plan  Planned Therapy Interventions (PT): balance training, bed mobility training, gait training, home exercise program, motor coordination training, postural re-education, neuromuscular re-education, stretching, strengthening, patient/family education, ROM (range of motion), transfer training  Progress: no change  Outcome Evaluation: Pt participated in PT initial evaluation this date. Pt presents supine in bed, pleasant and agreeable to PT evaluation. Pt AOx4, reports 3/10 generalized pain at rest. Pt reports at baseline, she lives at home with her daughter in a [place] with 3 CLAIR. Pt reports she is normally (I) with household mobility using a SPC. Pt reports increasing generalized weakness over the last 2-3 weeks, states she has mostly been in the bed or recliner. Pt denies reports of falls at home. Pt performed supine > sit on EOB with SBA. Pt performed STS transfer with CGA and use of a RW. During standing, pt reported dizziness. BP checked 117/78mmHg. Pt ambulated x3' toward  HOB, declined further attempt at ambualtion d/t continued dizziness. Pt returned to supine with SBA. Following evaluation, pt left semi-fowlers position in bed with bed alarm active, call light and all needs within reach. Recommend skilled PT intervention during IP admission to address identified deficits, reduce risk of falls and prevent functional decline. Once medically stable, recommend pt to d/c to STR (subacute) to address remaining impairments.     Time Calculation:   PT Evaluation Complexity  History, PT Evaluation Complexity: 1-2 personal factors and/or comorbidities  Examination of Body Systems (PT Eval Complexity): 1-2 elements  Clinical Presentation (PT Evaluation Complexity): stable  Clinical Decision Making (PT Evaluation Complexity): low complexity  Overall Complexity (PT Evaluation Complexity): low complexity     PT Charges       Row Name 03/02/25 9015              Time Calculation    Start Time 1016  -HW      PT Received On 03/02/25  -HW      PT Goal Re-Cert Due Date 03/12/25  -HW         Untimed Charges    PT Eval/Re-eval Minutes 43  -HW         Total Minutes    Untimed Charges Total Minutes 43  -HW       Total Minutes 43  -HW                User Key  (r) = Recorded By, (t) = Taken By, (c) = Cosigned By      Initials Name Provider Type     Richelle Zayas PT Physical Therapist                  Therapy Charges for Today       Code Description Service Date Service Provider Modifiers Qty    [id number] HC PT EVAL LOW COMPLEXITY 3 3/2/2025 Richelle Zayas PT GP 1            PT G-Codes  Outcome Measure Options: AM-PAC 6 Clicks Basic Mobility (PT)  AM-PAC 6 Clicks Score (PT): 20  PT Discharge Summary  Anticipated Discharge Disposition (PT): sub acute care setting    Richelle Zayas PT  3/2/2025

## 2025-03-02 NOTE — PROGRESS NOTES
[facility] HOSPITALIST    PROGRESS NOTE    Name:  Valarie Camara   Age:  79 y.o.  Sex:  female  :  1945  MRN:  [id number]   Visit Number:  [id number]  Admission Date:  2025  Date Of Service:  25  Primary Care Physician:  Lay Cox MD     LOS: 0 days :    Chief Complaint:      weakness    Subjective:    3/2/2025: PT documentation reviewed recommends STR.  Vital signs stable. D/w Daughter Tiny and patient. In the night she gets agitated associated with insomnia.    History Of Presenting Illness:       Patient is an 79-year-old female brought to the emergency department with her daughter with generalized weakness and dyspnea complaints.  Patient has a known history of dementia, unfortunately poor historian.  History of present illness was obtained from discussion with daughter at bedside.  This has reportedly been ongoing for weeks duration.  The patient has reportedly been moaning and groaning in pain throughout most of the evening the last few days.  She was recently seen in her PCPs office earlier this week, diagnosed with dementia at that time.  The patient has reportedly been more bedfast, complaining of bilateral feet pain, numbness, tingling due to her neuropathy.  Patient is unfortunate unable to be taken care of adequately at home, and ultimately brought into the emergency department for evaluation.     In the emergency department, laboratory workup was unremarkable.  CT abdomen pelvis with and without contrast did not show any acute process.  CTA of the chest showed a small single tubular defect of the right lower lobe pulmonary artery suggesting PE.  Patient started on heparin drip in the ED.  Hospitalist services consulted for admission.  Edited by: Wil Rader DO at 3/2/2025 1253     Review of Systems:     All systems were reviewed and negative except as mentioned in subjective, assessment and plan.    Vital Signs:    Temp:  [97.8 °F  "(36.6 °C)-98.7 °F (37.1 °C)] 98.7 °F (37.1 °C)  Heart Rate:  [77-93] 77  Resp:  [16-20] 18  BP: (128-221)/() 128/63    Intake and output:    I/O last 3 completed shifts:  In: 720 [P.O.:720]  Out: 240 [Urine:240]  I/O this shift:  In: 120 [P.O.:120]  Out: -     Physical Examination:    Constitutional: No acute distress, awake, alert  HENT: NCAT, mucous membranes moist  Respiratory: Clear to auscultation bilaterally, respiratory effort normal   Cardiovascular: RRR, no murmurs, rubs, or gallops  Gastrointestinal: Positive bowel sounds, soft, nontender, nondistended  Musculoskeletal: +1 bilateral ankle edema  Psychiatric: Appropriate affect, cooperative  Neurologic: Oriented to self, allodynia noted, speech clear  Skin: No rashes  Exam stable 3/2/25  Edited by: Wil Rader, DO at 3/2/2025 3568     Laboratory results:    Results from last 7 days   Lab Units 03/02/25  0724 03/01/25 0350 02/28/25  1200   SODIUM mmol/L 138 138 138   POTASSIUM mmol/L 4.2 3.7 4.0   CHLORIDE mmol/L 104 102 103   CO2 mmol/L 22.9 22.1 22.5   BUN mg/dL 27* 31* 32*   CREATININE mg/dL 1.77* 1.58* 1.74*   CALCIUM mg/dL 9.0 9.4 9.1   BILIRUBIN mg/dL  --   --  0.4   ALK PHOS U/L  --   --  54   ALT (SGPT) U/L  --   --  7   AST (SGOT) U/L  --   --  14   GLUCOSE mg/dL 136* 108* 218*     Results from last 7 days   Lab Units 03/02/25  0724 03/01/25  0350 02/28/25  1200   WBC 10*3/mm3 5.57 6.77 6.62   HEMOGLOBIN g/dL 12.2 13.5 13.1   HEMATOCRIT % 36.4 39.1 38.2   PLATELETS 10*3/mm3 233 272 299     Results from last 7 days   Lab Units 02/28/25  1200   INR  0.91     Results from last 7 days   Lab Units 02/28/25  1307 02/28/25  1200   HSTROP T ng/L 61* 60*         No results for input(s): \"PHART\", \"QBT5HGF\", \"PO2ART\", \"GOS7BUJ\", \"BASEEXCESS\" in the last 8760 hours.   I have reviewed the patient's laboratory results.    Radiology results:    CT Angiogram Chest Pulmonary Embolism    Result Date: 2/28/2025  PROCEDURE: CT ANGIOGRAM CHEST PULMONARY " EMBOLISM-  HISTORY: Leg swelling, SOA, weakness  COMPARISON: None.  TECHNIQUE: The patient was injected with  IV contrast. Axial images were obtained through the chest in a CTA/ PE protocol. 3 D Reconstruction images were also performed. This study was performed with techniques to keep radiation doses as low as reasonably achievable, (ALARA). Individualized dose reduction techniques using automated exposure control or adjustment of mA and/or kV according to the patient size were employed.  FINDINGS: Inhomogeneous thyroid noted. There is no axillary adenopathy. There is mildly prominent subcarinal lymph nodes, etiology unclear. The heart is mildly enlarged. There is a mild-to-moderate pericardial effusion but no pleural effusions. There is a single, small tubular defect in the the right lower lobe pulmonary artery identified on series 12 image 54, series 7 image 44, series 11 image 59, and series 10 image 45. A very small solitary pulmonary embolus is not excluded. There is no evidence of thoracic aortic aneurysm or dissection. Limited images of the upper abdomen partially demonstrate bilateral renal cysts. Moderate hiatal hernia noted. No suspicious infiltrate or nodule is identified. There are linear densities in the left lower lobe consistent with atelectasis or scar. No areas of consolidation seen. Vascular calcifications noted.      Impression: 1. A single, small, tubular defect in the right lower lobe pulmonary artery as described, suggesting small pulmonary embolus. Recommend repeat study after treatment to document resolution.  2. Cardiomegaly and pericardial effusion with no prior studies for comparison.   CTDI: 7.82 mGy DLP:448.04 mGy.cm   This report was signed and finalized on 2/28/2025 3:49 PM by Genna Walker MD.      CT Abdomen Pelvis With Contrast    Result Date: 2/28/2025  PROCEDURE: CT ABDOMEN PELVIS W CONTRAST-  HISTORY:  abdominal pain, generalized weakness  COMPARISON: February 8, 2025.   TECHNIQUE: Multiple axial CT images were obtained from the lung bases through the pubic symphysis following the administration of Isovue 300 contrast.   FINDINGS:  ABDOMEN: A hiatal hernia is stable. The liver is normal. The spleen is unremarkable. No adrenal mass is present.  The pancreas is normal. There are several well-circumscribed hypodense renal cysts bilaterally. The aorta is normal in caliber. There are vascular calcifications. There is no free fluid or adenopathy.  PELVIS: The appendix is not identified. There are no secondary signs to suggest appendicitis. There is colonic diverticulosis. There is no evidence of acute diverticulitis. There is no evidence of bowel obstruction. The uterus is diminutive or absent. The urinary bladder is unremarkable. There is no significant free fluid or adenopathy.      Impression: No evidence of acute intra-abdominal process.  Stable chronic changes as above.   CTDI: 7.82 mGy DLP: 448.04 mGy.cm   This study was performed with techniques to keep radiation doses as low as reasonably achievable (ALARA). Individualized dose reduction techniques using automated exposure control or adjustment of mA and/or kV according to the patient size were employed.      Images were reviewed, interpreted, and dictated by Dr. Genna Walker MD Transcribed by Lisa Lyons PA-C.  This report was signed and finalized on 2/28/2025 3:22 PM by Genna Walker MD.     I have reviewed the patient's radiology reports.    Medication Review:     I have reviewed the patient's active and prn medications.     Problem List:      Pulmonary emboli      Assessment/Plan:    Acute pulmonary embolus  Urinary tract infection  Dementia  COPD  Hypertension  Hyperlipidemia  Restless leg syndrome     Plan:     Acute pulmonary embolus  Lower extremity edema- no evidence of DVT  Pericardial effusion  Patient is not tachycardic, not requiring oxygen at this time.  Heparin drip transitioned to eliquis  2D echo shows grade 1  diastolic dysfunction.  Urinary tract infection ruled out has asymptomatic bacteriuria  Patient is not septic.    S/p IV Rocephin.  Dementia  PT/OT consults for functional eval  Case management consult for possible rehab versus long-term care placement.  Patient's daughter, who is co-power of  with her sibling, to discuss longitudinal care options.  Sundowning add seroquel and melatonin at night  COPD  Hypertension  Hyperlipidemia  Restless leg syndrome       DVT Prophylaxis: Eliquis  Code Status: Full code  Diet: Diabetic  Disposition: [facility] starting Monday morning anticipate in 24-48 hours  Edited by: Wil Rader DO at 3/2/2025 1258           Wil Rader DO  03/02/25  12:59 EST    Dictated utilizing Dragon dictation.

## 2025-03-03 LAB
ANION GAP SERPL CALCULATED.3IONS-SCNC: 10.4 MMOL/L (ref 5–15)
BASOPHILS # BLD AUTO: 0.02 10*3/MM3 (ref 0–0.2)
BASOPHILS NFR BLD AUTO: 0.3 % (ref 0–1.5)
BUN SERPL-MCNC: 28 MG/DL (ref 8–23)
BUN/CREAT SERPL: 18.2 (ref 7–25)
CALCIUM SPEC-SCNC: 8.9 MG/DL (ref 8.6–10.5)
CHLORIDE SERPL-SCNC: 106 MMOL/L (ref 98–107)
CO2 SERPL-SCNC: 22.6 MMOL/L (ref 22–29)
CREAT SERPL-MCNC: 1.54 MG/DL (ref 0.57–1)
DEPRECATED RDW RBC AUTO: 45.8 FL (ref 37–54)
EGFRCR SERPLBLD CKD-EPI 2021: 34 ML/MIN/1.73
EOSINOPHIL # BLD AUTO: 0.16 10*3/MM3 (ref 0–0.4)
EOSINOPHIL NFR BLD AUTO: 2.6 % (ref 0.3–6.2)
ERYTHROCYTE [DISTWIDTH] IN BLOOD BY AUTOMATED COUNT: 14.3 % (ref 12.3–15.4)
GLUCOSE BLDC GLUCOMTR-MCNC: 143 MG/DL (ref 70–130)
GLUCOSE BLDC GLUCOMTR-MCNC: 192 MG/DL (ref 70–130)
GLUCOSE BLDC GLUCOMTR-MCNC: 212 MG/DL (ref 70–130)
GLUCOSE BLDC GLUCOMTR-MCNC: 98 MG/DL (ref 70–130)
GLUCOSE SERPL-MCNC: 132 MG/DL (ref 65–99)
HCT VFR BLD AUTO: 37.1 % (ref 34–46.6)
HGB BLD-MCNC: 12.2 G/DL (ref 12–15.9)
IMM GRANULOCYTES # BLD AUTO: 0.01 10*3/MM3 (ref 0–0.05)
IMM GRANULOCYTES NFR BLD AUTO: 0.2 % (ref 0–0.5)
LYMPHOCYTES # BLD AUTO: 1.48 10*3/MM3 (ref 0.7–3.1)
LYMPHOCYTES NFR BLD AUTO: 24.5 % (ref 19.6–45.3)
MAGNESIUM SERPL-MCNC: 2.1 MG/DL (ref 1.6–2.4)
MCH RBC QN AUTO: 28.8 PG (ref 26.6–33)
MCHC RBC AUTO-ENTMCNC: 32.9 G/DL (ref 31.5–35.7)
MCV RBC AUTO: 87.5 FL (ref 79–97)
MONOCYTES # BLD AUTO: 0.49 10*3/MM3 (ref 0.1–0.9)
MONOCYTES NFR BLD AUTO: 8.1 % (ref 5–12)
NEUTROPHILS NFR BLD AUTO: 3.89 10*3/MM3 (ref 1.7–7)
NEUTROPHILS NFR BLD AUTO: 64.3 % (ref 42.7–76)
NRBC BLD AUTO-RTO: 0 /100 WBC (ref 0–0.2)
PHOSPHATE SERPL-MCNC: 3.4 MG/DL (ref 2.5–4.5)
PLATELET # BLD AUTO: 237 10*3/MM3 (ref 140–450)
PMV BLD AUTO: 9.9 FL (ref 6–12)
POTASSIUM SERPL-SCNC: 4.3 MMOL/L (ref 3.5–5.2)
RBC # BLD AUTO: 4.24 10*6/MM3 (ref 3.77–5.28)
SODIUM SERPL-SCNC: 139 MMOL/L (ref 136–145)
WBC NRBC COR # BLD AUTO: 6.05 10*3/MM3 (ref 3.4–10.8)

## 2025-03-03 PROCEDURE — G0378 HOSPITAL OBSERVATION PER HR: HCPCS

## 2025-03-03 PROCEDURE — 82948 REAGENT STRIP/BLOOD GLUCOSE: CPT

## 2025-03-03 PROCEDURE — 63710000001 INSULIN LISPRO (HUMAN) PER 5 UNITS: Performed by: INTERNAL MEDICINE

## 2025-03-03 PROCEDURE — 84100 ASSAY OF PHOSPHORUS: CPT | Performed by: FAMILY MEDICINE

## 2025-03-03 PROCEDURE — 97165 OT EVAL LOW COMPLEX 30 MIN: CPT

## 2025-03-03 PROCEDURE — 25010000002 LABETALOL 5 MG/ML SOLUTION: Performed by: FAMILY MEDICINE

## 2025-03-03 PROCEDURE — 99232 SBSQ HOSP IP/OBS MODERATE 35: CPT | Performed by: INTERNAL MEDICINE

## 2025-03-03 PROCEDURE — 85025 COMPLETE CBC W/AUTO DIFF WBC: CPT | Performed by: FAMILY MEDICINE

## 2025-03-03 PROCEDURE — 80048 BASIC METABOLIC PNL TOTAL CA: CPT | Performed by: FAMILY MEDICINE

## 2025-03-03 PROCEDURE — 82948 REAGENT STRIP/BLOOD GLUCOSE: CPT | Performed by: INTERNAL MEDICINE

## 2025-03-03 PROCEDURE — 96376 TX/PRO/DX INJ SAME DRUG ADON: CPT

## 2025-03-03 PROCEDURE — 83735 ASSAY OF MAGNESIUM: CPT | Performed by: FAMILY MEDICINE

## 2025-03-03 RX ADMIN — LABETALOL HYDROCHLORIDE 10 MG: 5 INJECTION, SOLUTION INTRAVENOUS at 08:56

## 2025-03-03 RX ADMIN — ATORVASTATIN CALCIUM 80 MG: 80 TABLET, FILM COATED ORAL at 08:40

## 2025-03-03 RX ADMIN — APIXABAN 10 MG: 5 TABLET, FILM COATED ORAL at 23:14

## 2025-03-03 RX ADMIN — NIFEDIPINE 90 MG: 90 TABLET, FILM COATED, EXTENDED RELEASE ORAL at 08:42

## 2025-03-03 RX ADMIN — CARVEDILOL 25 MG: 25 TABLET, FILM COATED ORAL at 08:42

## 2025-03-03 RX ADMIN — LABETALOL HYDROCHLORIDE 10 MG: 5 INJECTION, SOLUTION INTRAVENOUS at 03:24

## 2025-03-03 RX ADMIN — INSULIN LISPRO 2 UNITS: 100 INJECTION, SOLUTION INTRAVENOUS; SUBCUTANEOUS at 12:01

## 2025-03-03 RX ADMIN — Medication 5 MG: at 23:14

## 2025-03-03 RX ADMIN — BISACODYL 10 MG: 10 SUPPOSITORY RECTAL at 17:33

## 2025-03-03 RX ADMIN — INSULIN LISPRO 3 UNITS: 100 INJECTION, SOLUTION INTRAVENOUS; SUBCUTANEOUS at 23:14

## 2025-03-03 RX ADMIN — Medication 10 ML: at 21:59

## 2025-03-03 RX ADMIN — PANTOPRAZOLE SODIUM 40 MG: 40 TABLET, DELAYED RELEASE ORAL at 08:39

## 2025-03-03 RX ADMIN — QUETIAPINE FUMARATE 25 MG: 25 TABLET ORAL at 23:14

## 2025-03-03 RX ADMIN — Medication 10 ML: at 08:40

## 2025-03-03 RX ADMIN — APIXABAN 10 MG: 5 TABLET, FILM COATED ORAL at 08:39

## 2025-03-03 RX ADMIN — CARVEDILOL 25 MG: 25 TABLET, FILM COATED ORAL at 17:33

## 2025-03-03 RX ADMIN — POLYETHYLENE GLYCOL 3350 17 G: 17 POWDER, FOR SOLUTION ORAL at 10:15

## 2025-03-03 NOTE — PLAN OF CARE
Goal Outcome Evaluation:  Plan of Care Reviewed With: patient      Pts blood pressure elevated during shift, Dr. Rader aware,  managed with PRN medications, pt on RA maintaining sats >90%, and pt NSR on telemetry att. Medications administered per MAR. FSBS monitored per orders, insulin administered per sliding scale. Bowel regimen followed per orders. Discharge is pending.

## 2025-03-03 NOTE — PROGRESS NOTES
[facility] HOSPITALIST    PROGRESS NOTE    Name:  Valarie Camara   Age:  79 y.o.  Sex:  female  :  1945  MRN:  [id number]   Visit Number:  [id number]  Admission Date:  2025  Date Of Service:  25  Primary Care Physician:  Lay Cox MD     LOS: 0 days :    Chief Complaint:      weakness    Subjective:    3/3/2025: PT documentation reviewed recommends STR.  Vital signs stable. Called Tiny no answer. In the night she gets agitated associated with insomnia. Per nursing was able to sleep after receiving medication. CM reports [facility] is interested in her.      Hospital Course:       Patient is an 79-year-old female brought to the emergency department with her daughter with generalized weakness and dyspnea complaints.  Patient has a known history of dementia, unfortunately poor historian.  History of present illness was obtained from discussion with daughter at bedside.  This has reportedly been ongoing for weeks duration.  The patient has reportedly been moaning and groaning in pain throughout most of the evening the last few days.  She was recently seen in her PCPs office earlier this week, diagnosed with dementia at that time.  The patient has reportedly been more bedfast, complaining of bilateral feet pain, numbness, tingling due to her neuropathy.  Patient is unfortunate unable to be taken care of adequately at home, and ultimately brought into the emergency department for evaluation.     In the emergency department, laboratory workup was unremarkable.  CT abdomen pelvis with and without contrast did not show any acute process.  CTA of the chest showed a small single tubular defect of the right lower lobe pulmonary artery suggesting PE.  Patient started on heparin drip in the ED.  Hospitalist services consulted for admission.    Patient admitted with weakness.  Awaiting short-term rehab placement.  Looking at facilities in [place].  Edited by: Dior  Wil Jackson DO at 3/3/2025 1418     Review of Systems:     All systems were reviewed and negative except as mentioned in subjective, assessment and plan.    Vital Signs:    Temp:  [97.8 °F (36.6 °C)-98.3 °F (36.8 °C)] 98.3 °F (36.8 °C)  Heart Rate:  [71-80] 77  Resp:  [16-20] 20  BP: (154-213)/(77-82) 174/82    Intake and output:    I/O last 3 completed shifts:  In: 520 [P.O.:420; I.V.:100]  Out: 200 [Urine:200]  I/O this shift:  In: 840 [P.O.:840]  Out: -     Physical Examination:    Constitutional: No acute distress, awake, alert  HENT: NCAT, mucous membranes moist  Respiratory: Clear to auscultation bilaterally, respiratory effort normal   Cardiovascular: RRR, no murmurs, rubs, or gallops  Gastrointestinal: Positive bowel sounds, soft, nontender, nondistended  Musculoskeletal: +1 bilateral ankle edema  Psychiatric: Appropriate affect, cooperative  Neurologic: Oriented to self, allodynia noted, speech clear  Skin: No rashes  Exam stable 3/3/25  Edited by: Wil Rader DO at 3/3/2025 1417     Laboratory results:    Results from last 7 days   Lab Units 03/03/25  0910 03/02/25  0724 03/01/25  0350 02/28/25  1200   SODIUM mmol/L 139 138 138 138   POTASSIUM mmol/L 4.3 4.2 3.7 4.0   CHLORIDE mmol/L 106 104 102 103   CO2 mmol/L 22.6 22.9 22.1 22.5   BUN mg/dL 28* 27* 31* 32*   CREATININE mg/dL 1.54* 1.77* 1.58* 1.74*   CALCIUM mg/dL 8.9 9.0 9.4 9.1   BILIRUBIN mg/dL  --   --   --  0.4   ALK PHOS U/L  --   --   --  54   ALT (SGPT) U/L  --   --   --  7   AST (SGOT) U/L  --   --   --  14   GLUCOSE mg/dL 132* 136* 108* 218*     Results from last 7 days   Lab Units 03/03/25  0910 03/02/25  0724 03/01/25  0350   WBC 10*3/mm3 6.05 5.57 6.77   HEMOGLOBIN g/dL 12.2 12.2 13.5   HEMATOCRIT % 37.1 36.4 39.1   PLATELETS 10*3/mm3 237 233 272     Results from last 7 days   Lab Units 02/28/25  1200   INR  0.91     Results from last 7 days   Lab Units 02/28/25  1307 02/28/25  1200   HSTROP T ng/L 61* 60*         No results for  "input(s): \"PHART\", \"KEC7EAT\", \"PO2ART\", \"AYM2VJV\", \"BASEEXCESS\" in the last 8760 hours.   I have reviewed the patient's laboratory results.    Radiology results:    No radiology results from the last 24 hrs  I have reviewed the patient's radiology reports.    Medication Review:     I have reviewed the patient's active and prn medications.     Problem List:      Pulmonary emboli      Assessment/Plan:    Acute pulmonary embolus  Urinary tract infection  Dementia  COPD  Hypertension  Hyperlipidemia  Restless leg syndrome     Plan:     Acute pulmonary embolus  Lower extremity edema- no evidence of DVT  Pericardial effusion  Patient is not tachycardic, not requiring oxygen at this time.  Heparin drip transitioned to eliquis  2D echo shows grade 1 diastolic dysfunction.  Urinary tract infection ruled out has asymptomatic bacteriuria  Patient is not septic.    S/p IV Rocephin.  Dementia  PT/OT consults for functional eval  Case management consult for possible rehab versus long-term care placement.  Patient's daughter, who is co-power of  with her sibling, to discuss longitudinal care options.  Sundowning add seroquel and melatonin at night  COPD  Hypertension  Hyperlipidemia  Restless leg syndrome       DVT Prophylaxis: Eliquis  Code Status: Full code  Diet: Diabetic  Disposition: [facility] starting Monday morning anticipate in 24-48 hours  Edited by: Wil Rader DO at 3/2/2025 1258           Wil Rader DO  03/03/25  14:23 EST    Dictated utilizing Dragon dictation.      "

## 2025-03-03 NOTE — CASE MANAGEMENT/SOCIAL WORK
"Case Management/Social Work    Patient Name:  Valarie Camara  YOB: 1945  MRN: [id number]  Admit Date:  2/28/2025        14:07 EST   Discharge Plan       Row Name 03/03/25 1404       Plan    Plan DCP- Pending STR. [place] looking.                  1020: CM spoke with pt at bedside. Alert and answers some questions coherently. Will laugh at times during questions about STR and state \"Talk to my daughter about all this stuff\"    1550: CM left voice message for daughter/Cleo Camara to discuss change in insurance and DCP update. Requested a call back. CM to follow.     1600:Daughter called back and requested referral to be sent to [facility] due to insurance change to Medicare A&B effective 3/1/25.    Electronically signed by:    Sendy Avila RN  03/03/25 14:07 EST        "

## 2025-03-03 NOTE — CASE MANAGEMENT/SOCIAL WORK
Case Management/Social Work    Patient Name:  Valarie Camara  YOB: 1945  MRN: [id number]  Admit Date:  2/28/2025        Referrals for STR sent to [facility]. [facility] following.    11:48 HIGINIO [facility] can offer bed. Tyr[facility] spoke with family regarding insurance. Family to call insurance to see about coverage. Tyrone's system showing pts insurance ended on 2/28. IVANA following.     15:05 HIGINIO Tyr[facility] and daughter confirmed pt now has Medicare A/B as of 3/1. Pt is currently observation. IVANA can send referral for STR to [facility] to review for acute rehab. IVANA following.     Electronically signed by:  ALEX Daugherty  03/03/25 10:06 HIGINIO

## 2025-03-03 NOTE — PLAN OF CARE
Goal Outcome Evaluation:            NO c/o pain noted. Pt had elevated BP PRN meds given with effective results. Will continue plan of care.

## 2025-03-03 NOTE — PLAN OF CARE
"Goal Outcome Evaluation:  Plan of Care Reviewed With: patient        Progress: no change  Outcome Evaluation: OT she completed this date. Pt was received in the bed and she reports she has been up all day and is not interested in getting up again. OT explained purpose of the evaluation and she was agreeable. She is alert and oriented only to self and month with choices and refused additional orientation and PLOF questioning stating \"thats enough questions for the day\". PLOF gathered from PT and case management documentation. Per review pt presents from home where she lives with her daughter in a home with 3 CLAIR. She typically ambulates in the home independently and her daughter assists with ADLs PRN (more over the past few weeks due to weakness). Fall hx is unknown. She moved to eob with SBA, donned socks with HAN/CGA, transferred to standing with CGA using the Rwx with min cueing for positioning and she ambulated 24ft to the bathroom with CGA using the Rwx. Once in the bathroom she stood at the sink to complete partial UBB with CGA/HAN and grooming with CGA. She ambulated 24ft back to the eob and endorsed dizziness. Her bp was assessed and was 139/68. She returned to supine with SBA and repositioned in the bed for comfort with all needs in reach and the bed alarm activated. She is expected to benefit from skilled OT services to address ADL, functional mobility, safety awareness, endurance and activity tolerance deficits. Recommend she dc to subacute rehab with anticipated need for daily caregiver assistance at home vs long term due to underlying cognitive deficits.    Anticipated Discharge Disposition (OT): sub acute care setting                        "

## 2025-03-03 NOTE — DISCHARGE PLACEMENT REQUEST
"STR Referral     Valarie Barajas (79 y.o. Female)       Date of Birth   1945    Social Security Number       Address   [address] [facility] [address]    Home Phone   [phone]    MRN   [id number]       [facility]    Marital Status                               Admission Date   25    Admission Type   Emergency    Admitting Provider   Aki Rodriguez DO    Attending Provider   Wil Rader DO    Department, Room/Bed   [facility] TELEMETRY 3, 320/1       Discharge Date       Discharge Disposition       Discharge Destination                                 Attending Provider: Wil Rader DO    Allergies: Penicillins, Clonidine Derivatives, Hydralazine, Sulfa Antibiotics    Isolation: None   Infection: None   Code Status: CPR    Ht: 160 cm (63\")   Wt: 71.9 kg (158 lb 8.2 oz)    Admission Cmt: None   Principal Problem: Pulmonary emboli [I26.99]                   Active Insurance as of 2025       Primary Coverage       Payor Plan Insurance Group Employer/Plan Group    ANTHEM MEDICARE REPLACEMENT ANTHEM MEDICARE ADVANTAGE SNP [id number]       Payor Plan Address Payor Plan Phone Number Payor Plan Fax Number Effective Dates    [address] [phone]  2025 - None Entered    [facility] [id number]         Subscriber Name Subscriber Birth Date Member ID       VALARIE BARAJAS 1945 [id number]                     Emergency Contacts        (Rel.) Home Phone Work Phone Mobile Phone    Cleo Barajas (Daughter) [phone] -- --    JENNELROY (Daughter) [phone] -- --                 History & Physical        Aki Rodriguez DO at 25 [address] [place] [address] HOSPITALIST   HISTORY AND PHYSICAL      Name:  Valarie Barajas   Age:  79 y.o.  Sex:  female  :  1945  MRN:  [id number]   Visit Number:  [id number]  Admission Date:  2025  Date Of Service:  25  Primary Care Physician:  " Lay Cox MD    Chief Complaint:     Generalized weakness, dyspnea    History Of Presenting Illness:      Patient is an 79-year-old female brought to the emergency department with her daughter with generalized weakness and dyspnea complaints.  Patient has a known history of dementia, unfortunately poor historian.  History of present illness was obtained from discussion with daughter at bedside.  This has reportedly been ongoing for weeks duration.  The patient has reportedly been moaning and groaning in pain throughout most of the evening the last few days.  She was recently seen in her PCPs office earlier this week, diagnosed with dementia at that time.  The patient has reportedly been more bedfast, complaining of bilateral feet pain, numbness, tingling due to her neuropathy.  Patient is unfortunate unable to be taken care of adequately at home, and ultimately brought into the emergency department for evaluation.    In the emergency department, laboratory workup was unremarkable.  CT abdomen pelvis with and without contrast did not show any acute process.  CTA of the chest showed a small single tubular defect of the right lower lobe pulmonary artery suggesting PE.  Patient started on heparin drip in the ED.  Hospitalist services consulted for admission.    Review Of Systems:    All systems were reviewed and negative except as mentioned in history of presenting illness, assessment and plan.    Past Medical History: Patient  has a past medical history of Acute bronchitis with bronchospasm, Anxiety, Arthritis, Asthma, B12 deficiency, Back pain, Body piercing, Cataract, Cerebrovascular disease, Cervicalgia, Chronic kidney disease, Colonic polyp, Constipation, COPD (chronic obstructive pulmonary disease), Coronary artery disease, Depression, Diverticulitis, Dysphagia, Edema, Elevated cholesterol, Esophageal reflux, Folic acid deficiency, Full dentures, Heart murmur, Herpes zoster, High cholesterol,  History of kidney infection, History of recurrent urinary tract infection, HTN (hypertension), Hypercholesterolemia, Impaired functional mobility, balance, gait, and endurance, Insomnia, Kidney infection, Malignant hypertension (04/26/2015), Measles, Muscle spasm, Neoplasm of uncertain behavior of skin, Osteoporosis, Poor historian, Recurrent urinary tract infection, Rheumatoid arthritis, RLS (restless legs syndrome), Stomach ulcer, Stroke, Type 2 diabetes mellitus, and Vitamin D deficiency.    Past Surgical History: Patient  has a past surgical history that includes Hysterectomy (1979); Cataract extraction w/  intraocular lens implant (Left, 02/11/2013); Cataract extraction w/  intraocular lens implant (Right, 04/15/2013); Colonoscopy (2012); Esophagogastroduodenoscopy (N/A, 11/13/2017); Upper gastrointestinal endoscopy (12/09/2013); Upper gastrointestinal endoscopy (11/13/2017); Esophagogastroduodenoscopy (N/A, 11/25/2019); Colonoscopy (N/A, 11/26/2019); Esophagogastroduodenoscopy (N/A, 11/29/2021); Esophagogastroduodenoscopy (N/A, 11/1/2023); and Esophagogastroduodenoscopy (N/A, 1/27/2025).    Social History: Patient  reports that she quit smoking about 13 years ago. Her smoking use included cigarettes. She started smoking about 28 years ago. She has a 15 pack-year smoking history. She has been exposed to tobacco smoke. She has never used smokeless tobacco. She reports that she does not currently use alcohol. She reports that she does not use drugs.    Family History:  Patient's family history has been reviewed and found to be noncontributory.     Allergies:      Penicillins, Clonidine derivatives, Hydralazine, and Sulfa antibiotics    Home Medications:    Prior to Admission Medications       Prescriptions Last Dose Informant Patient Reported? Taking?    acetaminophen (TYLENOL) 325 MG tablet  Child Yes No    Take 1 tablet by mouth Every 6 (Six) Hours As Needed for Headache or Mild Pain. Indications: Pain     atorvastatin (LIPITOR) 80 MG tablet  Child No No    Take 1 tablet by mouth Daily.    carvedilol (COREG) 25 MG tablet  Child No No    Take 1 tablet by mouth 2 (Two) Times a Day With Meals.    CHOLECALCIFEROL PO  Child Yes No    Take 5,000 Int'l Units/day by mouth Daily. Indications: Vitamin D Deficiency    Easy Touch Pen Needles 31G X 8 MM misc  Child Yes No    Indications: Diabetes    ferrous sulfate 325 (65 FE) MG tablet  Child Yes No    Take 1 tablet by mouth 3 (Three) Times a Week. Indications: Iron Deficiency, Restless Leg Syndrome    glucose blood test strip  Child No No    Check sugar once a day    Patient taking differently:  1 each by Other route As Needed (FBS). Check sugar once a day    glucose monitor monitoring kit  Child No No    1 each Daily.    insulin lispro (humaLOG) 100 UNIT/ML injection  Child Yes No    Inject 2 Units under the skin into the appropriate area as directed 3 (Three) Times a Day As Needed for High Blood Sugar. Daughter only gives if BS is greater than 200 two hrs after eating.    Indications: Type 2 Diabetes    Insulin Pen Needle 31G X 5 MM misc  Child No No    Inject insulin three times daily    isosorbide mononitrate (IMDUR) 60 MG 24 hr tablet  Child Yes No    Take 1 tablet by mouth Daily. Indications: Stable Angina Pectoris    Lancets 30G misc  Child No No    Check sugar daily    linagliptin (TRADJENTA) 5 MG tablet tablet  Child No No    Take 1 tablet by mouth Daily.    losartan-hydrochlorothiazide (HYZAAR) 100-25 MG per tablet  Child No No    Take 1 tablet by mouth Daily.    Patient taking differently:  Take 1 tablet by mouth Daily.    NIFEdipine XL (PROCARDIA XL) 90 MG 24 hr tablet  Child Yes No    Take 1 tablet by mouth Daily. Indications: High Blood Pressure    pantoprazole (PROTONIX) 40 MG EC tablet  Child Yes No    Take 1 tablet by mouth Daily.    traZODone (DESYREL) 50 MG tablet  Child Yes No    Take 1 tablet by mouth At Night As Needed. Indications: Trouble Sleeping           ED Medications:    Medications   sodium chloride 0.9 % flush 10 mL (has no administration in time range)   heparin 56603 units/250 ml (100 units/ml) in D5W (18 Units/hr Intravenous New Bag 2/28/25 7984)   Pharmacy to Dose Heparin (has no administration in time range)   sodium chloride 0.9 % flush 10 mL (has no administration in time range)   sodium chloride 0.9 % flush 10 mL (has no administration in time range)   sodium chloride 0.9 % infusion 40 mL (has no administration in time range)   apixaban (ELIQUIS) tablet 10 mg (has no administration in time range)     Followed by   apixaban (ELIQUIS) tablet 5 mg (has no administration in time range)   nitroglycerin (NITROSTAT) SL tablet 0.4 mg (has no administration in time range)   Potassium Replacement - Follow Nurse / BPA Driven Protocol (has no administration in time range)   Magnesium Cardiology Dose Replacement - Follow Nurse / BPA Driven Protocol (has no administration in time range)   Phosphorus Replacement - Follow Nurse / BPA Driven Protocol (has no administration in time range)   Calcium Replacement - Follow Nurse / BPA Driven Protocol (has no administration in time range)   sennosides-docusate (PERICOLACE) 8.6-50 MG per tablet 2 tablet (has no administration in time range)     And   polyethylene glycol (MIRALAX) packet 17 g (has no administration in time range)     And   bisacodyl (DULCOLAX) EC tablet 5 mg (has no administration in time range)     And   bisacodyl (DULCOLAX) suppository 10 mg (has no administration in time range)   acetaminophen (TYLENOL) tablet 650 mg (has no administration in time range)     Or   acetaminophen (TYLENOL) 160 MG/5ML oral solution 650 mg (has no administration in time range)     Or   acetaminophen (TYLENOL) suppository 650 mg (has no administration in time range)   ondansetron (ZOFRAN) injection 4 mg (has no administration in time range)   cefTRIAXone (ROCEPHIN) 1,000 mg in sodium chloride 0.9 % 100 mL IVPB-VTB (has no  "administration in time range)   iopamidol (ISOVUE-300) 61 % injection 100 mL (100 mL Intravenous Given 2/28/25 1441)   heparin (porcine) injection 6,160 Units (6,160 Units Intravenous Given 2/28/25 1656)     Vital Signs:  Temp:  [97.5 °F (36.4 °C)] 97.5 °F (36.4 °C)  Heart Rate:  [78-97] 80  Resp:  [14-16] 14  BP: (131-142)/(67-75) 131/68        02/28/25  1152   Weight: 77 kg (169 lb 12.1 oz)     Body mass index is 30.07 kg/m².    Physical Exam:     Most recent vital Signs: /68   Pulse 80   Temp 97.5 °F (36.4 °C) (Oral)   Resp 14   Ht 160 cm (63\")   Wt 77 kg (169 lb 12.1 oz)   SpO2 97%   BMI 30.07 kg/m²     Physical Exam  Constitutional: Awake, alert.  Uncomfortable appearing.  Eyes: PERRLA, sclerae anicteric, no conjunctival injection  HENT: NCAT, mucous membranes moist  Neck: Supple, no thyromegaly, no lymphadenopathy, trachea midline  Respiratory: Clear to auscultation bilaterally, nonlabored respirations   Cardiovascular: RRR, no murmurs, rubs, or gallops, palpable pedal pulses bilaterally  Gastrointestinal: Positive bowel sounds, soft, nontender, nondistended  Musculoskeletal: No bilateral ankle edema, no clubbing or cyanosis to extremities  Psychiatric: Appropriate affect, cooperative  Neurologic: Oriented x 1, strength symmetric in all extremities, Cranial Nerves grossly intact to confrontation, speech clear.  Very sensitive to touch diffusely, winces in pain with even light touch.  Skin: No rashes     Laboratory data:    I have reviewed the labs done in the emergency room.    Results from last 7 days   Lab Units 02/28/25  1200   SODIUM mmol/L 138   POTASSIUM mmol/L 4.0   CHLORIDE mmol/L 103   CO2 mmol/L 22.5   BUN mg/dL 32*   CREATININE mg/dL 1.74*   CALCIUM mg/dL 9.1   BILIRUBIN mg/dL 0.4   ALK PHOS U/L 54   ALT (SGPT) U/L 7   AST (SGOT) U/L 14   GLUCOSE mg/dL 218*     Results from last 7 days   Lab Units 02/28/25  1200   WBC 10*3/mm3 6.62   HEMOGLOBIN g/dL 13.1   HEMATOCRIT % 38.2   PLATELETS " 10*3/mm3 299     Results from last 7 days   Lab Units 02/28/25  1200   INR  0.91     Results from last 7 days   Lab Units 02/28/25  1307 02/28/25  1200   HSTROP T ng/L 61* 60*     Results from last 7 days   Lab Units 02/28/25  1545   COLOR UA  Yellow   GLUCOSE UA  Negative   KETONES UA  Negative   BLOOD UA  Negative   LEUKOCYTES UA  Negative   PH, URINE  6.0   BILIRUBIN UA  Negative   UROBILINOGEN UA  0.2 E.U./dL   RBC UA /HPF None Seen   WBC UA /HPF None Seen     Radiology:    CT Angiogram Chest Pulmonary Embolism    Result Date: 2/28/2025  PROCEDURE: CT ANGIOGRAM CHEST PULMONARY EMBOLISM-  HISTORY: Leg swelling, SOA, weakness  COMPARISON: None.  TECHNIQUE: The patient was injected with  IV contrast. Axial images were obtained through the chest in a CTA/ PE protocol. 3 D Reconstruction images were also performed. This study was performed with techniques to keep radiation doses as low as reasonably achievable, (ALARA). Individualized dose reduction techniques using automated exposure control or adjustment of mA and/or kV according to the patient size were employed.  FINDINGS: Inhomogeneous thyroid noted. There is no axillary adenopathy. There is mildly prominent subcarinal lymph nodes, etiology unclear. The heart is mildly enlarged. There is a mild-to-moderate pericardial effusion but no pleural effusions. There is a single, small tubular defect in the the right lower lobe pulmonary artery identified on series 12 image 54, series 7 image 44, series 11 image 59, and series 10 image 45. A very small solitary pulmonary embolus is not excluded. There is no evidence of thoracic aortic aneurysm or dissection. Limited images of the upper abdomen partially demonstrate bilateral renal cysts. Moderate hiatal hernia noted. No suspicious infiltrate or nodule is identified. There are linear densities in the left lower lobe consistent with atelectasis or scar. No areas of consolidation seen. Vascular calcifications noted.      1.  A single, small, tubular defect in the right lower lobe pulmonary artery as described, suggesting small pulmonary embolus. Recommend repeat study after treatment to document resolution.  2. Cardiomegaly and pericardial effusion with no prior studies for comparison.   CTDI: 7.82 mGy DLP:448.04 mGy.cm   This report was signed and finalized on 2/28/2025 3:49 PM by Genna Walker MD.      CT Abdomen Pelvis With Contrast    Result Date: 2/28/2025  PROCEDURE: CT ABDOMEN PELVIS W CONTRAST-  HISTORY:  abdominal pain, generalized weakness  COMPARISON: February 8, 2025.  TECHNIQUE: Multiple axial CT images were obtained from the lung bases through the pubic symphysis following the administration of Isovue 300 contrast.   FINDINGS:  ABDOMEN: A hiatal hernia is stable. The liver is normal. The spleen is unremarkable. No adrenal mass is present.  The pancreas is normal. There are several well-circumscribed hypodense renal cysts bilaterally. The aorta is normal in caliber. There are vascular calcifications. There is no free fluid or adenopathy.  PELVIS: The appendix is not identified. There are no secondary signs to suggest appendicitis. There is colonic diverticulosis. There is no evidence of acute diverticulitis. There is no evidence of bowel obstruction. The uterus is diminutive or absent. The urinary bladder is unremarkable. There is no significant free fluid or adenopathy.      No evidence of acute intra-abdominal process.  Stable chronic changes as above.   CTDI: 7.82 mGy DLP: 448.04 mGy.cm   This study was performed with techniques to keep radiation doses as low as reasonably achievable (ALARA). Individualized dose reduction techniques using automated exposure control or adjustment of mA and/or kV according to the patient size were employed.      Images were reviewed, interpreted, and dictated by Dr. Genna Walker MD Transcribed by Lisa Lyons PA-C.  This report was signed and finalized on 2/28/2025 3:22 PM by Genna  MD Denise.      XR Chest 1 View    Result Date: 2/28/2025  PROCEDURE: XR CHEST 1 VW-  HISTORY: Weak/Dizzy/AMS triage protocol, weakness for 2 to 3 weeks.  COMPARISON: 4/8/2025..  FINDINGS: The heart is mildly enlarged, stable.. The lungs are clear. The mediastinum is unremarkable. There is no pneumothorax.  There are no acute osseous abnormalities.      Cardiomegaly without acute infiltrate..      This report was signed and finalized on 2/28/2025 1:02 PM by Genna Walker MD.       Assessment:    Acute pulmonary embolus  Urinary tract infection  Dementia  COPD  Hypertension  Hyperlipidemia  Restless leg syndrome    Plan:    Acute pulmonary embolus  Lower extremity edema  Pericardial effusion  Patient is not tachycardic, not requiring oxygen at this time.  Heparin drip started in the ED.  Transition to Eliquis in a.m., that is oral option patient can take at home.  Telemetry.  2D echo for pericardial effusion evaluation of lower extremity edema.  Does not appear to be in acute congestive heart failure.  Urinary tract infection  Patient is not septic.    IV Rocephin.  Dementia  PT/OT consults for functional eval  Case management consult for possible rehab versus long-term care placement.  Patient's daughter, who is co-power of  with her sibling, to discuss longitudinal care options.  COPD  Hypertension  Hyperlipidemia  Restless leg syndrome    Risk Assessment: High  DVT Prophylaxis: Heparin  Code Status: Full code  Diet: N.p.o. dysphagia screening    Aki Rodriguez DO  02/28/25  17:25 EST    Dictated utilizing Dragon dictation.    Electronically signed by Aki Rodriguez DO at 02/28/25 7920       Current Facility-Administered Medications   Medication Dose Route Frequency Provider Last Rate Last Admin    acetaminophen (TYLENOL) tablet 650 mg  650 mg Oral Q4H PRN Aki Rodriguez DO   650 mg at 03/02/25 0347    Or    acetaminophen (TYLENOL) 160 MG/5ML oral solution 650 mg  650 mg Oral Q4H PRN Aki Rodriguez DO         Or    acetaminophen (TYLENOL) suppository 650 mg  650 mg Rectal Q4H PRN Aki Rodriguez DO        apixaban (ELIQUIS) tablet 10 mg  10 mg Oral Q12H Aki Rodriguez DO   10 mg at 03/03/25 0839    Followed by    [START ON 3/8/2025] apixaban (ELIQUIS) tablet 5 mg  5 mg Oral Q12H Aki Rodriguez DO        atorvastatin (LIPITOR) tablet 80 mg  80 mg Oral Daily Aki Rodriguez DO   80 mg at 03/03/25 0840    sennosides-docusate (PERICOLACE) 8.6-50 MG per tablet 2 tablet  2 tablet Oral BID PRN Aki Rodriguez DO        And    polyethylene glycol (MIRALAX) packet 17 g  17 g Oral Daily PRN Aki Rodriguez DO        And    bisacodyl (DULCOLAX) EC tablet 5 mg  5 mg Oral Daily PRN Aki Rodriguez DO   5 mg at 03/02/25 1408    And    bisacodyl (DULCOLAX) suppository 10 mg  10 mg Rectal Daily PRN Aki Rodriguez DO        calcium carbonate (TUMS) chewable tablet 500 mg (200 mg elemental)  2 tablet Oral TID PRN Aki Rodriguez DO   2 tablet at 03/01/25 2111    Calcium Replacement - Follow Nurse / BPA Driven Protocol   Not Applicable PRN Aki Rodriguez DO        carvedilol (COREG) tablet 25 mg  25 mg Oral BID With Meals Aki Rodriguez DO   25 mg at 03/03/25 0842    dextrose (D50W) (25 g/50 mL) IV injection 25 g  25 g Intravenous Q15 Min PRN Wil Rader DO        dextrose (GLUTOSE) oral gel 15 g  15 g Oral Q15 Min PRN Wil Rader DO        glucagon (GLUCAGEN) injection 1 mg  1 mg Intramuscular Q15 Min PRN Wil Rader DO        Insulin Lispro (humaLOG) injection 2-7 Units  2-7 Units Subcutaneous 4x Daily AC & at Bedtime Wil Rader DO   3 Units at 03/02/25 2223    labetalol (NORMODYNE,TRANDATE) injection 10 mg  10 mg Intravenous Q4H PRN Edgardo Burnette MD   10 mg at 03/03/25 0856    Magnesium Cardiology Dose Replacement - Follow Nurse / BPA Driven Protocol   Not Applicable PRN Aki Rodriguez DO        melatonin tablet 5 mg  5 mg Oral Nightly Wil Rader DO   5 mg at 03/02/25 2221    NIFEdipine XL  (PROCARDIA XL) 24 hr tablet 90 mg  90 mg Oral Daily Aki Rodriguez DO   90 mg at 25 0842    nitroglycerin (NITROSTAT) SL tablet 0.4 mg  0.4 mg Sublingual Q5 Min PRN Aki Rodriguez DO        ondansetron (ZOFRAN) injection 4 mg  4 mg Intravenous Q6H PRN Aki Rodriguez DO   4 mg at 251    pantoprazole (PROTONIX) EC tablet 40 mg  40 mg Oral Daily Aki Rodriguez DO   40 mg at 25 0839    Phosphorus Replacement - Follow Nurse / BPA Driven Protocol   Not Applicable PRN Aki Rodriguez DO        Potassium Replacement - Follow Nurse / BPA Driven Protocol   Not Applicable PRN Aki Rodriguez DO        QUEtiapine (SEROquel) tablet 25 mg  25 mg Oral Nightly Wil Rader DO   25 mg at 25 2223    sodium chloride 0.9 % flush 10 mL  10 mL Intravenous PRN Memo Bryant MD        sodium chloride 0.9 % flush 10 mL  10 mL Intravenous Q12H Aki Rodriguez DO   10 mL at 25 0840    sodium chloride 0.9 % flush 10 mL  10 mL Intravenous PRN Aki Rodriguez DO        sodium chloride 0.9 % infusion 40 mL  40 mL Intravenous PRN Aki Rodriguez DO        traZODone (DESYREL) tablet 50 mg  50 mg Oral Nightly PRN Aki Rodriguez DO   50 mg at 25        Physician Progress Notes (most recent note)        Wil Radre DO at 25 1259              [facility]    Name:  Valarie Camara   Age:  79 y.o.  Sex:  female  :  1945  MRN:  [id number]   Visit Number:  [id number]  Admission Date:  2025  Date Of Service:  25  Primary Care Physician:  Lay Cox MD     LOS: 0 days :    Chief Complaint:      weakness    Subjective:    3/2/2025: PT documentation reviewed recommends STR.  Vital signs stable. D/w Daughter Tiny and patient. In the night she gets agitated associated with insomnia.    History Of Presenting Illness:       Patient is an 79-year-old female brought to the emergency department with her daughter with  generalized weakness and dyspnea complaints.  Patient has a known history of dementia, unfortunately poor historian.  History of present illness was obtained from discussion with daughter at bedside.  This has reportedly been ongoing for weeks duration.  The patient has reportedly been moaning and groaning in pain throughout most of the evening the last few days.  She was recently seen in her PCPs office earlier this week, diagnosed with dementia at that time.  The patient has reportedly been more bedfast, complaining of bilateral feet pain, numbness, tingling due to her neuropathy.  Patient is unfortunate unable to be taken care of adequately at home, and ultimately brought into the emergency department for evaluation.     In the emergency department, laboratory workup was unremarkable.  CT abdomen pelvis with and without contrast did not show any acute process.  CTA of the chest showed a small single tubular defect of the right lower lobe pulmonary artery suggesting PE.  Patient started on heparin drip in the ED.  Hospitalist services consulted for admission.  Edited by: Wil Rader DO at 3/2/2025 9142     Review of Systems:     All systems were reviewed and negative except as mentioned in subjective, assessment and plan.    Vital Signs:    Temp:  [97.8 °F (36.6 °C)-98.7 °F (37.1 °C)] 98.7 °F (37.1 °C)  Heart Rate:  [77-93] 77  Resp:  [16-20] 18  BP: (128-221)/() 128/63    Intake and output:    I/O last 3 completed shifts:  In: 720 [P.O.:720]  Out: 240 [Urine:240]  I/O this shift:  In: 120 [P.O.:120]  Out: -     Physical Examination:    Constitutional: No acute distress, awake, alert  HENT: NCAT, mucous membranes moist  Respiratory: Clear to auscultation bilaterally, respiratory effort normal   Cardiovascular: RRR, no murmurs, rubs, or gallops  Gastrointestinal: Positive bowel sounds, soft, nontender, nondistended  Musculoskeletal: +1 bilateral ankle edema  Psychiatric: Appropriate affect,  "cooperative  Neurologic: Oriented to self, allodynia noted, speech clear  Skin: No rashes  Exam stable 3/2/25  Edited by: Wil Rader, DO at 3/2/2025 1258     Laboratory results:    Results from last 7 days   Lab Units 03/02/25  0724 03/01/25  0350 02/28/25  1200   SODIUM mmol/L 138 138 138   POTASSIUM mmol/L 4.2 3.7 4.0   CHLORIDE mmol/L 104 102 103   CO2 mmol/L 22.9 22.1 22.5   BUN mg/dL 27* 31* 32*   CREATININE mg/dL 1.77* 1.58* 1.74*   CALCIUM mg/dL 9.0 9.4 9.1   BILIRUBIN mg/dL  --   --  0.4   ALK PHOS U/L  --   --  54   ALT (SGPT) U/L  --   --  7   AST (SGOT) U/L  --   --  14   GLUCOSE mg/dL 136* 108* 218*     Results from last 7 days   Lab Units 03/02/25  0724 03/01/25  0350 02/28/25  1200   WBC 10*3/mm3 5.57 6.77 6.62   HEMOGLOBIN g/dL 12.2 13.5 13.1   HEMATOCRIT % 36.4 39.1 38.2   PLATELETS 10*3/mm3 233 272 299     Results from last 7 days   Lab Units 02/28/25  1200   INR  0.91     Results from last 7 days   Lab Units 02/28/25  1307 02/28/25  1200   HSTROP T ng/L 61* 60*         No results for input(s): \"PHART\", \"KRG0GOE\", \"PO2ART\", \"RSZ2UMW\", \"BASEEXCESS\" in the last 8760 hours.   I have reviewed the patient's laboratory results.    Radiology results:    CT Angiogram Chest Pulmonary Embolism    Result Date: 2/28/2025  PROCEDURE: CT ANGIOGRAM CHEST PULMONARY EMBOLISM-  HISTORY: Leg swelling, SOA, weakness  COMPARISON: None.  TECHNIQUE: The patient was injected with  IV contrast. Axial images were obtained through the chest in a CTA/ PE protocol. 3 D Reconstruction images were also performed. This study was performed with techniques to keep radiation doses as low as reasonably achievable, (ALARA). Individualized dose reduction techniques using automated exposure control or adjustment of mA and/or kV according to the patient size were employed.  FINDINGS: Inhomogeneous thyroid noted. There is no axillary adenopathy. There is mildly prominent subcarinal lymph nodes, etiology unclear. The heart is " mildly enlarged. There is a mild-to-moderate pericardial effusion but no pleural effusions. There is a single, small tubular defect in the the right lower lobe pulmonary artery identified on series 12 image 54, series 7 image 44, series 11 image 59, and series 10 image 45. A very small solitary pulmonary embolus is not excluded. There is no evidence of thoracic aortic aneurysm or dissection. Limited images of the upper abdomen partially demonstrate bilateral renal cysts. Moderate hiatal hernia noted. No suspicious infiltrate or nodule is identified. There are linear densities in the left lower lobe consistent with atelectasis or scar. No areas of consolidation seen. Vascular calcifications noted.      Impression: 1. A single, small, tubular defect in the right lower lobe pulmonary artery as described, suggesting small pulmonary embolus. Recommend repeat study after treatment to document resolution.  2. Cardiomegaly and pericardial effusion with no prior studies for comparison.   CTDI: 7.82 mGy DLP:448.04 mGy.cm   This report was signed and finalized on 2/28/2025 3:49 PM by Genna Walker MD.      CT Abdomen Pelvis With Contrast    Result Date: 2/28/2025  PROCEDURE: CT ABDOMEN PELVIS W CONTRAST-  HISTORY:  abdominal pain, generalized weakness  COMPARISON: February 8, 2025.  TECHNIQUE: Multiple axial CT images were obtained from the lung bases through the pubic symphysis following the administration of Isovue 300 contrast.   FINDINGS:  ABDOMEN: A hiatal hernia is stable. The liver is normal. The spleen is unremarkable. No adrenal mass is present.  The pancreas is normal. There are several well-circumscribed hypodense renal cysts bilaterally. The aorta is normal in caliber. There are vascular calcifications. There is no free fluid or adenopathy.  PELVIS: The appendix is not identified. There are no secondary signs to suggest appendicitis. There is colonic diverticulosis. There is no evidence of acute diverticulitis.  There is no evidence of bowel obstruction. The uterus is diminutive or absent. The urinary bladder is unremarkable. There is no significant free fluid or adenopathy.      Impression: No evidence of acute intra-abdominal process.  Stable chronic changes as above.   CTDI: 7.82 mGy DLP: 448.04 mGy.cm   This study was performed with techniques to keep radiation doses as low as reasonably achievable (ALARA). Individualized dose reduction techniques using automated exposure control or adjustment of mA and/or kV according to the patient size were employed.      Images were reviewed, interpreted, and dictated by Dr. Genna Walker MD Transcribed by Lisa Lyons PA-C.  This report was signed and finalized on 2/28/2025 3:22 PM by Genna Walker MD.     I have reviewed the patient's radiology reports.    Medication Review:     I have reviewed the patient's active and prn medications.     Problem List:      Pulmonary emboli      Assessment/Plan:    Acute pulmonary embolus  Urinary tract infection  Dementia  COPD  Hypertension  Hyperlipidemia  Restless leg syndrome     Plan:     Acute pulmonary embolus  Lower extremity edema- no evidence of DVT  Pericardial effusion  Patient is not tachycardic, not requiring oxygen at this time.  Heparin drip transitioned to eliquis  2D echo shows grade 1 diastolic dysfunction.  Urinary tract infection ruled out has asymptomatic bacteriuria  Patient is not septic.    S/p IV Rocephin.  Dementia  PT/OT consults for functional eval  Case management consult for possible rehab versus long-term care placement.  Patient's daughter, who is co-power of  with her sibling, to discuss longitudinal care options.  Sundowning add seroquel and melatonin at night  COPD  Hypertension  Hyperlipidemia  Restless leg syndrome       DVT Prophylaxis: Eliquis  Code Status: Full code  Diet: Diabetic  Disposition: [facility] starting Monday morning anticipate in 24-48 hours  Edited by: Wil Rader  DO Manuel at 3/2/2025 1258           Wil Rader DO  25  12:59 EST    Dictated utilizing Dragon dictation.      Electronically signed by Wil Rader DO at 25 1259          Physical Therapy Notes (most recent note)        Richelle Zayas, PT at 25 1106  Version 1 of 1         Patient Name: Valarie Camara  : 1945    MRN: [id number]                              Today's Date: 3/2/2025       Admit Date: 2025    Visit Dx:     ICD-10-CM ICD-9-CM   1. Acute pulmonary embolism without acute cor pulmonale, unspecified pulmonary embolism type  I26.99 415.19   2. Cardiomegaly  I51.7 429.3   3. Pericardial effusion  I31.39 423.9   4. Weakness  R53.1 780.79   5. Decreased mobility  R26.89 781.99     Patient Active Problem List   Diagnosis    Atopic rhinitis    Arthritis    Chronic neck pain    Anxiety and depression    Edema    Gastroesophageal reflux disease with esophagitis    Multiple-type hyperlipidemia    Vitamin D deficiency    Benign essential hypertension    Obesity (BMI 30-39.9)    History of COPD    History of cataract    History of osteoporosis    History of restless legs syndrome    History of rheumatoid arthritis    Elevated serum creatinine    Esophageal dysphagia    Constipation    Schatzki's ring    Gastritis without bleeding    History of Helicobacter pylori infection    Duodenitis    Right hemiparesis    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    Multiple thyroid nodules    Lacunar stroke    DM (diabetes mellitus), type 2, uncontrolled w/neurologic complication    Syncope and collapse    Positive fecal occult blood test    Left foot pain    Hypomagnesemia    Acute on chronic anemia    Esophageal dysphagia    Reflux esophagitis    Syncope    Irritable bowel syndrome with constipation    Hypertensive urgency    Pulmonary emboli     Past Medical History:   Diagnosis Date    Acute bronchitis with bronchospasm     Anxiety     Arthritis   "   Asthma     B12 deficiency     Back pain     Body piercing     ears    Cataract     Cerebrovascular disease     Cervicalgia     Chronic kidney disease     stage 3b    Colonic polyp     History of colonic polyps     Constipation     COPD (chronic obstructive pulmonary disease)     Coronary artery disease     Depression     Diverticulitis     Dysphagia     Patient reported \"it won't go all the way down\" when eating solid foods first thing in the mornings    Edema     Elevated cholesterol     Esophageal reflux     Folic acid deficiency     Full dentures     Advised no adhesives DOS    Heart murmur     Herpes zoster     High cholesterol     History of kidney infection     History of recurrent urinary tract infection     HTN (hypertension)     Hypercholesterolemia     Impaired functional mobility, balance, gait, and endurance     Insomnia     Kidney infection     Malignant hypertension 04/26/2015     Accelerated essential hypertension    Measles     rubeola    Muscle spasm     Neoplasm of uncertain behavior of skin     dtr denies    Osteoporosis     Poor historian     Recurrent urinary tract infection     Rheumatoid arthritis     RLS (restless legs syndrome)     Stomach ulcer     Stroke     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    Type 2 diabetes mellitus     Vitamin D deficiency      Past Surgical History:   Procedure Laterality Date    CATARACT EXTRACTION WITH INTRAOCULAR LENS IMPLANT Left 02/11/2013    CATARACT EXTRACTION WITH INTRAOCULAR LENS IMPLANT Right 04/15/2013    COLONOSCOPY  2012    COLONOSCOPY N/A 11/26/2019    Procedure: COLONOSCOPY;  Surgeon: Chidi Downing MD;  Location: [facility] ENDOSCOPY;  Service: Gastroenterology    ENDOSCOPY N/A 11/13/2017    Procedure: ESOPHAGOGASTRODUODENOSCOPY with biopsies and esophageal balloon dilitation;  Surgeon: Wesley Stovall MD;  Location: [facility] ENDOSCOPY;  Service:     ENDOSCOPY N/A 11/25/2019    Procedure: " ESOPHAGOGASTRODUODENOSCOPY;  Surgeon: Chidi Downing MD;  Location: [facility] ENDOSCOPY;  Service: Gastroenterology    ENDOSCOPY N/A 11/29/2021    Procedure: ESOPHAGOGASTRODUODENOSCOPY with dilation and biopsies;  Surgeon: Gonzalez Zapata MD;  Location: [facility] ENDOSCOPY;  Service: Gastroenterology;  Laterality: N/A;    ENDOSCOPY N/A 11/1/2023    Procedure: ESOPHAGOGASTRODUODENOSCOPY WITH BIOPSY AND DILATATION;  Surgeon: Gonzalez Zapata MD;  Location: [facility] ENDOSCOPY;  Service: Gastroenterology;  Laterality: N/A;    ENDOSCOPY N/A 1/27/2025    Procedure: Esophagogastroduodenoscopy with dilatation and biopsies;  Surgeon: Gonzalez Zapata MD;  Location: [facility] ENDOSCOPY;  Service: Gastroenterology;  Laterality: N/A;    HYSTERECTOMY  1979    UPPER GASTROINTESTINAL ENDOSCOPY  12/09/2013    UPPER GASTROINTESTINAL ENDOSCOPY  11/13/2017      General Information       [facility] Name 03/02/25 1047          Physical Therapy Time and Intention    Document Type evaluation  -     Mode of Treatment physical therapy  -       Row Name 03/02/25 1048 03/02/25 1047       General Information    Patient Profile Reviewed -- yes  -    Prior Level of Function independent:;all household mobility  reports increasing weakness over the last 2-3 weeks  - --    Existing Precautions/Restrictions fall  - --    Barriers to Rehab medically complex;previous functional deficit  - --      Row Name 03/02/25 1048          Living Environment    People in Home child(sindy), adult  -       Row Name 03/02/25 1048          Home Main Entrance    Number of Stairs, Main Entrance three  -     Stair Railings, Main Entrance railings safe and in good condition  -       Row Name 03/02/25 1048          Stairs Within Home, Primary    Number of Stairs, Within Home, Primary none  -       Row Name 03/02/25 1048          Cognition    Orientation Status (Cognition) oriented x 4  -       Row Name 03/02/25 1048          Safety  Issues/Impairments Affecting Functional Mobility    Safety Issues Affecting Function (Mobility) ability to follow commands;insight into deficits/self-awareness;awareness of need for assistance;positioning of assistive device;safety precautions follow-through/compliance;safety precaution awareness;problem-solving  -     Impairments Affecting Function (Mobility) balance;endurance/activity tolerance;postural/trunk control;strength;other (see comments)  dizziness  -               User Key  (r) = Recorded By, (t) = Taken By, (c) = Cosigned By      Initials Name Provider Type    Richelle Schwartz PT Physical Therapist                   Mobility       Row Name 03/02/25 1050          Bed Mobility    Bed Mobility supine-sit;sit-supine  -     Supine-Sit [place] (Bed Mobility) standby assist;verbal cues  -     Sit-Supine [place] (Bed Mobility) standby assist;verbal cues  -     Assistive Device (Bed Mobility) head of bed elevated;bed rails  -       Row Name 03/02/25 1050          Sit-Stand Transfer    Sit-Stand [place] (Transfers) contact guard;verbal cues  -     Assistive Device (Sit-Stand Transfers) walker, front-wheeled  -       Row Name 03/02/25 1050          Gait/Stairs (Locomotion)    [place] Level (Gait) verbal cues;contact guard  -     Assistive Device (Gait) walker, front-wheeled  -     Patient was able to Ambulate yes  -     Distance in Feet (Gait) 3  -     Deviations/Abnormal Patterns (Gait) bilateral deviations;tyra decreased;base of support, wide;stride length decreased;gait speed decreased;festinating/shuffling  -     Bilateral Gait Deviations forward flexed posture;heel strike decreased  -     [place] Level (Stairs) not tested  -               User Key  (r) = Recorded By, (t) = Taken By, (c) = Cosigned By      Initials Name Provider Type    Richelle Schwartz PT Physical Therapist                   Obj/Interventions       Row Name 03/02/25 1051          Range  of Motion Comprehensive    General Range of Motion bilateral lower extremity ROM WFL  -       Row Name 03/02/25 1051          Strength Comprehensive (MMT)    General Manual Muscle Testing (MMT) Assessment lower extremity strength deficits identified  -     Comment, General Manual Muscle Testing (MMT) Assessment BLE MMT grossly 4-/5  -       Row Name 03/02/25 1051          Balance    Balance Assessment sitting static balance;sitting dynamic balance;standing static balance;standing dynamic balance  -     Static Sitting Balance standby assist  -     Dynamic Sitting Balance standby assist  -     Position, Sitting Balance unsupported;sitting edge of bed  -     Static Standing Balance contact guard  -     Dynamic Standing Balance contact guard  -     Position/Device Used, Standing Balance supported;walker, front-wheeled  -       Row Name 03/02/25 1051          Sensory Assessment (Somatosensory)    Sensory Assessment (Somatosensory) LE sensation intact  -               User Key  (r) = Recorded By, (t) = Taken By, (c) = Cosigned By      Initials Name Provider Type     Richelle Zayas, PT Physical Therapist                   Goals/Plan       Row Name 03/02/25 1103          Bed Mobility Goal 1 (PT)    Activity/Assistive Device (Bed Mobility Goal 1, PT) bed mobility activities, all  -     [place] Level/Cues Needed (Bed Mobility Goal 1, PT) modified independence  -     Time Frame (Bed Mobility Goal 1, PT) long term goal (LTG);10 days  -     Progress/Outcomes (Bed Mobility Goal 1, PT) new goal  -       Row Name 03/02/25 1103          Transfer Goal 1 (PT)    Activity/Assistive Device (Transfer Goal 1, PT) transfers, all  -     [place] Level/Cues Needed (Transfer Goal 1, PT) supervision required  -     Time Frame (Transfer Goal 1, PT) long term goal (LTG);10 days  -     Progress/Outcome (Transfer Goal 1, PT) new goal  -       Row Name 03/02/25 1103          Gait Training Goal 1 (PT)     Activity/Assistive Device (Gait Training Goal 1, PT) gait (walking locomotion)  -HW     [place] Level (Gait Training Goal 1, PT) contact guard required  -HW     Distance (Gait Training Goal 1, PT) 100  -HW     Time Frame (Gait Training Goal 1, PT) long term goal (LTG);10 days  -HW     Progress/Outcome (Gait Training Goal 1, PT) new goal  -       Row Name 03/02/25 1103          Patient Education Goal (PT)    Activity (Patient Education Goal, PT) Pt will perform 1x10 BLE TE with VC to allow for improved LE strength and endurance  -HW     [place]/Cues/Accuracy (Memory Goal 2, PT) demonstrates adequately  -HW     Time Frame (Patient Education Goal, PT) short term goal (STG);5 days  -HW     Progress/Outcome (Patient Education Goal, PT) new goal  -       Row Name 03/02/25 1103          Therapy Assessment/Plan (PT)    Planned Therapy Interventions (PT) balance training;bed mobility training;gait training;home exercise program;motor coordination training;postural re-education;neuromuscular re-education;stretching;strengthening;patient/family education;ROM (range of motion);transfer training  -               User Key  (r) = Recorded By, (t) = Taken By, (c) = Cosigned By      Initials Name Provider Type     Richelle Zayas, PT Physical Therapist                   Clinical Impression       Row Name 03/02/25 1052          Pain    Pretreatment Pain Rating 3/10  -HW     Posttreatment Pain Rating 3/10  -     Pain Side/Orientation generalized  -     Pain Management Interventions exercise or physical activity utilized  -     Response to Pain Interventions activity level improved;mobility function improved;functional ability improved;activity participation with tolerable pain  -       Row Name 03/02/25 1052          Plan of Care Review    Plan of Care Reviewed With patient  -     Progress no change  -     Outcome Evaluation Pt participated in PT initial evaluation this date. Pt presents supine in bed,  pleasant and agreeable to PT evaluation. Pt AOx4, reports 3/10 generalized pain at rest. Pt reports at baseline, she lives at home with her daughter in a SSH with 3 CLAIR. Pt reports she is normally (I) with household mobility using a SPC. Pt reports increasing generalized weakness over the last 2-3 weeks, states she has mostly been in the bed or recliner. Pt denies reports of falls at home. Pt performed supine > sit on EOB with SBA. Pt performed STS transfer with CGA and use of a RW. During standing, pt reported dizziness. BP checked 117/78mmHg. Pt ambulated x3' toward  HOB, declined further attempt at ambualtion d/t continued dizziness. Pt returned to supine with SBA. Following evaluation, pt left semi-fowlers position in bed with bed alarm active, call light and all needs within reach. Recommend skilled PT intervention during IP admission to address identified deficits, reduce risk of falls and prevent functional decline. Once medically stable, recommend pt to d/c to STR (subacute) to address remaining impairments.  -       Row Name 03/02/25 1052          Therapy Assessment/Plan (PT)    Patient/Family Therapy Goals Statement (PT) Pt is eager to return to [facility]  -     Rehab Potential (PT) good  -     Criteria for Skilled Interventions Met (PT) yes  -HW     Therapy Frequency (PT) 5 times/wk  -     Predicted Duration of Therapy Intervention (PT) 10 days  -       Row Name 03/02/25 1052          Vital Signs    Pre Systolic BP Rehab 137  -HW     Pre Treatment Diastolic BP 69  -HW     Intra Systolic BP Rehab 117  -HW     Intra Treatment Diastolic BP 78  -HW     Post Systolic BP Rehab 120  -HW     Post Treatment Diastolic BP 84  -HW     Pretreatment Heart Rate (beats/min) 80  -HW     Pre SpO2 (%) 97  -HW     O2 Delivery Pre Treatment room air  -HW     O2 Delivery Intra Treatment room air  -HW     O2 Delivery Post Treatment room air  -HW     Pre Patient Position Supine  -HW     Intra Patient Position Standing  -HW      Post Patient Position Supine  -       Row Name 03/02/25 1052          Positioning and Restraints    Pre-Treatment Position in bed  -     Post Treatment Position bed  -     In Bed supine;call light within reach;encouraged to call for assist;exit alarm on;notified nsg  -               User Key  (r) = Recorded By, (t) = Taken By, (c) = Cosigned By      Initials Name Provider Type     Richelle Zayas PT Physical Therapist                   Outcome Measures       Row Name 03/02/25 1104 03/02/25 0800       How much help from another person do you currently need...    Turning from your back to your side while in flat bed without using bedrails? 4  - 3  -SC    Moving from lying on back to sitting on the side of a flat bed without bedrails? 4  - 3  -SC    Moving to and from a bed to a chair (including a wheelchair)? 3  - 3  -SC    Standing up from a chair using your arms (e.g., wheelchair, bedside chair)? 3  - 2  -SC    Climbing 3-5 steps with a railing? 3  - 1  -SC    To walk in hospital room? 3  - 2  -SC    AM-PAC 6 Clicks Score (PT) 20  - 14  -SC    Highest Level of Mobility Goal 6 --> Walk 10 steps or more  - 4 --> Transfer to chair/commode  -SC      Row Name 03/02/25 1104          Functional Assessment    Outcome Measure Options AM-PAC 6 Clicks Basic Mobility (PT)  -               User Key  (r) = Recorded By, (t) = Taken By, (c) = Cosigned By      Initials Name Provider Type    SC Cristy Morales, RN Registered Nurse     Richelle Zayas, MARY Physical Therapist                                 Physical Therapy Education       Title: PT OT SLP Therapies (In Progress)       Topic: Physical Therapy (In Progress)       Point: Mobility training (Done)       Learning Progress Summary            Patient Acceptance, E,TB, VU by  at 3/2/2025 1105    Comment: role of PT during IP admission                      Point: Home exercise program (Not Started)       Learner Progress:  Not documented in this  visit.              Point: Body mechanics (Not Started)       Learner Progress:  Not documented in this visit.              Point: Precautions (Not Started)       Learner Progress:  Not documented in this visit.                              User Key       Initials Effective Dates Name Provider Type Discipline     11/29/23 -  Richelle Zayas PT Physical Therapist PT                  PT Recommendation and Plan  Planned Therapy Interventions (PT): balance training, bed mobility training, gait training, home exercise program, motor coordination training, postural re-education, neuromuscular re-education, stretching, strengthening, patient/family education, ROM (range of motion), transfer training  Progress: no change  Outcome Evaluation: Pt participated in PT initial evaluation this date. Pt presents supine in bed, pleasant and agreeable to PT evaluation. Pt AOx4, reports 3/10 generalized pain at rest. Pt reports at baseline, she lives at home with her daughter in a H with 3 CLAIR. Pt reports she is normally (I) with household mobility using a SPC. Pt reports increasing generalized weakness over the last 2-3 weeks, states she has mostly been in the bed or recliner. Pt denies reports of falls at home. Pt performed supine > sit on EOB with SBA. Pt performed STS transfer with CGA and use of a RW. During standing, pt reported dizziness. BP checked 117/78mmHg. Pt ambulated x3' toward  HOB, declined further attempt at ambualtion d/t continued dizziness. Pt returned to supine with SBA. Following evaluation, pt left semi-fowlers position in bed with bed alarm active, call light and all needs within reach. Recommend skilled PT intervention during IP admission to address identified deficits, reduce risk of falls and prevent functional decline. Once medically stable, recommend pt to d/c to STR (subacute) to address remaining impairments.     Time Calculation:   PT Evaluation Complexity  History, PT Evaluation Complexity: 1-2  personal factors and/or comorbidities  Examination of Body Systems (PT Eval Complexity): 1-2 elements  Clinical Presentation (PT Evaluation Complexity): stable  Clinical Decision Making (PT Evaluation Complexity): low complexity  Overall Complexity (PT Evaluation Complexity): low complexity     PT Charges       Row Name 03/02/25 1105             Time Calculation    Start Time 1016  -HW      PT Received On 03/02/25  -HW      PT Goal Re-Cert Due Date 03/12/25  -         Untimed Charges    PT Eval/Re-eval Minutes 43  -HW         Total Minutes    Untimed Charges Total Minutes 43  -HW       Total Minutes 43  -HW                User Key  (r) = Recorded By, (t) = Taken By, (c) = Cosigned By      Initials Name Provider Type     Richelle Zayas PT Physical Therapist                  Therapy Charges for Today       Code Description Service Date Service Provider Modifiers Qty    [id number]  PT EVAL LOW COMPLEXITY 3 3/2/2025 Richelle Zayas PT GP 1            PT G-Codes  Outcome Measure Options: AM-PAC 6 Clicks Basic Mobility (PT)  AM-PAC 6 Clicks Score (PT): 20  PT Discharge Summary  Anticipated Discharge Disposition (PT): sub acute care setting    Richelle Zayas PT  3/2/2025      Electronically signed by Richelle Zayas PT at 03/02/25 1106       Occupational Therapy Notes (most recent note)    No notes exist for this encounter.

## 2025-03-03 NOTE — THERAPY EVALUATION
Patient Name: Valarie Camara  : 1945    MRN: [id number]                              Today's Date: 3/3/2025       Admit Date: 2025    Visit Dx:     ICD-10-CM ICD-9-CM   1. Acute pulmonary embolism without acute cor pulmonale, unspecified pulmonary embolism type  I26.99 415.19   2. Cardiomegaly  I51.7 429.3   3. Pericardial effusion  I31.39 423.9   4. Weakness  R53.1 780.79   5. Decreased mobility  R26.89 781.99     Patient Active Problem List   Diagnosis    Atopic rhinitis    Arthritis    Chronic neck pain    Anxiety and depression    Edema    Gastroesophageal reflux disease with esophagitis    Multiple-type hyperlipidemia    Vitamin D deficiency    Benign essential hypertension    Obesity (BMI 30-39.9)    History of COPD    History of cataract    History of osteoporosis    History of restless legs syndrome    History of rheumatoid arthritis    Elevated serum creatinine    Esophageal dysphagia    Constipation    Schatzki's ring    Gastritis without bleeding    History of Helicobacter pylori infection    Duodenitis    Right hemiparesis    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    Multiple thyroid nodules    Lacunar stroke    DM (diabetes mellitus), type 2, uncontrolled w/neurologic complication    Syncope and collapse    Positive fecal occult blood test    Left foot pain    Hypomagnesemia    Acute on chronic anemia    Esophageal dysphagia    Reflux esophagitis    Syncope    Irritable bowel syndrome with constipation    Hypertensive urgency    Pulmonary emboli     Past Medical History:   Diagnosis Date    Acute bronchitis with bronchospasm     Anxiety     Arthritis     Asthma     B12 deficiency     Back pain     Body piercing     ears    Cataract     Cerebrovascular disease     Cervicalgia     Chronic kidney disease     stage 3b    Colonic polyp     History of colonic polyps     Constipation     COPD (chronic obstructive pulmonary disease)     Coronary artery  "disease     Depression     Diverticulitis     Dysphagia     Patient reported \"it won't go all the way down\" when eating solid foods first thing in the mornings    Edema     Elevated cholesterol     Esophageal reflux     Folic acid deficiency     Full dentures     Advised no adhesives DOS    Heart murmur     Herpes zoster     High cholesterol     History of kidney infection     History of recurrent urinary tract infection     HTN (hypertension)     Hypercholesterolemia     Impaired functional mobility, balance, gait, and endurance     Insomnia     Kidney infection     Malignant hypertension 04/26/2015     Accelerated essential hypertension    Measles     rubeola    Muscle spasm     Neoplasm of uncertain behavior of skin     dtr denies    Osteoporosis     Poor historian     Recurrent urinary tract infection     Rheumatoid arthritis     RLS (restless legs syndrome)     Stomach ulcer     Stroke     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    Type 2 diabetes mellitus     Vitamin D deficiency      Past Surgical History:   Procedure Laterality Date    CATARACT EXTRACTION WITH INTRAOCULAR LENS IMPLANT Left 02/11/2013    CATARACT EXTRACTION WITH INTRAOCULAR LENS IMPLANT Right 04/15/2013    COLONOSCOPY  2012    COLONOSCOPY N/A 11/26/2019    Procedure: COLONOSCOPY;  Surgeon: Chidi Downing MD;  Location:  [facility];  Service: Gastroenterology    ENDOSCOPY N/A 11/13/2017    Procedure: ESOPHAGOGASTRODUODENOSCOPY with biopsies and esophageal balloon dilitation;  Surgeon: Wesley Stovall MD;  Location:  [facility];  Service:     ENDOSCOPY N/A 11/25/2019    Procedure: ESOPHAGOGASTRODUODENOSCOPY;  Surgeon: Chidi Downing MD;  Location:  [facility];  Service: Gastroenterology    ENDOSCOPY N/A 11/29/2021    Procedure: ESOPHAGOGASTRODUODENOSCOPY with dilation and biopsies;  Surgeon: Gonzalez Zapata MD;  Location:  [facility];  Service: Gastroenterology;  Laterality: " N/A;    ENDOSCOPY N/A 11/1/2023    Procedure: ESOPHAGOGASTRODUODENOSCOPY WITH BIOPSY AND DILATATION;  Surgeon: Gonzalez Zapata MD;  Location: [facility];  Service: Gastroenterology;  Laterality: N/A;    ENDOSCOPY N/A 1/27/2025    Procedure: Esophagogastroduodenoscopy with dilatation and biopsies;  Surgeon: Gonzalez Zapata MD;  Location: [facility]COPY;  Service: Gastroenterology;  Laterality: N/A;    HYSTERECTOMY  1979    UPPER GASTROINTESTINAL ENDOSCOPY  12/09/2013    UPPER GASTROINTESTINAL ENDOSCOPY  11/13/2017      General Information       Row Name 03/03/25 1621          OT Time and Intention    Document Type evaluation  -SP     Mode of Treatment occupational therapy  -SP       Row Name 03/03/25 1621          General Information    Patient Profile Reviewed yes  -SP     Prior Level of Function independent:;all household mobility  pt refusing to provide PLOF per chart review pt was independent with household mobility, owns a shower chair and cane and her daughrer assists with ADLs PRN  -SP     Existing Precautions/Restrictions fall  -SP     Barriers to Rehab medically complex;cognitive status  -SP       Row Name 03/03/25 1621          Living Environment    People in Home child(sindy), adult  -SP       Row Name 03/03/25 1621          Home Main Entrance    Number of Stairs, Main Entrance three  -SP       Row Name 03/03/25 1621          Stairs Within Home, Primary    Number of Stairs, Within Home, Primary none  -SP       Row Name 03/03/25 1621          Cognition    Orientation Status (Cognition) oriented x 4  -SP       Row Name 03/03/25 1621          Safety Issues/Impairments Affecting Functional Mobility    Safety Issues Affecting Function (Mobility) awareness of need for assistance;insight into deficits/self-awareness;safety precaution awareness;safety precautions follow-through/compliance  -SP     Impairments Affecting Function (Mobility) balance;endurance/activity  tolerance;strength;cognition;other (see comments)  dizziness  -SP     Cognitive Impairments, Mobility Safety/Performance awareness, need for assistance;insight into deficits/self-awareness;problem-solving/reasoning;safety precaution awareness;safety precaution follow-through  -SP               User Key  (r) = Recorded By, (t) = Taken By, (c) = Cosigned By      Initials Name Provider Type    SP Lay Chávez OT Occupational Therapist                     Mobility/ADL's       Row Name 03/03/25 1624          Bed Mobility    Bed Mobility rolling left;rolling right;supine-sit;sit-supine  -SP     Rolling Left [place] (Bed Mobility) supervision;verbal cues  -SP     Rolling Right [place] (Bed Mobility) supervision;verbal cues  -SP     Sit-Supine [place] (Bed Mobility) standby assist;verbal cues  -SP     Assistive Device (Bed Mobility) head of bed elevated;bed rails  -SP       Row Name 03/03/25 1624          Transfers    Transfers sit-stand transfer  -SP       Row Name 03/03/25 1624          Sit-Stand Transfer    Sit-Stand [place] (Transfers) contact guard;verbal cues  -SP     Assistive Device (Sit-Stand Transfers) walker, front-wheeled  -SP       Row Name 03/03/25 1624          Functional Mobility    Functional Mobility- Ind. Level contact guard assist  -SP     Functional Mobility- Device walker, front-wheeled  -SP     Functional Mobility-Distance (Feet) --  24ft + 24ft  -SP     Patient was able to Ambulate yes  -SP       Row Name 03/03/25 1624          Activities of Daily Living    BADL Assessment/Intervention bathing;upper body dressing;lower body dressing;grooming;feeding;toileting  -SP       Row Name 03/03/25 1624          Bathing Assessment/Intervention    [place] Level (Bathing) bathing skills;contact guard assist  -SP       Row Name 03/03/25 1624          Upper Body Dressing Assessment/Training    [place] Level (Upper Body Dressing) upper body dressing skills;set up;standby assist  -SP        Row Name 03/03/25 1624          Lower Body Dressing Assessment/Training    [place] Level (Lower Body Dressing) lower body dressing skills;contact guard assist  -SP       Row Name 03/03/25 1624          Grooming Assessment/Training    [place] Level (Grooming) grooming skills;contact guard assist  -SP     Position (Grooming) sink side;supported standing  -SP       Row Name 03/03/25 1624          Self-Feeding Assessment/Training    [place] Level (Feeding) feeding skills;set up;supervision  -SP       Row Name 03/03/25 1624          Toileting Assessment/Training    [place] Level (Toileting) toileting skills;contact guard assist  -SP               User Key  (r) = Recorded By, (t) = Taken By, (c) = Cosigned By      Initials Name Provider Type    SP Lay Chávez OT Occupational Therapist                   Obj/Interventions       Row Name 03/03/25 1625          Range of Motion Comprehensive    General Range of Motion bilateral upper extremity ROM WFL  -SP       Row Name 03/03/25 1625          Strength Comprehensive (MMT)    General Manual Muscle Testing (MMT) Assessment upper extremity strength deficits identified  -SP       Row Name 03/03/25 1625          Upper Extremity (Manual Muscle Testing)    Comment, MMT: Upper Extremity B UE grossly 4-/5  -SP       Row Name 03/03/25 1625          Balance    Balance Assessment sitting static balance;sitting dynamic balance;standing static balance;standing dynamic balance  -SP     Static Sitting Balance standby assist  -SP     Dynamic Sitting Balance standby assist  -SP     Position, Sitting Balance unsupported;sitting edge of bed  -SP     Static Standing Balance contact guard  -SP     Dynamic Standing Balance contact guard  -SP     Position/Device Used, Standing Balance supported;walker, front-wheeled  -SP               User Key  (r) = Recorded By, (t) = Taken By, (c) = Cosigned By      Initials Name Provider Type    Lay Banks OT Occupational Therapist                    Goals/Plan       Row Name 03/03/25 1651          Transfer Goal 1 (OT)    Activity/Assistive Device (Transfer Goal 1, OT) toilet  -SP     [place] Level/Cues Needed (Transfer Goal 1, OT) modified independence  -SP     Time Frame (Transfer Goal 1, OT) by discharge  -SP     Progress/Outcome (Transfer Goal 1, OT) goal ongoing  -SP       Row Name 03/03/25 1651          Bathing Goal 1 (OT)    Activity/Device (Bathing Goal 1, OT) bathing skills, all  -SP     [place] Level/Cues Needed (Bathing Goal 1, OT) standby assist  -SP     Time Frame (Bathing Goal 1, OT) by discharge  -SP     Progress/Outcomes (Bathing Goal 1, OT) goal ongoing  -SP       Row Name 03/03/25 1651          Toileting Goal 1 (OT)    Activity/Device (Toileting Goal 1, OT) toileting skills, all  -SP     [place] Level/Cues Needed (Toileting Goal 1, OT) modified independence  -SP     Time Frame (Toileting Goal 1, OT) by discharge  -SP     Progress/Outcome (Toileting Goal 1, OT) goal ongoing  -SP       Row Name 03/03/25 1651          Strength Goal 1 (OT)    Strength Goal 1 (OT) Pt will participate in UE strengthening as tolerated to maximize her functional strength and muscular endurance for ADL and mobility participation.  -SP     Time Frame (Strength Goal 1, OT) short term goal (STG);5 days  -SP     Progress/Outcome (Strength Goal 1, OT) goal ongoing  -SP       Row Name 03/03/25 1651          Therapy Assessment/Plan (OT)    Planned Therapy Interventions (OT) activity tolerance training;BADL retraining;cognitive/visual perception retraining;IADL retraining;functional balance retraining;ROM/therapeutic exercise;patient/caregiver education/training;occupation/activity based interventions;strengthening exercise;transfer/mobility retraining  -SP               User Key  (r) = Recorded By, (t) = Taken By, (c) = Cosigned By      Initials Name Provider Type    Lay Banks, OT Occupational Therapist                   Clinical  "Impression       Row Name 03/03/25 1626          Pain Assessment    Pretreatment Pain Rating 2/10  -SP     Posttreatment Pain Rating 2/10  -SP     Pain Side/Orientation generalized  -SP     Pain Management Interventions exercise or physical activity utilized;diversional activity provided  -SP     Response to Pain Interventions activity participation with tolerable pain;no change per patient report  -SP       Row Name 03/03/25 1626          Plan of Care Review    Plan of Care Reviewed With patient  -SP     Progress no change  -SP     Outcome Evaluation OT she completed this date. Pt was received in the bed and she reports she has been up all day and is not interested in getting up again. OT explained purpose of the evaluation and she was agreeable. She is alert and oriented only to self and month with choices and refused additional orientation and PLOF questioning stating \"thats enough questions for the day\". PLOF gathered from PT and case management documentation. Per review pt presents from home where she lives with her daughter in a home with 3 CLAIR. She typically ambulates in the home independently and her daughter assists with ADLs PRN (more over the past few weeks due to weakness). Fall hx is unknown. She moved to eob with SBA, donned socks with HAN/CGA, transferred to standing with CGA using the Rwx with min cueing for positioning and she ambulated 24ft to the bathroom with CGA using the Rwx. Once in the bathroom she stood at the sink to complete partial UBB with CGA/HAN and grooming with CGA. She ambulated 24ft back to the eob and endorsed dizziness. Her bp was assessed and was 139/68. She returned to supine with SBA and repositioned in the bed for comfort with all needs in reach and the bed alarm activated. She is expected to benefit from skilled OT services to address ADL, functional mobility, safety awareness, endurance and activity tolerance deficits. Recommend she dc to subacute rehab with anticipated need " for daily caregiver assistance at home vs [facility] due to underlying cognitive deficits.  -SP       Row Name 03/03/25 1626          Therapy Assessment/Plan (OT)    Rehab Potential (OT) good  -SP     Criteria for Skilled Therapeutic Interventions Met (OT) yes;meets criteria  -SP     Therapy Frequency (OT) 3 times/wk  -SP       Row Name 03/03/25 1626          Therapy Plan Review/Discharge Plan (OT)    Anticipated Discharge Disposition (OT) sub acute care setting  -SP       Row Name 03/03/25 1626          Vital Signs    Pre Systolic BP Rehab 162  -SP     Pre Treatment Diastolic BP 91  -SP     Post Systolic BP Rehab 139  -SP     Intratreatment Heart Rate (beats/min) 63  -SP     O2 Delivery Pre Treatment room air  -SP     O2 Delivery Intra Treatment room air  -SP     O2 Delivery Post Treatment room air  -SP     Pre Patient Position Supine  -SP     Intra Patient Position Standing  -SP     Post Patient Position Supine  -SP       Row Name 03/03/25 1626          Positioning and Restraints    Pre-Treatment Position in bed  -SP     Post Treatment Position bed  -SP     In Bed fowlers;call light within reach;encouraged to call for assist;exit alarm on  -SP               User Key  (r) = Recorded By, (t) = Taken By, (c) = Cosigned By      Initials Name Provider Type    Lay Banks, CARYN Occupational Therapist                   Outcome Measures       Row Name 03/03/25 1652          How much help from another is currently needed...    Putting on and taking off regular lower body clothing? 3  -SP     Bathing (including washing, rinsing, and drying) 3  -SP     Toileting (which includes using toilet bed pan or urinal) 3  -SP     Putting on and taking off regular upper body clothing 3  -SP     Taking care of personal grooming (such as brushing teeth) 3  -SP     Eating meals 4  -SP     AM-PAC 6 Clicks Score (OT) 19  -SP       Row Name 03/03/25 0800          How much help from another person do you currently need...    Turning from your back  to your side while in flat bed without using bedrails? 4  -KJ     Moving from lying on back to sitting on the side of a flat bed without bedrails? 4  -KJ     Moving to and from a bed to a chair (including a wheelchair)? 3  -KJ     Standing up from a chair using your arms (e.g., wheelchair, bedside chair)? 3  -KJ     Climbing 3-5 steps with a railing? 2  -KJ     To walk in hospital room? 3  -KJ     AM-PAC 6 Clicks Score (PT) 19  -KJ     Highest Level of Mobility Goal 6 --> Walk 10 steps or more  -KJ       Row Name 03/03/25 1652          Functional Assessment    Outcome Measure Options AM-PAC 6 Clicks Daily Activity (OT)  -SP               User Key  (r) = Recorded By, (t) = Taken By, (c) = Cosigned By      Initials Name Provider Type    Lay Banks, CARYN Occupational Therapist    Karen Mann, RN Registered Nurse                    Occupational Therapy Education       Title: PT OT SLP Therapies (In Progress)       Topic: Occupational Therapy (In Progress)       Point: ADL training (Done)       Description:   Instruct learner(s) on proper safety adaptation and remediation techniques during self care or transfers.   Instruct in proper use of assistive devices.                  Learning Progress Summary            Patient Acceptance, E, VU,NR by SP at 3/3/2025 5059    Comment: Pt was educated on the purpose of the OT eval and POC. Cont edu due to underlying cog deficits.                      Point: Home exercise program (Not Started)       Description:   Instruct learner(s) on appropriate technique for monitoring, assisting and/or progressing therapeutic exercises/activities.                  Learner Progress:  Not documented in this visit.              Point: Precautions (Not Started)       Description:   Instruct learner(s) on prescribed precautions during self-care and functional transfers.                  Learner Progress:  Not documented in this visit.              Point: Body mechanics (Not Started)        "Description:   Instruct learner(s) on proper positioning and spine alignment during self-care, functional mobility activities and/or exercises.                  Learner Progress:  Not documented in this visit.                              User Key       Initials Effective Dates Name Provider Type Discipline    SP 09/08/22 -  Lay Chávez OT Occupational Therapist OT                  OT Recommendation and Plan  Planned Therapy Interventions (OT): activity tolerance training, BADL retraining, cognitive/visual perception retraining, IADL retraining, functional balance retraining, ROM/therapeutic exercise, patient/caregiver education/training, occupation/activity based interventions, strengthening exercise, transfer/mobility retraining  Therapy Frequency (OT): 3 times/wk  Plan of Care Review  Plan of Care Reviewed With: patient  Progress: no change  Outcome Evaluation: OT she completed this date. Pt was received in the bed and she reports she has been up all day and is not interested in getting up again. OT explained purpose of the evaluation and she was agreeable. She is alert and oriented only to self and month with choices and refused additional orientation and PLOF questioning stating \"thats enough questions for the day\". PLOF gathered from PT and case management documentation. Per review pt presents from home where she lives with her daughter in a home with 3 CLAIR. She typically ambulates in the home independently and her daughter assists with ADLs PRN (more over the past few weeks due to weakness). Fall hx is unknown. She moved to eob with SBA, donned socks with HAN/CGA, transferred to standing with CGA using the Rwx with min cueing for positioning and she ambulated 24ft to the bathroom with CGA using the Rwx. Once in the bathroom she stood at the sink to complete partial UBB with CGA/HAN and grooming with CGA. She ambulated 24ft back to the eob and endorsed dizziness. Her bp was assessed and was 139/68. She " returned to supine with SBA and repositioned in the bed for comfort with all needs in reach and the bed alarm activated. She is expected to benefit from skilled OT services to address ADL, functional mobility, safety awareness, endurance and activity tolerance deficits. Recommend she dc to subacute rehab with anticipated need for daily caregiver assistance at home vs longterm due to underlying cognitive deficits.     Time Calculation:   Evaluation Complexity (OT)  Review Occupational Profile/Medical/Therapy History Complexity: brief/low complexity  Assessment, Occupational Performance/Identification of Deficit Complexity: 3-5 performance deficits  Clinical Decision Making Complexity (OT): problem focused assessment/low complexity  Overall Complexity of Evaluation (OT): low complexity     Time Calculation- OT       Row Name 03/03/25 1654             Time Calculation- OT    OT Start Time 1403  -SP      OT Received On 03/03/25  -SP      OT Goal Re-Cert Due Date 03/13/25  -SP         Untimed Charges    OT Eval/Re-eval Minutes 41  -SP         Total Minutes    Untimed Charges Total Minutes 41  -SP       Total Minutes 41  -SP                User Key  (r) = Recorded By, (t) = Taken By, (c) = Cosigned By      Initials Name Provider Type    SP Lay Chávez OT Occupational Therapist                  Therapy Charges for Today       Code Description Service Date Service Provider Modifiers Qty    95096628021 HC OT EVAL LOW COMPLEXITY 3 3/3/2025 Lay Chávez OT GO 1                 Lya Chávez OT  3/3/2025

## 2025-03-04 VITALS
RESPIRATION RATE: 18 BRPM | SYSTOLIC BLOOD PRESSURE: 117 MMHG | TEMPERATURE: 98.3 F | WEIGHT: 160.5 LBS | BODY MASS INDEX: 28.44 KG/M2 | OXYGEN SATURATION: 98 % | HEIGHT: 63 IN | DIASTOLIC BLOOD PRESSURE: 64 MMHG | HEART RATE: 80 BPM

## 2025-03-04 LAB
BASOPHILS # BLD AUTO: 0.03 10*3/MM3 (ref 0–0.2)
BASOPHILS NFR BLD AUTO: 0.4 % (ref 0–1.5)
DEPRECATED RDW RBC AUTO: 42.7 FL (ref 37–54)
EOSINOPHIL # BLD AUTO: 0.21 10*3/MM3 (ref 0–0.4)
EOSINOPHIL NFR BLD AUTO: 3.1 % (ref 0.3–6.2)
ERYTHROCYTE [DISTWIDTH] IN BLOOD BY AUTOMATED COUNT: 13.7 % (ref 12.3–15.4)
GEN 5 1HR TROPONIN T REFLEX: 50 NG/L
GLUCOSE BLDC GLUCOMTR-MCNC: 189 MG/DL (ref 70–130)
GLUCOSE BLDC GLUCOMTR-MCNC: 255 MG/DL (ref 70–130)
HBA1C MFR BLD: 6.9 % (ref 4.8–5.6)
HCT VFR BLD AUTO: 35.7 % (ref 34–46.6)
HGB BLD-MCNC: 12 G/DL (ref 12–15.9)
IMM GRANULOCYTES # BLD AUTO: 0.06 10*3/MM3 (ref 0–0.05)
IMM GRANULOCYTES NFR BLD AUTO: 0.9 % (ref 0–0.5)
LYMPHOCYTES # BLD AUTO: 1.65 10*3/MM3 (ref 0.7–3.1)
LYMPHOCYTES NFR BLD AUTO: 24.1 % (ref 19.6–45.3)
MAGNESIUM SERPL-MCNC: 1.8 MG/DL (ref 1.6–2.4)
MAGNESIUM SERPL-MCNC: 1.9 MG/DL (ref 1.6–2.4)
MCH RBC QN AUTO: 28.7 PG (ref 26.6–33)
MCHC RBC AUTO-ENTMCNC: 33.6 G/DL (ref 31.5–35.7)
MCV RBC AUTO: 85.4 FL (ref 79–97)
MONOCYTES # BLD AUTO: 0.64 10*3/MM3 (ref 0.1–0.9)
MONOCYTES NFR BLD AUTO: 9.4 % (ref 5–12)
NEUTROPHILS NFR BLD AUTO: 4.25 10*3/MM3 (ref 1.7–7)
NEUTROPHILS NFR BLD AUTO: 62.1 % (ref 42.7–76)
NRBC BLD AUTO-RTO: 0 /100 WBC (ref 0–0.2)
PLATELET # BLD AUTO: 245 10*3/MM3 (ref 140–450)
PMV BLD AUTO: 10.2 FL (ref 6–12)
RBC # BLD AUTO: 4.18 10*6/MM3 (ref 3.77–5.28)
TROPONIN T % DELTA: -4
TROPONIN T NUMERIC DELTA: -2 NG/L
TROPONIN T SERPL HS-MCNC: 52 NG/L
TSH SERPL DL<=0.05 MIU/L-ACNC: 0.63 UIU/ML (ref 0.27–4.2)
WBC NRBC COR # BLD AUTO: 6.84 10*3/MM3 (ref 3.4–10.8)

## 2025-03-04 PROCEDURE — G0378 HOSPITAL OBSERVATION PER HR: HCPCS

## 2025-03-04 PROCEDURE — 85025 COMPLETE CBC W/AUTO DIFF WBC: CPT | Performed by: PHYSICIAN ASSISTANT

## 2025-03-04 PROCEDURE — 84443 ASSAY THYROID STIM HORMONE: CPT | Performed by: FAMILY MEDICINE

## 2025-03-04 PROCEDURE — 83735 ASSAY OF MAGNESIUM: CPT | Performed by: FAMILY MEDICINE

## 2025-03-04 PROCEDURE — 25010000002 MAGNESIUM SULFATE 2 GM/50ML SOLUTION: Performed by: FAMILY MEDICINE

## 2025-03-04 PROCEDURE — 82948 REAGENT STRIP/BLOOD GLUCOSE: CPT | Performed by: INTERNAL MEDICINE

## 2025-03-04 PROCEDURE — 83036 HEMOGLOBIN GLYCOSYLATED A1C: CPT | Performed by: STUDENT IN AN ORGANIZED HEALTH CARE EDUCATION/TRAINING PROGRAM

## 2025-03-04 PROCEDURE — 84484 ASSAY OF TROPONIN QUANT: CPT | Performed by: FAMILY MEDICINE

## 2025-03-04 PROCEDURE — 99239 HOSP IP/OBS DSCHRG MGMT >30: CPT | Performed by: STUDENT IN AN ORGANIZED HEALTH CARE EDUCATION/TRAINING PROGRAM

## 2025-03-04 PROCEDURE — 25010000002 MAGNESIUM SULFATE 2 GM/50ML SOLUTION: Performed by: STUDENT IN AN ORGANIZED HEALTH CARE EDUCATION/TRAINING PROGRAM

## 2025-03-04 PROCEDURE — 82948 REAGENT STRIP/BLOOD GLUCOSE: CPT

## 2025-03-04 PROCEDURE — 96366 THER/PROPH/DIAG IV INF ADDON: CPT

## 2025-03-04 PROCEDURE — 63710000001 INSULIN LISPRO (HUMAN) PER 5 UNITS: Performed by: INTERNAL MEDICINE

## 2025-03-04 RX ORDER — MAGNESIUM SULFATE HEPTAHYDRATE 40 MG/ML
2 INJECTION, SOLUTION INTRAVENOUS ONCE
Status: COMPLETED | OUTPATIENT
Start: 2025-03-04 | End: 2025-03-04

## 2025-03-04 RX ADMIN — PANTOPRAZOLE SODIUM 40 MG: 40 TABLET, DELAYED RELEASE ORAL at 08:40

## 2025-03-04 RX ADMIN — MAGNESIUM SULFATE HEPTAHYDRATE 2 G: 40 INJECTION, SOLUTION INTRAVENOUS at 11:46

## 2025-03-04 RX ADMIN — MAGNESIUM SULFATE HEPTAHYDRATE 2 G: 40 INJECTION, SOLUTION INTRAVENOUS at 04:56

## 2025-03-04 RX ADMIN — Medication 10 ML: at 08:43

## 2025-03-04 RX ADMIN — INSULIN LISPRO 4 UNITS: 100 INJECTION, SOLUTION INTRAVENOUS; SUBCUTANEOUS at 11:47

## 2025-03-04 RX ADMIN — CARVEDILOL 25 MG: 25 TABLET, FILM COATED ORAL at 08:41

## 2025-03-04 RX ADMIN — ATORVASTATIN CALCIUM 80 MG: 80 TABLET, FILM COATED ORAL at 08:40

## 2025-03-04 RX ADMIN — NIFEDIPINE 90 MG: 90 TABLET, FILM COATED, EXTENDED RELEASE ORAL at 08:41

## 2025-03-04 RX ADMIN — INSULIN LISPRO 2 UNITS: 100 INJECTION, SOLUTION INTRAVENOUS; SUBCUTANEOUS at 08:41

## 2025-03-04 RX ADMIN — APIXABAN 10 MG: 5 TABLET, FILM COATED ORAL at 08:40

## 2025-03-04 NOTE — PLAN OF CARE
"Goal Outcome Evaluation:         Pt has periods of incontinence. A/Ox3 self, situation, place. Pt had a period of \"flutter feeling in chest\" EKG obtained provider notified. Orders received and carried out. Will continue plan of care.                                    "

## 2025-03-04 NOTE — NURSING NOTE
Pt showed vtach on monitor. Checked on pt and she said she was fine. Checked BP it was 147/72. Pt continued to show vtach on tele. Changed tele leads and stickers. Pt stated she was feeling funny and it felt like it was fluttering and pointed to her chest. EKG done. Pt stated she it hurts trying to talk and when asked if she is having trouble finding her words she responded yes. Neuro assessment done with no findings out of the norm. Hospitalist notified and will come see pt.     0128 Hospitalist saw pt new orders received.

## 2025-03-04 NOTE — DISCHARGE SUMMARY
[facility] HOSPITALIST   DISCHARGE SUMMARY      Name:  Valarie Camara   Age:  79 y.o.  Sex:  female  :  1945  MRN:  [id number]   Visit Number:  [id number]    Admission Date:  2025  Date of Discharge:  3/4/2025  Primary Care Physician:  Lay Cox MD        Problem List:     Active Hospital Problems    Diagnosis  POA    **Pulmonary emboli [I26.99]  Yes      Resolved Hospital Problems   No resolved problems to display.     Presenting Problem:    Chief Complaint   Patient presents with    Weakness - Generalized      Consults:     Consulting Physician(s)         Provider   Role Specialty     Wil Rader DO      Consulting Physician Hospitalist            History of presenting illness/Hospital Course:    Valarie Camara is an 79-year-old female brought to the emergency department with her daughter with generalized weakness and dyspnea complaints.  Patient has a known history of dementia, unfortunately poor historian.  History of present illness was obtained from discussion with daughter at bedside.  This has reportedly been ongoing for weeks duration.  The patient has reportedly been moaning and groaning in pain throughout most of the evening the last few days.  She was recently seen in her PCPs office earlier this week, diagnosed with dementia at that time.  The patient has reportedly been more bedfast, complaining of bilateral feet pain, numbness, tingling due to her neuropathy.  Patient is unfortunate unable to be taken care of adequately at home, and ultimately brought into the emergency department for evaluation.     In the emergency department, laboratory workup was unremarkable.  CT abdomen pelvis with and without contrast did not show any acute process.  CTA of the chest showed a small single tubular defect of the right lower lobe pulmonary artery suggesting PE.  Patient started on heparin drip in the ED.  Hospitalist services consulted for  admission.    Inpatient general floor admission 2/28/2025 with acute pulmonary embolus.  Clinically did well during course.    Stable for discharge to short-term rehab 3/4/2025.  Attempted to contact daughter Cleo, no answer.     Pulmonary embolus  Continue Eliquis.  Follow-up outpatient.  No significant respiratory symptoms.  Pericardial effusion  Monitoring, no significant symptoms.  Very small on echo.  Echocardiogram EF 65%, grade 1 diastolic dysfunction, small circumferential pericardial effusion with no evidence of cardiac tamponade.     Chronic: Dementia, type 2 diabetes, CKD, COPD no home O2, heart failure preserved ejection fraction, hypertension, hyperlipidemia, RLS, neuropathy.  Continue home medications.    Vital Signs:    Temp:  [98.1 °F (36.7 °C)-98.4 °F (36.9 °C)] 98.3 °F (36.8 °C)  Heart Rate:  [] 80  Resp:  [18-20] 18  BP: (117-170)/(63-91) 117/64    Physical Exam:    General Appearance:  Alert and cooperative.    Head:  Atraumatic and normocephalic.   Eyes: Conjunctivae and sclerae normal, no icterus. No pallor.   Ears:  Ears with no abnormalities noted.   Throat: No oral lesions, no thrush, oral mucosa moist.   Neck: Supple, trachea midline, no thyromegaly.   Back:   No kyphoscoliosis present. No tenderness to palpation.   Lungs:   Breath sounds heard bilaterally equally.  No crackles or wheezing. No Pleural rub or bronchial breathing.   Heart:  Normal S1 and S2, no murmur, no gallop, no rub. No JVD.   Abdomen:   Normal bowel sounds, no masses, no organomegaly. Soft, nontender, nondistended, no rebound tenderness.   Extremities: Supple, no edema, no cyanosis, no clubbing.   Pulses: Pulses palpable bilaterally.   Skin: Warm.   Neurologic: Alert and oriented to self. No facial asymmetry. Moves all four limbs. No tremors.     Pertinent Lab Results:     Results from last 7 days   Lab Units 03/03/25  0910 03/02/25  0724 03/01/25  0350 02/28/25  1200   SODIUM mmol/L 139 138 138 138   POTASSIUM  mmol/L 4.3 4.2 3.7 4.0   CHLORIDE mmol/L 106 104 102 103   CO2 mmol/L 22.6 22.9 22.1 22.5   BUN mg/dL 28* 27* 31* 32*   CREATININE mg/dL 1.54* 1.77* 1.58* 1.74*   CALCIUM mg/dL 8.9 9.0 9.4 9.1   BILIRUBIN mg/dL  --   --   --  0.4   ALK PHOS U/L  --   --   --  54   ALT (SGPT) U/L  --   --   --  7   AST (SGOT) U/L  --   --   --  14   GLUCOSE mg/dL 132* 136* 108* 218*     Results from last 7 days   Lab Units 03/04/25  0423 03/03/25  0910 03/02/25  0724   WBC 10*3/mm3 6.84 6.05 5.57   HEMOGLOBIN g/dL 12.0 12.2 12.2   HEMATOCRIT % 35.7 37.1 36.4   PLATELETS 10*3/mm3 245 237 233     Results from last 7 days   Lab Units 02/28/25  1200   INR  0.91     Results from last 7 days   Lab Units 03/04/25  0423 03/04/25  0244 02/28/25  1307   HSTROP T ng/L 50* 52* 61*                           Pertinent Radiology Results:    Imaging Results (All)       Procedure Component Value Units Date/Time    CT Angiogram Chest Pulmonary Embolism [[id number]] Collected: 02/28/25 1542     Updated: 02/28/25 1551    Narrative:      PROCEDURE: CT ANGIOGRAM CHEST PULMONARY EMBOLISM-     HISTORY: Leg swelling, SOA, weakness     COMPARISON: None.     TECHNIQUE: The patient was injected with  IV contrast. Axial images were  obtained through the chest in a CTA/ PE protocol. 3 D Reconstruction  images were also performed. This study was performed with techniques to  keep radiation doses as low as reasonably achievable, (ALARA).  Individualized dose reduction techniques using automated exposure  control or adjustment of mA and/or kV according to the patient size were  employed.     FINDINGS: Inhomogeneous thyroid noted. There is no axillary adenopathy.  There is mildly prominent subcarinal lymph nodes, etiology unclear. The  heart is mildly enlarged. There is a mild-to-moderate pericardial  effusion but no pleural effusions. There is a single, small tubular  defect in the the right lower lobe pulmonary artery identified on series  12 image 54, series 7  image 44, series 11 image 59, and series 10 image  45. A very small solitary pulmonary embolus is not excluded. There is no  evidence of thoracic aortic aneurysm or dissection. Limited images of  the upper abdomen partially demonstrate bilateral renal cysts. Moderate  hiatal hernia noted. No suspicious infiltrate or nodule is identified.  There are linear densities in the left lower lobe consistent with  atelectasis or scar. No areas of consolidation seen. Vascular  calcifications noted.       Impression:      1. A single, small, tubular defect in the right lower lobe pulmonary  artery as described, suggesting small pulmonary embolus. Recommend  repeat study after treatment to document resolution.     2. Cardiomegaly and pericardial effusion with no prior studies for  comparison.        CTDI: 7.82 mGy  DLP:448.04 mGy.cm        This report was signed and finalized on 2/28/2025 3:49 PM by Genna Walker MD.       CT Abdomen Pelvis With Contrast [[id number]] Collected: 02/28/25 1520     Updated: 02/28/25 1524    Narrative:      PROCEDURE: CT ABDOMEN PELVIS W CONTRAST-     HISTORY:  abdominal pain, generalized weakness     COMPARISON: February 8, 2025.     TECHNIQUE: Multiple axial CT images were obtained from the lung bases  through the pubic symphysis following the administration of Isovue 300  contrast.        FINDINGS:      ABDOMEN: A hiatal hernia is stable. The liver is normal. The spleen is  unremarkable. No adrenal mass is present.  The pancreas is normal. There  are several well-circumscribed hypodense renal cysts bilaterally. The  aorta is normal in caliber. There are vascular calcifications. There is  no free fluid or adenopathy.     PELVIS: The appendix is not identified. There are no secondary signs to  suggest appendicitis. There is colonic diverticulosis. There is no  evidence of acute diverticulitis. There is no evidence of bowel  obstruction. The uterus is diminutive or absent. The urinary bladder  is  unremarkable. There is no significant free fluid or adenopathy.       Impression:      No evidence of acute intra-abdominal process.     Stable chronic changes as above.        CTDI: 7.82 mGy  DLP: 448.04 mGy.cm        This study was performed with techniques to keep radiation doses as low  as reasonably achievable (ALARA). Individualized dose reduction  techniques using automated exposure control or adjustment of mA and/or  kV according to the patient size were employed.                 Images were reviewed, interpreted, and dictated by Dr. Genna Walker MD  Transcribed by Lisa Lyons PA-C.     This report was signed and finalized on 2/28/2025 3:22 PM by Genna Walker MD.       XR Chest 1 View [[id number]] Collected: 02/28/25 1301     Updated: 02/28/25 1304    Narrative:      PROCEDURE: XR CHEST 1 VW-     HISTORY: Weak/Dizzy/AMS triage protocol, weakness for 2 to 3 weeks.     COMPARISON: 4/8/2025..     FINDINGS: The heart is mildly enlarged, stable.. The lungs are clear.  The mediastinum is unremarkable. There is no pneumothorax.  There are no  acute osseous abnormalities.       Impression:      Cardiomegaly without acute infiltrate..                 This report was signed and finalized on 2/28/2025 1:02 PM by Genna Walker MD.               Echo:    Results for orders placed during the hospital encounter of 02/28/25    Adult Transthoracic Echo Complete w/ Color, Spectral and Contrast if necessary per protocol    Interpretation Summary    Left ventricular systolic function is normal. Calculated left ventricular EF = 65% Left ventricular ejection fraction appears to be 61 - 65%.    Left ventricular wall thickness is consistent with mild concentric hypertrophy.    Left ventricular diastolic function is consistent with (grade I) impaired relaxation.    There is mild calcification of the aortic valve with no significant stenosis.    Estimated right ventricular systolic pressure from tricuspid regurgitation is  normal (<35 mmHg).    There is a small (<1cm) circumferential pericardial effusion. There is no evidence of cardiac tamponade.    Condition on Discharge:      Stable.    Code status during the hospital stay:    Code Status and Medical Interventions: CPR (Attempt to Resuscitate); Full Support   Ordered at: 02/28/25 4541     Level Of Support Discussed With:    Patient     Code Status (Patient has no pulse and is not breathing):    CPR (Attempt to Resuscitate)     Medical Interventions (Patient has pulse or is breathing):    Full Support     Discharge Disposition:    Rehab Facility or Unit (DC - External)    Discharge Medications:       Discharge Medications        New Medications        Instructions Start Date   apixaban 5 MG tablet tablet  Commonly known as: ELIQUIS   Take 2 tablets by mouth Every 12 (Twelve) Hours for 4 days, THEN 1 tablet Every 12 (Twelve) Hours for 30 days. Indications: DVT/PE (active thrombosis)   Start Date: March 4, 2025            Continue These Medications        Instructions Start Date   acetaminophen 325 MG tablet  Commonly known as: TYLENOL   325 mg, Every 6 Hours PRN      atorvastatin 80 MG tablet  Commonly known as: LIPITOR   80 mg, Oral, Daily      carvedilol 25 MG tablet  Commonly known as: COREG   25 mg, Oral, 2 Times Daily With Meals      CHOLECALCIFEROL PO   5,000 Int'l Units/day, Daily      ferrous sulfate 325 (65 FE) MG tablet   1 tablet, 3 Times Weekly      glucose blood test strip   1 each, Other, As Needed, Check sugar once a day      glucose monitor monitoring kit   1 each, Not Applicable, Daily      insulin lispro 100 UNIT/ML injection  Commonly known as: humaLOG   2 Units, 3 Times Daily PRN      Insulin Pen Needle 31G X 5 MM misc   Inject insulin three times daily      Easy Touch Pen Needles 31G X 8 MM misc  Generic drug: Insulin Pen Needle   Indications: Diabetes      Lancets 30G misc   Check sugar daily      linagliptin 5 MG tablet tablet  Commonly known as: TRADJENTA   5  mg, Oral, Daily      NIFEdipine XL 90 MG 24 hr tablet  Commonly known as: PROCARDIA XL   1 tablet, Daily      pantoprazole 40 MG EC tablet  Commonly known as: PROTONIX   40 mg, Daily      traZODone 50 MG tablet  Commonly known as: DESYREL   50 mg, Nightly PRN             Discharge Diet:     Diet Instructions       Diet: Diabetic Diets, Cardiac Diets; Healthy Heart (2-3 Na+); Regular (IDDSI 7); Thin (IDDSI 0); Consistent Carbohydrate      Discharge Diet:  Diabetic Diets  Cardiac Diets       Cardiac Diet: Healthy Heart (2-3 Na+)    Texture: Regular (IDDSI 7)    Fluid Consistency: Thin (IDDSI 0)    Diabetic Diet: Consistent Carbohydrate          Activity at Discharge:     Activity Instructions       Activity as Tolerated            Follow-up Appointments:     Follow-up Information       Lay Cox MD Follow up in 3 day(s).    Specialty: Family Medicine  Contact information:  [address] [place] [address] [place] [address]  [phone]                           Future Appointments   Date Time Provider Department Center   3/12/2025  2:30 PM Jocelin Roper APRN [facility]   3/26/2025  9:30 AM ALVIN  2 [facility]   3/2/2026  1:30 PM Vidal Low MD E [facility]     Test Results Pending at Discharge:    Pending Results       Procedure [Order ID] Specimen - Date/Time    Hemoglobin A1c [[id number]] Collected: 03/04/25 0423    Specimen: Blood Updated: 03/04/25 1253                 Brian Joseph Kerley, DO  03/04/25  12:56 EST    Time: I spent 40 minutes on this discharge activity which included: face-to-face encounter with the patient, reviewing the data in the system, coordination of the care with the nursing staff as well as consultants, documentation, and entering orders.     Dictated utilizing Dragon dictation.    I have reviewed the copied text and it is accurate as of 3/4/2025

## 2025-03-04 NOTE — CASE MANAGEMENT/SOCIAL WORK
Case Management Discharge Note      Final Note: DC per daughter/Cleo to [facility] for STR.    Provided Post Acute Provider List?: Yes  Post Acute Provider List: Inpatient Rehab  Delivered To: Support Person  Support Person: Jordan Camara/Daughter    Selected Continued Care - Discharged on 3/4/2025 Admission date: 2/28/2025 - Discharge disposition: Rehab Facility or Unit (DC - External)      Destination Coordination complete.      Service Provider Services Address Phone Fax Patient Preferred    [facility] Inpatient Rehabilitation [address] [place] [id number] [phone] --              Durable Medical Equipment    No services have been selected for the patient.                Dialysis/Infusion    No services have been selected for the patient.                Home Medical Care    No services have been selected for the patient.                Therapy    No services have been selected for the patient.                Community Resources    No services have been selected for the patient.                Community & DME    No services have been selected for the patient.                    Selected Continued Care - Prior Encounters Includes continued care and service providers with selected services from prior encounters from 11/30/2024 to 3/4/2025      Discharged on 1/6/2025 Admission date: 1/5/2025 - Discharge disposition: Home-Health Care Svc      Durable Medical Equipment       Service Provider Services Address Phone Fax Patient Preferred    [facility] Durable Medical Equipment [address] [place] [address] [phone] --       Internal Comment last updated by Atif Zelaya, RN 1/7/2025 0934    [employer] to deliver hospital bed to [facility] at [address] [place] [address], where pt is staying.                         Home Medical Care       Service Provider Services Address Phone Fax Patient Preferred    [facility]  Care Home Health Services, Home Rehabilitation, Home Nursing [address], [facility] [id number] [phone] --              Community Resources       Service Provider Services Address Phone Fax Patient Preferred    [facility] [place] PATIENTS ONLY FOOD BANK [employer] [employer] [address] [place] [address] [phone] -- --                          Transportation Services  Private: Car    Final Discharge Disposition Code: 03 - skilled nursing facility (SNF)

## 2025-03-04 NOTE — PROGRESS NOTES
[facility] HOSPITALIST    PROGRESS NOTE    Name:  Valarie Camara   Age:  79 y.o.  Sex:  female  :  1945  MRN:  [id number]   Visit Number:  [id number]  Admission Date:  2025  Date Of Service:  25  Primary Care Physician:  Lay Cox MD     LOS: 0 days :    Chief Complaint:      Follow-up; generalized weakness    Subjective:    Feels good today.  Denies any pain, no shortness of air.  Says she is not sleeping well.    Hospital Course:    Valarie Camara is an 79-year-old female brought to the emergency department with her daughter with generalized weakness and dyspnea complaints.  Patient has a known history of dementia, unfortunately poor historian.  History of present illness was obtained from discussion with daughter at bedside.  This has reportedly been ongoing for weeks duration.  The patient has reportedly been moaning and groaning in pain throughout most of the evening the last few days.  She was recently seen in her PCPs office earlier this week, diagnosed with dementia at that time.  The patient has reportedly been more bedfast, complaining of bilateral feet pain, numbness, tingling due to her neuropathy.  Patient is unfortunate unable to be taken care of adequately at home, and ultimately brought into the emergency department for evaluation.     In the emergency department, laboratory workup was unremarkable.  CT abdomen pelvis with and without contrast did not show any acute process.  CTA of the chest showed a small single tubular defect of the right lower lobe pulmonary artery suggesting PE.  Patient started on heparin drip in the ED.  Hospitalist services consulted for admission.    Review of Systems:     All systems were reviewed and negative except as mentioned in subjective, assessment and plan.    Vital Signs:    Temp:  [98.1 °F (36.7 °C)-98.4 °F (36.9 °C)] 98.3 °F (36.8 °C)  Heart Rate:  [] 80  Resp:  [18-20] 18  BP:  "(117170)/(63-91) 117/64    Intake and output:    I/O last 3 completed shifts:  In: 1080 [P.O.:1080]  Out: 300 [Urine:300]  I/O this shift:  In: 580 [P.O.:580]  Out: -     Physical Examination:    General Appearance:  Alert and cooperative.    Head:  Atraumatic and normocephalic.   Eyes: Conjunctivae and sclerae normal, no icterus. No pallor.   Throat: No oral lesions, no thrush, oral mucosa moist.   Neck: Supple, trachea midline, no thyromegaly.   Lungs:   Breath sounds heard bilaterally equally.  No wheezing or crackles. No Pleural rub or bronchial breathing.   Heart:  Normal S1 and S2, no murmur, no gallop, no rub. No JVD.   Abdomen:   Normal bowel sounds, no masses, no organomegaly. Soft, nontender, nondistended, no rebound tenderness.   Extremities: Supple, bilateral ankle edema, no cyanosis, no clubbing.   Skin: Warm.   Neurologic: Alert and oriented to self. No facial asymmetry. Moves all four limbs. No tremors.  Pleasant and calmly demented.     Laboratory results:    Results from last 7 days   Lab Units 03/03/25  0910 03/02/25  0724 03/01/25  0350 02/28/25  1200   SODIUM mmol/L 139 138 138 138   POTASSIUM mmol/L 4.3 4.2 3.7 4.0   CHLORIDE mmol/L 106 104 102 103   CO2 mmol/L 22.6 22.9 22.1 22.5   BUN mg/dL 28* 27* 31* 32*   CREATININE mg/dL 1.54* 1.77* 1.58* 1.74*   CALCIUM mg/dL 8.9 9.0 9.4 9.1   BILIRUBIN mg/dL  --   --   --  0.4   ALK PHOS U/L  --   --   --  54   ALT (SGPT) U/L  --   --   --  7   AST (SGOT) U/L  --   --   --  14   GLUCOSE mg/dL 132* 136* 108* 218*     Results from last 7 days   Lab Units 03/04/25  0423 03/03/25  0910 03/02/25  0724   WBC 10*3/mm3 6.84 6.05 5.57   HEMOGLOBIN g/dL 12.0 12.2 12.2   HEMATOCRIT % 35.7 37.1 36.4   PLATELETS 10*3/mm3 245 237 233     Results from last 7 days   Lab Units 02/28/25  1200   INR  0.91     Results from last 7 days   Lab Units 03/04/25  0423 03/04/25  0244 02/28/25  1307   HSTROP T ng/L 50* 52* 61*         No results for input(s): \"PHART\", \"DZK7PTY\", " "\"PO2ART\", \"YSW3OOK\", \"BASEEXCESS\" in the last 8760 hours.   I have reviewed the patient's laboratory results.    Radiology results:    No radiology results from the last 24 hrs  I have reviewed the patient's radiology reports.    Medication Review:     I have reviewed the patient's active and prn medications.     Problem List:      Pulmonary emboli      Assessment/Plan:    Inpatient general floor admission 2/28/2025 with acute pulmonary embolus, pericardial effusion, UTI.    Pulmonary embolus  Continue Eliquis.  Pericardial effusion  Monitoring, no significant symptoms.  Very small on echo.  Echocardiogram EF 65%, grade 1 diastolic dysfunction, small circumferential pericardial effusion with no evidence of cardiac tamponade.  UTI  Rocephin.    Chronic: Dementia, type 2 diabetes, CKD, COPD no home O2, heart failure preserved ejection fraction, hypertension, hyperlipidemia, RLS, neuropathy.  Subcutaneous insulin protocol.  Check A1c.  Continue home medications.    DVT Prophylaxis: Eliquis  Code Status: Full code  Diet: Cardiac/diabetic  Discharge Plan: Pending STR.    Attempted to call daughter Cleo, no answer.     Brian Joseph Kerley, DO  03/04/25  12:41 EST    Dictated utilizing Dragon dictation.    I have reviewed the copied text and it is accurate as of 3/4/2025     "

## 2025-03-04 NOTE — PLAN OF CARE
Goal Outcome Evaluation:      Pt. Adequate for discharge to [facility] per Dr. Kerley orders.

## 2025-03-04 NOTE — CASE MANAGEMENT/SOCIAL WORK
Case Management/Social Work    Patient Name:  Valarie Camara  YOB: 1945  MRN: [id number]  Admit Date:  2/28/2025        [facility] is currently reviewing for STR. Pt now has medicare AB as of 3/1 and currently obs status. SW following.     13:59 EST pt can admit to [facility] today. Team updated       Electronically signed by:  ALEX Daugherty  03/04/25 08:55 EST

## 2025-03-04 NOTE — NURSING NOTE
Report given to AIDAN Stanton [place] at this time. All questions answered appropriately.    1406- Pt. Off the floor to pt. Daughter personal vehicle for discharge to [facility] in stable condition. Pt. Belongings returned with pt. And daughter

## 2025-03-05 RX ORDER — DOXAZOSIN 2 MG/1
TABLET ORAL
Qty: 30 TABLET | Refills: 3 | OUTPATIENT
Start: 2025-03-05

## 2025-03-07 ENCOUNTER — HOME CARE VISIT (OUTPATIENT)
Dept: HOME HEALTH SERVICES | Facility: HOME HEALTHCARE | Age: 80
End: 2025-03-07
Payer: MEDICARE

## 2025-03-07 NOTE — HOME HEALTH
This patient was admitted to [facility] as an inpatient status post hospital stay for pulmonary emboli. She was transferred to [facility] on 3/4/25

## 2025-03-07 NOTE — Clinical Note
Please note that the patient was in observation at [facility] and transferred to [facility].     Transfer Summary:    - Patient transferred to [facility] for observation then [facility]  - Reason for transfer: hospitalized for pulmonary embolism  - Report called to hospital  at time of hospital presentation  - Summary of Care, treatment or services provided to the patient including disciplines seeing the patient: nursing     - Patient progress toward goals: ongoing    - Communicable Disease? No Recorded Pressure: PCW, HR=87, Condition=Condition 1 (Pulmonary Capillary Wedge) PCW 38/50/32*

## 2025-03-15 ENCOUNTER — APPOINTMENT (OUTPATIENT)
Dept: CT IMAGING | Facility: HOSPITAL | Age: 80
End: 2025-03-15
Payer: MEDICARE

## 2025-03-15 ENCOUNTER — HOSPITAL ENCOUNTER (EMERGENCY)
Facility: HOSPITAL | Age: 80
Discharge: HOME | End: 2025-03-15
Attending: STUDENT IN AN ORGANIZED HEALTH CARE EDUCATION/TRAINING PROGRAM
Payer: MEDICARE

## 2025-03-15 VITALS
HEIGHT: 63 IN | HEART RATE: 81 BPM | WEIGHT: 153 LBS | TEMPERATURE: 98.5 F | BODY MASS INDEX: 27.11 KG/M2 | RESPIRATION RATE: 18 BRPM | SYSTOLIC BLOOD PRESSURE: 189 MMHG | DIASTOLIC BLOOD PRESSURE: 87 MMHG | OXYGEN SATURATION: 96 %

## 2025-03-15 DIAGNOSIS — F03.C18 SEVERE DEMENTIA WITH OTHER BEHAVIORAL DISTURBANCE, UNSPECIFIED DEMENTIA TYPE: ICD-10-CM

## 2025-03-15 DIAGNOSIS — K59.04 CHRONIC IDIOPATHIC CONSTIPATION: Primary | ICD-10-CM

## 2025-03-15 LAB
ALBUMIN SERPL-MCNC: 3.4 G/DL (ref 3.5–5.2)
ALBUMIN/GLOB SERPL: 1.1 G/DL
ALP SERPL-CCNC: 52 U/L (ref 39–117)
ALT SERPL W P-5'-P-CCNC: 14 U/L (ref 1–33)
ANION GAP SERPL CALCULATED.3IONS-SCNC: 13.7 MMOL/L (ref 5–15)
AST SERPL-CCNC: 22 U/L (ref 1–32)
BACTERIA UR QL AUTO: ABNORMAL /HPF
BASOPHILS # BLD AUTO: 0.04 10*3/MM3 (ref 0–0.2)
BASOPHILS NFR BLD AUTO: 0.5 % (ref 0–1.5)
BILIRUB SERPL-MCNC: 0.4 MG/DL (ref 0–1.2)
BILIRUB UR QL STRIP: NEGATIVE
BUN SERPL-MCNC: 36 MG/DL (ref 8–23)
BUN/CREAT SERPL: 18.6 (ref 7–25)
CALCIUM SPEC-SCNC: 8.8 MG/DL (ref 8.6–10.5)
CHLORIDE SERPL-SCNC: 102 MMOL/L (ref 98–107)
CLARITY UR: ABNORMAL
CO2 SERPL-SCNC: 20.3 MMOL/L (ref 22–29)
COLOR UR: YELLOW
CREAT SERPL-MCNC: 1.94 MG/DL (ref 0.57–1)
DEPRECATED RDW RBC AUTO: 45.3 FL (ref 37–54)
EGFRCR SERPLBLD CKD-EPI 2021: 25.8 ML/MIN/1.73
EOSINOPHIL # BLD AUTO: 0.06 10*3/MM3 (ref 0–0.4)
EOSINOPHIL NFR BLD AUTO: 0.8 % (ref 0.3–6.2)
ERYTHROCYTE [DISTWIDTH] IN BLOOD BY AUTOMATED COUNT: 14.4 % (ref 12.3–15.4)
GLOBULIN UR ELPH-MCNC: 3 GM/DL
GLUCOSE SERPL-MCNC: 149 MG/DL (ref 65–99)
GLUCOSE UR STRIP-MCNC: ABNORMAL MG/DL
HCT VFR BLD AUTO: 33.5 % (ref 34–46.6)
HGB BLD-MCNC: 11.3 G/DL (ref 12–15.9)
HGB UR QL STRIP.AUTO: NEGATIVE
HYALINE CASTS UR QL AUTO: ABNORMAL /LPF
IMM GRANULOCYTES # BLD AUTO: 0.03 10*3/MM3 (ref 0–0.05)
IMM GRANULOCYTES NFR BLD AUTO: 0.4 % (ref 0–0.5)
KETONES UR QL STRIP: NEGATIVE
LEUKOCYTE ESTERASE UR QL STRIP.AUTO: NEGATIVE
LIPASE SERPL-CCNC: 53 U/L (ref 13–60)
LYMPHOCYTES # BLD AUTO: 2.6 10*3/MM3 (ref 0.7–3.1)
LYMPHOCYTES NFR BLD AUTO: 34.6 % (ref 19.6–45.3)
MCH RBC QN AUTO: 29.2 PG (ref 26.6–33)
MCHC RBC AUTO-ENTMCNC: 33.7 G/DL (ref 31.5–35.7)
MCV RBC AUTO: 86.6 FL (ref 79–97)
MONOCYTES # BLD AUTO: 0.69 10*3/MM3 (ref 0.1–0.9)
MONOCYTES NFR BLD AUTO: 9.2 % (ref 5–12)
NEUTROPHILS NFR BLD AUTO: 4.1 10*3/MM3 (ref 1.7–7)
NEUTROPHILS NFR BLD AUTO: 54.5 % (ref 42.7–76)
NITRITE UR QL STRIP: NEGATIVE
NRBC BLD AUTO-RTO: 0 /100 WBC (ref 0–0.2)
PH UR STRIP.AUTO: 5.5 [PH] (ref 5–8)
PLATELET # BLD AUTO: 263 10*3/MM3 (ref 140–450)
PMV BLD AUTO: 9.8 FL (ref 6–12)
POTASSIUM SERPL-SCNC: 3.9 MMOL/L (ref 3.5–5.2)
PROT SERPL-MCNC: 6.4 G/DL (ref 6–8.5)
PROT UR QL STRIP: ABNORMAL
RBC # BLD AUTO: 3.87 10*6/MM3 (ref 3.77–5.28)
RBC # UR STRIP: ABNORMAL /HPF
REF LAB TEST METHOD: ABNORMAL
SODIUM SERPL-SCNC: 136 MMOL/L (ref 136–145)
SP GR UR STRIP: 1.01 (ref 1–1.03)
SQUAMOUS #/AREA URNS HPF: ABNORMAL /HPF
UROBILINOGEN UR QL STRIP: ABNORMAL
WBC # UR STRIP: ABNORMAL /HPF
WBC NRBC COR # BLD AUTO: 7.52 10*3/MM3 (ref 3.4–10.8)

## 2025-03-15 PROCEDURE — 83690 ASSAY OF LIPASE: CPT | Performed by: STUDENT IN AN ORGANIZED HEALTH CARE EDUCATION/TRAINING PROGRAM

## 2025-03-15 PROCEDURE — 74176 CT ABD & PELVIS W/O CONTRAST: CPT

## 2025-03-15 PROCEDURE — 99284 EMERGENCY DEPT VISIT MOD MDM: CPT | Performed by: STUDENT IN AN ORGANIZED HEALTH CARE EDUCATION/TRAINING PROGRAM

## 2025-03-15 PROCEDURE — 80053 COMPREHEN METABOLIC PANEL: CPT | Performed by: STUDENT IN AN ORGANIZED HEALTH CARE EDUCATION/TRAINING PROGRAM

## 2025-03-15 PROCEDURE — 25810000003 SODIUM CHLORIDE 0.9 % SOLUTION: Performed by: STUDENT IN AN ORGANIZED HEALTH CARE EDUCATION/TRAINING PROGRAM

## 2025-03-15 PROCEDURE — 81001 URINALYSIS AUTO W/SCOPE: CPT | Performed by: STUDENT IN AN ORGANIZED HEALTH CARE EDUCATION/TRAINING PROGRAM

## 2025-03-15 PROCEDURE — 85025 COMPLETE CBC W/AUTO DIFF WBC: CPT | Performed by: STUDENT IN AN ORGANIZED HEALTH CARE EDUCATION/TRAINING PROGRAM

## 2025-03-15 RX ORDER — SODIUM CHLORIDE 0.9 % (FLUSH) 0.9 %
10 SYRINGE (ML) INJECTION AS NEEDED
Status: DISCONTINUED | OUTPATIENT
Start: 2025-03-15 | End: 2025-03-15 | Stop reason: HOSPADM

## 2025-03-15 RX ADMIN — SODIUM CHLORIDE 500 ML: 9 INJECTION, SOLUTION INTRAVENOUS at 04:37

## 2025-03-15 NOTE — DISCHARGE INSTRUCTIONS
Recommend bowel cleanout with either magnesium citrate or over-the-counter MiraLAX to help with constipation.  Continue to give plenty of fluids throughout the next few days to help with mild dehydration seen on lab work today.  Also recommend restarting patient's nightly sleep meds including Seroquel and trazodone to help get patient back on normal sleep schedule now that she is home.

## 2025-03-15 NOTE — ED PROVIDER NOTES
"        [facility] EMERGENCY DEPARTMENT  Emergency Department Encounter  Emergency Medicine Physician Note       Pt Name: Valarie Camara  MRN: [id number]  Pt :   1945  Room Number:    Date of encounter:  3/15/2025  PCP: Lay Cox MD  ED Provider: Julian Augustin MD    Historian: Patient's daughter      HPI:  Chief Complaint: Constipation and altered mental status        Context: Valarie Camara is a 79 y.o. female who presents to the ED c/o constipation and altered mental status.  Patient reportedly has been going downhill with her dementia for the past several months.  Had recent hospitalization for PE and had been sent to [facility] rehab facility until Thursday.  Was discharged back home with family.  Daughter states patient has not had a bowel movement since being home.  Patient is also not been herself ever since she was hospitalized with worsened confusion.  No reported fever.  Patient seems to be having pain and has been moaning tonight so was brought to the emergency department.      PAST MEDICAL HISTORY  Past Medical History:   Diagnosis Date    Acute bronchitis with bronchospasm     Anxiety     Arthritis     Asthma     B12 deficiency     Back pain     Body piercing     ears    Cataract     Cerebrovascular disease     Cervicalgia     Chronic kidney disease     stage 3b    Colonic polyp     History of colonic polyps     Constipation     COPD (chronic obstructive pulmonary disease)     Coronary artery disease     Depression     Diverticulitis     Dysphagia     Patient reported \"it won't go all the way down\" when eating solid foods first thing in the mornings    Edema     Elevated cholesterol     Esophageal reflux     Folic acid deficiency     Full dentures     Advised no adhesives DOS    Heart murmur     Herpes zoster     High cholesterol     History of kidney infection     History of recurrent urinary tract infection     HTN (hypertension)     " Hypercholesterolemia     Impaired functional mobility, balance, gait, and endurance     Insomnia     Kidney infection     Malignant hypertension 04/26/2015     Accelerated essential hypertension    Measles     rubeola    Muscle spasm     Neoplasm of uncertain behavior of skin     dtr denies    Osteoporosis     Poor historian     Recurrent urinary tract infection     Rheumatoid arthritis     RLS (restless legs syndrome)     Stomach ulcer     Stroke     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    Type 2 diabetes mellitus     Vitamin D deficiency          PAST SURGICAL HISTORY  Past Surgical History:   Procedure Laterality Date    CATARACT EXTRACTION WITH INTRAOCULAR LENS IMPLANT Left 02/11/2013    CATARACT EXTRACTION WITH INTRAOCULAR LENS IMPLANT Right 04/15/2013    COLONOSCOPY  2012    COLONOSCOPY N/A 11/26/2019    Procedure: COLONOSCOPY;  Surgeon: Chidi Downing MD;  Location:  [facility];  Service: Gastroenterology    ENDOSCOPY N/A 11/13/2017    Procedure: ESOPHAGOGASTRODUODENOSCOPY with biopsies and esophageal balloon dilitation;  Surgeon: Wesley Stovall MD;  Location: [facility];  Service:     ENDOSCOPY N/A 11/25/2019    Procedure: ESOPHAGOGASTRODUODENOSCOPY;  Surgeon: Chidi Downing MD;  Location:  [facility];  Service: Gastroenterology    ENDOSCOPY N/A 11/29/2021    Procedure: ESOPHAGOGASTRODUODENOSCOPY with dilation and biopsies;  Surgeon: Gonzalez Zapata MD;  Location: [facility];  Service: Gastroenterology;  Laterality: N/A;    ENDOSCOPY N/A 11/1/2023    Procedure: ESOPHAGOGASTRODUODENOSCOPY WITH BIOPSY AND DILATATION;  Surgeon: Gonzalez Zapata MD;  Location: [facility];  Service: Gastroenterology;  Laterality: N/A;    ENDOSCOPY N/A 1/27/2025    Procedure: Esophagogastroduodenoscopy with dilatation and biopsies;  Surgeon: Gonzalez Zapata MD;  Location: [facility];  Service: Gastroenterology;  Laterality: N/A;     HYSTERECTOMY  1979    UPPER GASTROINTESTINAL ENDOSCOPY  2013    UPPER GASTROINTESTINAL ENDOSCOPY  2017         FAMILY HISTORY  Family History   Problem Relation Age of Onset    Arthritis Mother     Diabetes Mother     Hypertension Mother     Arthritis Other     Arthritis Other     Diabetes Other         DM    Hypertension Other     Colon cancer Neg Hx          SOCIAL HISTORY  Social History     Socioeconomic History    Marital status:    Tobacco Use    Smoking status: Former     Current packs/day: 0.00     Average packs/day: 1 pack/day for 15.0 years (15.0 ttl pk-yrs)     Types: Cigarettes     Start date: 1997     Quit date:      Years since quittin.2     Passive exposure: Past    Smokeless tobacco: Never    Tobacco comments:      Denied: History of smoking cigarettes   Vaping Use    Vaping status: Never Used   Substance and Sexual Activity    Alcohol use: Not Currently    Drug use: Never    Sexual activity: Defer         ALLERGIES  Penicillins, Clonidine derivatives, Hydralazine, and Sulfa antibiotics        REVIEW OF SYSTEMS  Review of Systems     All systems reviewed and negative except for those discussed in HPI.       PHYSICAL EXAM    I have reviewed the triage vital signs and nursing notes.    ED Triage Vitals [03/15/25 0422]   Temp Heart Rate Resp BP SpO2   98.5 °F (36.9 °C) 90 18 159/99 99 %      Temp src Heart Rate Source Patient Position BP Location FiO2 (%)   Oral Monitor Sitting Left arm --       Physical Exam    General: Elderly, intermittently moaning, limited verbal response  HEENT: Atraumatic, normocephalic, EOMI, PERRLA, mucous membranes moist  NECK:  Supple, atraumatic  Cardiovascular:  Regular rate, regular rhythm, no murmurs, rubs, or gallops.  Extremities well perfused   Respiratory:  Regular rate, clear lungs to auscultation bilaterally.  No rhonchi, rales, wheezing  Abdominal:  Soft, nondistended, diffuse intermittent tenderness to palpation.  No guarding or  rebound.  No palpable masses  Extremity:  No visible bony abnormalities in all 4 extremities.  Full range of motion of all extremities.  Skin:  Warm and dry.  No rashes  Neuro: Moaning response.  Able to move all 4 extremities.  Able to follow basic commands.        LAB RESULTS  Recent Results (from the past 24 hours)   Comprehensive Metabolic Panel    Collection Time: 03/15/25  4:33 AM    Specimen: Blood   Result Value Ref Range    Glucose 149 (H) 65 - 99 mg/dL    BUN 36 (H) 8 - 23 mg/dL    Creatinine 1.94 (H) 0.57 - 1.00 mg/dL    Sodium 136 136 - 145 mmol/L    Potassium 3.9 3.5 - 5.2 mmol/L    Chloride 102 98 - 107 mmol/L    CO2 20.3 (L) 22.0 - 29.0 mmol/L    Calcium 8.8 8.6 - 10.5 mg/dL    Total Protein 6.4 6.0 - 8.5 g/dL    Albumin 3.4 (L) 3.5 - 5.2 g/dL    ALT (SGPT) 14 1 - 33 U/L    AST (SGOT) 22 1 - 32 U/L    Alkaline Phosphatase 52 39 - 117 U/L    Total Bilirubin 0.4 0.0 - 1.2 mg/dL    Globulin 3.0 gm/dL    A/G Ratio 1.1 g/dL    BUN/Creatinine Ratio 18.6 7.0 - 25.0    Anion Gap 13.7 5.0 - 15.0 mmol/L    eGFR 25.8 (L) >60.0 mL/min/1.73   Lipase    Collection Time: 03/15/25  4:33 AM    Specimen: Blood   Result Value Ref Range    Lipase 53 13 - 60 U/L   CBC Auto Differential    Collection Time: 03/15/25  4:33 AM    Specimen: Blood   Result Value Ref Range    WBC 7.52 3.40 - 10.80 10*3/mm3    RBC 3.87 3.77 - 5.28 10*6/mm3    Hemoglobin 11.3 (L) 12.0 - 15.9 g/dL    Hematocrit 33.5 (L) 34.0 - 46.6 %    MCV 86.6 79.0 - 97.0 fL    MCH 29.2 26.6 - 33.0 pg    MCHC 33.7 31.5 - 35.7 g/dL    RDW 14.4 12.3 - 15.4 %    RDW-SD 45.3 37.0 - 54.0 fl    MPV 9.8 6.0 - 12.0 fL    Platelets 263 140 - 450 10*3/mm3    Neutrophil % 54.5 42.7 - 76.0 %    Lymphocyte % 34.6 19.6 - 45.3 %    Monocyte % 9.2 5.0 - 12.0 %    Eosinophil % 0.8 0.3 - 6.2 %    Basophil % 0.5 0.0 - 1.5 %    Immature Grans % 0.4 0.0 - 0.5 %    Neutrophils, Absolute 4.10 1.70 - 7.00 10*3/mm3    Lymphocytes, Absolute 2.60 0.70 - 3.10 10*3/mm3    Monocytes, Absolute  0.69 0.10 - 0.90 10*3/mm3    Eosinophils, Absolute 0.06 0.00 - 0.40 10*3/mm3    Basophils, Absolute 0.04 0.00 - 0.20 10*3/mm3    Immature Grans, Absolute 0.03 0.00 - 0.05 10*3/mm3    nRBC 0.0 0.0 - 0.2 /100 WBC   Urinalysis With Microscopic If Indicated (No Culture) - Urine, Clean Catch    Collection Time: 03/15/25  5:57 AM    Specimen: Urine, Clean Catch   Result Value Ref Range    Color, UA Yellow Yellow, Straw    Appearance, UA Cloudy (A) Clear    pH, UA 5.5 5.0 - 8.0    Specific Gravity, UA 1.013 1.005 - 1.030    Glucose, UA >=1000 mg/dL (3+) (A) Negative    Ketones, UA Negative Negative    Bilirubin, UA Negative Negative    Blood, UA Negative Negative    Protein, UA 30 mg/dL (1+) (A) Negative    Leuk Esterase, UA Negative Negative    Nitrite, UA Negative Negative    Urobilinogen, UA 0.2 E.U./dL 0.2 - 1.0 E.U./dL   Urinalysis, Microscopic Only - Urine, Clean Catch    Collection Time: 03/15/25  5:57 AM    Specimen: Urine, Clean Catch   Result Value Ref Range    RBC, UA None Seen None Seen, 0-2 /HPF    WBC, UA None Seen None Seen, 0-2 /HPF    Bacteria, UA 4+ (A) None Seen /HPF    Squamous Epithelial Cells, UA 0-2 None Seen, 0-2 /HPF    Hyaline Casts, UA None Seen None Seen /LPF    Methodology Manual Light Microscopy        If labs were ordered, I independently evaluated the results and considered them in treating the patient.        RADIOLOGY  CT Abdomen Pelvis Without Contrast  Result Date: 3/15/2025  FINAL REPORT TECHNIQUE: null CLINICAL HISTORY: Diffuse Abdominal pain, constipation, no contrast due to low GFR COMPARISON: null FINDINGS: CT abdomen and pelvis without contrast Comparison: CT abdomen and pelvis with contrast 02/28/2025 and without contrast 02/08/2025. Findings: Nonuniform pericardial effusion greatest thickness as visualized 1.5 cm at right lateral mildly increased. Borderline distention of gallbladder. Calcified granulomas in unenlarged spleen. Bilateral renal cysts. Vascular calcifications renal  alejandra. Small calculi unlikely. No hydronephrosis or obstructing calculus. Extensive arteriosclerosis. No abdominal aortic aneurysm. Stable mild bilateral adrenal thickening. Couple tiny calcifications head of pancreas unchanged probably incidental cannot exclude minimal chronic pancreatitis. Beam hardening artifact in liver. Tiny hypodensity left lobe liver unchanged probably cysts. Small hiatal hernia. Patulous inguinal canals with no herniated bowel unchanged. Hysterectomy. Diverticulosis. Redundant colon with  moderate fecal retention increased. No bowel distention, ascites, or pneumoperitoneum. Appendix not definitively visualized. Spondylosis.     Impression: 1. Nonuniform pericardial effusion greatest thickness as visualized 1.5 cm at right lateral mildly increased. 2. Redundant colon with increased moderate fecal retention. Authenticated and Electronically Signed by Dejan Young MD on 03/15/2025 06:33:30 AM      I ordered and independently evaluated the above noted radiographic studies.     See radiologist's dictation for official interpretation.        PROCEDURES    Procedures    No orders to display       MEDICATIONS GIVEN IN ER    Medications   sodium chloride 0.9 % bolus 500 mL (0 mL Intravenous Stopped 3/15/25 050)         MEDICAL DECISION MAKING, PROGRESS, and CONSULTS    All labs, if obtained, have been independently reviewed by me.  All radiology studies, if obtained, have been evaluated by me and the radiologist dictating the report.  All EKG's, if obtained, have been independently viewed and interpreted by me.      Discussion below represents my analysis of pertinent findings related to patient's condition, differential diagnosis, treatment plan and final disposition.    Valarie Camara is a 79 y.o. female who presents to the ED c/o altered mental status and constipation.  Patient has known history of dementia with recent hospitalization, differential includes but is not limited to delirium,  UTI, fecal impaction, bowel obstruction, ascending cholangitis.  Extensive labs ordered along with CT of abdomen pelvis with contrast.  Patient given IV fluid bolus of 500 cc of normal saline.    Labs personally interpreted showing no significant leukocytosis.  Mild chronic anemia with hemoglobin of 11.3 and hematocrit of 33.5.  Mildly elevated creatinine of 1.94 up from baseline of 1.5-1.8 on chart review.  No critical electrolyte abnormalities otherwise.    CT of abdomen pelvis obtained which was personally visualized and interpreted showing evidence of some constipation but no significant fecal impaction or distal colon/rectum constipation.    Extensively discussed results with patient's daughter at bedside.  Shared concern that patient's hospitalization and recent rehab stay likely has caused worsening of dementia and sundowning.  Recommended getting patient back on her normal schedule at home and reestablishing her nightly sleep meds including Seroquel and trazodone.  Also recommended bowel cleanout with magnesium citrate or MiraLAX.  Daughter voiced understanding of this plan of care and was agreeable.                             Orders placed during this visit:  Orders Placed This Encounter   Procedures    CT Abdomen Pelvis Without Contrast    Comprehensive Metabolic Panel    Lipase    Urinalysis With Microscopic If Indicated (No Culture) - Urine, Clean Catch    CBC Auto Differential    Urinalysis, Microscopic Only - Urine, Clean Catch    CBC & Differential         ED Course:                Shared Decision Making:  After my consideration of clinical presentation and any laboratory/radiology studies obtained, I discussed the findings with the patient/patient representative who is in agreement with the treatment plan and the final disposition.   Risks and benefits of discharge and/or observation/admission were discussed.                  DIAGNOSIS  Final diagnoses:   Chronic idiopathic constipation   Severe  dementia with other behavioral disturbance, unspecified dementia type         DISPOSITION  Discharge      Please note that portions of this document were completed with voice recognition software.        Julian Augustin MD  03/15/25 5212

## 2025-03-20 ENCOUNTER — HOSPITAL ENCOUNTER (INPATIENT)
Facility: HOSPITAL | Age: 80
LOS: 18 days | Discharge: NURSING, REHAB OR LONG-TERM CARE | DRG: 092 | End: 2025-04-07
Attending: EMERGENCY MEDICINE | Admitting: HOSPITALIST
Payer: MEDICARE

## 2025-03-20 ENCOUNTER — APPOINTMENT (OUTPATIENT)
Dept: NEUROLOGY | Facility: HOSPITAL | Age: 80
DRG: 092 | End: 2025-03-20
Payer: MEDICARE

## 2025-03-20 ENCOUNTER — APPOINTMENT (OUTPATIENT)
Dept: CT IMAGING | Facility: HOSPITAL | Age: 80
DRG: 092 | End: 2025-03-20
Payer: MEDICARE

## 2025-03-20 ENCOUNTER — APPOINTMENT (OUTPATIENT)
Dept: GENERAL RADIOLOGY | Facility: HOSPITAL | Age: 80
DRG: 092 | End: 2025-03-20
Payer: MEDICARE

## 2025-03-20 DIAGNOSIS — R47.01 APHASIA: ICD-10-CM

## 2025-03-20 DIAGNOSIS — I63.9 ACUTE ISCHEMIC STROKE: Primary | ICD-10-CM

## 2025-03-20 DIAGNOSIS — R47.1 DYSARTHRIA: ICD-10-CM

## 2025-03-20 DIAGNOSIS — Z86.73 HISTORY OF STROKE: ICD-10-CM

## 2025-03-20 DIAGNOSIS — I69.30 HISTORY OF HEMORRHAGIC CEREBROVASCULAR ACCIDENT (CVA) WITH RESIDUAL DEFICIT: ICD-10-CM

## 2025-03-20 DIAGNOSIS — N28.9 RENAL INSUFFICIENCY: ICD-10-CM

## 2025-03-20 DIAGNOSIS — R41.841 COGNITIVE COMMUNICATION DEFICIT: ICD-10-CM

## 2025-03-20 DIAGNOSIS — R13.12 OROPHARYNGEAL DYSPHAGIA: ICD-10-CM

## 2025-03-20 LAB
ALBUMIN SERPL-MCNC: 3.9 G/DL (ref 3.5–5.2)
ALBUMIN/GLOB SERPL: 1.6 G/DL
ALP SERPL-CCNC: 54 U/L (ref 39–117)
ALT SERPL W P-5'-P-CCNC: 13 U/L (ref 1–33)
AMMONIA BLD-SCNC: 13 UMOL/L (ref 11–51)
AMPHET+METHAMPHET UR QL: NEGATIVE
AMPHETAMINES UR QL: NEGATIVE
ANION GAP SERPL CALCULATED.3IONS-SCNC: 15 MMOL/L (ref 5–15)
APAP SERPL-MCNC: <5 MCG/ML (ref 0–30)
APTT PPP: 31 SECONDS (ref 60–90)
AST SERPL-CCNC: 16 U/L (ref 1–32)
BACTERIA UR QL AUTO: ABNORMAL /HPF
BARBITURATES UR QL SCN: NEGATIVE
BASOPHILS # BLD AUTO: 0.03 10*3/MM3 (ref 0–0.2)
BASOPHILS NFR BLD AUTO: 0.5 % (ref 0–1.5)
BENZODIAZ UR QL SCN: NEGATIVE
BILIRUB SERPL-MCNC: 0.4 MG/DL (ref 0–1.2)
BILIRUB UR QL STRIP: NEGATIVE
BUN BLDA-MCNC: 44 MG/DL (ref 8–26)
BUN SERPL-MCNC: 44 MG/DL (ref 8–23)
BUN/CREAT SERPL: 17.6 (ref 7–25)
BUPRENORPHINE SERPL-MCNC: NEGATIVE NG/ML
CA-I BLDA-SCNC: 1.21 MMOL/L (ref 1.2–1.32)
CALCIUM SPEC-SCNC: 9.2 MG/DL (ref 8.6–10.5)
CANNABINOIDS SERPL QL: NEGATIVE
CHLORIDE BLDA-SCNC: 106 MMOL/L (ref 98–109)
CHLORIDE SERPL-SCNC: 105 MMOL/L (ref 98–107)
CK SERPL-CCNC: 129 U/L (ref 20–180)
CLARITY UR: ABNORMAL
CO2 BLDA-SCNC: 21 MMOL/L (ref 24–29)
CO2 SERPL-SCNC: 21 MMOL/L (ref 22–29)
COCAINE UR QL: NEGATIVE
COLOR UR: YELLOW
CREAT BLDA-MCNC: 2.8 MG/DL (ref 0.6–1.3)
CREAT SERPL-MCNC: 2.5 MG/DL (ref 0.57–1)
D-LACTATE SERPL-SCNC: 1.6 MMOL/L (ref 0.5–2)
DEPRECATED RDW RBC AUTO: 49.7 FL (ref 37–54)
EGFRCR SERPLBLD CKD-EPI 2021: 16.6 ML/MIN/1.73
EGFRCR SERPLBLD CKD-EPI 2021: 19 ML/MIN/1.73
EOSINOPHIL # BLD AUTO: 0.04 10*3/MM3 (ref 0–0.4)
EOSINOPHIL NFR BLD AUTO: 0.6 % (ref 0.3–6.2)
ERYTHROCYTE [DISTWIDTH] IN BLOOD BY AUTOMATED COUNT: 15.5 % (ref 12.3–15.4)
ETHANOL BLD-MCNC: <10 MG/DL (ref 0–10)
FENTANYL UR-MCNC: NEGATIVE NG/ML
GLOBULIN UR ELPH-MCNC: 2.4 GM/DL
GLUCOSE BLDC GLUCOMTR-MCNC: 151 MG/DL (ref 70–130)
GLUCOSE SERPL-MCNC: 150 MG/DL (ref 65–99)
GLUCOSE UR STRIP-MCNC: ABNORMAL MG/DL
HCT VFR BLD AUTO: 35.7 % (ref 34–46.6)
HCT VFR BLDA CALC: 35 % (ref 38–51)
HGB BLD-MCNC: 11.7 G/DL (ref 12–15.9)
HGB BLDA-MCNC: 11.9 G/DL (ref 12–17)
HGB UR QL STRIP.AUTO: NEGATIVE
HOLD SPECIMEN: NORMAL
HYALINE CASTS UR QL AUTO: ABNORMAL /LPF
IMM GRANULOCYTES # BLD AUTO: 0.03 10*3/MM3 (ref 0–0.05)
IMM GRANULOCYTES NFR BLD AUTO: 0.5 % (ref 0–0.5)
INR PPP: 1.43 (ref 0.89–1.12)
KETONES UR QL STRIP: ABNORMAL
LEUKOCYTE ESTERASE UR QL STRIP.AUTO: NEGATIVE
LYMPHOCYTES # BLD AUTO: 2.01 10*3/MM3 (ref 0.7–3.1)
LYMPHOCYTES NFR BLD AUTO: 31.2 % (ref 19.6–45.3)
MAGNESIUM SERPL-MCNC: 1.9 MG/DL (ref 1.6–2.4)
MCH RBC QN AUTO: 28.8 PG (ref 26.6–33)
MCHC RBC AUTO-ENTMCNC: 32.8 G/DL (ref 31.5–35.7)
MCV RBC AUTO: 87.9 FL (ref 79–97)
METHADONE UR QL SCN: NEGATIVE
MONOCYTES # BLD AUTO: 0.72 10*3/MM3 (ref 0.1–0.9)
MONOCYTES NFR BLD AUTO: 11.2 % (ref 5–12)
NEUTROPHILS NFR BLD AUTO: 3.61 10*3/MM3 (ref 1.7–7)
NEUTROPHILS NFR BLD AUTO: 56 % (ref 42.7–76)
NITRITE UR QL STRIP: NEGATIVE
NRBC BLD AUTO-RTO: 0 /100 WBC (ref 0–0.2)
OPIATES UR QL: NEGATIVE
OXYCODONE UR QL SCN: NEGATIVE
PCP UR QL SCN: NEGATIVE
PH UR STRIP.AUTO: <=5 [PH] (ref 5–8)
PLATELET # BLD AUTO: 264 10*3/MM3 (ref 140–450)
PMV BLD AUTO: 9.9 FL (ref 6–12)
POTASSIUM BLDA-SCNC: 3.7 MMOL/L (ref 3.5–4.9)
POTASSIUM SERPL-SCNC: 3.8 MMOL/L (ref 3.5–5.2)
PROCALCITONIN SERPL-MCNC: 0.2 NG/ML (ref 0–0.25)
PROT SERPL-MCNC: 6.3 G/DL (ref 6–8.5)
PROT UR QL STRIP: ABNORMAL
PROTHROMBIN TIME: 17.6 SECONDS (ref 12.2–14.5)
RBC # BLD AUTO: 4.06 10*6/MM3 (ref 3.77–5.28)
RBC # UR STRIP: ABNORMAL /HPF
REF LAB TEST METHOD: ABNORMAL
SALICYLATES SERPL-MCNC: <0.3 MG/DL
SODIUM BLD-SCNC: 141 MMOL/L (ref 138–146)
SODIUM SERPL-SCNC: 141 MMOL/L (ref 136–145)
SP GR UR STRIP: 1.05 (ref 1–1.03)
SQUAMOUS #/AREA URNS HPF: ABNORMAL /HPF
T4 FREE SERPL-MCNC: 1.91 NG/DL (ref 0.92–1.68)
TRICYCLICS UR QL SCN: NEGATIVE
TSH SERPL DL<=0.05 MIU/L-ACNC: 0.35 UIU/ML (ref 0.27–4.2)
UFH PPP CHRO-ACNC: >1.1 IU/ML (ref 0.3–0.7)
UROBILINOGEN UR QL STRIP: ABNORMAL
WBC # UR STRIP: ABNORMAL /HPF
WBC NRBC COR # BLD AUTO: 6.44 10*3/MM3 (ref 3.4–10.8)
WHOLE BLOOD HOLD COAG: NORMAL
WHOLE BLOOD HOLD SPECIMEN: NORMAL
YEAST URNS QL MICRO: ABNORMAL /HPF

## 2025-03-20 PROCEDURE — 84439 ASSAY OF FREE THYROXINE: CPT | Performed by: EMERGENCY MEDICINE

## 2025-03-20 PROCEDURE — 36415 COLL VENOUS BLD VENIPUNCTURE: CPT

## 2025-03-20 PROCEDURE — 99291 CRITICAL CARE FIRST HOUR: CPT

## 2025-03-20 PROCEDURE — 95816 EEG AWAKE AND DROWSY: CPT | Performed by: PSYCHIATRY & NEUROLOGY

## 2025-03-20 PROCEDURE — 85520 HEPARIN ASSAY: CPT

## 2025-03-20 PROCEDURE — 25810000003 SODIUM CHLORIDE 0.9 % SOLUTION: Performed by: HOSPITALIST

## 2025-03-20 PROCEDURE — 82077 ASSAY SPEC XCP UR&BREATH IA: CPT | Performed by: EMERGENCY MEDICINE

## 2025-03-20 PROCEDURE — 80143 DRUG ASSAY ACETAMINOPHEN: CPT | Performed by: EMERGENCY MEDICINE

## 2025-03-20 PROCEDURE — 85014 HEMATOCRIT: CPT | Performed by: EMERGENCY MEDICINE

## 2025-03-20 PROCEDURE — 70498 CT ANGIOGRAPHY NECK: CPT

## 2025-03-20 PROCEDURE — 83605 ASSAY OF LACTIC ACID: CPT

## 2025-03-20 PROCEDURE — 81001 URINALYSIS AUTO W/SCOPE: CPT | Performed by: EMERGENCY MEDICINE

## 2025-03-20 PROCEDURE — 80053 COMPREHEN METABOLIC PANEL: CPT | Performed by: EMERGENCY MEDICINE

## 2025-03-20 PROCEDURE — 80307 DRUG TEST PRSMV CHEM ANLYZR: CPT | Performed by: EMERGENCY MEDICINE

## 2025-03-20 PROCEDURE — 84145 PROCALCITONIN (PCT): CPT | Performed by: HOSPITALIST

## 2025-03-20 PROCEDURE — P9612 CATHETERIZE FOR URINE SPEC: HCPCS

## 2025-03-20 PROCEDURE — 85730 THROMBOPLASTIN TIME PARTIAL: CPT | Performed by: EMERGENCY MEDICINE

## 2025-03-20 PROCEDURE — 99223 1ST HOSP IP/OBS HIGH 75: CPT

## 2025-03-20 PROCEDURE — 80179 DRUG ASSAY SALICYLATE: CPT | Performed by: EMERGENCY MEDICINE

## 2025-03-20 PROCEDURE — 85610 PROTHROMBIN TIME: CPT | Performed by: EMERGENCY MEDICINE

## 2025-03-20 PROCEDURE — 95816 EEG AWAKE AND DROWSY: CPT

## 2025-03-20 PROCEDURE — 25510000001 IOPAMIDOL PER 1 ML: Performed by: EMERGENCY MEDICINE

## 2025-03-20 PROCEDURE — 93005 ELECTROCARDIOGRAM TRACING: CPT | Performed by: EMERGENCY MEDICINE

## 2025-03-20 PROCEDURE — 70496 CT ANGIOGRAPHY HEAD: CPT

## 2025-03-20 PROCEDURE — 99222 1ST HOSP IP/OBS MODERATE 55: CPT | Performed by: HOSPITALIST

## 2025-03-20 PROCEDURE — 83735 ASSAY OF MAGNESIUM: CPT | Performed by: EMERGENCY MEDICINE

## 2025-03-20 PROCEDURE — 82140 ASSAY OF AMMONIA: CPT | Performed by: EMERGENCY MEDICINE

## 2025-03-20 PROCEDURE — 70450 CT HEAD/BRAIN W/O DYE: CPT

## 2025-03-20 PROCEDURE — 87086 URINE CULTURE/COLONY COUNT: CPT | Performed by: EMERGENCY MEDICINE

## 2025-03-20 PROCEDURE — 71045 X-RAY EXAM CHEST 1 VIEW: CPT

## 2025-03-20 PROCEDURE — 82550 ASSAY OF CK (CPK): CPT | Performed by: EMERGENCY MEDICINE

## 2025-03-20 PROCEDURE — 85025 COMPLETE CBC W/AUTO DIFF WBC: CPT | Performed by: EMERGENCY MEDICINE

## 2025-03-20 PROCEDURE — 80047 BASIC METABLC PNL IONIZED CA: CPT | Performed by: EMERGENCY MEDICINE

## 2025-03-20 PROCEDURE — 0042T HC CT CEREBRAL PERFUSION W/WO CONTRAST: CPT

## 2025-03-20 PROCEDURE — 84443 ASSAY THYROID STIM HORMONE: CPT | Performed by: EMERGENCY MEDICINE

## 2025-03-20 RX ORDER — SODIUM CHLORIDE 9 MG/ML
100 INJECTION, SOLUTION INTRAVENOUS CONTINUOUS
Status: ACTIVE | OUTPATIENT
Start: 2025-03-20 | End: 2025-03-21

## 2025-03-20 RX ORDER — ATORVASTATIN CALCIUM 40 MG/1
80 TABLET, FILM COATED ORAL NIGHTLY
Status: DISCONTINUED | OUTPATIENT
Start: 2025-03-20 | End: 2025-04-07 | Stop reason: HOSPADM

## 2025-03-20 RX ORDER — SODIUM CHLORIDE 0.9 % (FLUSH) 0.9 %
10 SYRINGE (ML) INJECTION AS NEEDED
Status: DISCONTINUED | OUTPATIENT
Start: 2025-03-20 | End: 2025-04-07 | Stop reason: HOSPADM

## 2025-03-20 RX ORDER — SODIUM CHLORIDE 0.9 % (FLUSH) 0.9 %
10 SYRINGE (ML) INJECTION AS NEEDED
Status: DISCONTINUED | OUTPATIENT
Start: 2025-03-20 | End: 2025-03-21

## 2025-03-20 RX ORDER — ASPIRIN 81 MG/1
81 TABLET, CHEWABLE ORAL DAILY
Status: DISCONTINUED | OUTPATIENT
Start: 2025-03-20 | End: 2025-04-07 | Stop reason: HOSPADM

## 2025-03-20 RX ORDER — ASPIRIN 300 MG/1
300 SUPPOSITORY RECTAL DAILY
Status: DISCONTINUED | OUTPATIENT
Start: 2025-03-20 | End: 2025-04-07 | Stop reason: HOSPADM

## 2025-03-20 RX ORDER — AMOXICILLIN 250 MG
2 CAPSULE ORAL 2 TIMES DAILY PRN
Status: DISCONTINUED | OUTPATIENT
Start: 2025-03-20 | End: 2025-04-07 | Stop reason: HOSPADM

## 2025-03-20 RX ORDER — BISACODYL 5 MG/1
5 TABLET, DELAYED RELEASE ORAL DAILY PRN
Status: DISCONTINUED | OUTPATIENT
Start: 2025-03-20 | End: 2025-04-07 | Stop reason: HOSPADM

## 2025-03-20 RX ORDER — HEPARIN SODIUM 10000 [USP'U]/100ML
18 INJECTION, SOLUTION INTRAVENOUS
Status: DISCONTINUED | OUTPATIENT
Start: 2025-03-20 | End: 2025-03-21

## 2025-03-20 RX ORDER — BISACODYL 10 MG
10 SUPPOSITORY, RECTAL RECTAL DAILY PRN
Status: DISCONTINUED | OUTPATIENT
Start: 2025-03-20 | End: 2025-04-07 | Stop reason: HOSPADM

## 2025-03-20 RX ORDER — SODIUM CHLORIDE, SODIUM LACTATE, POTASSIUM CHLORIDE, CALCIUM CHLORIDE 600; 310; 30; 20 MG/100ML; MG/100ML; MG/100ML; MG/100ML
100 INJECTION, SOLUTION INTRAVENOUS CONTINUOUS
Status: ACTIVE | OUTPATIENT
Start: 2025-03-20 | End: 2025-03-21

## 2025-03-20 RX ORDER — NICOTINE POLACRILEX 4 MG
15 LOZENGE BUCCAL
Status: DISCONTINUED | OUTPATIENT
Start: 2025-03-20 | End: 2025-03-24 | Stop reason: SDUPTHER

## 2025-03-20 RX ORDER — FLUCONAZOLE 2 MG/ML
200 INJECTION, SOLUTION INTRAVENOUS EVERY 24 HOURS
Status: COMPLETED | OUTPATIENT
Start: 2025-03-21 | End: 2025-03-21

## 2025-03-20 RX ORDER — IOPAMIDOL 755 MG/ML
115 INJECTION, SOLUTION INTRAVASCULAR
Status: COMPLETED | OUTPATIENT
Start: 2025-03-20 | End: 2025-03-20

## 2025-03-20 RX ORDER — SODIUM CHLORIDE 9 MG/ML
40 INJECTION, SOLUTION INTRAVENOUS AS NEEDED
Status: DISCONTINUED | OUTPATIENT
Start: 2025-03-20 | End: 2025-04-07 | Stop reason: HOSPADM

## 2025-03-20 RX ORDER — POLYETHYLENE GLYCOL 3350 17 G/17G
17 POWDER, FOR SOLUTION ORAL DAILY PRN
Status: DISCONTINUED | OUTPATIENT
Start: 2025-03-20 | End: 2025-04-07 | Stop reason: HOSPADM

## 2025-03-20 RX ORDER — IBUPROFEN 600 MG/1
1 TABLET ORAL
Status: DISCONTINUED | OUTPATIENT
Start: 2025-03-20 | End: 2025-03-24

## 2025-03-20 RX ORDER — CARVEDILOL 12.5 MG/1
25 TABLET ORAL 2 TIMES DAILY WITH MEALS
Status: DISCONTINUED | OUTPATIENT
Start: 2025-03-20 | End: 2025-04-02

## 2025-03-20 RX ORDER — SODIUM CHLORIDE 0.9 % (FLUSH) 0.9 %
10 SYRINGE (ML) INJECTION EVERY 12 HOURS SCHEDULED
Status: DISCONTINUED | OUTPATIENT
Start: 2025-03-20 | End: 2025-03-21

## 2025-03-20 RX ORDER — DEXTROSE MONOHYDRATE 25 G/50ML
25 INJECTION, SOLUTION INTRAVENOUS
Status: DISCONTINUED | OUTPATIENT
Start: 2025-03-20 | End: 2025-04-01 | Stop reason: SDUPTHER

## 2025-03-20 RX ORDER — NITROGLYCERIN 0.4 MG/1
0.4 TABLET SUBLINGUAL
Status: DISCONTINUED | OUTPATIENT
Start: 2025-03-20 | End: 2025-04-07 | Stop reason: HOSPADM

## 2025-03-20 RX ORDER — ATORVASTATIN CALCIUM 40 MG/1
80 TABLET, FILM COATED ORAL DAILY
Status: DISCONTINUED | OUTPATIENT
Start: 2025-03-21 | End: 2025-03-20 | Stop reason: SDUPTHER

## 2025-03-20 RX ORDER — PANTOPRAZOLE SODIUM 40 MG/1
40 TABLET, DELAYED RELEASE ORAL DAILY
Status: DISCONTINUED | OUTPATIENT
Start: 2025-03-21 | End: 2025-03-25 | Stop reason: SDUPTHER

## 2025-03-20 RX ORDER — SODIUM CHLORIDE 0.9 % (FLUSH) 0.9 %
10 SYRINGE (ML) INJECTION EVERY 12 HOURS SCHEDULED
Status: DISCONTINUED | OUTPATIENT
Start: 2025-03-20 | End: 2025-04-07 | Stop reason: HOSPADM

## 2025-03-20 RX ORDER — SODIUM CHLORIDE 9 MG/ML
40 INJECTION, SOLUTION INTRAVENOUS AS NEEDED
Status: DISCONTINUED | OUTPATIENT
Start: 2025-03-20 | End: 2025-03-21

## 2025-03-20 RX ADMIN — IOPAMIDOL 115 ML: 755 INJECTION, SOLUTION INTRAVENOUS at 16:26

## 2025-03-20 RX ADMIN — Medication 10 ML: at 21:30

## 2025-03-20 RX ADMIN — ASPIRIN 300 MG: 300 SUPPOSITORY RECTAL at 17:06

## 2025-03-20 RX ADMIN — SODIUM CHLORIDE 100 ML/HR: 9 INJECTION, SOLUTION INTRAVENOUS at 21:46

## 2025-03-20 NOTE — PROGRESS NOTES
Pharmacy to Dose Heparin Infusion Note    Valarie Camara is a 79 y.o. female receiving heparin infusion.     Therapy for (VTE/Cardiac): Recent VTE  Patient Weight: 68  Initial Bolus (Y/N): No  Any Bolus (Y/N): No     Signs or Symptoms of Bleeding: No    VTE (PE/DVT)   Initial Bolus: 80 units/kg (Max 10,000 units)  Initial rate: 18 units/kg/hr (Max 1,500 units/hr)    Anti-Xa Bolus   Dose Infusion Hold   Time Infusion Rate Change (units/kg/hr) Repeat  Anti-Xa   < 0.11 50 units/kg  (4000 units Max) None Increase by  4 units/kg/hr 6 hours   0.11 - 0.19 25 units/kg  (2000 units Max) None Increase by  3 units/kg/hr 6 hours   0.2 - 0.29 0 None Increase by  2 units/kg/hr 6 hours   0.3 - 0.7 0 None No Change 6 hours (after 2 consecutive levels in range check qAM)   0.71 - 0.8 0 None Decrease by  1 units/kg/hr 6 hours   0.81 - 0.9 0 None Decrease by  2 units/kg/hr 6 hours   0.91 - 1 0 60 minutes Decrease by  3 units/kg/hr 6 hours   >1 0 Hold  After Anti-Xa less than 0.7 decrease previous rate by  4 units/kg/hr  Every 2 hours until Anti-Xa less than 0.7 then when infusion restarts in 6 hours     Results from last 7 days   Lab Units 03/20/25  1609 03/20/25  1608 03/15/25  0433   INR   --  1.43*  --    HEMOGLOBIN g/dL  --  11.7* 11.3*   HEMOGLOBIN, POC g/dL 11.9*  --   --    HEMATOCRIT %  --  35.7 33.5*   HEMATOCRIT POC % 35*  --   --    PLATELETS 10*3/mm3  --  264 263       Date   Time   Anti-Xa Current Rate (units/kg/hr) Bolus   (units) Rate Change   (units/kg/hr) New Rate (units/kg/hr) Repeat  Anti-Xa Comments /  Pump Check    3/20 1608 > 1.1 -- No bolus ever Held  held 2200 Holding infusion until anti-Xa < 1. Discussed with romie team and RN.                                                                                                                                                                                                                                 Joshua Alvarez, PharmD  3/20/2025  17:08 EDT

## 2025-03-20 NOTE — ED PROVIDER NOTES
"Subjective   History of Present Illness  79-year-old female presents for evaluation of \"possible stroke.\"  Of note, the patient has a history of prior stroke without any significant residual deficits.  She was also recently diagnosed with a pulmonary embolism and is anticoagulated.  She was recently discharged from rehab at [facility].  She presents today accompanied by her daughter.  Her last known well was last night before she went to bed.  At around 10 AM this morning the patient's daughter noted that she was having significant word finding difficulty that has persisted since that time so she brought her here to the emergency department to be evaluated.  Her daughter also notes that she has had some \"jerking movements\" in her upper extremities this afternoon.  She did not bite her tongue.  No urinary incontinence.  No recent known fever.  The patient is a somewhat limited historian at this time.      Review of Systems   Unable to perform ROS: Mental status change   Neurological:  Positive for speech difficulty.   Psychiatric/Behavioral:  Positive for confusion.        Past Medical History:   Diagnosis Date    Acute bronchitis with bronchospasm     Anxiety     Arthritis     Asthma     B12 deficiency     Back pain     Body piercing     ears    Cataract     Cerebrovascular disease     Cervicalgia     Chronic kidney disease     stage 3b    Colonic polyp     History of colonic polyps     Constipation     COPD (chronic obstructive pulmonary disease)     Coronary artery disease     Depression     Diverticulitis     Dysphagia     Patient reported \"it won't go all the way down\" when eating solid foods first thing in the mornings    Edema     Elevated cholesterol     Esophageal reflux     Folic acid deficiency     Full dentures     Advised no adhesives DOS    Heart murmur     Herpes zoster     High cholesterol     History of kidney infection     History of recurrent urinary tract infection     HTN (hypertension)     " Hypercholesterolemia     Impaired functional mobility, balance, gait, and endurance     Insomnia     Kidney infection     Malignant hypertension 04/26/2015     Accelerated essential hypertension    Measles     rubeola    Muscle spasm     Neoplasm of uncertain behavior of skin     dtr denies    Osteoporosis     Poor historian     Recurrent urinary tract infection     Rheumatoid arthritis     RLS (restless legs syndrome)     Stomach ulcer     Stroke     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    Type 2 diabetes mellitus     Vitamin D deficiency        Allergies   Allergen Reactions    Penicillins Rash, Shortness Of Breath, Anaphylaxis and Other (See Comments)    Clonidine Derivatives Other (See Comments)     Pt reports extreme hypotension      Hydralazine Nausea And Vomiting and Other (See Comments)    Sulfa Antibiotics Rash       Past Surgical History:   Procedure Laterality Date    CATARACT EXTRACTION WITH INTRAOCULAR LENS IMPLANT Left 02/11/2013    CATARACT EXTRACTION WITH INTRAOCULAR LENS IMPLANT Right 04/15/2013    COLONOSCOPY  2012    COLONOSCOPY N/A 11/26/2019    Procedure: COLONOSCOPY;  Surgeon: Chidi Downing MD;  Location: [facility] ENDOSCOPY;  Service: Gastroenterology    ENDOSCOPY N/A 11/13/2017    Procedure: ESOPHAGOGASTRODUODENOSCOPY with biopsies and esophageal balloon dilitation;  Surgeon: Wesley Stovall MD;  Location: [facility] ENDOSCOPY;  Service:     ENDOSCOPY N/A 11/25/2019    Procedure: ESOPHAGOGASTRODUODENOSCOPY;  Surgeon: Chidi Downing MD;  Location:  [facility] ENDOSCOPY;  Service: Gastroenterology    ENDOSCOPY N/A 11/29/2021    Procedure: ESOPHAGOGASTRODUODENOSCOPY with dilation and biopsies;  Surgeon: Gonzalez Zapata MD;  Location: [facility] ENDOSCOPY;  Service: Gastroenterology;  Laterality: N/A;    ENDOSCOPY N/A 11/1/2023    Procedure: ESOPHAGOGASTRODUODENOSCOPY WITH BIOPSY AND DILATATION;  Surgeon: Gonzalez Zapata MD;  Location: [facility] ENDOSCOPY;   Service: Gastroenterology;  Laterality: N/A;    ENDOSCOPY N/A 2025    Procedure: Esophagogastroduodenoscopy with dilatation and biopsies;  Surgeon: Gonzalez Zapata MD;  Location: [facility] ENDOSCOPY;  Service: Gastroenterology;  Laterality: N/A;    HYSTERECTOMY  1979    UPPER GASTROINTESTINAL ENDOSCOPY  2013    UPPER GASTROINTESTINAL ENDOSCOPY  2017       Family History   Problem Relation Age of Onset    Arthritis Mother     Diabetes Mother     Hypertension Mother     Arthritis Other     Arthritis Other     Diabetes Other         DM    Hypertension Other     Colon cancer Neg Hx        Social History     Socioeconomic History    Marital status:    Tobacco Use    Smoking status: Former     Current packs/day: 0.00     Average packs/day: 1 pack/day for 15.0 years (15.0 ttl pk-yrs)     Types: Cigarettes     Start date: 1997     Quit date:      Years since quittin.2     Passive exposure: Past    Smokeless tobacco: Never    Tobacco comments:      Denied: History of smoking cigarettes   Vaping Use    Vaping status: Never Used   Substance and Sexual Activity    Alcohol use: Not Currently    Drug use: Never    Sexual activity: Defer           Objective   Physical Exam  Vitals and nursing note reviewed.   Constitutional:       Appearance: She is well-developed.   HENT:      Head: Normocephalic and atraumatic.      Comments: No tongue laceration present  Eyes:      Pupils: Pupils are equal, round, and reactive to light.   Neck:      Comments: No meningeal signs or nuchal rigidity  Cardiovascular:      Rate and Rhythm: Normal rate and regular rhythm.      Heart sounds: Normal heart sounds. No murmur heard.     No friction rub. No gallop.   Pulmonary:      Effort: Pulmonary effort is normal. No respiratory distress.      Breath sounds: Normal breath sounds. No wheezing or rales.   Abdominal:      General: Bowel sounds are normal. There is no distension.      Palpations: Abdomen is soft.  "There is no mass.      Tenderness: There is no abdominal tenderness. There is no guarding or rebound.   Musculoskeletal:         General: No swelling.   Skin:     General: Skin is warm and dry.      Findings: No erythema or rash.   Neurological:      Comments: Alert to person but not place or year, significant word finding difficulty noted, no visible facial droop present, subtle drift noted with strength testing of both lower extremities, 5 out of 5 strength noted to both upper extremities   Psychiatric:         Thought Content: Thought content normal.         Judgment: Judgment normal.      Comments: Unable to adequately evaluate         Critical Care    Performed by: Joshua Ravi MD  Authorized by: Joshua Ravi MD    Critical care provider statement:     Critical care time (minutes):  38    Critical care was necessary to treat or prevent imminent or life-threatening deterioration of the following conditions:  CNS failure or compromise    Critical care was time spent personally by me on the following activities:  Development of treatment plan with patient or surrogate, discussions with consultants, evaluation of patient's response to treatment, examination of patient, obtaining history from patient or surrogate, ordering and performing treatments and interventions, ordering and review of laboratory studies, ordering and review of radiographic studies, pulse oximetry, re-evaluation of patient's condition and review of old charts             ED Course  ED Course as of 03/20/25 1934   Thu Mar 20, 2025   1706 79-year-old female presents for evaluation of \"possible stroke.\"  Of note, the patient has a history of prior stroke without significant residual deficits.  She also was recently diagnosed with a pulmonary embolism and is anticoagulated.  She was recently discharged from rehab at [facility].  She presents today accompanied by her daughter.  Her last known well was last night before she went to " "bed.  At around 10 AM this morning the patient's daughter noted that she was having significant word finding difficulty that has persisted since that time, prompting her to bring her to the ED to be evaluated.  She also notes \"jerking movements\" in her extremities this afternoon.  The patient did not bite her tongue.  No urinary incontinence.  On arrival, I was asked to come evaluate the patient in triage.  The patient has significant word finding difficulty noted.  A code stroke was initiated and the patient was brought directly to CT where I was met by her stroke navigator. [DD]   1707 I personally and independently reviewed the patient's CT images and findings, and I am in agreement with the radiologist regarding CT interpretation--particularly there is no intracranial bleed present. [DD]   1707 TNK is clearly contraindicated given the patient's anticoagulation status. [DD]   1707 Labs and further advanced imaging obtained.  Our stroke team is recommending admission to the hospitalist service and will continue to follow with the patient in consult.  I discussed the patient's case with our hospitalist, Dr. Pan, the patient will be admitted under his care for further evaluation and treatment.  The patient is hemodynamically stable at this time and family is aware/agreeable with the plan. [DD]      ED Course User Index  [DD] Joshua Ravi MD                                Total (NIH Stroke Scale): 7            Recent Results (from the past 24 hours)   Protime-INR    Collection Time: 03/20/25  4:08 PM    Specimen: Arm, Right; Blood   Result Value Ref Range    Protime 17.6 (H) 12.2 - 14.5 Seconds    INR 1.43 (H) 0.89 - 1.12   aPTT    Collection Time: 03/20/25  4:08 PM    Specimen: Arm, Right; Blood   Result Value Ref Range    PTT 31.0 (L) 60.0 - 90.0 seconds   Green Top (Gel)    Collection Time: 03/20/25  4:08 PM   Result Value Ref Range    Extra Tube Hold for add-ons.    Lavender Top    Collection Time: " 03/20/25  4:08 PM   Result Value Ref Range    Extra Tube hold for add-on    Gold Top - SST    Collection Time: 03/20/25  4:08 PM   Result Value Ref Range    Extra Tube Hold for add-ons.    Gray Top    Collection Time: 03/20/25  4:08 PM   Result Value Ref Range    Extra Tube Hold for add-ons.    Light Blue Top    Collection Time: 03/20/25  4:08 PM   Result Value Ref Range    Extra Tube Hold for add-ons.    CBC Auto Differential    Collection Time: 03/20/25  4:08 PM    Specimen: Arm, Right; Blood   Result Value Ref Range    WBC 6.44 3.40 - 10.80 10*3/mm3    RBC 4.06 3.77 - 5.28 10*6/mm3    Hemoglobin 11.7 (L) 12.0 - 15.9 g/dL    Hematocrit 35.7 34.0 - 46.6 %    MCV 87.9 79.0 - 97.0 fL    MCH 28.8 26.6 - 33.0 pg    MCHC 32.8 31.5 - 35.7 g/dL    RDW 15.5 (H) 12.3 - 15.4 %    RDW-SD 49.7 37.0 - 54.0 fl    MPV 9.9 6.0 - 12.0 fL    Platelets 264 140 - 450 10*3/mm3    Neutrophil % 56.0 42.7 - 76.0 %    Lymphocyte % 31.2 19.6 - 45.3 %    Monocyte % 11.2 5.0 - 12.0 %    Eosinophil % 0.6 0.3 - 6.2 %    Basophil % 0.5 0.0 - 1.5 %    Immature Grans % 0.5 0.0 - 0.5 %    Neutrophils, Absolute 3.61 1.70 - 7.00 10*3/mm3    Lymphocytes, Absolute 2.01 0.70 - 3.10 10*3/mm3    Monocytes, Absolute 0.72 0.10 - 0.90 10*3/mm3    Eosinophils, Absolute 0.04 0.00 - 0.40 10*3/mm3    Basophils, Absolute 0.03 0.00 - 0.20 10*3/mm3    Immature Grans, Absolute 0.03 0.00 - 0.05 10*3/mm3    nRBC 0.0 0.0 - 0.2 /100 WBC   Comprehensive Metabolic Panel    Collection Time: 03/20/25  4:08 PM    Specimen: Arm, Right; Blood   Result Value Ref Range    Glucose 150 (H) 65 - 99 mg/dL    BUN 44 (H) 8 - 23 mg/dL    Creatinine 2.50 (H) 0.57 - 1.00 mg/dL    Sodium 141 136 - 145 mmol/L    Potassium 3.8 3.5 - 5.2 mmol/L    Chloride 105 98 - 107 mmol/L    CO2 21.0 (L) 22.0 - 29.0 mmol/L    Calcium 9.2 8.6 - 10.5 mg/dL    Total Protein 6.3 6.0 - 8.5 g/dL    Albumin 3.9 3.5 - 5.2 g/dL    ALT (SGPT) 13 1 - 33 U/L    AST (SGOT) 16 1 - 32 U/L    Alkaline Phosphatase 54 39  - 117 U/L    Total Bilirubin 0.4 0.0 - 1.2 mg/dL    Globulin 2.4 gm/dL    A/G Ratio 1.6 g/dL    BUN/Creatinine Ratio 17.6 7.0 - 25.0    Anion Gap 15.0 5.0 - 15.0 mmol/L    eGFR 19.0 (L) >60.0 mL/min/1.73   T4, Free    Collection Time: 03/20/25  4:08 PM    Specimen: Arm, Right; Blood   Result Value Ref Range    Free T4 1.91 (H) 0.92 - 1.68 ng/dL   TSH    Collection Time: 03/20/25  4:08 PM    Specimen: Arm, Right; Blood   Result Value Ref Range    TSH 0.355 0.270 - 4.200 uIU/mL   Magnesium    Collection Time: 03/20/25  4:08 PM    Specimen: Arm, Right; Blood   Result Value Ref Range    Magnesium 1.9 1.6 - 2.4 mg/dL   CK    Collection Time: 03/20/25  4:08 PM    Specimen: Arm, Right; Blood   Result Value Ref Range    Creatine Kinase 129 20 - 180 U/L   Acetaminophen Level    Collection Time: 03/20/25  4:08 PM    Specimen: Arm, Right; Blood   Result Value Ref Range    Acetaminophen <5.0 0.0 - 30.0 mcg/mL   Ethanol    Collection Time: 03/20/25  4:08 PM    Specimen: Arm, Right; Blood   Result Value Ref Range    Ethanol <10 0 - 10 mg/dL   Salicylate Level    Collection Time: 03/20/25  4:08 PM    Specimen: Arm, Right; Blood   Result Value Ref Range    Salicylate <0.3 <=30.0 mg/dL   Lactic Acid, Plasma    Collection Time: 03/20/25  4:08 PM    Specimen: Arm, Right; Blood   Result Value Ref Range    Lactate 1.6 0.5 - 2.0 mmol/L   Heparin Anti-Xa    Collection Time: 03/20/25  4:08 PM    Specimen: Arm, Right; Blood   Result Value Ref Range    Heparin Anti-Xa (UFH) >1.10 (C) 0.30 - 0.70 IU/ml   Procalcitonin    Collection Time: 03/20/25  4:08 PM    Specimen: Arm, Right; Blood   Result Value Ref Range    Procalcitonin 0.20 0.00 - 0.25 ng/mL   POC CHEM 8    Collection Time: 03/20/25  4:09 PM    Specimen: Blood   Result Value Ref Range    Glucose 151 (H) 70 - 130 mg/dL    BUN 44 (H) 8 - 26 mg/dL    Creatinine 2.80 (H) 0.60 - 1.30 mg/dL    Sodium 141 138 - 146 mmol/L    POC Potassium 3.7 3.5 - 4.9 mmol/L    Chloride 106 98 - 109 mmol/L     Total CO2 21 (L) 24 - 29 mmol/L    Hemoglobin 11.9 (L) 12.0 - 17.0 g/dL    Hematocrit 35 (L) 38 - 51 %    Ionized Calcium 1.21 1.20 - 1.32 mmol/L    eGFR 16.6 (L) >60.0 mL/min/1.73   ECG 12 Lead ED Triage Standing Order; Acute Stroke (Onset <24 hrs)    Collection Time: 03/20/25  4:37 PM   Result Value Ref Range    QT Interval 364 ms    QTC Interval 430 ms   Urinalysis With Culture If Indicated - Urine, Catheter    Collection Time: 03/20/25  5:06 PM    Specimen: Urine, Catheter   Result Value Ref Range    Color, UA Yellow Yellow, Straw    Appearance, UA Cloudy (A) Clear    pH, UA <=5.0 5.0 - 8.0    Specific Gravity, UA 1.050 (H) 1.001 - 1.030    Glucose, UA >=1000 mg/dL (3+) (A) Negative    Ketones, UA Trace (A) Negative    Bilirubin, UA Negative Negative    Blood, UA Negative Negative    Protein, UA 30 mg/dL (1+) (A) Negative    Leuk Esterase, UA Negative Negative    Nitrite, UA Negative Negative    Urobilinogen, UA 1.0 E.U./dL 0.2 - 1.0 E.U./dL   Urine Drug Screen - Urine, Clean Catch    Collection Time: 03/20/25  5:06 PM    Specimen: Urine, Clean Catch   Result Value Ref Range    THC, Screen, Urine Negative Negative    Phencyclidine (PCP), Urine Negative Negative    Cocaine Screen, Urine Negative Negative    Methamphetamine, Ur Negative Negative    Opiate Screen Negative Negative    Amphetamine Screen, Urine Negative Negative    Benzodiazepine Screen, Urine Negative Negative    Tricyclic Antidepressants Screen Negative Negative    Methadone Screen, Urine Negative Negative    Barbiturates Screen, Urine Negative Negative    Oxycodone Screen, Urine Negative Negative    Buprenorphine, Screen, Urine Negative Negative   Fentanyl, Urine - Urine, Clean Catch    Collection Time: 03/20/25  5:06 PM    Specimen: Urine, Clean Catch   Result Value Ref Range    Fentanyl, Urine Negative Negative   Urinalysis, Microscopic Only - Urine, Catheter    Collection Time: 03/20/25  5:06 PM    Specimen: Urine, Catheter   Result Value  Ref Range    RBC, UA 0-2 None Seen, 0-2 /HPF    WBC, UA 0-2 None Seen, 0-2 /HPF    Bacteria, UA None Seen None Seen, Trace /HPF    Squamous Epithelial Cells, UA 13-20 (A) None Seen, 0-2 /HPF    Yeast, UA Large/3+ Budding Yeast w/Hyphae (A) None Seen /HPF    Hyaline Casts, UA 0-6 0 - 6 /LPF    Methodology Manual Light Microscopy    Ammonia    Collection Time: 03/20/25  6:27 PM    Specimen: Arm, Left; Blood   Result Value Ref Range    Ammonia 13 11 - 51 umol/L     Note: In addition to lab results from this visit, the labs listed above may include labs taken at another facility or during a different encounter within the last 24 hours. Please correlate lab times with ED admission and discharge times for further clarification of the services performed during this visit.    XR Chest 1 View   Final Result   Impression:   1.Enlarged cardiac silhouette. This could be related to cardiomegaly and/or pericardial effusion.         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 Posterior cerebral arteries appear patent. Findings may be chronic given absence    of posterior territory ischemia seen on the accompanying CT perfusion scan. Please correlate with patient's symptomatology and brain MRI.   2.Otherwise, no intracranial large vessel occlusion is identified.   3.Estimated 70% stenosis within the left proximal internal carotid artery (see series 8, image 223).   4.Less than 50% stenosis within the right internal carotid artery.   5.CT perfusion study demonstrates a small focus of prolonged Tmax greater than 6 seconds within the right inferior frontal lobe (4 mL). This may be artifactual, although a small focus of at risk ischemic tissue cannot be  excluded. No territorial core    infarct is demonstrated on CT perfusion imaging.   6.Enlarged, multinodular thyroid gland. Recommend follow-up thyroid ultrasound on a nonemergent basis.      The above findings were discussed with Moriah Menjivar via telephone on 3/20/2025 4:50 PM EDT.               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 Posterior cerebral arteries appear patent. Findings may be chronic given absence    of posterior territory ischemia seen on the accompanying CT perfusion scan. Please correlate with patient's symptomatology and brain MRI.   2.Otherwise, no intracranial large vessel occlusion is identified.   3.Estimated 70% stenosis within the left proximal internal carotid artery (see series 8, image 223).   4.Less than 50% stenosis within the right internal carotid artery.   5.CT perfusion study demonstrates a small focus of prolonged Tmax greater than 6 seconds within the right inferior frontal lobe (4 mL). This may be artifactual, although a small focus of at risk ischemic tissue cannot be excluded. No territorial core    infarct is demonstrated on CT perfusion imaging.   6.Enlarged, multinodular thyroid gland. Recommend follow-up thyroid ultrasound on a nonemergent basis.      The above findings were discussed with Moriah Menjivar via telephone on 3/20/2025 4:50 PM EDT.               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 Posterior  cerebral arteries appear patent. Findings may be chronic given absence    of posterior territory ischemia seen on the accompanying CT perfusion scan. Please correlate with patient's symptomatology and brain MRI.   2.Otherwise, no intracranial large vessel occlusion is identified.   3.Estimated 70% stenosis within the left proximal internal carotid artery (see series 8, image 223).   4.Less than 50% stenosis within the right internal carotid artery.   5.CT perfusion study demonstrates a small focus of prolonged Tmax greater than 6 seconds within the right inferior frontal lobe (4 mL). This may be artifactual, although a small focus of at risk ischemic tissue cannot be excluded. No territorial core    infarct is demonstrated on CT perfusion imaging.   6.Enlarged, multinodular thyroid gland. Recommend follow-up thyroid ultrasound on a nonemergent basis.      The above findings were discussed with Moriah Menjivar via telephone on 3/20/2025 4:50 PM EDT.               Electronically Signed: Charlie Sanders MD     3/20/2025 4:53 PM EDT     Workstation ID: [id number]      CT Head Without Contrast Stroke Protocol   Final Result   Impression:      1. Stable small old lacunar infarct in the central left thalamus.      2. Suspected mild interval extension of a small infarct of the dorsal left thalamus and left internal capsule, although not obviously acute.      3. No clearly acute intracranial abnormality is appreciated.            Electronically Signed: Norberto Seaman MD     3/20/2025 4:19 PM EDT     Workstation ID: [id number]      MRI Brain Without Contrast    (Results Pending)     Vitals:    03/20/25 1912 03/20/25 1917 03/20/25 1922 03/20/25 1927   BP:       BP Location:       Patient Position:       Pulse: 81 82 75 72   Resp:       Temp:       TempSrc:       SpO2: 98% 98% 100% 99%   Weight:       Height:         Medications   sodium chloride 0.9 % flush 10 mL (has no administration in time range)   sodium chloride 0.9 % flush 10 mL  (has no administration in time range)   sodium chloride 0.9 % flush 10 mL (has no administration in time range)   sodium chloride 0.9 % infusion 40 mL (has no administration in time range)   atorvastatin (LIPITOR) tablet 80 mg (has no administration in time range)   aspirin chewable tablet 81 mg ( Oral Not Given:  See Alt 3/20/25 1706)     Or   aspirin suppository 300 mg (300 mg Rectal Given 3/20/25 1706)   heparin 80575 units/250 mL (100 units/mL) in 0.45 % NaCl infusion ( Intravenous Held by provider 3/20/25 1740)   Pharmacy to Dose Heparin (has no administration in time range)   sodium chloride 0.9 % infusion (has no administration in time range)   iopamidol (ISOVUE-370) 76 % injection 115 mL (115 mL Intravenous Given 3/20/25 1626)     ECG/EMG Results (last 24 hours)       Procedure Component Value Units Date/Time    ECG 12 Lead ED Triage Standing Order; Acute Stroke (Onset <24 hrs) [[id number]] Collected: 03/20/25 1637     Updated: 03/20/25 1637     QT Interval 364 ms      QTC Interval 430 ms     Narrative:      Test Reason : ED Triage Standing Order~  Blood Pressure :   */*   mmHG  Vent. Rate :  84 BPM     Atrial Rate :  84 BPM     P-R Int : 176 ms          QRS Dur :  80 ms      QT Int : 364 ms       P-R-T Axes :  69  17  14 degrees    QTcB Int : 430 ms    Normal sinus rhythm  Low voltage QRS  Cannot rule out Anterior infarct (cited on or before 01-Mar-2025)  Abnormal ECG  When compared with ECG of 01-Mar-2025 21:25,  Nonspecific T wave abnormality now evident in Inferior leads    Referred By: ER MD           Confirmed By:           ECG 12 Lead ED Triage Standing Order; Acute Stroke (Onset <24 hrs)   Preliminary Result   Test Reason : ED Triage Standing Order~   Blood Pressure :   */*   mmHG   Vent. Rate :  84 BPM     Atrial Rate :  84 BPM      P-R Int : 176 ms          QRS Dur :  80 ms       QT Int : 364 ms       P-R-T Axes :  69  17  14 degrees     QTcB Int : 430 ms      Normal sinus rhythm   Low voltage QRS    Cannot rule out Anterior infarct (cited on or before 01-Mar-2025)   Abnormal ECG   When compared with ECG of 01-Mar-2025 21:25,   Nonspecific T wave abnormality now evident in Inferior leads      Referred By: ER MD           Confirmed By:                     Medical Decision Making  Amount and/or Complexity of Data Reviewed  Labs: ordered.  Radiology: ordered.  ECG/medicine tests: ordered.    Risk  Prescription drug management.  Decision regarding hospitalization.        Final diagnoses:   Acute ischemic stroke   History of stroke   Renal insufficiency       ED Disposition  ED Disposition       ED Disposition   Decision to Admit    Condition   --    Comment   Level of Care: Telemetry [5]   Diagnosis: CVA (cerebral vascular accident) [[id number]]   Admitting Physician: LENA LONG [[id number]]   Certification: I Certify That Inpatient Hospital Services Are Medically Necessary For Greater Than 2 Midnights                 No follow-up provider specified.       Medication List      No changes were made to your prescriptions during this visit.            Joshua Ravi MD  03/20/25 1937

## 2025-03-20 NOTE — Clinical Note
Level of Care: Telemetry [5]   Diagnosis: CVA (cerebral vascular accident) [[id number]]   Admitting Physician: LENA LONG [[id number]]

## 2025-03-20 NOTE — ED NOTES
" Valarie Camara    Nursing Report ED to Floor:  Mental status: a/o 1, word salad  Ambulatory status: assist x 2  Oxygen Therapy:  room air  Cardiac Rhythm: nsr  Admitted from: home  Safety Concerns:  fall risk  Precautions: none  Social Issues: none  ED Room #:  09    ED Nurse Phone Extension - [phone] or may call [phone].      HPI:   Chief Complaint   Patient presents with    Altered Mental Status       Past Medical History:  Past Medical History:   Diagnosis Date    Acute bronchitis with bronchospasm     Anxiety     Arthritis     Asthma     B12 deficiency     Back pain     Body piercing     ears    Cataract     Cerebrovascular disease     Cervicalgia     Chronic kidney disease     stage 3b    Colonic polyp     History of colonic polyps     Constipation     COPD (chronic obstructive pulmonary disease)     Coronary artery disease     Depression     Diverticulitis     Dysphagia     Patient reported \"it won't go all the way down\" when eating solid foods first thing in the mornings    Edema     Elevated cholesterol     Esophageal reflux     Folic acid deficiency     Full dentures     Advised no adhesives DOS    Heart murmur     Herpes zoster     High cholesterol     History of kidney infection     History of recurrent urinary tract infection     HTN (hypertension)     Hypercholesterolemia     Impaired functional mobility, balance, gait, and endurance     Insomnia     Kidney infection     Malignant hypertension 04/26/2015     Accelerated essential hypertension    Measles     rubeola    Muscle spasm     Neoplasm of uncertain behavior of skin     dtr denies    Osteoporosis     Poor historian     Recurrent urinary tract infection     Rheumatoid arthritis     RLS (restless legs syndrome)     Stomach ulcer     Stroke     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    Type 2 diabetes mellitus     Vitamin D deficiency         Past Surgical History:  Past Surgical History:   Procedure Laterality " Date    CATARACT EXTRACTION WITH INTRAOCULAR LENS IMPLANT Left 02/11/2013    CATARACT EXTRACTION WITH INTRAOCULAR LENS IMPLANT Right 04/15/2013    COLONOSCOPY  2012    COLONOSCOPY N/A 11/26/2019    Procedure: COLONOSCOPY;  Surgeon: Chidi Downing MD;  Location:  [facility];  Service: Gastroenterology    ENDOSCOPY N/A 11/13/2017    Procedure: ESOPHAGOGASTRODUODENOSCOPY with biopsies and esophageal balloon dilitation;  Surgeon: Wesley Stovall MD;  Location: [facility];  Service:     ENDOSCOPY N/A 11/25/2019    Procedure: ESOPHAGOGASTRODUODENOSCOPY;  Surgeon: Chidi Downing MD;  Location:  [facility];  Service: Gastroenterology    ENDOSCOPY N/A 11/29/2021    Procedure: ESOPHAGOGASTRODUODENOSCOPY with dilation and biopsies;  Surgeon: Gonzalez Zapata MD;  Location: [facility] END[facility]COPY;  Service: Gastroenterology;  Laterality: N/A;    ENDOSCOPY N/A 11/1/2023    Procedure: ESOPHAGOGASTRODUODENOSCOPY WITH BIOPSY AND DILATATION;  Surgeon: Gonzalez Zapata MD;  Location: [facility] ENDOSCOPY;  Service: Gastroenterology;  Laterality: N/A;    ENDOSCOPY N/A 1/27/2025    Procedure: Esophagogastroduodenoscopy with dilatation and biopsies;  Surgeon: Gonzalez Zapata MD;  Location: [facility] ENDOSCOPY;  Service: Gastroenterology;  Laterality: N/A;    HYSTERECTOMY  1979    UPPER GASTROINTESTINAL ENDOSCOPY  12/09/2013    UPPER GASTROINTESTINAL ENDOSCOPY  11/13/2017        Admitting Doctor:   Norberto Pan MD    Consulting Provider(s):  Consults       Date and Time Order Name Status Description    3/20/2025  4:04 PM Inpatient Neurology Consult Stroke               Admitting Diagnosis:   There were no encounter diagnoses.    Most Recent Vitals:   Vitals:    03/20/25 1626 03/20/25 1629 03/20/25 1630 03/20/25 1631   BP: 121/72  123/72    BP Location:       Patient Position:       Pulse:  90  91   Resp:       Temp:       TempSrc:       SpO2:  98%  100%   Weight:       Height:           Active LDAs/IV Access:    Lines, Drains & Airways       Active LDAs       Name Placement date Placement time Site Days    Peripheral IV 03/20/25 1609 Right Antecubital 03/20/25  1609  Antecubital  less than 1                    Labs (abnormal labs have a star):   Labs Reviewed   PROTIME-INR - Abnormal; Notable for the following components:       Result Value    Protime 17.6 (*)     INR 1.43 (*)     All other components within normal limits   APTT - Abnormal; Notable for the following components:    PTT 31.0 (*)     All other components within normal limits    Narrative:     PTT = The equivalent PTT values for the therapeutic range of heparin levels at 0.3 to 0.5 U/ml are 60 to 70 seconds.   CBC WITH AUTO DIFFERENTIAL - Abnormal; Notable for the following components:    Hemoglobin 11.7 (*)     RDW 15.5 (*)     All other components within normal limits   COMPREHENSIVE METABOLIC PANEL - Abnormal; Notable for the following components:    Glucose 150 (*)     BUN 44 (*)     Creatinine 2.50 (*)     CO2 21.0 (*)     eGFR 19.0 (*)     All other components within normal limits    Narrative:     GFR Categories in Chronic Kidney Disease (CKD)      GFR Category          GFR (mL/min/1.73)    Interpretation  G1                     90 or greater         Normal or high (1)  G2                      60-89                Mild decrease (1)  G3a                   45-59                Mild to moderate decrease  G3b                   30-44                Moderate to severe decrease  G4                    15-29                Severe decrease  G5                    14 or less           Kidney failure          (1)In the absence of evidence of kidney disease, neither GFR category G1 or G2 fulfill the criteria for CKD.    eGFR calculation 2021 CKD-EPI creatinine equation, which does not include race as a factor   T4, FREE - Abnormal; Notable for the following components:    Free T4 1.91 (*)     All other components within normal limits   POCT CHEM 8 - Abnormal;  Notable for the following components:    Glucose 151 (*)     BUN 44 (*)     Creatinine 2.80 (*)     Total CO2 21 (*)     Hemoglobin 11.9 (*)     Hematocrit 35 (*)     eGFR 16.6 (*)     All other components within normal limits   TSH - Normal   MAGNESIUM - Normal   CK - Normal   ACETAMINOPHEN LEVEL - Normal   ETHANOL - Normal    Narrative:     Elevated lactic acid concentration and lactate dehydrogenase(LD) activity may falsely elevate enzymatically determined ethanol levels. Not for legal purposes.    SALICYLATE LEVEL - Normal   LACTIC ACID, PLASMA - Normal   RAINBOW DRAW    Narrative:     The following orders were created for panel order [place] Draw.  Procedure                               Abnormality         Status                     ---------                               -----------         ------                     Green Top (Gel)[[id number]]                                  Final result               Lavender Top[[id number]]                                     Final result               Gold Top - SST[[id number]]                                   Final result               Woo Top[[id number]]                                         Final result               Light Blue Top[[id number]]                                   Final result                 Please view results for these tests on the individual orders.   URINALYSIS W/ CULTURE IF INDICATED   AMMONIA   URINE DRUG SCREEN   HEPARIN ANTI XA   CBC AND DIFFERENTIAL    Narrative:     The following orders were created for panel order CBC & Differential.  Procedure                               Abnormality         Status                     ---------                               -----------         ------                     CBC Auto Differential[[id number]]        Abnormal            Final result                 Please view results for these tests on the individual orders.   GREEN TOP   LAVENDER TOP   GOLD TOP - SST   GRAY TOP   LIGHT BLUE TOP       Meds Given in  ED:   Medications   sodium chloride 0.9 % flush 10 mL (has no administration in time range)   aspirin chewable tablet 81 mg ( Oral Not Given:  See Alt 3/20/25 1706)     Or   aspirin suppository 300 mg (300 mg Rectal Given 3/20/25 1706)   heparin 35009 units/250 mL (100 units/mL) in 0.45 % NaCl infusion (has no administration in time range)   Pharmacy to Dose Heparin (has no administration in time range)   iopamidol (ISOVUE-370) 76 % injection 115 mL (115 mL Intravenous Given 3/20/25 1626)     heparin, 18 Units/kg/hr  Pharmacy to Dose Heparin,          Last NIH score:  Interval: baseline  1a. Level of Consciousness: 0-->Alert, keenly responsive  1b. LOC Questions: 0-->Answers both questions correctly  1c. LOC Commands: 0-->Performs both tasks correctly  2. Best Gaze: 0-->Normal  3. Visual: 0-->No visual loss  4. Facial Palsy: 1-->Minor paralysis (flattened nasolabial fold, asymmetry on smiling)  5a. Motor Arm, Left: 0-->No drift, limb holds 90 (or 45) degrees for full 10 secs  5b. Motor Arm, Right: 0-->No drift, limb holds 90 (or 45) degrees for full 10 secs  6a. Motor Leg, Left: 1-->Drift, leg falls by the end of the 5-sec period but does not hit bed  6b. Motor Leg, Right: 1-->Drift, leg falls by the end of the 5-sec period but does not hit bed  7. Limb Ataxia: 0-->Absent  8. Sensory: 1-->Mild-to-moderate sensory loss, patient feels pinprick is less sharp or is dull on the affected side, or there is a loss of superficial pain with pinprick, but patient is aware of being touched  9. Best Language: 1-->Mild-to-moderate aphasia, some obvious loss of fluency or facility of comprehension, without significant limitation on ideas expressed or form of expression. Reduction of speech and/or comprehension, however, makes conversation. . . (see row details)  10. Dysarthria: 2-->Severe dysarthria, patients speech is so slurred as to be unintelligible in the absence of or out of proportion to any dysphasia, or is  mute/anarthric  11. Extinction and Inattention (formerly Neglect): 0-->No abnormality    Total (NIH Stroke Scale): 7     Dysphagia screening results:  Patient Factors Component (Dysphagia:Stroke or Rule-out)  Best Eye Response: 4-->(E4) spontaneous (03/20/25 1710)  Best Motor Response: 6-->(M6) obeys commands (03/20/25 1710)  Best Verbal Response: 3-->(V3) inappropriate words (03/20/25 1710)  Elvia Coma Scale Score: 13 (03/20/25 1710)  Is there Facial Asymmetry/Weakness?: Yes (03/20/25 1710)  Is there Tongue Asymmetry/Weakness?: No (03/20/25 1710)  Is there Palatal Asymmetry/Weakness?: No (03/20/25 1710)  Patient Assessment Result: (!) Fail (03/20/25 1710)     Elvia Coma Scale:  No data recorded     CIWA:        Restraint Type:            Isolation Status:  No active isolations

## 2025-03-20 NOTE — LETTER
EMS Transport Request  For use at [facility] [place], [place], [place], and [place] only   Patient Name: Valarie Camara : 1945   Weight:68 kg (150 lb) Pick-up Location: [facility] BLS/ALS: BLS/ALS: BLS   Insurance: MEDICARE Auth End Date: thru Friday   Pre-Cert #: D/C Summary complete:    Destination: [facility] ([place], [place])   Contact Precautions: None   Equipment (O2, Fluids, etc.): None   Arrive By Date/Time: Tomorrow afternoon Stretcher/WC: Stretcher   CM Requesting: Darya Woo, MSW Ext: [phone]   Notes/Medical Necessity: Acute stroke, fall risk, underlying dementia     ______________________________________________________________________    *Only 2 patient bags OR 1 carry-on size bag are permitted.  Wheelchairs and walkers CANNOT transported with the patient. Acknowledge: Yes

## 2025-03-20 NOTE — CONSULTS
Stroke Consult Note    Patient Name: Valarie Camara   MRN: [id number]  Age: 79 y.o.  Sex: female  : 1945    Primary Care Physician: Lay Cox MD  Referring Physician: Dr. Markos Ravi    TIME STROKE TEAM CALLED: 1559 EST     TIME PATIENT SEEN: 1604 EST    Handedness: Right  Race: -American    Chief Complaint/Reason for Consultation: Aphasia    HPI: Mrs. Camara is an 79-year-old female with known medical diagnoses of essential hypertension, hyperlipidemia, CAD, prediabetes, CKD stage III, known left ICA stenosis (50 to 69% per carotid ultrasound), mild dementia, depression/anxiety, recent PE (, [facility], on Eliquis) and recurrent UTIs who presents to the emergency department for further evaluation of altered mental status and aphasia which her daughter first noticed today.  Given patient's speech difficulty and confusion the majority of the her HPI and PMH was obtained from her daughter, Cleo, who she resides with.    Cleo tells me the patient has been generally weak for the past couple of days however she noted that she was having difficulty with her speech upon awakening this morning at 10 AM.  She states that when she went to bed last evening at 2200 she was in her normal state of health and that she awoke at 0330 to go to the restroom and seemed to be irritable at that time.  When her speech difficulties continue to persist throughout the day the patient's daughter brought her to the emergency department for further evaluation.  Of note the patient was recently admitted to [facility] where she was diagnosed with a pulmonary embolism and placed on Eliquis (started on 3/4).  She did complete 1 week of inpatient rehabilitation at [facility].    Her daughter assures me that she has not missed any of her doses of Eliquis.  She does not currently take any antiplatelet medications.    Last Known Normal Date/Time: 3/19/2025 2200 EST     Review of Systems  "  Unable to perform ROS: Mental status change      Past Medical History:   Diagnosis Date    Acute bronchitis with bronchospasm     Anxiety     Arthritis     Asthma     B12 deficiency     Back pain     Body piercing     ears    Cataract     Cerebrovascular disease     Cervicalgia     Chronic kidney disease     stage 3b    Colonic polyp     History of colonic polyps     Constipation     COPD (chronic obstructive pulmonary disease)     Coronary artery disease     Depression     Diverticulitis     Dysphagia     Patient reported \"it won't go all the way down\" when eating solid foods first thing in the mornings    Edema     Elevated cholesterol     Esophageal reflux     Folic acid deficiency     Full dentures     Advised no adhesives DOS    Heart murmur     Herpes zoster     High cholesterol     History of kidney infection     History of recurrent urinary tract infection     HTN (hypertension)     Hypercholesterolemia     Impaired functional mobility, balance, gait, and endurance     Insomnia     Kidney infection     Malignant hypertension 04/26/2015     Accelerated essential hypertension    Measles     rubeola    Muscle spasm     Neoplasm of uncertain behavior of skin     dtr denies    Osteoporosis     Poor historian     Recurrent urinary tract infection     Rheumatoid arthritis     RLS (restless legs syndrome)     Stomach ulcer     Stroke     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    Type 2 diabetes mellitus     Vitamin D deficiency      Past Surgical History:   Procedure Laterality Date    CATARACT EXTRACTION WITH INTRAOCULAR LENS IMPLANT Left 02/11/2013    CATARACT EXTRACTION WITH INTRAOCULAR LENS IMPLANT Right 04/15/2013    COLONOSCOPY  2012    COLONOSCOPY N/A 11/26/2019    Procedure: COLONOSCOPY;  Surgeon: Chidi Downing MD;  Location: [facility] ENDOSCOPY;  Service: Gastroenterology    ENDOSCOPY N/A 11/13/2017    Procedure: ESOPHAGOGASTRODUODENOSCOPY with biopsies and esophageal " balloon dilitation;  Surgeon: Wesley Stovall MD;  Location: [facility] ENDOSCOPY;  Service:     ENDOSCOPY N/A 2019    Procedure: ESOPHAGOGASTRODUODENOSCOPY;  Surgeon: Chidi Downing MD;  Location: [facility] ENDOSCOPY;  Service: Gastroenterology    ENDOSCOPY N/A 2021    Procedure: ESOPHAGOGASTRODUODENOSCOPY with dilation and biopsies;  Surgeon: Gonzalez Zapata MD;  Location: [facility] ENDOSCOPY;  Service: Gastroenterology;  Laterality: N/A;    ENDOSCOPY N/A 2023    Procedure: ESOPHAGOGASTRODUODENOSCOPY WITH BIOPSY AND DILATATION;  Surgeon: Gonzalez Zapata MD;  Location: [facility] ENDOSCOPY;  Service: Gastroenterology;  Laterality: N/A;    ENDOSCOPY N/A 2025    Procedure: Esophagogastroduodenoscopy with dilatation and biopsies;  Surgeon: Gonzalez Zapata MD;  Location: [facility] ENDOSCOPY;  Service: Gastroenterology;  Laterality: N/A;    HYSTERECTOMY  1979    UPPER GASTROINTESTINAL ENDOSCOPY  2013    UPPER GASTROINTESTINAL ENDOSCOPY  2017     Family History   Problem Relation Age of Onset    Arthritis Mother     Diabetes Mother     Hypertension Mother     Arthritis Other     Arthritis Other     Diabetes Other         DM    Hypertension Other     Colon cancer Neg Hx      Social History     Socioeconomic History    Marital status:    Tobacco Use    Smoking status: Former     Current packs/day: 0.00     Average packs/day: 1 pack/day for 15.0 years (15.0 ttl pk-yrs)     Types: Cigarettes     Start date: 1997     Quit date:      Years since quittin.2     Passive exposure: Past    Smokeless tobacco: Never    Tobacco comments:      Denied: History of smoking cigarettes   Vaping Use    Vaping status: Never Used   Substance and Sexual Activity    Alcohol use: Not Currently    Drug use: Never    Sexual activity: Defer     Allergies   Allergen Reactions    Penicillins Rash, Shortness Of Breath, Anaphylaxis and Other (See Comments)    Clonidine Derivatives Other (See  Comments)     Pt reports extreme hypotension      Hydralazine Nausea And Vomiting and Other (See Comments)    Sulfa Antibiotics Rash     Prior to Admission medications    Medication Sig Start Date End Date Taking? Authorizing Provider   acetaminophen (TYLENOL) 325 MG tablet Take 1 tablet by mouth Every 6 (Six) Hours As Needed for Headache or Mild Pain. Indications: Pain 1/1/25   Bruce Mata MD   apixaban (ELIQUIS) 5 MG tablet tablet Take 2 tablets by mouth Every 12 (Twelve) Hours for 4 days, THEN 1 tablet Every 12 (Twelve) Hours for 30 days. Indications: DVT/PE (active thrombosis) 3/4/25 4/7/25  Kerley, Brian Joseph, DO   atorvastatin (LIPITOR) 80 MG tablet Take 1 tablet by mouth Daily. 10/19/20   Arminda Evans MD   carvedilol (COREG) 25 MG tablet Take 1 tablet by mouth 2 (Two) Times a Day With Meals. 10/19/20   Arminda Evans MD   CHOLECALCIFEROL PO Take 5,000 Int'l Units/day by mouth Daily. Indications: Vitamin D Deficiency    Bruce Mata MD   Easy Touch Pen Needles 31G X 8 MM misc Indications: Diabetes 1/26/22   Bruce Mata MD   ferrous sulfate 325 (65 FE) MG tablet Take 1 tablet by mouth 3 (Three) Times a Week. Indications: Iron Deficiency, Restless Leg Syndrome    Bruce Mata MD   glucose blood test strip 1 each by Other route As Needed (FBS). Check sugar once a day  Indications: Type 2 Diabetes 3/4/25   Kerley, Brian Joseph, DO   glucose monitor monitoring kit 1 each Daily. 11/19/20   Arminda Evans MD   insulin lispro (humaLOG) 100 UNIT/ML injection Inject 2 Units under the skin into the appropriate area as directed 3 (Three) Times a Day As Needed for High Blood Sugar. Daughter only gives if BS is greater than 200 two hrs after eating.    Indications: Type 2 Diabetes    Bruce Mata MD   Insulin Pen Needle 31G X 5 MM misc Inject insulin three times daily 9/8/20   Arminda Evans MD   Lancets 30G misc Check sugar daily 11/19/20   Arminda Evans MD   linagliptin  "(TRADJENTA) 5 MG tablet tablet Take 1 tablet by mouth Daily. 1/6/25   Wil Rader,    NIFEdipine XL (PROCARDIA XL) 90 MG 24 hr tablet Take 1 tablet by mouth Daily. Indications: High Blood Pressure 9/1/23   Bruce Mata MD   pantoprazole (PROTONIX) 40 MG EC tablet Take 1 tablet by mouth Daily.    Bruce Mata MD   traZODone (DESYREL) 50 MG tablet Take 1 tablet by mouth At Night As Needed. Indications: Trouble Sleeping 1/2/25   Bruce Mata MD         Temp:  [98.9 °F (37.2 °C)] 98.9 °F (37.2 °C)  Heart Rate:  [90-95] 91  Resp:  [16] 16  BP: (121-129)/(69-72) 123/72  Neurological Exam  Mental Status  Alert. Oriented only to person, place and time. Moderate dysarthria present. Expressive aphasia present. Loss of fluency. Follows two-step commands.    Cranial Nerves  CN II: Appears to blink less to visual threat on left fields.  CN III, IV, VI: Extraocular movements intact bilaterally. Pupils equal round and reactive to light bilaterally.  CN V: Patient states that it feels \"different\" however is unable to tell me which side she feels me better on.  CN VII:  Right: There is central facial weakness.  CN VIII: Hard of hearing.  CN XII: Tongue midline without atrophy or fasciculations.    Motor  Decreased muscle bulk throughout. The following abnormal movements were seen: Occasional jerking; appears to be right sided only.    Bilateral upper extremities with no drift, 4+/5 strength  Bilateral lower extremities with mild drift, 4 -/5 strength.    Sensory  Sensation: Patient is able to tell me that it feels \"different\" when touching her face and extremities however is unable to tell me which side she feels any better on.     Coordination    No obvious dysmetria out of portion to weakness.    Gait    Not observed.      Physical Exam  Vitals and nursing note reviewed.   Constitutional:       General: She is not in acute distress.     Appearance: Normal appearance. She is ill-appearing. "   HENT:      Head: Normocephalic.      Mouth/Throat:      Mouth: Mucous membranes are dry.   Eyes:      Extraocular Movements: Extraocular movements intact.      Pupils: Pupils are equal, round, and reactive to light.   Cardiovascular:      Rate and Rhythm: Normal rate.   Pulmonary:      Effort: Pulmonary effort is normal. No respiratory distress.      Comments: On room air  Skin:     General: Skin is warm and dry.   Neurological:      Mental Status: She is alert. She is disoriented.      Cranial Nerves: Cranial nerve deficit and dysarthria present.      Sensory: Sensory deficit present.      Motor: Weakness present.      Coordination: Coordination normal.   Psychiatric:         Mood and Affect: Mood normal.         Behavior: Behavior normal.         Acute Stroke Data    Thrombolytic Inclusion / Exclusion Criteria    Time: 17:14 EDT  Person Administering Scale: JOSEE Rosales      YES NO INCLUSION CRITERIA CLASS I   [] [x]   Suspected diagnosis of acute ischemic stroke with measureable neurological deficit.  Low NIHSS with disabling stroke symptoms.   [] [x]   Onset of stroke symptoms < 3 hours before beginning treatment >/ 18 years old  Stroke symptom onset = time patient was last seen well or without symptoms (LKW)   [] [x]   Onset of symptoms between 3-4.5 hours: >/= 80 years old (safe Class IIa) with history of   both diabetes and prior CVA  (reasonable Class IIb) AND NIHSS </= 25  *If not eligible for IV Thrombolytic consider neuro intervention for LKW within 24 hours     YES NO EXCLUSION CRITERIA (CONTRAINDICATIONS) CLASS III EVIDENCE HARM   [] []   Blood pressure >185/110 medically refractory to IV medications   [] []   Active bleeding at a non-compressible site   [] []   Active intracranial hemorrhage (ICH)   [] []   Symptoms suggestive of subarachnoid hemorrhage (SAH)   [] []   GI bleed within 21 days   [] []   Ischemic stroke within 3 months   [] []   Severe head trauma within 3 months    []  []   Intracranial or intraspinal surgery within 3 months   [] []   Current GI malignancy   [] []   Intracranial neoplasm   [] []   Infective endocarditis   [] []   Aortic arch dissection   [] []   Active coagulopathy with  INR >1.7, platelets <100,000, PTT > 40 sec, PT > 15 sec   *For warfarin, administration can begin before blood tests resulted. Discontinue for above values.    [] []   Treatment dose* of LMWH (Lovenox) in last 24 hours  *prophylactic dosages are not a contraindication   [] []   Concurrent use of antiplatelet agents' glycoprotein inhibitors IIb/IIIa (Integrilin, etc.)   [x] []   Thrombin or factor Xa inhibitors (Eliquis, Xarelto, Arixtra) taken in last 48 hours     YES NO CLASS II: AIS WITH THE FOLLOWING CONDITIONS -   TREATMENT RISKS SHOULD BE WEIGHED AGAINST POSSIBLE BENEFITS.    [] []   Major trauma in last 14 days, recent major surgery in last 14 days, intracranial arterial dissection, giant unruptured and unsecured intracranial aneurysm, pericarditis     [] []   The risks and benefits have been discussed with the patient or family related to the administration of IV thrombolytic therapy for stroke symptoms.   [] []   I have discussed and reviewed the patient's case and imaging with the attending prior to IV thrombolytic therapy.   TIME N/A Time IV thrombolytic administered       Hospital Meds:  Scheduled- aspirin, 81 mg, Oral, Daily   Or  aspirin, 300 mg, Rectal, Daily      Infusions- heparin, 18 Units/kg/hr  Pharmacy to Dose Heparin,        PRNs-   Pharmacy to Dose Heparin    sodium chloride    Functional Status Prior to Current Stroke/Liz Score: 3-4; patient lives with daughter and requires assistance with ADLs, able to walk with cane    NIH Stroke Scale  Time: 17:14 EDT  Person Administering Scale: Renee Aguirre, JOSEE    Interval: baseline  1a. Level of Consciousness: 0-->Alert, keenly responsive  1b. LOC Questions: 0-->Answers both questions correctly  1c. LOC Commands:  0-->Performs both tasks correctly  2. Best Gaze: 0-->Normal  3. Visual: 0-->No visual loss  4. Facial Palsy: 1-->Minor paralysis (flattened nasolabial fold, asymmetry on smiling)  5a. Motor Arm, Left: 0-->No drift, limb holds 90 (or 45) degrees for full 10 secs  5b. Motor Arm, Right: 0-->No drift, limb holds 90 (or 45) degrees for full 10 secs  6a. Motor Leg, Left: 1-->Drift, leg falls by the end of the 5-sec period but does not hit bed  6b. Motor Leg, Right: 1-->Drift, leg falls by the end of the 5-sec period but does not hit bed  7. Limb Ataxia: 0-->Absent  8. Sensory: 1-->Mild-to-moderate sensory loss, patient feels pinprick is less sharp or is dull on the affected side, or there is a loss of superficial pain with pinprick, but patient is aware of being touched  9. Best Language: 1-->Mild-to-moderate aphasia, some obvious loss of fluency or facility of comprehension, without significant limitation on ideas expressed or form of expression. Reduction of speech and/or comprehension, however, makes conversation. . . (see row details)  10. Dysarthria: 2-->Severe dysarthria, patients speech is so slurred as to be unintelligible in the absence of or out of proportion to any dysphasia, or is mute/anarthric  11. Extinction and Inattention (formerly Neglect): 0-->No abnormality    Total (NIH Stroke Scale): 7        Results Reviewed:  I have personally reviewed current lab, radiology, and data and agree with results.    XR Chest 1 View  Result Date: 3/20/2025  Impression: 1.Enlarged cardiac silhouette. This could be related to cardiomegaly and/or pericardial effusion. 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 Posterior cerebral arteries  appear patent. Findings may be chronic given absence of posterior territory ischemia seen on the accompanying CT perfusion scan. Please correlate with patient's symptomatology and brain MRI. 2.Otherwise, no intracranial large vessel occlusion is identified. 3.Estimated 70% stenosis within the left proximal internal carotid artery (see series 8, image 223). 4.Less than 50% stenosis within the right internal carotid artery. 5.CT perfusion study demonstrates a small focus of prolonged Tmax greater than 6 seconds within the right inferior frontal lobe (4 mL). This may be artifactual, although a small focus of at risk ischemic tissue cannot be excluded. No territorial core infarct is demonstrated on CT perfusion imaging. 6.Enlarged, multinodular thyroid gland. Recommend follow-up thyroid ultrasound on a nonemergent basis. The above findings were discussed with Moriah Menjivar via telephone on 3/20/2025 4:50 PM EDT. 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 Posterior cerebral arteries appear patent. Findings may be chronic given absence of posterior territory ischemia seen on the accompanying CT perfusion scan. Please correlate with patient's symptomatology and brain MRI. 2.Otherwise, no intracranial large vessel occlusion is identified. 3.Estimated 70% stenosis within the left proximal internal carotid artery (see series 8, image 223). 4.Less than 50% stenosis within the right internal carotid artery. 5.CT perfusion study demonstrates a small focus of prolonged Tmax greater than 6 seconds within the right inferior frontal lobe (4 mL). This may be artifactual, although a small focus of at risk ischemic tissue cannot be excluded. No territorial core infarct is  demonstrated on CT perfusion imaging. 6.Enlarged, multinodular thyroid gland. Recommend follow-up thyroid ultrasound on a nonemergent basis. The above findings were discussed with Moriah Menjivar via telephone on 3/20/2025 4:50 PM EDT. 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 Posterior cerebral arteries appear patent. Findings may be chronic given absence of posterior territory ischemia seen on the accompanying CT perfusion scan. Please correlate with patient's symptomatology and brain MRI. 2.Otherwise, no intracranial large vessel occlusion is identified. 3.Estimated 70% stenosis within the left proximal internal carotid artery (see series 8, image 223). 4.Less than 50% stenosis within the right internal carotid artery. 5.CT perfusion study demonstrates a small focus of prolonged Tmax greater than 6 seconds within the right inferior frontal lobe (4 mL). This may be artifactual, although a small focus of at risk ischemic tissue cannot be excluded. No territorial core infarct is demonstrated on CT perfusion imaging. 6.Enlarged, multinodular thyroid gland. Recommend follow-up thyroid ultrasound on a nonemergent basis. The above findings were discussed with Moriah Menjivar via telephone on 3/20/2025 4:50 PM EDT. Electronically Signed: Charlie Sanders MD  3/20/2025 4:53 PM EDT  Workstation ID: [id number]    CT Head Without Contrast Stroke Protocol  Result Date: 3/20/2025  Impression: 1. Stable small old lacunar infarct in the central left thalamus. 2. Suspected mild interval extension of a small infarct of the dorsal left thalamus and left internal capsule, although not obviously acute. 3. No clearly acute intracranial abnormality  is appreciated. Electronically Signed: Norberto Seaman MD  3/20/2025 4:19 PM EDT  Workstation ID: [id number]     Results for orders placed during the hospital encounter of 02/28/25    Adult Transthoracic Echo Complete w/ Color, Spectral and Contrast if necessary per protocol    Interpretation Summary    Left ventricular systolic function is normal. Calculated left ventricular EF = 65% Left ventricular ejection fraction appears to be 61 - 65%.    Left ventricular wall thickness is consistent with mild concentric hypertrophy.    Left ventricular diastolic function is consistent with (grade I) impaired relaxation.    There is mild calcification of the aortic valve with no significant stenosis.    Estimated right ventricular systolic pressure from tricuspid regurgitation is normal (<35 mmHg).    There is a small (<1cm) circumferential pericardial effusion. There is no evidence of cardiac tamponade.     Results for orders placed during the hospital encounter of 11/26/24    Duplex Carotid Ultrasound CAR    Interpretation Summary    Right internal carotid artery demonstrates a less than 50% stenosis.    Antegrade right vertebral flow.    Left internal carotid artery demonstrates a 50-69% stenosis.    Antegrade left vertebral flow.    WBC   Date Value Ref Range Status   03/20/2025 6.44 3.40 - 10.80 10*3/mm3 Final     Hemoglobin   Date Value Ref Range Status   03/20/2025 11.9 (L) 12.0 - 17.0 g/dL Final     Comment:     Serial Number: [id number]:  [id number]   03/20/2025 11.7 (L) 12.0 - 15.9 g/dL Final     Hematocrit   Date Value Ref Range Status   03/20/2025 35 (L) 38 - 51 % Final   03/20/2025 35.7 34.0 - 46.6 % Final     Platelets   Date Value Ref Range Status   03/20/2025 264 140 - 450 10*3/mm3 Final     Lab Results   Component Value Date    GLUCOSE 150 (H) 03/20/2025    BUN 44 (H) 03/20/2025    CREATININE 2.80 (H) 03/20/2025     03/20/2025    K 3.8 03/20/2025     03/20/2025    CALCIUM 9.2 03/20/2025    PROTEINTOT 6.3  03/20/2025    ALBUMIN 3.9 03/20/2025    ALT 13 03/20/2025    AST 16 03/20/2025    ALKPHOS 54 03/20/2025    BILITOT 0.4 03/20/2025    GLOB 2.4 03/20/2025    AGRATIO 1.6 03/20/2025    BCR 17.6 03/20/2025    ANIONGAP 15.0 03/20/2025    EGFR 16.6 (L) 03/20/2025     Lactic acid 1.6    Assessment/Plan:    This is an 79-year-old female with known medical diagnoses of essential hypertension, hyperlipidemia, CAD, prediabetes, CKD stage III, known left ICA stenosis (50 to 69% per carotid ultrasound), mild dementia, depression/anxiety, recent PE (2/28, [facility], on Eliquis) and recurrent UTIs who presents to the emergency department for further evaluation of altered mental status and aphasia which her daughter first noticed today.  Given chronic Eliquis use and LKW > 4.5 hours she is not a candidate for IV thrombolytic therapy.  CTA head/neck/perfusion is negative for flow-limiting stenosis or LVO.  She will be admitted to the hospital service for further stroke workup.    Antiplatelet PTA: None  Anticoagulant PTA: Eliquis 5 mg twice daily (last dose taken this morning)        Aphasia and right-sided weakness, suspected left hemispheric stroke versus stroke recrudescence        Multifocal intracranial atherosclerotic disease        Left ICA stenosis, 50 to 69%  Differential diagnosis includes partial complex seizure versus metabolic disarrangement  -TIA/CVA order set without thrombolytic therapy has been initiated  -NPO until bedside nursing dysphagia screen completed  -MRI brain without contrast  -TTE performed 3/1 with normal EF, no dilation of LA cavity; no need to repeat  -Lipid panel in AM  -Meds: Patient will not pass bedside nursing dysphagia screen therefore will place patient on a heparin drip overnight, add aspirin given patient's intracranial athero  -Activity as tolerated, fall risk precautions  -PT/OT/SLP evaluation  -Will obtain EEG to evaluate for possible seizure activity given patient's abnormal twitching on the  right; implement seizure precautions, no need for AED at this time unless EEG is positive    2.  Pulmonary embolism, diagnosed 2/28, [facility] to continue Eliquis 5 mg twice daily once patient has been cleared by SLP  -Will start patient on heparin drip with no bolus ever  -Will obtain bilateral venous duplex to evaluate for DVT    3.  Essential hypertension  -Allow autoregulation of blood pressure for adequate cerebral blood flow  -Nicardipine as needed for SBP >180    4.  Hyperlipidemia  -Lipid panel in AM  -Atorvastatin 80mg nightly    5.  Diabetes Mellitus type 2  -A1c 6.90 (3/4/2025), goal  <7 for secondary stroke prevention; no need to repeat  -Maintain euglycemia  -Management per primary team    Plan of care was discussed with patient's daughter and Dr. Ravi (emergency department physician).  Stroke neurology will continue to follow.  Please call with any questions or concerns.  Thank you for this consult.     Renee Aguirre, APRN  March 20, 2025  16:36 EDT

## 2025-03-20 NOTE — H&P
"    [facility] Medicine Services  HISTORY AND PHYSICAL    Patient Name: Valarie Camara  : 1945  MRN: [id number]  Primary Care Physician: Lay Cox MD  Date of admission: 3/20/2025      Subjective   Subjective     Chief Complaint: AMS    HPI:  Valarie Camara is a 79 y.o. female with history of dementia, prior CVA, DM, CKD, PN, depression, recently diagnosed with PE on Eliquis, ICA stenosis, recurrent UTIs here with AMS. Family noted worsening expressive aphasia x 2 days. Hasn't slept well since hospitalization at [facility] 3/5. Poor PO intake. NO f/c. Does have bouts of constipation. Can usually ambulate with cane. Today she is somnolent, but easily agitated. Speech is slurred. Myoclonic jerking noted.     Unable to assess ROS.       Personal History     Past Medical History:   Diagnosis Date    Acute bronchitis with bronchospasm     Anxiety     Arthritis     Asthma     B12 deficiency     Back pain     Body piercing     ears    Cataract     Cerebrovascular disease     Cervicalgia     Chronic kidney disease     stage 3b    Colonic polyp     History of colonic polyps     Constipation     COPD (chronic obstructive pulmonary disease)     Coronary artery disease     Depression     Diverticulitis     Dysphagia     Patient reported \"it won't go all the way down\" when eating solid foods first thing in the mornings    Edema     Elevated cholesterol     Esophageal reflux     Folic acid deficiency     Full dentures     Advised no adhesives DOS    Heart murmur     Herpes zoster     High cholesterol     History of kidney infection     History of recurrent urinary tract infection     HTN (hypertension)     Hypercholesterolemia     Impaired functional mobility, balance, gait, and endurance     Insomnia     Kidney infection     Malignant hypertension 2015     Accelerated essential hypertension    Measles     rubeola    Muscle spasm     Neoplasm of uncertain behavior of skin "     dtr denies    Osteoporosis     Poor historian     Recurrent urinary tract infection     Rheumatoid arthritis     RLS (restless legs syndrome)     Stomach ulcer     Stroke     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    Type 2 diabetes mellitus     Vitamin D deficiency            Past Surgical History:   Procedure Laterality Date    CATARACT EXTRACTION WITH INTRAOCULAR LENS IMPLANT Left 02/11/2013    CATARACT EXTRACTION WITH INTRAOCULAR LENS IMPLANT Right 04/15/2013    COLONOSCOPY  2012    COLONOSCOPY N/A 11/26/2019    Procedure: COLONOSCOPY;  Surgeon: Chidi Downing MD;  Location:  [facility];  Service: Gastroenterology    ENDOSCOPY N/A 11/13/2017    Procedure: ESOPHAGOGASTRODUODENOSCOPY with biopsies and esophageal balloon dilitation;  Surgeon: Wesley Stovall MD;  Location: [facility] ENDOSCOPY;  Service:     ENDOSCOPY N/A 11/25/2019    Procedure: ESOPHAGOGASTRODUODENOSCOPY;  Surgeon: Chidi Downing MD;  Location:  [facility];  Service: Gastroenterology    ENDOSCOPY N/A 11/29/2021    Procedure: ESOPHAGOGASTRODUODENOSCOPY with dilation and biopsies;  Surgeon: Gonzalez Zapata MD;  Location: [facility] ENDOSCOPY;  Service: Gastroenterology;  Laterality: N/A;    ENDOSCOPY N/A 11/1/2023    Procedure: ESOPHAGOGASTRODUODENOSCOPY WITH BIOPSY AND DILATATION;  Surgeon: Gonzalez Zapata MD;  Location: [facility] ENDOSCOPY;  Service: Gastroenterology;  Laterality: N/A;    ENDOSCOPY N/A 1/27/2025    Procedure: Esophagogastroduodenoscopy with dilatation and biopsies;  Surgeon: Gonzalez Zapata MD;  Location: [facility] ENDOSCOPY;  Service: Gastroenterology;  Laterality: N/A;    HYSTERECTOMY  1979    UPPER GASTROINTESTINAL ENDOSCOPY  12/09/2013    UPPER GASTROINTESTINAL ENDOSCOPY  11/13/2017       Family History: family history includes Arthritis in her mother and other family members; Diabetes in her mother and another family member; Hypertension in her mother and another  family member.     Social History:  reports that she quit smoking about 13 years ago. Her smoking use included cigarettes. She started smoking about 28 years ago. She has a 15 pack-year smoking history. She has been exposed to tobacco smoke. She has never used smokeless tobacco. She reports that she does not currently use alcohol. She reports that she does not use drugs.  Social History     Social History Narrative    Not on file       Medications:  Available home medication information reviewed.  Cholecalciferol, Insulin Pen Needle, Lancets 30G, NIFEdipine XL, acetaminophen, apixaban, atorvastatin, carvedilol, ferrous sulfate, glucose blood, glucose monitor, insulin lispro, linagliptin, pantoprazole, and traZODone    Allergies   Allergen Reactions    Penicillins Rash, Shortness Of Breath, Anaphylaxis and Other (See Comments)    Clonidine Derivatives Other (See Comments)     Pt reports extreme hypotension      Hydralazine Nausea And Vomiting and Other (See Comments)    Sulfa Antibiotics Rash       Objective   Objective     Vital Signs:   Temp:  [98.9 °F (37.2 °C)] 98.9 °F (37.2 °C)  Heart Rate:  [90-95] 91  Resp:  [16] 16  BP: (121-129)/(69-72) 123/72  Total (NIH Stroke Scale): 7    Physical Exam   Mild distress, somnolent but arouses, easily agitated, disoriented  OP clear, dry MM  Neck supple  Tachy  Decreased at bases, but clear  +BS, soft, mild distention  Dysarthric, PERRL, B UE/LE weakness, not following commands    Result Review:  I have personally reviewed the results from the time of this admission to 3/20/2025 17:38 EDT and agree with these findings:  []  Laboratory list / accordion  []  Microbiology  []  Radiology  []  EKG/Telemetry   []  Cardiology/Vascular   []  Pathology  []  Old records  []  Other:  Most notable findings include: WBC 6, UA pending, Free T4 1.9, CXR without infiltrate, creatinine 2.5, previously 1.9, EKG NSR      LAB RESULTS:      Lab 03/20/25  1609 03/20/25  1608 03/15/25  0433   WBC   --  6.44 7.52   HEMOGLOBIN  --  11.7* 11.3*   HEMOGLOBIN, POC 11.9*  --   --    HEMATOCRIT  --  35.7 33.5*   HEMATOCRIT POC 35*  --   --    PLATELETS  --  264 263   NEUTROS ABS  --  3.61 4.10   IMMATURE GRANS (ABS)  --  0.03 0.03   LYMPHS ABS  --  2.01 2.60   MONOS ABS  --  0.72 0.69   EOS ABS  --  0.04 0.06   MCV  --  87.9 86.6   LACTATE  --  1.6  --    PROTIME  --  17.6*  --    INR  --  1.43*  --    APTT  --  31.0*  --    HEPARIN ANTI-XA  --  >1.10*  --          Lab 03/20/25  1609 03/20/25  1608 03/15/25  0433   SODIUM  --  141 136   POTASSIUM  --  3.8 3.9   CHLORIDE  --  105 102   CO2  --  21.0* 20.3*   ANION GAP  --  15.0 13.7   BUN  --  44* 36*   CREATININE 2.80* 2.50* 1.94*   EGFR 16.6* 19.0* 25.8*   GLUCOSE  --  150* 149*   CALCIUM  --  9.2 8.8   MAGNESIUM  --  1.9  --    TSH  --  0.355  --          Lab 03/20/25  1608 03/15/25  0433   TOTAL PROTEIN 6.3 6.4   ALBUMIN 3.9 3.4*   GLOBULIN 2.4 3.0   ALT (SGPT) 13 14   AST (SGOT) 16 22   BILIRUBIN 0.4 0.4   ALK PHOS 54 52   LIPASE  --  53                     UA          2/8/2025    02:32 2/28/2025    15:45 3/15/2025    05:57   Urinalysis   Squamous Epithelial Cells, UA  3-6  0-2    Specific Gravity, UA <=1.005  1.018  1.013    Ketones, UA Negative  Negative  Negative    Blood, UA Negative  Negative  Negative    Leukocytes, UA Negative  Negative  Negative    Nitrite, UA Negative  Negative  Negative    RBC, UA  None Seen  None Seen    WBC, UA  None Seen  None Seen    Bacteria, UA  1+  4+        Microbiology Results (last 10 days)       ** No results found for the last 240 hours. **            XR Chest 1 View  Result Date: 3/20/2025  XR CHEST 1 VW Date of Exam: 3/20/2025 4:47 PM EDT Indication: Acute Stroke Protocol (onset < 12 hrs) Comparison: None available. Findings: The lungs appear adequately aerated without consolidation or mass. No pleural effusion or pneumothorax is identified. Cardiac silhouette is enlarged. Pulmonary vasculature appears unremarkable. No  acute or suspicious osseous lesion is identified.     Impression: Impression: 1.Enlarged cardiac silhouette. This could be related to cardiomegaly and/or pericardial effusion. Electronically Signed: Charlie Sanders MD  3/20/2025 4:57 PM EDT  Workstation ID: [id number]    CT Angiogram Head w AI Analysis of LVO  Result Date: 3/20/2025  CT ANGIOGRAM HEAD W AI ANALYSIS OF LVO, CT CEREBRAL PERFUSION W WO CONTRAST, CT ANGIOGRAM NECK Date of Exam: 3/20/2025 4:08 PM EDT Indication: Neuro Deficit, acute, Stroke suspected Neuro deficit, acute stroke suspected. Comparison: None available. Technique: CTA of the head was performed after the uneventful intravenous administration of 115 mL Isovue-370. Reconstructed coronal and sagittal images were also obtained. In addition, a 3-D volume rendered image was created for interpretation. Automated exposure control and iterative reconstruction methods were used. FINDINGS: Vascular Findings: Atherosclerotic plaque within the right carotid bifurcation and right carotid siphon. The right common carotid, internal carotid, middle cerebral, anterior cerebral, vertebral, and posterior cerebral arteries are patent without abrupt cut off or aneurysmal dilation. Atherosclerotic plaque within the left carotid bifurcation and left carotid siphon. Estimated 70% stenosis of the left proximal ICA (series 8, image 223). The left common carotid, remainder of the left ICA, middle cerebral, anterior cerebral, vertebral, and posterior cerebral arteries are patent without abrupt cut off or aneurysmal dilation. 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 Posterior cerebral arteries appear patent. Findings may be chronic given absence of  posterior territory ischemia seen on the accompanying CT perfusion scan. Please correlate with patient's symptomatology and brain MRI. Non-vascular  Findings: For description of nonvascular intracranial findings, please refer to the noncontrast head CT performed the same date. No acute abnormality is identified within the visualized soft tissue or bony structures of the neck. Enlarged, multinodular thyroid gland. Largest nodule measures approximately 2.2 cm within the right thyroid lobe. The visualized lung apices are clear. 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 Posterior cerebral arteries appear patent. Findings may be chronic given absence of posterior territory ischemia seen on the accompanying CT perfusion scan. Please correlate with patient's symptomatology and brain MRI. 2.Otherwise, no intracranial large vessel occlusion is identified. 3.Estimated 70% stenosis within the left proximal internal carotid artery (see series 8, image 223). 4.Less than 50% stenosis within the right internal carotid artery. 5.CT perfusion study demonstrates a small focus of prolonged Tmax greater than 6 seconds within the right inferior frontal lobe (4 mL). This may be artifactual, although a small focus of at risk ischemic tissue cannot be excluded. No territorial core infarct is demonstrated on CT perfusion imaging. 6.Enlarged, multinodular thyroid gland. Recommend follow-up thyroid ultrasound on a nonemergent basis. The above findings were discussed with Moriah Menjivar via telephone on 3/20/2025 4:50 PM EDT. Electronically Signed: Charlie Sanders MD  3/20/2025 4:53 PM EDT  Workstation ID: [id number]    CT Angiogram Neck  Result Date: 3/20/2025  CT ANGIOGRAM HEAD W AI ANALYSIS OF LVO, CT CEREBRAL PERFUSION W WO CONTRAST, CT ANGIOGRAM NECK Date of Exam: 3/20/2025 4:08 PM EDT Indication: Neuro Deficit, acute, Stroke  suspected Neuro deficit, acute stroke suspected. Comparison: None available. Technique: CTA of the head was performed after the uneventful intravenous administration of 115 mL Isovue-370. Reconstructed coronal and sagittal images were also obtained. In addition, a 3-D volume rendered image was created for interpretation. Automated exposure control and iterative reconstruction methods were used. FINDINGS: Vascular Findings: Atherosclerotic plaque within the right carotid bifurcation and right carotid siphon. The right common carotid, internal carotid, middle cerebral, anterior cerebral, vertebral, and posterior cerebral arteries are patent without abrupt cut off or aneurysmal dilation. Atherosclerotic plaque within the left carotid bifurcation and left carotid siphon. Estimated 70% stenosis of the left proximal ICA (series 8, image 223). The left common carotid, remainder of the left ICA, middle cerebral, anterior cerebral, vertebral, and posterior cerebral arteries are patent without abrupt cut off or aneurysmal dilation. 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 Posterior cerebral arteries appear patent. Findings may be chronic given absence of  posterior territory ischemia seen on the accompanying CT perfusion scan. Please correlate with patient's symptomatology and brain MRI. Non-vascular Findings: For description of nonvascular intracranial findings, please refer to the noncontrast head CT performed the same date. No acute abnormality is identified within the visualized soft tissue or bony structures of the neck. Enlarged, multinodular thyroid gland. Largest nodule measures approximately 2.2 cm within the right thyroid lobe. The visualized lung apices are clear. 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 Posterior cerebral arteries appear patent. Findings may be chronic given absence of posterior territory ischemia seen on the accompanying CT perfusion scan. Please correlate with patient's symptomatology and brain MRI. 2.Otherwise, no intracranial large vessel occlusion is identified. 3.Estimated 70% stenosis within the left proximal internal carotid artery (see series 8, image 223). 4.Less than 50% stenosis within the right internal carotid artery. 5.CT perfusion study demonstrates a small focus of prolonged Tmax greater than 6 seconds within the right inferior frontal lobe (4 mL). This may be artifactual, although a small focus of at risk ischemic tissue cannot be excluded. No territorial core infarct is demonstrated on CT perfusion imaging. 6.Enlarged, multinodular thyroid gland. Recommend follow-up thyroid ultrasound on a nonemergent basis. The above findings were discussed with Moriah Menjivar via telephone on 3/20/2025 4:50 PM EDT. Electronically Signed: Charlie Sanders MD  3/20/2025 4:53 PM EDT  Workstation ID: [id number]    CT CEREBRAL PERFUSION WITH & WITHOUT CONTRAST  Result Date: 3/20/2025  CT ANGIOGRAM HEAD W AI ANALYSIS OF LVO, CT CEREBRAL PERFUSION W WO CONTRAST, CT ANGIOGRAM NECK Date of Exam: 3/20/2025 4:08 PM EDT Indication: Neuro Deficit, acute, Stroke suspected Neuro deficit, acute stroke suspected. Comparison: None available. Technique: CTA of the head was performed after the uneventful intravenous administration of 115 mL Isovue-370. Reconstructed coronal and sagittal images were also obtained. In addition, a 3-D volume rendered image was created for interpretation. Automated exposure control and iterative reconstruction methods were used. FINDINGS: Vascular Findings: Atherosclerotic plaque within the right  carotid bifurcation and right carotid siphon. The right common carotid, internal carotid, middle cerebral, anterior cerebral, vertebral, and posterior cerebral arteries are patent without abrupt cut off or aneurysmal dilation. Atherosclerotic plaque within the left carotid bifurcation and left carotid siphon. Estimated 70% stenosis of the left proximal ICA (series 8, image 223). The left common carotid, remainder of the left ICA, middle cerebral, anterior cerebral, vertebral, and posterior cerebral arteries are patent without abrupt cut off or aneurysmal dilation. 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 Posterior cerebral arteries appear patent. Findings may be chronic given absence of  posterior territory ischemia seen on the accompanying CT perfusion scan. Please correlate with patient's symptomatology and brain MRI. Non-vascular Findings: For description of nonvascular intracranial findings, please refer to the noncontrast head CT performed the same date. No acute abnormality is identified within the visualized soft tissue or bony structures of the neck. Enlarged, multinodular thyroid gland. Largest nodule measures approximately 2.2 cm within the right thyroid lobe. The visualized lung apices are clear. 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 Posterior cerebral arteries appear patent. Findings may be chronic given absence of posterior territory ischemia seen on the accompanying CT perfusion scan. Please correlate with patient's symptomatology and brain MRI. 2.Otherwise, no intracranial large vessel occlusion is identified.  3.Estimated 70% stenosis within the left proximal internal carotid artery (see series 8, image 223). 4.Less than 50% stenosis within the right internal carotid artery. 5.CT perfusion study demonstrates a small focus of prolonged Tmax greater than 6 seconds within the right inferior frontal lobe (4 mL). This may be artifactual, although a small focus of at risk ischemic tissue cannot be excluded. No territorial core infarct is demonstrated on CT perfusion imaging. 6.Enlarged, multinodular thyroid gland. Recommend follow-up thyroid ultrasound on a nonemergent basis. The above findings were discussed with Moriah Menjivar via telephone on 3/20/2025 4:50 PM EDT. Electronically Signed: Charlie Sanders MD  3/20/2025 4:53 PM EDT  Workstation ID: [id number]    CT Head Without Contrast Stroke Protocol  Result Date: 3/20/2025  CT HEAD WO CONTRAST STROKE PROTOCOL Date of Exam: 3/20/2025 4:00 PM EDT Indication: Neuro deficit, acute, stroke suspected Neuro Deficit, acute, Stroke suspected. Comparison: Head CT scan 2/8/2025 Technique: Axial CT images were obtained of the head without contrast administration.  Reconstructed coronal images were also obtained. Automated exposure control and iterative construction methods were used. Scan Time: 1602 Results discussed with stroke team Navigator at 1607, 3/20/2025. Findings: Prior study report describes chronic central white matter changes. Minute lacunar infarct of the left thalamus. Today's study shows punctate central lacunar infarct in the left thalamus unchanged. There also appears to be some some new low-attenuation/atrophic change of the dorsal left thalamus and posterior limb of the left internal capsule although more prominent than on the prior study. There is no evidence of acute edema/infarct elsewhere, no evidence of intracranial hemorrhage, mass or mass effect, hydrocephalus, or abnormal extra-axial collection.     Impression: Impression: 1. Stable small old lacunar infarct in  the central left thalamus. 2. Suspected mild interval extension of a small infarct of the dorsal left thalamus and left internal capsule, although not obviously acute. 3. No clearly acute intracranial abnormality is appreciated. Electronically Signed: Norberto Seaman MD  3/20/2025 4:19 PM EDT  Workstation ID: [id number]      Results for orders placed during the hospital encounter of 02/28/25    Adult Transthoracic Echo Complete w/ Color, Spectral and Contrast if necessary per protocol    Interpretation Summary    Left ventricular systolic function is normal. Calculated left ventricular EF = 65% Left ventricular ejection fraction appears to be 61 - 65%.    Left ventricular wall thickness is consistent with mild concentric hypertrophy.    Left ventricular diastolic function is consistent with (grade I) impaired relaxation.    There is mild calcification of the aortic valve with no significant stenosis.    Estimated right ventricular systolic pressure from tricuspid regurgitation is normal (<35 mmHg).    There is a small (<1cm) circumferential pericardial effusion. There is no evidence of cardiac tamponade.      Assessment & Plan   Assessment & Plan       CVA (cerebral vascular accident)    Anxiety and depression    Gastroesophageal reflux disease with esophagitis    Multiple-type hyperlipidemia    Benign essential hypertension    Pulmonary emboli    Expressive aphasia  R weakness  Myoclonic jerks  Encephalopathy  -stroke work up per stroke team, further imaging pending  -EEG pending  -UA pending  -elevated free T4 noted  -procal/lactate reassuring    KURT/CKD  -IVF    Sleeplessness/insomnia    Recent PE  -cannot take PO  -IV Heparin      VTE Prophylaxis:  Mechanical VTE prophylaxis orders are signed & held. Pharmacologic VTE prophylaxis orders are present.          CODE STATUS:    Code Status and Medical Interventions: CPR (Attempt to Resuscitate); Full Support   Ordered at: 03/20/25 3531     Code Status (Patient has no pulse  and is not breathing):    CPR (Attempt to Resuscitate)     Medical Interventions (Patient has pulse or is breathing):    Full Support       Expected Discharge   Expected discharge date/ time has not been documented.     Norberto Pan MD  03/20/25

## 2025-03-21 ENCOUNTER — APPOINTMENT (OUTPATIENT)
Dept: CARDIOLOGY | Facility: HOSPITAL | Age: 80
DRG: 092 | End: 2025-03-21
Payer: MEDICARE

## 2025-03-21 ENCOUNTER — APPOINTMENT (OUTPATIENT)
Dept: CT IMAGING | Facility: HOSPITAL | Age: 80
DRG: 092 | End: 2025-03-21
Payer: MEDICARE

## 2025-03-21 LAB
ALBUMIN SERPL-MCNC: 3.8 G/DL (ref 3.5–5.2)
ALBUMIN/GLOB SERPL: 1.4 G/DL
ALP SERPL-CCNC: 49 U/L (ref 39–117)
ALT SERPL W P-5'-P-CCNC: 13 U/L (ref 1–33)
ANION GAP SERPL CALCULATED.3IONS-SCNC: 17 MMOL/L (ref 5–15)
AORTIC DIMENSIONLESS INDEX: 0.64 (DI)
APTT PPP: 28.7 SECONDS (ref 22–39)
AST SERPL-CCNC: 17 U/L (ref 1–32)
AV HCM GRAD VALS: 29 MMHG
AV LVOT PEAK GRADIENT: 11 MMHG
AV MEAN PRESS GRAD SYS DOP V1V2: 9 MMHG
AV VMAX SYS DOP: 201 CM/SEC
BASOPHILS # BLD AUTO: 0.03 10*3/MM3 (ref 0–0.2)
BASOPHILS NFR BLD AUTO: 0.4 % (ref 0–1.5)
BH CV ECHO MEAS - AO MAX PG: 16.2 MMHG
BH CV ECHO MEAS - AO ROOT DIAM: 3 CM
BH CV ECHO MEAS - AO V2 VTI: 29.6 CM
BH CV ECHO MEAS - AVA(I,D): 2.02 CM2
BH CV ECHO MEAS - EDV(CUBED): 100.5 ML
BH CV ECHO MEAS - EDV(MOD-SP2): 83.7 ML
BH CV ECHO MEAS - EDV(MOD-SP4): 80.8 ML
BH CV ECHO MEAS - EF(MOD-SP2): 67.9 %
BH CV ECHO MEAS - EF(MOD-SP4): 60.4 %
BH CV ECHO MEAS - ESV(CUBED): 15.6 ML
BH CV ECHO MEAS - ESV(MOD-SP2): 26.9 ML
BH CV ECHO MEAS - ESV(MOD-SP4): 32 ML
BH CV ECHO MEAS - FS: 46.2 %
BH CV ECHO MEAS - IVS/LVPW: 1 CM
BH CV ECHO MEAS - IVSD: 1.2 CM
BH CV ECHO MEAS - LA DIMENSION: 3.9 CM
BH CV ECHO MEAS - LAT PEAK E' VEL: 13.1 CM/SEC
BH CV ECHO MEAS - LV MASS(C)D: 208.5 GRAMS
BH CV ECHO MEAS - LV MAX PG: 4.5 MMHG
BH CV ECHO MEAS - LV MEAN PG: 2.33 MMHG
BH CV ECHO MEAS - LV V1 MAX: 106 CM/SEC
BH CV ECHO MEAS - LV V1 VTI: 19 CM
BH CV ECHO MEAS - LVIDD: 4.7 CM
BH CV ECHO MEAS - LVIDS: 2.5 CM
BH CV ECHO MEAS - LVOT AREA: 3.1 CM2
BH CV ECHO MEAS - LVOT DIAM: 2 CM
BH CV ECHO MEAS - LVPWD: 1.2 CM
BH CV ECHO MEAS - MED PEAK E' VEL: 6.1 CM/SEC
BH CV ECHO MEAS - MV A MAX VEL: 114 CM/SEC
BH CV ECHO MEAS - MV DEC SLOPE: 966 CM/SEC2
BH CV ECHO MEAS - MV DEC TIME: 0.15 SEC
BH CV ECHO MEAS - MV E MAX VEL: 60.8 CM/SEC
BH CV ECHO MEAS - MV E/A: 0.53
BH CV ECHO MEAS - MV MAX PG: 8.6 MMHG
BH CV ECHO MEAS - MV MEAN PG: 4 MMHG
BH CV ECHO MEAS - MV P1/2T: 26 MSEC
BH CV ECHO MEAS - MV V2 VTI: 27.6 CM
BH CV ECHO MEAS - MVA(P1/2T): 8.5 CM2
BH CV ECHO MEAS - MVA(VTI): 2.16 CM2
BH CV ECHO MEAS - PA ACC TIME: 0.11 SEC
BH CV ECHO MEAS - PA V2 MAX: 120 CM/SEC
BH CV ECHO MEAS - RAP SYSTOLE: 8 MMHG
BH CV ECHO MEAS - RVSP: 21 MMHG
BH CV ECHO MEAS - SV(LVOT): 59.7 ML
BH CV ECHO MEAS - SV(MOD-SP2): 56.8 ML
BH CV ECHO MEAS - SV(MOD-SP4): 48.8 ML
BH CV ECHO MEAS - TAPSE (>1.6): 2.9 CM
BH CV ECHO MEAS - TR MAX PG: 13 MMHG
BH CV ECHO MEAS - TR MAX VEL: 180 CM/SEC
BH CV ECHO MEASUREMENTS AVERAGE E/E' RATIO: 6.33
BH CV LOWER VASCULAR LEFT COMMON FEMORAL AUGMENT: NORMAL
BH CV LOWER VASCULAR LEFT COMMON FEMORAL COMPRESS: NORMAL
BH CV LOWER VASCULAR LEFT COMMON FEMORAL PHASIC: NORMAL
BH CV LOWER VASCULAR LEFT COMMON FEMORAL SPONT: NORMAL
BH CV LOWER VASCULAR LEFT DISTAL FEMORAL AUGMENT: NORMAL
BH CV LOWER VASCULAR LEFT DISTAL FEMORAL COMPRESS: NORMAL
BH CV LOWER VASCULAR LEFT DISTAL FEMORAL PHASIC: NORMAL
BH CV LOWER VASCULAR LEFT DISTAL FEMORAL SPONT: NORMAL
BH CV LOWER VASCULAR LEFT GASTRONEMIUS COMPRESS: NORMAL
BH CV LOWER VASCULAR LEFT GREATER SAPH AK COMPRESS: NORMAL
BH CV LOWER VASCULAR LEFT GREATER SAPH BK COMPRESS: NORMAL
BH CV LOWER VASCULAR LEFT LESSER SAPH COMPRESS: NORMAL
BH CV LOWER VASCULAR LEFT MID FEMORAL AUGMENT: NORMAL
BH CV LOWER VASCULAR LEFT MID FEMORAL COMPRESS: NORMAL
BH CV LOWER VASCULAR LEFT MID FEMORAL PHASIC: NORMAL
BH CV LOWER VASCULAR LEFT MID FEMORAL SPONT: NORMAL
BH CV LOWER VASCULAR LEFT PERONEAL COMPRESS: NORMAL
BH CV LOWER VASCULAR LEFT POPLITEAL AUGMENT: NORMAL
BH CV LOWER VASCULAR LEFT POPLITEAL COMPRESS: NORMAL
BH CV LOWER VASCULAR LEFT POPLITEAL PHASIC: NORMAL
BH CV LOWER VASCULAR LEFT POPLITEAL SPONT: NORMAL
BH CV LOWER VASCULAR LEFT POSTERIOR TIBIAL COMPRESS: NORMAL
BH CV LOWER VASCULAR LEFT PROFUNDA FEMORAL COMPRESS: NORMAL
BH CV LOWER VASCULAR LEFT PROXIMAL FEMORAL AUGMENT: NORMAL
BH CV LOWER VASCULAR LEFT PROXIMAL FEMORAL COMPRESS: NORMAL
BH CV LOWER VASCULAR LEFT PROXIMAL FEMORAL PHASIC: NORMAL
BH CV LOWER VASCULAR LEFT PROXIMAL FEMORAL SPONT: NORMAL
BH CV LOWER VASCULAR LEFT SAPHENOFEMORAL JUNCTION AUGMENT: NORMAL
BH CV LOWER VASCULAR LEFT SAPHENOFEMORAL JUNCTION COMPRESS: NORMAL
BH CV LOWER VASCULAR LEFT SAPHENOFEMORAL JUNCTION PHASIC: NORMAL
BH CV LOWER VASCULAR LEFT SAPHENOFEMORAL JUNCTION SPONT: NORMAL
BH CV LOWER VASCULAR RIGHT COMMON FEMORAL AUGMENT: NORMAL
BH CV LOWER VASCULAR RIGHT COMMON FEMORAL COMPRESS: NORMAL
BH CV LOWER VASCULAR RIGHT COMMON FEMORAL PHASIC: NORMAL
BH CV LOWER VASCULAR RIGHT COMMON FEMORAL SPONT: NORMAL
BH CV LOWER VASCULAR RIGHT DISTAL FEMORAL AUGMENT: NORMAL
BH CV LOWER VASCULAR RIGHT DISTAL FEMORAL COMPRESS: NORMAL
BH CV LOWER VASCULAR RIGHT DISTAL FEMORAL PHASIC: NORMAL
BH CV LOWER VASCULAR RIGHT DISTAL FEMORAL SPONT: NORMAL
BH CV LOWER VASCULAR RIGHT GASTRONEMIUS COMPRESS: NORMAL
BH CV LOWER VASCULAR RIGHT GREATER SAPH AK COMPRESS: NORMAL
BH CV LOWER VASCULAR RIGHT GREATER SAPH BK COMPRESS: NORMAL
BH CV LOWER VASCULAR RIGHT LESSER SAPH COMPRESS: NORMAL
BH CV LOWER VASCULAR RIGHT MID FEMORAL AUGMENT: NORMAL
BH CV LOWER VASCULAR RIGHT MID FEMORAL COMPRESS: NORMAL
BH CV LOWER VASCULAR RIGHT MID FEMORAL PHASIC: NORMAL
BH CV LOWER VASCULAR RIGHT MID FEMORAL SPONT: NORMAL
BH CV LOWER VASCULAR RIGHT PERONEAL COMPRESS: NORMAL
BH CV LOWER VASCULAR RIGHT POPLITEAL AUGMENT: NORMAL
BH CV LOWER VASCULAR RIGHT POPLITEAL COMPRESS: NORMAL
BH CV LOWER VASCULAR RIGHT POPLITEAL PHASIC: NORMAL
BH CV LOWER VASCULAR RIGHT POPLITEAL SPONT: NORMAL
BH CV LOWER VASCULAR RIGHT POSTERIOR TIBIAL COMPRESS: NORMAL
BH CV LOWER VASCULAR RIGHT PROFUNDA FEMORAL COMPRESS: NORMAL
BH CV LOWER VASCULAR RIGHT PROXIMAL FEMORAL AUGMENT: NORMAL
BH CV LOWER VASCULAR RIGHT PROXIMAL FEMORAL COMPRESS: NORMAL
BH CV LOWER VASCULAR RIGHT PROXIMAL FEMORAL PHASIC: NORMAL
BH CV LOWER VASCULAR RIGHT PROXIMAL FEMORAL SPONT: NORMAL
BH CV LOWER VASCULAR RIGHT SAPHENOFEMORAL JUNCTION AUGMENT: NORMAL
BH CV LOWER VASCULAR RIGHT SAPHENOFEMORAL JUNCTION COMPRESS: NORMAL
BH CV LOWER VASCULAR RIGHT SAPHENOFEMORAL JUNCTION PHASIC: NORMAL
BH CV LOWER VASCULAR RIGHT SAPHENOFEMORAL JUNCTION SPONT: NORMAL
BH CV VAS BP LEFT ARM: NORMAL MMHG
BH CV XLRA - TDI S': 30.1 CM/SEC
BILIRUB SERPL-MCNC: 0.5 MG/DL (ref 0–1.2)
BUN SERPL-MCNC: 36 MG/DL (ref 8–23)
BUN/CREAT SERPL: 19.6 (ref 7–25)
CALCIUM SPEC-SCNC: 9 MG/DL (ref 8.6–10.5)
CHLORIDE SERPL-SCNC: 107 MMOL/L (ref 98–107)
CHOLEST SERPL-MCNC: 215 MG/DL (ref 0–200)
CO2 SERPL-SCNC: 20 MMOL/L (ref 22–29)
CREAT SERPL-MCNC: 1.84 MG/DL (ref 0.57–1)
DEPRECATED RDW RBC AUTO: 49.5 FL (ref 37–54)
EGFRCR SERPLBLD CKD-EPI 2021: 27.5 ML/MIN/1.73
EOSINOPHIL # BLD AUTO: 0.04 10*3/MM3 (ref 0–0.4)
EOSINOPHIL NFR BLD AUTO: 0.5 % (ref 0.3–6.2)
ERYTHROCYTE [DISTWIDTH] IN BLOOD BY AUTOMATED COUNT: 15.6 % (ref 12.3–15.4)
GLOBULIN UR ELPH-MCNC: 2.7 GM/DL
GLUCOSE BLDC GLUCOMTR-MCNC: 111 MG/DL (ref 70–130)
GLUCOSE BLDC GLUCOMTR-MCNC: 113 MG/DL (ref 70–130)
GLUCOSE BLDC GLUCOMTR-MCNC: 177 MG/DL (ref 70–130)
GLUCOSE BLDC GLUCOMTR-MCNC: 179 MG/DL (ref 70–130)
GLUCOSE SERPL-MCNC: 124 MG/DL (ref 65–99)
HCT VFR BLD AUTO: 37.4 % (ref 34–46.6)
HDLC SERPL-MCNC: 43 MG/DL (ref 40–60)
HGB BLD-MCNC: 12.3 G/DL (ref 12–15.9)
IMM GRANULOCYTES # BLD AUTO: 0.02 10*3/MM3 (ref 0–0.05)
IMM GRANULOCYTES NFR BLD AUTO: 0.3 % (ref 0–0.5)
IVRT: 176 MS
LDLC SERPL CALC-MCNC: 145 MG/DL (ref 0–100)
LDLC/HDLC SERPL: 3.31 {RATIO}
LEFT ATRIUM VOLUME INDEX: 21.1 ML/M2
LV EF BIPLANE MOD: 67 %
LYMPHOCYTES # BLD AUTO: 1.92 10*3/MM3 (ref 0.7–3.1)
LYMPHOCYTES NFR BLD AUTO: 25.7 % (ref 19.6–45.3)
MCH RBC QN AUTO: 28.9 PG (ref 26.6–33)
MCHC RBC AUTO-ENTMCNC: 32.9 G/DL (ref 31.5–35.7)
MCV RBC AUTO: 88 FL (ref 79–97)
MONOCYTES # BLD AUTO: 0.55 10*3/MM3 (ref 0.1–0.9)
MONOCYTES NFR BLD AUTO: 7.4 % (ref 5–12)
NEUTROPHILS NFR BLD AUTO: 4.9 10*3/MM3 (ref 1.7–7)
NEUTROPHILS NFR BLD AUTO: 65.7 % (ref 42.7–76)
NRBC BLD AUTO-RTO: 0 /100 WBC (ref 0–0.2)
PLATELET # BLD AUTO: 250 10*3/MM3 (ref 140–450)
PMV BLD AUTO: 9.9 FL (ref 6–12)
POTASSIUM SERPL-SCNC: 3.5 MMOL/L (ref 3.5–5.2)
POTASSIUM SERPL-SCNC: 3.7 MMOL/L (ref 3.5–5.2)
PROT SERPL-MCNC: 6.5 G/DL (ref 6–8.5)
RBC # BLD AUTO: 4.25 10*6/MM3 (ref 3.77–5.28)
SODIUM SERPL-SCNC: 144 MMOL/L (ref 136–145)
T4 FREE SERPL-MCNC: 1.97 NG/DL (ref 0.92–1.68)
TRIGL SERPL-MCNC: 149 MG/DL (ref 0–150)
UFH PPP CHRO-ACNC: >1.1 IU/ML (ref 0.3–0.7)
VLDLC SERPL-MCNC: 27 MG/DL (ref 5–40)
WBC NRBC COR # BLD AUTO: 7.46 10*3/MM3 (ref 3.4–10.8)

## 2025-03-21 PROCEDURE — 85520 HEPARIN ASSAY: CPT

## 2025-03-21 PROCEDURE — 70450 CT HEAD/BRAIN W/O DYE: CPT

## 2025-03-21 PROCEDURE — 93005 ELECTROCARDIOGRAM TRACING: CPT | Performed by: INTERNAL MEDICINE

## 2025-03-21 PROCEDURE — 25010000002 HYDRALAZINE PER 20 MG: Performed by: INTERNAL MEDICINE

## 2025-03-21 PROCEDURE — 82948 REAGENT STRIP/BLOOD GLUCOSE: CPT

## 2025-03-21 PROCEDURE — 84132 ASSAY OF SERUM POTASSIUM: CPT | Performed by: INTERNAL MEDICINE

## 2025-03-21 PROCEDURE — 25010000002 NICARDIPINE 2.5 MG/ML SOLUTION 10 ML VIAL: Performed by: NURSE PRACTITIONER

## 2025-03-21 PROCEDURE — 99233 SBSQ HOSP IP/OBS HIGH 50: CPT | Performed by: STUDENT IN AN ORGANIZED HEALTH CARE EDUCATION/TRAINING PROGRAM

## 2025-03-21 PROCEDURE — 25010000002 FLUCONAZOLE PER 200 MG: Performed by: HOSPITALIST

## 2025-03-21 PROCEDURE — 93010 ELECTROCARDIOGRAM REPORT: CPT | Performed by: INTERNAL MEDICINE

## 2025-03-21 PROCEDURE — 25810000003 SODIUM CHLORIDE 0.9 % SOLUTION 250 ML FLEX CONT: Performed by: NURSE PRACTITIONER

## 2025-03-21 PROCEDURE — 93970 EXTREMITY STUDY: CPT

## 2025-03-21 PROCEDURE — 25010000002 POTASSIUM CHLORIDE 10 MEQ/100ML SOLUTION: Performed by: INTERNAL MEDICINE

## 2025-03-21 PROCEDURE — 80061 LIPID PANEL: CPT

## 2025-03-21 PROCEDURE — 25010000002 ZIPRASIDONE MESYLATE PER 10 MG: Performed by: INTERNAL MEDICINE

## 2025-03-21 PROCEDURE — 92610 EVALUATE SWALLOWING FUNCTION: CPT

## 2025-03-21 PROCEDURE — 99232 SBSQ HOSP IP/OBS MODERATE 35: CPT | Performed by: INTERNAL MEDICINE

## 2025-03-21 PROCEDURE — 92523 SPEECH SOUND LANG COMPREHEN: CPT

## 2025-03-21 PROCEDURE — 63710000001 INSULIN REGULAR HUMAN PER 5 UNITS: Performed by: HOSPITALIST

## 2025-03-21 PROCEDURE — 93306 TTE W/DOPPLER COMPLETE: CPT

## 2025-03-21 PROCEDURE — 80053 COMPREHEN METABOLIC PANEL: CPT | Performed by: HOSPITALIST

## 2025-03-21 PROCEDURE — 85025 COMPLETE CBC W/AUTO DIFF WBC: CPT | Performed by: HOSPITALIST

## 2025-03-21 PROCEDURE — 93970 EXTREMITY STUDY: CPT | Performed by: INTERNAL MEDICINE

## 2025-03-21 PROCEDURE — 84439 ASSAY OF FREE THYROXINE: CPT | Performed by: HOSPITALIST

## 2025-03-21 PROCEDURE — 85730 THROMBOPLASTIN TIME PARTIAL: CPT

## 2025-03-21 PROCEDURE — 93306 TTE W/DOPPLER COMPLETE: CPT | Performed by: INTERNAL MEDICINE

## 2025-03-21 RX ORDER — CLONIDINE 0.3 MG/24H
1 PATCH, EXTENDED RELEASE TRANSDERMAL WEEKLY
COMMUNITY
End: 2025-04-07 | Stop reason: HOSPADM

## 2025-03-21 RX ORDER — LABETALOL HYDROCHLORIDE 5 MG/ML
10 INJECTION, SOLUTION INTRAVENOUS ONCE
Status: COMPLETED | OUTPATIENT
Start: 2025-03-21 | End: 2025-03-21

## 2025-03-21 RX ORDER — ACETAMINOPHEN 650 MG/1
325 SUPPOSITORY RECTAL EVERY 4 HOURS PRN
Status: DISCONTINUED | OUTPATIENT
Start: 2025-03-21 | End: 2025-04-07 | Stop reason: HOSPADM

## 2025-03-21 RX ORDER — AMLODIPINE BESYLATE 5 MG/1
5 TABLET ORAL
Status: DISCONTINUED | OUTPATIENT
Start: 2025-03-21 | End: 2025-03-26

## 2025-03-21 RX ORDER — POTASSIUM CHLORIDE 7.45 MG/ML
10 INJECTION INTRAVENOUS
Status: COMPLETED | OUTPATIENT
Start: 2025-03-21 | End: 2025-03-21

## 2025-03-21 RX ORDER — QUETIAPINE FUMARATE 25 MG/1
25 TABLET, FILM COATED ORAL ONCE
Status: DISCONTINUED | OUTPATIENT
Start: 2025-03-21 | End: 2025-03-22

## 2025-03-21 RX ORDER — QUETIAPINE FUMARATE 25 MG/1
50 TABLET, FILM COATED ORAL NIGHTLY
Status: DISCONTINUED | OUTPATIENT
Start: 2025-03-21 | End: 2025-03-22

## 2025-03-21 RX ORDER — ZIPRASIDONE MESYLATE 20 MG/ML
10 INJECTION, POWDER, LYOPHILIZED, FOR SOLUTION INTRAMUSCULAR ONCE AS NEEDED
Status: DISCONTINUED | OUTPATIENT
Start: 2025-03-21 | End: 2025-03-21 | Stop reason: SDUPTHER

## 2025-03-21 RX ORDER — HYDRALAZINE HYDROCHLORIDE 20 MG/ML
10 INJECTION INTRAMUSCULAR; INTRAVENOUS EVERY 6 HOURS PRN
Status: DISCONTINUED | OUTPATIENT
Start: 2025-03-21 | End: 2025-04-07 | Stop reason: HOSPADM

## 2025-03-21 RX ORDER — ZIPRASIDONE MESYLATE 20 MG/ML
10 INJECTION, POWDER, LYOPHILIZED, FOR SOLUTION INTRAMUSCULAR ONCE AS NEEDED
Status: COMPLETED | OUTPATIENT
Start: 2025-03-21 | End: 2025-03-21

## 2025-03-21 RX ORDER — ISOSORBIDE DINITRATE 20 MG/1
25 TABLET ORAL DAILY
COMMUNITY
End: 2025-04-07 | Stop reason: HOSPADM

## 2025-03-21 RX ADMIN — POTASSIUM CHLORIDE 10 MEQ: 7.46 INJECTION, SOLUTION INTRAVENOUS at 09:17

## 2025-03-21 RX ADMIN — ZIPRASIDONE MESYLATE 10 MG: 20 INJECTION, POWDER, LYOPHILIZED, FOR SOLUTION INTRAMUSCULAR at 15:57

## 2025-03-21 RX ADMIN — POTASSIUM CHLORIDE 10 MEQ: 7.46 INJECTION, SOLUTION INTRAVENOUS at 10:41

## 2025-03-21 RX ADMIN — HYDRALAZINE HYDROCHLORIDE 10 MG: 20 INJECTION INTRAMUSCULAR; INTRAVENOUS at 15:12

## 2025-03-21 RX ADMIN — SODIUM CHLORIDE 7.5 MG/HR: 9 INJECTION, SOLUTION INTRAVENOUS at 09:03

## 2025-03-21 RX ADMIN — AMLODIPINE BESYLATE 5 MG: 5 TABLET ORAL at 15:12

## 2025-03-21 RX ADMIN — INSULIN HUMAN 2 UNITS: 100 INJECTION, SOLUTION PARENTERAL at 17:45

## 2025-03-21 RX ADMIN — Medication 10 ML: at 09:17

## 2025-03-21 RX ADMIN — Medication 10 ML: at 22:06

## 2025-03-21 RX ADMIN — HYDRALAZINE HYDROCHLORIDE 10 MG: 20 INJECTION INTRAMUSCULAR; INTRAVENOUS at 23:58

## 2025-03-21 RX ADMIN — SODIUM CHLORIDE 5 MG/HR: 9 INJECTION, SOLUTION INTRAVENOUS at 04:51

## 2025-03-21 RX ADMIN — ACETAMINOPHEN 325 MG: 650 SUPPOSITORY RECTAL at 05:29

## 2025-03-21 RX ADMIN — Medication 10 MG: at 01:05

## 2025-03-21 RX ADMIN — QUETIAPINE FUMARATE 50 MG: 25 TABLET ORAL at 22:06

## 2025-03-21 RX ADMIN — ATORVASTATIN CALCIUM 80 MG: 40 TABLET, FILM COATED ORAL at 22:06

## 2025-03-21 RX ADMIN — INSULIN HUMAN 2 UNITS: 100 INJECTION, SOLUTION PARENTERAL at 12:38

## 2025-03-21 RX ADMIN — FLUCONAZOLE 200 MG: 2 INJECTION, SOLUTION INTRAVENOUS at 00:45

## 2025-03-21 NOTE — PLAN OF CARE
Goal Outcome Evaluation:                   Anticipated Discharge Disposition (SLP): unknown, other (see comments) (pending further w/u)

## 2025-03-21 NOTE — CONSULTS
Diabetes Education    Patient Name:  Valarie Camara  YOB: 1945  MRN: [id number]  Admit Date:  3/20/2025        Consult received for diabetes education per stroke protocol. Patient lives with her daughter and receives home health services. A1c is 6.9%. Met with patient at bedside. Daughter not present. Patient said that her daughter checks her blood sugar and gives insulin. Patient gave permission to speak to her daughter. Patient is confused, not appropriate for education. Called no answer. Provided information on target blood sugar goals, A1c and What is Diabetes.   Patient does not qualify for the follow up stroke class based on the exclusion criteria of  CT negative for acute changes.   Please call [phone] as needed.       Electronically signed by:  Katerin Rivera RN  03/21/25 19:36 EDT

## 2025-03-21 NOTE — THERAPY EVALUATION
Acute Care - Speech Language Pathology   Swallow Initial Evaluation [facility]  Clinical Swallow Evaluation  Cognitive-Communication Evaluation         Patient Name: Valarie Camara  : 1945  MRN: [id number]  Today's Date: 3/21/2025               Admit Date: 3/20/2025    Visit Dx:     ICD-10-CM ICD-9-CM   1. Acute ischemic stroke  I63.9 434.91   2. History of stroke  Z86.73 V12.54   3. Renal insufficiency  N28.9 593.9   4. Aphasia  R47.01 784.3   5. Dysarthria  R47.1 784.51   6. Dysphagia, unspecified type  R13.10 787.20     Patient Active Problem List   Diagnosis    Atopic rhinitis    Arthritis    Chronic neck pain    Anxiety and depression    Edema    Gastroesophageal reflux disease with esophagitis    Multiple-type hyperlipidemia    Vitamin D deficiency    Benign essential hypertension    Obesity (BMI 30-39.9)    History of COPD    History of cataract    History of osteoporosis    History of restless legs syndrome    History of rheumatoid arthritis    Elevated serum creatinine    Esophageal dysphagia    Constipation    Schatzki's ring    Gastritis without bleeding    History of Helicobacter pylori infection    Duodenitis    Right hemiparesis    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    Multiple thyroid nodules    Lacunar stroke    DM (diabetes mellitus), type 2, uncontrolled w/neurologic complication    Syncope and collapse    Positive fecal occult blood test    Left foot pain    Hypomagnesemia    Acute on chronic anemia    Esophageal dysphagia    Reflux esophagitis    Syncope    Irritable bowel syndrome with constipation    Hypertensive urgency    Pulmonary emboli    CVA (cerebral vascular accident)     Past Medical History:   Diagnosis Date    Acute bronchitis with bronchospasm     Anxiety     Arthritis     Asthma     B12 deficiency     Back pain     Body piercing     ears    Cataract     Cerebrovascular disease     Cervicalgia     Chronic kidney disease      "stage 3b    Colonic polyp     History of colonic polyps     Constipation     COPD (chronic obstructive pulmonary disease)     Coronary artery disease     Depression     Diverticulitis     Dysphagia     Patient reported \"it won't go all the way down\" when eating solid foods first thing in the mornings    Edema     Elevated cholesterol     Esophageal reflux     Folic acid deficiency     Full dentures     Advised no adhesives DOS    Heart murmur     Herpes zoster     High cholesterol     History of kidney infection     History of recurrent urinary tract infection     HTN (hypertension)     Hypercholesterolemia     Impaired functional mobility, balance, gait, and endurance     Insomnia     Kidney infection     Malignant hypertension 04/26/2015     Accelerated essential hypertension    Measles     rubeola    Muscle spasm     Neoplasm of uncertain behavior of skin     dtr denies    Osteoporosis     Poor historian     Recurrent urinary tract infection     Rheumatoid arthritis     RLS (restless legs syndrome)     Stomach ulcer     Stroke     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    Type 2 diabetes mellitus     Vitamin D deficiency      Past Surgical History:   Procedure Laterality Date    CATARACT EXTRACTION WITH INTRAOCULAR LENS IMPLANT Left 02/11/2013    CATARACT EXTRACTION WITH INTRAOCULAR LENS IMPLANT Right 04/15/2013    COLONOSCOPY  2012    COLONOSCOPY N/A 11/26/2019    Procedure: COLONOSCOPY;  Surgeon: Chidi Downing MD;  Location:  [facility];  Service: Gastroenterology    ENDOSCOPY N/A 11/13/2017    Procedure: ESOPHAGOGASTRODUODENOSCOPY with biopsies and esophageal balloon dilitation;  Surgeon: Wesley Stovall MD;  Location:  [facility];  Service:     ENDOSCOPY N/A 11/25/2019    Procedure: ESOPHAGOGASTRODUODENOSCOPY;  Surgeon: Chidi Downing MD;  Location:  [facility];  Service: Gastroenterology    ENDOSCOPY N/A 11/29/2021    Procedure: " ESOPHAGOGASTRODUODENOSCOPY with dilation and biopsies;  Surgeon: Gonzalez Zapata MD;  Location: [facility] ENDOSCOPY;  Service: Gastroenterology;  Laterality: N/A;    ENDOSCOPY N/A 11/1/2023    Procedure: ESOPHAGOGASTRODUODENOSCOPY WITH BIOPSY AND DILATATION;  Surgeon: Gonzalez Zapata MD;  Location: [facility] ENDOSCOPY;  Service: Gastroenterology;  Laterality: N/A;    ENDOSCOPY N/A 1/27/2025    Procedure: Esophagogastroduodenoscopy with dilatation and biopsies;  Surgeon: Gonzalez Zapata MD;  Location: [facility] ENDOSCOPY;  Service: Gastroenterology;  Laterality: N/A;    HYSTERECTOMY  1979    UPPER GASTROINTESTINAL ENDOSCOPY  12/09/2013    UPPER GASTROINTESTINAL ENDOSCOPY  11/13/2017       SLP Recommendation and Plan  SLP Swallowing Diagnosis: oral dysphagia, R/O pharyngeal dysphagia (03/21/25 0925)  SLP Diet Recommendation: mechanical ground textures, thin liquids (03/21/25 0925)  Recommended Precautions and Strategies: general aspiration precautions, reflux precautions, upright posture during/after eating, 1:1 supervision, assist with feeding, other (see comments) (feed only when fully awake/alert) (03/21/25 0925)  SLP Rec. for Method of Medication Administration: meds whole, meds crushed, with puree, as tolerated (03/21/25 0925)     Monitor for Signs of Aspiration: yes, notify SLP if any concerns (03/21/25 0925)  Recommended Diagnostics: other (see comments) (diet tolerance/adjustment) (03/21/25 0925)  Swallow Criteria for Skilled Therapeutic Interventions Met: demonstrates skilled criteria (03/21/25 0925)  Anticipated Discharge Disposition (SLP): unknown, other (see comments) (pending further w/u) (03/21/25 0925)  Rehab Potential/Prognosis, Swallowing: good, to achieve stated therapy goals (03/21/25 0925)  Therapy Frequency (Swallow): PRN (03/21/25 0925)  Predicted Duration Therapy Intervention (Days): 1 week (03/21/25 0925)  Oral Care Recommendations: Oral Care BID/PRN, Toothbrush (03/21/25 0925)                                                SWALLOW EVALUATION (Last 72 Hours)       SLP Adult Swallow Evaluation       Row Name 03/21/25 0925                   Rehab Evaluation    Document Type evaluation  -        Subjective Information no complaints  -        Patient Observations alert;cooperative  -        Patient/Family/Caregiver Comments/Observations No family present  -        Patient Effort good  -        Symptoms Noted During/After Treatment none  -           General Information    Patient Profile Reviewed yes  -        Pertinent History Of Current Problem Adm w/ AMS & worsening aphasia. Hx: CVA, DM, CKD, recently diagnosed PE, recurrent UTIs. CXR w/o evidence of consolidation or active dz process.  Head CT: stable old infarct in central L thalamus & suspected extension of small infarct of L thalamus/internal capsule  -        Current Method of Nutrition NPO  -        Precautions/Limitations, Vision WFL;for purposes of eval  -        Precautions/Limitations, Hearing WFL;for purposes of eval  -        Prior Level of Function-Communication other (see comments)  Aphasia/dysarthria per chart  -        Prior Level of Function-Swallowing esophageal concerns;other (see comments)  EGD (Jan '25) revealed hiatel hernia & Schatzki's ring  -        Plans/Goals Discussed with patient;agreed upon  -        Barriers to Rehab medically complex;cognitive status;previous functional deficit  -        Patient's Goals for Discharge patient did not state  -           Pain    Additional Documentation Pain Scale: FACES Pre/Post-Treatment (Group)  -           Pain Scale: FACES Pre/Post-Treatment    Pain: FACES Scale, Pretreatment 0-->no hurt  -        Posttreatment Pain Rating 0-->no hurt  -           Oral Motor Structure and Function    Secretion Management WNL/WFL  -        Mucosal Quality moist, healthy  -           Oral Musculature and Cranial Nerve Assessment    Oral Motor General  "Assessment generalized oral motor weakness  -           General Eating/Swallowing Observations    Respiratory Support Currently in Use room air  -        Eating/Swallowing Skills fed by SLP;needed assist;unable to perform self-feeding;other (see comments)  Jerking movement of upper extremities, RN aware  -        Positioning During Eating upright 90 degree;upright in bed  -        Utensils Used spoon;cup;straw  -        Consistencies Trialed ice chips;thin liquids;pureed  -           Clinical Swallow Eval    Oral Prep Phase impaired  -        Oral Transit impaired  -        Pharyngeal Phase no overt signs/symptoms of pharyngeal impairment  -        Clinical Swallow Evaluation Summary No overt s/s of aspiration accross trials of thin liquid or pureed trials; even when pushed for consecutive sips. Pt declined regular solid trial 2' \"difficulty chewing\". Recommend mechanical ground solid diet w/ thin liquids, assistance w/ feeding, feed only when fully awake/alert. SLP will f/u for diet tolerance/adjustment  -           Oral Prep Concerns    Oral Prep Concerns incomplete or weak lip closure around spoon;increased prep time  -        Incomplete or Weak Lip Closure Around Spoon thin  -        Increased Prep Time pudding  -           Oral Transit Concerns    Oral Transit Concerns increased oral transit time  -        Increased Oral Transit Time all consistencies  -           SLP Evaluation Clinical Impression    SLP Swallowing Diagnosis oral dysphagia;R/O pharyngeal dysphagia  -        Functional Impact risk of aspiration/pneumonia  -        Rehab Potential/Prognosis, Swallowing good, to achieve stated therapy goals  -        Swallow Criteria for Skilled Therapeutic Interventions Met demonstrates skilled criteria  -           Recommendations    Therapy Frequency (Swallow) PRN  -        Predicted Duration Therapy Intervention (Days) 1 week  -        SLP Diet Recommendation " mechanical ground textures;thin liquids  -        Recommended Diagnostics other (see comments)  diet tolerance/adjustment  -        Recommended Precautions and Strategies general aspiration precautions;reflux precautions;upright posture during/after eating;1:1 supervision;assist with feeding;other (see comments)  feed only when fully awake/alert  -        Oral Care Recommendations Oral Care BID/PRN;Toothbrush  -        SLP Rec. for Method of Medication Administration meds whole;meds crushed;with puree;as tolerated  -        Monitor for Signs of Aspiration yes;notify SLP if any concerns  -        Anticipated Discharge Disposition (SLP) unknown;other (see comments)  pending further w/u  -                  User Key  (r) = Recorded By, (t) = Taken By, (c) = Cosigned By      Initials Name Effective Dates    Rossy Brewer, MS [facility]-SLP 05/12/23 -                     EDUCATION  The patient has been educated in the following areas:   Dysphagia (Swallowing Impairment) Oral Care/Hydration Modified Diet Instruction.        SLP GOALS       Row Name 03/21/25 0925             (LTG) Patient will demonstrate functional swallow for    Diet Texture (Demonstrate functional swallow) regular textures  -      Liquid viscosity (Demonstrate functional swallow) thin liquids  -      [place]ette (Demonstrate functional swallow) with minimal cues (75-90% accuracy)  -      Time Frame (Demonstrate functional swallow) 1 week  -      Progress/Outcomes (Demonstrate functional swallow) new goal  -         (STG) Patient will tolerate trials of    Consistencies Trialed (Tolerate trials) mechanical ground textures;thin liquids  -      Desired Outcome (Tolerate trials) without signs/symptoms of aspiration;with adequate oral prep/transit/clearance  -      Monette (Tolerate trials) with minimal cues (75-90% accuracy)  -      Time Frame (Tolerate trials) 1 week  -      Progress/Outcomes (Tolerate trials) new goal   -MH         Patient will demonstrate functional speech skills for return to discharge environment    [place] with moderate cues  -      Time frame 1 week  -      Progress/Outcomes new goal  -MH         Patient will demonstrate functional language skills for return to discharge environment     [place] with moderate cues  -      Time frame 1 week  -      Progress/Outcomes new goal  -MH         SLP Diagnostic Treatment     Patient will participate in further assessment in the following areas reading comprehension;graphic expression;cognitive-linguistic;clarification of baseline cognitive communication status  -      Time Frame (Diagnostic) 1 week  -      Progress/Outcomes (Additional Goal 1, SLP) new goal  -         Words/Phrases/Sentences Goal 1 (SLP)    Improve Ability to Comprehend Words/Phrases/Sentences Through: Goal 1 (SLP) identify objects, field of;identify pictures, field of;80%;with moderate cues (50-74%);other (comment)  field of 2  -      Time Frame (Identify Objects and Pictures Goal 1, SLP) 1 week  -      Progress/Outcomes (Identify Objects and Pictures Goal 1, SLP) new goal  -         Follow Directions Goal 2 (SLP)    Improve Ability to Follow Directions Goal 1 (SLP) 1 step direction with objects;1 step direction without objects;80%;with moderate cues (50-74%)  -      Time Frame (Follow Directions Goal 1, SLP) 1 week  -      Progress/Outcomes (Follow Directions Goal 1, SLP) new goal  -         Word Retrieval Skills Goal 1 (SLP)    Improve Word Retrieval Skills By Goal 1 (SLP) completing automatic speech task, counting;completing automatic speech task, days of the week;completing automatic speech task, alphabet;completing automatic speech task, months;completing automatic speech task, Pledge of Allegiance;completing automatic speech task, sing “Happy Birthday”;confrontational naming task;high frequency;responsive naming task;simple;answer WH question with one  word;80%;with moderate cues (50-74%)  -      Time Frame (Word Retrieval Goal 1, SLP) 1 week  -      Progress/Outcomes (Word Retrieval Goal 1, SLP) new goal  -         Phonation Goal 1 (SLP)    Improve Phonation By Goal 1 (SLP) using loud speech;80%;with moderate cues (50-74%)  -      Time Frame (Phonation Goal 1, SLP) 1 week  -      Progress/Outcomes (Phonation Goal 1, SLP) new goal  -         Articulation Goal 1 (SLP)    Improve Articulation Goal 1 (SLP) by over-articulating at word level;80%;with moderate cues (50-74%)  -      Time Frame (Articulation Goal 1, SLP) 1 week  -      Progress/Outcomes (Articulation Goal 1, SLP) new goal  -                User Key  (r) = Recorded By, (t) = Taken By, (c) = Cosigned By      Initials Name Provider Type     Rossy Stanley, MS CCC-SLP Speech and Language Pathologist                         Time Calculation:    Time Calculation- SLP       Row Name 25 1107             Time Calculation- SLP    SLP Start Time 09  -      SLP Received On 25  -         Untimed Charges    95497-CW Eval Speech and Production w/ Language Minutes 35  -MH      06546-IF Eval Oral Pharyng Swallow Minutes 40  -         Total Minutes    Untimed Charges Total Minutes 75  -       Total Minutes 75  -                User Key  (r) = Recorded By, (t) = Taken By, (c) = Cosigned By      Initials Name Provider Type     Rossy Stanley, MS CCC-SLP Speech and Language Pathologist                    Therapy Charges for Today       Code Description Service Date Service Provider Modifiers Qty    34610464774 HC ST EVAL ORAL PHARYNG SWALLOW 3 3/21/2025 Rossy Stanley MS CCC-SLP GN 1    10846941760 HC ST EVAL SPEECH AND PROD W LANG  2 3/21/2025 Rossy Stanley MS CCC-SLP GN 1                 Rossy Stanley MS CCC-SLP  3/21/2025   and Acute Care - Speech Language Pathology Initial Evaluation   David     Patient Name: Valarie Camara  : 1945  MRN:  [id number]  Today's Date: 3/21/2025               Admit Date: 3/20/2025     Visit Dx:    ICD-10-CM ICD-9-CM   1. Acute ischemic stroke  I63.9 434.91   2. History of stroke  Z86.73 V12.54   3. Renal insufficiency  N28.9 593.9   4. Aphasia  R47.01 784.3   5. Dysarthria  R47.1 784.51   6. Dysphagia, unspecified type  R13.10 787.20     Patient Active Problem List   Diagnosis    Atopic rhinitis    Arthritis    Chronic neck pain    Anxiety and depression    Edema    Gastroesophageal reflux disease with esophagitis    Multiple-type hyperlipidemia    Vitamin D deficiency    Benign essential hypertension    Obesity (BMI 30-39.9)    History of COPD    History of cataract    History of osteoporosis    History of restless legs syndrome    History of rheumatoid arthritis    Elevated serum creatinine    Esophageal dysphagia    Constipation    Schatzki's ring    Gastritis without bleeding    History of Helicobacter pylori infection    Duodenitis    Right hemiparesis    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    Multiple thyroid nodules    Lacunar stroke    DM (diabetes mellitus), type 2, uncontrolled w/neurologic complication    Syncope and collapse    Positive fecal occult blood test    Left foot pain    Hypomagnesemia    Acute on chronic anemia    Esophageal dysphagia    Reflux esophagitis    Syncope    Irritable bowel syndrome with constipation    Hypertensive urgency    Pulmonary emboli    CVA (cerebral vascular accident)     Past Medical History:   Diagnosis Date    Acute bronchitis with bronchospasm     Anxiety     Arthritis     Asthma     B12 deficiency     Back pain     Body piercing     ears    Cataract     Cerebrovascular disease     Cervicalgia     Chronic kidney disease     stage 3b    Colonic polyp     History of colonic polyps     Constipation     COPD (chronic obstructive pulmonary disease)     Coronary artery disease     Depression     Diverticulitis     Dysphagia     Patient  "reported \"it won't go all the way down\" when eating solid foods first thing in the mornings    Edema     Elevated cholesterol     Esophageal reflux     Folic acid deficiency     Full dentures     Advised no adhesives DOS    Heart murmur     Herpes zoster     High cholesterol     History of kidney infection     History of recurrent urinary tract infection     HTN (hypertension)     Hypercholesterolemia     Impaired functional mobility, balance, gait, and endurance     Insomnia     Kidney infection     Malignant hypertension 04/26/2015     Accelerated essential hypertension    Measles     rubeola    Muscle spasm     Neoplasm of uncertain behavior of skin     dtr denies    Osteoporosis     Poor historian     Recurrent urinary tract infection     Rheumatoid arthritis     RLS (restless legs syndrome)     Stomach ulcer     Stroke     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    Type 2 diabetes mellitus     Vitamin D deficiency      Past Surgical History:   Procedure Laterality Date    CATARACT EXTRACTION WITH INTRAOCULAR LENS IMPLANT Left 02/11/2013    CATARACT EXTRACTION WITH INTRAOCULAR LENS IMPLANT Right 04/15/2013    COLONOSCOPY  2012    COLONOSCOPY N/A 11/26/2019    Procedure: COLONOSCOPY;  Surgeon: Chidi Downing MD;  Location:  [facility];  Service: Gastroenterology    ENDOSCOPY N/A 11/13/2017    Procedure: ESOPHAGOGASTRODUODENOSCOPY with biopsies and esophageal balloon dilitation;  Surgeon: Wesley Stovall MD;  Location:  [facility];  Service:     ENDOSCOPY N/A 11/25/2019    Procedure: ESOPHAGOGASTRODUODENOSCOPY;  Surgeon: Chidi Downing MD;  Location:  [facility];  Service: Gastroenterology    ENDOSCOPY N/A 11/29/2021    Procedure: ESOPHAGOGASTRODUODENOSCOPY with dilation and biopsies;  Surgeon: Gonzalez Zapata MD;  Location: [facility] ENDOSCOPY;  Service: Gastroenterology;  Laterality: N/A;    ENDOSCOPY N/A 11/1/2023    Procedure: " ESOPHAGOGASTRODUODENOSCOPY WITH BIOPSY AND DILATATION;  Surgeon: Gonzalez Zapata MD;  Location: [facility] ENDOSCOPY;  Service: Gastroenterology;  Laterality: N/A;    ENDOSCOPY N/A 1/27/2025    Procedure: Esophagogastroduodenoscopy with dilatation and biopsies;  Surgeon: Gonzalez Zapata MD;  Location: [facility] ENDOSCOPY;  Service: Gastroenterology;  Laterality: N/A;    HYSTERECTOMY  1979    UPPER GASTROINTESTINAL ENDOSCOPY  12/09/2013    UPPER GASTROINTESTINAL ENDOSCOPY  11/13/2017       SLP Recommendation and Plan  SLP Diagnosis: moderate, dysarthria, moderate-severe, aphasia (03/21/25 0925)  SLP Diagnosis Comments: Pt presents w/ moderate dysarthria & moderate to severe aphasia. U/a to assess cognitive linguistic abilities 2' severity of aphasia. Per chart, pt w/ baseline aphasia & dysarthria. No family present to provide further clarification re: baseline level of fx. SLP will f/u for tx & dx tx (03/21/25 0925)     Monitor for Signs of Aspiration: yes, notify SLP if any concerns (03/21/25 0925)  Swallow Criteria for Skilled Therapeutic Interventions Met: demonstrates skilled criteria (03/21/25 0925)  SLC Criteria for Skilled Therapy Interventions Met: yes (03/21/25 0925)  Anticipated Discharge Disposition (SLP): unknown, other (see comments) (pending further w/u) (03/21/25 0925)     Therapy Frequency (Swallow): PRN (03/21/25 0925)  Therapy Frequency (SLP SLC): 3 days per week (03/21/25 0925)  Predicted Duration Therapy Intervention (Days): 1 week (03/21/25 0925)  Oral Care Recommendations: Oral Care BID/PRN, Toothbrush (03/21/25 0925)                                 SLP EVALUATION (Last 72 Hours)       SLP SLC Evaluation       Row Name 03/21/25 0925                   General Information    Prior Level of Function-Communication other (see comments)  aphasia/dysarthria per chart  -        Patient's Goals for Discharge patient did not state  -           Comprehension Assessment/Intervention     Comprehension Assessment/Intervention Auditory Comprehension  -           Auditory Comprehension Assessment/Intervention    Auditory Comprehension (Communication) moderate impairment  -        Able to Identify Objects/Pictures (Communication) body part;familiar objects;moderate impairment  -        Answers Questions (Communication) yes/no;wh questions;personal;simple;moderate impairment  -MH        Able to Follow Commands (Communication) 1-step;mild impairment  -MH           Expression Assessment/Intervention    Expression Assessment/Intervention verbal expression  -           Verbal Expression Assessment/Intervention    Verbal Expression moderate impairment;severe impairment  -MH        Automatic Speech (Communication) response to greeting;days of week;months of year;moderate impairment  -MH        Repetition phrases;moderate impairment  -MH        Phrase Completion automatic/predictable;moderate impairment;severe impairment  -        Responsive Naming simple;moderate impairment;severe impairment  -        Confrontational Naming high frequency;moderate impairment;severe impairment  -        Spontaneous/Functional Words simple;moderate impairment;severe impairment  -           Motor Speech Assessment/Intervention    Motor Speech Function moderate impairment  -           Cognitive Assessment Intervention- SLP    Cognitive Function (Cognition) unable/difficult to assess;other (see comments)  2' severity of aphasia  -           SLP Evaluation Clinical Impressions    SLP Diagnosis moderate;dysarthria;moderate-severe;aphasia  -        SLP Diagnosis Comments Pt presents w/ moderate dysarthria & moderate to severe aphasia. U/a to assess cognitive linguistic abilities 2' severity of aphasia. Per chart, pt w/ baseline aphasia & dysarthria. No family present to provide further clarification re: baseline level of fx. SLP will f/u for tx & dx tx  -        Rehab Potential/Prognosis good  -        SLC  Criteria for Skilled Therapy Interventions Met yes  -        Functional Impact functional impact in ADLs;difficulty communicating wants, needs;difficulty communicating in an emergency  -           Recommendations    Therapy Frequency (SLP SLC) 3 days per week  -                  User Key  (r) = Recorded By, (t) = Taken By, (c) = Cosigned By      Initials Name Effective Dates     Rossy Stanley JOSE, MS CCC-SLP 05/12/23 -                        EDUCATION  The patient has been educated in the following areas:     Communication Impairment.           SLP GOALS       Row Name 03/21/25 0925             (LTG) Patient will demonstrate functional swallow for    Diet Texture (Demonstrate functional swallow) regular textures  -      Liquid viscosity (Demonstrate functional swallow) thin liquids  -      [place] (Demonstrate functional swallow) with minimal cues (75-90% accuracy)  -      Time Frame (Demonstrate functional swallow) 1 week  -      Progress/Outcomes (Demonstrate functional swallow) new goal  -         (STG) Patient will tolerate trials of    Consistencies Trialed (Tolerate trials) mechanical ground textures;thin liquids  -      Desired Outcome (Tolerate trials) without signs/symptoms of aspiration;with adequate oral prep/transit/clearance  -      [place] (Tolerate trials) with minimal cues (75-90% accuracy)  -      Time Frame (Tolerate trials) 1 week  -      Progress/Outcomes (Tolerate trials) new goal  -         Patient will demonstrate functional speech skills for return to discharge environment    [place] with moderate cues  -      Time frame 1 week  -      Progress/Outcomes new goal  -         Patient will demonstrate functional language skills for return to discharge environment     [place] with moderate cues  -      Time frame 1 week  -      Progress/Outcomes new [facility]  -         SLP Diagnostic Treatment     Patient will participate in further  assessment in the following areas reading comprehension;graphic expression;cognitive-linguistic;clarification of baseline cognitive communication status  -      Time Frame (Diagnostic) 1 week  -      Progress/Outcomes (Additional Goal 1, SLP) new goal  -         Words/Phrases/Sentences Goal 1 (SLP)    Improve Ability to Comprehend Words/Phrases/Sentences Through: Goal 1 (SLP) identify objects, field of;identify pictures, field of;80%;with moderate cues (50-74%);other (comment)  field of 2  -      Time Frame (Identify Objects and Pictures Goal 1, SLP) 1 week  -      Progress/Outcomes (Identify Objects and Pictures Goal 1, SLP) new goal  -         Follow Directions Goal 2 (SLP)    Improve Ability to Follow Directions Goal 1 (SLP) 1 step direction with objects;1 step direction without objects;80%;with moderate cues (50-74%)  -      Time Frame (Follow Directions Goal 1, SLP) 1 week  -      Progress/Outcomes (Follow Directions Goal 1, SLP) new goal  -         Word Retrieval Skills Goal 1 (SLP)    Improve Word Retrieval Skills By Goal 1 (SLP) completing automatic speech task, counting;completing automatic speech task, days of the week;completing automatic speech task, alphabet;completing automatic speech task, months;completing automatic speech task, Pledge of Allegiance;completing automatic speech task, sing “Happy Birthday”;confrontational naming task;high frequency;responsive naming task;simple;answer WH question with one word;80%;with moderate cues (50-74%)  -      Time Frame (Word Retrieval Goal 1, SLP) 1 week  -      Progress/Outcomes (Word Retrieval Goal 1, SLP) new goal  -         Phonation Goal 1 (SLP)    Improve Phonation By Goal 1 (SLP) using loud speech;80%;with moderate cues (50-74%)  -      Time Frame (Phonation Goal 1, SLP) 1 week  -      Progress/Outcomes (Phonation Goal 1, SLP) new goal  -         Articulation Goal 1 (SLP)    Improve Articulation Goal 1 (SLP) by  over-articulating at word level;80%;with moderate cues (50-74%)  -      Time Frame (Articulation Goal 1, SLP) 1 week  -      Progress/Outcomes (Articulation Goal 1, SLP) new goal  -                User Key  (r) = Recorded By, (t) = Taken By, (c) = Cosigned By      Initials Name Provider Type    Rossy Brewer MS CCC-SLP Speech and Language Pathologist                              Time Calculation:      Time Calculation- SLP       Row Name 03/21/25 1107             Time Calculation- SLP    SLP Start Time 0925  -      SLP Received On 03/21/25  -         Untimed Charges    36808-HT Eval Speech and Production w/ Language Minutes 35  -MH      50954-KR Eval Oral Pharyng Swallow Minutes 40  -         Total Minutes    Untimed Charges Total Minutes 75  -       Total Minutes 75  -                User Key  (r) = Recorded By, (t) = Taken By, (c) = Cosigned By      Initials Name Provider Type     Rossy Stanley MS CCC-SLP Speech and Language Pathologist                    Therapy Charges for Today       Code Description Service Date Service Provider Modifiers Qty    25162457570 HC ST EVAL ORAL PHARYNG SWALLOW 3 3/21/2025 Rossy Stanley MS CCC-GABRIELLA GN 1    86429522515 HC ST EVAL SPEECH AND PROD W LANG  2 3/21/2025 Rossy Stanley MS CCC-GABRIELLA GN 1                       MS BANDAR Cohen  3/21/2025

## 2025-03-21 NOTE — PROGRESS NOTES
Stroke Progress Note       Chief Complaint: Word finding difficulty    Subjective    Subjective     Subjective:  The patient is lying down in the bed in NAD. Family were at the bedside. The patient stated that she is doing fine this morning.  Denies having any new stroke or strokelike symptoms.  I discussed with the patient and her family imaging findings and what can be done to reduce future risk of stroke.  All patient's questions and concerns were answered.    No other acute complains at this time    Review of Systems   Neurological: Negative for tremors.   Objective      Temp:  [97.8 °F (36.6 °C)-98.9 °F (37.2 °C)] 98.3 °F (36.8 °C)  Heart Rate:  [] 84  Resp:  [16-20] 18  BP: (121-200)/() 146/72    Objective    GEN: lying in bed; in NAD  HENT: normocephalic, non-erythematous oropharynx    NEURO:  Mental Status: A&O x 2, interactive, able to follow commands  Speech: Intact Articulation  CN 2-12:  II - PERRLA  III, IV, VI - EOMI  V - Facial sensation intact  VII -no facial asymmetry  VIII - Auditory acuity intact  XII - Tongue protrudes midline    Motor: Patient can move all 4 extremities against gravity stronger on the bilateral upper extremities when compared to the bilateral lower extremities  Sensory: intact light touch throughout  Reflexes: negative Ruiz's sign BL  Coordination: no ataxia with finger-to-nose testing  Gait/Station: deferred     Results Review:    I reviewed the patient's new clinical results.    WBC   Date Value Ref Range Status   03/21/2025 7.46 3.40 - 10.80 10*3/mm3 Final     RBC   Date Value Ref Range Status   03/21/2025 4.25 3.77 - 5.28 10*6/mm3 Final     Hemoglobin   Date Value Ref Range Status   03/21/2025 12.3 12.0 - 15.9 g/dL Final     Hematocrit   Date Value Ref Range Status   03/21/2025 37.4 34.0 - 46.6 % Final     MCV   Date Value Ref Range Status   03/21/2025 88.0 79.0 - 97.0 fL Final     MCH   Date Value Ref Range Status   03/21/2025 28.9 26.6 - 33.0 pg Final      MCHC   Date Value Ref Range Status   03/21/2025 32.9 31.5 - 35.7 g/dL Final     RDW   Date Value Ref Range Status   03/21/2025 15.6 (H) 12.3 - 15.4 % Final     RDW-SD   Date Value Ref Range Status   03/21/2025 49.5 37.0 - 54.0 fl Final     MPV   Date Value Ref Range Status   03/21/2025 9.9 6.0 - 12.0 fL Final     Platelets   Date Value Ref Range Status   03/21/2025 250 140 - 450 10*3/mm3 Final     Neutrophil %   Date Value Ref Range Status   03/21/2025 65.7 42.7 - 76.0 % Final     Lymphocyte %   Date Value Ref Range Status   03/21/2025 25.7 19.6 - 45.3 % Final     Monocyte %   Date Value Ref Range Status   03/21/2025 7.4 5.0 - 12.0 % Final     Eosinophil %   Date Value Ref Range Status   03/21/2025 0.5 0.3 - 6.2 % Final     Basophil %   Date Value Ref Range Status   03/21/2025 0.4 0.0 - 1.5 % Final     Immature Grans %   Date Value Ref Range Status   03/21/2025 0.3 0.0 - 0.5 % Final     Neutrophils, Absolute   Date Value Ref Range Status   03/21/2025 4.90 1.70 - 7.00 10*3/mm3 Final     Lymphocytes, Absolute   Date Value Ref Range Status   03/21/2025 1.92 0.70 - 3.10 10*3/mm3 Final     Monocytes, Absolute   Date Value Ref Range Status   03/21/2025 0.55 0.10 - 0.90 10*3/mm3 Final     Eosinophils, Absolute   Date Value Ref Range Status   03/21/2025 0.04 0.00 - 0.40 10*3/mm3 Final     Basophils, Absolute   Date Value Ref Range Status   03/21/2025 0.03 0.00 - 0.20 10*3/mm3 Final     Immature Grans, Absolute   Date Value Ref Range Status   03/21/2025 0.02 0.00 - 0.05 10*3/mm3 Final     nRBC   Date Value Ref Range Status   03/21/2025 0.0 0.0 - 0.2 /100 WBC Final       Lab Results   Component Value Date    GLUCOSE 124 (H) 03/21/2025    BUN 36 (H) 03/21/2025    CREATININE 1.84 (H) 03/21/2025     03/21/2025    K 3.5 03/21/2025     03/21/2025    CALCIUM 9.0 03/21/2025    PROTEINTOT 6.5 03/21/2025    ALBUMIN 3.8 03/21/2025    ALT 13 03/21/2025    AST 17 03/21/2025    ALKPHOS 49 03/21/2025    BILITOT 0.5  03/21/2025    GLOB 2.7 03/21/2025    AGRATIO 1.4 03/21/2025    BCR 19.6 03/21/2025    ANIONGAP 17.0 (H) 03/21/2025    EGFR 27.5 (L) 03/21/2025     EEG  Result Date: 3/20/2025  Normal study No evidence for epilepsy is seen This report is transcribed using the Dragon dictation system.      XR Chest 1 View  Result Date: 3/20/2025  Impression: 1.Enlarged cardiac silhouette. This could be related to cardiomegaly and/or pericardial effusion. 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 Posterior cerebral arteries appear patent. Findings may be chronic given absence of posterior territory ischemia seen on the accompanying CT perfusion scan. Please correlate with patient's symptomatology and brain MRI. 2.Otherwise, no intracranial large vessel occlusion is identified. 3.Estimated 70% stenosis within the left proximal internal carotid artery (see series 8, image 223). 4.Less than 50% stenosis within the right internal carotid artery. 5.CT perfusion study demonstrates a small focus of prolonged Tmax greater than 6 seconds within the right inferior frontal lobe (4 mL). This may be artifactual, although a small focus of at risk ischemic tissue cannot be excluded. No territorial core infarct is demonstrated on CT perfusion imaging. 6.Enlarged, multinodular thyroid gland. Recommend follow-up thyroid ultrasound on a nonemergent basis. The above findings were discussed with Moriah Menjivar via telephone on 3/20/2025 4:50 PM EDT. 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 Posterior cerebral arteries appear patent. Findings may be chronic given absence of posterior territory ischemia seen on the accompanying CT perfusion scan. Please correlate with patient's symptomatology and brain MRI. 2.Otherwise, no intracranial large vessel occlusion is identified. 3.Estimated 70% stenosis within the left proximal internal carotid artery (see series 8, image 223). 4.Less than 50% stenosis within the right internal carotid artery. 5.CT perfusion study demonstrates a small focus of prolonged Tmax greater than 6 seconds within the right inferior frontal lobe (4 mL). This may be artifactual, although a small focus of at risk ischemic tissue cannot be excluded. No territorial core infarct is demonstrated on CT perfusion imaging. 6.Enlarged, multinodular thyroid gland. Recommend follow-up thyroid ultrasound on a nonemergent basis. The above findings were discussed with Moriah Menjivar via telephone on 3/20/2025 4:50 PM EDT. 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 Posterior cerebral arteries appear patent. Findings may be chronic given absence of posterior territory ischemia seen on the accompanying CT perfusion scan. Please correlate with patient's symptomatology and brain MRI. 2.Otherwise, no intracranial large vessel occlusion is identified. 3.Estimated 70% stenosis within the left proximal internal carotid artery (see series 8, image 223). 4.Less than 50% stenosis within the right internal carotid artery. 5.CT perfusion study demonstrates a small focus of prolonged Tmax greater than 6 seconds within the right inferior frontal  lobe (4 mL). This may be artifactual, although a small focus of at risk ischemic tissue cannot be excluded. No territorial core infarct is demonstrated on CT perfusion imaging. 6.Enlarged, multinodular thyroid gland. Recommend follow-up thyroid ultrasound on a nonemergent basis. The above findings were discussed with Moriah Menjivar via telephone on 3/20/2025 4:50 PM EDT. Electronically Signed: Charlie Sanders MD  3/20/2025 4:53 PM EDT  Workstation ID: [id number]    CT Head Without Contrast Stroke Protocol  Result Date: 3/20/2025  Impression: 1. Stable small old lacunar infarct in the central left thalamus. 2. Suspected mild interval extension of a small infarct of the dorsal left thalamus and left internal capsule, although not obviously acute. 3. No clearly acute intracranial abnormality is appreciated. Electronically Signed: Norberto Seaman MD  3/20/2025 4:19 PM EDT  Workstation ID: [id number]    Results for orders placed during the hospital encounter of 02/28/25    Adult Transthoracic Echo Complete w/ Color, Spectral and Contrast if necessary per protocol    Interpretation Summary    Left ventricular systolic function is normal. Calculated left ventricular EF = 65% Left ventricular ejection fraction appears to be 61 - 65%.    Left ventricular wall thickness is consistent with mild concentric hypertrophy.    Left ventricular diastolic function is consistent with (grade I) impaired relaxation.    There is mild calcification of the aortic valve with no significant stenosis.    Estimated right ventricular systolic pressure from tricuspid regurgitation is normal (<35 mmHg).    There is a small (<1cm) circumferential pericardial effusion. There is no evidence of cardiac tamponade.      -CTH wo on 3/20/2025 images were personally reviewed and showed no acute ischemic or hemorrhagic stroke.  There was stable chronic lacunar infarct of the left arm  -CTA of the head and neck images from 3/20/2025 were personally reviewed and  showed no flow limiting stenosis or LVO.  There is moderate stenosis with complex plaque of the left internal carotid artery  -MRI brain is pending  -Transthoracic echocardiogram from 2/28/2025 report was personally reviewed and showed left ventricular ejection fraction of 61-65%, no left atrial dilation  -A1c from 3/4/2025 was 6.9   -LDL from 3/21/2025 was 145    Assessment/Plan     This is an 79-year-old female with known medical diagnoses of essential hypertension, hyperlipidemia, CAD, prediabetes, CKD stage III, known left ICA stenosis (50 to 69% per carotid ultrasound), mild dementia, depression/anxiety, recent PE (2/28, [facility], on Eliquis) and recurrent UTIs who presents to the emergency department for further evaluation of altered mental status and aphasia which her daughter first noticed today.  Given chronic Eliquis use and LKW > 4.5 hours she is not a candidate for IV thrombolytic therapy.  CTA head/neck/perfusion is negative for flow-limiting stenosis or LVO.  She will be admitted to the hospital service for further stroke workup.     Antiplatelet PTA: None  Anticoagulant PTA: Eliquis 5 mg twice daily (last dose taken this morning)           #Aphasia and right-sided weakness, suspected left hemispheric stroke versus stroke recrudescence  #Multifocal intracranial atherosclerotic disease  #Left ICA stenosis, 50 to 69%  Differential diagnosis includes symptomatic left ICA stenosis versus acute encephalopathy in the setting of possible infection or metabolic disarrangement  -CTH wo on 3/20/2025 images were personally reviewed and showed no acute ischemic or hemorrhagic stroke.  There was stable chronic lacunar infarct of the left arm  -CTA of the head and neck images from 3/20/2025 were personally reviewed and showed no flow limiting stenosis or LVO.  There is moderate stenosis with complex plaque of the left internal carotid artery  -MRI brain is pending  -Transthoracic echocardiogram from 2/28/2025 report was  personally reviewed and showed left ventricular ejection fraction of 61-65%, no left atrial dilation  -A1c from 3/4/2025 was 6.9   -LDL from 3/21/2025 was 145    Recommendations  -Correct underlying metabolic and infectious etiologies  -The patient is unable to tolerate MRI of the brain can consider repeat CT head to evaluate for any interval change and concern for acute stroke.  -Continue heparin drip and consider resuming Eliquis once appropriate by the primary team  -Consider adding aspirin 81 mg daily for secondary stroke prevention in the setting of moderate stenosis of the left ICA with a complex plaque.  -Blood pressure goals of normotension  -Continue atorvastatin 80 mg nightly for secondary stroke prevention.  Target LDL level of less than 70.  -Activity as tolerated, fall risk precautions  -PT/OT/SLP evaluation       #Pulmonary embolism, diagnosed 2/28, [facility] to continue Eliquis 5 mg twice daily once patient has been cleared by SLP  -Will start patient on heparin drip with no bolus ever  -Will obtain bilateral venous duplex to evaluate for DVT     #Essential hypertension  -Blood pressure goals of normotension.  Consider Cardene for blood pressure more than 180     #Hyperlipidemia  -Atorvastatin 80mg nightly     #Diabetes Mellitus type 2  -A1c 6.90 (3/4/2025), goal  <7 for secondary stroke prevention; no need to repeat  -Maintain euglycemia  -Management per primary team      Stroke will continue to follow with repeat CT head is concerning for acute stroke. Please call for any further questions or concerns  =================================  Hadley Carmichael MD, Msc, PhD  Vascular Neurologist  [facility]

## 2025-03-21 NOTE — PROGRESS NOTES
[facility] Medicine Services  PROGRESS NOTE    Patient Name: Valarie Camara  : 1945  MRN: [id number]    Date of Admission: 3/20/2025  Primary Care Physician: Lay Cox MD    Subjective   Subjective     CC:  Right sided weakness    HPI:  No pain  No dyspnea  No palpitations      Objective   Objective     Vital Signs:   Temp:  [97.8 °F (36.6 °C)-98.9 °F (37.2 °C)] 98.3 °F (36.8 °C)  Heart Rate:  [] 84  Resp:  [16-] 18  BP: (121-200)/() 146/72     Physical Exam:  Constitutional:Alert, interactive, no distress, pleasant, normal work of breathing  Psych:Normal/appropriate affect  HEENT:NCAT, oropharynx clear  Neck: neck supple, full range of motion  Neuro: Face symmetric, speech clear, equal , moves all extremities  Cardiac: rrr  Resp: ctab  GI: abd soft, nontender  Skin: No extremity rash  Musculoskeletal/extremities: no cyanosis of extremities; no significant ankle edema          Results Reviewed:  LAB RESULTS:      Lab 25  1150 25  0553 25  0000 25  1609 25  1608 03/15/25  0433   WBC  --  7.46  --   --  6.44 7.52   HEMOGLOBIN  --  12.3  --   --  11.7* 11.3*   HEMOGLOBIN, POC  --   --   --  11.9*  --   --    HEMATOCRIT  --  37.4  --   --  35.7 33.5*   HEMATOCRIT POC  --   --   --  35*  --   --    PLATELETS  --  250  --   --  264 263   NEUTROS ABS  --  4.90  --   --  3.61 4.10   IMMATURE GRANS (ABS)  --  0.02  --   --  0.03 0.03   LYMPHS ABS  --  1.92  --   --  2.01 2.60   MONOS ABS  --  0.55  --   --  0.72 0.69   EOS ABS  --  0.04  --   --  0.04 0.06   MCV  --  88.0  --   --  87.9 86.6   PROCALCITONIN  --   --   --   --  0.20  --    LACTATE  --   --   --   --  1.6  --    PROTIME  --   --   --   --  17.6*  --    APTT  --  28.7  --   --  31.0*  --    HEPARIN ANTI-XA >1.10* >1.10* >1.10*  --  >1.10*  --          Lab 25  0553 25  1609 25  1608 03/15/25  0433   SODIUM 144  --  141 136   POTASSIUM  3.5  --  3.8 3.9   CHLORIDE 107  --  105 102   CO2 20.0*  --  21.0* 20.3*   ANION GAP 17.0*  --  15.0 13.7   BUN 36*  --  44* 36*   CREATININE 1.84* 2.80* 2.50* 1.94*   EGFR 27.5* 16.6* 19.0* 25.8*   GLUCOSE 124*  --  150* 149*   CALCIUM 9.0  --  9.2 8.8   MAGNESIUM  --   --  1.9  --    TSH  --   --  0.355  --          Lab 03/21/25  0553 03/20/25  1608 03/15/25  0433   TOTAL PROTEIN 6.5 6.3 6.4   ALBUMIN 3.8 3.9 3.4*   GLOBULIN 2.7 2.4 3.0   ALT (SGPT) 13 13 14   AST (SGOT) 17 16 22   BILIRUBIN 0.5 0.4 0.4   ALK PHOS 49 54 52   LIPASE  --   --  53         Lab 03/20/25  1608   PROTIME 17.6*   INR 1.43*         Lab 03/21/25  0553   CHOLESTEROL 215*   LDL CHOL 145*   HDL CHOL 43   TRIGLYCERIDES 149             Brief Urine Lab Results  (Last result in the past 365 days)        Color   Clarity   Blood   Leuk Est   Nitrite   Protein   CREAT   Urine HCG        03/20/25 1706 Yellow   Cloudy   Negative   Negative   Negative   30 mg/dL (1+)                   Microbiology Results Abnormal       None            EEG  Result Date: 3/20/2025  Reason for referral: 79 y.o.female with altered mental status, speech changes Technical Summary:  A 19 channel digital EEG was performed using the international 10-20 placement system, including eye leads and EKG leads. Duration: 20 minutes Findings: The patient is awake.  A medium amplitude well-regulated 9 Hz posterior rhythm is evident symmetrically over the occipital leads.  Intermixed theta and alpha activity is seen anteriorly.  Movement artifact is variably prominent.  Photic stimulation does not change the background.  Hyperventilation is not performed.  Sleep is not seen.  No focal features or epileptiform activity are present. Video: Available Technical quality: Good Rhythm strip: Regular, 70 bpm SUMMARY: Normal EEG in the awake state No focal features or epileptiform activity are present     Impression: Normal study No evidence for epilepsy is seen This report is transcribed using  the Dragon dictation system.      XR Chest 1 View  Result Date: 3/20/2025  XR CHEST 1 VW Date of Exam: 3/20/2025 4:47 PM EDT Indication: Acute Stroke Protocol (onset < 12 hrs) Comparison: None available. Findings: The lungs appear adequately aerated without consolidation or mass. No pleural effusion or pneumothorax is identified. Cardiac silhouette is enlarged. Pulmonary vasculature appears unremarkable. No acute or suspicious osseous lesion is identified.     Impression: Impression: 1.Enlarged cardiac silhouette. This could be related to cardiomegaly and/or pericardial effusion. Electronically Signed: Charlie Sanders MD  3/20/2025 4:57 PM EDT  Workstation ID: [id number]    CT Angiogram Head w AI Analysis of LVO  Result Date: 3/20/2025  CT ANGIOGRAM HEAD W AI ANALYSIS OF LVO, CT CEREBRAL PERFUSION W WO CONTRAST, CT ANGIOGRAM NECK Date of Exam: 3/20/2025 4:08 PM EDT Indication: Neuro Deficit, acute, Stroke suspected Neuro deficit, acute stroke suspected. Comparison: None available. Technique: CTA of the head was performed after the uneventful intravenous administration of 115 mL Isovue-370. Reconstructed coronal and sagittal images were also obtained. In addition, a 3-D volume rendered image was created for interpretation. Automated exposure control and iterative reconstruction methods were used. FINDINGS: Vascular Findings: Atherosclerotic plaque within the right carotid bifurcation and right carotid siphon. The right common carotid, internal carotid, middle cerebral, anterior cerebral, vertebral, and posterior cerebral arteries are patent without abrupt cut off or aneurysmal dilation. Atherosclerotic plaque within the left carotid bifurcation and left carotid siphon. Estimated 70% stenosis of the left proximal ICA (series 8, image 223). The left common carotid, remainder of the left ICA, middle cerebral, anterior cerebral, vertebral, and posterior cerebral arteries are patent without abrupt cut off or aneurysmal  dilation. 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 Posterior cerebral arteries appear patent. Findings may be chronic given absence of  posterior territory ischemia seen on the accompanying CT perfusion scan. Please correlate with patient's symptomatology and brain MRI. Non-vascular Findings: For description of nonvascular intracranial findings, please refer to the noncontrast head CT performed the same date. No acute abnormality is identified within the visualized soft tissue or bony structures of the neck. Enlarged, multinodular thyroid gland. Largest nodule measures approximately 2.2 cm within the right thyroid lobe. The visualized lung apices are clear. 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 Posterior cerebral arteries appear patent. Findings may be chronic given absence of posterior territory ischemia seen on the accompanying CT perfusion scan. Please correlate with patient's symptomatology and brain MRI. 2.Otherwise, no intracranial large vessel occlusion is identified. 3.Estimated 70% stenosis within the left proximal internal carotid artery (see series 8, image 223). 4.Less than 50% stenosis within the right internal carotid artery. 5.CT perfusion study demonstrates a small focus of prolonged Tmax greater than 6 seconds within the right inferior frontal lobe (4 mL). This may be artifactual, although a small focus of at risk ischemic tissue cannot be excluded. No territorial core infarct is demonstrated on CT perfusion imaging. 6.Enlarged, multinodular thyroid gland. Recommend follow-up thyroid ultrasound on a nonemergent  basis. The above findings were discussed with Moriah Menjivar via telephone on 3/20/2025 4:50 PM EDT. Electronically Signed: Charlie Sanders MD  3/20/2025 4:53 PM EDT  Workstation ID: [id number]    CT Angiogram Neck  Result Date: 3/20/2025  CT ANGIOGRAM HEAD W AI ANALYSIS OF LVO, CT CEREBRAL PERFUSION W WO CONTRAST, CT ANGIOGRAM NECK Date of Exam: 3/20/2025 4:08 PM EDT Indication: Neuro Deficit, acute, Stroke suspected Neuro deficit, acute stroke suspected. Comparison: None available. Technique: CTA of the head was performed after the uneventful intravenous administration of 115 mL Isovue-370. Reconstructed coronal and sagittal images were also obtained. In addition, a 3-D volume rendered image was created for interpretation. Automated exposure control and iterative reconstruction methods were used. FINDINGS: Vascular Findings: Atherosclerotic plaque within the right carotid bifurcation and right carotid siphon. The right common carotid, internal carotid, middle cerebral, anterior cerebral, vertebral, and posterior cerebral arteries are patent without abrupt cut off or aneurysmal dilation. Atherosclerotic plaque within the left carotid bifurcation and left carotid siphon. Estimated 70% stenosis of the left proximal ICA (series 8, image 223). The left common carotid, remainder of the left ICA, middle cerebral, anterior cerebral, vertebral, and posterior cerebral arteries are patent without abrupt cut off or aneurysmal dilation. 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 Posterior cerebral arteries appear patent. Findings may be chronic given absence of  posterior territory ischemia seen on the accompanying CT perfusion scan. Please correlate with patient's symptomatology and brain MRI. Non-vascular Findings: For description of nonvascular intracranial findings, please refer to the noncontrast  head CT performed the same date. No acute abnormality is identified within the visualized soft tissue or bony structures of the neck. Enlarged, multinodular thyroid gland. Largest nodule measures approximately 2.2 cm within the right thyroid lobe. The visualized lung apices are clear. 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 Posterior cerebral arteries appear patent. Findings may be chronic given absence of posterior territory ischemia seen on the accompanying CT perfusion scan. Please correlate with patient's symptomatology and brain MRI. 2.Otherwise, no intracranial large vessel occlusion is identified. 3.Estimated 70% stenosis within the left proximal internal carotid artery (see series 8, image 223). 4.Less than 50% stenosis within the right internal carotid artery. 5.CT perfusion study demonstrates a small focus of prolonged Tmax greater than 6 seconds within the right inferior frontal lobe (4 mL). This may be artifactual, although a small focus of at risk ischemic tissue cannot be excluded. No territorial core infarct is demonstrated on CT perfusion imaging. 6.Enlarged, multinodular thyroid gland. Recommend follow-up thyroid ultrasound on a nonemergent basis. The above findings were discussed with Moriah Menjivar via telephone on 3/20/2025 4:50 PM EDT. Electronically Signed: Charlie Sanders MD  3/20/2025 4:53 PM EDT  Workstation ID: [id number]    CT CEREBRAL PERFUSION WITH & WITHOUT CONTRAST  Result Date: 3/20/2025  CT ANGIOGRAM HEAD W AI ANALYSIS OF LVO, CT CEREBRAL PERFUSION W WO CONTRAST, CT ANGIOGRAM NECK Date of Exam: 3/20/2025 4:08 PM EDT Indication: Neuro Deficit, acute, Stroke suspected Neuro deficit, acute stroke suspected. Comparison: None  available. Technique: CTA of the head was performed after the uneventful intravenous administration of 115 mL Isovue-370. Reconstructed coronal and sagittal images were also obtained. In addition, a 3-D volume rendered image was created for interpretation. Automated exposure control and iterative reconstruction methods were used. FINDINGS: Vascular Findings: Atherosclerotic plaque within the right carotid bifurcation and right carotid siphon. The right common carotid, internal carotid, middle cerebral, anterior cerebral, vertebral, and posterior cerebral arteries are patent without abrupt cut off or aneurysmal dilation. Atherosclerotic plaque within the left carotid bifurcation and left carotid siphon. Estimated 70% stenosis of the left proximal ICA (series 8, image 223). The left common carotid, remainder of the left ICA, middle cerebral, anterior cerebral, vertebral, and posterior cerebral arteries are patent without abrupt cut off or aneurysmal dilation. 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 Posterior cerebral arteries appear patent. Findings may be chronic given absence of  posterior territory ischemia seen on the accompanying CT perfusion scan. Please correlate with patient's symptomatology and brain MRI. Non-vascular Findings: For description of nonvascular intracranial findings, please refer to the noncontrast head CT performed the same date. No acute abnormality is identified within the visualized soft tissue or bony structures of the neck. Enlarged, multinodular thyroid gland. Largest nodule measures approximately 2.2 cm within the right thyroid lobe. The visualized lung apices are clear. 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 Posterior cerebral arteries appear patent. Findings may be chronic given absence of posterior territory ischemia seen on the accompanying CT perfusion scan. Please correlate with patient's symptomatology and brain MRI. 2.Otherwise, no intracranial large vessel occlusion is identified. 3.Estimated 70% stenosis within the left proximal internal carotid artery (see series 8, image 223). 4.Less than 50% stenosis within the right internal carotid artery. 5.CT perfusion study demonstrates a small focus of prolonged Tmax greater than 6 seconds within the right inferior frontal lobe (4 mL). This may be artifactual, although a small focus of at risk ischemic tissue cannot be excluded. No territorial core infarct is demonstrated on CT perfusion imaging. 6.Enlarged, multinodular thyroid gland. Recommend follow-up thyroid ultrasound on a nonemergent basis. The above findings were discussed with Moriah Menjivar via telephone on 3/20/2025 4:50 PM EDT. Electronically Signed: Charlie Sanders MD  3/20/2025 4:53 PM EDT  Workstation ID: [id number]    CT Head Without Contrast Stroke Protocol  Result Date: 3/20/2025  CT HEAD WO CONTRAST STROKE PROTOCOL Date of Exam: 3/20/2025 4:00 PM EDT Indication: Neuro deficit, acute, stroke suspected Neuro Deficit, acute, Stroke suspected. Comparison: Head CT scan 2/8/2025 Technique: Axial CT images were obtained of the head without contrast administration.  Reconstructed coronal images were also obtained. Automated exposure control and iterative construction methods were used. Scan Time: 1602 Results discussed with stroke team Navigator at 1607, 3/20/2025. Findings: Prior study report describes chronic central white matter changes. Minute lacunar infarct of the left thalamus. Today's study shows punctate central lacunar infarct in the left thalamus unchanged. There also appears to be some some  new low-attenuation/atrophic change of the dorsal left thalamus and posterior limb of the left internal capsule although more prominent than on the prior study. There is no evidence of acute edema/infarct elsewhere, no evidence of intracranial hemorrhage, mass or mass effect, hydrocephalus, or abnormal extra-axial collection.     Impression: Impression: 1. Stable small old lacunar infarct in the central left thalamus. 2. Suspected mild interval extension of a small infarct of the dorsal left thalamus and left internal capsule, although not obviously acute. 3. No clearly acute intracranial abnormality is appreciated. Electronically Signed: Norberto Seaman MD  3/20/2025 4:19 PM EDT  Workstation ID: [id number]      Results for orders placed during the hospital encounter of 02/28/25    Adult Transthoracic Echo Complete w/ Color, Spectral and Contrast if necessary per protocol    Interpretation Summary    Left ventricular systolic function is normal. Calculated left ventricular EF = 65% Left ventricular ejection fraction appears to be 61 - 65%.    Left ventricular wall thickness is consistent with mild concentric hypertrophy.    Left ventricular diastolic function is consistent with (grade I) impaired relaxation.    There is mild calcification of the aortic valve with no significant stenosis.    Estimated right ventricular systolic pressure from tricuspid regurgitation is normal (<35 mmHg).    There is a small (<1cm) circumferential pericardial effusion. There is no evidence of cardiac tamponade.      Current medications:  Scheduled Meds:Pharmacy Consult, , Not Applicable, Q12H  aspirin, 81 mg, Oral, Daily   Or  aspirin, 300 mg, Rectal, Daily  atorvastatin, 80 mg, Oral, Nightly  carvedilol, 25 mg, Oral, BID With Meals  insulin regular, 2-7 Units, Subcutaneous, Q6H  pantoprazole, 40 mg, Oral, Daily  sodium chloride, 10 mL, Intravenous, Q12H      Continuous Infusions:[Held by provider] niCARdipine, 5-15 mg/hr, Last Rate: 5 mg/hr  (03/21/25 0955)  Pharmacy to Dose Heparin,       PRN Meds:.  acetaminophen    senna-docusate sodium **AND** polyethylene glycol **AND** bisacodyl **AND** bisacodyl    Calcium Replacement - Follow Nurse / BPA Driven Protocol    dextrose    dextrose    glucagon (human recombinant)    Magnesium Standard Dose Replacement - Follow Nurse / BPA Driven Protocol    nitroglycerin    Pharmacy to Dose Heparin    Phosphorus Replacement - Follow Nurse / BPA Driven Protocol    Potassium Replacement - Follow Nurse / BPA Driven Protocol    sodium chloride    sodium chloride    Assessment & Plan   Assessment & Plan     Active Hospital Problems    Diagnosis  POA    **CVA (cerebral vascular accident) [I63.9]  Yes    Pulmonary emboli [I26.99]  Yes    Gastroesophageal reflux disease with esophagitis [K21.00]  Yes    Anxiety and depression [F41.9, F32.A]  Yes    Multiple-type hyperlipidemia [E78.2]  Yes    Benign essential hypertension [I10]  Yes      Resolved Hospital Problems   No resolved problems to display.        Brief Hospital Course to date:  Valarie Camara is a 79 y.o. female w/ hx cad, pad & left ica dz (50-69%, ckd 3 (baseline cr ~1.8-1.9), htn, mild dementia, PE (on eliquis), dm2 who presented with confusion, aphasia and some right sided weakness. CTA H&N & CT perfusion was negative for flow limiting stenosis or LVO. After admission had significant HTN, Eliquis was held and transitioned to iv heparin and cardene drip initiated. EEG negative. Patient apparently didn't tolerate MRI, repeat CT head ordered    Aphasia & right sided weakness (suspect left hemispheric stroke va stroke recrudescence)  Mild dementia  CT[facility] wo on 3/20/2025 images were personally reviewed and showed no acute ischemic or hemorrhagic stroke.  There was stable chronic lacunar infarct of the left arm  -CTA of the head and neck images from 3/20/2025 were personally reviewed and showed no flow limiting stenosis or LVO.  There is moderate stenosis with  complex plaque of the left internal carotid artery  -previous echo 3/1/25: ef 61-65%, diastolic dysfunction  -EEG negative  -u/a benign  -MRI brain was ordered, reportedly patient didn't tolerate  -repeat CT head ordered  -echo w/ bubble pending  -BLE duplex pending  -neuro-stroke team follows: continue heparin drip/eliquis for now, lipitor 80mg (considering adding asa 81mg)  -pt/ot evals pending  -repeat u/a   -restart home seroquel 50mg nightly (also give one time dose of 25mg on 3/21)    HTN  -normotension  -continue coreg, weaned off cardene drip (add amlodipine if needed, do not restart cardene unless has neurologic symptoms and instructed to do so by neuro-stroke team)    -Diet controlled DM2  -A1c 6.9 on 3/4/25  -ssi for now, titrate prn    Hx PE 2/28/25 & [facility]  -heparin drip initiated until SLP cleared  -likely switch back to eliquis 5mg bid over next 24 hours if ok w/ neuro-stroke team and no procedures planned    KURT on ckd 3 (baseline cr ~1.8-1.9)  -initial creatinine was 2.5  -repeat cr 1.8 today at baseline    Am labs: cbc,bmp,mag (repeat u/a)    Expected Discharge Location and Transportation: TBD (pt/ot evals pending)  Expected Discharge   Expected Discharge Date: 3/23/2025; Expected Discharge Time:      VTE Prophylaxis:  Pharmacologic & mechanical VTE prophylaxis orders are present.         AM-PAC 6 Clicks Score (PT): 13 (03/21/25 0800)    CODE STATUS:   Code Status and Medical Interventions: CPR (Attempt to Resuscitate); Full Support   Ordered at: 03/20/25 5455     Code Status (Patient has no pulse and is not breathing):    CPR (Attempt to Resuscitate)     Medical Interventions (Patient has pulse or is breathing):    Full Support       Danie Dunn MD  03/21/25

## 2025-03-21 NOTE — SIGNIFICANT NOTE
Called per nursing secondary to persistent hypertension. Previously given Labetalol without continued control. Discussed with stroke navigator on call. Cardene gtt initiated with hold parameters to maintain -180. Pt currently on heparin gtt so goal SBP<180. Discussed with nurse.

## 2025-03-21 NOTE — CASE MANAGEMENT/SOCIAL WORK
Discharge Planning Assessment  [facility]     Patient Name: Valarie Camara  MRN: [id number]  Today's Date: 3/21/2025    Admit Date: 3/20/2025    Plan: Home with HH vs SNF   Discharge Needs Assessment       Row Name 03/21/25 1643       Living Environment    People in Home child(sindy), adult    Name(s) of People in Home Boby Gallo    Current Living Arrangements home    Potentially Unsafe Housing Conditions none    Primary Care Provided by child(sindy)    Provides Primary Care For no one, unable/limited ability to care for self    Family Caregiver if Needed child(sindy), adult    Family Caregiver Names Andrew- Cleo    Quality of Family Relationships helpful;involved;supportive    Able to Return to Prior Arrangements yes       Transition Planning    Patient/Family Anticipates Transition to home with help/services       Discharge Needs Assessment    Equipment Currently Used at Home cane, straight;shower chair    Concerns to be Addressed discharge planning    Equipment Needed After Discharge --  to be determined    Outpatient/Agency/Support Group Needs homecare agency    Discharge Facility/Level of Care Needs home with home health    Discharge Coordination/Progress Pt lives in [place] with her daughter, Cleo.  Pt stated her family assists with medications, meal preparation, and transportation.  Pt just started home health services with [facility]; skilled nursing services.  Therapy hadn't seen her prior to her returning to the hospital.  Pt has a straight case and shower chair for DME.  Pt and family are uncertain if pt needs to return to rehab or if she is stable to return home with [facility] services.                   Discharge Plan       Row Name 03/21/25 1656       Plan    Plan Home with HH vs SNF    Patient/Family in Agreement with Plan yes    Plan Comments SW met with pt and two daughters to discuss discharge planning needs.  Pt was recently discharged from [facility] and was home  with [facility] before returning to the hospital.  If pt is in need of returning to SNF the family requests placement closer to their home in [facility].  They are okay with referrals being made to [facility] if needed.  Pt hasn't been seen by PT or OT provider yet.  If pt returns home with HH a TOSHIA order will be needed.  SW/CM will continue to follow for discharge planning needs.    Final Discharge Disposition Code 06 - home with home health care                  Selected Continued Care - Prior Encounters Includes continued care and service providers with selected services from prior encounters from 12/20/2024 to 3/21/2025      Discharged on 3/4/2025 Admission date: 2/28/2025 - Discharge disposition: Skilled Nursing Facility (DC - External)      Destination       Service Provider Services Address Phone Fax Patient Preferred    [facility] [address] [facility] [id number] [phone] --                      Discharged on 1/6/2025 Admission date: 1/5/2025 - Discharge disposition: Home-Health Care [facility]      Durable Medical Equipment       Service Provider Services Address Phone Fax Patient Preferred    [employer] [facility] Medical [facility] [address] [address] [phone] --       Internal Comment last updated by Atif Zelaya, RN 1/7/2025 0934    [employer] to deliver hospital bed to [facility] at [address] [place] [address], where pt is staying.                         Home Medical Care       Service Provider Services Address Phone Fax Patient Preferred    [facility], Home Rehabilitation, Home Nursing [address] [facility] [address] [phone] --              Community Resources       Service Provider Services Address Phone Fax Patient Preferred    [facility] PATIENTS ONLY FOOD [facility] [employer] [facility],  [employer] [address] [place] [address] [phone] -- --                          Expected Discharge Date and Time       Expected Discharge Date Expected Discharge Time    Mar 23, 2025            Demographic Summary       Row Name 03/21/25 1429       General Information    Admission Type inpatient    Arrived From home    Referral Source nursing    Reason for Consult other (see comments)  stroke    Preferred Language English    General Information Comments PCP is Lay Ruiz.                   Functional Status       Row Name 03/21/25 1642       Employment/    Employment/ Comments Pt has insurance with Medicare A/B.                   Psychosocial    No documentation.                  Abuse/Neglect    No documentation.                  Legal    No documentation.                  Substance Abuse    No documentation.                  Patient Forms    No documentation.                     ALEX Resendez

## 2025-03-21 NOTE — PROGRESS NOTES
Pharmacy to Dose Heparin Infusion Note    Valarie Camara is a 79 y.o. female receiving heparin infusion.     Therapy for (VTE/Cardiac): Recent VTE  Patient Weight: 68  Initial Bolus (Y/N): No  Any Bolus (Y/N): No     Signs or Symptoms of Bleeding: No    VTE (PE/DVT)   Initial Bolus: 80 units/kg (Max 10,000 units)  Initial rate: 18 units/kg/hr (Max 1,500 units/hr)    Anti-Xa Bolus   Dose Infusion Hold   Time Infusion Rate Change (units/kg/hr) Repeat  Anti-Xa   < 0.11 50 units/kg  (4000 units Max) None Increase by  4 units/kg/hr 6 hours   0.11 - 0.19 25 units/kg  (2000 units Max) None Increase by  3 units/kg/hr 6 hours   0.2 - 0.29 0 None Increase by  2 units/kg/hr 6 hours   0.3 - 0.7 0 None No Change 6 hours (after 2 consecutive levels in range check qAM)   0.71 - 0.8 0 None Decrease by  1 units/kg/hr 6 hours   0.81 - 0.9 0 None Decrease by  2 units/kg/hr 6 hours   0.91 - 1 0 60 minutes Decrease by  3 units/kg/hr 6 hours   >1 0 Hold  After Anti-Xa less than 0.7 decrease previous rate by  4 units/kg/hr  Every 2 hours until Anti-Xa less than 0.7 then when infusion restarts in 6 hours     Results from last 7 days   Lab Units 03/21/25  0553 03/20/25  1609 03/20/25  1608 03/15/25  0433   INR   --   --  1.43*  --    HEMOGLOBIN g/dL 12.3  --  11.7* 11.3*   HEMOGLOBIN, POC g/dL  --  11.9*  --   --    HEMATOCRIT % 37.4  --  35.7 33.5*   HEMATOCRIT POC %  --  35*  --   --    PLATELETS 10*3/mm3 250  --  264 263       Date   Time   Anti-Xa Current Rate (units/kg/hr) Bolus   (units) Rate Change   (units/kg/hr) New Rate (units/kg/hr) Repeat  Anti-Xa Comments /  Pump Check    3/20 1608 > 1.1 -- No bolus ever Held  Hold 2200 Holding infusion until anti-Xa < 1. Discussed with stoke team and RN.   3/21 0000 > 1.1 Hold -- -- Hold 0600 Continue to hold   3/21 0553 > 1.1 HOLD -- -- HOLD 1200 Jennifer -forest   3/21 1150 >1.1 HOLD -- -- HOLD 1800 Donovan BERMUDEZ                                                                                                                                                                                               Mike Hartman, [facility]  3/21/2025  13:02 EDT

## 2025-03-22 ENCOUNTER — APPOINTMENT (OUTPATIENT)
Dept: MRI IMAGING | Facility: HOSPITAL | Age: 80
DRG: 092 | End: 2025-03-22
Payer: MEDICARE

## 2025-03-22 PROBLEM — I63.9 CVA (CEREBRAL VASCULAR ACCIDENT): Status: RESOLVED | Noted: 2025-03-20 | Resolved: 2025-03-22

## 2025-03-22 PROBLEM — G93.40 ENCEPHALOPATHY: Status: ACTIVE | Noted: 2025-03-22

## 2025-03-22 LAB
ANION GAP SERPL CALCULATED.3IONS-SCNC: 16 MMOL/L (ref 5–15)
ARTERIAL PATENCY WRIST A: ABNORMAL
ATMOSPHERIC PRESS: ABNORMAL MM[HG]
BACTERIA SPEC AEROBE CULT: NO GROWTH
BACTERIA UR QL AUTO: ABNORMAL /HPF
BASE EXCESS BLDA CALC-SCNC: -1.2 MMOL/L (ref 0–2)
BDY SITE: ABNORMAL
BILIRUB UR QL STRIP: NEGATIVE
BODY TEMPERATURE: 37
BUN SERPL-MCNC: 27 MG/DL (ref 8–23)
BUN/CREAT SERPL: 20.6 (ref 7–25)
CALCIUM SPEC-SCNC: 8.9 MG/DL (ref 8.6–10.5)
CHLORIDE SERPL-SCNC: 108 MMOL/L (ref 98–107)
CLARITY UR: ABNORMAL
CO2 BLDA-SCNC: 23.1 MMOL/L (ref 22–33)
CO2 SERPL-SCNC: 20 MMOL/L (ref 22–29)
COHGB MFR BLD: 0.9 % (ref 0–2)
COLOR UR: YELLOW
CREAT SERPL-MCNC: 1.31 MG/DL (ref 0.57–1)
DEPRECATED RDW RBC AUTO: 53 FL (ref 37–54)
EGFRCR SERPLBLD CKD-EPI 2021: 41.3 ML/MIN/1.73
EPAP: 0
ERYTHROCYTE [DISTWIDTH] IN BLOOD BY AUTOMATED COUNT: 16.2 % (ref 12.3–15.4)
GLUCOSE BLDC GLUCOMTR-MCNC: 127 MG/DL (ref 70–130)
GLUCOSE BLDC GLUCOMTR-MCNC: 180 MG/DL (ref 70–130)
GLUCOSE BLDC GLUCOMTR-MCNC: 68 MG/DL (ref 70–130)
GLUCOSE BLDC GLUCOMTR-MCNC: 71 MG/DL (ref 70–130)
GLUCOSE BLDC GLUCOMTR-MCNC: 91 MG/DL (ref 70–130)
GLUCOSE BLDC GLUCOMTR-MCNC: 97 MG/DL (ref 70–130)
GLUCOSE BLDC GLUCOMTR-MCNC: 98 MG/DL (ref 70–130)
GLUCOSE SERPL-MCNC: 90 MG/DL (ref 65–99)
GLUCOSE UR STRIP-MCNC: ABNORMAL MG/DL
HCO3 BLDA-SCNC: 22.1 MMOL/L (ref 20–26)
HCT VFR BLD AUTO: 41.3 % (ref 34–46.6)
HCT VFR BLD CALC: 37.8 % (ref 38–51)
HGB BLD-MCNC: 13.2 G/DL (ref 12–15.9)
HGB BLDA-MCNC: 12.3 G/DL (ref 14–18)
HGB UR QL STRIP.AUTO: NEGATIVE
HYALINE CASTS UR QL AUTO: ABNORMAL /LPF
INHALED O2 CONCENTRATION: 21 %
IPAP: 0
KETONES UR QL STRIP: ABNORMAL
LEUKOCYTE ESTERASE UR QL STRIP.AUTO: NEGATIVE
MAGNESIUM SERPL-MCNC: 1.9 MG/DL (ref 1.6–2.4)
MCH RBC QN AUTO: 28.8 PG (ref 26.6–33)
MCHC RBC AUTO-ENTMCNC: 32 G/DL (ref 31.5–35.7)
MCV RBC AUTO: 90.2 FL (ref 79–97)
METHGB BLD QL: 0.5 % (ref 0–1.5)
MODALITY: ABNORMAL
NITRITE UR QL STRIP: NEGATIVE
OXYHGB MFR BLDV: 95.1 % (ref 94–99)
PAW @ PEAK INSP FLOW SETTING VENT: 0 CMH2O
PCO2 BLDA: 32 MM HG (ref 35–45)
PCO2 TEMP ADJ BLD: 32 MM HG (ref 35–45)
PH BLDA: 7.45 PH UNITS (ref 7.35–7.45)
PH UR STRIP.AUTO: 5.5 [PH] (ref 5–8)
PH, TEMP CORRECTED: 7.45 PH UNITS
PLATELET # BLD AUTO: 255 10*3/MM3 (ref 140–450)
PMV BLD AUTO: 10.6 FL (ref 6–12)
PO2 BLDA: 78.3 MM HG (ref 83–108)
PO2 TEMP ADJ BLD: 78.3 MM HG (ref 83–108)
POTASSIUM SERPL-SCNC: 3.7 MMOL/L (ref 3.5–5.2)
PROT UR QL STRIP: ABNORMAL
QT INTERVAL: 348 MS
QT INTERVAL: 364 MS
QTC INTERVAL: 430 MS
QTC INTERVAL: 437 MS
RBC # BLD AUTO: 4.58 10*6/MM3 (ref 3.77–5.28)
RBC # UR STRIP: ABNORMAL /HPF
REF LAB TEST METHOD: ABNORMAL
SODIUM SERPL-SCNC: 144 MMOL/L (ref 136–145)
SP GR UR STRIP: 1.02 (ref 1–1.03)
SQUAMOUS #/AREA URNS HPF: ABNORMAL /HPF
TOTAL RATE: 0 BREATHS/MINUTE
UFH PPP CHRO-ACNC: 0.98 IU/ML (ref 0.3–0.7)
UFH PPP CHRO-ACNC: >1.1 IU/ML (ref 0.3–0.7)
UFH PPP CHRO-ACNC: >1.1 IU/ML (ref 0.3–0.7)
UROBILINOGEN UR QL STRIP: ABNORMAL
WBC # UR STRIP: ABNORMAL /HPF
WBC NRBC COR # BLD AUTO: 6.49 10*3/MM3 (ref 3.4–10.8)

## 2025-03-22 PROCEDURE — 25010000002 CEFTRIAXONE PER 250 MG: Performed by: INTERNAL MEDICINE

## 2025-03-22 PROCEDURE — 82805 BLOOD GASES W/O2 SATURATION: CPT

## 2025-03-22 PROCEDURE — 25010000002 HYDRALAZINE PER 20 MG: Performed by: INTERNAL MEDICINE

## 2025-03-22 PROCEDURE — 83735 ASSAY OF MAGNESIUM: CPT | Performed by: INTERNAL MEDICINE

## 2025-03-22 PROCEDURE — 25810000003 SODIUM CHLORIDE 0.9 % SOLUTION 250 ML FLEX CONT: Performed by: INTERNAL MEDICINE

## 2025-03-22 PROCEDURE — 85027 COMPLETE CBC AUTOMATED: CPT | Performed by: INTERNAL MEDICINE

## 2025-03-22 PROCEDURE — 82375 ASSAY CARBOXYHB QUANT: CPT

## 2025-03-22 PROCEDURE — 25010000002 HEPARIN (PORCINE) 25000-0.45 UT/250ML-% SOLUTION

## 2025-03-22 PROCEDURE — 70551 MRI BRAIN STEM W/O DYE: CPT

## 2025-03-22 PROCEDURE — 83050 HGB METHEMOGLOBIN QUAN: CPT

## 2025-03-22 PROCEDURE — 87086 URINE CULTURE/COLONY COUNT: CPT | Performed by: INTERNAL MEDICINE

## 2025-03-22 PROCEDURE — 80048 BASIC METABOLIC PNL TOTAL CA: CPT | Performed by: INTERNAL MEDICINE

## 2025-03-22 PROCEDURE — 99232 SBSQ HOSP IP/OBS MODERATE 35: CPT | Performed by: INTERNAL MEDICINE

## 2025-03-22 PROCEDURE — 81001 URINALYSIS AUTO W/SCOPE: CPT | Performed by: INTERNAL MEDICINE

## 2025-03-22 PROCEDURE — 82948 REAGENT STRIP/BLOOD GLUCOSE: CPT

## 2025-03-22 PROCEDURE — 36600 WITHDRAWAL OF ARTERIAL BLOOD: CPT

## 2025-03-22 PROCEDURE — 99233 SBSQ HOSP IP/OBS HIGH 50: CPT | Performed by: NURSE PRACTITIONER

## 2025-03-22 PROCEDURE — 85520 HEPARIN ASSAY: CPT

## 2025-03-22 PROCEDURE — 25010000002 NICARDIPINE 2.5 MG/ML SOLUTION 10 ML VIAL: Performed by: INTERNAL MEDICINE

## 2025-03-22 RX ORDER — HEPARIN SODIUM 10000 [USP'U]/100ML
15 INJECTION, SOLUTION INTRAVENOUS
Status: DISCONTINUED | OUTPATIENT
Start: 2025-03-22 | End: 2025-03-22

## 2025-03-22 RX ORDER — QUETIAPINE FUMARATE 25 MG/1
25 TABLET, FILM COATED ORAL NIGHTLY
Status: DISCONTINUED | OUTPATIENT
Start: 2025-03-22 | End: 2025-03-22

## 2025-03-22 RX ORDER — SODIUM CHLORIDE 9 MG/ML
50 INJECTION, SOLUTION INTRAVENOUS CONTINUOUS
Status: DISCONTINUED | OUTPATIENT
Start: 2025-03-22 | End: 2025-03-22

## 2025-03-22 RX ADMIN — SODIUM CHLORIDE 5 MG/HR: 9 INJECTION, SOLUTION INTRAVENOUS at 21:29

## 2025-03-22 RX ADMIN — SODIUM CHLORIDE 1000 MG: 900 INJECTION INTRAVENOUS at 15:35

## 2025-03-22 RX ADMIN — HEPARIN SODIUM 15 UNITS/KG/HR: 10000 INJECTION, SOLUTION INTRAVENOUS at 18:26

## 2025-03-22 RX ADMIN — SODIUM CHLORIDE 5 MG/HR: 9 INJECTION, SOLUTION INTRAVENOUS at 16:01

## 2025-03-22 RX ADMIN — Medication 10 ML: at 09:40

## 2025-03-22 RX ADMIN — DEXTROSE MONOHYDRATE 25 G: 25 INJECTION, SOLUTION INTRAVENOUS at 22:25

## 2025-03-22 RX ADMIN — HYDRALAZINE HYDROCHLORIDE 10 MG: 20 INJECTION INTRAMUSCULAR; INTRAVENOUS at 15:36

## 2025-03-22 NOTE — PROGRESS NOTES
Rapid response  Rapid response was called at approximately 5:35 AM for altered mental status.  Upon arrival patient was altered but would respond to noxious stimuli.  Patient would respond to simple commands but was still confused.  Vitals were normal.  Glucose was 98.  Patient had just returned from MRI that was taken around 2:30 AM.  Last known normal was prior to MRI.  Nurse at bedside stated that the patient was alert oriented and answering questions.  Ordered morning labs of CBC, CMP added lactic acid and ABG.  Stroke team was called and was already on the team and following for strokelike symptoms which was the admission diagnosis.  No recommendation of additional imaging at this time.  Patient received 50 mg of Seroquel at approximately 11:30 PM.  The Seroquel may be contributing to the patient's somnolence.  Will continue to follow.

## 2025-03-22 NOTE — NURSING NOTE
Rapid response called ~0535 d/t AMS, and increased NIH from 5 to 19. BG 98, vitals WNL  x . PT was found extremely lethargic, difficult to arouse, increased generalized weakness, and incomprehensible speech. Pupils PERRLA x/ limited visual tracking.     Response: Labs drawn and ABG collected.    Will continue course of care.

## 2025-03-22 NOTE — PROGRESS NOTES
[facility] Medicine Services  PROGRESS NOTE    Patient Name: Valarie Camara  : 1945  MRN: [id number]    Date of Admission: 3/20/2025  Primary Care Physician: Lay Cox MD    Subjective   Subjective     CC:  Right sided weakness    HPI:  Drowsy but arousable, confused. Poor historian currently      Objective   Objective     Vital Signs:   Temp:  [97.7 °F (36.5 °C)-98.6 °F (37 °C)] 97.7 °F (36.5 °C)  Heart Rate:  [73-99] 73  Resp:  [18-20] 18  BP: (154-223)/(72-96) 209/96     Physical Exam:  Constitutional:drowsy but arousable, normal work of breathing  Psych:Normal/appropriate affect  HEENT:NCAT, oropharynx clear  Neck: neck supple, full range of motion  Neuro: confused, moves all extremities (gait not assessed)  Cardiac: rrr  Resp: ctab  GI: abd soft, nontender  Skin: No extremity rash  Musculoskeletal/extremities: no cyanosis of extremities; no significant ankle edema          Results Reviewed:  LAB RESULTS:      Lab 25  0545 25  0529 25  1901 25  1150 25  0553 25  0000 25  1609 25  1608   WBC 6.49  --   --   --  7.46  --   --  6.44   HEMOGLOBIN 13.2  --   --   --  12.3  --   --  11.7*   HEMOGLOBIN, POC  --   --   --   --   --   --  11.9*  --    HEMATOCRIT 41.3  --   --   --  37.4  --   --  35.7   HEMATOCRIT POC  --   --   --   --   --   --  35*  --    PLATELETS 255  --   --   --  250  --   --  264   NEUTROS ABS  --   --   --   --  4.90  --   --  3.61   IMMATURE GRANS (ABS)  --   --   --   --  0.02  --   --  0.03   LYMPHS ABS  --   --   --   --  1.92  --   --  2.01   MONOS ABS  --   --   --   --  0.55  --   --  0.72   EOS ABS  --   --   --   --  0.04  --   --  0.04   MCV 90.2  --   --   --  88.0  --   --  87.9   PROCALCITONIN  --   --   --   --   --   --   --  0.20   LACTATE  --   --   --   --   --   --   --  1.6   PROTIME  --   --   --   --   --   --   --  17.6*   APTT  --   --   --   --  28.7  --   --   31.0*   HEPARIN ANTI-XA  --  >1.10* >1.10* >1.10* >1.10* >1.10*  --  >1.10*         Lab 03/22/25  0545 03/21/25  1901 03/21/25  0553 03/20/25  1609 03/20/25  1608   SODIUM 144  --  144  --  141   POTASSIUM 3.7 3.7 3.5  --  3.8   CHLORIDE 108*  --  107  --  105   CO2 20.0*  --  20.0*  --  21.0*   ANION GAP 16.0*  --  17.0*  --  15.0   BUN 27*  --  36*  --  44*   CREATININE 1.31*  --  1.84* 2.80* 2.50*   EGFR 41.3*  --  27.5* 16.6* 19.0*   GLUCOSE 90  --  124*  --  150*   CALCIUM 8.9  --  9.0  --  9.2   MAGNESIUM 1.9  --   --   --  1.9   TSH  --   --   --   --  0.355         Lab 03/21/25  0553 03/20/25  1608   TOTAL PROTEIN 6.5 6.3   ALBUMIN 3.8 3.9   GLOBULIN 2.7 2.4   ALT (SGPT) 13 13   AST (SGOT) 17 16   BILIRUBIN 0.5 0.4   ALK PHOS 49 54         Lab 03/20/25  1608   PROTIME 17.6*   INR 1.43*         Lab 03/21/25  0553   CHOLESTEROL 215*   LDL CHOL 145*   HDL CHOL 43   TRIGLYCERIDES 149             Lab 03/22/25  0610   PH, ARTERIAL 7.449   PCO2, ARTERIAL 32.0*   PO2 ART 78.3*   FIO2 21   HCO3 ART 22.1   BASE EXCESS ART -1.2*   CARBOXYHEMOGLOBIN 0.9     Brief Urine Lab Results  (Last result in the past 365 days)        Color   Clarity   Blood   Leuk Est   Nitrite   Protein   CREAT   Urine HCG        03/22/25 1129 Yellow   Cloudy   Negative   Negative   Negative   100 mg/dL (2+)                   Microbiology Results Abnormal       None            MRI Brain Without Contrast  Result Date: 3/22/2025  MRI BRAIN WO CONTRAST Date of Exam: 3/22/2025 2:03 AM EDT Indication: Stroke, follow up Aphasia, right-sided weakness, numbness, facial droop.  Comparison: Head CT 3/21/2025 Technique:  Routine multiplanar/multisequence sequence images of the brain were obtained without contrast administration. Findings: No areas of diffusion restriction to suggest a recent infarct. Negative for acute intracranial hemorrhage, large mass lesion, midline shift or hydrocephalus. Mild global parenchymal volume loss for age. Mild  periventricular T2/FLAIR hyperintense signal suggesting chronic microvascular ischemic change. Mostly empty sella. Negative for cerebellar tonsillar ectopia. Normal volume corpus callosum. Major intracranial flow voids preserved. Orbits symmetric. Trace bilateral mastoid fluid. Unremarkable appearance of the calvarium.     Impression: Impression: No acute MRI findings, specifically no findings to suggest a recent infarct. Electronically Signed: Shade Seay MD  3/22/2025 8:17 AM EDT  Workstation ID: [id number]    CT Head Without Contrast  Result Date: 3/21/2025  CT HEAD WO CONTRAST Date of Exam: 3/21/2025 4:22 PM EDT Indication: Stroke. Comparison: 3/20/2025 Technique: Axial CT images were obtained of the head without contrast administration.  Automated exposure control and iterative construction methods were used. Findings: No intracranial hemorrhage. Recent CT perfusion shows a small area of likely artifactual elevated Tmax in the inferior right frontal lobe.. Multiple foci of decreased attenuation are present within the subcortical, deep cerebral, and periventricular white matter consistent with chronic small vessel/microangiopathic ischemic changes. No extra-axial mass or collection. The ventricles and sulci are prominent commensurate with involutional changes. The posterior fossa appears grossly normal. Sellar and suprasellar structures are normal. Orbital and periorbital soft tissues are normal. The paranasal sinuses, ethmoid air cells, and mastoid air cells are aerated. The bony calvarium is intact.     Impression: Impression: No definite interval change. No acute intracranial pathology is definitively identified. Electronically Signed: Vidal Riddle MD  3/21/2025 4:49 PM EDT  Workstation ID: [id number]    Duplex Venous Lower Extremity - Bilateral CAR  Result Date: 3/21/2025    Normal bilateral lower extremity venous duplex scan.     EEG  Result Date: 3/20/2025  Reason for referral: 79 y.o.female with altered  mental status, speech changes Technical Summary:  A 19 channel digital EEG was performed using the international 10-20 placement system, including eye leads and EKG leads. Duration: 20 minutes Findings: The patient is awake.  A medium amplitude well-regulated 9 Hz posterior rhythm is evident symmetrically over the occipital leads.  Intermixed theta and alpha activity is seen anteriorly.  Movement artifact is variably prominent.  Photic stimulation does not change the background.  Hyperventilation is not performed.  Sleep is not seen.  No focal features or epileptiform activity are present. Video: Available Technical quality: Good Rhythm strip: Regular, 70 bpm SUMMARY: Normal EEG in the awake state No focal features or epileptiform activity are present     Impression: Normal study No evidence for epilepsy is seen This report is transcribed using the Dragon dictation system.      XR Chest 1 View  Result Date: 3/20/2025  XR CHEST 1 VW Date of Exam: 3/20/2025 4:47 PM EDT Indication: Acute Stroke Protocol (onset < 12 hrs) Comparison: None available. Findings: The lungs appear adequately aerated without consolidation or mass. No pleural effusion or pneumothorax is identified. Cardiac silhouette is enlarged. Pulmonary vasculature appears unremarkable. No acute or suspicious osseous lesion is identified.     Impression: Impression: 1.Enlarged cardiac silhouette. This could be related to cardiomegaly and/or pericardial effusion. Electronically Signed: Charlie Sanders MD  3/20/2025 4:57 PM EDT  Workstation ID: [id number]    CT Angiogram Head w AI Analysis of LVO  Result Date: 3/20/2025  CT ANGIOGRAM HEAD W AI ANALYSIS OF LVO, CT CEREBRAL PERFUSION W WO CONTRAST, CT ANGIOGRAM NECK Date of Exam: 3/20/2025 4:08 PM EDT Indication: Neuro Deficit, acute, Stroke suspected Neuro deficit, acute stroke suspected. Comparison: None available. Technique: CTA of the head was performed after the uneventful intravenous administration of 115 mL  Isovue-370. Reconstructed coronal and sagittal images were also obtained. In addition, a 3-D volume rendered image was created for interpretation. Automated exposure control and iterative reconstruction methods were used. FINDINGS: Vascular Findings: Atherosclerotic plaque within the right carotid bifurcation and right carotid siphon. The right common carotid, internal carotid, middle cerebral, anterior cerebral, vertebral, and posterior cerebral arteries are patent without abrupt cut off or aneurysmal dilation. Atherosclerotic plaque within the left carotid bifurcation and left carotid siphon. Estimated 70% stenosis of the left proximal ICA (series 8, image 223). The left common carotid, remainder of the left ICA, middle cerebral, anterior cerebral, vertebral, and posterior cerebral arteries are patent without abrupt cut off or aneurysmal dilation. 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 Posterior cerebral arteries appear patent. Findings may be chronic given absence of  posterior territory ischemia seen on the accompanying CT perfusion scan. Please correlate with patient's symptomatology and brain MRI. Non-vascular Findings: For description of nonvascular intracranial findings, please refer to the noncontrast head CT performed the same date. No acute abnormality is identified within the visualized soft tissue or bony structures of the neck. Enlarged, multinodular thyroid gland. Largest nodule measures approximately 2.2 cm within the right thyroid lobe. The visualized lung apices are clear. 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  Posterior cerebral arteries appear patent. Findings may be chronic given absence of posterior territory ischemia seen on the accompanying CT perfusion scan. Please correlate with patient's symptomatology and brain MRI. 2.Otherwise, no intracranial large vessel occlusion is identified. 3.Estimated 70% stenosis within the left proximal internal carotid artery (see series 8, image 223). 4.Less than 50% stenosis within the right internal carotid artery. 5.CT perfusion study demonstrates a small focus of prolonged Tmax greater than 6 seconds within the right inferior frontal lobe (4 mL). This may be artifactual, although a small focus of at risk ischemic tissue cannot be excluded. No territorial core infarct is demonstrated on CT perfusion imaging. 6.Enlarged, multinodular thyroid gland. Recommend follow-up thyroid ultrasound on a nonemergent basis. The above findings were discussed with Moriah Menjivar via telephone on 3/20/2025 4:50 PM EDT. Electronically Signed: Charlie Sanders MD  3/20/2025 4:53 PM EDT  Workstation ID: [id number]    CT Angiogram Neck  Result Date: 3/20/2025  CT ANGIOGRAM HEAD W AI ANALYSIS OF LVO, CT CEREBRAL PERFUSION W WO CONTRAST, CT ANGIOGRAM NECK Date of Exam: 3/20/2025 4:08 PM EDT Indication: Neuro Deficit, acute, Stroke suspected Neuro deficit, acute stroke suspected. Comparison: None available. Technique: CTA of the head was performed after the uneventful intravenous administration of 115 mL Isovue-370. Reconstructed coronal and sagittal images were also obtained. In addition, a 3-D volume rendered image was created for interpretation. Automated exposure control and iterative reconstruction methods were used. FINDINGS: Vascular Findings: Atherosclerotic plaque within the right carotid bifurcation and right carotid siphon. The right common carotid, internal carotid, middle cerebral, anterior cerebral, vertebral, and posterior cerebral arteries are patent without abrupt cut off or aneurysmal  dilation. Atherosclerotic plaque within the left carotid bifurcation and left carotid siphon. Estimated 70% stenosis of the left proximal ICA (series 8, image 223). The left common carotid, remainder of the left ICA, middle cerebral, anterior cerebral, vertebral, and posterior cerebral arteries are patent without abrupt cut off or aneurysmal dilation. 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 Posterior cerebral arteries appear patent. Findings may be chronic given absence of  posterior territory ischemia seen on the accompanying CT perfusion scan. Please correlate with patient's symptomatology and brain MRI. Non-vascular Findings: For description of nonvascular intracranial findings, please refer to the noncontrast head CT performed the same date. No acute abnormality is identified within the visualized soft tissue or bony structures of the neck. Enlarged, multinodular thyroid gland. Largest nodule measures approximately 2.2 cm within the right thyroid lobe. The visualized lung apices are clear. 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 Posterior cerebral arteries appear patent. Findings may be chronic given absence of posterior territory ischemia seen on the accompanying CT perfusion scan. Please correlate with patient's symptomatology and brain MRI. 2.Otherwise, no intracranial large vessel occlusion is identified. 3.Estimated 70% stenosis within the left proximal internal carotid artery (see series 8, image 223). 4.Less than 50% stenosis within the right internal carotid artery. 5.CT perfusion study demonstrates a small focus of  prolonged Tmax greater than 6 seconds within the right inferior frontal lobe (4 mL). This may be artifactual, although a small focus of at risk ischemic tissue cannot be excluded. No territorial core infarct is demonstrated on CT perfusion imaging. 6.Enlarged, multinodular thyroid gland. Recommend follow-up thyroid ultrasound on a nonemergent basis. The above findings were discussed with Moriah Menjivar via telephone on 3/20/2025 4:50 PM EDT. Electronically Signed: Charlie Sanders MD  3/20/2025 4:53 PM EDT  Workstation ID: [id number]    CT CEREBRAL PERFUSION WITH & WITHOUT CONTRAST  Result Date: 3/20/2025  CT ANGIOGRAM HEAD W AI ANALYSIS OF LVO, CT CEREBRAL PERFUSION W WO CONTRAST, CT ANGIOGRAM NECK Date of Exam: 3/20/2025 4:08 PM EDT Indication: Neuro Deficit, acute, Stroke suspected Neuro deficit, acute stroke suspected. Comparison: None available. Technique: CTA of the head was performed after the uneventful intravenous administration of 115 mL Isovue-370. Reconstructed coronal and sagittal images were also obtained. In addition, a 3-D volume rendered image was created for interpretation. Automated exposure control and iterative reconstruction methods were used. FINDINGS: Vascular Findings: Atherosclerotic plaque within the right carotid bifurcation and right carotid siphon. The right common carotid, internal carotid, middle cerebral, anterior cerebral, vertebral, and posterior cerebral arteries are patent without abrupt cut off or aneurysmal dilation. Atherosclerotic plaque within the left carotid bifurcation and left carotid siphon. Estimated 70% stenosis of the left proximal ICA (series 8, image 223). The left common carotid, remainder of the left ICA, middle cerebral, anterior cerebral, vertebral, and posterior cerebral arteries are patent without abrupt cut off or aneurysmal dilation. 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 Posterior cerebral arteries appear patent. Findings may be chronic given absence of  posterior territory ischemia seen on the accompanying CT perfusion scan. Please correlate with patient's symptomatology and brain MRI. Non-vascular Findings: For description of nonvascular intracranial findings, please refer to the noncontrast head CT performed the same date. No acute abnormality is identified within the visualized soft tissue or bony structures of the neck. Enlarged, multinodular thyroid gland. Largest nodule measures approximately 2.2 cm within the right thyroid lobe. The visualized lung apices are clear. 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 Posterior cerebral arteries appear patent. Findings may be chronic given absence of posterior territory ischemia seen on the accompanying CT perfusion scan. Please correlate with patient's symptomatology and brain MRI. 2.Otherwise, no intracranial large vessel occlusion is identified. 3.Estimated 70% stenosis within the left proximal internal carotid artery (see series 8, image 223). 4.Less than 50% stenosis within the right internal carotid artery. 5.CT perfusion study demonstrates a small focus of prolonged Tmax greater than 6 seconds within the right inferior frontal lobe (4 mL). This may be artifactual, although a small focus of at risk ischemic tissue cannot be excluded. No territorial core infarct is demonstrated on CT perfusion imaging. 6.Enlarged, multinodular thyroid gland. Recommend follow-up thyroid ultrasound on a nonemergent basis. The above findings were discussed with Moriah Menjivar via telephone on 3/20/2025 4:50 PM EDT. Electronically Signed: Charlie  MD Marilyn  3/20/2025 4:53 PM EDT  Workstation ID: [id number]    CT Head Without Contrast Stroke Protocol  Result Date: 3/20/2025  CT HEAD WO CONTRAST STROKE PROTOCOL Date of Exam: 3/20/2025 4:00 PM EDT Indication: Neuro deficit, acute, stroke suspected Neuro Deficit, acute, Stroke suspected. Comparison: Head CT scan 2/8/2025 Technique: Axial CT images were obtained of the head without contrast administration.  Reconstructed coronal images were also obtained. Automated exposure control and iterative construction methods were used. Scan Time: 1602 Results discussed with stroke team Navigator at 1607, 3/20/2025. Findings: Prior study report describes chronic central white matter changes. Minute lacunar infarct of the left thalamus. Today's study shows punctate central lacunar infarct in the left thalamus unchanged. There also appears to be some some new low-attenuation/atrophic change of the dorsal left thalamus and posterior limb of the left internal capsule although more prominent than on the prior study. There is no evidence of acute edema/infarct elsewhere, no evidence of intracranial hemorrhage, mass or mass effect, hydrocephalus, or abnormal extra-axial collection.     Impression: Impression: 1. Stable small old lacunar infarct in the central left thalamus. 2. Suspected mild interval extension of a small infarct of the dorsal left thalamus and left internal capsule, although not obviously acute. 3. No clearly acute intracranial abnormality is appreciated. Electronically Signed: Norberto Seaman MD  3/20/2025 4:19 PM EDT  Workstation ID: [id number]      Results for orders placed during the hospital encounter of 03/20/25    Adult Transthoracic Echo Complete W/ Cont if Necessary Per Protocol (With Agitated Saline)    Interpretation Summary    Left ventricular systolic function is normal. Calculated left ventricular EF = 67% Left ventricular ejection fraction appears to be 66 - 70%.    Left ventricular wall thickness is  consistent with mild concentric hypertrophy.    Left ventricular outflow tract peak flow gradient at rest is 11 mmHg. Left ventricular outflow tract peak gradient with valsalva is 29 mmHg.    Left ventricular diastolic function is consistent with (grade I) impaired relaxation.    The mitral valve peak gradient is 9 mmHg. The mitral valve mean gradient is 4 mmHg.    Calculated right ventricular systolic pressure from tricuspid regurgitation is 21 mmHg.    There is a trivial pericardial effusion.      Current medications:  Scheduled Meds:Pharmacy Consult, , Not Applicable, Q12H  amLODIPine, 5 mg, Oral, Q24H  aspirin, 81 mg, Oral, Daily   Or  aspirin, 300 mg, Rectal, Daily  atorvastatin, 80 mg, Oral, Nightly  carvedilol, 25 mg, Oral, BID With Meals  cefTRIAXone, 1,000 mg, Intravenous, Q24H  insulin regular, 2-7 Units, Subcutaneous, Q6H  pantoprazole, 40 mg, Oral, Daily  sodium chloride, 10 mL, Intravenous, Q12H      Continuous Infusions:[Held by provider] niCARdipine, 5-15 mg/hr, Last Rate: Stopped (03/21/25 1130)  Pharmacy to Dose Heparin,   sodium chloride, 50 mL/hr      PRN Meds:.  acetaminophen    senna-docusate sodium **AND** polyethylene glycol **AND** bisacodyl **AND** bisacodyl    Calcium Replacement - Follow Nurse / BPA Driven Protocol    dextrose    dextrose    glucagon (human recombinant)    hydrALAZINE    Magnesium Standard Dose Replacement - Follow Nurse / BPA Driven Protocol    nitroglycerin    Pharmacy to Dose Heparin    Phosphorus Replacement - Follow Nurse / BPA Driven Protocol    Potassium Replacement - Follow Nurse / BPA Driven Protocol    sodium chloride    sodium chloride    Assessment & Plan   Assessment & Plan     Active Hospital Problems    Diagnosis  POA    **CVA (cerebral vascular accident) [I63.9]  Yes    Pulmonary emboli [I26.99]  Yes    Gastroesophageal reflux disease with esophagitis [K21.00]  Yes    Anxiety and depression [F41.9, F32.A]  Yes    Multiple-type hyperlipidemia [E78.2]  Yes     Benign essential hypertension [I10]  Yes      Resolved Hospital Problems   No resolved problems to display.        Brief Hospital Course to date:  Valarie Camara is a 79 y.o. female w/ hx cad, pad & left ica dz (50-69%, ckd 3 (baseline cr ~1.8-1.9), htn, mild dementia, PE (on eliquis), dm2 who presented with confusion, aphasia and some right sided weakness. CTA H&N & CT perfusion was negative for flow limiting stenosis or LVO. After admission had significant HTN, Eliquis was held and transitioned to iv heparin and cardene drip initiated. EEG negative. Patient apparently didn't tolerate MRI, repeat CT head ordered    Aphasia & right sided weakness (suspect left hemispheric stroke va stroke recrudescence)  Left ICA stenosis (50-69%), & multifocal atherosclerotic dz  Episode of lethargy/somnolence evening 3/21/25  Mild dementia  CTH wo on 3/20/2025 images were personally reviewed and showed no acute ischemic or hemorrhagic stroke.  There was stable chronic lacunar infarct of the left arm  -CTA of the head and neck images from 3/20/2025 were personally reviewed and showed no flow limiting stenosis or LVO.  There is moderate stenosis with complex plaque of the left internal carotid artery  -previous echo 3/1/25: ef 61-65%, diastolic dysfunction  -EEG negative  -initial u/a benign  -initially didn't tolerate MRI brain (agitation) so was aborted  -repeat CT head 3/21 was negative/unchanged from original CT head  -echo : ef 66-70%, diastolic dysfunction  -BLE duplex: negative  -evening 3/21 became leghargic. Had received geodon (tolerated this earlier in the day 3/21), but then received seroquel 50mg later that evening and became lethargic.   -MRI brain negative. Neurology discussed w/ family (daughter Cleo) and apparently patient has become lethargic in past w/ trazodone, seroquel in past and wasn't taking these prior to admission (make sure to cancel/discontinue trazodone and seroquel meds on discharge)  -neuro  following: cont asa 81 (ica stenosis), eliquis (heparin until reliably taking po), statin. Continue to avoid seroquel & sedating meds as able (can consider low dose geodon if needed as tolerated this on 3/21)  -SLP re-eval  -pt/ot evals    UTI  -initial u/a negative  -in/out cath u/a 3/21: 6-10 wbc, 4+ bacteria; day #1 empiric rocephin. Follow urine culture      HTN  -currently hypertensive due to encephalopathy/drowsiness not reliably taking po bp meds; cardene drip for now until tolerating po meds reliably  -once tolerating po: amlodipine 5mg, continue coreg 25mg bid (prn cardene drip if needed until taking po meds reliably)    -Diet controlled DM2  -A1c 6.9 on 3/4/25  -ssi for now, titrate prn    Hx PE 2/28/25 & [facility]  -heparin drip initiated until SLP cleared/reliably taking PO, then change back to Eliquis      KURT on ckd 3 (baseline cr ~1.8-1.9)  -initial creatinine was 2.5  -repeat cr 1.8 today at baseline    Am labs: cbc,bmp,mag (follow up urine culture)    Expected Discharge Location and Transportation: family hoping home w/ h.h. (however pt/ot evals still pending)  Expected Discharge   Expected Discharge Date: 3/24/2025; Expected Discharge Time:      VTE Prophylaxis:  Pharmacologic & mechanical VTE prophylaxis orders are present.         AM-PAC 6 Clicks Score (PT): 13 (03/22/25 0800)    CODE STATUS:   Code Status and Medical Interventions: CPR (Attempt to Resuscitate); Full Support   Ordered at: 03/20/25 8385     Code Status (Patient has no pulse and is not breathing):    CPR (Attempt to Resuscitate)     Medical Interventions (Patient has pulse or is breathing):    Full Support       Danie Dunn MD  03/22/25

## 2025-03-22 NOTE — PROGRESS NOTES
Stroke Progress Note       Chief Complaint: Word finding difficulty    Subjective    Subjective     Subjective:  Drowsy this a.m.  Awakens to voice and follows simple commands.  Received Seroquel 50 mg overnight.  Per discussion with the patient's daughter, the patient was not taking this at home.  Code stroke called for increased lethargy.  Suspect this is secondary to Seroquel.  MRI completed overnight with no evidence of AIS.      Review of Systems   Neurological: Negative for tremors.   Objective      Temp:  [97.8 °F (36.6 °C)-98.6 °F (37 °C)] 97.8 °F (36.6 °C)  Heart Rate:  [] 86  Resp:  [18] 18  BP: (143-205)/(66-94) 154/72    Objective      Physical Exam  Constitutional:       Comments: Drowsy, awakens to verbal stimuli, follows simple commands   HENT:      Head: Normocephalic and atraumatic.   Eyes:      Comments: Blinks to visual threat, difficult to fully assess D/T lethargy   Cardiovascular:      Rate and Rhythm: Normal rate and regular rhythm.   Pulmonary:      Effort: Pulmonary effort is normal. No respiratory distress.   Abdominal:      General: There is no distension.      Palpations: Abdomen is soft.   Musculoskeletal:      Right lower leg: No edema.      Left lower leg: No edema.   Skin:     General: Skin is warm and dry.   Neurological:      Comments: Oriented to person, knows she is in the hospital, blinks to visual threat, moderate dysarthria, no obvious facial asymmetry, moves all extremities against gravity       Results Review:    I reviewed the patient's new clinical results.    WBC   Date Value Ref Range Status   03/21/2025 7.46 3.40 - 10.80 10*3/mm3 Final     RBC   Date Value Ref Range Status   03/21/2025 4.25 3.77 - 5.28 10*6/mm3 Final     Hemoglobin   Date Value Ref Range Status   03/21/2025 12.3 12.0 - 15.9 g/dL Final     Hematocrit   Date Value Ref Range Status   03/21/2025 37.4 34.0 - 46.6 % Final     MCV   Date Value Ref Range Status   03/21/2025 88.0 79.0 - 97.0 fL Final      MCH   Date Value Ref Range Status   03/21/2025 28.9 26.6 - 33.0 pg Final     MCHC   Date Value Ref Range Status   03/21/2025 32.9 31.5 - 35.7 g/dL Final     RDW   Date Value Ref Range Status   03/21/2025 15.6 (H) 12.3 - 15.4 % Final     RDW-SD   Date Value Ref Range Status   03/21/2025 49.5 37.0 - 54.0 fl Final     MPV   Date Value Ref Range Status   03/21/2025 9.9 6.0 - 12.0 fL Final     Platelets   Date Value Ref Range Status   03/21/2025 250 140 - 450 10*3/mm3 Final     Neutrophil %   Date Value Ref Range Status   03/21/2025 65.7 42.7 - 76.0 % Final     Lymphocyte %   Date Value Ref Range Status   03/21/2025 25.7 19.6 - 45.3 % Final     Monocyte %   Date Value Ref Range Status   03/21/2025 7.4 5.0 - 12.0 % Final     Eosinophil %   Date Value Ref Range Status   03/21/2025 0.5 0.3 - 6.2 % Final     Basophil %   Date Value Ref Range Status   03/21/2025 0.4 0.0 - 1.5 % Final     Immature Grans %   Date Value Ref Range Status   03/21/2025 0.3 0.0 - 0.5 % Final     Neutrophils, Absolute   Date Value Ref Range Status   03/21/2025 4.90 1.70 - 7.00 10*3/mm3 Final     Lymphocytes, Absolute   Date Value Ref Range Status   03/21/2025 1.92 0.70 - 3.10 10*3/mm3 Final     Monocytes, Absolute   Date Value Ref Range Status   03/21/2025 0.55 0.10 - 0.90 10*3/mm3 Final     Eosinophils, Absolute   Date Value Ref Range Status   03/21/2025 0.04 0.00 - 0.40 10*3/mm3 Final     Basophils, Absolute   Date Value Ref Range Status   03/21/2025 0.03 0.00 - 0.20 10*3/mm3 Final     Immature Grans, Absolute   Date Value Ref Range Status   03/21/2025 0.02 0.00 - 0.05 10*3/mm3 Final     nRBC   Date Value Ref Range Status   03/21/2025 0.0 0.0 - 0.2 /100 WBC Final       Lab Results   Component Value Date    GLUCOSE 124 (H) 03/21/2025    BUN 36 (H) 03/21/2025    CREATININE 1.84 (H) 03/21/2025     03/21/2025    K 3.7 03/21/2025     03/21/2025    CALCIUM 9.0 03/21/2025    PROTEINTOT 6.5 03/21/2025    ALBUMIN 3.8 03/21/2025    ALT 13  03/21/2025    AST 17 03/21/2025    ALKPHOS 49 03/21/2025    BILITOT 0.5 03/21/2025    GLOB 2.7 03/21/2025    AGRATIO 1.4 03/21/2025    BCR 19.6 03/21/2025    ANIONGAP 17.0 (H) 03/21/2025    EGFR 27.5 (L) 03/21/2025     CT Head Without Contrast  Result Date: 3/21/2025  Impression: No definite interval change. No acute intracranial pathology is definitively identified. Electronically Signed: Vidal Riddle MD  3/21/2025 4:49 PM EDT  Workstation ID: [id number]    EEG  Result Date: 3/20/2025  Normal study No evidence for epilepsy is seen This report is transcribed using the Dragon dictation system.      XR Chest 1 View  Result Date: 3/20/2025  Impression: 1.Enlarged cardiac silhouette. This could be related to cardiomegaly and/or pericardial effusion. 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 Posterior cerebral arteries appear patent. Findings may be chronic given absence of posterior territory ischemia seen on the accompanying CT perfusion scan. Please correlate with patient's symptomatology and brain MRI. 2.Otherwise, no intracranial large vessel occlusion is identified. 3.Estimated 70% stenosis within the left proximal internal carotid artery (see series 8, image 223). 4.Less than 50% stenosis within the right internal carotid artery. 5.CT perfusion study demonstrates a small focus of prolonged Tmax greater than 6 seconds within the right inferior frontal lobe (4 mL). This may be artifactual, although a small focus of at risk ischemic tissue cannot be excluded. No territorial core infarct is demonstrated on CT perfusion imaging. 6.Enlarged, multinodular thyroid gland. Recommend follow-up thyroid ultrasound on a nonemergent basis. The  above findings were discussed with Moriah Menjivar via telephone on 3/20/2025 4:50 PM EDT. 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 Posterior cerebral arteries appear patent. Findings may be chronic given absence of posterior territory ischemia seen on the accompanying CT perfusion scan. Please correlate with patient's symptomatology and brain MRI. 2.Otherwise, no intracranial large vessel occlusion is identified. 3.Estimated 70% stenosis within the left proximal internal carotid artery (see series 8, image 223). 4.Less than 50% stenosis within the right internal carotid artery. 5.CT perfusion study demonstrates a small focus of prolonged Tmax greater than 6 seconds within the right inferior frontal lobe (4 mL). This may be artifactual, although a small focus of at risk ischemic tissue cannot be excluded. No territorial core infarct is demonstrated on CT perfusion imaging. 6.Enlarged, multinodular thyroid gland. Recommend follow-up thyroid ultrasound on a nonemergent basis. The above findings were discussed with Moriah Menjivar via telephone on 3/20/2025 4:50 PM EDT. 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 Posterior cerebral arteries appear patent. Findings may be chronic given absence of posterior territory ischemia seen on the accompanying CT perfusion scan. Please correlate with patient's symptomatology and brain MRI. 2.Otherwise, no intracranial large vessel  occlusion is identified. 3.Estimated 70% stenosis within the left proximal internal carotid artery (see series 8, image 223). 4.Less than 50% stenosis within the right internal carotid artery. 5.CT perfusion study demonstrates a small focus of prolonged Tmax greater than 6 seconds within the right inferior frontal lobe (4 mL). This may be artifactual, although a small focus of at risk ischemic tissue cannot be excluded. No territorial core infarct is demonstrated on CT perfusion imaging. 6.Enlarged, multinodular thyroid gland. Recommend follow-up thyroid ultrasound on a nonemergent basis. The above findings were discussed with Moriah Menjivar via telephone on 3/20/2025 4:50 PM EDT. Electronically Signed: Charlie Sanders MD  3/20/2025 4:53 PM EDT  Workstation ID: [id number]    CT Head Without Contrast Stroke Protocol  Result Date: 3/20/2025  Impression: 1. Stable small old lacunar infarct in the central left thalamus. 2. Suspected mild interval extension of a small infarct of the dorsal left thalamus and left internal capsule, although not obviously acute. 3. No clearly acute intracranial abnormality is appreciated. Electronically Signed: Norberto Seaman MD  3/20/2025 4:19 PM EDT  Workstation ID: [id number]    Results for orders placed during the hospital encounter of 03/20/25    Adult Transthoracic Echo Complete W/ Cont if Necessary Per Protocol (With Agitated Saline)    Interpretation Summary    Left ventricular systolic function is normal. Calculated left ventricular EF = 67% Left ventricular ejection fraction appears to be 66 - 70%.    Left ventricular wall thickness is consistent with mild concentric hypertrophy.    Left ventricular outflow tract peak flow gradient at rest is 11 mmHg. Left ventricular outflow tract peak gradient with valsalva is 29 mmHg.    Left ventricular diastolic function is consistent with (grade I) impaired relaxation.    The mitral valve peak gradient is 9 mmHg. The mitral valve mean gradient is 4  mmHg.    Calculated right ventricular systolic pressure from tricuspid regurgitation is 21 mmHg.    There is a trivial pericardial effusion.      -CTH wo on 3/20/2025 images were personally reviewed and showed no acute ischemic or hemorrhagic stroke.  There was stable chronic lacunar infarct of the left arm  -CTA of the head and neck images from 3/20/2025 were personally reviewed and showed no flow limiting stenosis or LVO.  There is moderate stenosis with complex plaque of the left internal carotid artery  -MRI brain is pending  -Transthoracic echocardiogram from 2/28/2025 report was personally reviewed and showed left ventricular ejection fraction of 61-65%, no left atrial dilation  -A1c from 3/4/2025 was 6.9   -LDL from 3/21/2025 was 145    Assessment/Plan     79-year-old female PMH significant for HTN, HLD, CAD, prediabetes, CKD 3, L ICA stenosis (50 to 69%), mild dementia, depression/anxiety, recent PE (2/28, [facility], on Eliquis), recurrent UTIs who presented to the [facility] ED for further evaluation of AMS and aphasia noted by her daughter that day. Given chronic Eliquis use and LKW > 4.5 hours she is not a candidate for IV thrombolytic therapy.  CTA head/neck/perfusion is negative for flow-limiting stenosis or LVO.  She was admitted for further evaluation.     Antiplatelet PTA: None  Anticoagulant PTA: Eliquis 5 mg twice daily (last dose taken this morning)           #Aphasia and right-sided weakness, suspected left hemispheric stroke versus stroke recrudescence  #Multifocal intracranial atherosclerotic disease  #Left ICA stenosis, 50 to 69%  #Recurrent UTIs  -Suspected  stroke recrudescence in the setting of infection/metabolic disarrangement  -UA on 3/20/2025 with a large amount of budding yeast, treated with fluconazole per primary team (last dose 3/21/2025).  -MRI brain 3/22/2025 with chronic left thalamic infarct, no evidence of AIS  -CTH wo on 3/20/2025 with no acute ischemic or hemorrhagic stroke.  There was  stable chronic lacunar infarct on the left  -CTA of the head and neck images from 3/20/2025 were personally reviewed and showed no flow limiting stenosis or LVO.  There is moderate stenosis with complex plaque of the left internal carotid artery  -CTA H on 3/21/2025 negative for any acute findings  -TTE 2/28/2025 with a EF of 61-65, no LAE   -CUS 11/24/2024 with R ICA less than 50% stenosis, L ICA with 50 to 69% stenosis  -Continue aspirin 81 mg daily for secondary stroke prevention in the setting of moderate stenosis of the left ICA with complex plaque  -Recommend correcting any underlying infectious/metabolic derangements.  Discussed with Dr. Dunn.  You repeat UA pending.  -Activity as tolerated, fall risk precautions  -PT/OT/SLP     #Pulmonary embolism, diagnosed 2/28, [facility]  -Heparin drip initiated, currently on hold secondary to elevated Xa  -Okay to continue Eliquis 5 mg twice daily once patient has been cleared by SLP  -Venous duplex negative for DVT  -Further management per primary team     #Essential hypertension  -BP goals less than 130/80.    -Management per primary team    #Hyperlipidemia  -.  Goal less than 70 for secondary stroke prevention.  -Continue atorvastatin 80mg nightly     #Diabetes Mellitus type 2  -A1c 6.90 (3/4/2025), goal  <7 for secondary stroke prevention; no need to repeat  -Maintain euglycemia  -Management per primary team    #Lethargy  -History of mild dementia  -Code stroke called overnight for increased lethargy  -Suspect this is secondary to administration of Seroquel 50 mg.  She was given Geodon yesterday afternoon secondary to agitation.  Discussed with her daughter Cleo.  They have tried multiple medications at home including Seroquel and trazodone.  These did not work for the patient per her report.  The patient was not taking either at home.  -Recommend avoiding sedating medications if possible.  -If something is needed for agitation, recommend  decreasing Seroquel dose to 12.5 mg nightly for now and administering earlier in the evening if possible.    #History of GIB  -LICA stenosis with complex plaque discussed with the patient's daughter.  -Patient's daughter reports a history of GI bleeding while on aspirin.  -No current signs and symptoms of bleeding noted.  H&H is stable.  -Heparin remains held secondary to elevated Xa.  -Recommend continuing to monitor CBC and for s/s bleeding.  If any s/s bleeding is noted, aspirin will be discontinued.    Plan discussed with Dr. Mercedes, Dr. Dunn, the patient, her daughter Cleo, and nursing staff.  Stroke neurology will continue to follow.  Please call with any questions or concerns.    JOSEE Howell, AGAP-BC

## 2025-03-22 NOTE — PROGRESS NOTES
Pharmacy to Dose Heparin Infusion Note    Valarie Camara is a 79 y.o. female receiving heparin infusion.     Therapy for (VTE/Cardiac): Recent VTE  Patient Weight: 68  Initial Bolus (Y/N): No  Any Bolus (Y/N): No     Signs or Symptoms of Bleeding: No    VTE (PE/DVT)   Initial Bolus: 80 units/kg (Max 10,000 units)  Initial rate: 18 units/kg/hr (Max 1,500 units/hr)    Anti-Xa Bolus   Dose Infusion Hold   Time Infusion Rate Change (units/kg/hr) Repeat  Anti-Xa   < 0.11 50 units/kg  (4000 units Max) None Increase by  4 units/kg/hr 6 hours   0.11 - 0.19 25 units/kg  (2000 units Max) None Increase by  3 units/kg/hr 6 hours   0.2 - 0.29 0 None Increase by  2 units/kg/hr 6 hours   0.3 - 0.7 0 None No Change 6 hours (after 2 consecutive levels in range check qAM)   0.71 - 0.8 0 None Decrease by  1 units/kg/hr 6 hours   0.81 - 0.9 0 None Decrease by  2 units/kg/hr 6 hours   0.91 - 1 0 60 minutes Decrease by  3 units/kg/hr 6 hours   >1 0 Hold  After Anti-Xa less than 0.7 decrease previous rate by  4 units/kg/hr  Every 2 hours until Anti-Xa less than 0.7 then when infusion restarts in 6 hours     Results from last 7 days   Lab Units 03/22/25  0545 03/21/25  0553 03/20/25  1609 03/20/25  1608   INR   --   --   --  1.43*   HEMOGLOBIN g/dL 13.2 12.3  --  11.7*   HEMOGLOBIN, POC g/dL  --   --  11.9*  --    HEMATOCRIT % 41.3 37.4  --  35.7   HEMATOCRIT POC %  --   --  35*  --    PLATELETS 10*3/mm3 255 250  --  264       Date   Time   Anti-Xa Current Rate (units/kg/hr) Bolus   (units) Rate Change   (units/kg/hr) New Rate (units/kg/hr) Repeat  Anti-Xa Comments /  Pump Check    3/20 1608 > 1.1 -- No bolus ever Held  Hold 2200 Holding infusion until anti-Xa < 1. Discussed with stoke team and RN.   3/21 0000 > 1.1 Hold -- -- Hold 0600 Continue to hold   3/21 0553 > 1.1 HOLD -- -- HOLD 1200 Jennifer lou   3/21 1150 >1.1 HOLD -- -- HOLD 1800 Donovan RN   3/21 1901 >1.1 HOLD -- -- HOLD 0200 Discussed with  Hadley Julian (Nurse)   3/22 0582 >1.1  HOLD -- -- HOLD 1200 Discussed w/ nurse   3/22 1452 0.98 HELD -- +15 15 2100 DW RN                                                                                                                                                                 Thank you,  Suzanne Lewis, PharmD  03/22/25  16:56 EDT

## 2025-03-22 NOTE — SIGNIFICANT NOTE
Stroke Neurology:    CODE STROKE called for increased lethargy. Blood pressure 170's systolic and blood glucose stable. Patient is somnolent, but able to be aroused with deep stimuli. Seroquel restarted tonight at home dose of 50mg. Unclear if she has been taking this dose at home. Heparin continues to be held secondary to increased Xa. MRI reviewed and appears negative on my personal interpretation. Suspect her lethargy and somnolence is secondary to Seroquel. Labs ordered. No need for further imaging at this time. Stroke neurology will continue to follow.     Wendy Bernard, APRN  03/22/25

## 2025-03-23 LAB
ANION GAP SERPL CALCULATED.3IONS-SCNC: 17 MMOL/L (ref 5–15)
BACTERIA SPEC AEROBE CULT: ABNORMAL
BUN SERPL-MCNC: 25 MG/DL (ref 8–23)
BUN/CREAT SERPL: 17 (ref 7–25)
CALCIUM SPEC-SCNC: 9 MG/DL (ref 8.6–10.5)
CHLORIDE SERPL-SCNC: 109 MMOL/L (ref 98–107)
CO2 SERPL-SCNC: 18 MMOL/L (ref 22–29)
CREAT SERPL-MCNC: 1.47 MG/DL (ref 0.57–1)
DEPRECATED RDW RBC AUTO: 53.2 FL (ref 37–54)
EGFRCR SERPLBLD CKD-EPI 2021: 35.9 ML/MIN/1.73
ERYTHROCYTE [DISTWIDTH] IN BLOOD BY AUTOMATED COUNT: 16.5 % (ref 12.3–15.4)
GLUCOSE BLDC GLUCOMTR-MCNC: 125 MG/DL (ref 70–130)
GLUCOSE BLDC GLUCOMTR-MCNC: 74 MG/DL (ref 70–130)
GLUCOSE BLDC GLUCOMTR-MCNC: 84 MG/DL (ref 70–130)
GLUCOSE BLDC GLUCOMTR-MCNC: 86 MG/DL (ref 70–130)
GLUCOSE BLDC GLUCOMTR-MCNC: 99 MG/DL (ref 70–130)
GLUCOSE SERPL-MCNC: 88 MG/DL (ref 65–99)
HCT VFR BLD AUTO: 41 % (ref 34–46.6)
HGB BLD-MCNC: 13.3 G/DL (ref 12–15.9)
MAGNESIUM SERPL-MCNC: 1.7 MG/DL (ref 1.6–2.4)
MCH RBC QN AUTO: 28.9 PG (ref 26.6–33)
MCHC RBC AUTO-ENTMCNC: 32.4 G/DL (ref 31.5–35.7)
MCV RBC AUTO: 89.1 FL (ref 79–97)
PLATELET # BLD AUTO: 255 10*3/MM3 (ref 140–450)
PMV BLD AUTO: 10.2 FL (ref 6–12)
POTASSIUM SERPL-SCNC: 3.8 MMOL/L (ref 3.5–5.2)
RBC # BLD AUTO: 4.6 10*6/MM3 (ref 3.77–5.28)
SODIUM SERPL-SCNC: 144 MMOL/L (ref 136–145)
UFH PPP CHRO-ACNC: 0.78 IU/ML (ref 0.3–0.7)
UFH PPP CHRO-ACNC: 0.81 IU/ML (ref 0.3–0.7)
UFH PPP CHRO-ACNC: 0.92 IU/ML (ref 0.3–0.7)
UFH PPP CHRO-ACNC: 0.96 IU/ML (ref 0.3–0.7)
UFH PPP CHRO-ACNC: 0.97 IU/ML (ref 0.3–0.7)
WBC NRBC COR # BLD AUTO: 6.88 10*3/MM3 (ref 3.4–10.8)

## 2025-03-23 PROCEDURE — 99233 SBSQ HOSP IP/OBS HIGH 50: CPT | Performed by: NURSE PRACTITIONER

## 2025-03-23 PROCEDURE — 99232 SBSQ HOSP IP/OBS MODERATE 35: CPT | Performed by: INTERNAL MEDICINE

## 2025-03-23 PROCEDURE — 80048 BASIC METABOLIC PNL TOTAL CA: CPT | Performed by: INTERNAL MEDICINE

## 2025-03-23 PROCEDURE — 25010000002 CEFTRIAXONE PER 250 MG: Performed by: INTERNAL MEDICINE

## 2025-03-23 PROCEDURE — 82948 REAGENT STRIP/BLOOD GLUCOSE: CPT

## 2025-03-23 PROCEDURE — 92610 EVALUATE SWALLOWING FUNCTION: CPT

## 2025-03-23 PROCEDURE — 25810000003 SODIUM CHLORIDE 0.9 % SOLUTION 250 ML FLEX CONT: Performed by: INTERNAL MEDICINE

## 2025-03-23 PROCEDURE — 97530 THERAPEUTIC ACTIVITIES: CPT

## 2025-03-23 PROCEDURE — 97162 PT EVAL MOD COMPLEX 30 MIN: CPT

## 2025-03-23 PROCEDURE — 85027 COMPLETE CBC AUTOMATED: CPT | Performed by: INTERNAL MEDICINE

## 2025-03-23 PROCEDURE — 25010000002 HEPARIN (PORCINE) 25000-0.45 UT/250ML-% SOLUTION

## 2025-03-23 PROCEDURE — 97535 SELF CARE MNGMENT TRAINING: CPT

## 2025-03-23 PROCEDURE — 25010000002 NICARDIPINE 2.5 MG/ML SOLUTION 10 ML VIAL: Performed by: INTERNAL MEDICINE

## 2025-03-23 PROCEDURE — 97166 OT EVAL MOD COMPLEX 45 MIN: CPT

## 2025-03-23 PROCEDURE — 85520 HEPARIN ASSAY: CPT

## 2025-03-23 PROCEDURE — 83735 ASSAY OF MAGNESIUM: CPT | Performed by: INTERNAL MEDICINE

## 2025-03-23 RX ORDER — DIPHENHYDRAMINE HCL 25 MG
25 CAPSULE ORAL NIGHTLY PRN
Status: DISCONTINUED | OUTPATIENT
Start: 2025-03-23 | End: 2025-04-07 | Stop reason: HOSPADM

## 2025-03-23 RX ORDER — HEPARIN SODIUM 10000 [USP'U]/100ML
5 INJECTION, SOLUTION INTRAVENOUS
Status: DISCONTINUED | OUTPATIENT
Start: 2025-03-23 | End: 2025-03-25

## 2025-03-23 RX ADMIN — Medication 10 ML: at 09:06

## 2025-03-23 RX ADMIN — SODIUM CHLORIDE 5 MG/HR: 9 INJECTION, SOLUTION INTRAVENOUS at 09:26

## 2025-03-23 RX ADMIN — SODIUM CHLORIDE 1000 MG: 900 INJECTION INTRAVENOUS at 14:55

## 2025-03-23 RX ADMIN — Medication 10 ML: at 21:21

## 2025-03-23 RX ADMIN — ATORVASTATIN CALCIUM 80 MG: 40 TABLET, FILM COATED ORAL at 20:58

## 2025-03-23 RX ADMIN — HEPARIN SODIUM 6 UNITS/KG/HR: 10000 INJECTION, SOLUTION INTRAVENOUS at 23:02

## 2025-03-23 RX ADMIN — SODIUM CHLORIDE 5 MG/HR: 9 INJECTION, SOLUTION INTRAVENOUS at 02:33

## 2025-03-23 RX ADMIN — HEPARIN SODIUM 11 UNITS/KG/HR: 10000 INJECTION, SOLUTION INTRAVENOUS at 09:06

## 2025-03-23 NOTE — THERAPY EVALUATION
Patient Name: Valarie Camara  : 1945    MRN: [id number]                              Today's Date: 3/23/2025       Admit Date: 3/20/2025    Visit Dx:     ICD-10-CM ICD-9-CM   1. Acute ischemic stroke  I63.9 434.91   2. History of stroke  Z86.73 V12.54   3. Renal insufficiency  N28.9 593.9   4. Aphasia  R47.01 784.3   5. Dysarthria  R47.1 784.51   6. Dysphagia, unspecified type  R13.10 787.20     Patient Active Problem List   Diagnosis    Atopic rhinitis    Arthritis    Chronic neck pain    Anxiety and depression    Edema    Gastroesophageal reflux disease with esophagitis    Multiple-type hyperlipidemia    Vitamin D deficiency    Benign essential hypertension    Obesity (BMI 30-39.9)    History of COPD    History of cataract    History of osteoporosis    History of restless legs syndrome    History of rheumatoid arthritis    Elevated serum creatinine    Esophageal dysphagia    Constipation    Schatzki's ring    Gastritis without bleeding    History of Helicobacter pylori infection    Duodenitis    Right hemiparesis    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    Multiple thyroid nodules    Lacunar stroke    DM (diabetes mellitus), type 2, uncontrolled w/neurologic complication    Syncope and collapse    Positive fecal occult blood test    Left foot pain    Hypomagnesemia    Acute on chronic anemia    Esophageal dysphagia    Reflux esophagitis    Syncope    Irritable bowel syndrome with constipation    Hypertensive urgency    Pulmonary emboli    Encephalopathy     Past Medical History:   Diagnosis Date    Acute bronchitis with bronchospasm     Anxiety     Arthritis     Asthma     B12 deficiency     Back pain     Body piercing     ears    Cataract     Cerebrovascular disease     Cervicalgia     Chronic kidney disease     stage 3b    Colonic polyp     History of colonic polyps     Constipation     COPD (chronic obstructive pulmonary disease)     Coronary artery disease      "Depression     Diverticulitis     Dysphagia     Patient reported \"it won't go all the way down\" when eating solid foods first thing in the mornings    Edema     Elevated cholesterol     Esophageal reflux     Folic acid deficiency     Full dentures     Advised no adhesives DOS    Heart murmur     Herpes zoster     High cholesterol     History of kidney infection     History of recurrent urinary tract infection     HTN (hypertension)     Hypercholesterolemia     Impaired functional mobility, balance, gait, and endurance     Insomnia     Kidney infection     Malignant hypertension 04/26/2015     Accelerated essential hypertension    Measles     rubeola    Muscle spasm     Neoplasm of uncertain behavior of skin     dtr denies    Osteoporosis     Poor historian     Recurrent urinary tract infection     Rheumatoid arthritis     RLS (restless legs syndrome)     Stomach ulcer     Stroke     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    Type 2 diabetes mellitus     Vitamin D deficiency      Past Surgical History:   Procedure Laterality Date    CATARACT EXTRACTION WITH INTRAOCULAR LENS IMPLANT Left 02/11/2013    CATARACT EXTRACTION WITH INTRAOCULAR LENS IMPLANT Right 04/15/2013    COLONOSCOPY  2012    COLONOSCOPY N/A 11/26/2019    Procedure: COLONOSCOPY;  Surgeon: Chidi Downing MD;  Location:  [facility];  Service: Gastroenterology    ENDOSCOPY N/A 11/13/2017    Procedure: ESOPHAGOGASTRODUODENOSCOPY with biopsies and esophageal balloon dilitation;  Surgeon: Wesley Stovall MD;  Location:  [facility];  Service:     ENDOSCOPY N/A 11/25/2019    Procedure: ESOPHAGOGASTRODUODENOSCOPY;  Surgeon: Chidi Downing MD;  Location:  [facility];  Service: Gastroenterology    ENDOSCOPY N/A 11/29/2021    Procedure: ESOPHAGOGASTRODUODENOSCOPY with dilation and biopsies;  Surgeon: Gonzalez Zapata MD;  Location: [facility];  Service: Gastroenterology;  Laterality: N/A;    " "ENDOSCOPY N/A 11/1/2023    Procedure: ESOPHAGOGASTRODUODENOSCOPY WITH BIOPSY AND DILATATION;  Surgeon: Gonzalez Zapata MD;  Location: [facility] END[facility]COPY;  Service: Gastroenterology;  Laterality: N/A;    ENDOSCOPY N/A 1/27/2025    Procedure: Esophagogastroduodenoscopy with dilatation and biopsies;  Surgeon: Gonzalez Zapata MD;  Location: [facility] ENDOSCOPY;  Service: Gastroenterology;  Laterality: N/A;    HYSTERECTOMY  1979    UPPER GASTROINTESTINAL ENDOSCOPY  12/09/2013    UPPER GASTROINTESTINAL ENDOSCOPY  11/13/2017      General Information       Row Name 03/23/25 1143          OT Time and Intention    Document Type evaluation  -JR     Mode of Treatment occupational therapy  -       Row Name 03/23/25 1143          General Information    Patient Profile Reviewed yes  -JR     Prior Level of Function independent:;gait;transfer;bed mobility;ADL's  Pt's daughter reports pt ambulates with cane, daughter assists pt with shower transfer, and washing pt's back. Dtr completes home management tasks & driving  -     Existing Precautions/Restrictions fall;seizures;other (see comments)  -     Barriers to Rehab medically complex;previous functional deficit;cognitive status  -       Row Name 03/23/25 1143          Living Environment    Current Living Arrangements home  -     People in Home child(sindy), adult  -       Row Name 03/23/25 1143          Home Main Entrance    Number of Stairs, Main Entrance three  -       Row Name 03/23/25 1143          Stairs Within Home, Primary    Number of Stairs, Within Home, Primary none  -       Row Name 03/23/25 1143          Cognition    Orientation Status (Cognition) oriented to;person;place;time;verbal cues/prompts needed for orientation  Pt able to state name, \"hospital,\" but not name of hospital and month and year with delay  -       Row Name 03/23/25 1143          Safety Issues/Impairments Affecting Functional Mobility    Safety Issues Affecting Function " (Mobility) awareness of need for assistance;insight into deficits/self-awareness;safety precaution awareness;safety precautions follow-through/compliance;sequencing abilities  -     Impairments Affecting Function (Mobility) balance;cognition;endurance/activity tolerance;motor control;strength;postural/trunk control  -     Cognitive Impairments, Mobility Safety/Performance awareness, need for assistance;insight into deficits/self-awareness;judgment;problem-solving/reasoning;safety precaution awareness;safety precaution follow-through;sequencing abilities  -               User Key  (r) = Recorded By, (t) = Taken By, (c) = Cosigned By      Initials Name Provider Type    JR Mely, Eli ASHRAF, OT Occupational Therapist                     Mobility/ADL's       Row Name 03/23/25 1151          Bed Mobility    Bed Mobility scooting/bridging;supine-sit;sit-supine  -     Scooting/Bridging [place] (Bed Mobility) dependent (less than 25% patient effort);2 person assist;verbal cues  -     Supine-Sit [place] (Bed Mobility) maximum assist (25% patient effort);2 person assist;verbal cues  -     Sit-Supine [place] (Bed Mobility) maximum assist (25% patient effort);2 person assist;verbal cues  -JR     Bed Mobility, Safety Issues impaired trunk control for bed mobility  -     Assistive Device (Bed Mobility) bed rails;head of bed elevated;repositioning sheet  -     Comment, (Bed Mobility) Pt required increased time and verbal cues for supine to sit on EOB. Despite cues and increased time, pt required assist to bring trunk into sitting and LE's off the EOB. Pt able to scoot forward to EOB.  -       Row Name 03/23/25 1151          Transfers    Comment, (Transfers) Deferred this date due to decreased sitting balance.  -       Row Name 03/23/25 1151          Activities of Daily Living    BADL Assessment/Intervention upper body dressing;lower body dressing  -       Row Name 03/23/25 1151          Upper  Body Dressing Assessment/Training    [place] Level (Upper Body Dressing) don;doff;dependent (less than 25% patient effort)  -JR     Position (Upper Body Dressing) supine  -JR     Comment, (Upper Body Dressing) hospital gown  -JR       Row Name 03/23/25 1151          Lower Body Dressing Assessment/Training    [place] Level (Lower Body Dressing) don;socks;dependent (less than 25% patient effort)  -JR     Position (Lower Body Dressing) supine  -JR               User Key  (r) = Recorded By, (t) = Taken By, (c) = Cosigned By      Initials Name Provider Type    Eli East OT Occupational Therapist                   Obj/Interventions       Row Name 03/23/25 1154          Sensory Assessment (Somatosensory)    Sensory Assessment (Somatosensory) UE sensation intact  -       Row Name 03/23/25 1154          Vision Assessment/Intervention    Vision Assessment Comment Pt able to move eyes in all planes, and able to ID # of fingers in both visual fields. Pt able to recognize daughter  -       Row Name 03/23/25 1154          Range of Motion Comprehensive    General Range of Motion bilateral upper extremity ROM WFL  -       Row Name 03/23/25 1154          Strength Comprehensive (MMT)    Comment, General Manual Muscle Testing (MMT) Assessment B UE MMT equal, approx 4/5  -JR       Row Name 03/23/25 1154          Motor Skills    Motor Skills muscle tone;other (see comments)  Pt with whole body myoclonic jerking and startles easily when touched  -     Muscle Tone WNL  -JR       Row Name 03/23/25 1154          Balance    Balance Assessment sitting static balance  -JR     Static Sitting Balance minimal assist  -JR               User Key  (r) = Recorded By, (t) = Taken By, (c) = Cosigned By      Initials Name Provider Type    Eli East OT Occupational Therapist                   Goals/Plan       Row Name 03/23/25 1203          Bed Mobility Goal 1 (OT)    Activity/Assistive Device (Bed Mobility Goal  1, OT) bed mobility activities, all  -JR     Person Level/Cues Needed (Bed Mobility Goal 1, OT) minimum assist (75% or more patient effort);verbal cues required  -JR     Time Frame (Bed Mobility Goal 1, OT) short term goal (STG);5 days  -JR     Progress/Outcomes (Bed Mobility Goal 1, OT) new [facility]  -       Row Name 03/23/25 1203          Transfer Goal 1 (OT)    Activity/Assistive Device (Transfer Goal 1, OT) transfers, all;walker, rolling  -JR     Person Level/Cues Needed (Transfer Goal 1, OT) minimum assist (75% or more patient effort);verbal cues required  -JR     Time Frame (Transfer Goal 1, OT) long term goal (LTG);by discharge  -JR     Progress/Outcome (Transfer Goal 1, OT) new [facility]  -       Row Name 03/23/25 1203          Toileting Goal 1 (OT)    Activity/Device (Toileting Goal 1, OT) toileting skills, all  -JR     Person Level/Cues Needed (Toileting Goal 1, OT) minimum assist (75% or more patient effort);verbal cues required  -JR     Time Frame (Toileting Goal 1, OT) long term goal (LTG);by discharge  -JR     Progress/Outcome (Toileting Goal 1, OT) new goal  -       Row Name 03/23/25 1203          Grooming Goal 1 (OT)    Activity/Device (Grooming Goal 1, OT) grooming skills, all  -JR     Person (Grooming Goal 1, OT) minimum assist (75% or more patient effort);verbal cues required  -JR     Time Frame (Grooming Goal 1, OT) long term goal (LTG);by discharge  -JR     Progress/Outcome (Grooming Goal 1, OT) new [facility]  -       Row Name 03/23/25 1203          Therapy Assessment/Plan (OT)    Planned Therapy Interventions (OT) activity tolerance training;adaptive equipment training;BADL retraining;cognitive/visual perception retraining;functional balance retraining;occupation/activity based interventions;ROM/therapeutic exercise;transfer/mobility retraining;strengthening exercise;patient/caregiver education/training;neuromuscular control/coordination retraining  -JR               User Key   (r) = Recorded By, (t) = Taken By, (c) = Cosigned By      Initials Name Provider Type    JR Eli Boone, OT Occupational Therapist                   Clinical Impression       Row Name 03/23/25 9077          Pain Assessment    Pretreatment Pain Rating 0/10 - no pain  -JR     Posttreatment Pain Rating 0/10 - no pain  -JR       Row Name 03/23/25 1150          Plan of Care Review    Plan of Care Reviewed With patient;daughter  -JR     Outcome Evaluation OT initial eval and expanded chart review completed. Pt presents with multiple comorbidities and decreased strength, balance, activity tolerance limiting independence with ADL's and mobility from baseline status. Recommend continued skilled OT services and transfer to SNF, if deemed medically appropriate.  -       Row Name 03/23/25 8416          Therapy Assessment/Plan (OT)    Patient/Family Therapy Goal Statement (OT) dtr would like pt to return to [facility]  -     Rehab Potential (OT) good  -     Criteria for Skilled Therapeutic Interventions Met (OT) yes;meets criteria;skilled treatment is necessary  -     Therapy Frequency (OT) daily  -     Predicted Duration of Therapy Intervention (OT) 10 days  -       Row Name 03/23/25 6964          Therapy Plan Review/Discharge Plan (OT)    Anticipated Discharge Disposition (OT) [facility] nursing facility  -       Row Name 03/23/25 2372          Vital Signs    Pre Systolic BP Rehab 132  -JR     Pre Treatment Diastolic BP 57  -JR     Post Systolic BP Rehab 143  -JR     Post Treatment Diastolic BP 73  -JR     Pretreatment Heart Rate (beats/min) 100  -JR     Posttreatment Heart Rate (beats/min) 88  -JR     Pre SpO2 (%) 97  -JR     O2 Delivery Pre Treatment room air  -JR     Post SpO2 (%) 98  -JR     O2 Delivery Post Treatment room air  -JR     Pre Patient Position Supine  -JR     Intra Patient Position Sitting  -JR     Post Patient Position Supine  -JR       Row Name 03/23/25 2882          Positioning and Restraints     Pre-Treatment Position in bed  -JR     Post Treatment Position bed  -JR     In Bed notified nsg;supine;call light within reach;encouraged to call for assist;exit alarm on;with family/caregiver;SCD pump applied;heels elevated  -               User Key  (r) = Recorded By, (t) = Taken By, (c) = Cosigned By      Initials Name Provider Type    Eli East OT Occupational Therapist                   Outcome Measures       Row Name 03/23/25 1204          How much help from another is currently needed...    Putting on and taking off regular lower body clothing? 1  -JR     Bathing (including washing, rinsing, and drying) 2  -JR     Toileting (which includes using toilet bed pan or urinal) 1  -JR     Putting on and taking off regular upper body clothing 2  -JR     Taking care of personal grooming (such as brushing teeth) 2  -JR     Eating meals 3  -JR     AM-PAC 6 Clicks Score (OT) 11  -       Row Name 03/23/25 1204          Modified Pitt Scale    Modified Liz Scale 4 - Moderately severe disability.  Unable to walk without assistance, and unable to attend to own bodily needs without assistance.  -       Row Name 03/23/25 1204          Functional Assessment    Outcome Measure Options AM-PAC 6 Clicks Daily Activity (OT);Modified Pitt  -               User Key  (r) = Recorded By, (t) = Taken By, (c) = Cosigned By      Initials Name Provider Type    Eli East OT Occupational Therapist                    Occupational Therapy Education       Title: PT OT SLP Therapies (In Progress)       Topic: Occupational Therapy (In Progress)       Point: ADL training (Done)       Learning Progress Summary            Patient Acceptance, E, VU,NR by  at 3/23/2025 1054    Comment: role of therapy, ongoing treatment plan, encouraged call bell use   Family Acceptance, E, VU,NR by  at 3/23/2025 1054    Comment: role of therapy, ongoing treatment plan, encouraged call bell use                      Point: Body  mechanics (Done)       Learning Progress Summary            Patient Acceptance, E, VU,NR by  at 3/23/2025 1054    Comment: role of therapy, ongoing treatment plan, encouraged call bell use   Family Acceptance, E, VU,NR by  at 3/23/2025 1054    Comment: role of therapy, ongoing treatment plan, encouraged call bell use                                      User Key       Initials Effective Dates Name Provider Type Discipline     02/03/23 -  Eli Boone, OT Occupational Therapist OT                  OT Recommendation and Plan  Planned Therapy Interventions (OT): activity tolerance training, adaptive equipment training, BADL retraining, cognitive/visual perception retraining, functional balance retraining, occupation/activity based interventions, ROM/therapeutic exercise, transfer/mobility retraining, strengthening exercise, patient/caregiver education/training, neuromuscular control/coordination retraining  Therapy Frequency (OT): daily  Plan of Care Review  Plan of Care Reviewed With: patient, daughter  Outcome Evaluation: OT initial eval and expanded chart review completed. Pt presents with multiple comorbidities and decreased strength, balance, activity tolerance limiting independence with ADL's and mobility from baseline status. Recommend continued skilled OT services and transfer to SNF, if deemed medically appropriate.     Time Calculation:   Evaluation Complexity (OT)  Review Occupational Profile/Medical/Therapy History Complexity: expanded/moderate complexity  Assessment, Occupational Performance/Identification of Deficit Complexity: 3-5 performance deficits  Clinical Decision Making Complexity (OT): detailed assessment/moderate complexity  Overall Complexity of Evaluation (OT): moderate complexity     Time Calculation- OT       Row Name 03/23/25 1205             Time Calculation- OT    OT Start Time 1054  -      OT Received On 03/23/25  -      OT Goal Re-Cert Due Date 04/02/25  -         Timed  Charges    72078 - OT Self Care/Mgmt Minutes 12  -JR         Untimed Charges    OT Eval/Re-eval Minutes 46  -JR         Total Minutes    Timed Charges Total Minutes 12  -JR      Untimed Charges Total Minutes 46  -JR       Total Minutes 58  -JR                User Key  (r) = Recorded By, (t) = Taken By, (c) = Cosigned By      Initials Name Provider Type     Eli Boone, OT Occupational Therapist                  Therapy Charges for Today       Code Description Service Date Service Provider Modifiers Qty    68497833789 HC OT SELF CARE/MGMT/TRAIN EA 15 MIN 3/23/2025 Eli Boone, OT GO 1    41780132379 HC OT EVAL MOD COMPLEXITY 4 3/23/2025 Eli Boone OT GO 1                 Eli Boone, OT  3/23/2025

## 2025-03-23 NOTE — PROGRESS NOTES
Pharmacy to Dose Heparin Infusion Note    Valarie Camara is a 79 y.o. female receiving heparin infusion.     Therapy for (VTE/Cardiac): Recent VTE  Patient Weight: 68  Initial Bolus (Y/N): No  Any Bolus (Y/N): No     Signs or Symptoms of Bleeding: No    VTE (PE/DVT)   Initial Bolus: 80 units/kg (Max 10,000 units)  Initial rate: 18 units/kg/hr (Max 1,500 units/hr)    Anti-Xa Bolus   Dose Infusion Hold   Time Infusion Rate Change (units/kg/hr) Repeat  Anti-Xa   < 0.11 50 units/kg  (4000 units Max) None Increase by  4 units/kg/hr 6 hours   0.11 - 0.19 25 units/kg  (2000 units Max) None Increase by  3 units/kg/hr 6 hours   0.2 - 0.29 0 None Increase by  2 units/kg/hr 6 hours   0.3 - 0.7 0 None No Change 6 hours (after 2 consecutive levels in range check qAM)   0.71 - 0.8 0 None Decrease by  1 units/kg/hr 6 hours   0.81 - 0.9 0 None Decrease by  2 units/kg/hr 6 hours   0.91 - 1 0 60 minutes Decrease by  3 units/kg/hr 6 hours   >1 0 Hold  After Anti-Xa less than 0.7 decrease previous rate by  4 units/kg/hr  Every 2 hours until Anti-Xa less than 0.7 then when infusion restarts in 6 hours     Results from last 7 days   Lab Units 03/23/25  0220 03/22/25  0545 03/21/25  0553 03/20/25  1609 03/20/25  1608   INR   --   --   --   --  1.43*   HEMOGLOBIN g/dL 13.3 13.2 12.3  --  11.7*   HEMOGLOBIN, POC   --   --   --    < >  --    HEMATOCRIT % 41.0 41.3 37.4  --  35.7   HEMATOCRIT POC   --   --   --    < >  --    PLATELETS 10*3/mm3 255 255 250  --  264    < > = values in this interval not displayed.       Date   Time   Anti-Xa Current Rate (units/kg/hr) Bolus   (units) Rate Change   (units/kg/hr) New Rate (units/kg/hr) Repeat  Anti-Xa Comments /  Pump Check    3/20 1608 > 1.1 -- No bolus ever Held  Hold 2200 Holding infusion until anti-Xa < 1. Discussed with stoke team and RN.   3/21 0000 > 1.1 Hold -- -- Hold 0600 Continue to hold   3/21 0553 > 1.1 HOLD -- -- HOLD 1200 Dawnlyn -acb   3/21 1150 >1.1 HOLD -- -- HOLD 1800 Dw RN    3/21 1901 >1.1 HOLD -- -- HOLD 0200 Discussed with  Hadley Julian (Nurse)   3/22 0529 >1.1 HOLD -- -- HOLD 1200 Discussed w/ nurse   3/22 1452 0.98 HELD -- +15 15 2100 DW RN   3/22 2131 >1.1 15 -- -15 (hold) hold 0100 Spoke with RN   3/23 0036 0.97 HOLD -- -- HOLD 0300 Discussed w/ nurse   3/23 0220 0.92 HOLD -- -- HOLD 0600 Discussed w/ nurse   3/23 0613 0.78 0 (held) -- +11 11 1400 DW RN (Christopher); pump verified

## 2025-03-23 NOTE — PROGRESS NOTES
Pharmacy to Dose Heparin Infusion Note    Valarie Camara is a 79 y.o. female receiving heparin infusion.     Therapy for (VTE/Cardiac): Recent VTE  Patient Weight: 68  Initial Bolus (Y/N): No  Any Bolus (Y/N): No     Signs or Symptoms of Bleeding: No    VTE (PE/DVT)   Initial Bolus: 80 units/kg (Max 10,000 units)  Initial rate: 18 units/kg/hr (Max 1,500 units/hr)    Anti-Xa Bolus   Dose Infusion Hold   Time Infusion Rate Change (units/kg/hr) Repeat  Anti-Xa   < 0.11 50 units/kg  (4000 units Max) None Increase by  4 units/kg/hr 6 hours   0.11 - 0.19 25 units/kg  (2000 units Max) None Increase by  3 units/kg/hr 6 hours   0.2 - 0.29 0 None Increase by  2 units/kg/hr 6 hours   0.3 - 0.7 0 None No Change 6 hours (after 2 consecutive levels in range check qAM)   0.71 - 0.8 0 None Decrease by  1 units/kg/hr 6 hours   0.81 - 0.9 0 None Decrease by  2 units/kg/hr 6 hours   0.91 - 1 0 60 minutes Decrease by  3 units/kg/hr 6 hours   >1 0 Hold  After Anti-Xa less than 0.7 decrease previous rate by  4 units/kg/hr  Every 2 hours until Anti-Xa less than 0.7 then when infusion restarts in 6 hours     Results from last 7 days   Lab Units 03/23/25  0220 03/22/25  0545 03/21/25  0553 03/20/25  1609 03/20/25  1608   INR   --   --   --   --  1.43*   HEMOGLOBIN g/dL 13.3 13.2 12.3  --  11.7*   HEMOGLOBIN, POC   --   --   --    < >  --    HEMATOCRIT % 41.0 41.3 37.4  --  35.7   HEMATOCRIT POC   --   --   --    < >  --    PLATELETS 10*3/mm3 255 255 250  --  264    < > = values in this interval not displayed.       Date   Time   Anti-Xa Current Rate (units/kg/hr) Bolus   (units) Rate Change   (units/kg/hr) New Rate (units/kg/hr) Repeat  Anti-Xa Comments /  Pump Check    3/20 1608 > 1.1 -- No bolus ever Held  Hold 2200 Holding infusion until anti-Xa < 1. Discussed with stoke team and RN.   3/21 0000 > 1.1 Hold -- -- Hold 0600 Continue to hold   3/21 0553 > 1.1 HOLD -- -- HOLD 1200 Dawnlyn -acb   3/21 1150 >1.1 HOLD -- -- HOLD 1800 Dw RN    3/21 1901 >1.1 HOLD -- -- HOLD 0200 Discussed with  Hadley Julian (Nurse)   3/22 0529 >1.1 HOLD -- -- HOLD 1200 Discussed w/ nurse   3/22 1452 0.98 HELD -- +15 15 2100 DW RN   3/22 2131 >1.1 15 -- -15 (hold) hold 0100 Spoke with RN   3/23 0036 0.97 HOLD -- -- HOLD 0300 Discussed w/ nurse   3/23 0220 0.92 HOLD -- -- HOLD 0600 Discussed w/ nurse   3/23 0613 0.78 0 (held) -- +11 11 1400 DW RN Hans); pump verified    3/23 1341 0.96 11 -- -11 (HOLD) 0 (HOLD) 2000 DW RN Hans); pump verified

## 2025-03-23 NOTE — PLAN OF CARE
Goal Outcome Evaluation:  Plan of Care Reviewed With: patient, child           Outcome Evaluation: PT PRESENTS WITH EVOLVING SYMPTOMS TO INCLUDE IMPAIRED BALANCE, GENERALIZED WEAKNESS, IMPAIRED COGNITION, DECREASED ENDURANCE AND DECLINE IN FUNCTIONAL MOBILITY. PT STARTLES EASILY AND DEMONSTRATES INTERMITTENT MYOCLONIC JERKING. EVAL LIMITED TO SITTING EOB X APPROX 10 MINUTES. AT BASELINE, PT AMBULATES WITH CANE INDEPENDENTLY. PT WAS RECENTLY AT [facility] FOR PE. PT WAS D//C  TO IRF AND HAS BEEN BACK HOME WITH DTR  FOR APPROX 1 WEEK. RECOMMEND SNF AT D/C.    Anticipated Discharge Disposition (PT): skilled nursing facility                         Vaccine Information Sheet (VIS) provided-VIS date: 8/15/19

## 2025-03-23 NOTE — PROGRESS NOTES
[facility] Medicine Services  PROGRESS NOTE    Patient Name: Valarie Camara  : 1945  MRN: [id number]    Date of Admission: 3/20/2025  Primary Care Physician: Lay Cox MD    Subjective   Subjective     CC:  Right sided weakness    HPI:  More alert today  No chest pain  No palpitations  No n/v/d      Objective   Objective     Vital Signs:   Temp:  [98.3 °F (36.8 °C)-99.1 °F (37.3 °C)] 98.7 °F (37.1 °C)  Heart Rate:  [] 84  Resp:  [16-18] 18  BP: (124-180)/(48-83) 136/64     Physical Exam:  Constitutional: alert, elderly, frail but nontoxic, normal work of breathing lying in bed  Psych:Normal/appropriate affect  HEENT:NCAT, oropharynx clear  Neck: neck supple, full range of motion  Neuro: confused, moves all extremities (gait not assessed)  Cardiac: rrr  Resp: tab  GI: abd soft, nontender  Skin: No extremity rash  Musculoskeletal/extremities: no cyanosis of extremities; no significant ankle edema        Results Reviewed:  LAB RESULTS:      Lab 25  1341 25  0613 25  0220 25  0036 25  2131 25  1452 25  0545 25  1150 25  0553 25  0000 25  1609 25  1608   WBC  --   --  6.88  --   --   --  6.49  --  7.46  --   --  6.44   HEMOGLOBIN  --   --  13.3  --   --   --  13.2  --  12.3  --   --  11.7*   HEMOGLOBIN, POC  --   --   --   --   --   --   --   --   --   --  11.9*  --    HEMATOCRIT  --   --  41.0  --   --   --  41.3  --  37.4  --   --  35.7   HEMATOCRIT POC  --   --   --   --   --   --   --   --   --   --  35*  --    PLATELETS  --   --  255  --   --   --  255  --  250  --   --  264   NEUTROS ABS  --   --   --   --   --   --   --   --  4.90  --   --  3.61   IMMATURE GRANS (ABS)  --   --   --   --   --   --   --   --  0.02  --   --  0.03   LYMPHS ABS  --   --   --   --   --   --   --   --  1.92  --   --  2.01   MONOS ABS  --   --   --   --   --   --   --   --  0.55  --   --  0.72   EOS ABS   --   --   --   --   --   --   --   --  0.04  --   --  0.04   MCV  --   --  89.1  --   --   --  90.2  --  88.0  --   --  87.9   PROCALCITONIN  --   --   --   --   --   --   --   --   --   --   --  0.20   LACTATE  --   --   --   --   --   --   --   --   --   --   --  1.6   PROTIME  --   --   --   --   --   --   --   --   --   --   --  17.6*   APTT  --   --   --   --   --   --   --   --  28.7  --   --  31.0*   HEPARIN ANTI-XA 0.96* 0.78* 0.92* 0.97* >1.10*   < >  --    < > >1.10*   < >  --  >1.10*    < > = values in this interval not displayed.         Lab 03/23/25  0220 03/22/25  0545 03/21/25  1901 03/21/25  0553 03/20/25  1609 03/20/25  1608   SODIUM 144 144  --  144  --  141   POTASSIUM 3.8 3.7 3.7 3.5  --  3.8   CHLORIDE 109* 108*  --  107  --  105   CO2 18.0* 20.0*  --  20.0*  --  21.0*   ANION GAP 17.0* 16.0*  --  17.0*  --  15.0   BUN 25* 27*  --  36*  --  44*   CREATININE 1.47* 1.31*  --  1.84* 2.80* 2.50*   EGFR 35.9* 41.3*  --  27.5* 16.6* 19.0*   GLUCOSE 88 90  --  124*  --  150*   CALCIUM 9.0 8.9  --  9.0  --  9.2   MAGNESIUM 1.7 1.9  --   --   --  1.9   TSH  --   --   --   --   --  0.355         Lab 03/21/25  0553 03/20/25  1608   TOTAL PROTEIN 6.5 6.3   ALBUMIN 3.8 3.9   GLOBULIN 2.7 2.4   ALT (SGPT) 13 13   AST (SGOT) 17 16   BILIRUBIN 0.5 0.4   ALK PHOS 49 54         Lab 03/20/25  1608   PROTIME 17.6*   INR 1.43*         Lab 03/21/25  0553   CHOLESTEROL 215*   LDL CHOL 145*   HDL CHOL 43   TRIGLYCERIDES 149             Lab 03/22/25  0610   PH, ARTERIAL 7.449   PCO2, ARTERIAL 32.0*   PO2 ART 78.3*   FIO2 21   HCO3 ART 22.1   BASE EXCESS ART -1.2*   CARBOXYHEMOGLOBIN 0.9     Brief Urine Lab Results  (Last result in the past 365 days)        Color   Clarity   Blood   Leuk Est   Nitrite   Protein   CREAT   Urine HCG        03/22/25 1129 Yellow   Cloudy   Negative   Negative   Negative   100 mg/dL (2+)                   Microbiology Results Abnormal       None            MRI Brain Without Contrast  Result  Date: 3/22/2025  MRI BRAIN WO CONTRAST Date of Exam: 3/22/2025 2:03 AM EDT Indication: Stroke, follow up Aphasia, right-sided weakness, numbness, facial droop.  Comparison: Head CT 3/21/2025 Technique:  Routine multiplanar/multisequence sequence images of the brain were obtained without contrast administration. Findings: No areas of diffusion restriction to suggest a recent infarct. Negative for acute intracranial hemorrhage, large mass lesion, midline shift or hydrocephalus. Mild global parenchymal volume loss for age. Mild periventricular T2/FLAIR hyperintense signal suggesting chronic microvascular ischemic change. Mostly empty sella. Negative for cerebellar tonsillar ectopia. Normal volume corpus callosum. Major intracranial flow voids preserved. Orbits symmetric. Trace bilateral mastoid fluid. Unremarkable appearance of the calvarium.     Impression: Impression: No acute MRI findings, specifically no findings to suggest a recent infarct. Electronically Signed: Shade Seay MD  3/22/2025 8:17 AM EDT  Workstation ID: [id number]      Results for orders placed during the hospital encounter of 03/20/25    Adult Transthoracic Echo Complete W/ Cont if Necessary Per Protocol (With Agitated Saline)    Interpretation Summary    Left ventricular systolic function is normal. Calculated left ventricular EF = 67% Left ventricular ejection fraction appears to be 66 - 70%.    Left ventricular wall thickness is consistent with mild concentric hypertrophy.    Left ventricular outflow tract peak flow gradient at rest is 11 mmHg. Left ventricular outflow tract peak gradient with valsalva is 29 mmHg.    Left ventricular diastolic function is consistent with (grade I) impaired relaxation.    The mitral valve peak gradient is 9 mmHg. The mitral valve mean gradient is 4 mmHg.    Calculated right ventricular systolic pressure from tricuspid regurgitation is 21 mmHg.    There is a trivial pericardial effusion.      Current  medications:  Scheduled Meds:Pharmacy Consult, , Not Applicable, Q12H  amLODIPine, 5 mg, Oral, Q24H  aspirin, 81 mg, Oral, Daily   Or  aspirin, 300 mg, Rectal, Daily  atorvastatin, 80 mg, Oral, Nightly  carvedilol, 25 mg, Oral, BID With Meals  cefTRIAXone, 1,000 mg, Intravenous, Q24H  [Held by provider] insulin regular, 2-7 Units, Subcutaneous, Q6H  pantoprazole, 40 mg, Oral, Daily  sodium chloride, 10 mL, Intravenous, Q12H      Continuous Infusions:[Held by provider] heparin, 11 Units/kg/hr, Last Rate: Stopped (03/23/25 3054)  niCARdipine, 5-15 mg/hr, Last Rate: 5 mg/hr (03/23/25 1819)  Pharmacy to Dose Heparin,       PRN Meds:.  acetaminophen    senna-docusate sodium **AND** polyethylene glycol **AND** bisacodyl **AND** bisacodyl    Calcium Replacement - Follow Nurse / BPA Driven Protocol    dextrose    dextrose    diphenhydrAMINE    glucagon (human recombinant)    hydrALAZINE    Magnesium Standard Dose Replacement - Follow Nurse / BPA Driven Protocol    nitroglycerin    Pharmacy to Dose Heparin    Phosphorus Replacement - Follow Nurse / BPA Driven Protocol    Potassium Replacement - Follow Nurse / BPA Driven Protocol    sodium chloride    sodium chloride    Assessment & Plan   Assessment & Plan     Active Hospital Problems    Diagnosis  POA    **Encephalopathy [G93.40]  Unknown    Pulmonary emboli [I26.99]  Yes    Gastroesophageal reflux disease with esophagitis [K21.00]  Yes    Anxiety and depression [F41.9, F32.A]  Yes    Multiple-type hyperlipidemia [E78.2]  Yes    Benign essential hypertension [I10]  Yes      Resolved Hospital Problems    Diagnosis Date Resolved POA    CVA (cerebral vascular accident) [I63.9] 03/22/2025 Yes        Brief Hospital Course to date:  Valarie Camara is a 79 y.o. female w/ hx cad, pad & left ica dz (50-69%, ckd 3 (baseline cr ~1.8-1.9), htn, mild dementia, PE (on eliquis), dm2 who presented with confusion, aphasia and some right sided weakness. CTA H&N & CT perfusion was  negative for flow limiting stenosis or LVO. After admission had significant HTN, Eliquis was held and transitioned to iv heparin and cardene drip initiated. EEG negative. Patient apparently didn't tolerate MRI, repeat CT head ordered    Aphasia & right sided weakness (suspect left hemispheric stroke va stroke recrudescence)  Left ICA stenosis (50-69%), & multifocal atherosclerotic dz  Episode of lethargy/somnolence evening 3/21/25 due to seroquel  Mild dementia  [facility] wo on 3/20/2025 images were personally reviewed and showed no acute ischemic or hemorrhagic stroke.  There was stable chronic lacunar infarct of the left arm  -CTA of the head and neck images from 3/20/2025 were personally reviewed and showed no flow limiting stenosis or LVO.  There is moderate stenosis with complex plaque of the left internal carotid artery  -previous echo 3/1/25: ef 61-65%, diastolic dysfunction  -EEG negative  -initial u/a benign  -initially didn't tolerate MRI brain (agitation) so was aborted  -repeat CT head 3/21 was negative/unchanged from original CT head  -echo : ef 66-70%, diastolic dysfunction  -BLE duplex: negative  -evening 3/21 became lethargic. Had received geodon (tolerated this earlier in the day 3/21), but then received seroquel 50mg later that evening and became lethargic.   -MRI brain negative. Neurology discussed w/ family (daughter Cleo) and apparently patient has become lethargic in past w/ trazodone, seroquel in past and wasn't taking these prior to admission   -avoid seroquel & trazodone (daughter says hasn't tolerated)  -neuro follows: cont. Asa 81mg, statin (also on anticoagulation as below); f/u stroke clinic 8-12 weeks  -SLP, cleared for modified diet  -therapy recommends inpt rehab    UTI  -initial u/a negative  -in/out cath u/a 3/21: 6-10 wbc, 4+ bacteria; day #2 empiric rocephin. Follow urine culture    HTN  -currently hypertensive due to encephalopathy/drowsiness not reliably taking po bp meds; cardene  drip for now until tolerating po meds reliably, then restart po bp meds  -once tolerating po: amlodipine 5mg, continue coreg 25mg bid (prn cardene drip if needed until taking po meds reliably)    -Diet controlled DM2  -A1c 6.9 on 3/4/25  -ssi for now, titrate prn    Hx PE 2/28/25 & [facility] Bethea  -heparin drip initiated until SLP cleared/reliably taking PO, then change back to Eliquis      KURT on ckd 3 (baseline cr ~1.8-1.9)  -initial creatinine was 2.5  -creatinine currently at baseline    Am labs: cbc,bmp,mag (follow up urine culture)    Expected Discharge Location and Transportation: TBD, ? Inpt rehab  Expected Discharge   Expected Discharge Date: 3/25/2025; Expected Discharge Time:      VTE Prophylaxis:  Pharmacologic & mechanical VTE prophylaxis orders are present.         AM-PAC 6 Clicks Score (PT): 13 (03/23/25 0800)    CODE STATUS:   Code Status and Medical Interventions: CPR (Attempt to Resuscitate); Full Support   Ordered at: 03/20/25 1422     Code Status (Patient has no pulse and is not breathing):    CPR (Attempt to Resuscitate)     Medical Interventions (Patient has pulse or is breathing):    Full Support       Danie Dunn MD  03/23/25

## 2025-03-23 NOTE — THERAPY EVALUATION
Patient Name: Valarie Camara  : 1945    MRN: [id number]                              Today's Date: 3/23/2025       Admit Date: 3/20/2025    Visit Dx:     ICD-10-CM ICD-9-CM   1. Acute ischemic stroke  I63.9 434.91   2. History of stroke  Z86.73 V12.54   3. Renal insufficiency  N28.9 593.9   4. Aphasia  R47.01 784.3   5. Dysarthria  R47.1 784.51   6. Dysphagia, unspecified type  R13.10 787.20   7. 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  I69.30 V12.54     Patient Active Problem List   Diagnosis    Atopic rhinitis    Arthritis    Chronic neck pain    Anxiety and depression    Edema    Gastroesophageal reflux disease with esophagitis    Multiple-type hyperlipidemia    Vitamin D deficiency    Benign essential hypertension    Obesity (BMI 30-39.9)    History of COPD    History of cataract    History of osteoporosis    History of restless legs syndrome    History of rheumatoid arthritis    Elevated serum creatinine    Esophageal dysphagia    Constipation    Schatzki's ring    Gastritis without bleeding    History of Helicobacter pylori infection    Duodenitis    Right hemiparesis    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    Multiple thyroid nodules    Lacunar stroke    DM (diabetes mellitus), type 2, uncontrolled w/neurologic complication    Syncope and collapse    Positive fecal occult blood test    Left foot pain    Hypomagnesemia    Acute on chronic anemia    Esophageal dysphagia    Reflux esophagitis    Syncope    Irritable bowel syndrome with constipation    Hypertensive urgency    Pulmonary emboli    Encephalopathy     Past Medical History:   Diagnosis Date    Acute bronchitis with bronchospasm     Anxiety     Arthritis     Asthma     B12 deficiency     Back pain     Body piercing     ears    Cataract     Cerebrovascular disease     Cervicalgia     Chronic kidney disease     stage 3b    Colonic polyp     History of colonic polyps   "   Constipation     COPD (chronic obstructive pulmonary disease)     Coronary artery disease     Depression     Diverticulitis     Dysphagia     Patient reported \"it won't go all the way down\" when eating solid foods first thing in the mornings    Edema     Elevated cholesterol     Esophageal reflux     Folic acid deficiency     Full dentures     Advised no adhesives DOS    Heart murmur     Herpes zoster     High cholesterol     History of kidney infection     History of recurrent urinary tract infection     HTN (hypertension)     Hypercholesterolemia     Impaired functional mobility, balance, gait, and endurance     Insomnia     Kidney infection     Malignant hypertension 04/26/2015     Accelerated essential hypertension    Measles     rubeola    Muscle spasm     Neoplasm of uncertain behavior of skin     dtr denies    Osteoporosis     Poor historian     Recurrent urinary tract infection     Rheumatoid arthritis     RLS (restless legs syndrome)     Stomach ulcer     Stroke     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    Type 2 diabetes mellitus     Vitamin D deficiency      Past Surgical History:   Procedure Laterality Date    CATARACT EXTRACTION WITH INTRAOCULAR LENS IMPLANT Left 02/11/2013    CATARACT EXTRACTION WITH INTRAOCULAR LENS IMPLANT Right 04/15/2013    COLONOSCOPY  2012    COLONOSCOPY N/A 11/26/2019    Procedure: COLONOSCOPY;  Surgeon: Chidi Downing MD;  Location:  [facility];  Service: Gastroenterology    ENDOSCOPY N/A 11/13/2017    Procedure: ESOPHAGOGASTRODUODENOSCOPY with biopsies and esophageal balloon dilitation;  Surgeon: Wesley Stovall MD;  Location:  [facility];  Service:     ENDOSCOPY N/A 11/25/2019    Procedure: ESOPHAGOGASTRODUODENOSCOPY;  Surgeon: Chidi Downing MD;  Location:  [facility];  Service: Gastroenterology    ENDOSCOPY N/A 11/29/2021    Procedure: ESOPHAGOGASTRODUODENOSCOPY with dilation and biopsies;  Surgeon: Benny" Gonzalez WIN MD;  Location: [facility];  Service: Gastroenterology;  Laterality: N/A;    ENDOSCOPY N/A 11/1/2023    Procedure: ESOPHAGOGASTRODUODENOSCOPY WITH BIOPSY AND DILATATION;  Surgeon: Gonzalez Zapata MD;  Location: [facility] ENDOSCOPY;  Service: Gastroenterology;  Laterality: N/A;    ENDOSCOPY N/A 1/27/2025    Procedure: Esophagogastroduodenoscopy with dilatation and biopsies;  Surgeon: Gonzalez Zapata MD;  Location: [facility] ENDOSCOPY;  Service: Gastroenterology;  Laterality: N/A;    HYSTERECTOMY  1979    UPPER GASTROINTESTINAL ENDOSCOPY  12/09/2013    UPPER GASTROINTESTINAL ENDOSCOPY  11/13/2017      General Information       Row Name 03/23/25 1727          Physical Therapy Time and Intention    Document Type evaluation  -CD     Mode of Treatment physical therapy  -CD       Row Name 03/23/25 1725          General Information    Patient Profile Reviewed yes  -CD     Prior Level of Function independent:;gait;transfer;bed mobility;ADL's  Pt's daughter reports pt ambulates with cane, daughter assists pt with shower transfer, and washing pt's back. Dtr completes home management tasks & driving. Was ambulating with cane independently on the morning of this admit.  -CD     Existing Precautions/Restrictions fall;seizures  PT CURRENTLY ON SEIZURE PRECAUTIONS WITH PADS IN PLACE. DTR REPORT JERKING MOVEMENTS AND EASILY STARTLED SINCE AT [facility] AFTER LAST HOSPITALIZATION.  -CD     Barriers to Rehab medically complex;previous functional deficit;cognitive status  -CD       Row Name 03/23/25 1725          Living Environment    Current Living Arrangements home  -CD     People in Home child(sindy), adult  -CD       Row Name 03/23/25 1727          Home Main Entrance    Number of Stairs, Main Entrance three  -CD     Stair Railings, Main Entrance railings on both sides of stairs  -CD       Row Name 03/23/25 1727          Stairs Within Home, Primary    Number of Stairs, Within Home, Primary none  -CD       Row Name  03/23/25 1727          Cognition    Orientation Status (Cognition) oriented to;person;place;verbal cues/prompts needed for orientation;time  -CD       Row Name 03/23/25 1727          Safety Issues/Impairments Affecting Functional Mobility    Safety Issues Affecting Function (Mobility) awareness of need for assistance;insight into deficits/self-awareness;safety precautions follow-through/compliance;sequencing abilities;safety precaution awareness  -CD     Impairments Affecting Function (Mobility) balance;cognition;endurance/activity tolerance;motor control;strength;postural/trunk control  -CD     Cognitive Impairments, Mobility Safety/Performance awareness, need for assistance;insight into deficits/self-awareness;safety precaution awareness;safety precaution follow-through;sequencing abilities;judgment;problem-solving/reasoning;attention  -CD     Comment, Safety Issues/Impairments (Mobility) PT IS EASILY STARTLED AND HAS MYOCLONIC JERKING INTERMITTENTLY.  -CD               User Key  (r) = Recorded By, (t) = Taken By, (c) = Cosigned By      Initials Name Provider Type    CD Gracy Millan PT Physical Therapist                   Mobility       Row Name 03/23/25 1732          Bed Mobility    Bed Mobility sit-supine  -CD     Supine-Sit [place] (Bed Mobility) maximum assist (25% patient effort);2 person assist;verbal cues  -CD     Sit-Supine [place] (Bed Mobility) maximum assist (25% patient effort);2 person assist;verbal cues  -CD     Assistive Device (Bed Mobility) bed rails;head of bed elevated;repositioning sheet  -CD     Comment, (Bed Mobility) INCREASED TIME AND EFFORT. MANUAL ASSIST TO UPRIGHT TRUNK AND MOVE LE'S OFF EOB. POSTERIOR AND R LEAN UPON SITTING EOB. NSG IN TO ADDRESS LEAKING IV WHILE SITTING EOB.  -CD       Row Name 03/23/25 1732          Transfers    Comment, (Transfers) DEFERRED DUE TO IMPAIRED SITTING BALANCE AND DECREASED LEVEL OF ALERTNESS.  -CD       Row Name 03/23/25 1732           Gait/Stairs (Locomotion)    [place] Level (Gait) unable to assess  -CD               User Key  (r) = Recorded By, (t) = Taken By, (c) = Cosigned By      Initials Name Provider Type    Gracy Nur PT Physical Therapist                   Obj/Interventions       Row Name 03/23/25 1733          Range of Motion Comprehensive    General Range of Motion bilateral lower extremity ROM WFL  -CD       Row Name 03/23/25 1733          Strength Comprehensive (MMT)    General Manual Muscle Testing (MMT) Assessment lower extremity strength deficits identified  -CD     Comment, General Manual Muscle Testing (MMT) Assessment B LE GROSSLY 3/5. ABLE TO LIFT BLE OFF BED BRIEFLY AND PERFORM LAQ SITTING EOB.  -CD       Row Name 03/23/25 1733          Motor Skills    Motor Skills muscle tone  MYOCLONIC JERKING OF ENTIRE BODY. STARTLES EASILY.  -CD     Therapeutic Exercise --  SEATED LAQ X 10 REPS B LE.  -CD       Row Name 03/23/25 1733          Balance    Static Sitting Balance minimal assist  -CD     Balance Interventions sitting;supported;static  -CD     Comment, Balance WORKED ON MIDLINE ORIENTATION AND FOLLOWING COMMANDS SITTING EOB. FATIGUES QUICKLY.  -CD               User Key  (r) = Recorded By, (t) = Taken By, (c) = Cosigned By      Initials Name Provider Type    Gracy Nur PT Physical Therapist                   Goals/Plan       Row Name 03/23/25 1740          Bed Mobility Goal 1 (PT)    Activity/Assistive Device (Bed Mobility Goal 1, PT) sit to supine/supine to sit  -CD     [place] Level/Cues Needed (Bed Mobility Goal 1, PT) minimum assist (75% or more patient effort)  -CD     Time Frame (Bed Mobility Goal 1, PT) short term goal (STG);1 week  -CD       Row Name 03/23/25 1740          Transfer Goal 1 (PT)    Activity/Assistive Device (Transfer Goal 1, PT) sit-to-stand/stand-to-sit;bed-to-chair/chair-to-bed;walker, rolling  -CD     [place] Level/Cues Needed (Transfer Goal 1, PT) minimum assist (75% or  more patient effort)  -CD       Row Name 03/23/25 1749          Gait Training Goal 1 (PT)    Activity/Assistive Device (Gait Training Goal 1, PT) gait (walking locomotion);walker, rolling  -CD     [place] Level (Gait Training Goal 1, PT) minimum assist (75% or more patient effort)  -CD     Distance (Gait Training Goal 1, PT) 100  -CD     Time Frame (Gait Training Goal 1, PT) long term goal (LTG);2 weeks  -CD       Row Name 03/23/25 0260          Therapy Assessment/Plan (PT)    Planned Therapy Interventions (PT) balance training;bed mobility training;home exercise program;gait training;strengthening;transfer training;postural re-education;patient/family education;neuromuscular re-education  -CD               User Key  (r) = Recorded By, (t) = Taken By, (c) = Cosigned By      Initials Name Provider Type    CD Gracy Millan, PT Physical Therapist                   Clinical Impression       Row Name 03/23/25 9827          Pain    Pretreatment Pain Rating 0/10 - no pain  -CD     Posttreatment Pain Rating 0/10 - no pain  -CD       Row Name 03/23/25 1733          Plan of Care Review    Plan of Care Reviewed With patient;child  -CD     Outcome Evaluation PT PRESENTS WITH EVOLVING SYMPTOMS TO INCLUDE IMPAIRED BALANCE, GENERALIZED WEAKNESS, IMPAIRED COGNITION, DECREASED ENDURANCE AND DECLINE IN FUNCTIONAL MOBILITY. PT STARTLES EASILY AND DEMONSTRATES INTERMITTENT MYOCLONIC JERKING. EVAL LIMITED TO SITTING EOB X APPROX 10 MINUTES AND LE THER EX.  AT BASELINE, PT AMBULATES WITH CANE INDEPENDENTLY. PT WAS RECENTLY AT [facility] FOR PE. PT WAS D//C  TO IRF AND HAS BEEN BACK HOME WITH DTR  FOR APPROX 1 WEEK. RECOMMEND SNF AT D/C.  -CD       Row Name 03/23/25 2722          Therapy Assessment/Plan (PT)    Patient/Family Therapy Goals Statement (PT) TO RETURN TO PLOF.  -CD     Rehab Potential (PT) good  -CD     Criteria for Skilled Interventions Met (PT) yes;meets criteria;skilled treatment is necessary  -CD     Therapy Frequency (PT)  daily  -CD       Row Name 03/23/25 1735          Vital Signs    Pre Systolic BP Rehab 132  -CD     Pre Treatment Diastolic BP 57  -CD     Intra Systolic BP Rehab 141  -CD     Intra Treatment Diastolic BP 80  -CD     Post Systolic BP Rehab 143  -CD     Post Treatment Diastolic BP 73  -CD     Pretreatment Heart Rate (beats/min) 85  -CD     Intratreatment Heart Rate (beats/min) 101  -CD     Posttreatment Heart Rate (beats/min) 87  -CD     Pre SpO2 (%) 97  -CD     O2 Delivery Pre Treatment room air  -CD     O2 Delivery Intra Treatment room air  -CD     Post SpO2 (%) 97  -CD     O2 Delivery Post Treatment room air  -CD     Pre Patient Position Supine  -CD     Intra Patient Position Sitting  -CD     Post Patient Position Supine  -CD       Row Name 03/23/25 1735          Positioning and Restraints    Pre-Treatment Position in bed  -CD     Post Treatment Position bed  -CD     In Bed supine;notified nsg;exit alarm on;encouraged to call for assist;call light within reach;with family/caregiver;SCD pump applied;waffle boots/both  -CD               User Key  (r) = Recorded By, (t) = Taken By, (c) = Cosigned By      Initials Name Provider Type    CD Gracy Millan, PT Physical Therapist                   Outcome Measures       Row Name 03/23/25 1741 03/23/25 0800       How much help from another person do you currently need...    Turning from your back to your side while in flat bed without using bedrails? 2  -CD 3  -JH    Moving from lying on back to sitting on the side of a flat bed without bedrails? 2  -CD 2  -JH    Moving to and from a bed to a chair (including a wheelchair)? 2  -CD 2  -JH    Standing up from a chair using your arms (e.g., wheelchair, bedside chair)? 2  -CD 2  -JH    Climbing 3-5 steps with a railing? 1  -CD 2  -JH    To walk in hospital room? 1  -CD 2  -JH    AM-PAC 6 Clicks Score (PT) 10  -CD 13  -JH      Row Name 03/23/25 1741 03/23/25 1204       Modified [place] Scale    Modified Liz Scale 4 -  Moderately severe disability.  Unable to walk without assistance, and unable to attend to own bodily needs without assistance.  -CD 4 - Moderately severe disability.  Unable to walk without assistance, and unable to attend to own bodily needs without assistance.  -      Row Name 03/23/25 1204          Functional Assessment    Outcome Measure Options AM-PAC 6 Clicks Daily Activity (OT);Modified [place]  -               User Key  (r) = Recorded By, (t) = Taken By, (c) = Cosigned By      Initials Name Provider Type    CD Gracy Millan, PT Physical Therapist    JR Boone, Eli ASHRAF, OT Occupational Therapist    Bruno Sands RN Registered Nurse                                 Physical Therapy Education       Title: PT OT SLP Therapies (In Progress)       Topic: Physical Therapy (Done)       Point: Mobility training (Done)       Learning Progress Summary            Patient Acceptance, E, VU,NR by CD at 3/23/2025 1742    Comment: BENEFITS OF OOB ACTIVITY, SAFETY WITH MOBILITY, PROGRESSION OF POC, D/C PLANNING,                      Point: Home exercise program (Done)       Learning Progress Summary            Patient Acceptance, E, VU,NR by CD at 3/23/2025 1742    Comment: BENEFITS OF OOB ACTIVITY, SAFETY WITH MOBILITY, PROGRESSION OF POC, D/C PLANNING,                      Point: Body mechanics (Done)       Learning Progress Summary            Patient Acceptance, E, VU,NR by CD at 3/23/2025 1742    Comment: BENEFITS OF OOB ACTIVITY, SAFETY WITH MOBILITY, PROGRESSION OF POC, D/C PLANNING,                      Point: Precautions (Done)       Learning Progress Summary            Patient Acceptance, E, VU,NR by CD at 3/23/2025 1742    Comment: BENEFITS OF OOB ACTIVITY, SAFETY WITH MOBILITY, PROGRESSION OF POC, D/C PLANNING,                                      User Key       Initials Effective Dates Name Provider Type Discipline    CD 02/03/23 -  Gracy Millan, PT Physical Therapist PT                  PT  Recommendation and Plan  Planned Therapy Interventions (PT): balance training, bed mobility training, home exercise program, gait training, strengthening, transfer training, postural re-education, patient/family education, neuromuscular re-education  Outcome Evaluation: PT PRESENTS WITH EVOLVING SYMPTOMS TO INCLUDE IMPAIRED BALANCE, GENERALIZED WEAKNESS, IMPAIRED COGNITION, DECREASED ENDURANCE AND DECLINE IN FUNCTIONAL MOBILITY. PT STARTLES EASILY AND DEMONSTRATES INTERMITTENT MYOCLONIC JERKING. EVAL LIMITED TO SITTING EOB X APPROX 10 MINUTES AND LE THER EX.  AT BASELINE, PT AMBULATES WITH CANE INDEPENDENTLY. PT WAS RECENTLY AT [facility] FOR PE. PT WAS D//C  TO IRF AND HAS BEEN BACK HOME WITH DTR  FOR APPROX 1 WEEK. RECOMMEND SNF AT D/C.     Time Calculation:   PT Evaluation Complexity  History, PT Evaluation Complexity: 3 or more personal factors and/or comorbidities  Examination of Body Systems (PT Eval Complexity): total of 4 or more elements  Clinical Presentation (PT Evaluation Complexity): evolving  Clinical Decision Making (PT Evaluation Complexity): moderate complexity  Overall Complexity (PT Evaluation Complexity): moderate complexity     PT Charges       Row Name 03/23/25 1743             Time Calculation    Start Time 1108  -CD      PT Received On 03/23/25  -CD      PT Goal Re-Cert Due Date 04/02/25  -CD         Timed Charges    08872 - PT Therapeutic Activity Minutes 10  -CD         Untimed Charges    PT Eval/Re-eval Minutes 49  -CD         Total Minutes    Timed Charges Total Minutes 10  -CD      Untimed Charges Total Minutes 49  -CD       Total Minutes 59  -CD                User Key  (r) = Recorded By, (t) = Taken By, (c) = Cosigned By      Initials Name Provider Type    CD Gracy Millan, PT Physical Therapist                  Therapy Charges for Today       Code Description Service Date Service Provider Modifiers Qty    [id number]  PT THERAPEUTIC ACT EA 15 MIN 3/23/2025 Gracy Millan, PT GP 1     [id number]  PT EVAL MOD COMPLEXITY 4 3/23/2025 Gracy iMllan, PT GP 1            PT G-Codes  Outcome Measure Options: AM-PAC 6 Clicks Daily Activity (OT), Modified [place]  AM-PAC 6 Clicks Score (PT): 10  AM-PAC 6 Clicks Score (OT): 11  Modified [place] Scale: 4 - Moderately severe disability.  Unable to walk without assistance, and unable to attend to own bodily needs without assistance.  PT Discharge Summary  Anticipated Discharge Disposition (PT): skilled nursing facility    Gracy Millan, PT  3/23/2025

## 2025-03-23 NOTE — PROGRESS NOTES
Stroke Progress Note       Chief Complaint: Word finding difficulty    Subjective    Subjective     Subjective:  Patient is alert and conversant.  Daughter at bedside.  No adverse events overnight.  No acute distress noted.  Repeat UA with concern for UTI.      Review of Systems   Neurological: Negative for tremors.   Objective      Temp:  [97.7 °F (36.5 °C)-98.9 °F (37.2 °C)] 98.9 °F (37.2 °C)  Heart Rate:  [73-95] 90  Resp:  [16-20] 16  BP: (124-223)/(59-96) 124/59    Objective      Physical Exam  Constitutional:       Comments: Alert, conversant   HENT:      Head: Normocephalic and atraumatic.   Eyes:      Comments: Blinks to visual threat, tracks without difficulty   Cardiovascular:      Rate and Rhythm: Normal rate and regular rhythm.   Pulmonary:      Effort: Pulmonary effort is normal. No respiratory distress.   Abdominal:      General: There is no distension.      Palpations: Abdomen is soft.   Musculoskeletal:      Right lower leg: No edema.      Left lower leg: No edema.   Skin:     General: Skin is warm and dry.   Neurological:      Comments: Oriented to person, knows she is in the hospital, blinks to visual threat, mild dysarthria, no obvious facial asymmetry, moves all extremities against gravity       Results Review:    I reviewed the patient's new clinical results.    WBC   Date Value Ref Range Status   03/23/2025 6.88 3.40 - 10.80 10*3/mm3 Final     RBC   Date Value Ref Range Status   03/23/2025 4.60 3.77 - 5.28 10*6/mm3 Final     Hemoglobin   Date Value Ref Range Status   03/23/2025 13.3 12.0 - 15.9 g/dL Final     Hematocrit   Date Value Ref Range Status   03/23/2025 41.0 34.0 - 46.6 % Final     MCV   Date Value Ref Range Status   03/23/2025 89.1 79.0 - 97.0 fL Final     MCH   Date Value Ref Range Status   03/23/2025 28.9 26.6 - 33.0 pg Final     MCHC   Date Value Ref Range Status   03/23/2025 32.4 31.5 - 35.7 g/dL Final     RDW   Date Value Ref Range Status   03/23/2025 16.5 (H) 12.3 - 15.4 % Final      RDW-SD   Date Value Ref Range Status   03/23/2025 53.2 37.0 - 54.0 fl Final     MPV   Date Value Ref Range Status   03/23/2025 10.2 6.0 - 12.0 fL Final     Platelets   Date Value Ref Range Status   03/23/2025 255 140 - 450 10*3/mm3 Final     Neutrophil %   Date Value Ref Range Status   03/21/2025 65.7 42.7 - 76.0 % Final     Lymphocyte %   Date Value Ref Range Status   03/21/2025 25.7 19.6 - 45.3 % Final     Monocyte %   Date Value Ref Range Status   03/21/2025 7.4 5.0 - 12.0 % Final     Eosinophil %   Date Value Ref Range Status   03/21/2025 0.5 0.3 - 6.2 % Final     Basophil %   Date Value Ref Range Status   03/21/2025 0.4 0.0 - 1.5 % Final     Immature Grans %   Date Value Ref Range Status   03/21/2025 0.3 0.0 - 0.5 % Final     Neutrophils, Absolute   Date Value Ref Range Status   03/21/2025 4.90 1.70 - 7.00 10*3/mm3 Final     Lymphocytes, Absolute   Date Value Ref Range Status   03/21/2025 1.92 0.70 - 3.10 10*3/mm3 Final     Monocytes, Absolute   Date Value Ref Range Status   03/21/2025 0.55 0.10 - 0.90 10*3/mm3 Final     Eosinophils, Absolute   Date Value Ref Range Status   03/21/2025 0.04 0.00 - 0.40 10*3/mm3 Final     Basophils, Absolute   Date Value Ref Range Status   03/21/2025 0.03 0.00 - 0.20 10*3/mm3 Final     Immature Grans, Absolute   Date Value Ref Range Status   03/21/2025 0.02 0.00 - 0.05 10*3/mm3 Final     nRBC   Date Value Ref Range Status   03/21/2025 0.0 0.0 - 0.2 /100 WBC Final       Lab Results   Component Value Date    GLUCOSE 88 03/23/2025    BUN 25 (H) 03/23/2025    CREATININE 1.47 (H) 03/23/2025     03/23/2025    K 3.8 03/23/2025     (H) 03/23/2025    CALCIUM 9.0 03/23/2025    PROTEINTOT 6.5 03/21/2025    ALBUMIN 3.8 03/21/2025    ALT 13 03/21/2025    AST 17 03/21/2025    ALKPHOS 49 03/21/2025    BILITOT 0.5 03/21/2025    GLOB 2.7 03/21/2025    AGRATIO 1.4 03/21/2025    BCR 17.0 03/23/2025    ANIONGAP 17.0 (H) 03/23/2025    EGFR 35.9 (L) 03/23/2025     MRI Brain Without  Contrast  Result Date: 3/22/2025  Impression: No acute MRI findings, specifically no findings to suggest a recent infarct. Electronically Signed: Shade Seay MD  3/22/2025 8:17 AM EDT  Workstation ID: [id number]    CT Head Without Contrast  Result Date: 3/21/2025  Impression: No definite interval change. No acute intracranial pathology is definitively identified. Electronically Signed: Vidal Riddle MD  3/21/2025 4:49 PM EDT  Workstation ID: [id number]    Results for orders placed during the hospital encounter of 03/20/25    Adult Transthoracic Echo Complete W/ Cont if Necessary Per Protocol (With Agitated Saline)    Interpretation Summary    Left ventricular systolic function is normal. Calculated left ventricular EF = 67% Left ventricular ejection fraction appears to be 66 - 70%.    Left ventricular wall thickness is consistent with mild concentric hypertrophy.    Left ventricular outflow tract peak flow gradient at rest is 11 mmHg. Left ventricular outflow tract peak gradient with valsalva is 29 mmHg.    Left ventricular diastolic function is consistent with (grade I) impaired relaxation.    The mitral valve peak gradient is 9 mmHg. The mitral valve mean gradient is 4 mmHg.    Calculated right ventricular systolic pressure from tricuspid regurgitation is 21 mmHg.    There is a trivial pericardial effusion.      -CTH wo on 3/20/2025 images were personally reviewed and showed no acute ischemic or hemorrhagic stroke.  There was stable chronic lacunar infarct of the left arm  -CTA of the head and neck images from 3/20/2025 were personally reviewed and showed no flow limiting stenosis or LVO.  There is moderate stenosis with complex plaque of the left internal carotid artery  -MRI brain is pending  -Transthoracic echocardiogram from 2/28/2025 report was personally reviewed and showed left ventricular ejection fraction of 61-65%, no left atrial dilation  -A1c from 3/4/2025 was 6.9   -LDL from 3/21/2025 was  145    Assessment/Plan     79-year-old female PMH significant for HTN, HLD, CAD, prediabetes, CKD 3, L ICA stenosis (50 to 69%), mild dementia, depression/anxiety, recent PE (2/28, [facility], on Eliquis), recurrent UTIs who presented to the [facility] ED for further evaluation of AMS and aphasia noted by her daughter that day. Given chronic Eliquis use and LKW > 4.5 hours she is not a candidate for IV thrombolytic therapy.  CTA head/neck/perfusion is negative for flow-limiting stenosis or LVO.  She was admitted for further evaluation.     Antiplatelet PTA: None  Anticoagulant PTA: Eliquis 5 mg twice daily (last dose taken this morning)           #Aphasia and right-sided weakness, suspected left hemispheric stroke versus stroke recrudescence  #Multifocal intracranial atherosclerotic disease  #Left ICA stenosis, 50 to 69%  #Recurrent UTIs  -Suspected  stroke recrudescence in the setting of infection/metabolic disarrangement  -UA on 3/20/2025 with a large amount of budding yeast, treated with fluconazole per primary team (last dose 3/21/2025).  UA 3/22/2025 suspicious for UTI.  -MRI brain 3/22/2025 with chronic left thalamic infarct, no evidence of AIS  -CTH wo on 3/20/2025 with no acute ischemic or hemorrhagic stroke.  There was stable chronic lacunar infarct on the left  -CTA of the head and neck images from 3/20/2025 were personally reviewed and showed no flow limiting stenosis or LVO.  There is moderate stenosis with complex plaque of the left internal carotid artery  -CTA H on 3/21/2025 negative for any acute findings  -TTE 2/28/2025 with a EF of 61-65, no LAE   -CUS 11/24/2024 with R ICA less than 50% stenosis, L ICA with 50 to 69% stenosis  -Continue aspirin 81 mg daily for secondary stroke prevention in the setting of moderate stenosis of the left ICA with complex plaque  -Recommend correcting any underlying infectious/metabolic derangements.  Discussed with Dr. Dunn.    -Activity as tolerated, fall risk  precautions  -Stroke clinic follow-up in 8 to 12 weeks  -PT/OT/SLP     #Pulmonary embolism, diagnosed 2/28, [facility]  -Heparin drip initiated, currently on hold secondary to elevated Xa  -Okay to continue Eliquis 5 mg twice daily once patient has been cleared by SLP  -Venous duplex negative for DVT  -Further management per primary team     #Essential hypertension  -BP goals less than 130/80.  Avoid hypotension secondary to L ICA stenosis.  -Management per primary team    #Hyperlipidemia  -.  Goal less than 70 for secondary stroke prevention.  -Continue atorvastatin 80mg nightly     #Diabetes Mellitus type 2  -A1c 6.90 (3/4/2025), goal  <7 for secondary stroke prevention; no need to repeat  -Maintain euglycemia  -Management per primary team    #Lethargy  -History of mild dementia  -Worsened with Seroquel and Geodon  -Much improved today    #History of GIB  -LICA stenosis with complex plaque discussed with the patient's daughter.  -Patient's daughter reports a history of GI bleeding while on aspirin.  -No current signs and symptoms of bleeding noted.  H&H is stable.  -Heparin remains held secondary to elevated Xa.  -Recommend continuing to monitor CBC and for s/s bleeding.  If any s/s bleeding is noted, aspirin will be discontinued.    Further PE treatment with Eliquis to be restarted/managed by primary team. Discussed the importance of medication compliance. Aspirin 81mg daily and Atorvastatin 80mg nightly and lifestyle modifications adequate control of blood pressure, adequate control of cholesterol (goal LDL <70), adequate control of glucose (<140, A1c goal <7), increased physical activity, and implementation of healthy diet to help reduce the risk of future cerebrovascular events.  Also discussed the signs symptoms that would warrant the patient return back to the emergency department including unilateral weakness, unilateral numbness, visual disturbances, loss of balance, speech  difficulties, and/or a sudden severe headache.  Patient and her daughter verbalized understanding.    Plan discussed with Dr. Mercedes, Dr. Dunn, the patient,and her daughter Cleo.  Stroke neurology will sign off.  Please call with any questions or concerns.    JOSEE Howell, AGACNP-BC

## 2025-03-23 NOTE — PLAN OF CARE
Goal Outcome Evaluation:                   Anticipated Discharge Disposition (SLP): skilled nursing facility          SLP Swallowing Diagnosis: oral dysphagia, R/O pharyngeal dysphagia (03/23/25 [id number])

## 2025-03-23 NOTE — THERAPY RE-EVALUATION
Acute Care - Speech Language Pathology   Swallow Re-Evaluation [facility]  Clinical Swallow Evaluation       Patient Name: Valarie Camara  : 1945  MRN: [id number]  Today's Date: 3/23/2025               Admit Date: 3/20/2025    Visit Dx:     ICD-10-CM ICD-9-CM   1. Acute ischemic stroke  I63.9 434.91   2. History of stroke  Z86.73 V12.54   3. Renal insufficiency  N28.9 593.9   4. Aphasia  R47.01 784.3   5. Dysarthria  R47.1 784.51   6. Dysphagia, unspecified type  R13.10 787.20   7. 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  I69.30 V12.54     Patient Active Problem List   Diagnosis    Atopic rhinitis    Arthritis    Chronic neck pain    Anxiety and depression    Edema    Gastroesophageal reflux disease with esophagitis    Multiple-type hyperlipidemia    Vitamin D deficiency    Benign essential hypertension    Obesity (BMI 30-39.9)    History of COPD    History of cataract    History of osteoporosis    History of restless legs syndrome    History of rheumatoid arthritis    Elevated serum creatinine    Esophageal dysphagia    Constipation    Schatzki's ring    Gastritis without bleeding    History of Helicobacter pylori infection    Duodenitis    Right hemiparesis    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    Multiple thyroid nodules    Lacunar stroke    DM (diabetes mellitus), type 2, uncontrolled w/neurologic complication    Syncope and collapse    Positive fecal occult blood test    Left foot pain    Hypomagnesemia    Acute on chronic anemia    Esophageal dysphagia    Reflux esophagitis    Syncope    Irritable bowel syndrome with constipation    Hypertensive urgency    Pulmonary emboli    Encephalopathy     Past Medical History:   Diagnosis Date    Acute bronchitis with bronchospasm     Anxiety     Arthritis     Asthma     B12 deficiency     Back pain     Body piercing     ears    Cataract     Cerebrovascular disease     Cervicalgia   "   Chronic kidney disease     stage 3b    Colonic polyp     History of colonic polyps     Constipation     COPD (chronic obstructive pulmonary disease)     Coronary artery disease     Depression     Diverticulitis     Dysphagia     Patient reported \"it won't go all the way down\" when eating solid foods first thing in the mornings    Edema     Elevated cholesterol     Esophageal reflux     Folic acid deficiency     Full dentures     Advised no adhesives DOS    Heart murmur     Herpes zoster     High cholesterol     History of kidney infection     History of recurrent urinary tract infection     HTN (hypertension)     Hypercholesterolemia     Impaired functional mobility, balance, gait, and endurance     Insomnia     Kidney infection     Malignant hypertension 04/26/2015     Accelerated essential hypertension    Measles     rubeola    Muscle spasm     Neoplasm of uncertain behavior of skin     dtr denies    Osteoporosis     Poor historian     Recurrent urinary tract infection     Rheumatoid arthritis     RLS (restless legs syndrome)     Stomach ulcer     Stroke     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    Type 2 diabetes mellitus     Vitamin D deficiency      Past Surgical History:   Procedure Laterality Date    CATARACT EXTRACTION WITH INTRAOCULAR LENS IMPLANT Left 02/11/2013    CATARACT EXTRACTION WITH INTRAOCULAR LENS IMPLANT Right 04/15/2013    COLONOSCOPY  2012    COLONOSCOPY N/A 11/26/2019    Procedure: COLONOSCOPY;  Surgeon: Chidi Downing MD;  Location:  [facility];  Service: Gastroenterology    ENDOSCOPY N/A 11/13/2017    Procedure: ESOPHAGOGASTRODUODENOSCOPY with biopsies and esophageal balloon dilitation;  Surgeon: Wesley Stovall MD;  Location:  [facility];  Service:     ENDOSCOPY N/A 11/25/2019    Procedure: ESOPHAGOGASTRODUODENOSCOPY;  Surgeon: Chidi Downing MD;  Location:  [facility];  Service: Gastroenterology    ENDOSCOPY N/A 11/29/2021    " Procedure: ESOPHAGOGASTRODUODENOSCOPY with dilation and biopsies;  Surgeon: Gonzalez Zapata MD;  Location: [facility];  Service: Gastroenterology;  Laterality: N/A;    ENDOSCOPY N/A 11/1/2023    Procedure: ESOPHAGOGASTRODUODENOSCOPY WITH BIOPSY AND DILATATION;  Surgeon: Gonzalez Zapata MD;  Location: [facility] ENDOSCOPY;  Service: Gastroenterology;  Laterality: N/A;    ENDOSCOPY N/A 1/27/2025    Procedure: Esophagogastroduodenoscopy with dilatation and biopsies;  Surgeon: Gonzalez Zapata MD;  Location: [facility] ENDOSCOPY;  Service: Gastroenterology;  Laterality: N/A;    HYSTERECTOMY  1979    UPPER GASTROINTESTINAL ENDOSCOPY  12/09/2013    UPPER GASTROINTESTINAL ENDOSCOPY  11/13/2017       SLP Recommendation and Plan  SLP Swallowing Diagnosis: oral dysphagia, R/O pharyngeal dysphagia (03/23/25 1325)  SLP Diet Recommendation: puree, thin liquids (03/23/25 1325)  Recommended Precautions and Strategies: general aspiration precautions, reflux precautions, upright posture during/after eating, 1:1 supervision, assist with feeding, other (see comments), check mouth frequently for oral residue/pocketing (feed only when fully awake/alert) (03/23/25 1325)  SLP Rec. for Method of Medication Administration: meds whole, meds crushed, with puree, as tolerated (03/23/25 1325)     Monitor for Signs of Aspiration: yes, notify SLP if any concerns (03/23/25 1325)  Recommended Diagnostics: other (see comments) (diet tolerance/adjustment) (03/23/25 1325)  Swallow Criteria for Skilled Therapeutic Interventions Met: demonstrates skilled criteria (03/23/25 1325)  Anticipated Discharge Disposition (SLP): skilled nursing facility (03/23/25 1325)  Rehab Potential/Prognosis, Swallowing: good, to achieve stated therapy goals (03/23/25 1325)  Therapy Frequency (Swallow): PRN (03/23/25 1325)  Predicted Duration Therapy Intervention (Days): 1 week (03/23/25 1325)  Oral Care Recommendations: Oral Care BID/PRN, Toothbrush  (03/23/25 1325)                                               SWALLOW EVALUATION (Last 72 Hours)       SLP Adult Swallow Evaluation       Row Name 03/23/25 1325 03/21/25 0425                Rehab Evaluation    Document Type re-evaluation  - evaluation  -       Subjective Information no complaints  - no complaints  -       Patient Observations cooperative;lethargic  - alert;cooperative  -       Patient/Family/Caregiver Comments/Observations No family present  - No family present  -       Patient Effort good  - good  -       Symptoms Noted During/After Treatment none  - none  -          General Information    Patient Profile Reviewed yes  - yes  -       Pertinent History Of Current Problem See initial eval  - Adm w/ AMS & worsening aphasia. Hx: CVA, DM, CKD, recently diagnosed PE, recurrent UTIs. CXR w/o evidence of consolidation or active dz process.  Head CT: stable old infarct in central L thalamus & suspected extension of small infarct of L thalamus/internal capsule  -       Current Method of Nutrition mechanical ground textures;thin liquids  - NPO  -       Precautions/Limitations, Vision WFL;for purposes of eval  - WFL;for purposes of eval  -       Precautions/Limitations, Hearing WFL;for purposes of eval  - WFL;for purposes of eval  -       Prior Level of Function-Communication other (see comments)  Aphasia/dysarthria per chart  - other (see comments)  Aphasia/dysarthria per chart  -       Prior Level of Function-Swallowing esophageal concerns;other (see comments)  EGD (Jan '25) revealed hiatel hernia & Schatzki's ring`  - esophageal concerns;other (see comments)  EGD (Jan '25) revealed hiatel hernia & Schatzki's ring  -       Plans/Goals Discussed with patient;agreed upon  - patient;agreed upon  -       Barriers to Rehab medically complex;cognitive status;previous functional deficit  - medically complex;cognitive status;previous functional deficit  -        Patient's Goals for Discharge patient did not state  - patient did not state  -          Pain    Additional Documentation Pain Scale: FACES Pre/Post-Treatment (Group)  - Pain Scale: FACES Pre/Post-Treatment (Group)  -          Pain Scale: FACES Pre/Post-Treatment    Pain: FACES Scale, Pretreatment 0-->no hurt  - 0-->no hurt  -       Posttreatment Pain Rating 0-->no hurt  - 0-->no hurt  -          Oral Motor Structure and Function    Secretion Management WNL/WFL  - WNL/WFL  -       Mucosal Quality moist, healthy  - moist, healthy  -          Oral Musculature and Cranial Nerve Assessment    Oral Motor General Assessment generalized oral motor weakness  - generalized oral motor weakness  -          General Eating/Swallowing Observations    Respiratory Support Currently in Use room air  - room air  -       Eating/Swallowing Skills fed by SLP  - fed by SLP;needed assist;unable to perform self-feeding;other (see comments)  Jerking movement of upper extremities, RN aware  -       Positioning During Eating upright 90 degree;upright in bed  - upright 90 degree;upright in bed  -       Utensils Used spoon;cup;straw  - spoon;cup;straw  -       Consistencies Trialed ice chips;thin liquids;pureed  - ice chips;thin liquids;pureed  -          Clinical Swallow Eval    Oral Prep Phase impaired  - impaired  -       Oral Transit impaired  - impaired  -       Pharyngeal Phase no overt signs/symptoms of pharyngeal impairment  - no overt signs/symptoms of pharyngeal impairment  -       Clinical Swallow Evaluation Summary Pt lethargic, able to remain awake/alert w/ cues. No overt s/s of aspiration accross trials of thin liquid or pureed trials. Regular solid trials deferred 2' lethargy. Oral phase generally weak appearing, suspect cognitive status impacting oral phase as well. Recommend downgrade to pureed diet w/ thin liquids, assist w/ feeding, feed only when fully  "awake/alert, check for pocketing/residue. SLP will f/u for diet tolerance/adjustment  - No overt s/s of aspiration accross trials of thin liquid or pureed trials; even when pushed for consecutive sips. Pt declined regular solid trial 2' \"difficulty chewing\". Recommend mechanical ground solid diet w/ thin liquids, assistance w/ feeding, feed only when fully awake/alert. SLP will f/u for diet tolerance/adjustment  -          Oral Prep Concerns    Oral Prep Concerns incomplete or weak lip closure around spoon;anterior loss  - incomplete or weak lip closure around spoon;increased prep time  -       Incomplete or Weak Lip Closure Around Spoon thin  - thin  -       Anterior Loss thin  - --       Increased Prep Time pudding  - pudding  -          Oral Transit Concerns    Oral Transit Concerns increased oral transit time  - increased oral transit time  -       Increased Oral Transit Time all consistencies  - all consistencies  -          SLP Evaluation Clinical Impression    SLP Swallowing Diagnosis oral dysphagia;R/O pharyngeal dysphagia  - oral dysphagia;R/O pharyngeal dysphagia  -       Functional Impact risk of aspiration/pneumonia  - risk of aspiration/pneumonia  -       Rehab Potential/Prognosis, Swallowing good, to achieve stated therapy goals  - good, to achieve stated therapy goals  -       Swallow Criteria for Skilled Therapeutic Interventions Met demonstrates skilled criteria  - demonstrates skilled criteria  -          Recommendations    Therapy Frequency (Swallow) PRN  - PRN  -       Predicted Duration Therapy Intervention (Days) 1 week  - 1 week  -       SLP Diet Recommendation puree;thin liquids  - mechanical ground textures;thin liquids  -       Recommended Diagnostics other (see comments)  diet tolerance/adjustment  - other (see comments)  diet tolerance/adjustment  -       Recommended Precautions and Strategies general aspiration precautions;reflux " precautions;upright posture during/after eating;1:1 supervision;assist with feeding;other (see comments);check mouth frequently for oral residue/pocketing  feed only when fully awake/alert  - general aspiration precautions;reflux precautions;upright posture during/after eating;1:1 supervision;assist with feeding;other (see comments)  feed only when fully awake/alert  -       Oral Care Recommendations Oral Care BID/PRN;Toothbrush  - Oral Care BID/PRN;Toothbrush  -       SLP Rec. for Method of Medication Administration meds whole;meds crushed;with puree;as tolerated  - meds whole;meds crushed;with puree;as tolerated  -       Monitor for Signs of Aspiration yes;notify SLP if any concerns  - yes;notify SLP if any concerns  -       Anticipated Discharge Disposition (SLP) [facility] nursing facility  - unknown;other (see comments)  pending further w/u  -                 User Key  (r) = Recorded By, (t) = Taken By, (c) = Cosigned By      Initials Name Effective Dates    Rossy Brewer, MS [facility]-SLP 05/12/23 -                     EDUCATION  The patient has been educated in the following areas:   Dysphagia (Swallowing Impairment) Oral Care/Hydration Modified Diet Instruction.        SLP GOALS       Row Name 03/23/25 1325 03/21/25 0958          (LTG) Patient will demonstrate functional swallow for    Diet Texture (Demonstrate functional swallow) regular textures  - regular textures  -     Liquid viscosity (Demonstrate functional swallow) thin liquids  - thin liquids  -     [place] (Demonstrate functional swallow) with minimal cues (75-90% accuracy)  - with minimal cues (75-90% accuracy)  -     Time Frame (Demonstrate functional swallow) 1 week  - 1 week  -     Progress/Outcomes (Demonstrate functional swallow) goal ongoing  - new goal  -        (STG) Patient will tolerate trials of    Consistencies Trialed (Tolerate trials) pureed textures;thin liquids  - mechanical ground  textures;thin liquids  -     Desired Outcome (Tolerate trials) without signs/symptoms of aspiration;with adequate oral prep/transit/clearance  - without signs/symptoms of aspiration;with adequate oral prep/transit/clearance  -     [place] (Tolerate trials) with maximum cues (25-49% accuracy)  - with minimal cues (75-90% accuracy)  -     Time Frame (Tolerate trials) 1 week  - 1 week  -     Progress/Outcomes (Tolerate trials) goal revised this date  - new goal  -        Patient will demonstrate functional speech skills for return to discharge environment    [place] -- with moderate cues  -     Time frame -- 1 week  -     Progress/Outcomes -- new goal  -        Patient will demonstrate functional language skills for return to discharge environment     [place] -- with moderate cues  -     Time frame -- 1 week  -     Progress/Outcomes -- new [facility]  -        SLP Diagnostic Treatment     Patient will participate in further assessment in the following areas -- reading comprehension;graphic expression;cognitive-linguistic;clarification of baseline cognitive communication status  -     Time Frame (Diagnostic) -- 1 week  -     Progress/Outcomes (Additional Goal 1, SLP) -- new goal  [facility]        Words/Phrases/Sentences Goal 1 (SLP)    Improve Ability to Comprehend Words/Phrases/Sentences Through: Goal 1 (SLP) -- identify objects, field of;identify pictures, field of;80%;with moderate cues (50-74%);other (comment)  field of 2  -     Time Frame (Identify Objects and Pictures Goal 1, SLP) -- 1 week  -     Progress/Outcomes (Identify Objects and Pictures Goal 1, SLP) -- new goal  -        Follow Directions Goal 2 (SLP)    Improve Ability to Follow Directions Goal 1 (SLP) -- 1 step direction with objects;1 step direction without objects;80%;with moderate cues (50-74%)  -     Time Frame (Follow Directions Goal 1, SLP) -- 1 week  -     Progress/Outcomes (Follow Directions Goal 1,  SLP) -- new goal  -        Word Retrieval Skills Goal 1 (SLP)    Improve Word Retrieval Skills By Goal 1 (SLP) -- completing automatic speech task, counting;completing automatic speech task, days of the week;completing automatic speech task, alphabet;completing automatic speech task, months;completing automatic speech task, Pledge of Allegiance;completing automatic speech task, sing “Happy Birthday”;confrontational naming task;high frequency;responsive naming task;simple;answer WH question with one word;80%;with moderate cues (50-74%)  -     Time Frame (Word Retrieval Goal 1, SLP) -- 1 week  -     Progress/Outcomes (Word Retrieval Goal 1, SLP) -- new goal  -        Phonation Goal 1 (SLP)    Improve Phonation By Goal 1 (SLP) -- using loud speech;80%;with moderate cues (50-74%)  -     Time Frame (Phonation Goal 1, SLP) -- 1 week  -     Progress/Outcomes (Phonation Goal 1, SLP) -- new goal  -        Articulation Goal 1 (SLP)    Improve Articulation Goal 1 (SLP) -- by over-articulating at word level;80%;with moderate cues (50-74%)  -     Time Frame (Articulation Goal 1, SLP) -- 1 week  -     Progress/Outcomes (Articulation Goal 1, SLP) -- new goal  -               User Key  (r) = Recorded By, (t) = Taken By, (c) = Cosigned By      Initials Name Provider Type    Rossy Brewer MS CCC-SLP Speech and Language Pathologist                         Time Calculation:    Time Calculation- SLP       Row Name 03/23/25 1410             Time Calculation- [facility]    SLP Start Time 1325  -      SLP Received On 03/23/25  -         Untimed Charges    64560-[id number] Eval Oral Pharyng Swallow Minutes 41  -         Total Minutes    Untimed Charges Total Minutes 41  -       Total Minutes 41  -[facility]                User Key  (r) = Recorded By, (t) = Taken By, (c) = Cosigned By      Initials Name Provider Type    Rossy Brewer MS CCC-SLP Speech and Language Pathologist                    Therapy Charges for Today        Code Description Service Date Service Provider Modifiers Qty    [id number]  ST EVAL ORAL PHARYNG SWALLOW 3 3/23/2025 Rossy Stanley, MS CCC-SLP GN 1                 Rossy Stanley MS CCC-SLP  3/23/2025

## 2025-03-23 NOTE — PLAN OF CARE
Problem: Adult Inpatient Plan of Care  Goal: Plan of Care Review  Outcome: Progressing  Flowsheets (Taken 3/23/2025 0312)  Progress: no change  Plan of Care Reviewed With: patient  Goal: Patient-Specific Goal (Individualized)  Outcome: Progressing  Goal: Absence of Hospital-Acquired Illness or Injury  Outcome: Progressing  Intervention: Identify and Manage Fall Risk  Recent Flowsheet Documentation  Taken 3/23/2025 0200 by Katia Thrasher RN  Safety Promotion/Fall Prevention:   activity supervised   assistive device/personal items within reach   clutter free environment maintained   lighting adjusted   nonskid shoes/slippers when out of bed   room organization consistent   safety round/check completed  Taken 3/23/2025 0000 by Katia Thrasher RN  Safety Promotion/Fall Prevention:   assistive device/personal items within reach   activity supervised   clutter free environment maintained   lighting adjusted   nonskid shoes/slippers when out of bed   room organization consistent   safety round/check completed  Taken 3/22/2025 2200 by Katia Thrasher RN  Safety Promotion/Fall Prevention:   assistive device/personal items within reach   activity supervised   clutter free environment maintained   lighting adjusted   nonskid shoes/slippers when out of bed   room organization consistent   safety round/check completed  Taken 3/22/2025 2000 by Katia Thrasher RN  Safety Promotion/Fall Prevention:   assistive device/personal items within reach   activity supervised   clutter free environment maintained   lighting adjusted   nonskid shoes/slippers when out of bed   room organization consistent   safety round/check completed  Intervention: Prevent Skin Injury  Recent Flowsheet Documentation  Taken 3/23/2025 0200 by Katia Thrasher RN  Body Position:   turned   left  Skin Protection:   incontinence pads utilized   protective footwear used   silicone foam dressing in place  Taken 3/23/2025 0000 by Katia Thrasher RN  Body Position:   turned    supine  Skin Protection:   incontinence pads utilized   protective footwear used   silicone foam dressing in place  Taken 3/22/2025 2200 by Katia Thrasher RN  Body Position:   turned   right  Skin Protection:   incontinence pads utilized   protective footwear used   silicone foam dressing in place  Taken 3/22/2025 2000 by Katia Thrasher RN  Body Position: supine  Skin Protection: (silicone dressings peeled back, skin assessed)   incontinence pads utilized   protective footwear used   silicone foam dressing in place  Intervention: Prevent and Manage VTE (Venous Thromboembolism) Risk  Recent Flowsheet Documentation  Taken 3/22/2025 2000 by Katia Thrasher RN  VTE Prevention/Management:   bilateral   SCDs (sequential compression devices) on  Intervention: Prevent Infection  Recent Flowsheet Documentation  Taken 3/23/2025 0200 by Katia Thrasher RN  Infection Prevention: environmental surveillance performed  Taken 3/23/2025 0000 by Katia Thrasher RN  Infection Prevention: environmental surveillance performed  Taken 3/22/2025 2200 by Katia Thrasher RN  Infection Prevention: environmental surveillance performed  Taken 3/22/2025 2000 by Katia Thrasher RN  Infection Prevention: environmental surveillance performed  Goal: Optimal Comfort and Wellbeing  Outcome: Progressing  Intervention: Provide Person-Centered Care  Recent Flowsheet Documentation  Taken 3/22/2025 2000 by Katia Thrasher RN  Trust Relationship/Rapport:   care explained   choices provided  Goal: Readiness for Transition of Care  Outcome: Progressing     Problem: Skin Injury Risk Increased  Goal: Skin Health and Integrity  Outcome: Progressing  Intervention: Optimize Skin Protection  Recent Flowsheet Documentation  Taken 3/23/2025 0200 by Katia Thrasher RN  Activity Management: activity encouraged  Pressure Reduction Techniques:   frequent weight shift encouraged   heels elevated off bed   weight shift assistance provided  Head of Bed (HOB) Positioning: HOB  elevated  Pressure Reduction Devices:   pressure-redistributing mattress utilized   positioning supports utilized   heel offloading device utilized  Skin Protection:   incontinence pads utilized   protective footwear used   silicone foam dressing in place  Taken 3/23/2025 0000 by Katia Thrasher RN  Activity Management: activity encouraged  Pressure Reduction Techniques:   frequent weight shift encouraged   heels elevated off bed   weight shift assistance provided  Head of Bed (HOB) Positioning: \[facility] [place] [facility]\"" elevated  Pressure Reduction Devices:   pressure-redistributing mattress utilized   positioning supports utilized   heel offloading device utilized  Skin Protection:   incontinence pads utilized   protective footwear used   silicone foam dressing in place  Taken 3/22/2025 2200 by Katia Thrasher RN  Activity Management: activity encouraged  Pressure Reduction Techniques:   frequent weight shift encouraged   heels elevated off bed   weight shift assistance provided  Head of Bed (HOB) Positioning: \[facility]\"" elevated  Pressure Reduction Devices:   pressure-redistributing mattress utilized   positioning supports utilized   heel offloading device utilized  Skin Protection:   incontinence pads utilized   protective footwear used   silicone foam dressing in place  Taken 3/22/2025 2000 by Katia Thrasher RN  Activity Management: activity encouraged  Pressure Reduction Techniques:   heels elevated off bed   frequent weight shift encouraged   weight shift assistance provided  Head of Bed (HOB) Positioning: \[facility]\"" elevated  Pressure Reduction Devices:   pressure-redistributing mattress utilized   positioning supports utilized   heel offloading device utilized  Skin Protection: (silicone dressings peeled back, skin assessed)   incontinence pads utilized   protective footwear used   silicone foam dressing in place     Problem: Comorbidity Management  Goal: Maintenance of Behavioral Health Symptom Control  Outcome: Progressing  Intervention:  Maintain Behavioral Health Symptom Control  Recent Flowsheet Documentation  Taken 3/23/2025 0200 by Katia Thrasher RN  Medication Review/Management: medications reviewed  Taken 3/23/2025 0000 by Katia Thrasher RN  Medication Review/Management: medications reviewed  Taken 3/22/2025 2200 by Katia Thrasher RN  Medication Review/Management: medications reviewed  Taken 3/22/2025 2000 by Katia Thrasher RN  Medication Review/Management: medications reviewed  Goal: Blood Glucose Level Within Target Range  Outcome: Progressing  Intervention: Monitor and Manage Glycemia  Recent Flowsheet Documentation  Taken 3/23/2025 0200 by Katia Thrasher RN  Medication Review/Management: medications reviewed  Taken 3/23/2025 0000 by Katia Thrasher RN  Medication Review/Management: medications reviewed  Taken 3/22/2025 2200 by Katia Thrasher RN  Medication Review/Management: medications reviewed  Taken 3/22/2025 2000 by Katia Thrasher RN  Medication Review/Management: medications reviewed  Goal: Blood Pressure in Desired Range  Outcome: Progressing  Intervention: Maintain Blood Pressure Management  Recent Flowsheet Documentation  Taken 3/23/2025 0200 by Katia Thrasher RN  Medication Review/Management: medications reviewed  Taken 3/23/2025 0000 by Katia Thrasher RN  Medication Review/Management: medications reviewed  Taken 3/22/2025 2200 by Katia Thrasher RN  Medication Review/Management: medications reviewed  Taken 3/22/2025 2000 by Katia Thrasher RN  Medication Review/Management: medications reviewed     Problem: Fall Injury Risk  Goal: Absence of Fall and Fall-Related Injury  Outcome: Progressing  Intervention: Identify and Manage Contributors  Recent Flowsheet Documentation  Taken 3/23/2025 0200 by Katia Thrasher RN  Medication Review/Management: medications reviewed  Taken 3/23/2025 0000 by Katia Thrasher RN  Medication Review/Management: medications reviewed  Taken 3/22/2025 2200 by Katia Thrasher RN  Medication Review/Management: medications  reviewed  Taken 3/22/2025 2000 by Katia Thrasher RN  Medication Review/Management: medications reviewed  Intervention: Promote Injury-Free Environment  Recent Flowsheet Documentation  Taken 3/23/2025 0200 by Katia Thrasher RN  Safety Promotion/Fall Prevention:   activity supervised   assistive device/personal items within reach   clutter free environment maintained   lighting adjusted   nonskid shoes/slippers when out of bed   room organization consistent   safety round/check completed  Taken 3/23/2025 0000 by Katia Thrasher RN  Safety Promotion/Fall Prevention:   assistive device/personal items within reach   activity supervised   clutter free environment maintained   lighting adjusted   nonskid shoes/slippers when out of bed   room organization consistent   safety round/check completed  Taken 3/22/2025 2200 by Katia Thrasher RN  Safety Promotion/Fall Prevention:   assistive device/personal items within reach   activity supervised   clutter free environment maintained   lighting adjusted   nonskid shoes/slippers when out of bed   room organization consistent   safety round/check completed  Taken 3/22/2025 2000 by Katia Thrasher RN  Safety Promotion/Fall Prevention:   assistive device/personal items within reach   activity supervised   clutter free environment maintained   lighting adjusted   nonskid shoes/slippers when out of bed   room organization consistent   safety round/check completed   Goal Outcome Evaluation:  Plan of Care Reviewed With: patient        Progress: no change

## 2025-03-23 NOTE — PLAN OF CARE
Goal Outcome Evaluation:  Plan of Care Reviewed With: patient, daughter           Outcome Evaluation: OT initial eval and expanded chart review completed. Pt presents with multiple comorbidities and decreased strength, balance, activity tolerance limiting independence with ADL's and mobility from baseline status. Recommend continued skilled OT services and transfer to SNF, if deemed medically appropriate.    Anticipated Discharge Disposition (OT): skilled nursing facility

## 2025-03-24 LAB
ANION GAP SERPL CALCULATED.3IONS-SCNC: 18 MMOL/L (ref 5–15)
BASOPHILS # BLD AUTO: 0.03 10*3/MM3 (ref 0–0.2)
BASOPHILS NFR BLD AUTO: 0.4 % (ref 0–1.5)
BUN SERPL-MCNC: 30 MG/DL (ref 8–23)
BUN/CREAT SERPL: 24 (ref 7–25)
CALCIUM SPEC-SCNC: 8.9 MG/DL (ref 8.6–10.5)
CHLORIDE SERPL-SCNC: 110 MMOL/L (ref 98–107)
CO2 SERPL-SCNC: 17 MMOL/L (ref 22–29)
CREAT SERPL-MCNC: 1.25 MG/DL (ref 0.57–1)
DEPRECATED RDW RBC AUTO: 55.9 FL (ref 37–54)
EGFRCR SERPLBLD CKD-EPI 2021: 43.7 ML/MIN/1.73
EOSINOPHIL # BLD AUTO: 0.04 10*3/MM3 (ref 0–0.4)
EOSINOPHIL NFR BLD AUTO: 0.6 % (ref 0.3–6.2)
ERYTHROCYTE [DISTWIDTH] IN BLOOD BY AUTOMATED COUNT: 16.3 % (ref 12.3–15.4)
GLUCOSE BLDC GLUCOMTR-MCNC: 126 MG/DL (ref 70–130)
GLUCOSE BLDC GLUCOMTR-MCNC: 134 MG/DL (ref 70–130)
GLUCOSE BLDC GLUCOMTR-MCNC: 186 MG/DL (ref 70–130)
GLUCOSE BLDC GLUCOMTR-MCNC: 67 MG/DL (ref 70–130)
GLUCOSE SERPL-MCNC: 130 MG/DL (ref 65–99)
HCT VFR BLD AUTO: 42.5 % (ref 34–46.6)
HGB BLD-MCNC: 13.2 G/DL (ref 12–15.9)
IMM GRANULOCYTES # BLD AUTO: 0.02 10*3/MM3 (ref 0–0.05)
IMM GRANULOCYTES NFR BLD AUTO: 0.3 % (ref 0–0.5)
LYMPHOCYTES # BLD AUTO: 1.27 10*3/MM3 (ref 0.7–3.1)
LYMPHOCYTES NFR BLD AUTO: 18.6 % (ref 19.6–45.3)
MAGNESIUM SERPL-MCNC: 1.9 MG/DL (ref 1.6–2.4)
MCH RBC QN AUTO: 29.1 PG (ref 26.6–33)
MCHC RBC AUTO-ENTMCNC: 31.1 G/DL (ref 31.5–35.7)
MCV RBC AUTO: 93.6 FL (ref 79–97)
MONOCYTES # BLD AUTO: 0.41 10*3/MM3 (ref 0.1–0.9)
MONOCYTES NFR BLD AUTO: 6 % (ref 5–12)
NEUTROPHILS NFR BLD AUTO: 5.04 10*3/MM3 (ref 1.7–7)
NEUTROPHILS NFR BLD AUTO: 74.1 % (ref 42.7–76)
NRBC BLD AUTO-RTO: 0 /100 WBC (ref 0–0.2)
PLATELET # BLD AUTO: 227 10*3/MM3 (ref 140–450)
PMV BLD AUTO: 9.8 FL (ref 6–12)
POTASSIUM SERPL-SCNC: 4.4 MMOL/L (ref 3.5–5.2)
RBC # BLD AUTO: 4.54 10*6/MM3 (ref 3.77–5.28)
SODIUM SERPL-SCNC: 145 MMOL/L (ref 136–145)
UFH PPP CHRO-ACNC: 0.65 IU/ML (ref 0.3–0.7)
UFH PPP CHRO-ACNC: 0.75 IU/ML (ref 0.3–0.7)
WBC NRBC COR # BLD AUTO: 6.81 10*3/MM3 (ref 3.4–10.8)

## 2025-03-24 PROCEDURE — 99232 SBSQ HOSP IP/OBS MODERATE 35: CPT | Performed by: INTERNAL MEDICINE

## 2025-03-24 PROCEDURE — 25810000003 DEXTROSE 5 % AND SODIUM CHLORIDE 0.9 % 5-0.9 % SOLUTION: Performed by: INTERNAL MEDICINE

## 2025-03-24 PROCEDURE — 25810000003 SODIUM CHLORIDE 0.9 % SOLUTION 250 ML FLEX CONT: Performed by: INTERNAL MEDICINE

## 2025-03-24 PROCEDURE — 25010000002 NICARDIPINE 2.5 MG/ML SOLUTION 10 ML VIAL: Performed by: INTERNAL MEDICINE

## 2025-03-24 PROCEDURE — 83735 ASSAY OF MAGNESIUM: CPT | Performed by: INTERNAL MEDICINE

## 2025-03-24 PROCEDURE — 99232 SBSQ HOSP IP/OBS MODERATE 35: CPT

## 2025-03-24 PROCEDURE — 92507 TX SP LANG VOICE COMM INDIV: CPT

## 2025-03-24 PROCEDURE — 25010000002 CEFTRIAXONE PER 250 MG: Performed by: INTERNAL MEDICINE

## 2025-03-24 PROCEDURE — 92610 EVALUATE SWALLOWING FUNCTION: CPT

## 2025-03-24 PROCEDURE — 25010000002 HYDRALAZINE PER 20 MG: Performed by: INTERNAL MEDICINE

## 2025-03-24 PROCEDURE — 85520 HEPARIN ASSAY: CPT

## 2025-03-24 PROCEDURE — 85025 COMPLETE CBC W/AUTO DIFF WBC: CPT | Performed by: HOSPITALIST

## 2025-03-24 PROCEDURE — 82948 REAGENT STRIP/BLOOD GLUCOSE: CPT

## 2025-03-24 PROCEDURE — 80048 BASIC METABOLIC PNL TOTAL CA: CPT | Performed by: INTERNAL MEDICINE

## 2025-03-24 RX ORDER — IBUPROFEN 600 MG/1
1 TABLET ORAL
Status: DISCONTINUED | OUTPATIENT
Start: 2025-03-24 | End: 2025-04-07 | Stop reason: HOSPADM

## 2025-03-24 RX ORDER — RIVASTIGMINE 4.6 MG/24H
1 PATCH, EXTENDED RELEASE TRANSDERMAL DAILY
Status: DISCONTINUED | OUTPATIENT
Start: 2025-03-24 | End: 2025-04-07 | Stop reason: HOSPADM

## 2025-03-24 RX ORDER — NICOTINE POLACRILEX 4 MG
15 LOZENGE BUCCAL
Status: DISCONTINUED | OUTPATIENT
Start: 2025-03-24 | End: 2025-04-07 | Stop reason: HOSPADM

## 2025-03-24 RX ORDER — DEXTROSE MONOHYDRATE AND SODIUM CHLORIDE 5; .9 G/100ML; G/100ML
50 INJECTION, SOLUTION INTRAVENOUS CONTINUOUS
Status: DISCONTINUED | OUTPATIENT
Start: 2025-03-24 | End: 2025-03-25

## 2025-03-24 RX ORDER — DEXTROSE MONOHYDRATE 25 G/50ML
25 INJECTION, SOLUTION INTRAVENOUS
Status: DISCONTINUED | OUTPATIENT
Start: 2025-03-24 | End: 2025-04-07 | Stop reason: HOSPADM

## 2025-03-24 RX ORDER — INSULIN LISPRO 100 [IU]/ML
2-7 INJECTION, SOLUTION INTRAVENOUS; SUBCUTANEOUS
Status: DISCONTINUED | OUTPATIENT
Start: 2025-03-24 | End: 2025-04-07 | Stop reason: HOSPADM

## 2025-03-24 RX ADMIN — HYDRALAZINE HYDROCHLORIDE 10 MG: 20 INJECTION INTRAMUSCULAR; INTRAVENOUS at 11:55

## 2025-03-24 RX ADMIN — Medication 10 ML: at 21:01

## 2025-03-24 RX ADMIN — DEXTROSE MONOHYDRATE AND SODIUM CHLORIDE 50 ML/HR: 5; .9 INJECTION, SOLUTION INTRAVENOUS at 11:55

## 2025-03-24 RX ADMIN — ATORVASTATIN CALCIUM 80 MG: 40 TABLET, FILM COATED ORAL at 21:02

## 2025-03-24 RX ADMIN — SODIUM CHLORIDE 1000 MG: 900 INJECTION INTRAVENOUS at 15:54

## 2025-03-24 RX ADMIN — SODIUM CHLORIDE 5 MG/HR: 9 INJECTION, SOLUTION INTRAVENOUS at 18:59

## 2025-03-24 RX ADMIN — DEXTROSE MONOHYDRATE AND SODIUM CHLORIDE 50 ML/HR: 5; .9 INJECTION, SOLUTION INTRAVENOUS at 21:14

## 2025-03-24 RX ADMIN — RIVASTIGMINE 1 PATCH: 4.6 PATCH, EXTENDED RELEASE TRANSDERMAL at 21:02

## 2025-03-24 NOTE — PROGRESS NOTES
"          Clinical Nutrition Assessment     Patient Name: Valarie Camara  YOB: 1945  MRN: [id number]  Date of Encounter: 03/24/25 15:52 EDT  Admission date: 3/20/2025  Reason for Visit: Identified at risk by screening criteria, MST score 2+, \"Unsure\" unintentional weight loss    Assessment   Nutrition Assessment   Admission Diagnosis:  CVA (cerebral vascular accident) [I63.9]    Problem List:    Encephalopathy    Anxiety and depression    Gastroesophageal reflux disease with esophagitis    Multiple-type hyperlipidemia    Benign essential hypertension    Pulmonary emboli      PMH:   She  has a past medical history of Acute bronchitis with bronchospasm, Anxiety, Arthritis, Asthma, B12 deficiency, Back pain, Body piercing, Cataract, Cerebrovascular disease, Cervicalgia, Chronic kidney disease, Colonic polyp, Constipation, COPD (chronic obstructive pulmonary disease), Coronary artery disease, Depression, Diverticulitis, Dysphagia, Edema, Elevated cholesterol, Esophageal reflux, Folic acid deficiency, Full dentures, Heart murmur, Herpes zoster, High cholesterol, History of kidney infection, History of recurrent urinary tract infection, HTN (hypertension), Hypercholesterolemia, Impaired functional mobility, balance, gait, and endurance, Insomnia, Kidney infection, Malignant hypertension (04/26/2015), Measles, Muscle spasm, Neoplasm of uncertain behavior of skin, Osteoporosis, Poor historian, Recurrent urinary tract infection, Rheumatoid arthritis, RLS (restless legs syndrome), Stomach ulcer, Stroke, Type 2 diabetes mellitus, and Vitamin D deficiency.    PSH:  She  has a past surgical history that includes Hysterectomy (1979); Cataract extraction w/  intraocular lens implant (Left, 02/11/2013); Cataract extraction w/  intraocular lens implant (Right, 04/15/2013); Colonoscopy (2012); Esophagogastroduodenoscopy (N/A, 11/13/2017); Upper gastrointestinal endoscopy (12/09/2013); Upper gastrointestinal " "endoscopy (11/13/2017); Esophagogastroduodenoscopy (N/A, 11/25/2019); Colonoscopy (N/A, 11/26/2019); Esophagogastroduodenoscopy (N/A, 11/29/2021); Esophagogastroduodenoscopy (N/A, 11/1/2023); and Esophagogastroduodenoscopy (N/A, 1/27/2025).    Applicable Nutrition History:   3/21S Diet Recommendation: mechanical ground textures, thin liquids   3/23 [facility] Diet Recommendation: puree, thin liquids   3/24 [facility] Diet Recommendation: puree, honey thick liquids, ice chips between meals after oral care, with supervision     Anthropometrics     Height: Height: 160 cm (62.99\")  Last Filed Weight: Weight: 68 kg (150 lb) (03/21/25 1511)  Method: Weight Method: Stated  BMI: BMI (Calculated): 26.6    UBW:  200# several months ago; no specific time frame provided  Weight change: weight loss of 19 lbs (11.2%) over 2 month(s)    Significant?  Yes     Weight       Weight (kg) Weight (lbs) Weight Method Visit Report   11/6/2024 80.65 kg  177 lb 12.8 oz   --    1/5/2025 79.379 kg  175 lb  Standing scale      79.4 kg  175 lb 0.7 oz  Stated      76.3 kg  168 lb 3.4 oz  Bed scale     1/10/2025 80.287 kg  177 lb      1/24/2025 75.297 kg  166 lb  Stated     1/27/2025 77.111 kg  170 lb  Stated     2/8/2025 77.1 kg  169 lb 15.6 oz  Standing scale     2/28/2025 77 kg  169 lb 12.1 oz  Stated      77 kg  169 lb 12.1 oz  Bed scale      72.6 kg  160 lb 0.9 oz      3/1/2025 72.3 kg  159 lb 6.3 oz      3/2/2025 70.5 kg  155 lb 6.8 oz  Bed scale      71.9 kg  158 lb 8.2 oz  Bed scale     3/4/2025 72.8 kg  160 lb 7.9 oz      3/15/2025 69.4 kg  153 lb  Stated     3/20/2025 68.04 kg  150 lb  Stated     3/21/2025 68.04 kg  150 lb          Nutrition Focused Physical Exam    Date:  3/24       Unable to perform due to Pt unable to participate at time of visit     Subjective   Reported/Observed/Food/Nutrition Related History:     Patient in bed, daughter at bedside provides most of patient nutrition hx. Dtr reports that patient was eating fairly well prior to " 2 days ago, notes that medications were changed and pt has has poor intakes since. No c/o N/V, pt notes she has been constipated. NKFA. Dtr reports patient drinks Boost at home, would like with breakfast while here. Dtr also notes that over the course of several months patient has lost wt, causing dentures to be ill-fitting, which may have played a role in pt's wt loss.    Current Nutrition Prescription   PO: Diet: Cardiac, Diabetic; Healthy Heart (2-3 Na+); Consistent Carbohydrate; Feeding Assistance - Nursing; Texture: Pureed (NDD 1); Fluid Consistency: Thin (IDDSI 0)  Oral Nutrition Supplement: N/A  Intake: 4 Days: 75% x 1 meal    Assessment & Plan   Nutrition Diagnosis   Date:  3/24            Updated:    Problem Unintended weight loss    Etiology Advanced age, ill-fitting dentures   Signs/Symptoms Wt loss 19# x ~2 months (11.2%)   Status: New    Goal:   Nutrition to support treatment and Maintain intake per meal documented      Nutrition Intervention      Follow treatment progress, Care plan reviewed, Advise alternate selection, Advised available snacks, Interview for preferences, Encourage intake, Supplement provided    Encourage continued adequate intakes  Boost GC with breakfast  NFPE when feasible    Monitoring/Evaluation:   Per protocol, I&O, PO intake, Supplement intake, Pertinent labs, Weight, Symptoms, POC/GOC, Swallow function    Valarie Noble RD  Time Spent: 30m

## 2025-03-24 NOTE — PROGRESS NOTES
Pharmacy to Dose Heparin Infusion Note    Valarie Camara is a 79 y.o. female receiving heparin infusion.     Therapy for (VTE/Cardiac): Recent VTE  Patient Weight: 68  Initial Bolus (Y/N): No  Any Bolus (Y/N): No     Signs or Symptoms of Bleeding: No    VTE (PE/DVT)   Initial Bolus: 80 units/kg (Max 10,000 units)  Initial rate: 18 units/kg/hr (Max 1,500 units/hr)    Anti-Xa Bolus   Dose Infusion Hold   Time Infusion Rate Change (units/kg/hr) Repeat  Anti-Xa   < 0.11 50 units/kg  (4000 units Max) None Increase by  4 units/kg/hr 6 hours   0.11 - 0.19 25 units/kg  (2000 units Max) None Increase by  3 units/kg/hr 6 hours   0.2 - 0.29 0 None Increase by  2 units/kg/hr 6 hours   0.3 - 0.7 0 None No Change 6 hours (after 2 consecutive levels in range check qAM)   0.71 - 0.8 0 None Decrease by  1 units/kg/hr 6 hours   0.81 - 0.9 0 None Decrease by  2 units/kg/hr 6 hours   0.91 - 1 0 60 minutes Decrease by  3 units/kg/hr 6 hours   >1 0 Hold  After Anti-Xa less than 0.7 decrease previous rate by  4 units/kg/hr  Every 2 hours until Anti-Xa less than 0.7 then when infusion restarts in 6 hours     Results from last 7 days   Lab Units 03/23/25  0220 03/22/25  0545 03/21/25  0553 03/20/25  1609 03/20/25  1608   INR   --   --   --   --  1.43*   HEMOGLOBIN g/dL 13.3 13.2 12.3  --  11.7*   HEMOGLOBIN, POC   --   --   --    < >  --    HEMATOCRIT % 41.0 41.3 37.4  --  35.7   HEMATOCRIT POC   --   --   --    < >  --    PLATELETS 10*3/mm3 255 255 250  --  264    < > = values in this interval not displayed.       Date   Time   Anti-Xa Current Rate (units/kg/hr) Bolus   (units) Rate Change   (units/kg/hr) New Rate (units/kg/hr) Repeat  Anti-Xa Comments /  Pump Check    3/20 1608 > 1.1 -- No bolus ever Held  Hold 2200 Holding infusion until anti-Xa < 1. Discussed with stoke team and RN.   3/21 0000 > 1.1 Hold -- -- Hold 0600 Continue to hold   3/21 0553 > 1.1 HOLD -- -- HOLD 1200 Dawnlyn -acb   3/21 1150 >1.1 HOLD -- -- HOLD 1800 Dw RN    3/21 1901 >1.1 HOLD -- -- HOLD 0200 Discussed with  Hadley Julian (Nurse)   3/22 0529 >1.1 HOLD -- -- HOLD 1200 Discussed w/ nurse   3/22 1452 0.98 HELD -- +15 15 2100 DW RN   3/22 2131 >1.1 15 -- -15 (hold) hold 0100 Spoke with RN   3/23 0036 0.97 HOLD -- -- HOLD 0300  A-Xa remains high, will place heparin drip on hold. Ordered repeat for 1700, recommending restart drip when heparin within therapeutic range at decreased rate.  Discussed w/ nurse   3/23 0220 0.92 HOLD -- -- HOLD 0600 Discussed w/ nurse   3/23 0613 0.78 0 (held) -- +11 11 1400 DW RN (Christopher); pump verified    3/23 1341 0.96 11 -- -11 (HOLD) 0 (HOLD) 2000 DW AIDAN (Christopher); pump verified    3/23 1955 0.81 HOLD -- +6 6 0600 Hadley Rowley1 -acb   3/24 0914 0.75 6 -- -1 5 1800 Dw RN

## 2025-03-24 NOTE — PROGRESS NOTES
[facility] Medicine Services  PROGRESS NOTE    Patient Name: Valarie Camara  : 1945  MRN: [id number]    Date of Admission: 3/20/2025  Primary Care Physician: Lay Cox MD    Subjective   Subjective     CC:  Right sided weakness    HPI:  Slept. Drowsy all day  No chest pain  No palpitations  Hasn't eaten      Objective   Objective     Vital Signs:   Temp:  [97.9 °F (36.6 °C)-98.7 °F (37.1 °C)] 97.9 °F (36.6 °C)  Heart Rate:  [73-97] 97  Resp:  [16-18] 18  BP: (143-186)/(70-92) 143/70     Physical Exam:  Constitutional: alert, elderly, frail but nontoxic, normal work of breathing lying in bed  Psych:Normal/appropriate affect  HEENT:NCAT, oropharynx clear  Neck: neck supple, full range of motion  Neuro: confused, slightly somnolent, moves all extremities  Cardiac: rrr  Resp:ctab  GI: abd soft, nontender  Skin: No extremity rash  Musculoskeletal/extremities: no cyanosis of extremities; no significant ankle edema        Results Reviewed:  LAB RESULTS:      Lab 25  0914 25  1955 25  1341 25  0613 25  0220 25  1452 25  0545 25  1150 25  0553 25  0000 25  1609 25  1608   WBC 6.81  --   --   --  6.88  --  6.49  --  7.46  --   --  6.44   HEMOGLOBIN 13.2  --   --   --  13.3  --  13.2  --  12.3  --   --  11.7*   HEMOGLOBIN, POC  --   --   --   --   --   --   --   --   --   --  11.9*  --    HEMATOCRIT 42.5  --   --   --  41.0  --  41.3  --  37.4  --   --  35.7   HEMATOCRIT POC  --   --   --   --   --   --   --   --   --   --  35*  --    PLATELETS 227  --   --   --  255  --  255  --  250  --   --  264   NEUTROS ABS 5.04  --   --   --   --   --   --   --  4.90  --   --  3.61   IMMATURE GRANS (ABS) 0.02  --   --   --   --   --   --   --  0.02  --   --  0.03   LYMPHS ABS 1.27  --   --   --   --   --   --   --  1.92  --   --  2.01   MONOS ABS 0.41  --   --   --   --   --   --   --  0.55  --   --  0.72    EOS ABS 0.04  --   --   --   --   --   --   --  0.04  --   --  0.04   MCV 93.6  --   --   --  89.1  --  90.2  --  88.0  --   --  87.9   PROCALCITONIN  --   --   --   --   --   --   --   --   --   --   --  0.20   LACTATE  --   --   --   --   --   --   --   --   --   --   --  1.6   PROTIME  --   --   --   --   --   --   --   --   --   --   --  17.6*   APTT  --   --   --   --   --   --   --   --  28.7  --   --  31.0*   HEPARIN ANTI-XA 0.75* 0.81* 0.96* 0.78* 0.92*   < >  --    < > >1.10*   < >  --  >1.10*    < > = values in this interval not displayed.         Lab 03/24/25  0914 03/23/25  0220 03/22/25  0545 03/21/25  1901 03/21/25  0553 03/20/25  1609 03/20/25  1608   SODIUM 145 144 144  --  144  --  141   POTASSIUM 4.4 3.8 3.7 3.7 3.5  --  3.8   CHLORIDE 110* 109* 108*  --  107  --  105   CO2 17.0* 18.0* 20.0*  --  20.0*  --  21.0*   ANION GAP 18.0* 17.0* 16.0*  --  17.0*  --  15.0   BUN 30* 25* 27*  --  36*  --  44*   CREATININE 1.25* 1.47* 1.31*  --  1.84* 2.80* 2.50*   EGFR 43.7* 35.9* 41.3*  --  27.5* 16.6* 19.0*   GLUCOSE 130* 88 90  --  124*  --  150*   CALCIUM 8.9 9.0 8.9  --  9.0  --  9.2   MAGNESIUM 1.9 1.7 1.9  --   --   --  1.9   TSH  --   --   --   --   --   --  0.355         Lab 03/21/25  0553 03/20/25  1608   TOTAL PROTEIN 6.5 6.3   ALBUMIN 3.8 3.9   GLOBULIN 2.7 2.4   ALT (SGPT) 13 13   AST (SGOT) 17 16   BILIRUBIN 0.5 0.4   ALK PHOS 49 54         Lab 03/20/25  1608   PROTIME 17.6*   INR 1.43*         Lab 03/21/25  0553   CHOLESTEROL 215*   LDL CHOL 145*   HDL CHOL 43   TRIGLYCERIDES 149             Lab 03/22/25  0610   PH, ARTERIAL 7.449   PCO2, ARTERIAL 32.0*   PO2 ART 78.3*   FIO2 21   HCO3 ART 22.1   BASE EXCESS ART -1.2*   CARBOXYHEMOGLOBIN 0.9     Brief Urine Lab Results  (Last result in the past 365 days)        Color   Clarity   Blood   Leuk Est   Nitrite   Protein   CREAT   Urine HCG        03/22/25 1129 Yellow   Cloudy   Negative   Negative   Negative   100 mg/dL (2+)                    Microbiology Results Abnormal       Procedure Component Value - Date/Time    Urine Culture - Urine, Straight Cath [[id number]]  (Abnormal) Collected: 03/22/25 1129    Lab Status: Final result Specimen: Urine from Straight Cath Updated: 03/23/25 2043     Urine Culture 25,000 CFU/mL Lactobacillus species     Comment:   Based on laboratory diagnosis criteria, these organisms are common urogenital commensal lilly and have not been associated with urinary tract infections; clinical correlation is recommended.       Narrative:      Colonization of the urinary tract without infection is common. Treatment is discouraged unless the patient is symptomatic, pregnant, or undergoing an invasive urologic procedure.            No radiology results from the last 24 hrs      Results for orders placed during the hospital encounter of 03/20/25    Adult Transthoracic Echo Complete W/ Cont if Necessary Per Protocol (With Agitated Saline)    Interpretation Summary    Left ventricular systolic function is normal. Calculated left ventricular EF = 67% Left ventricular ejection fraction appears to be 66 - 70%.    Left ventricular wall thickness is consistent with mild concentric hypertrophy.    Left ventricular outflow tract peak flow gradient at rest is 11 mmHg. Left ventricular outflow tract peak gradient with valsalva is 29 mmHg.    Left ventricular diastolic function is consistent with (grade I) impaired relaxation.    The mitral valve peak gradient is 9 mmHg. The mitral valve mean gradient is 4 mmHg.    Calculated right ventricular systolic pressure from tricuspid regurgitation is 21 mmHg.    There is a trivial pericardial effusion.      Current medications:  Scheduled Meds:amLODIPine, 5 mg, Oral, Q24H  aspirin, 81 mg, Oral, Daily   Or  aspirin, 300 mg, Rectal, Daily  atorvastatin, 80 mg, Oral, Nightly  carvedilol, 25 mg, Oral, BID With Meals  cefTRIAXone, 1,000 mg, Intravenous, Q24H  insulin lispro, 2-7 Units, Subcutaneous, TID  With Meals  pantoprazole, 40 mg, Oral, Daily  rivastigmine, 1 patch, Transdermal, Daily  sodium chloride, 10 mL, Intravenous, Q12H      Continuous Infusions:dextrose 5 % and sodium chloride 0.9 %, 50 mL/hr, Last Rate: Stopped (03/24/25 1418)  heparin, 5 Units/kg/hr, Last Rate: 5 Units/kg/hr (03/24/25 1156)  niCARdipine, 5-15 mg/hr, Last Rate: 5 mg/hr (03/24/25 1515)  Pharmacy to Dose Heparin,       PRN Meds:.  acetaminophen    senna-docusate sodium **AND** polyethylene glycol **AND** bisacodyl **AND** bisacodyl    Calcium Replacement - Follow Nurse / BPA Driven Protocol    dextrose    dextrose    dextrose    diphenhydrAMINE    glucagon (human recombinant)    hydrALAZINE    Magnesium Standard Dose Replacement - Follow Nurse / BPA Driven Protocol    nitroglycerin    Pharmacy to Dose Heparin    Phosphorus Replacement - Follow Nurse / BPA Driven Protocol    Potassium Replacement - Follow Nurse / BPA Driven Protocol    sodium chloride    sodium chloride    Assessment & Plan   Assessment & Plan     Active Hospital Problems    Diagnosis  POA    **Encephalopathy [G93.40]  Unknown    Pulmonary emboli [I26.99]  Yes    Gastroesophageal reflux disease with esophagitis [K21.00]  Yes    Anxiety and depression [F41.9, F32.A]  Yes    Multiple-type hyperlipidemia [E78.2]  Yes    Benign essential hypertension [I10]  Yes      Resolved Hospital Problems    Diagnosis Date Resolved POA    CVA (cerebral vascular accident) [I63.9] 03/22/2025 Yes        Brief Hospital Course to date:  Valarie Camara is a 79 y.o. female w/ hx cad, pad & left ica dz (50-69%, ckd 3 (baseline cr ~1.8-1.9), htn, mild dementia, PE (on eliquis), dm2 who presented with confusion, aphasia and some right sided weakness. CTA H&N & CT perfusion was negative for flow limiting stenosis or LVO. After admission had significant HTN, Eliquis was held and transitioned to iv heparin and cardene drip initiated. EEG negative. Patient apparently didn't tolerate MRI, repeat CT  head ordered    Encephalopathy  Aphasia & right sided weakness (suspect left hemispheric stroke va stroke recrudescence)  Left ICA stenosis (50-69%), & multifocal atherosclerotic dz  Episode of lethargy/somnolence evening 3/21/25 due to seroquel  Mild dementia  Hx previous left thalamic cva  CTH wo on 3/20/2025 images were personally reviewed and showed no acute ischemic or hemorrhagic stroke.  There was stable chronic lacunar infarct of the left thalamus  -CTA of the head and neck images from 3/20/2025 were personally reviewed and showed no flow limiting stenosis or LVO.  There is moderate stenosis with complex plaque of the left internal carotid artery  -previous echo 3/1/25: ef 61-65%, diastolic dysfunction  -tsh wnl  -EEG negative  -initial u/a benign  -initially didn't tolerate MRI brain (agitation) so was aborted  -repeat CT head 3/21 was negative/unchanged from original CT head  -echo : ef 66-70%, diastolic dysfunction  -BLE duplex: negative  -evening 3/21 became lethargic. Had received geodon (tolerated this earlier in the day 3/21), but then received seroquel 50mg later that evening and became lethargic.   -MRI brain negative. Neurology discussed w/ family (daughter Cleo) and apparently patient has become lethargic in past w/ trazodone, seroquel in past and wasn't taking these prior to admission   -avoid seroquel & trazodone (daughter says hasn't tolerated)  -neuro stroke followed: asa, statin (also anticoagulation as below), f/u stroke clinic 8-12 weeks  -3/24/25: yesterday more alert/interactive, today confused and less interactive. I have consulted neurology and valentina Tompkins following: repeating EEG (already had repeat imaging on 3/22/25 which was negative); trial rivastigmine patch 4.9mg daily (monitor for signs of gi distress). If has extreme agitation would recommend IM or IV Valium (avoid antipsychotics if possible given ongoing lethargy).  -SLP following: modified diet: meds crushed w/  puree, puree/honey thick liquids    UTI  -initial u/a negative  -in/out cath u/a 3/21: 6-10 wbc, 4+ bacteria  -day #3/5 antibiotics      HTN  -currently hypertensive due to encephalopathy/drowsiness not reliably taking po bp meds; cardene drip for now until tolerating po meds reliably, then restart po bp meds  -once tolerating po: amlodipine 5mg, continue coreg 25mg bid (prn cardene drip if needed until taking po meds reliably)    -Diet controlled DM2  -A1c 6.9 on 3/4/25  -ssi for now, titrate prn    Hx PE 2/28/25 & [facility]  -heparin drip initiated until SLP cleared/reliably taking PO, then change back to Eliquis      KURT on ckd 3 (baseline cr ~1.8-1.9)  -initial creatinine was 2.5  -creatinine currently at baseline    Am labs: cbc,bmp,mag     Expected Discharge Location and Transportation: snf (timing TBD)  Expected Discharge   Expected Discharge Date: 3/26/2025; Expected Discharge Time:      VTE Prophylaxis:  Pharmacologic & mechanical VTE prophylaxis orders are present.         AM-PAC 6 Clicks Score (PT): 13 (03/24/25 0000)    CODE STATUS:   Code Status and Medical Interventions: CPR (Attempt to Resuscitate); Full Support   Ordered at: 03/20/25 1731     Code Status (Patient has no pulse and is not breathing):    CPR (Attempt to Resuscitate)     Medical Interventions (Patient has pulse or is breathing):    Full Support       Danie Dunn MD  03/24/25

## 2025-03-24 NOTE — CASE MANAGEMENT/SOCIAL WORK
Continued Stay Note  [facility]     Patient Name: Valarie Camara  MRN: [id number]  Today's Date: 3/24/2025    Admit Date: 3/20/2025    Plan: Home with HH vs SNF   Discharge Plan       Row Name 03/24/25 1218       Plan    Plan Comments MSW called and spoke with pt's daughter as no family currently at bedside. Therapy worked with pt yesterday and did recommend SNF. Pt's daughter reports that pt was just released from [facility] about a week ago. Pt's daughter reports she would like to see how pt works with therapy again before deciding any further between home and resume [facility], or SNF/rehab.                   Discharge Codes    No documentation.                 Expected Discharge Date and Time       Expected Discharge Date Expected Discharge Time    Mar 25, 2025               ALEX Michelle

## 2025-03-24 NOTE — PROGRESS NOTES
[facility] Neurology    Progress Note    Patient Name: Valarie Camara  : 1945  MRN: [id number]  Primary Care Physician:  Lay Cox MD  Date of admission: 3/20/2025    Subjective     Chief Complaint: Encephalopathy dementia    History of Present Illness   Per bedside RN on Friday night patient became extremely agitated requiring Geodon.  She was also given a dose of Seroquel 50 mg.  Rapid response was called on Saturday due to excessive lethargy.  Patient underwent repeat imaging with no acute findings.  Since then she has continued to be unable to follow commands, take medications or state her name.    Review of Systems   HILARY due to AMS    Objective     Physical Exam  Vitals and nursing note reviewed.   Constitutional:       General: She is not in acute distress.     Appearance: She is ill-appearing.   Eyes:      Extraocular Movements: Extraocular movements intact.      Pupils: Pupils are equal, round, and reactive to light.      Comments: No nystagmus or deviated gaze noted   Neurological:      Mental Status: She is lethargic.      Cranial Nerves: No facial asymmetry.      Sensory: Sensory deficit present.      Motor: Weakness and tremor present.      Comments: Physical exam extremely limited due to patient mentation.               Vitals:   Temp:  [98.4 °F (36.9 °C)-98.7 °F (37.1 °C)] 98.4 °F (36.9 °C)  Heart Rate:  [73-97] 80  Resp:  [16-18] 18  BP: (136-186)/(64-92) 175/79    Current Medications    Current Facility-Administered Medications:     acetaminophen (TYLENOL) suppository 325 mg, 325 mg, Rectal, Q4H PRN, Brittaney Martin, JOSEE, 325 mg at 25 0529    amLODIPine (NORVASC) tablet 5 mg, 5 mg, Oral, Q24H, Danie Dunn MD, 5 mg at 25 1512    aspirin chewable tablet 81 mg, 81 mg, Oral, Daily **OR** aspirin suppository 300 mg, 300 mg, Rectal, Daily, Norberto Pan MD, 300 mg at 25 1706    atorvastatin (LIPITOR) tablet 80 mg, 80 mg, Oral, Nightly,  Renee Aguirre, APRN, 80 mg at 03/23/25 2058    sennosides-docusate (PERICOLACE) 8.6-50 MG per tablet 2 tablet, 2 tablet, Oral, BID PRN **AND** polyethylene glycol (MIRALAX) packet 17 g, 17 g, Oral, Daily PRN **AND** bisacodyl (DULCOLAX) EC tablet 5 mg, 5 mg, Oral, Daily PRN **AND** bisacodyl (DULCOLAX) suppository 10 mg, 10 mg, Rectal, Daily PRN, Norberto Pan MD    Calcium Replacement - Follow Nurse / BPA Driven Protocol, , Not Applicable, PRN, Norberto Pan MD    carvedilol (COREG) tablet 25 mg, 25 mg, Oral, BID With Meals, Norberto Pan MD    cefTRIAXone (ROCEPHIN) 1,000 mg in sodium chloride 0.9 % 100 mL MBP, 1,000 mg, Intravenous, Q24H, Danie Dunn MD, Last Rate: 200 mL/hr at 03/23/25 1455, 1,000 mg at 03/23/25 1455    dextrose (D50W) (25 g/50 mL) IV injection 25 g, 25 g, Intravenous, Q15 Min PRN, Norberto Pan MD, 25 g at 03/22/25 2225    dextrose (GLUTOSE) oral gel 15 g, 15 g, Oral, Q15 Min PRN, Norberto Pan MD    dextrose 5 % and sodium chloride 0.9 % infusion, 50 mL/hr, Intravenous, Continuous, Danie Dunn MD, Stopped at 03/24/25 1418    diphenhydrAMINE (BENADRYL) capsule 25 mg, 25 mg, Oral, Nightly PRN, Danie Dunn MD    glucagon (GLUCAGEN) injection 1 mg, 1 mg, Intramuscular, Q15 Min PRN, Norberto Pan MD    heparin 88510 units/250 mL (100 units/mL) in 0.45 % NaCl infusion, 5 Units/kg/hr, Intravenous, Titrated, Prebble, Mike, RPH, Last Rate: 3.4 mL/hr at 03/24/25 1156, 5 Units/kg/hr at 03/24/25 1156    hydrALAZINE (APRESOLINE) injection 10 mg, 10 mg, Intravenous, Q6H PRN, Danie Dunn MD, 10 mg at 03/24/25 1155    [Held by provider] insulin regular (humuLIN R,novoLIN R) injection 2-7 Units, 2-7 Units, Subcutaneous, Q6H, Norberto Pan MD, 2 Units at 03/21/25 1745    Magnesium Standard Dose Replacement - Follow Nurse / BPA Driven Protocol, , Not Applicable, PRN, Norberto Pan MD    niCARdipine (CARDENE) 25mg in 250mL NS infusion, 5-15  mg/hr, Intravenous, Titrated, Danie Dunn MD, Last Rate: 75 mL/hr at 03/24/25 1418, 7.5 mg/hr at 03/24/25 1418    nitroglycerin (NITROSTAT) SL tablet 0.4 mg, 0.4 mg, Sublingual, Q5 Min PRN, Norberto Pan MD    pantoprazole (PROTONIX) EC tablet 40 mg, 40 mg, Oral, Daily, Norberto Pan MD    Pharmacy to Dose Heparin, , Not Applicable, Continuous PRNMaxim Marc P, MD    Phosphorus Replacement - Follow Nurse / BPA Driven Protocol, , Not Applicable, PRNMaxim Marc P, MD    Potassium Replacement - Follow Nurse / BPA Driven Protocol, , Not Applicable, Maxim GARCIA Marc P, MD    sodium chloride 0.9 % flush 10 mL, 10 mL, Intravenous, Q12H, Norberto Pan MD, 10 mL at 03/23/25 2121    sodium chloride 0.9 % flush 10 mL, 10 mL, Intravenous, PRMaxim ALLEN Marc P, MD    sodium chloride 0.9 % infusion 40 mL, 40 mL, Intravenous, PRN, Norberto Pan MD    Laboratory Results:   Lab Results   Component Value Date    GLUCOSE 130 (H) 03/24/2025    CALCIUM 8.9 03/24/2025     03/24/2025    K 4.4 03/24/2025    CO2 17.0 (L) 03/24/2025     (H) 03/24/2025    BUN 30 (H) 03/24/2025    CREATININE 1.25 (H) 03/24/2025    EGFRIFAFRI 42 (L) 05/23/2021    EGFRIFNONA 33 (L) 12/18/2020    BCR 24.0 03/24/2025    ANIONGAP 18.0 (H) 03/24/2025     Lab Results   Component Value Date    WBC 6.81 03/24/2025    HGB 13.2 03/24/2025    HCT 42.5 03/24/2025    MCV 93.6 03/24/2025     03/24/2025     Lab Results   Component Value Date    CHOL 215 (H) 03/21/2025    CHOL 195 05/20/2021    CHOL 147 01/11/2021     Lab Results   Component Value Date    HDL 43 03/21/2025    HDL 54 05/20/2021    HDL 44 01/11/2021     Lab Results   Component Value Date     (H) 03/21/2025     (H) 05/20/2021    LDL 86 01/11/2021     Lab Results   Component Value Date    TRIG 149 03/21/2025    TRIG 124 05/20/2021    TRIG 90 01/11/2021     Lab Results   Component Value Date    HGBA1C 6.90 (H) 03/04/2025     Lab Results   Component  "Value Date    INR 1.43 (H) 03/20/2025    INR 0.91 02/28/2025    INR 0.89 (L) 03/03/2018    PROTIME 17.6 (H) 03/20/2025    PROTIME 12.7 02/28/2025    PROTIME 9.3 (L) 03/03/2018     No results found for: \"FOLATE\"  Lab Results   Component Value Date    [id number] 252 10/07/2019       MRI Brain Without Contrast  Result Date: 3/22/2025  MRI BRAIN WO CONTRAST Date of Exam: 3/22/2025 2:03 AM EDT Indication: Stroke, follow up Aphasia, right-sided weakness, numbness, facial droop.  Comparison: Head CT 3/21/2025 Technique:  Routine multiplanar/multisequence sequence images of the brain were obtained without contrast administration. Findings: No areas of diffusion restriction to suggest a recent infarct. Negative for acute intracranial hemorrhage, large mass lesion, midline shift or hydrocephalus. Mild global parenchymal volume loss for age. Mild periventricular T2/FLAIR hyperintense signal suggesting chronic microvascular ischemic change. Mostly empty sella. Negative for cerebellar tonsillar ectopia. Normal volume corpus callosum. Major intracranial flow voids preserved. Orbits symmetric. Trace bilateral mastoid fluid. Unremarkable appearance of the calvarium.     Impression: Impression: No acute MRI findings, specifically no findings to suggest a recent infarct. Electronically Signed: Shade Seay MD  3/22/2025 8:17 AM EDT  Workstation ID: [id number]       Assessment / Plan   Brief Patient Summary:  Valarie Camara is a 79 y.o. female who has past medical history significant for HTN, HLD, CAD, diabetes, CKD stage III, L ICA stenosis, mild dementia, anxiety depression, recent PE on Eliquis, recurrent UTIs who presented to [facility] ED with complaint of altered mental status and aphasia.  Patient was originally seen by stroke neurology with a negative acute workup.  Suspected stroke prepubescent's in the setting of infection was likely causing patient's altered mentation.  MRI brain showed no acute intracranial abnormality.  " Chronic left thalamic infarct was noted.  It was recommended to continue ASA 81 mg daily for stroke prevention.    General neurology was asked to evaluate patient with ongoing metabolic encephalopathy.  Per report patient has also been intermittently refusing medications.  Per patient's daughter in the past she has not tolerated Seroquel or trazodone.  On 3/21 patient came lethargic but had received Geodon earlier in the day.  EEG negative.    Plan:   Altered mental status  Underlying dementia  Metabolic encephalopathy  Chronic infarct  Unclear etiology of patient's ongoing altered mentation.  Likely medication along with possible other metabolic component.  Repeat EEG.  Repeat imaging done on 3/22/2025 showed no acute abnormality.  Suspect there is a underlying dementia component playing a role.  Trial of rivastigmine patch 4.9 mg daily.  Monitor for signs of GI distress.  Patient to continue work with PT/OT  Will hold off on repeat images getting recent evaluations.  Continue delirium/fall precautions.  Continue ASA and statin per stroke neurology recommendations.  Patient has poor reaction to Seroquel and trazodone.  Refrain from giving.  If patient has extreme agitation would recommend IM/IV Valium.  Would avoid Geodon given patient's recent ongoing lethargy.  General neurology will continue to follow.    I have discussed the above with the patient, bedside RN and Dr. Dunn     Time spent with patient: 50 minutes in face-to-face evaluation and management of the patient.      JOSEE Machuca

## 2025-03-24 NOTE — PLAN OF CARE
"Goal Outcome Evaluation:  Plan of Care Reviewed With: patient        Progress: declining       Anticipated Discharge Disposition (SLP): skilled nursing facility          SLP Swallowing Diagnosis: oral dysphagia, suspected pharyngeal dysphagia (03/24/25 1510)  Treatment Assessment (SLP): continued, aphasia (03/24/25 1510)  Treatment Assessment Comments (SLP): Pt lethargic which negatively impacted pt's ability to participate with treatment session on this date. Pt verbalized x1 to state \"I'm cold\" and made no further attempt to verbalize despite cues. SLP will continue to follow. (03/24/25 1510)  Plan for Continued Treatment (SLP): continue treatment per plan of care (03/24/25 1510)    MBS ordered for 3/25  "

## 2025-03-24 NOTE — THERAPY RE-EVALUATION
Acute Care - Speech Language Pathology   Swallow Re-Evaluation [facility]  Speech Treatment Note     Patient Name: Valarie Camara  : 1945  MRN: [id number]  Today's Date: 3/24/2025               Admit Date: 3/20/2025    Visit Dx:     ICD-10-CM ICD-9-CM   1. Acute ischemic stroke  I63.9 434.91   2. History of stroke  Z86.73 V12.54   3. Renal insufficiency  N28.9 593.9   4. Aphasia  R47.01 784.3   5. Dysarthria  R47.1 784.51   6. Dysphagia, unspecified type  R13.10 787.20   7. 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  I69.30 V12.54     Patient Active Problem List   Diagnosis    Atopic rhinitis    Arthritis    Chronic neck pain    Anxiety and depression    Edema    Gastroesophageal reflux disease with esophagitis    Multiple-type hyperlipidemia    Vitamin D deficiency    Benign essential hypertension    Obesity (BMI 30-39.9)    History of COPD    History of cataract    History of osteoporosis    History of restless legs syndrome    History of rheumatoid arthritis    Elevated serum creatinine    Esophageal dysphagia    Constipation    Schatzki's ring    Gastritis without bleeding    History of Helicobacter pylori infection    Duodenitis    Right hemiparesis    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    Multiple thyroid nodules    Lacunar stroke    DM (diabetes mellitus), type 2, uncontrolled w/neurologic complication    Syncope and collapse    Positive fecal occult blood test    Left foot pain    Hypomagnesemia    Acute on chronic anemia    Esophageal dysphagia    Reflux esophagitis    Syncope    Irritable bowel syndrome with constipation    Hypertensive urgency    Pulmonary emboli    Encephalopathy     Past Medical History:   Diagnosis Date    Acute bronchitis with bronchospasm     Anxiety     Arthritis     Asthma     B12 deficiency     Back pain     Body piercing     ears    Cataract     Cerebrovascular disease     Cervicalgia      "Chronic kidney disease     stage 3b    Colonic polyp     History of colonic polyps     Constipation     COPD (chronic obstructive pulmonary disease)     Coronary artery disease     Depression     Diverticulitis     Dysphagia     Patient reported \"it won't go all the way down\" when eating solid foods first thing in the mornings    Edema     Elevated cholesterol     Esophageal reflux     Folic acid deficiency     Full dentures     Advised no adhesives DOS    Heart murmur     Herpes zoster     High cholesterol     History of kidney infection     History of recurrent urinary tract infection     HTN (hypertension)     Hypercholesterolemia     Impaired functional mobility, balance, gait, and endurance     Insomnia     Kidney infection     Malignant hypertension 04/26/2015     Accelerated essential hypertension    Measles     rubeola    Muscle spasm     Neoplasm of uncertain behavior of skin     dtr denies    Osteoporosis     Poor historian     Recurrent urinary tract infection     Rheumatoid arthritis     RLS (restless legs syndrome)     Stomach ulcer     Stroke     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    Type 2 diabetes mellitus     Vitamin D deficiency      Past Surgical History:   Procedure Laterality Date    CATARACT EXTRACTION WITH INTRAOCULAR LENS IMPLANT Left 02/11/2013    CATARACT EXTRACTION WITH INTRAOCULAR LENS IMPLANT Right 04/15/2013    COLONOSCOPY  2012    COLONOSCOPY N/A 11/26/2019    Procedure: COLONOSCOPY;  Surgeon: Chidi Downing MD;  Location:  [facility];  Service: Gastroenterology    ENDOSCOPY N/A 11/13/2017    Procedure: ESOPHAGOGASTRODUODENOSCOPY with biopsies and esophageal balloon dilitation;  Surgeon: Wesley Stovall MD;  Location:  [facility];  Service:     ENDOSCOPY N/A 11/25/2019    Procedure: ESOPHAGOGASTRODUODENOSCOPY;  Surgeon: Chidi Downing MD;  Location:  [facility];  Service: Gastroenterology    ENDOSCOPY N/A 11/29/2021    " Procedure: ESOPHAGOGASTRODUODENOSCOPY with dilation and biopsies;  Surgeon: Gonzalez Zapata MD;  Location: [facility];  Service: Gastroenterology;  Laterality: N/A;    ENDOSCOPY N/A 11/1/2023    Procedure: ESOPHAGOGASTRODUODENOSCOPY WITH BIOPSY AND DILATATION;  Surgeon: Gonzalez Zapata MD;  Location: [facility];  Service: Gastroenterology;  Laterality: N/A;    ENDOSCOPY N/A 1/27/2025    Procedure: Esophagogastroduodenoscopy with dilatation and biopsies;  Surgeon: Gonzalez Zapata MD;  Location: [facility];  Service: Gastroenterology;  Laterality: N/A;    HYSTERECTOMY  1979    UPPER GASTROINTESTINAL ENDOSCOPY  12/09/2013    UPPER GASTROINTESTINAL ENDOSCOPY  11/13/2017       SLP Recommendation and Plan  SLP Swallowing Diagnosis: oral dysphagia, suspected pharyngeal dysphagia (03/24/25 1510)  SLP Diet Recommendation: puree, honey thick liquids, ice chips between meals after oral care, with supervision (03/24/25 1510)  Recommended Precautions and Strategies: general aspiration precautions, reflux precautions, upright posture during/after eating, 1:1 supervision, assist with feeding, other (see comments), check mouth frequently for oral residue/pocketing (feed only when fully awake/alert) (03/24/25 1510)  SLP Rec. for Method of Medication Administration: meds crushed, with puree, as tolerated (03/24/25 1510)     Monitor for Signs of Aspiration: yes, notify SLP if any concerns (03/24/25 1510)  Recommended Diagnostics: VFSS (MBS) (03/24/25 1510)  Swallow Criteria for Skilled Therapeutic Interventions Met: demonstrates skilled criteria (03/24/25 1510)  Anticipated Discharge Disposition (SLP): skilled nursing facility (03/24/25 1510)  Rehab Potential/Prognosis, Swallowing: adequate, monitor progress closely (03/24/25 1510)  Therapy Frequency (Swallow): 5 days per week (03/24/25 1510)  Predicted Duration Therapy Intervention (Days): 1 week (03/24/25 1510)  Oral Care Recommendations: Oral  "Care BID/PRN, Suction toothbrush (03/24/25 1510)        Daily Summary of Progress (SLP): progress toward functional goals is gradual (03/24/25 1510)               Treatment Assessment (SLP): continued, aphasia (03/24/25 1510)  Treatment Assessment Comments (SLP): Pt lethargic which negatively impacted pt's ability to participate with treatment session on this date. Pt verbalized x1 to state \"I'm cold\" and made no further attempt to verbalize despite cues. SLP will continue to follow. (03/24/25 1510)  Plan for Continued Treatment (SLP): continue treatment per plan of care (03/24/25 1510)         Progress: declining      SWALLOW EVALUATION (Last 72 Hours)       SLP Adult Swallow Evaluation       Row Name 03/24/25 1510 03/23/25 1325                Rehab Evaluation    Document Type re-evaluation  -MM re-evaluation  -       Subjective Information no complaints  -MM no complaints  -       Patient Observations poorly cooperative;alert  -MM cooperative;lethargic  -       Patient/Family/Caregiver Comments/Observations No family present  -MM No family present  -       Patient Effort adequate  -MM good  -MH       Symptoms Noted During/After Treatment none  -MM none  -MH       Oral Care swabbed with antiseptic solution;swabbed with sterile water  -MM --          General Information    Patient Profile Reviewed yes  -MM yes  -MH       Pertinent History Of Current Problem See initial eval  -MM See initial eval  -       Current Method of Nutrition pureed;honey-thick liquids  -MM mechanical ground textures;thin liquids  -       Precautions/Limitations, Vision WFL;for purposes of eval  -MM WFL;for purposes of eval  -       Precautions/Limitations, Hearing WFL;for purposes of eval  -MM WFL;for purposes of eval  -MH       Prior Level of Function-Communication other (see comments)  aphasia/dysarthria per chart  -MM other (see comments)  Aphasia/dysarthria per chart  -       Prior Level of Function-Swallowing --  EGD (Jan " '25) revealed hiatal hernia and Schatzki's ring  - esophageal concerns;other (see comments)  EGD (Jan '25) revealed hiatel hernia & Schatzki's ring`  -       Plans/Goals Discussed with patient  - patient;agreed upon  -       Barriers to Rehab medically complex;cognitive status;previous functional deficit  - medically complex;cognitive status;previous functional deficit  -       Patient's Goals for Discharge patient did not state  - patient did not state  -          Pain    Additional Documentation -- Pain Scale: FACES Pre/Post-Treatment (Group)  -          Pain Scale: FACES Pre/Post-Treatment    Pain: FACES Scale, Pretreatment 0-->no hurt  -MM 0-->no hurt  -       Posttreatment Pain Rating 0-->no hurt  -MM 0-->no hurt  [facility]          Oral Motor Structure and Function    Dentition Assessment edentulous;upper dentures/partial in place  - --       Secretion Management WNL/WFL  - WNL/L  -       Mucosal Quality dry  - moist, healthy  -          Oral Musculature and Cranial Nerve Assessment    Oral Motor General Assessment -- generalized oral motor weakness  -          General Eating/Swallowing Observations    Respiratory Support Currently in Use -- room air  -       Eating/Swallowing Skills -- fed by SLP  -       Positioning During Eating upright 90 degree;upright in bed  - upright 90 degree;upright in bed  -       Utensils Used spoon;straw  - spoon;cup;straw  -       Consistencies Trialed ice chips;thin liquids;nectar/syrup-thick liquids;honey-thick liquids;pureed  - ice chips;thin liquids;pureed  -          Clinical Swallow Eval    Oral Prep Phase impaired  - impaired  -       Oral Transit impaired  - impaired  -       Oral Residue WFL  - --       Pharyngeal Phase suspected pharyngeal impairment  - no overt signs/symptoms of pharyngeal impairment  -       Clinical Swallow Evaluation Summary -- Pt lethargic, able to remain awake/alert w/ cues. No overt s/s of  aspiration accross trials of thin liquid or pureed trials. Regular solid trials deferred 2' lethargy. Oral phase generally weak appearing, suspect cognitive status impacting oral phase as well. Recommend downgrade to pureed diet w/ thin liquids, assist w/ feeding, feed only when fully awake/alert, check for pocketing/residue. SLP will f/u for diet tolerance/adjustment  -          Oral Prep Concerns    Oral Prep Concerns incomplete bolus preparation;incomplete or weak lip closure around spoon;anterior loss  -MM incomplete or weak lip closure around spoon;anterior loss  -MH       Incomplete or Weak Lip Closure Around Spoon all consistencies  -MM thin  -MH       Anterior Loss thin;nectar  -MM thin  -MH       Incomplete Bolus Preparation all consistencies  -MM --       Increased Prep Time pudding  -MM pudding  -       Oral Prep Concerns, Comment Pt presents with reduced bolus manipulation and control of all consistencies.  -MM --          Oral Transit Concerns    Oral Transit Concerns increased oral transit time  -MM increased oral transit time  -       Increased Oral Transit Time all consistencies  -MM all consistencies  -          Pharyngeal Phase Concerns    Pharyngeal Phase Concerns throat clear;multiple swallows  -MM --       Multiple Swallows thin;nectar  -MM --       Throat Clear thin  -MM --       Pharyngeal Phase Concerns, Comment Pt demonstrated repeat swallow with thin and nectar via spoon and throat clear following trial of thin via spoon. Pt accepted trials of honey thick liquids via spoon with no overt s/sx aspiration. Pt appears to be at high risk of aspiration at this time. SLP recs MBS to further evaluate swallow function and determine appropriate diet textures.  -MM --          SLP Evaluation Clinical Impression    SLP Swallowing Diagnosis oral dysphagia;suspected pharyngeal dysphagia  -MM oral dysphagia;R/O pharyngeal dysphagia  -       Functional Impact risk of aspiration/pneumonia  -MM  "risk of aspiration/pneumonia  -       Rehab Potential/Prognosis, Swallowing adequate, monitor progress closely  - good, to achieve stated therapy goals  -       Swallow Criteria for Skilled Therapeutic Interventions Met demonstrates skilled criteria  - demonstrates skilled criteria  -          SLP Treatment Clinical Impressions    Treatment Assessment (SLP) continued;aphasia  - --       Treatment Assessment Comments (SLP) Pt lethargic which negatively impacted pt's ability to participate with treatment session on this date. Pt verbalized x1 to state \"I'm cold\" and made no further attempt to verbalize despite cues. SLP will continue to follow.  -MM --       Daily Summary of Progress (SLP) progress toward functional goals is gradual  - --       Barriers to Overall Progress (SLP) Lethargy;Cognitive status  - --       Plan for Continued Treatment (SLP) continue treatment per plan of care  - --       Care Plan Review care plan/treatment goals reviewed;risks/benefits reviewed;current/potential barriers reviewed;patient/other agree to care plan  - --          Recommendations    Therapy Frequency (Swallow) 5 days per week  - PRN  -       Predicted Duration Therapy Intervention (Days) 1 week  - 1 week  -       SLP Diet Recommendation puree;honey thick liquids;ice chips between meals after oral care, with supervision  - puree;thin liquids  -       Recommended Diagnostics VFSS (MBS)  - other (see comments)  diet tolerance/adjustment  -       Recommended Precautions and Strategies general aspiration precautions;reflux precautions;upright posture during/after eating;1:1 supervision;assist with feeding;other (see comments);check mouth frequently for oral residue/pocketing  feed only when fully awake/alert  - general aspiration precautions;reflux precautions;upright posture during/after eating;1:1 supervision;assist with feeding;other (see comments);check mouth frequently for oral " residue/pocketing  feed only when fully awake/alert  -       Oral Care Recommendations Oral Care BID/PRN;Suction toothbrush  - Oral Care BID/PRN;Toothbrush  -       SLP Rec. for Method of Medication Administration meds crushed;with puree;as tolerated  - meds whole;meds crushed;with puree;as tolerated  -       Monitor for Signs of Aspiration yes;notify SLP if any concerns  - yes;notify SLP if any concerns  -       Anticipated Discharge Disposition (SLP) skilled nursing facility  - skilled nursing facility  -                 User Key  (r) = Recorded By, (t) = Taken By, (c) = Cosigned By      Initials Name Effective Dates     Rossy Stanley MS CCC-SLP 05/12/23 -     MM Jenn Boland MS CCC-SLP 08/30/24 -                     EDUCATION  The patient has been educated in the following areas:   Communication Impairment Dysphagia (Swallowing Impairment).        SLP GOALS       Row Name 03/24/25 1510 03/23/25 1325          (LTG) Patient will demonstrate functional swallow for    Diet Texture (Demonstrate functional swallow) regular textures  - regular textures  -     Liquid viscosity (Demonstrate functional swallow) thin liquids  -MM thin liquids  -     [place] (Demonstrate functional swallow) with minimal cues (75-90% accuracy)  - with minimal cues (75-90% accuracy)  -     Time Frame (Demonstrate functional swallow) 1 week  - 1 week  -     Progress/Outcomes (Demonstrate functional swallow) goal ongoing  - goal ongoing  -        (STG) Patient will tolerate trials of    Consistencies Trialed (Tolerate trials) pureed textures;honey/ moderately thick liquids  - pureed textures;thin liquids  -     Desired Outcome (Tolerate trials) without signs/symptoms of aspiration;with adequate oral prep/transit/clearance  - without signs/symptoms of aspiration;with adequate oral prep/transit/clearance  -     [place] (Tolerate trials) with maximum cues (25-49% accuracy)  - with  maximum cues (25-49% accuracy)  -     Time Frame (Tolerate trials) 1 week  -MM 1 week  -     Progress/Outcomes (Tolerate trials) goal revised this date  - goal revised this date  -     Comment (Tolerate trials) Pt demonstrated throat clear after trials of thin via spoon and could not drink thin via straw due to poor labial seal. SLP recs honey thick liquids via spoon and MBS to further evaluate.  -MM --        Patient will demonstrate functional speech skills for return to discharge environment    [place] with moderate cues  -MM --     Time frame 1 week  -MM --     Barriers lethargy;cognitive status  -MM --     Progress/Outcomes goal ongoing  -MM --        Patient will demonstrate functional language skills for return to discharge environment     [place] with moderate cues  -MM --     Time frame 1 week  -MM --     Barriers lethargy;cognitive status  -MM --     Progress/Outcomes goal ongoing  -MM --        SLP Diagnostic Treatment     Patient will participate in further assessment in the following areas reading comprehension;graphic expression;cognitive-linguistic;clarification of baseline cognitive communication status  -MM --     Time Frame (Diagnostic) 1 week  -MM --     Barriers (Diagnostic) Lethargy;Cognitive status  -MM --        Words/Phrases/Sentences Goal 1 (SLP)    Improve Ability to Comprehend Words/Phrases/Sentences Through: Goal 1 (SLP) identify objects, field of;identify pictures, field of;80%;with moderate cues (50-74%);other (comment)  field of 2  -MM --     Time Frame (Identify Objects and Pictures Goal 1, SLP) 1 week  -MM --     Progress/Outcomes (Identify Objects and Pictures Goal 1, SLP) goal ongoing  - --        Follow Directions Goal 2 (SLP)    Improve Ability to Follow Directions Goal 1 (SLP) 1 step direction with objects;1 step direction without objects;80%;with moderate cues (50-74%)  - --     Time Frame (Follow Directions Goal 1, SLP) 1 week  -MM --     Progress  (Ability to Follow Directions Goal 1, SLP) 10%;with maximum cues (25-49%)  -MM --     Progress/Outcomes (Follow Directions Goal 1, SLP) goal ongoing  -MM --        Word Retrieval Skills Goal 1 (SLP)    Improve Word Retrieval Skills By Goal 1 (SLP) completing automatic speech task, counting;completing automatic speech task, days of the week;completing automatic speech task, alphabet;completing automatic speech task, months;completing automatic speech task, Pledge of Allegiance;completing automatic speech task, sing “Happy Birthday”;confrontational naming task;high frequency;responsive naming task;simple;answer WH question with one word;80%;with moderate cues (50-74%)  -MM --     Time Frame (Word Retrieval Goal 1, SLP) 1 week  -MM --     Progress (Word Retrieval Skills Goal 1, SLP) 0%;with maximum cues (25-49%)  -MM --     Progress/Outcomes (Word Retrieval Goal 1, SLP) progress slower than expected  -MM --     Comment (Word Retrieval Goal 1, SLP) Pt did not complete any auto speech tasks despite cues; suspect related to lethargy and cognitive status  -MM --        Phonation Goal 1 (SLP)    Improve Phonation By Goal 1 (SLP) using loud speech;80%;with moderate cues (50-74%)  -MM --     Time Frame (Phonation Goal 1, SLP) 1 week  -MM --     Progress/Outcomes (Phonation Goal 1, SLP) goal ongoing  -MM --        Articulation Goal 1 (SLP)    Improve Articulation Goal 1 (SLP) by over-articulating at word level;80%;with moderate cues (50-74%)  -MM --     Time Frame (Articulation Goal 1, SLP) 1 week  -MM --     Progress (Articulation Goal 1, SLP) 0%;with maximum cues (25-49%)  -MM --     Progress/Outcomes (Articulation Goal 1, SLP) goal ongoing  -MM --     Comment (Articulation Goal 1, SLP) Pt verbalized x1 on this date  -MM --               User Key  (r) = Recorded By, (t) = Taken By, (c) = Cosigned By      Initials Name Provider Type    Rossy Brewer, MS CCC-SLP Speech and Language Pathologist    Jenn Silva, MS  CCC-SLP Speech and Language Pathologist                         Time Calculation:    Time Calculation- SLP       Row Name 03/24/25 1510             Time Calculation- SLP    SLP Start Time 1510  -MM      SLP Received On 03/24/25  -MM         Untimed Charges    47596-EF Eval Oral Pharyng Swallow Minutes 39  -MM      60598-IK Treatment/ST Modification Prosth Aug Alter  12  -MM         Total Minutes    Untimed Charges Total Minutes 51  -MM       Total Minutes 51  -MM                User Key  (r) = Recorded By, (t) = Taken By, (c) = Cosigned By      Initials Name Provider Type    Jenn Silva, MS CCC-SLP Speech and Language Pathologist                    Therapy Charges for Today       Code Description Service Date Service Provider Modifiers Qty    [id number] HC ST EVAL ORAL PHARYNG SWALLOW 3 3/24/2025 Jenn Boland MS CCC-SLP GN 1    [id number]  ST TREATMENT SPEECH 1 3/24/2025 Jenn Boland MS CCC-SLP GN 1                 MS BANDAR Nunez  3/24/2025

## 2025-03-25 ENCOUNTER — APPOINTMENT (OUTPATIENT)
Dept: GENERAL RADIOLOGY | Facility: HOSPITAL | Age: 80
DRG: 092 | End: 2025-03-25
Payer: MEDICARE

## 2025-03-25 ENCOUNTER — APPOINTMENT (OUTPATIENT)
Dept: NEUROLOGY | Facility: HOSPITAL | Age: 80
DRG: 092 | End: 2025-03-25
Payer: MEDICARE

## 2025-03-25 LAB
ANION GAP SERPL CALCULATED.3IONS-SCNC: 13 MMOL/L (ref 5–15)
BUN SERPL-MCNC: 28 MG/DL (ref 8–23)
BUN/CREAT SERPL: 20 (ref 7–25)
CALCIUM SPEC-SCNC: 9 MG/DL (ref 8.6–10.5)
CHLORIDE SERPL-SCNC: 114 MMOL/L (ref 98–107)
CO2 SERPL-SCNC: 19 MMOL/L (ref 22–29)
CREAT SERPL-MCNC: 1.4 MG/DL (ref 0.57–1)
DEPRECATED RDW RBC AUTO: 52.9 FL (ref 37–54)
EGFRCR SERPLBLD CKD-EPI 2021: 38.1 ML/MIN/1.73
ERYTHROCYTE [DISTWIDTH] IN BLOOD BY AUTOMATED COUNT: 16.2 % (ref 12.3–15.4)
GLUCOSE BLDC GLUCOMTR-MCNC: 157 MG/DL (ref 70–130)
GLUCOSE BLDC GLUCOMTR-MCNC: 168 MG/DL (ref 70–130)
GLUCOSE BLDC GLUCOMTR-MCNC: 174 MG/DL (ref 70–130)
GLUCOSE BLDC GLUCOMTR-MCNC: 221 MG/DL (ref 70–130)
GLUCOSE SERPL-MCNC: 170 MG/DL (ref 65–99)
HCT VFR BLD AUTO: 37.8 % (ref 34–46.6)
HGB BLD-MCNC: 12.2 G/DL (ref 12–15.9)
MAGNESIUM SERPL-MCNC: 1.9 MG/DL (ref 1.6–2.4)
MCH RBC QN AUTO: 28.8 PG (ref 26.6–33)
MCHC RBC AUTO-ENTMCNC: 32.3 G/DL (ref 31.5–35.7)
MCV RBC AUTO: 89.2 FL (ref 79–97)
PLATELET # BLD AUTO: 230 10*3/MM3 (ref 140–450)
PMV BLD AUTO: 10.8 FL (ref 6–12)
POTASSIUM SERPL-SCNC: 3.7 MMOL/L (ref 3.5–5.2)
RBC # BLD AUTO: 4.24 10*6/MM3 (ref 3.77–5.28)
SODIUM SERPL-SCNC: 146 MMOL/L (ref 136–145)
UFH PPP CHRO-ACNC: 0.53 IU/ML (ref 0.3–0.7)
UFH PPP CHRO-ACNC: 0.58 IU/ML (ref 0.3–0.7)
WBC NRBC COR # BLD AUTO: 7.13 10*3/MM3 (ref 3.4–10.8)

## 2025-03-25 PROCEDURE — 85027 COMPLETE CBC AUTOMATED: CPT | Performed by: INTERNAL MEDICINE

## 2025-03-25 PROCEDURE — 95819 EEG AWAKE AND ASLEEP: CPT

## 2025-03-25 PROCEDURE — 25810000003 SODIUM CHLORIDE 0.9 % SOLUTION 250 ML FLEX CONT: Performed by: INTERNAL MEDICINE

## 2025-03-25 PROCEDURE — 92507 TX SP LANG VOICE COMM INDIV: CPT

## 2025-03-25 PROCEDURE — 99232 SBSQ HOSP IP/OBS MODERATE 35: CPT

## 2025-03-25 PROCEDURE — 83735 ASSAY OF MAGNESIUM: CPT | Performed by: INTERNAL MEDICINE

## 2025-03-25 PROCEDURE — 25010000002 NICARDIPINE 2.5 MG/ML SOLUTION 10 ML VIAL: Performed by: INTERNAL MEDICINE

## 2025-03-25 PROCEDURE — 85520 HEPARIN ASSAY: CPT

## 2025-03-25 PROCEDURE — 74230 X-RAY XM SWLNG FUNCJ C+: CPT

## 2025-03-25 PROCEDURE — 63710000001 INSULIN LISPRO (HUMAN) PER 5 UNITS: Performed by: INTERNAL MEDICINE

## 2025-03-25 PROCEDURE — 82948 REAGENT STRIP/BLOOD GLUCOSE: CPT

## 2025-03-25 PROCEDURE — 80048 BASIC METABOLIC PNL TOTAL CA: CPT | Performed by: INTERNAL MEDICINE

## 2025-03-25 PROCEDURE — 95812 EEG 41-60 MINUTES: CPT | Performed by: PSYCHIATRY & NEUROLOGY

## 2025-03-25 PROCEDURE — 99232 SBSQ HOSP IP/OBS MODERATE 35: CPT | Performed by: INTERNAL MEDICINE

## 2025-03-25 PROCEDURE — 25010000002 POTASSIUM CHLORIDE PER 2 MEQ OF POTASSIUM: Performed by: INTERNAL MEDICINE

## 2025-03-25 PROCEDURE — 92611 MOTION FLUOROSCOPY/SWALLOW: CPT

## 2025-03-25 PROCEDURE — 95812 EEG 41-60 MINUTES: CPT

## 2025-03-25 RX ORDER — SODIUM CHLORIDE AND POTASSIUM CHLORIDE 150; 450 MG/100ML; MG/100ML
50 INJECTION, SOLUTION INTRAVENOUS CONTINUOUS
Status: DISCONTINUED | OUTPATIENT
Start: 2025-03-25 | End: 2025-03-25

## 2025-03-25 RX ORDER — PANTOPRAZOLE SODIUM 40 MG/1
40 TABLET, DELAYED RELEASE ORAL
Status: DISCONTINUED | OUTPATIENT
Start: 2025-03-26 | End: 2025-04-07 | Stop reason: HOSPADM

## 2025-03-25 RX ORDER — PANTOPRAZOLE SODIUM 40 MG/10ML
40 INJECTION, POWDER, LYOPHILIZED, FOR SOLUTION INTRAVENOUS DAILY
Status: DISCONTINUED | OUTPATIENT
Start: 2025-03-25 | End: 2025-03-25

## 2025-03-25 RX ADMIN — POTASSIUM CHLORIDE 50 ML/HR: 2 INJECTION, SOLUTION, CONCENTRATE INTRAVENOUS at 13:26

## 2025-03-25 RX ADMIN — SODIUM CHLORIDE 5 MG/HR: 9 INJECTION, SOLUTION INTRAVENOUS at 06:44

## 2025-03-25 RX ADMIN — ATORVASTATIN CALCIUM 80 MG: 40 TABLET, FILM COATED ORAL at 22:07

## 2025-03-25 RX ADMIN — INSULIN LISPRO 2 UNITS: 100 INJECTION, SOLUTION INTRAVENOUS; SUBCUTANEOUS at 08:33

## 2025-03-25 RX ADMIN — BARIUM SULFATE 100 ML: 0.81 POWDER, FOR SUSPENSION ORAL at 14:31

## 2025-03-25 RX ADMIN — Medication 10 ML: at 22:07

## 2025-03-25 RX ADMIN — APIXABAN 5 MG: 5 TABLET, FILM COATED ORAL at 10:33

## 2025-03-25 RX ADMIN — BARIUM SULFATE 20 ML: 400 PASTE ORAL at 14:31

## 2025-03-25 RX ADMIN — SODIUM CHLORIDE 5 MG/HR: 9 INJECTION, SOLUTION INTRAVENOUS at 01:02

## 2025-03-25 RX ADMIN — RIVASTIGMINE 1 PATCH: 4.6 PATCH, EXTENDED RELEASE TRANSDERMAL at 16:00

## 2025-03-25 RX ADMIN — INSULIN LISPRO 2 UNITS: 100 INJECTION, SOLUTION INTRAVENOUS; SUBCUTANEOUS at 17:46

## 2025-03-25 RX ADMIN — AMLODIPINE BESYLATE 5 MG: 5 TABLET ORAL at 08:34

## 2025-03-25 RX ADMIN — CARVEDILOL 25 MG: 12.5 TABLET, FILM COATED ORAL at 08:33

## 2025-03-25 RX ADMIN — CARVEDILOL 25 MG: 12.5 TABLET, FILM COATED ORAL at 17:46

## 2025-03-25 RX ADMIN — INSULIN LISPRO 3 UNITS: 100 INJECTION, SOLUTION INTRAVENOUS; SUBCUTANEOUS at 12:59

## 2025-03-25 RX ADMIN — ASPIRIN 81 MG: 81 TABLET, CHEWABLE ORAL at 08:34

## 2025-03-25 RX ADMIN — PANTOPRAZOLE SODIUM 40 MG: 40 INJECTION, POWDER, FOR SOLUTION INTRAVENOUS at 08:39

## 2025-03-25 RX ADMIN — APIXABAN 5 MG: 5 TABLET, FILM COATED ORAL at 22:07

## 2025-03-25 RX ADMIN — Medication 10 ML: at 08:34

## 2025-03-25 NOTE — PLAN OF CARE
Goal Outcome Evaluation:         Patient free of falls and injury. Skin free of injury. VSS. RA. NSR. BG monitored. NIH decreased to 7. Heparin and Cardene drip stopped today. On PO eliquis now. BG  monitored, supplemental insulin given.       Problem: Adult Inpatient Plan of Care  Goal: Plan of Care Review  Outcome: Progressing  Goal: Patient-Specific Goal (Individualized)  Outcome: Progressing  Goal: Absence of Hospital-Acquired Illness or Injury  Outcome: Progressing  Intervention: Identify and Manage Fall Risk  Recent Flowsheet Documentation  Taken 3/25/2025 1800 by Netta Espino RN  Safety Promotion/Fall Prevention:   activity supervised   assistive device/personal items within reach   clutter free environment maintained   fall prevention program maintained   lighting adjusted   nonskid shoes/slippers when out of bed   room organization consistent   safety round/check completed   toileting scheduled  Taken 3/25/2025 1600 by Netta Espino RN  Safety Promotion/Fall Prevention:   activity supervised   assistive device/personal items within reach   clutter free environment maintained   fall prevention program maintained   lighting adjusted   nonskid shoes/slippers when out of bed   room organization consistent   safety round/check completed   toileting scheduled  Taken 3/25/2025 1400 by Netat Espino RN  Safety Promotion/Fall Prevention: patient off unit  Taken 3/25/2025 1200 by Netta Espino RN  Safety Promotion/Fall Prevention:   activity supervised   assistive device/personal items within reach   clutter free environment maintained   fall prevention program maintained   lighting adjusted   nonskid shoes/slippers when out of bed   room organization consistent   safety round/check completed   toileting scheduled  Taken 3/25/2025 1030 by Netta Espino, RN  Safety Promotion/Fall Prevention:   activity supervised   assistive device/personal items within reach   clutter free environment maintained    fall prevention program maintained   lighting adjusted   nonskid shoes/slippers when out of bed   room organization consistent   safety round/check completed   toileting scheduled  Taken 3/25/2025 0800 by Netta Espino RN  Safety Promotion/Fall Prevention:   assistive device/personal items within reach   activity supervised   clutter free environment maintained   fall prevention program maintained   lighting adjusted   nonskid shoes/slippers when out of bed   room organization consistent   safety round/check completed   toileting scheduled  Intervention: Prevent Skin Injury  Recent Flowsheet Documentation  Taken 3/25/2025 1800 by Netta Espino RN  Skin Protection:   incontinence pads utilized   silicone foam dressing in place   transparent dressing maintained  Taken 3/25/2025 1600 by Netta Espino RN  Skin Protection:   incontinence pads utilized   silicone foam dressing in place   transparent dressing maintained  Taken 3/25/2025 1200 by Netta Espino RN  Skin Protection:   incontinence pads utilized   silicone foam dressing in place   transparent dressing maintained  Taken 3/25/2025 1030 by Netta Espino RN  Skin Protection:   incontinence pads utilized   silicone foam dressing in place   transparent dressing maintained  Taken 3/25/2025 0800 by Netta Espino RN  Skin Protection:   incontinence pads utilized   transparent dressing maintained   silicone foam dressing in place  Intervention: Prevent and Manage VTE (Venous Thromboembolism) Risk  Recent Flowsheet Documentation  Taken 3/25/2025 0800 by Netta Espino RN  VTE Prevention/Management: (heparin drip)   bilateral   SCDs (sequential compression devices) off   other (see comments)  Intervention: Prevent Infection  Recent Flowsheet Documentation  Taken 3/25/2025 1800 by Netta Espino RN  Infection Prevention:   environmental surveillance performed   hand hygiene promoted  Taken 3/25/2025 1600 by Netta Espino RN  Infection  Prevention:   environmental surveillance performed   hand hygiene promoted  Taken 3/25/2025 1200 by Netta Espino RN  Infection Prevention:   environmental surveillance performed   hand hygiene promoted  Taken 3/25/2025 1030 by Netta Espino RN  Infection Prevention:   environmental surveillance performed   hand hygiene promoted  Taken 3/25/2025 0800 by Netta Espino RN  Infection Prevention:   environmental surveillance performed   hand hygiene promoted  Goal: Optimal Comfort and Wellbeing  Outcome: Progressing  Intervention: Provide Person-Centered Care  Recent Flowsheet Documentation  Taken 3/25/2025 1600 by Netta Espino RN  Trust Relationship/Rapport:   care explained   thoughts/feelings acknowledged  Taken 3/25/2025 1200 by Netta Espino RN  Trust Relationship/Rapport:   care explained   thoughts/feelings acknowledged  Taken 3/25/2025 0800 by Netta Espino RN  Trust Relationship/Rapport:   care explained   thoughts/feelings acknowledged  Goal: Readiness for Transition of Care  Outcome: Progressing     Problem: Skin Injury Risk Increased  Goal: Skin Health and Integrity  Outcome: Progressing  Intervention: Optimize Skin Protection  Recent Flowsheet Documentation  Taken 3/25/2025 1800 by Netta Espino RN  Activity Management: activity encouraged  Pressure Reduction Techniques:   frequent weight shift encouraged   heels elevated off bed   positioned off wounds   pressure points protected   weight shift assistance provided  Head of Bed (HOB) Positioning: HOB elevated  Pressure Reduction Devices:   pressure-redistributing mattress utilized   positioning supports utilized   heel offloading device utilized   foam padding utilized  Skin Protection:   incontinence pads utilized   silicone foam dressing in place   transparent dressing maintained  Taken 3/25/2025 1600 by Netta Espino RN  Activity Management: activity encouraged  Pressure Reduction Techniques:   frequent weight shift  encouraged   heels elevated off bed   positioned off wounds   weight shift assistance provided  Head of Bed (HOB) Positioning: HOB elevated  Pressure Reduction Devices:   pressure-redistributing mattress utilized   positioning supports utilized   heel offloading device utilized   foam padding utilized  Skin Protection:   incontinence pads utilized   silicone foam dressing in place   transparent dressing maintained  Taken 3/25/2025 1200 by Netta Espino RN  Activity Management: activity encouraged  Pressure Reduction Techniques:   frequent weight shift encouraged   heels elevated off bed   positioned off wounds   pressure points protected   weight shift assistance provided  Head of Bed ([facility]) Positioning: HOB elevated  Pressure Reduction Devices:   pressure-redistributing mattress utilized   positioning supports utilized   heel offloading device utilized   foam padding utilized  Skin Protection:   incontinence pads utilized   silicone foam dressing in place   transparent dressing maintained  Taken 3/25/2025 1030 by Netta Espino RN  Activity Management: activity encouraged  Pressure Reduction Techniques:   frequent weight shift encouraged   heels elevated off bed   positioned off wounds   pressure points protected   weight shift assistance provided  Head of Bed ([facility]) Positioning: [facility] elevated  Pressure Reduction Devices:   pressure-redistributing mattress utilized   positioning supports utilized   heel offloading device utilized   foam padding utilized  Skin Protection:   incontinence pads utilized   silicone foam dressing in place   transparent dressing maintained  Taken 3/25/2025 0800 by Netta Espino RN  Activity Management: activity encouraged  Pressure Reduction Techniques:   frequent weight shift encouraged   heels elevated off bed   positioned off wounds   pressure points protected   weight shift assistance provided  Head of Bed ([facility]) Positioning: HOB elevated  Pressure Reduction Devices:    pressure-redistributing mattress utilized   positioning supports utilized   heel offloading device utilized   foam padding utilized  Skin Protection:   incontinence pads utilized   transparent dressing maintained   silicone foam dressing in place     Problem: Comorbidity Management  Goal: Maintenance of Behavioral Health Symptom Control  Outcome: Progressing  Intervention: Maintain Behavioral Health Symptom Control  Recent Flowsheet Documentation  Taken 3/25/2025 1800 by Netta Espino RN  Medication Review/Management: medications reviewed  Taken 3/25/2025 1600 by Netta Espino RN  Medication Review/Management: medications reviewed  Taken 3/25/2025 1200 by Netta Espino RN  Medication Review/Management: medications reviewed  Taken 3/25/2025 1030 by Netta Espino RN  Medication Review/Management: medications reviewed  Taken 3/25/2025 0800 by Netta Espino RN  Medication Review/Management: medications reviewed  Goal: Blood Glucose Level Within Target Range  Outcome: Progressing  Intervention: Monitor and Manage Glycemia  Recent Flowsheet Documentation  Taken 3/25/2025 1800 by Netta Espino RN  Medication Review/Management: medications reviewed  Taken 3/25/2025 1600 by Netta Espino RN  Medication Review/Management: medications reviewed  Taken 3/25/2025 1200 by Netta Espino RN  Medication Review/Management: medications reviewed  Taken 3/25/2025 1030 by Netta Espino RN  Medication Review/Management: medications reviewed  Taken 3/25/2025 0800 by Netta Espino RN  Medication Review/Management: medications reviewed  Goal: Blood Pressure in Desired Range  Outcome: Progressing  Intervention: Maintain Blood Pressure Management  Recent Flowsheet Documentation  Taken 3/25/2025 1800 by Netta Espino RN  Medication Review/Management: medications reviewed  Taken 3/25/2025 1600 by Netta Espino RN  Medication Review/Management: medications reviewed  Taken 3/25/2025 1200 by Srinivas  AIDAN Chadwick  Medication Review/Management: medications reviewed  Taken 3/25/2025 1030 by Netta Espino RN  Medication Review/Management: medications reviewed  Taken 3/25/2025 0800 by Netta Espino RN  Medication Review/Management: medications reviewed     Problem: Fall Injury Risk  Goal: Absence of Fall and Fall-Related Injury  Outcome: Progressing  Intervention: Identify and Manage Contributors  Recent Flowsheet Documentation  Taken 3/25/2025 1800 by Netta Espino RN  Medication Review/Management: medications reviewed  Self-Care Promotion:   independence encouraged   BADL personal objects within reach  Taken 3/25/2025 1600 by Netta Espino RN  Medication Review/Management: medications reviewed  Self-Care Promotion:   independence encouraged   BADL personal objects within reach  Taken 3/25/2025 1200 by Netta Espino RN  Medication Review/Management: medications reviewed  Taken 3/25/2025 1030 by Netta Espino RN  Medication Review/Management: medications reviewed  Self-Care Promotion:   independence encouraged   BADL personal objects within reach  Taken 3/25/2025 0800 by Netta Espino RN  Medication Review/Management: medications reviewed  Self-Care Promotion:   independence encouraged   BADL personal objects within reach  Intervention: Promote Injury-Free Environment  Recent Flowsheet Documentation  Taken 3/25/2025 1800 by Netta Espino RN  Safety Promotion/Fall Prevention:   activity supervised   assistive device/personal items within reach   clutter free environment maintained   fall prevention program maintained   lighting adjusted   nonskid shoes/slippers when out of bed   room organization consistent   safety round/check completed   toileting scheduled  Taken 3/25/2025 1600 by Netta Espino RN  Safety Promotion/Fall Prevention:   activity supervised   assistive device/personal items within reach   clutter free environment maintained   fall prevention program maintained    lighting adjusted   nonskid shoes/slippers when out of bed   room organization consistent   safety round/check completed   toileting scheduled  Taken 3/25/2025 1400 by Netta Espino RN  Safety Promotion/Fall Prevention: patient off unit  Taken 3/25/2025 1200 by Netta Espino RN  Safety Promotion/Fall Prevention:   activity supervised   assistive device/personal items within reach   clutter free environment maintained   fall prevention program maintained   lighting adjusted   nonskid shoes/slippers when out of bed   room organization consistent   safety round/check completed   toileting scheduled  Taken 3/25/2025 1030 by Netta Espino RN  Safety Promotion/Fall Prevention:   activity supervised   assistive device/personal items within reach   clutter free environment maintained   fall prevention program maintained   lighting adjusted   nonskid shoes/slippers when out of bed   room organization consistent   safety round/check completed   toileting scheduled  Taken 3/25/2025 0800 by Netta Espino RN  Safety Promotion/Fall Prevention:   assistive device/personal items within reach   activity supervised   clutter free environment maintained   fall prevention program maintained   lighting adjusted   nonskid shoes/slippers when out of bed   room organization consistent   safety round/check completed   toileting scheduled

## 2025-03-25 NOTE — PLAN OF CARE
Goal Outcome Evaluation:                   Anticipated Discharge Disposition (SLP): skilled nursing facility    SLP Diagnosis: moderate, dysarthria, moderate-severe, aphasia (03/25/25 1420)  SLP Diagnosis Comments: Pt presents w/ moderate dysarthria & moderate to severe aphasia. U/a to assess cognitive linguistic abilities 2' severity of aphasia. Per chart, pt w/ baseline aphasia & dysarthria. No family present to provide further clarification re: baseline level of fx. SLP will f/u for tx & dx tx (03/25/25 1420)  SLP Swallowing Diagnosis: mild, oral dysphagia, pharyngeal dysphagia (03/25/25 1420)  Treatment Assessment (SLP): continued, mild, aphasia (03/25/25 1420)     Plan for Continued Treatment (SLP): continue treatment per plan of care (03/25/25 1420)

## 2025-03-25 NOTE — MBS/VFSS/FEES
Acute Care - Speech Language Pathology   Swallow Re-Evaluation   [facility]    Modified Barium Swallow Study (MBS)       Patient Name: Valarie Camara  : 1945  MRN: [id number]  Today's Date: 3/25/2025               Admit Date: 3/20/2025    Visit Dx:     ICD-10-CM ICD-9-CM   1. Acute ischemic stroke  I63.9 434.91   2. History of stroke  Z86.73 V12.54   3. Renal insufficiency  N28.9 593.9   4. Aphasia  R47.01 784.3   5. Dysarthria  R47.1 784.51   6. Oropharyngeal dysphagia  R13.12 787.22   7. 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  I69.30 V12.54     Patient Active Problem List   Diagnosis    Atopic rhinitis    Arthritis    Chronic neck pain    Anxiety and depression    Edema    Gastroesophageal reflux disease with esophagitis    Multiple-type hyperlipidemia    Vitamin D deficiency    Benign essential hypertension    Obesity (BMI 30-39.9)    History of COPD    History of cataract    History of osteoporosis    History of restless legs syndrome    History of rheumatoid arthritis    Elevated serum creatinine    Esophageal dysphagia    Constipation    Schatzki's ring    Gastritis without bleeding    History of Helicobacter pylori infection    Duodenitis    Right hemiparesis    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    Multiple thyroid nodules    Lacunar stroke    DM (diabetes mellitus), type 2, uncontrolled w/neurologic complication    Syncope and collapse    Positive fecal occult blood test    Left foot pain    Hypomagnesemia    Acute on chronic anemia    Esophageal dysphagia    Reflux esophagitis    Syncope    Irritable bowel syndrome with constipation    Hypertensive urgency    Pulmonary emboli    Encephalopathy     Past Medical History:   Diagnosis Date    Acute bronchitis with bronchospasm     Anxiety     Arthritis     Asthma     B12 deficiency     Back pain     Body piercing     ears    Cataract     Cerebrovascular disease      "Cervicalgia     Chronic kidney disease     stage 3b    Colonic polyp     History of colonic polyps     Constipation     COPD (chronic obstructive pulmonary disease)     Coronary artery disease     Depression     Diverticulitis     Dysphagia     Patient reported \"it won't go all the way down\" when eating solid foods first thing in the mornings    Edema     Elevated cholesterol     Esophageal reflux     Folic acid deficiency     Full dentures     Advised no adhesives DOS    Heart murmur     Herpes zoster     High cholesterol     History of kidney infection     History of recurrent urinary tract infection     HTN (hypertension)     Hypercholesterolemia     Impaired functional mobility, balance, gait, and endurance     Insomnia     Kidney infection     Malignant hypertension 04/26/2015     Accelerated essential hypertension    Measles     rubeola    Muscle spasm     Neoplasm of uncertain behavior of skin     dtr denies    Osteoporosis     Poor historian     Recurrent urinary tract infection     Rheumatoid arthritis     RLS (restless legs syndrome)     Stomach ulcer     Stroke     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    Type 2 diabetes mellitus     Vitamin D deficiency      Past Surgical History:   Procedure Laterality Date    CATARACT EXTRACTION WITH INTRAOCULAR LENS IMPLANT Left 02/11/2013    CATARACT EXTRACTION WITH INTRAOCULAR LENS IMPLANT Right 04/15/2013    COLONOSCOPY  2012    COLONOSCOPY N/A 11/26/2019    Procedure: COLONOSCOPY;  Surgeon: Chidi Downing MD;  Location:  [facility];  Service: Gastroenterology    ENDOSCOPY N/A 11/13/2017    Procedure: ESOPHAGOGASTRODUODENOSCOPY with biopsies and esophageal balloon dilitation;  Surgeon: Wesley Stovall MD;  Location:  [facility];  Service:     ENDOSCOPY N/A 11/25/2019    Procedure: ESOPHAGOGASTRODUODENOSCOPY;  Surgeon: Chidi Downing MD;  Location:  [facility];  Service: Gastroenterology    ENDOSCOPY N/A " 11/29/2021    Procedure: ESOPHAGOGASTRODUODENOSCOPY with dilation and biopsies;  Surgeon: Gonzalez Zapata MD;  Location: [facility];  Service: Gastroenterology;  Laterality: N/A;    ENDOSCOPY N/A 11/1/2023    Procedure: ESOPHAGOGASTRODUODENOSCOPY WITH BIOPSY AND DILATATION;  Surgeon: Gonzalez Zapata MD;  Location: [facility] ENDOSCOPY;  Service: Gastroenterology;  Laterality: N/A;    ENDOSCOPY N/A 1/27/2025    Procedure: Esophagogastroduodenoscopy with dilatation and biopsies;  Surgeon: Gonzalez Zapata MD;  Location: [facility] ENDOSCOPY;  Service: Gastroenterology;  Laterality: N/A;    HYSTERECTOMY  1979    UPPER GASTROINTESTINAL ENDOSCOPY  12/09/2013    UPPER GASTROINTESTINAL ENDOSCOPY  11/13/2017       SLP Recommendation and Plan  SLP Swallowing Diagnosis: mild, oral dysphagia, pharyngeal dysphagia (03/25/25 1420)  SLP Diet Recommendation: puree, thin liquids (03/25/25 1420)  Recommended Precautions and Strategies: general aspiration precautions, reflux precautions, upright posture during/after eating, 1:1 supervision, assist with feeding, check mouth frequently for oral residue/pocketing, no straw (03/25/25 1420)  SLP Rec. for Method of Medication Administration: meds whole, meds crushed, with puree, as tolerated (03/25/25 1420)     Monitor for Signs of Aspiration: notify SLP if any concerns (03/25/25 1420)     Swallow Criteria for Skilled Therapeutic Interventions Met: demonstrates skilled criteria (03/25/25 1420)  Anticipated Discharge Disposition (SLP): skilled nursing facility (03/25/25 1420)  Rehab Potential/Prognosis, Swallowing: good, to achieve stated therapy goals (03/25/25 1420)  Therapy Frequency (Swallow): 5 days per week (03/25/25 1420)  Predicted Duration Therapy Intervention (Days): 1 week (03/25/25 1420)  Oral Care Recommendations: Oral Care BID/PRN, Toothbrush (03/25/25 1420)        Daily Summary of Progress (SLP): progress toward functional goals as expected (03/25/25  1420)               Treatment Assessment (SLP): continued, mild, aphasia (03/25/25 1420)     Plan for Continued Treatment (SLP): continue treatment per plan of care (03/25/25 1420)                SWALLOW EVALUATION (Last 72 Hours)       SLP Adult Swallow Evaluation       Row Name 03/25/25 1420 03/24/25 1510 03/23/25 1325             Rehab Evaluation    Document Type re-evaluation;other (see comments)  [facility] re-evaluation  - re-evaluation  -      Subjective Information -- no complaints  - no complaints  -      Patient Observations -- poorly cooperative;alert  - cooperative;lethargic  -      Patient/Family/Caregiver Comments/Observations -- No family present  - No family present  -      Patient Effort -- adequate  - good  -      Symptoms Noted During/After Treatment -- none  - none  -      Oral Care -- swabbed with antiseptic solution;swabbed with sterile water  - --         General Information    Patient Profile Reviewed -- yes  -MM yes  -      Pertinent History Of Current Problem -- See initial eval  - See initial eval  -      Current Method of Nutrition -- pureed;honey-thick liquids  - mechanical ground textures;thin liquids  -      Precautions/Limitations, Vision -- WFL;for purposes of eval  - WFL;for purposes of eval  -      Precautions/Limitations, Hearing -- WFL;for purposes of eval  - WFL;for purposes of eval  -      Prior Level of Function-Communication -- other (see comments)  aphasia/dysarthria per chart  -MM other (see comments)  Aphasia/dysarthria per chart  -      Prior Level of Function-Swallowing -- --  EGD (Jan '25) revealed hiatal hernia and Schatzki's ring  - esophageal concerns;other (see comments)  EGD (Jan '25) revealed hiatel hernia & Schatzki's ring`  -      Plans/Goals Discussed with -- patient  - patient;agreed upon  -      Barriers to Rehab -- medically complex;cognitive status;previous functional deficit  - medically  complex;cognitive status;previous functional deficit  -      Patient's Goals for Discharge -- patient did not state  - patient did not state  -         Pain    Additional Documentation -- -- Pain Scale: FACES Pre/Post-Treatment (Group)  [facility]         Pain Scale: FACES Pre/Post-Treatment    Pain: FACES Scale, Pretreatment -- 0-->no hurt  -MM 0-->no hurt  -      Posttreatment Pain Rating -- 0-->no hurt  -MM 0-->no hurt  -         Oral Motor Structure and Function    Dentition Assessment -- edentulous;upper dentures/partial in place  - --      Secretion Management -- WNL/WFL  - WNL/WFL  -      Mucosal Quality -- dry  - moist, healthy  -         Oral Musculature and Cranial Nerve Assessment    Oral Motor General Assessment -- -- generalized oral motor weakness  -         General Eating/Swallowing Observations    Respiratory Support Currently in Use -- -- room air  -      Eating/Swallowing Skills -- -- fed by SLP  -      Positioning During Eating -- upright 90 degree;upright in bed  - upright 90 degree;upright in bed  -      Utensils Used -- spoon;straw  - spoon;cup;straw  -      Consistencies Trialed -- ice chips;thin liquids;nectar/syrup-thick liquids;honey-thick liquids;pureed  - ice chips;thin liquids;pureed  -         Clinical Swallow Eval    Oral Prep Phase -- impaired  - impaired  -      Oral Transit -- impaired  - impaired  -      Oral Residue -- WFL  - --      Pharyngeal Phase -- suspected pharyngeal impairment  - no overt signs/symptoms of pharyngeal impairment  -      Clinical Swallow Evaluation Summary -- -- Pt lethargic, able to remain awake/alert w/ cues. No overt s/s of aspiration accross trials of thin liquid or pureed trials. Regular solid trials deferred 2' lethargy. Oral phase generally weak appearing, suspect cognitive status impacting oral phase as well. Recommend downgrade to pureed diet w/ thin liquids, assist w/ feeding, feed only when fully  awake/alert, check for pocketing/residue. SLP will f/u for diet tolerance/adjustment  -         Oral Prep Concerns    Oral Prep Concerns -- incomplete bolus preparation;incomplete or weak lip closure around spoon;anterior loss  -MM incomplete or weak lip closure around spoon;anterior loss  -MH      Incomplete or Weak Lip Closure Around Spoon -- all consistencies  -MM thin  -MH      Anterior Loss -- thin;nectar  -MM thin  -MH      Incomplete Bolus Preparation -- all consistencies  -MM --      Increased Prep Time -- pudding  -MM pudding  -      Oral Prep Concerns, Comment -- Pt presents with reduced bolus manipulation and control of all consistencies.  -MM --         Oral Transit Concerns    Oral Transit Concerns -- increased oral transit time  -MM increased oral transit time  -      Increased Oral Transit Time -- all consistencies  -MM all consistencies  -         Pharyngeal Phase Concerns    Pharyngeal Phase Concerns -- throat clear;multiple swallows  -MM --      Multiple Swallows -- thin;nectar  -MM --      Throat Clear -- thin  -MM --      Pharyngeal Phase Concerns, Comment -- Pt demonstrated repeat swallow with thin and nectar via spoon and throat clear following trial of thin via spoon. Pt accepted trials of honey thick liquids via spoon with no overt s/sx aspiration. Pt appears to be at high risk of aspiration at this time. SLP recs MBS to further evaluate swallow function and determine appropriate diet textures.  -MM --         MBS/VFSS    Utensils Used spoon;cup;straw  -CH -- --      Consistencies Trialed thin liquids;pureed  patient declined regular trials during the study stating that she couldn't chew them  -CH -- --         MBS/VFSS Interpretation    Oral Prep Phase WFL  -CH -- --      Oral Transit Phase WFL  -CH -- --      Oral Residue WFL  -CH -- --         Initiation of Pharyngeal Swallow    Initiation of Pharyngeal Swallow bolus in pyriform sinuses  -CH -- --      Pharyngeal Phase impaired  "pharyngeal phase of swallowing  -CH -- --      Penetration During the Swallow thin liquids;secondary to delayed swallow initiation or mistiming;other (see comments)  only via straw x1  -CH -- --      Depth of Penetration deep  -CH -- --      Response to Penetration No  -CH -- --      No spontaneous response to penetration and other (see comments)  mostly cleared upon completion of the swallow  -CH -- --      Rosenbek's Scale thin:;4--->level 4  -CH -- --      Pharyngeal Residue no significant pharyngeal residue noted  -CH -- --      Attempted Compensatory Maneuvers bolus size;bolus presentation style  -CH -- --      Response to Attempted Compensatory Maneuvers prevented penetration  -CH -- --      Successful Compensatory Maneuver Competency patient able to;teach back compensations;with cues  -CH -- --         Swallowing Quality of Life Assessment    Education and counseling provided Risks of aspiration;Oral care recommendations and rationale;Aspiration precautions  -CH -- --         SLP Evaluation Clinical Impression    SLP Swallowing Diagnosis mild;oral dysphagia;pharyngeal dysphagia  - oral dysphagia;suspected pharyngeal dysphagia  -MM oral dysphagia;R/O pharyngeal dysphagia  -      Functional Impact risk of aspiration/pneumonia  - risk of aspiration/pneumonia  -MM risk of aspiration/pneumonia  -      Rehab Potential/Prognosis, Swallowing good, to achieve stated therapy goals  - adequate, monitor progress closely  - good, to achieve stated therapy goals  -      Swallow Criteria for Skilled Therapeutic Interventions Met demonstrates skilled criteria  - demonstrates skilled criteria  - demonstrates skilled criteria  -         SLP Treatment Clinical Impressions    Treatment Assessment (SLP) -- continued;aphasia  -MM --      Treatment Assessment Comments (SLP) -- Pt lethargic which negatively impacted pt's ability to participate with treatment session on this date. Pt verbalized x1 to state \"I'm " "cold\" and made no further attempt to verbalize despite cues. SLP will continue to follow.  - --      Daily Summary of Progress (SLP) -- progress toward functional goals is gradual  - --      Barriers to Overall Progress (SLP) -- Lethargy;Cognitive status  - --      Plan for Continued Treatment (SLP) -- continue treatment per plan of care  - --      Care Plan Review -- care plan/treatment goals reviewed;risks/benefits reviewed;current/potential barriers reviewed;patient/other agree to care plan  - --         Recommendations    Therapy Frequency (Swallow) 5 days per week  - 5 days per week  - PRN  -      Predicted Duration Therapy Intervention (Days) -- 1 week  - 1 week  -      SLP Diet Recommendation puree;thin liquids  - puree;honey thick liquids;ice chips between meals after oral care, with supervision  - puree;thin liquids  -      Recommended Diagnostics -- VFSS (MBS)  - other (see comments)  diet tolerance/adjustment  -      Recommended Precautions and Strategies general aspiration precautions;reflux precautions;upright posture during/after eating;1:1 supervision;assist with feeding;check mouth frequently for oral residue/pocketing;no straw  - general aspiration precautions;reflux precautions;upright posture during/after eating;1:1 supervision;assist with feeding;other (see comments);check mouth frequently for oral residue/pocketing  feed only when fully awake/alert  - general aspiration precautions;reflux precautions;upright posture during/after eating;1:1 supervision;assist with feeding;other (see comments);check mouth frequently for oral residue/pocketing  feed only when fully awake/alert  -      Oral Care Recommendations Oral Care BID/PRN;Toothbrush  - Oral Care BID/PRN;Suction toothbrush  - Oral Care BID/PRN;Toothbrush  -      SLP Rec. for Method of Medication Administration meds whole;meds crushed;with puree;as tolerated  - meds crushed;with puree;as tolerated  " - meds whole;meds crushed;with puree;as tolerated  -      Monitor for Signs of Aspiration notify SLP if any concerns  - yes;notify SLP if any concerns  - yes;notify SLP if any concerns  -      Anticipated Discharge Disposition (SLP) -- skilled nursing facility  - skilled nursing facility  -                User Key  (r) = Recorded By, (t) = Taken By, (c) = Cosigned By      Initials Name Effective Dates     Mandy Wyatt, MS CCC-SLP 01/20/25 -      Rossy Stanley, MS [facility]-SLP 05/12/23 -     MM Jenn Boland, MS CCC-SLP 08/30/24 -                     EDUCATION  The patient has been educated in the following areas:   Dysphagia (Swallowing Impairment) Oral Care/Hydration Modified Diet Instruction.        SLP GOALS       Row Name 03/25/25 1420 03/24/25 1510 03/23/25 1325       (LTG) Patient will demonstrate functional swallow for    Diet Texture (Demonstrate functional swallow) soft to chew (chopped) textures  - regular textures  - regular textures  -    Liquid viscosity (Demonstrate functional swallow) thin liquids  - thin liquids  - thin liquids  -    [place] (Demonstrate functional swallow) with minimal cues (75-90% accuracy)  - with minimal cues (75-90% accuracy)  - with minimal cues (75-90% accuracy)  -    Time Frame (Demonstrate functional swallow) 1 week  - 1 week  - 1 week  -    Progress/Outcomes (Demonstrate functional swallow) -- goal ongoing  - goal ongoing  -       (STG) Patient will tolerate trials of    Consistencies Trialed (Tolerate trials) pureed textures;thin liquids  - pureed textures;honey/ moderately thick liquids  - pureed textures;thin liquids  -    Desired Outcome (Tolerate trials) without signs/symptoms of aspiration;with adequate oral prep/transit/clearance;with use of compensatory strategies (see comments)  no straw, general precautions  - without signs/symptoms of aspiration;with adequate oral prep/transit/clearance  - without  signs/symptoms of aspiration;with adequate oral prep/transit/clearance  -    [place] (Tolerate trials) with minimal cues (75-90% accuracy)  - with maximum cues (25-49% accuracy)  -MM with maximum cues (25-49% accuracy)  -    Time Frame (Tolerate trials) 1 week  -CH 1 week  -MM 1 week  -    Progress/Outcomes (Tolerate trials) -- goal revised this date  - goal revised this date  -    Comment (Tolerate trials) -- Pt demonstrated throat clear after trials of thin via spoon and could not drink thin via straw due to poor labial seal. SLP recs honey thick liquids via spoon and MBS to further evaluate.  -MM --       ([facility]) Pharyngeal Strengthening Exercise Goal 1 (SLP)    Activity (Pharyngeal Strengthening Goal 1, SLP) increase timing  -CH -- --    Increase Timing prepping - 3 second prep or suck swallow or 3-step swallow  -CH -- --    [place]/Accuracy (Pharyngeal Strengthening Goal 1, SLP) with minimal cues (75-90% accuracy)  - -- --    Time Frame (Pharyngeal Strengthening Goal 1, SLP) 1 week  -CH -- --       Patient will demonstrate functional speech skills for return to discharge environment    [place] with moderate cues  -CH with moderate cues  -MM --    Time frame 1 week  - 1 week  -MM --    Barriers lethargy;cognitive status  - lethargy;cognitive status  -MM --    Progress/Outcomes continuing progress toward goal  - goal ongoing  -MM --       Patient will demonstrate functional language skills for return to discharge environment     [place] with moderate cues  -CH with moderate cues  -MM --    Time frame 1 week  -CH 1 week  -MM --    Barriers lethargy;cognitive status  - lethargy;cognitive status  -MM --    Progress/Outcomes continuing progress toward goal  - goal ongoing  -MM --       SLP Diagnostic Treatment     Patient will participate in further assessment in the following areas reading comprehension;graphic expression;cognitive-linguistic;clarification of baseline  cognitive communication status  - reading comprehension;graphic expression;cognitive-linguistic;clarification of baseline cognitive communication status  -MM --    Time Frame (Diagnostic) 1 week  -CH 1 week  -MM --    Barriers (Diagnostic) Lethargy;Cognitive status  - Lethargy;Cognitive status  -MM --    Progress/Outcomes (Additional Goal 1, SLP) continuing progress toward goal  - -- --       Words/Phrases/Sentences Goal 1 (SLP)    Improve Ability to Comprehend Words/Phrases/Sentences Through: Goal 1 (SLP) identify objects, field of;identify pictures, field of;80%;with moderate cues (50-74%);other (comment)  - identify objects, field of;identify pictures, field of;80%;with moderate cues (50-74%);other (comment)  field of 2  -MM --    Time Frame (Identify Objects and Pictures Goal 1, SLP) 1 week  -CH 1 week  -MM --    Progress (Ability to Contruct Words/Phrases/Sentences Goal 1, SLP) 70%;with minimal cues (75-90%)  - -- --    Progress/Outcomes (Identify Objects and Pictures Goal 1, SLP) continuing progress toward goal  - goal ongoing  -MM --       Follow Directions Goal 2 (SLP)    Improve Ability to Follow Directions Goal 1 (SLP) 2 step commands;80%;with minimal cues (75-90%)  - 1 step direction with objects;1 step direction without objects;80%;with moderate cues (50-74%)  -MM --    Time Frame (Follow Directions Goal 1, SLP) 1 week  -CH 1 week  -MM --    Progress (Ability to Follow Directions Goal 1, SLP) 80%;with minimal cues (75-90%)  - 10%;with maximum cues (25-49%)  -MM --    Progress/Outcomes (Follow Directions Goal 1, SLP) goal revised this date  - goal ongoing  -MM --    Comment (Follow Directions Goal 1, SLP) Goal met for 1 step directives, upgraded to 2 step  - -- --       Word Retrieval Skills Goal 1 (SLP)    Improve Word Retrieval Skills By Goal 1 (SLP) completing automatic speech task, counting;completing automatic speech task, days of the week;completing automatic speech task,  alphabet;completing automatic speech task, months;completing automatic speech task, Pledge of Allegiance;completing automatic speech task, sing “Happy Birthday”;confrontational naming task;high frequency;responsive naming task;simple;answer WH question with one word;80%;with moderate cues (50-74%)  -CH completing automatic speech task, counting;completing automatic speech task, days of the week;completing automatic speech task, alphabet;completing automatic speech task, months;completing automatic speech task, Pledge of Allegiance;completing automatic speech task, sing “Happy Birthday”;confrontational naming task;high frequency;responsive naming task;simple;answer WH question with one word;80%;with moderate cues (50-74%)  -MM --    Time Frame (Word Retrieval Goal 1, SLP) 1 week  -CH 1 week  -MM --    Progress (Word Retrieval Skills Goal 1, SLP) 100%;independently (over 90% accuracy)  - 0%;with maximum cues (25-49%)  -MM --    Progress/Outcomes (Word Retrieval Goal 1, SLP) goal met  -CH progress slower than expected  -MM --    Comment (Word Retrieval Goal 1, SLP) -- Pt did not complete any auto speech tasks despite cues; suspect related to lethargy and cognitive status  -MM --       Connected Speech to Express Thoughts Goal 1 (SLP)    Improve Narrative Discourse to Express Thoughts By Goal 1 (SLP) explaining a proverb or idiom;conversational task on a given topic;90%;with minimal cues (75-90%)  -CH -- --    Time Frame (Connected Speech Goal 1, SLP) 1 week  -CH -- --       Phonation Goal 1 (SLP)    Improve Phonation By Goal 1 (SLP) using loud speech;80%;with moderate cues (50-74%)  - using loud speech;80%;with moderate cues (50-74%)  -MM --    Time Frame (Phonation Goal 1, SLP) 1 week  -CH 1 week  -MM --    Progress/Outcomes (Phonation Goal 1, SLP) goal no longer appropriate  - goal ongoing  -MM --       Articulation Goal 1 (SLP)    Improve Articulation Goal 1 (SLP) by over-articulating at word level;80%;with  moderate cues (50-74%)  -CH by over-articulating at word level;80%;with moderate cues (50-74%)  -MM --    Time Frame (Articulation Goal 1, SLP) 1 week  -CH 1 week  -MM --    Progress (Articulation Goal 1, SLP) 70%;with minimal cues (75-90%)  -CH 0%;with maximum cues (25-49%)  -MM --    Progress/Outcomes (Articulation Goal 1, SLP) continuing progress toward goal  -CH goal ongoing  -MM --    Comment (Articulation Goal 1, SLP) -- Pt verbalized x1 on this date  -MM --              User Key  (r) = Recorded By, (t) = Taken By, (c) = Cosigned By      Initials Name Provider Type    Mandy Aguiar MS CCC-SLP Speech and Language Pathologist    Rossy Brewer, MS CCC-SLP Speech and Language Pathologist    MM Jenn Boland MS CCC-SLP Speech and Language Pathologist                         Time Calculation:    Time Calculation- SLP       Row Name 25 1600             Time Calculation- SLP    SLP Start Time 1420  -CH      SLP Received On 25  -CH         Untimed Charges    44748-RK Motion Fluoro Eval Swallow Minutes 55  -CH      04615-FK Treatment/ST Modification Prosth Aug   25  -CH         Total Minutes    Untimed Charges Total Minutes 80  -CH       Total Minutes 80  -CH                User Key  (r) = Recorded By, (t) = Taken By, (c) = Cosigned By      Initials Name Provider Type    Mandy Aguiar MS CCC-SLP Speech and Language Pathologist                    Therapy Charges for Today       Code Description Service Date Service Provider Modifiers Qty    77458288347  ST MOTION FLUORO EVAL SWALLOW 4 3/25/2025 Mandy Wyatt MS CCC-SLP GN 1    [id number] HC ST TREATMENT SPEECH 2 3/25/2025 Mandy Wyatt MS CCC-SLP GN 1                 Mandy Wyatt MS CCC-SLP  3/25/2025   and Acute Care - Speech Language Pathology Treatment Note   David     Patient Name: Valarie Camara  : 1945  MRN: [id number]  Today's Date: 3/25/2025               Admit Date: 3/20/2025     Visit  Dx:    ICD-10-CM ICD-9-CM   1. Acute ischemic stroke  I63.9 434.91   2. History of stroke  Z86.73 V12.54   3. Renal insufficiency  N28.9 593.9   4. Aphasia  R47.01 784.3   5. Dysarthria  R47.1 784.51   6. Oropharyngeal dysphagia  R13.12 787.22   7. 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  I69.30 V12.54     Patient Active Problem List   Diagnosis    Atopic rhinitis    Arthritis    Chronic neck pain    Anxiety and depression    Edema    Gastroesophageal reflux disease with esophagitis    Multiple-type hyperlipidemia    Vitamin D deficiency    Benign essential hypertension    Obesity (BMI 30-39.9)    History of COPD    History of cataract    History of osteoporosis    History of restless legs syndrome    History of rheumatoid arthritis    Elevated serum creatinine    Esophageal dysphagia    Constipation    Schatzki's ring    Gastritis without bleeding    History of Helicobacter pylori infection    Duodenitis    Right hemiparesis    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    Multiple thyroid nodules    Lacunar stroke    DM (diabetes mellitus), type 2, uncontrolled w/neurologic complication    Syncope and collapse    Positive fecal occult blood test    Left foot pain    Hypomagnesemia    Acute on chronic anemia    Esophageal dysphagia    Reflux esophagitis    Syncope    Irritable bowel syndrome with constipation    Hypertensive urgency    Pulmonary emboli    Encephalopathy     Past Medical History:   Diagnosis Date    Acute bronchitis with bronchospasm     Anxiety     Arthritis     Asthma     B12 deficiency     Back pain     Body piercing     ears    Cataract     Cerebrovascular disease     Cervicalgia     Chronic kidney disease     stage 3b    Colonic polyp     History of colonic polyps     Constipation     COPD (chronic obstructive pulmonary disease)     Coronary artery disease     Depression     Diverticulitis     Dysphagia     Patient reported  "\"it won't go all the way down\" when eating solid foods first thing in the mornings    Edema     Elevated cholesterol     Esophageal reflux     Folic acid deficiency     Full dentures     Advised no adhesives DOS    Heart murmur     Herpes zoster     High cholesterol     History of kidney infection     History of recurrent urinary tract infection     HTN (hypertension)     Hypercholesterolemia     Impaired functional mobility, balance, gait, and endurance     Insomnia     Kidney infection     Malignant hypertension 04/26/2015     Accelerated essential hypertension    Measles     rubeola    Muscle spasm     Neoplasm of uncertain behavior of skin     dtr denies    Osteoporosis     Poor historian     Recurrent urinary tract infection     Rheumatoid arthritis     RLS (restless legs syndrome)     Stomach ulcer     Stroke     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    Type 2 diabetes mellitus     Vitamin D deficiency      Past Surgical History:   Procedure Laterality Date    CATARACT EXTRACTION WITH INTRAOCULAR LENS IMPLANT Left 02/11/2013    CATARACT EXTRACTION WITH INTRAOCULAR LENS IMPLANT Right 04/15/2013    COLONOSCOPY  2012    COLONOSCOPY N/A 11/26/2019    Procedure: COLONOSCOPY;  Surgeon: Chidi Downing MD;  Location:  [facility];  Service: Gastroenterology    ENDOSCOPY N/A 11/13/2017    Procedure: ESOPHAGOGASTRODUODENOSCOPY with biopsies and esophageal balloon dilitation;  Surgeon: Wesley Stovall MD;  Location:  [facility];  Service:     ENDOSCOPY N/A 11/25/2019    Procedure: ESOPHAGOGASTRODUODENOSCOPY;  Surgeon: Chidi Downing MD;  Location:  [facility];  Service: Gastroenterology    ENDOSCOPY N/A 11/29/2021    Procedure: ESOPHAGOGASTRODUODENOSCOPY with dilation and biopsies;  Surgeon: Gonzalez Zapata MD;  Location:  [facility];  Service: Gastroenterology;  Laterality: N/A;    ENDOSCOPY N/A 11/1/2023    Procedure: ESOPHAGOGASTRODUODENOSCOPY WITH " BIOPSY AND DILATATION;  Surgeon: Gonzalez Zapata MD;  Location: [facility] END[facility]COPY;  Service: Gastroenterology;  Laterality: N/A;    ENDOSCOPY N/A 1/27/2025    Procedure: Esophagogastroduodenoscopy with dilatation and biopsies;  Surgeon: Gonzalez Zapata MD;  Location: [facility] END[facility]COPY;  Service: Gastroenterology;  Laterality: N/A;    HYSTERECTOMY  1979    UPPER GASTROINTESTINAL ENDOSCOPY  12/09/2013    UPPER GASTROINTESTINAL ENDOSCOPY  11/13/2017       SLP Recommendation and Plan  SLP Diagnosis: moderate, dysarthria, moderate-severe, aphasia (03/25/25 1420)  SLP Diagnosis Comments: Pt presents w/ moderate dysarthria & moderate to severe aphasia. U/a to assess cognitive linguistic abilities 2' severity of aphasia. Per chart, pt w/ baseline aphasia & dysarthria. No family present to provide further clarification re: baseline level of fx. SLP will f/u for tx & dx tx (03/25/25 1420)     Monitor for Signs of Aspiration: notify SLP if any concerns (03/25/25 1420)  Swallow Criteria for Skilled Therapeutic Interventions Met: demonstrates skilled criteria (03/25/25 1420)  SLC Criteria for Skilled Therapy Interventions Met: yes (03/25/25 1420)  Anticipated Discharge Disposition (SLP): skilled nursing facility (03/25/25 1420)     Therapy Frequency (Swallow): 5 days per week (03/25/25 1420)  Therapy Frequency (SLP SLC): 3 days per week (03/25/25 1420)  Predicted Duration Therapy Intervention (Days): 1 week (03/25/25 1420)  Oral Care Recommendations: Oral Care BID/PRN, Toothbrush (03/25/25 1420)     Daily Summary of Progress (SLP): progress toward functional goals as expected (03/25/25 1420)           Treatment Assessment (SLP): continued, mild, aphasia (03/25/25 1420)     Plan for Continued Treatment (SLP): continue treatment per plan of care (03/25/25 1420)         SLP EVALUATION (Last 72 Hours)       SLP SLC Evaluation       Row Name 03/25/25 1420                   Communication Assessment/Intervention     Subjective Information no complaints  -CH        Patient Observations alert;cooperative;agree to therapy  -        Patient/Family/Caregiver Comments/Observations none present  -        Patient Effort good  -        Symptoms Noted During/After Treatment none  -CH           General Information    Patient Profile Reviewed yes  -CH           Pain Scale: FACES Pre/Post-Treatment    Pain: FACES Scale, Pretreatment 0-->no hurt  -CH        Posttreatment Pain Rating 0-->no hurt  -CH           SLP Evaluation Clinical Impressions    SLP Diagnosis moderate;dysarthria;moderate-severe;aphasia  -        SLP Diagnosis Comments Pt presents w/ moderate dysarthria & moderate to severe aphasia. U/a to assess cognitive linguistic abilities 2' severity of aphasia. Per chart, pt w/ baseline aphasia & dysarthria. No family present to provide further clarification re: baseline level of fx. SLP will f/u for tx & dx tx  -        Rehab Potential/Prognosis good  -[facility] Criteria for Skilled Therapy Interventions Met yes  -        Functional Impact functional impact in ADLs;difficulty communicating wants, needs;difficulty communicating in an emergency  -           SLP Treatment Clinical Impressions    Treatment Assessment (SLP) continued;mild;aphasia  -        Daily Summary of Progress (SLP) progress toward functional goals as expected  -        Plan for Continued Treatment (SLP) continue treatment per plan of care  -        Care Plan Review evaluation/treatment results reviewed;care plan/treatment goals reviewed  -           Recommendations    Therapy Frequency (SLP SLC) 3 days per week  -        Predicted Duration Therapy Intervention (Days) 1 week  -        Anticipated Discharge Disposition (SLP) skilled nursing facility  -                  User Key  (r) = Recorded By, (t) = Taken By, (c) = Cosigned By      Initials Name Effective Dates     Mandy Wyatt MS CCC-SLP 01/20/25 -                         EDUCATION  The patient has been educated in the following areas:     Cognitive Impairment Communication Impairment.           SLP GOALS       Row Name 03/25/25 1420 03/24/25 1510 03/23/25 1325       (LTG) Patient will demonstrate functional swallow for    Diet Texture (Demonstrate functional swallow) soft to chew (chopped) textures  -CH regular textures  -MM regular textures  -    Liquid viscosity (Demonstrate functional swallow) thin liquids  -CH thin liquids  -MM thin liquids  -    Naylor (Demonstrate functional swallow) with minimal cues (75-90% accuracy)  -CH with minimal cues (75-90% accuracy)  -MM with minimal cues (75-90% accuracy)  -    Time Frame (Demonstrate functional swallow) 1 week  - 1 week  -MM 1 week  -    Progress/Outcomes (Demonstrate functional swallow) -- goal ongoing  -MM goal ongoing  -       ([facility]) Patient will tolerate trials of    Consistencies Trialed (Tolerate trials) pureed textures;thin liquids  -CH pureed textures;honey/ moderately thick liquids  -MM pureed textures;thin liquids  -    Desired Outcome (Tolerate trials) without signs/symptoms of aspiration;with adequate oral prep/transit/clearance;with use of compensatory strategies (see comments)  no straw, general precautions  -CH without signs/symptoms of aspiration;with adequate oral prep/transit/clearance  -MM without signs/symptoms of aspiration;with adequate oral prep/transit/clearance  -    Naylor (Tolerate trials) with minimal cues (75-90% accuracy)  -CH with maximum cues (25-49% accuracy)  -MM with maximum cues (25-49% accuracy)  -    Time Frame (Tolerate trials) 1 week  - 1 week  - 1 week  -    Progress/Outcomes (Tolerate trials) -- goal revised this date  -MM goal revised this date  -    Comment (Tolerate trials) -- Pt demonstrated throat clear after trials of thin via spoon and could not drink thin via straw due to poor labial seal. SLP recs honey thick liquids via spoon and MBS to  further evaluate.  -MM --       (STG) Pharyngeal Strengthening Exercise Goal 1 (SLP)    Activity (Pharyngeal Strengthening Goal 1, SLP) increase timing  -CH -- --    Increase Timing prepping - 3 second prep or suck swallow or 3-step swallow  -CH -- --    Lamar/Accuracy (Pharyngeal Strengthening Goal 1, SLP) with minimal cues (75-90% accuracy)  -CH -- --    Time Frame (Pharyngeal Strengthening Goal 1, SLP) 1 week  -CH -- --       Patient will demonstrate functional speech skills for return to discharge environment    Lamar with moderate cues  -CH with moderate cues  -MM --    Time frame 1 week  -CH 1 week  -MM --    Barriers lethargy;cognitive status  -CH lethargy;cognitive status  -MM --    Progress/Outcomes continuing progress toward goal  -CH goal ongoing  -MM --       Patient will demonstrate functional language skills for return to discharge environment     Lamar with moderate cues  -CH with moderate cues  -MM --    Time frame 1 week  -CH 1 week  -MM --    Barriers lethargy;cognitive status  -CH lethargy;cognitive status  -MM --    Progress/Outcomes continuing progress toward goal  -CH goal ongoing  -MM --       SLP Diagnostic Treatment     Patient will participate in further assessment in the following areas reading comprehension;graphic expression;cognitive-linguistic;clarification of baseline cognitive communication status  -CH reading comprehension;graphic expression;cognitive-linguistic;clarification of baseline cognitive communication status  -MM --    Time Frame (Diagnostic) 1 week  -CH 1 week  -MM --    Barriers (Diagnostic) Lethargy;Cognitive status  -CH Lethargy;Cognitive status  -MM --    Progress/Outcomes (Additional Goal 1, SLP) continuing progress toward goal  -CH -- --       Words/Phrases/Sentences Goal 1 (SLP)    Improve Ability to Comprehend Words/Phrases/Sentences Through: Goal 1 (SLP) identify objects, field of;identify pictures, field of;80%;with moderate cues  (50-74%);other (comment)  - identify objects, field of;identify pictures, field of;80%;with moderate cues (50-74%);other (comment)  field of 2  -MM --    Time Frame (Identify Objects and Pictures Goal 1, SLP) 1 week  -CH 1 week  -MM --    Progress (Ability to Contruct Words/Phrases/Sentences Goal 1, SLP) 70%;with minimal cues (75-90%)  - -- --    Progress/Outcomes (Identify Objects and Pictures Goal 1, SLP) continuing progress toward goal  - goal ongoing  -MM --       Follow Directions Goal 2 (SLP)    Improve Ability to Follow Directions Goal 1 (SLP) 2 step commands;80%;with minimal cues (75-90%)  - 1 step direction with objects;1 step direction without objects;80%;with moderate cues (50-74%)  -MM --    Time Frame (Follow Directions Goal 1, SLP) 1 week  -CH 1 week  -MM --    Progress (Ability to Follow Directions Goal 1, SLP) 80%;with minimal cues (75-90%)  - 10%;with maximum cues (25-49%)  -MM --    Progress/Outcomes (Follow Directions Goal 1, SLP) goal revised this date  - goal ongoing  -MM --    Comment (Follow Directions Goal 1, SLP) Goal met for 1 step directives, upgraded to 2 step  - -- --       Word Retrieval Skills Goal 1 (SLP)    Improve Word Retrieval Skills By Goal 1 (SLP) completing automatic speech task, counting;completing automatic speech task, days of the week;completing automatic speech task, alphabet;completing automatic speech task, months;completing automatic speech task, Pledge of Allegiance;completing automatic speech task, sing “Happy Birthday”;confrontational naming task;high frequency;responsive naming task;simple;answer WH question with one word;80%;with moderate cues (50-74%)  - completing automatic speech task, counting;completing automatic speech task, days of the week;completing automatic speech task, alphabet;completing automatic speech task, months;completing automatic speech task, Pledge of Allegiance;completing automatic speech task, sing “Happy  Birthday”;confrontational naming task;high frequency;responsive naming task;simple;answer WH question with one word;80%;with moderate cues (50-74%)  -MM --    Time Frame (Word Retrieval Goal 1, SLP) 1 week  -CH 1 week  -MM --    Progress (Word Retrieval Skills Goal 1, SLP) 100%;independently (over 90% accuracy)  -CH 0%;with maximum cues (25-49%)  -MM --    Progress/Outcomes (Word Retrieval Goal 1, SLP) goal met  - progress slower than expected  -MM --    Comment (Word Retrieval Goal 1, SLP) -- Pt did not complete any auto speech tasks despite cues; suspect related to lethargy and cognitive status  -MM --       Connected Speech to Express Thoughts Goal 1 (SLP)    Improve Narrative Discourse to Express Thoughts By Goal 1 (SLP) explaining a proverb or idiom;conversational task on a given topic;90%;with minimal cues (75-90%)  -CH -- --    Time Frame (Connected Speech Goal 1, SLP) 1 week  -CH -- --       Phonation Goal 1 (SLP)    Improve Phonation By Goal 1 (SLP) using loud speech;80%;with moderate cues (50-74%)  - using loud speech;80%;with moderate cues (50-74%)  -MM --    Time Frame (Phonation Goal 1, SLP) 1 week  -CH 1 week  -MM --    Progress/Outcomes (Phonation Goal 1, SLP) goal no longer appropriate  - goal ongoing  -MM --       Articulation Goal 1 (SLP)    Improve Articulation Goal 1 (SLP) by over-articulating at word level;80%;with moderate cues (50-74%)  - by over-articulating at word level;80%;with moderate cues (50-74%)  -MM --    Time Frame (Articulation Goal 1, SLP) 1 week  -CH 1 week  -MM --    Progress (Articulation Goal 1, SLP) 70%;with minimal cues (75-90%)  -CH 0%;with maximum cues (25-49%)  -MM --    Progress/Outcomes (Articulation Goal 1, SLP) continuing progress toward goal  - goal ongoing  -MM --    Comment (Articulation Goal 1, SLP) -- Pt verbalized x1 on this date  -MM --              User Key  (r) = Recorded By, (t) = Taken By, (c) = Cosigned By      Initials Name Provider Type    CH  Mandy Wyatt, MS CCC-SLP Speech and Language Pathologist    Rossy Brewer, MS CCC-SLP Speech and Language Pathologist    MM Jenn Boland, MS CCC-SLP Speech and Language Pathologist                              Time Calculation:      Time Calculation- SLP       Row Name 03/25/25 1600             Time Calculation- SLP    SLP Start Time 1420  -CH      SLP Received On 03/25/25  -CH         Untimed Charges    34386-NO Motion Fluoro Eval Swallow Minutes 55  -CH      91750-DE Treatment/ST Modification Prosth Aug Alter  25  -CH         Total Minutes    Untimed Charges Total Minutes 80  -CH       Total Minutes 80  -CH                User Key  (r) = Recorded By, (t) = Taken By, (c) = Cosigned By      Initials Name Provider Type    Mandy Aguiar MS CCC-SLP Speech and Language Pathologist                    Therapy Charges for Today       Code Description Service Date Service Provider Modifiers Qty    53254012211 HC ST MOTION FLUORO EVAL SWALLOW 4 3/25/2025 Mandy Wyatt MS CCC-SLP GN 1    [id number] HC ST TREATMENT SPEECH 2 3/25/2025 Mandy Wyatt MS CCC-SLP GN 1                       Mandy Wyatt MS CCC-SLP  3/25/2025

## 2025-03-25 NOTE — PROGRESS NOTES
[facility] Medicine Services  PROGRESS NOTE    Patient Name: Valarie Camara  : 1945  MRN: [id number]    Date of Admission: 3/20/2025  Primary Care Physician: Lay Cox MD    Subjective   Subjective     CC:  Right sided weakness    HPI:  Alert today, eating, no dyspnea, no n/v/d.       Objective   Objective     Vital Signs:   Temp:  [97 °F (36.1 °C)-98.6 °F (37 °C)] 98.5 °F (36.9 °C)  Heart Rate:  [] 85  Resp:  [18] 18  BP: (135-163)/(60-90) 163/74     Physical Exam:  Constitutional: alert, elderly, frail but nontoxic, alert, pleasant, confused but interactive, normal work of breathing  Psych:Normal/appropriate affect  HEENT:NCAT, oropharynx clear  Neck: neck supple, full range of motion  Neuro: confused to details but answers simple questions appropriately, nonfocal, cooperative, follows commands & moves all extremities  Cardiac: rrr  Resp:ctab  GI: abd soft, nontender  Skin: No extremity rash  Musculoskeletal/extremities: no cyanosis of extremities; no significant ankle edema        Results Reviewed:  LAB RESULTS:      Lab 25  0550 25  2354 25  1751 25  0914 25  1955 25  0613 25  0220 25  1452 25  0545 25  1150 25  0553 25  1609 25  1608   WBC 7.13  --   --  6.81  --   --  6.88  --  6.49  --  7.46  --  6.44   HEMOGLOBIN 12.2  --   --  13.2  --   --  13.3  --  13.2  --  12.3  --  11.7*   HEMOGLOBIN, POC  --   --   --   --   --   --   --   --   --   --   --    < >  --    HEMATOCRIT 37.8  --   --  42.5  --   --  41.0  --  41.3  --  37.4  --  35.7   HEMATOCRIT POC  --   --   --   --   --   --   --   --   --   --   --    < >  --    PLATELETS 230  --   --  227  --   --  255  --  255  --  250  --  264   NEUTROS ABS  --   --   --  5.04  --   --   --   --   --   --  4.90  --  3.61   IMMATURE GRANS (ABS)  --   --   --  0.02  --   --   --   --   --   --  0.02  --  0.03   LYMPHS ABS   --   --   --  1.27  --   --   --   --   --   --  1.92  --  2.01   MONOS ABS  --   --   --  0.41  --   --   --   --   --   --  0.55  --  0.72   EOS ABS  --   --   --  0.04  --   --   --   --   --   --  0.04  --  0.04   MCV 89.2  --   --  93.6  --   --  89.1  --  90.2  --  88.0  --  87.9   PROCALCITONIN  --   --   --   --   --   --   --   --   --   --   --   --  0.20   LACTATE  --   --   --   --   --   --   --   --   --   --   --   --  1.6   PROTIME  --   --   --   --   --   --   --   --   --   --   --   --  17.6*   APTT  --   --   --   --   --   --   --   --   --   --  28.7  --  31.0*   HEPARIN ANTI-XA 0.53 0.58 0.65 0.75* 0.81*   < > 0.92*   < >  --    < > >1.10*   < > >1.10*    < > = values in this interval not displayed.         Lab 03/25/25  0550 03/24/25  0914 03/23/25  0220 03/22/25  0545 03/21/25  1901 03/21/25  0553 03/20/25  1609 03/20/25  1608   SODIUM 146* 145 144 144  --  144  --  141   POTASSIUM 3.7 4.4 3.8 3.7 3.7 3.5  --  3.8   CHLORIDE 114* 110* 109* 108*  --  107  --  105   CO2 19.0* 17.0* 18.0* 20.0*  --  20.0*  --  21.0*   ANION GAP 13.0 18.0* 17.0* 16.0*  --  17.0*  --  15.0   BUN 28* 30* 25* 27*  --  36*  --  44*   CREATININE 1.40* 1.25* 1.47* 1.31*  --  1.84*   < > 2.50*   EGFR 38.1* 43.7* 35.9* 41.3*  --  27.5*   < > 19.0*   GLUCOSE 170* 130* 88 90  --  124*  --  150*   CALCIUM 9.0 8.9 9.0 8.9  --  9.0  --  9.2   MAGNESIUM 1.9 1.9 1.7 1.9  --   --   --  1.9   TSH  --   --   --   --   --   --   --  0.355    < > = values in this interval not displayed.         Lab 03/21/25  0553 03/20/25  1608   TOTAL PROTEIN 6.5 6.3   ALBUMIN 3.8 3.9   GLOBULIN 2.7 2.4   ALT (SGPT) 13 13   AST (SGOT) 17 16   BILIRUBIN 0.5 0.4   ALK PHOS 49 54         Lab 03/20/25  1608   PROTIME 17.6*   INR 1.43*         Lab 03/21/25  0553   CHOLESTEROL 215*   LDL CHOL 145*   HDL CHOL 43   TRIGLYCERIDES 149             Lab 03/22/25  0610   PH, ARTERIAL 7.449   PCO2, ARTERIAL 32.0*   PO2 ART 78.3*   FIO2 21   HCO3 ART 22.1   BASE  EXCESS ART -1.2*   CARBOXYHEMOGLOBIN 0.9     Brief Urine Lab Results  (Last result in the past 365 days)        Color   Clarity   Blood   Leuk Est   Nitrite   Protein   CREAT   Urine HCG        03/22/25 1129 Yellow   Cloudy   Negative   Negative   Negative   100 mg/dL (2+)                   Microbiology Results Abnormal       Procedure Component Value - Date/Time    Urine Culture - Urine, Straight Cath [[id number]]  (Abnormal) Collected: 03/22/25 1129    Lab Status: Final result Specimen: Urine from Straight Cath Updated: 03/23/25 2043     Urine Culture 25,000 CFU/mL Lactobacillus species     Comment:   Based on laboratory diagnosis criteria, these organisms are common urogenital commensal lilly and have not been associated with urinary tract infections; clinical correlation is recommended.       Narrative:      Colonization of the urinary tract without infection is common. Treatment is discouraged unless the patient is symptomatic, pregnant, or undergoing an invasive urologic procedure.            EEG  Result Date: 3/25/2025  History: 79 y old  female Procedure: A 21 Channel digital electroencephalogram was performed in the Clinical Neurophysiology Laboratory. The 10/20 International System of electrode placement was used and both Bipolar and referential electrode montages were monitored.  EEG Description: This EEG is continuous and symmetrical. During the awake state with eye closed, there is a posterior dominate rhythm of  -9-  Hz that is symmetrical and reacts appropriately to eye opening. Anterior to posterior amplitude frequency gradient is preserved. With Drowsiness, there is waxing and waning of the posterior dominant rhythm with eventual replacement by a mixture of beta, alpha and theta activity. A prolonged Lead I EKG rhythm strip revealed heart rate at --80--/min         Interpretation: This EEG obtained in the awake and drowsy state is normal for age.  Clinical correlation: The diagnosis of epilepsy is  clinical one. A normal EEG dose not excludes this Diagnosis. However there are no epileptiform features in this recording to suggest an underlining diagnosis of Epilepsy. Therefore, Clinical correlation is recommended.         Results for orders placed during the hospital encounter of 03/20/25    Adult Transthoracic Echo Complete W/ Cont if Necessary Per Protocol (With Agitated Saline)    Interpretation Summary    Left ventricular systolic function is normal. Calculated left ventricular EF = 67% Left ventricular ejection fraction appears to be 66 - 70%.    Left ventricular wall thickness is consistent with mild concentric hypertrophy.    Left ventricular outflow tract peak flow gradient at rest is 11 mmHg. Left ventricular outflow tract peak gradient with valsalva is 29 mmHg.    Left ventricular diastolic function is consistent with (grade I) impaired relaxation.    The mitral valve peak gradient is 9 mmHg. The mitral valve mean gradient is 4 mmHg.    Calculated right ventricular systolic pressure from tricuspid regurgitation is 21 mmHg.    There is a trivial pericardial effusion.      Current medications:  Scheduled Meds:amLODIPine, 5 mg, Oral, Q24H  apixaban, 5 mg, Oral, Q12H  aspirin, 81 mg, Oral, Daily   Or  aspirin, 300 mg, Rectal, Daily  atorvastatin, 80 mg, Oral, Nightly  carvedilol, 25 mg, Oral, BID With Meals  insulin lispro, 2-7 Units, Subcutaneous, TID With Meals  [START ON 3/26/2025] pantoprazole, 40 mg, Oral, Q AM  rivastigmine, 1 patch, Transdermal, Daily  sodium chloride, 10 mL, Intravenous, Q12H      Continuous Infusions:sodium chloride 0.45 % 1,000 mL with potassium chloride 20 mEq infusion, 50 mL/hr, Last Rate: 50 mL/hr (03/25/25 1326)      PRN Meds:.  acetaminophen    senna-docusate sodium **AND** polyethylene glycol **AND** bisacodyl **AND** bisacodyl    Calcium Replacement - Follow Nurse / BPA Driven Protocol    dextrose    dextrose    dextrose    diphenhydrAMINE    glucagon (human  recombinant)    hydrALAZINE    Magnesium Standard Dose Replacement - Follow Nurse / BPA Driven Protocol    nitroglycerin    Phosphorus Replacement - Follow Nurse / BPA Driven Protocol    Potassium Replacement - Follow Nurse / BPA Driven Protocol    sodium chloride    sodium chloride    Assessment & Plan   Assessment & Plan     Active Hospital Problems    Diagnosis  POA    **Encephalopathy [G93.40]  Unknown    Pulmonary emboli [I26.99]  Yes    Gastroesophageal reflux disease with esophagitis [K21.00]  Yes    Anxiety and depression [F41.9, F32.A]  Yes    Multiple-type hyperlipidemia [E78.2]  Yes    Benign essential hypertension [I10]  Yes      Resolved Hospital Problems    Diagnosis Date Resolved POA    CVA (cerebral vascular accident) [I63.9] 03/22/2025 Yes        Brief Hospital Course to date:  Valarie Camara is a 79 y.o. female w/ hx cad, pad & left ica dz (50-69%, ckd 3 (baseline cr ~1.8-1.9), htn, mild dementia, PE (on eliquis), dm2 who presented with confusion, aphasia and some right sided weakness. CTA H&N & CT perfusion was negative for flow limiting stenosis or LVO. After admission had significant HTN, Eliquis was held and transitioned to iv heparin and cardene drip initiated. EEG negative. Patient apparently didn't tolerate MRI, repeat CT head ordered    Encephalopathy & transient Aphasia & right sided weakness  Hx previous CVA (noted on imaging)  Left ICA stenosis (50-69%), & multifocal atherosclerotic dz  Episode of lethargy/somnolence evening 3/21/25 due to seroquel  Mild dementia  CTH wo on 3/20/2025 images were personally reviewed and showed no acute ischemic or hemorrhagic stroke.  There was stable chronic lacunar infarct of the left thalamus  -CTA of the head and neck images from 3/20/2025 were personally reviewed and showed no flow limiting stenosis or LVO.  There is moderate stenosis with complex plaque of the left internal carotid artery  -previous echo 3/1/25: ef 61-65%, diastolic  dysfunction  -tsh wnl  -EEG negative  -initial u/a benign  -initially didn't tolerate MRI brain (agitation) so was aborted  -repeat CT head 3/21 was negative/unchanged from original CT head  -echo : ef 66-70%, diastolic dysfunction  -BLE duplex: negative  -evening 3/21 became lethargic. Had received geodon (tolerated this earlier in the day 3/21), but then received seroquel 50mg later that evening and became lethargic.   -MRI brain negative. Neurology discussed w/ family (daughter Cleo) and apparently patient has become lethargic in past w/ trazodone, seroquel in past and wasn't taking these prior to admission   -seen by neuro-stroke team: stroke ruled out. Recommends asa, statin; f/u stroke clinic 8-12 weeks  -General neuro follows: improved rivastigmine patch 4.9mg daily (monitor for GI symoptoms); avoid seroquel & trazodone; if extreme agitation would try some valium (would avoid antipsychotics given previous intolerance). Overall improved today more interactive and tolerating po today  -SLP follows, continue modified diet    UTI  -initial u/a negative  -in/out cath u/a 3/21: 6-10 wbc, 4+ bacteria. Ultimately grew lactobacillus  -received 4 days empiric antibiotics      HTN  -initially required cardene drip as wasn't taking po due to encephalopathy  -now tolerating po, restarted home BP meds    -Diet controlled DM2  -A1c 6.9 on 3/4/25  -ssi for now, titrate prn    Hx PE 2/28/25 &  Bethea  -eliquynhis      KURT on ckd 3 (baseline cr ~1.8-1.9), resolved  -initial creatinine was 2.5  -creatinine currently at baseline        Expected Discharge Location and Transportation: PT/OT recommends SNF at discharge; final dispo pending. Possibly medically ready in couple of days if keeps improving  Expected Discharge   Expected Discharge Date: 3/27/2025; Expected Discharge Time:      VTE Prophylaxis:  Pharmacologic & mechanical VTE prophylaxis orders are present.         AM-PAC 6 Clicks Score (PT): 13 (03/25/25 0800)    CODE  STATUS:   Code Status and Medical Interventions: CPR (Attempt to Resuscitate); Full Support   Ordered at: 03/20/25 1737     Code Status (Patient has no pulse and is not breathing):    CPR (Attempt to Resuscitate)     Medical Interventions (Patient has pulse or is breathing):    Full Support       Danie Dunn MD  03/25/25

## 2025-03-25 NOTE — PROGRESS NOTES
[facility] Neurology    Progress Note    Patient Name: Valarie Camara  : 1945  MRN: [id number]  Primary Care Physician:  Lay Cox MD  Date of admission: 3/20/2025    Subjective     Chief Complaint: Altered mental status    History of Present Illness   Patient seen resting comfortably in bed.  She was able to state her name, birthday, current year and knew she was in a hospital.  She did believe that hospital was in [place].  Per bedside RN she rested well throughout the night.  No GI issues.    Review of Systems   General: Negative for fever, nausea, or vomiting.   Neurological: Negative for headache, pain, or weakness.     Objective     Physical Exam  Vitals and nursing note reviewed.   Eyes:      Extraocular Movements: Extraocular movements intact.      Pupils: Pupils are equal, round, and reactive to light.      Comments: No nystagmus or deviated gaze noted   Neurological:      Mental Status: She is alert. Mental status is at baseline.      Cranial Nerves: No cranial nerve deficit or facial asymmetry.      Sensory: No sensory deficit.      Motor: Weakness present. No tremor or seizure activity.      Comments:     Cranial Nerves   CN II: Pupils are equal, round, and reactive to light. Normal visual acuity and visual fields.    CN III IV VI: Extraocular movements are full without nystagmus.  CN V: Normal facial sensation and strength of muscles of mastication.  CN VII: Facial movements are symmetric. No weakness.  CN VIII:  Auditory acuity is normal.  CN IX & X:  Symmetric palatal movement.  CN XI: Sternocleidomastoid and trapezius are normal.  No weakness.  CN XII: The tongue is midline.  No atrophy or fasciculations.  Generalized weakness          Vitals:   Temp:  [97 °F (36.1 °C)-98.6 °F (37 °C)] 97 °F (36.1 °C)  Heart Rate:  [] 94  Resp:  [18] 18  BP: (135-181)/(60-90) 141/83    Current Medications    Current Facility-Administered Medications:     acetaminophen  (TYLENOL) suppository 325 mg, 325 mg, Rectal, Q4H PRN, Brittaney Martin, APRN, 325 mg at 03/21/25 0529    amLODIPine (NORVASC) tablet 5 mg, 5 mg, Oral, Q24H, Danie Dunn MD, 5 mg at 03/25/25 0834    aspirin chewable tablet 81 mg, 81 mg, Oral, Daily, 81 mg at 03/25/25 0834 **OR** aspirin suppository 300 mg, 300 mg, Rectal, Daily, Norberto Pan MD, 300 mg at 03/20/25 1706    atorvastatin (LIPITOR) tablet 80 mg, 80 mg, Oral, Nightly, Renee Aguirre, APRN, 80 mg at 03/24/25 2102    sennosides-docusate (PERICOLACE) 8.6-50 MG per tablet 2 tablet, 2 tablet, Oral, BID PRN **AND** polyethylene glycol (MIRALAX) packet 17 g, 17 g, Oral, Daily PRN **AND** bisacodyl (DULCOLAX) EC tablet 5 mg, 5 mg, Oral, Daily PRN **AND** bisacodyl (DULCOLAX) suppository 10 mg, 10 mg, Rectal, Daily PRN, Norberto Pan MD    Calcium Replacement - Follow Nurse / BPA Driven Protocol, , Not Applicable, PRN, Norberto Pan MD    carvedilol (COREG) tablet 25 mg, 25 mg, Oral, BID With Meals, Norberto Pan MD, 25 mg at 03/25/25 0833    cefTRIAXone (ROCEPHIN) 1,000 mg in sodium chloride 0.9 % 100 mL MBP, 1,000 mg, Intravenous, Q24H, Danie Dunn MD, Last Rate: 200 mL/hr at 03/24/25 1554, 1,000 mg at 03/24/25 1554    dextrose (D50W) (25 g/50 mL) IV injection 25 g, 25 g, Intravenous, Q15 Min PRN, Norberto Pan MD, 25 g at 03/22/25 2225    dextrose (D50W) (25 g/50 mL) IV injection 25 g, 25 g, Intravenous, Q15 Min PRN, Danie Dunn MD    dextrose (GLUTOSE) oral gel 15 g, 15 g, Oral, Q15 Min PRN, Danie Dunn MD    dextrose 5 % and sodium chloride 0.9 % infusion, 50 mL/hr, Intravenous, Continuous, Danie Dunn MD, Last Rate: 50 mL/hr at 03/24/25 2114, 50 mL/hr at 03/24/25 2114    diphenhydrAMINE (BENADRYL) capsule 25 mg, 25 mg, Oral, Nightly PRN, Danie Dunn MD    glucagon (GLUCAGEN) injection 1 mg, 1 mg, Intramuscular, Q15 Min PRN, Danie Dunn MD    heparin 39923 units/250 mL  (100 units/mL) in 0.45 % NaCl infusion, 5 Units/kg/hr, Intravenous, Titrated, Prebble, LAURA Mckeon, Last Rate: 3.4 mL/hr at 03/24/25 1156, 5 Units/kg/hr at 03/24/25 1156    hydrALAZINE (APRESOLINE) injection 10 mg, 10 mg, Intravenous, Q6H PRN, Danie Dunn MD, 10 mg at 03/24/25 1155    Insulin Lispro (humaLOG) injection 2-7 Units, 2-7 Units, Subcutaneous, TID With Meals, Danie Dunn MD, 2 Units at 03/25/25 0833    Magnesium Standard Dose Replacement - Follow Nurse / BPA Driven Protocol, , Not Applicable, Maxim GARCIA Marc P, MD    niCARdipine (CARDENE) 25mg in 250mL NS infusion, 5-15 mg/hr, Intravenous, Titrated, Danie Dunn MD, Stopped at 03/25/25 0846    nitroglycerin (NITROSTAT) SL tablet 0.4 mg, 0.4 mg, Sublingual, Q5 Min PRN, Norberto Pan MD    pantoprazole (PROTONIX) injection 40 mg, 40 mg, Intravenous, Daily, Norberto Pan MD, 40 mg at 03/25/25 0839    Pharmacy to Dose Heparin, , Not Applicable, Continuous PRMaxim ALLEN Marc P, MD    Phosphorus Replacement - Follow Nurse / BPA Driven Protocol, , Not Applicable, Maxim GARCIA Marc P, MD    Potassium Replacement - Follow Nurse / BPA Driven Protocol, , Not Applicable, Maxim GARCIA Marc P, MD    rivastigmine (EXELON) 4.6 MG/24HR patch 1 patch, 1 patch, Transdermal, Daily, Caty Shrestha K, APRN, 1 patch at 03/24/25 2102    sodium chloride 0.9 % flush 10 mL, 10 mL, Intravenous, Q12H, Norberto Pan MD, 10 mL at 03/25/25 0834    sodium chloride 0.9 % flush 10 mL, 10 mL, Intravenous, PRN, Norberto Pan MD    sodium chloride 0.9 % infusion 40 mL, 40 mL, Intravenous, PRN, Norberto Pan MD    Laboratory Results:   Lab Results   Component Value Date    GLUCOSE 170 (H) 03/25/2025    CALCIUM 9.0 03/25/2025     (H) 03/25/2025    K 3.7 03/25/2025    CO2 19.0 (L) 03/25/2025     (H) 03/25/2025    BUN 28 (H) 03/25/2025    CREATININE 1.40 (H) 03/25/2025    EGFRIFAFRI 42 (L) 05/23/2021    EGFRIFNONA 33 (L) 12/18/2020    BCR  "20.0 03/25/2025    ANIONGAP 13.0 03/25/2025     Lab Results   Component Value Date    WBC 7.13 03/25/2025    HGB 12.2 03/25/2025    HCT 37.8 03/25/2025    MCV 89.2 03/25/2025     03/25/2025     Lab Results   Component Value Date    CHOL 215 (H) 03/21/2025    CHOL 195 05/20/2021    CHOL 147 01/11/2021     Lab Results   Component Value Date    HDL 43 03/21/2025    HDL 54 05/20/2021    HDL 44 01/11/2021     Lab Results   Component Value Date     (H) 03/21/2025     (H) 05/20/2021    LDL 86 01/11/2021     Lab Results   Component Value Date    TRIG 149 03/21/2025    TRIG 124 05/20/2021    TRIG 90 01/11/2021     Lab Results   Component Value Date    HGBA1C 6.90 (H) 03/04/2025     Lab Results   Component Value Date    INR 1.43 (H) 03/20/2025    INR 0.91 02/28/2025    INR 0.89 (L) 03/03/2018    PROTIME 17.6 (H) 03/20/2025    PROTIME 12.7 02/28/2025    PROTIME 9.3 (L) 03/03/2018     No results found for: \"FOLATE\"  Lab Results   Component Value Date    YCIQHVDB82 252 10/07/2019       MRI Brain Without Contrast  Result Date: 3/22/2025  MRI BRAIN WO CONTRAST Date of Exam: 3/22/2025 2:03 AM EDT Indication: Stroke, follow up Aphasia, right-sided weakness, numbness, facial droop.  Comparison: Head CT 3/21/2025 Technique:  Routine multiplanar/multisequence sequence images of the brain were obtained without contrast administration. Findings: No areas of diffusion restriction to suggest a recent infarct. Negative for acute intracranial hemorrhage, large mass lesion, midline shift or hydrocephalus. Mild global parenchymal volume loss for age. Mild periventricular T2/FLAIR hyperintense signal suggesting chronic microvascular ischemic change. Mostly empty sella. Negative for cerebellar tonsillar ectopia. Normal volume corpus callosum. Major intracranial flow voids preserved. Orbits symmetric. Trace bilateral mastoid fluid. Unremarkable appearance of the calvarium.     Impression: Impression: No acute MRI findings, " specifically no findings to suggest a recent infarct. Electronically Signed: Shade Seay MD  3/22/2025 8:17 AM EDT  Workstation ID: [id number]       Assessment / Plan   Brief Patient Summary:  Valarie Camara is a 79 y.o. female who has past medical history significant for HTN, HLD, CAD, diabetes, CKD stage III, L ICA stenosis, mild dementia, anxiety depression, recent PE on Eliquis, recurrent UTIs who presented to [facility] ED with complaint of altered mental status and aphasia.  Patient was originally seen by stroke neurology with a negative acute workup.  Suspected stroke prepubescent's in the setting of infection was likely causing patient's altered mentation.  MRI brain showed no acute intracranial abnormality.  Chronic left thalamic infarct was noted.  It was recommended to continue ASA 81 mg daily for stroke prevention.     General neurology was asked to evaluate patient with ongoing metabolic encephalopathy.  Per report patient has also been intermittently refusing medications.  Per patient's daughter in the past she has not tolerated Seroquel or trazodone.  On 3/21 patient came lethargic but had received Geodon earlier in the day.  EEG negative.    Suspect patient's altered mentation was related to previous medications along with underlying dementia.  Much improved on today's assessment.     Plan:   Altered mental status  Underlying dementia  Metabolic encephalopathy  Chronic infarct  Continue rivastigmine patch 4.9 mg daily.  Monitor for signs of GI distress.  Patient to continue work with PT/OT  Will hold off on repeat images getting recent evaluations.  Continue delirium/fall precautions.  Continue ASA and statin per stroke neurology recommendations.  Patient has poor reaction to Seroquel and trazodone.  Refrain from giving.  If patient has extreme agitation would recommend IM/IV Valium.  Would avoid Geodon given patient's recent ongoing lethargy.  General neurology will continue to follow.    I have  discussed the above with the patient, bedside RN Dr. Dunn  Time spent with patient: 35 minutes in face-to-face evaluation and management of the patient.    Copied text in this note has been reviewed and is accurate as of 03/25/25.     Caty Shrestha APRN

## 2025-03-26 LAB
ANION GAP SERPL CALCULATED.3IONS-SCNC: 12 MMOL/L (ref 5–15)
BUN SERPL-MCNC: 26 MG/DL (ref 8–23)
BUN/CREAT SERPL: 20.8 (ref 7–25)
CALCIUM SPEC-SCNC: 8.8 MG/DL (ref 8.6–10.5)
CHLORIDE SERPL-SCNC: 113 MMOL/L (ref 98–107)
CO2 SERPL-SCNC: 19 MMOL/L (ref 22–29)
CREAT SERPL-MCNC: 1.25 MG/DL (ref 0.57–1)
EGFRCR SERPLBLD CKD-EPI 2021: 43.7 ML/MIN/1.73
GLUCOSE BLDC GLUCOMTR-MCNC: 112 MG/DL (ref 70–130)
GLUCOSE BLDC GLUCOMTR-MCNC: 130 MG/DL (ref 70–130)
GLUCOSE BLDC GLUCOMTR-MCNC: 161 MG/DL (ref 70–130)
GLUCOSE BLDC GLUCOMTR-MCNC: 170 MG/DL (ref 70–130)
GLUCOSE SERPL-MCNC: 140 MG/DL (ref 65–99)
MAGNESIUM SERPL-MCNC: 1.8 MG/DL (ref 1.6–2.4)
POTASSIUM SERPL-SCNC: 4.1 MMOL/L (ref 3.5–5.2)
SODIUM SERPL-SCNC: 144 MMOL/L (ref 136–145)

## 2025-03-26 PROCEDURE — 25010000002 NICARDIPINE 2.5 MG/ML SOLUTION 10 ML VIAL: Performed by: NURSE PRACTITIONER

## 2025-03-26 PROCEDURE — 99232 SBSQ HOSP IP/OBS MODERATE 35: CPT | Performed by: NURSE PRACTITIONER

## 2025-03-26 PROCEDURE — 25810000003 SODIUM CHLORIDE 0.9 % SOLUTION 250 ML FLEX CONT: Performed by: NURSE PRACTITIONER

## 2025-03-26 PROCEDURE — 25010000002 HYDRALAZINE PER 20 MG: Performed by: INTERNAL MEDICINE

## 2025-03-26 PROCEDURE — 97530 THERAPEUTIC ACTIVITIES: CPT

## 2025-03-26 PROCEDURE — 82948 REAGENT STRIP/BLOOD GLUCOSE: CPT

## 2025-03-26 PROCEDURE — 80048 BASIC METABOLIC PNL TOTAL CA: CPT | Performed by: INTERNAL MEDICINE

## 2025-03-26 PROCEDURE — 99232 SBSQ HOSP IP/OBS MODERATE 35: CPT

## 2025-03-26 PROCEDURE — 63710000001 INSULIN LISPRO (HUMAN) PER 5 UNITS: Performed by: INTERNAL MEDICINE

## 2025-03-26 PROCEDURE — 83735 ASSAY OF MAGNESIUM: CPT | Performed by: INTERNAL MEDICINE

## 2025-03-26 RX ORDER — AMLODIPINE BESYLATE 5 MG/1
5 TABLET ORAL ONCE
Status: COMPLETED | OUTPATIENT
Start: 2025-03-26 | End: 2025-03-26

## 2025-03-26 RX ORDER — NIFEDIPINE 10 MG/1
20 CAPSULE ORAL EVERY 8 HOURS SCHEDULED
Status: DISCONTINUED | OUTPATIENT
Start: 2025-03-26 | End: 2025-03-27

## 2025-03-26 RX ORDER — ISOSORBIDE MONONITRATE 60 MG/1
60 TABLET, EXTENDED RELEASE ORAL
Status: DISCONTINUED | OUTPATIENT
Start: 2025-03-27 | End: 2025-03-27

## 2025-03-26 RX ORDER — FLUCONAZOLE 100 MG/1
100 TABLET ORAL EVERY 24 HOURS
Status: COMPLETED | OUTPATIENT
Start: 2025-03-26 | End: 2025-04-01

## 2025-03-26 RX ORDER — ISOSORBIDE MONONITRATE 30 MG/1
30 TABLET, EXTENDED RELEASE ORAL ONCE
Status: COMPLETED | OUTPATIENT
Start: 2025-03-26 | End: 2025-03-26

## 2025-03-26 RX ORDER — NIFEDIPINE 30 MG/1
30 TABLET, EXTENDED RELEASE ORAL
Status: DISCONTINUED | OUTPATIENT
Start: 2025-03-26 | End: 2025-03-26

## 2025-03-26 RX ADMIN — APIXABAN 5 MG: 5 TABLET, FILM COATED ORAL at 08:20

## 2025-03-26 RX ADMIN — CARVEDILOL 25 MG: 12.5 TABLET, FILM COATED ORAL at 21:43

## 2025-03-26 RX ADMIN — NIFEDIPINE 20 MG: 10 CAPSULE ORAL at 21:43

## 2025-03-26 RX ADMIN — Medication 10 ML: at 08:21

## 2025-03-26 RX ADMIN — APIXABAN 5 MG: 5 TABLET, FILM COATED ORAL at 21:43

## 2025-03-26 RX ADMIN — CARVEDILOL 25 MG: 12.5 TABLET, FILM COATED ORAL at 08:20

## 2025-03-26 RX ADMIN — AMLODIPINE BESYLATE 5 MG: 5 TABLET ORAL at 08:20

## 2025-03-26 RX ADMIN — HYDRALAZINE HYDROCHLORIDE 10 MG: 20 INJECTION INTRAMUSCULAR; INTRAVENOUS at 08:20

## 2025-03-26 RX ADMIN — FLUCONAZOLE 100 MG: 100 TABLET ORAL at 17:46

## 2025-03-26 RX ADMIN — INSULIN LISPRO 2 UNITS: 100 INJECTION, SOLUTION INTRAVENOUS; SUBCUTANEOUS at 12:09

## 2025-03-26 RX ADMIN — PANTOPRAZOLE SODIUM 40 MG: 40 TABLET, DELAYED RELEASE ORAL at 06:11

## 2025-03-26 RX ADMIN — ATORVASTATIN CALCIUM 80 MG: 40 TABLET, FILM COATED ORAL at 21:43

## 2025-03-26 RX ADMIN — Medication 10 ML: at 21:43

## 2025-03-26 RX ADMIN — ISOSORBIDE MONONITRATE 30 MG: 30 TABLET, EXTENDED RELEASE ORAL at 17:45

## 2025-03-26 RX ADMIN — AMLODIPINE BESYLATE 5 MG: 5 TABLET ORAL at 11:00

## 2025-03-26 RX ADMIN — RIVASTIGMINE 1 PATCH: 4.6 PATCH, EXTENDED RELEASE TRANSDERMAL at 17:48

## 2025-03-26 RX ADMIN — ASPIRIN 81 MG: 81 TABLET, CHEWABLE ORAL at 08:20

## 2025-03-26 RX ADMIN — SODIUM CHLORIDE 5 MG/HR: 9 INJECTION, SOLUTION INTRAVENOUS at 13:49

## 2025-03-26 NOTE — PROGRESS NOTES
[facility] Neurology    Progress Note    Patient Name: Valarie Camara  : 1945  MRN: [id number]  Primary Care Physician:  Lay Cox MD  Date of admission: 3/20/2025    Subjective     Chief Complaint: Altered mental status    History of Present Illness   Patient was seen resting comfortably in bed.  More lethargic than yesterday's assessment.  Was able to briefly wake and state her name.  Per bedside RN she did rest overnight.  She is able to follow very base commands including squeezing the hands.  No diarrhea noted    Review of Systems   HILARY due to AMS    Objective     Physical Exam  Vitals and nursing note reviewed.   Constitutional:       General: She is not in acute distress.     Appearance: She is not ill-appearing.   Eyes:      Extraocular Movements: Extraocular movements intact.      Pupils: Pupils are equal, round, and reactive to light.      Comments: No nystagmus or deviated gaze noted   Cardiovascular:      Rate and Rhythm: Normal rate.   Pulmonary:      Effort: Pulmonary effort is normal.   Neurological:      Mental Status: She is lethargic.      Cranial Nerves: No cranial nerve deficit or facial asymmetry.      Sensory: No sensory deficit.      Motor: Weakness present. No seizure activity.      Comments: Physical exam limited due to patient lethargy    Moves all extremities    Follows basic commands.          Vitals:   Temp:  [97.2 °F (36.2 °C)-98.5 °F (36.9 °C)] 98.2 °F (36.8 °C)  Heart Rate:  [77-95] 80  Resp:  [16-20] 18  BP: (141-194)/(68-94) 194/89    Current Medications    Current Facility-Administered Medications:     acetaminophen (TYLENOL) suppository 325 mg, 325 mg, Rectal, Q4H PRN, Brittaney Martin APRN, 325 mg at 25 0529    amLODIPine (NORVASC) tablet 5 mg, 5 mg, Oral, Q24H, Danie Dunn MD, 5 mg at 25 0820    apixaban (ELIQUIS) tablet 5 mg, 5 mg, Oral, Q12H, Danie Dunn MD, 5 mg at 25 0820    aspirin chewable tablet 81  mg, 81 mg, Oral, Daily, 81 mg at 03/26/25 0820 **OR** aspirin suppository 300 mg, 300 mg, Rectal, Daily, Norberto Pan MD, 300 mg at 03/20/25 1706    atorvastatin (LIPITOR) tablet 80 mg, 80 mg, Oral, Nightly, Renee Aguirre, APRN, 80 mg at 03/25/25 2207    sennosides-docusate (PERICOLACE) 8.6-50 MG per tablet 2 tablet, 2 tablet, Oral, BID PRN **AND** polyethylene glycol (MIRALAX) packet 17 g, 17 g, Oral, Daily PRN **AND** bisacodyl (DULCOLAX) EC tablet 5 mg, 5 mg, Oral, Daily PRN **AND** bisacodyl (DULCOLAX) suppository 10 mg, 10 mg, Rectal, Daily PRN, Norberto Pan MD    Calcium Replacement - Follow Nurse / BPA Driven Protocol, , Not Applicable, PRN, Norberto Pan MD    carvedilol (COREG) tablet 25 mg, 25 mg, Oral, BID With Meals, Norberto Pan MD, 25 mg at 03/26/25 0820    dextrose (D50W) (25 g/50 mL) IV injection 25 g, 25 g, Intravenous, Q15 Min PRN, Norberto Pan MD, 25 g at 03/22/25 2225    dextrose (D50W) (25 g/50 mL) IV injection 25 g, 25 g, Intravenous, Q15 Min PRN, Danie Dunn MD    dextrose (GLUTOSE) oral gel 15 g, 15 g, Oral, Q15 Min PRN, Danie Dunn MD    diphenhydrAMINE (BENADRYL) capsule 25 mg, 25 mg, Oral, Nightly PRN, Danie Dunn MD    glucagon (GLUCAGEN) injection 1 mg, 1 mg, Intramuscular, Q15 Min PRN, Danie Dunn MD    hydrALAZINE (APRESOLINE) injection 10 mg, 10 mg, Intravenous, Q6H PRN, Danie Dunn MD, 10 mg at 03/26/25 0820    Insulin Lispro (humaLOG) injection 2-7 Units, 2-7 Units, Subcutaneous, TID With Meals, Danie Dunn MD, 2 Units at 03/25/25 1746    Magnesium Standard Dose Replacement - Follow Nurse / BPA Driven Protocol, , Not Applicable, PRN, Norberto Pan MD    nitroglycerin (NITROSTAT) SL tablet 0.4 mg, 0.4 mg, Sublingual, Q5 Min PRN, Norberto Pan MD    pantoprazole (PROTONIX) EC tablet 40 mg, 40 mg, Oral, Q AM, Danie Dunn MD, 40 mg at 03/26/25 0611    Phosphorus Replacement - Follow Nurse  "/ BPA Driven Protocol, , Not Applicable, Maxim GARCIA Marc P, MD    Potassium Replacement - Follow Nurse / BPA Driven Protocol, , Not Applicable, Maxim GARCIA Marc P, MD    rivastigmine (EXELON) 4.6 MG/24HR patch 1 patch, 1 patch, Transdermal, Daily, Caty Shrestha K, APRN, 1 patch at 03/25/25 1600    sodium chloride 0.9 % flush 10 mL, 10 mL, Intravenous, Q12H, Norberto Pan MD, 10 mL at 03/26/25 0821    sodium chloride 0.9 % flush 10 mL, 10 mL, Intravenous, PRN, Norberto Pan MD    sodium chloride 0.9 % infusion 40 mL, 40 mL, Intravenous, Maxim GARCIA Marc P, MD    Laboratory Results:   Lab Results   Component Value Date    GLUCOSE 170 (H) 03/25/2025    CALCIUM 9.0 03/25/2025     (H) 03/25/2025    K 3.7 03/25/2025    CO2 19.0 (L) 03/25/2025     (H) 03/25/2025    BUN 28 (H) 03/25/2025    CREATININE 1.40 (H) 03/25/2025    EGFRIFAFRI 42 (L) 05/23/2021    EGFRIFNONA 33 (L) 12/18/2020    BCR 20.0 03/25/2025    ANIONGAP 13.0 03/25/2025     Lab Results   Component Value Date    WBC 7.13 03/25/2025    HGB 12.2 03/25/2025    HCT 37.8 03/25/2025    MCV 89.2 03/25/2025     03/25/2025     Lab Results   Component Value Date    CHOL 215 (H) 03/21/2025    CHOL 195 05/20/2021    CHOL 147 01/11/2021     Lab Results   Component Value Date    HDL 43 03/21/2025    HDL 54 05/20/2021    HDL 44 01/11/2021     Lab Results   Component Value Date     (H) 03/21/2025     (H) 05/20/2021    LDL 86 01/11/2021     Lab Results   Component Value Date    TRIG 149 03/21/2025    TRIG 124 05/20/2021    TRIG 90 01/11/2021     Lab Results   Component Value Date    HGBA1C 6.90 (H) 03/04/2025     Lab Results   Component Value Date    INR 1.43 (H) 03/20/2025    INR 0.91 02/28/2025    INR 0.89 (L) 03/03/2018    PROTIME 17.6 (H) 03/20/2025    PROTIME 12.7 02/28/2025    PROTIME 9.3 (L) 03/03/2018     No results found for: \"FOLATE\"  Lab Results   Component Value Date    TJIEBDKL84 252 10/07/2019       MRI Brain Without " Contrast  Result Date: 3/22/2025  MRI BRAIN WO CONTRAST Date of Exam: 3/22/2025 2:03 AM EDT Indication: Stroke, follow up Aphasia, right-sided weakness, numbness, facial droop.  Comparison: Head CT 3/21/2025 Technique:  Routine multiplanar/multisequence sequence images of the brain were obtained without contrast administration. Findings: No areas of diffusion restriction to suggest a recent infarct. Negative for acute intracranial hemorrhage, large mass lesion, midline shift or hydrocephalus. Mild global parenchymal volume loss for age. Mild periventricular T2/FLAIR hyperintense signal suggesting chronic microvascular ischemic change. Mostly empty sella. Negative for cerebellar tonsillar ectopia. Normal volume corpus callosum. Major intracranial flow voids preserved. Orbits symmetric. Trace bilateral mastoid fluid. Unremarkable appearance of the calvarium.     Impression: Impression: No acute MRI findings, specifically no findings to suggest a recent infarct. Electronically Signed: Shade Seay MD  3/22/2025 8:17 AM EDT  Workstation ID: [id number]       Assessment / Plan   Brief Patient Summary:  Valarie Camara is a 79 y.o. female who has past medical history significant for HTN, HLD, CAD, diabetes, CKD stage III, L ICA stenosis, mild dementia, anxiety depression, recent PE on Eliquis, recurrent UTIs who presented to [facility] ED with complaint of altered mental status and aphasia.  Patient was originally seen by stroke neurology with a negative acute workup.  Suspected stroke prepubescent's in the setting of infection was likely causing patient's altered mentation.  MRI brain showed no acute intracranial abnormality.  Chronic left thalamic infarct was noted.  It was recommended to continue ASA 81 mg daily for stroke prevention.     General neurology was asked to evaluate patient with ongoing metabolic encephalopathy.  Per report patient has also been intermittently refusing medications.  Per patient's daughter in  the past she has not tolerated Seroquel or trazodone.  On 3/21 patient came lethargic but had received Geodon earlier in the day.  EEG negative.     Suspect patient's altered mentation was related to previous medications along with underlying dementia.  Much improved on today's assessment.     Plan:   Altered mental status  Underlying dementia  Metabolic encephalopathy  Chronic infarct  Continue rivastigmine patch.  Once patient is able to take p.o. medications more regularly can consider transition to p.o.  Patient to continue work with PT/OT  Will hold off on repeat images getting recent evaluations.  Continue delirium/fall precautions.  Continue ASA and statin per stroke neurology recommendations.  Patient has poor reaction to Seroquel and trazodone.  Refrain from giving.  If patient has extreme agitation would recommend IM/IV Valium.  Would avoid Geodon given patient's recent ongoing lethargy.  General neurology will continue to follow.     I have discussed the above with the patient, bedside RN and JOSEE Hernández      Time spent with patient: 35 minutes in face-to-face evaluation and management of the patient.  Copied text in this note has been reviewed and is accurate as of 03/26/25.       JOSEE Machuca

## 2025-03-26 NOTE — PROGRESS NOTES
[facility] Medicine Services  PROGRESS NOTE    Patient Name: Valarie Camara  : 1945  MRN: [id number]    Date of Admission: 3/20/2025  Primary Care Physician: Lay Cox MD    Subjective   Subjective     CC:  F/u right-sided weakness    HPI:  Patient resting in bed.  Able to follow commands.  Is unable to tell me where she is.  Spoke with daughter, Cleo, by phone.  She says her mother gets anxious when she has to answer questions but thinks her mother has improved.  Reviewed patient's home meds and will start home nifedipine and Imdur.  Patient appears to have candidiasis on her tongue, so we will try Diflucan x 7 days. Daughter reports patient has h/o yeast infections, particularly with abx.    Objective   Objective     Vital Signs:   Temp:  [97.2 °F (36.2 °C)-98.5 °F (36.9 °C)] 98.2 °F (36.8 °C)  Heart Rate:  [77-95] 80  Resp:  [16-20] 18  BP: (147-194)/(71-94) 194/89     Physical Exam:  Constitutional: No acute distress, awake, alert  HENT: NCAT, mucous membranes with white patches  Respiratory: Clear to auscultation bilaterally, respiratory effort normal, room air   Cardiovascular: RRR, no murmurs, rubs, or gallops  Gastrointestinal: Positive bowel sounds, soft, nontender, nondistended  Musculoskeletal: No bilateral ankle edema  Psychiatric: Appropriate affect, cooperative  Neurologic: Oriented to self, follows commands, moves all extremities, speech clear  Skin: No rashes      Results Reviewed:  LAB RESULTS:      Lab 25  0550 25  2354 25  1751 25  0914 25  1955 25  0613 25  0220 25  1452 25  0545 25  1150 25  0553 25  1609 25  1608   WBC 7.13  --   --  6.81  --   --  6.88  --  6.49  --  7.46  --  6.44   HEMOGLOBIN 12.2  --   --  13.2  --   --  13.3  --  13.2  --  12.3  --  11.7*   HEMOGLOBIN, POC  --   --   --   --   --   --   --   --   --   --   --    < >  --    HEMATOCRIT  37.8  --   --  42.5  --   --  41.0  --  41.3  --  37.4  --  35.7   HEMATOCRIT POC  --   --   --   --   --   --   --   --   --   --   --    < >  --    PLATELETS 230  --   --  227  --   --  255  --  255  --  250  --  264   NEUTROS ABS  --   --   --  5.04  --   --   --   --   --   --  4.90  --  3.61   IMMATURE GRANS (ABS)  --   --   --  0.02  --   --   --   --   --   --  0.02  --  0.03   LYMPHS ABS  --   --   --  1.27  --   --   --   --   --   --  1.92  --  2.01   MONOS ABS  --   --   --  0.41  --   --   --   --   --   --  0.55  --  0.72   EOS ABS  --   --   --  0.04  --   --   --   --   --   --  0.04  --  0.04   MCV 89.2  --   --  93.6  --   --  89.1  --  90.2  --  88.0  --  87.9   PROCALCITONIN  --   --   --   --   --   --   --   --   --   --   --   --  0.20   LACTATE  --   --   --   --   --   --   --   --   --   --   --   --  1.6   PROTIME  --   --   --   --   --   --   --   --   --   --   --   --  17.6*   APTT  --   --   --   --   --   --   --   --   --   --  28.7  --  31.0*   HEPARIN ANTI-XA 0.53 0.58 0.65 0.75* 0.81*   < > 0.92*   < >  --    < > >1.10*   < > >1.10*    < > = values in this interval not displayed.         Lab 03/25/25  0550 03/24/25  0914 03/23/25  0220 03/22/25  0545 03/21/25  1901 03/21/25  0553 03/20/25  1609 03/20/25  1608   SODIUM 146* 145 144 144  --  144  --  141   POTASSIUM 3.7 4.4 3.8 3.7 3.7 3.5  --  3.8   CHLORIDE 114* 110* 109* 108*  --  107  --  105   CO2 19.0* 17.0* 18.0* 20.0*  --  20.0*  --  21.0*   ANION GAP 13.0 18.0* 17.0* 16.0*  --  17.0*  --  15.0   BUN 28* 30* 25* 27*  --  36*  --  44*   CREATININE 1.40* 1.25* 1.47* 1.31*  --  1.84*   < > 2.50*   EGFR 38.1* 43.7* 35.9* 41.3*  --  27.5*   < > 19.0*   GLUCOSE 170* 130* 88 90  --  124*  --  150*   CALCIUM 9.0 8.9 9.0 8.9  --  9.0  --  9.2   MAGNESIUM 1.9 1.9 1.7 1.9  --   --   --  1.9   TSH  --   --   --   --   --   --   --  0.355    < > = values in this interval not displayed.         Lab 03/21/25  0553 03/20/25  1607   TOTAL  PROTEIN 6.5 6.3   ALBUMIN 3.8 3.9   GLOBULIN 2.7 2.4   ALT (SGPT) 13 13   AST (SGOT) 17 16   BILIRUBIN 0.5 0.4   ALK PHOS 49 54         Lab 03/20/25  1608   PROTIME 17.6*   INR 1.43*         Lab 03/21/25  0553   CHOLESTEROL 215*   LDL CHOL 145*   HDL CHOL 43   TRIGLYCERIDES 149             Lab 03/22/25  0610   PH, ARTERIAL 7.449   PCO2, ARTERIAL 32.0*   PO2 ART 78.3*   FIO2 21   HCO3 ART 22.1   BASE EXCESS ART -1.2*   CARBOXYHEMOGLOBIN 0.9     Brief Urine Lab Results  (Last result in the past 365 days)        Color   Clarity   Blood   Leuk Est   Nitrite   Protein   CREAT   Urine HCG        03/22/25 1129 Yellow   Cloudy   Negative   Negative   Negative   100 mg/dL (2+)                   Microbiology Results Abnormal       Procedure Component Value - Date/Time    Urine Culture - Urine, Straight Cath [[id number]]  (Abnormal) Collected: 03/22/25 1129    Lab Status: Final result Specimen: Urine from Straight Cath Updated: 03/23/25 2043     Urine Culture 25,000 CFU/mL Lactobacillus species     Comment:   Based on laboratory diagnosis criteria, these organisms are common urogenital commensal lilly and have not been associated with urinary tract infections; clinical correlation is recommended.       Narrative:      Colonization of the urinary tract without infection is common. Treatment is discouraged unless the patient is symptomatic, pregnant, or undergoing an invasive urologic procedure.            FL Video Swallow With Speech Single Contrast  Result Date: 3/25/2025  FL VIDEO SWALLOW W SPEECH SINGLE-CONTRAST Date of Exam: 3/25/2025 2:16 PM EDT Indication: dysphagia.   Comparison: None available. Technique:   The speech pathologist administered food and/or liquid mixed with barium to the patient with cine/video imaging.  Imaging assistance was provided to the speech pathologist and an image was saved. Fluoroscopic Time: 24 seconds Number of Images: 6 associated fluoroscopic loops were saved Findings: Penetration was seen  during fluoroscopic guided modified barium swallowing series. Please see speech therapy report for full details and recommendations.     Impression: Impression: Fluoroscopy provided for a modified barium swallow. Penetration of barium was seen during swallowing evaluation. Please see speech therapy report for full details and recommendations. Report dictated by: Jayda Ch PA-c  I have personally reviewed this case and agree with the findings above: Electronically Signed: Baldev Perez MD  3/25/2025 5:28 PM EDT  Workstation ID: [id number]    EEG  Result Date: 3/25/2025  History: 79 y old  female Procedure: A 21 Channel digital electroencephalogram was performed in the Clinical Neurophysiology Laboratory. The 10/20 International System of electrode placement was used and both Bipolar and referential electrode montages were monitored.  EEG Description: This EEG is continuous and symmetrical. During the awake state with eye closed, there is a posterior dominate rhythm of  -9-  Hz that is symmetrical and reacts appropriately to eye opening. Anterior to posterior amplitude frequency gradient is preserved. With Drowsiness, there is waxing and waning of the posterior dominant rhythm with eventual replacement by a mixture of beta, alpha and theta activity. A prolonged Lead I EKG rhythm strip revealed heart rate at --80--/min         Interpretation: This EEG obtained in the awake and drowsy state is normal for age.  Clinical correlation: The diagnosis of epilepsy is clinical one. A normal EEG dose not excludes this Diagnosis. However there are no epileptiform features in this recording to suggest an underlining diagnosis of Epilepsy. Therefore, Clinical correlation is recommended.       Results for orders placed during the hospital encounter of 03/20/25    Adult Transthoracic Echo Complete W/ Cont if Necessary Per Protocol (With Agitated Saline)    Interpretation Summary    Left ventricular systolic function is normal.  Calculated left ventricular EF = 67% Left ventricular ejection fraction appears to be 66 - 70%.    Left ventricular wall thickness is consistent with mild concentric hypertrophy.    Left ventricular outflow tract peak flow gradient at rest is 11 mmHg. Left ventricular outflow tract peak gradient with valsalva is 29 mmHg.    Left ventricular diastolic function is consistent with (grade I) impaired relaxation.    The mitral valve peak gradient is 9 mmHg. The mitral valve mean gradient is 4 mmHg.    Calculated right ventricular systolic pressure from tricuspid regurgitation is 21 mmHg.    There is a trivial pericardial effusion.      Current medications:  Scheduled Meds:amLODIPine, 5 mg, Oral, Q24H  apixaban, 5 mg, Oral, Q12H  aspirin, 81 mg, Oral, Daily   Or  aspirin, 300 mg, Rectal, Daily  atorvastatin, 80 mg, Oral, Nightly  carvedilol, 25 mg, Oral, BID With Meals  insulin lispro, 2-7 Units, Subcutaneous, TID With Meals  pantoprazole, 40 mg, Oral, Q AM  rivastigmine, 1 patch, Transdermal, Daily  sodium chloride, 10 mL, Intravenous, Q12H      Continuous Infusions:   PRN Meds:.  acetaminophen    senna-docusate sodium **AND** polyethylene glycol **AND** bisacodyl **AND** bisacodyl    Calcium Replacement - Follow Nurse / BPA Driven Protocol    dextrose    dextrose    dextrose    diphenhydrAMINE    glucagon (human recombinant)    hydrALAZINE    Magnesium Standard Dose Replacement - Follow Nurse / BPA Driven Protocol    nitroglycerin    Phosphorus Replacement - Follow Nurse / BPA Driven Protocol    Potassium Replacement - Follow Nurse / BPA Driven Protocol    sodium chloride    sodium chloride    Assessment & Plan   Assessment & Plan     Active Hospital Problems    Diagnosis  POA    **Encephalopathy [G93.40]  Unknown    Pulmonary emboli [I26.99]  Yes    Gastroesophageal reflux disease with esophagitis [K21.00]  Yes    Anxiety and depression [F41.9, F32.A]  Yes    Multiple-type hyperlipidemia [E78.2]  Yes    Benign  essential hypertension [I10]  Yes      Resolved Hospital Problems    Diagnosis Date Resolved POA    CVA (cerebral vascular accident) [I63.9] 03/22/2025 Yes        Brief Hospital Course to date:  Valarie Camara is a 79 y.o. female  w/ hx CAD, PAD, L ICA dz (50-69% stenosis) CKD3 (baseline cr ~1.8-1.9), HTN, mild dementia, PE (on Eliquis), DM2 who presented with confusion, aphasia, and some right-sided weakness. Patient originally seen by Neuro-Stroke team. However, stroke workup negative. Neuro-stroke team did suspect stroke recrudescence in setting of infection/metabolic derangements. UA was concerning for UTI. After admission, patient had significant HTN. Cardene gtt initiated. Eliquis was held and transitioned to heparin gtt. General neurology consulted for ongoing encephalopathy. EEG negative.     This patient's problems and plans were partially entered by my partner and updated as appropriate by me 03/26/25.      Encephalopathy  Aphasia & right-sided weakness Left ICA stenosis (50-69%), & multifocal atherosclerotic dz  Episode of lethargy/somnolence evening 3/21/25 due to seroquel  Mild dementia  Hx previous left thalamic cva  -Neuro-stroke following, suspect left hemispheric stroke va stroke recrudescence  -previous echo 2/28/25: ef 61-65%, diastolic dysfunction  -TSH wnl  -EEG 3/22 negative  -initial UA benign  -CTH 3/20 negative for acute ischemic or hemorrhagic stroke, shows stable chronic lacunar infarct of the left thalamus  -CTA H/N 3/20 negative for flow-limiting stenosis or LVO, does show moderate stenosis with complex plaque of the left internal carotid artery  -repeat CT head 3/21 negative/unchanged from original CT head  -repeat Echo 3/21 shows EF 66-70%, diastolic dysfunction  -BLE duplex negative  -evening 3/21 became lethargic. Had received geodon (tolerated this earlier in the day 3/21), but then received seroquel 50mg later that evening and became lethargic.   -MRI brain 3/22 negative.  \  -Neurology discussed w/ family (daughter Cleo) and apparently patient has become lethargic in past w/ trazodone and seroquel, wasn't taking these prior to admission   -Avoid seroquel & trazodone  -3/24/25 more confused and less interactive, so general neurology consulted  -repeat EEG 3/25 negative  -trial rivastigmine patch 4.9mg daily (monitor for signs of gi distress). If pt has extreme agitation, neuro recommens IM or IV Valium (avoid antipsychotics if possible given ongoing lethargy).  -Per Neuro-Stroke team, continue ASA, statin, anticoagulation  -Follow up in [facility] 8-12 weeks  -SLP following, modified diet recs in place  -PT, OT recommending SNF rehab, recently home from [facility]     UTI  -initial UA negative  -UA 3/22 with 4+ bacteria, 6-10 WBCs, large yeast  -initiated on Rocephin  -urine culture  with lactobacillus species, DC abx     HTN  -required Cardene gtt for hypertension, dc'd 3/25 as patient able to take PO meds now   -reviewed home meds with daughter, patient on nifedipine XL 90 mg daily, Coreg 25 mg BID, Imdur 60 mg daily  -pt restarted on Cardene gtt today as SPB > 180, will trial low-dose nifedipine and half home dose Imdur  -wean Cardene gtt  -resume home BP meds in AM, able to swallow pills whole in applesauce     ?Candidiasis  -pt recently on Rocephin  -Diflucan 100 mg daily x 7 days, defer Nystatin given dysphagia  -monitor QTc intermittently, pt has tolerated Diflucan in past per gwyn    Diet-controlled DM2  -A1c 6.9 on 3/4/25  -SSI for now, titrate prn     Hx PE 2/28/25 at [facility]  -s/p heparin gtt while NPO  -Eliquis resumed 3/25, tolerating PO     KURT on CKD 3 (baseline cr ~1.8-1.9)  -initial creatinine was 2.5  -creatinine currently at baseline  -AM BMP       Expected Discharge Location and Transportation: SNF rehab vs [facility]  Expected Discharge   Expected Discharge Date: 3/28/2025; Expected Discharge Time:      VTE Prophylaxis:  Pharmacologic &  mechanical VTE prophylaxis orders are present.         AM-PAC 6 Clicks Score (PT): 10 (03/25/25 2000)    CODE STATUS:   Code Status and Medical Interventions: CPR (Attempt to Resuscitate); Full Support   Ordered at: 03/20/25 1737     Code Status (Patient has no pulse and is not breathing):    CPR (Attempt to Resuscitate)     Medical Interventions (Patient has pulse or is breathing):    Full Support       Florencia Clark, APRN  03/26/25

## 2025-03-26 NOTE — CASE MANAGEMENT/SOCIAL WORK
Continued Stay Note  [facility]     Patient Name: Valarie Camara  MRN: [id number]  Today's Date: 3/26/2025    Admit Date: 3/20/2025    Plan: Home with HH vs SNF   Discharge Plan       Row Name 03/26/25 9910       Plan    Plan Comments MSW spoke with pt's daughter by phone. Pt's daughter still wanting to know what therapy recommends as she would prefer to take pt home with [facility], but is open to SNF if pt will really need. MSW updated Physical therapist signed in to see pt today. Pt's daughter reports that if they decide on SNF, they would want pt to go to a place in [place].                   Discharge Codes    No documentation.                 Expected Discharge Date and Time       Expected Discharge Date Expected Discharge Time    Mar 28, 2025               ALEX Michelle

## 2025-03-26 NOTE — PLAN OF CARE
Goal Outcome Evaluation:  Plan of Care Reviewed With: patient        Progress: improving  Outcome Evaluation: PT DEMONSTRATED IMPROVED STATIC/DYNAMIC SITTING BALANCE AND WAS ABLE TO TAKE SMALL SHUFFLING STEPS FROM BED TO TURN AND SIT IN RECLINER WITH MOD ASSIST OF 2. PT DID NOT STARTLE AS EASY TODAY AND DEMONSTRATED LESS MYOCLONIC JERKING. PT ABLE TO FOLLOW SLOW SIMPLE COMMANDS WITH INCREASED TIME TO PROCESS. CONTINUE TO RECOMMEND SNF AS PT IS BELOW BASELINE WITH FUNCTIONAL MOBILITY. IF DTR OPTS TO TAKE PT HOME AT CURRENT FUNCTIONAL STATUS, WILL NEED ASSIST OF 2 TO MOBILIZE SAFELY.    Anticipated Discharge Disposition (PT): skilled nursing facility

## 2025-03-26 NOTE — PLAN OF CARE
Problem: Adult Inpatient Plan of Care  Goal: Plan of Care Review  Outcome: Progressing  Goal: Patient-Specific Goal (Individualized)  Outcome: Progressing  Goal: Absence of Hospital-Acquired Illness or Injury  Outcome: Progressing  Intervention: Identify and Manage Fall Risk  Recent Flowsheet Documentation  Taken 3/26/2025 0200 by Jennifer Rogers RN  Safety Promotion/Fall Prevention:   activity supervised   safety round/check completed  Taken 3/26/2025 0000 by Jennifer Rogers RN  Safety Promotion/Fall Prevention:   activity supervised   safety round/check completed  Taken 3/25/2025 2200 by Jennifer Rogers RN  Safety Promotion/Fall Prevention:   activity supervised   safety round/check completed  Taken 3/25/2025 2000 by Jennifer Rogers RN  Safety Promotion/Fall Prevention:   activity supervised   safety round/check completed  Intervention: Prevent Skin Injury  Recent Flowsheet Documentation  Taken 3/26/2025 0200 by Jennifer Rogers RN  Body Position:   side-lying   left  Skin Protection:   incontinence pads utilized   silicone foam dressing in place   skin sealant/moisture barrier applied   transparent dressing maintained  Taken 3/26/2025 0000 by Jennifer Rogers RN  Skin Protection:   incontinence pads utilized   silicone foam dressing in place   skin sealant/moisture barrier applied   transparent dressing maintained  Taken 3/25/2025 2200 by Jennifer Rogers RN  Body Position:   side-lying   right  Skin Protection:   incontinence pads utilized   silicone foam dressing in place   skin sealant/moisture barrier applied   transparent dressing maintained  Taken 3/25/2025 2000 by Jennifer Rogers RN  Body Position: supine  Skin Protection:   incontinence pads utilized   silicone foam dressing in place   skin sealant/moisture barrier applied   transparent dressing maintained  Intervention: Prevent and Manage VTE (Venous Thromboembolism) Risk  Recent Flowsheet Documentation  Taken 3/26/2025 0200 by Ken  AIDAN Rodriguez  VTE Prevention/Management:   SCDs (sequential compression devices) off   other (see comments)  Taken 3/26/2025 0000 by Jennifer Rogers RN  VTE Prevention/Management:   SCDs (sequential compression devices) off   other (see comments)  Taken 3/25/2025 2200 by Jennifer Rogers RN  VTE Prevention/Management:   SCDs (sequential compression devices) off   other (see comments)  Taken 3/25/2025 2000 by Jennifer Rogers RN  VTE Prevention/Management: (ELIQUIS)   SCDs (sequential compression devices) off   other (see comments)  Intervention: Prevent Infection  Recent Flowsheet Documentation  Taken 3/26/2025 0200 by Jennifer Rogers RN  Infection Prevention: environmental surveillance performed  Taken 3/26/2025 0000 by Jennifer Rogers RN  Infection Prevention: environmental surveillance performed  Taken 3/25/2025 2200 by Jennifer Rogers RN  Infection Prevention: environmental surveillance performed  Taken 3/25/2025 2000 by Jennifer Rogers RN  Infection Prevention: environmental surveillance performed  Goal: Optimal Comfort and Wellbeing  Outcome: Progressing  Intervention: Provide Person-Centered Care  Recent Flowsheet Documentation  Taken 3/26/2025 0200 by Jennifer Rogers RN  Trust Relationship/Rapport:   care explained   emotional support provided   empathic listening provided   questions encouraged   reassurance provided   thoughts/feelings acknowledged  Taken 3/26/2025 0000 by Jennifer Rogers RN  Trust Relationship/Rapport:   care explained   emotional support provided   empathic listening provided   questions encouraged   reassurance provided   thoughts/feelings acknowledged  Taken 3/25/2025 2200 by Jennifer Rogers RN  Trust Relationship/Rapport:   care explained   emotional support provided   empathic listening provided   questions encouraged   reassurance provided   thoughts/feelings acknowledged  Taken 3/25/2025 2000 by Jennifer Rogers RN  Trust Relationship/Rapport:   care explained    emotional support provided   empathic listening provided   questions encouraged   reassurance provided   thoughts/feelings acknowledged  Goal: Readiness for Transition of Care  Outcome: Progressing   Goal Outcome Evaluation:

## 2025-03-26 NOTE — THERAPY TREATMENT NOTE
Patient Name: Valarie Camara  : 1945    MRN: [id number]                              Today's Date: 3/26/2025       Admit Date: 3/20/2025    Visit Dx:     ICD-10-CM ICD-9-CM   1. Acute ischemic stroke  I63.9 434.91   2. History of stroke  Z86.73 V12.54   3. Renal insufficiency  N28.9 593.9   4. Aphasia  R47.01 784.3   5. Dysarthria  R47.1 784.51   6. Oropharyngeal dysphagia  R13.12 787.22   7. 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  I69.30 V12.54     Patient Active Problem List   Diagnosis    Atopic rhinitis    Arthritis    Chronic neck pain    Anxiety and depression    Edema    Gastroesophageal reflux disease with esophagitis    Multiple-type hyperlipidemia    Vitamin D deficiency    Benign essential hypertension    Obesity (BMI 30-39.9)    History of COPD    History of cataract    History of osteoporosis    History of restless legs syndrome    History of rheumatoid arthritis    Elevated serum creatinine    Esophageal dysphagia    Constipation    Schatzki's ring    Gastritis without bleeding    History of Helicobacter pylori infection    Duodenitis    Right hemiparesis    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    Multiple thyroid nodules    Lacunar stroke    DM (diabetes mellitus), type 2, uncontrolled w/neurologic complication    Syncope and collapse    Positive fecal occult blood test    Left foot pain    Hypomagnesemia    Acute on chronic anemia    Esophageal dysphagia    Reflux esophagitis    Syncope    Irritable bowel syndrome with constipation    Hypertensive urgency    Pulmonary emboli    Encephalopathy     Past Medical History:   Diagnosis Date    Acute bronchitis with bronchospasm     Anxiety     Arthritis     Asthma     B12 deficiency     Back pain     Body piercing     ears    Cataract     Cerebrovascular disease     Cervicalgia     Chronic kidney disease     stage 3b    Colonic polyp     History of colonic polyps      "Constipation     COPD (chronic obstructive pulmonary disease)     Coronary artery disease     Depression     Diverticulitis     Dysphagia     Patient reported \"it won't go all the way down\" when eating solid foods first thing in the mornings    Edema     Elevated cholesterol     Esophageal reflux     Folic acid deficiency     Full dentures     Advised no adhesives DOS    Heart murmur     Herpes zoster     High cholesterol     History of kidney infection     History of recurrent urinary tract infection     HTN (hypertension)     Hypercholesterolemia     Impaired functional mobility, balance, gait, and endurance     Insomnia     Kidney infection     Malignant hypertension 04/26/2015     Accelerated essential hypertension    Measles     rubeola    Muscle spasm     Neoplasm of uncertain behavior of skin     dtr denies    Osteoporosis     Poor historian     Recurrent urinary tract infection     Rheumatoid arthritis     RLS (restless legs syndrome)     Stomach ulcer     Stroke     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    Type 2 diabetes mellitus     Vitamin D deficiency      Past Surgical History:   Procedure Laterality Date    CATARACT EXTRACTION WITH INTRAOCULAR LENS IMPLANT Left 02/11/2013    CATARACT EXTRACTION WITH INTRAOCULAR LENS IMPLANT Right 04/15/2013    COLONOSCOPY  2012    COLONOSCOPY N/A 11/26/2019    Procedure: COLONOSCOPY;  Surgeon: Chidi Downing MD;  Location:  [facility];  Service: Gastroenterology    ENDOSCOPY N/A 11/13/2017    Procedure: ESOPHAGOGASTRODUODENOSCOPY with biopsies and esophageal balloon dilitation;  Surgeon: Wesley Stovall MD;  Location:  [facility];  Service:     ENDOSCOPY N/A 11/25/2019    Procedure: ESOPHAGOGASTRODUODENOSCOPY;  Surgeon: Chidi Downing MD;  Location:  [facility];  Service: Gastroenterology    ENDOSCOPY N/A 11/29/2021    Procedure: ESOPHAGOGASTRODUODENOSCOPY with dilation and biopsies;  Surgeon: Benny" Gonzalez WIN MD;  Location: [facility];  Service: Gastroenterology;  Laterality: N/A;    ENDOSCOPY N/A 11/1/2023    Procedure: ESOPHAGOGASTRODUODENOSCOPY WITH BIOPSY AND DILATATION;  Surgeon: Goznalez Zapata MD;  Location: [facility];  Service: Gastroenterology;  Laterality: N/A;    ENDOSCOPY N/A 1/27/2025    Procedure: Esophagogastroduodenoscopy with dilatation and biopsies;  Surgeon: Gonzalez Zapata MD;  Location: [facility] END[facility]COPY;  Service: Gastroenterology;  Laterality: N/A;    HYSTERECTOMY  1979    UPPER GASTROINTESTINAL ENDOSCOPY  12/09/2013    UPPER GASTROINTESTINAL ENDOSCOPY  11/13/2017      General Information       Row Name 03/26/25 162          Physical Therapy Time and Intention    Document Type therapy note (daily note)  -CD     Mode of Treatment physical therapy  -CD       Row Name 03/26/25 162          General Information    Patient Profile Reviewed yes  -CD     Existing Precautions/Restrictions fall;seizures  PT CURRENTLY ON SEIZURE PRECAUTIONS WITH PADS IN PLACE. PER DTR, WAS EASILY STARTLED AND DEMONSTRATED JERKING MOVEMENTS PTA.  -CD     Barriers to Rehab medically complex;previous functional deficit;cognitive status  -CD       Row Name 03/26/25 6684          Cognition    Orientation Status (Cognition) oriented to;person  DOES NOT INITIATE CONVERSATION BUT CAN RESPOND VERBALLY AND FOLLOW COMMANDS WITH INCREASED TIME TO PROCESS.  -CD       Row Name 03/26/25 9460          Safety Issues/Impairments Affecting Functional Mobility    Impairments Affecting Function (Mobility) balance;cognition;endurance/activity tolerance;motor control;strength;postural/trunk control  -CD     Cognitive Impairments, Mobility Safety/Performance awareness, need for assistance;insight into deficits/self-awareness;safety precaution follow-through;sequencing abilities;safety precaution awareness;problem-solving/reasoning  -CD     Comment, Safety Issues/Impairments (Mobility) NEEDS INCREASED TIME TO  PROCESS AND SLOW SIMPLE COMMANDS. STARTLES EASILY, INTERMITTENT JERKING MOVEMENTS.  -CD               User Key  (r) = Recorded By, (t) = Taken By, (c) = Cosigned By      Initials Name Provider Type    Gracy Nur PT Physical Therapist                   Mobility       Row Name 03/26/25 1626          Bed Mobility    Bed Mobility supine-sit  -CD     Supine-Sit [place] (Bed Mobility) maximum assist (25% patient effort);2 person assist;verbal cues  -CD     Assistive Device (Bed Mobility) bed rails;head of bed elevated;repositioning sheet  -CD     Comment, (Bed Mobility) CUES TO REACH FOR BED RAIL TO ASSIST. MANUAL ASSIST TO UPRIGHT TRUNK AND MOVE LE'S OFF EOB.  -CD       Row Name 03/26/25 1626          Transfers    Comment, (Transfers) CUES FOR HAND PLACEMENT. STS FROM EOB X 2 REPS AND STEP PIVOT TRANSFER BED TO RECLINER VIA B UE SUPPORT.  -       Row Name 03/26/25 1626          Bed-Chair Transfer    Bed-Chair [place] (Transfers) 2 person assist;verbal cues;moderate assist (50% patient effort)  -CD     Assistive Device (Bed-Chair Transfers) --  B UE SUPPORT.  -       Row Name 03/26/25 1626          Sit-Stand Transfer    Sit-Stand [place] (Transfers) 2 person assist;minimum assist (75% patient effort)  -CD     Assistive Device (Sit-Stand Transfers) --  B UE SUPPORT.  -       Row Name 03/26/25 1626          Gait/Stairs (Locomotion)    [place] Level (Gait) not tested  -CD     Comment, (Gait/Stairs) DEFERRED DUE TO FATIGUE FROM PROLONGED STANDING FOR PERICARE/HYGIENE. ABLE TO TAKE SMALL SHUFFLING STEPS FROM BED TO TURN AND SIT IN RECLINER WITH MOD ASSIST OF 2.  -CD               User Key  (r) = Recorded By, (t) = Taken By, (c) = Cosigned By      Initials Name Provider Type    Gracy Nur PT Physical Therapist                   Obj/Interventions       Row Name 03/26/25 1633          Motor Skills    Therapeutic Exercise --  deferred due to fatigue.  -       Row Name 03/26/25 1633           Balance    Balance Assessment sitting dynamic balance;standing static balance;standing dynamic balance  -CD     Static Sitting Balance standby assist  -CD     Dynamic Sitting Balance contact guard  PT ABLE TO RECIPROCAL SCOOT BACK IN RECLINER.  -CD     Static Standing Balance minimal assist;2-person assist  -CD     Dynamic Standing Balance moderate assist;2-person assist  -CD     Position/Device Used, Standing Balance supported  B UE SUPPORT.  -CD     Balance Interventions sitting;standing;sit to stand;supported;static;dynamic  -CD     Comment, Balance IMPROVED SITTING BALANCE. REQUIRED MOD ASSIST OF 2  TO MAINTAIN BALANCE FOR STAND STEP PIVOT TRANSFER.  -CD               User Key  (r) = Recorded By, (t) = Taken By, (c) = Cosigned By      Initials Name Provider Type    CD Gracy Millan, PT Physical Therapist                   Goals/Plan    No documentation.                  Clinical Impression       Row Name 03/26/25 6506          Pain    Pretreatment Pain Rating 0/10 - no pain  -CD     Posttreatment Pain Rating 0/10 - no pain  -CD     Pre/Posttreatment Pain Comment DENIES PAIN AND NO S&S OF PAIN.  -CD       Row Name 03/26/25 9232          Plan of Care Review    Plan of Care Reviewed With patient  -CD     Progress improving  -CD     Outcome Evaluation PT DEMONSTRATED IMPROVED STATIC/DYNAMIC SITTING BALANCE AND WAS ABLE TO TAKE SMALL SHUFFLING STEPS FROM BED TO TURN AND SIT IN RECLINER WITH MOD ASSIST OF 2. PT DID NOT STARTLE AS EASY TODAY AND DEMONSTRATED LESS MYOCLONIC JERKING. PT ABLE TO FOLLOW SLOW SIMPLE COMMANDS WITH INCREASED TIME TO PROCESS. CONTINUE TO RECOMMEND SNF AS PT IS BELOW BASELINE WITH FUNCTIONAL MOBILITY. IF DTR OPTS TO TAKE PT HOME AT CURRENT FUNCTIONAL STATUS, WILL NEED ASSIST OF 2 TO MOBILIZE SAFELY.  -CD       Row Name 03/26/25 0804          Therapy Assessment/Plan (PT)    Patient/Family Therapy Goals Statement (PT) TO RETURN TO PLOF.  -CD     Rehab Potential (PT) good  -CD     Criteria for  Skilled Interventions Met (PT) yes;meets criteria;skilled treatment is necessary  -CD     Therapy Frequency (PT) daily  -CD       Row Name 03/26/25 1635          Vital Signs    Pre Systolic BP Rehab 146  -CD     Pre Treatment Diastolic BP 70  -CD     Post Systolic BP Rehab 132  -CD     Post Treatment Diastolic BP 69  -CD     Pretreatment Heart Rate (beats/min) 88  -CD     Posttreatment Heart Rate (beats/min) 94  -CD     Pre SpO2 (%) 100  -CD     O2 Delivery Pre Treatment room air  -CD     O2 Delivery Intra Treatment room air  -CD     Post SpO2 (%) 100  -CD     O2 Delivery Post Treatment room air  -CD     Pre Patient Position Supine  -CD     Intra Patient Position Standing  -CD     Post Patient Position Sitting  -CD       Row Name 03/26/25 1635          Positioning and Restraints    Pre-Treatment Position in bed  -CD     Post Treatment Position chair  -CD     In Chair reclined;call light within reach;encouraged to call for assist;exit alarm on;legs elevated;notified nsg  NSG NOTIFIED PT UIC AND NEEDS SACRAL PROTECTION PAD REPLACED (WAS SOILED WITH BM UPON ARRIVAL)NSG NOTIFIED PURE WICK WAS REPLACED  -CD               User Key  (r) = Recorded By, (t) = Taken By, (c) = Cosigned By      Initials Name Provider Type    CD Gracy Millan, PT Physical Therapist                   Outcome Measures       Row Name 03/26/25 1643 03/26/25 0800       How much help from another person do you currently need...    Turning from your back to your side while in flat bed without using bedrails? 3  -CD 3  -EC    Moving from lying on back to sitting on the side of a flat bed without bedrails? 2  -CD 3  -EC    Moving to and from a bed to a chair (including a wheelchair)? 2  -CD 1  -EC    Standing up from a chair using your arms (e.g., wheelchair, bedside chair)? 2  -CD 1  -EC    Climbing 3-5 steps with a railing? 1  -CD 1  -EC    To walk in hospital room? 2  -CD 1  -EC    AM-PAC 6 Clicks Score (PT) 12  -CD 10  -EC    Highest Level of  Mobility Goal 4 --> Transfer to chair/commode  -CD 4 --> Transfer to chair/commode  -EC      Row Name 03/26/25 0600          How much help from another person do you currently need...    Turning from your back to your side while in flat bed without using bedrails? 3  -DS       Row Name 03/26/25 1643          Modified [place] Scale    Modified [place] Scale 4 - Moderately severe disability.  Unable to walk without assistance, and unable to attend to own bodily needs without assistance.  -CD       Row Name 03/26/25 1643          Functional Assessment    Outcome Measure Options AM-PAC 6 Clicks Basic Mobility (PT)  -CD               User Key  (r) = Recorded By, (t) = Taken By, (c) = Cosigned By      Initials Name Provider Type    CD Gracy Millan PT Physical Therapist    EC Delmi Crespo, RN Registered Nurse    Jennifer Silva RN Registered Nurse                                 Physical Therapy Education       Title: PT OT SLP Therapies (In Progress)       Topic: Physical Therapy (Done)       Point: Mobility training (Done)       Learning Progress Summary            Patient Acceptance, E, VU,NR by CD at 3/26/2025 1643    Comment: SEE FLOWSHEET    Acceptance, E, VU,NR by CD at 3/23/2025 1742    Comment: BENEFITS OF OOB ACTIVITY, SAFETY WITH MOBILITY, PROGRESSION OF POC, D/C PLANNING,                      Point: Home exercise program (Done)       Learning Progress Summary            Patient Acceptance, E, VU,NR by CD at 3/26/2025 1643    Comment: SEE FLOWSHEET    Acceptance, E, VU,NR by CD at 3/23/2025 1742    Comment: BENEFITS OF OOB ACTIVITY, SAFETY WITH MOBILITY, PROGRESSION OF POC, D/C PLANNING,                      Point: Body mechanics (Done)       Learning Progress Summary            Patient Acceptance, E, VU,NR by CD at 3/26/2025 1643    Comment: SEE FLOWSHEET    Acceptance, E, VU,NR by CD at 3/23/2025 1742    Comment: BENEFITS OF OOB ACTIVITY, SAFETY WITH MOBILITY, PROGRESSION OF POC, D/C PLANNING,                       Point: Precautions (Done)       Learning Progress Summary            Patient Acceptance, E, VU,NR by CD at 3/26/2025 1643    Comment: SEE FLOWSHEET    Acceptance, E, VU,NR by CD at 3/23/2025 1743    Comment: BENEFITS OF OOB ACTIVITY, SAFETY WITH MOBILITY, PROGRESSION OF POC, D/C PLANNING,                                      User Key       Initials Effective Dates Name Provider Type Discipline    CD 02/03/23 -  Gracy Millan PT Physical Therapist PT                  PT Recommendation and Plan  Planned Therapy Interventions (PT): balance training, bed mobility training, home exercise program, gait training, strengthening, transfer training, postural re-education, patient/family education, neuromuscular re-education  Progress: improving  Outcome Evaluation: PT DEMONSTRATED IMPROVED STATIC/DYNAMIC SITTING BALANCE AND WAS ABLE TO TAKE SMALL SHUFFLING STEPS FROM BED TO TURN AND SIT IN RECLINER WITH MOD ASSIST OF 2. PT DID NOT STARTLE AS EASY TODAY AND DEMONSTRATED LESS MYOCLONIC JERKING. PT ABLE TO FOLLOW SLOW SIMPLE COMMANDS WITH INCREASED TIME TO PROCESS. CONTINUE TO RECOMMEND SNF AS PT IS BELOW BASELINE WITH FUNCTIONAL MOBILITY. IF DTR OPTS TO TAKE PT HOME AT CURRENT FUNCTIONAL STATUS, WILL NEED ASSIST OF 2 TO MOBILIZE SAFELY.     Time Calculation:   PT Evaluation Complexity  History, PT Evaluation Complexity: 3 or more personal factors and/or comorbidities  Examination of Body Systems (PT Eval Complexity): total of 4 or more elements  Clinical Presentation (PT Evaluation Complexity): evolving  Clinical Decision Making (PT Evaluation Complexity): moderate complexity  Overall Complexity (PT Evaluation Complexity): moderate complexity     PT Charges       Row Name 03/26/25 1644             Time Calculation    Start Time 1551  -CD      PT Received On 03/26/25  -CD         Time Calculation- PT    Total Timed Code Minutes- PT 25 minute(s)  -CD         Timed Charges    28387 - PT Therapeutic Activity Minutes  25  -CD         Total Minutes    Timed Charges Total Minutes 25  -CD       Total Minutes 25  -CD                User Key  (r) = Recorded By, (t) = Taken By, (c) = Cosigned By      Initials Name Provider Type    Gracy Nur PT Physical Therapist                  Therapy Charges for Today       Code Description Service Date Service Provider Modifiers Qty    32988112193  PT THERAPEUTIC ACT EA 15 MIN 3/26/2025 Gracy Millan, PT GP 2    [id number] HC PT THER SUPP EA 15 MIN 3/26/2025 Gracy Millan, PT GP 2            PT G-Codes  Outcome Measure Options: AM-PAC 6 Clicks Basic Mobility (PT)  AM-PAC 6 Clicks Score (PT): 12  AM-PAC 6 Clicks Score (OT): 11  Modified [place] Scale: 4 - Moderately severe disability.  Unable to walk without assistance, and unable to attend to own bodily needs without assistance.  PT Discharge Summary  Anticipated Discharge Disposition (PT): skilled nursing facility    Gracy Millan, PT  3/26/2025

## 2025-03-26 NOTE — PROGRESS NOTES
Clinical Nutrition Assessment     Patient Name: Valarie Camara  YOB: 1945  MRN: [id number]  Date of Encounter: 03/26/25 09:49 EDT  Admission date: 3/20/2025  Reason for Visit: Follow-up protocol    Assessment   Nutrition Assessment   Admission Diagnosis:  CVA (cerebral vascular accident) [I63.9]    Problem List:    Encephalopathy    Anxiety and depression    Gastroesophageal reflux disease with esophagitis    Multiple-type hyperlipidemia    Benign essential hypertension    Pulmonary emboli      PMH:   She  has a past medical history of Acute bronchitis with bronchospasm, Anxiety, Arthritis, Asthma, B12 deficiency, Back pain, Body piercing, Cataract, Cerebrovascular disease, Cervicalgia, Chronic kidney disease, Colonic polyp, Constipation, COPD (chronic obstructive pulmonary disease), Coronary artery disease, Depression, Diverticulitis, Dysphagia, Edema, Elevated cholesterol, Esophageal reflux, Folic acid deficiency, Full dentures, Heart murmur, Herpes zoster, High cholesterol, History of kidney infection, History of recurrent urinary tract infection, HTN (hypertension), Hypercholesterolemia, Impaired functional mobility, balance, gait, and endurance, Insomnia, Kidney infection, Malignant hypertension (04/26/2015), Measles, Muscle spasm, Neoplasm of uncertain behavior of skin, Osteoporosis, Poor historian, Recurrent urinary tract infection, Rheumatoid arthritis, RLS (restless legs syndrome), Stomach ulcer, Stroke, Type 2 diabetes mellitus, and Vitamin D deficiency.    PSH:  She  has a past surgical history that includes Hysterectomy (1979); Cataract extraction w/  intraocular lens implant (Left, 02/11/2013); Cataract extraction w/  intraocular lens implant (Right, 04/15/2013); Colonoscopy (2012); Esophagogastroduodenoscopy (N/A, 11/13/2017); Upper gastrointestinal endoscopy (12/09/2013); Upper gastrointestinal endoscopy (11/13/2017); Esophagogastroduodenoscopy (N/A, 11/25/2019);  "Colonoscopy (N/A, 11/26/2019); Esophagogastroduodenoscopy (N/A, 11/29/2021); Esophagogastroduodenoscopy (N/A, 11/1/2023); and Esophagogastroduodenoscopy (N/A, 1/27/2025).    Applicable Nutrition History:   3/21S Diet Recommendation: mechanical ground textures, thin liquids   3/23 SLP Diet Recommendation: puree, thin liquids   3/24 SLP Diet Recommendation: puree, honey thick liquids, ice chips between meals after oral care, with supervision   3/25 SLP Diet Recommendation: puree, thin liquids     Anthropometrics     Height: Height: 160 cm (62.99\")  Last Filed Weight: Weight: 68 kg (150 lb) (03/21/25 1511)  Method: Weight Method: Stated  BMI: BMI (Calculated): 26.6    UBW:  200# several months ago; no specific time frame provided  Weight change: weight loss of 19 lbs (11.2%) over 2 month(s)    Significant?  Yes     Weight       Weight (kg) Weight (lbs) Weight Method Visit Report   11/6/2024 80.65 kg  177 lb 12.8 oz   --    1/5/2025 79.379 kg  175 lb  Standing scale      79.4 kg  175 lb 0.7 oz  Stated      76.3 kg  168 lb 3.4 oz  Bed scale     1/10/2025 80.287 kg  177 lb      1/24/2025 75.297 kg  166 lb  Stated     1/27/2025 77.111 kg  170 lb  Stated     2/8/2025 77.1 kg  169 lb 15.6 oz  Standing scale     2/28/2025 77 kg  169 lb 12.1 oz  Stated      77 kg  169 lb 12.1 oz  Bed scale      72.6 kg  160 lb 0.9 oz      3/1/2025 72.3 kg  159 lb 6.3 oz      3/2/2025 70.5 kg  155 lb 6.8 oz  Bed scale      71.9 kg  158 lb 8.2 oz  Bed scale     3/4/2025 72.8 kg  160 lb 7.9 oz      3/15/2025 69.4 kg  153 lb  Stated     3/20/2025 68.04 kg  150 lb  Stated     3/21/2025 68.04 kg  150 lb          Nutrition Focused Physical Exam    Date:  3/24, 3/26       Unable to perform due to Pt unable to participate at time of visit     Subjective   Reported/Observed/Food/Nutrition Related History:     3/26  Patient unable to wake. RN reported patient was more alert and conversational, also ate fairly well yesterday. Today patient is more " lethargic, RN encouraged breakfast this morning, and patient was able to drink about half of ONS.    3/24  Patient in bed, daughter at bedside provides most of patient nutrition hx. Dtr reports that patient was eating fairly well prior to 2 days ago, notes that medications were changed and pt has has poor intakes since. No c/o N/V, pt notes she has been constipated. NKFA. Dtr reports patient drinks Boost at home, would like with breakfast while here. Dtr also notes that over the course of several months patient has lost wt, causing dentures to be ill-fitting, which may have played a role in pt's wt loss.    Current Nutrition Prescription   PO: Diet: Cardiac, Diabetic; Healthy Heart (2-3 Na+); Consistent Carbohydrate; Feeding Assistance - Nursing, No Straw; Texture: Pureed (NDD 1); Fluid Consistency: Thin (IDDSI 0)  Oral Nutrition Supplement: Boost GC with breakfast  Intake: 4 Days: 75% x 1 meal, no further PO documentation since Monday 3/24/25    Assessment & Plan   Nutrition Diagnosis   Date:  3/24            Updated:    Problem Unintended weight loss    Etiology Advanced age, ill-fitting dentures   Signs/Symptoms Wt loss 19# x ~2 months (11.2%)   Status: New    Goal:   Nutrition to support treatment and Maintain intake per meal documented      Nutrition Intervention      Follow treatment progress, Care plan reviewed, Supplement provided    Encourage continued adequate intakes  Boost GC with breakfast  NFPE when feasible    Monitoring/Evaluation:   Per protocol, I&O, PO intake, Supplement intake, Pertinent labs, Weight, Symptoms, POC/GOC, Swallow function    Valarie Noble RD  Time Spent: 30m

## 2025-03-27 ENCOUNTER — DOCUMENTATION (OUTPATIENT)
Dept: HOME HEALTH SERVICES | Facility: HOME HEALTHCARE | Age: 80
End: 2025-03-27
Payer: MEDICARE

## 2025-03-27 LAB
ANION GAP SERPL CALCULATED.3IONS-SCNC: 14 MMOL/L (ref 5–15)
BASOPHILS # BLD AUTO: 0.02 10*3/MM3 (ref 0–0.2)
BASOPHILS NFR BLD AUTO: 0.2 % (ref 0–1.5)
BUN SERPL-MCNC: 29 MG/DL (ref 8–23)
BUN/CREAT SERPL: 20.1 (ref 7–25)
CALCIUM SPEC-SCNC: 9.1 MG/DL (ref 8.6–10.5)
CHLORIDE SERPL-SCNC: 112 MMOL/L (ref 98–107)
CO2 SERPL-SCNC: 18 MMOL/L (ref 22–29)
CREAT SERPL-MCNC: 1.44 MG/DL (ref 0.57–1)
DEPRECATED RDW RBC AUTO: 53.1 FL (ref 37–54)
EGFRCR SERPLBLD CKD-EPI 2021: 36.8 ML/MIN/1.73
EOSINOPHIL # BLD AUTO: 0.04 10*3/MM3 (ref 0–0.4)
EOSINOPHIL NFR BLD AUTO: 0.5 % (ref 0.3–6.2)
ERYTHROCYTE [DISTWIDTH] IN BLOOD BY AUTOMATED COUNT: 15.9 % (ref 12.3–15.4)
GLUCOSE BLDC GLUCOMTR-MCNC: 147 MG/DL (ref 70–130)
GLUCOSE BLDC GLUCOMTR-MCNC: 167 MG/DL (ref 70–130)
GLUCOSE BLDC GLUCOMTR-MCNC: 167 MG/DL (ref 70–130)
GLUCOSE BLDC GLUCOMTR-MCNC: 203 MG/DL (ref 70–130)
GLUCOSE SERPL-MCNC: 151 MG/DL (ref 65–99)
HCT VFR BLD AUTO: 37.7 % (ref 34–46.6)
HGB BLD-MCNC: 12.1 G/DL (ref 12–15.9)
IMM GRANULOCYTES # BLD AUTO: 0.03 10*3/MM3 (ref 0–0.05)
IMM GRANULOCYTES NFR BLD AUTO: 0.4 % (ref 0–0.5)
LYMPHOCYTES # BLD AUTO: 1.23 10*3/MM3 (ref 0.7–3.1)
LYMPHOCYTES NFR BLD AUTO: 14.8 % (ref 19.6–45.3)
MCH RBC QN AUTO: 29.1 PG (ref 26.6–33)
MCHC RBC AUTO-ENTMCNC: 32.1 G/DL (ref 31.5–35.7)
MCV RBC AUTO: 90.6 FL (ref 79–97)
MONOCYTES # BLD AUTO: 0.56 10*3/MM3 (ref 0.1–0.9)
MONOCYTES NFR BLD AUTO: 6.8 % (ref 5–12)
NEUTROPHILS NFR BLD AUTO: 6.41 10*3/MM3 (ref 1.7–7)
NEUTROPHILS NFR BLD AUTO: 77.3 % (ref 42.7–76)
NRBC BLD AUTO-RTO: 0 /100 WBC (ref 0–0.2)
PLATELET # BLD AUTO: 203 10*3/MM3 (ref 140–450)
PMV BLD AUTO: 10.6 FL (ref 6–12)
POTASSIUM SERPL-SCNC: 4.4 MMOL/L (ref 3.5–5.2)
RBC # BLD AUTO: 4.16 10*6/MM3 (ref 3.77–5.28)
SODIUM SERPL-SCNC: 144 MMOL/L (ref 136–145)
WBC NRBC COR # BLD AUTO: 8.29 10*3/MM3 (ref 3.4–10.8)

## 2025-03-27 PROCEDURE — 85025 COMPLETE CBC W/AUTO DIFF WBC: CPT | Performed by: HOSPITALIST

## 2025-03-27 PROCEDURE — 99232 SBSQ HOSP IP/OBS MODERATE 35: CPT | Performed by: NURSE PRACTITIONER

## 2025-03-27 PROCEDURE — 82948 REAGENT STRIP/BLOOD GLUCOSE: CPT

## 2025-03-27 PROCEDURE — 97535 SELF CARE MNGMENT TRAINING: CPT

## 2025-03-27 PROCEDURE — 63710000001 INSULIN LISPRO (HUMAN) PER 5 UNITS: Performed by: INTERNAL MEDICINE

## 2025-03-27 PROCEDURE — 93005 ELECTROCARDIOGRAM TRACING: CPT | Performed by: HOSPITALIST

## 2025-03-27 PROCEDURE — 93010 ELECTROCARDIOGRAM REPORT: CPT | Performed by: INTERNAL MEDICINE

## 2025-03-27 PROCEDURE — 97530 THERAPEUTIC ACTIVITIES: CPT

## 2025-03-27 PROCEDURE — 99232 SBSQ HOSP IP/OBS MODERATE 35: CPT

## 2025-03-27 PROCEDURE — 80048 BASIC METABOLIC PNL TOTAL CA: CPT | Performed by: NURSE PRACTITIONER

## 2025-03-27 RX ORDER — ISOSORBIDE MONONITRATE 30 MG/1
30 TABLET, EXTENDED RELEASE ORAL
Status: DISCONTINUED | OUTPATIENT
Start: 2025-03-27 | End: 2025-04-07 | Stop reason: HOSPADM

## 2025-03-27 RX ORDER — LIDOCAINE 4 G/G
1 PATCH TOPICAL
Status: DISCONTINUED | OUTPATIENT
Start: 2025-03-27 | End: 2025-04-07 | Stop reason: HOSPADM

## 2025-03-27 RX ORDER — ACETAMINOPHEN 325 MG/1
650 TABLET ORAL EVERY 8 HOURS PRN
Status: DISCONTINUED | OUTPATIENT
Start: 2025-03-27 | End: 2025-04-07 | Stop reason: HOSPADM

## 2025-03-27 RX ORDER — NIFEDIPINE 60 MG/1
60 TABLET, EXTENDED RELEASE ORAL
Status: DISCONTINUED | OUTPATIENT
Start: 2025-03-27 | End: 2025-03-27

## 2025-03-27 RX ORDER — NIFEDIPINE 60 MG/1
60 TABLET, EXTENDED RELEASE ORAL
Status: DISCONTINUED | OUTPATIENT
Start: 2025-03-28 | End: 2025-04-07 | Stop reason: HOSPADM

## 2025-03-27 RX ADMIN — RIVASTIGMINE 1 PATCH: 4.6 PATCH, EXTENDED RELEASE TRANSDERMAL at 17:08

## 2025-03-27 RX ADMIN — INSULIN LISPRO 2 UNITS: 100 INJECTION, SOLUTION INTRAVENOUS; SUBCUTANEOUS at 13:21

## 2025-03-27 RX ADMIN — APIXABAN 5 MG: 5 TABLET, FILM COATED ORAL at 08:59

## 2025-03-27 RX ADMIN — Medication 10 ML: at 20:01

## 2025-03-27 RX ADMIN — INSULIN LISPRO 3 UNITS: 100 INJECTION, SOLUTION INTRAVENOUS; SUBCUTANEOUS at 09:00

## 2025-03-27 RX ADMIN — CARVEDILOL 25 MG: 12.5 TABLET, FILM COATED ORAL at 08:58

## 2025-03-27 RX ADMIN — Medication 10 ML: at 09:00

## 2025-03-27 RX ADMIN — FLUCONAZOLE 100 MG: 100 TABLET ORAL at 18:16

## 2025-03-27 RX ADMIN — NIFEDIPINE 60 MG: 60 TABLET, EXTENDED RELEASE ORAL at 08:59

## 2025-03-27 RX ADMIN — ISOSORBIDE MONONITRATE 30 MG: 30 TABLET, EXTENDED RELEASE ORAL at 08:59

## 2025-03-27 RX ADMIN — ATORVASTATIN CALCIUM 80 MG: 40 TABLET, FILM COATED ORAL at 20:01

## 2025-03-27 RX ADMIN — PANTOPRAZOLE SODIUM 40 MG: 40 TABLET, DELAYED RELEASE ORAL at 05:03

## 2025-03-27 RX ADMIN — INSULIN LISPRO 2 UNITS: 100 INJECTION, SOLUTION INTRAVENOUS; SUBCUTANEOUS at 18:16

## 2025-03-27 RX ADMIN — ACETAMINOPHEN 650 MG: 325 TABLET, FILM COATED ORAL at 08:58

## 2025-03-27 RX ADMIN — APIXABAN 5 MG: 5 TABLET, FILM COATED ORAL at 20:01

## 2025-03-27 RX ADMIN — ASPIRIN 81 MG: 81 TABLET, CHEWABLE ORAL at 08:59

## 2025-03-27 RX ADMIN — CARVEDILOL 25 MG: 12.5 TABLET, FILM COATED ORAL at 18:16

## 2025-03-27 RX ADMIN — LIDOCAINE 1 PATCH: 4 PATCH TOPICAL at 17:07

## 2025-03-27 NOTE — PLAN OF CARE
Problem: Adult Inpatient Plan of Care  Goal: Plan of Care Review  Outcome: Progressing  Goal: Patient-Specific Goal (Individualized)  Outcome: Progressing  Goal: Absence of Hospital-Acquired Illness or Injury  Outcome: Progressing  Intervention: Identify and Manage Fall Risk  Recent Flowsheet Documentation  Taken 3/27/2025 0400 by Rossy Kirkland RN  Safety Promotion/Fall Prevention:   activity supervised   assistive device/personal items within reach   clutter free environment maintained   fall prevention program maintained   lighting adjusted   nonskid shoes/slippers when out of bed   room organization consistent   safety round/check completed  Taken 3/27/2025 0200 by Rossy Kirkland RN  Safety Promotion/Fall Prevention:   activity supervised   assistive device/personal items within reach   clutter free environment maintained   fall prevention program maintained   lighting adjusted   nonskid shoes/slippers when out of bed   room organization consistent   safety round/check completed  Taken 3/27/2025 0000 by Rossy Kirkland RN  Safety Promotion/Fall Prevention:   activity supervised   assistive device/personal items within reach   clutter free environment maintained   fall prevention program maintained   lighting adjusted   nonskid shoes/slippers when out of bed   room organization consistent   safety round/check completed  Taken 3/26/2025 2200 by Rossy Kirkland RN  Safety Promotion/Fall Prevention:   activity supervised   assistive device/personal items within reach   clutter free environment maintained   fall prevention program maintained   lighting adjusted   nonskid shoes/slippers when out of bed   room organization consistent   safety round/check completed  Taken 3/26/2025 2000 by Rossy Kirkland RN  Safety Promotion/Fall Prevention:   activity supervised   assistive device/personal items within reach   clutter free environment maintained   fall prevention program maintained   lighting adjusted    nonskid shoes/slippers when out of bed   room organization consistent   safety round/check completed  Intervention: Prevent Skin Injury  Recent Flowsheet Documentation  Taken 3/27/2025 0400 by Rossy Kirkland RN  Body Position:   turned   right  Skin Protection:   incontinence pads utilized   transparent dressing maintained  Taken 3/27/2025 0200 by Rossy Kirkland RN  Body Position:   turned   left  Skin Protection:   incontinence pads utilized   transparent dressing maintained  Taken 3/27/2025 0000 by Rossy Kirkland RN  Body Position: supine  Skin Protection:   incontinence pads utilized   transparent dressing maintained  Taken 3/26/2025 2200 by Rossy Kirkland RN  Body Position:   turned   right  Skin Protection:   incontinence pads utilized   transparent dressing maintained  Taken 3/26/2025 2000 by Rossy Kirkland RN  Body Position: (in chair)   supine   legs elevated  Skin Protection:   incontinence pads utilized   transparent dressing maintained  Intervention: Prevent and Manage VTE (Venous Thromboembolism) Risk  Recent Flowsheet Documentation  Taken 3/26/2025 2000 by Rossy Kirkland RN  VTE Prevention/Management: (pt takes eliquis)   bilateral   SCDs (sequential compression devices) off  Intervention: Prevent Infection  Recent Flowsheet Documentation  Taken 3/27/2025 0400 by Rossy Kirkland RN  Infection Prevention:   cohorting utilized   environmental surveillance performed   equipment surfaces disinfected   hand hygiene promoted   rest/sleep promoted  Taken 3/27/2025 0200 by Rossy Kirkland RN  Infection Prevention:   cohorting utilized   environmental surveillance performed   equipment surfaces disinfected   hand hygiene promoted   rest/sleep promoted  Taken 3/27/2025 0000 by Rossy Kirkland RN  Infection Prevention:   cohorting utilized   environmental surveillance performed   equipment surfaces disinfected   hand hygiene promoted   rest/sleep promoted  Taken 3/26/2025 2200 by Isael  AIDAN Blair  Infection Prevention:   cohorting utilized   environmental surveillance performed   equipment surfaces disinfected   hand hygiene promoted   rest/sleep promoted  Taken 3/26/2025 2000 by Rossy Kirkland RN  Infection Prevention:   cohorting utilized   environmental surveillance performed   equipment surfaces disinfected   hand hygiene promoted   rest/sleep promoted  Goal: Optimal Comfort and Wellbeing  Outcome: Progressing  Intervention: Provide Person-Centered Care  Recent Flowsheet Documentation  Taken 3/26/2025 2000 by Rossy Kirkland RN  Trust Relationship/Rapport:   care explained   choices provided  Goal: Readiness for Transition of Care  Outcome: Progressing     Problem: Skin Injury Risk Increased  Goal: Skin Health and Integrity  Outcome: Progressing  Intervention: Optimize Skin Protection  Recent Flowsheet Documentation  Taken 3/27/2025 0400 by Rossy Kirkland RN  Activity Management: activity encouraged  Pressure Reduction Techniques:   frequent weight shift encouraged   weight shift assistance provided   heels elevated off bed   pressure points protected  Head of Bed (HOB) Positioning: HOB elevated  Pressure Reduction Devices:   pressure-redistributing mattress utilized   positioning supports utilized   heel offloading device utilized  Skin Protection:   incontinence pads utilized   transparent dressing maintained  Taken 3/27/2025 0200 by Rossy Kirkland RN  Activity Management: activity encouraged  Pressure Reduction Techniques:   frequent weight shift encouraged   weight shift assistance provided   heels elevated off bed   pressure points protected  Head of Bed (HOB) Positioning: HOB elevated  Pressure Reduction Devices:   pressure-redistributing mattress utilized   positioning supports utilized   heel offloading device utilized  Skin Protection:   incontinence pads utilized   transparent dressing maintained  Taken 3/27/2025 0000 by Rossy Kirkland RN  Activity Management: activity  encouraged  Pressure Reduction Techniques:   frequent weight shift encouraged   weight shift assistance provided   heels elevated off bed   pressure points protected  Head of Bed (HOB) Positioning: HOB elevated  Pressure Reduction Devices:   pressure-redistributing mattress utilized   positioning supports utilized   heel offloading device utilized  Skin Protection:   incontinence pads utilized   transparent dressing maintained  Taken 3/26/2025 2200 by Rossy Kirkland RN  Activity Management: activity encouraged  Pressure Reduction Techniques:   frequent weight shift encouraged   weight shift assistance provided   heels elevated off bed   pressure points protected  Head of Bed (HOB) Positioning: HOB elevated  Pressure Reduction Devices:   pressure-redistributing mattress utilized   positioning supports utilized   heel offloading device utilized  Skin Protection:   incontinence pads utilized   transparent dressing maintained  Taken 3/26/2025 2000 by Rossy Kirkland RN  Activity Management: activity encouraged  Pressure Reduction Techniques:   frequent weight shift encouraged   heels elevated off bed   pressure points protected   weight shift assistance provided  Head of Bed (HOB) Positioning: HOB elevated  Pressure Reduction Devices:   heel offloading device utilized   positioning supports utilized  Skin Protection:   incontinence pads utilized   transparent dressing maintained     Problem: Comorbidity Management  Goal: Maintenance of Behavioral Health Symptom Control  Outcome: Progressing  Intervention: Maintain Behavioral Health Symptom Control  Recent Flowsheet Documentation  Taken 3/26/2025 2000 by Rossy Kirkland RN  Medication Review/Management: medications reviewed  Goal: Blood Glucose Level Within Target Range  Outcome: Progressing  Intervention: Monitor and Manage Glycemia  Recent Flowsheet Documentation  Taken 3/26/2025 2000 by Rossy Kirkland RN  Medication Review/Management: medications  reviewed  Goal: Blood Pressure in Desired Range  Outcome: Progressing  Intervention: Maintain Blood Pressure Management  Recent Flowsheet Documentation  Taken 3/26/2025 2000 by Rossy Kirkland RN  Medication Review/Management: medications reviewed     Problem: Fall Injury Risk  Goal: Absence of Fall and Fall-Related Injury  Outcome: Progressing  Intervention: Identify and Manage Contributors  Recent Flowsheet Documentation  Taken 3/27/2025 0400 by Rossy Kirkland RN  Self-Care Promotion:   independence encouraged   BADL personal objects within reach  Taken 3/27/2025 0200 by Rossy Kirkland RN  Self-Care Promotion:   independence encouraged   BADL personal objects within reach  Taken 3/27/2025 0000 by Rossy Kirkland RN  Self-Care Promotion:   independence encouraged   BADL personal objects within reach  Taken 3/26/2025 2200 by Rossy Kirkland RN  Self-Care Promotion:   independence encouraged   BADL personal objects within reach  Taken 3/26/2025 2000 by Rossy Kirkland RN  Medication Review/Management: medications reviewed  Self-Care Promotion:   independence encouraged   BADL personal objects within reach  Intervention: Promote Injury-Free Environment  Recent Flowsheet Documentation  Taken 3/27/2025 0400 by Rossy Kirkland RN  Safety Promotion/Fall Prevention:   activity supervised   assistive device/personal items within reach   clutter free environment maintained   fall prevention program maintained   lighting adjusted   nonskid shoes/slippers when out of bed   room organization consistent   safety round/check completed  Taken 3/27/2025 0200 by Rossy Kirkland RN  Safety Promotion/Fall Prevention:   activity supervised   assistive device/personal items within reach   clutter free environment maintained   fall prevention program maintained   lighting adjusted   nonskid shoes/slippers when out of bed   room organization consistent   safety round/check completed  Taken 3/27/2025 0000 by Isael  Rossy RN  Safety Promotion/Fall Prevention:   activity supervised   assistive device/personal items within reach   clutter free environment maintained   fall prevention program maintained   lighting adjusted   nonskid shoes/slippers when out of bed   room organization consistent   safety round/check completed  Taken 3/26/2025 2200 by Rossy Kirkland RN  Safety Promotion/Fall Prevention:   activity supervised   assistive device/personal items within reach   clutter free environment maintained   fall prevention program maintained   lighting adjusted   nonskid shoes/slippers when out of bed   room organization consistent   safety round/check completed  Taken 3/26/2025 2000 by Rossy Kirkland, RN  Safety Promotion/Fall Prevention:   activity supervised   assistive device/personal items within reach   clutter free environment maintained   fall prevention program maintained   lighting adjusted   nonskid shoes/slippers when out of bed   room organization consistent   safety round/check completed   Goal Outcome Evaluation:               3/26-   Pt remains disoriented to situation and time. NIH: 7. RA. NSR.  Pt has remained off the cardene drip, and maintained sys< 170 w/PO BP meds.  Plan of care ongoing.

## 2025-03-27 NOTE — PLAN OF CARE
Goal Outcome Evaluation:           Progress: improving  Outcome Evaluation: Patient demonstrating improvements w/ SPT transfers, STS and standing tolerance for ADLs this date. Pt continues to be below her functional baseline warranting continuation of skilled OT services. Continue per current POC.    Anticipated Discharge Disposition (OT): skilled nursing facility

## 2025-03-27 NOTE — THERAPY TREATMENT NOTE
Patient Name: Valarie Camara  : 1945    MRN: [id number]                              Today's Date: 3/27/2025       Admit Date: 3/20/2025    Visit Dx:     ICD-10-CM ICD-9-CM   1. Acute ischemic stroke  I63.9 434.91   2. History of stroke  Z86.73 V12.54   3. Renal insufficiency  N28.9 593.9   4. Aphasia  R47.01 784.3   5. Dysarthria  R47.1 784.51   6. Oropharyngeal dysphagia  R13.12 787.22   7. 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  I69.30 V12.54     Patient Active Problem List   Diagnosis    Atopic rhinitis    Arthritis    Chronic neck pain    Anxiety and depression    Edema    Gastroesophageal reflux disease with esophagitis    Multiple-type hyperlipidemia    Vitamin D deficiency    Benign essential hypertension    Obesity (BMI 30-39.9)    History of COPD    History of cataract    History of osteoporosis    History of restless legs syndrome    History of rheumatoid arthritis    Elevated serum creatinine    Esophageal dysphagia    Constipation    Schatzki's ring    Gastritis without bleeding    History of Helicobacter pylori infection    Duodenitis    Right hemiparesis    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    Multiple thyroid nodules    Lacunar stroke    DM (diabetes mellitus), type 2, uncontrolled w/neurologic complication    Syncope and collapse    Positive fecal occult blood test    Left foot pain    Hypomagnesemia    Acute on chronic anemia    Esophageal dysphagia    Reflux esophagitis    Syncope    Irritable bowel syndrome with constipation    Hypertensive urgency    Pulmonary emboli    Encephalopathy     Past Medical History:   Diagnosis Date    Acute bronchitis with bronchospasm     Anxiety     Arthritis     Asthma     B12 deficiency     Back pain     Body piercing     ears    Cataract     Cerebrovascular disease     Cervicalgia     Chronic kidney disease     stage 3b    Colonic polyp     History of colonic polyps      "Constipation     COPD (chronic obstructive pulmonary disease)     Coronary artery disease     Depression     Diverticulitis     Dysphagia     Patient reported \"it won't go all the way down\" when eating solid foods first thing in the mornings    Edema     Elevated cholesterol     Esophageal reflux     Folic acid deficiency     Full dentures     Advised no adhesives DOS    Heart murmur     Herpes zoster     High cholesterol     History of kidney infection     History of recurrent urinary tract infection     HTN (hypertension)     Hypercholesterolemia     Impaired functional mobility, balance, gait, and endurance     Insomnia     Kidney infection     Malignant hypertension 04/26/2015     Accelerated essential hypertension    Measles     rubeola    Muscle spasm     Neoplasm of uncertain behavior of skin     dtr denies    Osteoporosis     Poor historian     Recurrent urinary tract infection     Rheumatoid arthritis     RLS (restless legs syndrome)     Stomach ulcer     Stroke     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    Type 2 diabetes mellitus     Vitamin D deficiency      Past Surgical History:   Procedure Laterality Date    CATARACT EXTRACTION WITH INTRAOCULAR LENS IMPLANT Left 02/11/2013    CATARACT EXTRACTION WITH INTRAOCULAR LENS IMPLANT Right 04/15/2013    COLONOSCOPY  2012    COLONOSCOPY N/A 11/26/2019    Procedure: COLONOSCOPY;  Surgeon: Chidi Downing MD;  Location:  [facility];  Service: Gastroenterology    ENDOSCOPY N/A 11/13/2017    Procedure: ESOPHAGOGASTRODUODENOSCOPY with biopsies and esophageal balloon dilitation;  Surgeon: Wesley Stovall MD;  Location:  [facility];  Service:     ENDOSCOPY N/A 11/25/2019    Procedure: ESOPHAGOGASTRODUODENOSCOPY;  Surgeon: Chidi Downing MD;  Location:  [facility];  Service: Gastroenterology    ENDOSCOPY N/A 11/29/2021    Procedure: ESOPHAGOGASTRODUODENOSCOPY with dilation and biopsies;  Surgeon: Benny" Gonzalez WIN MD;  Location: [facility];  Service: Gastroenterology;  Laterality: N/A;    ENDOSCOPY N/A 11/1/2023    Procedure: ESOPHAGOGASTRODUODENOSCOPY WITH BIOPSY AND DILATATION;  Surgeon: Gonzalez Zapata MD;  Location: [facility];  Service: Gastroenterology;  Laterality: N/A;    ENDOSCOPY N/A 1/27/2025    Procedure: Esophagogastroduodenoscopy with dilatation and biopsies;  Surgeon: Gonzalez Zapata MD;  Location: [facility];  Service: Gastroenterology;  Laterality: N/A;    HYSTERECTOMY  1979    UPPER GASTROINTESTINAL ENDOSCOPY  12/09/2013    UPPER GASTROINTESTINAL ENDOSCOPY  11/13/2017      General Information       Row Name 03/27/25 1540          Physical Therapy Time and Intention    Document Type therapy note (daily note)  -CK     Mode of Treatment physical therapy  -CK       Row Name 03/27/25 1540          General Information    Patient Profile Reviewed yes  -CK     Existing Precautions/Restrictions fall;seizures  -CK     Barriers to Rehab medically complex;previous functional deficit;cognitive status  -CK       Row Name 03/27/25 1540          Cognition    Orientation Status (Cognition) oriented to;person  -CK       Row Name 03/27/25 1540          Safety Issues/Impairments Affecting Functional Mobility    Safety Issues Affecting Function (Mobility) awareness of need for assistance;insight into deficits/self-awareness;judgment;positioning of assistive device;problem-solving;safety precaution awareness;sequencing abilities;safety precautions follow-through/compliance  -CK     Impairments Affecting Function (Mobility) balance;cognition;endurance/activity tolerance;motor control;strength;postural/trunk control  -CK     Comment, Safety Issues/Impairments (Mobility) patient is easily startled  -CK               User Key  (r) = Recorded By, (t) = Taken By, (c) = Cosigned By      Initials Name Provider Type    CK Gina Nielsen, PT Physical Therapist                   Mobility        Row Name 03/27/25 1540          Bed Mobility    Comment, (Bed Mobility) UI with OT upon therapist entry  -CK       Row Name 03/27/25 1540          Sit-Stand Transfer    Sit-Stand [place] (Transfers) 2 person assist;minimum assist (75% patient effort)  -CK     Assistive Device (Sit-Stand Transfers) walker, front-wheeled  -CK     Comment, (Sit-Stand Transfer) cues to push up from chair, patient completed 2 reps from chair with 1 prolonged stand for pericare  -CK       Row Name 03/27/25 1540          Gait/Stairs (Locomotion)    [place] Level (Gait) minimum assist (75% patient effort);2 person assist;verbal cues  -CK     Assistive Device (Gait) walker, front-wheeled  -CK     Patient was able to Ambulate yes  -CK     Distance in Feet (Gait) 3  -CK     Deviations/Abnormal Patterns (Gait) bilateral deviations;tyra decreased;festinating/shuffling;gait speed decreased;stride length decreased;weight shifting decreased  -CK     Bilateral Gait Deviations forward flexed posture;heel strike decreased  -CK     Comment, (Gait/Stairs) Patient able to take a couple shuffled steps away from chair with assist for balance and weightshifting. Patient became fatigued and had increasing difficulty sequencing requiring chair to be brought up behind her to rest.  -CK               User Key  (r) = Recorded By, (t) = Taken By, (c) = Cosigned By      Initials Name Provider Type    CK Gina Nielsen, PT Physical Therapist                   Obj/Interventions       Row Name 03/27/25 154          Balance    Balance Assessment sitting static balance;standing static balance;standing dynamic balance  -CK     Static Sitting Balance standby assist;contact guard  -CK     Position, Sitting Balance unsupported;sitting in chair  -CK     Static Standing Balance minimal assist  -CK     Dynamic Standing Balance minimal assist  -CK     Position/Device Used, Standing Balance supported;walker, front-wheeled  -CK               User Key  (r)  = Recorded By, (t) = Taken By, (c) = Cosigned By      Initials Name Provider Type    Gina Dow PT Physical Therapist                   Goals/Plan    No documentation.                  Clinical Impression       Row Name 03/27/25 1543          Pain    Pretreatment Pain Rating 0/10 - no pain  -CK     Posttreatment Pain Rating 0/10 - no pain  -CK       Row Name 03/27/25 1543          Plan of Care Review    Plan of Care Reviewed With patient;family  -CK     Progress improving  -CK     Outcome Evaluation Patient showing improvement with ability to take steps away from chair ~3' minAx2 with FWW. She requires encouragement and cues for task completion, limited in further mobility by quick fatigue. IPPT remains indicated to address current deficits Continue to recommend D/C to SNF.  -CK       Row Name 03/27/25 1543          Vital Signs    Pre Systolic BP Rehab 132  -CK     Pre Treatment Diastolic BP 64  -CK     Posttreatment Heart Rate (beats/min) 89  -CK     O2 Delivery Pre Treatment room air  -CK     O2 Delivery Intra Treatment room air  -CK     Post SpO2 (%) 100  -CK     O2 Delivery Post Treatment room air  -CK     Pre Patient Position Sitting  -CK     Post Patient Position Sitting  -CK       Row Name 03/27/25 1543          Positioning and Restraints    Pre-Treatment Position sitting in chair/recliner  -CK     Post Treatment Position chair  -CK     In Chair reclined;call light within reach;encouraged to call for assist;exit alarm on;with family/caregiver;waffle cushion;on mechanical lift sling;notified nsg  -CK               User Key  (r) = Recorded By, (t) = Taken By, (c) = Cosigned By      Initials Name Provider Type    Gina Dow PT Physical Therapist                   Outcome Measures       Row Name 03/27/25 1545 03/27/25 0858       How much help from another person do you currently need...    Turning from your back to your side while in flat bed without using bedrails? 3  -CK 3  -HS    Moving  from lying on back to sitting on the side of a flat bed without bedrails? 2  -CK 2  -HS    Moving to and from a bed to a chair (including a wheelchair)? 2  -CK 2  -HS    Standing up from a chair using your arms (e.g., wheelchair, bedside chair)? 3  -CK 2  -HS    Climbing 3-5 steps with a railing? 1  -CK 1  -HS    To walk in hospital room? 3  -CK 2  -HS    AM-PAC 6 Clicks Score (PT) 14  -CK 12  -HS    Highest Level of Mobility Goal 4 --> Transfer to chair/commode  -CK 4 --> Transfer to chair/commode  -HS      Row Name 03/27/25 1545          Functional Assessment    Outcome Measure Options AM-PAC 6 Clicks Basic Mobility (PT)  -CK               User Key  (r) = Recorded By, (t) = Taken By, (c) = Cosigned By      Initials Name Provider Type    Arabella Laguna, RN Registered Nurse    Gina Dow, PT Physical Therapist                                 Physical Therapy Education       Title: PT OT SLP Therapies (In Progress)       Topic: Physical Therapy (In Progress)       Point: Mobility training (In Progress)       Learning Progress Summary            Patient Acceptance, E, NR by CK at 3/27/2025 1545    Acceptance, E, VU,NR by CD at 3/26/2025 1643    Comment: SEE FLOWSHEET    Acceptance, E, VU,NR by CD at 3/23/2025 1742    Comment: BENEFITS OF OOB ACTIVITY, SAFETY WITH MOBILITY, PROGRESSION OF POC, D/C PLANNING,                      Point: Home exercise program (Done)       Learning Progress Summary            Patient Acceptance, E, VU,NR by CD at 3/26/2025 1643    Comment: SEE FLOWSHEET    Acceptance, E, VU,NR by CD at 3/23/2025 1742    Comment: BENEFITS OF OOB ACTIVITY, SAFETY WITH MOBILITY, PROGRESSION OF POC, D/C PLANNING,                      Point: Body mechanics (In Progress)       Learning Progress Summary            Patient Acceptance, E, NR by CK at 3/27/2025 1545    Acceptance, E, VU,NR by CD at 3/26/2025 1643    Comment: SEE FLOWSHEET    Acceptance, E, VU,NR by CD at 3/23/2025 1742    Comment:  BENEFITS OF OOB ACTIVITY, SAFETY WITH MOBILITY, PROGRESSION OF POC, D/C PLANNING,                      Point: Precautions (In Progress)       Learning Progress Summary            Patient Acceptance, E, NR by CK at 3/27/2025 1545    Acceptance, E, VU,NR by CD at 3/26/2025 1643    Comment: SEE FLOWSHEET    Acceptance, E, VU,NR by CD at 3/23/2025 1742    Comment: BENEFITS OF OOB ACTIVITY, SAFETY WITH MOBILITY, PROGRESSION OF POC, D/C PLANNING,                                      User Key       Initials Effective Dates Name Provider Type Discipline    CD 02/03/23 -  Gracy Millan PT Physical Therapist PT    CK 02/06/24 -  Gina Nielsen PT Physical Therapist PT                  PT Recommendation and Plan     Progress: improving  Outcome Evaluation: Patient showing improvement with ability to take steps away from chair ~3' minAx2 with FWW. She requires encouragement and cues for task completion, limited in further mobility by quick fatigue. IPPT remains indicated to address current deficits Continue to recommend D/C to SNF.     Time Calculation:         PT Charges       Row Name 03/27/25 1545             Time Calculation    Start Time 1524  -CK      PT Received On 03/27/25  -CK         Timed Charges    39961 - PT Therapeutic Activity Minutes 10  -CK         Total Minutes    Timed Charges Total Minutes 10  -CK       Total Minutes 10  -CK                User Key  (r) = Recorded By, (t) = Taken By, (c) = Cosigned By      Initials Name Provider Type    CK Gina Nielsen, MARY Physical Therapist                  Therapy Charges for Today       Code Description Service Date Service Provider Modifiers Qty    [id number] HC PT THERAPEUTIC ACT EA 15 MIN 3/27/2025 Gina Nielsen, PT GP 1            PT G-Codes  Outcome Measure Options: AM-PAC 6 Clicks Basic Mobility (PT)  AM-PAC 6 Clicks Score (PT): 14  AM-PAC 6 Clicks Score (OT): 11  Modified [place] Scale: 4 - Moderately severe disability.  Unable to walk without  assistance, and unable to attend to own bodily needs without assistance.  PT Discharge Summary  Anticipated Discharge Disposition (PT): skilled nursing facility    Gina Nielsen, PT  3/27/2025

## 2025-03-27 NOTE — PROGRESS NOTES
[facility] Neurology    Progress Note    Patient Name: Valarie Camara  : 1945  MRN: [id number]  Primary Care Physician:  Lay Cox MD  Date of admission: 3/20/2025    Subjective     Chief Complaint: AMS     History of Present Illness   Patient much more alert on assessment.  Was able to state her name and knew she was in a hospital.  No complaints overnight.    Review of Systems   General: Negative for fever, nausea, or vomiting.   Neurological: Negative for headache, pain, or weakness.     Objective     Physical Exam  Vitals and nursing note reviewed.   Constitutional:       General: She is not in acute distress.     Appearance: She is not ill-appearing.   Eyes:      Extraocular Movements: Extraocular movements intact.      Pupils: Pupils are equal, round, and reactive to light.      Comments: No nystagmus or deviated gaze noted   Neurological:      Mental Status: She is alert. Mental status is at baseline.      Cranial Nerves: No facial asymmetry.      Sensory: No sensory deficit.      Motor: Weakness present. No tremor or seizure activity.      Comments:       Cranial Nerves   CN II: Pupils are equal, round, and reactive to light. Normal visual acuity and visual fields.    CN III IV VI: Extraocular movements are full without nystagmus.  CN V: Normal facial sensation and strength of muscles of mastication.  CN VII: Facial movements are symmetric. No weakness.  CN VIII:  Auditory acuity is normal.  CN IX & X:  Symmetric palatal movement  CN XII: The tongue is midline.  No atrophy or fasciculations.    Generalized weakness          Vitals:   Temp:  [97.5 °F (36.4 °C)-98.5 °F (36.9 °C)] 98.4 °F (36.9 °C)  Heart Rate:  [] 96  Resp:  [16-18] 16  BP: ()/(54-85) 167/61    Current Medications    Current Facility-Administered Medications:     acetaminophen (TYLENOL) suppository 325 mg, 325 mg, Rectal, Q4H PRN, Brittaney Martin, APRN, 325 mg at 25 0529    acetaminophen  (TYLENOL) tablet 650 mg, 650 mg, Oral, Q8H PRN, Norberto Pan MD, 650 mg at 03/27/25 0858    apixaban (ELIQUIS) tablet 5 mg, 5 mg, Oral, Q12H, Danie Dunn MD, 5 mg at 03/27/25 0859    aspirin chewable tablet 81 mg, 81 mg, Oral, Daily, 81 mg at 03/27/25 0859 **OR** aspirin suppository 300 mg, 300 mg, Rectal, Daily, Norberto Pan MD, 300 mg at 03/20/25 1706    atorvastatin (LIPITOR) tablet 80 mg, 80 mg, Oral, Nightly, Renee Aguirre, APRTIFFANY, 80 mg at 03/26/25 2143    sennosides-docusate (PERICOLACE) 8.6-50 MG per tablet 2 tablet, 2 tablet, Oral, BID PRN **AND** polyethylene glycol (MIRALAX) packet 17 g, 17 g, Oral, Daily PRN **AND** bisacodyl (DULCOLAX) EC tablet 5 mg, 5 mg, Oral, Daily PRN **AND** bisacodyl (DULCOLAX) suppository 10 mg, 10 mg, Rectal, Daily PRN, Norberto Pan MD    Calcium Replacement - Follow Nurse / BPA Driven Protocol, , Not Applicable, PRN, Norberto Pan MD    carvedilol (COREG) tablet 25 mg, 25 mg, Oral, BID With Meals, Norberto Pan MD, 25 mg at 03/27/25 0858    dextrose (D50W) (25 g/50 mL) IV injection 25 g, 25 g, Intravenous, Q15 Min PRN, Norberto Pan MD, 25 g at 03/22/25 2225    dextrose (D50W) (25 g/50 mL) IV injection 25 g, 25 g, Intravenous, Q15 Min PRN, Danie Dunn MD    dextrose (GLUTOSE) oral gel 15 g, 15 g, Oral, Q15 Min PRN, Danie Dunn MD    diphenhydrAMINE (BENADRYL) capsule 25 mg, 25 mg, Oral, Nightly PRN, Danie Dunn MD    fluconazole (DIFLUCAN) tablet 100 mg, 100 mg, Oral, Q24H, Florencia Clark, APRN, 100 mg at 03/26/25 1746    glucagon (GLUCAGEN) injection 1 mg, 1 mg, Intramuscular, Q15 Min PRN, Danie Dunn MD    hydrALAZINE (APRESOLINE) injection 10 mg, 10 mg, Intravenous, Q6H PRN, Danie Dunn MD, 10 mg at 03/26/25 0820    Insulin Lispro (humaLOG) injection 2-7 Units, 2-7 Units, Subcutaneous, TID With Meals, Daine Dunn MD, 3 Units at 03/27/25 0900    isosorbide mononitrate (IMDUR) 24  hr tablet 30 mg, 30 mg, Oral, Q24H, Florencia Clark, APRN, 30 mg at 03/27/25 0859    Magnesium Standard Dose Replacement - Follow Nurse / BPA Driven Protocol, , Not Applicable, Maxim GARCIA Marc P, MD    niCARdipine (CARDENE) 25mg in 250mL NS infusion, 5-15 mg/hr, Intravenous, Titrated, Florencia Clark, APRN, Stopped at 03/26/25 1620    NIFEdipine XL (PROCARDIA XL) 24 hr tablet 60 mg, 60 mg, Oral, Q24H, Florencia Clark APRN, 60 mg at 03/27/25 0859    nitroglycerin (NITROSTAT) SL tablet 0.4 mg, 0.4 mg, Sublingual, Q5 Min PRTIFFANY, Norberto Pan MD    pantoprazole (PROTONIX) EC tablet 40 mg, 40 mg, Oral, Q AM, Danie Dunn MD, 40 mg at 03/27/25 0503    Phosphorus Replacement - Follow Nurse / BPA Driven Protocol, , Not Applicable, Maxim GARCIA Marc P, MD    Potassium Replacement - Follow Nurse / BPA Driven Protocol, , Not Applicable, Maxim GARCIA Marc P, MD    rivastigmine (EXELON) 4.6 MG/24HR patch 1 patch, 1 patch, Transdermal, Daily, Caty Shrestha, APRN, 1 patch at 03/26/25 1748    sodium chloride 0.9 % flush 10 mL, 10 mL, Intravenous, Q12H, Norberto Pan MD, 10 mL at 03/27/25 0900    sodium chloride 0.9 % flush 10 mL, 10 mL, Intravenous, PRN, Norberto Pan MD    sodium chloride 0.9 % infusion 40 mL, 40 mL, Intravenous, PRN, Norberto Pan MD    Laboratory Results:   Lab Results   Component Value Date    GLUCOSE 151 (H) 03/27/2025    CALCIUM 9.1 03/27/2025     03/27/2025    K 4.4 03/27/2025    CO2 18.0 (L) 03/27/2025     (H) 03/27/2025    BUN 29 (H) 03/27/2025    CREATININE 1.44 (H) 03/27/2025    EGFRIFAFRI 42 (L) 05/23/2021    EGFRIFNONA 33 (L) 12/18/2020    BCR 20.1 03/27/2025    ANIONGAP 14.0 03/27/2025     Lab Results   Component Value Date    WBC 8.29 03/27/2025    HGB 12.1 03/27/2025    HCT 37.7 03/27/2025    MCV 90.6 03/27/2025     03/27/2025     Lab Results   Component Value Date    CHOL 215 (H) 03/21/2025    CHOL 195 05/20/2021    CHOL 147 01/11/2021  "    Lab Results   Component Value Date    HDL 43 03/21/2025    HDL 54 05/20/2021    HDL 44 01/11/2021     Lab Results   Component Value Date     (H) 03/21/2025     (H) 05/20/2021    LDL 86 01/11/2021     Lab Results   Component Value Date    TRIG 149 03/21/2025    TRIG 124 05/20/2021    TRIG 90 01/11/2021     Lab Results   Component Value Date    HGBA1C 6.90 (H) 03/04/2025     Lab Results   Component Value Date    INR 1.43 (H) 03/20/2025    INR 0.91 02/28/2025    INR 0.89 (L) 03/03/2018    PROTIME 17.6 (H) 03/20/2025    PROTIME 12.7 02/28/2025    PROTIME 9.3 (L) 03/03/2018     No results found for: \"FOLATE\"  Lab Results   Component Value Date    [id number] 252 10/07/2019       MRI Brain Without Contrast  Result Date: 3/22/2025  MRI BRAIN WO CONTRAST Date of Exam: 3/22/2025 2:03 AM EDT Indication: Stroke, follow up Aphasia, right-sided weakness, numbness, facial droop.  Comparison: Head CT 3/21/2025 Technique:  Routine multiplanar/multisequence sequence images of the brain were obtained without contrast administration. Findings: No areas of diffusion restriction to suggest a recent infarct. Negative for acute intracranial hemorrhage, large mass lesion, midline shift or hydrocephalus. Mild global parenchymal volume loss for age. Mild periventricular T2/FLAIR hyperintense signal suggesting chronic microvascular ischemic change. Mostly empty sella. Negative for cerebellar tonsillar ectopia. Normal volume corpus callosum. Major intracranial flow voids preserved. Orbits symmetric. Trace bilateral mastoid fluid. Unremarkable appearance of the calvarium.     Impression: Impression: No acute MRI findings, specifically no findings to suggest a recent infarct. Electronically Signed: Shade Seay MD  3/22/2025 8:17 AM EDT  Workstation ID: [id number]       Assessment / Plan   Brief Patient Summary:  Valarie Camara is a 79 y.o. female who has past medical history significant for HTN, HLD, CAD, diabetes, CKD " stage III, L ICA stenosis, mild dementia, anxiety depression, recent PE on Eliquis, recurrent UTIs who presented to [facility] ED with complaint of altered mental status and aphasia.  Patient was originally seen by stroke neurology with a negative acute workup.  Suspected stroke prepubescent's in the setting of infection was likely causing patient's altered mentation.  MRI brain showed no acute intracranial abnormality.  Chronic left thalamic infarct was noted.  It was recommended to continue ASA 81 mg daily for stroke prevention.     General neurology was asked to evaluate patient with ongoing metabolic encephalopathy.  Per report patient has also been intermittently refusing medications.  Per patient's daughter in the past she has not tolerated Seroquel or trazodone.  On 3/21 patient came lethargic but had received Geodon earlier in the day.  EEG negative.     Suspect patient's altered mentation was related to previous medications along with underlying dementia.  Much improved on today's assessment.     Plan:   Altered mental status  Underlying dementia  Metabolic encephalopathy  Chronic infarct  Continue rivastigmine patch.  Once patient is able to take p.o. medications more regularly can consider transition to p.o.  Patient to continue work with PT/OT  Will hold off on repeat images getting recent evaluations.  Continue delirium/fall precautions.  Continue ASA and statin per stroke neurology recommendations.  Patient has poor reaction to Seroquel and trazodone.  Refrain from giving.  If patient has extreme agitation would recommend IM/IV Valium.  Would avoid Geodon given patient's recent ongoing lethargy.  General neurology will continue to follow      I have discussed the above with the patient, bedside RN and Florencia FIGUEREDO  Time spent with patient: 35 minutes in face-to-face evaluation and management of the patient.  Copied text in this note has been reviewed and is accurate as of 03/27/25.       April K  Henrietta, APRN

## 2025-03-27 NOTE — PROGRESS NOTES
[facility] Medicine Services  PROGRESS NOTE    Patient Name: Valarie Camara  : 1945  MRN: [id number]    Date of Admission: 3/20/2025  Primary Care Physician: Lay Cox MD    Subjective   Subjective     CC:  F/u right-sided weakness    HPI:   Patient resting in bed.  More alert and interactive today.  BP has improved.  Patient denies concerns.    Objective   Objective     Vital Signs:   Temp:  [97.5 °F (36.4 °C)-98.5 °F (36.9 °C)] 97.7 °F (36.5 °C)  Heart Rate:  [] 79  Resp:  [16-18] 16  BP: ()/(54-85) 132/64     Physical Exam:   Constitutional: No acute distress, awake, alert  HENT: NCAT, mucous membranes with white patches  Respiratory: Clear to auscultation bilaterally, respiratory effort normal, room air   Cardiovascular: RRR, no murmurs, rubs, or gallops  Gastrointestinal: Positive bowel sounds, soft, nontender, nondistended  Musculoskeletal: No bilateral ankle edema  Psychiatric: Appropriate affect, cooperative  Neurologic: Oriented to self, place, follows commands, moves all extremities, speech clear  Skin: No rashes      Results Reviewed:  LAB RESULTS:      Lab 25  0449 25  0550 25  2354 25  1751 25  0914 25  1955 25  0613 25  0220 25  1452 25  0545 25  1150 25  0553 25  1609 25  1608   WBC 8.29 7.13  --   --  6.81  --   --  6.88  --  6.49  --  7.46  --  6.44   HEMOGLOBIN 12.1 12.2  --   --  13.2  --   --  13.3  --  13.2  --  12.3  --  11.7*   HEMOGLOBIN, POC  --   --   --   --   --   --   --   --   --   --   --   --    < >  --    HEMATOCRIT 37.7 37.8  --   --  42.5  --   --  41.0  --  41.3  --  37.4  --  35.7   HEMATOCRIT POC  --   --   --   --   --   --   --   --   --   --   --   --    < >  --    PLATELETS 203 230  --   --  227  --   --  255  --  255  --  250  --  264   NEUTROS ABS 6.41  --   --   --  5.04  --   --   --   --   --   --  4.90  --  3.61    IMMATURE GRANS (ABS) 0.03  --   --   --  0.02  --   --   --   --   --   --  0.02  --  0.03   LYMPHS ABS 1.23  --   --   --  1.27  --   --   --   --   --   --  1.92  --  2.01   MONOS ABS 0.56  --   --   --  0.41  --   --   --   --   --   --  0.55  --  0.72   EOS ABS 0.04  --   --   --  0.04  --   --   --   --   --   --  0.04  --  0.04   MCV 90.6 89.2  --   --  93.6  --   --  89.1  --  90.2  --  88.0  --  87.9   PROCALCITONIN  --   --   --   --   --   --   --   --   --   --   --   --   --  0.20   LACTATE  --   --   --   --   --   --   --   --   --   --   --   --   --  1.6   PROTIME  --   --   --   --   --   --   --   --   --   --   --   --   --  17.6*   APTT  --   --   --   --   --   --   --   --   --   --   --  28.7  --  31.0*   HEPARIN ANTI-XA  --  0.53 0.58 0.65 0.75* 0.81*   < > 0.92*   < >  --    < > >1.10*   < > >1.10*    < > = values in this interval not displayed.         Lab 03/27/25  0449 03/26/25  1113 03/25/25  0550 03/24/25  0914 03/23/25  0220 03/22/25  0545 03/20/25  1609 03/20/25  1608   SODIUM 144 144 146* 145 144 144   < > 141   POTASSIUM 4.4 4.1 3.7 4.4 3.8 3.7   < > 3.8   CHLORIDE 112* 113* 114* 110* 109* 108*   < > 105   CO2 18.0* 19.0* 19.0* 17.0* 18.0* 20.0*   < > 21.0*   ANION GAP 14.0 12.0 13.0 18.0* 17.0* 16.0*   < > 15.0   BUN 29* 26* 28* 30* 25* 27*   < > 44*   CREATININE 1.44* 1.25* 1.40* 1.25* 1.47* 1.31*   < > 2.50*   EGFR 36.8* 43.7* 38.1* 43.7* 35.9* 41.3*   < > 19.0*   GLUCOSE 151* 140* 170* 130* 88 90   < > 150*   CALCIUM 9.1 8.8 9.0 8.9 9.0 8.9   < > 9.2   MAGNESIUM  --  1.8 1.9 1.9 1.7 1.9  --  1.9   TSH  --   --   --   --   --   --   --  0.355    < > = values in this interval not displayed.         Lab 03/21/25  0553 03/20/25  1608   TOTAL PROTEIN 6.5 6.3   ALBUMIN 3.8 3.9   GLOBULIN 2.7 2.4   ALT (SGPT) 13 13   AST (SGOT) 17 16   BILIRUBIN 0.5 0.4   ALK PHOS 49 54         Lab 03/20/25  1608   PROTIME 17.6*   INR 1.43*         Lab 03/21/25  0553   CHOLESTEROL 215*   LDL CHOL  145*   HDL CHOL 43   TRIGLYCERIDES 149             Lab 03/22/25  0610   PH, ARTERIAL 7.449   PCO2, ARTERIAL 32.0*   PO2 ART 78.3*   FIO2 21   HCO3 ART 22.1   BASE EXCESS ART -1.2*   CARBOXYHEMOGLOBIN 0.9     Brief Urine Lab Results  (Last result in the past 365 days)        Color   Clarity   Blood   Leuk Est   Nitrite   Protein   CREAT   Urine HCG        03/22/25 1129 Yellow   Cloudy   Negative   Negative   Negative   100 mg/dL (2+)                   Microbiology Results Abnormal       Procedure Component Value - Date/Time    Urine Culture - Urine, Straight Cath [[id number]]  (Abnormal) Collected: 03/22/25 1129    Lab Status: Final result Specimen: Urine from Straight Cath Updated: 03/23/25 2043     Urine Culture 25,000 CFU/mL Lactobacillus species     Comment:   Based on laboratory diagnosis criteria, these organisms are common urogenital commensal lilly and have not been associated with urinary tract infections; clinical correlation is recommended.       Narrative:      Colonization of the urinary tract without infection is common. Treatment is discouraged unless the patient is symptomatic, pregnant, or undergoing an invasive urologic procedure.            FL Video Swallow With Speech Single Contrast  Result Date: 3/25/2025  FL VIDEO SWALLOW W SPEECH SINGLE-CONTRAST Date of Exam: 3/25/2025 2:16 PM EDT Indication: dysphagia.   Comparison: None available. Technique:   The speech pathologist administered food and/or liquid mixed with barium to the patient with cine/video imaging.  Imaging assistance was provided to the speech pathologist and an image was saved. Fluoroscopic Time: 24 seconds Number of Images: 6 associated fluoroscopic loops were saved Findings: Penetration was seen during fluoroscopic guided modified barium swallowing series. Please see speech therapy report for full details and recommendations.     Impression: Impression: Fluoroscopy provided for a modified barium swallow. Penetration of barium was  seen during swallowing evaluation. Please see speech therapy report for full details and recommendations. Report dictated by: Jayda Ch PA-c  I have personally reviewed this case and agree with the findings above: Electronically Signed: Baldev Perez MD  3/25/2025 5:28 PM EDT  Workstation ID: [id number]      Results for orders placed during the hospital encounter of 03/20/25    Adult Transthoracic Echo Complete W/ Cont if Necessary Per Protocol (With Agitated Saline)    Interpretation Summary    Left ventricular systolic function is normal. Calculated left ventricular EF = 67% Left ventricular ejection fraction appears to be 66 - 70%.    Left ventricular wall thickness is consistent with mild concentric hypertrophy.    Left ventricular outflow tract peak flow gradient at rest is 11 mmHg. Left ventricular outflow tract peak gradient with valsalva is 29 mmHg.    Left ventricular diastolic function is consistent with (grade I) impaired relaxation.    The mitral valve peak gradient is 9 mmHg. The mitral valve mean gradient is 4 mmHg.    Calculated right ventricular systolic pressure from tricuspid regurgitation is 21 mmHg.    There is a trivial pericardial effusion.      Current medications:  Scheduled Meds:apixaban, 5 mg, Oral, Q12H  aspirin, 81 mg, Oral, Daily   Or  aspirin, 300 mg, Rectal, Daily  atorvastatin, 80 mg, Oral, Nightly  carvedilol, 25 mg, Oral, BID With Meals  fluconazole, 100 mg, Oral, Q24H  insulin lispro, 2-7 Units, Subcutaneous, TID With Meals  isosorbide mononitrate, 30 mg, Oral, Q24H  [START ON 3/28/2025] NIFEdipine XL, 90 mg, Oral, Q24H  pantoprazole, 40 mg, Oral, Q AM  rivastigmine, 1 patch, Transdermal, Daily  sodium chloride, 10 mL, Intravenous, Q12H      Continuous Infusions:     PRN Meds:.  acetaminophen    acetaminophen    senna-docusate sodium **AND** polyethylene glycol **AND** bisacodyl **AND** bisacodyl    Calcium Replacement - Follow Nurse / BPA Driven Protocol    dextrose     dextrose    dextrose    diphenhydrAMINE    glucagon (human recombinant)    hydrALAZINE    Magnesium Standard Dose Replacement - Follow Nurse / BPA Driven Protocol    nitroglycerin    Phosphorus Replacement - Follow Nurse / BPA Driven Protocol    Potassium Replacement - Follow Nurse / BPA Driven Protocol    sodium chloride    sodium chloride    Assessment & Plan   Assessment & Plan     Active Hospital Problems    Diagnosis  POA    **Encephalopathy [G93.40]  Unknown    Pulmonary emboli [I26.99]  Yes    Gastroesophageal reflux disease with esophagitis [K21.00]  Yes    Anxiety and depression [F41.9, F32.A]  Yes    Multiple-type hyperlipidemia [E78.2]  Yes    Benign essential hypertension [I10]  Yes      Resolved Hospital Problems    Diagnosis Date Resolved POA    CVA (cerebral vascular accident) [I63.9] 03/22/2025 Yes        Brief Hospital Course to date:  Valarie Camara is a 79 y.o. female  w/ hx CAD, PAD, L ICA dz (50-69% stenosis) CKD3 (baseline cr ~1.8-1.9), HTN, mild dementia, PE (on Eliquis), DM2 who presented with confusion, aphasia, and some right-sided weakness. Patient seen by Neuro-Stroke team. However, stroke workup was negative and they suspected stroke recrudescence in setting of infection/metabolic derangements. UA was concerning for UTI. After admission, patient had significant HTN. Cardene gtt initiated. Eliquis was held and transitioned to heparin gtt. General neurology consulted for ongoing encephalopathy. EEG negative.     This patient's problems and plans were partially entered by my partner and updated as appropriate by me 03/27/25.      Encephalopathy  Aphasia & right-sided weakness Left ICA stenosis (50-69%), & multifocal atherosclerotic dz  Episode of lethargy/somnolence evening 3/21/25 due to seroquel  Mild dementia  Hx previous left thalamic cva  -Neuro-stroke followed, suspect left hemispheric stroke vs stroke recrudescence in setting of infection/metabolic derangement  -TSH wnl, initial  UA benign  -EEG 3/22 negative  -prior echo 2/28/25: ef 61-65%, diastolic dysfunction  -CT head 3/20 negative for acute stroke, shows stable chronic lacunar infarct of the left thalamus  -CTA H/N 3/20 negative for flow-limiting stenosis or LVO  -repeat CT head 3/21 negative/unchanged from original CT head  -Echo 3/21 shows EF 66-70%, diastolic dysfunction  -BLE duplex negative  -evening 3/21 became lethargic after receiving Seroquel 50 mg. Had tolerated Geodon earlier in the day. .   -MRI brain 3/22 negative.   -Per discussion with daughter, patient has become lethargic in past w/ trazodone and Seroquel >> avoid seroquel & trazodone  -3/24/25 more confused and less interactive, so general neurology consulted  -repeat EEG 3/25 negative  -Neuro recs rivastigmine patch 4.9 mg daily. If pt has extreme agitation, neuro recommens IM or IV Valium. Avoid Geodon given lethargy.   -Per Neuro-Stroke team, continue ASA, statin, anticoagulation  -Follow up in [facility] 8-12 weeks  -SLP following, modified diet recs in place  -PT, OT recommending SNF rehab, recently home from [facility]     UTI  -initial UA negative  -UA 3/22 with 4+ bacteria, 6-10 WBCs, large yeast  -initiated on Rocephin  -urine culture with lactobacillus species, Rocephin dc'd     HTN  -required Cardene gtt for hypertension, dc'd 3/25 as patient able to take PO meds now   -reviewed home meds with daughter, patient on nifedipine XL 90 mg daily, Coreg 25 mg BID, Imdur 60 mg daily  -resume home BP meds x start Imdur at 30 mg daily and titrate, avoid hypotension      ?Candidiasis  -pt recently on Rocephin  -Diflucan 100 mg daily x 7 days, defer Nystatin given dysphagia  -monitor QTc intermittently, pt has tolerated Diflucan in past per gwyn  -EKG in AM    Diet-controlled DM2  -A1c 6.9 on 3/4/25  -SSI for now, titrate prn     Hx PE 2/28/25 [facility]  -s/p heparin gtt while NPO  -Eliquis resumed 3/25, tolerating PO     KURT on CKD 3 (baseline cr  ~1.8-1.9)  -initial creatinine was 2.5  -creatinine currently at baseline  -AM BMP       Expected Discharge Location and Transportation: SNF rehab vs home with HH  Expected Discharge   Expected Discharge Date: 3/29/2025; Expected Discharge Time:      VTE Prophylaxis:  Pharmacologic & mechanical VTE prophylaxis orders are present.         AM-PAC 6 Clicks Score (PT): 12 (03/27/25 5169)    CODE STATUS:   Code Status and Medical Interventions: CPR (Attempt to Resuscitate); Full Support   Ordered at: 03/20/25 1737     Code Status (Patient has no pulse and is not breathing):    CPR (Attempt to Resuscitate)     Medical Interventions (Patient has pulse or is breathing):    Full Support       Florencia Clark, APRN  03/27/25

## 2025-03-27 NOTE — THERAPY TREATMENT NOTE
Patient Name: Valarie Camara  : 1945    MRN: [id number]                              Today's Date: 3/27/2025       Admit Date: 3/20/2025    Visit Dx:     ICD-10-CM ICD-9-CM   1. Acute ischemic stroke  I63.9 434.91   2. History of stroke  Z86.73 V12.54   3. Renal insufficiency  N28.9 593.9   4. Aphasia  R47.01 784.3   5. Dysarthria  R47.1 784.51   6. Oropharyngeal dysphagia  R13.12 787.22   7. 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  I69.30 V12.54     Patient Active Problem List   Diagnosis    Atopic rhinitis    Arthritis    Chronic neck pain    Anxiety and depression    Edema    Gastroesophageal reflux disease with esophagitis    Multiple-type hyperlipidemia    Vitamin D deficiency    Benign essential hypertension    Obesity (BMI 30-39.9)    History of COPD    History of cataract    History of osteoporosis    History of restless legs syndrome    History of rheumatoid arthritis    Elevated serum creatinine    Esophageal dysphagia    Constipation    Schatzki's ring    Gastritis without bleeding    History of Helicobacter pylori infection    Duodenitis    Right hemiparesis    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    Multiple thyroid nodules    Lacunar stroke    DM (diabetes mellitus), type 2, uncontrolled w/neurologic complication    Syncope and collapse    Positive fecal occult blood test    Left foot pain    Hypomagnesemia    Acute on chronic anemia    Esophageal dysphagia    Reflux esophagitis    Syncope    Irritable bowel syndrome with constipation    Hypertensive urgency    Pulmonary emboli    Encephalopathy     Past Medical History:   Diagnosis Date    Acute bronchitis with bronchospasm     Anxiety     Arthritis     Asthma     B12 deficiency     Back pain     Body piercing     ears    Cataract     Cerebrovascular disease     Cervicalgia     Chronic kidney disease     stage 3b    Colonic polyp     History of colonic polyps      "Constipation     COPD (chronic obstructive pulmonary disease)     Coronary artery disease     Depression     Diverticulitis     Dysphagia     Patient reported \"it won't go all the way down\" when eating solid foods first thing in the mornings    Edema     Elevated cholesterol     Esophageal reflux     Folic acid deficiency     Full dentures     Advised no adhesives DOS    Heart murmur     Herpes zoster     High cholesterol     History of kidney infection     History of recurrent urinary tract infection     HTN (hypertension)     Hypercholesterolemia     Impaired functional mobility, balance, gait, and endurance     Insomnia     Kidney infection     Malignant hypertension 04/26/2015     Accelerated essential hypertension    Measles     rubeola    Muscle spasm     Neoplasm of uncertain behavior of skin     dtr denies    Osteoporosis     Poor historian     Recurrent urinary tract infection     Rheumatoid arthritis     RLS (restless legs syndrome)     Stomach ulcer     Stroke     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    Type 2 diabetes mellitus     Vitamin D deficiency      Past Surgical History:   Procedure Laterality Date    CATARACT EXTRACTION WITH INTRAOCULAR LENS IMPLANT Left 02/11/2013    CATARACT EXTRACTION WITH INTRAOCULAR LENS IMPLANT Right 04/15/2013    COLONOSCOPY  2012    COLONOSCOPY N/A 11/26/2019    Procedure: COLONOSCOPY;  Surgeon: Chidi Downing MD;  Location:  [facility];  Service: Gastroenterology    ENDOSCOPY N/A 11/13/2017    Procedure: ESOPHAGOGASTRODUODENOSCOPY with biopsies and esophageal balloon dilitation;  Surgeon: Wesley Stovall MD;  Location:  [facility];  Service:     ENDOSCOPY N/A 11/25/2019    Procedure: ESOPHAGOGASTRODUODENOSCOPY;  Surgeon: Chidi Downing MD;  Location:  [facility];  Service: Gastroenterology    ENDOSCOPY N/A 11/29/2021    Procedure: ESOPHAGOGASTRODUODENOSCOPY with dilation and biopsies;  Surgeon: Benny" Gonzalez WIN MD;  Location: [facility];  Service: Gastroenterology;  Laterality: N/A;    ENDOSCOPY N/A 11/1/2023    Procedure: ESOPHAGOGASTRODUODENOSCOPY WITH BIOPSY AND DILATATION;  Surgeon: Gonzalez Zapata MD;  Location: [facility];  Service: Gastroenterology;  Laterality: N/A;    ENDOSCOPY N/A 1/27/2025    Procedure: Esophagogastroduodenoscopy with dilatation and biopsies;  Surgeon: Gonzalez Zapata MD;  Location: [facility];  Service: Gastroenterology;  Laterality: N/A;    HYSTERECTOMY  1979    UPPER GASTROINTESTINAL ENDOSCOPY  12/09/2013    UPPER GASTROINTESTINAL ENDOSCOPY  11/13/2017      General Information       Row Name 03/27/25 1541          OT Time and Intention    Document Type therapy note (daily note)  -MR     Mode of Treatment occupational therapy  -MR       Row Name 03/27/25 1541          General Information    Existing Precautions/Restrictions fall;seizures  -MR     Barriers to Rehab medically complex;previous functional deficit;cognitive status  -MR       Row Name 03/27/25 1541          Cognition    Orientation Status (Cognition) oriented to;person  -MR       Row Name 03/27/25 1541          Safety Issues/Impairments Affecting Functional Mobility    Safety Issues Affecting Function (Mobility) awareness of need for assistance;insight into deficits/self-awareness;positioning of assistive device;problem-solving;safety precautions follow-through/compliance;safety precaution awareness  -MR     Impairments Affecting Function (Mobility) balance;cognition;endurance/activity tolerance;motor control;strength;postural/trunk control  -MR     Cognitive Impairments, Mobility Safety/Performance awareness, need for assistance;insight into deficits/self-awareness;safety precaution follow-through;safety precaution awareness;problem-solving/reasoning  -MR               User Key  (r) = Recorded By, (t) = Taken By, (c) = Cosigned By      Initials Name Provider Type    Saira Nazario, OT  Occupational Therapist                     Mobility/ADL's       Row Name 03/27/25 1541          Bed Mobility    Bed Mobility supine-sit  -MR     Sit-Supine Franklin (Bed Mobility) moderate assist (50% patient effort);2 person assist;verbal cues;nonverbal cues (demo/gesture)  -MR     Assistive Device (Bed Mobility) bed rails;head of bed elevated;repositioning sheet  -MR     Comment, (Bed Mobility) Pt demonstrating improvements w/ advancing BLE towards EOB and rolling to reach for the bed rail.  -MR       Row Name 03/27/25 1541          Transfers    Transfers bed-chair transfer;sit-stand transfer  -MR       Row Name 03/27/25 1541          Bed-Chair Transfer    Bed-Chair Franklin (Transfers) 2 person assist;verbal cues;moderate assist (50% patient effort)  -MR     Assistive Device (Bed-Chair Transfers) other (see comments)  BUE support  -MR       Row Name 03/27/25 1541          Sit-Stand Transfer    Sit-Stand Franklin (Transfers) 2 person assist;minimum assist (75% patient effort)  -MR     Assistive Device (Sit-Stand Transfers) walker, front-wheeled  -MR       Row Name 03/27/25 1541          Activities of Daily Living    BADL Assessment/Intervention lower body dressing;upper body dressing  -MR       Row Name 03/27/25 1541          Upper Body Dressing Assessment/Training    Franklin Level (Upper Body Dressing) don;other (see comments)  adjusting hospital gown  -MR     Position (Upper Body Dressing) edge of bed sitting;supported sitting  -MR       Row Name 03/27/25 1541          Lower Body Dressing Assessment/Training    Franklin Level (Lower Body Dressing) don;maximum assist (25% patient effort);socks  -MR     Position (Lower Body Dressing) supine  -MR               User Key  (r) = Recorded By, (t) = Taken By, (c) = Cosigned By      Initials Name Provider Type    MR Saira Landis OT Occupational Therapist                   Obj/Interventions       Row Name 03/27/25 1545          Balance    Balance  Assessment sitting static balance;sitting dynamic balance;standing static balance;standing dynamic balance  -MR     Static Sitting Balance standby assist  -MR     Dynamic Sitting Balance contact guard  -MR     Position, Sitting Balance unsupported;sitting edge of bed;sitting in chair  -MR     Static Standing Balance minimal assist  -MR     Dynamic Standing Balance minimal assist  -MR     Position/Device Used, Standing Balance supported;walker, front-wheeled  -MR     Balance Interventions sitting;standing;sit to stand;supported;static;dynamic;occupation based/functional task  -MR     Comment, Balance Pt demonstrating improved sitting balance, SPT transfer and standing tolerance w/o LOB.  -MR               User Key  (r) = Recorded By, (t) = Taken By, (c) = Cosigned By      Initials Name Provider Type    MR Saira Landis, OT Occupational Therapist                   Goals/Plan    No documentation.                  Clinical Impression       Row Name 03/27/25 1546          Pain Assessment    Pretreatment Pain Rating 0/10 - no pain  -MR     Posttreatment Pain Rating 0/10 - no pain  -MR       Row Name 03/27/25 1546          Plan of Care Review    Progress improving  -MR     Outcome Evaluation Patient demonstrating improvements w/ SPT transfers, STS and standing tolerance for ADLs this date. Pt continues to be below her functional baseline warranting continuation of skilled OT services. Continue per current POC.  -MR       Row Name 03/27/25 1546          Therapy Plan Review/Discharge Plan (OT)    Anticipated Discharge Disposition (OT) skilled nursing facility  -MR       Row Name 03/27/25 1546          Vital Signs    Pre Systolic BP Rehab 132  -MR     Pre Treatment Diastolic BP 64  -MR     Pre SpO2 (%) 99  -MR     O2 Delivery Pre Treatment room air  -MR     O2 Delivery Intra Treatment room air  -MR     O2 Delivery Post Treatment room air  -MR     Pre Patient Position Supine  -MR     Intra Patient Position Standing  -MR      Post Patient Position Sitting  -MR       Row Name 03/27/25 1546          Positioning and Restraints    Pre-Treatment Position in bed  -MR     Post Treatment Position chair  -MR     In Chair sitting;with PT  -MR               User Key  (r) = Recorded By, (t) = Taken By, (c) = Cosigned By      Initials Name Provider Type    Saira Nazario, OT Occupational Therapist                   Outcome Measures       Row Name 03/27/25 1548          How much help from another is currently needed...    Putting on and taking off regular lower body clothing? 1  -MR     Bathing (including washing, rinsing, and drying) 2  -MR     Toileting (which includes using toilet bed pan or urinal) 1  -MR     Putting on and taking off regular upper body clothing 2  -MR     Taking care of personal grooming (such as brushing teeth) 2  -MR     Eating meals 3  -MR     AM-PAC 6 Clicks Score (OT) 11  -MR       Row Name 03/27/25 1545 03/27/25 0858       How much help from another person do you currently need...    Turning from your back to your side while in flat bed without using bedrails? 3  -CK 3  -HS    Moving from lying on back to sitting on the side of a flat bed without bedrails? 2  -CK 2  -HS    Moving to and from a bed to a chair (including a wheelchair)? 2  -CK 2  -HS    Standing up from a chair using your arms (e.g., wheelchair, bedside chair)? 3  -CK 2  -HS    Climbing 3-5 steps with a railing? 1  -CK 1  -HS    To walk in hospital room? 3  -CK 2  -HS    AM-PAC 6 Clicks Score (PT) 14  -CK 12  -HS    Highest Level of Mobility Goal 4 --> Transfer to chair/commode  -CK 4 --> Transfer to chair/commode  -HS      Row Name 03/27/25 1548 03/27/25 1545       Functional Assessment    Outcome Measure Options AM-PAC 6 Clicks Daily Activity (OT)  -MR AM-PAC 6 Clicks Basic Mobility (PT)  -CK              User Key  (r) = Recorded By, (t) = Taken By, (c) = Cosigned By      Initials Name Provider Type    HS Arabella Saunders RN Registered Nurse    MR Landis  Saira OT Occupational Therapist    Gina Dow PT Physical Therapist                    Occupational Therapy Education       Title: PT OT SLP Therapies (In Progress)       Topic: Occupational Therapy (In Progress)       Point: ADL training (Done)       Learning Progress Summary            Patient Acceptance, E, VU by MR at 3/27/2025 1548                      Point: Precautions (Done)       Learning Progress Summary            Patient Acceptance, E, VU by MR at 3/27/2025 1548                      Point: Body mechanics (Done)       Learning Progress Summary            Patient Acceptance, E, VU by MR at 3/27/2025 1548                                      User Key       Initials Effective Dates Name Provider Type Discipline    MR 09/22/22 -  Saira Landis OT Occupational Therapist OT                  OT Recommendation and Plan     Plan of Care Review  Progress: improving  Outcome Evaluation: Patient demonstrating improvements w/ SPT transfers, STS and standing tolerance for ADLs this date. Pt continues to be below her functional baseline warranting continuation of skilled OT services. Continue per current POC.     Time Calculation:         Time Calculation- OT       Row Name 03/27/25 1549             Time Calculation- OT    OT Start Time 1518  -MR      OT Received On 03/27/25  -MR         Timed Charges    01792 - OT Therapeutic Activity Minutes 5  -MR      25991 - OT Self Care/Mgmt Minutes 10  -MR         Total Minutes    Timed Charges Total Minutes 15  -MR       Total Minutes 15  -MR                User Key  (r) = Recorded By, (t) = Taken By, (c) = Cosigned By      Initials Name Provider Type     Saira Landis OT Occupational Therapist                  Therapy Charges for Today       Code Description Service Date Service Provider Modifiers Qty    40186100014 HC OT SELF CARE/MGMT/TRAIN EA 15 MIN 3/27/2025 Saira Landis OT GO 1                 Saira Landis OT  3/27/2025

## 2025-03-27 NOTE — PLAN OF CARE
Goal Outcome Evaluation:  Plan of Care Reviewed With: patient, family        Progress: improving  Outcome Evaluation: Patient showing improvement with ability to take steps away from chair ~3' minAx2 with FWW. She requires encouragement and cues for task completion, limited in further mobility by quick fatigue. IPPT remains indicated to address current deficits Continue to recommend D/C to SNF.    Anticipated Discharge Disposition (PT): skilled nursing facility

## 2025-03-27 NOTE — PROGRESS NOTES
Start PACC Note    Home Health Referral    Evaluated patient and 03/27/2025 on Home Care and services available. Patient offered choice of available HHC and [facility]  SN/OT  Care Types: no  Isolation Precautions: [unfilled]    Social Determinants of Health:  Tobacco Use: Medium Risk (3/21/2025)    Patient History     Smoking Tobacco Use: Former     Smokeless Tobacco Use: Never     Passive Exposure: Past     Social History     Substance and Sexual Activity   Alcohol Use Not Currently     Social History     Substance and Sexual Activity   Drug Use Never       Does the patient have any financial resource strain?   Does the patient have any food insecurities?   Does the patient have any housing instabilities?     If any of the above is noted as yes - consider a MSW evaluation once the patient returns home.    START PATIENT REGISTRATION INFORMATION  Order Information  Order Signing Physician: No att. providers found  Service Ordered RN?: Yes  Service Ordered PT?: Yes  Service Ordered OT?: Yes  Service Ordered ST?: No  Service Ordered MSW?: No  Service Ordered HHA?: No  Following Physician: Lay Cox MD  Following Physician Phone: [phone]  Overseeing Physician: Lay Cox MD  (Required for Residents Only)  Agreeable to Follow? Yes  Date/Time of Call 03/27/25 10:06 EDT, Spoke with: She is a current home health patient.     Care Coordination  Same Day SOC?: No  Primary Care Physician: Lay Cox MD  Primary Care Physician Phone: [phone]  Primary Care Physician Address: [address] [place] [address] / [facility] [address]  Visit Instructions: N/A  Service Discharge Location Type: Home  Service Facility Name: N/A  Service Floor Facility: N/A  Service Room No: N/A    Demographics  Patient Last Name: Jax  Patient First Name: Valarie  Language/Communication Barrier:   Service Address: [address]  Service City:  [place]  Service State: [place]  Service Zip: [address]  Service Home Phone: [phone]  Other Phone Numbers:   Telephone Information:   Mobile [phone]     Emergency Contact:   Extended Emergency Contact Information  Primary Emergency Contact: Cleo Barajas  Address[address] [place] [address] [facility]  Home Phone: [phone]  Relation: Daughter  Secondary Emergency Contact: ELROY BARAJAS   [facility]  Home Phone: [phone]  Relation: Daughter    Admission Information  Admit Date: 03/20/2025  Patient Status at Discharge:   Admitting Diagnosis: CVA    Caregiver Information  Caregiver First Name:   Caregiver Last Name:   Caregiver Relationship to Patient:   Caregiver Phone Number:   Caregiver Notes:     [employer]  [employer]  No      END PATIENT REGISTRATION INFORMATION    Start PACC Summary    Additional Comments:     END PACC Summary    Discharge Date: Pending    Referral Source: WILLIAM Hernandez    Signed By: Jacinta Ortiz RN, 3/27/2025, 10:06 EDT     Date/Time: 03/27/25 10:06 EDT    End PACC Note

## 2025-03-27 NOTE — CONSULTS
I visited this patient per RN/patient request. I assessed need and offered appropriate support, including prayer. The patient was engaged with spiritual care. I will continue to follow, but please re-consult if requested.

## 2025-03-27 NOTE — DISCHARGE PLACEMENT REQUEST
"Valarie Barajas (79 y.o. Female)      Leona [phone]      Date of Birth   1945    Social Security Number       Address   [address] [address]    Home Phone   [phone]    MRN   [id number]       [facility]    Marital Status                               Admission Date   3/20/2025    Admission Type   Emergency    Admitting Provider   Norberto Pan MD    Attending Provider   Norberto Pan MD    Department, Room/Bed   [facility] 3E, S331/1       Discharge Date       Discharge Disposition       Discharge Destination                                 Attending Provider: Norberto Pan MD    Allergies: Penicillins, Clonidine Derivatives, Hydralazine, Sulfa Antibiotics    Isolation: None   Infection: None   Code Status: CPR    Ht: 160 cm (62.99\")   Wt: 68 kg (150 lb)    Admission Cmt: None   Principal Problem: Encephalopathy [G93.40]                   Active Insurance as of 3/20/2025       Primary Coverage       Payor Plan Insurance Group Employer/Plan Group    MEDICARE MEDICARE A & B        Payor Plan Address Payor Plan Phone Number Payor Plan Fax Number Effective Dates    [address] [phone]  2004 - None Entered    [place] [address]         Subscriber Name Subscriber Birth Date Member ID       VALARIE BARAJAS 1945 [id number]                     Emergency Contacts        (Rel.) Home Phone Work Phone Mobile Phone    Cleo Barajas (Daughter) [phone] -- --    ELROY BARAJAS (Daughter) [phone] -- --                 History & Physical        Norberto Pan MD at [address] [place] [address] Medicine Services  HISTORY AND PHYSICAL    Patient Name: Valarie Barajas  : 1945  MRN: [id number]  Primary Care Physician: Lay Cox MD  Date of admission: 3/20/2025      Subjective  Subjective     Chief Complaint: AMS    HPI:  Valarie Barajas " "is a 79 y.o. female with history of dementia, prior CVA, DM, CKD, PN, depression, recently diagnosed with PE on Eliquis, ICA stenosis, recurrent UTIs here with AMS. Family noted worsening expressive aphasia x 2 days. Hasn't slept well since hospitalization at [facility] 3/5. Poor PO intake. NO f/c. Does have bouts of constipation. Can usually ambulate with cane. Today she is somnolent, but easily agitated. Speech is slurred. Myoclonic jerking noted.     Unable to assess ROS.       Personal History     Past Medical History:   Diagnosis Date    Acute bronchitis with bronchospasm     Anxiety     Arthritis     Asthma     B12 deficiency     Back pain     Body piercing     ears    Cataract     Cerebrovascular disease     Cervicalgia     Chronic kidney disease     stage 3b    Colonic polyp     History of colonic polyps     Constipation     COPD (chronic obstructive pulmonary disease)     Coronary artery disease     Depression     Diverticulitis     Dysphagia     Patient reported \"it won't go all the way down\" when eating solid foods first thing in the mornings    Edema     Elevated cholesterol     Esophageal reflux     Folic acid deficiency     Full dentures     Advised no adhesives DOS    Heart murmur     Herpes zoster     High cholesterol     History of kidney infection     History of recurrent urinary tract infection     HTN (hypertension)     Hypercholesterolemia     Impaired functional mobility, balance, gait, and endurance     Insomnia     Kidney infection     Malignant hypertension 04/26/2015     Accelerated essential hypertension    Measles     rubeola    Muscle spasm     Neoplasm of uncertain behavior of skin     dtr denies    Osteoporosis     Poor historian     Recurrent urinary tract infection     Rheumatoid arthritis     RLS (restless legs syndrome)     Stomach ulcer     Stroke     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    Type 2 diabetes mellitus     Vitamin D deficiency  "           Past Surgical History:   Procedure Laterality Date    CATARACT EXTRACTION WITH INTRAOCULAR LENS IMPLANT Left 02/11/2013    CATARACT EXTRACTION WITH INTRAOCULAR LENS IMPLANT Right 04/15/2013    COLONOSCOPY  2012    COLONOSCOPY N/A 11/26/2019    Procedure: COLONOSCOPY;  Surgeon: Chidi Downing MD;  Location:  [facility];  Service: Gastroenterology    ENDOSCOPY N/A 11/13/2017    Procedure: ESOPHAGOGASTRODUODENOSCOPY with biopsies and esophageal balloon dilitation;  Surgeon: Wesley Stovall MD;  Location: [facility];  Service:     ENDOSCOPY N/A 11/25/2019    Procedure: ESOPHAGOGASTRODUODENOSCOPY;  Surgeon: Chidi Downing MD;  Location:  [facility];  Service: Gastroenterology    ENDOSCOPY N/A 11/29/2021    Procedure: ESOPHAGOGASTRODUODENOSCOPY with dilation and biopsies;  Surgeon: Gonzalez Zapata MD;  Location: [facility];  Service: Gastroenterology;  Laterality: N/A;    ENDOSCOPY N/A 11/1/2023    Procedure: ESOPHAGOGASTRODUODENOSCOPY WITH BIOPSY AND DILATATION;  Surgeon: Gonzalez Zapata MD;  Location: [facility];  Service: Gastroenterology;  Laterality: N/A;    ENDOSCOPY N/A 1/27/2025    Procedure: Esophagogastroduodenoscopy with dilatation and biopsies;  Surgeon: Gonzalez Zapata MD;  Location: [facility];  Service: Gastroenterology;  Laterality: N/A;    HYSTERECTOMY  1979    UPPER GASTROINTESTINAL ENDOSCOPY  12/09/2013    UPPER GASTROINTESTINAL ENDOSCOPY  11/13/2017       Family History: family history includes Arthritis in her mother and other family members; Diabetes in her mother and another family member; Hypertension in her mother and another family member.     Social History:  reports that she quit smoking about 13 years ago. Her smoking use included cigarettes. She started smoking about 28 years ago. She has a 15 pack-year smoking history. She has been exposed to tobacco smoke. She has never used smokeless tobacco. She reports that she does not  currently use alcohol. She reports that she does not use drugs.  Social History     Social History Narrative    Not on file       Medications:  Available home medication information reviewed.  Cholecalciferol, Insulin Pen Needle, Lancets 30G, NIFEdipine XL, acetaminophen, apixaban, atorvastatin, carvedilol, ferrous sulfate, glucose blood, glucose monitor, insulin lispro, linagliptin, pantoprazole, and traZODone    Allergies   Allergen Reactions    Penicillins Rash, Shortness Of Breath, Anaphylaxis and Other (See Comments)    Clonidine Derivatives Other (See Comments)     Pt reports extreme hypotension      Hydralazine Nausea And Vomiting and Other (See Comments)    Sulfa Antibiotics Rash       Objective  Objective     Vital Signs:   Temp:  [98.9 °F (37.2 °C)] 98.9 °F (37.2 °C)  Heart Rate:  [90-95] 91  Resp:  [16] 16  BP: (121-129)/(69-72) 123/72  Total (NIH Stroke Scale): 7    Physical Exam   Mild distress, somnolent but arouses, easily agitated, disoriented  OP clear, dry MM  Neck supple  Tachy  Decreased at bases, but clear  +BS, soft, mild distention  Dysarthric, PERRL, B UE/LE weakness, not following commands    Result Review:  I have personally reviewed the results from the time of this admission to 3/20/2025 17:38 EDT and agree with these findings:  []  Laboratory list / accordion  []  Microbiology  []  Radiology  []  EKG/Telemetry   []  Cardiology/Vascular   []  Pathology  []  Old records  []  Other:  Most notable findings include: WBC 6, UA pending, Free T4 1.9, CXR without infiltrate, creatinine 2.5, previously 1.9, EKG NSR      LAB RESULTS:      Lab 03/20/25  1609 03/20/25  1608 03/15/25  0433   WBC  --  6.44 7.52   HEMOGLOBIN  --  11.7* 11.3*   HEMOGLOBIN, POC 11.9*  --   --    HEMATOCRIT  --  35.7 33.5*   HEMATOCRIT POC 35*  --   --    PLATELETS  --  264 263   NEUTROS ABS  --  3.61 4.10   IMMATURE GRANS (ABS)  --  0.03 0.03   LYMPHS ABS  --  2.01 2.60   MONOS ABS  --  0.72 0.69   EOS ABS  --  0.04 0.06    MCV  --  87.9 86.6   LACTATE  --  1.6  --    PROTIME  --  17.6*  --    INR  --  1.43*  --    APTT  --  31.0*  --    HEPARIN ANTI-XA  --  >1.10*  --          Lab 03/20/25  1609 03/20/25  1608 03/15/25  0433   SODIUM  --  141 136   POTASSIUM  --  3.8 3.9   CHLORIDE  --  105 102   CO2  --  21.0* 20.3*   ANION GAP  --  15.0 13.7   BUN  --  44* 36*   CREATININE 2.80* 2.50* 1.94*   EGFR 16.6* 19.0* 25.8*   GLUCOSE  --  150* 149*   CALCIUM  --  9.2 8.8   MAGNESIUM  --  1.9  --    TSH  --  0.355  --          Lab 03/20/25  1608 03/15/25  0433   TOTAL PROTEIN 6.3 6.4   ALBUMIN 3.9 3.4*   GLOBULIN 2.4 3.0   ALT (SGPT) 13 14   AST (SGOT) 16 22   BILIRUBIN 0.4 0.4   ALK PHOS 54 52   LIPASE  --  53                     UA          2/8/2025    02:32 2/28/2025    15:45 3/15/2025    05:57   Urinalysis   Squamous Epithelial Cells, UA  3-6  0-2    Specific Gravity, UA <=1.005  1.018  1.013    Ketones, UA Negative  Negative  Negative    Blood, UA Negative  Negative  Negative    Leukocytes, UA Negative  Negative  Negative    Nitrite, UA Negative  Negative  Negative    RBC, UA  None Seen  None Seen    WBC, UA  None Seen  None Seen    Bacteria, UA  1+  4+        Microbiology Results (last 10 days)       ** No results found for the last 240 hours. **            XR Chest 1 View  Result Date: 3/20/2025  XR CHEST 1 VW Date of Exam: 3/20/2025 4:47 PM EDT Indication: Acute Stroke Protocol (onset < 12 hrs) Comparison: None available. Findings: The lungs appear adequately aerated without consolidation or mass. No pleural effusion or pneumothorax is identified. Cardiac silhouette is enlarged. Pulmonary vasculature appears unremarkable. No acute or suspicious osseous lesion is identified.     Impression: Impression: 1.Enlarged cardiac silhouette. This could be related to cardiomegaly and/or pericardial effusion. Electronically Signed: Charlie Sanders MD  3/20/2025 4:57 PM EDT  Workstation ID: [id number]    CT Angiogram Head w AI Analysis of  LVO  Result Date: 3/20/2025  CT ANGIOGRAM HEAD W AI ANALYSIS OF LVO, CT CEREBRAL PERFUSION W WO CONTRAST, CT ANGIOGRAM NECK Date of Exam: 3/20/2025 4:08 PM EDT Indication: Neuro Deficit, acute, Stroke suspected Neuro deficit, acute stroke suspected. Comparison: None available. Technique: CTA of the head was performed after the uneventful intravenous administration of 115 mL Isovue-370. Reconstructed coronal and sagittal images were also obtained. In addition, a 3-D volume rendered image was created for interpretation. Automated exposure control and iterative reconstruction methods were used. FINDINGS: Vascular Findings: Atherosclerotic plaque within the right carotid bifurcation and right carotid siphon. The right common carotid, internal carotid, middle cerebral, anterior cerebral, vertebral, and posterior cerebral arteries are patent without abrupt cut off or aneurysmal dilation. Atherosclerotic plaque within the left carotid bifurcation and left carotid siphon. Estimated 70% stenosis of the left proximal ICA (series 8, image 223). The left common carotid, remainder of the left ICA, middle cerebral, anterior cerebral, vertebral, and posterior cerebral arteries are patent without abrupt cut off or aneurysmal dilation. 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 Posterior cerebral arteries appear patent. Findings may be chronic given absence of  posterior territory ischemia seen on the accompanying CT perfusion scan. Please correlate with patient's symptomatology and brain MRI. Non-vascular Findings: For description of nonvascular intracranial findings, please refer to the noncontrast head CT performed the same date. No acute abnormality is identified within the visualized soft tissue or bony structures of the neck. Enlarged, multinodular thyroid gland. Largest nodule measures approximately  2.2 cm within the right thyroid lobe. The visualized lung apices are clear. 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 Posterior cerebral arteries appear patent. Findings may be chronic given absence of posterior territory ischemia seen on the accompanying CT perfusion scan. Please correlate with patient's symptomatology and brain MRI. 2.Otherwise, no intracranial large vessel occlusion is identified. 3.Estimated 70% stenosis within the left proximal internal carotid artery (see series 8, image 223). 4.Less than 50% stenosis within the right internal carotid artery. 5.CT perfusion study demonstrates a small focus of prolonged Tmax greater than 6 seconds within the right inferior frontal lobe (4 mL). This may be artifactual, although a small focus of at risk ischemic tissue cannot be excluded. No territorial core infarct is demonstrated on CT perfusion imaging. 6.Enlarged, multinodular thyroid gland. Recommend follow-up thyroid ultrasound on a nonemergent basis. The above findings were discussed with Moriah Menjivar via telephone on 3/20/2025 4:50 PM EDT. Electronically Signed: Charlie Sanders MD  3/20/2025 4:53 PM EDT  Workstation ID: [id number]    CT Angiogram Neck  Result Date: 3/20/2025  CT ANGIOGRAM HEAD W AI ANALYSIS OF LVO, CT CEREBRAL PERFUSION W WO CONTRAST, CT ANGIOGRAM NECK Date of Exam: 3/20/2025 4:08 PM EDT Indication: Neuro Deficit, acute, Stroke suspected Neuro deficit, acute stroke suspected. Comparison: None available. Technique: CTA of the head was performed after the uneventful intravenous administration of 115 mL Isovue-370. Reconstructed coronal and sagittal images were also obtained. In addition, a 3-D volume rendered image was created for  interpretation. Automated exposure control and iterative reconstruction methods were used. FINDINGS: Vascular Findings: Atherosclerotic plaque within the right carotid bifurcation and right carotid siphon. The right common carotid, internal carotid, middle cerebral, anterior cerebral, vertebral, and posterior cerebral arteries are patent without abrupt cut off or aneurysmal dilation. Atherosclerotic plaque within the left carotid bifurcation and left carotid siphon. Estimated 70% stenosis of the left proximal ICA (series 8, image 223). The left common carotid, remainder of the left ICA, middle cerebral, anterior cerebral, vertebral, and posterior cerebral arteries are patent without abrupt cut off or aneurysmal dilation. 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 Posterior cerebral arteries appear patent. Findings may be chronic given absence of  posterior territory ischemia seen on the accompanying CT perfusion scan. Please correlate with patient's symptomatology and brain MRI. Non-vascular Findings: For description of nonvascular intracranial findings, please refer to the noncontrast head CT performed the same date. No acute abnormality is identified within the visualized soft tissue or bony structures of the neck. Enlarged, multinodular thyroid gland. Largest nodule measures approximately 2.2 cm within the right thyroid lobe. The visualized lung apices are clear. 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 Posterior cerebral arteries appear patent. Findings may be chronic given absence of posterior territory ischemia seen on the  accompanying CT perfusion scan. Please correlate with patient's symptomatology and brain MRI. 2.Otherwise, no intracranial large vessel occlusion is identified. 3.Estimated 70% stenosis within the left proximal internal carotid artery (see series 8, image 223). 4.Less than 50% stenosis within the right internal carotid artery. 5.CT perfusion study demonstrates a small focus of prolonged Tmax greater than 6 seconds within the right inferior frontal lobe (4 mL). This may be artifactual, although a small focus of at risk ischemic tissue cannot be excluded. No territorial core infarct is demonstrated on CT perfusion imaging. 6.Enlarged, multinodular thyroid gland. Recommend follow-up thyroid ultrasound on a nonemergent basis. The above findings were discussed with Moriah Menjivar via telephone on 3/20/2025 4:50 PM EDT. Electronically Signed: Charlie Sanders MD  3/20/2025 4:53 PM EDT  Workstation ID: [id number]    CT CEREBRAL PERFUSION WITH & WITHOUT CONTRAST  Result Date: 3/20/2025  CT ANGIOGRAM HEAD W AI ANALYSIS OF LVO, CT CEREBRAL PERFUSION W WO CONTRAST, CT ANGIOGRAM NECK Date of Exam: 3/20/2025 4:08 PM EDT Indication: Neuro Deficit, acute, Stroke suspected Neuro deficit, acute stroke suspected. Comparison: None available. Technique: CTA of the head was performed after the uneventful intravenous administration of 115 mL Isovue-370. Reconstructed coronal and sagittal images were also obtained. In addition, a 3-D volume rendered image was created for interpretation. Automated exposure control and iterative reconstruction methods were used. FINDINGS: Vascular Findings: Atherosclerotic plaque within the right carotid bifurcation and right carotid siphon. The right common carotid, internal carotid, middle cerebral, anterior cerebral, vertebral, and posterior cerebral arteries are patent without abrupt cut off or aneurysmal dilation. Atherosclerotic plaque within the left carotid bifurcation and left carotid siphon.  Estimated 70% stenosis of the left proximal ICA (series 8, image 223). The left common carotid, remainder of the left ICA, middle cerebral, anterior cerebral, vertebral, and posterior cerebral arteries are patent without abrupt cut off or aneurysmal dilation. 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 Posterior cerebral arteries appear patent. Findings may be chronic given absence of  posterior territory ischemia seen on the accompanying CT perfusion scan. Please correlate with patient's symptomatology and brain MRI. Non-vascular Findings: For description of nonvascular intracranial findings, please refer to the noncontrast head CT performed the same date. No acute abnormality is identified within the visualized soft tissue or bony structures of the neck. Enlarged, multinodular thyroid gland. Largest nodule measures approximately 2.2 cm within the right thyroid lobe. The visualized lung apices are clear. 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 Posterior cerebral arteries appear patent. Findings may be chronic given absence of posterior territory ischemia seen on the accompanying CT perfusion scan. Please correlate with patient's symptomatology and brain MRI. 2.Otherwise, no intracranial large vessel occlusion is identified. 3.Estimated 70% stenosis within the left proximal internal carotid artery (see series 8, image 223). 4.Less than 50% stenosis within the right internal carotid artery. 5.CT perfusion study demonstrates a small focus of prolonged Tmax greater than 6 seconds within the right inferior frontal lobe (4 mL). This may be  artifactual, although a small focus of at risk ischemic tissue cannot be excluded. No territorial core infarct is demonstrated on CT perfusion imaging. 6.Enlarged, multinodular thyroid gland. Recommend follow-up thyroid ultrasound on a nonemergent basis. The above findings were discussed with Moriah Menjivar via telephone on 3/20/2025 4:50 PM EDT. Electronically Signed: Charlie Sanders MD  3/20/2025 4:53 PM EDT  Workstation ID: [id number]    CT Head Without Contrast Stroke Protocol  Result Date: 3/20/2025  CT HEAD WO CONTRAST STROKE PROTOCOL Date of Exam: 3/20/2025 4:00 PM EDT Indication: Neuro deficit, acute, stroke suspected Neuro Deficit, acute, Stroke suspected. Comparison: Head CT scan 2/8/2025 Technique: Axial CT images were obtained of the head without contrast administration.  Reconstructed coronal images were also obtained. Automated exposure control and iterative construction methods were used. Scan Time: 1602 Results discussed with stroke team Navigator at 1607, 3/20/2025. Findings: Prior study report describes chronic central white matter changes. Minute lacunar infarct of the left thalamus. Today's study shows punctate central lacunar infarct in the left thalamus unchanged. There also appears to be some some new low-attenuation/atrophic change of the dorsal left thalamus and posterior limb of the left internal capsule although more prominent than on the prior study. There is no evidence of acute edema/infarct elsewhere, no evidence of intracranial hemorrhage, mass or mass effect, hydrocephalus, or abnormal extra-axial collection.     Impression: Impression: 1. Stable small old lacunar infarct in the central left thalamus. 2. Suspected mild interval extension of a small infarct of the dorsal left thalamus and left internal capsule, although not obviously acute. 3. No clearly acute intracranial abnormality is appreciated. Electronically Signed: Norberto Seaman MD  3/20/2025 4:19 PM EDT  Workstation ID:  [id number]      Results for orders placed during the hospital encounter of 02/28/25    Adult Transthoracic Echo Complete w/ Color, Spectral and Contrast if necessary per protocol    Interpretation Summary    Left ventricular systolic function is normal. Calculated left ventricular EF = 65% Left ventricular ejection fraction appears to be 61 - 65%.    Left ventricular wall thickness is consistent with mild concentric hypertrophy.    Left ventricular diastolic function is consistent with (grade I) impaired relaxation.    There is mild calcification of the aortic valve with no significant stenosis.    Estimated right ventricular systolic pressure from tricuspid regurgitation is normal (<35 mmHg).    There is a small (<1cm) circumferential pericardial effusion. There is no evidence of cardiac tamponade.      Assessment & Plan  Assessment & Plan       CVA (cerebral vascular accident)    Anxiety and depression    Gastroesophageal reflux disease with esophagitis    Multiple-type hyperlipidemia    Benign essential hypertension    Pulmonary emboli    Expressive aphasia  R weakness  Myoclonic jerks  Encephalopathy  -stroke work up per stroke team, further imaging pending  -EEG pending  -UA pending  -elevated free T4 noted  -procal/lactate reassuring    KURT/CKD  -IVF    Sleeplessness/insomnia    Recent PE  -cannot take PO  -IV Heparin      VTE Prophylaxis:  Mechanical VTE prophylaxis orders are signed & held. Pharmacologic VTE prophylaxis orders are present.          CODE STATUS:    Code Status and Medical Interventions: CPR (Attempt to Resuscitate); Full Support   Ordered at: 03/20/25 1737     Code Status (Patient has no pulse and is not breathing):    CPR (Attempt to Resuscitate)     Medical Interventions (Patient has pulse or is breathing):    Full Support       Expected Discharge   Expected discharge date/ time has not been documented.     Norberto Pan MD  03/20/25      Electronically signed by Norberto Pan MD at  03/20/25 1820       Prior to Admission Medications       Prescriptions Last Dose Informant Patient Reported? Taking?    acetaminophen (TYLENOL) 325 MG tablet 3/19/2025 Child Yes Yes    Take 1 tablet by mouth Every 6 (Six) Hours As Needed for Headache or Mild Pain. Indications: Pain    apixaban (ELIQUIS) 5 MG tablet tablet 3/20/2025  No Yes    Take 2 tablets by mouth Every 12 (Twelve) Hours for 4 days, THEN 1 tablet Every 12 (Twelve) Hours for 30 days. Indications: DVT/PE (active thrombosis)    atorvastatin (LIPITOR) 80 MG tablet 3/19/2025 Child No Yes    Take 1 tablet by mouth Daily.    carvedilol (COREG) 25 MG tablet 3/20/2025 Child No Yes    Take 1 tablet by mouth 2 (Two) Times a Day With Meals.    CHOLECALCIFEROL PO 3/20/2025 Child Yes Yes    Take 5,000 Int'l Units/day by mouth Daily. Indications: Vitamin D Deficiency    ferrous sulfate 325 (65 FE) MG tablet 3/20/2025 Child Yes Yes    Take 1 tablet by mouth 3 (Three) Times a Week. Indications: Iron Deficiency, Restless Leg Syndrome    insulin lispro (humaLOG) 100 UNIT/ML injection 3/20/2025 Child Yes Yes    Inject 2 Units under the skin into the appropriate area as directed 3 (Three) Times a Day As Needed for High Blood Sugar. Daughter only gives if BS is greater than 200 two hrs after eating.    Indications: Type 2 Diabetes    NIFEdipine XL (PROCARDIA XL) 90 MG 24 hr tablet 3/20/2025 Child Yes Yes    Take 1 tablet by mouth Daily. Indications: High Blood Pressure    pantoprazole (PROTONIX) 40 MG EC tablet 3/20/2025 Child Yes Yes    Take 1 tablet by mouth Daily.    traZODone (DESYREL) 50 MG tablet Past Week Child Yes Yes    Take 1 tablet by mouth At Night As Needed. Indications: Trouble Sleeping    cloNIDine (CATAPRES-TTS) 0.3 MG/24HR patch   Yes No    Place 1 patch on the skin as directed by provider 1 (One) Time Per Week.    Easy Touch Pen Needles 31G X 8 MM misc  Child Yes No    Indications: Diabetes    glucose blood test strip   No No    1 each by Other route  As Needed (FBS). Check sugar once a day  Indications: Type 2 Diabetes    glucose monitor monitoring kit  Child No No    1 each Daily.    Insulin Pen Needle 31G X 5 MM misc  Child No No    Inject insulin three times daily    isosorbide dinitrate (ISORDIL) 20 MG tablet   Yes No    Take 25 mg by mouth Daily.    Lancets 30G misc  Child No No    Check sugar daily          Current Facility-Administered Medications   Medication Dose Route Frequency Provider Last Rate Last Admin    acetaminophen (TYLENOL) suppository 325 mg  325 mg Rectal Q4H PRN Brittaney Martin APRN   325 mg at 03/21/25 0529    acetaminophen (TYLENOL) tablet 650 mg  650 mg Oral Q8H PRN Norberto Pan MD   650 mg at 03/27/25 0858    apixaban (ELIQUIS) tablet 5 mg  5 mg Oral Q12H Danie Dunn MD   5 mg at 03/27/25 0859    aspirin chewable tablet 81 mg  81 mg Oral Daily Norberto Pan MD   81 mg at 03/27/25 0859    Or    aspirin suppository 300 mg  300 mg Rectal Daily Norberto Pan MD   300 mg at 03/20/25 1706    atorvastatin (LIPITOR) tablet 80 mg  80 mg Oral Nightly Renee Aguirre APRN   80 mg at 03/26/25 2143    sennosides-docusate (PERICOLACE) 8.6-50 MG per tablet 2 tablet  2 tablet Oral BID PRN Norberto Pan MD        And    polyethylene glycol (MIRALAX) packet 17 g  17 g Oral Daily PRN Norberto Pan MD        And    bisacodyl (DULCOLAX) EC tablet 5 mg  5 mg Oral Daily PRN Norberto Pan MD        And    bisacodyl (DULCOLAX) suppository 10 mg  10 mg Rectal Daily PRN Norberto Pan MD        Calcium Replacement - Follow Nurse / BPA Driven Protocol   Not Applicable PRN Norberto Pan MD        carvedilol (COREG) tablet 25 mg  25 mg Oral BID With Meals Norberto Pan MD   25 mg at 03/27/25 0858    dextrose (D50W) (25 g/50 mL) IV injection 25 g  25 g Intravenous Q15 Min PRN Norberto Pan MD   25 g at 03/22/25 2225    dextrose (D50W) (25 g/50 mL) IV injection 25 g  25 g Intravenous Q15 Min PRN Danie Dunn,  MD        dextrose (GLUTOSE) oral gel 15 g  15 g Oral Q15 Min PRN Danie Dunn MD        diphenhydrAMINE (BENADRYL) capsule 25 mg  25 mg Oral Nightly PRN Danie Dunn MD        fluconazole (DIFLUCAN) tablet 100 mg  100 mg Oral Q24H Florencia Clark, APRN   100 mg at 03/26/25 1746    glucagon (GLUCAGEN) injection 1 mg  1 mg Intramuscular Q15 Min PRN Danie Dunn MD        hydrALAZINE (APRESOLINE) injection 10 mg  10 mg Intravenous Q6H PRN Danie Dunn MD   10 mg at 03/26/25 0820    Insulin Lispro (humaLOG) injection 2-7 Units  2-7 Units Subcutaneous TID With Meals Danie Dunn MD   3 Units at 03/27/25 0900    isosorbide mononitrate (IMDUR) 24 hr tablet 30 mg  30 mg Oral Q24H Florencia Clark APRN   30 mg at 03/27/25 0859    Magnesium Standard Dose Replacement - Follow Nurse / BPA Driven Protocol   Not Applicable PRN Norberto Pan MD        niCARdipine (CARDENE) 25mg in 250mL NS infusion  5-15 mg/hr Intravenous Titrated Florencia Clark APRN   Stopped at 03/26/25 1620    NIFEdipine XL (PROCARDIA XL) 24 hr tablet 60 mg  60 mg Oral Q24H Florencia Clark, APRN   60 mg at 03/27/25 0859    nitroglycerin (NITROSTAT) SL tablet 0.4 mg  0.4 mg Sublingual Q5 Min PRN Norberto Pan MD        pantoprazole (PROTONIX) EC tablet 40 mg  40 mg Oral Q AM Danie Dunn MD   40 mg at 03/27/25 0503    Phosphorus Replacement - Follow Nurse / BPA Driven Protocol   Not Applicable PRN Norberto Pan MD        Potassium Replacement - Follow Nurse / BPA Driven Protocol   Not Applicable PRN Norberto Pan MD        rivastigmine (EXELON) 4.6 MG/24HR patch 1 patch  1 patch Transdermal Daily Caty Shrestha, APRN   1 patch at 03/26/25 1748    sodium chloride 0.9 % flush 10 mL  10 mL Intravenous Q12H Norberto Pan MD   10 mL at 03/27/25 0900    sodium chloride 0.9 % flush 10 mL  10 mL Intravenous PRN Norberto Pan MD        sodium chloride 0.9 % infusion 40 mL  40 mL  Intravenous PRN Norberto Pan MD            Physician Progress Notes (last 48 hours)        Florencia Clark, APRN at 25 0914              [facility] Medicine Services  PROGRESS NOTE    Patient Name: Valarie Camara  : 1945  MRN: [id number]    Date of Admission: 3/20/2025  Primary Care Physician: Lay Cox MD    Subjective   Subjective     CC:  F/u right-sided weakness    HPI:  Patient resting in bed.  Able to follow commands.  Is unable to tell me where she is.  Spoke with daughter, Cleo, by phone.  She says her mother gets anxious when she has to answer questions but thinks her mother has improved.  Reviewed patient's home meds and will start home nifedipine and Imdur.  Patient appears to have candidiasis on her tongue, so we will try Diflucan x 7 days. Daughter reports patient has h/o yeast infections, particularly with abx.    Objective   Objective     Vital Signs:   Temp:  [97.2 °F (36.2 °C)-98.5 °F (36.9 °C)] 98.2 °F (36.8 °C)  Heart Rate:  [77-95] 80  Resp:  [16-20] 18  BP: (147-194)/(71-94) 194/89     Physical Exam:  Constitutional: No acute distress, awake, alert  HENT: NCAT, mucous membranes with white patches  Respiratory: Clear to auscultation bilaterally, respiratory effort normal, room air   Cardiovascular: RRR, no murmurs, rubs, or gallops  Gastrointestinal: Positive bowel sounds, soft, nontender, nondistended  Musculoskeletal: No bilateral ankle edema  Psychiatric: Appropriate affect, cooperative  Neurologic: Oriented to self, follows commands, moves all extremities, speech clear  Skin: No rashes      Results Reviewed:  LAB RESULTS:      Lab 25  0550 25  2354 25  1751 25  0914 25  1955 25  0613 25  0220 25  1452 25  0545 25  1150 25  0553 25  1609 25  1608   WBC 7.13  --   --  6.81  --   --  6.88  --  6.49  --  7.46  --  6.44   HEMOGLOBIN 12.2  --   --   13.2  --   --  13.3  --  13.2  --  12.3  --  11.7*   HEMOGLOBIN, POC  --   --   --   --   --   --   --   --   --   --   --    < >  --    HEMATOCRIT 37.8  --   --  42.5  --   --  41.0  --  41.3  --  37.4  --  35.7   HEMATOCRIT POC  --   --   --   --   --   --   --   --   --   --   --    < >  --    PLATELETS 230  --   --  227  --   --  255  --  255  --  250  --  264   NEUTROS ABS  --   --   --  5.04  --   --   --   --   --   --  4.90  --  3.61   IMMATURE GRANS (ABS)  --   --   --  0.02  --   --   --   --   --   --  0.02  --  0.03   LYMPHS ABS  --   --   --  1.27  --   --   --   --   --   --  1.92  --  2.01   MONOS ABS  --   --   --  0.41  --   --   --   --   --   --  0.55  --  0.72   EOS ABS  --   --   --  0.04  --   --   --   --   --   --  0.04  --  0.04   MCV 89.2  --   --  93.6  --   --  89.1  --  90.2  --  88.0  --  87.9   PROCALCITONIN  --   --   --   --   --   --   --   --   --   --   --   --  0.20   LACTATE  --   --   --   --   --   --   --   --   --   --   --   --  1.6   PROTIME  --   --   --   --   --   --   --   --   --   --   --   --  17.6*   APTT  --   --   --   --   --   --   --   --   --   --  28.7  --  31.0*   HEPARIN ANTI-XA 0.53 0.58 0.65 0.75* 0.81*   < > 0.92*   < >  --    < > >1.10*   < > >1.10*    < > = values in this interval not displayed.         Lab 03/25/25  0550 03/24/25  0914 03/23/25  0220 03/22/25  0545 03/21/25  1901 03/21/25  0553 03/20/25  1609 03/20/25  1608   SODIUM 146* 145 144 144  --  144  --  141   POTASSIUM 3.7 4.4 3.8 3.7 3.7 3.5  --  3.8   CHLORIDE 114* 110* 109* 108*  --  107  --  105   CO2 19.0* 17.0* 18.0* 20.0*  --  20.0*  --  21.0*   ANION GAP 13.0 18.0* 17.0* 16.0*  --  17.0*  --  15.0   BUN 28* 30* 25* 27*  --  36*  --  44*   CREATININE 1.40* 1.25* 1.47* 1.31*  --  1.84*   < > 2.50*   EGFR 38.1* 43.7* 35.9* 41.3*  --  27.5*   < > 19.0*   GLUCOSE 170* 130* 88 90  --  124*  --  150*   CALCIUM 9.0 8.9 9.0 8.9  --  9.0  --  9.2   MAGNESIUM 1.9 1.9 1.7 1.9  --   --   --  1.9    TSH  --   --   --   --   --   --   --  0.355    < > = values in this interval not displayed.         Lab 03/21/25  0553 03/20/25  1608   TOTAL PROTEIN 6.5 6.3   ALBUMIN 3.8 3.9   GLOBULIN 2.7 2.4   ALT (SGPT) 13 13   AST (SGOT) 17 16   BILIRUBIN 0.5 0.4   ALK PHOS 49 54         Lab 03/20/25  1608   PROTIME 17.6*   INR 1.43*         Lab 03/21/25  0553   CHOLESTEROL 215*   LDL CHOL 145*   HDL CHOL 43   TRIGLYCERIDES 149             Lab 03/22/25  0610   PH, ARTERIAL 7.449   PCO2, ARTERIAL 32.0*   PO2 ART 78.3*   FIO2 21   HCO3 ART 22.1   BASE EXCESS ART -1.2*   CARBOXYHEMOGLOBIN 0.9     Brief Urine Lab Results  (Last result in the past 365 days)        Color   Clarity   Blood   Leuk Est   Nitrite   Protein   CREAT   Urine HCG        03/22/25 1129 Yellow   Cloudy   Negative   Negative   Negative   100 mg/dL (2+)                   Microbiology Results Abnormal       Procedure Component Value - Date/Time    Urine Culture - Urine, Straight Cath [[id number]]  (Abnormal) Collected: 03/22/25 1129    Lab Status: Final result Specimen: Urine from Straight Cath Updated: 03/23/25 2043     Urine Culture 25,000 CFU/mL Lactobacillus species     Comment:   Based on laboratory diagnosis criteria, these organisms are common urogenital commensal lilly and have not been associated with urinary tract infections; clinical correlation is recommended.       Narrative:      Colonization of the urinary tract without infection is common. Treatment is discouraged unless the patient is symptomatic, pregnant, or undergoing an invasive urologic procedure.            FL Video Swallow With Speech Single Contrast  Result Date: 3/25/2025  FL VIDEO SWALLOW W SPEECH SINGLE-CONTRAST Date of Exam: 3/25/2025 2:16 PM EDT Indication: dysphagia.   Comparison: None available. Technique:   The speech pathologist administered food and/or liquid mixed with barium to the patient with cine/video imaging.  Imaging assistance was provided to the speech pathologist  and an image was saved. Fluoroscopic Time: 24 seconds Number of Images: 6 associated fluoroscopic loops were saved Findings: Penetration was seen during fluoroscopic guided modified barium swallowing series. Please see speech therapy report for full details and recommendations.     Impression: Impression: Fluoroscopy provided for a modified barium swallow. Penetration of barium was seen during swallowing evaluation. Please see speech therapy report for full details and recommendations. Report dictated by: Jayda Ch PA-c  I have personally reviewed this case and agree with the findings above: Electronically Signed: Baldev Perez MD  3/25/2025 5:28 PM EDT  Workstation ID: [id number]    EEG  Result Date: 3/25/2025  History: 79 y old  female Procedure: A 21 Channel digital electroencephalogram was performed in the Clinical Neurophysiology Laboratory. The 10/20 International System of electrode placement was used and both Bipolar and referential electrode montages were monitored.  EEG Description: This EEG is continuous and symmetrical. During the awake state with eye closed, there is a posterior dominate rhythm of  -9-  Hz that is symmetrical and reacts appropriately to eye opening. Anterior to posterior amplitude frequency gradient is preserved. With Drowsiness, there is waxing and waning of the posterior dominant rhythm with eventual replacement by a mixture of beta, alpha and theta activity. A prolonged Lead I EKG rhythm strip revealed heart rate at --80--/min         Interpretation: This EEG obtained in the awake and drowsy state is normal for age.  Clinical correlation: The diagnosis of epilepsy is clinical one. A normal EEG dose not excludes this Diagnosis. However there are no epileptiform features in this recording to suggest an underlining diagnosis of Epilepsy. Therefore, Clinical correlation is recommended.       Results for orders placed during the hospital encounter of 03/20/25    Adult Transthoracic  Echo Complete W/ Cont if Necessary Per Protocol (With Agitated Saline)    Interpretation Summary    Left ventricular systolic function is normal. Calculated left ventricular EF = 67% Left ventricular ejection fraction appears to be 66 - 70%.    Left ventricular wall thickness is consistent with mild concentric hypertrophy.    Left ventricular outflow tract peak flow gradient at rest is 11 mmHg. Left ventricular outflow tract peak gradient with valsalva is 29 mmHg.    Left ventricular diastolic function is consistent with (grade I) impaired relaxation.    The mitral valve peak gradient is 9 mmHg. The mitral valve mean gradient is 4 mmHg.    Calculated right ventricular systolic pressure from tricuspid regurgitation is 21 mmHg.    There is a trivial pericardial effusion.      Current medications:  Scheduled Meds:amLODIPine, 5 mg, Oral, Q24H  apixaban, 5 mg, Oral, Q12H  aspirin, 81 mg, Oral, Daily   Or  aspirin, 300 mg, Rectal, Daily  atorvastatin, 80 mg, Oral, Nightly  carvedilol, 25 mg, Oral, BID With Meals  insulin lispro, 2-7 Units, Subcutaneous, TID With Meals  pantoprazole, 40 mg, Oral, Q AM  rivastigmine, 1 patch, Transdermal, Daily  sodium chloride, 10 mL, Intravenous, Q12H      Continuous Infusions:   PRN Meds:.  acetaminophen    senna-docusate sodium **AND** polyethylene glycol **AND** bisacodyl **AND** bisacodyl    Calcium Replacement - Follow Nurse / BPA Driven Protocol    dextrose    dextrose    dextrose    diphenhydrAMINE    glucagon (human recombinant)    hydrALAZINE    Magnesium Standard Dose Replacement - Follow Nurse / BPA Driven Protocol    nitroglycerin    Phosphorus Replacement - Follow Nurse / BPA Driven Protocol    Potassium Replacement - Follow Nurse / BPA Driven Protocol    sodium chloride    sodium chloride    Assessment & Plan   Assessment & Plan     Active Hospital Problems    Diagnosis  POA    **Encephalopathy [G93.40]  Unknown    Pulmonary emboli [I26.99]  Yes    Gastroesophageal reflux  disease with esophagitis [K21.00]  Yes    Anxiety and depression [F41.9, F32.A]  Yes    Multiple-type hyperlipidemia [E78.2]  Yes    Benign essential hypertension [I10]  Yes      Resolved Hospital Problems    Diagnosis Date Resolved POA    CVA (cerebral vascular accident) [I63.9] 03/22/2025 Yes        Brief Hospital Course to date:  Valarie Camara is a 79 y.o. female  w/ hx CAD, PAD, L ICA dz (50-69% stenosis) CKD3 (baseline cr ~1.8-1.9), HTN, mild dementia, PE (on Eliquis), DM2 who presented with confusion, aphasia, and some right-sided weakness. Patient originally seen by Neuro-Stroke team. However, stroke workup negative. Neuro-stroke team did suspect stroke recrudescence in setting of infection/metabolic derangements. UA was concerning for UTI. After admission, patient had significant HTN. Cardene gtt initiated. Eliquis was held and transitioned to heparin gtt. General neurology consulted for ongoing encephalopathy. EEG negative.     This patient's problems and plans were partially entered by my partner and updated as appropriate by me 03/26/25.      Encephalopathy  Aphasia & right-sided weakness Left ICA stenosis (50-69%), & multifocal atherosclerotic dz  Episode of lethargy/somnolence evening 3/21/25 due to seroquel  Mild dementia  Hx previous left thalamic cva  -Neuro-stroke following, suspect left hemispheric stroke va stroke recrudescence  -previous echo 2/28/25: ef 61-65%, diastolic dysfunction  -TSH wnl  -EEG 3/22 negative  -initial UA benign  -CTH 3/20 negative for acute ischemic or hemorrhagic stroke, shows stable chronic lacunar infarct of the left thalamus  -CTA H/N 3/20 negative for flow-limiting stenosis or LVO, does show moderate stenosis with complex plaque of the left internal carotid artery  -repeat CT head 3/21 negative/unchanged from original CT head  -repeat Echo 3/21 shows EF 66-70%, diastolic dysfunction  -BLE duplex negative  -evening 3/21 became lethargic. Had received geodon  (tolerated this earlier in the day 3/21), but then received seroquel 50mg later that evening and became lethargic.   -MRI brain 3/22 negative. \  -Neurology discussed w/ family (daughter Cleo) and apparently patient has become lethargic in past w/ trazodone and seroquel, wasn't taking these prior to admission   -Avoid seroquel & trazodone  -3/24/25 more confused and less interactive, so general neurology consulted  -repeat EEG 3/25 negative  -trial rivastigmine patch 4.9mg daily (monitor for signs of gi distress). If pt has extreme agitation, neuro recommens IM or IV Valium (avoid antipsychotics if possible given ongoing lethargy).  -Per Neuro-Stroke team, continue ASA, statin, anticoagulation  -Follow up in [facility] 8-12 weeks  -SLP following, modified diet recs in place  -PT, OT recommending SNF rehab, recently home from [facility]     UTI  -initial UA negative  -UA 3/22 with 4+ bacteria, 6-10 WBCs, large yeast  -initiated on Rocephin  -urine culture  with lactobacillus species, DC abx     HTN  -required Cardene gtt for hypertension, dc'd 3/25 as patient able to take PO meds now   -reviewed home meds with daughter, patient on nifedipine XL 90 mg daily, Coreg 25 mg BID, Imdur 60 mg daily  -pt restarted on Cardene gtt today as SPB > 180, will trial low-dose nifedipine and half home dose Imdur  -wean Cardene gtt  -resume home BP meds in AM, able to swallow pills whole in applesauce     ?Candidiasis  -pt recently on Rocephin  -Diflucan 100 mg daily x 7 days, defer Nystatin given dysphagia  -monitor QTc intermittently, pt has tolerated Diflucan in past per gwyn    Diet-controlled DM2  -A1c 6.9 on 3/4/25  -SSI for now, titrate prn     Hx PE 2/28/25 at  [facility]  -s/p heparin gtt while NPO  -Eliquis resumed 3/25, tolerating PO     KURT on CKD 3 (baseline cr ~1.8-1.9)  -initial creatinine was 2.5  -creatinine currently at baseline  -AM BMP       Expected Discharge Location and Transportation: SNF rehab  vs [facility]  Expected Discharge   Expected Discharge Date: 3/28/2025; Expected Discharge Time:      VTE Prophylaxis:  Pharmacologic & mechanical VTE prophylaxis orders are present.         AM-PAC 6 Clicks Score (PT): 10 (25)    CODE STATUS:   Code Status and Medical Interventions: CPR (Attempt to Resuscitate); Full Support   Ordered at: 25 3270     Code Status (Patient has no pulse and is not breathing):    CPR (Attempt to Resuscitate)     Medical Interventions (Patient has pulse or is breathing):    Full Support       JOSEE Valdes  25        Electronically signed by Florencia Clark APRN at 25 1600       Caty Shrestha APRN at 25 0895           [facility]    Progress Note    Patient Name: Valarie Camara  : 1945  MRN: [id number]  Primary Care Physician:  Lay Cox MD  Date of admission: 3/20/2025    Subjective     Chief Complaint: Altered mental status    History of Present Illness   Patient was seen resting comfortably in bed.  More lethargic than yesterday's assessment.  Was able to briefly wake and state her name.  Per bedside RN she did rest overnight.  She is able to follow very base commands including squeezing the hands.  No diarrhea noted    Review of Systems   HILARY due to AMS    Objective     Physical Exam  Vitals and nursing note reviewed.   Constitutional:       General: She is not in acute distress.     Appearance: She is not ill-appearing.   Eyes:      Extraocular Movements: Extraocular movements intact.      Pupils: Pupils are equal, round, and reactive to light.      Comments: No nystagmus or deviated gaze noted   Cardiovascular:      Rate and Rhythm: Normal rate.   Pulmonary:      Effort: Pulmonary effort is normal.   Neurological:      Mental Status: She is lethargic.      Cranial Nerves: No cranial nerve deficit or facial asymmetry.      Sensory: No sensory deficit.      Motor: Weakness present.  No seizure activity.      Comments: Physical exam limited due to patient lethargy    Moves all extremities    Follows basic commands.          Vitals:   Temp:  [97.2 °F (36.2 °C)-98.5 °F (36.9 °C)] 98.2 °F (36.8 °C)  Heart Rate:  [77-95] 80  Resp:  [16-20] 18  BP: (141-194)/(68-94) 194/89    Current Medications    Current Facility-Administered Medications:     acetaminophen (TYLENOL) suppository 325 mg, 325 mg, Rectal, Q4H PRN, Brittaney Martin APRN, 325 mg at 03/21/25 0529    amLODIPine (NORVASC) tablet 5 mg, 5 mg, Oral, Q24H, Danie Dunn MD, 5 mg at 03/26/25 0820    apixaban (ELIQUIS) tablet 5 mg, 5 mg, Oral, Q12H, Danie Dunn MD, 5 mg at 03/26/25 0820    aspirin chewable tablet 81 mg, 81 mg, Oral, Daily, 81 mg at 03/26/25 0820 **OR** aspirin suppository 300 mg, 300 mg, Rectal, Daily, Norberto Pan MD, 300 mg at 03/20/25 1706    atorvastatin (LIPITOR) tablet 80 mg, 80 mg, Oral, Nightly, Renee Aguirre, APRTIFFANY, 80 mg at 03/25/25 2207    sennosides-docusate (PERICOLACE) 8.6-50 MG per tablet 2 tablet, 2 tablet, Oral, BID PRN **AND** polyethylene glycol (MIRALAX) packet 17 g, 17 g, Oral, Daily PRN **AND** bisacodyl (DULCOLAX) EC tablet 5 mg, 5 mg, Oral, Daily PRN **AND** bisacodyl (DULCOLAX) suppository 10 mg, 10 mg, Rectal, Daily PRN, Norberto Pan MD    Calcium Replacement - Follow Nurse / BPA Driven Protocol, , Not Applicable, PRN, Norberto Pan MD    carvedilol (COREG) tablet 25 mg, 25 mg, Oral, BID With Meals, Norberto Pan MD, 25 mg at 03/26/25 0820    dextrose (D50W) (25 g/50 mL) IV injection 25 g, 25 g, Intravenous, Q15 Min PRN, Norberto Pan MD, 25 g at 03/22/25 2225    dextrose (D50W) (25 g/50 mL) IV injection 25 g, 25 g, Intravenous, Q15 Min PRN, Danie Dunn MD    dextrose (GLUTOSE) oral gel 15 g, 15 g, Oral, Q15 Min PRN, Danie Dunn MD    diphenhydrAMINE (BENADRYL) capsule 25 mg, 25 mg, Oral, Nightly PRN, Danie Dunn MD    glucagon  (GLUCAGEN) injection 1 mg, 1 mg, Intramuscular, Q15 Min PRN, Danie Dunn MD    hydrALAZINE (APRESOLINE) injection 10 mg, 10 mg, Intravenous, Q6H PRN, Danei Dunn MD, 10 mg at 03/26/25 0820    Insulin Lispro (humaLOG) injection 2-7 Units, 2-7 Units, Subcutaneous, TID With Meals, Danie Dunn MD, 2 Units at 03/25/25 1746    Magnesium Standard Dose Replacement - Follow Nurse / BPA Driven Protocol, , Not Applicable, PRMaxim ALLEN Marc P, MD    nitroglycerin (NITROSTAT) SL tablet 0.4 mg, 0.4 mg, Sublingual, Q5 Min PRN, Norberto Pan MD    pantoprazole (PROTONIX) EC tablet 40 mg, 40 mg, Oral, Q AM, Danie Dunn MD, 40 mg at 03/26/25 0611    Phosphorus Replacement - Follow Nurse / BPA Driven Protocol, , Not Applicable, Maxim GARCIA Marc P, MD    Potassium Replacement - Follow Nurse / BPA Driven Protocol, , Not Applicable, Maxim GARCIA Marc P, MD    rivastigmine (EXELON) 4.6 MG/24HR patch 1 patch, 1 patch, Transdermal, Daily, Caty Shrestha, APRN, 1 patch at 03/25/25 1600    sodium chloride 0.9 % flush 10 mL, 10 mL, Intravenous, Q12H, Norberto Pan MD, 10 mL at 03/26/25 0821    sodium chloride 0.9 % flush 10 mL, 10 mL, Intravenous, PRN, Norberto Pan MD    sodium chloride 0.9 % infusion 40 mL, 40 mL, Intravenous, PRN, Norberto Pan MD    Laboratory Results:   Lab Results   Component Value Date    GLUCOSE 170 (H) 03/25/2025    CALCIUM 9.0 03/25/2025     (H) 03/25/2025    K 3.7 03/25/2025    CO2 19.0 (L) 03/25/2025     (H) 03/25/2025    BUN 28 (H) 03/25/2025    CREATININE 1.40 (H) 03/25/2025    EGFRIFAFRI 42 (L) 05/23/2021    EGFRIFNONA 33 (L) 12/18/2020    BCR 20.0 03/25/2025    ANIONGAP 13.0 03/25/2025     Lab Results   Component Value Date    WBC 7.13 03/25/2025    HGB 12.2 03/25/2025    HCT 37.8 03/25/2025    MCV 89.2 03/25/2025     03/25/2025     Lab Results   Component Value Date    CHOL 215 (H) 03/21/2025    CHOL 195 05/20/2021    CHOL 147 01/11/2021  "    Lab Results   Component Value Date    HDL 43 03/21/2025    HDL 54 05/20/2021    HDL 44 01/11/2021     Lab Results   Component Value Date     (H) 03/21/2025     (H) 05/20/2021    LDL 86 01/11/2021     Lab Results   Component Value Date    TRIG 149 03/21/2025    TRIG 124 05/20/2021    TRIG 90 01/11/2021     Lab Results   Component Value Date    HGBA1C 6.90 (H) 03/04/2025     Lab Results   Component Value Date    INR 1.43 (H) 03/20/2025    INR 0.91 02/28/2025    INR 0.89 (L) 03/03/2018    PROTIME 17.6 (H) 03/20/2025    PROTIME 12.7 02/28/2025    PROTIME 9.3 (L) 03/03/2018     No results found for: \"FOLATE\"  Lab Results   Component Value Date    NTFVKSBM53 252 10/07/2019       MRI Brain Without Contrast  Result Date: 3/22/2025  MRI BRAIN WO CONTRAST Date of Exam: 3/22/2025 2:03 AM EDT Indication: Stroke, follow up Aphasia, right-sided weakness, numbness, facial droop.  Comparison: Head CT 3/21/2025 Technique:  Routine multiplanar/multisequence sequence images of the brain were obtained without contrast administration. Findings: No areas of diffusion restriction to suggest a recent infarct. Negative for acute intracranial hemorrhage, large mass lesion, midline shift or hydrocephalus. Mild global parenchymal volume loss for age. Mild periventricular T2/FLAIR hyperintense signal suggesting chronic microvascular ischemic change. Mostly empty sella. Negative for cerebellar tonsillar ectopia. Normal volume corpus callosum. Major intracranial flow voids preserved. Orbits symmetric. Trace bilateral mastoid fluid. Unremarkable appearance of the calvarium.     Impression: Impression: No acute MRI findings, specifically no findings to suggest a recent infarct. Electronically Signed: Shade Seay MD  3/22/2025 8:17 AM EDT  Workstation ID: [id number]       Assessment / Plan   Brief Patient Summary:  Valarie Camara is a 79 y.o. female who has past medical history significant for HTN, HLD, CAD, diabetes, CKD " stage III, L ICA stenosis, mild dementia, anxiety depression, recent PE on Eliquis, recurrent UTIs who presented to [facility] ED with complaint of altered mental status and aphasia.  Patient was originally seen by stroke neurology with a negative acute workup.  Suspected stroke prepubescent's in the setting of infection was likely causing patient's altered mentation.  MRI brain showed no acute intracranial abnormality.  Chronic left thalamic infarct was noted.  It was recommended to continue ASA 81 mg daily for stroke prevention.     General neurology was asked to evaluate patient with ongoing metabolic encephalopathy.  Per report patient has also been intermittently refusing medications.  Per patient's daughter in the past she has not tolerated Seroquel or trazodone.  On 3/21 patient came lethargic but had received Geodon earlier in the day.  EEG negative.     Suspect patient's altered mentation was related to previous medications along with underlying dementia.  Much improved on today's assessment.     Plan:   Altered mental status  Underlying dementia  Metabolic encephalopathy  Chronic infarct  Continue rivastigmine patch.  Once patient is able to take p.o. medications more regularly can consider transition to p.o.  Patient to continue work with PT/OT  Will hold off on repeat images getting recent evaluations.  Continue delirium/fall precautions.  Continue ASA and statin per stroke neurology recommendations.  Patient has poor reaction to Seroquel and trazodone.  Refrain from giving.  If patient has extreme agitation would recommend IM/IV Valium.  Would avoid Geodon given patient's recent ongoing lethargy.  General neurology will continue to follow.     I have discussed the above with the patient, bedside RN and JOSEE Hernández      Time spent with patient: 35 minutes in face-to-face evaluation and management of the patient.  Copied text in this note has been reviewed and is accurate as of 03/26/25.       April K  JOSEE Shrestha              Electronically signed by Caty Shrestha APRN at 25 1125       Fate, Danie ASHRAF MD at 25 1553              [facility] Medicine Services  PROGRESS NOTE    Patient Name: Valarie Camara  : 1945  MRN: [id number]    Date of Admission: 3/20/2025  Primary Care Physician: Lay Cox MD    Subjective   Subjective     CC:  Right sided weakness    HPI:  Alert today, eating, no dyspnea, no n/v/d.       Objective   Objective     Vital Signs:   Temp:  [97 °F (36.1 °C)-98.6 °F (37 °C)] 98.5 °F (36.9 °C)  Heart Rate:  [] 85  Resp:  [18] 18  BP: (135-163)/(60-90) 163/74     Physical Exam:  Constitutional: alert, elderly, frail but nontoxic, alert, pleasant, confused but interactive, normal work of breathing  Psych:Normal/appropriate affect  HEENT:NCAT, oropharynx clear  Neck: neck supple, full range of motion  Neuro: confused to details but answers simple questions appropriately, nonfocal, cooperative, follows commands & moves all extremities  Cardiac: rrr  Resp:ctab  GI: abd soft, nontender  Skin: No extremity rash  Musculoskeletal/extremities: no cyanosis of extremities; no significant ankle edema        Results Reviewed:  LAB RESULTS:      Lab 25  0550 25  2354 25  1751 25  0914 25  1955 25  0613 25  0220 25  1452 25  0545 25  1150 25  0553 25  1609 25  1608   WBC 7.13  --   --  6.81  --   --  6.88  --  6.49  --  7.46  --  6.44   HEMOGLOBIN 12.2  --   --  13.2  --   --  13.3  --  13.2  --  12.3  --  11.7*   HEMOGLOBIN, POC  --   --   --   --   --   --   --   --   --   --   --    < >  --    HEMATOCRIT 37.8  --   --  42.5  --   --  41.0  --  41.3  --  37.4  --  35.7   HEMATOCRIT POC  --   --   --   --   --   --   --   --   --   --   --    < >  --    PLATELETS 230  --   --  227  --   --  255  --  255  --  250  --  264   NEUTROS ABS  --   --   --  5.04   --   --   --   --   --   --  4.90  --  3.61   IMMATURE GRANS (ABS)  --   --   --  0.02  --   --   --   --   --   --  0.02  --  0.03   LYMPHS ABS  --   --   --  1.27  --   --   --   --   --   --  1.92  --  2.01   MONOS ABS  --   --   --  0.41  --   --   --   --   --   --  0.55  --  0.72   EOS ABS  --   --   --  0.04  --   --   --   --   --   --  0.04  --  0.04   MCV 89.2  --   --  93.6  --   --  89.1  --  90.2  --  88.0  --  87.9   PROCALCITONIN  --   --   --   --   --   --   --   --   --   --   --   --  0.20   LACTATE  --   --   --   --   --   --   --   --   --   --   --   --  1.6   PROTIME  --   --   --   --   --   --   --   --   --   --   --   --  17.6*   APTT  --   --   --   --   --   --   --   --   --   --  28.7  --  31.0*   HEPARIN ANTI-XA 0.53 0.58 0.65 0.75* 0.81*   < > 0.92*   < >  --    < > >1.10*   < > >1.10*    < > = values in this interval not displayed.         Lab 03/25/25  0550 03/24/25  0914 03/23/25  0220 03/22/25  0545 03/21/25  1901 03/21/25  0553 03/20/25  1609 03/20/25  1608   SODIUM 146* 145 144 144  --  144  --  141   POTASSIUM 3.7 4.4 3.8 3.7 3.7 3.5  --  3.8   CHLORIDE 114* 110* 109* 108*  --  107  --  105   CO2 19.0* 17.0* 18.0* 20.0*  --  20.0*  --  21.0*   ANION GAP 13.0 18.0* 17.0* 16.0*  --  17.0*  --  15.0   BUN 28* 30* 25* 27*  --  36*  --  44*   CREATININE 1.40* 1.25* 1.47* 1.31*  --  1.84*   < > 2.50*   EGFR 38.1* 43.7* 35.9* 41.3*  --  27.5*   < > 19.0*   GLUCOSE 170* 130* 88 90  --  124*  --  150*   CALCIUM 9.0 8.9 9.0 8.9  --  9.0  --  9.2   MAGNESIUM 1.9 1.9 1.7 1.9  --   --   --  1.9   TSH  --   --   --   --   --   --   --  0.355    < > = values in this interval not displayed.         Lab 03/21/25  0553 03/20/25  1608   TOTAL PROTEIN 6.5 6.3   ALBUMIN 3.8 3.9   GLOBULIN 2.7 2.4   ALT (SGPT) 13 13   AST (SGOT) 17 16   BILIRUBIN 0.5 0.4   ALK PHOS 49 54         Lab 03/20/25  1608   PROTIME 17.6*   INR 1.43*         Lab 03/21/25  0553   CHOLESTEROL 215*   LDL CHOL 145*   HDL CHOL  43   TRIGLYCERIDES 149             Lab 03/22/25  0610   PH, ARTERIAL 7.449   PCO2, ARTERIAL 32.0*   PO2 ART 78.3*   FIO2 21   HCO3 ART 22.1   BASE EXCESS ART -1.2*   CARBOXYHEMOGLOBIN 0.9     Brief Urine Lab Results  (Last result in the past 365 days)        Color   Clarity   Blood   Leuk Est   Nitrite   Protein   CREAT   Urine HCG        03/22/25 1129 Yellow   Cloudy   Negative   Negative   Negative   100 mg/dL (2+)                   Microbiology Results Abnormal       Procedure Component Value - Date/Time    Urine Culture - Urine, Straight Cath [[id number]]  (Abnormal) Collected: 03/22/25 1129    Lab Status: Final result Specimen: Urine from Straight Cath Updated: 03/23/25 2043     Urine Culture 25,000 CFU/mL Lactobacillus species     Comment:   Based on laboratory diagnosis criteria, these organisms are common urogenital commensal lilly and have not been associated with urinary tract infections; clinical correlation is recommended.       Narrative:      Colonization of the urinary tract without infection is common. Treatment is discouraged unless the patient is symptomatic, pregnant, or undergoing an invasive urologic procedure.            EEG  Result Date: 3/25/2025  History: 79 y old  female Procedure: A 21 Channel digital electroencephalogram was performed in the Clinical Neurophysiology Laboratory. The 10/20 International System of electrode placement was used and both Bipolar and referential electrode montages were monitored.  EEG Description: This EEG is continuous and symmetrical. During the awake state with eye closed, there is a posterior dominate rhythm of  -9-  Hz that is symmetrical and reacts appropriately to eye opening. Anterior to posterior amplitude frequency gradient is preserved. With Drowsiness, there is waxing and waning of the posterior dominant rhythm with eventual replacement by a mixture of beta, alpha and theta activity. A prolonged Lead I EKG rhythm strip revealed heart rate at  --80--/min         Interpretation: This EEG obtained in the awake and drowsy state is normal for age.  Clinical correlation: The diagnosis of epilepsy is clinical one. A normal EEG dose not excludes this Diagnosis. However there are no epileptiform features in this recording to suggest an underlining diagnosis of Epilepsy. Therefore, Clinical correlation is recommended.         Results for orders placed during the hospital encounter of 03/20/25    Adult Transthoracic Echo Complete W/ Cont if Necessary Per Protocol (With Agitated Saline)    Interpretation Summary    Left ventricular systolic function is normal. Calculated left ventricular EF = 67% Left ventricular ejection fraction appears to be 66 - 70%.    Left ventricular wall thickness is consistent with mild concentric hypertrophy.    Left ventricular outflow tract peak flow gradient at rest is 11 mmHg. Left ventricular outflow tract peak gradient with valsalva is 29 mmHg.    Left ventricular diastolic function is consistent with (grade I) impaired relaxation.    The mitral valve peak gradient is 9 mmHg. The mitral valve mean gradient is 4 mmHg.    Calculated right ventricular systolic pressure from tricuspid regurgitation is 21 mmHg.    There is a trivial pericardial effusion.      Current medications:  Scheduled Meds:amLODIPine, 5 mg, Oral, Q24H  apixaban, 5 mg, Oral, Q12H  aspirin, 81 mg, Oral, Daily   Or  aspirin, 300 mg, Rectal, Daily  atorvastatin, 80 mg, Oral, Nightly  carvedilol, 25 mg, Oral, BID With Meals  insulin lispro, 2-7 Units, Subcutaneous, TID With Meals  [START ON 3/26/2025] pantoprazole, 40 mg, Oral, Q AM  rivastigmine, 1 patch, Transdermal, Daily  sodium chloride, 10 mL, Intravenous, Q12H      Continuous Infusions:sodium chloride 0.45 % 1,000 mL with potassium chloride 20 mEq infusion, 50 mL/hr, Last Rate: 50 mL/hr (03/25/25 1326)      PRN Meds:.  acetaminophen    senna-docusate sodium **AND** polyethylene glycol **AND** bisacodyl **AND**  bisacodyl    Calcium Replacement - Follow Nurse / BPA Driven Protocol    dextrose    dextrose    dextrose    diphenhydrAMINE    glucagon (human recombinant)    hydrALAZINE    Magnesium Standard Dose Replacement - Follow Nurse / BPA Driven Protocol    nitroglycerin    Phosphorus Replacement - Follow Nurse / BPA Driven Protocol    Potassium Replacement - Follow Nurse / BPA Driven Protocol    sodium chloride    sodium chloride    Assessment & Plan   Assessment & Plan     Active Hospital Problems    Diagnosis  POA    **Encephalopathy [G93.40]  Unknown    Pulmonary emboli [I26.99]  Yes    Gastroesophageal reflux disease with esophagitis [K21.00]  Yes    Anxiety and depression [F41.9, F32.A]  Yes    Multiple-type hyperlipidemia [E78.2]  Yes    Benign essential hypertension [I10]  Yes      Resolved Hospital Problems    Diagnosis Date Resolved POA    CVA (cerebral vascular accident) [I63.9] 03/22/2025 Yes        Brief Hospital Course to date:  Valarie Camara is a 79 y.o. female w/ hx cad, pad & left ica dz (50-69%, ckd 3 (baseline cr ~1.8-1.9), htn, mild dementia, PE (on eliquis), dm2 who presented with confusion, aphasia and some right sided weakness. CTA H&N & CT perfusion was negative for flow limiting stenosis or LVO. After admission had significant HTN, Eliquis was held and transitioned to iv heparin and cardene drip initiated. EEG negative. Patient apparently didn't tolerate MRI, repeat CT head ordered    Encephalopathy & transient Aphasia & right sided weakness  Hx previous CVA (noted on imaging)  Left ICA stenosis (50-69%), & multifocal atherosclerotic dz  Episode of lethargy/somnolence evening 3/21/25 due to seroquel  Mild dementia  CTH wo on 3/20/2025 images were personally reviewed and showed no acute ischemic or hemorrhagic stroke.  There was stable chronic lacunar infarct of the left thalamus  -CTA of the head and neck images from 3/20/2025 were personally reviewed and showed no flow limiting stenosis or  LVO.  There is moderate stenosis with complex plaque of the left internal carotid artery  -previous echo 3/1/25: ef 61-65%, diastolic dysfunction  -tsh wnl  -EEG negative  -initial u/a benign  -initially didn't tolerate MRI brain (agitation) so was aborted  -repeat CT head 3/21 was negative/unchanged from original CT head  -echo : ef 66-70%, diastolic dysfunction  -BLE duplex: negative  -evening 3/21 became lethargic. Had received geodon (tolerated this earlier in the day 3/21), but then received seroquel 50mg later that evening and became lethargic.   -MRI brain negative. Neurology discussed w/ family (daughter Cleo) and apparently patient has become lethargic in past w/ trazodone, seroquel in past and wasn't taking these prior to admission   -seen by neuro-stroke team: stroke ruled out. Recommends asa, statin; f/u stroke clinic 8-12 weeks  -General neuro follows: improved rivastigmine patch 4.9mg daily (monitor for GI symoptoms); avoid seroquel & trazodone; if extreme agitation would try some valium (would avoid antipsychotics given previous intolerance). Overall improved today more interactive and tolerating po today  -SLP follows, continue modified diet    UTI  -initial u/a negative  -in/out cath u/a 3/21: 6-10 wbc, 4+ bacteria. Ultimately grew lactobacillus  -received 4 days empiric antibiotics      HTN  -initially required cardene drip as wasn't taking po due to encephalopathy  -now tolerating po, restarted home BP meds    -Diet controlled DM2  -A1c 6.9 on 3/4/25  -ssi for now, titrate prn    Hx PE 2/28/25 &  [place]      KURT on ckd 3 (baseline cr ~1.8-1.9), resolved  -initial creatinine was 2.5  -creatinine currently at baseline        Expected Discharge Location and Transportation: PT/OT recommends SNF at discharge; final dispo pending. Possibly medically ready in couple of days if keeps improving  Expected Discharge   Expected Discharge Date: 3/27/2025; Expected Discharge Time:      VTE  Prophylaxis:  Pharmacologic & mechanical VTE prophylaxis orders are present.         AM-PAC 6 Clicks Score (PT): 13 (25 0800)    CODE STATUS:   Code Status and Medical Interventions: CPR (Attempt to Resuscitate); Full Support   Ordered at: 25 1737     Code Status (Patient has no pulse and is not breathing):    CPR (Attempt to Resuscitate)     Medical Interventions (Patient has pulse or is breathing):    Full Support       Danie Dunn MD  25        Electronically signed by Danie Dunn MD at 25 1601          Physical Therapy Notes (most recent note)        Gracy Millan, PT at 25 1551  Version 1 of 1         Patient Name: Valarie Camara  : 1945    MRN: [id number]                              Today's Date: 3/26/2025       Admit Date: 3/20/2025    Visit Dx:     ICD-10-CM ICD-9-CM   1. Acute ischemic stroke  I63.9 434.91   2. History of stroke  Z86.73 V12.54   3. Renal insufficiency  N28.9 593.9   4. Aphasia  R47.01 784.3   5. Dysarthria  R47.1 784.51   6. Oropharyngeal dysphagia  R13.12 787.22   7. 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  I69.30 V12.54     Patient Active Problem List   Diagnosis    Atopic rhinitis    Arthritis    Chronic neck pain    Anxiety and depression    Edema    Gastroesophageal reflux disease with esophagitis    Multiple-type hyperlipidemia    Vitamin D deficiency    Benign essential hypertension    Obesity (BMI 30-39.9)    History of COPD    History of cataract    History of osteoporosis    History of restless legs syndrome    History of rheumatoid arthritis    Elevated serum creatinine    Esophageal dysphagia    Constipation    Schatzki's ring    Gastritis without bleeding    History of Helicobacter pylori infection    Duodenitis    Right hemiparesis    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    Multiple thyroid nodules    Lacunar stroke    DM (diabetes mellitus),  "type 2, uncontrolled w/neurologic complication    Syncope and collapse    Positive fecal occult blood test    Left foot pain    Hypomagnesemia    Acute on chronic anemia    Esophageal dysphagia    Reflux esophagitis    Syncope    Irritable bowel syndrome with constipation    Hypertensive urgency    Pulmonary emboli    Encephalopathy     Past Medical History:   Diagnosis Date    Acute bronchitis with bronchospasm     Anxiety     Arthritis     Asthma     B12 deficiency     Back pain     Body piercing     ears    Cataract     Cerebrovascular disease     Cervicalgia     Chronic kidney disease     stage 3b    Colonic polyp     History of colonic polyps     Constipation     COPD (chronic obstructive pulmonary disease)     Coronary artery disease     Depression     Diverticulitis     Dysphagia     Patient reported \"it won't go all the way down\" when eating solid foods first thing in the mornings    Edema     Elevated cholesterol     Esophageal reflux     Folic acid deficiency     Full dentures     Advised no adhesives DOS    Heart murmur     Herpes zoster     High cholesterol     History of kidney infection     History of recurrent urinary tract infection     HTN (hypertension)     Hypercholesterolemia     Impaired functional mobility, balance, gait, and endurance     Insomnia     Kidney infection     Malignant hypertension 04/26/2015     Accelerated essential hypertension    Measles     rubeola    Muscle spasm     Neoplasm of uncertain behavior of skin     dtr denies    Osteoporosis     Poor historian     Recurrent urinary tract infection     Rheumatoid arthritis     RLS (restless legs syndrome)     Stomach ulcer     Stroke     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    Type 2 diabetes mellitus     Vitamin D deficiency      Past Surgical History:   Procedure Laterality Date    CATARACT EXTRACTION WITH INTRAOCULAR LENS IMPLANT Left 02/11/2013    CATARACT EXTRACTION WITH INTRAOCULAR LENS " IMPLANT Right 04/15/2013    COLONOSCOPY  2012    COLONOSCOPY N/A 11/26/2019    Procedure: COLONOSCOPY;  Surgeon: Chidi Downing MD;  Location:  [facility];  Service: Gastroenterology    ENDOSCOPY N/A 11/13/2017    Procedure: ESOPHAGOGASTRODUODENOSCOPY with biopsies and esophageal balloon dilitation;  Surgeon: Wesley Stovall MD;  Location:  [facility];  Service:     ENDOSCOPY N/A 11/25/2019    Procedure: ESOPHAGOGASTRODUODENOSCOPY;  Surgeon: Chidi Downing MD;  Location:  [facility];  Service: Gastroenterology    ENDOSCOPY N/A 11/29/2021    Procedure: ESOPHAGOGASTRODUODENOSCOPY with dilation and biopsies;  Surgeon: Gonzalez Zapata MD;  Location: [facility];  Service: Gastroenterology;  Laterality: N/A;    ENDOSCOPY N/A 11/1/2023    Procedure: ESOPHAGOGASTRODUODENOSCOPY WITH BIOPSY AND DILATATION;  Surgeon: Gonzalez Zapata MD;  Location: [facility];  Service: Gastroenterology;  Laterality: N/A;    ENDOSCOPY N/A 1/27/2025    Procedure: Esophagogastroduodenoscopy with dilatation and biopsies;  Surgeon: Gonzalez Zapata MD;  Location: [facility] ENDOSCOPY;  Service: Gastroenterology;  Laterality: N/A;    HYSTERECTOMY  1979    UPPER GASTROINTESTINAL ENDOSCOPY  12/09/2013    UPPER GASTROINTESTINAL ENDOSCOPY  11/13/2017      General Information       Row Name 03/26/25 1626          Physical Therapy Time and Intention    Document Type therapy note (daily note)  -CD     Mode of Treatment physical therapy  -CD       Row Name 03/26/25 1620          General Information    Patient Profile Reviewed yes  -CD     Existing Precautions/Restrictions fall;seizures  PT CURRENTLY ON SEIZURE PRECAUTIONS WITH PADS IN PLACE. PER DTR, WAS EASILY STARTLED AND DEMONSTRATED JERKING MOVEMENTS PTA.  -CD     Barriers to Rehab medically complex;previous functional deficit;cognitive status  -CD       Row Name 03/26/25 1620          Cognition    Orientation Status (Cognition) oriented to;person  DOES NOT  INITIATE CONVERSATION BUT CAN RESPOND VERBALLY AND FOLLOW COMMANDS WITH INCREASED TIME TO PROCESS.  -CD       Row Name 03/26/25 1620          Safety Issues/Impairments Affecting Functional Mobility    Impairments Affecting Function (Mobility) balance;cognition;endurance/activity tolerance;motor control;strength;postural/trunk control  -CD     Cognitive Impairments, Mobility Safety/Performance awareness, need for assistance;insight into deficits/self-awareness;safety precaution follow-through;sequencing abilities;safety precaution awareness;problem-solving/reasoning  -CD     Comment, Safety Issues/Impairments (Mobility) NEEDS INCREASED TIME TO PROCESS AND SLOW SIMPLE COMMANDS. STARTLES EASILY, INTERMITTENT JERKING MOVEMENTS.  -CD               User Key  (r) = Recorded By, (t) = Taken By, (c) = Cosigned By      Initials Name Provider Type    CD Gracy Millan, PT Physical Therapist                   Mobility       Row Name 03/26/25 1626          Bed Mobility    Bed Mobility supine-sit  -CD     Supine-Sit [place] (Bed Mobility) maximum assist (25% patient effort);2 person assist;verbal cues  -     Assistive Device (Bed Mobility) bed rails;head of bed elevated;repositioning sheet  -CD     Comment, (Bed Mobility) CUES TO REACH FOR BED RAIL TO ASSIST. MANUAL ASSIST TO UPRIGHT TRUNK AND MOVE LE'S OFF EOB.  -       Row Name 03/26/25 1626          Transfers    Comment, (Transfers) CUES FOR HAND PLACEMENT. STS FROM EOB X 2 REPS AND STEP PIVOT TRANSFER BED TO RECLINER VIA B UE SUPPORT.  -       Row Name 03/26/25 1626          Bed-Chair Transfer    Bed-Chair [place] (Transfers) 2 person assist;verbal cues;moderate assist (50% patient effort)  -CD     Assistive Device (Bed-Chair Transfers) --  B UE SUPPORT.  -       Row Name 03/26/25 1626          Sit-Stand Transfer    Sit-Stand [place] (Transfers) 2 person assist;minimum assist (75% patient effort)  -CD     Assistive Device (Sit-Stand Transfers) --  B UE  SUPPORT.  -CD       Row Name 03/26/25 1626          Gait/Stairs (Locomotion)    [place] (Gait) not tested  -CD     Comment, (Gait/Stairs) DEFERRED DUE TO FATIGUE FROM PROLONGED STANDING FOR PERICARE/HYGIENE. ABLE TO TAKE SMALL SHUFFLING STEPS FROM BED TO TURN AND SIT IN RECLINER WITH MOD ASSIST OF 2.  -CD               User Key  (r) = Recorded By, (t) = Taken By, (c) = Cosigned By      Initials Name Provider Type    Gracy Nur PT Physical Therapist                   Obj/Interventions       Row Name 03/26/25 1633          Motor Skills    Therapeutic Exercise --  deferred due to fatigue.  -CD       Row Name 03/26/25 1633          Balance    Balance Assessment sitting dynamic balance;standing static balance;standing dynamic balance  -CD     Static Sitting Balance standby assist  -CD     Dynamic Sitting Balance contact guard  PT ABLE TO RECIPROCAL SCOOT BACK IN RECLINER.  -CD     Static Standing Balance minimal assist;2-person assist  -CD     Dynamic Standing Balance moderate assist;2-person assist  -CD     Position/Device Used, Standing Balance supported  B UE SUPPORT.  -CD     Balance Interventions sitting;standing;sit to stand;supported;static;dynamic  -CD     Comment, Balance IMPROVED SITTING BALANCE. REQUIRED MOD ASSIST OF 2  TO MAINTAIN BALANCE FOR STAND STEP PIVOT TRANSFER.  -CD               User Key  (r) = Recorded By, (t) = Taken By, (c) = Cosigned By      Initials Name Provider Type    Gracy Nur PT Physical Therapist                   Goals/Plan    No documentation.                  Clinical Impression       Row Name 03/26/25 1635          Pain    Pretreatment Pain Rating 0/10 - no pain  -CD     Posttreatment Pain Rating 0/10 - no pain  -CD     Pre/Posttreatment Pain Comment DENIES PAIN AND NO S&S OF PAIN.  -CD       Row Name 03/26/25 1635          Plan of Care Review    Plan of Care Reviewed With patient  -CD     Progress improving  -CD     Outcome Evaluation PT DEMONSTRATED  IMPROVED STATIC/DYNAMIC SITTING BALANCE AND WAS ABLE TO TAKE SMALL SHUFFLING STEPS FROM BED TO TURN AND SIT IN RECLINER WITH MOD ASSIST OF 2. PT DID NOT STARTLE AS EASY TODAY AND DEMONSTRATED LESS MYOCLONIC JERKING. PT ABLE TO FOLLOW SLOW SIMPLE COMMANDS WITH INCREASED TIME TO PROCESS. CONTINUE TO RECOMMEND SNF AS PT IS BELOW BASELINE WITH FUNCTIONAL MOBILITY. IF DTR OPTS TO TAKE PT HOME AT CURRENT FUNCTIONAL STATUS, WILL NEED ASSIST OF 2 TO MOBILIZE SAFELY.  -CD       Row Name 03/26/25 1901          Therapy Assessment/Plan (PT)    Patient/Family Therapy Goals Statement (PT) TO RETURN TO PLOF.  -CD     Rehab Potential (PT) good  -CD     Criteria for Skilled Interventions Met (PT) yes;meets criteria;skilled treatment is necessary  -CD     Therapy Frequency (PT) daily  -CD       Row Name 03/26/25 0597          Vital Signs    Pre Systolic BP Rehab 146  -CD     Pre Treatment Diastolic BP 70  -CD     Post Systolic BP Rehab 132  -CD     Post Treatment Diastolic BP 69  -CD     Pretreatment Heart Rate (beats/min) 88  -CD     Posttreatment Heart Rate (beats/min) 94  -CD     Pre SpO2 (%) 100  -CD     O2 Delivery Pre Treatment room air  -CD     O2 Delivery Intra Treatment room air  -CD     Post SpO2 (%) 100  -CD     O2 Delivery Post Treatment room air  -CD     Pre Patient Position Supine  -CD     Intra Patient Position Standing  -CD     Post Patient Position Sitting  -CD       Row Name 03/26/25 4163          Positioning and Restraints    Pre-Treatment Position in bed  -CD     Post Treatment Position chair  -CD     In Chair reclined;call light within reach;encouraged to call for assist;exit alarm on;legs elevated;notified nsg  NSG NOTIFIED PT UIC AND NEEDS SACRAL PROTECTION PAD REPLACED (WAS SOILED WITH BM UPON ARRIVAL)NSG NOTIFIED PURE WICK WAS REPLACED  -CD               User Key  (r) = Recorded By, (t) = Taken By, (c) = Cosigned By      Initials Name Provider Type    CD Gracy Millan, PT Physical Therapist                    Outcome Measures       Row Name 03/26/25 1643 03/26/25 0800       How much help from another person do you currently need...    Turning from your back to your side while in flat bed without using bedrails? 3  -CD 3  -EC    Moving from lying on back to sitting on the side of a flat bed without bedrails? 2  -CD 3  -EC    Moving to and from a bed to a chair (including a wheelchair)? 2  -CD 1  -EC    Standing up from a chair using your arms (e.g., wheelchair, bedside chair)? 2  -CD 1  -EC    Climbing 3-5 steps with a railing? 1  -CD 1  -EC    To walk in hospital room? 2  -CD 1  -EC    AM-PAC 6 Clicks Score (PT) 12  -CD 10  -EC    Highest Level of Mobility Goal 4 --> Transfer to chair/commode  -CD 4 --> Transfer to chair/commode  -EC      Row Name 03/26/25 0600          How much help from another person do you currently need...    Turning from your back to your side while in flat bed without using bedrails? 3  -DS       Row Name 03/26/25 1643          Modified [place] Scale    Modified [place] Scale 4 - Moderately severe disability.  Unable to walk without assistance, and unable to attend to own bodily needs without assistance.  -CD       Row Name 03/26/25 1643          Functional Assessment    Outcome Measure Options AM-PAC 6 Clicks Basic Mobility (PT)  -CD               User Key  (r) = Recorded By, (t) = Taken By, (c) = Cosigned By      Initials Name Provider Type    CD Gracy Millan, PT Physical Therapist    EC Delmi Crespo, RN Registered Nurse    Jennifer Silva RN Registered Nurse                                 Physical Therapy Education       Title: PT OT SLP Therapies (In Progress)       Topic: Physical Therapy (Done)       Point: Mobility training (Done)       Learning Progress Summary            Patient Acceptance, E, VU,NR by CD at 3/26/2025 1643    Comment: SEE FLOWSHEET    Acceptance, E, VU,NR by CD at 3/23/2025 1742    Comment: BENEFITS OF OOB ACTIVITY, SAFETY WITH MOBILITY, PROGRESSION OF POC, D/C  PLANNING,                      Point: Home exercise program (Done)       Learning Progress Summary            Patient Acceptance, E, VU,NR by CD at 3/26/2025 1643    Comment: SEE FLOWSHEET    Acceptance, E, VU,NR by CD at 3/23/2025 1742    Comment: BENEFITS OF OOB ACTIVITY, SAFETY WITH MOBILITY, PROGRESSION OF POC, D/C PLANNING,                      Point: Body mechanics (Done)       Learning Progress Summary            Patient Acceptance, E, VU,NR by CD at 3/26/2025 1643    Comment: SEE FLOWSHEET    Acceptance, E, VU,NR by CD at 3/23/2025 1742    Comment: BENEFITS OF OOB ACTIVITY, SAFETY WITH MOBILITY, PROGRESSION OF POC, D/C PLANNING,                      Point: Precautions (Done)       Learning Progress Summary            Patient Acceptance, E, VU,NR by CD at 3/26/2025 1643    Comment: SEE FLOWSHEET    Acceptance, E, VU,NR by CD at 3/23/2025 1742    Comment: BENEFITS OF OOB ACTIVITY, SAFETY WITH MOBILITY, PROGRESSION OF POC, D/C PLANNING,                                      User Key       Initials Effective Dates Name Provider Type Discipline    CD 02/03/23 -  Gracy Millan, PT Physical Therapist PT                  PT Recommendation and Plan  Planned Therapy Interventions (PT): balance training, bed mobility training, home exercise program, gait training, strengthening, transfer training, postural re-education, patient/family education, neuromuscular re-education  Progress: improving  Outcome Evaluation: PT DEMONSTRATED IMPROVED STATIC/DYNAMIC SITTING BALANCE AND WAS ABLE TO TAKE SMALL SHUFFLING STEPS FROM BED TO TURN AND SIT IN RECLINER WITH MOD ASSIST OF 2. PT DID NOT STARTLE AS EASY TODAY AND DEMONSTRATED LESS MYOCLONIC JERKING. PT ABLE TO FOLLOW SLOW SIMPLE COMMANDS WITH INCREASED TIME TO PROCESS. CONTINUE TO RECOMMEND SNF AS PT IS BELOW BASELINE WITH FUNCTIONAL MOBILITY. IF DTR OPTS TO TAKE PT HOME AT CURRENT FUNCTIONAL STATUS, WILL NEED ASSIST OF 2 TO MOBILIZE SAFELY.     Time Calculation:   PT  Evaluation Complexity  History, PT Evaluation Complexity: 3 or more personal factors and/or comorbidities  Examination of Body Systems (PT Eval Complexity): total of 4 or more elements  Clinical Presentation (PT Evaluation Complexity): evolving  Clinical Decision Making (PT Evaluation Complexity): moderate complexity  Overall Complexity (PT Evaluation Complexity): moderate complexity     PT Charges       Row Name 25 1644             Time Calculation    Start Time 1551  -CD      PT Received On 25  -CD         Time Calculation- PT    Total Timed Code Minutes- PT 25 minute(s)  -CD         Timed Charges    58354 - PT Therapeutic Activity Minutes 25  -CD         Total Minutes    Timed Charges Total Minutes 25  -CD       Total Minutes 25  -CD                User Key  (r) = Recorded By, (t) = Taken By, (c) = Cosigned By      Initials Name Provider Type    CD Gracy Millan, PT Physical Therapist                  Therapy Charges for Today       Code Description Service Date Service Provider Modifiers Qty    89327025125 HC PT THERAPEUTIC ACT EA 15 MIN 3/26/2025 Gracy Millan, PT GP 2    78098417039 HC PT THER SUPP EA 15 MIN 3/26/2025 Gracy Millan, PT GP 2            PT G-Codes  Outcome Measure Options: AM-PAC 6 Clicks Basic Mobility (PT)  AM-PAC 6 Clicks Score (PT): 12  AM-PAC 6 Clicks Score (OT): 11  Modified Liz Scale: 4 - Moderately severe disability.  Unable to walk without assistance, and unable to attend to own bodily needs without assistance.  PT Discharge Summary  Anticipated Discharge Disposition (PT): skilled nursing facility    Gracy Millan PT  3/26/2025      Electronically signed by Gracy Millan PT at 25 1649          Occupational Therapy Notes (most recent note)        Eli Boone, OT at 25 1054          Patient Name: Valarie Camara  : 1945    MRN: [id number]                              Today's Date: 3/23/2025       Admit Date: 3/20/2025    Visit Dx:      "ICD-10-CM ICD-9-CM   1. Acute ischemic stroke  I63.9 434.91   2. History of stroke  Z86.73 V12.54   3. Renal insufficiency  N28.9 593.9   4. Aphasia  R47.01 784.3   5. Dysarthria  R47.1 784.51   6. Dysphagia, unspecified type  R13.10 787.20     Patient Active Problem List   Diagnosis    Atopic rhinitis    Arthritis    Chronic neck pain    Anxiety and depression    Edema    Gastroesophageal reflux disease with esophagitis    Multiple-type hyperlipidemia    Vitamin D deficiency    Benign essential hypertension    Obesity (BMI 30-39.9)    History of COPD    History of cataract    History of osteoporosis    History of restless legs syndrome    History of rheumatoid arthritis    Elevated serum creatinine    Esophageal dysphagia    Constipation    Schatzki's ring    Gastritis without bleeding    History of Helicobacter pylori infection    Duodenitis    Right hemiparesis    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    Multiple thyroid nodules    Lacunar stroke    DM (diabetes mellitus), type 2, uncontrolled w/neurologic complication    Syncope and collapse    Positive fecal occult blood test    Left foot pain    Hypomagnesemia    Acute on chronic anemia    Esophageal dysphagia    Reflux esophagitis    Syncope    Irritable bowel syndrome with constipation    Hypertensive urgency    Pulmonary emboli    Encephalopathy     Past Medical History:   Diagnosis Date    Acute bronchitis with bronchospasm     Anxiety     Arthritis     Asthma     B12 deficiency     Back pain     Body piercing     ears    Cataract     Cerebrovascular disease     Cervicalgia     Chronic kidney disease     stage 3b    Colonic polyp     History of colonic polyps     Constipation     COPD (chronic obstructive pulmonary disease)     Coronary artery disease     Depression     Diverticulitis     Dysphagia     Patient reported \"it won't go all the way down\" when eating solid foods first thing in the mornings    Edema     " Elevated cholesterol     Esophageal reflux     Folic acid deficiency     Full dentures     Advised no adhesives DOS    Heart murmur     Herpes zoster     High cholesterol     History of kidney infection     History of recurrent urinary tract infection     HTN (hypertension)     Hypercholesterolemia     Impaired functional mobility, balance, gait, and endurance     Insomnia     Kidney infection     Malignant hypertension 04/26/2015     Accelerated essential hypertension    Measles     rubeola    Muscle spasm     Neoplasm of uncertain behavior of skin     dtr denies    Osteoporosis     Poor historian     Recurrent urinary tract infection     Rheumatoid arthritis     RLS (restless legs syndrome)     Stomach ulcer     Stroke     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    Type 2 diabetes mellitus     Vitamin D deficiency      Past Surgical History:   Procedure Laterality Date    CATARACT EXTRACTION WITH INTRAOCULAR LENS IMPLANT Left 02/11/2013    CATARACT EXTRACTION WITH INTRAOCULAR LENS IMPLANT Right 04/15/2013    COLONOSCOPY  2012    COLONOSCOPY N/A 11/26/2019    Procedure: COLONOSCOPY;  Surgeon: Chidi Downing MD;  Location:  [facility];  Service: Gastroenterology    ENDOSCOPY N/A 11/13/2017    Procedure: ESOPHAGOGASTRODUODENOSCOPY with biopsies and esophageal balloon dilitation;  Surgeon: Wesley Stovall MD;  Location: [facility];  Service:     ENDOSCOPY N/A 11/25/2019    Procedure: ESOPHAGOGASTRODUODENOSCOPY;  Surgeon: Chidi Downing MD;  Location:  [facility];  Service: Gastroenterology    ENDOSCOPY N/A 11/29/2021    Procedure: ESOPHAGOGASTRODUODENOSCOPY with dilation and biopsies;  Surgeon: Gonzalez Zapata MD;  Location: [facility] END[facility]COPY;  Service: Gastroenterology;  Laterality: N/A;    ENDOSCOPY N/A 11/1/2023    Procedure: ESOPHAGOGASTRODUODENOSCOPY WITH BIOPSY AND DILATATION;  Surgeon: Gonzalez Zapata MD;  Location: [facility] ENDOSCOPY;  Service:  "Gastroenterology;  Laterality: N/A;    ENDOSCOPY N/A 1/27/2025    Procedure: Esophagogastroduodenoscopy with dilatation and biopsies;  Surgeon: Gonzalez Zapata MD;  Location: [facility] ENDOSCOPY;  Service: Gastroenterology;  Laterality: N/A;    HYSTERECTOMY  1979    UPPER GASTROINTESTINAL ENDOSCOPY  12/09/2013    UPPER GASTROINTESTINAL ENDOSCOPY  11/13/2017      General Information       Row Name 03/23/25 1143          OT Time and Intention    Document Type evaluation  -JR     Mode of Treatment occupational therapy  -JR       Row Name 03/23/25 1143          General Information    Patient Profile Reviewed yes  -JR     Prior Level of Function independent:;gait;transfer;bed mobility;ADL's  Pt's daughter reports pt ambulates with cane, daughter assists pt with shower transfer, and washing pt's back. Dtr completes home management tasks & driving  -JR     Existing Precautions/Restrictions fall;seizures;other (see comments)  -JR     Barriers to Rehab medically complex;previous functional deficit;cognitive status  -       Row Name 03/23/25 1143          Living Environment    Current Living Arrangements home  -JR     People in Home child(sindy), adult  -       Row Name 03/23/25 1143          Home Main Entrance    Number of Stairs, Main Entrance three  -       Row Name 03/23/25 1143          Stairs Within Home, Primary    Number of Stairs, Within Home, Primary none  -JR       Row Name 03/23/25 1143          Cognition    Orientation Status (Cognition) oriented to;person;place;time;verbal cues/prompts needed for orientation  Pt able to state name, \"hospital,\" but not name of hospital and month and year with delay  -JR       Row Name 03/23/25 1143          Safety Issues/Impairments Affecting Functional Mobility    Safety Issues Affecting Function (Mobility) awareness of need for assistance;insight into deficits/self-awareness;safety precaution awareness;safety precautions follow-through/compliance;sequencing abilities  " -     Impairments Affecting Function (Mobility) balance;cognition;endurance/activity tolerance;motor control;strength;postural/trunk control  -     Cognitive Impairments, Mobility Safety/Performance awareness, need for assistance;insight into deficits/self-awareness;judgment;problem-solving/reasoning;safety precaution awareness;safety precaution follow-through;sequencing abilities  -               User Key  (r) = Recorded By, (t) = Taken By, (c) = Cosigned By      Initials Name Provider Type    JR Mely, Eli ASHRAF OT Occupational Therapist                     Mobility/ADL's       Row Name 03/23/25 1151          Bed Mobility    Bed Mobility scooting/bridging;supine-sit;sit-supine  -JR     Scooting/Bridging [place] (Bed Mobility) dependent (less than 25% patient effort);2 person assist;verbal cues  -JR     Supine-Sit [place] (Bed Mobility) maximum assist (25% patient effort);2 person assist;verbal cues  -JR     Sit-Supine [place] (Bed Mobility) maximum assist (25% patient effort);2 person assist;verbal cues  -     Bed Mobility, Safety Issues impaired trunk control for bed mobility  -     Assistive Device (Bed Mobility) bed rails;head of bed elevated;repositioning sheet  -     Comment, (Bed Mobility) Pt required increased time and verbal cues for supine to sit on EOB. Despite cues and increased time, pt required assist to bring trunk into sitting and LE's off the EOB. Pt able to scoot forward to EOB.  -       Row Name 03/23/25 1151          Transfers    Comment, (Transfers) Deferred this date due to decreased sitting balance.  -       Row Name 03/23/25 1151          Activities of Daily Living    BADL Assessment/Intervention upper body dressing;lower body dressing  -       Row Name 03/23/25 1151          Upper Body Dressing Assessment/Training    [place] Level (Upper Body Dressing) don;doff;dependent (less than 25% patient effort)  -     Position (Upper Body Dressing) supine   -JR     Comment, (Upper Body Dressing) hospital gown  -       Row Name 03/23/25 1151          Lower Body Dressing Assessment/Training    [place] Level (Lower Body Dressing) don;socks;dependent (less than 25% patient effort)  -JR     Position (Lower Body Dressing) supine  -JR               User Key  (r) = Recorded By, (t) = Taken By, (c) = Cosigned By      Initials Name Provider Type    JR Eli Boone, OT Occupational Therapist                   Obj/Interventions       Row Name 03/23/25 1154          Sensory Assessment (Somatosensory)    Sensory Assessment (Somatosensory) UE sensation intact  -       Row Name 03/23/25 1154          Vision Assessment/Intervention    Vision Assessment Comment Pt able to move eyes in all planes, and able to ID # of fingers in both visual fields. Pt able to recognize daughter  -       Row Name 03/23/25 1154          Range of Motion Comprehensive    General Range of Motion bilateral upper extremity ROM WFL  -       Row Name 03/23/25 1154          Strength Comprehensive (MMT)    Comment, General Manual Muscle Testing (MMT) Assessment B UE MMT equal, approx 4/5  -       Row Name 03/23/25 1154          Motor Skills    Motor Skills muscle tone;other (see comments)  Pt with whole body myoclonic jerking and startles easily when touched  -JR     Muscle Tone WNL  -       Row Name 03/23/25 1154          Balance    Balance Assessment sitting static balance  -JR     Static Sitting Balance minimal assist  -               User Key  (r) = Recorded By, (t) = Taken By, (c) = Cosigned By      Initials Name Provider Type    JR Eli Boone, OT Occupational Therapist                   Goals/Plan       Row Name 03/23/25 1203          Bed Mobility Goal 1 (OT)    Activity/Assistive Device (Bed Mobility Goal 1, OT) bed mobility activities, all  -JR     [place] Level/Cues Needed (Bed Mobility Goal 1, OT) minimum assist (75% or more patient effort);verbal cues required  -JR      Time Frame (Bed Mobility Goal 1, OT) short term goal (STG);5 days  -JR     Progress/Outcomes (Bed Mobility Goal 1, OT) new goal  -       Row Name 03/23/25 1203          Transfer Goal 1 (OT)    Activity/Assistive Device (Transfer Goal 1, OT) transfers, all;walker, rolling  -JR     [place] Level/Cues Needed (Transfer Goal 1, OT) minimum assist (75% or more patient effort);verbal cues required  -JR     Time Frame (Transfer Goal 1, OT) long term goal (LTG);by discharge  -JR     Progress/Outcome (Transfer Goal 1, OT) new goal  -       Row Name 03/23/25 1203          Toileting Goal 1 (OT)    Activity/Device (Toileting Goal 1, OT) toileting skills, all  -JR     [place] Level/Cues Needed (Toileting Goal 1, OT) minimum assist (75% or more patient effort);verbal cues required  -JR     Time Frame (Toileting Goal 1, OT) long term goal (LTG);by discharge  -JR     Progress/Outcome (Toileting Goal 1, OT) new goal  -       Row Name 03/23/25 1203          Grooming Goal 1 (OT)    Activity/Device (Grooming Goal 1, OT) grooming skills, all  -JR     [place] (Grooming Goal 1, OT) minimum assist (75% or more patient effort);verbal cues required  -JR     Time Frame (Grooming Goal 1, OT) long term goal (LTG);by discharge  -JR     Progress/Outcome (Grooming Goal 1, OT) new [facility]  -       Row Name 03/23/25 1203          Therapy Assessment/Plan (OT)    Planned Therapy Interventions (OT) activity tolerance training;adaptive equipment training;BADL retraining;cognitive/visual perception retraining;functional balance retraining;occupation/activity based interventions;ROM/therapeutic exercise;transfer/mobility retraining;strengthening exercise;patient/caregiver education/training;neuromuscular control/coordination retraining  -JR               User Key  (r) = Recorded By, (t) = Taken By, (c) = Cosigned By      Initials Name Provider Type    Eli East, OT Occupational Therapist                   Clinical Impression        Row Name 03/23/25 1155          Pain Assessment    Pretreatment Pain Rating 0/10 - no pain  -JR     Posttreatment Pain Rating 0/10 - no pain  -JR       Row Name 03/23/25 1155          Plan of Care Review    Plan of Care Reviewed With patient;daughter  -     Outcome Evaluation OT initial eval and expanded chart review completed. Pt presents with multiple comorbidities and decreased strength, balance, activity tolerance limiting independence with ADL's and mobility from baseline status. Recommend continued skilled OT services and transfer to SNF, if deemed medically appropriate.  -       Row Name 03/23/25 1157          Therapy Assessment/Plan (OT)    Patient/Family Therapy Goal Statement (OT) dtr would like pt to return to [facility]  -     Rehab Potential (OT) good  -     Criteria for Skilled Therapeutic Interventions Met (OT) yes;meets criteria;skilled treatment is necessary  -     Therapy Frequency (OT) daily  -JR     Predicted Duration of Therapy Intervention (OT) 10 days  -       Row Name 03/23/25 1155          Therapy Plan Review/Discharge Plan (OT)    Anticipated Discharge Disposition (OT) [facility] nursing facility  -       Row Name 03/23/25 1156          Vital Signs    Pre Systolic BP Rehab 132  -JR     Pre Treatment Diastolic BP 57  -JR     Post Systolic BP Rehab 143  -JR     Post Treatment Diastolic BP 73  -JR     Pretreatment Heart Rate (beats/min) 100  -JR     Posttreatment Heart Rate (beats/min) 88  -JR     Pre SpO2 (%) 97  -JR     O2 Delivery Pre Treatment room air  -JR     Post SpO2 (%) 98  -JR     O2 Delivery Post Treatment room air  -JR     Pre Patient Position Supine  -JR     Intra Patient Position Sitting  -JR     Post Patient Position Supine  -JR       Row Name 03/23/25 1155          Positioning and Restraints    Pre-Treatment Position in bed  -JR     Post Treatment Position bed  -JR     In Bed notified nsg;supine;call light within reach;encouraged to call for assist;exit alarm on;with  family/caregiver;SCD pump applied;heels elevated  -               User Key  (r) = Recorded By, (t) = Taken By, (c) = Cosigned By      Initials Name Provider Type    Eli East, OT Occupational Therapist                   Outcome Measures       Row Name 03/23/25 1204          How much help from another is currently needed...    Putting on and taking off regular lower body clothing? 1  -JR     Bathing (including washing, rinsing, and drying) 2  -JR     Toileting (which includes using toilet bed pan or urinal) 1  -JR     Putting on and taking off regular upper body clothing 2  -JR     Taking care of personal grooming (such as brushing teeth) 2  -JR     Eating meals 3  -JR     AM-PAC 6 Clicks Score (OT) 11  -       Row Name 03/23/25 1204          Modified [place] Scale    Modified Liz Scale 4 - Moderately severe disability.  Unable to walk without assistance, and unable to attend to own bodily needs without assistance.  -       Row Name 03/23/25 1204          Functional Assessment    Outcome Measure Options AM-PAC 6 Clicks Daily Activity (OT);Modified [place]  -               User Key  (r) = Recorded By, (t) = Taken By, (c) = Cosigned By      Initials Name Provider Type    Eli East, OT Occupational Therapist                    Occupational Therapy Education       Title: PT OT SLP Therapies (In Progress)       Topic: Occupational Therapy (In Progress)       Point: ADL training (Done)       Learning Progress Summary            Patient Acceptance, E, VU,NR by  at 3/23/2025 1054    Comment: role of therapy, ongoing treatment plan, encouraged call bell use   Family Acceptance, E, VU,NR by  at 3/23/2025 1054    Comment: role of therapy, ongoing treatment plan, encouraged call bell use                      Point: Body mechanics (Done)       Learning Progress Summary            Patient Acceptance, E, VU,NR by  at 3/23/2025 1054    Comment: role of therapy, ongoing treatment plan, encouraged  call bell use   Family Acceptance, E, VU,NR by  at 3/23/2025 1054    Comment: role of therapy, ongoing treatment plan, encouraged call bell use                                      User Key       Initials Effective Dates Name Provider Type Discipline     02/03/23 -  Eli Boone, OT Occupational Therapist OT                  OT Recommendation and Plan  Planned Therapy Interventions (OT): activity tolerance training, adaptive equipment training, BADL retraining, cognitive/visual perception retraining, functional balance retraining, occupation/activity based interventions, ROM/therapeutic exercise, transfer/mobility retraining, strengthening exercise, patient/caregiver education/training, neuromuscular control/coordination retraining  Therapy Frequency (OT): daily  Plan of Care Review  Plan of Care Reviewed With: patient, daughter  Outcome Evaluation: OT initial eval and expanded chart review completed. Pt presents with multiple comorbidities and decreased strength, balance, activity tolerance limiting independence with ADL's and mobility from baseline status. Recommend continued skilled OT services and transfer to SNF, if deemed medically appropriate.     Time Calculation:   Evaluation Complexity (OT)  Review Occupational Profile/Medical/Therapy History Complexity: expanded/moderate complexity  Assessment, Occupational Performance/Identification of Deficit Complexity: 3-5 performance deficits  Clinical Decision Making Complexity (OT): detailed assessment/moderate complexity  Overall Complexity of Evaluation (OT): moderate complexity     Time Calculation- OT       Row Name 03/23/25 1205             Time Calculation- OT    OT Start Time 1054  -JR      OT Received On 03/23/25  -      OT Goal Re-Cert Due Date 04/02/25  -         Timed Charges    54255 - OT Self Care/Mgmt Minutes 12  -JR         Untimed Charges    OT Eval/Re-eval Minutes 46  -JR         Total Minutes    Timed Charges Total Minutes 12  -JR       Untimed Charges Total Minutes 46  -JR       Total Minutes 58  -JR                User Key  (r) = Recorded By, (t) = Taken By, (c) = Cosigned By      Initials Name Provider Type     Eli Boone OT Occupational Therapist                  Therapy Charges for Today       Code Description Service Date Service Provider Modifiers Qty    [id number]  OT SELF CARE/MGMT/TRAIN EA 15 MIN 3/23/2025 Eli Boone OT GO 1    [id number]  OT EVAL MOD COMPLEXITY 4 3/23/2025 Eli Boone OT GO 1                 Eli Boone OT  3/23/2025    Electronically signed by Eli Boone OT at 03/23/25 1206

## 2025-03-27 NOTE — CASE MANAGEMENT/SOCIAL WORK
Continued Stay Note  [facility]     Patient Name: Valarie Camara  MRN: [id number]  Today's Date: 3/27/2025    Admit Date: 3/20/2025    Plan: Home with HH vs SNF   Discharge Plan       Row Name 03/27/25 1048       Plan    Plan Comments MSW spoke with pt's daughter, Cleo and updated her on pt's progress and remaining recs for SNF. Cleo reports that if it is decided to do rehab again, pt did not wnt to do [facility] again and they would like a facility in [place] since they live there. Cleo was agreeable to referrals for all three facilities in [place], however, she reports her and her sister will be at [facility] this afternoon to discuss everything with pt and see what they all ultimately feel comfortable with. Pt does have home health and will have both daughters staying with her and assisting at home if does end up discharging home. MSW sent referrals to the three SNF's in [place] and will follow up with them and pt's family.                   Discharge Codes    No documentation.                 Expected Discharge Date and Time       Expected Discharge Date Expected Discharge Time    Mar 28, 2025               ALEX Michelle

## 2025-03-28 LAB
ANION GAP SERPL CALCULATED.3IONS-SCNC: 13 MMOL/L (ref 5–15)
BUN SERPL-MCNC: 38 MG/DL (ref 8–23)
BUN/CREAT SERPL: 22.6 (ref 7–25)
CALCIUM SPEC-SCNC: 8.8 MG/DL (ref 8.6–10.5)
CHLORIDE SERPL-SCNC: 110 MMOL/L (ref 98–107)
CO2 SERPL-SCNC: 19 MMOL/L (ref 22–29)
CREAT SERPL-MCNC: 1.68 MG/DL (ref 0.57–1)
EGFRCR SERPLBLD CKD-EPI 2021: 30.6 ML/MIN/1.73
GLUCOSE BLDC GLUCOMTR-MCNC: 123 MG/DL (ref 70–130)
GLUCOSE BLDC GLUCOMTR-MCNC: 138 MG/DL (ref 70–130)
GLUCOSE BLDC GLUCOMTR-MCNC: 142 MG/DL (ref 70–130)
GLUCOSE BLDC GLUCOMTR-MCNC: 197 MG/DL (ref 70–130)
GLUCOSE SERPL-MCNC: 140 MG/DL (ref 65–99)
POTASSIUM SERPL-SCNC: 4.1 MMOL/L (ref 3.5–5.2)
SODIUM SERPL-SCNC: 142 MMOL/L (ref 136–145)

## 2025-03-28 PROCEDURE — 93005 ELECTROCARDIOGRAM TRACING: CPT | Performed by: NURSE PRACTITIONER

## 2025-03-28 PROCEDURE — 80048 BASIC METABOLIC PNL TOTAL CA: CPT | Performed by: NURSE PRACTITIONER

## 2025-03-28 PROCEDURE — 99232 SBSQ HOSP IP/OBS MODERATE 35: CPT

## 2025-03-28 PROCEDURE — 82948 REAGENT STRIP/BLOOD GLUCOSE: CPT

## 2025-03-28 PROCEDURE — 63710000001 INSULIN LISPRO (HUMAN) PER 5 UNITS: Performed by: INTERNAL MEDICINE

## 2025-03-28 PROCEDURE — 99232 SBSQ HOSP IP/OBS MODERATE 35: CPT | Performed by: HOSPITALIST

## 2025-03-28 PROCEDURE — 92526 ORAL FUNCTION THERAPY: CPT

## 2025-03-28 RX ADMIN — FLUCONAZOLE 100 MG: 100 TABLET ORAL at 15:50

## 2025-03-28 RX ADMIN — APIXABAN 5 MG: 5 TABLET, FILM COATED ORAL at 20:02

## 2025-03-28 RX ADMIN — RIVASTIGMINE 1 PATCH: 4.6 PATCH, EXTENDED RELEASE TRANSDERMAL at 15:50

## 2025-03-28 RX ADMIN — PANTOPRAZOLE SODIUM 40 MG: 40 TABLET, DELAYED RELEASE ORAL at 05:01

## 2025-03-28 RX ADMIN — ATORVASTATIN CALCIUM 80 MG: 40 TABLET, FILM COATED ORAL at 20:02

## 2025-03-28 RX ADMIN — INSULIN LISPRO 2 UNITS: 100 INJECTION, SOLUTION INTRAVENOUS; SUBCUTANEOUS at 17:44

## 2025-03-28 RX ADMIN — CARVEDILOL 25 MG: 12.5 TABLET, FILM COATED ORAL at 17:44

## 2025-03-28 RX ADMIN — APIXABAN 5 MG: 5 TABLET, FILM COATED ORAL at 09:24

## 2025-03-28 RX ADMIN — NIFEDIPINE 60 MG: 60 TABLET, EXTENDED RELEASE ORAL at 09:24

## 2025-03-28 RX ADMIN — ASPIRIN 81 MG: 81 TABLET, CHEWABLE ORAL at 09:24

## 2025-03-28 RX ADMIN — CARVEDILOL 25 MG: 12.5 TABLET, FILM COATED ORAL at 09:24

## 2025-03-28 RX ADMIN — Medication 10 ML: at 20:02

## 2025-03-28 RX ADMIN — LIDOCAINE 1 PATCH: 4 PATCH TOPICAL at 09:24

## 2025-03-28 RX ADMIN — ISOSORBIDE MONONITRATE 30 MG: 30 TABLET, EXTENDED RELEASE ORAL at 09:24

## 2025-03-28 NOTE — PROGRESS NOTES
[facility] Neurology    Progress Note    Patient Name: Valarie Camara  : 1945  MRN: [id number]  Primary Care Physician:  Lay Cox MD  Date of admission: 3/20/2025    Subjective     Chief Complaint: AMS    History of Present Illness   Patient was seen resting comfortably in bed.  She was able to wake easily.  She was able to state her name.  Bedside RN endorses she rested well throughout night.  No focal deficit was noted on exam.  She was able to follow basic commands.     Review of Systems   General: Negative for fever, nausea, or vomiting.   Neurological: Negative for headache, pain, or weakness.     Objective     Physical Exam  Vitals and nursing note reviewed.   Constitutional:       General: She is not in acute distress.     Appearance: She is not ill-appearing.   Eyes:      Extraocular Movements: Extraocular movements intact.      Pupils: Pupils are equal, round, and reactive to light.      Comments: No nystagmus or deviated gaze noted   Neurological:      Mental Status: She is alert. Mental status is at baseline.      Cranial Nerves: Cranial nerves 2-12 are intact.      Sensory: Sensory deficit present.      Motor: Weakness present.      Coordination: Finger-Nose-Finger Test normal.      Comments:       Cranial Nerves   CN II: Pupils are equal, round, and reactive to light. Normal visual acuity and visual fields.    CN III IV VI: Extraocular movements are full without nystagmus.  CN V: Normal facial sensation and strength of muscles of mastication.  CN VII: Facial movements are symmetric. No weakness.  CN VIII: [place]  CN IX & X:  Symmetric palatal movement  CN XII: The tongue is midline.  No atrophy or fasciculations.    Generalized weakness.              Vitals:   Temp:  [97.7 °F (36.5 °C)-98.7 °F (37.1 °C)] 98.7 °F (37.1 °C)  Heart Rate:  [] 84  Resp:  [16] 16  BP: (103-167)/(56-68) 144/65    Current Medications    Current Facility-Administered Medications:      acetaminophen (TYLENOL) suppository 325 mg, 325 mg, Rectal, Q4H PRN, Brittaney Martin, APRN, 325 mg at 03/21/25 0529    acetaminophen (TYLENOL) tablet 650 mg, 650 mg, Oral, Q8H PRN, Norberto Pan MD, 650 mg at 03/27/25 0858    apixaban (ELIQUIS) tablet 5 mg, 5 mg, Oral, Q12H, Danie Dunn MD, 5 mg at 03/27/25 2001    aspirin chewable tablet 81 mg, 81 mg, Oral, Daily, 81 mg at 03/27/25 0859 **OR** aspirin suppository 300 mg, 300 mg, Rectal, Daily, Norberto Pan MD, 300 mg at 03/20/25 1706    atorvastatin (LIPITOR) tablet 80 mg, 80 mg, Oral, Nightly, Renee Aguirre, APRN, 80 mg at 03/27/25 2001    sennosides-docusate (PERICOLACE) 8.6-50 MG per tablet 2 tablet, 2 tablet, Oral, BID PRN **AND** polyethylene glycol (MIRALAX) packet 17 g, 17 g, Oral, Daily PRN **AND** bisacodyl (DULCOLAX) EC tablet 5 mg, 5 mg, Oral, Daily PRN **AND** bisacodyl (DULCOLAX) suppository 10 mg, 10 mg, Rectal, Daily PRN, Norberto Pan MD    Calcium Replacement - Follow Nurse / BPA Driven Protocol, , Not Applicable, PRN, Norberto Pan MD    carvedilol (COREG) tablet 25 mg, 25 mg, Oral, BID With Meals, Norberto Pan MD, 25 mg at 03/27/25 1816    dextrose (D50W) (25 g/50 mL) IV injection 25 g, 25 g, Intravenous, Q15 Min PRN, Norberto Pan MD, 25 g at 03/22/25 2225    dextrose (D50W) (25 g/50 mL) IV injection 25 g, 25 g, Intravenous, Q15 Min PRN, Danie Dunn MD    dextrose (GLUTOSE) oral gel 15 g, 15 g, Oral, Q15 Min PRN, Danie Dunn MD    diphenhydrAMINE (BENADRYL) capsule 25 mg, 25 mg, Oral, Nightly PRN, Danie Dunn MD    fluconazole (DIFLUCAN) tablet 100 mg, 100 mg, Oral, Q24H, Florencia Clark, APRN, 100 mg at 03/27/25 1816    glucagon (GLUCAGEN) injection 1 mg, 1 mg, Intramuscular, Q15 Min PRN, Danie Dunn MD    hydrALAZINE (APRESOLINE) injection 10 mg, 10 mg, Intravenous, Q6H PRN, Danie Dunn MD, 10 mg at 03/26/25 0820    Insulin Lispro (humaLOG) injection 2-7  Units, 2-7 Units, Subcutaneous, TID With Meals, Danie Dunn MD, 2 Units at 03/27/25 1816    isosorbide mononitrate (IMDUR) 24 hr tablet 30 mg, 30 mg, Oral, Q24H, Florencia Clark, JOSEE, 30 mg at 03/27/25 0859    Lidocaine 4 % 1 patch, 1 patch, Transdermal, Q24H, Florencia Clark APRN, 1 patch at 03/27/25 1707    Magnesium Standard Dose Replacement - Follow Nurse / BPA Driven Protocol, , Not Applicable, Mxaim GARCIA Marc P, MD    NIFEdipine XL (PROCARDIA XL) 24 hr tablet 60 mg, 60 mg, Oral, Q24H, Florencia Clark APRN    nitroglycerin (NITROSTAT) SL tablet 0.4 mg, 0.4 mg, Sublingual, Q5 Min PRMaxim ALLEN Marc P, MD    pantoprazole (PROTONIX) EC tablet 40 mg, 40 mg, Oral, Q AM, Danie Dunn MD, 40 mg at 03/28/25 0501    Phosphorus Replacement - Follow Nurse / BPA Driven Protocol, , Not Applicable, Maxim GARCIA Marc P, MD    Potassium Replacement - Follow Nurse / BPA Driven Protocol, , Not Applicable, Maxim GARCIA Marc P, MD    rivastigmine (EXELON) 4.6 MG/24HR patch 1 patch, 1 patch, Transdermal, Daily, Caty Shrestha K, APRN, 1 patch at 03/27/25 1708    sodium chloride 0.9 % flush 10 mL, 10 mL, Intravenous, Q12H, Norberto Pan MD, 10 mL at 03/27/25 2001    sodium chloride 0.9 % flush 10 mL, 10 mL, Intravenous, PRN, Norberto Pan MD    sodium chloride 0.9 % infusion 40 mL, 40 mL, Intravenous, Maxim GARCIA Marc P, MD    Laboratory Results:   Lab Results   Component Value Date    GLUCOSE 151 (H) 03/27/2025    CALCIUM 9.1 03/27/2025     03/27/2025    K 4.4 03/27/2025    CO2 18.0 (L) 03/27/2025     (H) 03/27/2025    BUN 29 (H) 03/27/2025    CREATININE 1.44 (H) 03/27/2025    EGFRIFAFRI 42 (L) 05/23/2021    EGFRIFNONA 33 (L) 12/18/2020    BCR 20.1 03/27/2025    ANIONGAP 14.0 03/27/2025     Lab Results   Component Value Date    WBC 8.29 03/27/2025    HGB 12.1 03/27/2025    HCT 37.7 03/27/2025    MCV 90.6 03/27/2025     03/27/2025     Lab Results   Component Value Date  "   CHOL 215 (H) 03/21/2025    CHOL 195 05/20/2021    CHOL 147 01/11/2021     Lab Results   Component Value Date    HDL 43 03/21/2025    HDL 54 05/20/2021    HDL 44 01/11/2021     Lab Results   Component Value Date     (H) 03/21/2025     (H) 05/20/2021    LDL 86 01/11/2021     Lab Results   Component Value Date    TRIG 149 03/21/2025    TRIG 124 05/20/2021    TRIG 90 01/11/2021     Lab Results   Component Value Date    HGBA1C 6.90 (H) 03/04/2025     Lab Results   Component Value Date    INR 1.43 (H) 03/20/2025    INR 0.91 02/28/2025    INR 0.89 (L) 03/03/2018    PROTIME 17.6 (H) 03/20/2025    PROTIME 12.7 02/28/2025    PROTIME 9.3 (L) 03/03/2018     No results found for: \"FOLATE\"  Lab Results   Component Value Date    MDZKSJEX18 252 10/07/2019       MRI Brain Without Contrast  Result Date: 3/22/2025  MRI BRAIN WO CONTRAST Date of Exam: 3/22/2025 2:03 AM EDT Indication: Stroke, follow up Aphasia, right-sided weakness, numbness, facial droop.  Comparison: Head CT 3/21/2025 Technique:  Routine multiplanar/multisequence sequence images of the brain were obtained without contrast administration. Findings: No areas of diffusion restriction to suggest a recent infarct. Negative for acute intracranial hemorrhage, large mass lesion, midline shift or hydrocephalus. Mild global parenchymal volume loss for age. Mild periventricular T2/FLAIR hyperintense signal suggesting chronic microvascular ischemic change. Mostly empty sella. Negative for cerebellar tonsillar ectopia. Normal volume corpus callosum. Major intracranial flow voids preserved. Orbits symmetric. Trace bilateral mastoid fluid. Unremarkable appearance of the calvarium.     Impression: Impression: No acute MRI findings, specifically no findings to suggest a recent infarct. Electronically Signed: Shade Seay MD  3/22/2025 8:17 AM EDT  Workstation ID: [id number]       Assessment / Plan   Brief Patient Summary:  Valarie Camara is a 79 y.o. female who " has past medical history significant for HTN, HLD, CAD, diabetes, CKD stage III, L ICA stenosis, mild dementia, anxiety depression, recent PE on Eliquis, recurrent UTIs who presented to [facility] ED with complaint of altered mental status and aphasia.  Patient was originally seen by stroke neurology with a negative acute workup.  Suspected stroke prepubescent's in the setting of infection was likely causing patient's altered mentation.  MRI brain showed no acute intracranial abnormality.  Chronic left thalamic infarct was noted.  It was recommended to continue ASA 81 mg daily for stroke prevention.     General neurology was asked to evaluate patient with ongoing metabolic encephalopathy.  Per report patient has also been intermittently refusing medications.  Per patient's daughter in the past she has not tolerated Seroquel or trazodone.  On 3/21 patient came lethargic but had received Geodon earlier in the day.  EEG negative.     Suspect patient's altered mentation was related to previous medications along with underlying dementia.  Mentation continues to wax and wane.     Plan:   Altered mental status  Underlying dementia  Metabolic encephalopathy  Chronic infarct  Continue rivastigmine patch.  Once patient is able to take p.o. medications consistently can consider transition to p.o. no GI distress noted.  Patient to continue work with PT/OT  Will hold off on repeat images getting recent evaluations.  Continue delirium/fall precautions.  Continue ASA and statin per stroke neurology recommendations.  Patient has poor reaction to Seroquel and trazodone.  Refrain from giving.  If patient has extreme agitation would recommend IM/IV Valium.  Would avoid Geodon given patient's recent ongoing lethargy.  General neurology will follow-up on Monday.  Please reach out if needed over the weekend.    I have discussed the above with the patient, Chyna Pan  Time spent with patient: 35 minutes in face-to-face evaluation and  management of the patient.    Copied text in this note has been reviewed and is accurate as of 03/28/25.     JOSEE Machuca

## 2025-03-28 NOTE — PLAN OF CARE
Goal Outcome Evaluation:  Plan of Care Reviewed With: patient        Progress: no change  Outcome Evaluation: VSS on RA. A&Ox3, disoriented to situation. Lidocaine patch applied for back aching. PO intake encouraged. SLP she. Fall and skin precautions in place. Will continue plan of care.            Problem: Adult Inpatient Plan of Care  Goal: Plan of Care Review  Outcome: Progressing  Flowsheets (Taken 3/28/2025 1804)  Progress: no change  Outcome Evaluation: VSS on RA. A&Ox3, disoriented to situation. Lidocaine patch applied for back aching. PO intake encouraged. SLP she. Fall and skin precautions in place. Will continue plan of care.  Plan of Care Reviewed With: patient  Goal: Patient-Specific Goal (Individualized)  Outcome: Progressing  Goal: Absence of Hospital-Acquired Illness or Injury  Outcome: Progressing  Intervention: Identify and Manage Fall Risk  Recent Flowsheet Documentation  Taken 3/28/2025 1600 by Connor Snyder RN  Safety Promotion/Fall Prevention: safety round/check completed  Taken 3/28/2025 1400 by Connor Snyder RN  Safety Promotion/Fall Prevention: safety round/check completed  Taken 3/28/2025 1200 by Connor Snyder RN  Safety Promotion/Fall Prevention: safety round/check completed  Taken 3/28/2025 1000 by Connor Snyder RN  Safety Promotion/Fall Prevention: safety round/check completed  Taken 3/28/2025 0800 by Connor Snyder RN  Safety Promotion/Fall Prevention: safety round/check completed  Intervention: Prevent Skin Injury  Recent Flowsheet Documentation  Taken 3/28/2025 1600 by Connor Snyder RN  Skin Protection:   incontinence pads utilized   protective footwear used   silicone foam dressing in place  Taken 3/28/2025 1400 by Connor Snyder RN  Body Position:   turned   supine  Skin Protection:   incontinence pads utilized   silicone foam dressing in place  Taken 3/28/2025 1200 by Connor Snyder RN  Body Position:   turned   supine  Skin Protection:   incontinence pads utilized   silicone foam  dressing in place  Taken 3/28/2025 1000 by Connor Snyder RN  Body Position:   turned   left  Skin Protection:   incontinence pads utilized   protective footwear used   silicone foam dressing in place  Taken 3/28/2025 0800 by Connor Snyder RN  Body Position:   turned   right  Skin Protection: (skin assessed)   incontinence pads utilized   protective footwear used   silicone foam dressing in place  Intervention: Prevent and Manage VTE (Venous Thromboembolism) Risk  Recent Flowsheet Documentation  Taken 3/28/2025 0800 by Connor Snyder RN  VTE Prevention/Management: (eliquis)   bilateral   SCDs (sequential compression devices) off   other (see comments)  Goal: Optimal Comfort and Wellbeing  Outcome: Progressing  Intervention: Monitor Pain and Promote Comfort  Recent Flowsheet Documentation  Taken 3/28/2025 0800 by Connor Snyder RN  Pain Management Interventions: (lidocaine patch applied to lower back)   position adjusted   pillow support provided   other (see comments)  Intervention: Provide Person-Centered Care  Recent Flowsheet Documentation  Taken 3/28/2025 0800 by Connor Snyder RN  Trust Relationship/Rapport:   care explained   choices provided  Goal: Readiness for Transition of Care  Outcome: Progressing     Problem: Skin Injury Risk Increased  Goal: Skin Health and Integrity  Outcome: Progressing  Intervention: Optimize Skin Protection  Recent Flowsheet Documentation  Taken 3/28/2025 1600 by Connor Snyder RN  Activity Management: activity encouraged  Pressure Reduction Techniques:   frequent weight shift encouraged   heels elevated off bed   pressure points protected   weight shift assistance provided  Head of Bed (HOB) Positioning: HOB elevated  Pressure Reduction Devices:   pressure-redistributing mattress utilized   positioning supports utilized   foam padding utilized  Skin Protection:   incontinence pads utilized   protective footwear used   silicone foam dressing in place  Taken 3/28/2025 1400 by Claudio  AIDAN Ryan  Activity Management: activity encouraged  Pressure Reduction Techniques:   frequent weight shift encouraged   heels elevated off bed   pressure points protected   weight shift assistance provided  Head of Bed (HOB) Positioning: HOB elevated  Pressure Reduction Devices:   pressure-redistributing mattress utilized   positioning supports utilized   foam padding utilized  Skin Protection:   incontinence pads utilized   silicone foam dressing in place  Taken 3/28/2025 1200 by Connor Snyder RN  Activity Management: activity encouraged  Pressure Reduction Techniques:   frequent weight shift encouraged   heels elevated off bed   pressure points protected   weight shift assistance provided  Head of Bed ([facility]) Positioning: HOB elevated  Pressure Reduction Devices:   pressure-redistributing mattress utilized   positioning supports utilized   foam padding utilized  Skin Protection:   incontinence pads utilized   silicone foam dressing in place  Taken 3/28/2025 1000 by Connor Snyder RN  Activity Management: activity encouraged  Pressure Reduction Techniques:   frequent weight shift encouraged   heels elevated off bed   positioned off wounds   pressure points protected   weight shift assistance provided  Head of Bed ([facility]) Positioning: [facility] elevated  Pressure Reduction Devices:   pressure-redistributing mattress utilized   positioning supports utilized   foam padding utilized  Skin Protection:   incontinence pads utilized   protective footwear used   silicone foam dressing in place  Taken 3/28/2025 0800 by Connor Snyder RN  Activity Management: activity encouraged  Pressure Reduction Techniques:   frequent weight shift encouraged   heels elevated off bed   positioned off wounds   pressure points protected   weight shift assistance provided  Head of Bed ([facility]) Positioning: HOB elevated  Pressure Reduction Devices:   pressure-redistributing mattress utilized   positioning supports utilized   heel offloading device utilized    foam padding utilized  Skin Protection: (skin assessed)   incontinence pads utilized   protective footwear used   silicone foam dressing in place     Problem: Comorbidity Management  Goal: Maintenance of Behavioral Health Symptom Control  Outcome: Progressing  Goal: Blood Glucose Level Within Target Range  Outcome: Progressing  Goal: Blood Pressure in Desired Range  Outcome: Progressing     Problem: Fall Injury Risk  Goal: Absence of Fall and Fall-Related Injury  Outcome: Progressing  Intervention: Promote Injury-Free Environment  Recent Flowsheet Documentation  Taken 3/28/2025 1600 by Connor Snyder RN  Safety Promotion/Fall Prevention: safety round/check completed  Taken 3/28/2025 1400 by Connor Snyder RN  Safety Promotion/Fall Prevention: safety round/check completed  Taken 3/28/2025 1200 by Connor Snyder RN  Safety Promotion/Fall Prevention: safety round/check completed  Taken 3/28/2025 1000 by Connor Snyder RN  Safety Promotion/Fall Prevention: safety round/check completed  Taken 3/28/2025 0800 by Connor Snyder RN  Safety Promotion/Fall Prevention: safety round/check completed

## 2025-03-28 NOTE — PLAN OF CARE
Problem: Adult Inpatient Plan of Care  Goal: Plan of Care Review  Outcome: Progressing  Goal: Patient-Specific Goal (Individualized)  Outcome: Progressing  Goal: Absence of Hospital-Acquired Illness or Injury  Outcome: Progressing  Intervention: Identify and Manage Fall Risk  Recent Flowsheet Documentation  Taken 3/28/2025 0400 by Rossy Kirkland RN  Safety Promotion/Fall Prevention:   activity supervised   assistive device/personal items within reach   clutter free environment maintained   fall prevention program maintained   lighting adjusted   nonskid shoes/slippers when out of bed   room organization consistent   safety round/check completed  Taken 3/28/2025 0200 by Rossy Kirkland RN  Safety Promotion/Fall Prevention:   activity supervised   assistive device/personal items within reach   clutter free environment maintained   fall prevention program maintained   lighting adjusted   nonskid shoes/slippers when out of bed   room organization consistent   safety round/check completed  Taken 3/28/2025 0000 by Rossy Kirkland RN  Safety Promotion/Fall Prevention:   activity supervised   assistive device/personal items within reach   clutter free environment maintained   fall prevention program maintained   lighting adjusted   nonskid shoes/slippers when out of bed   room organization consistent   safety round/check completed  Taken 3/27/2025 2200 by Rossy Kirkland RN  Safety Promotion/Fall Prevention:   activity supervised   assistive device/personal items within reach   clutter free environment maintained   fall prevention program maintained   lighting adjusted   nonskid shoes/slippers when out of bed   room organization consistent   safety round/check completed  Taken 3/27/2025 2000 by Rossy Kirkland RN  Safety Promotion/Fall Prevention:   activity supervised   assistive device/personal items within reach   clutter free environment maintained   fall prevention program maintained   lighting adjusted    nonskid shoes/slippers when out of bed   room organization consistent   safety round/check completed  Intervention: Prevent Skin Injury  Recent Flowsheet Documentation  Taken 3/28/2025 0400 by Rossy Kirkland RN  Body Position:   turned   right  Skin Protection:   incontinence pads utilized   transparent dressing maintained  Taken 3/28/2025 0200 by Rossy Kirkland RN  Body Position:   turned   left  Skin Protection:   incontinence pads utilized   transparent dressing maintained  Taken 3/28/2025 0000 by Rossy Kirkland RN  Body Position: supine  Skin Protection:   incontinence pads utilized   transparent dressing maintained  Taken 3/27/2025 2200 by Rossy Kirkland RN  Body Position:   turned   right  Skin Protection:   incontinence pads utilized   transparent dressing maintained  Taken 3/27/2025 2000 by Rossy Kirkland RN  Body Position:   turned   left   sitting up in bed  Skin Protection:   incontinence pads utilized   transparent dressing maintained  Intervention: Prevent and Manage VTE (Venous Thromboembolism) Risk  Recent Flowsheet Documentation  Taken 3/27/2025 2000 by Rossy Kirkland RN  VTE Prevention/Management: (see mar)   bilateral   SCDs (sequential compression devices) off  Intervention: Prevent Infection  Recent Flowsheet Documentation  Taken 3/28/2025 0400 by Rossy Kirkland RN  Infection Prevention:   cohorting utilized   environmental surveillance performed   equipment surfaces disinfected   hand hygiene promoted   rest/sleep promoted  Taken 3/28/2025 0200 by Rossy Kirkland RN  Infection Prevention:   cohorting utilized   environmental surveillance performed   equipment surfaces disinfected   hand hygiene promoted   rest/sleep promoted  Taken 3/28/2025 0000 by Rossy Kirkland RN  Infection Prevention:   cohorting utilized   environmental surveillance performed   equipment surfaces disinfected   hand hygiene promoted   rest/sleep promoted  Taken 3/27/2025 2200 by Rossy Kirkland  RN  Infection Prevention:   cohorting utilized   environmental surveillance performed   equipment surfaces disinfected   hand hygiene promoted   rest/sleep promoted  Taken 3/27/2025 2000 by Rossy Kirkland RN  Infection Prevention:   cohorting utilized   environmental surveillance performed   equipment surfaces disinfected   hand hygiene promoted   rest/sleep promoted  Goal: Optimal Comfort and Wellbeing  Outcome: Progressing  Intervention: Provide Person-Centered Care  Recent Flowsheet Documentation  Taken 3/27/2025 2000 by Rossy Kirkland RN  Trust Relationship/Rapport:   care explained   choices provided  Goal: Readiness for Transition of Care  Outcome: Progressing     Problem: Skin Injury Risk Increased  Goal: Skin Health and Integrity  Outcome: Progressing  Intervention: Optimize Skin Protection  Recent Flowsheet Documentation  Taken 3/28/2025 0400 by Rossy Kirkland RN  Activity Management: activity encouraged  Pressure Reduction Techniques:   frequent weight shift encouraged   weight shift assistance provided   heels elevated off bed   pressure points protected  Head of Bed (HOB) Positioning: HOB elevated  Pressure Reduction Devices:   pressure-redistributing mattress utilized   positioning supports utilized   heel offloading device utilized  Skin Protection:   incontinence pads utilized   transparent dressing maintained  Taken 3/28/2025 0200 by Rossy Kirkland RN  Activity Management: activity encouraged  Pressure Reduction Techniques:   frequent weight shift encouraged   weight shift assistance provided   heels elevated off bed   pressure points protected  Head of Bed (HOB) Positioning: HOB elevated  Pressure Reduction Devices:   pressure-redistributing mattress utilized   positioning supports utilized   heel offloading device utilized  Skin Protection:   incontinence pads utilized   transparent dressing maintained  Taken 3/28/2025 0000 by Rossy Kirkland RN  Activity Management: activity  encouraged  Pressure Reduction Techniques:   frequent weight shift encouraged   weight shift assistance provided   heels elevated off bed   pressure points protected  Head of Bed (HOB) Positioning: HOB elevated  Pressure Reduction Devices:   pressure-redistributing mattress utilized   positioning supports utilized   heel offloading device utilized  Skin Protection:   incontinence pads utilized   transparent dressing maintained  Taken 3/27/2025 2200 by Rossy Kirkland RN  Activity Management: activity encouraged  Pressure Reduction Techniques:   frequent weight shift encouraged   weight shift assistance provided   heels elevated off bed   pressure points protected  Head of Bed (HOB) Positioning: HOB elevated  Pressure Reduction Devices:   pressure-redistributing mattress utilized   positioning supports utilized   heel offloading device utilized  Skin Protection:   incontinence pads utilized   transparent dressing maintained  Taken 3/27/2025 2000 by Rossy Kirkland RN  Activity Management: activity encouraged  Pressure Reduction Techniques:   frequent weight shift encouraged   weight shift assistance provided   heels elevated off bed   pressure points protected  Head of Bed (HOB) Positioning: HOB elevated  Pressure Reduction Devices:   pressure-redistributing mattress utilized   positioning supports utilized   heel offloading device utilized  Skin Protection:   incontinence pads utilized   transparent dressing maintained     Problem: Comorbidity Management  Goal: Maintenance of Behavioral Health Symptom Control  Outcome: Progressing  Intervention: Maintain Behavioral Health Symptom Control  Recent Flowsheet Documentation  Taken 3/27/2025 2000 by Rossy Kirkland RN  Medication Review/Management: medications reviewed  Goal: Blood Glucose Level Within Target Range  Outcome: Progressing  Intervention: Monitor and Manage Glycemia  Recent Flowsheet Documentation  Taken 3/27/2025 2000 by Rossy Kirkland  RN  Medication Review/Management: medications reviewed  Goal: Blood Pressure in Desired Range  Outcome: Progressing  Intervention: Maintain Blood Pressure Management  Recent Flowsheet Documentation  Taken 3/27/2025 2000 by Rossy Kirkland RN  Medication Review/Management: medications reviewed     Problem: Fall Injury Risk  Goal: Absence of Fall and Fall-Related Injury  Outcome: Progressing  Intervention: Identify and Manage Contributors  Recent Flowsheet Documentation  Taken 3/28/2025 0400 by Rossy Kirkland RN  Self-Care Promotion:   independence encouraged   BADL personal objects within reach  Taken 3/28/2025 0200 by Rossy Kirkland RN  Self-Care Promotion:   independence encouraged   BADL personal objects within reach  Taken 3/28/2025 0000 by Rossy Kirkland RN  Self-Care Promotion:   independence encouraged   BADL personal objects within reach  Taken 3/27/2025 2200 by Rossy Kirkland RN  Self-Care Promotion:   independence encouraged   BADL personal objects within reach  Taken 3/27/2025 2000 by Rossy Kirkland RN  Medication Review/Management: medications reviewed  Self-Care Promotion:   independence encouraged   BADL personal objects within reach  Intervention: Promote Injury-Free Environment  Recent Flowsheet Documentation  Taken 3/28/2025 0400 by Rossy Kirkland RN  Safety Promotion/Fall Prevention:   activity supervised   assistive device/personal items within reach   clutter free environment maintained   fall prevention program maintained   lighting adjusted   nonskid shoes/slippers when out of bed   room organization consistent   safety round/check completed  Taken 3/28/2025 0200 by Rossy Kirkland RN  Safety Promotion/Fall Prevention:   activity supervised   assistive device/personal items within reach   clutter free environment maintained   fall prevention program maintained   lighting adjusted   nonskid shoes/slippers when out of bed   room organization consistent   safety round/check  completed  Taken 3/28/2025 0000 by Rossy Kirkland, RN  Safety Promotion/Fall Prevention:   activity supervised   assistive device/personal items within reach   clutter free environment maintained   fall prevention program maintained   lighting adjusted   nonskid shoes/slippers when out of bed   room organization consistent   safety round/check completed  Taken 3/27/2025 2200 by Rossy Kirkland, RN  Safety Promotion/Fall Prevention:   activity supervised   assistive device/personal items within reach   clutter free environment maintained   fall prevention program maintained   lighting adjusted   nonskid shoes/slippers when out of bed   room organization consistent   safety round/check completed  Taken 3/27/2025 2000 by Rossy Kirkland, RN  Safety Promotion/Fall Prevention:   activity supervised   assistive device/personal items within reach   clutter free environment maintained   fall prevention program maintained   lighting adjusted   nonskid shoes/slippers when out of bed   room organization consistent   safety round/check completed   Goal Outcome Evaluation:            3/27-   Pt remains disoriented to situation. RA. NSR.  Pt's BP  remains stable throughout shift.   Plan of care ongoing.

## 2025-03-28 NOTE — PROGRESS NOTES
Clinical Nutrition Assessment     Patient Name: Valarie Camara  YOB: 1945  MRN: [id number]  Date of Encounter: 03/28/25 15:51 EDT  Admission date: 3/20/2025  Reason for Visit: Follow-up protocol    Assessment   Nutrition Assessment   Admission Diagnosis:  CVA (cerebral vascular accident) [I63.9]    Problem List:    Encephalopathy    Anxiety and depression    Gastroesophageal reflux disease with esophagitis    Multiple-type hyperlipidemia    Benign essential hypertension    Pulmonary emboli      PMH:   She  has a past medical history of Acute bronchitis with bronchospasm, Anxiety, Arthritis, Asthma, B12 deficiency, Back pain, Body piercing, Cataract, Cerebrovascular disease, Cervicalgia, Chronic kidney disease, Colonic polyp, Constipation, COPD (chronic obstructive pulmonary disease), Coronary artery disease, Depression, Diverticulitis, Dysphagia, Edema, Elevated cholesterol, Esophageal reflux, Folic acid deficiency, Full dentures, Heart murmur, Herpes zoster, High cholesterol, History of kidney infection, History of recurrent urinary tract infection, HTN (hypertension), Hypercholesterolemia, Impaired functional mobility, balance, gait, and endurance, Insomnia, Kidney infection, Malignant hypertension (04/26/2015), Measles, Muscle spasm, Neoplasm of uncertain behavior of skin, Osteoporosis, Poor historian, Recurrent urinary tract infection, Rheumatoid arthritis, RLS (restless legs syndrome), Stomach ulcer, Stroke, Type 2 diabetes mellitus, and Vitamin D deficiency.    PSH:  She  has a past surgical history that includes Hysterectomy (1979); Cataract extraction w/  intraocular lens implant (Left, 02/11/2013); Cataract extraction w/  intraocular lens implant (Right, 04/15/2013); Colonoscopy (2012); Esophagogastroduodenoscopy (N/A, 11/13/2017); Upper gastrointestinal endoscopy (12/09/2013); Upper gastrointestinal endoscopy (11/13/2017); Esophagogastroduodenoscopy (N/A, 11/25/2019);  "Colonoscopy (N/A, 11/26/2019); Esophagogastroduodenoscopy (N/A, 11/29/2021); Esophagogastroduodenoscopy (N/A, 11/1/2023); and Esophagogastroduodenoscopy (N/A, 1/27/2025).    Applicable Nutrition History:   3/21S Diet Recommendation: mechanical ground textures, thin liquids   3/23 SLP Diet Recommendation: puree, thin liquids   3/24 SLP Diet Recommendation: puree, honey thick liquids, ice chips between meals after oral care, with supervision   3/25 SLP Diet Recommendation: puree, thin liquids     Anthropometrics     Height: Height: 160 cm (62.99\")  Last Filed Weight: Weight: 68 kg (150 lb) (03/21/25 1511)  Method: Weight Method: Stated  BMI: BMI (Calculated): 26.6    UBW:  200# several months ago; no specific time frame provided  Weight change: weight loss of 19 lbs (11.2%) over 2 month(s)    Significant?  Yes     Weight       Weight (kg) Weight (lbs) Weight Method Visit Report   11/6/2024 80.65 kg  177 lb 12.8 oz   --    1/5/2025 79.379 kg  175 lb  Standing scale      79.4 kg  175 lb 0.7 oz  Stated      76.3 kg  168 lb 3.4 oz  Bed scale     1/10/2025 80.287 kg  177 lb      1/24/2025 75.297 kg  166 lb  Stated     1/27/2025 77.111 kg  170 lb  Stated     2/8/2025 77.1 kg  169 lb 15.6 oz  Standing scale     2/28/2025 77 kg  169 lb 12.1 oz  Stated      77 kg  169 lb 12.1 oz  Bed scale      72.6 kg  160 lb 0.9 oz      3/1/2025 72.3 kg  159 lb 6.3 oz      3/2/2025 70.5 kg  155 lb 6.8 oz  Bed scale      71.9 kg  158 lb 8.2 oz  Bed scale     3/4/2025 72.8 kg  160 lb 7.9 oz      3/15/2025 69.4 kg  153 lb  Stated     3/20/2025 68.04 kg  150 lb  Stated     3/21/2025 68.04 kg  150 lb          Nutrition Focused Physical Exam    Date:  3/24, 3/26       Unable to perform due to Pt unable to participate at time of visit     Subjective   Reported/Observed/Food/Nutrition Related History:     3/28  Patient unable to wake again, opens eyes but fell back asleep. RN reports patient very sleepy today and has been refusing " meals.    3/26  Patient unable to wake. RN reported patient was more alert and conversational, also ate fairly well yesterday. Today patient is more lethargic, RN encouraged breakfast this morning, and patient was able to drink about half of ONS.    3/24  Patient in bed, daughter at bedside provides most of patient nutrition hx. Dtr reports that patient was eating fairly well prior to 2 days ago, notes that medications were changed and pt has has poor intakes since. No c/o N/V, pt notes she has been constipated. NKFA. Dtr reports patient drinks Boost at home, would like with breakfast while here. Dtr also notes that over the course of several months patient has lost wt, causing dentures to be ill-fitting, which may have played a role in pt's wt loss.    Current Nutrition Prescription   PO: Diet: Cardiac, Diabetic; Healthy Heart (2-3 Na+); Consistent Carbohydrate; Feeding Assistance - Nursing, No Straw; Texture: Pureed (NDD 1); Fluid Consistency: Thin (IDDSI 0)  Oral Nutrition Supplement: Boost GC with dinner  Intake:  6 days  38% x 4 meals    Assessment & Plan   Nutrition Diagnosis   Date:  3/24            Updated:    Problem Unintended weight loss    Etiology Advanced age, ill-fitting dentures   Signs/Symptoms Wt loss 19# x ~2 months (11.2%)   Status: New    Goal:   Nutrition to support treatment and Increase intake      Nutrition Intervention      Follow treatment progress, Care plan reviewed, Supplement provided    Encourage continued adequate intakes  Boost GC with breakfast and dinner  NFPE when feasible    Monitoring/Evaluation:   Per protocol, I&O, PO intake, Supplement intake, Pertinent labs, Weight, Symptoms, POC/GOC, Swallow function    Valarie Noble RD  Time Spent: 25m

## 2025-03-28 NOTE — PLAN OF CARE
Goal Outcome Evaluation:  Plan of Care Reviewed With: patient        Progress: improving       Anticipated Discharge Disposition (SLP): skilled nursing facility             Treatment Assessment (SLP): improved, oral dysphagia, continued, pharyngeal dysphagia, suspected (03/28/25 1635)  Treatment Assessment Comments (SLP): Upgraded diet to mechanical ground. If pt prefers pureed tray, RN is ok to downgrade. (03/28/25 1635)  Plan for Continued Treatment (SLP): continue treatment per plan of care (03/28/25 1635)

## 2025-03-28 NOTE — PROGRESS NOTES
[facility] Medicine Services  PROGRESS NOTE    Patient Name: Valarie Camara  : 1945  MRN: [id number]    Date of Admission: 3/20/2025  Primary Care Physician: Lay Cox MD    Subjective   Subjective     CC: R weakness    HPI:  In bed. Hungry. Notes constipation. No dysuria. No dyspnea. No n/v. Denies fevers.     Objective   Objective     Vital Signs:   Temp:  [97.7 °F (36.5 °C)-98.7 °F (37.1 °C)] 98.7 °F (37.1 °C)  Heart Rate:  [] 84  Resp:  [16] 16  BP: (103-167)/(56-68) 144/65     Physical Exam:   NAD, alert and oriented  OP clear, dry MM  Neck supple  No LAD  RRR  Decreased at bases  +BS, soft  MENESES  Normal affect    Results Reviewed:  LAB RESULTS:      Lab 25  0449 25  0550 25  2354 25  1751 25  0914 25  1955 25  0613 25  0220 25  1452 25  0545   WBC 8.29 7.13  --   --  6.81  --   --  6.88  --  6.49   HEMOGLOBIN 12.1 12.2  --   --  13.2  --   --  13.3  --  13.2   HEMATOCRIT 37.7 37.8  --   --  42.5  --   --  41.0  --  41.3   PLATELETS 203 230  --   --  227  --   --  255  --  255   NEUTROS ABS 6.41  --   --   --  5.04  --   --   --   --   --    IMMATURE GRANS (ABS) 0.03  --   --   --  0.02  --   --   --   --   --    LYMPHS ABS 1.23  --   --   --  1.27  --   --   --   --   --    MONOS ABS 0.56  --   --   --  0.41  --   --   --   --   --    EOS ABS 0.04  --   --   --  0.04  --   --   --   --   --    MCV 90.6 89.2  --   --  93.6  --   --  89.1  --  90.2   HEPARIN ANTI-XA  --  0.53 0.58 0.65 0.75* 0.81*   < > 0.92*   < >  --     < > = values in this interval not displayed.         Lab 25  0449 25  1113 25  0550 25  0914 25  0220 25  0545   SODIUM 144 144 146* 145 144 144   POTASSIUM 4.4 4.1 3.7 4.4 3.8 3.7   CHLORIDE 112* 113* 114* 110* 109* 108*   CO2 18.0* 19.0* 19.0* 17.0* 18.0* 20.0*   ANION GAP 14.0 12.0 13.0 18.0* 17.0* 16.0*   BUN 29* 26* 28* 30* 25*  27*   CREATININE 1.44* 1.25* 1.40* 1.25* 1.47* 1.31*   EGFR 36.8* 43.7* 38.1* 43.7* 35.9* 41.3*   GLUCOSE 151* 140* 170* 130* 88 90   CALCIUM 9.1 8.8 9.0 8.9 9.0 8.9   MAGNESIUM  --  1.8 1.9 1.9 1.7 1.9                               Lab 03/22/25  0610   PH, ARTERIAL 7.449   PCO2, ARTERIAL 32.0*   PO2 ART 78.3*   FIO2 21   HCO3 ART 22.1   BASE EXCESS ART -1.2*   CARBOXYHEMOGLOBIN 0.9     Brief Urine Lab Results  (Last result in the past 365 days)        Color   Clarity   Blood   Leuk Est   Nitrite   Protein   CREAT   Urine HCG        03/22/25 1129 Yellow   Cloudy   Negative   Negative   Negative   100 mg/dL (2+)                   Microbiology Results Abnormal       Procedure Component Value - Date/Time    Urine Culture - Urine, Straight Cath [[id number]]  (Abnormal) Collected: 03/22/25 1129    Lab Status: Final result Specimen: Urine from Straight Cath Updated: 03/23/25 2043     Urine Culture 25,000 CFU/mL Lactobacillus species     Comment:   Based on laboratory diagnosis criteria, these organisms are common urogenital commensal lilly and have not been associated with urinary tract infections; clinical correlation is recommended.       Narrative:      Colonization of the urinary tract without infection is common. Treatment is discouraged unless the patient is symptomatic, pregnant, or undergoing an invasive urologic procedure.            No radiology results from the last 24 hrs      Results for orders placed during the hospital encounter of 03/20/25    Adult Transthoracic Echo Complete W/ Cont if Necessary Per Protocol (With Agitated Saline)    Interpretation Summary    Left ventricular systolic function is normal. Calculated left ventricular EF = 67% Left ventricular ejection fraction appears to be 66 - 70%.    Left ventricular wall thickness is consistent with mild concentric hypertrophy.    Left ventricular outflow tract peak flow gradient at rest is 11 mmHg. Left ventricular outflow tract peak gradient with  valsalva is 29 mmHg.    Left ventricular diastolic function is consistent with (grade I) impaired relaxation.    The mitral valve peak gradient is 9 mmHg. The mitral valve mean gradient is 4 mmHg.    Calculated right ventricular systolic pressure from tricuspid regurgitation is 21 mmHg.    There is a trivial pericardial effusion.      Current medications:  Scheduled Meds:apixaban, 5 mg, Oral, Q12H  aspirin, 81 mg, Oral, Daily   Or  aspirin, 300 mg, Rectal, Daily  atorvastatin, 80 mg, Oral, Nightly  carvedilol, 25 mg, Oral, BID With Meals  fluconazole, 100 mg, Oral, Q24H  insulin lispro, 2-7 Units, Subcutaneous, TID With Meals  isosorbide mononitrate, 30 mg, Oral, Q24H  Lidocaine, 1 patch, Transdermal, Q24H  NIFEdipine XL, 60 mg, Oral, Q24H  pantoprazole, 40 mg, Oral, Q AM  rivastigmine, 1 patch, Transdermal, Daily  sodium chloride, 10 mL, Intravenous, Q12H      Continuous Infusions:     PRN Meds:.  acetaminophen    acetaminophen    senna-docusate sodium **AND** polyethylene glycol **AND** bisacodyl **AND** bisacodyl    Calcium Replacement - Follow Nurse / BPA Driven Protocol    dextrose    dextrose    dextrose    diphenhydrAMINE    glucagon (human recombinant)    hydrALAZINE    Magnesium Standard Dose Replacement - Follow Nurse / BPA Driven Protocol    nitroglycerin    Phosphorus Replacement - Follow Nurse / BPA Driven Protocol    Potassium Replacement - Follow Nurse / BPA Driven Protocol    sodium chloride    sodium chloride    Assessment & Plan   Assessment & Plan     Active Hospital Problems    Diagnosis  POA    **Encephalopathy [G93.40]  Unknown    Pulmonary emboli [I26.99]  Yes    Gastroesophageal reflux disease with esophagitis [K21.00]  Yes    Anxiety and depression [F41.9, F32.A]  Yes    Multiple-type hyperlipidemia [E78.2]  Yes    Benign essential hypertension [I10]  Yes      Resolved Hospital Problems    Diagnosis Date Resolved POA    CVA (cerebral vascular accident) [I63.9] 03/22/2025 Yes        Brief  Hospital Course to date:  Valarie Camara is a 79 y.o. female  w/ hx CAD, PAD, L ICA dz (50-69% stenosis) CKD3 (baseline cr ~1.8-1.9), HTN, mild dementia, PE (on Eliquis), DM2 who presented with confusion, aphasia, and some right-sided weakness. Patient seen by Neuro-Stroke team. However, stroke workup was negative and they suspected stroke recrudescence in setting of infection/metabolic derangements. UA was concerning for UTI. After admission, patient had significant HTN. Cardene gtt initiated. Eliquis was held and transitioned to heparin gtt. General neurology consulted for ongoing encephalopathy. EEG negative.     This patient's problems and plans were partially entered by my partner and updated as appropriate by me 03/28/25.      Encephalopathy  Aphasia & right-sided weakness Left ICA stenosis (50-69%), & multifocal atherosclerotic diseas  Episode of lethargy/somnolence evening 3/21/25 due to seroquel  Mild dementia  Hx previous left thalamic cva  -Neuro-stroke followed, suspect left hemispheric stroke vs stroke recrudescence in setting of infection/metabolic derangement  -TSH wnl, initial UA benign  -EEG 3/22 negative  -prior echo 2/28/25: ef 61-65%, diastolic dysfunction  -CT head 3/20 negative for acute stroke, shows stable chronic lacunar infarct of the left thalamus  -CTA H/N 3/20 negative for flow-limiting stenosis or LVO  -repeat CT head 3/21 negative/unchanged from original CT head  -ECHO 3/21 shows EF 66-70%, diastolic dysfunction  -BLE duplex negative  -evening 3/21 became lethargic after receiving Seroquel 50 mg. Had tolerated Geodon earlier in the day. .   -MRI brain 3/22 negative.   -Per discussion with daughter, patient has become lethargic in past w/ trazodone and Seroquel >> avoid seroquel & trazodone  -3/24/25 more confused and less interactive, so general neurology consulted  -repeat EEG 3/25 negative  -Neuro recommended rivastigmine patch 4.9 mg daily. If pt has extreme agitation, neuro  recommens IM or IV Valium. Avoid Geodon given lethargy.   -Per Neuro-Stroke team, continue ASA, statin, anticoagulation  -Follow up in [facility] 8-12 weeks  -SLP following, modified diet recs in place  -PT, OT recommending SNF rehab, recently home from [facility]     UTI  -initial UA negative  -UA 3/22 with 4+ bacteria, 6-10 WBCs, large yeast  -initiated on Rocephin  -urine culture with lactobacillus species, rocephin discontinued     HTN  -s/p cardene  -titrate medications as tolerated     Candidiasis  -Patient recently on Rocephin  -Diflucan 100 mg daily x 7 days, defer Nystatin given dysphagia  -monitor QTc intermittently, pt has tolerated Diflucan in past per gwyn  -EKG in AM    Diet-controlled DM2  -A1c 6.9 on 3/4/25  -SSI for now, titrate prn, stable trend     Hx PE 2/28/25 at [facility]  -s/p heparin gtt while NPO  -Eliquis resumed 3/25, tolerating PO     KURT on CKD 3 (baseline cr ~1.8-1.9)  -initial creatinine was 2.5  -creatinine 1.4 now, stable       Expected Discharge Location and Transportation: SNF rehab vs home with   Expected Discharge   Expected Discharge Date: 3/29/2025; Expected Discharge Time:      VTE Prophylaxis:  Pharmacologic & mechanical VTE prophylaxis orders are present.         AM-PAC 6 Clicks Score (PT): 14 (03/27/25 2000)    CODE STATUS:   Code Status and Medical Interventions: CPR (Attempt to Resuscitate); Full Support   Ordered at: 03/20/25 6074     Code Status (Patient has no pulse and is not breathing):    CPR (Attempt to Resuscitate)     Medical Interventions (Patient has pulse or is breathing):    Full Support       Norberto Pan MD  03/28/25

## 2025-03-28 NOTE — CASE MANAGEMENT/SOCIAL WORK
Continued Stay Note  [facility]     Patient Name: Valarie Camara  MRN: [id number]  Today's Date: 3/28/2025    Admit Date: 3/20/2025    Plan: Home with HH vs SNF   Discharge Plan       Row Name 03/28/25 1147       Plan    Plan Comments MSW spoke with pt's daughter, Cleo. Pt and pt's family have discussed and remain agreeable to plan for SNF and would still like one of the facilities in [place]. Cleo also reports that pt's insurance will be switching on Tuesday, April 1st, back to her [place] [employer]. MSW called and followed up with all three facilities for referrals. MSW left voicemail with admissions for [facility] and rehab as well as [facility] and [facility]. MSW spoke with admissions staff at [facility] who reports that the DON is currently reviewing referral and they will let MSW know if can accept.    Final Discharge Disposition Code 03 - skilled nursing facility (SNF)                   Discharge Codes    No documentation.                 Expected Discharge Date and Time       Expected Discharge Date Expected Discharge Time    Mar 29, 2025               ALEX Michelle

## 2025-03-28 NOTE — THERAPY TREATMENT NOTE
Acute Care - Speech Language Pathology   Swallow Treatment Note [facility]     Patient Name: Valarie Camara  : 1945  MRN: [id number]  Today's Date: 3/28/2025               Admit Date: 3/20/2025    Visit Dx:     ICD-10-CM ICD-9-CM   1. Acute ischemic stroke  I63.9 434.91   2. History of stroke  Z86.73 V12.54   3. Renal insufficiency  N28.9 593.9   4. Aphasia  R47.01 784.3   5. Dysarthria  R47.1 784.51   6. Oropharyngeal dysphagia  R13.12 787.22   7. 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  I69.30 V12.54     Patient Active Problem List   Diagnosis    Atopic rhinitis    Arthritis    Chronic neck pain    Anxiety and depression    Edema    Gastroesophageal reflux disease with esophagitis    Multiple-type hyperlipidemia    Vitamin D deficiency    Benign essential hypertension    Obesity (BMI 30-39.9)    History of COPD    History of cataract    History of osteoporosis    History of restless legs syndrome    History of rheumatoid arthritis    Elevated serum creatinine    Esophageal dysphagia    Constipation    Schatzki's ring    Gastritis without bleeding    History of Helicobacter pylori infection    Duodenitis    Right hemiparesis    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    Multiple thyroid nodules    Lacunar stroke    DM (diabetes mellitus), type 2, uncontrolled w/neurologic complication    Syncope and collapse    Positive fecal occult blood test    Left foot pain    Hypomagnesemia    Acute on chronic anemia    Esophageal dysphagia    Reflux esophagitis    Syncope    Irritable bowel syndrome with constipation    Hypertensive urgency    Pulmonary emboli    Encephalopathy     Past Medical History:   Diagnosis Date    Acute bronchitis with bronchospasm     Anxiety     Arthritis     Asthma     B12 deficiency     Back pain     Body piercing     ears    Cataract     Cerebrovascular disease     Cervicalgia     Chronic kidney disease      "stage 3b    Colonic polyp     History of colonic polyps     Constipation     COPD (chronic obstructive pulmonary disease)     Coronary artery disease     Depression     Diverticulitis     Dysphagia     Patient reported \"it won't go all the way down\" when eating solid foods first thing in the mornings    Edema     Elevated cholesterol     Esophageal reflux     Folic acid deficiency     Full dentures     Advised no adhesives DOS    Heart murmur     Herpes zoster     High cholesterol     History of kidney infection     History of recurrent urinary tract infection     HTN (hypertension)     Hypercholesterolemia     Impaired functional mobility, balance, gait, and endurance     Insomnia     Kidney infection     Malignant hypertension 04/26/2015     Accelerated essential hypertension    Measles     rubeola    Muscle spasm     Neoplasm of uncertain behavior of skin     dtr denies    Osteoporosis     Poor historian     Recurrent urinary tract infection     Rheumatoid arthritis     RLS (restless legs syndrome)     Stomach ulcer     Stroke     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    Type 2 diabetes mellitus     Vitamin D deficiency      Past Surgical History:   Procedure Laterality Date    CATARACT EXTRACTION WITH INTRAOCULAR LENS IMPLANT Left 02/11/2013    CATARACT EXTRACTION WITH INTRAOCULAR LENS IMPLANT Right 04/15/2013    COLONOSCOPY  2012    COLONOSCOPY N/A 11/26/2019    Procedure: COLONOSCOPY;  Surgeon: Chidi Downing MD;  Location:  [facility];  Service: Gastroenterology    ENDOSCOPY N/A 11/13/2017    Procedure: ESOPHAGOGASTRODUODENOSCOPY with biopsies and esophageal balloon dilitation;  Surgeon: Wesley Stovall MD;  Location:  [facility];  Service:     ENDOSCOPY N/A 11/25/2019    Procedure: ESOPHAGOGASTRODUODENOSCOPY;  Surgeon: Chidi Downing MD;  Location:  [facility];  Service: Gastroenterology    ENDOSCOPY N/A 11/29/2021    Procedure: " ESOPHAGOGASTRODUODENOSCOPY with dilation and biopsies;  Surgeon: Gonzalez Zapata MD;  Location: [facility];  Service: Gastroenterology;  Laterality: N/A;    ENDOSCOPY N/A 11/1/2023    Procedure: ESOPHAGOGASTRODUODENOSCOPY WITH BIOPSY AND DILATATION;  Surgeon: Gonzalez Zapata MD;  Location: [facility] ENDOSCOPY;  Service: Gastroenterology;  Laterality: N/A;    ENDOSCOPY N/A 1/27/2025    Procedure: Esophagogastroduodenoscopy with dilatation and biopsies;  Surgeon: Gonzalez Zapata MD;  Location: [facility] ENDOSCOPY;  Service: Gastroenterology;  Laterality: N/A;    HYSTERECTOMY  1979    UPPER GASTROINTESTINAL ENDOSCOPY  12/09/2013    UPPER GASTROINTESTINAL ENDOSCOPY  11/13/2017       SLP Recommendation and Plan     SLP Diet Recommendation: mechanical ground textures, no mixed consistencies, thin liquids (03/28/25 1635)  Recommended Precautions and Strategies: general aspiration precautions, reflux precautions, upright posture during/after eating, 1:1 supervision, assist with feeding, check mouth frequently for oral residue/pocketing, no straw (03/28/25 1635)                 Anticipated Discharge Disposition (SLP): skilled nursing facility (03/28/25 1635)                    Daily Summary of Progress (SLP): progress toward functional goals as expected (03/28/25 1635)               Treatment Assessment (SLP): improved, oral dysphagia, continued, pharyngeal dysphagia, suspected (03/28/25 1635)  Treatment Assessment Comments (SLP): Upgraded diet to mechanical ground. If pt prefers pureed tray, RN is ok to downgrade. (03/28/25 1635)  Plan for Continued Treatment (SLP): continue treatment per plan of care (03/28/25 1635)         Progress: improving      SWALLOW EVALUATION (Last 72 Hours)       SLP Adult Swallow Evaluation       Row Name 03/28/25 1635                   Rehab Evaluation    Document Type therapy note (daily note)  -AC        Subjective Information no complaints  -AC        Patient Observations  alert;cooperative  -AC        Patient/Family/Caregiver Comments/Observations No family present.  -AC        Patient Effort good  -AC           Pain    Pretreatment Pain Rating 0/10 - no pain  -AC        Posttreatment Pain Rating 0/10 - no pain  -AC           SLP Treatment Clinical Impressions    Treatment Assessment (SLP) improved;oral dysphagia;continued;pharyngeal dysphagia;suspected  -AC        Treatment Assessment Comments (SLP) Upgraded diet to mechanical ground. If pt prefers pureed tray, RN is ok to downgrade.  -        Daily Summary of Progress (SLP) progress toward functional goals as expected  -AC        Barriers to Overall Progress (SLP) Cognitive status  -        Plan for Continued Treatment (SLP) continue treatment per plan of care  -AC        Care Plan Review evaluation/treatment results reviewed;care plan/treatment goals reviewed;risks/benefits reviewed;current/potential barriers reviewed;patient/other agree to care plan  -           Recommendations    SLP Diet Recommendation mechanical ground textures;no mixed consistencies;thin liquids  -        Recommended Precautions and Strategies general aspiration precautions;reflux precautions;upright posture during/after eating;1:1 supervision;assist with feeding;check mouth frequently for oral residue/pocketing;no straw  -        Anticipated Discharge Disposition (SLP) skilled nursing facility  -                  User Key  (r) = Recorded By, (t) = Taken By, (c) = Cosigned By      Initials Name Effective Dates    AC Julissa Mcfadden, MS [facility]-SLP 02/03/23 -                     EDUCATION  The patient has been educated in the following areas:   Dysphagia (Swallowing Impairment) Modified Diet Instruction.        SLP GOALS       Row Name 03/28/25 6996             (LTG) Patient will demonstrate functional swallow for    Diet Texture (Demonstrate functional swallow) soft to chew (chopped) textures  -      Liquid viscosity (Demonstrate functional swallow)  thin liquids  -AC      [place] (Demonstrate functional swallow) with minimal cues (75-90% accuracy)  -AC      Time Frame (Demonstrate functional swallow) 1 week  -AC      Progress/Outcomes (Demonstrate functional swallow) continuing progress toward goal  -AC         (STG) Patient will tolerate trials of    Consistencies Trialed (Tolerate trials) mechanical ground textures;thin liquids  -AC      Desired Outcome (Tolerate trials) without signs/symptoms of aspiration;with adequate oral prep/transit/clearance;with use of compensatory strategies (see comments)  -AC      Acadia (Tolerate trials) with minimal cues (75-90% accuracy)  -AC      Time Frame (Tolerate trials) 1 week  -AC      Progress/Outcomes (Tolerate trials) goal revised this date  -AC      Comment (Tolerate trials) Pt reported disliking pureed trays. Feels like she's getting the same foods. Noted she did not eat majority of pureed items on last meal tray in room. Also c/o ill-fitting dentures. Upper/lower plates placed. Trialed soft solids, puree, and thin liquid via cup. Increased oral prep time w/ solids, but mastication adequate and no significant oral residue noted. No overt clinical s/sxs aspiration.  -AC         (STG) Pharyngeal Strengthening Exercise Goal 1 (SLP)    Increase Timing prepping - 3 second prep or suck swallow or 3-step swallow  -AC      Acadia/Accuracy (Pharyngeal Strengthening Goal 1, SLP) with minimal cues (75-90% accuracy)  -AC      Time Frame (Pharyngeal Strengthening Goal 1, SLP) 1 week  -AC      Progress/Outcomes (Pharyngeal Strengthening Goal 1, SLP) goal ongoing  -AC                User Key  (r) = Recorded By, (t) = Taken By, (c) = Cosigned By      Initials Name Provider Type     Julissa Mcfadden MS CCC-SLP Speech and Language Pathologist                         Time Calculation:    Time Calculation- SLP       Row Name 03/28/25 3271             Time Calculation- SLP    SLP Start Time 1635  -      SLP Received  On 03/28/25  -AC         Untimed Charges    01110-VD Treatment Swallow Minutes 42  -AC         Total Minutes    Untimed Charges Total Minutes 42  -AC       Total Minutes 42  -AC                User Key  (r) = Recorded By, (t) = Taken By, (c) = Cosigned By      Initials Name Provider Type    Julissa Poe MS CCC-SLP Speech and Language Pathologist                    Therapy Charges for Today       Code Description Service Date Service Provider Modifiers Qty    [id number]  ST TREATMENT SWALLOW 3 3/28/2025 Julissa Mcfadden MS CCC-SLP GN 1                 Julissa Mcfadden MS CCC-SLP  3/28/2025

## 2025-03-29 LAB
GLUCOSE BLDC GLUCOMTR-MCNC: 130 MG/DL (ref 70–130)
GLUCOSE BLDC GLUCOMTR-MCNC: 176 MG/DL (ref 70–130)
GLUCOSE BLDC GLUCOMTR-MCNC: 202 MG/DL (ref 70–130)
GLUCOSE BLDC GLUCOMTR-MCNC: 240 MG/DL (ref 70–130)
HCT VFR BLD AUTO: 33.7 % (ref 34–46.6)
HGB BLD-MCNC: 10.6 G/DL (ref 12–15.9)

## 2025-03-29 PROCEDURE — 85018 HEMOGLOBIN: CPT

## 2025-03-29 PROCEDURE — 99232 SBSQ HOSP IP/OBS MODERATE 35: CPT

## 2025-03-29 PROCEDURE — 63710000001 DIPHENHYDRAMINE PER 50 MG: Performed by: INTERNAL MEDICINE

## 2025-03-29 PROCEDURE — 93010 ELECTROCARDIOGRAM REPORT: CPT | Performed by: INTERNAL MEDICINE

## 2025-03-29 PROCEDURE — 85014 HEMATOCRIT: CPT

## 2025-03-29 PROCEDURE — 93005 ELECTROCARDIOGRAM TRACING: CPT

## 2025-03-29 PROCEDURE — 82948 REAGENT STRIP/BLOOD GLUCOSE: CPT

## 2025-03-29 PROCEDURE — 63710000001 INSULIN LISPRO (HUMAN) PER 5 UNITS: Performed by: INTERNAL MEDICINE

## 2025-03-29 RX ORDER — HYDROCORTISONE 25 MG/G
CREAM TOPICAL 2 TIMES DAILY
Status: DISCONTINUED | OUTPATIENT
Start: 2025-03-29 | End: 2025-04-07 | Stop reason: HOSPADM

## 2025-03-29 RX ADMIN — CARVEDILOL 25 MG: 12.5 TABLET, FILM COATED ORAL at 09:26

## 2025-03-29 RX ADMIN — DIPHENHYDRAMINE HYDROCHLORIDE 25 MG: 25 CAPSULE ORAL at 21:59

## 2025-03-29 RX ADMIN — INSULIN LISPRO 2 UNITS: 100 INJECTION, SOLUTION INTRAVENOUS; SUBCUTANEOUS at 12:54

## 2025-03-29 RX ADMIN — APIXABAN 5 MG: 5 TABLET, FILM COATED ORAL at 09:26

## 2025-03-29 RX ADMIN — INSULIN LISPRO 3 UNITS: 100 INJECTION, SOLUTION INTRAVENOUS; SUBCUTANEOUS at 18:34

## 2025-03-29 RX ADMIN — NIFEDIPINE 60 MG: 60 TABLET, EXTENDED RELEASE ORAL at 09:26

## 2025-03-29 RX ADMIN — Medication 10 ML: at 09:27

## 2025-03-29 RX ADMIN — CARVEDILOL 25 MG: 12.5 TABLET, FILM COATED ORAL at 18:34

## 2025-03-29 RX ADMIN — ISOSORBIDE MONONITRATE 30 MG: 30 TABLET, EXTENDED RELEASE ORAL at 09:26

## 2025-03-29 RX ADMIN — Medication 10 ML: at 21:54

## 2025-03-29 RX ADMIN — RIVASTIGMINE 1 PATCH: 4.6 PATCH, EXTENDED RELEASE TRANSDERMAL at 16:58

## 2025-03-29 RX ADMIN — FLUCONAZOLE 100 MG: 100 TABLET ORAL at 16:58

## 2025-03-29 RX ADMIN — ATORVASTATIN CALCIUM 80 MG: 40 TABLET, FILM COATED ORAL at 21:59

## 2025-03-29 RX ADMIN — PANTOPRAZOLE SODIUM 40 MG: 40 TABLET, DELAYED RELEASE ORAL at 09:26

## 2025-03-29 RX ADMIN — ASPIRIN 81 MG: 81 TABLET, CHEWABLE ORAL at 09:26

## 2025-03-29 RX ADMIN — ACETAMINOPHEN 650 MG: 325 TABLET, FILM COATED ORAL at 21:59

## 2025-03-29 RX ADMIN — LIDOCAINE 1 PATCH: 4 PATCH TOPICAL at 09:26

## 2025-03-29 RX ADMIN — APIXABAN 5 MG: 5 TABLET, FILM COATED ORAL at 21:59

## 2025-03-29 NOTE — PLAN OF CARE
Goal Outcome Evaluation:           Progress: no change  Outcome Evaluation: Oriented to self only today. NSR, RA. sat in chair today w/ 1-2 assist. one moderate BM this afternoon, small amount of blood noted- Carol David notified.

## 2025-03-29 NOTE — PROGRESS NOTES
[facility] Medicine Services  PROGRESS NOTE    Patient Name: Valarie Camara  : 1945  MRN: [id number]    Date of Admission: 3/20/2025  Primary Care Physician: Lay Cox MD    Subjective   Subjective     CC:   Follow up R weakness    HPI:  Patient states she feels like there are too many things in her bed for her to move comfortably. She also states she needs assistance with eating her meals.     Objective   Objective     Vital Signs:   Temp:  [97.3 °F (36.3 °C)-98.7 °F (37.1 °C)] 97.3 °F (36.3 °C)  Heart Rate:  [82-96] 86  Resp:  [18] 18  BP: (118-145)/(60-78) 118/65     Physical Exam:   Constitutional: Elderly appearing female lying in bed in NAD  HENT: NCAT, mucous membranes moist  Respiratory: Anterior lung fields clear to auscultation bilaterally, nonlabored respirations on room air  Cardiovascular: RRR, no murmurs  Gastrointestinal: Positive bowel sounds, soft, nontender, nondistended  Musculoskeletal: No bilateral ankle edema  Psychiatric: Appropriate affect, cooperative  Neurologic: Alert, follows commands, speech clear  Skin: No rashes on exposed skin    Results Reviewed:  LAB RESULTS:      Lab 25  0449 25  0550 25  2354 25  1751 25  0914 25  1955 25  0613 25  0220   WBC 8.29 7.13  --   --  6.81  --   --  6.88   HEMOGLOBIN 12.1 12.2  --   --  13.2  --   --  13.3   HEMATOCRIT 37.7 37.8  --   --  42.5  --   --  41.0   PLATELETS 203 230  --   --  227  --   --  255   NEUTROS ABS 6.41  --   --   --  5.04  --   --   --    IMMATURE GRANS (ABS) 0.03  --   --   --  0.02  --   --   --    LYMPHS ABS 1.23  --   --   --  1.27  --   --   --    MONOS ABS 0.56  --   --   --  0.41  --   --   --    EOS ABS 0.04  --   --   --  0.04  --   --   --    MCV 90.6 89.2  --   --  93.6  --   --  89.1   HEPARIN ANTI-XA  --  0.53 0.58 0.65 0.75* 0.81*   < > 0.92*    < > = values in this interval not displayed.         Lab  03/28/25  1119 03/27/25  0449 03/26/25  1113 03/25/25  0550 03/24/25  0914 03/23/25  0220   SODIUM 142 144 144 146* 145 144   POTASSIUM 4.1 4.4 4.1 3.7 4.4 3.8   CHLORIDE 110* 112* 113* 114* 110* 109*   CO2 19.0* 18.0* 19.0* 19.0* 17.0* 18.0*   ANION GAP 13.0 14.0 12.0 13.0 18.0* 17.0*   BUN 38* 29* 26* 28* 30* 25*   CREATININE 1.68* 1.44* 1.25* 1.40* 1.25* 1.47*   EGFR 30.6* 36.8* 43.7* 38.1* 43.7* 35.9*   GLUCOSE 140* 151* 140* 170* 130* 88   CALCIUM 8.8 9.1 8.8 9.0 8.9 9.0   MAGNESIUM  --   --  1.8 1.9 1.9 1.7                                 Brief Urine Lab Results  (Last result in the past 365 days)        Color   Clarity   Blood   Leuk Est   Nitrite   Protein   CREAT   Urine HCG        03/22/25 1129 Yellow   Cloudy   Negative   Negative   Negative   100 mg/dL (2+)                   Microbiology Results Abnormal       Procedure Component Value - Date/Time    Urine Culture - Urine, Straight Cath [[id number]]  (Abnormal) Collected: 03/22/25 1129    Lab Status: Final result Specimen: Urine from Straight Cath Updated: 03/23/25 2043     Urine Culture 25,000 CFU/mL Lactobacillus species     Comment:   Based on laboratory diagnosis criteria, these organisms are common urogenital commensal lilly and have not been associated with urinary tract infections; clinical correlation is recommended.       Narrative:      Colonization of the urinary tract without infection is common. Treatment is discouraged unless the patient is symptomatic, pregnant, or undergoing an invasive urologic procedure.            No radiology results from the last 24 hrs      Results for orders placed during the hospital encounter of 03/20/25    Adult Transthoracic Echo Complete W/ Cont if Necessary Per Protocol (With Agitated Saline)    Interpretation Summary    Left ventricular systolic function is normal. Calculated left ventricular EF = 67% Left ventricular ejection fraction appears to be 66 - 70%.    Left ventricular wall thickness is consistent  with mild concentric hypertrophy.    Left ventricular outflow tract peak flow gradient at rest is 11 mmHg. Left ventricular outflow tract peak gradient with valsalva is 29 mmHg.    Left ventricular diastolic function is consistent with (grade I) impaired relaxation.    The mitral valve peak gradient is 9 mmHg. The mitral valve mean gradient is 4 mmHg.    Calculated right ventricular systolic pressure from tricuspid regurgitation is 21 mmHg.    There is a trivial pericardial effusion.      Current medications:  Scheduled Meds:apixaban, 5 mg, Oral, Q12H  aspirin, 81 mg, Oral, Daily   Or  aspirin, 300 mg, Rectal, Daily  atorvastatin, 80 mg, Oral, Nightly  carvedilol, 25 mg, Oral, BID With Meals  fluconazole, 100 mg, Oral, Q24H  insulin lispro, 2-7 Units, Subcutaneous, TID With Meals  isosorbide mononitrate, 30 mg, Oral, Q24H  Lidocaine, 1 patch, Transdermal, Q24H  NIFEdipine XL, 60 mg, Oral, Q24H  pantoprazole, 40 mg, Oral, Q AM  rivastigmine, 1 patch, Transdermal, Daily  sodium chloride, 10 mL, Intravenous, Q12H      Continuous Infusions:     PRN Meds:.  acetaminophen    acetaminophen    senna-docusate sodium **AND** polyethylene glycol **AND** bisacodyl **AND** bisacodyl    Calcium Replacement - Follow Nurse / BPA Driven Protocol    dextrose    dextrose    dextrose    diphenhydrAMINE    glucagon (human recombinant)    hydrALAZINE    Magnesium Standard Dose Replacement - Follow Nurse / BPA Driven Protocol    nitroglycerin    Phosphorus Replacement - Follow Nurse / BPA Driven Protocol    Potassium Replacement - Follow Nurse / BPA Driven Protocol    sodium chloride    sodium chloride    Assessment & Plan   Assessment & Plan     Active Hospital Problems    Diagnosis  POA    **Encephalopathy [G93.40]  Unknown    Pulmonary emboli [I26.99]  Yes    Gastroesophageal reflux disease with esophagitis [K21.00]  Yes    Anxiety and depression [F41.9, F32.A]  Yes    Multiple-type hyperlipidemia [E78.2]  Yes    Benign essential  hypertension [I10]  Yes      Resolved Hospital Problems    Diagnosis Date Resolved POA    CVA (cerebral vascular accident) [I63.9] 03/22/2025 Yes        Brief Hospital Course to date:  Valarie Camara is a 79 y.o. female  w/ hx CAD, PAD, L ICA dz (50-69% stenosis) CKD3 (baseline cr ~1.8-1.9), HTN, mild dementia, PE (on Eliquis), DM2 who presented with confusion, aphasia, and some right-sided weakness. Patient seen by Neuro-Stroke team. However, stroke workup was negative and they suspected stroke recrudescence in setting of infection/metabolic derangements. UA was concerning for UTI. After admission, patient had significant HTN. Cardene gtt initiated. Eliquis was held and transitioned to heparin gtt. General neurology consulted for ongoing encephalopathy. EEG negative.     This patient's problems and plans were partially entered by my partner and updated as appropriate by me 03/29/25.    Encephalopathy  Aphasia & right-sided weakness Left ICA stenosis (50-69%), & multifocal atherosclerotic diseas  Episode of lethargy/somnolence evening 3/21/25 due to seroquel  Mild dementia  Hx previous left thalamic cva  - Neuro-stroke followed, suspect left hemispheric stroke vs stroke recrudescence in setting of I infection/metabolic derangement  - TSH wnl, initial UA benign  - EEG 3/22 negative  - Prior echo 2/28/25: EF 61-65%, diastolic dysfunction  - CT head 3/20 negative for acute stroke, shows stable chronic lacunar infarct of the left thalamus  - CTA H/N 3/20 negative for flow-limiting stenosis or LVO  - Repeat CT head 3/21 negative/unchanged from original CT head  - ECHO 3/21 shows EF 66-70%, diastolic dysfunction  - BLE duplex negative  - Evening 3/21 became lethargic after receiving Seroquel 50 mg. Had tolerated Geodon earlier in the day  - MRI brain 3/22 negative  - Per discussion with daughter, patient has become lethargic in past w/ trazodone and Seroquel >> avoid seroquel & trazodone  - 3/24/25 more confused and  less interactive, so General Neurology consulted  - Repeat EEG 3/25 negative  - Neuro recommended rivastigmine patch 4.9 mg daily. If pt has extreme agitation, Neuro recommends IM or IV Valium. Avoid Geodon given lethargy  - Per Neuro-Stroke team, continue ASA, statin, anticoagulation  - Follow up in [facility] 8-12 weeks  - SLP following, modified diet recs in place  - PT, OT recommending SNF rehab, recently home from [facility]     UTI  - Initial UA negative  - UA 3/22 with 4+ bacteria, 6-10 WBCs, large yeast  - Initiated on Rocephin  - Urine culture with lactobacillus species, Rocephin discontinued     HTN  - S/p cardene  - BP stable, titrate medications as tolerated     Candidiasis  - Patient recently on Rocephin  - Diflucan 100 mg daily x 7 days, defer Nystatin given dysphagia  - Monitor QTc intermittently, pt has tolerated Diflucan in past per gwyn  - EKG pending 3/29/25    Diet-controlled DM2  - A1c 6.9 on 3/4/25  - SSI for now, titrate prn, stable trend     Hx PE 2/28/25 at [facility]  - S/p heparin gtt while NPO  - Eliquis resumed 3/25/25, tolerating PO     KURT on CKD 3 (baseline cr ~1.8-1.9)  - Initial creatinine was 2.5  - Creatinine 1.4 now, stable    Expected Discharge Location and Transportation: SNF rehab vs home with   Expected Discharge   Expected Discharge Date: 3/30/2025; Expected Discharge Time:      VTE Prophylaxis:  Pharmacologic & mechanical VTE prophylaxis orders are present.         AM-PAC 6 Clicks Score (PT): 14 (03/28/25 2000)    CODE STATUS:   Code Status and Medical Interventions: CPR (Attempt to Resuscitate); Full Support   Ordered at: 03/20/25 5920     Code Status (Patient has no pulse and is not breathing):    CPR (Attempt to Resuscitate)     Medical Interventions (Patient has pulse or is breathing):    Full Support       Carol Hernandez PA-C  03/29/25

## 2025-03-29 NOTE — PLAN OF CARE
Problem: Adult Inpatient Plan of Care  Goal: Plan of Care Review  Outcome: Progressing  Goal: Patient-Specific Goal (Individualized)  Outcome: Progressing  Goal: Absence of Hospital-Acquired Illness or Injury  Outcome: Progressing  Intervention: Identify and Manage Fall Risk  Recent Flowsheet Documentation  Taken 3/29/2025 0600 by Rossy Kirkland RN  Safety Promotion/Fall Prevention:   activity supervised   assistive device/personal items within reach   clutter free environment maintained   fall prevention program maintained   lighting adjusted   nonskid shoes/slippers when out of bed   room organization consistent   safety round/check completed  Taken 3/29/2025 0400 by Rossy Kirkland RN  Safety Promotion/Fall Prevention:   activity supervised   assistive device/personal items within reach   clutter free environment maintained   fall prevention program maintained   lighting adjusted   nonskid shoes/slippers when out of bed   room organization consistent   safety round/check completed  Taken 3/29/2025 0200 by Rossy Kirkland RN  Safety Promotion/Fall Prevention:   activity supervised   assistive device/personal items within reach   clutter free environment maintained   fall prevention program maintained   lighting adjusted   nonskid shoes/slippers when out of bed   room organization consistent   safety round/check completed  Taken 3/29/2025 0000 by Rossy Kirkland RN  Safety Promotion/Fall Prevention:   activity supervised   assistive device/personal items within reach   clutter free environment maintained   fall prevention program maintained   lighting adjusted   nonskid shoes/slippers when out of bed   room organization consistent   safety round/check completed  Taken 3/28/2025 2200 by Rossy Kirkland RN  Safety Promotion/Fall Prevention:   activity supervised   assistive device/personal items within reach   clutter free environment maintained   fall prevention program maintained   lighting adjusted    nonskid shoes/slippers when out of bed   room organization consistent   safety round/check completed  Taken 3/28/2025 2000 by Rossy Kirkland RN  Safety Promotion/Fall Prevention:   activity supervised   assistive device/personal items within reach   clutter free environment maintained   fall prevention program maintained   lighting adjusted   nonskid shoes/slippers when out of bed   room organization consistent   safety round/check completed  Intervention: Prevent Skin Injury  Recent Flowsheet Documentation  Taken 3/29/2025 0600 by Rossy Kirkland RN  Body Position: supine  Skin Protection:   incontinence pads utilized   transparent dressing maintained  Taken 3/29/2025 0400 by Rossy Kirkland RN  Body Position:   turned   right  Skin Protection:   incontinence pads utilized   transparent dressing maintained  Taken 3/29/2025 0200 by Rossy Kirkland RN  Body Position:   turned   left  Skin Protection:   incontinence pads utilized   transparent dressing maintained  Taken 3/29/2025 0000 by Rossy Kirkland RN  Body Position: supine  Skin Protection:   incontinence pads utilized   transparent dressing maintained  Taken 3/28/2025 2200 by Rossy Kirkland RN  Body Position:   turned   right  Skin Protection:   incontinence pads utilized   transparent dressing maintained  Taken 3/28/2025 2000 by Rossy Kirkland RN  Body Position:   turned   left  Skin Protection:   incontinence pads utilized   transparent dressing maintained  Intervention: Prevent and Manage VTE (Venous Thromboembolism) Risk  Recent Flowsheet Documentation  Taken 3/28/2025 2000 by Rossy Kirkland RN  VTE Prevention/Management: (pt takes eliquis)   bilateral   SCDs (sequential compression devices) off  Intervention: Prevent Infection  Recent Flowsheet Documentation  Taken 3/29/2025 0600 by Rossy Kirkland RN  Infection Prevention:   cohorting utilized   environmental surveillance performed   equipment surfaces disinfected   hand hygiene  promoted   rest/sleep promoted  Taken 3/29/2025 0400 by Rossy Kirkland RN  Infection Prevention:   cohorting utilized   environmental surveillance performed   equipment surfaces disinfected   hand hygiene promoted   rest/sleep promoted  Taken 3/29/2025 0200 by Rossy Kirkland RN  Infection Prevention:   cohorting utilized   environmental surveillance performed   equipment surfaces disinfected   hand hygiene promoted   rest/sleep promoted  Taken 3/29/2025 0000 by Rossy Kirkland RN  Infection Prevention:   cohorting utilized   environmental surveillance performed   equipment surfaces disinfected   hand hygiene promoted   rest/sleep promoted  Taken 3/28/2025 2200 by Rossy Kirkland RN  Infection Prevention:   cohorting utilized   environmental surveillance performed   equipment surfaces disinfected   hand hygiene promoted   rest/sleep promoted  Taken 3/28/2025 2000 by Rossy Kirkland RN  Infection Prevention:   cohorting utilized   environmental surveillance performed   equipment surfaces disinfected   hand hygiene promoted   rest/sleep promoted  Goal: Optimal Comfort and Wellbeing  Outcome: Progressing  Intervention: Provide Person-Centered Care  Recent Flowsheet Documentation  Taken 3/28/2025 2000 by Rossy Kirkland RN  Trust Relationship/Rapport:   care explained   choices provided  Goal: Readiness for Transition of Care  Outcome: Progressing     Problem: Skin Injury Risk Increased  Goal: Skin Health and Integrity  Outcome: Progressing  Intervention: Optimize Skin Protection  Recent Flowsheet Documentation  Taken 3/29/2025 0600 by Rossy Kirkland RN  Activity Management: activity encouraged  Pressure Reduction Techniques:   frequent weight shift encouraged   weight shift assistance provided   heels elevated off bed   pressure points protected  Head of Bed (HOB) Positioning: HOB elevated  Pressure Reduction Devices:   pressure-redistributing mattress utilized   positioning supports utilized   heel  offloading device utilized  Skin Protection:   incontinence pads utilized   transparent dressing maintained  Taken 3/29/2025 0400 by Rossy Kirkland RN  Activity Management: activity encouraged  Pressure Reduction Techniques:   frequent weight shift encouraged   weight shift assistance provided   heels elevated off bed   pressure points protected  Head of Bed (HOB) Positioning: HOB elevated  Pressure Reduction Devices:   pressure-redistributing mattress utilized   positioning supports utilized   heel offloading device utilized  Skin Protection:   incontinence pads utilized   transparent dressing maintained  Taken 3/29/2025 0200 by Rossy Kirkland RN  Activity Management: activity encouraged  Pressure Reduction Techniques:   frequent weight shift encouraged   weight shift assistance provided   heels elevated off bed   pressure points protected  Head of Bed (HOB) Positioning: HOB elevated  Pressure Reduction Devices:   pressure-redistributing mattress utilized   positioning supports utilized   heel offloading device utilized  Skin Protection:   incontinence pads utilized   transparent dressing maintained  Taken 3/29/2025 0000 by Rossy Kirkland RN  Activity Management: activity encouraged  Pressure Reduction Techniques:   frequent weight shift encouraged   weight shift assistance provided   heels elevated off bed   pressure points protected  Head of Bed (HOB) Positioning: HOB elevated  Pressure Reduction Devices:   pressure-redistributing mattress utilized   positioning supports utilized   heel offloading device utilized  Skin Protection:   incontinence pads utilized   transparent dressing maintained  Taken 3/28/2025 2200 by Rossy Kirkland RN  Activity Management: activity encouraged  Pressure Reduction Techniques:   frequent weight shift encouraged   weight shift assistance provided   heels elevated off bed   pressure points protected  Head of Bed (HOB) Positioning: HOB elevated  Pressure Reduction  Devices:   pressure-redistributing mattress utilized   positioning supports utilized   heel offloading device utilized  Skin Protection:   incontinence pads utilized   transparent dressing maintained  Taken 3/28/2025 2000 by Rossy Kirkland RN  Activity Management: activity encouraged  Pressure Reduction Techniques:   frequent weight shift encouraged   weight shift assistance provided   heels elevated off bed   pressure points protected  Head of Bed (HOB) Positioning: HOB elevated  Pressure Reduction Devices:   pressure-redistributing mattress utilized   positioning supports utilized   heel offloading device utilized  Skin Protection:   incontinence pads utilized   transparent dressing maintained     Problem: Comorbidity Management  Goal: Maintenance of Behavioral Health Symptom Control  Outcome: Progressing  Intervention: Maintain Behavioral Health Symptom Control  Recent Flowsheet Documentation  Taken 3/28/2025 2000 by Rossy Kirkland RN  Medication Review/Management: medications reviewed  Goal: Blood Glucose Level Within Target Range  Outcome: Progressing  Intervention: Monitor and Manage Glycemia  Recent Flowsheet Documentation  Taken 3/28/2025 2000 by Rossy Kirkland RN  Medication Review/Management: medications reviewed  Goal: Blood Pressure in Desired Range  Outcome: Progressing  Intervention: Maintain Blood Pressure Management  Recent Flowsheet Documentation  Taken 3/28/2025 2000 by Rossy Kirkland RN  Medication Review/Management: medications reviewed     Problem: Fall Injury Risk  Goal: Absence of Fall and Fall-Related Injury  Outcome: Progressing  Intervention: Identify and Manage Contributors  Recent Flowsheet Documentation  Taken 3/29/2025 0600 by Rossy Kirkland RN  Self-Care Promotion:   independence encouraged   BADL personal objects within reach  Taken 3/29/2025 0400 by Rossy Kirkland RN  Self-Care Promotion:   independence encouraged   BADL personal objects within reach  Taken  3/29/2025 0200 by Rossy Kirkland RN  Self-Care Promotion:   independence encouraged   BADL personal objects within reach  Taken 3/29/2025 0000 by Rossy Kirkland RN  Self-Care Promotion:   independence encouraged   BADL personal objects within reach  Taken 3/28/2025 2200 by Rossy Kirkland RN  Self-Care Promotion:   independence encouraged   BADL personal objects within reach  Taken 3/28/2025 2000 by Rossy Kirkland RN  Medication Review/Management: medications reviewed  Self-Care Promotion:   independence encouraged   BADL personal objects within reach  Intervention: Promote Injury-Free Environment  Recent Flowsheet Documentation  Taken 3/29/2025 0600 by Rossy Kirkland RN  Safety Promotion/Fall Prevention:   activity supervised   assistive device/personal items within reach   clutter free environment maintained   fall prevention program maintained   lighting adjusted   nonskid shoes/slippers when out of bed   room organization consistent   safety round/check completed  Taken 3/29/2025 0400 by Rossy Kirkland RN  Safety Promotion/Fall Prevention:   activity supervised   assistive device/personal items within reach   clutter free environment maintained   fall prevention program maintained   lighting adjusted   nonskid shoes/slippers when out of bed   room organization consistent   safety round/check completed  Taken 3/29/2025 0200 by Rossy Kirkland RN  Safety Promotion/Fall Prevention:   activity supervised   assistive device/personal items within reach   clutter free environment maintained   fall prevention program maintained   lighting adjusted   nonskid shoes/slippers when out of bed   room organization consistent   safety round/check completed  Taken 3/29/2025 0000 by Rossy Kirkland RN  Safety Promotion/Fall Prevention:   activity supervised   assistive device/personal items within reach   clutter free environment maintained   fall prevention program maintained   lighting adjusted   nonskid  shoes/slippers when out of bed   room organization consistent   safety round/check completed  Taken 3/28/2025 2200 by Rossy Kirkland RN  Safety Promotion/Fall Prevention:   activity supervised   assistive device/personal items within reach   clutter free environment maintained   fall prevention program maintained   lighting adjusted   nonskid shoes/slippers when out of bed   room organization consistent   safety round/check completed  Taken 3/28/2025 2000 by Rossy Kirkland RN  Safety Promotion/Fall Prevention:   activity supervised   assistive device/personal items within reach   clutter free environment maintained   fall prevention program maintained   lighting adjusted   nonskid shoes/slippers when out of bed   room organization consistent   safety round/check completed   Goal Outcome Evaluation:

## 2025-03-30 LAB
ANION GAP SERPL CALCULATED.3IONS-SCNC: 17 MMOL/L (ref 5–15)
BASOPHILS # BLD AUTO: 0.03 10*3/MM3 (ref 0–0.2)
BASOPHILS NFR BLD AUTO: 0.5 % (ref 0–1.5)
BUN SERPL-MCNC: 36 MG/DL (ref 8–23)
BUN/CREAT SERPL: 20 (ref 7–25)
CALCIUM SPEC-SCNC: 8.7 MG/DL (ref 8.6–10.5)
CHLORIDE SERPL-SCNC: 110 MMOL/L (ref 98–107)
CO2 SERPL-SCNC: 17 MMOL/L (ref 22–29)
CREAT SERPL-MCNC: 1.8 MG/DL (ref 0.57–1)
DEPRECATED RDW RBC AUTO: 51.1 FL (ref 37–54)
EGFRCR SERPLBLD CKD-EPI 2021: 28.2 ML/MIN/1.73
EOSINOPHIL # BLD AUTO: 0.09 10*3/MM3 (ref 0–0.4)
EOSINOPHIL NFR BLD AUTO: 1.5 % (ref 0.3–6.2)
ERYTHROCYTE [DISTWIDTH] IN BLOOD BY AUTOMATED COUNT: 15.3 % (ref 12.3–15.4)
GLUCOSE BLDC GLUCOMTR-MCNC: 138 MG/DL (ref 70–130)
GLUCOSE BLDC GLUCOMTR-MCNC: 156 MG/DL (ref 70–130)
GLUCOSE BLDC GLUCOMTR-MCNC: 194 MG/DL (ref 70–130)
GLUCOSE BLDC GLUCOMTR-MCNC: 223 MG/DL (ref 70–130)
GLUCOSE SERPL-MCNC: 145 MG/DL (ref 65–99)
HCT VFR BLD AUTO: 35.4 % (ref 34–46.6)
HGB BLD-MCNC: 11.4 G/DL (ref 12–15.9)
IMM GRANULOCYTES # BLD AUTO: 0.02 10*3/MM3 (ref 0–0.05)
IMM GRANULOCYTES NFR BLD AUTO: 0.3 % (ref 0–0.5)
LYMPHOCYTES # BLD AUTO: 1.49 10*3/MM3 (ref 0.7–3.1)
LYMPHOCYTES NFR BLD AUTO: 24.4 % (ref 19.6–45.3)
MCH RBC QN AUTO: 29.4 PG (ref 26.6–33)
MCHC RBC AUTO-ENTMCNC: 32.2 G/DL (ref 31.5–35.7)
MCV RBC AUTO: 91.2 FL (ref 79–97)
MONOCYTES # BLD AUTO: 0.54 10*3/MM3 (ref 0.1–0.9)
MONOCYTES NFR BLD AUTO: 8.8 % (ref 5–12)
NEUTROPHILS NFR BLD AUTO: 3.94 10*3/MM3 (ref 1.7–7)
NEUTROPHILS NFR BLD AUTO: 64.5 % (ref 42.7–76)
NRBC BLD AUTO-RTO: 0 /100 WBC (ref 0–0.2)
PLATELET # BLD AUTO: 233 10*3/MM3 (ref 140–450)
PMV BLD AUTO: 11.1 FL (ref 6–12)
POTASSIUM SERPL-SCNC: 4.2 MMOL/L (ref 3.5–5.2)
QT INTERVAL: 336 MS
QT INTERVAL: 352 MS
QT INTERVAL: 360 MS
QT INTERVAL: 378 MS
QTC INTERVAL: 392 MS
QTC INTERVAL: 405 MS
QTC INTERVAL: 412 MS
QTC INTERVAL: 425 MS
RBC # BLD AUTO: 3.88 10*6/MM3 (ref 3.77–5.28)
SODIUM SERPL-SCNC: 144 MMOL/L (ref 136–145)
WBC NRBC COR # BLD AUTO: 6.11 10*3/MM3 (ref 3.4–10.8)

## 2025-03-30 PROCEDURE — 85025 COMPLETE CBC W/AUTO DIFF WBC: CPT

## 2025-03-30 PROCEDURE — 99232 SBSQ HOSP IP/OBS MODERATE 35: CPT | Performed by: HOSPITALIST

## 2025-03-30 PROCEDURE — 80048 BASIC METABOLIC PNL TOTAL CA: CPT

## 2025-03-30 PROCEDURE — 82948 REAGENT STRIP/BLOOD GLUCOSE: CPT

## 2025-03-30 PROCEDURE — 63710000001 INSULIN LISPRO (HUMAN) PER 5 UNITS: Performed by: INTERNAL MEDICINE

## 2025-03-30 RX ADMIN — Medication 10 ML: at 21:28

## 2025-03-30 RX ADMIN — LIDOCAINE 1 PATCH: 4 PATCH TOPICAL at 10:45

## 2025-03-30 RX ADMIN — ASPIRIN 81 MG: 81 TABLET, CHEWABLE ORAL at 08:30

## 2025-03-30 RX ADMIN — INSULIN LISPRO 2 UNITS: 100 INJECTION, SOLUTION INTRAVENOUS; SUBCUTANEOUS at 18:09

## 2025-03-30 RX ADMIN — CARVEDILOL 25 MG: 12.5 TABLET, FILM COATED ORAL at 18:09

## 2025-03-30 RX ADMIN — APIXABAN 5 MG: 5 TABLET, FILM COATED ORAL at 08:30

## 2025-03-30 RX ADMIN — Medication 10 ML: at 08:30

## 2025-03-30 RX ADMIN — INSULIN LISPRO 3 UNITS: 100 INJECTION, SOLUTION INTRAVENOUS; SUBCUTANEOUS at 13:33

## 2025-03-30 RX ADMIN — HYDROCORTISONE 2.5%: 25 CREAM TOPICAL at 21:27

## 2025-03-30 RX ADMIN — FLUCONAZOLE 100 MG: 100 TABLET ORAL at 15:56

## 2025-03-30 RX ADMIN — RIVASTIGMINE 1 PATCH: 4.6 PATCH, EXTENDED RELEASE TRANSDERMAL at 15:09

## 2025-03-30 RX ADMIN — ATORVASTATIN CALCIUM 80 MG: 40 TABLET, FILM COATED ORAL at 21:27

## 2025-03-30 RX ADMIN — ISOSORBIDE MONONITRATE 30 MG: 30 TABLET, EXTENDED RELEASE ORAL at 08:30

## 2025-03-30 RX ADMIN — APIXABAN 5 MG: 5 TABLET, FILM COATED ORAL at 21:27

## 2025-03-30 RX ADMIN — PANTOPRAZOLE SODIUM 40 MG: 40 TABLET, DELAYED RELEASE ORAL at 08:30

## 2025-03-30 RX ADMIN — CARVEDILOL 25 MG: 12.5 TABLET, FILM COATED ORAL at 08:30

## 2025-03-30 RX ADMIN — NIFEDIPINE 60 MG: 60 TABLET, EXTENDED RELEASE ORAL at 08:30

## 2025-03-30 NOTE — PROGRESS NOTES
[facility] Medicine Services  PROGRESS NOTE    Patient Name: Valarie Camara  : 1945  MRN: [id number]    Date of Admission: 3/20/2025  Primary Care Physician: Lay Cox MD    Subjective   Subjective     CC:  R weakness    HPI: Says she feels confused, conversant however. No f/c. No n/v. No abdominal pain. Notes toes feel sore.     Objective   Objective     Vital Signs:   Temp:  [97.3 °F (36.3 °C)-98.4 °F (36.9 °C)] 98.2 °F (36.8 °C)  Heart Rate:  [83-87] 84  Resp:  [18] 18  BP: (118-132)/(59-84) 127/84     Physical Exam:   NAD, alert  OP clear, dry MM  Neck supple  RRR  Decreased at bases, otherwise seemingly clear, non-labored  +BS, soft  MENESES  Normal affect    Results Reviewed:  LAB RESULTS:      Lab 25  2137 25  0449 25  0550 25  2354 25  1751 25  0914 25   WBC  --  8.29 7.13  --   --  6.81  --    HEMOGLOBIN 10.6* 12.1 12.2  --   --  13.2  --    HEMATOCRIT 33.7* 37.7 37.8  --   --  42.5  --    PLATELETS  --  203 230  --   --  227  --    NEUTROS ABS  --  6.41  --   --   --  5.04  --    IMMATURE GRANS (ABS)  --  0.03  --   --   --  0.02  --    LYMPHS ABS  --  1.23  --   --   --  1.27  --    MONOS ABS  --  0.56  --   --   --  0.41  --    EOS ABS  --  0.04  --   --   --  0.04  --    MCV  --  90.6 89.2  --   --  93.6  --    HEPARIN ANTI-XA  --   --  0.53 0.58 0.65 0.75* 0.81*         Lab 25  1119 25  0449 25  1113 25  0550 25  0914   SODIUM 142 144 144 146* 145   POTASSIUM 4.1 4.4 4.1 3.7 4.4   CHLORIDE 110* 112* 113* 114* 110*   CO2 19.0* 18.0* 19.0* 19.0* 17.0*   ANION GAP 13.0 14.0 12.0 13.0 18.0*   BUN 38* 29* 26* 28* 30*   CREATININE 1.68* 1.44* 1.25* 1.40* 1.25*   EGFR 30.6* 36.8* 43.7* 38.1* 43.7*   GLUCOSE 140* 151* 140* 170* 130*   CALCIUM 8.8 9.1 8.8 9.0 8.9   MAGNESIUM  --   --  1.8 1.9 1.9                                 Brief Urine Lab Results  (Last result in the past  365 days)        Color   Clarity   Blood   Leuk Est   Nitrite   Protein   CREAT   Urine HCG        03/22/25 1129 Yellow   Cloudy   Negative   Negative   Negative   100 mg/dL (2+)                   Microbiology Results Abnormal       Procedure Component Value - Date/Time    Urine Culture - Urine, Straight Cath [[id number]]  (Abnormal) Collected: 03/22/25 1129    Lab Status: Final result Specimen: Urine from Straight Cath Updated: 03/23/25 2043     Urine Culture 25,000 CFU/mL Lactobacillus species     Comment:   Based on laboratory diagnosis criteria, these organisms are common urogenital commensal lilly and have not been associated with urinary tract infections; clinical correlation is recommended.       Narrative:      Colonization of the urinary tract without infection is common. Treatment is discouraged unless the patient is symptomatic, pregnant, or undergoing an invasive urologic procedure.            No radiology results from the last 24 hrs      Results for orders placed during the hospital encounter of 03/20/25    Adult Transthoracic Echo Complete W/ Cont if Necessary Per Protocol (With Agitated Saline)    Interpretation Summary    Left ventricular systolic function is normal. Calculated left ventricular EF = 67% Left ventricular ejection fraction appears to be 66 - 70%.    Left ventricular wall thickness is consistent with mild concentric hypertrophy.    Left ventricular outflow tract peak flow gradient at rest is 11 mmHg. Left ventricular outflow tract peak gradient with valsalva is 29 mmHg.    Left ventricular diastolic function is consistent with (grade I) impaired relaxation.    The mitral valve peak gradient is 9 mmHg. The mitral valve mean gradient is 4 mmHg.    Calculated right ventricular systolic pressure from tricuspid regurgitation is 21 mmHg.    There is a trivial pericardial effusion.      Current medications:  Scheduled Meds:apixaban, 5 mg, Oral, Q12H  aspirin, 81 mg, Oral, Daily   Or  aspirin,  300 mg, Rectal, Daily  atorvastatin, 80 mg, Oral, Nightly  carvedilol, 25 mg, Oral, BID With Meals  fluconazole, 100 mg, Oral, Q24H  Hydrocortisone (Perianal), , Rectal, BID  insulin lispro, 2-7 Units, Subcutaneous, TID With Meals  isosorbide mononitrate, 30 mg, Oral, Q24H  Lidocaine, 1 patch, Transdermal, Q24H  NIFEdipine XL, 60 mg, Oral, Q24H  pantoprazole, 40 mg, Oral, Q AM  rivastigmine, 1 patch, Transdermal, Daily  sodium chloride, 10 mL, Intravenous, Q12H      Continuous Infusions:     PRN Meds:.  acetaminophen    acetaminophen    senna-docusate sodium **AND** polyethylene glycol **AND** bisacodyl **AND** bisacodyl    Calcium Replacement - Follow Nurse / BPA Driven Protocol    dextrose    dextrose    dextrose    diphenhydrAMINE    glucagon (human recombinant)    hydrALAZINE    Magnesium Standard Dose Replacement - Follow Nurse / BPA Driven Protocol    nitroglycerin    Phosphorus Replacement - Follow Nurse / BPA Driven Protocol    Potassium Replacement - Follow Nurse / BPA Driven Protocol    sodium chloride    sodium chloride    Assessment & Plan   Assessment & Plan     Active Hospital Problems    Diagnosis  POA    **Encephalopathy [G93.40]  Unknown    Pulmonary emboli [I26.99]  Yes    Gastroesophageal reflux disease with esophagitis [K21.00]  Yes    Anxiety and depression [F41.9, F32.A]  Yes    Multiple-type hyperlipidemia [E78.2]  Yes    Benign essential hypertension [I10]  Yes      Resolved Hospital Problems    Diagnosis Date Resolved POA    CVA (cerebral vascular accident) [I63.9] 03/22/2025 Yes        Brief Hospital Course to date:  Valarie Camara is a 79 y.o. female  w/ hx CAD, PAD, L ICA dz (50-69% stenosis) CKD3 (baseline cr ~1.8-1.9), HTN, mild dementia, PE (on Eliquis), DM2 who presented with confusion, aphasia, and some right-sided weakness. Patient seen by Neuro-Stroke team. However, stroke workup was negative and they suspected stroke recrudescence in setting of infection/metabolic  derangements. UA was concerning for UTI. After admission, patient had significant HTN. Cardene gtt initiated. Eliquis was held and transitioned to heparin gtt. General neurology consulted for ongoing encephalopathy. EEG negative.     This patient's problems and plans were partially entered by my partner and updated as appropriate by me 03/30/25.    Encephalopathy  Aphasia & right-sided weakness Left ICA stenosis (50-69%), & multifocal atherosclerotic diseas  Episode of lethargy/somnolence evening 3/21/25 due to seroquel  Mild dementia  Hx previous left thalamic cva  - Neuro-stroke followed, suspect left hemispheric stroke vs stroke recrudescence in setting of I infection/metabolic derangement  - TSH wnl, initial UA benign  - EEG 3/22 negative  - Prior echo 2/28/25: EF 61-65%, diastolic dysfunction  - CT head 3/20 negative for acute stroke, shows stable chronic lacunar infarct of the left thalamus  - CTA H/N 3/20 negative for flow-limiting stenosis or LVO  - Repeat CT head 3/21 negative/unchanged from original CT head  - ECHO 3/21 shows EF 66-70%, diastolic dysfunction  - BLE duplex negative  - Evening 3/21 became lethargic after receiving Seroquel 50 mg. Had tolerated Geodon earlier in the day  - MRI brain 3/22 negative  - Per discussion with daughter, patient has become lethargic in past w/ trazodone and Seroquel >> avoid seroquel & trazodone  - 3/24/25 more confused and less interactive, so General Neurology consulted  - Repeat EEG 3/25 negative  - Neuro recommended rivastigmine patch 4.9 mg daily. If pt has extreme agitation, Neuro recommends IM or IV Valium. Avoid Geodon given lethargy  - Per Neuro-Stroke team, continue ASA, statin, anticoagulation  - Follow up in [facility] 8-12 weeks  - SLP following, modified diet recs in place  - PT, OT recommending SNF rehab, recently home from [facility]     UTI  - Initial UA negative  - UA 3/22 with 4+ bacteria, 6-10 WBCs, large yeast  - Initiated on  Rocephin  - Urine culture with lactobacillus species, Rocephin discontinued     HTN  - s/p cardene  - BP stable, titrate medications as tolerated     Candidiasis  - Patient recently on Rocephin  - Diflucan 100 mg daily x 7 days, defer Nystatin given dysphagia  - Monitor QTc intermittently, pt has tolerated Diflucan in past per gwyn  - EKG pending 3/29/25    Diet-controlled DM2  - A1c 6.9 on 3/4/25  - SSI for now, titrate prn, stable trend     Hx PE 2/28/25 at [facility]  - s/p heparin gtt while NPO  - Eliquis resumed 3/25/25, tolerating PO     KURT on CKD 3 (baseline cr ~1.8-1.9)  - Initial creatinine was 2.5  - stable/better    Expected Discharge Location and Transportation: SNF rehab vs home with   Expected Discharge   Expected Discharge Date: 3/30/2025; Expected Discharge Time:      VTE Prophylaxis:  Pharmacologic & mechanical VTE prophylaxis orders are present.         AM-PAC 6 Clicks Score (PT): 14 (03/29/25 2000)    CODE STATUS:   Code Status and Medical Interventions: CPR (Attempt to Resuscitate); Full Support   Ordered at: 03/20/25 9136     Code Status (Patient has no pulse and is not breathing):    CPR (Attempt to Resuscitate)     Medical Interventions (Patient has pulse or is breathing):    Full Support       Norberto Pan MD  03/30/25

## 2025-03-31 LAB
GLUCOSE BLDC GLUCOMTR-MCNC: 135 MG/DL (ref 70–130)
GLUCOSE BLDC GLUCOMTR-MCNC: 191 MG/DL (ref 70–130)
GLUCOSE BLDC GLUCOMTR-MCNC: 202 MG/DL (ref 70–130)
GLUCOSE BLDC GLUCOMTR-MCNC: 224 MG/DL (ref 70–130)

## 2025-03-31 PROCEDURE — 82948 REAGENT STRIP/BLOOD GLUCOSE: CPT

## 2025-03-31 PROCEDURE — 99232 SBSQ HOSP IP/OBS MODERATE 35: CPT | Performed by: NURSE PRACTITIONER

## 2025-03-31 PROCEDURE — 99232 SBSQ HOSP IP/OBS MODERATE 35: CPT

## 2025-03-31 PROCEDURE — 63710000001 INSULIN LISPRO (HUMAN) PER 5 UNITS: Performed by: INTERNAL MEDICINE

## 2025-03-31 RX ADMIN — Medication 10 ML: at 20:48

## 2025-03-31 RX ADMIN — CARVEDILOL 25 MG: 12.5 TABLET, FILM COATED ORAL at 17:45

## 2025-03-31 RX ADMIN — HYDROCORTISONE 2.5%: 25 CREAM TOPICAL at 08:55

## 2025-03-31 RX ADMIN — ASPIRIN 81 MG: 81 TABLET, CHEWABLE ORAL at 08:56

## 2025-03-31 RX ADMIN — FLUCONAZOLE 100 MG: 100 TABLET ORAL at 16:28

## 2025-03-31 RX ADMIN — LIDOCAINE 1 PATCH: 4 PATCH TOPICAL at 08:56

## 2025-03-31 RX ADMIN — ACETAMINOPHEN 650 MG: 325 TABLET, FILM COATED ORAL at 06:00

## 2025-03-31 RX ADMIN — ISOSORBIDE MONONITRATE 30 MG: 30 TABLET, EXTENDED RELEASE ORAL at 08:56

## 2025-03-31 RX ADMIN — INSULIN LISPRO 3 UNITS: 100 INJECTION, SOLUTION INTRAVENOUS; SUBCUTANEOUS at 12:12

## 2025-03-31 RX ADMIN — RIVASTIGMINE 1 PATCH: 4.6 PATCH, EXTENDED RELEASE TRANSDERMAL at 16:28

## 2025-03-31 RX ADMIN — APIXABAN 5 MG: 5 TABLET, FILM COATED ORAL at 20:48

## 2025-03-31 RX ADMIN — ATORVASTATIN CALCIUM 80 MG: 40 TABLET, FILM COATED ORAL at 20:47

## 2025-03-31 RX ADMIN — HYDROCORTISONE 2.5%: 25 CREAM TOPICAL at 20:48

## 2025-03-31 RX ADMIN — INSULIN LISPRO 3 UNITS: 100 INJECTION, SOLUTION INTRAVENOUS; SUBCUTANEOUS at 17:45

## 2025-03-31 RX ADMIN — NIFEDIPINE 60 MG: 60 TABLET, EXTENDED RELEASE ORAL at 08:56

## 2025-03-31 RX ADMIN — Medication 10 ML: at 08:57

## 2025-03-31 RX ADMIN — APIXABAN 5 MG: 5 TABLET, FILM COATED ORAL at 08:56

## 2025-03-31 RX ADMIN — PANTOPRAZOLE SODIUM 40 MG: 40 TABLET, DELAYED RELEASE ORAL at 06:00

## 2025-03-31 RX ADMIN — CARVEDILOL 25 MG: 12.5 TABLET, FILM COATED ORAL at 08:56

## 2025-03-31 NOTE — PROGRESS NOTES
[facility] Neurology    Progress Note    Patient Name: Valarie Camara  : 1945  MRN: [id number]  Primary Care Physician:  Lay Cox MD  Date of admission: 3/20/2025    Subjective     Chief Complaint: Altered mental status    History of Present Illness   Patient seen resting comfortably in bed.  Continues at her baseline.  No GI distress noted.  Family member bedside.  She was able to introduce me and remembered his name.    Review of Systems   General: Negative for fever, nausea, or vomiting.   Neurological: Negative for headache, pain, or weakness.     Objective     Physical Exam  Vitals and nursing note reviewed.   Constitutional:       General: She is not in acute distress.     Appearance: She is not ill-appearing.   Eyes:      Extraocular Movements: Extraocular movements intact.      Pupils: Pupils are equal, round, and reactive to light.      Comments: No nystagmus or deviated gaze noted   Neurological:      Mental Status: She is alert. Mental status is at baseline.      Cranial Nerves: No cranial nerve deficit, dysarthria or facial asymmetry.      Sensory: No sensory deficit.      Motor: Weakness present. No tremor or seizure activity.      Comments:     Cranial Nerves   CN II: Pupils are equal, round, and reactive to light. Normal visual acuity and visual fields.    CN III IV VI: Extraocular movements are full without nystagmus.  CN V: Normal facial sensation and strength of muscles of mastication.  CN VII: Facial movements are symmetric. No weakness.  CN VIII:  Auditory acuity is normal.  CN IX & X:  Symmetric palatal movement.  CN XII: The tongue is midline.  No atrophy or fasciculations.    Motor: Generalized weakness          Vitals:   Temp:  [97.8 °F (36.6 °C)-98.7 °F (37.1 °C)] 98.6 °F (37 °C)  Heart Rate:  [79-90] 90  Resp:  [18-20] 20  BP: (122-140)/() 140/69    Current Medications    Current Facility-Administered Medications:     acetaminophen (TYLENOL)  suppository 325 mg, 325 mg, Rectal, Q4H PRN, Brittaney Martin, APRN, 325 mg at 03/21/25 0529    acetaminophen (TYLENOL) tablet 650 mg, 650 mg, Oral, Q8H PRN, Norberto Pan MD, 650 mg at 03/31/25 0600    apixaban (ELIQUIS) tablet 5 mg, 5 mg, Oral, Q12H, Danie Dunn MD, 5 mg at 03/30/25 2127    aspirin chewable tablet 81 mg, 81 mg, Oral, Daily, 81 mg at 03/30/25 0830 **OR** aspirin suppository 300 mg, 300 mg, Rectal, Daily, Norberto Pan MD, 300 mg at 03/20/25 1706    atorvastatin (LIPITOR) tablet 80 mg, 80 mg, Oral, Nightly, Renee Aguirre, APRN, 80 mg at 03/30/25 2127    sennosides-docusate (PERICOLACE) 8.6-50 MG per tablet 2 tablet, 2 tablet, Oral, BID PRN **AND** polyethylene glycol (MIRALAX) packet 17 g, 17 g, Oral, Daily PRN **AND** bisacodyl (DULCOLAX) EC tablet 5 mg, 5 mg, Oral, Daily PRN **AND** bisacodyl (DULCOLAX) suppository 10 mg, 10 mg, Rectal, Daily PRN, Norberto Pan MD    Calcium Replacement - Follow Nurse / BPA Driven Protocol, , Not Applicable, PRN, Norberto Pan MD    carvedilol (COREG) tablet 25 mg, 25 mg, Oral, BID With Meals, Norberto Pan MD, 25 mg at 03/30/25 1809    dextrose (D50W) (25 g/50 mL) IV injection 25 g, 25 g, Intravenous, Q15 Min PRN, Norberto Pan MD, 25 g at 03/22/25 2225    dextrose (D50W) (25 g/50 mL) IV injection 25 g, 25 g, Intravenous, Q15 Min PRN, Danie Dunn MD    dextrose (GLUTOSE) oral gel 15 g, 15 g, Oral, Q15 Min PRN, Danie Dunn MD    diphenhydrAMINE (BENADRYL) capsule 25 mg, 25 mg, Oral, Nightly PRN, Danie Dunn MD, 25 mg at 03/29/25 2159    fluconazole (DIFLUCAN) tablet 100 mg, 100 mg, Oral, Q24H, Florencia Clark APRN, 100 mg at 03/30/25 1556    glucagon (GLUCAGEN) injection 1 mg, 1 mg, Intramuscular, Q15 Min PRN, Daine Dunn MD    hydrALAZINE (APRESOLINE) injection 10 mg, 10 mg, Intravenous, Q6H PRN, Danie Dunn MD, 10 mg at 03/26/25 0820    Hydrocortisone (Perianal) (ANUSOL-HC)  2.5 % rectal cream, , Rectal, BID, Carol Hernandez PA-C, Given at 03/30/25 2127    Insulin Lispro (humaLOG) injection 2-7 Units, 2-7 Units, Subcutaneous, TID With Meals, Danie Dunn MD, 2 Units at 03/30/25 1809    isosorbide mononitrate (IMDUR) 24 hr tablet 30 mg, 30 mg, Oral, Q24H, Florencia Clark APRN, 30 mg at 03/30/25 0830    Lidocaine 4 % 1 patch, 1 patch, Transdermal, Q24H, Florencia Clark APRN, 1 patch at 03/30/25 1045    Magnesium Standard Dose Replacement - Follow Nurse / BPA Driven Protocol, , Not Applicable, Maxim GARCIA Marc P, MD    NIFEdipine XL (PROCARDIA XL) 24 hr tablet 60 mg, 60 mg, Oral, Q24H, Florencia Clark APRTIFFANY, 60 mg at 03/30/25 0830    nitroglycerin (NITROSTAT) SL tablet 0.4 mg, 0.4 mg, Sublingual, Q5 Min PRMaxim ALLEN Marc P, MD    pantoprazole (PROTONIX) EC tablet 40 mg, 40 mg, Oral, Q AM, Danie Dunn MD, 40 mg at 03/31/25 0600    Phosphorus Replacement - Follow Nurse / BPA Driven Protocol, , Not Applicable, Maxim GARCIA Marc P, MD    Potassium Replacement - Follow Nurse / BPA Driven Protocol, , Not Applicable, Maxim GARCIA Marc P, MD    rivastigmine (EXELON) 4.6 MG/24HR patch 1 patch, 1 patch, Transdermal, Daily, Caty Shrestha K, APRN, 1 patch at 03/30/25 1509    sodium chloride 0.9 % flush 10 mL, 10 mL, Intravenous, Q12H, Norberto Pan MD, 10 mL at 03/30/25 2128    sodium chloride 0.9 % flush 10 mL, 10 mL, Intravenous, PRN, Norberto Pan MD    sodium chloride 0.9 % infusion 40 mL, 40 mL, Intravenous, PRMaxim ALLEN Marc P, MD    Laboratory Results:   Lab Results   Component Value Date    GLUCOSE 145 (H) 03/30/2025    CALCIUM 8.7 03/30/2025     03/30/2025    K 4.2 03/30/2025    CO2 17.0 (L) 03/30/2025     (H) 03/30/2025    BUN 36 (H) 03/30/2025    CREATININE 1.80 (H) 03/30/2025    EGFRIFAFRI 42 (L) 05/23/2021    EGFRIFNONA 33 (L) 12/18/2020    BCR 20.0 03/30/2025    ANIONGAP 17.0 (H) 03/30/2025     Lab Results   Component Value  "Date    WBC 6.11 03/30/2025    HGB 11.4 (L) 03/30/2025    HCT 35.4 03/30/2025    MCV 91.2 03/30/2025     03/30/2025     Lab Results   Component Value Date    CHOL 215 (H) 03/21/2025    CHOL 195 05/20/2021    CHOL 147 01/11/2021     Lab Results   Component Value Date    HDL 43 03/21/2025    HDL 54 05/20/2021    HDL 44 01/11/2021     Lab Results   Component Value Date     (H) 03/21/2025     (H) 05/20/2021    LDL 86 01/11/2021     Lab Results   Component Value Date    TRIG 149 03/21/2025    TRIG 124 05/20/2021    TRIG 90 01/11/2021     Lab Results   Component Value Date    HGBA1C 6.90 (H) 03/04/2025     Lab Results   Component Value Date    INR 1.43 (H) 03/20/2025    INR 0.91 02/28/2025    INR 0.89 (L) 03/03/2018    PROTIME 17.6 (H) 03/20/2025    PROTIME 12.7 02/28/2025    PROTIME 9.3 (L) 03/03/2018     No results found for: \"FOLATE\"  Lab Results   Component Value Date    [id number] 252 10/07/2019       MRI Brain Without Contrast  Result Date: 3/22/2025  MRI BRAIN WO CONTRAST Date of Exam: 3/22/2025 2:03 AM EDT Indication: Stroke, follow up Aphasia, right-sided weakness, numbness, facial droop.  Comparison: Head CT 3/21/2025 Technique:  Routine multiplanar/multisequence sequence images of the brain were obtained without contrast administration. Findings: No areas of diffusion restriction to suggest a recent infarct. Negative for acute intracranial hemorrhage, large mass lesion, midline shift or hydrocephalus. Mild global parenchymal volume loss for age. Mild periventricular T2/FLAIR hyperintense signal suggesting chronic microvascular ischemic change. Mostly empty sella. Negative for cerebellar tonsillar ectopia. Normal volume corpus callosum. Major intracranial flow voids preserved. Orbits symmetric. Trace bilateral mastoid fluid. Unremarkable appearance of the calvarium.     Impression: Impression: No acute MRI findings, specifically no findings to suggest a recent infarct. Electronically Signed: " Shade Seay MD  3/22/2025 8:17 AM EDT  Workstation ID: [id number]       Assessment / Plan     Active Hospital Problems:    Encephalopathy    Anxiety and depression    Gastroesophageal reflux disease with esophagitis    Multiple-type hyperlipidemia    Benign essential hypertension    Pulmonary emboli       Brief Patient Summary:    Valarie Camara is a 79 y.o. female who has past medical history significant for HTN, HLD, CAD, diabetes, CKD stage III, L ICA stenosis, mild dementia, anxiety depression, recent PE on Eliquis, recurrent UTIs who presented to [facility] ED with complaint of altered mental status and aphasia.  Patient was originally seen by stroke neurology with a negative acute workup.  Suspected stroke prepubescent's in the setting of infection was likely causing patient's altered mentation.  MRI brain showed no acute intracranial abnormality.  Chronic left thalamic infarct was noted.  It was recommended to continue ASA 81 mg daily for stroke prevention.     General neurology was asked to evaluate patient with ongoing metabolic encephalopathy.  Per report patient has also been intermittently refusing medications.  Per patient's daughter in the past she has not tolerated Seroquel or trazodone.  On 3/21 patient came lethargic but had received Geodon earlier in the day.  EEG negative.     Suspect patient's altered mentation was related to previous medications along with underlying dementia.  Much improved on today's assessment.     Plan:   Altered mental status  Underlying dementia  Metabolic encephalopathy  Chronic infarct  Continue rivastigmine patch.  Once patient is able to take p.o. medications more regularly can consider transition to p.o.  Patient to continue work with PT/OT  Will hold off on repeat images getting recent evaluations.  Continue delirium/fall precautions.  Continue ASA and statin per stroke neurology recommendations.  Patient has poor reaction to Seroquel and trazodone.  Refrain from  giving.  If patient has extreme agitation would recommend IM/IV Valium.  Would avoid Geodon given patient's recent ongoing lethargy.  Follow-up with Judy TRIPP   Patient is okay for discharge from a neurologic standpoint.  General neurology will see patient on request.     I have discussed the above with the patient, bedside RN and Dr. Bernstein  Time spent with patient: 35 minutes in face-to-face evaluation and management of the patient.    Copied text in this note has been reviewed and is accurate as of 03/31/25.     JOSEE Machuca

## 2025-03-31 NOTE — CASE MANAGEMENT/SOCIAL WORK
Continued Stay Note  Psychiatric     Patient Name: Valarie Camara  MRN: [id number]  Today's Date: 3/31/2025    Admit Date: 3/20/2025    Plan: Home with HH vs SNF   Discharge Plan       Row Name 03/31/25 1540       Plan    Plan Comments MSW called and followed up with facilities. MSW left a voicemail for admissions at [facility]. MSW called and spoke with admissions, both the DON and the admissions coordinator, Stacey, at [facility] and they would like to offer pt a bed. However, pt's insurance switches from traditional Medicare, back to her [place] [employer] previously had, as of tomorrow. MSW did update [facility] on this and they report they will have to review the new insurance to see if still can accept. MSW called and updated pt's daughter, Cleo. Cleo faxed the [employer] Medicare insurance info to MSW. MSW faxed the insurance info to both [facility] and [facility].                   Discharge Codes    No documentation.                 Expected Discharge Date and Time       Expected Discharge Date Expected Discharge Time    Mar 31, 2025               ALEX Michelle

## 2025-03-31 NOTE — PROGRESS NOTES
[facility] Medicine Services  PROGRESS NOTE    Patient Name: Valarie Camara  : 1945  MRN: [id number]    Date of Admission: 3/20/2025  Primary Care Physician: Lay Cox MD    Subjective   Subjective     CC:  R weakness    HPI: fatigued. Says she is not resting too well    Objective   Objective     Vital Signs:   Temp:  [97.8 °F (36.6 °C)-98.7 °F (37.1 °C)] 98.6 °F (37 °C)  Heart Rate:  [79-90] 90  Resp:  [18-20] 20  BP: (122-140)/() 140/69     Physical Exam:   Constitutional: No acute distress, awake, alert  HENT: NCAT, mucous membranes moist  Respiratory: Clear to auscultation bilaterally, respiratory effort normal   Cardiovascular: RRR  Gastrointestinal: Positive bowel sounds, soft, nontender, nondistended  Musculoskeletal: No bilateral ankle edema  Psychiatric: Appropriate affect, cooperative  Neurologic: Oriented x 3, MENESES, mild dysarthria  Skin: No rashes      Results Reviewed:  LAB RESULTS:      Lab 25  0731 25  2137 25  0449 25  0550 25  2354 25  1751   WBC 6.11  --  8.29 7.13  --   --    HEMOGLOBIN 11.4* 10.6* 12.1 12.2  --   --    HEMATOCRIT 35.4 33.7* 37.7 37.8  --   --    PLATELETS 233  --  203 230  --   --    NEUTROS ABS 3.94  --  6.41  --   --   --    IMMATURE GRANS (ABS) 0.02  --  0.03  --   --   --    LYMPHS ABS 1.49  --  1.23  --   --   --    MONOS ABS 0.54  --  0.56  --   --   --    EOS ABS 0.09  --  0.04  --   --   --    MCV 91.2  --  90.6 89.2  --   --    HEPARIN ANTI-XA  --   --   --  0.53 0.58 0.65         Lab 25  0731 25  1119 25  0449 25  1113 25  0550   SODIUM 144 142 144 144 146*   POTASSIUM 4.2 4.1 4.4 4.1 3.7   CHLORIDE 110* 110* 112* 113* 114*   CO2 17.0* 19.0* 18.0* 19.0* 19.0*   ANION GAP 17.0* 13.0 14.0 12.0 13.0   BUN 36* 38* 29* 26* 28*   CREATININE 1.80* 1.68* 1.44* 1.25* 1.40*   EGFR 28.2* 30.6* 36.8* 43.7* 38.1*   GLUCOSE 145* 140* 151* 140* 170*    CALCIUM 8.7 8.8 9.1 8.8 9.0   MAGNESIUM  --   --   --  1.8 1.9                                 Brief Urine Lab Results  (Last result in the past 365 days)        Color   Clarity   Blood   Leuk Est   Nitrite   Protein   CREAT   Urine HCG        03/22/25 1129 Yellow   Cloudy   Negative   Negative   Negative   100 mg/dL (2+)                   Microbiology Results Abnormal       Procedure Component Value - Date/Time    Urine Culture - Urine, Straight Cath [[id number]]  (Abnormal) Collected: 03/22/25 1129    Lab Status: Final result Specimen: Urine from Straight Cath Updated: 03/23/25 2043     Urine Culture 25,000 CFU/mL Lactobacillus species     Comment:   Based on laboratory diagnosis criteria, these organisms are common urogenital commensal lilly and have not been associated with urinary tract infections; clinical correlation is recommended.       Narrative:      Colonization of the urinary tract without infection is common. Treatment is discouraged unless the patient is symptomatic, pregnant, or undergoing an invasive urologic procedure.            No radiology results from the last 24 hrs      Results for orders placed during the hospital encounter of 03/20/25    Adult Transthoracic Echo Complete W/ Cont if Necessary Per Protocol (With Agitated Saline)    Interpretation Summary    Left ventricular systolic function is normal. Calculated left ventricular EF = 67% Left ventricular ejection fraction appears to be 66 - 70%.    Left ventricular wall thickness is consistent with mild concentric hypertrophy.    Left ventricular outflow tract peak flow gradient at rest is 11 mmHg. Left ventricular outflow tract peak gradient with valsalva is 29 mmHg.    Left ventricular diastolic function is consistent with (grade I) impaired relaxation.    The mitral valve peak gradient is 9 mmHg. The mitral valve mean gradient is 4 mmHg.    Calculated right ventricular systolic pressure from tricuspid regurgitation is 21 mmHg.    There  is a trivial pericardial effusion.      Current medications:  Scheduled Meds:apixaban, 5 mg, Oral, Q12H  aspirin, 81 mg, Oral, Daily   Or  aspirin, 300 mg, Rectal, Daily  atorvastatin, 80 mg, Oral, Nightly  carvedilol, 25 mg, Oral, BID With Meals  fluconazole, 100 mg, Oral, Q24H  Hydrocortisone (Perianal), , Rectal, BID  insulin lispro, 2-7 Units, Subcutaneous, TID With Meals  isosorbide mononitrate, 30 mg, Oral, Q24H  Lidocaine, 1 patch, Transdermal, Q24H  NIFEdipine XL, 60 mg, Oral, Q24H  pantoprazole, 40 mg, Oral, Q AM  rivastigmine, 1 patch, Transdermal, Daily  sodium chloride, 10 mL, Intravenous, Q12H      Continuous Infusions:     PRN Meds:.  acetaminophen    acetaminophen    senna-docusate sodium **AND** polyethylene glycol **AND** bisacodyl **AND** bisacodyl    Calcium Replacement - Follow Nurse / BPA Driven Protocol    dextrose    dextrose    dextrose    diphenhydrAMINE    glucagon (human recombinant)    hydrALAZINE    Magnesium Standard Dose Replacement - Follow Nurse / BPA Driven Protocol    nitroglycerin    Phosphorus Replacement - Follow Nurse / BPA Driven Protocol    Potassium Replacement - Follow Nurse / BPA Driven Protocol    sodium chloride    sodium chloride    Assessment & Plan   Assessment & Plan     Active Hospital Problems    Diagnosis  POA    **Encephalopathy [G93.40]  Unknown    Pulmonary emboli [I26.99]  Yes    Gastroesophageal reflux disease with esophagitis [K21.00]  Yes    Anxiety and depression [F41.9, F32.A]  Yes    Multiple-type hyperlipidemia [E78.2]  Yes    Benign essential hypertension [I10]  Yes      Resolved Hospital Problems    Diagnosis Date Resolved POA    CVA (cerebral vascular accident) [I63.9] 03/22/2025 Yes        Brief Hospital Course to date:  Valarie Camara is a 79 y.o. female  w/ hx CAD, PAD, L ICA dz (50-69% stenosis) CKD3 (baseline cr ~1.8-1.9), HTN, mild dementia, PE (on Eliquis), DM2 who presented with confusion, aphasia, and some right-sided weakness. Patient  seen by Neuro-Stroke team. However, stroke workup was negative and they suspected stroke recrudescence in setting of infection/metabolic derangements. UA was concerning for UTI. After admission, patient had significant HTN. Cardene gtt initiated. Eliquis was held and transitioned to heparin gtt. General neurology consulted for ongoing encephalopathy. EEG negative.     This patient's problems and plans were partially entered by my partner and updated as appropriate by me 03/31/25.    Encephalopathy  Aphasia & right-sided weakness Left ICA stenosis (50-69%), & multifocal atherosclerotic diseas  Episode of lethargy/somnolence evening 3/21/25 due to seroquel  Mild dementia  Hx previous left thalamic cva  - Neuro-stroke followed, suspect left hemispheric stroke vs stroke recrudescence in setting of infection/metabolic derangement  - TSH wnl, initial UA benign  - EEG 3/22 negative  - CT head 3/20 negative for acute stroke, shows stable chronic lacunar infarct of the left thalamus  - CTA H/N 3/20 negative for flow-limiting stenosis or LVO  - ECHO 3/21 shows EF 66-70%, diastolic dysfunction  - BLE duplex negative  - MRI brain 3/22 negative  - Per discussion with daughter, patient has become lethargic in past w/ trazodone and Seroquel >> avoid seroquel & trazodone  - 3/24/25 more confused and less interactive, so General Neurology consulted  - Repeat EEG 3/25 negative  - Neuro recommended rivastigmine patch 4.9 mg daily. If pt has extreme agitation, Neuro recommends IM or IV Valium. Avoid Geodon given lethargy  - Per Neuro-Stroke team, continue ASA, statin, anticoagulation  - Follow up in [facility] 8-12 weeks  - SLP following, modified diet recs in place  - PT, OT recommending SNF rehab, recently home from [facility]     UTI  - UA 3/22 with 4+ bacteria, 6-10 WBCs, large yeast  - Initiated on Rocephin  - Urine culture with lactobacillus species, Rocephin discontinued     HTN  - s/p cardene  - BP stable,  carvedilol, nifedipine, imdur     Candidiasis  - Patient recently on Rocephin  - Diflucan 100 mg daily x 7 days, defer Nystatin given dysphagia  - QTc 392    Diet-controlled DM2  - A1c 6.9 on 3/4/25  - SSI for now- no change     Hx PE 2/28/25 at [facility]  - s/p heparin gtt while NPO  - Eliquis resumed 3/25/25, tolerating PO     KURT on CKD 3 (baseline cr ~1.8-1.9)  - Initial creatinine was 2.5, now at baseline 1.8    Expected Discharge Location and Transportation: SNF rehab pending insurance  Expected Discharge   Expected Discharge Date: 3/31/2025; Expected Discharge Time:      VTE Prophylaxis:  Pharmacologic & mechanical VTE prophylaxis orders are present.         AM-PAC 6 Clicks Score (PT): 12 (03/30/25 0800)    CODE STATUS:   Code Status and Medical Interventions: CPR (Attempt to Resuscitate); Full Support   Ordered at: 03/20/25 8397     Code Status (Patient has no pulse and is not breathing):    CPR (Attempt to Resuscitate)     Medical Interventions (Patient has pulse or is breathing):    Full Support       Katie Pacheco, APRN  03/31/25

## 2025-04-01 LAB
GLUCOSE BLDC GLUCOMTR-MCNC: 130 MG/DL (ref 70–130)
GLUCOSE BLDC GLUCOMTR-MCNC: 174 MG/DL (ref 70–130)
GLUCOSE BLDC GLUCOMTR-MCNC: 183 MG/DL (ref 70–130)
GLUCOSE BLDC GLUCOMTR-MCNC: 209 MG/DL (ref 70–130)

## 2025-04-01 PROCEDURE — 97116 GAIT TRAINING THERAPY: CPT

## 2025-04-01 PROCEDURE — 97535 SELF CARE MNGMENT TRAINING: CPT | Performed by: OCCUPATIONAL THERAPIST

## 2025-04-01 PROCEDURE — 25810000003 SODIUM CHLORIDE 0.9 % SOLUTION: Performed by: HOSPITALIST

## 2025-04-01 PROCEDURE — 82948 REAGENT STRIP/BLOOD GLUCOSE: CPT

## 2025-04-01 PROCEDURE — 97530 THERAPEUTIC ACTIVITIES: CPT | Performed by: OCCUPATIONAL THERAPIST

## 2025-04-01 PROCEDURE — 99232 SBSQ HOSP IP/OBS MODERATE 35: CPT | Performed by: HOSPITALIST

## 2025-04-01 PROCEDURE — 63710000001 INSULIN LISPRO (HUMAN) PER 5 UNITS: Performed by: INTERNAL MEDICINE

## 2025-04-01 RX ADMIN — CARVEDILOL 25 MG: 12.5 TABLET, FILM COATED ORAL at 17:26

## 2025-04-01 RX ADMIN — INSULIN LISPRO 3 UNITS: 100 INJECTION, SOLUTION INTRAVENOUS; SUBCUTANEOUS at 17:25

## 2025-04-01 RX ADMIN — NIFEDIPINE 60 MG: 60 TABLET, EXTENDED RELEASE ORAL at 08:56

## 2025-04-01 RX ADMIN — APIXABAN 5 MG: 5 TABLET, FILM COATED ORAL at 20:30

## 2025-04-01 RX ADMIN — LIDOCAINE 1 PATCH: 4 PATCH TOPICAL at 08:55

## 2025-04-01 RX ADMIN — ATORVASTATIN CALCIUM 80 MG: 40 TABLET, FILM COATED ORAL at 20:30

## 2025-04-01 RX ADMIN — ASPIRIN 81 MG: 81 TABLET, CHEWABLE ORAL at 08:56

## 2025-04-01 RX ADMIN — RIVASTIGMINE 1 PATCH: 4.6 PATCH, EXTENDED RELEASE TRANSDERMAL at 16:14

## 2025-04-01 RX ADMIN — APIXABAN 5 MG: 5 TABLET, FILM COATED ORAL at 08:56

## 2025-04-01 RX ADMIN — ISOSORBIDE MONONITRATE 30 MG: 30 TABLET, EXTENDED RELEASE ORAL at 08:56

## 2025-04-01 RX ADMIN — Medication 10 ML: at 20:31

## 2025-04-01 RX ADMIN — SODIUM CHLORIDE 500 ML: 900 INJECTION, SOLUTION INTRAVENOUS at 17:26

## 2025-04-01 RX ADMIN — INSULIN LISPRO 2 UNITS: 100 INJECTION, SOLUTION INTRAVENOUS; SUBCUTANEOUS at 12:13

## 2025-04-01 RX ADMIN — Medication 10 ML: at 08:59

## 2025-04-01 RX ADMIN — FLUCONAZOLE 100 MG: 100 TABLET ORAL at 16:12

## 2025-04-01 RX ADMIN — CARVEDILOL 25 MG: 12.5 TABLET, FILM COATED ORAL at 08:56

## 2025-04-01 RX ADMIN — HYDROCORTISONE 2.5%: 25 CREAM TOPICAL at 20:30

## 2025-04-01 RX ADMIN — HYDROCORTISONE 2.5%: 25 CREAM TOPICAL at 08:59

## 2025-04-01 NOTE — PLAN OF CARE
Goal Outcome Evaluation:  Plan of Care Reviewed With: patient        Progress: improving  Outcome Evaluation: UPON ARRIVAL, PT  DEMONSTRATES MUCH IMPROVED LEVEL OF ALERTNESS, ABILITY TO CONVERSE AND ABILITY TO FOLLOW COMMANDS. AMBULATED 8 FEET WITH R WALKER AND MIN ASSIST WITH CLOSE CHAIR FOLLOW. GAIT DISTANCE LIMITED BY WEAKNESS AND ACUTE ONSET OF DECREASED LEVEL OF ALERTNESS. NOTED 22 POINT DROP IN SBP WITH SIT TO STAND TRANSITION. RECOMMEND SNF AT D/C.    Anticipated Discharge Disposition (PT): skilled nursing facility

## 2025-04-01 NOTE — CASE MANAGEMENT/SOCIAL WORK
Continued Stay Note  [facility]     Patient Name: Valarie Camara  MRN: [id number]  Today's Date: 4/1/2025    Admit Date: 3/20/2025    Plan: Home with HH vs SNF   Discharge Plan       Row Name 04/01/25 1104       Plan    Plan Comments MSW spoke with Libra in admissions at [facility]. At this time, [facility] does not have a bed for pt. MSW spoke with Stacey at [facility] admissions and they receive new insurance information and are still able to offer pt a bed. MSW updated pt's daughter, Cleo. Cleo is agreeable to accept bed offer at [facility]. Updated therapy notes needed to start precert and MSW updated therapy team signed in to see pt today. Once updated therapy notes in, [facility] will initiate precert with pt's insurance.                   Discharge Codes    No documentation.                 Expected Discharge Date and Time       Expected Discharge Date Expected Discharge Time    Mar 31, 2025               ALEX Michelle

## 2025-04-01 NOTE — THERAPY TREATMENT NOTE
Patient Name: Valarie Camara  : 1945    MRN: [id number]                              Today's Date: 2025       Admit Date: 3/20/2025    Visit Dx:     ICD-10-CM ICD-9-CM   1. Acute ischemic stroke  I63.9 434.91   2. History of stroke  Z86.73 V12.54   3. Renal insufficiency  N28.9 593.9   4. Aphasia  R47.01 784.3   5. Dysarthria  R47.1 784.51   6. Oropharyngeal dysphagia  R13.12 787.22   7. 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  I69.30 V12.54     Patient Active Problem List   Diagnosis    Atopic rhinitis    Arthritis    Chronic neck pain    Anxiety and depression    Edema    Gastroesophageal reflux disease with esophagitis    Multiple-type hyperlipidemia    Vitamin D deficiency    Benign essential hypertension    Obesity (BMI 30-39.9)    History of COPD    History of cataract    History of osteoporosis    History of restless legs syndrome    History of rheumatoid arthritis    Elevated serum creatinine    Esophageal dysphagia    Constipation    Schatzki's ring    Gastritis without bleeding    History of Helicobacter pylori infection    Duodenitis    Right hemiparesis    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    Multiple thyroid nodules    Lacunar stroke    DM (diabetes mellitus), type 2, uncontrolled w/neurologic complication    Syncope and collapse    Positive fecal occult blood test    Left foot pain    Hypomagnesemia    Acute on chronic anemia    Esophageal dysphagia    Reflux esophagitis    Syncope    Irritable bowel syndrome with constipation    Hypertensive urgency    Pulmonary emboli    Encephalopathy     Past Medical History:   Diagnosis Date    Acute bronchitis with bronchospasm     Anxiety     Arthritis     Asthma     B12 deficiency     Back pain     Body piercing     ears    Cataract     Cerebrovascular disease     Cervicalgia     Chronic kidney disease     stage 3b    Colonic polyp     History of colonic polyps      "Constipation     COPD (chronic obstructive pulmonary disease)     Coronary artery disease     Depression     Diverticulitis     Dysphagia     Patient reported \"it won't go all the way down\" when eating solid foods first thing in the mornings    Edema     Elevated cholesterol     Esophageal reflux     Folic acid deficiency     Full dentures     Advised no adhesives DOS    Heart murmur     Herpes zoster     High cholesterol     History of kidney infection     History of recurrent urinary tract infection     HTN (hypertension)     Hypercholesterolemia     Impaired functional mobility, balance, gait, and endurance     Insomnia     Kidney infection     Malignant hypertension 04/26/2015     Accelerated essential hypertension    Measles     rubeola    Muscle spasm     Neoplasm of uncertain behavior of skin     dtr denies    Osteoporosis     Poor historian     Recurrent urinary tract infection     Rheumatoid arthritis     RLS (restless legs syndrome)     Stomach ulcer     Stroke     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    Type 2 diabetes mellitus     Vitamin D deficiency      Past Surgical History:   Procedure Laterality Date    CATARACT EXTRACTION WITH INTRAOCULAR LENS IMPLANT Left 02/11/2013    CATARACT EXTRACTION WITH INTRAOCULAR LENS IMPLANT Right 04/15/2013    COLONOSCOPY  2012    COLONOSCOPY N/A 11/26/2019    Procedure: COLONOSCOPY;  Surgeon: Chidi Downing MD;  Location:  [facility];  Service: Gastroenterology    ENDOSCOPY N/A 11/13/2017    Procedure: ESOPHAGOGASTRODUODENOSCOPY with biopsies and esophageal balloon dilitation;  Surgeon: Wesley Stovall MD;  Location:  [facility];  Service:     ENDOSCOPY N/A 11/25/2019    Procedure: ESOPHAGOGASTRODUODENOSCOPY;  Surgeon: Chidi Downing MD;  Location:  [facility];  Service: Gastroenterology    ENDOSCOPY N/A 11/29/2021    Procedure: ESOPHAGOGASTRODUODENOSCOPY with dilation and biopsies;  Surgeon: Benny" Gonzalez WIN MD;  Location: [facility];  Service: Gastroenterology;  Laterality: N/A;    ENDOSCOPY N/A 11/1/2023    Procedure: ESOPHAGOGASTRODUODENOSCOPY WITH BIOPSY AND DILATATION;  Surgeon: Gonzalez Zapata MD;  Location: [facility];  Service: Gastroenterology;  Laterality: N/A;    ENDOSCOPY N/A 1/27/2025    Procedure: Esophagogastroduodenoscopy with dilatation and biopsies;  Surgeon: Gonzalez Zapata MD;  Location: [facility];  Service: Gastroenterology;  Laterality: N/A;    HYSTERECTOMY  1979    UPPER GASTROINTESTINAL ENDOSCOPY  12/09/2013    UPPER GASTROINTESTINAL ENDOSCOPY  11/13/2017      General Information       Row Name 04/01/25 1117          OT Time and Intention    Document Type therapy note (daily note)  -SD     Mode of Treatment occupational therapy  -SD     Patient Effort excellent  -SD       Row Name 04/01/25 1117          General Information    Patient Profile Reviewed yes  -SD     Existing Precautions/Restrictions fall;seizures  -SD     Barriers to Rehab medically complex;previous functional deficit;cognitive status  -SD       Row Name 04/01/25 1117          Cognition    Orientation Status (Cognition) oriented to;person;place;verbal cues/prompts needed for orientation;situation;time  -SD       Row Name 04/01/25 1117          Safety Issues/Impairments Affecting Functional Mobility    Safety Issues Affecting Function (Mobility) insight into deficits/self-awareness;safety precaution awareness;safety precautions follow-through/compliance  -SD     Impairments Affecting Function (Mobility) balance;cognition;endurance/activity tolerance;strength  -SD     Cognitive Impairments, Mobility Safety/Performance insight into deficits/self-awareness;safety precaution awareness;safety precaution follow-through  -SD               User Key  (r) = Recorded By, (t) = Taken By, (c) = Cosigned By      Initials Name Provider Type    SD Bri Diego, OT Occupational Therapist                      Mobility/ADL's       Row Name 04/01/25 1156          Bed Mobility    Bed Mobility rolling right;scooting/bridging;supine-sit  -SD     Rolling Right [place] (Bed Mobility) minimum assist (75% patient effort);verbal cues  -SD     Scooting/Bridging [place] (Bed Mobility) minimum assist (75% patient effort);2 person assist;verbal cues  -SD     Supine-Sit [place] (Bed Mobility) minimum assist (75% patient effort);1 person assist;verbal cues  -SD     Assistive Device (Bed Mobility) bed rails;head of bed elevated  -SD       Row Name 04/01/25 1156          Transfers    Transfers bed-chair transfer;sit-stand transfer;toilet transfer;stand-sit transfer  -SD       Row Name 04/01/25 1156          Bed-Chair Transfer    Bed-Chair [place] (Transfers) minimum assist (75% patient effort);2 person assist;verbal cues  -SD     Assistive Device (Bed-Chair Transfers) walker, front-wheeled  -SD       Row Name 04/01/25 1156          Sit-Stand Transfer    Sit-Stand [place] (Transfers) minimum assist (75% patient effort);2 person assist;verbal cues  -SD     Assistive Device (Sit-Stand Transfers) walker, front-wheeled  -SD       Row Name 04/01/25 1156          Stand-Sit Transfer    Stand-Sit [place] (Transfers) minimum assist (75% patient effort);2 person assist;verbal cues  -SD     Assistive Device (Stand-Sit Transfers) walker, front-wheeled  -SD       Row Name 04/01/25 1156          Toilet Transfer    Type (Toilet Transfer) stand pivot/stand step  -SD     [place] Level (Toilet Transfer) minimum assist (75% patient effort);2 person assist;verbal cues  -SD     Assistive Device (Toilet Transfer) commode, bedside without drop arms;walker, front-wheeled  -SD       Row Name 04/01/25 1156          Functional Mobility    Functional Mobility- Ind. Level minimum assist (75% patient effort);2 person assist required;verbal cues required  -SD     Functional Mobility- Device walker, front-wheeled  -SD      Functional Mobility-Distance (Feet) 5  -SD     Functional Mobility- Comment pt. c/o dizziness so transitioned to chair  -SD     Patient was able to Ambulate yes  -SD       Row Name 04/01/25 1156          Activities of Daily Living    BADL Assessment/Intervention lower body dressing;upper body dressing;grooming;toileting;feeding  -SD       Row Name 04/01/25 1156          Upper Body Dressing Assessment/Training    [place] Level (Upper Body Dressing) don;pajama/robe;minimum assist (75% patient effort)  -SD     Position (Upper Body Dressing) edge of bed sitting  -SD       Row Name 04/01/25 1156          Lower Body Dressing Assessment/Training    [place] Level (Lower Body Dressing) don;socks;maximum assist (25% patient effort)  -SD     Position (Lower Body Dressing) sitting up in bed  -SD       Row Name 04/01/25 1156          Grooming Assessment/Training    [place] Level (Grooming) wash face, hands;set up  -SD     Position (Grooming) sitting up in bed  -SD       Row Name 04/01/25 1156          Toileting Assessment/Training    [place] Level (Toileting) adjust/manage clothing;manage urinary drainage device;perform perineal hygiene;maximum assist (25% patient effort)  -SD     Assistive Devices (Toileting) commode, bedside without drop arms  -SD     Position (Toileting) supported standing  -SD       Row Name 04/01/25 1156          Self-Feeding Assessment/Training    [place] Level (Feeding) liquids to mouth;supervision  -SD     Position (Feeding) supported sitting  -SD               User Key  (r) = Recorded By, (t) = Taken By, (c) = Cosigned By      Initials Name Provider Type    Bri Leary OT Occupational Therapist                   Obj/Interventions       Row Name 04/01/25 1159          Balance    Static Sitting Balance standby assist  -SD     Dynamic Sitting Balance supervision  -SD     Position, Sitting Balance unsupported;sitting edge of bed  -SD     Static Standing Balance  minimal assist;verbal cues  -SD     Dynamic Standing Balance minimal assist;2-person assist;verbal cues  -SD     Position/Device Used, Standing Balance supported;walker, front-wheeled  -SD     Balance Interventions sitting;standing;dynamic reaching;occupation based/functional task;UE activity with balance activity  -SD               User Key  (r) = Recorded By, (t) = Taken By, (c) = Cosigned By      Initials Name Provider Type    Bri Leary, OT Occupational Therapist                   Goals/Plan    No documentation.                  Clinical Impression       Row Name 04/01/25 1200          Pain Assessment    Pretreatment Pain Rating 0/10 - no pain  -SD     Posttreatment Pain Rating 0/10 - no pain  -SD       Row Name 04/01/25 1200          Plan of Care Review    Plan of Care Reviewed With patient  -SD     Progress improving  -SD     Outcome Evaluation Pt. with increased alertness, improving balance and strength to support functional mobility & ADL routine. Pt. transferred from EOB to C with min A x 2 with B UE support. Pt. continues to present below functional baseline & continues to warrant OT skilled services to return to [facility]. Recommend SNF upon d/c.  -SD       Row Name 04/01/25 1200          Therapy Assessment/Plan (OT)    Rehab Potential (OT) good  -SD     Criteria for Skilled Therapeutic Interventions Met (OT) yes;meets criteria;skilled treatment is necessary  -SD     Therapy Frequency (OT) daily  -SD       Row Name 04/01/25 1200          Therapy Plan Review/Discharge Plan (OT)    Anticipated Discharge Disposition (OT) skilled nursing facility  -SD       Row Name 04/01/25 1200          Vital Signs    Pre Systolic BP Rehab 118  -SD     Pre Treatment Diastolic BP 69  -SD     Intra Systolic BP Rehab 96  -SD     Intra Treatment Diastolic BP 68  -SD     Posttreatment Heart Rate (beats/min) 85  -SD     O2 Delivery Pre Treatment room air  -SD     O2 Delivery Intra Treatment room air  -SD     Post SpO2  (%) 99  -SD     O2 Delivery Post Treatment room air  -SD     Pre Patient Position Supine  -SD     Intra Patient Position Standing  -SD     Post Patient Position Sitting  -SD       Row Name 04/01/25 1200          Positioning and Restraints    Pre-Treatment Position in bed  -SD     Post Treatment Position chair  -SD     In Chair notified nsg;reclined;call light within reach;encouraged to call for assist;exit alarm on;waffle cushion;on mechanical lift sling;legs elevated  -SD               User Key  (r) = Recorded By, (t) = Taken By, (c) = Cosigned By      Initials Name Provider Type    Bri Leary, CARYN Occupational Therapist                   Outcome Measures       Row Name 04/01/25 1117          How much help from another is currently needed...    Putting on and taking off regular lower body clothing? 2  -SD     Bathing (including washing, rinsing, and drying) 2  -SD     Toileting (which includes using toilet bed pan or urinal) 1  -SD     Putting on and taking off regular upper body clothing 3  -SD     Taking care of personal grooming (such as brushing teeth) 3  -SD     Eating meals 3  -SD     AM-PAC 6 Clicks Score (OT) 14  -SD       Row Name 04/01/25 0800          How much help from another person do you currently need...    Turning from your back to your side while in flat bed without using bedrails? 2  -HK     Moving from lying on back to sitting on the side of a flat bed without bedrails? 2  -HK     Moving to and from a bed to a chair (including a wheelchair)? 2  -HK     Standing up from a chair using your arms (e.g., wheelchair, bedside chair)? 2  -HK     Climbing 3-5 steps with a railing? 2  -HK     To walk in hospital room? 2  -HK     AM-PAC 6 Clicks Score (PT) 12  -HK     Highest Level of Mobility Goal 4 --> Transfer to chair/commode  -HK       Row Name 04/01/25 1117          Functional Assessment    Outcome Measure Options AM-PAC 6 Clicks Daily Activity (OT)  -SD               User Key  (r) =  Recorded By, (t) = Taken By, (c) = Cosigned By      Initials Name Provider Type    Bri Leary OT Occupational Therapist    Angie Joseph RN Registered Nurse                      OT Recommendation and Plan  Therapy Frequency (OT): daily  Plan of Care Review  Plan of Care Reviewed With: patient  Progress: improving  Outcome Evaluation: Pt. with increased alertness, improving balance and strength to support functional mobility & ADL routine. Pt. transferred from EOB to BSC with min A x 2 with B UE support. Pt. continues to present below functional baseline & continues to warrant OT skilled services to return to [facility]. Recommend SNF upon d/c.     Time Calculation:         Time Calculation- OT       Row Name 04/01/25 1203             Time Calculation- OT    OT Received On 04/01/25  -SD      OT Goal Re-Cert Due Date 04/02/25  -SD         Timed Charges    41622 - OT Therapeutic Activity Minutes 12  -SD      27640 - OT Self Care/Mgmt Minutes 20  -SD         Total Minutes    Timed Charges Total Minutes 32  -SD       Total Minutes 32  -SD                User Key  (r) = Recorded By, (t) = Taken By, (c) = Cosigned By      Initials Name Provider Type    Bri Leary OT Occupational Therapist                  Therapy Charges for Today       Code Description Service Date Service Provider Modifiers Qty    74683457301 HC OT THERAPEUTIC ACT EA 15 MIN 4/1/2025 Bri Diego OT GO 1    33196716768 HC OT SELF CARE/MGMT/TRAIN EA 15 MIN 4/1/2025 Bri Diego OT GO 1                 Bri Diego OT  4/1/2025

## 2025-04-01 NOTE — PLAN OF CARE
Problem: Adult Inpatient Plan of Care  Goal: Plan of Care Review  Recent Flowsheet Documentation  Taken 4/1/2025 1200 by Bri Diego OT  Progress: improving  Outcome Evaluation: Pt. with increased alertness, improving balance and strength to support functional mobility & ADL routine. Pt. transferred from EOB to BSC with min A x 2 with B UE support. Pt. continues to present below functional baseline & continues to warrant OT skilled services to return to [facility]. Recommend SNF upon d/c.   Goal Outcome Evaluation:  Plan of Care Reviewed With: patient        Progress: improving  Outcome Evaluation: Pt. with increased alertness, improving balance and strength to support functional mobility & ADL routine. Pt. transferred from EOB to BSC with min A x 2 with B UE support. Pt. continues to present below functional baseline & continues to warrant OT skilled services to return to [facility]. Recommend SNF upon d/c.    Anticipated Discharge Disposition (OT): skilled nursing facility

## 2025-04-01 NOTE — THERAPY TREATMENT NOTE
Patient Name: Valarie Camara  : 1945    MRN: [id number]                              Today's Date: 2025       Admit Date: 3/20/2025    Visit Dx:     ICD-10-CM ICD-9-CM   1. Acute ischemic stroke  I63.9 434.91   2. History of stroke  Z86.73 V12.54   3. Renal insufficiency  N28.9 593.9   4. Aphasia  R47.01 784.3   5. Dysarthria  R47.1 784.51   6. Oropharyngeal dysphagia  R13.12 787.22   7. 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  I69.30 V12.54     Patient Active Problem List   Diagnosis    Atopic rhinitis    Arthritis    Chronic neck pain    Anxiety and depression    Edema    Gastroesophageal reflux disease with esophagitis    Multiple-type hyperlipidemia    Vitamin D deficiency    Benign essential hypertension    Obesity (BMI 30-39.9)    History of COPD    History of cataract    History of osteoporosis    History of restless legs syndrome    History of rheumatoid arthritis    Elevated serum creatinine    Esophageal dysphagia    Constipation    Schatzki's ring    Gastritis without bleeding    History of Helicobacter pylori infection    Duodenitis    Right hemiparesis    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    Multiple thyroid nodules    Lacunar stroke    DM (diabetes mellitus), type 2, uncontrolled w/neurologic complication    Syncope and collapse    Positive fecal occult blood test    Left foot pain    Hypomagnesemia    Acute on chronic anemia    Esophageal dysphagia    Reflux esophagitis    Syncope    Irritable bowel syndrome with constipation    Hypertensive urgency    Pulmonary emboli    Encephalopathy     Past Medical History:   Diagnosis Date    Acute bronchitis with bronchospasm     Anxiety     Arthritis     Asthma     B12 deficiency     Back pain     Body piercing     ears    Cataract     Cerebrovascular disease     Cervicalgia     Chronic kidney disease     stage 3b    Colonic polyp     History of colonic polyps      "Constipation     COPD (chronic obstructive pulmonary disease)     Coronary artery disease     Depression     Diverticulitis     Dysphagia     Patient reported \"it won't go all the way down\" when eating solid foods first thing in the mornings    Edema     Elevated cholesterol     Esophageal reflux     Folic acid deficiency     Full dentures     Advised no adhesives DOS    Heart murmur     Herpes zoster     High cholesterol     History of kidney infection     History of recurrent urinary tract infection     HTN (hypertension)     Hypercholesterolemia     Impaired functional mobility, balance, gait, and endurance     Insomnia     Kidney infection     Malignant hypertension 04/26/2015     Accelerated essential hypertension    Measles     rubeola    Muscle spasm     Neoplasm of uncertain behavior of skin     dtr denies    Osteoporosis     Poor historian     Recurrent urinary tract infection     Rheumatoid arthritis     RLS (restless legs syndrome)     Stomach ulcer     Stroke     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    Type 2 diabetes mellitus     Vitamin D deficiency      Past Surgical History:   Procedure Laterality Date    CATARACT EXTRACTION WITH INTRAOCULAR LENS IMPLANT Left 02/11/2013    CATARACT EXTRACTION WITH INTRAOCULAR LENS IMPLANT Right 04/15/2013    COLONOSCOPY  2012    COLONOSCOPY N/A 11/26/2019    Procedure: COLONOSCOPY;  Surgeon: Chidi Downing MD;  Location:  [facility];  Service: Gastroenterology    ENDOSCOPY N/A 11/13/2017    Procedure: ESOPHAGOGASTRODUODENOSCOPY with biopsies and esophageal balloon dilitation;  Surgeon: Wesley Stovall MD;  Location:  [facility];  Service:     ENDOSCOPY N/A 11/25/2019    Procedure: ESOPHAGOGASTRODUODENOSCOPY;  Surgeon: Chidi Downing MD;  Location:  [facility];  Service: Gastroenterology    ENDOSCOPY N/A 11/29/2021    Procedure: ESOPHAGOGASTRODUODENOSCOPY with dilation and biopsies;  Surgeon: Benny" Gonzalez WIN MD;  Location: [facility];  Service: Gastroenterology;  Laterality: N/A;    ENDOSCOPY N/A 11/1/2023    Procedure: ESOPHAGOGASTRODUODENOSCOPY WITH BIOPSY AND DILATATION;  Surgeon: Gonzalez Zapata MD;  Location: [facility];  Service: Gastroenterology;  Laterality: N/A;    ENDOSCOPY N/A 1/27/2025    Procedure: Esophagogastroduodenoscopy with dilatation and biopsies;  Surgeon: Gonzalez Zapata MD;  Location: [facility] ENDOSCOPY;  Service: Gastroenterology;  Laterality: N/A;    HYSTERECTOMY  1979    UPPER GASTROINTESTINAL ENDOSCOPY  12/09/2013    UPPER GASTROINTESTINAL ENDOSCOPY  11/13/2017      General Information       Row Name 04/01/25 1434          Physical Therapy Time and Intention    Document Type therapy note (daily note)  -CD     Mode of Treatment physical therapy  -CD       Row Name 04/01/25 1434          General Information    Patient Profile Reviewed yes  -CD     Existing Precautions/Restrictions fall;seizures  -CD     Barriers to Rehab medically complex;previous functional deficit;cognitive status  -CD       Row Name 04/01/25 1434          Cognition    Orientation Status (Cognition) oriented to;person;place;verbal cues/prompts needed for orientation;situation;time  -CD       Row Name 04/01/25 1434          Safety Issues/Impairments Affecting Functional Mobility    Safety Issues Affecting Function (Mobility) insight into deficits/self-awareness;safety precaution awareness;safety precautions follow-through/compliance;sequencing abilities  -CD     Impairments Affecting Function (Mobility) balance;cognition;endurance/activity tolerance;strength  -CD     Cognitive Impairments, Mobility Safety/Performance insight into deficits/self-awareness;safety precaution awareness;safety precaution follow-through;sequencing abilities  -CD     Comment, Safety Issues/Impairments (Mobility) SIGNIFICANT IMPROVEMENT IN LEVEL OF ALERTNESS. PT CONVERSING AND FOLLOWING ALL COMMANDS. MOTIVATED TO  WORK WITH THERAPY. LESS STARTLED BEHAVIOR.  -CD               User Key  (r) = Recorded By, (t) = Taken By, (c) = Cosigned By      Initials Name Provider Type    CD Gracy Millan, PT Physical Therapist                   Mobility       Row Name 04/01/25 1437          Bed Mobility    Supine-Sit [place] (Bed Mobility) minimum assist (75% patient effort);verbal cues  -CD     Assistive Device (Bed Mobility) head of bed elevated;repositioning sheet  -CD     Comment, (Bed Mobility) CUES FOR LOG ROLLING TO THE R AND REACHING FOR THE BEDRAIL TO ASSIST.  -CD       Row Name 04/01/25 1437          Transfers    Comment, (Transfers) CUES FOR HAND PLACEMENT. STS FROM EOB, BSC, AND RECLINER WITH WALKER FOR SUPPORT. STAND STEP PIVOT BED TO BSC VIA B UE SUPPORT.  -CD       Row Name 04/01/25 1437          Bed-Chair Transfer    Bed-Chair [place] (Transfers) minimum assist (75% patient effort);2 person assist;verbal cues  -CD     Assistive Device (Bed-Chair Transfers) --  B UE SUPPORT.  -CD       Row Name 04/01/25 1437          Sit-Stand Transfer    Sit-Stand [place] (Transfers) minimum assist (75% patient effort)  -CD     Assistive Device (Sit-Stand Transfers) walker, front-wheeled  -CD       Row Name 04/01/25 1437          Gait/Stairs (Locomotion)    [place] Level (Gait) minimum assist (75% patient effort);2 person assist  -CD     Assistive Device (Gait) walker, front-wheeled  -CD     Distance in Feet (Gait) 8  -CD     Deviations/Abnormal Patterns (Gait) gait speed decreased;stride length decreased;weight shifting decreased;tyra decreased;festinating/shuffling  -CD     Bilateral Gait Deviations forward flexed posture;heel strike decreased  -CD     Comment, (Gait/Stairs) PT RELIES HEAVILY ON R WALKER FOR SUPPORT. BECAME LESS RESPONSIVE AND GAIT SLOWED. RECLINER BROUGHT UP BEHIND PT TO SIT. STOOD AGAIN FOR STANDING BP AND NOTED DROP IN BP FROM 118/69 IN SITTING TO 96/68 IN STANDING.  -CD               User Key   (r) = Recorded By, (t) = Taken By, (c) = Cosigned By      Initials Name Provider Type    CD Gracy Millan PT Physical Therapist                   Obj/Interventions       Row Name 04/01/25 1447          Motor Skills    Therapeutic Exercise --  COMPLETED SEATED B LE THER EX: 2 SETS OF 10 REPS EACH- LAQ, HIP FLEX, AP'S  -CD       Row Name 04/01/25 1447          Balance    Static Sitting Balance standby assist  -CD     Dynamic Sitting Balance supervision  ABLE TO RECIPROCAL SCOOT SELF BACK IN RECLINER WITH CUES.  -CD     Position, Sitting Balance unsupported;sitting edge of bed;sitting in chair  -CD     Static Standing Balance verbal cues;contact guard  -CD     Dynamic Standing Balance minimal assist;2-person assist;verbal cues  -CD     Position/Device Used, Standing Balance walker, rolling  -CD     Balance Interventions sitting;standing;sit to stand;supported;dynamic;static  -CD     Comment, Balance PT STOOD AT WALKER FOR HYGIENE X APPROX 1 MINUTE AFTER TOILETING WITH CGA. GAIT REQUIRES MIN ASSIST OF 2 AND R WALKER FOR BALANCE.  -CD               User Key  (r) = Recorded By, (t) = Taken By, (c) = Cosigned By      Initials Name Provider Type    CD Gracy Millan, PT Physical Therapist                   Goals/Plan    No documentation.                  Clinical Impression       Row Name 04/01/25 1452          Pain    Pretreatment Pain Rating 0/10 - no pain  -CD     Posttreatment Pain Rating 0/10 - no pain  -CD       Row Name 04/01/25 1452          Plan of Care Review    Plan of Care Reviewed With patient  -CD     Progress improving  -CD     Outcome Evaluation UPON ARRIVAL, PT  DEMONSTRATES MUCH IMPROVED LEVEL OF ALERTNESS, ABILITY TO CONVERSE AND ABILITY TO FOLLOW COMMANDS. AMBULATED 8 FEET WITH R WALKER AND MIN ASSIST WITH CLOSE CHAIR FOLLOW. GAIT DISTANCE LIMITED BY WEAKNESS AND ACUTE ONSET OF DECREASED LEVEL OF ALERTNESS. NOTED 22 POINT DROP IN SBP WITH SIT TO STAND TRANSITION. RECOMMEND SNF AT D/C.  -CD       Row  Name 04/01/25 1452          Therapy Assessment/Plan (PT)    Patient/Family Therapy Goals Statement (PT) TO GO HOME.  -CD     Rehab Potential (PT) good  -CD     Criteria for Skilled Interventions Met (PT) yes;meets criteria;skilled treatment is necessary  -CD     Therapy Frequency (PT) daily  -CD       Row Name 04/01/25 1452          Vital Signs    Pre Systolic BP Rehab 118  -CD     Pre Treatment Diastolic BP 69  -CD     Post Systolic BP Rehab 96  -CD     Post Treatment Diastolic BP 68  -CD     Posttreatment Heart Rate (beats/min) 91  -CD     Pre SpO2 (%) 98  -CD     O2 Delivery Pre Treatment room air  -CD     O2 Delivery Intra Treatment room air  -CD     Post SpO2 (%) 98  -CD     O2 Delivery Post Treatment room air  -CD     Pre Patient Position Supine  -CD     Intra Patient Position Standing  -CD     Post Patient Position Sitting  -CD       Row Name 04/01/25 1452          Positioning and Restraints    Pre-Treatment Position in bed  -CD     Post Treatment Position chair  -CD     In Chair reclined;call light within reach;encouraged to call for assist;exit alarm on;legs elevated;notified nsg;LUE elevated;RUE elevated  -CD               User Key  (r) = Recorded By, (t) = Taken By, (c) = Cosigned By      Initials Name Provider Type    CD Gracy Millan, PT Physical Therapist                   Outcome Measures       Row Name 04/01/25 1459 04/01/25 0800       How much help from another person do you currently need...    Turning from your back to your side while in flat bed without using bedrails? 3  -CD 2  -HK    Moving from lying on back to sitting on the side of a flat bed without bedrails? 3  -CD 2  -HK    Moving to and from a bed to a chair (including a wheelchair)? 2  -CD 2  -HK    Standing up from a chair using your arms (e.g., wheelchair, bedside chair)? 2  -CD 2  -HK    Climbing 3-5 steps with a railing? 1  -CD 2  -HK    To walk in hospital room? 2  -CD 2  -HK    AM-PAC 6 Clicks Score (PT) 13  -CD 12  -HK     Highest Level of Mobility Goal 4 --> Transfer to chair/commode  -CD 4 --> Transfer to chair/commode  -HK      Row Name 04/01/25 1459          Modified [place] Scale    Modified Liz Scale 4 - Moderately severe disability.  Unable to walk without assistance, and unable to attend to own bodily needs without assistance.  -CD       Row Name 04/01/25 1459 04/01/25 1117       Functional Assessment    Outcome Measure Options AM-PAC 6 Clicks Basic Mobility (PT);Modified Liz  -CD AM-PAC 6 Clicks Daily Activity (OT)  -SD              User Key  (r) = Recorded By, (t) = Taken By, (c) = Cosigned By      Initials Name Provider Type    SD Bri Diego, OT Occupational Therapist    Gracy Nur, PT Physical Therapist    HK Angie Espinosa, RN Registered Nurse                                 Physical Therapy Education       Title: PT OT SLP Therapies (In Progress)       Topic: Physical Therapy (Done)       Point: Mobility training (Done)       Learning Progress Summary            Patient Acceptance, E, VU,NR by CD at 4/1/2025 1500    Comment: SEE FLOWSHEET    Acceptance, E, NR by CK at 3/27/2025 1545    Acceptance, E, VU,NR by CD at 3/26/2025 1643    Comment: SEE FLOWSHEET    Acceptance, E, VU,NR by CD at 3/23/2025 1742    Comment: BENEFITS OF OOB ACTIVITY, SAFETY WITH MOBILITY, PROGRESSION OF POC, D/C PLANNING,                      Point: Home exercise program (Done)       Learning Progress Summary            Patient Acceptance, E, VU,NR by CD at 4/1/2025 1500    Comment: SEE FLOWSHEET    Acceptance, E, VU,NR by CD at 3/26/2025 1643    Comment: SEE FLOWSHEET    Acceptance, E, VU,NR by CD at 3/23/2025 1742    Comment: BENEFITS OF OOB ACTIVITY, SAFETY WITH MOBILITY, PROGRESSION OF POC, D/C PLANNING,                      Point: Body mechanics (Done)       Learning Progress Summary            Patient Acceptance, E, VU,NR by CD at 4/1/2025 1500    Comment: SEE FLOWSHEET    Acceptance, E, NR by CK at 3/27/2025 1545     Acceptance, E, VU,NR by CD at 3/26/2025 1643    Comment: SEE FLOWSHEET    Acceptance, E, VU,NR by CD at 3/23/2025 1742    Comment: BENEFITS OF OOB ACTIVITY, SAFETY WITH MOBILITY, PROGRESSION OF POC, D/C PLANNING,                      Point: Precautions (Done)       Learning Progress Summary            Patient Acceptance, E, VU,NR by CD at 4/1/2025 1500    Comment: SEE FLOWSHEET    Acceptance, E, NR by CK at 3/27/2025 1545    Acceptance, E, VU,NR by CD at 3/26/2025 1643    Comment: SEE FLOWSHEET    Acceptance, E, VU,NR by CD at 3/23/2025 1742    Comment: BENEFITS OF OOB ACTIVITY, SAFETY WITH MOBILITY, PROGRESSION OF POC, D/C PLANNING,                                      User Key       Initials Effective Dates Name Provider Type Discipline    CD 02/03/23 -  Gracy Millan, PT Physical Therapist PT    CK 02/06/24 -  Gina Nielsen, PT Physical Therapist PT                  PT Recommendation and Plan  Planned Therapy Interventions (PT): balance training, bed mobility training, home exercise program, gait training, strengthening, transfer training, postural re-education, patient/family education, neuromuscular re-education  Progress: improving  Outcome Evaluation: UPON ARRIVAL, PT  DEMONSTRATES MUCH IMPROVED LEVEL OF ALERTNESS, ABILITY TO CONVERSE AND ABILITY TO FOLLOW COMMANDS. AMBULATED 8 FEET WITH R WALKER AND MIN ASSIST WITH CLOSE CHAIR FOLLOW. GAIT DISTANCE LIMITED BY WEAKNESS AND ACUTE ONSET OF DECREASED LEVEL OF ALERTNESS. NOTED 22 POINT DROP IN SBP WITH SIT TO STAND TRANSITION. RECOMMEND SNF AT D/C.     Time Calculation:   PT Evaluation Complexity  History, PT Evaluation Complexity: 3 or more personal factors and/or comorbidities  Examination of Body Systems (PT Eval Complexity): total of 4 or more elements  Clinical Presentation (PT Evaluation Complexity): evolving  Clinical Decision Making (PT Evaluation Complexity): moderate complexity  Overall Complexity (PT Evaluation Complexity): moderate  complexity     PT Charges       Row Name 04/01/25 1501             Time Calculation    Start Time 1120  -CD      PT Received On 04/01/25  -CD         Time Calculation- PT    Total Timed Code Minutes- PT 13 minute(s)  -CD         Timed Charges    69634 - PT Therapeutic Exercise Minutes 5  -CD      59079 - Gait Training Minutes  8  -CD         Total Minutes    Timed Charges Total Minutes 13  -CD       Total Minutes 13  -CD                User Key  (r) = Recorded By, (t) = Taken By, (c) = Cosigned By      Initials Name Provider Type    CD Gracy Millan, PT Physical Therapist                  Therapy Charges for Today       Code Description Service Date Service Provider Modifiers Qty    [id number] HC GAIT TRAINING EA 15 MIN 4/1/2025 Gracy Millan, PT GP 1            PT G-Codes  Outcome Measure Options: AM-PAC 6 Clicks Basic Mobility (PT), Modified Liz  AM-PAC 6 Clicks Score (PT): 13  AM-PAC 6 Clicks Score (OT): 14  Modified Liz Scale: 4 - Moderately severe disability.  Unable to walk without assistance, and unable to attend to own bodily needs without assistance.  PT Discharge Summary  Anticipated Discharge Disposition (PT): skilled nursing facility    Gracy Millan, PT  4/1/2025

## 2025-04-01 NOTE — PROGRESS NOTES
[facility] Medicine Services  PROGRESS NOTE    Patient Name: Valarie Camara  : 1945  MRN: [id number]    Date of Admission: 3/20/2025  Primary Care Physician: Lay Cox MD    Subjective   Subjective     CC:  F/U AMS, right sided weakness    HPI:  Seen this morning. Says she is not sleeping well here but otherwise no complaints.       Objective   Objective     Vital Signs:   Temp:  [97.8 °F (36.6 °C)-98.6 °F (37 °C)] 98.4 °F (36.9 °C)  Heart Rate:  [77-89] 89  Resp:  [18-20] 18  BP: (119-137)/(62-75) 121/62     Physical Exam:  Gen-no acute distress  HENT-NCAT, mucous membranes moist  CV-RRR, S1 S2 normal, no m/r/g  Resp-CTAB, no wheezes or rales  Abd-soft, NT, ND, +BS  Ext-no edema  Neuro-A&Ox3  Skin-no rashes  Psych-appropriate mood      Results Reviewed:  LAB RESULTS:      Lab 25  0731 25  2137 25  0449   WBC 6.11  --  8.29   HEMOGLOBIN 11.4* 10.6* 12.1   HEMATOCRIT 35.4 33.7* 37.7   PLATELETS 233  --  203   NEUTROS ABS 3.94  --  6.41   IMMATURE GRANS (ABS) 0.02  --  0.03   LYMPHS ABS 1.49  --  1.23   MONOS ABS 0.54  --  0.56   EOS ABS 0.09  --  0.04   MCV 91.2  --  90.6         Lab 25  0731 25  1119 25  0449 25  1113   SODIUM 144 142 144 144   POTASSIUM 4.2 4.1 4.4 4.1   CHLORIDE 110* 110* 112* 113*   CO2 17.0* 19.0* 18.0* 19.0*   ANION GAP 17.0* 13.0 14.0 12.0   BUN 36* 38* 29* 26*   CREATININE 1.80* 1.68* 1.44* 1.25*   EGFR 28.2* 30.6* 36.8* 43.7*   GLUCOSE 145* 140* 151* 140*   CALCIUM 8.7 8.8 9.1 8.8   MAGNESIUM  --   --   --  1.8                         Brief Urine Lab Results  (Last result in the past 365 days)        Color   Clarity   Blood   Leuk Est   Nitrite   Protein   CREAT   Urine HCG        25 1129 Yellow   Cloudy   Negative   Negative   Negative   100 mg/dL (2+)                   Microbiology Results Abnormal       Procedure Component Value - Date/Time    Urine Culture - Urine, Straight Cath  [[id number]]  (Abnormal) Collected: 03/22/25 1129    Lab Status: Final result Specimen: Urine from Straight Cath Updated: 03/23/25 2043     Urine Culture 25,000 CFU/mL Lactobacillus species     Comment:   Based on laboratory diagnosis criteria, these organisms are common urogenital commensal lilly and have not been associated with urinary tract infections; clinical correlation is recommended.       Narrative:      Colonization of the urinary tract without infection is common. Treatment is discouraged unless the patient is symptomatic, pregnant, or undergoing an invasive urologic procedure.            No radiology results from the last 24 hrs    Results for orders placed during the hospital encounter of 03/20/25    Adult Transthoracic Echo Complete W/ Cont if Necessary Per Protocol (With Agitated Saline)    Interpretation Summary    Left ventricular systolic function is normal. Calculated left ventricular EF = 67% Left ventricular ejection fraction appears to be 66 - 70%.    Left ventricular wall thickness is consistent with mild concentric hypertrophy.    Left ventricular outflow tract peak flow gradient at rest is 11 mmHg. Left ventricular outflow tract peak gradient with valsalva is 29 mmHg.    Left ventricular diastolic function is consistent with (grade I) impaired relaxation.    The mitral valve peak gradient is 9 mmHg. The mitral valve mean gradient is 4 mmHg.    Calculated right ventricular systolic pressure from tricuspid regurgitation is 21 mmHg.    There is a trivial pericardial effusion.      Current medications:  Scheduled Meds:apixaban, 5 mg, Oral, Q12H  aspirin, 81 mg, Oral, Daily   Or  aspirin, 300 mg, Rectal, Daily  atorvastatin, 80 mg, Oral, Nightly  carvedilol, 25 mg, Oral, BID With Meals  fluconazole, 100 mg, Oral, Q24H  Hydrocortisone (Perianal), , Rectal, BID  insulin lispro, 2-7 Units, Subcutaneous, TID With Meals  isosorbide mononitrate, 30 mg, Oral, Q24H  Lidocaine, 1 patch, Transdermal,  Q24H  NIFEdipine XL, 60 mg, Oral, Q24H  pantoprazole, 40 mg, Oral, Q AM  rivastigmine, 1 patch, Transdermal, Daily  sodium chloride, 10 mL, Intravenous, Q12H      Continuous Infusions:   PRN Meds:.  acetaminophen    acetaminophen    senna-docusate sodium **AND** polyethylene glycol **AND** bisacodyl **AND** bisacodyl    Calcium Replacement - Follow Nurse / BPA Driven Protocol    dextrose    dextrose    diphenhydrAMINE    glucagon (human recombinant)    hydrALAZINE    Magnesium Standard Dose Replacement - Follow Nurse / BPA Driven Protocol    nitroglycerin    Phosphorus Replacement - Follow Nurse / BPA Driven Protocol    Potassium Replacement - Follow Nurse / BPA Driven Protocol    sodium chloride    sodium chloride    Assessment & Plan   Assessment & Plan     Active Hospital Problems    Diagnosis  POA    **Encephalopathy [G93.40]  Unknown    Pulmonary emboli [I26.99]  Yes    Gastroesophageal reflux disease with esophagitis [K21.00]  Yes    Anxiety and depression [F41.9, F32.A]  Yes    Multiple-type hyperlipidemia [E78.2]  Yes    Benign essential hypertension [I10]  Yes      Resolved Hospital Problems    Diagnosis Date Resolved POA    CVA (cerebral vascular accident) [I63.9] 03/22/2025 Yes        Brief Hospital Course to date:  Valarie Camara is a 79 y.o. female with hx of CAD, PAD, left ICA disease (50-69% stenosis), CKD3, HTN, mild dementia, PE (on Eliquis), and DM2 who presented with confusion, aphasia, and some right-sided weakness. Patient seen by Neuro-Stroke team. However, stroke workup was negative and they suspected stroke recrudescence in setting of infection/metabolic derangements. UA was concerning for UTI. After admission, patient had significant HTN. Cardene gtt initiated. General Neurology was consulted for ongoing encephalopathy. EEG negative. Felt to be secondary to medications which were discontinued, with subsequent improvement. She is now stable and awaiting rehab placement.      This  patient's problems and plans were partially entered by my partner and updated as appropriate by me 04/01/25. Copied text in this note has been reviewed and is accurate as of today's date.      Encephalopathy, resolved  Aphasia & right-sided weakness, resolved   Left ICA stenosis (50-69%), & multifocal atherosclerotic disease  Mild dementia  Hx previous left thalamic CVA  - Neuro-Stroke followed, suspected stroke recrudescence in setting of infection/metabolic derangement  - TSH WNL, initial UA benign, however repeat UA showed infection (see below)  - MRI brain 3/22/25 negative  - EEG 3/22/25 negative  - CTA head/neck 3/20/25 negative for flow-limiting stenosis or LVO, 70% stenosis of left ICA  - ECHO 3/21/25: EF 66-70%, diastolic dysfunction  - BLE duplex negative  - 3/24/25 more confused and less interactive, so General Neurology was consulted: repeat EEG 3/25/25 was negative  - Per discussion with daughter, patient has become lethargic in past with Trazodone and Seroquel - also lethargic this admission after Geodon administration: AVOID these meds  - Neuro recommended rivastigmine patch 4.9 mg daily  - Per Neuro-Stroke team: continue ASA, statin, anticoagulation  - Follow up in [facility] 8-12 weeks  - SLP following, modified diet recs in place  - PT/OT recommending SNF rehab, recently home from [facility]     UTI  - UA 3/22/25 with 4+ bacteria, 6-10 WBCs, large yeast  - Initiated on Rocephin  - Urine culture with lactobacillus species, Rocephin discontinued     HTN  - s/p Cardene drip  - BP stable, continue current meds     Candidiasis  - Patient recently on Rocephin  - Diflucan 100 mg daily x 7 days, deferred Nystatin given dysphagia  - QTc 392     Diet-controlled DM2  - HbA1c 6.9 on 3/4/25  - SSI      Hx PE 2/28/25 at [facility]  - s/p heparin gtt while NPO  - Eliquis resumed 3/25/25, tolerating PO     KURT on CKD 3 (baseline Cr ~1.8-1.9)  - Initial Cr was 2.5, now at baseline 1.8    Expected  Discharge Location and Transportation: rehab  Expected Discharge medically ready anytime  Expected Discharge Date: 4/2/2025; Expected Discharge Time:      VTE Prophylaxis:  Pharmacologic & mechanical VTE prophylaxis orders are present.         AM-PAC 6 Clicks Score (PT): 12 (04/01/25 0800)    CODE STATUS:   Code Status and Medical Interventions: CPR (Attempt to Resuscitate); Full Support   Ordered at: 03/20/25 7556     Code Status (Patient has no pulse and is not breathing):    CPR (Attempt to Resuscitate)     Medical Interventions (Patient has pulse or is breathing):    Full Support       Makayla Bernstein MD  04/01/25

## 2025-04-02 LAB
ANION GAP SERPL CALCULATED.3IONS-SCNC: 9 MMOL/L (ref 5–15)
BUN SERPL-MCNC: 33 MG/DL (ref 8–23)
BUN/CREAT SERPL: 19.6 (ref 7–25)
CALCIUM SPEC-SCNC: 8.5 MG/DL (ref 8.6–10.5)
CHLORIDE SERPL-SCNC: 110 MMOL/L (ref 98–107)
CO2 SERPL-SCNC: 20 MMOL/L (ref 22–29)
CREAT SERPL-MCNC: 1.68 MG/DL (ref 0.57–1)
DEPRECATED RDW RBC AUTO: 47.7 FL (ref 37–54)
EGFRCR SERPLBLD CKD-EPI 2021: 30.6 ML/MIN/1.73
ERYTHROCYTE [DISTWIDTH] IN BLOOD BY AUTOMATED COUNT: 14.9 % (ref 12.3–15.4)
GLUCOSE BLDC GLUCOMTR-MCNC: 140 MG/DL (ref 70–130)
GLUCOSE BLDC GLUCOMTR-MCNC: 143 MG/DL (ref 70–130)
GLUCOSE BLDC GLUCOMTR-MCNC: 175 MG/DL (ref 70–130)
GLUCOSE BLDC GLUCOMTR-MCNC: 277 MG/DL (ref 70–130)
GLUCOSE SERPL-MCNC: 292 MG/DL (ref 65–99)
HCT VFR BLD AUTO: 35 % (ref 34–46.6)
HGB BLD-MCNC: 11.5 G/DL (ref 12–15.9)
MAGNESIUM SERPL-MCNC: 1.8 MG/DL (ref 1.6–2.4)
MCH RBC QN AUTO: 29.1 PG (ref 26.6–33)
MCHC RBC AUTO-ENTMCNC: 32.9 G/DL (ref 31.5–35.7)
MCV RBC AUTO: 88.6 FL (ref 79–97)
PLATELET # BLD AUTO: 263 10*3/MM3 (ref 140–450)
PMV BLD AUTO: 10.4 FL (ref 6–12)
POTASSIUM SERPL-SCNC: 4.2 MMOL/L (ref 3.5–5.2)
RBC # BLD AUTO: 3.95 10*6/MM3 (ref 3.77–5.28)
SODIUM SERPL-SCNC: 139 MMOL/L (ref 136–145)
WBC NRBC COR # BLD AUTO: 6.08 10*3/MM3 (ref 3.4–10.8)

## 2025-04-02 PROCEDURE — 99232 SBSQ HOSP IP/OBS MODERATE 35: CPT | Performed by: NURSE PRACTITIONER

## 2025-04-02 PROCEDURE — 92526 ORAL FUNCTION THERAPY: CPT

## 2025-04-02 PROCEDURE — 63710000001 INSULIN LISPRO (HUMAN) PER 5 UNITS: Performed by: INTERNAL MEDICINE

## 2025-04-02 PROCEDURE — 82948 REAGENT STRIP/BLOOD GLUCOSE: CPT

## 2025-04-02 PROCEDURE — 85027 COMPLETE CBC AUTOMATED: CPT | Performed by: NURSE PRACTITIONER

## 2025-04-02 PROCEDURE — 97110 THERAPEUTIC EXERCISES: CPT

## 2025-04-02 PROCEDURE — 80048 BASIC METABOLIC PNL TOTAL CA: CPT | Performed by: NURSE PRACTITIONER

## 2025-04-02 PROCEDURE — 97530 THERAPEUTIC ACTIVITIES: CPT

## 2025-04-02 PROCEDURE — 25810000003 SODIUM CHLORIDE 0.9 % SOLUTION: Performed by: NURSE PRACTITIONER

## 2025-04-02 PROCEDURE — 83735 ASSAY OF MAGNESIUM: CPT | Performed by: NURSE PRACTITIONER

## 2025-04-02 RX ORDER — MIDODRINE HYDROCHLORIDE 5 MG/1
2.5 TABLET ORAL
Status: DISCONTINUED | OUTPATIENT
Start: 2025-04-02 | End: 2025-04-04

## 2025-04-02 RX ORDER — CARVEDILOL 12.5 MG/1
12.5 TABLET ORAL 2 TIMES DAILY WITH MEALS
Status: DISCONTINUED | OUTPATIENT
Start: 2025-04-02 | End: 2025-04-07 | Stop reason: HOSPADM

## 2025-04-02 RX ADMIN — ISOSORBIDE MONONITRATE 30 MG: 30 TABLET, EXTENDED RELEASE ORAL at 08:30

## 2025-04-02 RX ADMIN — APIXABAN 5 MG: 5 TABLET, FILM COATED ORAL at 08:30

## 2025-04-02 RX ADMIN — ATORVASTATIN CALCIUM 80 MG: 40 TABLET, FILM COATED ORAL at 22:11

## 2025-04-02 RX ADMIN — HYDROCORTISONE 2.5%: 25 CREAM TOPICAL at 22:11

## 2025-04-02 RX ADMIN — LIDOCAINE 1 PATCH: 4 PATCH TOPICAL at 08:30

## 2025-04-02 RX ADMIN — Medication 10 ML: at 08:30

## 2025-04-02 RX ADMIN — SODIUM CHLORIDE 500 ML: 900 INJECTION, SOLUTION INTRAVENOUS at 11:31

## 2025-04-02 RX ADMIN — INSULIN LISPRO 2 UNITS: 100 INJECTION, SOLUTION INTRAVENOUS; SUBCUTANEOUS at 17:32

## 2025-04-02 RX ADMIN — INSULIN LISPRO 4 UNITS: 100 INJECTION, SOLUTION INTRAVENOUS; SUBCUTANEOUS at 11:47

## 2025-04-02 RX ADMIN — MIDODRINE HYDROCHLORIDE 2.5 MG: 5 TABLET ORAL at 17:32

## 2025-04-02 RX ADMIN — ASPIRIN 81 MG: 81 TABLET, CHEWABLE ORAL at 08:30

## 2025-04-02 RX ADMIN — NIFEDIPINE 60 MG: 60 TABLET, EXTENDED RELEASE ORAL at 08:30

## 2025-04-02 RX ADMIN — RIVASTIGMINE 1 PATCH: 4.6 PATCH, EXTENDED RELEASE TRANSDERMAL at 17:34

## 2025-04-02 RX ADMIN — Medication 10 ML: at 21:15

## 2025-04-02 RX ADMIN — CARVEDILOL 25 MG: 12.5 TABLET, FILM COATED ORAL at 08:30

## 2025-04-02 RX ADMIN — APIXABAN 5 MG: 5 TABLET, FILM COATED ORAL at 22:12

## 2025-04-02 RX ADMIN — HYDROCORTISONE 2.5%: 25 CREAM TOPICAL at 08:30

## 2025-04-02 RX ADMIN — MIDODRINE HYDROCHLORIDE 2.5 MG: 5 TABLET ORAL at 11:47

## 2025-04-02 RX ADMIN — CARVEDILOL 12.5 MG: 12.5 TABLET, FILM COATED ORAL at 17:32

## 2025-04-02 NOTE — PLAN OF CARE
Goal Outcome Evaluation:  Plan of Care Reviewed With: patient        Progress: improving  Outcome Evaluation: PT FOLLOWING ALL COMMANDS AND CONTINUED TO DEMONSTRATE IMPROVED LEVEL OF ALERTNESS UPON ARRIVAL BUT ONCE STANDING HAD EPISODE OF ORTHOSTATIC HYPOTENSION WITH DECREASED ALERTNESS AND B KNEES BUCKLING. UNABLE TO PROGRESS TO PIVOT TRANSFERS OR GAIT THIS DATE. REQUIRED MIN ASSIST FOR SUPINE TO SIT AND MIN ASSIST OF 2 FOR SIT TO STAND AT WALKER. PRIOR GOALS PARTIALLY MET AND REVISED TO REFLECT CURRENT LEVEL OF FUNCTION. CONTINUE TO RECOMMEND IRF AT D/C.    Anticipated Discharge Disposition (PT): skilled nursing facility

## 2025-04-02 NOTE — THERAPY TREATMENT NOTE
Acute Care - Speech Language Pathology   Swallow Treatment Note [facility]       Patient Name: Valarie Camara  : 1945  MRN: [id number]  Today's Date: 2025               Admit Date: 3/20/2025    Visit Dx:     ICD-10-CM ICD-9-CM   1. Acute ischemic stroke  I63.9 434.91   2. History of stroke  Z86.73 V12.54   3. Renal insufficiency  N28.9 593.9   4. Aphasia  R47.01 784.3   5. Dysarthria  R47.1 784.51   6. Oropharyngeal dysphagia  R13.12 787.22   7. 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  I69.30 V12.54     Patient Active Problem List   Diagnosis    Atopic rhinitis    Arthritis    Chronic neck pain    Anxiety and depression    Edema    Gastroesophageal reflux disease with esophagitis    Multiple-type hyperlipidemia    Vitamin D deficiency    Benign essential hypertension    Obesity (BMI 30-39.9)    History of COPD    History of cataract    History of osteoporosis    History of restless legs syndrome    History of rheumatoid arthritis    Elevated serum creatinine    Esophageal dysphagia    Constipation    Schatzki's ring    Gastritis without bleeding    History of Helicobacter pylori infection    Duodenitis    Right hemiparesis    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    Multiple thyroid nodules    Lacunar stroke    DM (diabetes mellitus), type 2, uncontrolled w/neurologic complication    Syncope and collapse    Positive fecal occult blood test    Left foot pain    Hypomagnesemia    Acute on chronic anemia    Esophageal dysphagia    Reflux esophagitis    Syncope    Irritable bowel syndrome with constipation    Hypertensive urgency    Pulmonary emboli    Encephalopathy     Past Medical History:   Diagnosis Date    Acute bronchitis with bronchospasm     Anxiety     Arthritis     Asthma     B12 deficiency     Back pain     Body piercing     ears    Cataract     Cerebrovascular disease     Cervicalgia     Chronic kidney disease      "stage 3b    Colonic polyp     History of colonic polyps     Constipation     COPD (chronic obstructive pulmonary disease)     Coronary artery disease     Depression     Diverticulitis     Dysphagia     Patient reported \"it won't go all the way down\" when eating solid foods first thing in the mornings    Edema     Elevated cholesterol     Esophageal reflux     Folic acid deficiency     Full dentures     Advised no adhesives DOS    Heart murmur     Herpes zoster     High cholesterol     History of kidney infection     History of recurrent urinary tract infection     HTN (hypertension)     Hypercholesterolemia     Impaired functional mobility, balance, gait, and endurance     Insomnia     Kidney infection     Malignant hypertension 04/26/2015     Accelerated essential hypertension    Measles     rubeola    Muscle spasm     Neoplasm of uncertain behavior of skin     dtr denies    Osteoporosis     Poor historian     Recurrent urinary tract infection     Rheumatoid arthritis     RLS (restless legs syndrome)     Stomach ulcer     Stroke     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    Type 2 diabetes mellitus     Vitamin D deficiency      Past Surgical History:   Procedure Laterality Date    CATARACT EXTRACTION WITH INTRAOCULAR LENS IMPLANT Left 02/11/2013    CATARACT EXTRACTION WITH INTRAOCULAR LENS IMPLANT Right 04/15/2013    COLONOSCOPY  2012    COLONOSCOPY N/A 11/26/2019    Procedure: COLONOSCOPY;  Surgeon: Chidi Downing MD;  Location:  [facility];  Service: Gastroenterology    ENDOSCOPY N/A 11/13/2017    Procedure: ESOPHAGOGASTRODUODENOSCOPY with biopsies and esophageal balloon dilitation;  Surgeon: Wesley Stovall MD;  Location:  [facility];  Service:     ENDOSCOPY N/A 11/25/2019    Procedure: ESOPHAGOGASTRODUODENOSCOPY;  Surgeon: Chidi Downing MD;  Location:  [facility];  Service: Gastroenterology    ENDOSCOPY N/A 11/29/2021    Procedure: " ESOPHAGOGASTRODUODENOSCOPY with dilation and biopsies;  Surgeon: Gonzalez Zapata MD;  Location: [facility];  Service: Gastroenterology;  Laterality: N/A;    ENDOSCOPY N/A 11/1/2023    Procedure: ESOPHAGOGASTRODUODENOSCOPY WITH BIOPSY AND DILATATION;  Surgeon: Gonzalez Zapata MD;  Location: [facility] END[facility]COPY;  Service: Gastroenterology;  Laterality: N/A;    ENDOSCOPY N/A 1/27/2025    Procedure: Esophagogastroduodenoscopy with dilatation and biopsies;  Surgeon: Gonzalez Zapata MD;  Location: [facility] ENDOSCOPY;  Service: Gastroenterology;  Laterality: N/A;    HYSTERECTOMY  1979    UPPER GASTROINTESTINAL ENDOSCOPY  12/09/2013    UPPER GASTROINTESTINAL ENDOSCOPY  11/13/2017       SLP Recommendation and Plan     SLP Diet Recommendation: mechanical ground textures, no mixed consistencies, thin liquids (04/02/25 0915)  Recommended Precautions and Strategies: general aspiration precautions, reflux precautions, upright posture during/after eating, 1:1 supervision, assist with feeding, check mouth frequently for oral residue/pocketing, no straw (04/02/25 0915)  SLP Rec. for Method of Medication Administration: meds whole, meds crushed, with puree, as tolerated (04/02/25 0915)     Monitor for Signs of Aspiration: notify SLP if any concerns (04/02/25 0915)        Anticipated Discharge Disposition (SLP): skilled nursing facility (04/02/25 0915)     Therapy Frequency (Swallow): 5 days per week (04/02/25 0915)  Predicted Duration Therapy Intervention (Days): 1 week (04/02/25 0915)  Oral Care Recommendations: Oral Care BID/PRN, Toothbrush (04/02/25 0915)        Daily Summary of Progress (SLP): progress toward functional goals as expected (04/02/25 0915)               Treatment Assessment (SLP): suspected, continued, pharyngeal dysphagia (04/02/25 0915)  Treatment Assessment Comments (SLP): Good participation, pt reports that she likes the upgraded foods (04/02/25 0915)  Plan for Continued Treatment (SLP):  continue treatment per plan of care (04/02/25 0915)         Progress: no change      SWALLOW EVALUATION (Last 72 Hours)       SLP Adult Swallow Evaluation       Row Name 04/02/25 0915                   Rehab Evaluation    Document Type therapy note (daily note)  -RS        Subjective Information no complaints  -RS        Patient Observations alert;cooperative  -RS        Patient/Family/Caregiver Comments/Observations no family present  -RS        Patient Effort good  -RS        Symptoms Noted During/After Treatment none  -RS           Pain    Pretreatment Pain Rating 0/10 - no pain  -RS        Posttreatment Pain Rating 0/10 - no pain  -RS           SLP Treatment Clinical Impressions    Treatment Assessment (SLP) suspected;continued;pharyngeal dysphagia  -RS        Treatment Assessment Comments (SLP) Good participation, pt reports that she likes the upgraded foods  -RS        Daily Summary of Progress (SLP) progress toward functional goals as expected  -RS        Plan for Continued Treatment (SLP) continue treatment per plan of care  -RS        Care Plan Review care plan/treatment goals reviewed  -RS           Recommendations    Therapy Frequency (Swallow) 5 days per week  -RS        Predicted Duration Therapy Intervention (Days) 1 week  -RS        SLP Diet Recommendation mechanical ground textures;no mixed consistencies;thin liquids  -RS        Recommended Precautions and Strategies general aspiration precautions;reflux precautions;upright posture during/after eating;1:1 supervision;assist with feeding;check mouth frequently for oral residue/pocketing;no straw  -RS        Oral Care Recommendations Oral Care BID/PRN;Toothbrush  -RS        SLP Rec. for Method of Medication Administration meds whole;meds crushed;with puree;as tolerated  -RS        Monitor for Signs of Aspiration notify SLP if any concerns  -RS        Anticipated Discharge Disposition (SLP) [facility]  -RS                  User Key  (r) =  Recorded By, (t) = Taken By, (c) = Cosigned By      Initials Name Effective Dates    RS GregoryQasim, MS CCC-SLP 09/14/23 -                     EDUCATION  The patient has been educated in the following areas:   Dysphagia (Swallowing Impairment) Modified Diet Instruction.        SLP GOALS       Row Name 04/02/25 0915             (LTG) Patient will demonstrate functional swallow for    Diet Texture (Demonstrate functional swallow) soft to chew (chopped) textures  -RS      Liquid viscosity (Demonstrate functional swallow) thin liquids  -RS      Door (Demonstrate functional swallow) with minimal cues (75-90% accuracy)  -RS      Time Frame (Demonstrate functional swallow) 1 week  -RS      Progress/Outcomes (Demonstrate functional swallow) continuing progress toward goal  -RS         (STG) Patient will tolerate trials of    Consistencies Trialed (Tolerate trials) mechanical ground textures;thin liquids  -RS      Desired Outcome (Tolerate trials) without signs/symptoms of aspiration;with adequate oral prep/transit/clearance;with use of compensatory strategies (see comments)  -RS      Door (Tolerate trials) with minimal cues (75-90% accuracy)  -RS      Time Frame (Tolerate trials) 1 week  -RS      Progress/Outcomes (Tolerate trials) continuing progress toward goal  -RS      Comment (Tolerate trials) Increased oral prep time but functional. Pt reports difficulty drinking Boost from container without straw, offered to provide pt extra cups but she declined  -RS         (STG) Pharyngeal Strengthening Exercise Goal 1 (SLP)    Activity (Pharyngeal Strengthening Goal 1, SLP) increase timing  -RS      Increase Timing prepping - 3 second prep or suck swallow or 3-step swallow  -RS      Door/Accuracy (Pharyngeal Strengthening Goal 1, SLP) with minimal cues (75-90% accuracy)  -RS      Time Frame (Pharyngeal Strengthening Goal 1, SLP) 1 week  -RS      Progress/Outcomes (Pharyngeal Strengthening Goal 1, SLP)  continuing progress toward goal  -RS         Patient will demonstrate functional speech skills for return to discharge environment    Palm with moderate cues  -RS      Time frame 1 week  -RS      Barriers lethargy;cognitive status  -RS      Progress/Outcomes goal ongoing  -RS         Patient will demonstrate functional language skills for return to discharge environment     Palm with moderate cues  -RS      Time frame 1 week  -RS      Barriers lethargy;cognitive status  -RS      Progress/Outcomes goal ongoing  -RS         SLP Diagnostic Treatment     Patient will participate in further assessment in the following areas reading comprehension;graphic expression;cognitive-linguistic;clarification of baseline cognitive communication status  -RS      Time Frame (Diagnostic) 1 week  -RS      Barriers (Diagnostic) Lethargy;Cognitive status  -RS      Progress/Outcomes (Additional Goal 1, SLP) goal ongoing  -RS         Words/Phrases/Sentences Goal 1 (SLP)    Improve Ability to Comprehend Words/Phrases/Sentences Through: Goal 1 (SLP) identify objects, field of;identify pictures, field of;80%;with moderate cues (50-74%);other (comment)  -RS      Time Frame (Identify Objects and Pictures Goal 1, SLP) 1 week  -RS      Progress/Outcomes (Identify Objects and Pictures Goal 1, SLP) goal ongoing  -RS         Follow Directions Goal 2 (SLP)    Improve Ability to Follow Directions Goal 1 (SLP) 2 step commands;80%;with minimal cues (75-90%)  -RS      Time Frame (Follow Directions Goal 1, SLP) 1 week  -RS      Progress/Outcomes (Follow Directions Goal 1, SLP) goal ongoing  -RS         Connected Speech to Express Thoughts Goal 1 (SLP)    Improve Narrative Discourse to Express Thoughts By Goal 1 (SLP) explaining a proverb or idiom;conversational task on a given topic;90%;with minimal cues (75-90%)  -RS      Time Frame (Connected Speech Goal 1, SLP) 1 week  -RS      Progress/Outcomes (Connected Speech Goal 1, SLP) goal  ongoing  -RS         Articulation Goal 1 (SLP)    Improve Articulation Goal 1 (SLP) by over-articulating at word level;80%;with moderate cues (50-74%)  -RS      Time Frame (Articulation Goal 1, SLP) 1 week  -RS      Progress/Outcomes (Articulation Goal 1, SLP) goal ongoing  -RS                User Key  (r) = Recorded By, (t) = Taken By, (c) = Cosigned By      Initials Name Provider Type    Qasim Lopez MS CCC-SLP Speech and Language Pathologist                         Time Calculation:    Time Calculation- SLP       Row Name 04/02/25 1204             Time Calculation- SLP    SLP Start Time 0915  -RS      SLP Received On 04/02/25  -RS         Untimed Charges    03612-YE Treatment Swallow Minutes 40  -RS         Total Minutes    Untimed Charges Total Minutes 40  -RS       Total Minutes 40  -RS                User Key  (r) = Recorded By, (t) = Taken By, (c) = Cosigned By      Initials Name Provider Type    Qasim Lopez MS CCC-SLP Speech and Language Pathologist                    Therapy Charges for Today       Code Description Service Date Service Provider Modifiers Qty    [id number]  ST TREATMENT SWALLOW 3 4/2/2025 Qasim Jenkins MS CCC-GABRIELLA GN 1                 MS BANDAR Zavala  4/2/2025

## 2025-04-02 NOTE — PLAN OF CARE
Goal Outcome Evaluation:  Plan of Care Reviewed With: patient        Progress: no change       Anticipated Discharge Disposition (SLP): skilled nursing facility             Treatment Assessment (SLP): suspected, continued, pharyngeal dysphagia (04/02/25 0915)    Plan for Continued Treatment (SLP): continue treatment per plan of care (04/02/25 0915)

## 2025-04-02 NOTE — PROGRESS NOTES
[facility] Medicine Services  PROGRESS NOTE    Patient Name: Valarie Camara  : 1945  MRN: [id number]    Date of Admission: 3/20/2025  Primary Care Physician: Lay Cox MD    Subjective   Subjective     CC:  F/u AMS, right-sided weakness    HPI:  Patient sitting up on side of the bed.  Says she feels weak today.  Is working with PT.  PT checking orthostatics.  Upon standing, patient became severely orthostatic with SBP dropping by 47 and DBP dropping by 51.  Patient positioned back in the bed and placed in Trendelenburg briefly with BP recovering to 116/69.  Will give 500 mL normal saline bolus.  Per RN, patient required a bolus yesterday as well for orthostasis.      Objective   Objective     Vital Signs:   Temp:  [98.3 °F (36.8 °C)-98.7 °F (37.1 °C)] 98.3 °F (36.8 °C)  Heart Rate:  [80-90] 85  Resp:  [18-19] 18  BP: ()/(59-71) 137/70     Physical Exam:  Constitutional: Awake, alert  HENT: NCAT, mucous membranes moist  Respiratory: Clear to auscultation bilaterally, respiratory effort normal, room air  Cardiovascular: RRR, no murmurs, rubs, or gallops  Gastrointestinal: Positive bowel sounds, soft, nontender, nondistended  Musculoskeletal: No bilateral ankle edema  Psychiatric: Appropriate affect, cooperative  Neurologic: Oriented to self, follows commands, moves all extremities, speech clear  Skin: No rashes        Results Reviewed:  LAB RESULTS:      Lab 25  0731 257 25   WBC 6.11  --  8.29   HEMOGLOBIN 11.4* 10.6* 12.1   HEMATOCRIT 35.4 33.7* 37.7   PLATELETS 233  --  203   NEUTROS ABS 3.94  --  6.41   IMMATURE GRANS (ABS) 0.02  --  0.03   LYMPHS ABS 1.49  --  1.23   MONOS ABS 0.54  --  0.56   EOS ABS 0.09  --  0.04   MCV 91.2  --  90.6         Lab 25  0731 25  1119 25  0449 25  1113   SODIUM 144 142 144 144   POTASSIUM 4.2 4.1 4.4 4.1   CHLORIDE 110* 110* 112* 113*   CO2 17.0* 19.0* 18.0* 19.0*    ANION GAP 17.0* 13.0 14.0 12.0   BUN 36* 38* 29* 26*   CREATININE 1.80* 1.68* 1.44* 1.25*   EGFR 28.2* 30.6* 36.8* 43.7*   GLUCOSE 145* 140* 151* 140*   CALCIUM 8.7 8.8 9.1 8.8   MAGNESIUM  --   --   --  1.8                         Brief Urine Lab Results  (Last result in the past 365 days)        Color   Clarity   Blood   Leuk Est   Nitrite   Protein   CREAT   Urine HCG        03/22/25 1129 Yellow   Cloudy   Negative   Negative   Negative   100 mg/dL (2+)                   Microbiology Results Abnormal       Procedure Component Value - Date/Time    Urine Culture - Urine, Straight Cath [[id number]]  (Abnormal) Collected: 03/22/25 1129    Lab Status: Final result Specimen: Urine from Straight Cath Updated: 03/23/25 2043     Urine Culture 25,000 CFU/mL Lactobacillus species     Comment:   Based on laboratory diagnosis criteria, these organisms are common urogenital commensal lilly and have not been associated with urinary tract infections; clinical correlation is recommended.       Narrative:      Colonization of the urinary tract without infection is common. Treatment is discouraged unless the patient is symptomatic, pregnant, or undergoing an invasive urologic procedure.            No radiology results from the last 24 hrs    Results for orders placed during the hospital encounter of 03/20/25    Adult Transthoracic Echo Complete W/ Cont if Necessary Per Protocol (With Agitated Saline)    Interpretation Summary    Left ventricular systolic function is normal. Calculated left ventricular EF = 67% Left ventricular ejection fraction appears to be 66 - 70%.    Left ventricular wall thickness is consistent with mild concentric hypertrophy.    Left ventricular outflow tract peak flow gradient at rest is 11 mmHg. Left ventricular outflow tract peak gradient with valsalva is 29 mmHg.    Left ventricular diastolic function is consistent with (grade I) impaired relaxation.    The mitral valve peak gradient is 9 mmHg. The  mitral valve mean gradient is 4 mmHg.    Calculated right ventricular systolic pressure from tricuspid regurgitation is 21 mmHg.    There is a trivial pericardial effusion.      Current medications:  Scheduled Meds:apixaban, 5 mg, Oral, Q12H  aspirin, 81 mg, Oral, Daily   Or  aspirin, 300 mg, Rectal, Daily  atorvastatin, 80 mg, Oral, Nightly  carvedilol, 25 mg, Oral, BID With Meals  Hydrocortisone (Perianal), , Rectal, BID  insulin lispro, 2-7 Units, Subcutaneous, TID With Meals  isosorbide mononitrate, 30 mg, Oral, Q24H  Lidocaine, 1 patch, Transdermal, Q24H  NIFEdipine XL, 60 mg, Oral, Q24H  pantoprazole, 40 mg, Oral, Q AM  rivastigmine, 1 patch, Transdermal, Daily  sodium chloride, 10 mL, Intravenous, Q12H      Continuous Infusions:   PRN Meds:.  acetaminophen    acetaminophen    senna-docusate sodium **AND** polyethylene glycol **AND** bisacodyl **AND** bisacodyl    Calcium Replacement - Follow Nurse / BPA Driven Protocol    dextrose    dextrose    diphenhydrAMINE    glucagon (human recombinant)    hydrALAZINE    Magnesium Standard Dose Replacement - Follow Nurse / BPA Driven Protocol    nitroglycerin    Phosphorus Replacement - Follow Nurse / BPA Driven Protocol    Potassium Replacement - Follow Nurse / BPA Driven Protocol    sodium chloride    sodium chloride    Assessment & Plan   Assessment & Plan     Active Hospital Problems    Diagnosis  POA    **Encephalopathy [G93.40]  Unknown    Pulmonary emboli [I26.99]  Yes    Gastroesophageal reflux disease with esophagitis [K21.00]  Yes    Anxiety and depression [F41.9, F32.A]  Yes    Multiple-type hyperlipidemia [E78.2]  Yes    Benign essential hypertension [I10]  Yes      Resolved Hospital Problems    Diagnosis Date Resolved POA    CVA (cerebral vascular accident) [I63.9] 03/22/2025 Yes        Brief Hospital Course to date:  Valarie Camara is a 79 y.o. female  with hx of CAD, PAD, left ICA disease (50-69% stenosis), CKD3, HTN, mild dementia, PE (on Eliquis),  and DM2 who presented with confusion, aphasia, and some right-sided weakness. Patient seen by Neuro-Stroke team. However, stroke workup was negative and they suspected stroke recrudescence in setting of infection/metabolic derangements. UA was concerning for UTI. After admission, patient had significant HTN. Cardene gtt initiated. General Neurology was consulted for ongoing encephalopathy. EEG negative. Felt to be secondary to medications which were discontinued, with subsequent improvement. She is now stable and awaiting rehab placement.      This patient's problems and plans were partially entered by my partner and updated as appropriate by me 04/02/25.       Encephalopathy, resolved  Aphasia & right-sided weakness, resolved   Left ICA stenosis (50-69%), & multifocal atherosclerotic disease  Mild dementia  Hx previous left thalamic CVA  - Neuro-Stroke followed, suspected stroke recrudescence in setting of infection/metabolic derangement  - TSH WNL, initial UA benign, however repeat UA showed infection (see below)  - MRI brain 3/22/25 negative  - EEG 3/22/25 negative  - CTA head/neck 3/20/25 negative for flow-limiting stenosis or LVO, 70% stenosis of left ICA  - ECHO 3/21/25: EF 66-70%, diastolic dysfunction  - BLE duplex negative  - 3/24/25 more confused and less interactive, so General Neurology was consulted: repeat EEG 3/25/25 was negative  - Per discussion with daughter, patient has become lethargic in past with Trazodone and Seroquel - also lethargic this admission after Geodon administration: AVOID these meds  - Neuro recommended rivastigmine patch 4.9 mg daily  - Per Neuro-Stroke team: continue ASA, statin, anticoagulation  - Follow up in [facility] 8-12 weeks  - SLP following, modified diet recs in place  - PT/OT recommending SNF rehab, recently home from [facility]     UTI  - UA 3/22/25 with 4+ bacteria, 6-10 WBCs, large yeast  - Initiated on Rocephin  - Urine culture with lactobacillus  species, Rocephin discontinued     Orthostatic hypotension  Weakness  - Pt has had orthostasis the past 2 days. BP today 129/110 >> 82/59 with standing.   - Echo 3/21 with EF 66-70%  - Give 500 ml bolus  - Midodrine 2.5 mg TID for now, titrate as indicated for orthostasis  - Reduce Coreg to 12.5 mg BID daily,  monitor, okay to let BP run higher give pt's age, goal < 160/90  - Check CBC, BMP, mag    HTN  - s/p Cardene drip  - BP stable; however, pt orthostatic, BP meds adjusted as above     Candidiasis  - Patient recently on Rocephin  - Diflucan 100 mg daily x 7 days, deferred Nystatin given dysphagia  - QTc 392     Diet-controlled DM2  - HbA1c 6.9 on 3/4/25  - SSI      Hx PE 2/28/25 at [facility]  - s/p heparin gtt while NPO  - Eliquis resumed 3/25/25, tolerating PO     KURT on CKD 3 (baseline Cr ~1.8-1.9)  - Initial Cr was 2.5, now at baseline 1.8      Expected Discharge Location and Transportation: [facility], EMS  Expected Discharge pending stable labs and VS  Expected Discharge Date: 4/3/2025; Expected Discharge Time:      VTE Prophylaxis:  Pharmacologic & mechanical VTE prophylaxis orders are present.         AM-PAC 6 Clicks Score (PT): 13 (04/01/25 1459)    CODE STATUS:   Code Status and Medical Interventions: CPR (Attempt to Resuscitate); Full Support   Ordered at: 03/20/25 4997     Code Status (Patient has no pulse and is not breathing):    CPR (Attempt to Resuscitate)     Medical Interventions (Patient has pulse or is breathing):    Full Support       Florencia Clark, JOSEE  04/02/25

## 2025-04-02 NOTE — THERAPY PROGRESS REPORT/RE-CERT
Patient Name: Valarie Camara  : 1945    MRN: [id number]                              Today's Date: 2025       Admit Date: 3/20/2025    Visit Dx:     ICD-10-CM ICD-9-CM   1. Acute ischemic stroke  I63.9 434.91   2. History of stroke  Z86.73 V12.54   3. Renal insufficiency  N28.9 593.9   4. Aphasia  R47.01 784.3   5. Dysarthria  R47.1 784.51   6. Oropharyngeal dysphagia  R13.12 787.22   7. 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  I69.30 V12.54     Patient Active Problem List   Diagnosis    Atopic rhinitis    Arthritis    Chronic neck pain    Anxiety and depression    Edema    Gastroesophageal reflux disease with esophagitis    Multiple-type hyperlipidemia    Vitamin D deficiency    Benign essential hypertension    Obesity (BMI 30-39.9)    History of COPD    History of cataract    History of osteoporosis    History of restless legs syndrome    History of rheumatoid arthritis    Elevated serum creatinine    Esophageal dysphagia    Constipation    Schatzki's ring    Gastritis without bleeding    History of Helicobacter pylori infection    Duodenitis    Right hemiparesis    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    Multiple thyroid nodules    Lacunar stroke    DM (diabetes mellitus), type 2, uncontrolled w/neurologic complication    Syncope and collapse    Positive fecal occult blood test    Left foot pain    Hypomagnesemia    Acute on chronic anemia    Esophageal dysphagia    Reflux esophagitis    Syncope    Irritable bowel syndrome with constipation    Hypertensive urgency    Pulmonary emboli    Encephalopathy     Past Medical History:   Diagnosis Date    Acute bronchitis with bronchospasm     Anxiety     Arthritis     Asthma     B12 deficiency     Back pain     Body piercing     ears    Cataract     Cerebrovascular disease     Cervicalgia     Chronic kidney disease     stage 3b    Colonic polyp     History of colonic polyps      "Constipation     COPD (chronic obstructive pulmonary disease)     Coronary artery disease     Depression     Diverticulitis     Dysphagia     Patient reported \"it won't go all the way down\" when eating solid foods first thing in the mornings    Edema     Elevated cholesterol     Esophageal reflux     Folic acid deficiency     Full dentures     Advised no adhesives DOS    Heart murmur     Herpes zoster     High cholesterol     History of kidney infection     History of recurrent urinary tract infection     HTN (hypertension)     Hypercholesterolemia     Impaired functional mobility, balance, gait, and endurance     Insomnia     Kidney infection     Malignant hypertension 04/26/2015     Accelerated essential hypertension    Measles     rubeola    Muscle spasm     Neoplasm of uncertain behavior of skin     dtr denies    Osteoporosis     Poor historian     Recurrent urinary tract infection     Rheumatoid arthritis     RLS (restless legs syndrome)     Stomach ulcer     Stroke     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    Type 2 diabetes mellitus     Vitamin D deficiency      Past Surgical History:   Procedure Laterality Date    CATARACT EXTRACTION WITH INTRAOCULAR LENS IMPLANT Left 02/11/2013    CATARACT EXTRACTION WITH INTRAOCULAR LENS IMPLANT Right 04/15/2013    COLONOSCOPY  2012    COLONOSCOPY N/A 11/26/2019    Procedure: COLONOSCOPY;  Surgeon: Chidi Downing MD;  Location:  [facility];  Service: Gastroenterology    ENDOSCOPY N/A 11/13/2017    Procedure: ESOPHAGOGASTRODUODENOSCOPY with biopsies and esophageal balloon dilitation;  Surgeon: Welsey Stovall MD;  Location:  [facility];  Service:     ENDOSCOPY N/A 11/25/2019    Procedure: ESOPHAGOGASTRODUODENOSCOPY;  Surgeon: Chidi Downing MD;  Location:  [facility];  Service: Gastroenterology    ENDOSCOPY N/A 11/29/2021    Procedure: ESOPHAGOGASTRODUODENOSCOPY with dilation and biopsies;  Surgeon: Benny" Gonzalez WIN MD;  Location: [facility];  Service: Gastroenterology;  Laterality: N/A;    ENDOSCOPY N/A 11/1/2023    Procedure: ESOPHAGOGASTRODUODENOSCOPY WITH BIOPSY AND DILATATION;  Surgeon: Gonzalez Zapata MD;  Location: [facility];  Service: Gastroenterology;  Laterality: N/A;    ENDOSCOPY N/A 1/27/2025    Procedure: Esophagogastroduodenoscopy with dilatation and biopsies;  Surgeon: Gonzalez Zapata MD;  Location: [facility];  Service: Gastroenterology;  Laterality: N/A;    HYSTERECTOMY  1979    UPPER GASTROINTESTINAL ENDOSCOPY  12/09/2013    UPPER GASTROINTESTINAL ENDOSCOPY  11/13/2017      General Information       Row Name 04/02/25 1059          Physical Therapy Time and Intention    Document Type progress note/recertification  -CD     Mode of Treatment physical therapy  -CD       Row Name 04/02/25 1052          General Information    Patient Profile Reviewed yes  -CD     Existing Precautions/Restrictions fall;seizures  -CD     Barriers to Rehab medically complex;previous functional deficit;cognitive status  -CD       Row Name 04/02/25 1052          Cognition    Orientation Status (Cognition) oriented to;person;place;verbal cues/prompts needed for orientation;time;situation  -CD       Row Name 04/02/25 105          Safety Issues/Impairments Affecting Functional Mobility    Safety Issues Affecting Function (Mobility) insight into deficits/self-awareness  -CD     Impairments Affecting Function (Mobility) balance;cognition;endurance/activity tolerance;strength  -CD     Cognitive Impairments, Mobility Safety/Performance insight into deficits/self-awareness;safety precaution awareness;safety precaution follow-through;sequencing abilities  -CD     Comment, Safety Issues/Impairments (Mobility) PT ALERT UPON ARRIVAL AND FOLLOWING COMMANDS. NOTED DECREASED LEVEL OF ALERTNESS AND KNEES BEGINNING TO BUCKLE WITH DROP IN BP TO 82/59 IN STANDING NECESSITATING A RETURN TO SUPINE IN BED. APRN  PRESENT AND TO GIVE BOLUS OF FLUIDS.  -CD               User Key  (r) = Recorded By, (t) = Taken By, (c) = Cosigned By      Initials Name Provider Type    Gracy Nur PT Physical Therapist                   Mobility       Row Name 04/02/25 1102          Bed Mobility    Supine-Sit [place] (Bed Mobility) minimum assist (75% patient effort);verbal cues  -CD     Sit-Supine [place] (Bed Mobility) dependent (less than 25% patient effort);2 person assist  -CD     Assistive Device (Bed Mobility) bed rails  -CD     Comment, (Bed Mobility) CUES FOR LOG ROLLING AND TO REACH FOR BED RAIL TO ASSIST. INCREASED LEVEL OF ASSIST REQUIRED TO RETURN TO SUPINE DUE TO SYMPTOMATIC ORTHOSTATIC HYPOTENSION.  -CD       Row Name 04/02/25 1102          Transfers    Comment, (Transfers) CUES FOR HAND PLACEMENT. STS FROM EOB TO WALKER AND HAD 47 POINT DROP IN SBP AND 51 POINT DROP IN DBP.  PT SYMPTOMATIC WITH DECREASED LEVEL OF ALERTNESS AND B KNEE BUCKLING. PT ASSISTED BACK TO SUPINE IN BED AND PLACED IN [place] BRIEFLY,  RECOVERING /69.   -CD       Row Name 04/02/25 1102          Bed-Chair Transfer    Bed-Chair [place] (Transfers) unable to assess  -CD     Comment, (Bed-Chair Transfer) DEFERRED DUE TO ORTHOSTATIC HYPOTENSION.  -CD       Row Name 04/02/25 1102          Sit-Stand Transfer    Sit-Stand [place] (Transfers) minimum assist (75% patient effort);verbal cues  -CD     Assistive Device (Sit-Stand Transfers) walker, front-wheeled  -CD       Row Name 04/02/25 1102          Gait/Stairs (Locomotion)    [place] Level (Gait) unable to assess  -CD     Comment, (Gait/Stairs) DEFERRED DUE TO ORTHOSTATIC HYPOTENSION.  -CD               User Key  (r) = Recorded By, (t) = Taken By, (c) = Cosigned By      Initials Name Provider Type    Gracy Nur PT Physical Therapist                   Obj/Interventions       Row Name 04/02/25 1335          Motor Skills    Therapeutic Exercise --  COMPLETED SEATED  B LE THER EX, 2 SETS OF 10 REPS EACH: LAQ, HIP FLEX, AP'S  -CD       Row Name 04/02/25 1335          Balance    Static Sitting Balance standby assist  -CD     Dynamic Sitting Balance contact guard  -CD     Position, Sitting Balance unsupported;sitting edge of bed  -CD     Static Standing Balance contact guard;2-person assist   BEGAN TO BUCKLE WITH ORTHOSTATIC HYPOTENSION REQUIRING MAX ASSIST OF 2 FOR BALANCE AND DEPENDENTL OF 2 FOR RETURN TO SUPINE POSITION.  -CD     Dynamic Standing Balance unable to assess  -CD               User Key  (r) = Recorded By, (t) = Taken By, (c) = Cosigned By      Initials Name Provider Type    CD Gracy Millan, PT Physical Therapist                   Goals/Plan       Row Name 04/02/25 1340          Bed Mobility Goal 1 (PT)    Activity/Assistive Device (Bed Mobility Goal 1, PT) sit to supine/supine to sit  -CD     [place] Level/Cues Needed (Bed Mobility Goal 1, PT) standby assist  -CD     Time Frame (Bed Mobility Goal 1, PT) 1 week;short term goal (STG)  -CD     Progress/Outcomes (Bed Mobility Goal 1, PT) goal revised this date  PRIOR GOAL MET.  -CD       Row Name 04/02/25 1340          Transfer Goal 1 (PT)    Activity/Assistive Device (Transfer Goal 1, PT) sit-to-stand/stand-to-sit;bed-to-chair/chair-to-bed;walker, rolling  -CD     [place] Level/Cues Needed (Transfer Goal 1, PT) contact guard required  -CD     Time Frame (Transfer Goal 1, PT) long term goal (LTG);2 weeks  -CD     Progress/Outcome (Transfer Goal 1, PT) goal revised this date  PRIOR GOAL MET.  -CD       Row Name 04/02/25 1340          Gait Training Goal 1 (PT)    Activity/Assistive Device (Gait Training Goal 1, PT) gait (walking locomotion);walker, rolling  -CD     [place] Level (Gait Training Goal 1, PT) minimum assist (75% or more patient effort)  -CD     Distance (Gait Training Goal 1, PT) 100  -CD     Time Frame (Gait Training Goal 1, PT) long term goal (LTG);2 weeks  -CD     Progress/Outcome (Gait  Training Goal 1, PT) continuing progress toward goal;goal ongoing  -CD               User Key  (r) = Recorded By, (t) = Taken By, (c) = Cosigned By      Initials Name Provider Type    Gracy Nur, PT Physical Therapist                   Clinical Impression       Row Name 04/02/25 6808          Pain    Pretreatment Pain Rating 0/10 - no pain  -CD     Posttreatment Pain Rating 0/10 - no pain  -CD       Row Name 04/02/25 1334          Plan of Care Review    Plan of Care Reviewed With patient  -CD     Outcome Evaluation PT FOLLOWING ALL COMMANDS AND CONTINUED TO DEMONSTRATE IMPROVED LEVEL OF ALERTNESS UPON ARRIVAL BUT ONCE STANDING HAD EPISODE OF ORTHOSTATIC HYPOTENSION WITH DECREASED ALERTNESS AND B KNEES BUCKLING. UNABLE TO PROGRESS TO PIVOT TRANSFERS OR GAIT THIS DATE. REQUIRED MIN ASSIST FOR SUPINE TO SIT AND MIN ASSIST OF 2 FOR SIT TO STAND AT WALKER. PRIOR GOALS PARTIALLY MET AND REVISED TO REFLECT CURRENT LEVEL OF FUNCTION. CONTINUE TO RECOMMEND IRF AT D/C.  -CD       Row Name 04/02/25 8794          Therapy Assessment/Plan (PT)    Patient/Family Therapy Goals Statement (PT) TO GO HOME. TO WALK.  -CD     Rehab Potential (PT) good  -CD     Criteria for Skilled Interventions Met (PT) yes;meets criteria;skilled treatment is necessary  -CD     Therapy Frequency (PT) daily  -CD       Row Name 04/02/25 1337          Vital Signs    Pre Systolic BP Rehab 123  -CD     Pre Treatment Diastolic   SUPINE  -CD     Intra Systolic BP Rehab 129  -CD     Intra Treatment Diastolic   SIT  -CD     Post Systolic BP Rehab 82  -CD     Post Treatment Diastolic BP 59  STAND. POSITIONED IN [place] WITH RECOVERY IN BP /69.  -CD     Pre SpO2 (%) 97  -CD     O2 Delivery Pre Treatment room air  -CD     O2 Delivery Intra Treatment room air  -CD     Post SpO2 (%) 96  -CD     O2 Delivery Post Treatment room air  -CD     Pre Patient Position Supine  -CD     Intra Patient Position Standing  -CD     Post Patient Position  Supine  -CD       Row Name 04/02/25 1337          Positioning and Restraints    Pre-Treatment Position in bed  -CD     Post Treatment Position bed  -CD     In Bed supine;exit alarm on;notified nsg;encouraged to call for assist;call light within reach  -CD               User Key  (r) = Recorded By, (t) = Taken By, (c) = Cosigned By      Initials Name Provider Type    CD Gracy Millan PT Physical Therapist                   Outcome Measures       Row Name 04/02/25 1346 04/02/25 0800       How much help from another person do you currently need...    Turning from your back to your side while in flat bed without using bedrails? 3  -CD 3  -HK    Moving from lying on back to sitting on the side of a flat bed without bedrails? 3  -CD 3  -HK    Moving to and from a bed to a chair (including a wheelchair)? 1  -CD 2  -HK    Standing up from a chair using your arms (e.g., wheelchair, bedside chair)? 3  -CD 2  -HK    Climbing 3-5 steps with a railing? 1  -CD 1  -HK    To walk in hospital room? 1  -CD 2  -HK    AM-PAC 6 Clicks Score (PT) 12  -CD 13  -HK    Highest Level of Mobility Goal 4 --> Transfer to chair/commode  -CD 4 --> Transfer to chair/commode  -HK      Row Name 04/02/25 1346          Modified [place] Scale    Modified [place] Scale 4 - Moderately severe disability.  Unable to walk without assistance, and unable to attend to own bodily needs without assistance.  -CD       Row Name 04/02/25 1346          Functional Assessment    Outcome Measure Options AM-PAC 6 Clicks Basic Mobility (PT);Modified [place]  -CD               User Key  (r) = Recorded By, (t) = Taken By, (c) = Cosigned By      Initials Name Provider Type    CD Grcay Millan PT Physical Therapist    Angie Joseph RN Registered Nurse                                 Physical Therapy Education       Title: PT OT SLP Therapies (In Progress)       Topic: Physical Therapy (Done)       Point: Mobility training (Done)       Learning Progress Summary             Patient Acceptance, E, VU,NR by CD at 4/2/2025 1346    Comment: SEE FLOWSHEET    Acceptance, E, VU,NR by CD at 4/1/2025 1500    Comment: SEE FLOWSHEET    Acceptance, E, NR by CK at 3/27/2025 1545    Acceptance, E, VU,NR by CD at 3/26/2025 1643    Comment: SEE FLOWSHEET    Acceptance, E, VU,NR by CD at 3/23/2025 1742    Comment: BENEFITS OF OOB ACTIVITY, SAFETY WITH MOBILITY, PROGRESSION OF POC, D/C PLANNING,                      Point: Home exercise program (Done)       Learning Progress Summary            Patient Acceptance, E, VU,NR by CD at 4/2/2025 1346    Comment: SEE FLOWSHEET    Acceptance, E, VU,NR by CD at 4/1/2025 1500    Comment: SEE FLOWSHEET    Acceptance, E, VU,NR by CD at 3/26/2025 1643    Comment: SEE FLOWSHEET    Acceptance, E, VU,NR by CD at 3/23/2025 1742    Comment: BENEFITS OF OOB ACTIVITY, SAFETY WITH MOBILITY, PROGRESSION OF POC, D/C PLANNING,                      Point: Body mechanics (Done)       Learning Progress Summary            Patient Acceptance, E, VU,NR by CD at 4/2/2025 1346    Comment: SEE FLOWSHEET    Acceptance, E, VU,NR by CD at 4/1/2025 1500    Comment: SEE FLOWSHEET    Acceptance, E, NR by CK at 3/27/2025 1545    Acceptance, E, VU,NR by CD at 3/26/2025 1643    Comment: SEE FLOWSHEET    Acceptance, E, VU,NR by CD at 3/23/2025 1742    Comment: BENEFITS OF OOB ACTIVITY, SAFETY WITH MOBILITY, PROGRESSION OF POC, D/C PLANNING,                      Point: Precautions (Done)       Learning Progress Summary            Patient Acceptance, E, VU,NR by CD at 4/2/2025 1346    Comment: SEE FLOWSHEET    Acceptance, E, VU,NR by CD at 4/1/2025 1500    Comment: SEE FLOWSHEET    Acceptance, E, NR by CK at 3/27/2025 1545    Acceptance, E, VU,NR by CD at 3/26/2025 1643    Comment: SEE FLOWSHEET    Acceptance, E, VU,NR by CD at 3/23/2025 1742    Comment: BENEFITS OF OOB ACTIVITY, SAFETY WITH MOBILITY, PROGRESSION OF POC, D/C PLANNING,                                      User Key        Initials Effective Dates Name Provider Type Discipline    CD 02/03/23 -  Gracy Millan PT Physical Therapist PT    CK 02/06/24 -  Gina Nielsen, PT Physical Therapist PT                  PT Recommendation and Plan  Planned Therapy Interventions (PT): balance training, bed mobility training, home exercise program, gait training, strengthening, transfer training, postural re-education, patient/family education, neuromuscular re-education  Progress: improving  Outcome Evaluation: PT FOLLOWING ALL COMMANDS AND CONTINUED TO DEMONSTRATE IMPROVED LEVEL OF ALERTNESS UPON ARRIVAL BUT ONCE STANDING HAD EPISODE OF ORTHOSTATIC HYPOTENSION WITH DECREASED ALERTNESS AND B KNEES BUCKLING. UNABLE TO PROGRESS TO PIVOT TRANSFERS OR GAIT THIS DATE. REQUIRED MIN ASSIST FOR SUPINE TO SIT AND MIN ASSIST OF 2 FOR SIT TO STAND AT WALKER. PRIOR GOALS PARTIALLY MET AND REVISED TO REFLECT CURRENT LEVEL OF FUNCTION. CONTINUE TO RECOMMEND IRF AT D/C.     Time Calculation:   PT Evaluation Complexity  History, PT Evaluation Complexity: 3 or more personal factors and/or comorbidities  Examination of Body Systems (PT Eval Complexity): total of 4 or more elements  Clinical Presentation (PT Evaluation Complexity): evolving  Clinical Decision Making (PT Evaluation Complexity): moderate complexity  Overall Complexity (PT Evaluation Complexity): moderate complexity     PT Charges       Row Name 04/02/25 1346             Time Calculation    Start Time 1018  -CD      PT Received On 04/02/25  -CD      PT Goal Re-Cert Due Date 04/12/25  -CD         Time Calculation- PT    Total Timed Code Minutes- PT 32 minute(s)  -CD         Timed Charges    28426 - PT Therapeutic Exercise Minutes 10  -CD      09366 - PT Therapeutic Activity Minutes 22  -CD         Total Minutes    Timed Charges Total Minutes 32  -CD       Total Minutes 32  -CD                User Key  (r) = Recorded By, (t) = Taken By, (c) = Cosigned By      Initials Name Provider Type    CD Yana  Gracy HARRELL, PT Physical Therapist                  Therapy Charges for Today       Code Description Service Date Service Provider Modifiers Qty    46138266844 HC GAIT TRAINING EA 15 MIN 4/1/2025 Gracy Millan, PT GP 1    32065054421 HC PT THER PROC EA 15 MIN 4/2/2025 Gracy Millan, PT GP 1    04143478393 HC PT THERAPEUTIC ACT EA 15 MIN 4/2/2025 Gracy Millan, PT GP 1    36322206969 HC PT THER SUPP EA 15 MIN 4/2/2025 Gracy Millan, PT GP 2            PT G-Codes  Outcome Measure Options: AM-PAC 6 Clicks Basic Mobility (PT), Modified [place]  AM-PAC 6 Clicks Score (PT): 12  AM-PAC 6 Clicks Score (OT): 14  Modified [place] Scale: 4 - Moderately severe disability.  Unable to walk without assistance, and unable to attend to own bodily needs without assistance.  PT Discharge Summary  Anticipated Discharge Disposition (PT): skilled nursing facility    Gracy Millan, MARY  4/2/2025

## 2025-04-02 NOTE — CASE MANAGEMENT/SOCIAL WORK
Continued Stay Note  [facility]     Patient Name: Valarie Camara  MRN: [id number]  Today's Date: 4/2/2025    Admit Date: 3/20/2025    Plan: Home with HH vs SNF   Discharge Plan       Row Name 04/02/25 1233       Plan    Plan Comments MSW contacted by Stacey with admissions at [facility]. Pt's insurance has approved and they are able to take pt when ready. Per APRN, pt not ready today but likely will be tomorrow. MSW called and updated Stacey with [facility]. MSW called and updated pt's daughter Cleo who remains agreeable to plan. MSW sent ambulance request for tomorrow afternoon as pt's daughter reports pt will likwly need ambulance transportation due to current weakness and assistance needed.                   Discharge Codes    No documentation.                 Expected Discharge Date and Time       Expected Discharge Date Expected Discharge Time    Apr 3, 2025               ALEX Michelle

## 2025-04-03 ENCOUNTER — APPOINTMENT (OUTPATIENT)
Dept: OTHER | Facility: HOSPITAL | Age: 80
DRG: 092 | End: 2025-04-03
Payer: MEDICARE

## 2025-04-03 LAB
ANION GAP SERPL CALCULATED.3IONS-SCNC: 9 MMOL/L (ref 5–15)
BUN SERPL-MCNC: 28 MG/DL (ref 8–23)
BUN/CREAT SERPL: 20.9 (ref 7–25)
CALCIUM SPEC-SCNC: 9 MG/DL (ref 8.6–10.5)
CHLORIDE SERPL-SCNC: 109 MMOL/L (ref 98–107)
CO2 SERPL-SCNC: 20 MMOL/L (ref 22–29)
CREAT SERPL-MCNC: 1.34 MG/DL (ref 0.57–1)
EGFRCR SERPLBLD CKD-EPI 2021: 40.2 ML/MIN/1.73
GLUCOSE BLDC GLUCOMTR-MCNC: 110 MG/DL (ref 70–130)
GLUCOSE BLDC GLUCOMTR-MCNC: 117 MG/DL (ref 70–130)
GLUCOSE BLDC GLUCOMTR-MCNC: 168 MG/DL (ref 70–130)
GLUCOSE BLDC GLUCOMTR-MCNC: 170 MG/DL (ref 70–130)
GLUCOSE SERPL-MCNC: 134 MG/DL (ref 65–99)
POTASSIUM SERPL-SCNC: 4 MMOL/L (ref 3.5–5.2)
SODIUM SERPL-SCNC: 138 MMOL/L (ref 136–145)

## 2025-04-03 PROCEDURE — 80048 BASIC METABOLIC PNL TOTAL CA: CPT | Performed by: NURSE PRACTITIONER

## 2025-04-03 PROCEDURE — 63710000001 INSULIN LISPRO (HUMAN) PER 5 UNITS: Performed by: INTERNAL MEDICINE

## 2025-04-03 PROCEDURE — 82948 REAGENT STRIP/BLOOD GLUCOSE: CPT

## 2025-04-03 PROCEDURE — 99232 SBSQ HOSP IP/OBS MODERATE 35: CPT | Performed by: NURSE PRACTITIONER

## 2025-04-03 RX ADMIN — Medication 10 ML: at 09:16

## 2025-04-03 RX ADMIN — APIXABAN 5 MG: 5 TABLET, FILM COATED ORAL at 21:24

## 2025-04-03 RX ADMIN — INSULIN LISPRO 2 UNITS: 100 INJECTION, SOLUTION INTRAVENOUS; SUBCUTANEOUS at 17:11

## 2025-04-03 RX ADMIN — MIDODRINE HYDROCHLORIDE 2.5 MG: 5 TABLET ORAL at 12:06

## 2025-04-03 RX ADMIN — RIVASTIGMINE 1 PATCH: 4.6 PATCH, EXTENDED RELEASE TRANSDERMAL at 17:49

## 2025-04-03 RX ADMIN — APIXABAN 5 MG: 5 TABLET, FILM COATED ORAL at 09:16

## 2025-04-03 RX ADMIN — INSULIN LISPRO 2 UNITS: 100 INJECTION, SOLUTION INTRAVENOUS; SUBCUTANEOUS at 13:36

## 2025-04-03 RX ADMIN — ISOSORBIDE MONONITRATE 30 MG: 30 TABLET, EXTENDED RELEASE ORAL at 09:16

## 2025-04-03 RX ADMIN — CARVEDILOL 12.5 MG: 12.5 TABLET, FILM COATED ORAL at 09:16

## 2025-04-03 RX ADMIN — MIDODRINE HYDROCHLORIDE 2.5 MG: 5 TABLET ORAL at 17:11

## 2025-04-03 RX ADMIN — HYDROCORTISONE 2.5%: 25 CREAM TOPICAL at 09:16

## 2025-04-03 RX ADMIN — LIDOCAINE 1 PATCH: 4 PATCH TOPICAL at 09:15

## 2025-04-03 RX ADMIN — ASPIRIN 81 MG: 81 TABLET, CHEWABLE ORAL at 09:16

## 2025-04-03 RX ADMIN — Medication 10 ML: at 21:24

## 2025-04-03 RX ADMIN — PANTOPRAZOLE SODIUM 40 MG: 40 TABLET, DELAYED RELEASE ORAL at 05:37

## 2025-04-03 RX ADMIN — HYDROCORTISONE 2.5%: 25 CREAM TOPICAL at 21:24

## 2025-04-03 RX ADMIN — ATORVASTATIN CALCIUM 80 MG: 40 TABLET, FILM COATED ORAL at 21:24

## 2025-04-03 RX ADMIN — NIFEDIPINE 60 MG: 60 TABLET, EXTENDED RELEASE ORAL at 09:16

## 2025-04-03 RX ADMIN — CARVEDILOL 12.5 MG: 12.5 TABLET, FILM COATED ORAL at 17:11

## 2025-04-03 NOTE — PROGRESS NOTES
Clinical Nutrition Assessment     Patient Name: Valarie Camara  YOB: 1945  MRN: [id number]  Date of Encounter: 04/03/25 14:17 EDT  Admission date: 3/20/2025  Reason for Visit: Follow-up protocol    Assessment   Nutrition Assessment   Admission Diagnosis:  CVA (cerebral vascular accident) [I63.9]    Problem List:    Encephalopathy    Anxiety and depression    Gastroesophageal reflux disease with esophagitis    Multiple-type hyperlipidemia    Benign essential hypertension    Pulmonary emboli      PMH:   She  has a past medical history of Acute bronchitis with bronchospasm, Anxiety, Arthritis, Asthma, B12 deficiency, Back pain, Body piercing, Cataract, Cerebrovascular disease, Cervicalgia, Chronic kidney disease, Colonic polyp, Constipation, COPD (chronic obstructive pulmonary disease), Coronary artery disease, Depression, Diverticulitis, Dysphagia, Edema, Elevated cholesterol, Esophageal reflux, Folic acid deficiency, Full dentures, Heart murmur, Herpes zoster, High cholesterol, History of kidney infection, History of recurrent urinary tract infection, HTN (hypertension), Hypercholesterolemia, Impaired functional mobility, balance, gait, and endurance, Insomnia, Kidney infection, Malignant hypertension (04/26/2015), Measles, Muscle spasm, Neoplasm of uncertain behavior of skin, Osteoporosis, Poor historian, Recurrent urinary tract infection, Rheumatoid arthritis, RLS (restless legs syndrome), Stomach ulcer, Stroke, Type 2 diabetes mellitus, and Vitamin D deficiency.    PSH:  She  has a past surgical history that includes Hysterectomy (1979); Cataract extraction w/  intraocular lens implant (Left, 02/11/2013); Cataract extraction w/  intraocular lens implant (Right, 04/15/2013); Colonoscopy (2012); Esophagogastroduodenoscopy (N/A, 11/13/2017); Upper gastrointestinal endoscopy (12/09/2013); Upper gastrointestinal endoscopy (11/13/2017); Esophagogastroduodenoscopy (N/A, 11/25/2019);  "Colonoscopy (N/A, 11/26/2019); Esophagogastroduodenoscopy (N/A, 11/29/2021); Esophagogastroduodenoscopy (N/A, 11/1/2023); and Esophagogastroduodenoscopy (N/A, 1/27/2025).    Applicable Nutrition History:     SLP Diet Recommendation:  3/2 - mechanical ground textures, thin liquids   3/23 - puree, thin liquids   3/24 - puree, honey thick liquids, ice chips between meals after oral care, with supervision   3/25 - puree, thin liquids   3/28, 4/2 - mechanical ground textures, no mixed consistencies, thin liquids     Anthropometrics     Height: Height: 160 cm (62.99\")  Last Filed Weight: Weight: 68 kg (150 lb) (03/21/25 1511)  Method: Weight Method: Stated  BMI: BMI (Calculated): 26.6    UBW:  200# several months ago; no specific time frame provided  Weight change: weight loss of 19 lbs (11.2%) over 2 month(s)    Significant?  Yes     Weight       Weight (kg) Weight (lbs) Weight Method Visit Report   11/6/2024 80.65 kg  177 lb 12.8 oz   --    1/5/2025 79.379 kg  175 lb  Standing scale      79.4 kg  175 lb 0.7 oz  Stated      76.3 kg  168 lb 3.4 oz  Bed scale     1/10/2025 80.287 kg  177 lb      1/24/2025 75.297 kg  166 lb  Stated     1/27/2025 77.111 kg  170 lb  Stated     2/8/2025 77.1 kg  169 lb 15.6 oz  Standing scale     2/28/2025 77 kg  169 lb 12.1 oz  Stated      77 kg  169 lb 12.1 oz  Bed scale      72.6 kg  160 lb 0.9 oz      3/1/2025 72.3 kg  159 lb 6.3 oz      3/2/2025 70.5 kg  155 lb 6.8 oz  Bed scale      71.9 kg  158 lb 8.2 oz  Bed scale     3/4/2025 72.8 kg  160 lb 7.9 oz      3/15/2025 69.4 kg  153 lb  Stated     3/20/2025 68.04 kg  150 lb  Stated     3/21/2025 68.04 kg  150 lb          Nutrition Focused Physical Exam    Date:  3/24, 3/26, 4/4       Unable to perform due to Pt unable to participate at time of visit     Subjective   Reported/Observed/Food/Nutrition Related History:     4/4  Patient opens eyes when name called, did not nod head to answer any questions. Noted 3-4 Boost unopened sitting on " bedside table.  Spoke with RN that reports patient doing very well with eating, is able to feed herself.    3/28  Patient unable to wake again, opens eyes but fell back asleep. RN reports patient very sleepy today and has been refusing meals.    3/26  Patient unable to wake. RN reported patient was more alert and conversational, also ate fairly well yesterday. Today patient is more lethargic, RN encouraged breakfast this morning, and patient was able to drink about half of ONS.    3/24  Patient in bed, daughter at bedside provides most of patient nutrition hx. Dtr reports that patient was eating fairly well prior to 2 days ago, notes that medications were changed and pt has has poor intakes since. No c/o N/V, pt notes she has been constipated. NKFA. Dtr reports patient drinks Boost at home, would like with breakfast while here. Dtr also notes that over the course of several months patient has lost wt, causing dentures to be ill-fitting, which may have played a role in pt's wt loss.    Current Nutrition Prescription   PO: Diet: Cardiac, Diabetic; Healthy Heart (2-3 Na+); Consistent Carbohydrate; Feeding Assistance - Nursing, No Straw, No Mixed Consistencies; Texture: Mechanical Ground (NDD 2); Fluid Consistency: Thin (IDDSI 0)  Oral Nutrition Supplement: Boost GC with breakfast dinner  Intake:  7 days  75% x 4 meals    Assessment & Plan   Nutrition Diagnosis   Date:  3/24            Updated:    Problem Unintended weight loss    Etiology Advanced age, ill-fitting dentures   Signs/Symptoms Wt loss 19# x ~2 months (11.2%)   Status: Active    Goal:   Nutrition to support treatment and Maintain intake      Nutrition Intervention      Follow treatment progress, Care plan reviewed, Adjusted supplement    Encourage continued adequate intakes  Boost GC with breakfast  NFPE when feasible    Monitoring/Evaluation:   Per protocol, I&O, PO intake, Supplement intake, Pertinent labs, Weight, Symptoms, POC/GOC, Swallow  function    Valarie Noble RD  Time Spent: 25m

## 2025-04-03 NOTE — PLAN OF CARE
Problem: Adult Inpatient Plan of Care  Goal: Plan of Care Review  Outcome: Progressing  Goal: Patient-Specific Goal (Individualized)  Outcome: Progressing  Goal: Absence of Hospital-Acquired Illness or Injury  Outcome: Progressing  Intervention: Identify and Manage Fall Risk  Recent Flowsheet Documentation  Taken 4/3/2025 1800 by Delmi Crespo RN  Safety Promotion/Fall Prevention:   activity supervised   assistive device/personal items within reach   clutter free environment maintained   fall prevention program maintained   nonskid shoes/slippers when out of bed   room organization consistent   safety round/check completed  Taken 4/3/2025 1600 by Delmi Crespo RN  Safety Promotion/Fall Prevention:   activity supervised   assistive device/personal items within reach   clutter free environment maintained   fall prevention program maintained   nonskid shoes/slippers when out of bed   room organization consistent   safety round/check completed  Taken 4/3/2025 1400 by Delmi Crespo RN  Safety Promotion/Fall Prevention:   activity supervised   assistive device/personal items within reach   clutter free environment maintained   fall prevention program maintained   nonskid shoes/slippers when out of bed   room organization consistent   safety round/check completed  Taken 4/3/2025 1200 by Delmi Crespo RN  Safety Promotion/Fall Prevention:   activity supervised   assistive device/personal items within reach   clutter free environment maintained   fall prevention program maintained   nonskid shoes/slippers when out of bed   room organization consistent   safety round/check completed  Taken 4/3/2025 1000 by Delmi Crespo RN  Safety Promotion/Fall Prevention:   activity supervised   assistive device/personal items within reach   clutter free environment maintained   fall prevention program maintained   nonskid shoes/slippers when out of bed   room organization consistent   safety round/check completed  Taken 4/3/2025  0800 by Delmi Crespo RN  Safety Promotion/Fall Prevention:   activity supervised   assistive device/personal items within reach   clutter free environment maintained   fall prevention program maintained   nonskid shoes/slippers when out of bed   room organization consistent   safety round/check completed  Intervention: Prevent Skin Injury  Recent Flowsheet Documentation  Taken 4/3/2025 1800 by Delmi Crespo RN  Body Position:   supine   sitting up in bed  Skin Protection:   drying agents applied   incontinence pads utilized   protective footwear used   transparent dressing maintained  Taken 4/3/2025 1600 by Delmi Crespo RN  Body Position:   supine   sitting up in bed  Skin Protection:   drying agents applied   incontinence pads utilized   protective footwear used   transparent dressing maintained  Taken 4/3/2025 1400 by Delmi Crespo RN  Body Position:   turned   left  Skin Protection:   drying agents applied   incontinence pads utilized   protective footwear used   transparent dressing maintained  Taken 4/3/2025 1200 by Delmi Crespo RN  Body Position:   supine   sitting up in bed  Skin Protection:   drying agents applied   incontinence pads utilized   protective footwear used   transparent dressing maintained  Taken 4/3/2025 1000 by Delmi Crespo RN  Body Position:   turned   right  Skin Protection:   drying agents applied   incontinence pads utilized   protective footwear used   transparent dressing maintained  Taken 4/3/2025 0800 by Delmi Crespo RN  Body Position:   turned   left  Skin Protection:   drying agents applied   incontinence pads utilized   protective footwear used   transparent dressing maintained  Intervention: Prevent and Manage VTE (Venous Thromboembolism) Risk  Recent Flowsheet Documentation  Taken 4/3/2025 0800 by Delmi Crespo RN  VTE Prevention/Management:   bilateral   SCDs (sequential compression devices) off  Intervention: Prevent Infection  Recent Flowsheet Documentation  Taken  4/3/2025 1800 by Delmi Crespo RN  Infection Prevention: environmental surveillance performed  Taken 4/3/2025 1600 by Delmi Crespo RN  Infection Prevention: environmental surveillance performed  Taken 4/3/2025 1400 by Delmi Crespo RN  Infection Prevention: environmental surveillance performed  Taken 4/3/2025 1200 by Delmi Crespo RN  Infection Prevention: environmental surveillance performed  Taken 4/3/2025 1000 by Delmi Crespo RN  Infection Prevention: environmental surveillance performed  Taken 4/3/2025 0800 by Delmi Crespo RN  Infection Prevention: environmental surveillance performed  Goal: Optimal Comfort and Wellbeing  Outcome: Progressing  Intervention: Provide Person-Centered Care  Recent Flowsheet Documentation  Taken 4/3/2025 0800 by Delmi Crespo RN  Trust Relationship/Rapport:   care explained   choices provided  Goal: Readiness for Transition of Care  Outcome: Progressing     Problem: Skin Injury Risk Increased  Goal: Skin Health and Integrity  Outcome: Progressing  Intervention: Optimize Skin Protection  Recent Flowsheet Documentation  Taken 4/3/2025 1800 by Delmi Crespo RN  Activity Management: activity encouraged  Pressure Reduction Techniques:   weight shift assistance provided   frequent weight shift encouraged  Head of Bed (HOB) Positioning: HOB elevated  Pressure Reduction Devices:   pressure-redistributing mattress utilized   positioning supports utilized   heel offloading device utilized  Skin Protection:   drying agents applied   incontinence pads utilized   protective footwear used   transparent dressing maintained  Taken 4/3/2025 1600 by Delmi Crespo RN  Activity Management: activity encouraged  Pressure Reduction Techniques:   frequent weight shift encouraged   weight shift assistance provided  Head of Bed (HOB) Positioning: HOB elevated  Pressure Reduction Devices:   pressure-redistributing mattress utilized   positioning supports utilized   heel offloading device  utilized  Skin Protection:   drying agents applied   incontinence pads utilized   protective footwear used   transparent dressing maintained  Taken 4/3/2025 1400 by Delmi Crespo RN  Activity Management: activity encouraged  Pressure Reduction Techniques:   frequent weight shift encouraged   weight shift assistance provided  Head of Bed (HOB) Positioning: HOB elevated  Pressure Reduction Devices:   pressure-redistributing mattress utilized   positioning supports utilized   heel offloading device utilized  Skin Protection:   drying agents applied   incontinence pads utilized   protective footwear used   transparent dressing maintained  Taken 4/3/2025 1200 by Delmi Crespo RN  Activity Management: activity encouraged  Pressure Reduction Techniques:   frequent weight shift encouraged   weight shift assistance provided  Head of Bed (HOB) Positioning: [facility] elevated  Pressure Reduction Devices:   pressure-redistributing mattress utilized   positioning supports utilized   heel offloading device utilized  Skin Protection:   drying agents applied   incontinence pads utilized   protective footwear used   transparent dressing maintained  Taken 4/3/2025 1000 by Delmi Crespo RN  Activity Management: activity encouraged  Pressure Reduction Techniques:   frequent weight shift encouraged   weight shift assistance provided  Head of Bed (HOB) Positioning: [facility] elevated  Pressure Reduction Devices:   pressure-redistributing mattress utilized   positioning supports utilized   heel offloading device utilized  Skin Protection:   drying agents applied   incontinence pads utilized   protective footwear used   transparent dressing maintained  Taken 4/3/2025 0800 by Delmi Crespo RN  Activity Management: activity encouraged  Pressure Reduction Techniques:   frequent weight shift encouraged   weight shift assistance provided  Head of Bed (HOB) Positioning: [facility] elevated  Pressure Reduction Devices:   pressure-redistributing mattress  utilized   positioning supports utilized   heel offloading device utilized  Skin Protection:   drying agents applied   incontinence pads utilized   protective footwear used   transparent dressing maintained     Problem: Comorbidity Management  Goal: Maintenance of Behavioral Health Symptom Control  Outcome: Progressing  Intervention: Maintain Behavioral Health Symptom Control  Recent Flowsheet Documentation  Taken 4/3/2025 1800 by Delmi Crespo RN  Medication Review/Management: medications reviewed  Taken 4/3/2025 1600 by Delmi Crespo RN  Medication Review/Management: medications reviewed  Taken 4/3/2025 1400 by Delmi Crespo RN  Medication Review/Management: medications reviewed  Taken 4/3/2025 1200 by Delmi Crespo RN  Medication Review/Management: medications reviewed  Taken 4/3/2025 1000 by Delmi Crespo RN  Medication Review/Management: medications reviewed  Taken 4/3/2025 0800 by Delmi Crespo RN  Medication Review/Management: medications reviewed  Goal: Blood Glucose Level Within Target Range  Outcome: Progressing  Intervention: Monitor and Manage Glycemia  Recent Flowsheet Documentation  Taken 4/3/2025 1800 by Delmi Crespo RN  Medication Review/Management: medications reviewed  Taken 4/3/2025 1600 by Delmi Crespo RN  Medication Review/Management: medications reviewed  Taken 4/3/2025 1400 by Delmi Crespo RN  Medication Review/Management: medications reviewed  Taken 4/3/2025 1200 by Delmi Crespo RN  Medication Review/Management: medications reviewed  Taken 4/3/2025 1000 by Delmi Crespo RN  Medication Review/Management: medications reviewed  Taken 4/3/2025 0800 by Delmi Crespo RN  Medication Review/Management: medications reviewed  Goal: Blood Pressure in Desired Range  Outcome: Progressing  Intervention: Maintain Blood Pressure Management  Recent Flowsheet Documentation  Taken 4/3/2025 1800 by Delmi Crespo RN  Medication Review/Management: medications reviewed  Taken 4/3/2025 1600 by  Delmi Crespo RN  Medication Review/Management: medications reviewed  Taken 4/3/2025 1400 by Delmi Crespo RN  Medication Review/Management: medications reviewed  Taken 4/3/2025 1200 by Delmi Crespo RN  Medication Review/Management: medications reviewed  Taken 4/3/2025 1000 by Delmi Crespo RN  Medication Review/Management: medications reviewed  Taken 4/3/2025 0800 by Delmi Crespo RN  Medication Review/Management: medications reviewed     Problem: Fall Injury Risk  Goal: Absence of Fall and Fall-Related Injury  Outcome: Progressing  Intervention: Identify and Manage Contributors  Recent Flowsheet Documentation  Taken 4/3/2025 1800 by Delmi Crespo RN  Medication Review/Management: medications reviewed  Self-Care Promotion: independence encouraged  Taken 4/3/2025 1600 by Delmi Crespo RN  Medication Review/Management: medications reviewed  Self-Care Promotion: independence encouraged  Taken 4/3/2025 1400 by Delmi Crespo RN  Medication Review/Management: medications reviewed  Self-Care Promotion: independence encouraged  Taken 4/3/2025 1200 by Delmi Crespo RN  Medication Review/Management: medications reviewed  Self-Care Promotion: independence encouraged  Taken 4/3/2025 1000 by Delmi Crespo RN  Medication Review/Management: medications reviewed  Self-Care Promotion: independence encouraged  Taken 4/3/2025 0800 by Delmi Crespo RN  Medication Review/Management: medications reviewed  Self-Care Promotion: independence encouraged  Intervention: Promote Injury-Free Environment  Recent Flowsheet Documentation  Taken 4/3/2025 1800 by Delmi Crespo RN  Safety Promotion/Fall Prevention:   activity supervised   assistive device/personal items within reach   clutter free environment maintained   fall prevention program maintained   nonskid shoes/slippers when out of bed   room organization consistent   safety round/check completed  Taken 4/3/2025 1600 by Delmi Crespo RN  Safety Promotion/Fall Prevention:    activity supervised   assistive device/personal items within reach   clutter free environment maintained   fall prevention program maintained   nonskid shoes/slippers when out of bed   room organization consistent   safety round/check completed  Taken 4/3/2025 1400 by Delmi Crespo RN  Safety Promotion/Fall Prevention:   activity supervised   assistive device/personal items within reach   clutter free environment maintained   fall prevention program maintained   nonskid shoes/slippers when out of bed   room organization consistent   safety round/check completed  Taken 4/3/2025 1200 by Delmi Crespo RN  Safety Promotion/Fall Prevention:   activity supervised   assistive device/personal items within reach   clutter free environment maintained   fall prevention program maintained   nonskid shoes/slippers when out of bed   room organization consistent   safety round/check completed  Taken 4/3/2025 1000 by Delmi Crespo RN  Safety Promotion/Fall Prevention:   activity supervised   assistive device/personal items within reach   clutter free environment maintained   fall prevention program maintained   nonskid shoes/slippers when out of bed   room organization consistent   safety round/check completed  Taken 4/3/2025 0800 by Delmi Crespo RN  Safety Promotion/Fall Prevention:   activity supervised   assistive device/personal items within reach   clutter free environment maintained   fall prevention program maintained   nonskid shoes/slippers when out of bed   room organization consistent   safety round/check completed     Problem: Confusion Acute  Goal: Optimal Cognitive Function  Outcome: Progressing  Intervention: Minimize Contributing Factors  Recent Flowsheet Documentation  Taken 4/3/2025 1200 by Delmi Crespo RN  Sensory Stimulation Regulation: care clustered  Reorientation Measures: reorientation provided  Communication Support Strategies: active listening utilized  Taken 4/3/2025 0800 by Delmi Crespo  RN  Sensory Stimulation Regulation: care clustered  Reorientation Measures: reorientation provided  Communication Support Strategies: active listening utilized     Problem: Fatigue  Goal: Improved Activity Tolerance  Outcome: Progressing  Intervention: Promote Improved Energy  Recent Flowsheet Documentation  Taken 4/3/2025 1800 by Delmi Crespo RN  Activity Management: activity encouraged  Taken 4/3/2025 1600 by Delmi Crespo RN  Activity Management: activity encouraged  Taken 4/3/2025 1400 by Delmi Crespo RN  Activity Management: activity encouraged  Taken 4/3/2025 1200 by Delmi Crespo, AIDAN  Activity Management: activity encouraged  Taken 4/3/2025 1000 by Delmi Crespo RN  Activity Management: activity encouraged  Sleep/Rest Enhancement: awakenings minimized  Taken 4/3/2025 0800 by Delmi Crespo, AIDAN  Activity Management: activity encouraged  Environmental Support: calm environment promoted  Sleep/Rest Enhancement: awakenings minimized   Goal Outcome Evaluation:

## 2025-04-03 NOTE — PROGRESS NOTES
[facility] Medicine Services  PROGRESS NOTE    Patient Name: Valarie Camara  : 1945  MRN: [id number]    Date of Admission: 3/20/2025  Primary Care Physician: Lay Cox MD    Subjective   Subjective     CC:  F/u AMS, right-sided weakness    HPI:  Resting in bed. Feels weak. Per RN was not as symptomatic this morning while checking orthostatic hypotension. Due to elevated pressures this morning, held Midodrine.     Repeat orthostatics this evening improved, and patient asymptomatic, felt well standing. Hopeful for DC tomorrow.       Objective   Objective     Vital Signs:   Temp:  [96.7 °F (35.9 °C)-98.4 °F (36.9 °C)] 96.7 °F (35.9 °C)  Heart Rate:  [73-83] 73  Resp:  [16-18] 18  BP: ()/(52-85) 84/52     Physical Exam:  Constitutional: Awake, alert  HENT: NCAT, mucous membranes moist  Respiratory: Clear to auscultation bilaterally, respiratory effort normal, room air  Cardiovascular: RRR, no murmurs, rubs, or gallops  Gastrointestinal: Positive bowel sounds, soft, nontender, nondistended  Musculoskeletal: No bilateral ankle edema  Psychiatric: Appropriate affect, cooperative  Neurologic: Oriented to self/ place, follows commands, moves all extremities, speech clear  Skin: No rashes        Results Reviewed:  LAB RESULTS:      Lab 25  1150 25  0731 25  2137   WBC 6.08 6.11  --    HEMOGLOBIN 11.5* 11.4* 10.6*   HEMATOCRIT 35.0 35.4 33.7*   PLATELETS 263 233  --    NEUTROS ABS  --  3.94  --    IMMATURE GRANS (ABS)  --  0.02  --    LYMPHS ABS  --  1.49  --    MONOS ABS  --  0.54  --    EOS ABS  --  0.09  --    MCV 88.6 91.2  --          Lab 25  0525 25  1150 25  0731 25  1119   SODIUM 138 139 144 142   POTASSIUM 4.0 4.2 4.2 4.1   CHLORIDE 109* 110* 110* 110*   CO2 20.0* 20.0* 17.0* 19.0*   ANION GAP 9.0 9.0 17.0* 13.0   BUN 28* 33* 36* 38*   CREATININE 1.34* 1.68* 1.80* 1.68*   EGFR 40.2* 30.6* 28.2* 30.6*   GLUCOSE  134* 292* 145* 140*   CALCIUM 9.0 8.5* 8.7 8.8   MAGNESIUM  --  1.8  --   --                          Brief Urine Lab Results  (Last result in the past 365 days)        Color   Clarity   Blood   Leuk Est   Nitrite   Protein   CREAT   Urine HCG        03/22/25 1129 Yellow   Cloudy   Negative   Negative   Negative   100 mg/dL (2+)                   Microbiology Results Abnormal       Procedure Component Value - Date/Time    Urine Culture - Urine, Straight Cath [[id number]]  (Abnormal) Collected: 03/22/25 1129    Lab Status: Final result Specimen: Urine from Straight Cath Updated: 03/23/25 2043     Urine Culture 25,000 CFU/mL Lactobacillus species     Comment:   Based on laboratory diagnosis criteria, these organisms are common urogenital commensal lilly and have not been associated with urinary tract infections; clinical correlation is recommended.       Narrative:      Colonization of the urinary tract without infection is common. Treatment is discouraged unless the patient is symptomatic, pregnant, or undergoing an invasive urologic procedure.            No radiology results from the last 24 hrs    Results for orders placed during the hospital encounter of 03/20/25    Adult Transthoracic Echo Complete W/ Cont if Necessary Per Protocol (With Agitated Saline)    Interpretation Summary    Left ventricular systolic function is normal. Calculated left ventricular EF = 67% Left ventricular ejection fraction appears to be 66 - 70%.    Left ventricular wall thickness is consistent with mild concentric hypertrophy.    Left ventricular outflow tract peak flow gradient at rest is 11 mmHg. Left ventricular outflow tract peak gradient with valsalva is 29 mmHg.    Left ventricular diastolic function is consistent with (grade I) impaired relaxation.    The mitral valve peak gradient is 9 mmHg. The mitral valve mean gradient is 4 mmHg.    Calculated right ventricular systolic pressure from tricuspid regurgitation is 21 mmHg.     There is a trivial pericardial effusion.      Current medications:  Scheduled Meds:apixaban, 5 mg, Oral, Q12H  aspirin, 81 mg, Oral, Daily   Or  aspirin, 300 mg, Rectal, Daily  atorvastatin, 80 mg, Oral, Nightly  carvedilol, 12.5 mg, Oral, BID With Meals  Hydrocortisone (Perianal), , Rectal, BID  insulin lispro, 2-7 Units, Subcutaneous, TID With Meals  isosorbide mononitrate, 30 mg, Oral, Q24H  Lidocaine, 1 patch, Transdermal, Q24H  midodrine, 2.5 mg, Oral, TID AC  NIFEdipine XL, 60 mg, Oral, Q24H  pantoprazole, 40 mg, Oral, Q AM  rivastigmine, 1 patch, Transdermal, Daily  sodium chloride, 10 mL, Intravenous, Q12H      Continuous Infusions:   PRN Meds:.  acetaminophen    acetaminophen    senna-docusate sodium **AND** polyethylene glycol **AND** bisacodyl **AND** bisacodyl    Calcium Replacement - Follow Nurse / BPA Driven Protocol    dextrose    dextrose    diphenhydrAMINE    glucagon (human recombinant)    hydrALAZINE    Magnesium Standard Dose Replacement - Follow Nurse / BPA Driven Protocol    nitroglycerin    Phosphorus Replacement - Follow Nurse / BPA Driven Protocol    Potassium Replacement - Follow Nurse / BPA Driven Protocol    sodium chloride    sodium chloride    Assessment & Plan   Assessment & Plan     Active Hospital Problems    Diagnosis  POA    **Encephalopathy [G93.40]  Unknown    Pulmonary emboli [I26.99]  Yes    Gastroesophageal reflux disease with esophagitis [K21.00]  Yes    Anxiety and depression [F41.9, F32.A]  Yes    Multiple-type hyperlipidemia [E78.2]  Yes    Benign essential hypertension [I10]  Yes      Resolved Hospital Problems    Diagnosis Date Resolved POA    CVA (cerebral vascular accident) [I63.9] 03/22/2025 Yes        Brief Hospital Course to date:  Valarie Camara is a 79 y.o. female  with hx of CAD, PAD, left ICA disease (50-69% stenosis), CKD3, HTN, mild dementia, PE (on Eliquis), and DM2 who presented with confusion, aphasia, and some right-sided weakness. Patient seen by  Neuro-Stroke team. However, stroke workup was negative and they suspected stroke recrudescence in setting of infection/metabolic derangements. UA was concerning for UTI. After admission, patient had significant HTN. Cardene gtt initiated. General Neurology was consulted for ongoing encephalopathy. EEG negative. Felt to be secondary to medications which were discontinued, with subsequent improvement. She is now stable and awaiting rehab placement.      This patient's problems and plans were partially entered by my partner and updated as appropriate by me 04/03/25.       Encephalopathy, resolved  Aphasia & right-sided weakness, resolved   Left ICA stenosis (50-69%), & multifocal atherosclerotic disease  Mild dementia  Hx previous left thalamic CVA  - Neuro-Stroke followed, suspected stroke recrudescence in setting of infection/metabolic derangement  - TSH WNL, initial UA benign, however repeat UA showed infection (see below)  - MRI brain 3/22/25 negative  - EEG 3/22/25 negative  - CTA head/neck 3/20/25 negative for flow-limiting stenosis or LVO, 70% stenosis of left ICA  - ECHO 3/21/25: EF 66-70%, diastolic dysfunction  - BLE duplex negative  - 3/24/25 more confused and less interactive, so General Neurology was consulted: repeat EEG 3/25/25 was negative  - Per discussion with daughter, patient has become lethargic in past with Trazodone and Seroquel - also lethargic this admission after Geodon administration: AVOID these meds  - Neuro recommended rivastigmine patch 4.9 mg daily  - Per Neuro-Stroke team: continue ASA, statin, anticoagulation  - Follow up in [facility] 8-12 weeks  - SLP following, modified diet recs in place  - PT/OT recommending SNF rehab, recently home from [facility]     UTI  - UA 3/22/25 with 4+ bacteria, 6-10 WBCs, large yeast  - Initiated on Rocephin  - Urine culture with lactobacillus species, Rocephin discontinued     Orthostatic hypotension  Weakness  - Pt has had + orthostasis  recently with history of labile pressures, and HTN urgency   - Echo 3/21 with EF 66-70%  - s/p bolus   - Midodrine 2.5 mg TID, titrate as indicated for orthostasis  - Reduced Coreg to 12.5 mg BID daily, okay to let BP run higher give pt's age, goal < 160/90  - will need heart and valve clinic follow up; has seen Dr Low prior   --repeat ortho BP this evening improved, and patient asymptomatic.     HTN  - s/p Cardene drip  - BP stable; however, pt orthostatic, BP meds adjusted as above     Candidiasis  - Patient recently on Rocephin  - Diflucan 100 mg daily x 7 days, deferred Nystatin given dysphagia  - QTc 392     Diet-controlled DM2  - HbA1c 6.9 on 3/4/25  - SSI      Hx PE 2/28/25 at [facility]  - s/p heparin gtt while NPO  - Eliquis resumed 3/25/25, tolerating PO     KURT on CKD 3 (baseline Cr ~1.8-1.9)  - Initial Cr was 2.5, now at baseline 1.3      Expected Discharge Location and Transportation: [facility]  Expected Discharge pending stable labs and VS  Expected Discharge Date: 4/3/2025; Expected Discharge Time:      VTE Prophylaxis:  Pharmacologic & mechanical VTE prophylaxis orders are present.         AM-PAC 6 Clicks Score (PT): 10 (04/03/25 0800)    CODE STATUS:   Code Status and Medical Interventions: CPR (Attempt to Resuscitate); Full Support   Ordered at: 03/20/25 0185     Code Status (Patient has no pulse and is not breathing):    CPR (Attempt to Resuscitate)     Medical Interventions (Patient has pulse or is breathing):    Full Support       Michelle Carrillo, JOSEE  04/03/25

## 2025-04-03 NOTE — PLAN OF CARE
Problem: Adult Inpatient Plan of Care  Goal: Plan of Care Review  Outcome: Progressing  Goal: Patient-Specific Goal (Individualized)  Outcome: Progressing  Goal: Absence of Hospital-Acquired Illness or Injury  Outcome: Progressing  Intervention: Identify and Manage Fall Risk  Recent Flowsheet Documentation  Taken 4/3/2025 0200 by Jennifer Rogers RN  Safety Promotion/Fall Prevention:   activity supervised   fall prevention program maintained  Taken 4/3/2025 0000 by Jennifer Rogers RN  Safety Promotion/Fall Prevention:   activity supervised   fall prevention program maintained  Taken 4/2/2025 2200 by Jennifer Rogers RN  Safety Promotion/Fall Prevention:   activity supervised   fall prevention program maintained  Taken 4/2/2025 2000 by Jennifer Rogers RN  Safety Promotion/Fall Prevention:   activity supervised   fall prevention program maintained  Intervention: Prevent Skin Injury  Recent Flowsheet Documentation  Taken 4/3/2025 0200 by Jennifer Rogers RN  Body Position:   side-lying   left  Skin Protection:   drying agents applied   incontinence pads utilized   silicone foam dressing in place   skin sealant/moisture barrier applied   transparent dressing maintained  Taken 4/3/2025 0000 by Jennifer Rogers RN  Body Position: supine  Skin Protection:   drying agents applied   incontinence pads utilized   silicone foam dressing in place   skin sealant/moisture barrier applied   transparent dressing maintained  Taken 4/2/2025 2200 by Jennifer Rogers RN  Body Position:   side-lying   right  Skin Protection:   drying agents applied   incontinence pads utilized   silicone foam dressing in place   skin sealant/moisture barrier applied   transparent dressing maintained  Taken 4/2/2025 2000 by Jennifer Rogers RN  Body Position: supine  Skin Protection:   drying agents applied   incontinence pads utilized   silicone foam dressing in place   skin sealant/moisture barrier applied   transparent dressing  maintained  Intervention: Prevent and Manage VTE (Venous Thromboembolism) Risk  Recent Flowsheet Documentation  Taken 4/3/2025 0200 by Jennifer Rogers RN  VTE Prevention/Management: other (see comments)  Taken 4/3/2025 0000 by Jennifer Rogers RN  VTE Prevention/Management: other (see comments)  Taken 4/2/2025 2200 by Jennifer Rogers RN  VTE Prevention/Management: other (see comments)  Taken 4/2/2025 2000 by Jennifer Rogers RN  VTE Prevention/Management: (eliquis) other (see comments)  Goal: Optimal Comfort and Wellbeing  Outcome: Progressing  Intervention: Provide Person-Centered Care  Recent Flowsheet Documentation  Taken 4/3/2025 0200 by Jennifer Rogers RN  Trust Relationship/Rapport:   care explained   choices provided   emotional support provided   empathic listening provided   questions encouraged   reassurance provided   thoughts/feelings acknowledged  Taken 4/3/2025 0000 by eJnnifer Rogers RN  Trust Relationship/Rapport:   care explained   choices provided   emotional support provided   empathic listening provided   questions encouraged   reassurance provided   thoughts/feelings acknowledged  Taken 4/2/2025 2200 by Jennifer Rogers RN  Trust Relationship/Rapport:   care explained   choices provided   emotional support provided   empathic listening provided   questions encouraged   reassurance provided   thoughts/feelings acknowledged  Taken 4/2/2025 2000 by Jennifer Rogers RN  Trust Relationship/Rapport:   care explained   choices provided   emotional support provided   empathic listening provided   questions encouraged   reassurance provided   thoughts/feelings acknowledged  Goal: Readiness for Transition of Care  Outcome: Progressing   Goal Outcome Evaluation:

## 2025-04-03 NOTE — CASE MANAGEMENT/SOCIAL WORK
Continued Stay Note  [facility]     Patient Name: Valarie Camara  MRN: [id number]  Today's Date: 4/3/2025    Admit Date: 3/20/2025    Plan: Home with HH vs SNF   Discharge Plan       Row Name 04/03/25 1249       Plan    Plan Comments Per APRN pt still not ready for discharge to rehab. MSW spoke with Stacey in admissions at [facility] and updated her. Hopeful to plan for admission tomorrow as long as pt is ready. MSW requested ambulance to be rescheduled to tomorrow. MSW called and updated pt's daughter as well.                   Discharge Codes    No documentation.                 Expected Discharge Date and Time       Expected Discharge Date Expected Discharge Time    Apr 3, 2025               ALEX Michelle

## 2025-04-04 ENCOUNTER — APPOINTMENT (OUTPATIENT)
Dept: OTHER | Facility: HOSPITAL | Age: 80
DRG: 092 | End: 2025-04-04
Payer: MEDICARE

## 2025-04-04 LAB
GLUCOSE BLDC GLUCOMTR-MCNC: 130 MG/DL (ref 70–130)
GLUCOSE BLDC GLUCOMTR-MCNC: 141 MG/DL (ref 70–130)
GLUCOSE BLDC GLUCOMTR-MCNC: 152 MG/DL (ref 70–130)
GLUCOSE BLDC GLUCOMTR-MCNC: 222 MG/DL (ref 70–130)

## 2025-04-04 PROCEDURE — 82948 REAGENT STRIP/BLOOD GLUCOSE: CPT

## 2025-04-04 PROCEDURE — 92507 TX SP LANG VOICE COMM INDIV: CPT

## 2025-04-04 PROCEDURE — 93010 ELECTROCARDIOGRAM REPORT: CPT | Performed by: INTERNAL MEDICINE

## 2025-04-04 PROCEDURE — 99232 SBSQ HOSP IP/OBS MODERATE 35: CPT | Performed by: NURSE PRACTITIONER

## 2025-04-04 PROCEDURE — 93005 ELECTROCARDIOGRAM TRACING: CPT | Performed by: STUDENT IN AN ORGANIZED HEALTH CARE EDUCATION/TRAINING PROGRAM

## 2025-04-04 PROCEDURE — 92526 ORAL FUNCTION THERAPY: CPT

## 2025-04-04 PROCEDURE — 63710000001 INSULIN LISPRO (HUMAN) PER 5 UNITS: Performed by: INTERNAL MEDICINE

## 2025-04-04 RX ORDER — MIDODRINE HYDROCHLORIDE 5 MG/1
5 TABLET ORAL
Status: DISCONTINUED | OUTPATIENT
Start: 2025-04-04 | End: 2025-04-07 | Stop reason: HOSPADM

## 2025-04-04 RX ADMIN — HYDROCORTISONE 2.5%: 25 CREAM TOPICAL at 20:22

## 2025-04-04 RX ADMIN — PANTOPRAZOLE SODIUM 40 MG: 40 TABLET, DELAYED RELEASE ORAL at 05:26

## 2025-04-04 RX ADMIN — ISOSORBIDE MONONITRATE 30 MG: 30 TABLET, EXTENDED RELEASE ORAL at 08:31

## 2025-04-04 RX ADMIN — CARVEDILOL 12.5 MG: 12.5 TABLET, FILM COATED ORAL at 08:30

## 2025-04-04 RX ADMIN — RIVASTIGMINE 1 PATCH: 4.6 PATCH, EXTENDED RELEASE TRANSDERMAL at 17:15

## 2025-04-04 RX ADMIN — MIDODRINE HYDROCHLORIDE 5 MG: 5 TABLET ORAL at 17:15

## 2025-04-04 RX ADMIN — Medication 10 ML: at 08:31

## 2025-04-04 RX ADMIN — NIFEDIPINE 60 MG: 60 TABLET, EXTENDED RELEASE ORAL at 08:30

## 2025-04-04 RX ADMIN — INSULIN LISPRO 3 UNITS: 100 INJECTION, SOLUTION INTRAVENOUS; SUBCUTANEOUS at 13:15

## 2025-04-04 RX ADMIN — MIDODRINE HYDROCHLORIDE 5 MG: 5 TABLET ORAL at 13:15

## 2025-04-04 RX ADMIN — CARVEDILOL 12.5 MG: 12.5 TABLET, FILM COATED ORAL at 17:15

## 2025-04-04 RX ADMIN — APIXABAN 5 MG: 5 TABLET, FILM COATED ORAL at 20:22

## 2025-04-04 RX ADMIN — APIXABAN 5 MG: 5 TABLET, FILM COATED ORAL at 08:31

## 2025-04-04 RX ADMIN — HYDROCORTISONE 2.5%: 25 CREAM TOPICAL at 08:31

## 2025-04-04 RX ADMIN — MIDODRINE HYDROCHLORIDE 2.5 MG: 5 TABLET ORAL at 08:30

## 2025-04-04 RX ADMIN — ASPIRIN 81 MG: 81 TABLET, CHEWABLE ORAL at 08:31

## 2025-04-04 RX ADMIN — LIDOCAINE 1 PATCH: 4 PATCH TOPICAL at 08:31

## 2025-04-04 RX ADMIN — ATORVASTATIN CALCIUM 80 MG: 40 TABLET, FILM COATED ORAL at 20:22

## 2025-04-04 NOTE — CASE MANAGEMENT/SOCIAL WORK
Continued Stay Note  [facility]     Patient Name: Valarie Camara  MRN: [id number]  Today's Date: 4/4/2025    Admit Date: 3/20/2025    Plan: Home with HH vs SNF   Discharge Plan       Row Name 04/04/25 6859       Plan    Plan Comments MSW udpated by APRN that pt is not ready for discharge and unsure if will be ready this weekend. Pt will need another precert as well. MSW updated Stacey at [facility] and she report she will follow up with pt on Monday and if ready then, can initiate new precert. MSW called and updated pt's daughter, Cleo. MSW also cancelled ambulance for today. MSW will follow up with pt on Monday.                   Discharge Codes    No documentation.                 Expected Discharge Date and Time       Expected Discharge Date Expected Discharge Time    Apr 5, 2025               ALEX Michelle

## 2025-04-04 NOTE — PLAN OF CARE
Problem: Adult Inpatient Plan of Care  Goal: Absence of Hospital-Acquired Illness or Injury  Intervention: Identify and Manage Fall Risk  Recent Flowsheet Documentation  Taken 4/4/2025 0200 by Weathers, Riya, RN  Safety Promotion/Fall Prevention:   activity supervised   assistive device/personal items within reach   clutter free environment maintained   room organization consistent  Taken 4/4/2025 0000 by Weathers, Riya, RN  Safety Promotion/Fall Prevention:   activity supervised   assistive device/personal items within reach   clutter free environment maintained   lighting adjusted   room organization consistent  Taken 4/3/2025 2200 by Weathers, Riya, RN  Safety Promotion/Fall Prevention:   activity supervised   assistive device/personal items within reach   clutter free environment maintained   room organization consistent  Taken 4/3/2025 2000 by Riya Dean RN  Safety Promotion/Fall Prevention:   activity supervised   assistive device/personal items within reach   clutter free environment maintained   room organization consistent  Intervention: Prevent Skin Injury  Recent Flowsheet Documentation  Taken 4/4/2025 0200 by Riya Dean RN  Body Position:   turned   left  Skin Protection:   incontinence pads utilized   silicone foam dressing in place   transparent dressing maintained  Taken 4/4/2025 0000 by Riya Dean RN  Body Position: supine  Skin Protection:   incontinence pads utilized   silicone foam dressing in place   transparent dressing maintained  Taken 4/3/2025 2200 by Riya Dean RN  Body Position:   turned   right  Skin Protection:   incontinence pads utilized   silicone foam dressing in place   transparent dressing maintained  Taken 4/3/2025 2000 by Riya Dean RN  Body Position:   turned   right  Skin Protection:   transparent dressing maintained   silicone foam dressing in place   incontinence pads utilized  Intervention: Prevent and Manage  VTE (Venous Thromboembolism) Risk  Recent Flowsheet Documentation  Taken 4/3/2025 2000 by Riya Dean RN  VTE Prevention/Management:   bilateral   SCDs (sequential compression devices) on  Intervention: Prevent Infection  Recent Flowsheet Documentation  Taken 4/4/2025 0200 by Riya eDan RN  Infection Prevention: environmental surveillance performed  Taken 4/4/2025 0000 by Riya Dean RN  Infection Prevention: environmental surveillance performed  Taken 4/3/2025 2200 by Riya Dean RN  Infection Prevention: environmental surveillance performed  Taken 4/3/2025 2000 by Riya Dean RN  Infection Prevention: environmental surveillance performed  Goal: Optimal Comfort and Wellbeing  Intervention: Provide Person-Centered Care  Recent Flowsheet Documentation  Taken 4/3/2025 2000 by Riya Dean RN  Trust Relationship/Rapport:   care explained   choices provided   thoughts/feelings acknowledged     Problem: Skin Injury Risk Increased  Goal: Skin Health and Integrity  Intervention: Optimize Skin Protection  Recent Flowsheet Documentation  Taken 4/4/2025 0200 by Riya Dean RN  Pressure Reduction Techniques:   weight shift assistance provided   heels elevated off bed   pressure points protected  Head of Bed (HOB) Positioning: HOB elevated  Pressure Reduction Devices:   pressure-redistributing mattress utilized   positioning supports utilized   heel offloading device utilized  Skin Protection:   incontinence pads utilized   silicone foam dressing in place   transparent dressing maintained  Taken 4/4/2025 0000 by Riya Dean RN  Pressure Reduction Techniques:   weight shift assistance provided   pressure points protected   heels elevated off bed  Head of Bed (HOB) Positioning: HOB elevated  Pressure Reduction Devices:   pressure-redistributing mattress utilized   positioning supports utilized   heel offloading device utilized  Skin Protection:   incontinence  pads utilized   silicone foam dressing in place   transparent dressing maintained  Taken 4/3/2025 2200 by Ryia Dean RN  Pressure Reduction Techniques:   weight shift assistance provided   heels elevated off bed  Head of Bed (HOB) Positioning: [facility] elevated  Pressure Reduction Devices:   pressure-redistributing mattress utilized   positioning supports utilized   heel offloading device utilized  Skin Protection:   incontinence pads utilized   silicone foam dressing in place   transparent dressing maintained  Taken 4/3/2025 2000 by Riya Dean RN  Pressure Reduction Techniques:   frequent weight shift encouraged   heels elevated off bed  Head of Bed (HOB) Positioning: [facility] elevated  Pressure Reduction Devices:   pressure-redistributing mattress utilized   positioning supports utilized   heel offloading device utilized  Skin Protection:   transparent dressing maintained   silicone foam dressing in place   incontinence pads utilized     Problem: Comorbidity Management  Goal: Maintenance of Behavioral Health Symptom Control  Intervention: Maintain Behavioral Health Symptom Control  Recent Flowsheet Documentation  Taken 4/4/2025 0200 by Riya Dean RN  Medication Review/Management: medications reviewed  Taken 4/4/2025 0000 by Riya Dean RN  Medication Review/Management: medications reviewed  Taken 4/3/2025 2200 by Riya Dean RN  Medication Review/Management: medications reviewed  Taken 4/3/2025 2000 by Riya Dean RN  Medication Review/Management: medications reviewed  Goal: Blood Glucose Level Within Target Range  Intervention: Monitor and Manage Glycemia  Recent Flowsheet Documentation  Taken 4/4/2025 0200 by Riya Dean RN  Medication Review/Management: medications reviewed  Taken 4/4/2025 0000 by Riya Dean RN  Medication Review/Management: medications reviewed  Taken 4/3/2025 2200 by Riya Dean RN  Medication Review/Management:  medications reviewed  Taken 4/3/2025 2000 by Riya Dean RN  Medication Review/Management: medications reviewed  Goal: Blood Pressure in Desired Range  Intervention: Maintain Blood Pressure Management  Recent Flowsheet Documentation  Taken 4/4/2025 0200 by Riya Dean RN  Medication Review/Management: medications reviewed  Taken 4/4/2025 0000 by Riya Dean RN  Medication Review/Management: medications reviewed  Taken 4/3/2025 2200 by Riya Dean RN  Medication Review/Management: medications reviewed  Taken 4/3/2025 2000 by Riya Dean RN  Medication Review/Management: medications reviewed     Problem: Fall Injury Risk  Goal: Absence of Fall and Fall-Related Injury  Intervention: Identify and Manage Contributors  Recent Flowsheet Documentation  Taken 4/4/2025 0200 by Riya Dean RN  Medication Review/Management: medications reviewed  Taken 4/4/2025 0000 by Riya Dean RN  Medication Review/Management: medications reviewed  Taken 4/3/2025 2200 by Riya Dean RN  Medication Review/Management: medications reviewed  Taken 4/3/2025 2000 by Riya Dean RN  Medication Review/Management: medications reviewed  Intervention: Promote Injury-Free Environment  Recent Flowsheet Documentation  Taken 4/4/2025 0200 by Weathers, Riya, RN  Safety Promotion/Fall Prevention:   activity supervised   assistive device/personal items within reach   clutter free environment maintained   room organization consistent  Taken 4/4/2025 0000 by Weathers, Riya, RN  Safety Promotion/Fall Prevention:   activity supervised   assistive device/personal items within reach   clutter free environment maintained   lighting adjusted   room organization consistent  Taken 4/3/2025 2200 by Weathers, Riya, RN  Safety Promotion/Fall Prevention:   activity supervised   assistive device/personal items within reach   clutter free environment maintained   room organization  consistent  Taken 4/3/2025 2000 by Riya Dean RN  Safety Promotion/Fall Prevention:   activity supervised   assistive device/personal items within reach   clutter free environment maintained   room organization consistent     Problem: Confusion Acute  Goal: Optimal Cognitive Function  Intervention: Minimize Contributing Factors  Recent Flowsheet Documentation  Taken 4/3/2025 2000 by Riya Dean RN  Sensory Stimulation Regulation: care clustered  Reorientation Measures: reorientation provided  Communication Support Strategies: active listening utilized     Problem: Fatigue  Goal: Improved Activity Tolerance  Intervention: Promote Improved Energy  Recent Flowsheet Documentation  Taken 4/3/2025 2000 by Riya Dean RN  Environmental Support: calm environment promoted  Sleep/Rest Enhancement: awakenings minimized   Goal Outcome Evaluation:

## 2025-04-04 NOTE — PROGRESS NOTES
[facility] Medicine Services  PROGRESS NOTE    Patient Name: Valarie Camara  : 1945  MRN: [id number]    Date of Admission: 3/20/2025  Primary Care Physician: Lay Cox MD    Subjective   Subjective     CC:  F/u AMS, right-sided weakness    HPI:  Resting in bed. Ate all of breakfast  Dizzy this am with orthostats per RN  Drop to systolic 68 standing      Objective   Objective     Vital Signs:   Temp:  [96.7 °F (35.9 °C)-98.6 °F (37 °C)] 98.4 °F (36.9 °C)  Heart Rate:  [73-82] 78  Resp:  [16-18] 16  BP: ()/(52-90) 68/56     Physical Exam:  Constitutional: No acute distress, awake, alert  HENT: NCAT, mucous membranes moist  Respiratory: Clear to auscultation bilaterally, respiratory effort normal   Cardiovascular: RRR, systolic murmur present  Gastrointestinal: Positive bowel sounds, soft, nontender, nondistended  Musculoskeletal: No bilateral ankle edema  Psychiatric: Appropriate affect, cooperative  Neurologic: Oriented x 2, MENESES, speech clear  Skin: No rashes          Results Reviewed:  LAB RESULTS:      Lab 25  1150 25  0731 25  2137   WBC 6.08 6.11  --    HEMOGLOBIN 11.5* 11.4* 10.6*   HEMATOCRIT 35.0 35.4 33.7*   PLATELETS 263 233  --    NEUTROS ABS  --  3.94  --    IMMATURE GRANS (ABS)  --  0.02  --    LYMPHS ABS  --  1.49  --    MONOS ABS  --  0.54  --    EOS ABS  --  0.09  --    MCV 88.6 91.2  --          Lab 25  0525 25  1150 25  0731 25  1119   SODIUM 138 139 144 142   POTASSIUM 4.0 4.2 4.2 4.1   CHLORIDE 109* 110* 110* 110*   CO2 20.0* 20.0* 17.0* 19.0*   ANION GAP 9.0 9.0 17.0* 13.0   BUN 28* 33* 36* 38*   CREATININE 1.34* 1.68* 1.80* 1.68*   EGFR 40.2* 30.6* 28.2* 30.6*   GLUCOSE 134* 292* 145* 140*   CALCIUM 9.0 8.5* 8.7 8.8   MAGNESIUM  --  1.8  --   --                          Brief Urine Lab Results  (Last result in the past 365 days)        Color   Clarity   Blood   Leuk Est   Nitrite   Protein    CREAT   Urine HCG        03/22/25 1129 Yellow   Cloudy   Negative   Negative   Negative   100 mg/dL (2+)                   Microbiology Results Abnormal       Procedure Component Value - Date/Time    Urine Culture - Urine, Straight Cath [[id number]]  (Abnormal) Collected: 03/22/25 1129    Lab Status: Final result Specimen: Urine from Straight Cath Updated: 03/23/25 2043     Urine Culture 25,000 CFU/mL Lactobacillus species     Comment:   Based on laboratory diagnosis criteria, these organisms are common urogenital commensal lilly and have not been associated with urinary tract infections; clinical correlation is recommended.       Narrative:      Colonization of the urinary tract without infection is common. Treatment is discouraged unless the patient is symptomatic, pregnant, or undergoing an invasive urologic procedure.            No radiology results from the last 24 hrs    Results for orders placed during the hospital encounter of 03/20/25    Adult Transthoracic Echo Complete W/ Cont if Necessary Per Protocol (With Agitated Saline)    Interpretation Summary    Left ventricular systolic function is normal. Calculated left ventricular EF = 67% Left ventricular ejection fraction appears to be 66 - 70%.    Left ventricular wall thickness is consistent with mild concentric hypertrophy.    Left ventricular outflow tract peak flow gradient at rest is 11 mmHg. Left ventricular outflow tract peak gradient with valsalva is 29 mmHg.    Left ventricular diastolic function is consistent with (grade I) impaired relaxation.    The mitral valve peak gradient is 9 mmHg. The mitral valve mean gradient is 4 mmHg.    Calculated right ventricular systolic pressure from tricuspid regurgitation is 21 mmHg.    There is a trivial pericardial effusion.      Current medications:  Scheduled Meds:apixaban, 5 mg, Oral, Q12H  aspirin, 81 mg, Oral, Daily   Or  aspirin, 300 mg, Rectal, Daily  atorvastatin, 80 mg, Oral, Nightly  carvedilol, 12.5  mg, Oral, BID With Meals  Hydrocortisone (Perianal), , Rectal, BID  insulin lispro, 2-7 Units, Subcutaneous, TID With Meals  isosorbide mononitrate, 30 mg, Oral, Q24H  Lidocaine, 1 patch, Transdermal, Q24H  midodrine, 5 mg, Oral, TID AC  NIFEdipine XL, 60 mg, Oral, Q24H  pantoprazole, 40 mg, Oral, Q AM  rivastigmine, 1 patch, Transdermal, Daily  sodium chloride, 10 mL, Intravenous, Q12H      Continuous Infusions:   PRN Meds:.  acetaminophen    acetaminophen    senna-docusate sodium **AND** polyethylene glycol **AND** bisacodyl **AND** bisacodyl    Calcium Replacement - Follow Nurse / BPA Driven Protocol    dextrose    dextrose    diphenhydrAMINE    glucagon (human recombinant)    hydrALAZINE    Magnesium Standard Dose Replacement - Follow Nurse / BPA Driven Protocol    nitroglycerin    Phosphorus Replacement - Follow Nurse / BPA Driven Protocol    Potassium Replacement - Follow Nurse / BPA Driven Protocol    sodium chloride    sodium chloride    Assessment & Plan   Assessment & Plan     Active Hospital Problems    Diagnosis  POA    **Encephalopathy [G93.40]  Unknown    Pulmonary emboli [I26.99]  Yes    Gastroesophageal reflux disease with esophagitis [K21.00]  Yes    Anxiety and depression [F41.9, F32.A]  Yes    Multiple-type hyperlipidemia [E78.2]  Yes    Benign essential hypertension [I10]  Yes      Resolved Hospital Problems    Diagnosis Date Resolved POA    CVA (cerebral vascular accident) [I63.9] 03/22/2025 Yes        Brief Hospital Course to date:  Valarie Camara is a 79 y.o. female  with hx of CAD, PAD, left ICA disease (50-69% stenosis), CKD3, HTN, mild dementia, PE (on Eliquis), and DM2 who presented with confusion, aphasia, and some right-sided weakness. Patient seen by Neuro-Stroke team. However, stroke workup was negative and they suspected stroke recrudescence in setting of infection/metabolic derangements. UA was concerning for UTI. After admission, patient had significant HTN. Cardene gtt  initiated. General Neurology was consulted for ongoing encephalopathy. EEG negative. Felt to be secondary to medications which were discontinued, with subsequent improvement. She is now stable and awaiting rehab placement.      This patient's problems and plans were partially entered by my partner and updated as appropriate by me 04/04/25.       Encephalopathy, resolved  Aphasia & right-sided weakness, resolved   Left ICA stenosis (50-69%), & multifocal atherosclerotic disease  Mild dementia  Hx previous left thalamic CVA  - Neuro-Stroke followed, suspected stroke recrudescence in setting of infection/metabolic derangement  - TSH WNL, initial UA benign, however repeat UA showed infection (see below)  - MRI brain 3/22/25 negative  - EEG 3/22/25 negative  - CTA head/neck 3/20/25 negative for flow-limiting stenosis or LVO, 70% stenosis of left ICA  - ECHO 3/21/25: EF 66-70%, diastolic dysfunction  - BLE duplex negative  - 3/24/25 more confused and less interactive, so General Neurology was consulted: repeat EEG 3/25/25 was negative  - Per discussion with daughter, patient has become lethargic in past with Trazodone and Seroquel - also lethargic this admission after Geodon administration: AVOID these meds  - Neuro recommended rivastigmine patch 4.9 mg daily  - Per Neuro-Stroke team: continue ASA, statin, anticoagulation  - Follow up in [facility] 8-12 weeks  - SLP following, [facility] ground/ thin liquids  - PT/OT recommending SNF rehab, recently home from [facility]     UTI  - UA 3/22/25 with 4+ bacteria, 6-10 WBCs, large yeast  - Initiated on Rocephin  - Urine culture with lactobacillus species, Rocephin discontinued     Orthostatic hypotension  Weakness  - Pt has had + orthostasis recently with history of labile pressures, and HTN urgency   - Echo 3/21 with EF 66-70%  - s/p bolus   - Midodrine increased to 5mg TID today for => 98=> 68 today  - okay to let BP run higher give pt's age, goal < 160/90  -  will need heart and valve clinic follow up; has seen Dr Low prior     HTN  - s/p Cardene drip  - BP stable at rest to slightly elevated. Continue nifedipine, Imdur, carvedilol at reduced dose  - midodrine for orthostasis     Candidiasis  - Patient recently on Rocephin  - Diflucan 100 mg daily x 7 days, deferred Nystatin given dysphagia  - QTc 392     Diet-controlled DM2  - HbA1c 6.9 on 3/4/25  - SSI      Hx PE 2/28/25 at [facility]  - continue eliquis     KURT on CKD 3 (baseline Cr ~1.8-1.9)  - resolved, now better than baseline 1.3      Expected Discharge Location and Transportation: [facility], EMS once orthostasis improved  Expected Discharge   Expected Discharge Date: 4/5/2025; Expected Discharge Time:      VTE Prophylaxis:  Pharmacologic & mechanical VTE prophylaxis orders are present.         AM-PAC 6 Clicks Score (PT): 10 (04/03/25 2000)    CODE STATUS:   Code Status and Medical Interventions: CPR (Attempt to Resuscitate); Full Support   Ordered at: 03/20/25 1737     Code Status (Patient has no pulse and is not breathing):    CPR (Attempt to Resuscitate)     Medical Interventions (Patient has pulse or is breathing):    Full Support       Katie Pacheco, JOSEE  04/04/25

## 2025-04-04 NOTE — THERAPY TREATMENT NOTE
Acute Care - Speech Language Pathology Treatment Note  [facility]     Patient Name: Valarie Camara  : 1945  MRN: [id number]  Today's Date: 2025               Admit Date: 3/20/2025     Visit Dx:    ICD-10-CM ICD-9-CM   1. Acute ischemic stroke  I63.9 434.91   2. History of stroke  Z86.73 V12.54   3. Renal insufficiency  N28.9 593.9   4. Aphasia  R47.01 784.3   5. Dysarthria  R47.1 784.51   6. Oropharyngeal dysphagia  R13.12 787.22   7. 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  I69.30 V12.54   8. Cognitive communication deficit  R41.841 799.52     Patient Active Problem List   Diagnosis    Atopic rhinitis    Arthritis    Chronic neck pain    Anxiety and depression    Edema    Gastroesophageal reflux disease with esophagitis    Multiple-type hyperlipidemia    Vitamin D deficiency    Benign essential hypertension    Obesity (BMI 30-39.9)    History of COPD    History of cataract    History of osteoporosis    History of restless legs syndrome    History of rheumatoid arthritis    Elevated serum creatinine    Esophageal dysphagia    Constipation    Schatzki's ring    Gastritis without bleeding    History of Helicobacter pylori infection    Duodenitis    Right hemiparesis    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    Multiple thyroid nodules    Lacunar stroke    DM (diabetes mellitus), type 2, uncontrolled w/neurologic complication    Syncope and collapse    Positive fecal occult blood test    Left foot pain    Hypomagnesemia    Acute on chronic anemia    Esophageal dysphagia    Reflux esophagitis    Syncope    Irritable bowel syndrome with constipation    Hypertensive urgency    Pulmonary emboli    Encephalopathy     Past Medical History:   Diagnosis Date    Acute bronchitis with bronchospasm     Anxiety     Arthritis     Asthma     B12 deficiency     Back pain     Body piercing     ears    Cataract     Cerebrovascular disease      "Cervicalgia     Chronic kidney disease     stage 3b    Colonic polyp     History of colonic polyps     Constipation     COPD (chronic obstructive pulmonary disease)     Coronary artery disease     Depression     Diverticulitis     Dysphagia     Patient reported \"it won't go all the way down\" when eating solid foods first thing in the mornings    Edema     Elevated cholesterol     Esophageal reflux     Folic acid deficiency     Full dentures     Advised no adhesives DOS    Heart murmur     Herpes zoster     High cholesterol     History of kidney infection     History of recurrent urinary tract infection     HTN (hypertension)     Hypercholesterolemia     Impaired functional mobility, balance, gait, and endurance     Insomnia     Kidney infection     Malignant hypertension 04/26/2015     Accelerated essential hypertension    Measles     rubeola    Muscle spasm     Neoplasm of uncertain behavior of skin     dtr denies    Osteoporosis     Poor historian     Recurrent urinary tract infection     Rheumatoid arthritis     RLS (restless legs syndrome)     Stomach ulcer     Stroke     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    Type 2 diabetes mellitus     Vitamin D deficiency      Past Surgical History:   Procedure Laterality Date    CATARACT EXTRACTION WITH INTRAOCULAR LENS IMPLANT Left 02/11/2013    CATARACT EXTRACTION WITH INTRAOCULAR LENS IMPLANT Right 04/15/2013    COLONOSCOPY  2012    COLONOSCOPY N/A 11/26/2019    Procedure: COLONOSCOPY;  Surgeon: Chidi Downing MD;  Location:  [facility];  Service: Gastroenterology    ENDOSCOPY N/A 11/13/2017    Procedure: ESOPHAGOGASTRODUODENOSCOPY with biopsies and esophageal balloon dilitation;  Surgeon: Wesley Stovall MD;  Location:  [facility];  Service:     ENDOSCOPY N/A 11/25/2019    Procedure: ESOPHAGOGASTRODUODENOSCOPY;  Surgeon: Chidi Downing MD;  Location:  [facility];  Service: Gastroenterology    ENDOSCOPY N/A " 11/29/2021    Procedure: ESOPHAGOGASTRODUODENOSCOPY with dilation and biopsies;  Surgeon: Gonzalez Zapata MD;  Location: [facility];  Service: Gastroenterology;  Laterality: N/A;    ENDOSCOPY N/A 11/1/2023    Procedure: ESOPHAGOGASTRODUODENOSCOPY WITH BIOPSY AND DILATATION;  Surgeon: Gonzalez Zapata MD;  Location: [facility]COPY;  Service: Gastroenterology;  Laterality: N/A;    ENDOSCOPY N/A 1/27/2025    Procedure: Esophagogastroduodenoscopy with dilatation and biopsies;  Surgeon: Gonzalez Zapata MD;  Location: [facility] ENDOSCOPY;  Service: Gastroenterology;  Laterality: N/A;    HYSTERECTOMY  1979    UPPER GASTROINTESTINAL ENDOSCOPY  12/09/2013    UPPER GASTROINTESTINAL ENDOSCOPY  11/13/2017       SLP Recommendation and Plan                    Anticipated Discharge Disposition (SLP): skilled nursing facility (04/04/25 1155)                    Daily Summary of Progress (SLP): progress toward functional goals as expected (04/04/25 1155)           Treatment Assessment (SLP): continued, toleration of diet, mild-moderate, dysarthria, moderate, cognitive-linguistic disorder (04/04/25 1155)  Treatment Assessment Comments (SLP): Suspect may be approaching baseline. Pt satisfied w/ mechanical ground diet and avoiding straws (does not care to repeat instrumental swallow study). Will sign-off on further swallow intervention. Will f/u for further cognitive-linguistic tx, as appropriate. Specifics re: cognitive-linguistic baseline unknown. (04/04/25 1155)  Plan for Continued Treatment (SLP): goals adjusted to reflect functional improvements demonstrated, continue treatment per plan of care (04/04/25 1155)  Progress: improving (04/04/25 1300)      SLP EVALUATION (Last 72 Hours)       SLP SLC Evaluation       Row Name 04/04/25 1155                   Communication Assessment/Intervention    Document Type therapy note (daily note)  -AC        Subjective Information no complaints  -AC        Patient  Observations alert;cooperative  -AC        Patient/Family/Caregiver Comments/Observations No family present.  -AC        Patient Effort adequate  -AC           Pain    Pretreatment Pain Rating 0/10 - no pain  -AC        Posttreatment Pain Rating 0/10 - no pain  -AC           Comprehension Assessment/Intervention    Comprehension Assessment/Intervention Reading Comprehension  -AC           Reading Comprehension Assessment/Intervention    Reading Comprehension (Communication) WFL  -AC        Phrase Level WFL  -AC           Expression Assessment/Intervention    Expression Assessment/Intervention graphic expression  -AC           Graphic Expression Assessment/Intervention    Graphic Expression unable/difficult to assess  does not write at baseline per pt  -AC           Cognitive Assessment Intervention- SLP    Cognitive Function (Cognition) moderate impairment  -AC        Orientation Status (Cognition) WFL;person;place;situation  -AC        Thought Organization (Cognitive) moderate impairment;verbal sequencing  -AC        Reasoning (Cognitive) moderate impairment;simple  -AC           SLP Treatment Clinical Impressions    Treatment Assessment (SLP) continued;toleration of diet;mild-moderate;dysarthria;moderate;cognitive-linguistic disorder  -AC        Treatment Assessment Comments (SLP) Suspect may be approaching baseline. Pt satisfied w/ mechanical ground diet and avoiding straws (does not care to repeat instrumental swallow study). Will sign-off on further swallow intervention. Will f/u for further cognitive-linguistic tx, as appropriate. Specifics re: cognitive-linguistic baseline unknown.  -AC        Daily Summary of Progress (SLP) progress toward functional goals as expected  -AC        Plan for Continued Treatment (SLP) goals adjusted to reflect functional improvements demonstrated;continue treatment per plan of care  -AC        Care Plan Review evaluation/treatment results reviewed;care plan/treatment goals  reviewed;risks/benefits reviewed;current/potential barriers reviewed;patient/other agree to care plan  -AC           Recommendations    Anticipated Discharge Disposition (SLP) skilled nursing facility  -AC                  User Key  (r) = Recorded By, (t) = Taken By, (c) = Cosigned By      Initials Name Effective Dates    AC Julissa Mcfadden MS [facility]-SLP 02/03/23 -                        EDUCATION  The patient has been educated in the following areas:     Cognitive Impairment Communication Impairment Dysphagia (Swallowing Impairment).           SLP GOALS       Row Name 04/04/25 1155 04/02/25 0915          (LTG) Patient will demonstrate functional swallow for    Diet Texture (Demonstrate functional swallow) soft to chew (chopped) textures  -AC soft to chew (chopped) textures  -RS     Liquid viscosity (Demonstrate functional swallow) thin liquids  -AC thin liquids  -RS     [place] (Demonstrate functional swallow) with minimal cues (75-90% accuracy)  -AC with minimal cues (75-90% accuracy)  -RS     Time Frame (Demonstrate functional swallow) 1 week  -AC 1 week  -RS     Progress/Outcomes (Demonstrate functional swallow) goal no longer appropriate  -AC continuing progress toward goal  -RS        (STG) Patient will tolerate trials of    Consistencies Trialed (Tolerate trials) mechanical ground textures;thin liquids  -AC mechanical ground textures;thin liquids  -RS     Desired Outcome (Tolerate trials) without signs/symptoms of aspiration;with adequate oral prep/transit/clearance;with use of compensatory strategies (see comments)  -AC without signs/symptoms of aspiration;with adequate oral prep/transit/clearance;with use of compensatory strategies (see comments)  -RS     [place] (Tolerate trials) with minimal cues (75-90% accuracy)  -AC with minimal cues (75-90% accuracy)  -RS     Time Frame (Tolerate trials) 1 week  -AC 1 week  -RS     Progress/Outcomes (Tolerate trials) goal met  -AC continuing progress toward  goal  -RS     Comment (Tolerate trials) Trialed mechanical ground textures and thin liquid via cup/straw. Increased, but adequate oral prep w/ soft solids. No significant oral residue. No overt clinical s/sxs aspiration w/ thin via cup or soft solids. Pt hesitant to trial straw drinks. Delayed coughing noted. Discussed w/ pt who reported she has no desire to return to straw use or repeat instrumental swallow study to re-assess pharyngeal swallow/safety with straws.  -AC Increased oral prep time but functional. Pt reports difficulty drinking Boost from container without straw, offered to provide pt extra cups but she declined  -RS        ([facility]) Pharyngeal Strengthening Exercise Goal 1 (SLP)    Activity (Pharyngeal Strengthening Goal 1, SLP) -- increase timing  -RS     Increase Timing prepping - 3 second prep or suck swallow or 3-step swallow  -AC prepping - 3 second prep or suck swallow or 3-step swallow  -RS     [place]/Accuracy (Pharyngeal Strengthening Goal 1, SLP) with minimal cues (75-90% accuracy)  -AC with minimal cues (75-90% accuracy)  -RS     Time Frame (Pharyngeal Strengthening Goal 1, SLP) 1 week  -AC 1 week  -RS     Progress/Outcomes (Pharyngeal Strengthening Goal 1, SLP) goal no longer appropriate  -AC continuing progress toward goal  -RS        Patient will demonstrate functional speech skills for return to discharge environment    [place] with moderate cues  -AC with moderate cues  -RS     Time frame 1 week  -AC 1 week  -RS     Barriers -- lethargy;cognitive status  -RS     Progress/Outcomes continuing progress toward goal  -AC goal ongoing  -RS        Patient will demonstrate functional language skills for return to discharge environment     [place] with moderate cues  -AC with moderate cues  -RS     Time frame 1 week  -AC 1 week  -RS     Barriers -- lethargy;cognitive status  -RS     Progress/Outcomes goal no longer appropriate  -AC goal ongoing  -RS        Patient will demonstrate  functional cognitive-linguistic skills for return to discharge environment    [place] with moderate cues  -AC --     Time frame 1 week  -AC --     Progress/Outcomes new goal  -AC --        SLP Diagnostic Treatment     Patient will participate in further assessment in the following areas clarification of baseline cognitive communication status  -AC reading comprehension;graphic expression;cognitive-linguistic;clarification of baseline cognitive communication status  -RS     Time Frame (Diagnostic) 1 week  -AC 1 week  -RS     Barriers (Diagnostic) -- Lethargy;Cognitive status  -RS     Progress/Outcomes (Additional Goal 1, SLP) goal revised this date  -AC goal ongoing  -RS     Comment (Diagnostic) See assessment documentation and goals added below to target cognitive-linguistic deficits. No family present to confirm baseline.  -AC --        Words/Phrases/Sentences Goal 1 (SLP)    Improve Ability to Comprehend Words/Phrases/Sentences Through: Goal 1 (SLP) identify objects, field of;identify pictures, field of;80%;with moderate cues (50-74%);other (comment)  -AC identify objects, field of;identify pictures, field of;80%;with moderate cues (50-74%);other (comment)  -RS     Time Frame (Identify Objects and Pictures Goal 1, SLP) 1 week  -AC 1 week  -RS     Progress (Ability to Contruct Words/Phrases/Sentences Goal 1, SLP) 100%;independently (over 90% accuracy)  -AC --     Progress/Outcomes (Identify Objects and Pictures Goal 1, SLP) goal met  -AC goal ongoing  -RS        Follow Directions Goal 2 (SLP)    Improve Ability to Follow Directions Goal 1 (SLP) 2 step commands;80%;with minimal cues (75-90%)  -AC 2 step commands;80%;with minimal cues (75-90%)  -RS     Time Frame (Follow Directions Goal 1, SLP) 1 week  -AC 1 week  -RS     Progress (Ability to Follow Directions Goal 1, SLP) 100%;independently (over 90% accuracy)  -AC --     Progress/Outcomes (Follow Directions Goal 1, SLP) goal met  -AC goal ongoing  -RS         Connected Speech to Express Thoughts Goal 1 (SLP)    Improve Narrative Discourse to Express Thoughts By Goal 1 (SLP) explaining a proverb or idiom;conversational task on a given topic;90%;with minimal cues (75-90%)  -AC explaining a proverb or idiom;conversational task on a given topic;90%;with minimal cues (75-90%)  -RS     Time Frame (Connected Speech Goal 1, SLP) 1 week  -AC 1 week  -RS     Progress (Connected Speech Goal 1, SLP) 90%;independently (over 90% accuracy)  -AC --     Progress/Outcomes (Connected Speech Goal 1, SLP) goal met  -AC goal ongoing  -RS     Comment (Connected Speech Goal 1, SLP) Suspect deficits more cognitive in nature. See goals added below. Also noted a few episodes of possible dysfluency (part word repetition). ? Baseline. Did not inhibit overall communication exchange.  -AC --        Articulation Goal 1 (SLP)    Improve Articulation Goal 1 (SLP) by over-articulating at word level;80%;with moderate cues (50-74%)  -AC by over-articulating at word level;80%;with moderate cues (50-74%)  -RS     Time Frame (Articulation Goal 1, SLP) 1 week  -AC 1 week  -RS     Progress (Articulation Goal 1, SLP) 50%;with moderate cues (50-74%)  -AC --     Progress/Outcomes (Articulation Goal 1, SLP) continuing progress toward goal  -AC goal ongoing  -RS     Comment (Articulation Goal 1, SLP) Noted continued imprecise articulation. Reviewed overarticulation strategy. Pt stated understanding. Limited by fatigue.  -AC --        Organizational Skills Goal 1 (SLP)    Improve Thought Organization Through Goal 1 (SLP) completing a verbal sequencing task;70%;with moderate cues (50-74%)  -AC --     Time Frame (Thought Organization Skills Goal 1, SLP) 1 week  -AC --     Progress/Outcomes (Thought Organization Skills Goal 1, SLP) new goal  -AC --        Reasoning Goal 1 (SLP)    Improve Reasoning Through Goal 1 (SLP) complete basic reasoning task;70%;with moderate cues (50-74%)  -AC --     Time Frame (Reasoning Goal 1,  SLP) 1 week  -AC --     Progress/Outcomes (Reasoning Goal 1, SLP) new goal  -AC --               User Key  (r) = Recorded By, (t) = Taken By, (c) = Cosigned By      Initials Name Provider Type    Julissa Poe MS CCC-SLP Speech and Language Pathologist    Qasim Lopez MS CCC-SLP Speech and Language Pathologist                              Time Calculation:      Time Calculation- SLP       Row Name 04/04/25 1301             Time Calculation- SLP    SLP Start Time 1155  -AC      SLP Received On 04/04/25  -AC         Untimed Charges    59755-DJ Treatment/ST Modification Prosth Aug Alter  30  -AC      [id number] Treatment Swallow Minutes 25  -AC         Total Minutes    Untimed Charges Total Minutes 55  -AC       Total Minutes 55  -AC                User Key  (r) = Recorded By, (t) = Taken By, (c) = Cosigned By      Initials Name Provider Type    Julissa Poe MS CCC-SLP Speech and Language Pathologist                    Therapy Charges for Today       Code Description Service Date Service Provider Modifiers Qty    76311844706 HC ST TREATMENT SWALLOW 2 4/4/2025 Julissa Mcfadden MS CCC-SLP GN 1    [id number] HC ST TREATMENT SPEECH 2 4/4/2025 Julissa Mcfadden MS CCC-SLP GN 1                       Julissa Mcfadden MS CCC-GABRIELLA  4/4/2025

## 2025-04-04 NOTE — PLAN OF CARE
Goal Outcome Evaluation:  Plan of Care Reviewed With: patient        Progress: improving       Anticipated Discharge Disposition (SLP): skilled nursing facility             Treatment Assessment (SLP): continued, toleration of diet, mild-moderate, dysarthria, moderate, cognitive-linguistic disorder (04/04/25 1155)  Treatment Assessment Comments (SLP): Suspect may be approaching baseline. Pt satisfied w/ mechanical ground diet and avoiding straws (does not care to repeat instrumental swallow study). Will sign-off on further swallow intervention. Will f/u for further cognitive-linguistic tx, as appropriate. Specifics re: cognitive-linguistic baseline unknown. (04/04/25 1155)  Plan for Continued Treatment (SLP): goals adjusted to reflect functional improvements demonstrated, continue treatment per plan of care (04/04/25 1155)

## 2025-04-05 LAB
GLUCOSE BLDC GLUCOMTR-MCNC: 156 MG/DL (ref 70–130)
GLUCOSE BLDC GLUCOMTR-MCNC: 167 MG/DL (ref 70–130)
GLUCOSE BLDC GLUCOMTR-MCNC: 169 MG/DL (ref 70–130)
GLUCOSE BLDC GLUCOMTR-MCNC: 185 MG/DL (ref 70–130)
QT INTERVAL: 350 MS
QTC INTERVAL: 399 MS

## 2025-04-05 PROCEDURE — 99232 SBSQ HOSP IP/OBS MODERATE 35: CPT | Performed by: NURSE PRACTITIONER

## 2025-04-05 PROCEDURE — 82948 REAGENT STRIP/BLOOD GLUCOSE: CPT

## 2025-04-05 PROCEDURE — 63710000001 INSULIN LISPRO (HUMAN) PER 5 UNITS: Performed by: INTERNAL MEDICINE

## 2025-04-05 RX ADMIN — MIDODRINE HYDROCHLORIDE 5 MG: 5 TABLET ORAL at 17:29

## 2025-04-05 RX ADMIN — ATORVASTATIN CALCIUM 80 MG: 40 TABLET, FILM COATED ORAL at 20:08

## 2025-04-05 RX ADMIN — INSULIN LISPRO 2 UNITS: 100 INJECTION, SOLUTION INTRAVENOUS; SUBCUTANEOUS at 17:28

## 2025-04-05 RX ADMIN — RIVASTIGMINE 1 PATCH: 4.6 PATCH, EXTENDED RELEASE TRANSDERMAL at 16:18

## 2025-04-05 RX ADMIN — Medication 10 ML: at 09:00

## 2025-04-05 RX ADMIN — ASPIRIN 81 MG: 81 TABLET, CHEWABLE ORAL at 08:57

## 2025-04-05 RX ADMIN — MIDODRINE HYDROCHLORIDE 5 MG: 5 TABLET ORAL at 09:00

## 2025-04-05 RX ADMIN — INSULIN LISPRO 2 UNITS: 100 INJECTION, SOLUTION INTRAVENOUS; SUBCUTANEOUS at 09:00

## 2025-04-05 RX ADMIN — APIXABAN 5 MG: 5 TABLET, FILM COATED ORAL at 20:08

## 2025-04-05 RX ADMIN — PANTOPRAZOLE SODIUM 40 MG: 40 TABLET, DELAYED RELEASE ORAL at 05:19

## 2025-04-05 RX ADMIN — HYDROCORTISONE 2.5%: 25 CREAM TOPICAL at 08:59

## 2025-04-05 RX ADMIN — INSULIN LISPRO 2 UNITS: 100 INJECTION, SOLUTION INTRAVENOUS; SUBCUTANEOUS at 12:17

## 2025-04-05 RX ADMIN — HYDROCORTISONE 2.5%: 25 CREAM TOPICAL at 20:08

## 2025-04-05 RX ADMIN — NIFEDIPINE 60 MG: 60 TABLET, EXTENDED RELEASE ORAL at 08:57

## 2025-04-05 RX ADMIN — CARVEDILOL 12.5 MG: 12.5 TABLET, FILM COATED ORAL at 09:00

## 2025-04-05 RX ADMIN — LIDOCAINE 1 PATCH: 4 PATCH TOPICAL at 09:00

## 2025-04-05 RX ADMIN — ISOSORBIDE MONONITRATE 30 MG: 30 TABLET, EXTENDED RELEASE ORAL at 11:43

## 2025-04-05 RX ADMIN — APIXABAN 5 MG: 5 TABLET, FILM COATED ORAL at 09:00

## 2025-04-05 RX ADMIN — MIDODRINE HYDROCHLORIDE 5 MG: 5 TABLET ORAL at 11:43

## 2025-04-05 RX ADMIN — CARVEDILOL 12.5 MG: 12.5 TABLET, FILM COATED ORAL at 17:29

## 2025-04-05 NOTE — PROGRESS NOTES
[facility] Medicine Services  PROGRESS NOTE    Patient Name: Valarie Camara  : 1945  MRN: [id number]    Date of Admission: 3/20/2025  Primary Care Physician: Lay Cox MD    Subjective   Subjective     CC:  F/u AMS, right-sided weakness    HPI:  Up in bed this am getting ready for breakfast. RN at bedside. No complaints other than being tired      Objective   Objective     Vital Signs:   Temp:  [97.2 °F (36.2 °C)-98.3 °F (36.8 °C)] 97.8 °F (36.6 °C)  Heart Rate:  [69-82] 82  Resp:  [16] 16  BP: (121-178)/(61-91) 121/61     Physical Exam:  Constitutional: No acute distress, awake, alert  HENT: NCAT, mucous membranes moist  Respiratory: Clear to auscultation bilaterally, respiratory effort normal   Cardiovascular: RRR, systolic murmur present  Gastrointestinal: Positive bowel sounds, soft, nontender, nondistended  Musculoskeletal: No bilateral ankle edema  Psychiatric: Appropriate affect, cooperative  Neurologic: Oriented x 2, MENESES, speech clear  Skin: No rashes    No change in exam from 25          Results Reviewed:  LAB RESULTS:      Lab 25  1150 25  0731 25  2137   WBC 6.08 6.11  --    HEMOGLOBIN 11.5* 11.4* 10.6*   HEMATOCRIT 35.0 35.4 33.7*   PLATELETS 263 233  --    NEUTROS ABS  --  3.94  --    IMMATURE GRANS (ABS)  --  0.02  --    LYMPHS ABS  --  1.49  --    MONOS ABS  --  0.54  --    EOS ABS  --  0.09  --    MCV 88.6 91.2  --          Lab 25  0525 25  1150 25  0731   SODIUM 138 139 144   POTASSIUM 4.0 4.2 4.2   CHLORIDE 109* 110* 110*   CO2 20.0* 20.0* 17.0*   ANION GAP 9.0 9.0 17.0*   BUN 28* 33* 36*   CREATININE 1.34* 1.68* 1.80*   EGFR 40.2* 30.6* 28.2*   GLUCOSE 134* 292* 145*   CALCIUM 9.0 8.5* 8.7   MAGNESIUM  --  1.8  --                          Brief Urine Lab Results  (Last result in the past 365 days)        Color   Clarity   Blood   Leuk Est   Nitrite   Protein   CREAT   Urine HCG        25 1129  Yellow   Cloudy   Negative   Negative   Negative   100 mg/dL (2+)                   Microbiology Results Abnormal       Procedure Component Value - Date/Time    Urine Culture - Urine, Straight Cath [[id number]]  (Abnormal) Collected: 03/22/25 1129    Lab Status: Final result Specimen: Urine from Straight Cath Updated: 03/23/25 2043     Urine Culture 25,000 CFU/mL Lactobacillus species     Comment:   Based on laboratory diagnosis criteria, these organisms are common urogenital commensal lilly and have not been associated with urinary tract infections; clinical correlation is recommended.       Narrative:      Colonization of the urinary tract without infection is common. Treatment is discouraged unless the patient is symptomatic, pregnant, or undergoing an invasive urologic procedure.            No radiology results from the last 24 hrs    Results for orders placed during the hospital encounter of 03/20/25    Adult Transthoracic Echo Complete W/ Cont if Necessary Per Protocol (With Agitated Saline)    Interpretation Summary    Left ventricular systolic function is normal. Calculated left ventricular EF = 67% Left ventricular ejection fraction appears to be 66 - 70%.    Left ventricular wall thickness is consistent with mild concentric hypertrophy.    Left ventricular outflow tract peak flow gradient at rest is 11 mmHg. Left ventricular outflow tract peak gradient with valsalva is 29 mmHg.    Left ventricular diastolic function is consistent with (grade I) impaired relaxation.    The mitral valve peak gradient is 9 mmHg. The mitral valve mean gradient is 4 mmHg.    Calculated right ventricular systolic pressure from tricuspid regurgitation is 21 mmHg.    There is a trivial pericardial effusion.      Current medications:  Scheduled Meds:apixaban, 5 mg, Oral, Q12H  aspirin, 81 mg, Oral, Daily   Or  aspirin, 300 mg, Rectal, Daily  atorvastatin, 80 mg, Oral, Nightly  carvedilol, 12.5 mg, Oral, BID With  Meals  Hydrocortisone (Perianal), , Rectal, BID  insulin lispro, 2-7 Units, Subcutaneous, TID With Meals  isosorbide mononitrate, 30 mg, Oral, Q24H  Lidocaine, 1 patch, Transdermal, Q24H  midodrine, 5 mg, Oral, TID AC  NIFEdipine XL, 60 mg, Oral, Q24H  pantoprazole, 40 mg, Oral, Q AM  rivastigmine, 1 patch, Transdermal, Daily  sodium chloride, 10 mL, Intravenous, Q12H      Continuous Infusions:   PRN Meds:.  acetaminophen    acetaminophen    senna-docusate sodium **AND** polyethylene glycol **AND** bisacodyl **AND** bisacodyl    Calcium Replacement - Follow Nurse / BPA Driven Protocol    dextrose    dextrose    diphenhydrAMINE    glucagon (human recombinant)    hydrALAZINE    Magnesium Standard Dose Replacement - Follow Nurse / BPA Driven Protocol    nitroglycerin    Phosphorus Replacement - Follow Nurse / BPA Driven Protocol    Potassium Replacement - Follow Nurse / BPA Driven Protocol    sodium chloride    sodium chloride    Assessment & Plan   Assessment & Plan     Active Hospital Problems    Diagnosis  POA    **Encephalopathy [G93.40]  Unknown    Pulmonary emboli [I26.99]  Yes    Gastroesophageal reflux disease with esophagitis [K21.00]  Yes    Anxiety and depression [F41.9, F32.A]  Yes    Multiple-type hyperlipidemia [E78.2]  Yes    Benign essential hypertension [I10]  Yes      Resolved Hospital Problems    Diagnosis Date Resolved POA    CVA (cerebral vascular accident) [I63.9] 03/22/2025 Yes        Brief Hospital Course to date:  Valarie Camara is a 79 y.o. female  with hx of CAD, PAD, left ICA disease (50-69% stenosis), CKD3, HTN, mild dementia, PE (on Eliquis), and DM2 who presented with confusion, aphasia, and some right-sided weakness. Patient seen by Neuro-Stroke team. However, stroke workup was negative and they suspected stroke recrudescence in setting of infection/metabolic derangements. UA was concerning for UTI. After admission, patient had significant HTN. Cardene gtt initiated. General  Neurology was consulted for ongoing encephalopathy. EEG negative. Felt to be secondary to medications which were discontinued, with subsequent improvement. She is now stable and awaiting rehab placement.      This patient's problems and plans were partially entered by my partner and updated as appropriate by me 04/05/25.       Encephalopathy, resolved  Aphasia & right-sided weakness, resolved   Left ICA stenosis (50-69%), & multifocal atherosclerotic disease  Mild dementia  Hx previous left thalamic CVA  - Neuro-Stroke followed, suspected stroke recrudescence in setting of infection/metabolic derangement  - TSH WNL, initial UA benign, however repeat UA showed infection (see below)  - MRI brain 3/22/25 negative  - EEG 3/22/25 negative  - CTA head/neck 3/20/25 negative for flow-limiting stenosis or LVO, 70% stenosis of left ICA  - ECHO 3/21/25: EF 66-70%, diastolic dysfunction  - BLE duplex negative  - 3/24/25 more confused and less interactive, General Neurology was consulted: repeat EEG 3/25/25 was negative  - Per discussion with daughter, patient has become lethargic in past with Trazodone and Seroquel - also lethargic this admission after Geodon administration: AVOID these meds  - Neuro recommended rivastigmine patch 4.9 mg daily  - Per Neuro-Stroke team: continue ASA, statin, anticoagulation  - Follow up in [facility] 8-12 weeks  - SLP following, [facility] ground/ thin liquids  - PT/OT recommending SNF rehab, recently home from [facility]     UTI  - UA 3/22/25 with 4+ bacteria, 6-10 WBCs, large yeast  - Initiated on Rocephin  - Urine culture with lactobacillus species, Rocephin discontinued     Orthostatic hypotension  Weakness  - Pt has had + orthostasis recently with history of labile pressures, and HTN urgency   - Echo 3/21 with EF 66-70%  - s/p bolus   - Midodrine increased to 5mg TID for => 98=> 68 yesterday- pending repeat today  - okay to let BP run higher give pt's age, goal < 160/90  - will  need heart and valve clinic follow up; has seen Dr Low prior     HTN  - s/p Cardene drip  - BP stable at rest to slightly elevated. Continue nifedipine, Imdur, carvedilol at reduced dose  - midodrine for orthostasis     Candidiasis  - Patient recently on Rocephin  - Diflucan 100 mg daily x 7 days completed, deferred Nystatin given dysphagia  - QTc 399     Diet-controlled DM2  - HbA1c 6.9 on 3/4/25  - SSI      Hx PE 2/28/25 at [facility]  - continue eliquis     KURT on CKD 3 (baseline Cr ~1.8-1.9)  - resolved, now better than baseline 1.3      Expected Discharge Location and Transportation: [facility], EMS once orthostasis improved  Expected Discharge   Expected Discharge Date: 4/5/2025; Expected Discharge Time:      VTE Prophylaxis:  Pharmacologic & mechanical VTE prophylaxis orders are present.         AM-PAC 6 Clicks Score (PT): 10 (04/05/25 0800)    CODE STATUS:   Code Status and Medical Interventions: CPR (Attempt to Resuscitate); Full Support   Ordered at: 03/20/25 5406     Code Status (Patient has no pulse and is not breathing):    CPR (Attempt to Resuscitate)     Medical Interventions (Patient has pulse or is breathing):    Full Support       Katie Pacheco, JOSEE  04/05/25

## 2025-04-05 NOTE — PLAN OF CARE
Problem: Adult Inpatient Plan of Care  Goal: Absence of Hospital-Acquired Illness or Injury  Intervention: Identify and Manage Fall Risk  Recent Flowsheet Documentation  Taken 4/5/2025 0200 by Weathers, Riya, RN  Safety Promotion/Fall Prevention:   activity supervised   assistive device/personal items within reach   clutter free environment maintained   lighting adjusted   room organization consistent  Taken 4/5/2025 0000 by Weathers, Riya, RN  Safety Promotion/Fall Prevention:   activity supervised   assistive device/personal items within reach   clutter free environment maintained   room organization consistent  Taken 4/4/2025 2200 by Weathers, Riya, RN  Safety Promotion/Fall Prevention:   activity supervised   assistive device/personal items within reach   clutter free environment maintained   room organization consistent   lighting adjusted  Taken 4/4/2025 2000 by Riya Dean RN  Safety Promotion/Fall Prevention:   activity supervised   assistive device/personal items within reach   clutter free environment maintained   lighting adjusted   room organization consistent  Intervention: Prevent Skin Injury  Recent Flowsheet Documentation  Taken 4/5/2025 0200 by Riya Dean RN  Body Position:   turned   left  Skin Protection:   incontinence pads utilized   silicone foam dressing in place   transparent dressing maintained  Taken 4/5/2025 0000 by Riya Dean RN  Body Position:   turned   right  Skin Protection:   incontinence pads utilized   silicone foam dressing in place   transparent dressing maintained  Taken 4/4/2025 2200 by Riya Dean RN  Body Position:   turned   right  Skin Protection:   incontinence pads utilized   silicone foam dressing in place   transparent dressing maintained  Taken 4/4/2025 2000 by Riya Dean RN  Body Position:   turned   left  Skin Protection:   incontinence pads utilized   silicone foam dressing in place   transparent  dressing maintained  Intervention: Prevent and Manage VTE (Venous Thromboembolism) Risk  Recent Flowsheet Documentation  Taken 4/4/2025 2000 by Riya Dean RN  VTE Prevention/Management:   bilateral   SCDs (sequential compression devices) on  Intervention: Prevent Infection  Recent Flowsheet Documentation  Taken 4/5/2025 0200 by Riya Dean RN  Infection Prevention: environmental surveillance performed  Taken 4/5/2025 0000 by Riya Dean RN  Infection Prevention: environmental surveillance performed  Taken 4/4/2025 2200 by Riya Dean RN  Infection Prevention: environmental surveillance performed  Taken 4/4/2025 2000 by Riya Dean RN  Infection Prevention: environmental surveillance performed  Goal: Optimal Comfort and Wellbeing  Intervention: Provide Person-Centered Care  Recent Flowsheet Documentation  Taken 4/4/2025 2000 by Riya Dean RN  Trust Relationship/Rapport:   care explained   choices provided   thoughts/feelings acknowledged     Problem: Skin Injury Risk Increased  Goal: Skin Health and Integrity  Intervention: Optimize Skin Protection  Recent Flowsheet Documentation  Taken 4/5/2025 0200 by Riya Dean RN  Pressure Reduction Techniques:   weight shift assistance provided   heels elevated off bed   pressure points protected  Head of Bed (HOB) Positioning: HOB elevated  Pressure Reduction Devices:   pressure-redistributing mattress utilized   positioning supports utilized   heel offloading device utilized  Skin Protection:   incontinence pads utilized   silicone foam dressing in place   transparent dressing maintained  Taken 4/5/2025 0000 by Riya Dean RN  Pressure Reduction Techniques:   weight shift assistance provided   pressure points protected   heels elevated off bed  Head of Bed (HOB) Positioning: HOB elevated  Pressure Reduction Devices:   pressure-redistributing mattress utilized   positioning supports utilized   heel  offloading device utilized  Skin Protection:   incontinence pads utilized   silicone foam dressing in place   transparent dressing maintained  Taken 4/4/2025 2200 by Riya Dean RN  Pressure Reduction Techniques:   weight shift assistance provided   heels elevated off bed   pressure points protected  Head of Bed (HOB) Positioning: HOB elevated  Pressure Reduction Devices:   pressure-redistributing mattress utilized   positioning supports utilized   heel offloading device utilized  Skin Protection:   incontinence pads utilized   silicone foam dressing in place   transparent dressing maintained  Taken 4/4/2025 2000 by Riya Dean RN  Pressure Reduction Techniques:   weight shift assistance provided   heels elevated off bed   pressure points protected  Head of Bed (HOB) Positioning: HOB elevated  Pressure Reduction Devices:   pressure-redistributing mattress utilized   positioning supports utilized   heel offloading device utilized  Skin Protection:   incontinence pads utilized   silicone foam dressing in place   transparent dressing maintained     Problem: Comorbidity Management  Goal: Maintenance of Behavioral Health Symptom Control  Intervention: Maintain Behavioral Health Symptom Control  Recent Flowsheet Documentation  Taken 4/5/2025 0200 by Riya Dean RN  Medication Review/Management: medications reviewed  Taken 4/5/2025 0000 by Riya Dean RN  Medication Review/Management: medications reviewed  Taken 4/4/2025 2200 by Riya Dean RN  Medication Review/Management: medications reviewed  Taken 4/4/2025 2000 by Riya Dean RN  Medication Review/Management: medications reviewed  Goal: Blood Glucose Level Within Target Range  Intervention: Monitor and Manage Glycemia  Recent Flowsheet Documentation  Taken 4/5/2025 0200 by Riya Dean RN  Medication Review/Management: medications reviewed  Taken 4/5/2025 0000 by Riya Dean RN  Medication  Review/Management: medications reviewed  Taken 4/4/2025 2200 by Riya Dean RN  Medication Review/Management: medications reviewed  Taken 4/4/2025 2000 by Riya Dean RN  Medication Review/Management: medications reviewed  Goal: Blood Pressure in Desired Range  Intervention: Maintain Blood Pressure Management  Recent Flowsheet Documentation  Taken 4/5/2025 0200 by Riya Dean RN  Medication Review/Management: medications reviewed  Taken 4/5/2025 0000 by Riya Dean RN  Medication Review/Management: medications reviewed  Taken 4/4/2025 2200 by Riya Dean RN  Medication Review/Management: medications reviewed  Taken 4/4/2025 2000 by Riya Dean RN  Medication Review/Management: medications reviewed     Problem: Fall Injury Risk  Goal: Absence of Fall and Fall-Related Injury  Intervention: Identify and Manage Contributors  Recent Flowsheet Documentation  Taken 4/5/2025 0200 by Riya Dean RN  Medication Review/Management: medications reviewed  Taken 4/5/2025 0000 by Riya Dean RN  Medication Review/Management: medications reviewed  Taken 4/4/2025 2200 by Riya Dean RN  Medication Review/Management: medications reviewed  Taken 4/4/2025 2000 by Riya Dean RN  Medication Review/Management: medications reviewed  Intervention: Promote Injury-Free Environment  Recent Flowsheet Documentation  Taken 4/5/2025 0200 by Weathers, Riya, RN  Safety Promotion/Fall Prevention:   activity supervised   assistive device/personal items within reach   clutter free environment maintained   lighting adjusted   room organization consistent  Taken 4/5/2025 0000 by Weathers, Riya, RN  Safety Promotion/Fall Prevention:   activity supervised   assistive device/personal items within reach   clutter free environment maintained   room organization consistent  Taken 4/4/2025 2200 by Weathers, Riya, RN  Safety Promotion/Fall Prevention:    activity supervised   assistive device/personal items within reach   clutter free environment maintained   room organization consistent   lighting adjusted  Taken 4/4/2025 2000 by Riya Dean RN  Safety Promotion/Fall Prevention:   activity supervised   assistive device/personal items within reach   clutter free environment maintained   lighting adjusted   room organization consistent     Problem: Confusion Acute  Goal: Optimal Cognitive Function  Intervention: Minimize Contributing Factors  Recent Flowsheet Documentation  Taken 4/4/2025 2000 by Riya Dean RN  Sensory Stimulation Regulation: care clustered  Communication Support Strategies: active listening utilized     Problem: Fatigue  Goal: Improved Activity Tolerance  Intervention: Promote Improved Energy  Recent Flowsheet Documentation  Taken 4/4/2025 2000 by Riya Dean RN  Environmental Support: calm environment promoted   Goal Outcome Evaluation:

## 2025-04-06 LAB
GLUCOSE BLDC GLUCOMTR-MCNC: 128 MG/DL (ref 70–130)
GLUCOSE BLDC GLUCOMTR-MCNC: 144 MG/DL (ref 70–130)
GLUCOSE BLDC GLUCOMTR-MCNC: 151 MG/DL (ref 70–130)
GLUCOSE BLDC GLUCOMTR-MCNC: 189 MG/DL (ref 70–130)

## 2025-04-06 PROCEDURE — 99232 SBSQ HOSP IP/OBS MODERATE 35: CPT | Performed by: NURSE PRACTITIONER

## 2025-04-06 PROCEDURE — 63710000001 INSULIN LISPRO (HUMAN) PER 5 UNITS: Performed by: INTERNAL MEDICINE

## 2025-04-06 PROCEDURE — 82948 REAGENT STRIP/BLOOD GLUCOSE: CPT

## 2025-04-06 PROCEDURE — 97110 THERAPEUTIC EXERCISES: CPT

## 2025-04-06 PROCEDURE — 97530 THERAPEUTIC ACTIVITIES: CPT

## 2025-04-06 RX ADMIN — APIXABAN 5 MG: 5 TABLET, FILM COATED ORAL at 21:47

## 2025-04-06 RX ADMIN — INSULIN LISPRO 2 UNITS: 100 INJECTION, SOLUTION INTRAVENOUS; SUBCUTANEOUS at 08:04

## 2025-04-06 RX ADMIN — MIDODRINE HYDROCHLORIDE 5 MG: 5 TABLET ORAL at 17:29

## 2025-04-06 RX ADMIN — HYDROCORTISONE 2.5%: 25 CREAM TOPICAL at 21:47

## 2025-04-06 RX ADMIN — Medication 10 ML: at 21:47

## 2025-04-06 RX ADMIN — ASPIRIN 81 MG: 81 TABLET, CHEWABLE ORAL at 08:04

## 2025-04-06 RX ADMIN — NIFEDIPINE 60 MG: 60 TABLET, EXTENDED RELEASE ORAL at 08:04

## 2025-04-06 RX ADMIN — CARVEDILOL 12.5 MG: 12.5 TABLET, FILM COATED ORAL at 08:04

## 2025-04-06 RX ADMIN — MIDODRINE HYDROCHLORIDE 5 MG: 5 TABLET ORAL at 12:11

## 2025-04-06 RX ADMIN — LIDOCAINE 1 PATCH: 4 PATCH TOPICAL at 11:41

## 2025-04-06 RX ADMIN — Medication 10 ML: at 08:06

## 2025-04-06 RX ADMIN — ISOSORBIDE MONONITRATE 30 MG: 30 TABLET, EXTENDED RELEASE ORAL at 08:04

## 2025-04-06 RX ADMIN — INSULIN LISPRO 2 UNITS: 100 INJECTION, SOLUTION INTRAVENOUS; SUBCUTANEOUS at 11:47

## 2025-04-06 RX ADMIN — RIVASTIGMINE 1 PATCH: 4.6 PATCH, EXTENDED RELEASE TRANSDERMAL at 17:31

## 2025-04-06 RX ADMIN — CARVEDILOL 12.5 MG: 12.5 TABLET, FILM COATED ORAL at 17:29

## 2025-04-06 RX ADMIN — APIXABAN 5 MG: 5 TABLET, FILM COATED ORAL at 08:04

## 2025-04-06 RX ADMIN — MIDODRINE HYDROCHLORIDE 5 MG: 5 TABLET ORAL at 08:04

## 2025-04-06 RX ADMIN — HYDROCORTISONE 2.5%: 25 CREAM TOPICAL at 08:05

## 2025-04-06 RX ADMIN — PANTOPRAZOLE SODIUM 40 MG: 40 TABLET, DELAYED RELEASE ORAL at 06:22

## 2025-04-06 NOTE — THERAPY TREATMENT NOTE
Patient Name: Valarie Camara  : 1945    MRN: [id number]                              Today's Date: 2025       Admit Date: 3/20/2025    Visit Dx:     ICD-10-CM ICD-9-CM   1. Acute ischemic stroke  I63.9 434.91   2. History of stroke  Z86.73 V12.54   3. Renal insufficiency  N28.9 593.9   4. Aphasia  R47.01 784.3   5. Dysarthria  R47.1 784.51   6. Oropharyngeal dysphagia  R13.12 787.22   7. 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  I69.30 V12.54   8. Cognitive communication deficit  R41.841 799.52     Patient Active Problem List   Diagnosis    Atopic rhinitis    Arthritis    Chronic neck pain    Anxiety and depression    Edema    Gastroesophageal reflux disease with esophagitis    Multiple-type hyperlipidemia    Vitamin D deficiency    Benign essential hypertension    Obesity (BMI 30-39.9)    History of COPD    History of cataract    History of osteoporosis    History of restless legs syndrome    History of rheumatoid arthritis    Elevated serum creatinine    Esophageal dysphagia    Constipation    Schatzki's ring    Gastritis without bleeding    History of Helicobacter pylori infection    Duodenitis    Right hemiparesis    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    Multiple thyroid nodules    Lacunar stroke    DM (diabetes mellitus), type 2, uncontrolled w/neurologic complication    Syncope and collapse    Positive fecal occult blood test    Left foot pain    Hypomagnesemia    Acute on chronic anemia    Esophageal dysphagia    Reflux esophagitis    Syncope    Irritable bowel syndrome with constipation    Hypertensive urgency    Pulmonary emboli    Encephalopathy     Past Medical History:   Diagnosis Date    Acute bronchitis with bronchospasm     Anxiety     Arthritis     Asthma     B12 deficiency     Back pain     Body piercing     ears    Cataract     Cerebrovascular disease     Cervicalgia     Chronic kidney disease     stage 3b  "   Colonic polyp     History of colonic polyps     Constipation     COPD (chronic obstructive pulmonary disease)     Coronary artery disease     Depression     Diverticulitis     Dysphagia     Patient reported \"it won't go all the way down\" when eating solid foods first thing in the mornings    Edema     Elevated cholesterol     Esophageal reflux     Folic acid deficiency     Full dentures     Advised no adhesives DOS    Heart murmur     Herpes zoster     High cholesterol     History of kidney infection     History of recurrent urinary tract infection     HTN (hypertension)     Hypercholesterolemia     Impaired functional mobility, balance, gait, and endurance     Insomnia     Kidney infection     Malignant hypertension 04/26/2015     Accelerated essential hypertension    Measles     rubeola    Muscle spasm     Neoplasm of uncertain behavior of skin     dtr denies    Osteoporosis     Poor historian     Recurrent urinary tract infection     Rheumatoid arthritis     RLS (restless legs syndrome)     Stomach ulcer     Stroke     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    Type 2 diabetes mellitus     Vitamin D deficiency      Past Surgical History:   Procedure Laterality Date    CATARACT EXTRACTION WITH INTRAOCULAR LENS IMPLANT Left 02/11/2013    CATARACT EXTRACTION WITH INTRAOCULAR LENS IMPLANT Right 04/15/2013    COLONOSCOPY  2012    COLONOSCOPY N/A 11/26/2019    Procedure: COLONOSCOPY;  Surgeon: Chidi Downing MD;  Location: [facility] ENDOSCOPY;  Service: Gastroenterology    ENDOSCOPY N/A 11/13/2017    Procedure: ESOPHAGOGASTRODUODENOSCOPY with biopsies and esophageal balloon dilitation;  Surgeon: Wesley Stovall MD;  Location:  [facility]OSCOPY;  Service:     ENDOSCOPY N/A 11/25/2019    Procedure: ESOPHAGOGASTRODUODENOSCOPY;  Surgeon: Chidi Downing MD;  Location:  [facility];  Service: Gastroenterology    ENDOSCOPY N/A 11/29/2021    Procedure: ESOPHAGOGASTRODUODENOSCOPY with " dilation and biopsies;  Surgeon: Gonzalez Zapata MD;  Location: [facility] ENDOSCOPY;  Service: Gastroenterology;  Laterality: N/A;    ENDOSCOPY N/A 11/1/2023    Procedure: ESOPHAGOGASTRODUODENOSCOPY WITH BIOPSY AND DILATATION;  Surgeon: Gonzalez Zapata MD;  Location: [facility] ENDOSCOPY;  Service: Gastroenterology;  Laterality: N/A;    ENDOSCOPY N/A 1/27/2025    Procedure: Esophagogastroduodenoscopy with dilatation and biopsies;  Surgeon: Gonzalez Zapata MD;  Location: [facility] ENDOSCOPY;  Service: Gastroenterology;  Laterality: N/A;    HYSTERECTOMY  1979    UPPER GASTROINTESTINAL ENDOSCOPY  12/09/2013    UPPER GASTROINTESTINAL ENDOSCOPY  11/13/2017      General Information       Row Name 04/06/25 1116          Physical Therapy Time and Intention    Document Type therapy note (daily note)  -CD     Mode of Treatment physical therapy  -CD       Row Name 04/06/25 1116          General Information    Patient Profile Reviewed yes  -CD     Existing Precautions/Restrictions fall;seizures  -CD     Barriers to Rehab medically complex;previous functional deficit;cognitive status  -CD       Row Name 04/06/25 1116          Cognition    Orientation Status (Cognition) oriented to;person;place  -CD       Row Name 04/06/25 1116          Safety Issues/Impairments Affecting Functional Mobility    Safety Issues Affecting Function (Mobility) insight into deficits/self-awareness;safety precaution awareness;safety precautions follow-through/compliance;sequencing abilities  -CD     Impairments Affecting Function (Mobility) balance;cognition;endurance/activity tolerance;strength;postural/trunk control  -CD     Cognitive Impairments, Mobility Safety/Performance awareness, need for assistance;insight into deficits/self-awareness;safety precaution awareness;safety precaution follow-through;sequencing abilities  -CD     Comment, Safety Issues/Impairments (Mobility) PT REQUIRED MAX ENCOURAGEMENT TO PARTICIPATE IN OOB ACTIVITY. PT  STATED SHE FEARFUL SHE WAS TOO WEAK. ORTHOSTATIC BP STABLE SO ABLE TO PROGRESS MOBILITY.  -CD               User Key  (r) = Recorded By, (t) = Taken By, (c) = Cosigned By      Initials Name Provider Type    CD Gracy Millan, MARY Physical Therapist                   Mobility       Row Name 04/06/25 1118          Bed Mobility    Supine-Sit [place] (Bed Mobility) minimum assist (75% patient effort);verbal cues  -CD     Assistive Device (Bed Mobility) bed rails;head of bed elevated;repositioning sheet  -CD     Comment, (Bed Mobility) CUES TO LOG ROLL AND USE BED RAIL TO ASSIST. MIN ASSIST TO UPRIGHT TRUNK. BP STABLE SUPINE TO SIT.  -CD       Row Name 04/06/25 1118          Transfers    Comment, (Transfers) CUES FOR HAND PLACEMENT. STS FROM EOB VIA B  UE SUPPORT. BP STABLE SIT TO STAND.  -CD       Row Name 04/06/25 1118          Bed-Chair Transfer    Bed-Chair [place] (Transfers) minimum assist (75% patient effort)  -CD     Assistive Device (Bed-Chair Transfers) --  B UE SUPPORT.  -CD       Row Name 04/06/25 1118          Sit-Stand Transfer    Sit-Stand [place] (Transfers) minimum assist (75% patient effort)  -CD     Assistive Device (Sit-Stand Transfers) --  B UE SUPPORT.  -CD       Row Name 04/06/25 1118          Gait/Stairs (Locomotion)    [place] Level (Gait) minimum assist (75% patient effort)  -CD     Assistive Device (Gait) --  B UE SUPPORT  -CD     Distance in Feet (Gait) 12  -CD     Deviations/Abnormal Patterns (Gait) gait speed decreased;tyra decreased;stride length decreased  -CD     Bilateral Gait Deviations forward flexed posture;heel strike decreased  -CD     Comment, (Gait/Stairs) FOLLOWED CLOSELY WITH RECLINER. GAIT DISTANCE LIMITED BY FATIGUE AND WEAKNESS.  -CD               User Key  (r) = Recorded By, (t) = Taken By, (c) = Cosigned By      Initials Name Provider Type    Gracy Nur PT Physical Therapist                   Obj/Interventions       Row Name 04/06/25 1121           Motor Skills    Therapeutic Exercise --  COMPLETED SEATED B LE THER EX: 2 SETS OF 10 REPS EACH: LAQ, HIP FLEX, AP'S  -CD       Row Name 04/06/25 1122          Balance    Static Sitting Balance standby assist  -CD     Dynamic Sitting Balance contact guard  -CD     Position, Sitting Balance unsupported;sitting in chair;sitting edge of bed  -CD     Static Standing Balance contact guard;verbal cues  -CD     Dynamic Standing Balance minimal assist;verbal cues  -CD     Position/Device Used, Standing Balance --  B UE SUPPORT.  -CD     Comment, Balance SAT EOB APPROX 15 MINUTES FOR THER EX, ENCOURAGEMENT AND TO MONITOR FOR DIZZINESS PRIOR TO STANDING. BP STABLE AND ABLE TO INITIATE GAIT X 12 FEET WITH MIN ASSIST VIA B UE SUPPORT FOR BALANCE.  -CD               User Key  (r) = Recorded By, (t) = Taken By, (c) = Cosigned By      Initials Name Provider Type    CD Gracy Millan, PT Physical Therapist                   Goals/Plan    No documentation.                  Clinical Impression       Row Name 04/06/25 1124          Pain    Pretreatment Pain Rating 0/10 - no pain  -CD     Posttreatment Pain Rating 0/10 - no pain  -CD       Row Name 04/06/25 1124          Plan of Care Review    Plan of Care Reviewed With patient  -CD     Outcome Evaluation PT ALERT AND ABLE TO FOLLOW COMMANDS. REQUIRED MAX ENCOURAGEMENT TO PARTICIPATE WITH THERAPY DUE TO FEAR OF BEING TOO WEAK. ORTHOSTATIC BP STABLE. SAT EOB APPROX 15 MINUTES FOR THER EX, ENCOURAGEMENT AND TO MONITOR FOR DIZZINESS PRIOR TO STANDING. BP STABLE AND ABLE TO INITIATE GAIT X 12 FEET WITH MIN ASSIST VIA B UE SUPPORT FOR BALANCE. CONTINUE TO RECOMMEND IRF AT D/C.  -CD       Row Name 04/06/25 1124          Therapy Assessment/Plan (PT)    Patient/Family Therapy Goals Statement (PT) TO GO HOME.  -CD     Rehab Potential (PT) good  -CD     Criteria for Skilled Interventions Met (PT) yes;meets criteria;skilled treatment is necessary  -CD     Therapy Frequency (PT) daily  -CD        Row Name 04/06/25 1124          Vital Signs    Pre Systolic BP Rehab 114  -CD     Pre Treatment Diastolic BP 64  -CD     Intra Systolic BP Rehab 115  -CD     Intra Treatment Diastolic BP 81  -CD     Post Systolic BP Rehab 121  -CD     Post Treatment Diastolic BP 56  -CD     Posttreatment Heart Rate (beats/min) 90  -CD     Pre SpO2 (%) 99  -CD     O2 Delivery Pre Treatment room air  -CD     O2 Delivery Intra Treatment room air  -CD     Post SpO2 (%) 99  -CD     O2 Delivery Post Treatment room air  -CD     Pre Patient Position Supine  -CD     Intra Patient Position Sitting  -CD     Post Patient Position Standing  -CD       Row Name 04/06/25 1124          Positioning and Restraints    Pre-Treatment Position in bed  -CD     Post Treatment Position chair  -CD     In Chair reclined;call light within reach;encouraged to call for assist;exit alarm on;legs elevated;notified nsg;LUE elevated;RUE elevated;waffle cushion;on mechanical lift sling  -CD               User Key  (r) = Recorded By, (t) = Taken By, (c) = Cosigned By      Initials Name Provider Type    CD Gracy Millan, PT Physical Therapist                   Outcome Measures       Row Name 04/06/25 1126 04/06/25 0800       How much help from another person do you currently need...    Turning from your back to your side while in flat bed without using bedrails? 3  -CD 3  -HK    Moving from lying on back to sitting on the side of a flat bed without bedrails? 3  -CD 3  -HK    Moving to and from a bed to a chair (including a wheelchair)? 3  -CD 1  -HK    Standing up from a chair using your arms (e.g., wheelchair, bedside chair)? 3  -CD 1  -HK    Climbing 3-5 steps with a railing? 2  -CD 1  -HK    To walk in hospital room? 3  -CD 1  -HK    AM-PAC 6 Clicks Score (PT) 17  -CD 10  -HK    Highest Level of Mobility Goal 5 --> Static standing  -CD 4 --> Transfer to chair/commode  -HK      Row Name 04/06/25 1126          Modified [place] Scale    Modified [place] Scale 4 -  Moderately severe disability.  Unable to walk without assistance, and unable to attend to own bodily needs without assistance.  -CD               User Key  (r) = Recorded By, (t) = Taken By, (c) = Cosigned By      Initials Name Provider Type    CD Gracy Millan PT Physical Therapist    Angie Joseph RN Registered Nurse                                 Physical Therapy Education       Title: PT OT SLP Therapies (In Progress)       Topic: Physical Therapy (Done)       Point: Mobility training (Done)       Learning Progress Summary            Patient Acceptance, E, VU,NR by CD at 4/6/2025 1127    Comment: SEE FLOWSHEET    Acceptance, E, VU,NR by CD at 4/2/2025 1346    Comment: SEE FLOWSHEET    Acceptance, E, VU,NR by CD at 4/1/2025 1500    Comment: SEE FLOWSHEET    Acceptance, E, NR by CK at 3/27/2025 1545    Acceptance, E, VU,NR by CD at 3/26/2025 1643    Comment: SEE FLOWSHEET    Acceptance, E, VU,NR by CD at 3/23/2025 1742    Comment: BENEFITS OF OOB ACTIVITY, SAFETY WITH MOBILITY, PROGRESSION OF POC, D/C PLANNING,                      Point: Home exercise program (Done)       Learning Progress Summary            Patient Acceptance, E, VU,NR by CD at 4/6/2025 1127    Comment: SEE FLOWSHEET    Acceptance, E, VU,NR by CD at 4/2/2025 1346    Comment: SEE FLOWSHEET    Acceptance, E, VU,NR by CD at 4/1/2025 1500    Comment: SEE FLOWSHEET    Acceptance, E, VU,NR by CD at 3/26/2025 1643    Comment: SEE FLOWSHEET    Acceptance, E, VU,NR by CD at 3/23/2025 1742    Comment: BENEFITS OF OOB ACTIVITY, SAFETY WITH MOBILITY, PROGRESSION OF POC, D/C PLANNING,                      Point: Body mechanics (Done)       Learning Progress Summary            Patient Acceptance, E, VU,NR by CD at 4/6/2025 1127    Comment: SEE FLOWSHEET    Acceptance, E, VU,NR by CD at 4/2/2025 1346    Comment: SEE FLOWSHEET    Acceptance, E, VU,NR by CD at 4/1/2025 1500    Comment: SEE FLOWSHEET    Acceptance, E, NR by CK at 3/27/2025 1545     Acceptance, E, VU,NR by CD at 3/26/2025 1643    Comment: SEE FLOWSHEET    Acceptance, E, VU,NR by CD at 3/23/2025 1742    Comment: BENEFITS OF OOB ACTIVITY, SAFETY WITH MOBILITY, PROGRESSION OF POC, D/C PLANNING,                      Point: Precautions (Done)       Learning Progress Summary            Patient Acceptance, E, VU,NR by CD at 4/6/2025 1127    Comment: SEE FLOWSHEET    Acceptance, E, VU,NR by CD at 4/2/2025 1346    Comment: SEE FLOWSHEET    Acceptance, E, VU,NR by CD at 4/1/2025 1500    Comment: SEE FLOWSHEET    Acceptance, E, NR by CK at 3/27/2025 1545    Acceptance, E, VU,NR by CD at 3/26/2025 1643    Comment: SEE FLOWSHEET    Acceptance, E, VU,NR by CD at 3/23/2025 1742    Comment: BENEFITS OF OOB ACTIVITY, SAFETY WITH MOBILITY, PROGRESSION OF POC, D/C PLANNING,                                      User Key       Initials Effective Dates Name Provider Type Discipline    CD 02/03/23 -  Gracy Millan, PT Physical Therapist PT    CK 02/06/24 -  Gina Nielsen PT Physical Therapist PT                  PT Recommendation and Plan  Planned Therapy Interventions (PT): balance training, bed mobility training, home exercise program, gait training, strengthening, transfer training, postural re-education, patient/family education, neuromuscular re-education  Progress: improving  Outcome Evaluation: PT ALERT AND ABLE TO FOLLOW COMMANDS. REQUIRED MAX ENCOURAGEMENT TO PARTICIPATE WITH THERAPY DUE TO FEAR OF BEING TOO WEAK. ORTHOSTATIC BP STABLE. SAT EOB APPROX 15 MINUTES FOR THER EX, ENCOURAGEMENT AND TO MONITOR FOR DIZZINESS PRIOR TO STANDING. BP STABLE AND ABLE TO INITIATE GAIT X 12 FEET WITH MIN ASSIST VIA B UE SUPPORT FOR BALANCE. CONTINUE TO RECOMMEND IRF AT D/C.     Time Calculation:   PT Evaluation Complexity  History, PT Evaluation Complexity: 3 or more personal factors and/or comorbidities  Examination of Body Systems (PT Eval Complexity): total of 4 or more elements  Clinical Presentation (PT  Evaluation Complexity): evolving  Clinical Decision Making (PT Evaluation Complexity): moderate complexity  Overall Complexity (PT Evaluation Complexity): moderate complexity     PT Charges       Row Name 04/06/25 1127             Time Calculation    Start Time 1000  -CD      PT Received On 04/06/25  -CD         Time Calculation- PT    Total Timed Code Minutes- PT 42 minute(s)  -CD         Timed Charges    82518 - PT Therapeutic Exercise Minutes 9  -CD      01566 - Gait Training Minutes  8  -CD      50144 - PT Therapeutic Activity Minutes 25  -CD         Total Minutes    Timed Charges Total Minutes 42  -CD       Total Minutes 42  -CD                User Key  (r) = Recorded By, (t) = Taken By, (c) = Cosigned By      Initials Name Provider Type    CD Gracy Millan, PT Physical Therapist                  Therapy Charges for Today       Code Description Service Date Service Provider Modifiers Qty    74605740222 HC PT THERAPEUTIC ACT EA 15 MIN 4/6/2025 Gracy Millan, PT GP 2    10796262478 HC PT THER PROC EA 15 MIN 4/6/2025 Gracy Millan, PT GP 1            PT G-Codes  Outcome Measure Options: AM-PAC 6 Clicks Basic Mobility (PT), Modified [place]  AM-PAC 6 Clicks Score (PT): 17  AM-PAC 6 Clicks Score (OT): 14  Modified [place] Scale: 4 - Moderately severe disability.  Unable to walk without assistance, and unable to attend to own bodily needs without assistance.  PT Discharge Summary  Anticipated Discharge Disposition (PT): skilled nursing facility    Gracy Millan, PT  4/6/2025

## 2025-04-06 NOTE — PROGRESS NOTES
[facility] Medicine Services  PROGRESS NOTE    Patient Name: Valarie Camara  : 1945  MRN: [id number]    Date of Admission: 3/20/2025  Primary Care Physician: Lay Cox MD    Subjective   Subjective     CC:  F/u AMS, right-sided weakness    HPI:  No change, no overnight issues reported      Objective   Objective     Vital Signs:   Temp:  [98.2 °F (36.8 °C)-98.7 °F (37.1 °C)] 98.2 °F (36.8 °C)  Heart Rate:  [77-95] 77  Resp:  [16-18] 18  BP: (111-164)/(56-85) 118/73     Physical Exam:  Constitutional: No acute distress, awake, alert  HENT: NCAT, mucous membranes moist  Respiratory: Clear to auscultation bilaterally, respiratory effort normal   Cardiovascular: RRR, systolic murmur present  Gastrointestinal: Positive bowel sounds, soft, nontender, nondistended  Musculoskeletal: No bilateral ankle edema  Psychiatric: Appropriate affect, cooperative  Neurologic: Oriented x 2, MENESES, speech clear  Skin: No rashes    No change in exam from 25          Results Reviewed:  LAB RESULTS:      Lab 25  1150   WBC 6.08   HEMOGLOBIN 11.5*   HEMATOCRIT 35.0   PLATELETS 263   MCV 88.6         Lab 25  0525 25  1150   SODIUM 138 139   POTASSIUM 4.0 4.2   CHLORIDE 109* 110*   CO2 20.0* 20.0*   ANION GAP 9.0 9.0   BUN 28* 33*   CREATININE 1.34* 1.68*   EGFR 40.2* 30.6*   GLUCOSE 134* 292*   CALCIUM 9.0 8.5*   MAGNESIUM  --  1.8                         Brief Urine Lab Results  (Last result in the past 365 days)        Color   Clarity   Blood   Leuk Est   Nitrite   Protein   CREAT   Urine HCG        25 1129 Yellow   Cloudy   Negative   Negative   Negative   100 mg/dL (2+)                   Microbiology Results Abnormal       Procedure Component Value - Date/Time    Urine Culture - Urine, Straight Cath [[id number]]  (Abnormal) Collected: 25    Lab Status: Final result Specimen: Urine from Straight Cath Updated: 25     Urine Culture  25,000 CFU/mL Lactobacillus species     Comment:   Based on laboratory diagnosis criteria, these organisms are common urogenital commensal lilly and have not been associated with urinary tract infections; clinical correlation is recommended.       Narrative:      Colonization of the urinary tract without infection is common. Treatment is discouraged unless the patient is symptomatic, pregnant, or undergoing an invasive urologic procedure.            No radiology results from the last 24 hrs    Results for orders placed during the hospital encounter of 03/20/25    Adult Transthoracic Echo Complete W/ Cont if Necessary Per Protocol (With Agitated Saline)    Interpretation Summary    Left ventricular systolic function is normal. Calculated left ventricular EF = 67% Left ventricular ejection fraction appears to be 66 - 70%.    Left ventricular wall thickness is consistent with mild concentric hypertrophy.    Left ventricular outflow tract peak flow gradient at rest is 11 mmHg. Left ventricular outflow tract peak gradient with valsalva is 29 mmHg.    Left ventricular diastolic function is consistent with (grade I) impaired relaxation.    The mitral valve peak gradient is 9 mmHg. The mitral valve mean gradient is 4 mmHg.    Calculated right ventricular systolic pressure from tricuspid regurgitation is 21 mmHg.    There is a trivial pericardial effusion.      Current medications:  Scheduled Meds:apixaban, 5 mg, Oral, Q12H  aspirin, 81 mg, Oral, Daily   Or  aspirin, 300 mg, Rectal, Daily  atorvastatin, 80 mg, Oral, Nightly  carvedilol, 12.5 mg, Oral, BID With Meals  Hydrocortisone (Perianal), , Rectal, BID  insulin lispro, 2-7 Units, Subcutaneous, TID With Meals  isosorbide mononitrate, 30 mg, Oral, Q24H  Lidocaine, 1 patch, Transdermal, Q24H  midodrine, 5 mg, Oral, TID AC  NIFEdipine XL, 60 mg, Oral, Q24H  pantoprazole, 40 mg, Oral, Q AM  rivastigmine, 1 patch, Transdermal, Daily  sodium chloride, 10 mL, Intravenous,  Q12H      Continuous Infusions:   PRN Meds:.  acetaminophen    acetaminophen    senna-docusate sodium **AND** polyethylene glycol **AND** bisacodyl **AND** bisacodyl    Calcium Replacement - Follow Nurse / BPA Driven Protocol    dextrose    dextrose    diphenhydrAMINE    glucagon (human recombinant)    hydrALAZINE    Magnesium Standard Dose Replacement - Follow Nurse / BPA Driven Protocol    nitroglycerin    Phosphorus Replacement - Follow Nurse / BPA Driven Protocol    Potassium Replacement - Follow Nurse / BPA Driven Protocol    sodium chloride    sodium chloride    Assessment & Plan   Assessment & Plan     Active Hospital Problems    Diagnosis  POA    **Encephalopathy [G93.40]  Unknown    Pulmonary emboli [I26.99]  Yes    Gastroesophageal reflux disease with esophagitis [K21.00]  Yes    Anxiety and depression [F41.9, F32.A]  Yes    Multiple-type hyperlipidemia [E78.2]  Yes    Benign essential hypertension [I10]  Yes      Resolved Hospital Problems    Diagnosis Date Resolved POA    CVA (cerebral vascular accident) [I63.9] 03/22/2025 Yes        Brief Hospital Course to date:  Valarie Camara is a 79 y.o. female  with hx of CAD, PAD, left ICA disease (50-69% stenosis), CKD3, HTN, mild dementia, PE (on Eliquis), and DM2 who presented with confusion, aphasia, and some right-sided weakness. Patient seen by Neuro-Stroke team. However, stroke workup was negative and they suspected stroke recrudescence in setting of infection/metabolic derangements. UA was concerning for UTI. After admission, patient had significant HTN. Cardene gtt initiated. General Neurology was consulted for ongoing encephalopathy. EEG negative. Felt to be secondary to medications which were discontinued, with subsequent improvement. She is now stable and awaiting rehab placement.      This patient's problems and plans were partially entered by my partner and updated as appropriate by me 04/06/25.       Encephalopathy, resolved  Aphasia &  right-sided weakness, resolved   Left ICA stenosis (50-69%), & multifocal atherosclerotic disease  Mild dementia  Hx previous left thalamic CVA  - Neuro-Stroke followed, suspected stroke recrudescence in setting of infection/metabolic derangement  - TSH WNL, initial UA benign, however repeat UA showed infection (see below)  - MRI brain 3/22/25 negative  - EEG 3/22/25 negative  - CTA head/neck 3/20/25 negative for flow-limiting stenosis or LVO, 70% stenosis of left ICA  - ECHO 3/21/25: EF 66-70%, diastolic dysfunction  - BLE duplex negative  - 3/24/25 more confused and less interactive, General Neurology was consulted: repeat EEG 3/25/25 was negative  - Per discussion with daughter, patient has become lethargic in past with Trazodone and Seroquel - also lethargic this admission after Geodon administration: AVOID these meds  - Neuro recommended rivastigmine patch 4.9 mg daily  - Per Neuro-Stroke team: continue ASA, statin, anticoagulation  - Follow up in [facility] 8-12 weeks  - SLP following, [facility] ground/ thin liquids  - PT/OT recommending SNF rehab, recently home from [facility]     UTI  - UA 3/22/25 with 4+ bacteria, 6-10 WBCs, large yeast  - Initiated on Rocephin  - Urine culture with lactobacillus species, Rocephin discontinued     Orthostatic hypotension  Weakness  - Pt has had + orthostasis recently with history of labile pressures, and HTN urgency   - Echo 3/21 with EF 66-70%  - s/p bolus   - Midodrine increased to 5mg TID with significant improvement. Baseline BP and orthostats acceptable in last 48 hrs  - okay to let BP run higher give pt's age, goal < 160/90  - will need heart and valve clinic follow up; has seen Dr Low prior     HTN  - s/p Cardene drip  - BP stable at rest to slightly elevated. Continue nifedipine, Imdur, carvedilol at reduced dose  - midodrine for orthostasis     Candidiasis  - Patient recently on Rocephin  - Diflucan 100 mg daily x 7 days completed, deferred Nystatin given  dysphagia  - QTc 399     Diet-controlled DM2  - HbA1c 6.9 on 3/4/25  - SSI      Hx PE 2/28/25 at [facility]  - continue eliquis     KURT on CKD 3 (baseline Cr ~1.8-1.9)  - resolved, now better than baseline 1.3      Expected Discharge Location and Transportation: [facility], EMS once orthostasis improved  Expected Discharge   Expected Discharge Date: 4/7/2025; Expected Discharge Time:      VTE Prophylaxis:  Pharmacologic & mechanical VTE prophylaxis orders are present.         AM-PAC 6 Clicks Score (PT): 17 (04/06/25 1126)    CODE STATUS:   Code Status and Medical Interventions: CPR (Attempt to Resuscitate); Full Support   Ordered at: 03/20/25 2663     Code Status (Patient has no pulse and is not breathing):    CPR (Attempt to Resuscitate)     Medical Interventions (Patient has pulse or is breathing):    Full Support       aKtie Pacheco, APRN  04/06/25

## 2025-04-06 NOTE — PLAN OF CARE
Goal Outcome Evaluation:  Plan of Care Reviewed With: patient        Progress: improving  Outcome Evaluation: PT ALERT AND ABLE TO FOLLOW COMMANDS. REQUIRED MAX ENCOURAGEMENT TO PARTICIPATE WITH THERAPY DUE TO FEAR OF BEING TOO WEAK. ORTHOSTATIC BP STABLE. SAT EOB APPROX 15 MINUTES FOR THER EX, ENCOURAGEMENT AND TO MONITOR FOR DIZZINESS PRIOR TO STANDING. BP STABLE AND ABLE TO INITIATE GAIT X 12 FEET WITH MIN ASSIST VIA B UE SUPPORT FOR BALANCE. CONTINUE TO RECOMMEND IRF AT D/C.    Anticipated Discharge Disposition (PT): skilled nursing facility

## 2025-04-07 ENCOUNTER — APPOINTMENT (OUTPATIENT)
Dept: OTHER | Facility: HOSPITAL | Age: 80
DRG: 092 | End: 2025-04-07
Payer: MEDICARE

## 2025-04-07 VITALS
TEMPERATURE: 98.4 F | HEIGHT: 63 IN | SYSTOLIC BLOOD PRESSURE: 105 MMHG | WEIGHT: 146.8 LBS | OXYGEN SATURATION: 97 % | DIASTOLIC BLOOD PRESSURE: 59 MMHG | RESPIRATION RATE: 16 BRPM | HEART RATE: 85 BPM | BODY MASS INDEX: 26.01 KG/M2

## 2025-04-07 PROBLEM — G93.40 ENCEPHALOPATHY: Status: RESOLVED | Noted: 2025-03-22 | Resolved: 2025-04-07

## 2025-04-07 LAB
GLUCOSE BLDC GLUCOMTR-MCNC: 156 MG/DL (ref 70–130)
GLUCOSE BLDC GLUCOMTR-MCNC: 159 MG/DL (ref 70–130)

## 2025-04-07 PROCEDURE — 82948 REAGENT STRIP/BLOOD GLUCOSE: CPT

## 2025-04-07 PROCEDURE — 99239 HOSP IP/OBS DSCHRG MGMT >30: CPT | Performed by: STUDENT IN AN ORGANIZED HEALTH CARE EDUCATION/TRAINING PROGRAM

## 2025-04-07 PROCEDURE — 63710000001 INSULIN LISPRO (HUMAN) PER 5 UNITS: Performed by: INTERNAL MEDICINE

## 2025-04-07 RX ORDER — RIVASTIGMINE 4.6 MG/24H
1 PATCH, EXTENDED RELEASE TRANSDERMAL DAILY
Qty: 30 PATCH | Refills: 0 | Status: SHIPPED | OUTPATIENT
Start: 2025-04-07

## 2025-04-07 RX ORDER — LIDOCAINE 4 G/G
1 PATCH TOPICAL
Qty: 30 PATCH | Refills: 0 | Status: SHIPPED | OUTPATIENT
Start: 2025-04-08

## 2025-04-07 RX ORDER — ISOSORBIDE MONONITRATE 30 MG/1
30 TABLET, EXTENDED RELEASE ORAL
Qty: 90 TABLET | Refills: 0 | Status: SHIPPED | OUTPATIENT
Start: 2025-04-08

## 2025-04-07 RX ORDER — CARVEDILOL 12.5 MG/1
12.5 TABLET ORAL 2 TIMES DAILY WITH MEALS
Qty: 180 TABLET | Refills: 0 | Status: SHIPPED | OUTPATIENT
Start: 2025-04-07

## 2025-04-07 RX ORDER — ASPIRIN 81 MG/1
81 TABLET, CHEWABLE ORAL DAILY
Qty: 90 TABLET | Refills: 0 | Status: SHIPPED | OUTPATIENT
Start: 2025-04-08

## 2025-04-07 RX ORDER — MIDODRINE HYDROCHLORIDE 5 MG/1
5 TABLET ORAL
Qty: 90 TABLET | Refills: 0 | Status: SHIPPED | OUTPATIENT
Start: 2025-04-07

## 2025-04-07 RX ORDER — HYDROCORTISONE 25 MG/G
CREAM TOPICAL 2 TIMES DAILY
Qty: 28 G | Refills: 0 | Status: SHIPPED | OUTPATIENT
Start: 2025-04-07 | End: 2025-04-14

## 2025-04-07 RX ADMIN — LIDOCAINE 1 PATCH: 4 PATCH TOPICAL at 09:12

## 2025-04-07 RX ADMIN — NIFEDIPINE 60 MG: 60 TABLET, EXTENDED RELEASE ORAL at 09:09

## 2025-04-07 RX ADMIN — HYDROCORTISONE 2.5%: 25 CREAM TOPICAL at 09:12

## 2025-04-07 RX ADMIN — Medication 10 ML: at 09:12

## 2025-04-07 RX ADMIN — MIDODRINE HYDROCHLORIDE 5 MG: 5 TABLET ORAL at 09:10

## 2025-04-07 RX ADMIN — INSULIN LISPRO 2 UNITS: 100 INJECTION, SOLUTION INTRAVENOUS; SUBCUTANEOUS at 09:11

## 2025-04-07 RX ADMIN — ASPIRIN 81 MG: 81 TABLET, CHEWABLE ORAL at 09:10

## 2025-04-07 RX ADMIN — CARVEDILOL 12.5 MG: 12.5 TABLET, FILM COATED ORAL at 09:08

## 2025-04-07 RX ADMIN — APIXABAN 5 MG: 5 TABLET, FILM COATED ORAL at 09:10

## 2025-04-07 NOTE — CASE MANAGEMENT/SOCIAL WORK
Case Management Discharge Note      Final Note: patient has a bed at [facility]. I verified her bed with admissions at [facility]. I have discussed this with the patient and her daughter. Both are in agreement.  EMS will pick the patient up in the room today at 1300. PCS faxed. [facility] is the facility pharmacy. I have updated Epic. RN will need to call report to [phone]. They will pull the discharge summary out of Epic when it is available. IMM emailed to patient's daughter, Cleo, at [email or web address]. Copy of IMM at bedside. Patient and daughter denied questions or concerns.         Selected Continued Care - Admitted Since 3/20/2025       Destination Coordination complete.      Service Provider Services Address Phone Fax Patient Preferred    [facility] [address] [facility] [address] [phone] --              Durable Medical Equipment    No services have been selected for the patient.                Dialysis/Infusion    No services have been selected for the patient.                Home Medical Care    No services have been selected for the patient.                Therapy    No services have been selected for the patient.                Community Resources    No services have been selected for the patient.                Community & DME    No services have been selected for the patient.                    Selected Continued Care - Prior Encounters Includes continued care and service providers with selected services from prior encounters from 12/20/2024 to 4/7/2025      Discharged on 3/4/2025 Admission date: 2/28/2025 - Discharge disposition: Skilled Nursing Facility (DC - External)      Destination       Service Provider Services Address Phone Fax Patient Preferred    [facility] [address] [facility] [address] [phone] --                      Discharged on  1/6/2025 Admission date: 1/5/2025 - Discharge disposition: Home-Health Care Svc      Durable Medical Equipment       Service Provider Services Address Phone Fax Patient Preferred    [facility] [address] [place] [address] [phone] --       Internal Comment last updated by Atif Zelaya, RN 1/7/2025 0934    [employer] to deliver hospital bed to [facility] at [address] [place] [address], where pt is staying.                         Home Medical Care       Service Provider Services Address Phone Fax Patient Preferred     [facility], [facility], Home Nursing [address] [facility] [address] [phone] --              Community Resources       Service Provider Services Address Phone Fax Patient Preferred    [facility] ONLY FOOD B[facility] [employer] [facility] [employer] [address] [place] [address] [phone] -- --                          Transportation Services  Ambulance: [facility]    Final Discharge Disposition Code: 03 - skilled nursing facility (SNF)

## 2025-04-07 NOTE — DISCHARGE SUMMARY
[facility] Medicine Services  DISCHARGE SUMMARY    Patient Name: Valarie Camara  : 1945  MRN: [id number]    Date of Admission: 3/20/2025  3:59 PM  Date of Discharge:  2025  Primary Care Physician: Lay Cox MD    Consults       Date and Time Order Name Status Description    3/24/2025  2:02 PM Inpatient Neurology Consult General Completed     3/20/2025  4:04 PM Inpatient Neurology Consult Stroke Completed             Hospital Course     Presenting Problem: AMS    Active Hospital Problems    Diagnosis  POA    Pulmonary emboli [I26.99]  Yes    Gastroesophageal reflux disease with esophagitis [K21.00]  Yes    Anxiety and depression [F41.9, F32.A]  Yes    Multiple-type hyperlipidemia [E78.2]  Yes    Benign essential hypertension [I10]  Yes      Resolved Hospital Problems    Diagnosis Date Resolved POA    **Encephalopathy [G93.40] 2025 Unknown    CVA (cerebral vascular accident) [I63.9] 2025 Yes          Hospital Course:  Valarie Camara is a 79 y.o. female  with hx of CAD, PAD, left ICA disease (50-69% stenosis), CKD3, HTN, mild dementia, PE (on Eliquis), and DM2 who presented with confusion, aphasia, and some right-sided weakness. Patient seen by Neuro-Stroke team. However, stroke workup was negative and they suspected stroke recrudescence in setting of infection/metabolic derangements. UA was concerning for UTI. After admission, patient had significant HTN. Cardene gtt initiated. General Neurology was consulted for ongoing encephalopathy. EEG negative. Felt to be secondary to medications which were discontinued, with subsequent improvement. She is now stable.     Encephalopathy, resolved  Aphasia & right-sided weakness, resolved   Left ICA stenosis (50-69%), & multifocal atherosclerotic disease  Mild dementia  Hx previous left thalamic CVA  - Neuro-Stroke followed, suspected stroke recrudescence in setting of infection/metabolic  derangement  - TSH WNL, initial UA benign, however repeat UA showed infection (see below)  - MRI brain 3/22/25 negative  - EEG 3/22/25 negative  - CTA head/neck 3/20/25 negative for flow-limiting stenosis or LVO, 70% stenosis of left ICA  - ECHO 3/21/25: EF 66-70%, diastolic dysfunction  - BLE duplex negative  - 3/24/25 more confused and less interactive, General Neurology was consulted: repeat EEG 3/25/25 was negative  - Per discussion with daughter, patient has become lethargic in past with Trazodone and Seroquel - also lethargic this admission after Geodon administration: AVOID these meds  - Neuro recommended rivastigmine patch 4.9 mg daily  - Per Neuro-Stroke team: continue ASA, statin, anticoagulation  - Follow up in [facility] 8-12 weeks  - SLP following, [facility] ground/ thin liquids  - PT/OT recommending SNF rehab, recently home from [facility]     UTI  - UA 3/22/25 with 4+ bacteria, 6-10 WBCs, large yeast  - Initiated on Rocephin  - Urine culture with lactobacillus species, Rocephin discontinued     Orthostatic hypotension  Weakness  - Pt has had + orthostasis recently with history of labile pressures, and HTN urgency   - Echo 3/21 with EF 66-70%  - s/p bolus this admission  - Midodrine increased to 5mg TID with significant improvement. Baseline BP and orthostats acceptable in last 48 hrs  - okay to let BP run higher give pt's age, goal < 160/90  - will need heart and valve clinic follow up; has seen Dr Low prior      HTN  - s/p Cardene drip  - BP stable at rest to slightly elevated. Continue nifedipine, Imdur, carvedilol at reduced dose  - midodrine for orthostasis     Candidiasis  - Patient recently on Rocephin  - Diflucan 100 mg daily x 7 days completed, deferred Nystatin given dysphagia  - QTc monitored     Diet-controlled DM2  - HbA1c 6.9 on 3/4/25  - SSI      Hx PE 2/28/25 at [facility]  - continue eliquis     KURT on CKD 3 (baseline Cr ~1.8-1.9)  - resolved, now better than baseline        Discharge Follow Up Recommendations for outpatient labs/diagnostics:   PCP, neuro, heart and valve clinic    Day of Discharge     HPI:   No new overnight issues, resting comfortably    Review of Systems  Gen- No fevers, chills  CV- No chest pain, palpitations  Resp- No cough, dyspnea  GI- No N/V/D, abd pain      Vital Signs:   Temp:  [97.6 °F (36.4 °C)-98.6 °F (37 °C)] 98.4 °F (36.9 °C)  Heart Rate:  [70-86] 85  Resp:  [16-18] 16  BP: (105-178)/(59-90) 105/59      Physical Exam:  Constitutional: No acute distress, awake, alert  HENT: NCAT, mucous membranes moist  Respiratory: Clear to auscultation bilaterally, respiratory effort normal   Cardiovascular: RRR, systolic murmur present  Gastrointestinal: Positive bowel sounds, soft, nontender, nondistended  Musculoskeletal: No bilateral ankle edema  Psychiatric: Appropriate affect, cooperative  Neurologic: Oriented x 2, MENESES, speech clear  Skin: No rashes       Pertinent  and/or Most Recent Results     LAB RESULTS:      Lab 04/02/25  1150   WBC 6.08   HEMOGLOBIN 11.5*   HEMATOCRIT 35.0   PLATELETS 263   MCV 88.6         Lab 04/03/25  0525 04/02/25  1150   SODIUM 138 139   POTASSIUM 4.0 4.2   CHLORIDE 109* 110*   CO2 20.0* 20.0*   ANION GAP 9.0 9.0   BUN 28* 33*   CREATININE 1.34* 1.68*   EGFR 40.2* 30.6*   GLUCOSE 134* 292*   CALCIUM 9.0 8.5*   MAGNESIUM  --  1.8                         Brief Urine Lab Results  (Last result in the past 365 days)        Color   Clarity   Blood   Leuk Est   Nitrite   Protein   CREAT   Urine HCG        03/22/25 1129 Yellow   Cloudy   Negative   Negative   Negative   100 mg/dL (2+)                 Microbiology Results (last 10 days)       ** No results found for the last 240 hours. **            No radiology results for the last 10 days    Results for orders placed during the hospital encounter of 03/20/25    Duplex Venous Lower Extremity - Bilateral CAR    Interpretation Summary    Normal bilateral lower extremity venous duplex  scan.      Results for orders placed during the hospital encounter of 03/20/25    Duplex Venous Lower Extremity - Bilateral CAR    Interpretation Summary    Normal bilateral lower extremity venous duplex scan.      Results for orders placed during the hospital encounter of 03/20/25    Adult Transthoracic Echo Complete W/ Cont if Necessary Per Protocol (With Agitated Saline)    Interpretation Summary    Left ventricular systolic function is normal. Calculated left ventricular EF = 67% Left ventricular ejection fraction appears to be 66 - 70%.    Left ventricular wall thickness is consistent with mild concentric hypertrophy.    Left ventricular outflow tract peak flow gradient at rest is 11 mmHg. Left ventricular outflow tract peak gradient with valsalva is 29 mmHg.    Left ventricular diastolic function is consistent with (grade I) impaired relaxation.    The mitral valve peak gradient is 9 mmHg. The mitral valve mean gradient is 4 mmHg.    Calculated right ventricular systolic pressure from tricuspid regurgitation is 21 mmHg.    There is a trivial pericardial effusion.      Plan for Follow-up of Pending Labs/Results: no pending results    Discharge Details        Discharge Medications        New Medications        Instructions Start Date   aspirin 81 MG chewable tablet   81 mg, Oral, Daily   Start Date: April 8, 2025     Hydrocortisone (Perianal) 2.5 % rectal cream  Commonly known as: ANUSOL-HC   Rectal, 2 Times Daily      isosorbide mononitrate 30 MG 24 hr tablet  Commonly known as: IMDUR   30 mg, Oral, Every 24 Hours Scheduled   Start Date: April 8, 2025     Lidocaine 4 %   1 patch, Transdermal, Every 24 Hours Scheduled, Remove & Discard patch within 12 hours or as directed by MD   Start Date: April 8, 2025     midodrine 5 MG tablet  Commonly known as: PROAMATINE   5 mg, Oral, 3 Times Daily Before Meals      rivastigmine 4.6 MG/24HR patch  Commonly known as: EXELON   1 patch, Transdermal, Daily             Changes  to Medications        Instructions Start Date   carvedilol 12.5 MG tablet  Commonly known as: COREG  What changed:   medication strength  how much to take   12.5 mg, Oral, 2 Times Daily With Meals      NIFEdipine CC 60 MG 24 hr tablet  Commonly known as: ADALAT CC  What changed:   medication strength  how much to take  when to take this   60 mg, Oral, Every 24 Hours Scheduled   Start Date: April 8, 2025            Continue These Medications        Instructions Start Date   acetaminophen 325 MG tablet  Commonly known as: TYLENOL   325 mg, Every 6 Hours PRN      apixaban 5 MG tablet tablet  Commonly known as: ELIQUIS   Take 2 tablets by mouth Every 12 (Twelve) Hours for 4 days, THEN 1 tablet Every 12 (Twelve) Hours for 30 days. Indications: DVT/PE (active thrombosis)   Start Date: March 4, 2025     atorvastatin 80 MG tablet  Commonly known as: LIPITOR   80 mg, Oral, Daily      CHOLECALCIFEROL PO   5,000 Int'l Units/day, Daily      ferrous sulfate 325 (65 FE) MG tablet   1 tablet, 3 Times Weekly      glucose blood test strip   1 each, Other, As Needed, Check sugar once a day      glucose monitor monitoring kit   1 each, Not Applicable, Daily      insulin lispro 100 UNIT/ML injection  Commonly known as: humaLOG   2 Units, 3 Times Daily PRN      Insulin Pen Needle 31G X 5 MM misc   Inject insulin three times daily      Easy Touch Pen Needles 31G X 8 MM misc  Generic drug: Insulin Pen Needle   Indications: Diabetes      Lancets 30G misc   Check sugar daily      pantoprazole 40 MG EC tablet  Commonly known as: PROTONIX   40 mg, Daily             Stop These Medications      cloNIDine 0.3 MG/24HR patch  Commonly known as: CATAPRES-TTS     isosorbide dinitrate 20 MG tablet  Commonly known as: ISORDIL     traZODone 50 MG tablet  Commonly known as: DESYREL              Allergies   Allergen Reactions    Penicillins Rash, Shortness Of Breath, Anaphylaxis and Other (See Comments)    Clonidine Derivatives Other (See Comments)      Pt reports extreme hypotension      Hydralazine Nausea And Vomiting and Other (See Comments)    Sulfa Antibiotics Rash         Discharge Disposition:  Rehab Facility or Unit (DC - External)rehab    Diet:  Hospital:  Diet Order   Procedures    Diet: Cardiac, Diabetic; Healthy Heart (2-3 Na+); Consistent Carbohydrate; Feeding Assistance - Nursing, No Straw, No Mixed Consistencies; Texture: Mechanical Ground (NDD 2); Fluid Consistency: Thin (IDDSI 0)            Activity:  as tolerated    Restrictions or Other Recommendations:  none       CODE STATUS:    Code Status and Medical Interventions: CPR (Attempt to Resuscitate); Full Support   Ordered at: 03/20/25 1737     Code Status (Patient has no pulse and is not breathing):    CPR (Attempt to Resuscitate)     Medical Interventions (Patient has pulse or is breathing):    Full Support       Future Appointments   Date Time Provider Department Center   4/7/2025  1:00 PM MED [facility]   3/2/2026  1:30 PM Vidal Low MD [facility]       Additional Instructions for the Follow-ups that You Need to Schedule       Ambulatory Referral to Neurology   As directed      8 to 12 weeks    Order Comments: 8 to 12 weeks                       Magalis Raya MD  04/07/25      Time Spent on Discharge:  I spent  45  minutes on this discharge activity which included: face-to-face encounter with the patient, reviewing the data in the system, coordination of the care with the nursing staff as well as consultants, documentation, and entering orders.

## 2025-04-17 ENCOUNTER — NURSING HOME (OUTPATIENT)
Age: 80
End: 2025-04-17
Payer: MEDICARE

## 2025-04-17 VITALS
DIASTOLIC BLOOD PRESSURE: 83 MMHG | SYSTOLIC BLOOD PRESSURE: 95 MMHG | WEIGHT: 147.9 LBS | HEART RATE: 83 BPM | OXYGEN SATURATION: 95 % | RESPIRATION RATE: 18 BRPM | TEMPERATURE: 98 F | BODY MASS INDEX: 26.21 KG/M2 | HEIGHT: 63 IN

## 2025-04-17 DIAGNOSIS — R53.81 PHYSICAL DECONDITIONING: ICD-10-CM

## 2025-04-17 DIAGNOSIS — I69.90 LATE EFFECTS OF CVA (CEREBROVASCULAR ACCIDENT): ICD-10-CM

## 2025-04-17 DIAGNOSIS — E11.65 TYPE 2 DIABETES MELLITUS WITH HYPERGLYCEMIA, WITH LONG-TERM CURRENT USE OF INSULIN: Chronic | ICD-10-CM

## 2025-04-17 DIAGNOSIS — Z79.4 TYPE 2 DIABETES MELLITUS WITH HYPERGLYCEMIA, WITH LONG-TERM CURRENT USE OF INSULIN: Chronic | ICD-10-CM

## 2025-04-17 DIAGNOSIS — Z78.9 IMPAIRED MOBILITY AND ADLS: Primary | ICD-10-CM

## 2025-04-17 DIAGNOSIS — R54 AGE-RELATED PHYSICAL DEBILITY: ICD-10-CM

## 2025-04-17 DIAGNOSIS — E78.5 DYSLIPIDEMIA: Chronic | ICD-10-CM

## 2025-04-17 DIAGNOSIS — K21.9 GASTROESOPHAGEAL REFLUX DISEASE WITHOUT ESOPHAGITIS: Chronic | ICD-10-CM

## 2025-04-17 DIAGNOSIS — I10 BENIGN ESSENTIAL HYPERTENSION: Chronic | ICD-10-CM

## 2025-04-17 DIAGNOSIS — Z74.09 IMPAIRED MOBILITY AND ADLS: Primary | ICD-10-CM

## 2025-04-17 DIAGNOSIS — G81.91 RIGHT HEMIPARESIS: ICD-10-CM

## 2025-04-17 DIAGNOSIS — N39.0 COMPLICATED UTI (URINARY TRACT INFECTION): ICD-10-CM

## 2025-04-17 DIAGNOSIS — F01.A0 MILD VASCULAR DEMENTIA WITHOUT BEHAVIORAL DISTURBANCE, PSYCHOTIC DISTURBANCE, MOOD DISTURBANCE, OR ANXIETY: Chronic | ICD-10-CM

## 2025-04-17 DIAGNOSIS — K58.1 IRRITABLE BOWEL SYNDROME WITH CONSTIPATION: ICD-10-CM

## 2025-04-17 NOTE — LETTER
Nursing Home History/Physical        Corbin Herrera DO [x]   [facility] []  [address] [place] [address]  Phone: [phone]  Fax: [phone] Dony Toussaint MD []  Riccardo Song DO []  [address] [place] [address]  Phone: [phone]  Fax: [phone]     PATIENT NAME: Valarie Camara                                                                          YOB: 1945           DATE OF SERVICE: 04/17/2025  FACILITY:  []  [facility]  []  [place]   []  [facility]   [] [facility]    [x] [place] [facility] ______________________________________________________________________     CHIEF COMPLAINT:        HISTORY OF PRESENT ILLNESS:      [x]  Initial visit for coordination of rehabilitative care issues and chronic medical management needs.    I have reviewed labs/imaging/records from this hospitalization, including ER staff and admitting/attending physicians H/P's and progress notes to establish a comprehensive understanding of this patient's clinical hospital course, as well as to establish a transition of care appropriately.    Date of Admission: 3/20/2025  3:59 PM  Date of Discharge:  4/7/2025  Primary Care Physician: Lay Cox MD     Consults         Date and Time Order Name Status Description     3/24/2025  2:02 PM Inpatient Neurology Consult General Completed       3/20/2025  4:04 PM Inpatient Neurology Consult Stroke Completed                  Hospital Course      Presenting Problem: AMS           Active Hospital Problems     Diagnosis   POA   • Pulmonary emboli [I26.99]   Yes   • Gastroesophageal reflux disease with esophagitis [K21.00]   Yes   • Anxiety and depression [F41.9, F32.A]   Yes   • Multiple-type hyperlipidemia [E78.2]   Yes   • Benign essential hypertension [I10]   Yes       Resolved Hospital Problems     Diagnosis Date Resolved POA   • **Encephalopathy [G93.40] 04/07/2025 Unknown   • CVA (cerebral vascular accident) [I63.9]  03/22/2025 Yes            Hospital Course:  Valarie Camara is a 79 y.o. female  with hx of CAD, PAD, left ICA disease (50-69% stenosis), CKD3, HTN, mild dementia, PE (on Eliquis), and DM2 who presented with confusion, aphasia, and some right-sided weakness. Patient seen by Neuro-Stroke team. However, stroke workup was negative and they suspected stroke recrudescence in setting of infection/metabolic derangements. UA was concerning for UTI. After admission, patient had significant HTN. Cardene gtt initiated. General Neurology was consulted for ongoing encephalopathy. EEG negative. Felt to be secondary to medications which were discontinued, with subsequent improvement. She is now stable.     Encephalopathy, resolved  Aphasia & right-sided weakness, resolved   Left ICA stenosis (50-69%), & multifocal atherosclerotic disease  Mild dementia  Hx previous left thalamic CVA  - Neuro-Stroke followed, suspected stroke recrudescence in setting of infection/metabolic derangement  - TSH WNL, initial UA benign, however repeat UA showed infection (see below)  - MRI brain 3/22/25 negative  - EEG 3/22/25 negative  - CTA head/neck 3/20/25 negative for flow-limiting stenosis or LVO, 70% stenosis of left ICA  - ECHO 3/21/25: EF 66-70%, diastolic dysfunction  - BLE duplex negative  - 3/24/25 more confused and less interactive, General Neurology was consulted: repeat EEG 3/25/25 was negative  - Per discussion with daughter, patient has become lethargic in past with Trazodone and Seroquel - also lethargic this admission after Geodon administration: AVOID these meds  - Neuro recommended rivastigmine patch 4.9 mg daily  - Per Neuro-Stroke team: continue ASA, statin, anticoagulation  - Follow up in [facility] 8-12 weeks  - SLP following, [place] ground/ thin liquids  - PT/OT recommending SNF rehab, recently home [facility]     UTI  - UA 3/22/25 with 4+ bacteria, 6-10 WBCs, large yeast  - Initiated on Rocephin  - Urine  "culture with lactobacillus species, Rocephin discontinued     Orthostatic hypotension  Weakness  - Pt has had + orthostasis recently with history of labile pressures, and HTN urgency   - Echo 3/21 with EF 66-70%  - s/p bolus this admission  - Midodrine increased to 5mg TID with significant improvement. Baseline BP and orthostats acceptable in last 48 hrs  - okay to let BP run higher give pt's age, goal < 160/90  - will need heart and valve clinic follow up; has seen Dr Low prior      HTN  - s/p Cardene drip  - BP stable at rest to slightly elevated. Continue nifedipine, Imdur, carvedilol at reduced dose  - midodrine for orthostasis     Candidiasis  - Patient recently on Rocephin  - Diflucan 100 mg daily x 7 days completed, deferred Nystatin given dysphagia  - QTc monitored     Diet-controlled DM2  - HbA1c 6.9 on 3/4/25  - SSI      Hx PE 2/28/25 at [facility]  - continue eliquis     KURT on CKD 3 (baseline Cr ~1.8-1.9)  - resolved, now better than baseline         Discharge Follow Up Recommendations for outpatient labs/diagnostics:   PCP, neuro, heart and valve clinic    PAST MEDICAL & SURGICAL HISTORY:   Past Medical History:   Diagnosis Date   • Acute bronchitis with bronchospasm    • Anxiety    • Arthritis    • Asthma    • B12 deficiency    • Back pain    • Body piercing     ears   • Cataract    • Cerebrovascular disease    • Cervicalgia    • Chronic kidney disease     stage 3b   • Colonic polyp     History of colonic polyps    • Constipation    • COPD (chronic obstructive pulmonary disease)    • Coronary artery disease    • Depression    • Diverticulitis    • Dysphagia     Patient reported \"it won't go all the way down\" when eating solid foods first thing in the mornings   • Edema    • Elevated cholesterol    • Esophageal reflux    • Folic acid deficiency    • Full dentures     Advised no adhesives DOS   • Heart murmur    • Herpes zoster    • High cholesterol    • History of kidney infection    • History of " recurrent urinary tract infection    • HTN (hypertension)    • Hypercholesterolemia    • Impaired functional mobility, balance, gait, and endurance    • Insomnia    • Kidney infection    • Malignant hypertension 04/26/2015     Accelerated essential hypertension   • Measles     rubeola   • Muscle spasm    • Neoplasm of uncertain behavior of skin     dtr denies   • Osteoporosis    • Poor historian    • Recurrent urinary tract infection    • Rheumatoid arthritis    • RLS (restless legs syndrome)    • Stomach ulcer    • Stroke     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   • Type 2 diabetes mellitus    • Vitamin D deficiency       Past Surgical History:   Procedure Laterality Date   • CATARACT EXTRACTION WITH INTRAOCULAR LENS IMPLANT Left 02/11/2013   • CATARACT EXTRACTION WITH INTRAOCULAR LENS IMPLANT Right 04/15/2013   • COLONOSCOPY  2012   • COLONOSCOPY N/A 11/26/2019    Procedure: COLONOSCOPY;  Surgeon: Chidi Downing MD;  Location: [facility] ENDOSCOPY;  Service: Gastroenterology   • ENDOSCOPY N/A 11/13/2017    Procedure: ESOPHAGOGASTRODUODENOSCOPY with biopsies and esophageal balloon dilitation;  Surgeon: Wesley Stovall MD;  Location: [facility] ENDOSCOPY;  Service:    • ENDOSCOPY N/A 11/25/2019    Procedure: ESOPHAGOGASTRODUODENOSCOPY;  Surgeon: Chidi Downing MD;  Location: [facility] ENDOSCOPY;  Service: Gastroenterology   • ENDOSCOPY N/A 11/29/2021    Procedure: ESOPHAGOGASTRODUODENOSCOPY with dilation and biopsies;  Surgeon: Gonzalez Zapata MD;  Location: [facility] ENDOSCOPY;  Service: Gastroenterology;  Laterality: N/A;   • ENDOSCOPY N/A 11/1/2023    Procedure: ESOPHAGOGASTRODUODENOSCOPY WITH BIOPSY AND DILATATION;  Surgeon: Gonzalez Zapata MD;  Location: [facility] ENDOSCOPY;  Service: Gastroenterology;  Laterality: N/A;   • ENDOSCOPY N/A 1/27/2025    Procedure: Esophagogastroduodenoscopy with dilatation and biopsies;  Surgeon: Gonzalez Zapata MD;  Location: [facility]  ENDOSCOPY;  Service: Gastroenterology;  Laterality: N/A;   • HYSTERECTOMY  1979   • UPPER GASTROINTESTINAL ENDOSCOPY  2013   • UPPER GASTROINTESTINAL ENDOSCOPY  2017         MEDICATIONS:  I have reviewed and reconciled the patients medication list in the patients chart at the [facility] today.      ALLERGIES:    Allergies   Allergen Reactions   • Penicillins Rash, Shortness Of Breath, Anaphylaxis and Other (See Comments)   • Clonidine Derivatives Other (See Comments)     Pt reports extreme hypotension     • Hydralazine Nausea And Vomiting and Other (See Comments)   • Sulfa Antibiotics Rash         SOCIAL HISTORY:    Social History     Socioeconomic History   • Marital status:    Tobacco Use   • Smoking status: Former     Current packs/day: 0.00     Average packs/day: 1 pack/day for 15.0 years (15.0 ttl pk-yrs)     Types: Cigarettes     Start date: 1997     Quit date:      Years since quittin.3     Passive exposure: Past   • Smokeless tobacco: Never   • Tobacco comments:      Denied: History of smoking cigarettes   Vaping Use   • Vaping status: Never Used   Substance and Sexual Activity   • Alcohol use: Not Currently   • Drug use: Never   • Sexual activity: Defer       FAMILY HISTORY:    Family History   Problem Relation Age of Onset   • Arthritis Mother    • Diabetes Mother    • Hypertension Mother    • Arthritis Other    • Arthritis Other    • Diabetes Other         DM   • Hypertension Other    • Colon cancer Neg Hx        REVIEW OF SYSTEMS:    Review of Systems  Appetite: Fair []   Good [x]   Poor []   Weight Loss []  [x]  Weight Stable   Unavoidable Weight Loss []  Tolerating Tube Feeding []    Supplements Provided []   Constitutional: Negative for fever, chills, diaphoresis; improving malaise/fatigue  HENT: No dysphagia; no changes to vision/hearing/smell/taste; no epistaxis  Eyes: Negative for redness and visual disturbance.   Respiratory: Negative for shortness of  breath, chest pain, cough or chest tightness.   Cardiovascular: Negative for chest pain and palpitations.   Gastrointestinal: Negative for abdominal distention, abdominal pain and blood in stool.   Endocrine: Negative for cold intolerance and heat intolerance.   Genitourinary: Negative for difficulty urinating, dysuria and frequency.Negative for hematuria   Musculoskeletal: Chronic myalgias and arthralgias  Integumentary: No open wounds, rash or concerning skin lesions  Neurological: Negative for syncope; generalized weakness improving.  Chronic neuromuscular deficits of prior CVA  Hematological: Negative for adenopathy. Does not bruise/bleed easily.   Immunological: Negative for reported allergies or immunological disorders  Psychological: No acute behavioral changes    PHYSICAL EXAMINATION:   VITAL SIGNS:   Vitals:    04/17/25 0932   BP: 95/83   Pulse: 83   Resp: 18   Temp: 98 °F (36.7 °C)   SpO2: 95%         Physical Exam  General Appearance:  [x]  Alert   [x]  Oriented x person  [x]  No acute distress     []  Confused  []  Disoriented   []  Comatose   Head:  Atraumatic and normocephalic, without obvious abnormality.   Eyes:         PERRLA, conjunctivae and sclerae normal, no Icterus. No pallor. Extra-occular movements are within normal limits.   Ears:  Ears appear intact with no abnormalities noted.   Throat: No oral lesions, no thrush, oral mucosa moist.   Neck: Supple, trachea midline, no thyromegaly, no carotid bruit.   Back:   No kyphoscoliosis. No tenderness to palpation.   Lungs:   Chest shape is normal. Breath sounds heard bilaterally equally.  No wheezing.  Audible air exchange noted all lung fields.   Heart:  Normal S1 and S2, no murmur, no gallop, no rub. No JVD.   Abdomen:   Normal bowel sounds, no masses, no organomegaly. Soft, non-tender, non-distended, no guarding.  No CVA tenderness.   Extremities: Moves all extremities; without edema, cyanosis or clubbing.  Frail build.  Poor core  strength/stability.   Pulses: Pulses palpable and equal bilaterally.   Skin: No bleeding or rash.  Generalized dry skin noted.  Age-related atrophy of skin.   Neurologic: [x] Normal speech []  Normal mental status    [x] Cranial nerves II through XII intact   [x]  No anosmia [x]  DTR 2+ [x]  Proprioception intact  [x] chronic age related focal motor/sensory deficits      Psych/Mood:                    [x]  No acute changes []  Depressed      Urinary:      [x]  Continent  []  Incontinent  []  Retention  []  F/C     []  UTI w/treatment in progress      ASSESSMENT     Diagnoses and all orders for this visit:    1. Impaired mobility and ADLs (Primary)    2. Late effects of CVA (cerebrovascular accident)    3. Complicated UTI (urinary tract infection)    4. Physical deconditioning    5. Age-related physical debility    6. Right hemiparesis    7. Gastroesophageal reflux disease without esophagitis    8. Irritable bowel syndrome with constipation    9. Type 2 diabetes mellitus with hyperglycemia, with long-term current use of insulin    10. Benign essential hypertension    11. Dyslipidemia    12. Mild vascular dementia without behavioral disturbance, psychotic disturbance, mood disturbance, or anxiety        PLAN  Planned utilization of skilled nursing facility services to aid in mobilization/transfers, assist any ADLs of need.  Fall precautions in place given her neuromuscular deficits of past CVA, advanced age and physical deconditioning.    Continue to follow nutritional/hydration status, no deficits reported at this time.  Avoid prolonged fasting periods as best able.  Maintain appropriate hydration status.  Continue blood glucose monitoring.  Changes to her diabetic treatment regimen can be made when needed to maximize her blood glucose control and minimize potential hypoglycemic episodes.    Vital signs demonstrate hemodynamic stability, blood pressure well-controlled.  Mean arterial pressure appropriate.   Demonstrates no findings of unstable angina.    Continue dietary/to modifications to aid in symptom management of chronic GERD, as well as aspiration precautions given her past history of dysphagia.  Continue PPI therapy.    No new neurological deficits noted.  Continue PT/OT.    Surveillance labs as per routine/need.      [x]  Discussed Patient in detail with nursing/staff, addressed all needs today.     [x]  Plan of Care Reviewed   [x]  PT/OT Reviewed   []  Order Changes  [x]  Discharge Plans Reviewed  []  Code Status Change     I spent 60 minutes caring for Valarie on this date of service. This time includes time spent by me in the following activities:preparing for the visit, reviewing tests, obtaining and/or reviewing a separately obtained history, performing a medically appropriate examination and/or evaluation , counseling and educating the patient/family/caregiver, ordering medications, tests, or procedures, documenting information in the medical record, and care coordination    I confirm accuracy of unchanged data/findings which have been carried forward from previous visit, as well as I have updated appropriately those that have changed.      Corbin Herrera   04/17/2025

## 2025-04-24 PROBLEM — R55 SYNCOPE AND COLLAPSE: Status: RESOLVED | Noted: 2019-11-23 | Resolved: 2025-04-24

## 2025-04-24 PROBLEM — IMO0002 DM (DIABETES MELLITUS), TYPE 2, UNCONTROLLED W/NEUROLOGIC COMPLICATION: Status: RESOLVED | Noted: 2018-03-04 | Resolved: 2025-04-24

## 2025-04-24 PROBLEM — Z74.09 IMPAIRED MOBILITY AND ADLS: Status: ACTIVE | Noted: 2025-04-24

## 2025-04-24 PROBLEM — R54 AGE-RELATED PHYSICAL DEBILITY: Status: ACTIVE | Noted: 2025-04-24

## 2025-04-24 PROBLEM — R53.81 PHYSICAL DECONDITIONING: Status: ACTIVE | Noted: 2025-04-24

## 2025-04-24 PROBLEM — Z79.4 TYPE 2 DIABETES MELLITUS WITH HYPERGLYCEMIA, WITH LONG-TERM CURRENT USE OF INSULIN: Chronic | Status: ACTIVE | Noted: 2025-04-24

## 2025-04-24 PROBLEM — N39.0 COMPLICATED UTI (URINARY TRACT INFECTION): Status: ACTIVE | Noted: 2025-04-24

## 2025-04-24 PROBLEM — R55 SYNCOPE: Status: RESOLVED | Noted: 2021-05-20 | Resolved: 2025-04-24

## 2025-04-24 PROBLEM — Z78.9 IMPAIRED MOBILITY AND ADLS: Status: ACTIVE | Noted: 2025-04-24

## 2025-04-24 PROBLEM — I69.90 LATE EFFECTS OF CVA (CEREBROVASCULAR ACCIDENT): Status: ACTIVE | Noted: 2025-04-24

## 2025-04-24 PROBLEM — R13.19 ESOPHAGEAL DYSPHAGIA: Status: RESOLVED | Noted: 2019-11-23 | Resolved: 2025-04-24

## 2025-04-24 PROBLEM — F01.A0 MILD VASCULAR DEMENTIA WITHOUT BEHAVIORAL DISTURBANCE, PSYCHOTIC DISTURBANCE, MOOD DISTURBANCE, OR ANXIETY: Chronic | Status: ACTIVE | Noted: 2025-04-24

## 2025-04-24 PROBLEM — E11.65 TYPE 2 DIABETES MELLITUS WITH HYPERGLYCEMIA, WITH LONG-TERM CURRENT USE OF INSULIN: Chronic | Status: ACTIVE | Noted: 2025-04-24

## 2025-04-24 NOTE — PROGRESS NOTES
Nursing Home History/Physical        Corbin Herrera DO [x]   [facility] []  [address] [place] [address]  Phone: [phone]  Fax: [phone] Dony Toussaint MD []  Riccardo Song DO []  [address] [place] [address]  Phone: [phone]  Fax: [phone]     PATIENT NAME: Valarie Camara                                                                          YOB: 1945           DATE OF SERVICE: 04/17/2025  FACILITY:  []  [facility]  []  [facility]   []  [facility]   [] [facility]    [x] [place] ______________________________________________________________________     CHIEF COMPLAINT:        HISTORY OF PRESENT ILLNESS:      [x]  Initial visit for coordination of rehabilitative care issues and chronic medical management needs.    I have reviewed labs/imaging/records from this hospitalization, including ER staff and admitting/attending physicians H/P's and progress notes to establish a comprehensive understanding of this patient's clinical hospital course, as well as to establish a transition of care appropriately.    Date of Admission: 3/20/2025  3:59 PM  Date of Discharge:  4/7/2025  Primary Care Physician: Lay Cox MD     Consults         Date and Time Order Name Status Description     3/24/2025  2:02 PM Inpatient Neurology Consult General Completed       3/20/2025  4:04 PM Inpatient Neurology Consult Stroke Completed                  Hospital Course      Presenting Problem: AMS           Active Hospital Problems     Diagnosis   POA    Pulmonary emboli [I26.99]   Yes    Gastroesophageal reflux disease with esophagitis [K21.00]   Yes    Anxiety and depression [F41.9, F32.A]   Yes    Multiple-type hyperlipidemia [E78.2]   Yes    Benign essential hypertension [I10]   Yes       Resolved Hospital Problems     Diagnosis Date Resolved POA    **Encephalopathy [G93.40] 04/07/2025 Unknown    CVA (cerebral vascular accident) [I63.9] 03/22/2025  Yes            Hospital Course:  Valarie Camara is a 79 y.o. female  with hx of CAD, PAD, left ICA disease (50-69% stenosis), CKD3, HTN, mild dementia, PE (on Eliquis), and DM2 who presented with confusion, aphasia, and some right-sided weakness. Patient seen by Neuro-Stroke team. However, stroke workup was negative and they suspected stroke recrudescence in setting of infection/metabolic derangements. UA was concerning for UTI. After admission, patient had significant HTN. Cardene gtt initiated. General Neurology was consulted for ongoing encephalopathy. EEG negative. Felt to be secondary to medications which were discontinued, with subsequent improvement. She is now stable.     Encephalopathy, resolved  Aphasia & right-sided weakness, resolved   Left ICA stenosis (50-69%), & multifocal atherosclerotic disease  Mild dementia  Hx previous left thalamic CVA  - Neuro-Stroke followed, suspected stroke recrudescence in setting of infection/metabolic derangement  - TSH WNL, initial UA benign, however repeat UA showed infection (see below)  - MRI brain 3/22/25 negative  - EEG 3/22/25 negative  - CTA head/neck 3/20/25 negative for flow-limiting stenosis or LVO, 70% stenosis of left ICA  - ECHO 3/21/25: EF 66-70%, diastolic dysfunction  - BLE duplex negative  - 3/24/25 more confused and less interactive, General Neurology was consulted: repeat EEG 3/25/25 was negative  - Per discussion with daughter, patient has become lethargic in past with Trazodone and Seroquel - also lethargic this admission after Geodon administration: AVOID these meds  - Neuro recommended rivastigmine patch 4.9 mg daily  - Per Neuro-Stroke team: continue ASA, statin, anticoagulation  - Follow up in [facility] 8-12 weeks  - SLP following, [facility] ground/ thin liquids  - PT/OT recommending SNF rehab, recently home from [facility]     UTI  - UA 3/22/25 with 4+ bacteria, 6-10 WBCs, large yeast  - Initiated on Rocephin  - Urine culture with  "lactobacillus species, Rocephin discontinued     Orthostatic hypotension  Weakness  - Pt has had + orthostasis recently with history of labile pressures, and HTN urgency   - Echo 3/21 with EF 66-70%  - s/p bolus this admission  - Midodrine increased to 5mg TID with significant improvement. Baseline BP and orthostats acceptable in last 48 hrs  - okay to let BP run higher give pt's age, goal < 160/90  - will need heart and valve clinic follow up; has seen Dr Low prior      HTN  - s/p Cardene drip  - BP stable at rest to slightly elevated. Continue nifedipine, Imdur, carvedilol at reduced dose  - midodrine for orthostasis     Candidiasis  - Patient recently on Rocephin  - Diflucan 100 mg daily x 7 days completed, deferred Nystatin given dysphagia  - QTc monitored     Diet-controlled DM2  - HbA1c 6.9 on 3/4/25  - SSI      Hx PE 2/28/25 at [facility]  - continue eliquis     KURT on CKD 3 (baseline Cr ~1.8-1.9)  - resolved, now better than baseline         Discharge Follow Up Recommendations for outpatient labs/diagnostics:   PCP, neuro, heart and valve clinic    PAST MEDICAL & SURGICAL HISTORY:   Past Medical History:   Diagnosis Date    Acute bronchitis with bronchospasm     Anxiety     Arthritis     Asthma     B12 deficiency     Back pain     Body piercing     ears    Cataract     Cerebrovascular disease     Cervicalgia     Chronic kidney disease     stage 3b    Colonic polyp     History of colonic polyps     Constipation     COPD (chronic obstructive pulmonary disease)     Coronary artery disease     Depression     Diverticulitis     Dysphagia     Patient reported \"it won't go all the way down\" when eating solid foods first thing in the mornings    Edema     Elevated cholesterol     Esophageal reflux     Folic acid deficiency     Full dentures     Advised no adhesives DOS    Heart murmur     Herpes zoster     High cholesterol     History of kidney infection     History of recurrent urinary tract infection     HTN " (hypertension)     Hypercholesterolemia     Impaired functional mobility, balance, gait, and endurance     Insomnia     Kidney infection     Malignant hypertension 04/26/2015     Accelerated essential hypertension    Measles     rubeola    Muscle spasm     Neoplasm of uncertain behavior of skin     dtr denies    Osteoporosis     Poor historian     Recurrent urinary tract infection     Rheumatoid arthritis     RLS (restless legs syndrome)     Stomach ulcer     Stroke     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    Type 2 diabetes mellitus     Vitamin D deficiency       Past Surgical History:   Procedure Laterality Date    CATARACT EXTRACTION WITH INTRAOCULAR LENS IMPLANT Left 02/11/2013    CATARACT EXTRACTION WITH INTRAOCULAR LENS IMPLANT Right 04/15/2013    COLONOSCOPY  2012    COLONOSCOPY N/A 11/26/2019    Procedure: COLONOSCOPY;  Surgeon: Chidi Downing MD;  Location:  [facility];  Service: Gastroenterology    ENDOSCOPY N/A 11/13/2017    Procedure: ESOPHAGOGASTRODUODENOSCOPY with biopsies and esophageal balloon dilitation;  Surgeon: Wesley Stovall MD;  Location: [facility] END[facility]COPY;  Service:     ENDOSCOPY N/A 11/25/2019    Procedure: ESOPHAGOGASTRODUODENOSCOPY;  Surgeon: Chidi Downing MD;  Location:  [facility];  Service: Gastroenterology    ENDOSCOPY N/A 11/29/2021    Procedure: ESOPHAGOGASTRODUODENOSCOPY with dilation and biopsies;  Surgeon: Gonzalez Zapata MD;  Location: [facility] ENDOSCOPY;  Service: Gastroenterology;  Laterality: N/A;    ENDOSCOPY N/A 11/1/2023    Procedure: ESOPHAGOGASTRODUODENOSCOPY WITH BIOPSY AND DILATATION;  Surgeon: Gonzalez Zapata MD;  Location: [facility] ENDOSCOPY;  Service: Gastroenterology;  Laterality: N/A;    ENDOSCOPY N/A 1/27/2025    Procedure: Esophagogastroduodenoscopy with dilatation and biopsies;  Surgeon: Gonzalez Zapata MD;  Location: [facility] ENDOSCOPY;  Service: Gastroenterology;  Laterality: N/A;     HYSTERECTOMY  1979    UPPER GASTROINTESTINAL ENDOSCOPY  2013    UPPER GASTROINTESTINAL ENDOSCOPY  2017         MEDICATIONS:  I have reviewed and reconciled the patients medication list in the patients chart at the [facility] nursing facility today.      ALLERGIES:    Allergies   Allergen Reactions    Penicillins Rash, Shortness Of Breath, Anaphylaxis and Other (See Comments)    Clonidine Derivatives Other (See Comments)     Pt reports extreme hypotension      Hydralazine Nausea And Vomiting and Other (See Comments)    Sulfa Antibiotics Rash         SOCIAL HISTORY:    Social History     Socioeconomic History    Marital status:    Tobacco Use    Smoking status: Former     Current packs/day: 0.00     Average packs/day: 1 pack/day for 15.0 years (15.0 ttl pk-yrs)     Types: Cigarettes     Start date: 1997     Quit date:      Years since quittin.3     Passive exposure: Past    Smokeless tobacco: Never    Tobacco comments:      Denied: History of smoking cigarettes   Vaping Use    Vaping status: Never Used   Substance and Sexual Activity    Alcohol use: Not Currently    Drug use: Never    Sexual activity: Defer       FAMILY HISTORY:    Family History   Problem Relation Age of Onset    Arthritis Mother     Diabetes Mother     Hypertension Mother     Arthritis Other     Arthritis Other     Diabetes Other         DM    Hypertension Other     Colon cancer Neg Hx        REVIEW OF SYSTEMS:    Review of Systems  Appetite: Fair []   Good [x]   Poor []   Weight Loss []  [x]  Weight Stable   Unavoidable Weight Loss []  Tolerating Tube Feeding []    Supplements Provided []   Constitutional: Negative for fever, chills, diaphoresis; improving malaise/fatigue  HENT: No dysphagia; no changes to vision/hearing/smell/taste; no epistaxis  Eyes: Negative for redness and visual disturbance.   Respiratory: Negative for shortness of breath, chest pain, cough or chest tightness.   Cardiovascular: Negative for chest  pain and palpitations.   Gastrointestinal: Negative for abdominal distention, abdominal pain and blood in stool.   Endocrine: Negative for cold intolerance and heat intolerance.   Genitourinary: Negative for difficulty urinating, dysuria and frequency.Negative for hematuria   Musculoskeletal: Chronic myalgias and arthralgias  Integumentary: No open wounds, rash or concerning skin lesions  Neurological: Negative for syncope; generalized weakness improving.  Chronic neuromuscular deficits of prior CVA  Hematological: Negative for adenopathy. Does not bruise/bleed easily.   Immunological: Negative for reported allergies or immunological disorders  Psychological: No acute behavioral changes    PHYSICAL EXAMINATION:   VITAL SIGNS:   Vitals:    04/17/25 0932   BP: 95/83   Pulse: 83   Resp: 18   Temp: 98 °F (36.7 °C)   SpO2: 95%         Physical Exam  General Appearance:  [x]  Alert   [x]  Oriented x person  [x]  No acute distress     []  Confused  []  Disoriented   []  Comatose   Head:  Atraumatic and normocephalic, without obvious abnormality.   Eyes:         PERRLA, conjunctivae and sclerae normal, no Icterus. No pallor. Extra-occular movements are within normal limits.   Ears:  Ears appear intact with no abnormalities noted.   Throat: No oral lesions, no thrush, oral mucosa moist.   Neck: Supple, trachea midline, no thyromegaly, no carotid bruit.   Back:   No kyphoscoliosis. No tenderness to palpation.   Lungs:   Chest shape is normal. Breath sounds heard bilaterally equally.  No wheezing.  Audible air exchange noted all lung fields.   Heart:  Normal S1 and S2, no murmur, no gallop, no rub. No JVD.   Abdomen:   Normal bowel sounds, no masses, no organomegaly. Soft, non-tender, non-distended, no guarding.  No CVA tenderness.   Extremities: Moves all extremities; without edema, cyanosis or clubbing.  Frail build.  Poor core strength/stability.   Pulses: Pulses palpable and equal bilaterally.   Skin: No bleeding or rash.   Generalized dry skin noted.  Age-related atrophy of skin.   Neurologic: [x] Normal speech []  Normal mental status    [x] Cranial nerves II through XII intact   [x]  No anosmia [x]  DTR 2+ [x]  Proprioception intact  [x] chronic age related focal motor/sensory deficits      Psych/Mood:                    [x]  No acute changes []  Depressed      Urinary:      [x]  Continent  []  Incontinent  []  Retention  []  F/C     []  UTI w/treatment in progress      ASSESSMENT     Diagnoses and all orders for this visit:    1. Impaired mobility and ADLs (Primary)    2. Late effects of CVA (cerebrovascular accident)    3. Complicated UTI (urinary tract infection)    4. Physical deconditioning    5. Age-related physical debility    6. Right hemiparesis    7. Gastroesophageal reflux disease without esophagitis    8. Irritable bowel syndrome with constipation    9. Type 2 diabetes mellitus with hyperglycemia, with long-term current use of insulin    10. Benign essential hypertension    11. Dyslipidemia    12. Mild vascular dementia without behavioral disturbance, psychotic disturbance, mood disturbance, or anxiety        PLAN  Planned utilization of skilled nursing facility services to aid in mobilization/transfers, assist any ADLs of need.  Fall precautions in place given her neuromuscular deficits of past CVA, advanced age and physical deconditioning.    Continue to follow nutritional/hydration status, no deficits reported at this time.  Avoid prolonged fasting periods as best able.  Maintain appropriate hydration status.  Continue blood glucose monitoring.  Changes to her diabetic treatment regimen can be made when needed to maximize her blood glucose control and minimize potential hypoglycemic episodes.    Vital signs demonstrate hemodynamic stability, blood pressure well-controlled.  Mean arterial pressure appropriate.  Demonstrates no findings of unstable angina.    Continue dietary/to modifications to aid in symptom  management of chronic GERD, as well as aspiration precautions given her past history of dysphagia.  Continue PPI therapy.    No new neurological deficits noted.  Continue PT/OT.    Surveillance labs as per routine/need.      [x]  Discussed Patient in detail with nursing/staff, addressed all needs today.     [x]  Plan of Care Reviewed   [x]  PT/OT Reviewed   []  Order Changes  [x]  Discharge Plans Reviewed  []  Code Status Change     I spent 60 minutes caring for Valarie on this date of service. This time includes time spent by me in the following activities:preparing for the visit, reviewing tests, obtaining and/or reviewing a separately obtained history, performing a medically appropriate examination and/or evaluation , counseling and educating the patient/family/caregiver, ordering medications, tests, or procedures, documenting information in the medical record, and care coordination    I confirm accuracy of unchanged data/findings which have been carried forward from previous visit, as well as I have updated appropriately those that have changed.      Corbin Herrera   04/17/2025

## 2025-05-10 ENCOUNTER — APPOINTMENT (OUTPATIENT)
Dept: CT IMAGING | Facility: HOSPITAL | Age: 80
DRG: 871 | End: 2025-05-10
Payer: MEDICARE

## 2025-05-10 ENCOUNTER — HOSPITAL ENCOUNTER (INPATIENT)
Facility: HOSPITAL | Age: 80
LOS: 12 days | Discharge: HOSPICE | DRG: 871 | End: 2025-05-23
Attending: EMERGENCY MEDICINE | Admitting: FAMILY MEDICINE
Payer: MEDICARE

## 2025-05-10 ENCOUNTER — APPOINTMENT (OUTPATIENT)
Dept: GENERAL RADIOLOGY | Facility: HOSPITAL | Age: 80
DRG: 871 | End: 2025-05-10
Payer: MEDICARE

## 2025-05-10 DIAGNOSIS — I48.91 ATRIAL FIBRILLATION WITH RAPID VENTRICULAR RESPONSE: ICD-10-CM

## 2025-05-10 DIAGNOSIS — Z51.5 HOSPICE CARE: ICD-10-CM

## 2025-05-10 DIAGNOSIS — E05.90 HYPERTHYROIDISM: ICD-10-CM

## 2025-05-10 DIAGNOSIS — E87.0 HYPERNATREMIA: Primary | ICD-10-CM

## 2025-05-10 DIAGNOSIS — N17.9 AKI (ACUTE KIDNEY INJURY): ICD-10-CM

## 2025-05-10 PROBLEM — I48.0 PAF (PAROXYSMAL ATRIAL FIBRILLATION): Status: ACTIVE | Noted: 2025-05-10

## 2025-05-10 PROBLEM — N18.31 ACUTE RENAL FAILURE SUPERIMPOSED ON STAGE 3A CHRONIC KIDNEY DISEASE: Status: ACTIVE | Noted: 2025-05-10

## 2025-05-10 LAB
ALBUMIN SERPL-MCNC: 3.1 G/DL (ref 3.5–5.2)
ALBUMIN/GLOB SERPL: 1 G/DL
ALP SERPL-CCNC: 57 U/L (ref 39–117)
ALT SERPL W P-5'-P-CCNC: 7 U/L (ref 1–33)
AMORPH URATE CRY URNS QL MICRO: ABNORMAL /HPF
AMPHET+METHAMPHET UR QL: NEGATIVE
AMPHETAMINES UR QL: NEGATIVE
ANION GAP SERPL CALCULATED.3IONS-SCNC: 17.2 MMOL/L (ref 5–15)
ANION GAP SERPL CALCULATED.3IONS-SCNC: 19.5 MMOL/L (ref 5–15)
ANION GAP SERPL CALCULATED.3IONS-SCNC: 20.4 MMOL/L (ref 5–15)
APTT PPP: 30.3 SECONDS (ref 70–100)
APTT PPP: >200 SECONDS (ref 70–100)
AST SERPL-CCNC: 10 U/L (ref 1–32)
BACTERIA UR QL AUTO: ABNORMAL /HPF
BARBITURATES UR QL SCN: NEGATIVE
BASOPHILS # BLD AUTO: 0.03 10*3/MM3 (ref 0–0.2)
BASOPHILS NFR BLD AUTO: 0.2 % (ref 0–1.5)
BENZODIAZ UR QL SCN: NEGATIVE
BILIRUB SERPL-MCNC: 0.3 MG/DL (ref 0–1.2)
BILIRUB UR QL STRIP: NEGATIVE
BUN SERPL-MCNC: 106 MG/DL (ref 8–23)
BUN SERPL-MCNC: 108 MG/DL (ref 8–23)
BUN SERPL-MCNC: 127 MG/DL (ref 8–23)
BUN/CREAT SERPL: 31.8 (ref 7–25)
BUN/CREAT SERPL: 32.1 (ref 7–25)
BUN/CREAT SERPL: 33.6 (ref 7–25)
BUPRENORPHINE SERPL-MCNC: NEGATIVE NG/ML
CALCIUM SPEC-SCNC: 8.7 MG/DL (ref 8.6–10.5)
CALCIUM SPEC-SCNC: 9.1 MG/DL (ref 8.6–10.5)
CALCIUM SPEC-SCNC: 9.8 MG/DL (ref 8.6–10.5)
CANNABINOIDS SERPL QL: NEGATIVE
CHLORIDE SERPL-SCNC: 127 MMOL/L (ref 98–107)
CHLORIDE SERPL-SCNC: 128 MMOL/L (ref 98–107)
CHLORIDE SERPL-SCNC: 130 MMOL/L (ref 98–107)
CLARITY UR: ABNORMAL
CO2 SERPL-SCNC: 17.6 MMOL/L (ref 22–29)
CO2 SERPL-SCNC: 18.5 MMOL/L (ref 22–29)
CO2 SERPL-SCNC: 18.8 MMOL/L (ref 22–29)
COCAINE UR QL: NEGATIVE
COLOR UR: YELLOW
CORTIS SERPL-MCNC: 46.8 MCG/DL
CREAT SERPL-MCNC: 3.21 MG/DL (ref 0.57–1)
CREAT SERPL-MCNC: 3.33 MG/DL (ref 0.57–1)
CREAT SERPL-MCNC: 3.96 MG/DL (ref 0.57–1)
D-LACTATE SERPL-SCNC: 0.9 MMOL/L (ref 0.5–2)
DEPRECATED RDW RBC AUTO: 54.6 FL (ref 37–54)
EGFRCR SERPLBLD CKD-EPI 2021: 10.9 ML/MIN/1.73
EGFRCR SERPLBLD CKD-EPI 2021: 13.5 ML/MIN/1.73
EGFRCR SERPLBLD CKD-EPI 2021: 14.1 ML/MIN/1.73
EOSINOPHIL # BLD AUTO: 0.03 10*3/MM3 (ref 0–0.4)
EOSINOPHIL NFR BLD AUTO: 0.2 % (ref 0.3–6.2)
ERYTHROCYTE [DISTWIDTH] IN BLOOD BY AUTOMATED COUNT: 15.2 % (ref 12.3–15.4)
ETHANOL BLD-MCNC: <10 MG/DL (ref 0–10)
ETHANOL UR QL: <0.01 %
FENTANYL UR-MCNC: NEGATIVE NG/ML
GEN 5 1HR TROPONIN T REFLEX: 141 NG/L
GLOBULIN UR ELPH-MCNC: 3.2 GM/DL
GLUCOSE BLDC GLUCOMTR-MCNC: 129 MG/DL (ref 70–130)
GLUCOSE SERPL-MCNC: 152 MG/DL (ref 65–99)
GLUCOSE SERPL-MCNC: 162 MG/DL (ref 65–99)
GLUCOSE SERPL-MCNC: 170 MG/DL (ref 65–99)
GLUCOSE UR STRIP-MCNC: NEGATIVE MG/DL
HCT VFR BLD AUTO: 42.2 % (ref 34–46.6)
HGB BLD-MCNC: 12.6 G/DL (ref 12–15.9)
HGB UR QL STRIP.AUTO: ABNORMAL
HOLD SPECIMEN: NORMAL
HOLD SPECIMEN: NORMAL
HYALINE CASTS UR QL AUTO: ABNORMAL /LPF
IMM GRANULOCYTES # BLD AUTO: 0.07 10*3/MM3 (ref 0–0.05)
IMM GRANULOCYTES NFR BLD AUTO: 0.5 % (ref 0–0.5)
INR PPP: 1.25 (ref 0.9–1.1)
KETONES UR QL STRIP: ABNORMAL
LEUKOCYTE ESTERASE UR QL STRIP.AUTO: ABNORMAL
LYMPHOCYTES # BLD AUTO: 2.13 10*3/MM3 (ref 0.7–3.1)
LYMPHOCYTES NFR BLD AUTO: 16.3 % (ref 19.6–45.3)
MAGNESIUM SERPL-MCNC: 3 MG/DL (ref 1.6–2.4)
MCH RBC QN AUTO: 29.2 PG (ref 26.6–33)
MCHC RBC AUTO-ENTMCNC: 29.9 G/DL (ref 31.5–35.7)
MCV RBC AUTO: 97.7 FL (ref 79–97)
METHADONE UR QL SCN: NEGATIVE
MONOCYTES # BLD AUTO: 0.69 10*3/MM3 (ref 0.1–0.9)
MONOCYTES NFR BLD AUTO: 5.3 % (ref 5–12)
NEUTROPHILS NFR BLD AUTO: 10.13 10*3/MM3 (ref 1.7–7)
NEUTROPHILS NFR BLD AUTO: 77.5 % (ref 42.7–76)
NITRITE UR QL STRIP: POSITIVE
NRBC BLD AUTO-RTO: 0 /100 WBC (ref 0–0.2)
OPIATES UR QL: NEGATIVE
OXYCODONE UR QL SCN: NEGATIVE
PCP UR QL SCN: NEGATIVE
PH UR STRIP.AUTO: <=5 [PH] (ref 5–8)
PLATELET # BLD AUTO: 199 10*3/MM3 (ref 140–450)
PMV BLD AUTO: 12.5 FL (ref 6–12)
POTASSIUM SERPL-SCNC: 4.1 MMOL/L (ref 3.5–5.2)
POTASSIUM SERPL-SCNC: 4.2 MMOL/L (ref 3.5–5.2)
POTASSIUM SERPL-SCNC: 4.5 MMOL/L (ref 3.5–5.2)
PROT SERPL-MCNC: 6.3 G/DL (ref 6–8.5)
PROT UR QL STRIP: ABNORMAL
PROTHROMBIN TIME: 16.6 SECONDS (ref 12.3–15.1)
RBC # BLD AUTO: 4.32 10*6/MM3 (ref 3.77–5.28)
RBC # UR STRIP: ABNORMAL /HPF
REF LAB TEST METHOD: ABNORMAL
SODIUM SERPL-SCNC: 163 MMOL/L (ref 136–145)
SODIUM SERPL-SCNC: 166 MMOL/L (ref 136–145)
SODIUM SERPL-SCNC: 168 MMOL/L (ref 136–145)
SP GR UR STRIP: 1.02 (ref 1–1.03)
SQUAMOUS #/AREA URNS HPF: ABNORMAL /HPF
T3FREE SERPL-MCNC: 3.28 PG/ML (ref 2–4.4)
T4 FREE SERPL-MCNC: 2.09 NG/DL (ref 0.92–1.68)
TRICYCLICS UR QL SCN: NEGATIVE
TROPONIN T % DELTA: -13
TROPONIN T NUMERIC DELTA: -21 NG/L
TROPONIN T SERPL HS-MCNC: 162 NG/L
TSH SERPL DL<=0.05 MIU/L-ACNC: 0.02 UIU/ML (ref 0.27–4.2)
UFH PPP CHRO-ACNC: 0.82 IU/ML (ref 0.3–0.7)
UROBILINOGEN UR QL STRIP: ABNORMAL
WBC # UR STRIP: ABNORMAL /HPF
WBC NRBC COR # BLD AUTO: 13.08 10*3/MM3 (ref 3.4–10.8)
WHOLE BLOOD HOLD COAG: NORMAL
WHOLE BLOOD HOLD SPECIMEN: NORMAL

## 2025-05-10 PROCEDURE — 83735 ASSAY OF MAGNESIUM: CPT | Performed by: EMERGENCY MEDICINE

## 2025-05-10 PROCEDURE — 82077 ASSAY SPEC XCP UR&BREATH IA: CPT | Performed by: EMERGENCY MEDICINE

## 2025-05-10 PROCEDURE — 99285 EMERGENCY DEPT VISIT HI MDM: CPT | Performed by: EMERGENCY MEDICINE

## 2025-05-10 PROCEDURE — 25010000002 CEFTRIAXONE PER 250 MG: Performed by: EMERGENCY MEDICINE

## 2025-05-10 PROCEDURE — 25010000002 HEPARIN (PORCINE) PER 1000 UNITS: Performed by: FAMILY MEDICINE

## 2025-05-10 PROCEDURE — 25810000003 LACTATED RINGERS PER 1000 ML: Performed by: EMERGENCY MEDICINE

## 2025-05-10 PROCEDURE — G0378 HOSPITAL OBSERVATION PER HR: HCPCS

## 2025-05-10 PROCEDURE — 93005 ELECTROCARDIOGRAM TRACING: CPT | Performed by: EMERGENCY MEDICINE

## 2025-05-10 PROCEDURE — 82948 REAGENT STRIP/BLOOD GLUCOSE: CPT | Performed by: EMERGENCY MEDICINE

## 2025-05-10 PROCEDURE — 83605 ASSAY OF LACTIC ACID: CPT | Performed by: EMERGENCY MEDICINE

## 2025-05-10 PROCEDURE — 80307 DRUG TEST PRSMV CHEM ANLYZR: CPT | Performed by: EMERGENCY MEDICINE

## 2025-05-10 PROCEDURE — 92960 CARDIOVERSION ELECTRIC EXT: CPT

## 2025-05-10 PROCEDURE — 87040 BLOOD CULTURE FOR BACTERIA: CPT | Performed by: EMERGENCY MEDICINE

## 2025-05-10 PROCEDURE — 5A2204Z RESTORATION OF CARDIAC RHYTHM, SINGLE: ICD-10-PCS | Performed by: EMERGENCY MEDICINE

## 2025-05-10 PROCEDURE — 85730 THROMBOPLASTIN TIME PARTIAL: CPT | Performed by: FAMILY MEDICINE

## 2025-05-10 PROCEDURE — 82533 TOTAL CORTISOL: CPT | Performed by: EMERGENCY MEDICINE

## 2025-05-10 PROCEDURE — 84443 ASSAY THYROID STIM HORMONE: CPT | Performed by: EMERGENCY MEDICINE

## 2025-05-10 PROCEDURE — 51702 INSERT TEMP BLADDER CATH: CPT

## 2025-05-10 PROCEDURE — 85025 COMPLETE CBC W/AUTO DIFF WBC: CPT | Performed by: EMERGENCY MEDICINE

## 2025-05-10 PROCEDURE — 85520 HEPARIN ASSAY: CPT | Performed by: FAMILY MEDICINE

## 2025-05-10 PROCEDURE — 25010000002 CEFTRIAXONE PER 250 MG: Performed by: FAMILY MEDICINE

## 2025-05-10 PROCEDURE — 25010000002 LABETALOL 5 MG/ML SOLUTION: Performed by: EMERGENCY MEDICINE

## 2025-05-10 PROCEDURE — 84484 ASSAY OF TROPONIN QUANT: CPT | Performed by: EMERGENCY MEDICINE

## 2025-05-10 PROCEDURE — 70450 CT HEAD/BRAIN W/O DYE: CPT

## 2025-05-10 PROCEDURE — 87077 CULTURE AEROBIC IDENTIFY: CPT | Performed by: EMERGENCY MEDICINE

## 2025-05-10 PROCEDURE — 99214 OFFICE O/P EST MOD 30 MIN: CPT | Performed by: INTERNAL MEDICINE

## 2025-05-10 PROCEDURE — 87086 URINE CULTURE/COLONY COUNT: CPT | Performed by: EMERGENCY MEDICINE

## 2025-05-10 PROCEDURE — 84481 FREE ASSAY (FT-3): CPT | Performed by: EMERGENCY MEDICINE

## 2025-05-10 PROCEDURE — 84439 ASSAY OF FREE THYROXINE: CPT | Performed by: EMERGENCY MEDICINE

## 2025-05-10 PROCEDURE — 99223 1ST HOSP IP/OBS HIGH 75: CPT | Performed by: FAMILY MEDICINE

## 2025-05-10 PROCEDURE — 71045 X-RAY EXAM CHEST 1 VIEW: CPT

## 2025-05-10 PROCEDURE — 81001 URINALYSIS AUTO W/SCOPE: CPT | Performed by: EMERGENCY MEDICINE

## 2025-05-10 PROCEDURE — 85610 PROTHROMBIN TIME: CPT | Performed by: EMERGENCY MEDICINE

## 2025-05-10 PROCEDURE — 80053 COMPREHEN METABOLIC PANEL: CPT | Performed by: EMERGENCY MEDICINE

## 2025-05-10 PROCEDURE — 25810000003 LACTATED RINGERS SOLUTION: Performed by: EMERGENCY MEDICINE

## 2025-05-10 PROCEDURE — 87186 SC STD MICRODIL/AGAR DIL: CPT | Performed by: EMERGENCY MEDICINE

## 2025-05-10 RX ORDER — CARVEDILOL 12.5 MG/1
12.5 TABLET ORAL 2 TIMES DAILY WITH MEALS
Status: DISCONTINUED | OUTPATIENT
Start: 2025-05-10 | End: 2025-05-14

## 2025-05-10 RX ORDER — HEPARIN SODIUM 10000 [USP'U]/100ML
12 INJECTION, SOLUTION INTRAVENOUS
Status: CANCELLED | OUTPATIENT
Start: 2025-05-10

## 2025-05-10 RX ORDER — SODIUM CHLORIDE 9 MG/ML
40 INJECTION, SOLUTION INTRAVENOUS AS NEEDED
Status: DISCONTINUED | OUTPATIENT
Start: 2025-05-10 | End: 2025-05-20

## 2025-05-10 RX ORDER — HEPARIN SODIUM 1000 [USP'U]/ML
50 INJECTION, SOLUTION INTRAVENOUS; SUBCUTANEOUS AS NEEDED
Status: DISCONTINUED | OUTPATIENT
Start: 2025-05-10 | End: 2025-05-10

## 2025-05-10 RX ORDER — ASPIRIN 81 MG/1
81 TABLET, CHEWABLE ORAL DAILY
Status: DISCONTINUED | OUTPATIENT
Start: 2025-05-10 | End: 2025-05-14

## 2025-05-10 RX ORDER — HEPARIN SODIUM 1000 [USP'U]/ML
60 INJECTION, SOLUTION INTRAVENOUS; SUBCUTANEOUS ONCE
Status: COMPLETED | OUTPATIENT
Start: 2025-05-10 | End: 2025-05-10

## 2025-05-10 RX ORDER — ACETAMINOPHEN 650 MG/1
650 SUPPOSITORY RECTAL EVERY 4 HOURS PRN
Status: DISCONTINUED | OUTPATIENT
Start: 2025-05-10 | End: 2025-05-14

## 2025-05-10 RX ORDER — SODIUM CHLORIDE 0.9 % (FLUSH) 0.9 %
10 SYRINGE (ML) INJECTION EVERY 12 HOURS SCHEDULED
Status: DISCONTINUED | OUTPATIENT
Start: 2025-05-10 | End: 2025-05-23 | Stop reason: HOSPADM

## 2025-05-10 RX ORDER — BISACODYL 5 MG/1
5 TABLET, DELAYED RELEASE ORAL DAILY PRN
Status: DISCONTINUED | OUTPATIENT
Start: 2025-05-10 | End: 2025-05-14

## 2025-05-10 RX ORDER — RIVASTIGMINE 4.6 MG/24H
1 PATCH, EXTENDED RELEASE TRANSDERMAL DAILY
Status: DISCONTINUED | OUTPATIENT
Start: 2025-05-10 | End: 2025-05-23 | Stop reason: HOSPADM

## 2025-05-10 RX ORDER — LABETALOL HYDROCHLORIDE 5 MG/ML
10 INJECTION, SOLUTION INTRAVENOUS ONCE
Status: COMPLETED | OUTPATIENT
Start: 2025-05-10 | End: 2025-05-10

## 2025-05-10 RX ORDER — SODIUM CHLORIDE, SODIUM LACTATE, POTASSIUM CHLORIDE, CALCIUM CHLORIDE 600; 310; 30; 20 MG/100ML; MG/100ML; MG/100ML; MG/100ML
150 INJECTION, SOLUTION INTRAVENOUS CONTINUOUS
Status: DISCONTINUED | OUTPATIENT
Start: 2025-05-10 | End: 2025-05-10

## 2025-05-10 RX ORDER — POLYETHYLENE GLYCOL 3350 17 G/17G
17 POWDER, FOR SOLUTION ORAL DAILY PRN
Status: DISCONTINUED | OUTPATIENT
Start: 2025-05-10 | End: 2025-05-14

## 2025-05-10 RX ORDER — AMOXICILLIN 250 MG
2 CAPSULE ORAL 2 TIMES DAILY
Status: DISCONTINUED | OUTPATIENT
Start: 2025-05-10 | End: 2025-05-14

## 2025-05-10 RX ORDER — SODIUM CHLORIDE 0.9 % (FLUSH) 0.9 %
10 SYRINGE (ML) INJECTION AS NEEDED
Status: DISCONTINUED | OUTPATIENT
Start: 2025-05-10 | End: 2025-05-23 | Stop reason: HOSPADM

## 2025-05-10 RX ORDER — BISACODYL 10 MG
10 SUPPOSITORY, RECTAL RECTAL DAILY PRN
Status: DISCONTINUED | OUTPATIENT
Start: 2025-05-10 | End: 2025-05-14

## 2025-05-10 RX ORDER — ATORVASTATIN CALCIUM 80 MG/1
80 TABLET, FILM COATED ORAL DAILY
Status: DISCONTINUED | OUTPATIENT
Start: 2025-05-10 | End: 2025-05-14

## 2025-05-10 RX ORDER — NITROGLYCERIN 0.4 MG/1
0.4 TABLET SUBLINGUAL
Status: DISCONTINUED | OUTPATIENT
Start: 2025-05-10 | End: 2025-05-19

## 2025-05-10 RX ORDER — ACETAMINOPHEN 325 MG/1
650 TABLET ORAL EVERY 4 HOURS PRN
Status: DISCONTINUED | OUTPATIENT
Start: 2025-05-10 | End: 2025-05-14

## 2025-05-10 RX ORDER — HEPARIN SODIUM 1000 [USP'U]/ML
25 INJECTION, SOLUTION INTRAVENOUS; SUBCUTANEOUS AS NEEDED
Status: DISCONTINUED | OUTPATIENT
Start: 2025-05-10 | End: 2025-05-10

## 2025-05-10 RX ORDER — HEPARIN SODIUM 10000 [USP'U]/100ML
11 INJECTION, SOLUTION INTRAVENOUS
Status: DISCONTINUED | OUTPATIENT
Start: 2025-05-10 | End: 2025-05-11

## 2025-05-10 RX ADMIN — HEPARIN SODIUM 12 UNITS/KG/HR: 10000 INJECTION, SOLUTION INTRAVENOUS at 15:55

## 2025-05-10 RX ADMIN — SODIUM CHLORIDE, POTASSIUM CHLORIDE, SODIUM LACTATE AND CALCIUM CHLORIDE 1000 ML: 600; 310; 30; 20 INJECTION, SOLUTION INTRAVENOUS at 12:56

## 2025-05-10 RX ADMIN — Medication 2 SPRAY: at 19:48

## 2025-05-10 RX ADMIN — MUPIROCIN 1 APPLICATION: 20 OINTMENT TOPICAL at 15:56

## 2025-05-10 RX ADMIN — CEFTRIAXONE 1000 MG: 1 INJECTION, POWDER, FOR SOLUTION INTRAMUSCULAR; INTRAVENOUS at 14:02

## 2025-05-10 RX ADMIN — SODIUM CHLORIDE 2000 MG: 9 INJECTION, SOLUTION INTRAVENOUS at 18:17

## 2025-05-10 RX ADMIN — Medication 10 ML: at 14:38

## 2025-05-10 RX ADMIN — LABETALOL HYDROCHLORIDE 10 MG: 5 INJECTION, SOLUTION INTRAVENOUS at 14:56

## 2025-05-10 RX ADMIN — Medication 2 SPRAY: at 23:53

## 2025-05-10 RX ADMIN — SODIUM CHLORIDE, POTASSIUM CHLORIDE, SODIUM LACTATE AND CALCIUM CHLORIDE 1000 ML: 600; 310; 30; 20 INJECTION, SOLUTION INTRAVENOUS at 13:54

## 2025-05-10 RX ADMIN — HEPARIN SODIUM 4000 UNITS: 1000 INJECTION, SOLUTION INTRAVENOUS; SUBCUTANEOUS at 15:49

## 2025-05-10 RX ADMIN — RIVASTIGMINE 1 PATCH: 4.6 PATCH TRANSDERMAL at 18:18

## 2025-05-10 RX ADMIN — MUPIROCIN 1 APPLICATION: 20 OINTMENT TOPICAL at 22:32

## 2025-05-10 RX ADMIN — Medication 10 ML: at 19:51

## 2025-05-10 RX ADMIN — SODIUM CHLORIDE, POTASSIUM CHLORIDE, SODIUM LACTATE AND CALCIUM CHLORIDE 150 ML/HR: 600; 310; 30; 20 INJECTION, SOLUTION INTRAVENOUS at 14:58

## 2025-05-10 NOTE — CONSULTS
"[facility]  Cardiology Consult Note  [facility] EMERGENCY DEPARTMENT          Patient Identification:  Valarie Camara      [id number]  79 y.o.        female  1945       Date of Consultation:  05/10/25    Reason for Consultation: New onset atrial fibrillation    PCP: Lay Cox MD  Primary cardiologist:     History of Present Illness:     Pleasant 79-year-old female with a previous history of bilateral PE on Eliquis, with CKD stage IIIb, hypertension, hyperlipidemia, dementia, presenting with confusion, poor p.o. intake, EMS called to house, found to be hypotensive with A-fib RVR, patient was cardioverted in the emergency department to normal sinus rhythm.  Of note she was found to be hyponatremic with a sodium of 163, acute on chronic kidney disease with a creatinine of 3.3, with baseline creatinine around 1.6, and found to have mildly elevated troponin at 141 with no delta.  EKG shows A-fib with RVR at 1223 prior to cardioversion.  Recent hospitalization in [place] for stroke evaluation echo showed normal ejection fraction with mild LVH and mild mitral stenosis.    Past History:  Past Medical History:   Diagnosis Date    Acute bronchitis with bronchospasm     Anxiety     Arthritis     Asthma     B12 deficiency     Back pain     Body piercing     ears    Cataract     Cerebrovascular disease     Cervicalgia     Chronic kidney disease     stage 3b    Colonic polyp     History of colonic polyps     Constipation     COPD (chronic obstructive pulmonary disease)     Coronary artery disease     Depression     Diverticulitis     Dysphagia     Patient reported \"it won't go all the way down\" when eating solid foods first thing in the mornings    Edema     Elevated cholesterol     Esophageal reflux     Folic acid deficiency     Full dentures     Advised no adhesives DOS    Heart murmur     Herpes zoster     High cholesterol     History of kidney " infection     History of recurrent urinary tract infection     HTN (hypertension)     Hypercholesterolemia     Impaired functional mobility, balance, gait, and endurance     Insomnia     Kidney infection     Malignant hypertension 04/26/2015     Accelerated essential hypertension    Measles     rubeola    Muscle spasm     Neoplasm of uncertain behavior of skin     dtr denies    Osteoporosis     Poor historian     Recurrent urinary tract infection     Rheumatoid arthritis     RLS (restless legs syndrome)     Stomach ulcer     Stroke     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    Type 2 diabetes mellitus     Vitamin D deficiency      Past Surgical History:   Procedure Laterality Date    CATARACT EXTRACTION WITH INTRAOCULAR LENS IMPLANT Left 02/11/2013    CATARACT EXTRACTION WITH INTRAOCULAR LENS IMPLANT Right 04/15/2013    COLONOSCOPY  2012    COLONOSCOPY N/A 11/26/2019    Procedure: COLONOSCOPY;  Surgeon: Chidi Downing MD;  Location:  [facility];  Service: Gastroenterology    ENDOSCOPY N/A 11/13/2017    Procedure: ESOPHAGOGASTRODUODENOSCOPY with biopsies and esophageal balloon dilitation;  Surgeon: Wesley Stovall MD;  Location: [facility] ENDOSCOPY;  Service:     ENDOSCOPY N/A 11/25/2019    Procedure: ESOPHAGOGASTRODUODENOSCOPY;  Surgeon: Chidi Downing MD;  Location:  [facility];  Service: Gastroenterology    ENDOSCOPY N/A 11/29/2021    Procedure: ESOPHAGOGASTRODUODENOSCOPY with dilation and biopsies;  Surgeon: Gonzalez Zapata MD;  Location: [facility] ENDOSCOPY;  Service: Gastroenterology;  Laterality: N/A;    ENDOSCOPY N/A 11/1/2023    Procedure: ESOPHAGOGASTRODUODENOSCOPY WITH BIOPSY AND DILATATION;  Surgeon: Gonzalez Zapata MD;  Location: [facility] ENDOSCOPY;  Service: Gastroenterology;  Laterality: N/A;    ENDOSCOPY N/A 1/27/2025    Procedure: Esophagogastroduodenoscopy with dilatation and biopsies;  Surgeon: Gonzalez Zapata MD;  Location: [facility]  ENDOSCOPY;  Service: Gastroenterology;  Laterality: N/A;    HYSTERECTOMY  1979    UPPER GASTROINTESTINAL ENDOSCOPY  2013    UPPER GASTROINTESTINAL ENDOSCOPY  2017     Allergies   Allergen Reactions    Penicillins Rash, Shortness Of Breath, Anaphylaxis and Other (See Comments)    Clonidine Derivatives Other (See Comments)     Pt reports extreme hypotension      Hydralazine Nausea And Vomiting and Other (See Comments)    Sulfa Antibiotics Rash     Social History     Socioeconomic History    Marital status:    Tobacco Use    Smoking status: Former     Current packs/day: 0.00     Average packs/day: 1 pack/day for 15.0 years (15.0 ttl pk-yrs)     Types: Cigarettes     Start date: 1997     Quit date:      Years since quittin.3     Passive exposure: Past    Smokeless tobacco: Never    Tobacco comments:      Denied: History of smoking cigarettes   Vaping Use    Vaping status: Never Used   Substance and Sexual Activity    Alcohol use: Not Currently    Drug use: Never    Sexual activity: Defer     Family History   Problem Relation Age of Onset    Arthritis Mother     Diabetes Mother     Hypertension Mother     Arthritis Other     Arthritis Other     Diabetes Other         DM    Hypertension Other     Colon cancer Neg Hx      Medications:  (Not in a hospital admission)    Current medications:  aspirin, 81 mg, Oral, Daily  atorvastatin, 80 mg, Oral, Daily  carvedilol, 12.5 mg, Oral, BID With Meals  cefTRIAXone, 2,000 mg, Intravenous, Q24H  mupirocin, 1 Application, Each Nare, BID  rivastigmine, 1 patch, Transdermal, Daily  senna-docusate sodium, 2 tablet, Oral, BID  sodium chloride, 10 mL, Intravenous, Q12H      Current IV drips:  heparin, 12 Units/kg/hr, Last Rate: 12 Units/kg/hr (05/10/25 1555)  lactated ringers, 150 mL/hr, Last Rate: Stopped (05/10/25 1630)  Pharmacy to Dose Heparin,         Review of Systems:    Constitutional no fever,  no weight loss   Skin no rash   Otolaryngeal no  "difficulty swallowing   Cardiovascular See HPI   Pulmonary no cough, no sputum production   Gastrointestinal no constipation, no diarrhea   Genitourinary no dysuria, no hematuria   Hematologic no easy bruisability, no abnormal bleeding   Musculoskeletal no muscle pain   Neurologic no dizziness, no falls     Physical exam:    /70   Pulse 80   Temp 98 °F (36.7 °C) (Rectal)   Resp 16   Ht 160 cm (63\")   Wt 66.7 kg (147 lb)   SpO2 100%   BMI 26.04 kg/m²  Body mass index is 26.04 kg/m².   Oxygen saturation   SpO2  Min: 98 %  Max: 100 %    General Appearance:   Elderly, no acute distress, chronically ill-appearing, nonverbal  HENT:   oropharynx moist  lips not cyanotic  Neck:  thyroid not enlarged  supple  Respiratory:  no respiratory distress  normal breath sounds  no rales  Cardiovascular:  no jugular venous distention  regular rhythm  apical impulse normal  S1 normal, S2 normal  no S3, no S4   no murmur  no rub, no thrill  carotid pulses normal; no bruit  pedal pulses normal  lower extremity edema: none    Gastrointestinal:   bowel sounds normal  non-tender  no hepatomegaly, no splenomegaly  Musculoskeletal:  no clubbing of fingers.   normocephalic, head atraumatic  Skin:   warm, dry  Psychiatric:  Nonverbal, chronic dementia  Cardiographics:     ECG  (personally reviewed) A-fib with RVR   Telemetry:  (personally reviewed) normal sinus rhythm after cardioversion   ECHO:   Results for orders placed during the hospital encounter of 03/20/25    Adult Transthoracic Echo Complete W/ Cont if Necessary Per Protocol (With Agitated Saline)    Interpretation Summary    Left ventricular systolic function is normal. Calculated left ventricular EF = 67% Left ventricular ejection fraction appears to be 66 - 70%.    Left ventricular wall thickness is consistent with mild concentric hypertrophy.    Left ventricular outflow tract peak flow gradient at rest is 11 mmHg. Left ventricular outflow tract peak gradient with " "valsalva is 29 mmHg.    Left ventricular diastolic function is consistent with (grade I) impaired relaxation.    The mitral valve peak gradient is 9 mmHg. The mitral valve mean gradient is 4 mmHg.    Calculated right ventricular systolic pressure from tricuspid regurgitation is 21 mmHg.    There is a trivial pericardial effusion.       CATH:     CARDIOLITE:      Lab Review:       Results from last 7 days   Lab Units 05/10/25  1237   WBC 10*3/mm3 13.08*   HEMOGLOBIN g/dL 12.6   HEMATOCRIT % 42.2            Results from last 7 days   Lab Units 05/10/25  1437 05/10/25  1237   SODIUM mmol/L 163* 168*   BUN mg/dL 106* 127*   CREATININE mg/dL 3.33* 3.96*   GLUCOSE mg/dL 170* 162*      Estimated Creatinine Clearance: 12.4 mL/min (A) (by C-G formula based on SCr of 3.33 mg/dL (H)).     Lab Results   Component Value Date    CHOL 215 (H) 03/21/2025    TRIG 149 03/21/2025    HDL 43 03/21/2025     (H) 03/21/2025    AST 10 05/10/2025    ALT 7 05/10/2025        Results from last 7 days   Lab Units 05/10/25  1237   INR  1.25*        Lab Results   Component Value Date    TSH 0.024 (L) 05/10/2025        Lab Results   Component Value Date    HGBA1C 6.90 (H) 03/04/2025           No results found for: \"DIGOXIN\"   No results found for: \"DDIMERQUANT\"     Imaging:  All pertinent imaging studies were personally reviewed.    Assessment:      Hypernatremia    PAF (paroxysmal atrial fibrillation)    Acute renal failure superimposed on stage 3a chronic kidney disease          Recommendations:  1.  Paroxysmal atrial fibrillation:  Newly diagnosed 5/10/2025, cardioverted in the ER due to hypotension  Patient chronically anticoagulated with Eliquis due to history of PE  A-fib with RVR in setting of dehydration, KURT, UTI, hypernatremia.  Correct all of the above, no indication for antiarrhythmic at this time  Continue anticoagulation unless contraindicated  Continue heparin drip for now but may transition back to Eliquis I would use 2.5 mg " twice daily given age and renal function.  Recent echo 3/2025 with normal EF, no need to repeat    Resume home dose carvedilol    2.  Chronic diastolic heart failure:  Appears hypovolemic, no diuretics especially in setting of hyponatremia and dehydration    3.  Severe sepsis due to UTI:  Antibiotics per primary team    4.  Severe hypernatremia:  IV fluids, normal EF    5.  Hypertension:  Resume home dose carvedilol, hold off on other blood pressure lowering medications in setting of KURT    Thank you for allowing us to share in her care.    Discussed with patient's nurse, Dr. Tolliver, and patient's family        Aki Alvarenga MD  5/10/2025  16:36 EDT

## 2025-05-10 NOTE — ED PROVIDER NOTES
"     [facility] EMERGENCY DEPARTMENT  Emergency Department Encounter  Emergency Medicine Physician Note     Pt Name:Valarie Camara  MRN: [id number]  Birthdate 1945  Date of evaluation: 5/10/2025  PCP:  Lay Cox MD  Note Started: 1:19 PM EDT      CHIEF COMPLAINT       Chief Complaint   Patient presents with    Altered Mental Status       HISTORY OF PRESENT ILLNESS  (Location/Symptom, Timing/Onset, Context/Setting, Quality, Duration, Modifying Factors, Severity.)      Valarie Camara is a 79 y.o. female who presents with confusion that has been progressive worsening over the last couple days.  Patient does have a history of dementia and has been seen previously for concerns of confusion.  EMS arrived the patient was noted to be hypotensive and tachycardic with A-fib with rapid ventricular rate. They were unable to get access or provide a cardioversion prior to arrival.  Patient been altered for them on initial arrival.  Patient with recent evaluation for stroke.    PAST MEDICAL / SURGICAL / SOCIAL / FAMILY HISTORY     Past Medical History:   Diagnosis Date    Acute bronchitis with bronchospasm     Anxiety     Arthritis     Asthma     B12 deficiency     Back pain     Body piercing     ears    Cataract     Cerebrovascular disease     Cervicalgia     Chronic kidney disease     stage 3b    Colonic polyp     History of colonic polyps     Constipation     COPD (chronic obstructive pulmonary disease)     Coronary artery disease     Depression     Diverticulitis     Dysphagia     Patient reported \"it won't go all the way down\" when eating solid foods first thing in the mornings    Edema     Elevated cholesterol     Esophageal reflux     Folic acid deficiency     Full dentures     Advised no adhesives DOS    Heart murmur     Herpes zoster     High cholesterol     History of kidney infection     History of recurrent urinary tract infection     HTN (hypertension)     " Hypercholesterolemia     Impaired functional mobility, balance, gait, and endurance     Insomnia     Kidney infection     Malignant hypertension 04/26/2015     Accelerated essential hypertension    Measles     rubeola    Muscle spasm     Neoplasm of uncertain behavior of skin     dtr denies    Osteoporosis     Poor historian     Recurrent urinary tract infection     Rheumatoid arthritis     RLS (restless legs syndrome)     Stomach ulcer     Stroke     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    Type 2 diabetes mellitus     Vitamin D deficiency      No additional pertinent       Past Surgical History:   Procedure Laterality Date    CATARACT EXTRACTION WITH INTRAOCULAR LENS IMPLANT Left 02/11/2013    CATARACT EXTRACTION WITH INTRAOCULAR LENS IMPLANT Right 04/15/2013    COLONOSCOPY  2012    COLONOSCOPY N/A 11/26/2019    Procedure: COLONOSCOPY;  Surgeon: Chidi Downing MD;  Location:  [facility];  Service: Gastroenterology    ENDOSCOPY N/A 11/13/2017    Procedure: ESOPHAGOGASTRODUODENOSCOPY with biopsies and esophageal balloon dilitation;  Surgeon: Wesley Stovall MD;  Location: [facility] ENDOSCOPY;  Service:     ENDOSCOPY N/A 11/25/2019    Procedure: ESOPHAGOGASTRODUODENOSCOPY;  Surgeon: Chidi Downing MD;  Location:  [facility];  Service: Gastroenterology    ENDOSCOPY N/A 11/29/2021    Procedure: ESOPHAGOGASTRODUODENOSCOPY with dilation and biopsies;  Surgeon: Gonzalez Zapata MD;  Location: [facility] ENDOSCOPY;  Service: Gastroenterology;  Laterality: N/A;    ENDOSCOPY N/A 11/1/2023    Procedure: ESOPHAGOGASTRODUODENOSCOPY WITH BIOPSY AND DILATATION;  Surgeon: Gonzalez Zapata MD;  Location: [facility] ENDOSCOPY;  Service: Gastroenterology;  Laterality: N/A;    ENDOSCOPY N/A 1/27/2025    Procedure: Esophagogastroduodenoscopy with dilatation and biopsies;  Surgeon: Gonzalez Zapata MD;  Location: [facility] ENDOSCOPY;  Service: Gastroenterology;  Laterality: N/A;     HYSTERECTOMY  1979    UPPER GASTROINTESTINAL ENDOSCOPY  2013    UPPER GASTROINTESTINAL ENDOSCOPY  2017     No additional pertinent       Social History     Socioeconomic History    Marital status:    Tobacco Use    Smoking status: Former     Current packs/day: 0.00     Average packs/day: 1 pack/day for 15.0 years (15.0 ttl pk-yrs)     Types: Cigarettes     Start date: 1997     Quit date:      Years since quittin.3     Passive exposure: Past    Smokeless tobacco: Never    Tobacco comments:      Denied: History of smoking cigarettes   Vaping Use    Vaping status: Never Used   Substance and Sexual Activity    Alcohol use: Not Currently    Drug use: Never    Sexual activity: Defer       Family History   Problem Relation Age of Onset    Arthritis Mother     Diabetes Mother     Hypertension Mother     Arthritis Other     Arthritis Other     Diabetes Other         DM    Hypertension Other     Colon cancer Neg Hx        Allergies:  Penicillins, Clonidine derivatives, Hydralazine, and Sulfa antibiotics    Home Medications:  Prior to Admission medications    Medication Sig Start Date End Date Taking? Authorizing Provider   acetaminophen (TYLENOL) 325 MG tablet Take 1 tablet by mouth Every 6 (Six) Hours As Needed for Headache or Mild Pain. Indications: Pain 25   Bruce Mata MD   aspirin 81 MG chewable tablet Chew 1 tablet Daily. 25   Magalis Raya MD   atorvastatin (LIPITOR) 80 MG tablet Take 1 tablet by mouth Daily. 10/19/20   Arminda Evans MD   carvedilol (COREG) 12.5 MG tablet Take 1 tablet by mouth 2 (Two) Times a Day With Meals. Indications: Cardiac Failure 25   Magalis Raya MD   CHOLECALCIFEROL PO Take 5,000 Int'l Units/day by mouth Daily. Indications: Vitamin D Deficiency    ProviderBruce MD   Easy Touch Pen Needles 31G X 8 MM misc Indications: Diabetes 22   Bruce Mata MD   ferrous sulfate 325 (65 FE) MG tablet Take 1 tablet by mouth 3  "(Three) Times a Week. Indications: Iron Deficiency, Restless Leg Syndrome    Provider, MD Bruce   glucose blood test strip 1 each by Other route As Needed (FBS). Check sugar once a day  Indications: Type 2 Diabetes 3/4/25   Kerley, Brian Joseph, DO   glucose monitor monitoring kit 1 each Daily. 11/19/20   Arminda Evans MD   insulin lispro (humaLOG) 100 UNIT/ML injection Inject 2 Units under the skin into the appropriate area as directed 3 (Three) Times a Day As Needed for High Blood Sugar. Daughter only gives if BS is greater than 200 two hrs after eating.    Indications: Type 2 Diabetes    ProviderBruce MD   Insulin Pen Needle 31G X 5 MM misc Inject insulin three times daily 9/8/20   Arminda Evans MD   isosorbide mononitrate (IMDUR) 30 MG 24 hr tablet Take 1 tablet by mouth Daily. 4/8/25   Magalis Raya MD   Lancets 30G misc Check sugar daily 11/19/20   Arminda Evans MD   Lidocaine 4 % Place 1 patch on the skin as directed by provider Daily. Remove & Discard patch within 12 hours or as directed by MD 4/8/25   Magalis Raya MD   midodrine (PROAMATINE) 5 MG tablet Take 1 tablet by mouth 3 (Three) Times a Day Before Meals. 4/7/25   Magalis Raya MD   NIFEdipine XL (ADALAT CC) 60 MG 24 hr tablet Take 1 tablet by mouth Daily. 4/8/25   Magalis Raya MD   pantoprazole (PROTONIX) 40 MG EC tablet Take 1 tablet by mouth Daily.    Provider, MD Bruce   rivastigmine (EXELON) 4.6 MG/24HR patch Place 1 patch on the skin as directed by provider Daily. 4/7/25   Magalis Raya MD         REVIEW OF SYSTEMS       Review of Systems   Unable to perform ROS: Patient unresponsive       PHYSICAL EXAM      INITIAL VITALS:   BP (!) 187/98   Pulse 94   Temp 98 °F (36.7 °C) (Rectal)   Resp 10   Ht 160 cm (63\")   Wt 66.7 kg (147 lb)   SpO2 100%   BMI 26.04 kg/m²     Physical Exam  Constitutional:       Appearance: She is obese. She is ill-appearing.   HENT:      Head: Normocephalic and atraumatic.      Mouth/Throat: "      Mouth: Mucous membranes are moist.      Pharynx: Oropharynx is clear.   Eyes:      Pupils: Pupils are equal, round, and reactive to light.   Cardiovascular:      Rate and Rhythm: Tachycardia present. Rhythm irregular.      Heart sounds: Normal heart sounds.   Pulmonary:      Effort: Pulmonary effort is normal.      Breath sounds: Normal breath sounds.   Abdominal:      General: Bowel sounds are normal.      Palpations: Abdomen is soft.   Musculoskeletal:      Cervical back: Neck supple.   Skin:     General: Skin is warm and dry.   Neurological:      Mental Status: She is unresponsive.      GCS: GCS eye subscore is 2. GCS verbal subscore is 2. GCS motor subscore is 4.      Cranial Nerves: Facial asymmetry (Slight right sided facial droop, do feel this is chronic in nature when looking at previous charts) present.      Comments: Patient moved extremities to pain   Psychiatric:         Mood and Affect: Mood normal.         Behavior: Behavior normal.           DDX/DIAGNOSTIC RESULTS / EMERGENCY DEPARTMENT COURSE / MDM     Differential Diagnosis included but not limited: Sepsis, CVA, ACS, A-fib with RVR with symptomatic hypotension, hypothyroidism, thyroid storm, electrolyte abnormalities, metabolic and infectious encephalopathy     Diagnoses Considered but Do Not Suspect:  differential is very broad in this patient    Decision Rules/Scores utilized: N/A     Tests considered but not ordered and why: CT PE and CT scan with contrast, patient noted to have acute kidney injury    MIPS: N/A     Code Status Discussion: Discussed with daughter who also discussed with the other daughter for definitive decision of DNR without chest compressions and intubation    Additional Patient Education Provided: None     Medical Decision Making    Medical Decision Making  Patient is a 79-year-old female who presented today with progressively worsening altered mentation over the last 3 days, tachycardia and hypotension.  Patient was  noted to be A-fib with rapid ventricular rate as high as 180 and hypotensive on arrival.  Patient was cardioverted emergently immediately on arrival with 100 J, synchronized cardioversion performed.  Patient's been in the sinus rhythm under 100 bpm.  Patient has had no mentation improvement, CT head noted to be negative for intracranial hemorrhage.  Patient is on anticoagulation at this time for pulmonary embolism history.  Patient is maintaining airway at this time.  Patient noted to be severely hypernatremic, hyperthyroidism, and severe KURT.  Patient has not been tolerating p.o. over the last couple days according to daughter.  Patient has a anion gap metabolic acidosis, patient's lactate negative and only trace ketones in urine, unsure what caused patient's metabolic acidosis at this time.  Patient with severe KURT his creatinine was noted to  triple since 1 month ago.  Chest x-ray demonstrates no signs of infectious process.  Urine does demonstrate concerns of UTI, covering with ceftriaxone at this time, sepsis workup performed with negative lactate, single culture obtained per family's request.  Discussion had with family and they request DNR with no chest compressions and no intubation, they approve all other interventions.  Patient's case discussed with cardiology who feels that the elevated troponin is most likely secondary to the tachycardia and recommend troponin trending.  They recommend anticoagulation, continuing Eliquis or heparin.  They will follow for new onset A-fib and further management.  Discussed patient's case with neurology who does recommend MRI and further stroke workup as patient has a concerning history of this, MRI to be obtained tomorrow.  Thyroid storm is on the differential, patient is not hypothermic, tachycardia is improved with cardioversion and patient's been hemodynamically stable, further workup for hyperthyroidism may be necessary but do not feel that patient is in severe thyroid  storm at this time, did not give propranolol or other medications to manage it at this time.    Problems Addressed:  KURT (acute kidney injury): complicated acute illness or injury  Atrial fibrillation with rapid ventricular response: complicated acute illness or injury  Hypernatremia: complicated acute illness or injury  Hyperthyroidism: complicated acute illness or injury    Amount and/or Complexity of Data Reviewed  Independent Historian: caregiver     Details: Daughter  External Data Reviewed: labs, radiology and notes.  Labs: ordered. Decision-making details documented in ED Course.  Radiology: ordered.  ECG/medicine tests: ordered.  Discussion of management or test interpretation with external provider(s): Hospitalist for admission, cardiology for recommendations, neurology for recommendations    Risk  Prescription drug management.  Decision regarding hospitalization.        See ED COURSE for additional MDM statements    EKG  Initial EKG demonstrates atrial fibrillation with rapid ventricular rate of 159, T wave inversions in 2 3 and aVF and lateral leads with nonspecific Q waves in aVR, V1, V2    Repeat EKG post cardioversion demonstrates a normal sinus rhythm with a rate of 89, no ST elevation noted, T wave flattening in multiple leads Q waves in V3 noted at this time, single PAC present    All EKG's are interpreted by the Emergency Department Physician who either signs or Co-signs this chart in the absence of a cardiologist.    Additional Scores                   EMERGENCY DEPARTMENT COURSE:    ED Course as of 05/10/25 1503   Sat May 10, 2025   1232 Patient synchronized cardioverted to a sinus rhythm due to hypotension and A-fib with RVR [CR]   1257 Nitrite, UA(!): Positive [CR]   1257 BP: 175/96  Patient's blood pressures improved after cardioversion.  Patient noted to have a nitrite positive urine and concern for sepsis. [CR]   1257 Lactate: 0.9 [CR]   1307 Discussion had with daughter that she has had  mentation changes over the last 3 days.  Patient's been evaluated by home health nursing who seemed to not be concerned.  Patient was noted to have worsening mentation changes and be tachycardic this morning so daughter called 911. [CR]   1327 Discussed patient with cardiology, agreeable with the plan for management of A-fib with her Eliquis at this time, no indication for any further management does feel the troponin elevations most likely secondary to tachycardia and recommends trending it to ensure that is trending down with tachycardia control. [CR]   1352 Free T4(!): 2.09  Patient not hypothermic, was tachycardic on arrival with elevated hyperthyroidism, low concern for severe thyroid storm at this time but this is on the differential. [CR]   1352 Patient's case was discussed with neurology, they recommend MRI tomorrow.  No indication for tPA as patient is already on anticoagulation.  Patient is being managed for stroke with statin, anticoagulation, and other medication management after evaluation a month ago. [CR]   1355 Patient's family declined second blood cultures patient's been poked multiple times.  Will start antibiotics without obtaining full to set blood cultures.  At this time also discussed goals of care with family, they wish patient to have no chest compressions or intubation, patient's sister had called patient's other sister to make this decision together which they agreed upon. [CR]   1455 Lactated Ringer's started on infusion, goal to not reduce sodium too much but to help hydrate patient due to severe kidney injury [CR]      ED Course User Index  [CR] Danie Tolliver DO       PROCEDURES:  Electrical Cardioversion    Date/Time: 5/12/2025 10:29 AM    Performed by: Danie Tolliver DO  Authorized by: Wil Rader DO    Consent:     Consent obtained:  Emergent situation  Pre-procedure details:     Cardioversion basis:  Emergent    Rhythm:  Atrial fibrillation  Attempt  one:     Cardioversion mode:  Synchronous    Shock (Joules):  100    Shock outcome:  Conversion to normal sinus rhythm  Post-procedure details:     Patient status:  Awake    Procedure completion:  Tolerated well, no immediate complications     Intravenous internal jugular vein placed due to access needed.  Used ultrasound guidance, site was cleaned with chlorhexidine, catheter placed and secured with Tegaderm, blood was able to flow for lab obtainment, flush no problem, no signs of injury.  Chest x-ray post demonstrated no concerns for pneumothorax or any kind complications.    DATA FOR LAB AND RADIOLOGY TESTS ORDERED BELOW ARE REVIEWED BY THE ED CLINICIAN:    RADIOLOGY: All x-rays, CT, MRI, and formal ultrasound images (except ED bedside ultrasound) are read by the radiologist, see reports below, unless otherwise noted in MDM or here.  Reports below are reviewed by myself.  CT Head Without Contrast   Final Result       1. No evidence of acute intracranial abnormality.                       This report was signed and finalized on 5/10/2025 12:57 PM by Jah Kirk MD.          XR Chest 1 View   Final Result   1. No evidence of acute abnormality.       This report was signed and finalized on 5/10/2025 12:49 PM by Jah Kirk MD.              LABS: Lab orders shown below, the results are reviewed by myself, and all abnormals are listed below.  Labs Reviewed   COMPREHENSIVE METABOLIC PANEL - Abnormal; Notable for the following components:       Result Value    Glucose 162 (*)      (*)     Creatinine 3.96 (*)     Sodium 168 (*)     Chloride 130 (*)     CO2 18.5 (*)     Albumin 3.1 (*)     BUN/Creatinine Ratio 32.1 (*)     Anion Gap 19.5 (*)     eGFR 10.9 (*)     All other components within normal limits    Narrative:     GFR Categories in Chronic Kidney Disease (CKD)              GFR Category          GFR (mL/min/1.73)    Interpretation  G1                    90 or greater        Normal or high (1)  G2                     60-89                Mild decrease (1)  G3a                   45-59                Mild to moderate decrease  G3b                   30-44                Moderate to severe decrease  G4                    15-29                Severe decrease  G5                    14 or less           Kidney failure    (1)In the absence of evidence of kidney disease, neither GFR category G1 or G2 fulfill the criteria for CKD.    eGFR calculation 2021 CKD-EPI creatinine equation, which does not include race as a factor   URINALYSIS W/ CULTURE IF INDICATED - Abnormal; Notable for the following components:    Appearance, UA Cloudy (*)     Ketones, UA Trace (*)     Blood, UA Trace (*)     Protein, UA 30 mg/dL (1+) (*)     Leuk Esterase, UA Moderate (2+) (*)     Nitrite, UA Positive (*)     All other components within normal limits    Narrative:     In absence of clinical symptoms, the presence of pyuria, bacteria, and/or nitrites on the urinalysis result does not correlate with infection.   CBC WITH AUTO DIFFERENTIAL - Abnormal; Notable for the following components:    WBC 13.08 (*)     MCV 97.7 (*)     MCHC 29.9 (*)     RDW-SD 54.6 (*)     MPV 12.5 (*)     Neutrophil % 77.5 (*)     Lymphocyte % 16.3 (*)     Eosinophil % 0.2 (*)     Neutrophils, Absolute 10.13 (*)     Immature Grans, Absolute 0.07 (*)     All other components within normal limits   TSH RFX ON ABNORMAL TO FREE T4 - Abnormal; Notable for the following components:    TSH 0.024 (*)     All other components within normal limits   MAGNESIUM - Abnormal; Notable for the following components:    Magnesium 3.0 (*)     All other components within normal limits   URINE DRUG SCREEN - Normal    Narrative:     Cutoff For Drugs Screened:    Amphetamines               500 ng/ml  Barbiturates               200 ng/ml  Benzodiazepines            150 ng/ml  Cocaine                    150 ng/ml  Methadone                  200 ng/ml  Opiates                    100  ng/ml  Phencyclidine               25 ng/ml  THC                         50 ng/ml  Methamphetamine            500 ng/ml  Tricyclic Antidepressants  300 ng/ml  Oxycodone                  100 ng/ml  Buprenorphine               10 ng/ml    The normal value for all drugs tested is negative. This report includes unconfirmed screening results, with the cutoff values listed, to be used for medical treatment purposes only.  Unconfirmed results must not be used for non-medical purposes such as employment or legal testing.  Clinical consideration should be applied to any drug of abuse test, particularly when unconfirmed results are used.     LACTIC ACID, PLASMA - Normal   FENTANYL, URINE - Normal    Narrative:     Negative Threshold:      Fentanyl 5 ng/mL     The normal value for the drug tested is negative. This report includes final unconfirmed screening results to be used for medical treatment purposes only. Unconfirmed results must not be used for non-medical purposes such as employment or legal testing. Clinical consideration should be applied to any drug of abuse test, particularly when unconfirmed results are used.          POCT GLUCOSE FINGERSTICK - Normal   BLOOD CULTURE   BLOOD CULTURE   RAINBOW DRAW    Narrative:     The following orders were created for panel order [place] Draw.  Procedure                               Abnormality         Status                     ---------                               -----------         ------                     Green Top (Gel)[[id number]]                                  Final result               Lavender Top[[id number]]                                     Final result               Gold Top - SST[[id number]]                                   Final result               Light Blue Top[[id number]]                                   Final result                 Please view results for these tests on the individual orders.   ETHANOL    Narrative:     This result is for medical use  only and should not be used for forensic purposes.   PROTIME-INR   TROPONIN   SCAN SLIDE   URINALYSIS, MICROSCOPIC ONLY   T4, FREE   GREEN TOP   LAVENDER TOP   GOLD TOP - SST   LIGHT BLUE TOP   CBC AND DIFFERENTIAL    Narrative:     The following orders were created for panel order CBC & Differential.  Procedure                               Abnormality         Status                     ---------                               -----------         ------                     CBC Auto Differential[[id number]]        Abnormal            Final result               Scan Slide[[id number]]                                       In process                   Please view results for these tests on the individual orders.       Vitals Reviewed:    Vitals:    05/10/25 1255 05/10/25 1300 05/10/25 1303 05/10/25 1306   BP: (!) 184/76 (!) 182/85  (!) 187/98   BP Location:       Patient Position:       Pulse: 93 93  94   Resp:       Temp:       TempSrc:       SpO2: 100% 100% 100% 100%   Weight:       Height:           MEDICATIONS GIVEN TO PATIENT THIS ENCOUNTER:  Medications   sodium chloride 0.9 % flush 10 mL (has no administration in time range)   lactated ringers bolus 1,000 mL (1,000 mL Intravenous New Bag 5/10/25 1256)   lactated ringers bolus 1,000 mL (has no administration in time range)   cefTRIAXone (ROCEPHIN) 1,000 mg in sodium chloride 0.9 % 100 mL IVPB-VTB (has no administration in time range)       CONSULTS:  None    CRITICAL CARE:  There was significant risk of life threatening deterioration of patient's condition requiring my direct management. Critical care time 65 minutes, excluding any documented procedures.    FINAL IMPRESSION      No diagnosis found.      DISPOSITION / PLAN     ED Disposition       ED Disposition   Intended Admit    Condition   --    Comment   --               PATIENT REFERRED TO:  No follow-up provider specified.    DISCHARGE MEDICATIONS:     Medication List        CONTINUE taking these medications       glucose monitor monitoring kit  1 each Daily.     * Insulin Pen Needle 31G X 5 MM misc  Inject insulin three times daily     * Easy Touch Pen Needles 31G X 8 MM misc  Generic drug: Insulin Pen Needle     Lancets 30G misc  Check sugar daily           * This list has 2 medication(s) that are the same as other medications prescribed for you. Read the directions carefully, and ask your doctor or other care provider to review them with you.                ASK your doctor about these medications      acetaminophen 325 MG tablet  Commonly known as: TYLENOL     aspirin 81 MG chewable tablet  Chew 1 tablet Daily.     atorvastatin 80 MG tablet  Commonly known as: LIPITOR  Take 1 tablet by mouth Daily.     carvedilol 12.5 MG tablet  Commonly known as: COREG  Take 1 tablet by mouth 2 (Two) Times a Day With Meals. Indications: Cardiac Failure     CHOLECALCIFEROL PO     ferrous sulfate 325 (65 FE) MG tablet     glucose blood test strip  1 each by Other route As Needed (FBS). Check sugar once a day  Indications: Type 2 Diabetes     insulin lispro 100 UNIT/ML injection  Commonly known as: humaLOG     isosorbide mononitrate 30 MG 24 hr tablet  Commonly known as: IMDUR  Take 1 tablet by mouth Daily.     Lidocaine 4 %  Place 1 patch on the skin as directed by provider Daily. Remove & Discard patch within 12 hours or as directed by MD     midodrine 5 MG tablet  Commonly known as: PROAMATINE  Take 1 tablet by mouth 3 (Three) Times a Day Before Meals.     NIFEdipine CC 60 MG 24 hr tablet  Commonly known as: ADALAT CC  Take 1 tablet by mouth Daily.     pantoprazole 40 MG EC tablet  Commonly known as: PROTONIX     rivastigmine 4.6 MG/24HR patch  Commonly known as: EXELON  Place 1 patch on the skin as directed by provider Daily.              Electronically signed by Danie Tolliver DO, 05/10/25, 1:19 PM EDT.    Emergency Medicine Physician  [facility]  (Please note that portions of thisnote were completed  with a voice recognition program.  Efforts were made to edit the dictations but occasionally words are mis-transcribed.)       Danie Tolliver,   05/10/25 1514       Danie Tolliver,   05/11/25 0638       Danie Tolliver,   05/12/25 1030

## 2025-05-10 NOTE — H&P
[facility]      Name:  Valarie Camara   Age:  79 y.o.  Sex:  female  :  1945  MRN:  [id number]   Visit Number:  [id number]  Admission Date:  5/10/2025  Date Of Service:  05/10/25  Primary Care Physician:  Lay Cox MD    Chief Complaint:     Altered mental status    History Of Presenting Illness:      Patient is an 79-year-old female brought to the emergency department for evaluation of altered mental status.  At the time of examination, unfortunately, patient was alone without family at bedside.  History of present illness was obtained from review of medical records, including discussion with nursing and ED physician.  Patient has a known history of dementia, however over the last 3 days, patient's daughter was concerned that the patient has developed significant confusion.  Patient has been unable to tolerate oral intake of nutrition and oral hydration.      EMS was called to patient's nursing facility, she was found to be hypotensive and tachycardic in A-fib with RVR.  They were unable to obtain IV access, subsequently unable to provide cardioversion en route to the ED.  Upon arrival to the ED, patient was emergently cardioverted, patient's rhythm with normal sinus rhythm, without any change in mentation.  Neurology was consulted, CT of the head did not show any acute CVA.  Cardiology was consulted, who feels that the elevated troponin is most likely secondary to the tachycardia and recommend troponin trending, anticoagulation.  Hospitalist services consulted for admission.    Discussion had with family and they request DNR with no chest compressions and no intubation, they approve all other interventions.      Review Of Systems:    All systems were reviewed and negative except as mentioned in history of presenting illness, assessment and plan.    Past Medical History: Patient  has a past medical history of Acute bronchitis with  bronchospasm, Anxiety, Arthritis, Asthma, B12 deficiency, Back pain, Body piercing, Cataract, Cerebrovascular disease, Cervicalgia, Chronic kidney disease, Colonic polyp, Constipation, COPD (chronic obstructive pulmonary disease), Coronary artery disease, Depression, Diverticulitis, Dysphagia, Edema, Elevated cholesterol, Esophageal reflux, Folic acid deficiency, Full dentures, Heart murmur, Herpes zoster, High cholesterol, History of kidney infection, History of recurrent urinary tract infection, HTN (hypertension), Hypercholesterolemia, Impaired functional mobility, balance, gait, and endurance, Insomnia, Kidney infection, Malignant hypertension (04/26/2015), Measles, Muscle spasm, Neoplasm of uncertain behavior of skin, Osteoporosis, Poor historian, Recurrent urinary tract infection, Rheumatoid arthritis, RLS (restless legs syndrome), Stomach ulcer, Stroke, Type 2 diabetes mellitus, and Vitamin D deficiency.    Past Surgical History: Patient  has a past surgical history that includes Hysterectomy (1979); Cataract extraction w/  intraocular lens implant (Left, 02/11/2013); Cataract extraction w/  intraocular lens implant (Right, 04/15/2013); Colonoscopy (2012); Esophagogastroduodenoscopy (N/A, 11/13/2017); Upper gastrointestinal endoscopy (12/09/2013); Upper gastrointestinal endoscopy (11/13/2017); Esophagogastroduodenoscopy (N/A, 11/25/2019); Colonoscopy (N/A, 11/26/2019); Esophagogastroduodenoscopy (N/A, 11/29/2021); Esophagogastroduodenoscopy (N/A, 11/1/2023); and Esophagogastroduodenoscopy (N/A, 1/27/2025).    Social History: Patient  reports that she quit smoking about 13 years ago. Her smoking use included cigarettes. She started smoking about 28 years ago. She has a 15 pack-year smoking history. She has been exposed to tobacco smoke. She has never used smokeless tobacco. She reports that she does not currently use alcohol. She reports that she does not use drugs.    Family History:  Patient's family history  has been reviewed and found to be noncontributory.     Allergies:      Penicillins, Clonidine derivatives, Hydralazine, and Sulfa antibiotics    Home Medications:    Prior to Admission Medications       Prescriptions Last Dose Informant Patient Reported? Taking?    acetaminophen (TYLENOL) 325 MG tablet  Child Yes No    Take 1 tablet by mouth Every 6 (Six) Hours As Needed for Headache or Mild Pain. Indications: Pain    aspirin 81 MG chewable tablet   No No    Chew 1 tablet Daily.    atorvastatin (LIPITOR) 80 MG tablet  Child No No    Take 1 tablet by mouth Daily.    carvedilol (COREG) 12.5 MG tablet   No No    Take 1 tablet by mouth 2 (Two) Times a Day With Meals. Indications: Cardiac Failure    CHOLECALCIFEROL PO  Child Yes No    Take 5,000 Int'l Units/day by mouth Daily. Indications: Vitamin D Deficiency    Easy Touch Pen Needles 31G X 8 MM misc  Child Yes No    Indications: Diabetes    ferrous sulfate 325 (65 FE) MG tablet  Child Yes No    Take 1 tablet by mouth 3 (Three) Times a Week. Indications: Iron Deficiency, Restless Leg Syndrome    glucose blood test strip   No No    1 each by Other route As Needed (FBS). Check sugar once a day  Indications: Type 2 Diabetes    glucose monitor monitoring kit  Child No No    1 each Daily.    insulin lispro (humaLOG) 100 UNIT/ML injection  Child Yes No    Inject 2 Units under the skin into the appropriate area as directed 3 (Three) Times a Day As Needed for High Blood Sugar. Daughter only gives if BS is greater than 200 two hrs after eating.    Indications: Type 2 Diabetes    Insulin Pen Needle 31G X 5 MM misc  Child No No    Inject insulin three times daily    isosorbide mononitrate (IMDUR) 30 MG 24 hr tablet   No No    Take 1 tablet by mouth Daily.    Lancets 30G misc  Child No No    Check sugar daily    Lidocaine 4 %   No No    Place 1 patch on the skin as directed by provider Daily. Remove & Discard patch within 12 hours or as directed by MD    midodrine (PROAMATINE) 5 MG  tablet   No No    Take 1 tablet by mouth 3 (Three) Times a Day Before Meals.    NIFEdipine XL (ADALAT CC) 60 MG 24 hr tablet   No No    Take 1 tablet by mouth Daily.    pantoprazole (PROTONIX) 40 MG EC tablet  Child Yes No    Take 1 tablet by mouth Daily.    rivastigmine (EXELON) 4.6 MG/24HR patch   No No    Place 1 patch on the skin as directed by provider Daily.          ED Medications:    Medications   sodium chloride 0.9 % flush 10 mL (has no administration in time range)   labetalol (NORMODYNE,TRANDATE) injection 10 mg (has no administration in time range)   lactated ringers infusion (has no administration in time range)   nitroglycerin (NITROSTAT) SL tablet 0.4 mg (has no administration in time range)   sodium chloride 0.9 % flush 10 mL (has no administration in time range)   sodium chloride 0.9 % flush 10 mL (has no administration in time range)   sodium chloride 0.9 % infusion 40 mL (has no administration in time range)   mupirocin (BACTROBAN) 2 % nasal ointment 1 Application (has no administration in time range)   sennosides-docusate (PERICOLACE) 8.6-50 MG per tablet 2 tablet (has no administration in time range)     And   polyethylene glycol (MIRALAX) packet 17 g (has no administration in time range)     And   bisacodyl (DULCOLAX) EC tablet 5 mg (has no administration in time range)     And   bisacodyl (DULCOLAX) suppository 10 mg (has no administration in time range)   Potassium Replacement - Follow Nurse / BPA Driven Protocol (has no administration in time range)   Magnesium Cardiology Dose Replacement - Follow Nurse / BPA Driven Protocol (has no administration in time range)   Phosphorus Replacement - Follow Nurse / BPA Driven Protocol (has no administration in time range)   Calcium Replacement - Follow Nurse / BPA Driven Protocol (has no administration in time range)   acetaminophen (TYLENOL) tablet 650 mg (has no administration in time range)     Or   acetaminophen (TYLENOL) suppository 650 mg (has no  "administration in time range)   lactated ringers bolus 1,000 mL (1,000 mL Intravenous New Bag 5/10/25 1256)   lactated ringers bolus 1,000 mL (1,000 mL Intravenous New Bag 5/10/25 1354)   cefTRIAXone (ROCEPHIN) 1,000 mg in sodium chloride 0.9 % 100 mL IVPB-VTB (1,000 mg Intravenous New Bag 5/10/25 1402)     Vital Signs:  Temp:  [98 °F (36.7 °C)] 98 °F (36.7 °C)  Heart Rate:  [92-96] 92  Resp:  [10] 10  BP: (122-187)/(76-98) 185/87        05/10/25  1226   Weight: 66.7 kg (147 lb)     Body mass index is 26.04 kg/m².    Physical Exam:     Most recent vital Signs: BP (!) 185/87   Pulse 92   Temp 98 °F (36.7 °C) (Rectal)   Resp 10   Ht 160 cm (63\")   Wt 66.7 kg (147 lb)   SpO2 100%   BMI 26.04 kg/m²     Physical Exam  Constitutional: Somnolent, alert.  Responsive to pain.  Ill-appearing.  Eyes: PERRLA, sclerae anicteric, no conjunctival injection  HENT: NCAT, mucous membranes moist  Neck: Supple, no thyromegaly, no lymphadenopathy, trachea midline  Respiratory: Poor breath sounds in all lung fields, clear to auscultation bilaterally, nonlabored respirations   Cardiovascular: Tachycardic, regular, no murmurs, rubs, or gallops, palpable pedal pulses bilaterally  Gastrointestinal: Positive bowel sounds, soft, nontender, nondistended  Musculoskeletal: No bilateral ankle edema, no clubbing or cyanosis to extremities  Psychiatric: Blunted affect, uncooperative  Neurologic: Limited secondary to the patient's altered mental status.  Skin: No rashes     Laboratory data:    I have reviewed the labs done in the emergency room.    Results from last 7 days   Lab Units 05/10/25  1237   SODIUM mmol/L 168*   POTASSIUM mmol/L 4.1   CHLORIDE mmol/L 130*   CO2 mmol/L 18.5*   BUN mg/dL 127*   CREATININE mg/dL 3.96*   CALCIUM mg/dL 9.8   BILIRUBIN mg/dL 0.3   ALK PHOS U/L 57   ALT (SGPT) U/L 7   AST (SGOT) U/L 10   GLUCOSE mg/dL 162*     Results from last 7 days   Lab Units 05/10/25  1237   WBC 10*3/mm3 13.08*   HEMOGLOBIN g/dL 12.6 "   HEMATOCRIT % 42.2   PLATELETS 10*3/mm3 199     Results from last 7 days   Lab Units 05/10/25  1237   INR  1.25*     Results from last 7 days   Lab Units 05/10/25  1237   HSTROP T ng/L 162*         Results from last 7 days   Lab Units 05/10/25  1237   COLOR UA  Yellow   GLUCOSE UA  Negative   KETONES UA  Trace*   BLOOD UA  Trace*   LEUKOCYTES UA  Moderate (2+)*   PH, URINE  <=5.0   BILIRUBIN UA  Negative   UROBILINOGEN UA  0.2 E.U./dL   RBC UA /HPF 0-2   WBC UA /HPF 11-20*       Pain Management Panel  More data exists         Latest Ref Rng & Units 5/10/2025 3/20/2025   Pain Management Panel   Amphetamine, Urine Qual Negative Negative  Negative    Barbiturates Screen, Urine Negative Negative  Negative    Benzodiazepine Screen, Urine Negative Negative  Negative    Buprenorphine, Screen, Urine Negative Negative  Negative    Cocaine Screen, Urine Negative Negative  Negative    Fentanyl, Urine Negative Negative  Negative    Methadone Screen , Urine Negative Negative  Negative    Methamphetamine, Ur Negative Negative  Negative      Radiology:    CT Head Without Contrast  Result Date: 5/10/2025  HEAD CT  HISTORY: Confusion.  COMPARISON: 3-.  TECHNIQUE: Multiple axial CT images were performed from the foramen magnum to the vertex without enhancement. Individualized dose reduction techniques using automated exposure control or adjustment of the mA and/or kV according to patient size were employed.  FINDINGS: Mild to moderate diffuse cortical atrophy is present.  There is no evidence of acute hemorrhage, mass effect, or edema.  Mild mucoperiosteal thickening is noted in the right maxillary sinus. No air-fluid levels are seen.       1. No evidence of acute intracranial abnormality.      This report was signed and finalized on 5/10/2025 12:57 PM by Jah Kirk MD.      XR Chest 1 View  Result Date: 5/10/2025  Chest single view  COMPARISON: Chest from 3-  HISTORY: Altered mental status  FINDINGS: Cardiac  silhouette is of normal size.  Lungs are underinflated, but clear.      1. No evidence of acute abnormality.  This report was signed and finalized on 5/10/2025 12:49 PM by Jah Kirk MD.        Assessment:    Severe Sepsis secondary to urinary tract infection  Urinary tract infection  Metabolic encephalopathy  Atrial fibrillation with RVR  Acute kidney injury  Hypernatremia  Dementia  COPD  Hypertension  Hyperlipidemia  Restless leg syndrome    Plan:    Severe Sepsis secondary to urinary tract infection  Urinary tract infection  Metabolic encephalopathy  Patient meets sepsis criteria with tachycardia, leukocytosis, source of infection identified as urinary tract infection.  Due to the sodium derangements, LR was bolused.  Evidence of endorgan damage noted with metabolic encephalopathy and acute kidney injury.  IV Rocephin.  Atrial fibrillation with RVR  Patient status post cardioversion.  Cardiology consulted, appreciate their recommendations.  Heparin drip will be started, continued  Patient is currently normal sinus rhythm, continue Coreg  Acute kidney injury  Hypernatremia  Nephrology has been consulted, appreciate his recommendations.  LR has been started, running slow maintenance.  Continue to trend sodium levels.  Caution not to overcorrect sodium too quickly, 4-6 mill equivalents per day.  Dementia  COPD  Hypertension  Hyperlipidemia  Restless leg syndrome  Continue home medications as appropriate.    Risk Assessment: High  DVT Prophylaxis: Heparin  Code Status: DNR/DNI  Diet: NPO    Aki Rodriguez DO  05/10/25  14:35 EDT    Dictated utilizing Dragon dictation.

## 2025-05-10 NOTE — PLAN OF CARE
Goal Outcome Evaluation:  Plan of Care Reviewed With: patient        Progress: improving  Outcome Evaluation: Pt cardioverted in ER.  After transferred to ICU, pt remains in NSR.  Heparin drip infusing, receiving IV rocephin.

## 2025-05-10 NOTE — PHARMACY RECOMMENDATION
Pharmacy to Dose Heparin Infusion     Valarie Camara is a  79 y.o. female receiving heparin infusion.     Therapy for (VTE/Cardiac):   cardiac  Patient Weight: 66.7 kg  Initial Bolus (Y/N):   Y  Any Bolus (Y/N):   Y        Signs or Symptoms of Bleeding: N    Cardiac or Other (Not VTE)   Initial Bolus: 60 units/kg (Max 4,000 units)  Initial rate: 12 units/kg/hr (Max 1,000 units/hr)   aPTT  Rebolus Infusion Hold time Change infusion Dose (Units/kg/hr) Next Anti Xa or aPTT Level Due   <55 50 Units/kg  (4000 Units Max) None Increase by  3 Units/kg/hr 6 hours   55-62 25 Units/kg  (2000 Units Max) None Increase by  2 Units/kg/hr 6 hours   63-69 0 None Increase by  1 Units/kg/hr 6 hours   70-85 0 None No Change 6 hours (after 2 consecutive levels in range check qAM)   86-93 0 None Decrease by  1 Units/kg/hr 6 hours    0 30 Minutes Decrease by  2 Units/kg/hr 6 hours   109-123 0 60 Minutes Decrease by  3 Units/kg/hr 6 hours   >123 0 Hold  After aPTT less than 75 decrease previous rate by  4 Units/kg/hr  Every 2 hours until aPTT less than 75 then when infusion restarts in 6 hours     Recommend aPTT every 6 hours.         Lab 05/10/25  1437 05/10/25  1237   HEMOGLOBIN  --  12.6   HEMATOCRIT  --  42.2   PLATELETS  --  199   PROTIME  --  16.6*   APTT  --  30.3*   CREATININE 3.33* 3.96*   EGFR 13.5* 10.9*   INR  --  1.25*            Date   Time   aPTT Current Rate (Unit/kg/hr) Bolus   (Units) Rate Change   (Unit/kg/hr) New Rate (Unit/kg/hr) Next   aPTT Comments  Pump Check Daily   5/10/25 1535 30.3 0 4000 +12.0 12.0 5/10 2200 d/w Emeli Thrasher, RN                                                                                                                                                                                                                                 Pharmacy will continue to follow aPTT results and monitor for signs and symptoms of bleeding or thrombosis.      Thank you for the opportunity to consult on  this patient.    Arnulfo Saleh [facility], Pharm.D.  05/10/25  15:36 EDT

## 2025-05-11 ENCOUNTER — APPOINTMENT (OUTPATIENT)
Dept: MRI IMAGING | Facility: HOSPITAL | Age: 80
DRG: 871 | End: 2025-05-11
Payer: MEDICARE

## 2025-05-11 LAB
ANION GAP SERPL CALCULATED.3IONS-SCNC: 10.1 MMOL/L (ref 5–15)
ANION GAP SERPL CALCULATED.3IONS-SCNC: 10.9 MMOL/L (ref 5–15)
ANION GAP SERPL CALCULATED.3IONS-SCNC: 12 MMOL/L (ref 5–15)
ANION GAP SERPL CALCULATED.3IONS-SCNC: 13.7 MMOL/L (ref 5–15)
ANION GAP SERPL CALCULATED.3IONS-SCNC: 15.2 MMOL/L (ref 5–15)
ANISOCYTOSIS BLD QL: NORMAL
APTT PPP: 116.3 SECONDS (ref 70–100)
APTT PPP: 27.7 SECONDS (ref 70–100)
APTT PPP: 28.7 SECONDS (ref 70–100)
APTT PPP: 47.3 SECONDS (ref 70–100)
BASOPHILS # BLD AUTO: 0.02 10*3/MM3 (ref 0–0.2)
BASOPHILS NFR BLD AUTO: 0.2 % (ref 0–1.5)
BUN SERPL-MCNC: 101 MG/DL (ref 8–23)
BUN SERPL-MCNC: 101 MG/DL (ref 8–23)
BUN SERPL-MCNC: 105 MG/DL (ref 8–23)
BUN SERPL-MCNC: 111 MG/DL (ref 8–23)
BUN SERPL-MCNC: 94 MG/DL (ref 8–23)
BUN/CREAT SERPL: 33.3 (ref 7–25)
BUN/CREAT SERPL: 33.7 (ref 7–25)
BUN/CREAT SERPL: 36.3 (ref 7–25)
BUN/CREAT SERPL: 39.8 (ref 7–25)
BUN/CREAT SERPL: 40.9 (ref 7–25)
BURR CELLS BLD QL SMEAR: NORMAL
CALCIUM SPEC-SCNC: 8.6 MG/DL (ref 8.6–10.5)
CALCIUM SPEC-SCNC: 8.8 MG/DL (ref 8.6–10.5)
CALCIUM SPEC-SCNC: 8.9 MG/DL (ref 8.6–10.5)
CALCIUM SPEC-SCNC: 8.9 MG/DL (ref 8.6–10.5)
CALCIUM SPEC-SCNC: 9.2 MG/DL (ref 8.6–10.5)
CHLORIDE SERPL-SCNC: 129 MMOL/L (ref 98–107)
CHLORIDE SERPL-SCNC: 130 MMOL/L (ref 98–107)
CHLORIDE SERPL-SCNC: 131 MMOL/L (ref 98–107)
CHLORIDE SERPL-SCNC: 131 MMOL/L (ref 98–107)
CHLORIDE SERPL-SCNC: 132 MMOL/L (ref 98–107)
CO2 SERPL-SCNC: 19.8 MMOL/L (ref 22–29)
CO2 SERPL-SCNC: 21.9 MMOL/L (ref 22–29)
CO2 SERPL-SCNC: 22 MMOL/L (ref 22–29)
CO2 SERPL-SCNC: 22.3 MMOL/L (ref 22–29)
CO2 SERPL-SCNC: 23.1 MMOL/L (ref 22–29)
CREAT SERPL-MCNC: 2.47 MG/DL (ref 0.57–1)
CREAT SERPL-MCNC: 2.59 MG/DL (ref 0.57–1)
CREAT SERPL-MCNC: 2.79 MG/DL (ref 0.57–1)
CREAT SERPL-MCNC: 3 MG/DL (ref 0.57–1)
CREAT SERPL-MCNC: 3.15 MG/DL (ref 0.57–1)
DEPRECATED RDW RBC AUTO: 55.3 FL (ref 37–54)
EGFRCR SERPLBLD CKD-EPI 2021: 14.4 ML/MIN/1.73
EGFRCR SERPLBLD CKD-EPI 2021: 15.3 ML/MIN/1.73
EGFRCR SERPLBLD CKD-EPI 2021: 16.7 ML/MIN/1.73
EGFRCR SERPLBLD CKD-EPI 2021: 18.2 ML/MIN/1.73
EGFRCR SERPLBLD CKD-EPI 2021: 19.3 ML/MIN/1.73
EOSINOPHIL # BLD AUTO: 0.02 10*3/MM3 (ref 0–0.4)
EOSINOPHIL NFR BLD AUTO: 0.2 % (ref 0.3–6.2)
ERYTHROCYTE [DISTWIDTH] IN BLOOD BY AUTOMATED COUNT: 15 % (ref 12.3–15.4)
GLUCOSE SERPL-MCNC: 108 MG/DL (ref 65–99)
GLUCOSE SERPL-MCNC: 114 MG/DL (ref 65–99)
GLUCOSE SERPL-MCNC: 160 MG/DL (ref 65–99)
GLUCOSE SERPL-MCNC: 254 MG/DL (ref 65–99)
GLUCOSE SERPL-MCNC: 90 MG/DL (ref 65–99)
HCT VFR BLD AUTO: 38.7 % (ref 34–46.6)
HGB BLD-MCNC: 11.5 G/DL (ref 12–15.9)
IMM GRANULOCYTES # BLD AUTO: 0.03 10*3/MM3 (ref 0–0.05)
IMM GRANULOCYTES NFR BLD AUTO: 0.3 % (ref 0–0.5)
LYMPHOCYTES # BLD AUTO: 1.46 10*3/MM3 (ref 0.7–3.1)
LYMPHOCYTES NFR BLD AUTO: 16 % (ref 19.6–45.3)
MAGNESIUM SERPL-MCNC: 2.7 MG/DL (ref 1.6–2.4)
MCH RBC QN AUTO: 29.3 PG (ref 26.6–33)
MCHC RBC AUTO-ENTMCNC: 29.7 G/DL (ref 31.5–35.7)
MCV RBC AUTO: 98.5 FL (ref 79–97)
MONOCYTES # BLD AUTO: 0.74 10*3/MM3 (ref 0.1–0.9)
MONOCYTES NFR BLD AUTO: 8.1 % (ref 5–12)
NEUTROPHILS NFR BLD AUTO: 6.84 10*3/MM3 (ref 1.7–7)
NEUTROPHILS NFR BLD AUTO: 75.2 % (ref 42.7–76)
NRBC BLD AUTO-RTO: 0 /100 WBC (ref 0–0.2)
PHOSPHATE SERPL-MCNC: 4.3 MG/DL (ref 2.5–4.5)
PLATELET # BLD AUTO: 141 10*3/MM3 (ref 140–450)
PMV BLD AUTO: 12 FL (ref 6–12)
POTASSIUM SERPL-SCNC: 3.9 MMOL/L (ref 3.5–5.2)
POTASSIUM SERPL-SCNC: 4 MMOL/L (ref 3.5–5.2)
POTASSIUM SERPL-SCNC: 4 MMOL/L (ref 3.5–5.2)
POTASSIUM SERPL-SCNC: 4.2 MMOL/L (ref 3.5–5.2)
POTASSIUM SERPL-SCNC: 4.2 MMOL/L (ref 3.5–5.2)
RBC # BLD AUTO: 3.93 10*6/MM3 (ref 3.77–5.28)
SMALL PLATELETS BLD QL SMEAR: NORMAL
SODIUM SERPL-SCNC: 163 MMOL/L (ref 136–145)
SODIUM SERPL-SCNC: 164 MMOL/L (ref 136–145)
SODIUM SERPL-SCNC: 165 MMOL/L (ref 136–145)
SODIUM SERPL-SCNC: 165 MMOL/L (ref 136–145)
SODIUM SERPL-SCNC: 167 MMOL/L (ref 136–145)
WBC MORPH BLD: NORMAL
WBC NRBC COR # BLD AUTO: 9.11 10*3/MM3 (ref 3.4–10.8)

## 2025-05-11 PROCEDURE — 70551 MRI BRAIN STEM W/O DYE: CPT

## 2025-05-11 PROCEDURE — 85730 THROMBOPLASTIN TIME PARTIAL: CPT | Performed by: INTERNAL MEDICINE

## 2025-05-11 PROCEDURE — 99232 SBSQ HOSP IP/OBS MODERATE 35: CPT | Performed by: INTERNAL MEDICINE

## 2025-05-11 PROCEDURE — 83735 ASSAY OF MAGNESIUM: CPT | Performed by: FAMILY MEDICINE

## 2025-05-11 PROCEDURE — 85025 COMPLETE CBC W/AUTO DIFF WBC: CPT | Performed by: FAMILY MEDICINE

## 2025-05-11 PROCEDURE — 25010000002 HYDRALAZINE PER 20 MG: Performed by: FAMILY MEDICINE

## 2025-05-11 PROCEDURE — 84100 ASSAY OF PHOSPHORUS: CPT | Performed by: FAMILY MEDICINE

## 2025-05-11 PROCEDURE — 25010000002 CEFTRIAXONE PER 250 MG: Performed by: FAMILY MEDICINE

## 2025-05-11 PROCEDURE — 80048 BASIC METABOLIC PNL TOTAL CA: CPT | Performed by: FAMILY MEDICINE

## 2025-05-11 PROCEDURE — 99222 1ST HOSP IP/OBS MODERATE 55: CPT | Performed by: PSYCHIATRY & NEUROLOGY

## 2025-05-11 PROCEDURE — 25010000003 DEXTROSE 5 % SOLUTION: Performed by: INTERNAL MEDICINE

## 2025-05-11 PROCEDURE — 25010000002 HEPARIN (PORCINE) PER 1000 UNITS: Performed by: INTERNAL MEDICINE

## 2025-05-11 PROCEDURE — 85007 BL SMEAR W/DIFF WBC COUNT: CPT | Performed by: FAMILY MEDICINE

## 2025-05-11 PROCEDURE — 85730 THROMBOPLASTIN TIME PARTIAL: CPT | Performed by: FAMILY MEDICINE

## 2025-05-11 RX ORDER — DEXTROSE MONOHYDRATE 50 MG/ML
200 INJECTION, SOLUTION INTRAVENOUS CONTINUOUS
Status: ACTIVE | OUTPATIENT
Start: 2025-05-11 | End: 2025-05-11

## 2025-05-11 RX ORDER — DEXTROSE MONOHYDRATE, SODIUM CHLORIDE, AND POTASSIUM CHLORIDE 50; 1.49; 4.5 G/1000ML; G/1000ML; G/1000ML
50 INJECTION, SOLUTION INTRAVENOUS CONTINUOUS
Status: DISPENSED | OUTPATIENT
Start: 2025-05-11 | End: 2025-05-12

## 2025-05-11 RX ORDER — HEPARIN SODIUM 10000 [USP'U]/100ML
17 INJECTION, SOLUTION INTRAVENOUS
Status: DISCONTINUED | OUTPATIENT
Start: 2025-05-11 | End: 2025-05-12

## 2025-05-11 RX ORDER — HYDRALAZINE HYDROCHLORIDE 20 MG/ML
10 INJECTION INTRAMUSCULAR; INTRAVENOUS EVERY 6 HOURS PRN
Status: DISCONTINUED | OUTPATIENT
Start: 2025-05-11 | End: 2025-05-23 | Stop reason: HOSPADM

## 2025-05-11 RX ORDER — HEPARIN SODIUM 10000 [USP'U]/100ML
14 INJECTION, SOLUTION INTRAVENOUS
Status: DISCONTINUED | OUTPATIENT
Start: 2025-05-11 | End: 2025-05-11

## 2025-05-11 RX ORDER — HEPARIN SODIUM 1000 [USP'U]/ML
50 INJECTION, SOLUTION INTRAVENOUS; SUBCUTANEOUS ONCE
Status: COMPLETED | OUTPATIENT
Start: 2025-05-11 | End: 2025-05-11

## 2025-05-11 RX ORDER — METOPROLOL TARTRATE 1 MG/ML
5 INJECTION, SOLUTION INTRAVENOUS EVERY 4 HOURS PRN
Status: DISCONTINUED | OUTPATIENT
Start: 2025-05-11 | End: 2025-05-23 | Stop reason: HOSPADM

## 2025-05-11 RX ORDER — HEPARIN SODIUM 1000 [USP'U]/ML
3340 INJECTION, SOLUTION INTRAVENOUS; SUBCUTANEOUS ONCE
Status: COMPLETED | OUTPATIENT
Start: 2025-05-11 | End: 2025-05-11

## 2025-05-11 RX ADMIN — Medication 2 SPRAY: at 04:03

## 2025-05-11 RX ADMIN — MUPIROCIN 1 APPLICATION: 20 OINTMENT TOPICAL at 10:43

## 2025-05-11 RX ADMIN — HEPARIN SODIUM 3340 UNITS: 1000 INJECTION, SOLUTION INTRAVENOUS; SUBCUTANEOUS at 20:52

## 2025-05-11 RX ADMIN — SODIUM CHLORIDE 2000 MG: 9 INJECTION, SOLUTION INTRAVENOUS at 16:52

## 2025-05-11 RX ADMIN — DEXTROSE MONOHYDRATE 200 ML/HR: 50 INJECTION, SOLUTION INTRAVENOUS at 16:52

## 2025-05-11 RX ADMIN — Medication 2 SPRAY: at 23:43

## 2025-05-11 RX ADMIN — HEPARIN SODIUM 3340 UNITS: 1000 INJECTION, SOLUTION INTRAVENOUS; SUBCUTANEOUS at 13:44

## 2025-05-11 RX ADMIN — RIVASTIGMINE 1 PATCH: 4.6 PATCH TRANSDERMAL at 10:43

## 2025-05-11 RX ADMIN — Medication 2 SPRAY: at 10:48

## 2025-05-11 RX ADMIN — METOPROLOL TARTRATE 5 MG: 1 INJECTION, SOLUTION INTRAVENOUS at 20:15

## 2025-05-11 RX ADMIN — Medication 10 ML: at 10:42

## 2025-05-11 RX ADMIN — Medication 2 SPRAY: at 20:16

## 2025-05-11 RX ADMIN — HYDRALAZINE HYDROCHLORIDE 10 MG: 20 INJECTION INTRAMUSCULAR; INTRAVENOUS at 20:51

## 2025-05-11 RX ADMIN — MUPIROCIN 1 APPLICATION: 20 OINTMENT TOPICAL at 20:16

## 2025-05-11 RX ADMIN — POTASSIUM CHLORIDE, DEXTROSE MONOHYDRATE AND SODIUM CHLORIDE 50 ML/HR: 150; 5; 450 INJECTION, SOLUTION INTRAVENOUS at 10:40

## 2025-05-11 RX ADMIN — Medication 10 ML: at 20:16

## 2025-05-11 RX ADMIN — HEPARIN SODIUM 13.94 UNITS/KG/HR: 10000 INJECTION, SOLUTION INTRAVENOUS at 20:54

## 2025-05-11 NOTE — PROGRESS NOTES
Pharmacy to Dose Heparin Infusion     Valarie Camara is a  79 y.o. female receiving heparin infusion.     Therapy for (VTE/Cardiac):   Cardiac  Patient Weight: Using 66.7 kg  Initial Bolus (Y/N):   Y  Any Bolus (Y/N):   Y        Signs or Symptoms of Bleeding: N    Cardiac or Other (Not VTE)   Initial Bolus: 60 units/kg (Max 4,000 units)  Initial rate: 12 units/kg/hr (Max 1,000 units/hr)   aPTT  Rebolus Infusion Hold time Change infusion Dose (Units/kg/hr) Next Anti Xa or aPTT Level Due   <55 50 Units/kg  (4000 Units Max) None Increase by  3 Units/kg/hr 6 hours   55-62 25 Units/kg  (2000 Units Max) None Increase by  2 Units/kg/hr 6 hours   63-69 0 None Increase by  1 Units/kg/hr 6 hours   70-85 0 None No Change 6 hours (after 2 consecutive levels in range check qAM)   86-93 0 None Decrease by  1 Units/kg/hr 6 hours    0 30 Minutes Decrease by  2 Units/kg/hr 6 hours   109-123 0 60 Minutes Decrease by  3 Units/kg/hr 6 hours   >123 0 Hold  After aPTT less than 75 decrease previous rate by  4 Units/kg/hr  Every 2 hours until aPTT less than 75 then when infusion restarts in 6 hours           Recommend aPTT every 6 hours.     Results from last 7 days   Lab Units 05/11/25  0342 05/11/25  0025 05/10/25  2131 05/10/25  1237   APTT seconds 47.3* 116.3* >200.0* 30.3*   INR   --   --   --  1.25*   PROTIME Seconds  --   --   --  16.6*     Results from last 7 days   Lab Units 05/11/25  0053 05/10/25  1237   HEMOGLOBIN g/dL 11.5* 12.6   HEMATOCRIT % 38.7 42.2   PLATELETS 10*3/mm3 141 199       Per protocol after aPTT less than 75, decrease previous rate by 4 units/kg/hr .   Rate to be adjusted to 8 u/kg/hr.     Recommend aPTT every 6 hours.        Date   Time   aPTT Current Rate (Unit/kg/hr) Bolus   (Units) Rate Change   (Unit/kg/hr) New Rate (Unit/kg/hr) Next   aPTT Comments  Pump Check Daily   5/10/25 1530 30.3 0 4000 +12.0 12.0 5/10 2200 d/w Emeli Thrasher RN   05/10/25 2239 >200 12 0 Hold  Hold 5/11 0100 AIDAN Rehman    5/11/25 0342 47.3 12 at time of hold 0 -4 8 0945 AIDAN Rehman                                                                                                                                                                                                                Pharmacy will continue to follow aPTT results and monitor for signs and symptoms of bleeding or thrombosis.    Amanda Holcomb RPH  05/11/25 05:51 EDT

## 2025-05-11 NOTE — PROGRESS NOTES
[facility]  INPATIENT PROGRESS NOTE         [facility] INTENSIVE CARE    5/11/2025      PATIENT IDENTIFICATION:   Name:  Valarie Camara      MRN:  [id number]     79 y.o.  female             Reason for visit: Atrial fibrillation, chronic diastolic heart failure      SUBJECTIVE:   Resting comfortably no acute distress, nonverbal.     OBJECTIVE:  Vitals:    05/11/25 0700 05/11/25 0800 05/11/25 0900 05/11/25 1000   BP: 134/58 137/66 167/69 157/71   BP Location:       Patient Position:       Pulse: 74 80 85 78   Resp: 12 14 12 14   Temp:   98.1 °F (36.7 °C)    TempSrc:   Oral    SpO2: 99% 100% 100% 95%   Weight:       Height:               Body mass index is 21.67 kg/m².    Intake/Output Summary (Last 24 hours) at 5/11/2025 1249  Last data filed at 5/11/2025 0611  Gross per 24 hour   Intake 2453.2 ml   Output 295 ml   Net 2158.2 ml       Telemetry: Personally reviewed, normal sinus rhythm, no arrhythmia     Exam:  General Appearance:   Frail, chronically ill-appearing  Neck:  thyroid not enlarged  supple  Respiratory:  no respiratory distress  normal breath sounds  no rales  Cardiovascular:  no jugular venous distention  regular rhythm  apical impulse normal  S1 normal, S2 normal  no S3, no S4   no murmur  no rub, no thrill  carotid pulses normal; no bruit  pedal pulses normal  lower extremity edema: none    Skin:   warm, dry      Allergies   Allergen Reactions    Penicillins Rash, Shortness Of Breath, Anaphylaxis and Other (See Comments)    Clonidine Derivatives Other (See Comments)     Pt reports extreme hypotension      Hydralazine Nausea And Vomiting and Other (See Comments)    Sulfa Antibiotics Rash     Scheduled meds:       aspirin, 81 mg, Oral, Daily  atorvastatin, 80 mg, Oral, Daily  carvedilol, 12.5 mg, Oral, BID With Meals  cefTRIAXone, 2,000 mg, Intravenous, Q24H  mupirocin, 1 Application, Each Nare, BID  rivastigmine, 1 patch, Transdermal,  "Daily  senna-docusate sodium, 2 tablet, Oral, BID  sodium chloride, 10 mL, Intravenous, Q12H      IV meds:                      dextrose 5 % and sodium chloride 0.45 % with KCl 20 mEq/L, 50 mL/hr, Last Rate: 50 mL/hr (05/11/25 1040)  heparin, 8 Units/kg/hr, Last Rate: 7.946 Units/kg/hr (05/11/25 0555)  Pharmacy to Dose Heparin,       Data Review:  Results from last 7 days   Lab Units 05/11/25  1149 05/11/25  0342 05/11/25  0025 05/10/25  2131   SODIUM mmol/L 167* 164* 165* 166*   BUN mg/dL 101* 111* 105* 108*   CREATININE mg/dL 3.00* 2.79* 3.15* 3.21*   GLUCOSE mg/dL 114* 90 108* 152*     Results from last 7 days   Lab Units 05/11/25  0053 05/10/25  1237   WBC 10*3/mm3 9.11 13.08*   HEMOGLOBIN g/dL 11.5* 12.6     Results from last 7 days   Lab Units 05/10/25  1237   INR  1.25*     Results from last 7 days   Lab Units 05/10/25  1237   ALT (SGPT) U/L 7   AST (SGOT) U/L 10     No results found for: \"DIGOXIN\"   Lab Results   Component Value Date    TSH 0.024 (L) 05/10/2025           Invalid input(s): \"LDLCALC\"    Estimated Creatinine Clearance: 13.1 mL/min (A) (by C-G formula based on SCr of 3 mg/dL (H)).        Imaging (last 24 hr):   I personally reviewed the most recent chest x-ray and other pertinent imaging studies.  Results for orders placed during the hospital encounter of 05/10/25    XR Chest 1 View    Narrative  Chest single view    COMPARISON: Chest from 3-    HISTORY: Altered mental status    FINDINGS: Cardiac silhouette is of normal size.    Lungs are underinflated, but clear.    Impression  1. No evidence of acute abnormality.    This report was signed and finalized on 5/10/2025 12:49 PM by Jah Kirk MD.        Last ECHO:  Results for orders placed during the hospital encounter of 03/20/25    Adult Transthoracic Echo Complete W/ Cont if Necessary Per Protocol (With Agitated Saline)    Interpretation Summary    Left ventricular systolic function is normal. Calculated left ventricular EF = 67% " Left ventricular ejection fraction appears to be 66 - 70%.    Left ventricular wall thickness is consistent with mild concentric hypertrophy.    Left ventricular outflow tract peak flow gradient at rest is 11 mmHg. Left ventricular outflow tract peak gradient with valsalva is 29 mmHg.    Left ventricular diastolic function is consistent with (grade I) impaired relaxation.    The mitral valve peak gradient is 9 mmHg. The mitral valve mean gradient is 4 mmHg.    Calculated right ventricular systolic pressure from tricuspid regurgitation is 21 mmHg.    There is a trivial pericardial effusion.        PROBLEM LIST:     Hypernatremia    PAF (paroxysmal atrial fibrillation)    Acute renal failure superimposed on stage 3a chronic kidney disease        Initial cardiac assessment: 79-year-old female with severe dementia presenting with A-fib RVR in the setting of sepsis due to UTI, severe hypernatremia and acute on chronic kidney disease.    ASSESSMENT/PLAN:  1.  Paroxysmal atrial fibrillation:  Newly diagnosed 5/10/2025, cardioverted in the ER due to hypotension  Patient chronically anticoagulated with Eliquis due to history of PE  A-fib with RVR in setting of dehydration, KURT, UTI, hypernatremia.  Correct all of the above, no indication for antiarrhythmic at this time  Continue anticoagulation unless contraindicated  Continue heparin drip for now but may transition back to Eliquis I would use 2.5 mg twice daily given age and renal function.  Recent echo 3/2025 with normal EF, no need to repeat     Continue home dose carvedilol    Maintaining sinus rhythm     2.  Chronic diastolic heart failure:  Appears hypovolemic, no diuretics especially in setting of hyponatremia and dehydration     3.  Severe sepsis due to UTI:  Antibiotics per primary team     4.  Severe hypernatremia:  IV fluids, normal EF  Unchanged     5.  Hypertension:  Continue home dose carvedilol, hold off on other blood pressure lowering medications in setting  of KURT  Permissive hypertension        Aki Alvarenga MD  5/11/2025    12:49 EDT

## 2025-05-11 NOTE — CONSULTS
[facility]      Nephrology Consultation      Referring Provider:   Aki Rodriguez DO    Reason for Consultation:  Acute kidney injury associated problems.  Hypernatremia.    Subjective:  Chief complaint   Chief Complaint   Patient presents with    Altered Mental Status     History of present illness:    Patient is 79-year-old female with multimedical problems including chronic kidney disease stage IIIb, hypertension, coronary artery disease, recurrent urinary tract infection, dyslipidemia, significant dementia currently living in a nursing home, she was getting worsening of her confusion daughter got concerned and called the EMS, EMS found patient in A-fib with RVR and hypotensive.  Brought into the emergency room and cardiology was consulted started on anticoagulation and was admitted through the hospitalist service.  She was noted to have increased sodium with significant worsening of BUN and creatinine of 127/3.96 as well as UA suggestive of UTI.  Initial impression from the hospitalist admission note appears to be that patient has sepsis with metabolic encephalopathy.  Currently patient is in ICU she opens her eyes does not have significant interaction but appears comfortable.  Chart reviewed since admission.  Patient has been given saline and LR with no significant improvement in serum sodium, creatinine slightly better.  I have reviewed labs/imaging/records from this hospitalization, including ER staff and admitting/attending physicians H/P's and progress notes to establish a comprehensive understanding of this patient's clinical hospital course, as well as to establish plan of care appropriately.     Past Medical History:   Diagnosis Date    Acute bronchitis with bronchospasm     Anxiety     Arthritis     Asthma     B12 deficiency     Back pain     Body piercing     ears    Cataract     Cerebrovascular disease     Cervicalgia     Chronic kidney disease     stage 3b    Colonic polyp      "History of colonic polyps     Constipation     COPD (chronic obstructive pulmonary disease)     Coronary artery disease     Depression     Diverticulitis     Dysphagia     Patient reported \"it won't go all the way down\" when eating solid foods first thing in the mornings    Edema     Elevated cholesterol     Esophageal reflux     Folic acid deficiency     Full dentures     Advised no adhesives DOS    Heart murmur     Herpes zoster     High cholesterol     History of kidney infection     History of recurrent urinary tract infection     HTN (hypertension)     Hypercholesterolemia     Impaired functional mobility, balance, gait, and endurance     Insomnia     Kidney infection     Malignant hypertension 04/26/2015     Accelerated essential hypertension    Measles     rubeola    Muscle spasm     Neoplasm of uncertain behavior of skin     dtr denies    Osteoporosis     Poor historian     Recurrent urinary tract infection     Rheumatoid arthritis     RLS (restless legs syndrome)     Stomach ulcer     Stroke     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    Type 2 diabetes mellitus     Vitamin D deficiency        Past Surgical History:   Procedure Laterality Date    CATARACT EXTRACTION WITH INTRAOCULAR LENS IMPLANT Left 02/11/2013    CATARACT EXTRACTION WITH INTRAOCULAR LENS IMPLANT Right 04/15/2013    COLONOSCOPY  2012    COLONOSCOPY N/A 11/26/2019    Procedure: COLONOSCOPY;  Surgeon: Chidi Downing MD;  Location: [facility] ENDOSCOPY;  Service: Gastroenterology    ENDOSCOPY N/A 11/13/2017    Procedure: ESOPHAGOGASTRODUODENOSCOPY with biopsies and esophageal balloon dilitation;  Surgeon: Wesley Stovall MD;  Location: [facility] ENDOSCOPY;  Service:     ENDOSCOPY N/A 11/25/2019    Procedure: ESOPHAGOGASTRODUODENOSCOPY;  Surgeon: Chidi Downing MD;  Location: [facility] ENDOSCOPY;  Service: Gastroenterology    ENDOSCOPY N/A 11/29/2021    Procedure: ESOPHAGOGASTRODUODENOSCOPY with dilation and " biopsies;  Surgeon: Gonzalez Zapata MD;  Location: [facility] ENDOSCOPY;  Service: Gastroenterology;  Laterality: N/A;    ENDOSCOPY N/A 2023    Procedure: ESOPHAGOGASTRODUODENOSCOPY WITH BIOPSY AND DILATATION;  Surgeon: Gonzalez Zapata MD;  Location: [facility] ENDOSCOPY;  Service: Gastroenterology;  Laterality: N/A;    ENDOSCOPY N/A 2025    Procedure: Esophagogastroduodenoscopy with dilatation and biopsies;  Surgeon: Gonzalez Zapata MD;  Location: [facility] ENDOSCOPY;  Service: Gastroenterology;  Laterality: N/A;    HYSTERECTOMY      UPPER GASTROINTESTINAL ENDOSCOPY  2013    UPPER GASTROINTESTINAL ENDOSCOPY  2017     Family History   Problem Relation Age of Onset    Arthritis Mother     Diabetes Mother     Hypertension Mother     Arthritis Other     Arthritis Other     Diabetes Other         DM    Hypertension Other     Colon cancer Neg Hx        Social History     Tobacco Use    Smoking status: Former     Current packs/day: 0.00     Average packs/day: 1 pack/day for 15.0 years (15.0 ttl pk-yrs)     Types: Cigarettes     Start date: 1997     Quit date:      Years since quittin.3     Passive exposure: Past    Smokeless tobacco: Never    Tobacco comments:      Denied: History of smoking cigarettes   Vaping Use    Vaping status: Never Used   Substance Use Topics    Alcohol use: Not Currently    Drug use: Never     Home medications:   Prior to Admission Medications       Prescriptions Last Dose Informant Patient Reported? Taking?    acetaminophen (TYLENOL) 325 MG tablet  Child Yes No    Take 1 tablet by mouth Every 6 (Six) Hours As Needed for Headache or Mild Pain. Indications: Pain    aspirin 81 MG chewable tablet   No No    Chew 1 tablet Daily.    atorvastatin (LIPITOR) 80 MG tablet  Child No No    Take 1 tablet by mouth Daily.    carvedilol (COREG) 12.5 MG tablet   No No    Take 1 tablet by mouth 2 (Two) Times a Day With Meals. Indications: Cardiac Failure     CHOLECALCIFEROL PO  Child Yes No    Take 5,000 Int'l Units/day by mouth Daily. Indications: Vitamin D Deficiency    Easy Touch Pen Needles 31G X 8 MM misc  Child Yes No    Indications: Diabetes    ferrous sulfate 325 (65 FE) MG tablet  Child Yes No    Take 1 tablet by mouth 3 (Three) Times a Week. Indications: Iron Deficiency, Restless Leg Syndrome    glucose blood test strip   No No    1 each by Other route As Needed (FBS). Check sugar once a day  Indications: Type 2 Diabetes    glucose monitor monitoring kit  Child No No    1 each Daily.    insulin lispro (humaLOG) 100 UNIT/ML injection  Child Yes No    Inject 2 Units under the skin into the appropriate area as directed 3 (Three) Times a Day As Needed for High Blood Sugar. Daughter only gives if BS is greater than 200 two hrs after eating.    Indications: Type 2 Diabetes    Insulin Pen Needle 31G X 5 MM misc  Child No No    Inject insulin three times daily    isosorbide mononitrate (IMDUR) 30 MG 24 hr tablet   No No    Take 1 tablet by mouth Daily.    Lancets 30G misc  Child No No    Check sugar daily    Lidocaine 4 %   No No    Place 1 patch on the skin as directed by provider Daily. Remove & Discard patch within 12 hours or as directed by MD    midodrine (PROAMATINE) 5 MG tablet   No No    Take 1 tablet by mouth 3 (Three) Times a Day Before Meals.    NIFEdipine XL (ADALAT CC) 60 MG 24 hr tablet   No No    Take 1 tablet by mouth Daily.    pantoprazole (PROTONIX) 40 MG EC tablet  Child Yes No    Take 1 tablet by mouth Daily.    rivastigmine (EXELON) 4.6 MG/24HR patch   No No    Place 1 patch on the skin as directed by provider Daily.          Emergency department medications:   Medications   sodium chloride 0.9 % flush 10 mL (has no administration in time range)   nitroglycerin (NITROSTAT) SL tablet 0.4 mg (has no administration in time range)   sodium chloride 0.9 % flush 10 mL (10 mL Intravenous Not Given 5/10/25 2035)   sodium chloride 0.9 % flush 10 mL (10 mL  Intravenous Given 5/10/25 1951)   sodium chloride 0.9 % infusion 40 mL ( Intravenous Held by provider 5/10/25 1605)   mupirocin (BACTROBAN) 2 % nasal ointment 1 Application (1 Application Each Nare Given 5/10/25 2232)   sennosides-docusate (PERICOLACE) 8.6-50 MG per tablet 2 tablet (2 tablets Oral Not Given 5/10/25 2034)     And   polyethylene glycol (MIRALAX) packet 17 g (has no administration in time range)     And   bisacodyl (DULCOLAX) EC tablet 5 mg (has no administration in time range)     And   bisacodyl (DULCOLAX) suppository 10 mg (has no administration in time range)   Potassium Replacement - Follow Nurse / BPA Driven Protocol (has no administration in time range)   Magnesium Cardiology Dose Replacement - Follow Nurse / BPA Driven Protocol (has no administration in time range)   Phosphorus Replacement - Follow Nurse / BPA Driven Protocol (has no administration in time range)   Calcium Replacement - Follow Nurse / BPA Driven Protocol (has no administration in time range)   acetaminophen (TYLENOL) tablet 650 mg (has no administration in time range)     Or   acetaminophen (TYLENOL) suppository 650 mg (has no administration in time range)   heparin 64369 units/250 ml (100 units/ml) in D5W (7.946 Units/kg/hr × 66.7 kg Intravenous Restarted 5/11/25 0555)   Pharmacy to Dose Heparin (has no administration in time range)   carvedilol (COREG) tablet 12.5 mg (12.5 mg Oral Not Given 5/10/25 1724)   atorvastatin (LIPITOR) tablet 80 mg (80 mg Oral Not Given 5/10/25 1724)   aspirin chewable tablet 81 mg (81 mg Oral Not Given 5/10/25 1724)   rivastigmine (EXELON) 4.6 MG/24HR patch 1 patch (1 patch Transdermal Medication Applied 5/10/25 1818)   cefTRIAXone (ROCEPHIN) 2,000 mg in sodium chloride 0.9 % 100 mL IVPB-VTB (2,000 mg Intravenous New Bag 5/10/25 1817)   glycerin 35 % liquid 35% 2 spray (2 sprays Mouth/Throat Given 5/11/25 0403)   dextrose 5 % and sodium chloride 0.45 % with KCl 20 mEq/L infusion (has no  "administration in time range)   lactated ringers bolus 1,000 mL (0 mL Intravenous Stopped 5/10/25 1500)   lactated ringers bolus 1,000 mL (0 mL Intravenous Stopped 5/10/25 1500)   cefTRIAXone (ROCEPHIN) 1,000 mg in sodium chloride 0.9 % 100 mL IVPB-VTB (0 mg Intravenous Stopped 5/10/25 1500)   labetalol (NORMODYNE,TRANDATE) injection 10 mg (10 mg Intravenous Given 5/10/25 1456)   heparin (porcine) injection 4,000 Units (4,000 Units Intravenous Given 5/10/25 1549)       Allergies:  Penicillins, Clonidine derivatives, Hydralazine, and Sulfa antibiotics    Review of Systems  Patient is demented no meaningful review of system is possible.      Physical Exam:  Objective:  Vital Signs  /55 (BP Location: Left arm, Patient Position: Lying)   Pulse 78   Temp 98 °F (36.7 °C) (Oral)   Resp 14   Ht 160 cm (63\")   Wt 55.5 kg (122 lb 5.7 oz)   SpO2 98%   BMI 21.67 kg/m²   Objective    No intake/output data recorded.    Intake/Output Summary (Last 24 hours) at 5/11/2025 0748  Last data filed at 5/11/2025 0611  Gross per 24 hour   Intake 2453.2 ml   Output 295 ml   Net 2158.2 ml        Physical Exam     Constitutional: Awake, in no acute distress.  Eyes: sclerae anicteric, no conjunctival injection  HEENT: mucous membranes dry  Neck: Supple, no thyromegaly, no lymphadenopathy, trachea midline, No JVD  Respiratory: Clear to auscultation bilaterally, nonlabored respirations   Cardiovascular: IRRR, no murmurs, rubs, or gallops.  Gastrointestinal: Positive bowel sounds, obese, soft, nontender, nondistended  Musculoskeletal: No edema, no clubbing or cyanosis  Psychiatric: No meaningful interaction.    Neurologic: Randomly moving all extremities, no meaningful interaction.  Skin: warm and dry, no rashes            Results Review:   Results from last 7 days   Lab Units 05/11/25  0342 05/11/25  0025 05/10/25  2131 05/10/25  1437 05/10/25  1237   SODIUM mmol/L 164* 165* 166*   < > 168*   POTASSIUM mmol/L 4.2 4.0 4.2   < > 4.1 " "  CHLORIDE mmol/L 132* 130* 128*   < > 130*   CO2 mmol/L 21.9* 19.8* 17.6*   < > 18.5*   BUN mg/dL 111* 105* 108*   < > 127*   CREATININE mg/dL 2.79* 3.15* 3.21*   < > 3.96*   CALCIUM mg/dL 8.9 8.9 9.1   < > 9.8   ALBUMIN g/dL  --   --   --   --  3.1*   BILIRUBIN mg/dL  --   --   --   --  0.3   ALK PHOS U/L  --   --   --   --  57   ALT (SGPT) U/L  --   --   --   --  7   AST (SGOT) U/L  --   --   --   --  10   GLUCOSE mg/dL 90 108* 152*   < > 162*    < > = values in this interval not displayed.     Estimated Creatinine Clearance: 14.1 mL/min (A) (by C-G formula based on SCr of 2.79 mg/dL (H)).  Results from last 7 days   Lab Units 05/11/25  0342 05/10/25  1237   MAGNESIUM mg/dL 2.7* 3.0*   PHOSPHORUS mg/dL 4.3  --          Results from last 7 days   Lab Units 05/11/25  0053 05/10/25  1237   WBC 10*3/mm3 9.11 13.08*   HEMOGLOBIN g/dL 11.5* 12.6   PLATELETS 10*3/mm3 141 199     Results from last 7 days   Lab Units 05/10/25  1237   INR  1.25*     Brief Urine Lab Results  (Last result in the past 365 days)        Color   Clarity   Blood   Leuk Est   Nitrite   Protein   CREAT   Urine HCG        05/10/25 1237 Yellow   Cloudy   Trace   Moderate (2+)   Positive   30 mg/dL (1+)                 No results found for: \"UTPCR\"  Imaging Results (Last 24 Hours)       Procedure Component Value Units Date/Time    CT Head Without Contrast [[id number]] Collected: 05/10/25 1256     Updated: 05/10/25 1259    Narrative:      HEAD CT     HISTORY: Confusion.     COMPARISON: 3-.     TECHNIQUE: Multiple axial CT images were performed from the foramen  magnum to the vertex without enhancement. Individualized dose reduction  techniques using automated exposure control or adjustment of the mA  and/or kV according to patient size were employed.     FINDINGS: Mild to moderate diffuse cortical atrophy is present.     There is no evidence of acute hemorrhage, mass effect, or edema.     Mild mucoperiosteal thickening is noted in the right " maxillary sinus. No  air-fluid levels are seen.       Impression:         1. No evidence of acute intracranial abnormality.                 This report was signed and finalized on 5/10/2025 12:57 PM by Jah Kirk MD.       XR Chest 1 View [[id number]] Collected: 05/10/25 1248     Updated: 05/10/25 1252    Narrative:      Chest single view     COMPARISON: Chest from 3-     HISTORY: Altered mental status     FINDINGS: Cardiac silhouette is of normal size.     Lungs are underinflated, but clear.       Impression:      1. No evidence of acute abnormality.     This report was signed and finalized on 5/10/2025 12:49 PM by Jah Kirk MD.             aspirin, 81 mg, Oral, Daily  atorvastatin, 80 mg, Oral, Daily  carvedilol, 12.5 mg, Oral, BID With Meals  cefTRIAXone, 2,000 mg, Intravenous, Q24H  mupirocin, 1 Application, Each Nare, BID  rivastigmine, 1 patch, Transdermal, Daily  senna-docusate sodium, 2 tablet, Oral, BID  sodium chloride, 10 mL, Intravenous, Q12H      dextrose 5 % and sodium chloride 0.45 % with KCl 20 mEq/L, 50 mL/hr  heparin, 8 Units/kg/hr, Last Rate: 7.946 Units/kg/hr (05/11/25 0555)  Pharmacy to Dose Heparin,         Assessment/Plan:      Hypernatremia    PAF (paroxysmal atrial fibrillation)    Acute renal failure superimposed on stage 3a chronic kidney disease    Acute kidney injury: Most likely volume depletion, since she has been getting IV fluids it is starting to improve.  Bicarb and potassium appears to be stable increase serum sodium noted.  Starting to make urine.  Chronic kidney disease stage IIIb: Most likely will settle down to the baseline.  Hypernatremia: Secondary to free water losses and unable to eat and drink, she has about 5 L water deficit.  I will adjust hypotonic solutions.  Paroxysmal atrial fibrillation: Treated as per hospitalist service and cardiology.  Sepsis: Treated as per hospitalist service.  Metabolic encephalopathy: Significant uremia as well as dementia  making things worse.      Risk and complexity: High       Plan:  Patient is getting D5 half-normal saline at 50 cc an hour, will continue this fluid.  I will go ahead and give her D5W at 200 cc an hour for 5 hours they will give her 1 L of hypotonic solution.  Will recheck labs again in the morning and will adjust IV fluids and diuretics.  Once we hydrate her renal function should improve and get close to her baseline.  She is not a candidate for any aggressive measures.  Continue with rest of the current treatment plan and surveillance labs, adjust medications to patient's GFR.  Details were discussed with the patient no family in the room.    Details were also discussed with the hospitalist service.   Further recommendations will depend on clinical course of the patient during the current hospitalization.    I also discussed the details with the nursing staff.  Rest as ordered.    In closing, I sincerely appreciate opportunity to participate in care of this patient. If I can be of any further assistance with the management of this patient, please don’t hesitate to contact me.    Ortega Cabrales MD, [facility]    05/11/25  07:48 EDT    Dictated using Dragon.

## 2025-05-11 NOTE — PROGRESS NOTES
Pharmacy to Dose Heparin Infusion     Valarie Camara is a  79 y.o. female receiving heparin infusion.     Therapy for (VTE/Cardiac):   cardiac  Patient Weight: 66.7 kg  Initial Bolus (Y/N):   Y  Any Bolus (Y/N):   Y        Signs or Symptoms of Bleeding: N    Cardiac or Other (Not VTE)   Initial Bolus: 60 units/kg (Max 4,000 units)  Initial rate: 12 units/kg/hr (Max 1,000 units/hr)   aPTT  Rebolus Infusion Hold time Change infusion Dose (Units/kg/hr) Next Anti Xa or aPTT Level Due   <55 50 Units/kg  (4000 Units Max) None Increase by  3 Units/kg/hr 6 hours   55-62 25 Units/kg  (2000 Units Max) None Increase by  2 Units/kg/hr 6 hours   63-69 0 None Increase by  1 Units/kg/hr 6 hours   70-85 0 None No Change 6 hours (after 2 consecutive levels in range check qAM)   86-93 0 None Decrease by  1 Units/kg/hr 6 hours    0 30 Minutes Decrease by  2 Units/kg/hr 6 hours   109-123 0 60 Minutes Decrease by  3 Units/kg/hr 6 hours   >123 0 Hold  After aPTT less than 75 decrease previous rate by  4 Units/kg/hr  Every 2 hours until aPTT less than 75 then when infusion restarts in 6 hours     Recommend aPTT every 6 hours.         Lab 05/10/25  1437 05/10/25  1237   HEMOGLOBIN  --  12.6   HEMATOCRIT  --  42.2   PLATELETS  --  199   PROTIME  --  16.6*   APTT  --  30.3*   CREATININE 3.33* 3.96*   EGFR 13.5* 10.9*   INR  --  1.25*            Date   Time   aPTT Current Rate (Unit/kg/hr) Bolus   (Units) Rate Change   (Unit/kg/hr) New Rate (Unit/kg/hr) Next   aPTT Comments  Pump Check Daily   5/10/25 1535 30.3 0 4000 +12.0 12.0 5/10 2200 d/w Emeli Thrasher, RN                                                                                                                                                                                                                                 Pharmacy will continue to follow aPTT results and monitor for signs and symptoms of bleeding or thrombosis.      Thank you for the opportunity to consult on  this patient.    Arnulfo Saleh [facility], Pharm.D.  05/10/25  15:36 EDT

## 2025-05-11 NOTE — PLAN OF CARE
Pt alert to self and family, very soft spoken and muffled. MRI this am with neuro consult. Pt strict NPO, needs eval. PALLIATIVE CARE COnsult. Vitals stable HR did go into AFIB twice for only a few minutess and converted back on own  Goal Outcome Evaluation:

## 2025-05-11 NOTE — CONSULTS
DOS: 2025  NAME: Valarie Camara   : 1945  PCP: Lay Cox MD  CC: Altered mentation.  Referring MD: Aki Rodriguez DO, Wil Rader DO    Neurological Problem and Interval History:  79 y.o. right-handed white female with a Hx of cerebrovascular disease in the past had been seen for some time by Dr. Vic Crespo initially on March 3, 2018 for suspected stroke.  The stroke had affected her right side at that time.  Patient has also been seen by the stroke team at [facility] and again by our general neurology nurse practitioner Ms. Caty Shrestha on 2025 for lethargy and repeated agitation.  The last note from Ms. Caty Shrestha on 2025 states that she was at her baseline and was able to remember the names of family members.    She has been admitted this time with hyponatremia and acute renal failure.  Yesterday she was also hypoxic.  Today when I saw her through the telemedicine device she is awake and alert and she can tell me her name and date of birth although she whispers.  No asymmetry of the face is noted.  She follows one-step commands well.  There is no drift noticeable in the upper extremities and she can perform finger-to-nose coordination bilaterally.  However the lower extremities she is unable to  her legs off the bed for 5 seconds and drifts down right away.  However she is able to bend her knees and also wiggle her toes when requested.  She says she cannot feel the left side of the leg and does not feel the upper extremities.  However she can feel her face well.  Neurological examination is inconsistent through the telemedicine device.  She has been kept nothing by mouth as she is considered not safe to swallow as of yet.    Past Medical/Surgical Hx:  Past Medical History:   Diagnosis Date    Acute bronchitis with bronchospasm     Anxiety     Arthritis     Asthma     B12 deficiency     Back pain     Body piercing     ears    Cataract   "   Cerebrovascular disease     Cervicalgia     Chronic kidney disease     stage 3b    Colonic polyp     History of colonic polyps     Constipation     COPD (chronic obstructive pulmonary disease)     Coronary artery disease     Depression     Diverticulitis     Dysphagia     Patient reported \"it won't go all the way down\" when eating solid foods first thing in the mornings    Edema     Elevated cholesterol     Esophageal reflux     Folic acid deficiency     Full dentures     Advised no adhesives DOS    Heart murmur     Herpes zoster     High cholesterol     History of kidney infection     History of recurrent urinary tract infection     HTN (hypertension)     Hypercholesterolemia     Impaired functional mobility, balance, gait, and endurance     Insomnia     Kidney infection     Malignant hypertension 04/26/2015     Accelerated essential hypertension    Measles     rubeola    Muscle spasm     Neoplasm of uncertain behavior of skin     dtr denies    Osteoporosis     Poor historian     Recurrent urinary tract infection     Rheumatoid arthritis     RLS (restless legs syndrome)     Stomach ulcer     Stroke     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    Type 2 diabetes mellitus     Vitamin D deficiency      Past Surgical History:   Procedure Laterality Date    CATARACT EXTRACTION WITH INTRAOCULAR LENS IMPLANT Left 02/11/2013    CATARACT EXTRACTION WITH INTRAOCULAR LENS IMPLANT Right 04/15/2013    COLONOSCOPY  2012    COLONOSCOPY N/A 11/26/2019    Procedure: COLONOSCOPY;  Surgeon: Chidi Downing MD;  Location:  [facility];  Service: Gastroenterology    ENDOSCOPY N/A 11/13/2017    Procedure: ESOPHAGOGASTRODUODENOSCOPY with biopsies and esophageal balloon dilitation;  Surgeon: Wesley Stovall MD;  Location:  [facility];  Service:     ENDOSCOPY N/A 11/25/2019    Procedure: ESOPHAGOGASTRODUODENOSCOPY;  Surgeon: Chidi Downing MD;  Location:  [facility];  Service: " Gastroenterology    ENDOSCOPY N/A 11/29/2021    Procedure: ESOPHAGOGASTRODUODENOSCOPY with dilation and biopsies;  Surgeon: Gonzalez Zapata MD;  Location: [facility];  Service: Gastroenterology;  Laterality: N/A;    ENDOSCOPY N/A 11/1/2023    Procedure: ESOPHAGOGASTRODUODENOSCOPY WITH BIOPSY AND DILATATION;  Surgeon: Gonzalez Zapata MD;  Location: [facility];  Service: Gastroenterology;  Laterality: N/A;    ENDOSCOPY N/A 1/27/2025    Procedure: Esophagogastroduodenoscopy with dilatation and biopsies;  Surgeon: Gonzalez Zapata MD;  Location: [facility];  Service: Gastroenterology;  Laterality: N/A;    HYSTERECTOMY  1979    UPPER GASTROINTESTINAL ENDOSCOPY  12/09/2013    UPPER GASTROINTESTINAL ENDOSCOPY  11/13/2017       Review of Systems:    Constitutional: Pleasant lady laying comfortably in the bed in no distress.  Cardiovascular: No chest pain or palpitations noted.  Respiratory: Patient is not short of breath.  Gastrointestinal: No nausea and vomiting noted.  Genitourinary: Has diapers in place.  Musculoskeletal: No aches and pains in the muscles or joints noted.  However, the lower extremity muscles seem to be weak compared to the upper.  Dermatological: No skin breakdown noted.  Neurological: Inconsistent neurological examination through telemedicine device.  Psychiatric: Has progressive dementia and so could not tell me the month of the year.  Ophthalmological: No visual changes noted.          Medications On Admission  Medications Prior to Admission   Medication Sig Dispense Refill Last Dose/Taking    acetaminophen (TYLENOL) 325 MG tablet Take 1 tablet by mouth Every 6 (Six) Hours As Needed for Headache or Mild Pain. Indications: Pain       aspirin 81 MG chewable tablet Chew 1 tablet Daily. 90 tablet 0     atorvastatin (LIPITOR) 80 MG tablet Take 1 tablet by mouth Daily. 90 tablet 0     carvedilol (COREG) 12.5 MG tablet Take 1 tablet by mouth 2 (Two) Times a Day With  Meals. Indications: Cardiac Failure 180 tablet 0     CHOLECALCIFEROL PO Take 5,000 Int'l Units/day by mouth Daily. Indications: Vitamin D Deficiency       Easy Touch Pen Needles 31G X 8 MM misc Indications: Diabetes       ferrous sulfate 325 (65 FE) MG tablet Take 1 tablet by mouth 3 (Three) Times a Week. Indications: Iron Deficiency, Restless Leg Syndrome       glucose blood test strip 1 each by Other route As Needed (FBS). Check sugar once a day  Indications: Type 2 Diabetes       glucose monitor monitoring kit 1 each Daily. 1 each 1     insulin lispro (humaLOG) 100 UNIT/ML injection Inject 2 Units under the skin into the appropriate area as directed 3 (Three) Times a Day As Needed for High Blood Sugar. Daughter only gives if BS is greater than 200 two hrs after eating.    Indications: Type 2 Diabetes       Insulin Pen Needle 31G X 5 MM misc Inject insulin three times daily 100 each 11     isosorbide mononitrate (IMDUR) 30 MG 24 hr tablet Take 1 tablet by mouth Daily. 90 tablet 0     Lancets 30G misc Check sugar daily 30 each 12     Lidocaine 4 % Place 1 patch on the skin as directed by provider Daily. Remove & Discard patch within 12 hours or as directed by MD 30 patch 0     midodrine (PROAMATINE) 5 MG tablet Take 1 tablet by mouth 3 (Three) Times a Day Before Meals. 90 tablet 0     NIFEdipine XL (ADALAT CC) 60 MG 24 hr tablet Take 1 tablet by mouth Daily. 90 tablet 0     pantoprazole (PROTONIX) 40 MG EC tablet Take 1 tablet by mouth Daily.       rivastigmine (EXELON) 4.6 MG/24HR patch Place 1 patch on the skin as directed by provider Daily. 30 patch 0        Prior to Admission medications    Medication Sig Start Date End Date Taking? Authorizing Provider   acetaminophen (TYLENOL) 325 MG tablet Take 1 tablet by mouth Every 6 (Six) Hours As Needed for Headache or Mild Pain. Indications: Pain 1/1/25   Provider, MD Bruce   aspirin 81 MG chewable tablet Chew 1 tablet Daily. 4/8/25   Magalis Raya MD    atorvastatin (LIPITOR) 80 MG tablet Take 1 tablet by mouth Daily. 10/19/20   Arminda Evans MD   carvedilol (COREG) 12.5 MG tablet Take 1 tablet by mouth 2 (Two) Times a Day With Meals. Indications: Cardiac Failure 4/7/25   Magalis Raya MD   CHOLECALCIFEROL PO Take 5,000 Int'l Units/day by mouth Daily. Indications: Vitamin D Deficiency    ProviderBruce MD   Easy Touch Pen Needles 31G X 8 MM misc Indications: Diabetes 1/26/22   ProviderBruce MD   ferrous sulfate 325 (65 FE) MG tablet Take 1 tablet by mouth 3 (Three) Times a Week. Indications: Iron Deficiency, Restless Leg Syndrome    ProviderBruce MD   glucose blood test strip 1 each by Other route As Needed (FBS). Check sugar once a day  Indications: Type 2 Diabetes 3/4/25   Kerley, Brian Joseph, DO   glucose monitor monitoring kit 1 each Daily. 11/19/20   Arminda Evans MD   insulin lispro (humaLOG) 100 UNIT/ML injection Inject 2 Units under the skin into the appropriate area as directed 3 (Three) Times a Day As Needed for High Blood Sugar. Daughter only gives if BS is greater than 200 two hrs after eating.    Indications: Type 2 Diabetes    ProviderBruce MD   Insulin Pen Needle 31G X 5 MM misc Inject insulin three times daily 9/8/20   Arminda Evans MD   isosorbide mononitrate (IMDUR) 30 MG 24 hr tablet Take 1 tablet by mouth Daily. 4/8/25   Magalis Raya MD   Lancets 30G misc Check sugar daily 11/19/20   Arminda Evans MD   Lidocaine 4 % Place 1 patch on the skin as directed by provider Daily. Remove & Discard patch within 12 hours or as directed by MD 4/8/25   Magalis Raya MD   midodrine (PROAMATINE) 5 MG tablet Take 1 tablet by mouth 3 (Three) Times a Day Before Meals. 4/7/25   Magalis Raya MD   NIFEdipine XL (ADALAT CC) 60 MG 24 hr tablet Take 1 tablet by mouth Daily. 4/8/25   Magalis Raya MD   pantoprazole (PROTONIX) 40 MG EC tablet Take 1 tablet by mouth Daily.    ProviderBruce MD   rivastigmine (EXELON) 4.6  MG/24HR patch Place 1 patch on the skin as directed by provider Daily. 25   Magalis Ryaa MD        Allergies:  Allergies   Allergen Reactions    Penicillins Rash, Shortness Of Breath, Anaphylaxis and Other (See Comments)    Clonidine Derivatives Other (See Comments)     Pt reports extreme hypotension      Hydralazine Nausea And Vomiting and Other (See Comments)    Sulfa Antibiotics Rash       Social Hx:  Social History     Socioeconomic History    Marital status:    Tobacco Use    Smoking status: Former     Current packs/day: 0.00     Average packs/day: 1 pack/day for 15.0 years (15.0 ttl pk-yrs)     Types: Cigarettes     Start date: 1997     Quit date:      Years since quittin.3     Passive exposure: Past    Smokeless tobacco: Never    Tobacco comments:      Denied: History of smoking cigarettes   Vaping Use    Vaping status: Never Used   Substance and Sexual Activity    Alcohol use: Not Currently    Drug use: Never    Sexual activity: Defer       Family Hx:  Family History   Problem Relation Age of Onset    Arthritis Mother     Diabetes Mother     Hypertension Mother     Arthritis Other     Arthritis Other     Diabetes Other         DM    Hypertension Other     Colon cancer Neg Hx        Review of Imaging (Interpretation of images not reports): Reviewed the MRI films done this morning on the PACS that does not show any acute stroke and minimal white matter changes but mostly cortical atrophy noted.    CT Head Without Contrast  Result Date: 5/10/2025  HEAD CT  HISTORY: Confusion.  COMPARISON: 3-.  TECHNIQUE: Multiple axial CT images were performed from the foramen magnum to the vertex without enhancement. Individualized dose reduction techniques using automated exposure control or adjustment of the mA and/or kV according to patient size were employed.  FINDINGS: Mild to moderate diffuse cortical atrophy is present.  There is no evidence of acute hemorrhage, mass effect, or edema.   Mild mucoperiosteal thickening is noted in the right maxillary sinus. No air-fluid levels are seen.      Impression:  1. No evidence of acute intracranial abnormality.      This report was signed and finalized on 5/10/2025 12:57 PM by Jah Kirk MD.            Additional Tests Performed: MRI BRAIN WO CONTRAST     Date of Exam: 3/22/2025 2:03 AM EDT     Indication: Stroke, follow up  Aphasia, right-sided weakness, numbness, facial droop.     Comparison: Head CT 3/21/2025     Technique:  Routine multiplanar/multisequence sequence images of the brain were obtained without contrast administration.        Findings:  No areas of diffusion restriction to suggest a recent infarct. Negative for acute intracranial hemorrhage, large mass lesion, midline shift or hydrocephalus. Mild global parenchymal volume loss for age. Mild periventricular T2/FLAIR hyperintense signal   suggesting chronic microvascular ischemic change. Mostly empty sella. Negative for cerebellar tonsillar ectopia. Normal volume corpus callosum. Major intracranial flow voids preserved. Orbits symmetric. Trace bilateral mastoid fluid. Unremarkable   appearance of the calvarium.     IMPRESSION:  Impression:  No acute MRI findings, specifically no findings to suggest a recent infarct.    Results for orders placed during the hospital encounter of 03/20/25    Adult Transthoracic Echo Complete W/ Cont if Necessary Per Protocol (With Agitated Saline)    Interpretation Summary    Left ventricular systolic function is normal. Calculated left ventricular EF = 67% Left ventricular ejection fraction appears to be 66 - 70%.    Left ventricular wall thickness is consistent with mild concentric hypertrophy.    Left ventricular outflow tract peak flow gradient at rest is 11 mmHg. Left ventricular outflow tract peak gradient with valsalva is 29 mmHg.    Left ventricular diastolic function is consistent with (grade I) impaired relaxation.    The mitral valve peak gradient  "is 9 mmHg. The mitral valve mean gradient is 4 mmHg.    Calculated right ventricular systolic pressure from tricuspid regurgitation is 21 mmHg.    There is a trivial pericardial effusion.       Results for orders placed during the hospital encounter of 03/20/25    Duplex Venous Lower Extremity - Bilateral CAR    Interpretation Summary    Normal bilateral lower extremity venous duplex scan.       Laboratory Results:   Lab Results   Component Value Date    GLUCOSE 90 05/11/2025    CALCIUM 8.9 05/11/2025     (C) 05/11/2025    K 4.2 05/11/2025    CO2 21.9 (L) 05/11/2025     (H) 05/11/2025     (H) 05/11/2025    CREATININE 2.79 (H) 05/11/2025    EGFRIFAFRI 42 (L) 05/23/2021    EGFRIFNONA 33 (L) 12/18/2020    BCR 39.8 (H) 05/11/2025    ANIONGAP 10.1 05/11/2025     Lab Results   Component Value Date    WBC 9.11 05/11/2025    HGB 11.5 (L) 05/11/2025    HCT 38.7 05/11/2025    MCV 98.5 (H) 05/11/2025     05/11/2025     Lab Results   Component Value Date    CHOL 215 (H) 03/21/2025    CHOL 195 05/20/2021    CHOL 147 01/11/2021     Lab Results   Component Value Date    HDL 43 03/21/2025    HDL 54 05/20/2021    HDL 44 01/11/2021     Lab Results   Component Value Date     (H) 03/21/2025     (H) 05/20/2021    LDL 86 01/11/2021     Lab Results   Component Value Date    TRIG 149 03/21/2025    TRIG 124 05/20/2021    TRIG 90 01/11/2021     Lab Results   Component Value Date    HGBA1C 6.90 (H) 03/04/2025     Lab Results   Component Value Date    INR 1.25 (H) 05/10/2025    INR 1.43 (H) 03/20/2025    INR 0.91 02/28/2025    PROTIME 16.6 (H) 05/10/2025    PROTIME 17.6 (H) 03/20/2025    PROTIME 12.7 02/28/2025     Lab Results   Component Value Date    XIGTJXGR57 252 10/07/2019     No results found for: \"FOLATE\"  TSH          3/4/2025    02:44 3/20/2025    16:08 5/10/2025    12:37   TSH   TSH 0.630  0.355  0.024           Physical Examination:  /55 (BP Location: Left arm, Patient Position: Lying)  " " Pulse 78   Temp 98 °F (36.7 °C) (Oral)   Resp 14   Ht 160 cm (63\")   Wt 55.5 kg (122 lb 5.7 oz)   SpO2 98%   BMI 21.67 kg/m²   General Appearance:   Well developed, well nourished, well groomed, alert, and cooperative.  HEENT: Normocephalic.    Neck and Spine: Normal range of motion.  Normal alignment. No mass or tenderness. No bruits.    Extremities:    No edema or deformities. Normal joint ROM.   Skin:    No rashes or birth marks.    Neurological examination:  Higher Integrative  Function: Oriented to person only  CN II:  Pupils are equal, round, and reactive to light. Normal visual acuity and visual fields.    CN III IV VI: Extraocular movements are full without nystagmus.   CN V:  Normal facial sensation and strength of muscles of mastication.  CN VII:  Facial movements are symmetric. No weakness.  CN VIII: Auditory acuity is normal.  CN IX & X: Symmetric palatal movement.  CN XI:  Sternocleidomastoid and trapezius are normal.  No weakness.  CN XII:  The tongue is midline.  No atrophy or fasciculations.  Motor:  Normal muscle strength in both upper extremities noted.  However, both lower extremities seem to be weak and she cannot keep the legs up in the air for 5 seconds when requested.  She can pull up her knees and she can wiggle her toes when requested.  Sensation: She can only feel the right leg but cannot feel the left leg and says she cannot feel both arms but she can feel her face and the neurological sensory examination is inconsistent through the telemedicine device..  Station and Gait: Not weightbearing at this time.  Coordination:  The finger-to-nose coordination is normal.  However the heel-to-shin testing bilaterally could not be tested because she is weak in the legs..      Diagnoses / Discussion:  79 y.o. who presents with Sx of metabolic encephalopathy with which she presented yesterday.  She was hypernatremic along with acute renal failure.  The neuroimaging studies have been " unremarkable for any acute stroke.  She has some cortical atrophy consistent with her dementia.    Plan:  At this time treat her underlying metabolic issues.  Have speech pathology evaluated to make sure that she is safe to take things by mouth.  Have physical therapy and Occupational Therapy mobilize her.  She is at a high risk of delirium so we have to be careful regarding memory enhancing medications which are cholinergics that tend to make delirium worse.  She has been admitted numerous times in the [facility] with this problem.  An EEG will be helpful whenever the technicians are available for tomorrow..     I have discussed the above with the care team and she will be followed up by inpatient teleneurology service with me tomorrow..  Time spent with patient: 70 minutes in face-to-face evaluation and management of the patient using the dedicated telemedicine device without any interruption with the help of the rounding nurse with the patient located at the [facility] and myself at a remote location.    Electronically signed by Suha Hernandez MD, 05/11/25, 8:29 AM EDT.    Dictated using Dragon dictation.

## 2025-05-11 NOTE — PROGRESS NOTES
[facility]    Name:  Valarie Camara   Age:  79 y.o.  Sex:  female  :  1945  MRN:  [id number]   Visit Number:  [id number]  Admission Date:  5/10/2025  Date Of Service:  25  Primary Care Physician:  Lay Cox MD     LOS: 0 days :    Chief Complaint:      weakness    Subjective:    May 11, 2025: Admitting provider documentation reviewed.  Admitted with hypernatremia KURT concern for severe sepsis due to UTI patient afebrile.  Urine does show white cells and bacteria difficult to assess symptoms in this patient.  Reasonable to treat for UTI.  Also has been treated for atrial fib with anticoagulants.  Elevated troponin likely stress response.  Was hypotensive requiring cardioversion. Called Cleo went to .     History Of Presenting Illness:       Patient is an 79-year-old female brought to the emergency department for evaluation of altered mental status.  At the time of examination, unfortunately, patient was alone without family at bedside.  History of present illness was obtained from review of medical records, including discussion with nursing and ED physician.  Patient has a known history of dementia, however over the last 3 days, patient's daughter was concerned that the patient has developed significant confusion.  Patient has been unable to tolerate oral intake of nutrition and oral hydration.       EMS was called to patient's nursing facility, she was found to be hypotensive and tachycardic in A-fib with RVR.  They were unable to obtain IV access, subsequently unable to provide cardioversion en route to the ED.  Upon arrival to the ED, patient was emergently cardioverted, patient's rhythm with normal sinus rhythm, without any change in mentation.  Neurology was consulted, CT of the head did not show any acute CVA.  Cardiology was consulted, who feels that the elevated troponin is most likely secondary to the tachycardia and  recommend troponin trending, anticoagulation.  Hospitalist services consulted for admission.     Discussion had with family and they request DNR with no chest compressions and no intubation, they approve all other interventions.  Edited by: Wil Rader DO at 5/11/2025 1046     Review of Systems:     All systems were reviewed and negative except as mentioned in subjective, assessment and plan.    Vital Signs:    Temp:  [97.3 °F (36.3 °C)-98.1 °F (36.7 °C)] 98.1 °F (36.7 °C)  Heart Rate:  [74-96] 78  Resp:  [10-16] 14  BP: (122-187)/(54-98) 157/71    Intake and output:    I/O last 3 completed shifts:  In: 2453.2 [I.V.:453.2; IV Piggyback:2000]  Out: 295 [Urine:295]  No intake/output data recorded.    Physical Examination:    Constitutional: Awake alert, ill-appearing  HENT: NCAT, mucous membranes dry  Respiratory: Clear to auscultation bilaterally, respiratory effort normal   Cardiovascular: RRR, no murmurs, rubs, or gallops  Gastrointestinal: Positive bowel sounds, soft, nontender, nondistended  Musculoskeletal: No bilateral ankle edema  Psychiatric: Blunted affect uncooperative  Neurologic: Speech is limited oriented to self and location not day  Skin: dry  Edited by: Wil Rader DO at 5/11/2025 1048     Laboratory results:    Results from last 7 days   Lab Units 05/11/25  0342 05/11/25  0025 05/10/25  2131 05/10/25  1437 05/10/25  1237   SODIUM mmol/L 164* 165* 166*   < > 168*   POTASSIUM mmol/L 4.2 4.0 4.2   < > 4.1   CHLORIDE mmol/L 132* 130* 128*   < > 130*   CO2 mmol/L 21.9* 19.8* 17.6*   < > 18.5*   BUN mg/dL 111* 105* 108*   < > 127*   CREATININE mg/dL 2.79* 3.15* 3.21*   < > 3.96*   CALCIUM mg/dL 8.9 8.9 9.1   < > 9.8   BILIRUBIN mg/dL  --   --   --   --  0.3   ALK PHOS U/L  --   --   --   --  57   ALT (SGPT) U/L  --   --   --   --  7   AST (SGOT) U/L  --   --   --   --  10   GLUCOSE mg/dL 90 108* 152*   < > 162*    < > = values in this interval not displayed.     Results from last 7 days    Lab Units 05/11/25  0053 05/10/25  1237   WBC 10*3/mm3 9.11 13.08*   HEMOGLOBIN g/dL 11.5* 12.6   HEMATOCRIT % 38.7 42.2   PLATELETS 10*3/mm3 141 199     Results from last 7 days   Lab Units 05/10/25  1237   INR  1.25*     Results from last 7 days   Lab Units 05/10/25  1437 05/10/25  1237   HSTROP T ng/L 141* 162*     Results from last 7 days   Lab Units 05/10/25  1237   URINECX  >100,000 CFU/mL Gram Negative Bacilli*     Recent Labs     03/22/25  0610   PHART 7.449   [id number] 32.0*   PO2ART 78.3*   JFJ3LAG 22.1   BASEEXCESS -1.2*      I have reviewed the patient's laboratory results.    Radiology results:    MRI Brain Without Contrast  Result Date: 5/11/2025  MRI BRAIN WITHOUT CONTRAST  HISTORY: Confusion  COMPARISON: 3/22/2025  TECHNIQUE: Multiplanar, multisequence images of the brain were performed without contrast.  FINDINGS: The diffusion weighted images demonstrate no restricted diffusion. There is no acute ischemia. The cervicomedullary junction is normal. There is no Chiari malformation. There is moderate atrophy. There is increased T2 signal in the periventricular white matter consistent mild microvascular change. There is no cortical edema or hemorrhage. There are new right mastoid opacities.      Impression: 1. Moderate atrophy with mild microvascular change. There is no edema or hemorrhage. 2. New right mastoid opacities.   This report was signed and finalized on 5/11/2025 8:59 AM by Yuniel Silva MD.      CT Head Without Contrast  Result Date: 5/10/2025  HEAD CT  HISTORY: Confusion.  COMPARISON: 3-.  TECHNIQUE: Multiple axial CT images were performed from the foramen magnum to the vertex without enhancement. Individualized dose reduction techniques using automated exposure control or adjustment of the mA and/or kV according to patient size were employed.  FINDINGS: Mild to moderate diffuse cortical atrophy is present.  There is no evidence of acute hemorrhage, mass effect, or edema.  Mild  mucoperiosteal thickening is noted in the right maxillary sinus. No air-fluid levels are seen.      Impression:  1. No evidence of acute intracranial abnormality.      This report was signed and finalized on 5/10/2025 12:57 PM by Jah Kirk MD.      XR Chest 1 View  Result Date: 5/10/2025  Chest single view  COMPARISON: Chest from 3-  HISTORY: Altered mental status  FINDINGS: Cardiac silhouette is of normal size.  Lungs are underinflated, but clear.      Impression: 1. No evidence of acute abnormality.  This report was signed and finalized on 5/10/2025 12:49 PM by Jah Kirk MD.      I have reviewed the patient's radiology reports.    Medication Review:     I have reviewed the patient's active and prn medications.     Problem List:      Hypernatremia    PAF (paroxysmal atrial fibrillation)    Acute renal failure superimposed on stage 3a chronic kidney disease      Assessment/Plan:    Severe Sepsis secondary to urinary tract infection  Urinary tract infection  Metabolic encephalopathy  Atrial fibrillation with RVR  Acute kidney injury  Hypernatremia  Dementia  COPD  Hypertension  Hyperlipidemia  Restless leg syndrome     Plan:     Severe Sepsis secondary to urinary tract infection  Urinary tract infection  Metabolic encephalopathy  Patient meets sepsis criteria with tachycardia, leukocytosis, source of infection identified as urinary tract infection.  Due to the sodium derangements, LR was bolused.  Evidence of endorgan damage noted with metabolic encephalopathy and acute kidney injury.  IV Rocephin.  MRI no stroke  Grey placed on 5/10/25  Atrial fibrillation with RVR  Patient status post cardioversion.  Cardiology consulted, appreciate their recommendations.  Heparin drip will be started, continued  Patient is currently normal sinus rhythm, continue Coreg  Acute kidney injury  Acute hypernatremia  Sodium was normal 1 month prior.  Patient encephalopathic which may be symptoms related to  hyponatremia.  Nephrology has been consulted, appreciate his recommendations.  Status post LR boluses, continue slow hypotonic fluids with dextrose  Serial sodium, if sodium is correcting greater than one half Mg per DL per hour will hold fluids  Dementia  COPD  Hypertension  Hyperlipidemia  Restless leg syndrome  Continue home medications as appropriate.       DVT Prophylaxis: Heparin  Code Status: DNR/DNI  Diet: NPO  Disposition: Under evaluation will benefit from palliative discussions once available  Edited by: Wil Rader DO at 5/11/2025 1046           Wil Rader DO  05/11/25  10:49 EDT    Dictated utilizing Dragon dictation.

## 2025-05-11 NOTE — PLAN OF CARE
Goal Outcome Evaluation:              Outcome Evaluation: Sinus rhtyhm on monitor(had one episode of a psvt rhythm).  2L NC O2 continued.  Pt has not spoken any words, however, does mumble words at times.  Does not follow command(s) consistently.  Latest Na 164.

## 2025-05-11 NOTE — PROGRESS NOTES
Pharmacy to Dose Heparin Infusion     Valarie Camara is a  79 y.o. female receiving heparin infusion.     Therapy for (VTE/Cardiac):   Cardiac  Patient Weight: 66.7 kg  Initial Bolus (Y/N):   Y  Any Bolus (Y/N):   Y        Signs or Symptoms of Bleeding: N    Cardiac or Other (Not VTE)   Initial Bolus: 60 units/kg (Max 4,000 units)  Initial rate: 12 units/kg/hr (Max 1,000 units/hr)   aPTT  Rebolus Infusion Hold time Change infusion Dose (Units/kg/hr) Next Anti Xa or aPTT Level Due   <55 50 Units/kg  (4000 Units Max) None Increase by  3 Units/kg/hr 6 hours   55-62 25 Units/kg  (2000 Units Max) None Increase by  2 Units/kg/hr 6 hours   63-69 0 None Increase by  1 Units/kg/hr 6 hours   70-85 0 None No Change 6 hours (after 2 consecutive levels in range check qAM)   86-93 0 None Decrease by  1 Units/kg/hr 6 hours    0 30 Minutes Decrease by  2 Units/kg/hr 6 hours   109-123 0 60 Minutes Decrease by  3 Units/kg/hr 6 hours   >123 0 Hold  After aPTT less than 75 decrease previous rate by  4 Units/kg/hr  Every 2 hours until aPTT less than 75 then when infusion restarts in 6 hours           Recommend aPTT every 6 hours.     Results from last 7 days   Lab Units 05/10/25  2131 05/10/25  1237   APTT seconds >200.0* 30.3*   INR   --  1.25*   PROTIME Seconds  --  16.6*     Results from last 7 days   Lab Units 05/11/25  0053 05/10/25  1237   HEMOGLOBIN g/dL 11.5* 12.6   HEMATOCRIT % 38.7 42.2   PLATELETS 10*3/mm3 141 199          Date   Time   aPTT Current Rate (Unit/kg/hr) Bolus   (Units) Rate Change   (Unit/kg/hr) New Rate (Unit/kg/hr) Next   aPTT Comments  Pump Check Daily   5/10/25 1530 30.3 0 4000 +12.0 12.0 5/10 2200 d/w Emeli Thrasher RN   05/10/25 2239 >200 12 0 Hold  Hold 5/11 0100 AIDAN Rehman                                                                                                                                                                                                                      Pharmacy will  continue to follow aPTT results and monitor for signs and symptoms of bleeding or thrombosis.    Amanda Holcomb, [facility]  05/11/25 01:55 EDT

## 2025-05-12 LAB
A-A DO2: 25.2 MMHG
ANION GAP SERPL CALCULATED.3IONS-SCNC: 10.2 MMOL/L (ref 5–15)
ANION GAP SERPL CALCULATED.3IONS-SCNC: 12 MMOL/L (ref 5–15)
ANION GAP SERPL CALCULATED.3IONS-SCNC: 8.9 MMOL/L (ref 5–15)
ANION GAP SERPL CALCULATED.3IONS-SCNC: 9.4 MMOL/L (ref 5–15)
APTT PPP: 114.9 SECONDS (ref 70–100)
APTT PPP: 28.6 SECONDS (ref 70–100)
APTT PPP: 52.4 SECONDS (ref 70–100)
APTT PPP: >200 SECONDS (ref 70–100)
ARTERIAL PATENCY WRIST A: ABNORMAL
ATMOSPHERIC PRESS: 734 MMHG
BACTERIA SPEC AEROBE CULT: ABNORMAL
BASE EXCESS BLDA CALC-SCNC: -0.6 MMOL/L (ref 0–2)
BASOPHILS # BLD AUTO: 0.02 10*3/MM3 (ref 0–0.2)
BASOPHILS NFR BLD AUTO: 0.3 % (ref 0–1.5)
BDY SITE: ABNORMAL
BUN SERPL-MCNC: 73 MG/DL (ref 8–23)
BUN SERPL-MCNC: 78 MG/DL (ref 8–23)
BUN SERPL-MCNC: 84 MG/DL (ref 8–23)
BUN SERPL-MCNC: 86 MG/DL (ref 8–23)
BUN/CREAT SERPL: 35.1 (ref 7–25)
BUN/CREAT SERPL: 35.8 (ref 7–25)
BUN/CREAT SERPL: 36.1 (ref 7–25)
BUN/CREAT SERPL: 37.5 (ref 7–25)
CALCIUM SPEC-SCNC: 8.5 MG/DL (ref 8.6–10.5)
CALCIUM SPEC-SCNC: 8.6 MG/DL (ref 8.6–10.5)
CALCIUM SPEC-SCNC: 8.7 MG/DL (ref 8.6–10.5)
CALCIUM SPEC-SCNC: 9 MG/DL (ref 8.6–10.5)
CHLORIDE SERPL-SCNC: 124 MMOL/L (ref 98–107)
CHLORIDE SERPL-SCNC: 127 MMOL/L (ref 98–107)
CHLORIDE SERPL-SCNC: 127 MMOL/L (ref 98–107)
CHLORIDE SERPL-SCNC: 130 MMOL/L (ref 98–107)
CO2 SERPL-SCNC: 22 MMOL/L (ref 22–29)
CO2 SERPL-SCNC: 22.1 MMOL/L (ref 22–29)
CO2 SERPL-SCNC: 22.8 MMOL/L (ref 22–29)
CO2 SERPL-SCNC: 23.6 MMOL/L (ref 22–29)
COHGB MFR BLD: 0.8 % (ref 0–2)
CREAT SERPL-MCNC: 2.08 MG/DL (ref 0.57–1)
CREAT SERPL-MCNC: 2.16 MG/DL (ref 0.57–1)
CREAT SERPL-MCNC: 2.24 MG/DL (ref 0.57–1)
CREAT SERPL-MCNC: 2.4 MG/DL (ref 0.57–1)
DEPRECATED RDW RBC AUTO: 53.6 FL (ref 37–54)
EGFRCR SERPLBLD CKD-EPI 2021: 20 ML/MIN/1.73
EGFRCR SERPLBLD CKD-EPI 2021: 21.7 ML/MIN/1.73
EGFRCR SERPLBLD CKD-EPI 2021: 22.6 ML/MIN/1.73
EGFRCR SERPLBLD CKD-EPI 2021: 23.7 ML/MIN/1.73
EOSINOPHIL # BLD AUTO: 0.06 10*3/MM3 (ref 0–0.4)
EOSINOPHIL NFR BLD AUTO: 0.9 % (ref 0.3–6.2)
ERYTHROCYTE [DISTWIDTH] IN BLOOD BY AUTOMATED COUNT: 14.9 % (ref 12.3–15.4)
GLUCOSE SERPL-MCNC: 211 MG/DL (ref 65–99)
GLUCOSE SERPL-MCNC: 215 MG/DL (ref 65–99)
GLUCOSE SERPL-MCNC: 247 MG/DL (ref 65–99)
GLUCOSE SERPL-MCNC: 277 MG/DL (ref 65–99)
HCO3 BLDA-SCNC: 23 MMOL/L (ref 22–28)
HCT VFR BLD AUTO: 41.1 % (ref 34–46.6)
HCT VFR BLD CALC: 36.6 %
HGB BLD-MCNC: 12.4 G/DL (ref 12–15.9)
IMM GRANULOCYTES # BLD AUTO: 0.07 10*3/MM3 (ref 0–0.05)
IMM GRANULOCYTES NFR BLD AUTO: 1.1 % (ref 0–0.5)
LYMPHOCYTES # BLD AUTO: 1.11 10*3/MM3 (ref 0.7–3.1)
LYMPHOCYTES NFR BLD AUTO: 16.7 % (ref 19.6–45.3)
Lab: ABNORMAL
MAGNESIUM SERPL-MCNC: 2.4 MG/DL (ref 1.6–2.4)
MCH RBC QN AUTO: 29.3 PG (ref 26.6–33)
MCHC RBC AUTO-ENTMCNC: 30.2 G/DL (ref 31.5–35.7)
MCV RBC AUTO: 97.2 FL (ref 79–97)
METHGB BLD QL: 0.1 % (ref 0–1.5)
MODALITY: ABNORMAL
MONOCYTES # BLD AUTO: 0.59 10*3/MM3 (ref 0.1–0.9)
MONOCYTES NFR BLD AUTO: 8.9 % (ref 5–12)
NEUTROPHILS NFR BLD AUTO: 4.78 10*3/MM3 (ref 1.7–7)
NEUTROPHILS NFR BLD AUTO: 72.1 % (ref 42.7–76)
NRBC BLD AUTO-RTO: 0 /100 WBC (ref 0–0.2)
OXYHGB MFR BLDV: 96.3 % (ref 94–99)
PCO2 BLDA: 33.5 MM HG (ref 35–45)
PCO2 TEMP ADJ BLD: ABNORMAL MM[HG]
PH BLDA: 7.44 PH UNITS (ref 7.3–7.5)
PH, TEMP CORRECTED: ABNORMAL
PHOSPHATE SERPL-MCNC: 1.8 MG/DL (ref 2.5–4.5)
PHOSPHATE SERPL-MCNC: 2.5 MG/DL (ref 2.5–4.5)
PLATELET # BLD AUTO: 141 10*3/MM3 (ref 140–450)
PMV BLD AUTO: 12.7 FL (ref 6–12)
PO2 BLDA: 81.2 MM HG (ref 75–100)
PO2 TEMP ADJ BLD: ABNORMAL MM[HG]
POTASSIUM SERPL-SCNC: 3.6 MMOL/L (ref 3.5–5.2)
POTASSIUM SERPL-SCNC: 3.7 MMOL/L (ref 3.5–5.2)
POTASSIUM SERPL-SCNC: 3.7 MMOL/L (ref 3.5–5.2)
POTASSIUM SERPL-SCNC: 3.8 MMOL/L (ref 3.5–5.2)
RBC # BLD AUTO: 4.23 10*6/MM3 (ref 3.77–5.28)
SAO2 % BLDCOA: 97.2 % (ref 94–100)
SODIUM SERPL-SCNC: 158 MMOL/L (ref 136–145)
SODIUM SERPL-SCNC: 160 MMOL/L (ref 136–145)
SODIUM SERPL-SCNC: 160 MMOL/L (ref 136–145)
SODIUM SERPL-SCNC: 161 MMOL/L (ref 136–145)
VENTILATOR MODE: ABNORMAL
WBC NRBC COR # BLD AUTO: 6.63 10*3/MM3 (ref 3.4–10.8)

## 2025-05-12 PROCEDURE — 84100 ASSAY OF PHOSPHORUS: CPT | Performed by: INTERNAL MEDICINE

## 2025-05-12 PROCEDURE — 25010000002 HYDRALAZINE PER 20 MG: Performed by: FAMILY MEDICINE

## 2025-05-12 PROCEDURE — 83050 HGB METHEMOGLOBIN QUAN: CPT

## 2025-05-12 PROCEDURE — 83735 ASSAY OF MAGNESIUM: CPT | Performed by: FAMILY MEDICINE

## 2025-05-12 PROCEDURE — 99232 SBSQ HOSP IP/OBS MODERATE 35: CPT | Performed by: PSYCHIATRY & NEUROLOGY

## 2025-05-12 PROCEDURE — 80048 BASIC METABOLIC PNL TOTAL CA: CPT | Performed by: FAMILY MEDICINE

## 2025-05-12 PROCEDURE — 36600 WITHDRAWAL OF ARTERIAL BLOOD: CPT

## 2025-05-12 PROCEDURE — 85730 THROMBOPLASTIN TIME PARTIAL: CPT | Performed by: INTERNAL MEDICINE

## 2025-05-12 PROCEDURE — 25010000002 HEPARIN (PORCINE) PER 1000 UNITS: Performed by: FAMILY MEDICINE

## 2025-05-12 PROCEDURE — 25010000002 POTASSIUM CHLORIDE 10 MEQ/100ML SOLUTION: Performed by: INTERNAL MEDICINE

## 2025-05-12 PROCEDURE — 84100 ASSAY OF PHOSPHORUS: CPT | Performed by: FAMILY MEDICINE

## 2025-05-12 PROCEDURE — 99232 SBSQ HOSP IP/OBS MODERATE 35: CPT | Performed by: STUDENT IN AN ORGANIZED HEALTH CARE EDUCATION/TRAINING PROGRAM

## 2025-05-12 PROCEDURE — 25010000003 DEXTROSE 5 % SOLUTION: Performed by: INTERNAL MEDICINE

## 2025-05-12 PROCEDURE — 25010000002 ERTAPENEM PER 500 MG: Performed by: INTERNAL MEDICINE

## 2025-05-12 PROCEDURE — 99232 SBSQ HOSP IP/OBS MODERATE 35: CPT | Performed by: INTERNAL MEDICINE

## 2025-05-12 PROCEDURE — 25810000003 SODIUM CHLORIDE 0.9 % SOLUTION 250 ML FLEX CONT: Performed by: INTERNAL MEDICINE

## 2025-05-12 PROCEDURE — 82375 ASSAY CARBOXYHB QUANT: CPT

## 2025-05-12 PROCEDURE — 82805 BLOOD GASES W/O2 SATURATION: CPT

## 2025-05-12 PROCEDURE — 85025 COMPLETE CBC W/AUTO DIFF WBC: CPT | Performed by: FAMILY MEDICINE

## 2025-05-12 RX ORDER — DEXTROSE MONOHYDRATE 50 MG/ML
200 INJECTION, SOLUTION INTRAVENOUS CONTINUOUS
Status: ACTIVE | OUTPATIENT
Start: 2025-05-12 | End: 2025-05-12

## 2025-05-12 RX ORDER — HEPARIN SODIUM 1000 [USP'U]/ML
3340 INJECTION, SOLUTION INTRAVENOUS; SUBCUTANEOUS ONCE
Status: COMPLETED | OUTPATIENT
Start: 2025-05-12 | End: 2025-05-12

## 2025-05-12 RX ORDER — HEPARIN SODIUM 10000 [USP'U]/100ML
13 INJECTION, SOLUTION INTRAVENOUS
Status: DISCONTINUED | OUTPATIENT
Start: 2025-05-13 | End: 2025-05-13

## 2025-05-12 RX ORDER — CLONIDINE 0.1 MG/24H
1 PATCH, EXTENDED RELEASE TRANSDERMAL WEEKLY
Status: DISCONTINUED | OUTPATIENT
Start: 2025-05-12 | End: 2025-05-15

## 2025-05-12 RX ORDER — DEXTROSE MONOHYDRATE, SODIUM CHLORIDE, AND POTASSIUM CHLORIDE 50; 1.49; 4.5 G/1000ML; G/1000ML; G/1000ML
50 INJECTION, SOLUTION INTRAVENOUS CONTINUOUS
Status: DISCONTINUED | OUTPATIENT
Start: 2025-05-12 | End: 2025-05-13

## 2025-05-12 RX ORDER — POTASSIUM CHLORIDE 7.45 MG/ML
10 INJECTION INTRAVENOUS
Status: COMPLETED | OUTPATIENT
Start: 2025-05-12 | End: 2025-05-12

## 2025-05-12 RX ADMIN — POTASSIUM CHLORIDE, DEXTROSE MONOHYDRATE AND SODIUM CHLORIDE 50 ML/HR: 150; 5; 450 INJECTION, SOLUTION INTRAVENOUS at 10:30

## 2025-05-12 RX ADMIN — MUPIROCIN 1 APPLICATION: 20 OINTMENT TOPICAL at 20:15

## 2025-05-12 RX ADMIN — Medication 2 SPRAY: at 05:09

## 2025-05-12 RX ADMIN — SODIUM CHLORIDE 500 MG: 9 INJECTION, SOLUTION INTRAVENOUS at 16:32

## 2025-05-12 RX ADMIN — HEPARIN SODIUM 16.94 UNITS/KG/HR: 10000 INJECTION, SOLUTION INTRAVENOUS at 06:10

## 2025-05-12 RX ADMIN — HYDRALAZINE HYDROCHLORIDE 10 MG: 20 INJECTION INTRAMUSCULAR; INTRAVENOUS at 05:05

## 2025-05-12 RX ADMIN — Medication 2 SPRAY: at 20:11

## 2025-05-12 RX ADMIN — Medication 10 ML: at 10:32

## 2025-05-12 RX ADMIN — DEXTROSE MONOHYDRATE 200 ML/HR: 50 INJECTION, SOLUTION INTRAVENOUS at 10:35

## 2025-05-12 RX ADMIN — SODIUM PHOSPHATE, MONOBASIC, MONOHYDRATE 15 MMOL: 276; 142 INJECTION, SOLUTION INTRAVENOUS at 10:30

## 2025-05-12 RX ADMIN — ASPIRIN 81 MG CHEWABLE TABLET 81 MG: 81 TABLET CHEWABLE at 10:31

## 2025-05-12 RX ADMIN — HEPARIN SODIUM 13.04 UNITS/KG/HR: 10000 INJECTION, SOLUTION INTRAVENOUS at 23:16

## 2025-05-12 RX ADMIN — SENNOSIDES AND DOCUSATE SODIUM 2 TABLET: 50; 8.6 TABLET ORAL at 10:30

## 2025-05-12 RX ADMIN — RIVASTIGMINE 1 PATCH: 4.6 PATCH TRANSDERMAL at 12:32

## 2025-05-12 RX ADMIN — CARVEDILOL 12.5 MG: 12.5 TABLET, FILM COATED ORAL at 10:30

## 2025-05-12 RX ADMIN — POTASSIUM CHLORIDE 10 MEQ: 7.46 INJECTION, SOLUTION INTRAVENOUS at 01:16

## 2025-05-12 RX ADMIN — ATORVASTATIN CALCIUM 80 MG: 80 TABLET, FILM COATED ORAL at 10:30

## 2025-05-12 RX ADMIN — POTASSIUM CHLORIDE 10 MEQ: 7.46 INJECTION, SOLUTION INTRAVENOUS at 02:13

## 2025-05-12 RX ADMIN — HEPARIN SODIUM 3340 UNITS: 1000 INJECTION, SOLUTION INTRAVENOUS; SUBCUTANEOUS at 06:13

## 2025-05-12 RX ADMIN — CLONIDINE TRANSDERMAL SYSTEM 1 PATCH: 0.1 PATCH TRANSDERMAL at 10:31

## 2025-05-12 RX ADMIN — METOPROLOL TARTRATE 5 MG: 1 INJECTION, SOLUTION INTRAVENOUS at 04:04

## 2025-05-12 RX ADMIN — Medication 10 ML: at 20:11

## 2025-05-12 RX ADMIN — MUPIROCIN 1 APPLICATION: 20 OINTMENT TOPICAL at 10:31

## 2025-05-12 RX ADMIN — Medication 10 ML: at 06:17

## 2025-05-12 RX ADMIN — HYDRALAZINE HYDROCHLORIDE 10 MG: 20 INJECTION INTRAMUSCULAR; INTRAVENOUS at 12:35

## 2025-05-12 NOTE — PROGRESS NOTES
INPATIENT PROGRESS NOTE         [facility] INTENSIVE CARE    5/12/2025      PATIENT IDENTIFICATION:   Name:  Valarie Camara      MRN:  [id number]     79 y.o.  female             Reason for visit: Atrial fibrillation, chronic diastolic heart failure      SUBJECTIVE:   No acute issues overnight.  Patient not in distress.  Patient denies any chest pain.      OBJECTIVE:  Vitals:    05/12/25 0505 05/12/25 0530 05/12/25 0600 05/12/25 0700   BP: 177/85 132/63 119/64 135/69   BP Location:   Left arm Left arm   Patient Position:   Lying Lying   Pulse: 71 77 81 80   Resp: 16  18 16   Temp:    97.9 °F (36.6 °C)   TempSrc:    Axillary   SpO2:  91% 100% 98%   Weight:       Height:               Body mass index is 22.3 kg/m².    Intake/Output Summary (Last 24 hours) at 5/12/2025 0922  Last data filed at 5/12/2025 0640  Gross per 24 hour   Intake 2558.63 ml   Output 1200 ml   Net 1358.63 ml       Telemetry: Personally reviewed, normal sinus rhythm, no arrhythmia     Exam:  Constitutional: Awake, alert, No acute distress.  Chronically ill-appearing   Eyes: PERRLA, sclerae anicteric, no conjunctival injection   Respiratory: Clear to auscultation bilaterally, nonlabored respirations    Cardiovascular: RRR, no murmurs, rubs, or gallops, palpable pedal pulses bilaterally   Gastrointestinal: Positive bowel sounds, soft, nontender, nondistended   Musculoskeletal: No bilateral ankle edema, no clubbing or cyanosis to extremities   Neurologic: Responsive to questions .  Cranial Nerves grossly intact to confrontation, speech clear   Skin: No rashes       Allergies   Allergen Reactions    Penicillins Rash, Shortness Of Breath, Anaphylaxis and Other (See Comments)    Clonidine Derivatives Other (See Comments)     Pt reports extreme hypotension      Hydralazine Nausea And Vomiting and Other (See Comments)    Sulfa Antibiotics Rash     Scheduled meds:       aspirin, 81 mg, Oral, Daily  atorvastatin, 80 mg, Oral,  "Daily  carvedilol, 12.5 mg, Oral, BID With Meals  cefTRIAXone, 2,000 mg, Intravenous, Q24H  cloNIDine, 1 patch, Transdermal, Weekly  mupirocin, 1 Application, Each Nare, BID  rivastigmine, 1 patch, Transdermal, Daily  senna-docusate sodium, 2 tablet, Oral, BID  sodium chloride, 10 mL, Intravenous, Q12H      IV meds:                      dextrose, 200 mL/hr  dextrose 5 % and sodium chloride 0.45 % with KCl 20 mEq/L, 50 mL/hr  heparin, 17 Units/kg/hr (Order-Specific), Last Rate: 16.942 Units/kg/hr (05/12/25 0610)  Pharmacy to Dose Heparin,       Data Review:  Results from last 7 days   Lab Units 05/12/25  0805 05/12/25  0411 05/12/25  0014 05/11/25  1931   SODIUM mmol/L 161* 160* 158* 163*   BUN mg/dL 78* 84* 86* 94*   CREATININE mg/dL 2.16* 2.24* 2.40* 2.59*   GLUCOSE mg/dL 211* 215* 277* 254*     Results from last 7 days   Lab Units 05/12/25  0411 05/11/25  0053 05/10/25  1237   WBC 10*3/mm3 6.63 9.11 13.08*   HEMOGLOBIN g/dL 12.4 11.5* 12.6     Results from last 7 days   Lab Units 05/10/25  1237   INR  1.25*     Results from last 7 days   Lab Units 05/10/25  1237   ALT (SGPT) U/L 7   AST (SGOT) U/L 10     No results found for: \"DIGOXIN\"   Lab Results   Component Value Date    TSH 0.024 (L) 05/10/2025           Invalid input(s): \"LDLCALC\"    Estimated Creatinine Clearance: 18.7 mL/min (A) (by C-G formula based on SCr of 2.16 mg/dL (H)).        Imaging (last 24 hr):   I personally reviewed the most recent chest x-ray and other pertinent imaging studies.  Results for orders placed during the hospital encounter of 05/10/25    XR Chest 1 View    Narrative  Chest single view    COMPARISON: Chest from 3-    HISTORY: Altered mental status    FINDINGS: Cardiac silhouette is of normal size.    Lungs are underinflated, but clear.    Impression  1. No evidence of acute abnormality.    This report was signed and finalized on 5/10/2025 12:49 PM by Jah Kirk MD.        Last ECHO:  Results for orders placed during the " hospital encounter of 03/20/25    Adult Transthoracic Echo Complete W/ Cont if Necessary Per Protocol (With Agitated Saline)    Interpretation Summary    Left ventricular systolic function is normal. Calculated left ventricular EF = 67% Left ventricular ejection fraction appears to be 66 - 70%.    Left ventricular wall thickness is consistent with mild concentric hypertrophy.    Left ventricular outflow tract peak flow gradient at rest is 11 mmHg. Left ventricular outflow tract peak gradient with valsalva is 29 mmHg.    Left ventricular diastolic function is consistent with (grade I) impaired relaxation.    The mitral valve peak gradient is 9 mmHg. The mitral valve mean gradient is 4 mmHg.    Calculated right ventricular systolic pressure from tricuspid regurgitation is 21 mmHg.    There is a trivial pericardial effusion.        PROBLEM LIST:     Hypernatremia    PAF (paroxysmal atrial fibrillation)    Acute renal failure superimposed on stage 3a chronic kidney disease        Initial cardiac assessment: 79-year-old female with severe dementia presenting with A-fib RVR in the setting of sepsis due to UTI, severe hypernatremia and acute on chronic kidney disease.    ASSESSMENT/PLAN:  1.  Paroxysmal atrial fibrillation: Newly diagnosed 5/10/2025, cardioverted in the ER due to hypotension.  Patient is already chronically anticoagulated due to PE.  [id number] score is 4.  Her A-fib is likely secondary to metabolic abnormalities with her hyponatremia as well as KURT and UTI.  I would not start any antiarrhythmics at this time.  -Continue Eliquis 2.5 mg twice daily for stroke prevention  -Continue home dose of carvedilol.    2.  Chronic diastolic heart failure: Not in exacerbation  No diuretics given KURT and hypernatremia     3.  Hypertension:  Continue home dose carvedilol, hold off on other blood pressure lowering medications in setting of KURT    Thank you for the consult.  We will sign off.  Please call if you have any  further questions        Jeremías Hutchinson MD  5/12/2025    09:22 EDT

## 2025-05-12 NOTE — CONSULTS
"[facility]   Clinical Nutrition Assessment    Patient Name: Valarie Camara  YOB: 1945  MRN: [id number]  Admission date: 5/10/2025  Reason for Encounter: MST 2-3 or Nursing Admission Screen    Clinical Nutrition Assessment     Subjective    Trending Encounter/Subjective Information     5/12: Pt NPO x 2 days. EMR shows significant wt loss. Will monitor for diet advancement and add ONS.      Assessment    H&P and Current Problems      H&P  Past Medical History:   Diagnosis Date    Acute bronchitis with bronchospasm     Anxiety     Arthritis     Asthma     B12 deficiency     Back pain     Body piercing     ears    Cataract     Cerebrovascular disease     Cervicalgia     Chronic kidney disease     stage 3b    Colonic polyp     History of colonic polyps     Constipation     COPD (chronic obstructive pulmonary disease)     Coronary artery disease     Depression     Diverticulitis     Dysphagia     Patient reported \"it won't go all the way down\" when eating solid foods first thing in the mornings    Edema     Elevated cholesterol     Esophageal reflux     Folic acid deficiency     Full dentures     Advised no adhesives DOS    Heart murmur     Herpes zoster     High cholesterol     History of kidney infection     History of recurrent urinary tract infection     HTN (hypertension)     Hypercholesterolemia     Impaired functional mobility, balance, gait, and endurance     Insomnia     Kidney infection     Malignant hypertension 04/26/2015     Accelerated essential hypertension    Measles     rubeola    Muscle spasm     Neoplasm of uncertain behavior of skin     dtr denies    Osteoporosis     Poor historian     Recurrent urinary tract infection     Rheumatoid arthritis     RLS (restless legs syndrome)     Stomach ulcer     Stroke     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    Type 2 diabetes mellitus     Vitamin D deficiency       Past Surgical History:   Procedure " "Laterality Date    CATARACT EXTRACTION WITH INTRAOCULAR LENS IMPLANT Left 02/11/2013    CATARACT EXTRACTION WITH INTRAOCULAR LENS IMPLANT Right 04/15/2013    COLONOSCOPY  2012    COLONOSCOPY N/A 11/26/2019    Procedure: COLONOSCOPY;  Surgeon: Chidi Downing MD;  Location:  [facility];  Service: Gastroenterology    ENDOSCOPY N/A 11/13/2017    Procedure: ESOPHAGOGASTRODUODENOSCOPY with biopsies and esophageal balloon dilitation;  Surgeon: Wesley Stovall MD;  Location:  [facility];  Service:     ENDOSCOPY N/A 11/25/2019    Procedure: ESOPHAGOGASTRODUODENOSCOPY;  Surgeon: Chidi Downing MD;  Location:  [facility];  Service: Gastroenterology    ENDOSCOPY N/A 11/29/2021    Procedure: ESOPHAGOGASTRODUODENOSCOPY with dilation and biopsies;  Surgeon: Gonzalez Zapata MD;  Location: [facility];  Service: Gastroenterology;  Laterality: N/A;    ENDOSCOPY N/A 11/1/2023    Procedure: ESOPHAGOGASTRODUODENOSCOPY WITH BIOPSY AND DILATATION;  Surgeon: Gonzalez Zapata MD;  Location: [facility] ENDOSCOPY;  Service: Gastroenterology;  Laterality: N/A;    ENDOSCOPY N/A 1/27/2025    Procedure: Esophagogastroduodenoscopy with dilatation and biopsies;  Surgeon: Gonzalez Zapata MD;  Location: [facility] ENDOSCOPY;  Service: Gastroenterology;  Laterality: N/A;    HYSTERECTOMY  1979    UPPER GASTROINTESTINAL ENDOSCOPY  12/09/2013    UPPER GASTROINTESTINAL ENDOSCOPY  11/13/2017      Current Problems   Admission Diagnosis:  Hypernatremia [E87.0]    Problem List:    Hypernatremia    PAF (paroxysmal atrial fibrillation)    Acute renal failure superimposed on stage 3a chronic kidney disease      Other Applicable Nutrition Information:        Anthropometrics      BMI, Height, Weight Estimated body mass index is 22.3 kg/m² as calculated from the following:    Height as of this encounter: 160 cm (63\").    Weight as of this encounter: 57.1 kg (125 lb 14.1 oz).    Weight Method: Bed scale       Trending Weight " Changes weight loss of 35 lbs (21%) over 2 month(s)    Significant?  Yes       Weight History  Wt Readings from Last 20 Encounters:   05/12/25 0400 57.1 kg (125 lb 14.1 oz)   05/11/25 0500 55.5 kg (122 lb 5.7 oz)   05/10/25 1727 57.4 kg (126 lb 8.7 oz)   05/10/25 1226 66.7 kg (147 lb)   04/17/25 0932 67.1 kg (147 lb 14.4 oz)   04/05/25 1837 66.6 kg (146 lb 12.8 oz)   03/21/25 1511 68 kg (150 lb)   03/20/25 1556 68 kg (150 lb)   03/15/25 0422 69.4 kg (153 lb)   03/04/25 0600 72.8 kg (160 lb 7.9 oz)   03/02/25 1423 71.9 kg (158 lb 8.2 oz)   03/02/25 0348 70.5 kg (155 lb 6.8 oz)   03/01/25 0415 72.3 kg (159 lb 6.3 oz)   02/28/25 1848 72.6 kg (160 lb 0.9 oz)   02/28/25 1817 77 kg (169 lb 12.1 oz)   02/28/25 1152 77 kg (169 lb 12.1 oz)   02/08/25 0115 77.1 kg (169 lb 15.6 oz)   01/24/25 1111 75.3 kg (166 lb)   01/27/25 0139 77.1 kg (170 lb)   01/10/25 1330 80.3 kg (177 lb)   01/05/25 1700 76.3 kg (168 lb 3.4 oz)   01/05/25 1102 79.4 kg (175 lb 0.7 oz)   01/05/25 0921 79.4 kg (175 lb)   11/06/24 1049 80.6 kg (177 lb 12.8 oz)   12/04/23 2059 90.7 kg (200 lb)   10/31/23 0924 90.7 kg (200 lb)   08/21/23 1541 90.7 kg (200 lb)   08/17/23 1331 91.2 kg (201 lb)   01/31/22 1433 87.5 kg (192 lb 12.8 oz)   11/24/21 1834 88.5 kg (195 lb)   11/24/21 1543 86.2 kg (190 lb)   05/25/21 0500 92.6 kg (204 lb 2.3 oz)   05/21/21 0500 89.9 kg (198 lb 3.1 oz)   05/20/21 2344 89.9 kg (198 lb 3.1 oz)   05/20/21 0807 88.2 kg (194 lb 6.4 oz)   05/20/21 0305 90.7 kg (200 lb)   02/03/21 1507 89.4 kg (197 lb)        Labs      Results from last 7 days   Lab Units 05/12/25  0805 05/12/25  0411 05/12/25  0014 05/11/25  1149 05/11/25  0342 05/11/25  0053 05/10/25  1437 05/10/25  1238 05/10/25  1237   SODIUM mmol/L 161* 160* 158*   < > 164*  --    < >  --  168*   POTASSIUM mmol/L 3.7 3.7 3.6   < > 4.2  --    < >  --  4.1   GLUCOSE mg/dL 211* 215* 277*   < > 90  --    < >  --  162*   BUN mg/dL 78* 84* 86*   < > 111*  --    < >  --  127*   CREATININE mg/dL  2.16* 2.24* 2.40*   < > 2.79*  --    < >  --  3.96*   CALCIUM mg/dL 8.6 8.7 9.0   < > 8.9  --    < >  --  9.8   PHOSPHORUS mg/dL  --  1.8*  --   --  4.3  --   --   --   --    MAGNESIUM mg/dL  --  2.4  --   --  2.7*  --   --   --  3.0*   TOTAL PROTEIN g/dL  --   --   --   --   --   --   --   --  6.3   ALBUMIN g/dL  --   --   --   --   --   --   --   --  3.1*   LACTATE mmol/L  --   --   --   --   --   --   --  0.9  --    BILIRUBIN mg/dL  --   --   --   --   --   --   --   --  0.3   ALK PHOS U/L  --   --   --   --   --   --   --   --  57   AST (SGOT) U/L  --   --   --   --   --   --   --   --  10   ALT (SGPT) U/L  --   --   --   --   --   --   --   --  7   PLATELETS 10*3/mm3  --  141  --   --   --  141  --   --  199   HEMOGLOBIN g/dL  --  12.4  --   --   --  11.5*  --   --  12.6   HEMATOCRIT %  --  41.1  --   --   --  38.7  --   --  42.2    < > = values in this interval not displayed.     Lab Results   Component Value Date    HGBA1C 6.90 (H) 03/04/2025          Medications       Scheduled Medications aspirin, 81 mg, Oral, Daily  atorvastatin, 80 mg, Oral, Daily  carvedilol, 12.5 mg, Oral, BID With Meals  cefTRIAXone, 2,000 mg, Intravenous, Q24H  cloNIDine, 1 patch, Transdermal, Weekly  mupirocin, 1 Application, Each Nare, BID  rivastigmine, 1 patch, Transdermal, Daily  senna-docusate sodium, 2 tablet, Oral, BID  sodium chloride, 10 mL, Intravenous, Q12H  sodium phosphate, 15 mmol, Intravenous, Once        Infusions dextrose, 200 mL/hr, Last Rate: 200 mL/hr (05/12/25 1035)  dextrose 5 % and sodium chloride 0.45 % with KCl 20 mEq/L, 50 mL/hr, Last Rate: 50 mL/hr (05/12/25 1030)  heparin, 17 Units/kg/hr (Order-Specific), Last Rate: 17 Units/kg/hr (05/12/25 1035)  Pharmacy to Dose Heparin,         PRN Medications   acetaminophen **OR** acetaminophen    senna-docusate sodium **AND** polyethylene glycol **AND** bisacodyl **AND** bisacodyl    Calcium Replacement - Follow Nurse / BPA Driven Protocol    glycerin     hydrALAZINE    Magnesium Cardiology Dose Replacement - Follow Nurse / BPA Driven Protocol    metoprolol tartrate    nitroglycerin    Pharmacy to Dose Heparin    Phosphorus Replacement - Follow Nurse / BPA Driven Protocol    Potassium Replacement - Follow Nurse / BPA Driven Protocol    sodium chloride    sodium chloride    [Held by provider] sodium chloride     Physical Findings      Chewing/Swallowing  Teeth Status: Mouth/Teeth WDL: .WDL except, teeth Teeth Symptoms: dental appliance present, tooth/teeth missing  Chewing/Swallowing Issues: Other:NPO   Edema  Generalized Edema: 1+ (Trace)                         Bowel Function  Stool Output  Stool Unmeasured Occurrence: 1 (05/11/25 2100)  Bowel Incontinence: Yes (05/11/25 2100)  Stool Amount: Smear (05/11/25 2100)  Stool Consistency: soft (05/11/25 2100)  Perineal Care: absorbent brief/pad changed, catheter care provided, perineum cleansed, protective cream/ointment applied (05/12/25 0500)  Last Bowel Movement:  (pta.)   I/Os  Intake & Output (last 3 days)         05/09 0701  05/10 0700 05/10 0701  05/11 0700 05/11 0701  05/12 0700 05/12 0701  05/13 0700    P.O.   0     I.V. (mL/kg)  453.2 (8.2) 2358.6 (41.3)     IV Piggyback  2000 200     Total Intake(mL/kg)  2453.2 (44.2) 2558.6 (44.8)     Urine (mL/kg/hr)  295 1200 (0.9)     Stool   0     Total Output  295 1200     Net  +2158.2 +1358.6             Stool Unmeasured Occurrence   1 x            Lines, Drains, Airways, & Wounds       Active LDAs       Name Placement date Placement time Site Days Last dressing change    Peripheral IV 05/10/25 1222 20 G Anterior;Right External Jugular 05/10/25  1222  External Jugular  1 05/10/25 1231 (46.77 hrs)    Peripheral IV 05/10/25 1300 20 G Anterior;Right Forearm 05/10/25  1300  Forearm  1 05/10/25 1557 (43.34 hrs)    Urethral Catheter 16 Fr. 05/10/25  1525  -- 1                       Nutrition Focused Physical Exam     Trending NFPE     insert MSA if applicable  Estimated Needs       Energy Requirements    Weight for Calculation 57 kg   Method for Estimation  25-30 kcals/kg   Daily Needs (kcal/day) 0103-7726 kcal    Protein Requirements    Weight for Calculation 57 kg   Method for Estimation 1.2-1.5 gm/kg   Daily Needs (g/day) 68-85 g pro   Fluid Requirements     Method for Estimation 1 mL/kcal    Daily Needs (mL/day) 6360-7524 mL     Current Nutrition Orders & Evaluation of Intake      Oral Nutrition     Food Allergies NKFA   Current PO Diet NPO Diet NPO Type: Strict NPO   Oral Nutrition Supplement None    PO Evaluation     Trending % PO Intake 5/12: NPO x 2 days     Enteral Nutrition     Current EN Order Patient isn't on Tube Feeding  Patient doesn't have any tube feeding modular orders     EN Route     EN Tolerance     EN Observation         Parenteral Nutrition     Current TPN Order    TPN Route    Lipids (mL/%/frequency)     MVI Frequency     Trace Element Frequency     Total # Days on TPN    TPN Observation       Assessment & Plan   Nutrition Diagnosis and Goals       Nutrition Diagnosis 1 Underweight r/t dementia/COPD AEB BMI=22.3.      Nutrition Diagnosis 2         Goal(s) PO Diet Advances When Medically Appropriate      Nutrition Intervention and Prescription       Intervention  Monitor for diet advancement      Diet Prescription NPO    Supplement Prescription      Enteral Prescription        TPN Prescription        Education Provided       Monitoring/Evaluation       Monitor/Evaluation Per Protocol, I&O, PO Intake, Pertinent Labs, Weight, Skin Status, GI Status, Symptoms, POC/GOC, and Swallow Function     RD Follow-Up Encounter 1-2 days     Electronically signed by:  Lay Oliva RD  05/12/25 11:17 EDT

## 2025-05-12 NOTE — PLAN OF CARE
Goal Outcome Evaluation:      Palliative Care Team Consult received for Symptom management, Comfort Care, Hospice Referral/Discussion, Support for Patient/Family    Pt is currently CODE Status No CPR, Limited Support--No Intervention  Pt does have a Living Will in the EMR naming her daughters, Cleo Camara and Bailey Camara as her HC surrogates.      Pt admitted via ED from home with reported of AMS--significant confusion over last 3 days, poor oral intake and poor nutrition and hydration.   Pt has been in SNF recently.   In ED pt was hypotensive, tachycardiac in A.Fib with RVR.  CT of head was neg for CVA    Visited pt's room in CCU 1 to find her awake and fairly alert.  Pt was able to tell me her name, recognized her daughter and knew her name.  She was aware of being in a hospital.  Pt's daughter Cleo was at bedside.    Explained to pt and daughter that I was with the Palliative Care Team and we visit pt's with chronic illnesses to see if there is something we can do to help --review pt's condition, clarify questions they may have , discuss pt's wishes for treatments and Goals of Care.  We also discuss options of care based on pt's wishes for treatment or no treatment.    I informed pt and her daughter that we would like to meet with them tomorrow to discuss what is going on and what pt's wishes would be re: medical care.  Cleo was agreeable to meeting tomorrow and when asked she reported will contact her sister to come to the meeting as well.  We agreed on meeting at 1330 tomorrow (5/13)    Pt's nurse, Rafaela BERMUDEZ, and Dr Rader were notified of meeting.

## 2025-05-12 NOTE — CASE MANAGEMENT/SOCIAL WORK
Discharge Planning Assessment   [facility]     Patient Name: Valarie Camara  MRN: [id number]  Today's Date: 5/12/2025    Admit Date: 5/10/2025    Plan: dcp tbd   home/fsmily/HH or str   Discharge Needs Assessment       Row Name 05/12/25 1116       Living Environment    People in Home child(sindy), adult    Name(s) of People in Home corey camara daughter    Current Living Arrangements home    Duration at Residence 10 yrs    Potentially Unsafe Housing Conditions none    In the past 12 months has the electric, gas, oil, or water company threatened to shut off services in your home? No    Primary Care Provided by self;child(sindy)    Family Caregiver if Needed child(sindy), adult    Quality of Family Relationships helpful;involved    Able to Return to Prior Arrangements other (see comments)  tbd       Resource/Environmental Concerns    Transportation Concerns none       Transportation Needs    In the past 12 months, has lack of transportation kept you from medical appointments or from getting medications? no    In the past 12 months, has lack of transportation kept you from meetings, work, or from getting things needed for daily living? No       Food Insecurity    Within the past 12 months, you worried that your food would run out before you got the money to buy more. Sometimes    Within the past 12 months, the food you bought just didn't last and you didn't have money to get more. Never true       Transition Planning    Patient/Family Anticipates Transition to home with family       Discharge Needs Assessment    Readmission Within the Last 30 Days no previous admission in last 30 days    Equipment Currently Used at Home cane, bindu    Concerns to be Addressed discharge planning    Do you want help finding or keeping work or a job? I do not need or want help    Do you want help with school or training? For example, starting or completing job training or getting a high school diploma, GED or equivalent No     Anticipated Changes Related to Illness other (see comments)  tbd    Equipment Needed After Discharge commode    Discharge Facility/Level of Care Needs nursing facility, skilled                   Discharge Plan       Row Name 05/12/25 9950       Plan    Plan Comments Spoke with pt's daughter, Cleo for dcp information. Demographics verified. AD on file. Cleo and her sister are joint POA, will provide copy. [facility] is her primary and [facility] is pharmacy, agreeable to meds to bed. Pt and her daughter have been at current residence for 10 yrs. Pt has a cane and rollator. Goal is for pt to return home, if STR needed would like referrals sent within [facility]. Will follow.      Row Name 05/12/25 3530       Plan    Plan dcp tbd   home/fsmily/HH or str                  Selected Continued Care - Prior Encounters Includes continued care and service providers with selected services from prior encounters from 2/9/2025 to 5/12/2025      Discharged on 4/7/2025 Admission date: 3/20/2025 - Discharge disposition: Skilled Nursing Facility (DC - External)      Destination       Service Provider Services Address Phone Fax Patient Preferred    [facility] [address] [facility] [id number] [phone] --                      Discharged on 3/4/2025 Admission date: 2/28/2025 - Discharge disposition: Skilled Nursing Facility (DC - External)      Destination       Service Provider Services Address Phone Fax Patient Preferred    [facility] [address] [facility] [id number] [phone] --                             Demographic Summary       Row Name 05/12/25 1111       General Information    Admission Type inpatient    Arrived From emergency department;home    Required Notices Provided Important Message from Medicare    Referral Source admission list    Reason for Consult discharge planning    Preferred  Language English       Contact Information    Permission Granted to Share Info With family/designee                   Functional Status       Row Name 05/12/25 1115       Functional Status    Usual Activity Tolerance moderate    Current Activity Tolerance fair       Physical Activity    On average, how many days per week do you engage in moderate to strenuous exercise (like a brisk walk)? 0 days    On average, how many minutes do you engage in exercise at this level? 0 min    Number of minutes of exercise per week 0       Functional Status, IADL    Medications independent    Meal Preparation assistive equipment    Housekeeping assistive equipment    Laundry assistive equipment and person    Shopping assistive equipment and person    If for any reason you need help with day-to-day activities such as bathing, preparing meals, shopping, managing finances, etc., do you get the help you need? I get all the help I need       Mental Status    General Appearance WDL WDL       Mental Status Summary    Recent Changes in Mental Status/Cognitive Functioning unable to assess       Employment/    Employment Status retired                   Psychosocial    No documentation.                  Abuse/Neglect    No documentation.                  Legal    No documentation.                  Substance Abuse    No documentation.                  Patient Forms    No documentation.                     Lanny Brooks RN

## 2025-05-12 NOTE — PROGRESS NOTES
[facility]\"" Progress Note  [place]. [place]        Patient Name: Valarie Camara  : 1945  MRN: [id number]   LOS: 1 day    Patient Care Team:  Lay Cox MD as PCP - General (Family Medicine)  Corbin Herrera DO ([facility])    Chief Complaint:    Chief Complaint   Patient presents with    Altered Mental Status     Primary Care Physician:  Lay Cox MD  Date of admission: 5/10/2025    Subjective     Interval History:   Follow-up acute kidney injury  hypernatremia  Patient has minimal interaction, no significant mental status improvement noted, underlying dementia.  Events noted from last 24 hours.  I reviewed the chart and other providers notes, labs and procedures done since my last note.    Review of Systems:   unobtainable.    Objective     Vitals:   Temp:  [97.5 °F (36.4 °C)-98.3 °F (36.8 °C)] 97.7 °F (36.5 °C)  Heart Rate:  [] 81  Resp:  [12-18] 18  BP: ()/(62-88) 119/64  Flow (L/min) (Oxygen Therapy):  [2] 2    Intake/Output Summary (Last 24 hours) at 2025 0712  Last data filed at 2025 0640  Gross per 24 hour   Intake 2558.63 ml   Output 1200 ml   Net 1358.63 ml       Physical Exam:    General Appearance: no acute distress   Skin: warm and dry  HEENT: oral mucosa normal, nonicteric sclera  Neck: supple, no JVD  Lungs: CTA  Heart: RRR, normal S1 and S2  Abdomen: obese, soft, nontender, non distended and positive bowel sounds.  : no palpable bladder  Extremities: Trace edema, no cyanosis or clubbing  Neuro: normal speech and mental status     Scheduled Meds:     Current Facility-Administered Medications   Medication Dose Route Frequency Provider Last Rate Last Admin    acetaminophen (TYLENOL) tablet 650 mg  650 mg Oral Q4H PRN Aki Rodriguez DO        Or    acetaminophen (TYLENOL) suppository 650 mg  650 mg Rectal Q4H PRN Aki Rodriguez DO        aspirin chewable tablet 81 mg  81 mg  Oral Daily Aki Rodriguez DO        atorvastatin (LIPITOR) tablet 80 mg  80 mg Oral Daily Aki Rodriguez DO        sennosides-docusate (PERICOLACE) 8.6-50 MG per tablet 2 tablet  2 tablet Oral BID Aki Rodriguez DO        And    polyethylene glycol (MIRALAX) packet 17 g  17 g Oral Daily PRN Aki Rodriguze DO        And    bisacodyl (DULCOLAX) EC tablet 5 mg  5 mg Oral Daily PRN Aki Rodriguez DO        And    bisacodyl (DULCOLAX) suppository 10 mg  10 mg Rectal Daily PRN Aki Rodriguez DO        Calcium Replacement - Follow Nurse / BPA Driven Protocol   Not Applicable PRN Aki Rodriguez DO        carvedilol (COREG) tablet 12.5 mg  12.5 mg Oral BID With Meals Aki Rodriguez DO        cefTRIAXone (ROCEPHIN) 2,000 mg in sodium chloride 0.9 % 100 mL IVPB-VTB  2,000 mg Intravenous Q24H Aki Rodriguez  mL/hr at 05/11/25 1652 2,000 mg at 05/11/25 1652    glycerin 35 % liquid 35% 2 spray  2 spray Mouth/Throat Q2H PRN Aki Rodriguez DO   2 spray at 05/12/25 0509    heparin 97981 units/250 ml (100 units/ml) in D5W  17 Units/kg/hr (Order-Specific) Intravenous Titrated Edgardo Burnette MD 11.3 mL/hr at 05/12/25 0610 16.942 Units/kg/hr at 05/12/25 0610    hydrALAZINE (APRESOLINE) injection 10 mg  10 mg Intravenous Q6H PRN Aki Rodriguez DO   10 mg at 05/12/25 0505    Magnesium Cardiology Dose Replacement - Follow Nurse / BPA Driven Protocol   Not Applicable PRN Aki Rodriguez DO        metoprolol tartrate (LOPRESSOR) injection 5 mg  5 mg Intravenous Q4H PRN Wil Rader DO   5 mg at 05/12/25 0404    mupirocin (BACTROBAN) 2 % nasal ointment 1 Application  1 Application Each Nare BID Aki Rodriguez DO   1 Application at 05/11/25 2016    nitroglycerin (NITROSTAT) SL tablet 0.4 mg  0.4 mg Sublingual Q5 Min PRN Aki Rodriguez DO        Pharmacy to Dose Heparin   Not Applicable Continuous PRN Aki Rodriguez DO        Phosphorus Replacement - Follow Nurse / BPA Driven Protocol   Not Applicable PRAki Guzman DO         Potassium Replacement - Follow Nurse / BPA Driven Protocol   Not Applicable PRN Aki Rodriguez DO        rivastigmine (EXELON) 4.6 MG/24HR patch 1 patch  1 patch Transdermal Daily Aki Rodriguez DO   1 patch at 05/11/25 1043    sodium chloride 0.9 % flush 10 mL  10 mL Intravenous PRN Danie Tolliver DO        sodium chloride 0.9 % flush 10 mL  10 mL Intravenous Q12H Aki Rodriguez DO   10 mL at 05/11/25 2016    sodium chloride 0.9 % flush 10 mL  10 mL Intravenous PRN Aki Rodriguez DO   10 mL at 05/12/25 0617    [Held by provider] sodium chloride 0.9 % infusion 40 mL  40 mL Intravenous PRN Aki Rodriguez DO           aspirin, 81 mg, Oral, Daily  atorvastatin, 80 mg, Oral, Daily  carvedilol, 12.5 mg, Oral, BID With Meals  cefTRIAXone, 2,000 mg, Intravenous, Q24H  mupirocin, 1 Application, Each Nare, BID  rivastigmine, 1 patch, Transdermal, Daily  senna-docusate sodium, 2 tablet, Oral, BID  sodium chloride, 10 mL, Intravenous, Q12H        IV Meds:   heparin, 17 Units/kg/hr (Order-Specific), Last Rate: 16.942 Units/kg/hr (05/12/25 0610)  Pharmacy to Dose Heparin,         Results Reviewed:   I have personally reviewed the results from the time of this admission to 5/12/2025 07:12 EDT     Results from last 7 days   Lab Units 05/12/25  0411 05/12/25  0014 05/11/25  1931 05/10/25  1437 05/10/25  1237   SODIUM mmol/L 160* 158* 163*   < > 168*   POTASSIUM mmol/L 3.7 3.6 3.9   < > 4.1   CHLORIDE mmol/L 127* 124* 129*   < > 130*   CO2 mmol/L 23.6 22.0 23.1   < > 18.5*   BUN mg/dL 84* 86* 94*   < > 127*   CREATININE mg/dL 2.24* 2.40* 2.59*   < > 3.96*   CALCIUM mg/dL 8.7 9.0 8.6   < > 9.8   BILIRUBIN mg/dL  --   --   --   --  0.3   ALK PHOS U/L  --   --   --   --  57   ALT (SGPT) U/L  --   --   --   --  7   AST (SGOT) U/L  --   --   --   --  10   GLUCOSE mg/dL 215* 277* 254*   < > 162*    < > = values in this interval not displayed.       Estimated Creatinine Clearance: 18.1 mL/min (A) (by C-G formula based on SCr of  "2.24 mg/dL (H)).    Results from last 7 days   Lab Units 05/12/25  0411 05/11/25  0342 05/10/25  1237   MAGNESIUM mg/dL 2.4 2.7* 3.0*   PHOSPHORUS mg/dL 1.8* 4.3  --              Results from last 7 days   Lab Units 05/12/25  0411 05/11/25  0053 05/10/25  1237   WBC 10*3/mm3 6.63 9.11 13.08*   HEMOGLOBIN g/dL 12.4 11.5* 12.6   PLATELETS 10*3/mm3 141 141 199       Results from last 7 days   Lab Units 05/10/25  1237   INR  1.25*       Brief Urine Lab Results  (Last result in the past 365 days)        Color   Clarity   Blood   Leuk Est   Nitrite   Protein   CREAT   Urine HCG        05/10/25 1237 Yellow   Cloudy   Trace   Moderate (2+)   Positive   30 mg/dL (1+)                   No results found for: \"UTPCR\"    Imaging Results (Last 24 Hours)       Procedure Component Value Units Date/Time    MRI Brain Without Contrast [[id number]] Collected: 05/11/25 0855     Updated: 05/11/25 0901    Narrative:      MRI BRAIN WITHOUT CONTRAST     HISTORY: Confusion     COMPARISON: 3/22/2025     TECHNIQUE: Multiplanar, multisequence images of the brain were performed  without contrast.     FINDINGS: The diffusion weighted images demonstrate no restricted  diffusion. There is no acute ischemia. The cervicomedullary junction is  normal. There is no Chiari malformation. There is moderate atrophy.  There is increased T2 signal in the periventricular white matter  consistent mild microvascular change. There is no cortical edema or  hemorrhage. There are new right mastoid opacities.       Impression:      1. Moderate atrophy with mild microvascular change. There is no edema or  hemorrhage.  2. New right mastoid opacities.        This report was signed and finalized on 5/11/2025 8:59 AM by Yuniel Silva MD.                   Assessment / Plan     ASSESSMENT:    Hypernatremia    PAF (paroxysmal atrial fibrillation)    Acute renal failure superimposed on stage 3a chronic kidney disease    Acute kidney injury: Most likely volume depletion, since " she has been getting IV fluids it is starting to improve.  Bicarb and potassium appears to be stable increase serum sodium noted.  Starting to make urine.  Chronic kidney disease stage IIIb: Most likely will settle down to the baseline.  Hypernatremia: Secondary to free water losses and unable to eat and drink, she has about 5 L water deficit.  I will adjust hypotonic solutions.  Paroxysmal atrial fibrillation: Treated as per hospitalist service and cardiology.  Sepsis: Treated as per hospitalist service.  Metabolic encephalopathy: Significant uremia as well as dementia making things worse.        PLAN:  Serum sodium slightly better, I will go ahead and give her another 1 L of D5W at 200 mL/h for 5 hours.  Renal function continues to improve.  May consider switching her to half-normal saline starting tomorrow.  Low phosphorus noted will need IV phosphorus replacement per protocol.  Details were discussed with the patient no family in the room.    Details were also discussed with the hospitalist service and or other providers as needed.   Continue with rest of the current treatment plan, and monitor with surveillance labs, adjust medication doses to the eGFR.  Further recommendations will depend on clinical course of the patient during the current hospitalization.   I have reviewed the copied text to this note, it was edited and the changes made as needed.  It is accurate to the point, when the note was signed today.     Thank you for involving us in the care of Valarie Camara.  Please feel free to call with any questions.    Ortega Cabrales MD, DAPHNIE  05/12/25  07:12 EDT    [facility]  [phone]      Part of this note may be an electronic transcription/translation of spoken language to printed text using the Dragon Dictation System.

## 2025-05-12 NOTE — CASE MANAGEMENT/SOCIAL WORK
Case Management/Social Work    Patient Name:  Valarie Camara  YOB: 1945  MRN: [id number]  Admit Date:  5/10/2025        08:59 EDT   Discharge Plan       Row Name 05/12/25 0859       Plan    Plan dcp tbd   home/fsmily/HH or str                        Electronically signed by:  Lanny Brooks RN  05/12/25 08:59 EDT

## 2025-05-12 NOTE — PROGRESS NOTES
"DOS: 2025  NAME: Valarie Camara   : 1945  PCP: Lay Cox MD  Chief Complaint   Patient presents with    Altered Mental Status       Chief complaint: More awake and alert today and following simple commands.  Subjective: She was able to tell me her name and date of birth but thought it was March.  She has progressive dementia.  She was able to recognize most of the common pictures and some parts of the complex picture.  She was able to read aloud words and phrases most of them.  Followed one-step commands well.  Moves her legs.    Objective:  Vital signs: /69 (BP Location: Left arm, Patient Position: Lying)   Pulse 80   Temp 97.9 °F (36.6 °C) (Axillary)   Resp 16   Ht 160 cm (63\")   Wt 57.1 kg (125 lb 14.1 oz)   SpO2 98%   BMI 22.30 kg/m²    Gen: NAD, vitals reviewed  MS: Improved since yesterday but her sodium is elevated.  CN: Cranials 2-12: No focal deficits noted.  Motor: Has lost a lot of muscle mass but moving upper extremities well.  Moves the lower extremities side-to-side well and picks at up to a certain extent bilaterally but does not sustain them for 5 seconds in the air.  This is because of the weakness.  Sensory: No sensory loss at this point but yesterday she had inconsistent findings.  Coordination: Normal finger-to-nose.  Gait: Patient not weightbearing at this time.    ROS:  General: Pleasant lady currently improving slowly.  Seems to be close to baseline.  Neurological: Neurologically improving.  Has generalized weakness secondary to malnutrition.    Laboratory results:  Lab Results   Component Value Date    GLUCOSE 211 (H) 2025    CALCIUM 8.6 2025     (C) 2025    K 3.7 2025    CO2 22.1 2025     (H) 2025    BUN 78 (H) 2025    CREATININE 2.16 (H) 2025    EGFRIFAFRI 42 (L) 2021    EGFRIFNONA 33 (L) 2020    BCR 36.1 (H) 2025    ANIONGAP 8.9 2025     Lab Results "   Component Value Date    WBC 6.63 05/12/2025    HGB 12.4 05/12/2025    HCT 41.1 05/12/2025    MCV 97.2 (H) 05/12/2025     05/12/2025     Lab Results   Component Value Date     (H) 03/21/2025     (H) 05/20/2021    LDL 86 01/11/2021            Review of labs: The LDL was elevated at 145 on March 21, 2025.  The EGFR is low at 42.  The BUN is elevated at 78 and the creatinine is elevated at 2.16.  Vitamin B12 in 2019 was 252.     CMP:        Lab 05/12/25  0805 05/12/25  0411 05/12/25  0014 05/11/25  1931 05/11/25  1602 05/11/25  1149 05/11/25  0342 05/10/25  1437 05/10/25  1237   SODIUM 161* 160* 158* 163* 165*   < > 164*   < > 168*   POTASSIUM 3.7 3.7 3.6 3.9 4.0   < > 4.2   < > 4.1   CHLORIDE 130* 127* 124* 129* 131*   < > 132*   < > 130*   CO2 22.1 23.6 22.0 23.1 22.0   < > 21.9*   < > 18.5*   ANION GAP 8.9 9.4 12.0 10.9 12.0   < > 10.1   < > 19.5*   BUN 78* 84* 86* 94* 101*   < > 111*   < > 127*   CREATININE 2.16* 2.24* 2.40* 2.59* 2.47*   < > 2.79*   < > 3.96*   EGFR 22.6* 21.7* 20.0* 18.2* 19.3*   < > 16.7*   < > 10.9*   GLUCOSE 211* 215* 277* 254* 160*   < > 90   < > 162*   CALCIUM 8.6 8.7 9.0 8.6 8.8   < > 8.9   < > 9.8   MAGNESIUM  --  2.4  --   --   --   --  2.7*  --  3.0*   PHOSPHORUS  --  1.8*  --   --   --   --  4.3  --   --    TOTAL PROTEIN  --   --   --   --   --   --   --   --  6.3   ALBUMIN  --   --   --   --   --   --   --   --  3.1*   GLOBULIN  --   --   --   --   --   --   --   --  3.2   ALT (SGPT)  --   --   --   --   --   --   --   --  7   AST (SGOT)  --   --   --   --   --   --   --   --  10   BILIRUBIN  --   --   --   --   --   --   --   --  0.3   ALK PHOS  --   --   --   --   --   --   --   --  57    < > = values in this interval not displayed.        TSH          3/4/2025    02:44 3/20/2025    16:08 5/10/2025    12:37   TSH   TSH 0.630  0.355  0.024         Lipid Panel          3/21/2025    05:53   Lipid Panel   Total Cholesterol 215    Triglycerides 149    HDL Cholesterol  43    VLDL Cholesterol 27    LDL Cholesterol  145    LDL/HDL Ratio 3.31         Lab Results   Component Value Date    [id number] 252 10/07/2019         Lab Results   Component Value Date    HGBA1C 6.90 (H) 03/04/2025           Review and interpretation of imaging: MRI BRAIN WO CONTRAST     Date of Exam: 3/22/2025 2:03 AM EDT     Indication: Stroke, follow up  Aphasia, right-sided weakness, numbness, facial droop.     Comparison: Head CT 3/21/2025     Technique:  Routine multiplanar/multisequence sequence images of the brain were obtained without contrast administration.        Findings:  No areas of diffusion restriction to suggest a recent infarct. Negative for acute intracranial hemorrhage, large mass lesion, midline shift or hydrocephalus. Mild global parenchymal volume loss for age. Mild periventricular T2/FLAIR hyperintense signal   suggesting chronic microvascular ischemic change. Mostly empty sella. Negative for cerebellar tonsillar ectopia. Normal volume corpus callosum. Major intracranial flow voids preserved. Orbits symmetric. Trace bilateral mastoid fluid. Unremarkable   appearance of the calvarium.     IMPRESSION:  Impression:  No acute MRI findings, specifically no findings to suggest a recent infarct.    CT Head Without Contrast  Result Date: 5/10/2025  HEAD CT  HISTORY: Confusion.  COMPARISON: 3-.  TECHNIQUE: Multiple axial CT images were performed from the foramen magnum to the vertex without enhancement. Individualized dose reduction techniques using automated exposure control or adjustment of the mA and/or kV according to patient size were employed.  FINDINGS: Mild to moderate diffuse cortical atrophy is present.  There is no evidence of acute hemorrhage, mass effect, or edema.  Mild mucoperiosteal thickening is noted in the right maxillary sinus. No air-fluid levels are seen.      Impression:  1. No evidence of acute intracranial abnormality.      This report was signed and finalized on  5/10/2025 12:57 PM by Jah Kirk MD.             MRI Brain Without Contrast  Result Date: 5/11/2025  MRI BRAIN WITHOUT CONTRAST  HISTORY: Confusion  COMPARISON: 3/22/2025  TECHNIQUE: Multiplanar, multisequence images of the brain were performed without contrast.  FINDINGS: The diffusion weighted images demonstrate no restricted diffusion. There is no acute ischemia. The cervicomedullary junction is normal. There is no Chiari malformation. There is moderate atrophy. There is increased T2 signal in the periventricular white matter consistent mild microvascular change. There is no cortical edema or hemorrhage. There are new right mastoid opacities.      Impression: 1. Moderate atrophy with mild microvascular change. There is no edema or hemorrhage. 2. New right mastoid opacities.   This report was signed and finalized on 5/11/2025 8:59 AM by Yuniel Silva MD.         Workup to date: The EEG was interpreted as follows:    Procedure:   A 21 Channel digital electroencephalogram was performed in the Clinical Neurophysiology Laboratory. The 10/20 International System of electrode placement was used and both Bipolar and referential electrode montages were monitored.      EEG Description:   This EEG is continuous and symmetrical. During the awake state with eye closed, there is a posterior dominate rhythm of  -9-  Hz that is symmetrical and reacts appropriately to eye opening. Anterior to posterior amplitude frequency gradient is preserved.  With Drowsiness, there is waxing and waning of the posterior dominant rhythm with eventual replacement by a mixture of beta, alpha and theta activity.   A prolonged Lead I EKG rhythm strip revealed heart rate at --80--/min            Interpretation:   This EEG obtained in the awake and drowsy state is normal for age.     Clinical correlation:   The diagnosis of epilepsy is clinical one. A normal EEG dose not excludes this Diagnosis. However there are no epileptiform features in this  recording to suggest an underlining diagnosis of Epilepsy. Therefore, Clinical correlation is recommended.    Results for orders placed during the hospital encounter of 03/20/25    Adult Transthoracic Echo Complete W/ Cont if Necessary Per Protocol (With Agitated Saline)    Interpretation Summary    Left ventricular systolic function is normal. Calculated left ventricular EF = 67% Left ventricular ejection fraction appears to be 66 - 70%.    Left ventricular wall thickness is consistent with mild concentric hypertrophy.    Left ventricular outflow tract peak flow gradient at rest is 11 mmHg. Left ventricular outflow tract peak gradient with valsalva is 29 mmHg.    Left ventricular diastolic function is consistent with (grade I) impaired relaxation.    The mitral valve peak gradient is 9 mmHg. The mitral valve mean gradient is 4 mmHg.    Calculated right ventricular systolic pressure from tricuspid regurgitation is 21 mmHg.    There is a trivial pericardial effusion.       Results for orders placed during the hospital encounter of 03/20/25    Duplex Venous Lower Extremity - Bilateral CAR    Interpretation Summary    Normal bilateral lower extremity venous duplex scan.         Diagnoses: Patient with progressive vascular dementia was admitted with dehydration and hypernatremia as well as acute renal failure.    Comment: Nephrologist is currently closely following her electrolytes to slowly hydrate her so as not to overcorrect to prevent any central pontine myelinolysis.    Plan:  1.  Will check a vitamin B12 and folic acid levels for tomorrow.  2.  EEG for tomorrow.  3.  MRI is just showing progressive vascular dementia without any other changes.  4.  Have physical therapy and Occupational Therapy evaluate her for ambulation as well as rehabilitation potential.    Discussed with Dr. Wil Rader her hospitalist and she will be followed up tomorrow by inpatient teleneurology service with me.    Spent a total of 30  minutes in face-to-face evaluation and management of the patient using the dedicated telemedicine device without any interruption with the help of Ranjeet, the rounding nurse with the patient located at the [facility] and myself at a remote location.    Electronically signed by Suha Hernandez MD, 05/12/25, 9:43 AM EDT.

## 2025-05-12 NOTE — PLAN OF CARE
Goal Outcome Evaluation:  Plan of Care Reviewed With: patient, family           Outcome Evaluation: NSR this shift on the monitor, RA, aPTT >200 so heparin gtt held at this time. Would not participate with bedside swallow eval.

## 2025-05-12 NOTE — PROGRESS NOTES
Pharmacy to Dose Heparin Infusion     Valarie Camara is a  79 y.o. female receiving heparin infusion.     Therapy for (VTE/Cardiac):   Cardiac  Patient Weight: Using 66.7 kg  Initial Bolus (Y/N):   Y  Any Bolus (Y/N):   Y        Signs or Symptoms of Bleeding: N    Cardiac or Other (Not VTE)   Initial Bolus: 60 units/kg (Max 4,000 units)  Initial rate: 12 units/kg/hr (Max 1,000 units/hr)   aPTT  Rebolus Infusion Hold time Change infusion Dose (Units/kg/hr) Next Anti Xa or aPTT Level Due   <55 50 Units/kg  (4000 Units Max) None Increase by  3 Units/kg/hr 6 hours   55-62 25 Units/kg  (2000 Units Max) None Increase by  2 Units/kg/hr 6 hours   63-69 0 None Increase by  1 Units/kg/hr 6 hours   70-85 0 None No Change 6 hours (after 2 consecutive levels in range check qAM)   86-93 0 None Decrease by  1 Units/kg/hr 6 hours    0 30 Minutes Decrease by  2 Units/kg/hr 6 hours   109-123 0 60 Minutes Decrease by  3 Units/kg/hr 6 hours   >123 0 Hold  After aPTT less than 75 decrease previous rate by  4 Units/kg/hr  Every 2 hours until aPTT less than 75 then when infusion restarts in 6 hours           Recommend aPTT every 6 hours.     Results from last 7 days   Lab Units 05/12/25  1512 05/12/25  0411 05/11/25  1931 05/10/25  2131 05/10/25  1237   APTT seconds >200.0* 28.6* 28.7*   < > 30.3*   INR   --   --   --   --  1.25*   PROTIME Seconds  --   --   --   --  16.6*    < > = values in this interval not displayed.     Results from last 7 days   Lab Units 05/12/25  0411 05/11/25  0053 05/10/25  1237   HEMOGLOBIN g/dL 12.4 11.5* 12.6   HEMATOCRIT % 41.1 38.7 42.2   PLATELETS 10*3/mm3 141 141 199       Per protocol after aPTT less than 75, decrease previous rate by 4 units/kg/hr .   Rate to be adjusted to 8 u/kg/hr.     Recommend aPTT every 6 hours.        Date   Time   aPTT Current Rate (Unit/kg/hr) Bolus   (Units) Rate Change   (Unit/kg/hr) New Rate (Unit/kg/hr) Next   aPTT Comments  Pump Check Daily   5/10/25 7206 30.3 0  4000 +12.0 12.0 5/10 2200 d/w Emeli Thrasher RN   05/10/25 2239 >200 12 0 Hold  Hold 5/11 0100 AIDAN Rehman   5/11/25 0342 47.3 12 at time of hold 0 -4 8 0945 AIDAN Rehman   5/11/25 1149 27.7 8 3340 +3 11 5/11 1930 d/w Lida Boyer RN   5/11/25 1931 28.7 11 3340 +3 14 5/12 0400 D/W AIDAN Rehman   5/12/25 0411 28.6 14 3340 +3 17 5/12 1100 D/w AIDAN Rehman   5/12/25 1512 >200 HOLD HOLD HOLD HOLD 5/12 @ 1830 D/W AIDAN Russo  Lab was unable to stick patient and had to redraw                                                                                                                                                                    Pharmacy will continue to follow aPTT results and monitor for signs and symptoms of bleeding or thrombosis.    Lor Prescott, PharmD  05/12/25 16:13 EDT

## 2025-05-12 NOTE — PROGRESS NOTES
[facility]    Name:  Valarie Camara   Age:  79 y.o.  Sex:  female  :  1945  MRN:  [id number]   Visit Number:  [id number]  Admission Date:  5/10/2025  Date Of Service:  25  Primary Care Physician:  Lay Cox MD     LOS: 1 day :    Chief Complaint:      weakness    Subjective:    May 12, 2025: The fluids have been adjusted by nephrology.  The KURT is improving.  Spoke with patient's daughter Cleo and with palliative family meeting pending.      Hospital Course:     Patient is an 79-year-old female brought to the emergency department for evaluation of altered mental status.  At the time of examination, unfortunately, patient was alone without family at bedside.  History of present illness was obtained from review of medical records, including discussion with nursing and ED physician.  Patient has a known history of dementia, however over the last 3 days, patient's daughter was concerned that the patient has developed significant confusion.  Patient has been unable to tolerate oral intake of nutrition and oral hydration.      EMS was called to patient's nursing facility, she was found to be hypotensive and tachycardic in A-fib with RVR.  They were unable to obtain IV access, subsequently unable to provide cardioversion en route to the ED.  Upon arrival to the ED, patient was emergently cardioverted, patient's rhythm with normal sinus rhythm, without any change in mentation.  Neurology was consulted, CT of the head did not show any acute CVA.  Cardiology was consulted, who feels that the elevated troponin is most likely secondary to the tachycardia and recommend troponin trending, anticoagulation.  Hospitalist services consulted for admission.     Discussion had with family and they request DNR with no chest compressions and no intubation, they approve all other interventions.    Patient had presented with encephalopathy very high  sodium.  Continues to be in goals of care discussion with patient and family.  Family meeting pending.  Sodium has been reluctant to recover still running about 160 despite hypotonic fluids nephrology is addressing this.  Otherwise urine is growing ESBL has been switched to Invanz.  Edited by: Wil Rader DO at 5/12/2025 0885     Review of Systems:     All systems were reviewed and negative except as mentioned in subjective, assessment and plan.    Vital Signs:    Temp:  [97.5 °F (36.4 °C)-98.3 °F (36.8 °C)] 97.9 °F (36.6 °C)  Heart Rate:  [] 80  Resp:  [12-18] 16  BP: ()/(62-88) 135/69    Intake and output:    I/O last 3 completed shifts:  In: 2761.8 [I.V.:2561.8; IV Piggyback:200]  Out: 1420 [Urine:1420]  No intake/output data recorded.    Physical Examination:    Constitutional: Awake alert, ill-appearing  HENT: NCAT, mucous membranes dry  Respiratory: Clear to auscultation bilaterally, respiratory effort normal   Cardiovascular: RRR, no murmurs, rubs, or gallops  Gastrointestinal: Positive bowel sounds, soft, nontender, nondistended  Musculoskeletal: No bilateral ankle edema  Psychiatric: Blunted affect uncooperative  Neurologic: Speech is limited oriented to self and location not day  Skin: dry  Exam stable May 12, 2025  Edited by: Wil Rader DO at 5/12/2025 0727     Laboratory results:    Results from last 7 days   Lab Units 05/12/25  0411 05/12/25  0014 05/11/25  1931 05/10/25  1437 05/10/25  1237   SODIUM mmol/L 160* 158* 163*   < > 168*   POTASSIUM mmol/L 3.7 3.6 3.9   < > 4.1   CHLORIDE mmol/L 127* 124* 129*   < > 130*   CO2 mmol/L 23.6 22.0 23.1   < > 18.5*   BUN mg/dL 84* 86* 94*   < > 127*   CREATININE mg/dL 2.24* 2.40* 2.59*   < > 3.96*   CALCIUM mg/dL 8.7 9.0 8.6   < > 9.8   BILIRUBIN mg/dL  --   --   --   --  0.3   ALK PHOS U/L  --   --   --   --  57   ALT (SGPT) U/L  --   --   --   --  7   AST (SGOT) U/L  --   --   --   --  10   GLUCOSE mg/dL 215* 277* 254*   < > 162*     < > = values in this interval not displayed.     Results from last 7 days   Lab Units 05/12/25  0411 05/11/25  0053 05/10/25  1237   WBC 10*3/mm3 6.63 9.11 13.08*   HEMOGLOBIN g/dL 12.4 11.5* 12.6   HEMATOCRIT % 41.1 38.7 42.2   PLATELETS 10*3/mm3 141 141 199     Results from last 7 days   Lab Units 05/10/25  1237   INR  1.25*     Results from last 7 days   Lab Units 05/10/25  1437 05/10/25  1237   HSTROP T ng/L 141* 162*     Results from last 7 days   Lab Units 05/10/25  2131 05/10/25  1315 05/10/25  1237   BLOODCX  No growth at 24 hours No growth at 24 hours  --    URINECX   --   --  >100,000 CFU/mL Gram Negative Bacilli*     Recent Labs     03/22/25  0610 05/12/25  0538   PHART 7.449 7.444   QXG9CNL 32.0* 33.5*   PO2ART 78.3* 81.2   FGQ4VYU 22.1 23.0   BASEEXCESS -1.2* -0.6*      I have reviewed the patient's laboratory results.    Radiology results:    MRI Brain Without Contrast  Result Date: 5/11/2025  MRI BRAIN WITHOUT CONTRAST  HISTORY: Confusion  COMPARISON: 3/22/2025  TECHNIQUE: Multiplanar, multisequence images of the brain were performed without contrast.  FINDINGS: The diffusion weighted images demonstrate no restricted diffusion. There is no acute ischemia. The cervicomedullary junction is normal. There is no Chiari malformation. There is moderate atrophy. There is increased T2 signal in the periventricular white matter consistent mild microvascular change. There is no cortical edema or hemorrhage. There are new right mastoid opacities.      Impression: 1. Moderate atrophy with mild microvascular change. There is no edema or hemorrhage. 2. New right mastoid opacities.   This report was signed and finalized on 5/11/2025 8:59 AM by Yuniel Silva MD.      CT Head Without Contrast  Result Date: 5/10/2025  HEAD CT  HISTORY: Confusion.  COMPARISON: 3-.  TECHNIQUE: Multiple axial CT images were performed from the foramen magnum to the vertex without enhancement. Individualized dose reduction  techniques using automated exposure control or adjustment of the mA and/or kV according to patient size were employed.  FINDINGS: Mild to moderate diffuse cortical atrophy is present.  There is no evidence of acute hemorrhage, mass effect, or edema.  Mild mucoperiosteal thickening is noted in the right maxillary sinus. No air-fluid levels are seen.      Impression:  1. No evidence of acute intracranial abnormality.      This report was signed and finalized on 5/10/2025 12:57 PM by Jah Kirk MD.      XR Chest 1 View  Result Date: 5/10/2025  Chest single view  COMPARISON: Chest from 3-  HISTORY: Altered mental status  FINDINGS: Cardiac silhouette is of normal size.  Lungs are underinflated, but clear.      Impression: 1. No evidence of acute abnormality.  This report was signed and finalized on 5/10/2025 12:49 PM by Jah Kirk MD.      I have reviewed the patient's radiology reports.    Medication Review:     I have reviewed the patient's active and prn medications.     Problem List:      Hypernatremia    PAF (paroxysmal atrial fibrillation)    Acute renal failure superimposed on stage 3a chronic kidney disease      Assessment/Plan:    Severe Sepsis secondary to urinary tract infection  Urinary tract infection  Metabolic encephalopathy  Atrial fibrillation with RVR  Acute kidney injury  Hypernatremia  Dementia  COPD  Hypertension  Hyperlipidemia  Restless leg syndrome     Plan:     Severe Sepsis secondary to urinary tract infection  Urinary tract infection  Metabolic encephalopathy  Patient meets sepsis criteria with tachycardia, leukocytosis, source of infection identified as urinary tract infection.  Due to the sodium derangements, LR was bolused.  Evidence of endorgan damage noted with metabolic encephalopathy and acute kidney injury.  IV Rocephin, switched to Invanz 5/12/25 due to ESBL in the urine  MRI no stroke  Grey placed on 5/10/25  Atrial fibrillation with RVR  Patient status post  cardioversion.  Cardiology consulted, appreciate their recommendations.  Heparin drip will be started, continued  Patient is currently normal sinus rhythm, continue Coreg  Acute kidney injury  Acute hypernatremia  Sodium was normal 1 month prior.  Patient encephalopathic which may be symptoms related to hypernatremia.  Nephrology has been consulted, appreciate his recommendations.  Status post LR boluses, continue slow hypotonic fluids with dextrose  Serial sodium, if sodium is correcting greater than one half Mg per DL per hour will hold fluids  Dementia  COPD  Hypertension  Hyperlipidemia  Restless leg syndrome  Continue home medications as appropriate.       DVT Prophylaxis: Heparin  Code Status: DNR/DNI  Diet: NPO  Disposition: Under evaluation will benefit from palliative discussions once available  Edited by: Wil Rader DO at 5/11/2025 1424           Wil Rader DO  05/12/25  08:37 EDT    Dictated utilizing Dragon dictation.

## 2025-05-12 NOTE — PLAN OF CARE
Goal Outcome Evaluation:              Outcome Evaluation: Sinus rhythm on monitor this shift.  Remains on RA.  Mentation has improved.  No complaints or distress noted.  Have admin. Lopressor/hydralazine prn for elevated bp's.

## 2025-05-13 ENCOUNTER — APPOINTMENT (OUTPATIENT)
Dept: SLEEP MEDICINE | Facility: HOSPITAL | Age: 80
DRG: 871 | End: 2025-05-13
Payer: MEDICARE

## 2025-05-13 ENCOUNTER — TELEPHONE (OUTPATIENT)
Dept: NEUROLOGY | Facility: CLINIC | Age: 80
End: 2025-05-13

## 2025-05-13 LAB
ANION GAP SERPL CALCULATED.3IONS-SCNC: 11.2 MMOL/L (ref 5–15)
APTT PPP: 111.6 SECONDS (ref 70–100)
APTT PPP: 151 SECONDS (ref 70–100)
APTT PPP: 59.6 SECONDS (ref 70–100)
APTT PPP: 73.6 SECONDS (ref 70–100)
APTT PPP: 78.8 SECONDS (ref 70–100)
BASOPHILS # BLD AUTO: 0.02 10*3/MM3 (ref 0–0.2)
BASOPHILS NFR BLD AUTO: 0.3 % (ref 0–1.5)
BUN SERPL-MCNC: 62 MG/DL (ref 8–23)
BUN/CREAT SERPL: 35.4 (ref 7–25)
CALCIUM SPEC-SCNC: 7.9 MG/DL (ref 8.6–10.5)
CHLORIDE SERPL-SCNC: 125 MMOL/L (ref 98–107)
CO2 SERPL-SCNC: 21.8 MMOL/L (ref 22–29)
CREAT SERPL-MCNC: 1.75 MG/DL (ref 0.57–1)
DEPRECATED RDW RBC AUTO: 52.8 FL (ref 37–54)
EGFRCR SERPLBLD CKD-EPI 2021: 29.2 ML/MIN/1.73
EOSINOPHIL # BLD AUTO: 0.09 10*3/MM3 (ref 0–0.4)
EOSINOPHIL NFR BLD AUTO: 1.5 % (ref 0.3–6.2)
ERYTHROCYTE [DISTWIDTH] IN BLOOD BY AUTOMATED COUNT: 15.1 % (ref 12.3–15.4)
FOLATE SERPL-MCNC: 2.76 NG/ML (ref 4.78–24.2)
GLUCOSE BLDC GLUCOMTR-MCNC: 330 MG/DL (ref 70–130)
GLUCOSE BLDC GLUCOMTR-MCNC: 331 MG/DL (ref 70–130)
GLUCOSE BLDC GLUCOMTR-MCNC: 346 MG/DL (ref 70–130)
GLUCOSE SERPL-MCNC: 214 MG/DL (ref 65–99)
HCT VFR BLD AUTO: 32.9 % (ref 34–46.6)
HGB BLD-MCNC: 10.1 G/DL (ref 12–15.9)
IMM GRANULOCYTES # BLD AUTO: 0.06 10*3/MM3 (ref 0–0.05)
IMM GRANULOCYTES NFR BLD AUTO: 1 % (ref 0–0.5)
LYMPHOCYTES # BLD AUTO: 1.1 10*3/MM3 (ref 0.7–3.1)
LYMPHOCYTES NFR BLD AUTO: 18.7 % (ref 19.6–45.3)
MAGNESIUM SERPL-MCNC: 2.1 MG/DL (ref 1.6–2.4)
MCH RBC QN AUTO: 29.3 PG (ref 26.6–33)
MCHC RBC AUTO-ENTMCNC: 30.7 G/DL (ref 31.5–35.7)
MCV RBC AUTO: 95.4 FL (ref 79–97)
MONOCYTES # BLD AUTO: 0.53 10*3/MM3 (ref 0.1–0.9)
MONOCYTES NFR BLD AUTO: 9 % (ref 5–12)
NEUTROPHILS NFR BLD AUTO: 4.09 10*3/MM3 (ref 1.7–7)
NEUTROPHILS NFR BLD AUTO: 69.5 % (ref 42.7–76)
NRBC BLD AUTO-RTO: 0.3 /100 WBC (ref 0–0.2)
PHOSPHATE SERPL-MCNC: 2.5 MG/DL (ref 2.5–4.5)
PLATELET # BLD AUTO: 122 10*3/MM3 (ref 140–450)
PMV BLD AUTO: 12.9 FL (ref 6–12)
POTASSIUM SERPL-SCNC: 3.7 MMOL/L (ref 3.5–5.2)
RBC # BLD AUTO: 3.45 10*6/MM3 (ref 3.77–5.28)
SODIUM SERPL-SCNC: 158 MMOL/L (ref 136–145)
VIT B12 BLD-MCNC: 610 PG/ML (ref 211–946)
WBC NRBC COR # BLD AUTO: 5.89 10*3/MM3 (ref 3.4–10.8)

## 2025-05-13 PROCEDURE — 82746 ASSAY OF FOLIC ACID SERUM: CPT | Performed by: PSYCHIATRY & NEUROLOGY

## 2025-05-13 PROCEDURE — 80048 BASIC METABOLIC PNL TOTAL CA: CPT | Performed by: INTERNAL MEDICINE

## 2025-05-13 PROCEDURE — 84100 ASSAY OF PHOSPHORUS: CPT | Performed by: FAMILY MEDICINE

## 2025-05-13 PROCEDURE — 99497 ADVNCD CARE PLAN 30 MIN: CPT | Performed by: PHYSICIAN ASSISTANT

## 2025-05-13 PROCEDURE — 83735 ASSAY OF MAGNESIUM: CPT | Performed by: FAMILY MEDICINE

## 2025-05-13 PROCEDURE — 82948 REAGENT STRIP/BLOOD GLUCOSE: CPT | Performed by: INTERNAL MEDICINE

## 2025-05-13 PROCEDURE — 99232 SBSQ HOSP IP/OBS MODERATE 35: CPT | Performed by: INTERNAL MEDICINE

## 2025-05-13 PROCEDURE — 85730 THROMBOPLASTIN TIME PARTIAL: CPT | Performed by: FAMILY MEDICINE

## 2025-05-13 PROCEDURE — 85730 THROMBOPLASTIN TIME PARTIAL: CPT | Performed by: INTERNAL MEDICINE

## 2025-05-13 PROCEDURE — 99223 1ST HOSP IP/OBS HIGH 75: CPT | Performed by: PHYSICIAN ASSISTANT

## 2025-05-13 PROCEDURE — 25010000002 FLUCONAZOLE PER 200 MG: Performed by: PHYSICIAN ASSISTANT

## 2025-05-13 PROCEDURE — 25010000002 HYDRALAZINE PER 20 MG: Performed by: FAMILY MEDICINE

## 2025-05-13 PROCEDURE — 25010000002 FUROSEMIDE PER 20 MG: Performed by: INTERNAL MEDICINE

## 2025-05-13 PROCEDURE — 85025 COMPLETE CBC W/AUTO DIFF WBC: CPT | Performed by: FAMILY MEDICINE

## 2025-05-13 PROCEDURE — 25010000002 ERTAPENEM PER 500 MG: Performed by: INTERNAL MEDICINE

## 2025-05-13 PROCEDURE — 82948 REAGENT STRIP/BLOOD GLUCOSE: CPT

## 2025-05-13 PROCEDURE — 25810000003 DEXTROSE 5 % WITH KCL 20 MEQ 20 MEQ/L SOLUTION: Performed by: INTERNAL MEDICINE

## 2025-05-13 PROCEDURE — 3E0G76Z INTRODUCTION OF NUTRITIONAL SUBSTANCE INTO UPPER GI, VIA NATURAL OR ARTIFICIAL OPENING: ICD-10-PCS | Performed by: INTERNAL MEDICINE

## 2025-05-13 PROCEDURE — 99232 SBSQ HOSP IP/OBS MODERATE 35: CPT | Performed by: PSYCHIATRY & NEUROLOGY

## 2025-05-13 PROCEDURE — 25010000003 DEXTROSE 5 % SOLUTION: Performed by: INTERNAL MEDICINE

## 2025-05-13 PROCEDURE — 82607 VITAMIN B-12: CPT | Performed by: PSYCHIATRY & NEUROLOGY

## 2025-05-13 RX ORDER — DEXTROSE MONOHYDRATE 25 G/50ML
25 INJECTION, SOLUTION INTRAVENOUS
Status: DISCONTINUED | OUTPATIENT
Start: 2025-05-13 | End: 2025-05-23 | Stop reason: HOSPADM

## 2025-05-13 RX ORDER — FUROSEMIDE 10 MG/ML
20 INJECTION INTRAMUSCULAR; INTRAVENOUS ONCE
Status: COMPLETED | OUTPATIENT
Start: 2025-05-13 | End: 2025-05-13

## 2025-05-13 RX ORDER — NICOTINE POLACRILEX 4 MG
15 LOZENGE BUCCAL
Status: DISCONTINUED | OUTPATIENT
Start: 2025-05-13 | End: 2025-05-23 | Stop reason: HOSPADM

## 2025-05-13 RX ORDER — POTASSIUM CHLORIDE, DEXTROSE MONOHYDRATE 150; 5 MG/100ML; G/100ML
50 INJECTION, SOLUTION INTRAVENOUS CONTINUOUS
Status: DISCONTINUED | OUTPATIENT
Start: 2025-05-13 | End: 2025-05-14

## 2025-05-13 RX ORDER — HEPARIN SODIUM 10000 [USP'U]/100ML
12 INJECTION, SOLUTION INTRAVENOUS
Status: DISCONTINUED | OUTPATIENT
Start: 2025-05-13 | End: 2025-05-15

## 2025-05-13 RX ORDER — DEXTROSE MONOHYDRATE 50 MG/ML
100 INJECTION, SOLUTION INTRAVENOUS CONTINUOUS
Status: DISCONTINUED | OUTPATIENT
Start: 2025-05-13 | End: 2025-05-14

## 2025-05-13 RX ORDER — FLUCONAZOLE 2 MG/ML
200 INJECTION, SOLUTION INTRAVENOUS EVERY 24 HOURS
Status: COMPLETED | OUTPATIENT
Start: 2025-05-13 | End: 2025-05-15

## 2025-05-13 RX ORDER — NYSTATIN 100000 [USP'U]/ML
5 SUSPENSION ORAL 4 TIMES DAILY
Status: DISPENSED | OUTPATIENT
Start: 2025-05-13 | End: 2025-05-16

## 2025-05-13 RX ORDER — HEPARIN SODIUM 10000 [USP'U]/100ML
9 INJECTION, SOLUTION INTRAVENOUS
Status: DISCONTINUED | OUTPATIENT
Start: 2025-05-13 | End: 2025-05-13

## 2025-05-13 RX ADMIN — FLUCONAZOLE 200 MG: 2 INJECTION, SOLUTION INTRAVENOUS at 18:32

## 2025-05-13 RX ADMIN — HYDRALAZINE HYDROCHLORIDE 10 MG: 20 INJECTION INTRAMUSCULAR; INTRAVENOUS at 10:48

## 2025-05-13 RX ADMIN — DEXTROSE MONOHYDRATE 100 ML/HR: 50 INJECTION, SOLUTION INTRAVENOUS at 20:47

## 2025-05-13 RX ADMIN — Medication 10 ML: at 10:54

## 2025-05-13 RX ADMIN — NYSTATIN 500000 UNITS: 100000 SUSPENSION ORAL at 20:47

## 2025-05-13 RX ADMIN — SODIUM CHLORIDE 500 MG: 9 INJECTION, SOLUTION INTRAVENOUS at 19:34

## 2025-05-13 RX ADMIN — FUROSEMIDE 20 MG: 10 INJECTION, SOLUTION INTRAMUSCULAR; INTRAVENOUS at 20:47

## 2025-05-13 RX ADMIN — MUPIROCIN 1 APPLICATION: 20 OINTMENT TOPICAL at 10:48

## 2025-05-13 RX ADMIN — RIVASTIGMINE 1 PATCH: 4.6 PATCH TRANSDERMAL at 10:48

## 2025-05-13 RX ADMIN — MUPIROCIN 1 APPLICATION: 20 OINTMENT TOPICAL at 20:47

## 2025-05-13 RX ADMIN — DEXTROSE MONOHYDRATE 100 ML/HR: 50 INJECTION, SOLUTION INTRAVENOUS at 10:47

## 2025-05-13 RX ADMIN — HYDRALAZINE HYDROCHLORIDE 10 MG: 20 INJECTION INTRAMUSCULAR; INTRAVENOUS at 22:18

## 2025-05-13 RX ADMIN — POTASSIUM CHLORIDE AND DEXTROSE MONOHYDRATE 50 ML/HR: 150; 5 INJECTION, SOLUTION INTRAVENOUS at 10:47

## 2025-05-13 RX ADMIN — NYSTATIN 500000 UNITS: 100000 SUSPENSION ORAL at 18:32

## 2025-05-13 RX ADMIN — Medication 10 ML: at 20:50

## 2025-05-13 NOTE — TELEPHONE ENCOUNTER
Caller: Cleo Camara    Relationship: Emergency Contact; DAUGHTER    Best call back number: [phone]    What was the call regarding: PT'S DAUGHTER CALLED TO CANCEL PT'S NP APPT W/ JOSEE HAQUE ON 5/15/25 @ 11AM AS PT IS CURRENTLY ADMITTED TO [facility] AND SHE IS UNSURE WHEN PT WILL BE DISCHARGED. PT'S DAUGHTER TO CALL BACK WHEN THEY ARE READY TO RESCHEDULE.    SENDING ENCOUNTER TO MAKE OFFICE AWARE THAT [facility] APPT HAS BEEN CANCELLED WITH OPT TO NOT RESCHEDULE AT THE TIME OF CALL.    PLEASE BE AWARE.

## 2025-05-13 NOTE — CONSULTS
"Dietitian Follow-up    Patient Name: Valarie Camara  YOB: 1945  MRN: [id number]  Admission date: 5/10/2025    Comment:    Clinical Nutrition Follow-up   Encounter Information        Trending Narrative     5/13: RD consulted for TF recommendations.      5/12: Pt NPO x 2 days. EMR shows significant wt loss. Will monitor for diet advancement and add ONS.     Anthropometrics        Current Height, Weight Height: 160 cm (63\")  Weight: 60.1 kg (132 lb 7.9 oz) (05/13/25 0400)       Trending Weight Hx     This admission:              PTA:     Wt Readings from Last 30 Encounters:   05/13/25 0400 60.1 kg (132 lb 7.9 oz)   05/12/25 0400 57.1 kg (125 lb 14.1 oz)   05/11/25 0500 55.5 kg (122 lb 5.7 oz)   05/10/25 1727 57.4 kg (126 lb 8.7 oz)   05/10/25 1226 66.7 kg (147 lb)   04/17/25 0932 67.1 kg (147 lb 14.4 oz)   04/05/25 1837 66.6 kg (146 lb 12.8 oz)   03/21/25 1511 68 kg (150 lb)   03/20/25 1556 68 kg (150 lb)   03/15/25 0422 69.4 kg (153 lb)   03/04/25 0600 72.8 kg (160 lb 7.9 oz)   03/02/25 1423 71.9 kg (158 lb 8.2 oz)   03/02/25 0348 70.5 kg (155 lb 6.8 oz)   03/01/25 0415 72.3 kg (159 lb 6.3 oz)   02/28/25 1848 72.6 kg (160 lb 0.9 oz)   02/28/25 1817 77 kg (169 lb 12.1 oz)   02/28/25 1152 77 kg (169 lb 12.1 oz)   02/08/25 0115 77.1 kg (169 lb 15.6 oz)   01/24/25 1111 75.3 kg (166 lb)   01/27/25 0139 77.1 kg (170 lb)   01/10/25 1330 80.3 kg (177 lb)   01/05/25 1700 76.3 kg (168 lb 3.4 oz)   01/05/25 1102 79.4 kg (175 lb 0.7 oz)   01/05/25 0921 79.4 kg (175 lb)   11/06/24 1049 80.6 kg (177 lb 12.8 oz)   12/04/23 2059 90.7 kg (200 lb)   10/31/23 0924 90.7 kg (200 lb)   08/21/23 1541 90.7 kg (200 lb)   08/17/23 1331 91.2 kg (201 lb)   01/31/22 1433 87.5 kg (192 lb 12.8 oz)   11/24/21 1834 88.5 kg (195 lb)   11/24/21 1543 86.2 kg (190 lb)   05/25/21 0500 92.6 kg (204 lb 2.3 oz)   05/21/21 0500 89.9 kg (198 lb 3.1 oz)   05/20/21 2344 89.9 kg (198 lb 3.1 oz)   05/20/21 0807 88.2 kg (194 lb 6.4 oz)   05/20/21 " 0305 90.7 kg (200 lb)   02/03/21 1507 89.4 kg (197 lb)   12/17/20 1307 92.1 kg (203 lb)   10/28/20 1735 90.7 kg (200 lb)   09/08/20 1011 95.7 kg (211 lb)   08/17/20 1122 93.6 kg (206 lb 6.4 oz)   01/02/20 1016 90.4 kg (199 lb 6.4 oz)   12/13/19 0445 87.2 kg (192 lb 3.2 oz)   11/25/19 1335 89.4 kg (197 lb)   11/23/19 2340 89.7 kg (197 lb 12.8 oz)   11/23/19 1951 90.7 kg (200 lb)   03/19/19 1541 91.8 kg (202 lb 6.4 oz)   04/10/18 1524 90.7 kg (200 lb)   04/09/18 1342 92.5 kg (204 lb)      BMI kg/m2 Body mass index is 23.47 kg/m².     Labs        Pertinent Labs Results from last 7 days   Lab Units 05/13/25  0431 05/12/25  1226 05/12/25  0805 05/10/25  1437 05/10/25  1237   SODIUM mmol/L 158* 160* 161*   < > 168*   POTASSIUM mmol/L 3.7 3.8 3.7   < > 4.1   CHLORIDE mmol/L 125* 127* 130*   < > 130*   CO2 mmol/L 21.8* 22.8 22.1   < > 18.5*   BUN mg/dL 62* 73* 78*   < > 127*   CREATININE mg/dL 1.75* 2.08* 2.16*   < > 3.96*   CALCIUM mg/dL 7.9* 8.5* 8.6   < > 9.8   BILIRUBIN mg/dL  --   --   --   --  0.3   ALK PHOS U/L  --   --   --   --  57   ALT (SGPT) U/L  --   --   --   --  7   AST (SGOT) U/L  --   --   --   --  10   GLUCOSE mg/dL 214* 247* 211*   < > 162*    < > = values in this interval not displayed.     Results from last 7 days   Lab Units 05/13/25  0431 05/12/25  1934 05/12/25  0411 05/11/25  0342   MAGNESIUM mg/dL 2.1  --  2.4 2.7*   PHOSPHORUS mg/dL 2.5   < > 1.8* 4.3   HEMOGLOBIN g/dL 10.1*  --  12.4  --    HEMATOCRIT % 32.9*  --  41.1  --     < > = values in this interval not displayed.         Medications    Scheduled Medications aspirin, 81 mg, Oral, Daily  atorvastatin, 80 mg, Oral, Daily  carvedilol, 12.5 mg, Oral, BID With Meals  cloNIDine, 1 patch, Transdermal, Weekly  ertapenem, 500 mg, Intravenous, Q24H  mupirocin, 1 Application, Each Nare, BID  rivastigmine, 1 patch, Transdermal, Daily  senna-docusate sodium, 2 tablet, Oral, BID  sodium chloride, 10 mL, Intravenous, Q12H        Infusions dextrose, 100  mL/hr, Last Rate: 100 mL/hr (05/13/25 1047)  dextrose 5 % with KCl 20 mEq, 50 mL/hr, Last Rate: 50 mL/hr (05/13/25 1047)  heparin, 9 Units/kg/hr (Order-Specific), Last Rate: 9 Units/kg/hr (05/13/25 1123)  Pharmacy to Dose Heparin,         PRN Medications   acetaminophen **OR** acetaminophen    senna-docusate sodium **AND** polyethylene glycol **AND** bisacodyl **AND** bisacodyl    Calcium Replacement - Follow Nurse / BPA Driven Protocol    glycerin    hydrALAZINE    Magnesium Cardiology Dose Replacement - Follow Nurse / BPA Driven Protocol    metoprolol tartrate    nitroglycerin    Pharmacy to Dose Heparin    Phosphorus Replacement - Follow Nurse / BPA Driven Protocol    Potassium Replacement - Follow Nurse / BPA Driven Protocol    sodium chloride    sodium chloride    [Held by provider] sodium chloride     Physical Findings        Trending Physical   Appearance, NFPE    --  Edema  Generalized Edema: 1+ (Trace)      Arm, Right Edema: 2+ (Mild) (in ac area.)                  Bowel Function Stool Output  Stool Unmeasured Occurrence: 1 (05/13/25 0500)  Bowel Incontinence: Yes (05/13/25 0500)  Stool Amount: Smear (05/13/25 0500)  Stool Consistency: soft (05/13/25 0500)  Perineal Care: absorbent brief/pad changed, catheter care provided, perineum cleansed, protective cream/ointment applied (05/13/25 0500)  Last Bowel Movement:  (pt having smears per 7a rn.)   Chewing/Swallowing Teeth Status:Mouth/Teeth WDL: .WDL except, teeth (upper denure present.) Teeth Symptoms: tooth/teeth missing  Chewing/Swallowing Issues: No issues identified at this time   Skin Lines, Drains, Airways, & Wounds       Active LDAs       Name Placement date Placement time Site Days Last dressing change    Peripheral IV 05/10/25 1222 20 G Anterior;Right External Jugular 05/10/25  1222  External Jugular  3 05/10/25 1231 (73.40 hrs)    Urethral Catheter 16 Fr. 05/10/25  0285  -- 2                     --  Current Nutrition Orders & Evaluation of Intake        Oral Nutrition     Food Allergies None   Current PO Diet NPO Diet NPO Type: Strict NPO   Supplement None    PO Evaluation     Trending % PO Intake NPO       Enteral Nutrition    Current EN Order Patient isn't on Tube Feeding    Patient doesn't have any tube feeding modular orders    EN Route    EN Tolerance    EN Observation        Parenteral Nutrition    Current TPN Order    TPN Route    Lipids (mL/%/frequency)    MVI Frequency    Trace Element Frequency    Total # Days on TPN    TPN Observation      Nutrition Diagnosis         Nutrition Dx Problem 1  Underweight r/t dementia/COPD AEB BMI=22.3.       Nutrition Dx Problem 2 Difficulty Chewing  r/t NPO as evidenced by need for enteral nutrition recommendations.      Goal(s) Initiate EN      Intervention         Intervention  Start EN and Continue to monitor for plan of care       Diet Prescription    Supplement Prescription         Enteral Prescription  Rec#1: Initiate Diabetisource AC @ 20mL/hr and advance 10mL q8hrs to goal rate of 60mL/hr x 22hrs  Rec#2: Free water flush of 120mL q4hrs  Total TF regimen to provide: 1584kcals, 79g protein, and 1797mL per day.       TPN Prescription         Education Provided         Monitor/Evaluation        Monitor Per Protocol, Pertinent Labs, EN Delivery/Tolerance, Weight, Skin Status, GI Status, Symptoms, POC/GOC, and Hemodynamic Stability     RD Follow-up Encounter 1-2 days     Electronically signed by:  Mojgan Pascal RD  05/13/25 13:54 EDT

## 2025-05-13 NOTE — PROGRESS NOTES
Pharmacy to Dose Heparin Infusion     Valarie Camara is a  79 y.o. female receiving heparin infusion.     Therapy for (VTE/Cardiac):   Cardiac  Patient Weight: Using 66.7 kg  Initial Bolus (Y/N):   Y  Any Bolus (Y/N):   Y        Signs or Symptoms of Bleeding: N    Cardiac or Other (Not VTE)   Initial Bolus: 60 units/kg (Max 4,000 units)  Initial rate: 12 units/kg/hr (Max 1,000 units/hr)   aPTT  Rebolus Infusion Hold time Change infusion Dose (Units/kg/hr) Next Anti Xa or aPTT Level Due   <55 50 Units/kg  (4000 Units Max) None Increase by  3 Units/kg/hr 6 hours   55-62 25 Units/kg  (2000 Units Max) None Increase by  2 Units/kg/hr 6 hours   63-69 0 None Increase by  1 Units/kg/hr 6 hours   70-85 0 None No Change 6 hours (after 2 consecutive levels in range check qAM)   86-93 0 None Decrease by  1 Units/kg/hr 6 hours    0 30 Minutes Decrease by  2 Units/kg/hr 6 hours   109-123 0 60 Minutes Decrease by  3 Units/kg/hr 6 hours   >123 0 Hold  After aPTT less than 75 decrease previous rate by  4 Units/kg/hr  Every 2 hours until aPTT less than 75 then when infusion restarts in 6 hours           Recommend aPTT every 6 hours.     Results from last 7 days   Lab Units 05/13/25  0648 05/13/25  0431 05/12/25  2225 05/10/25  2131 05/10/25  1237   APTT seconds 111.6* 151.0* 52.4*   < > 30.3*   INR   --   --   --   --  1.25*   PROTIME Seconds  --   --   --   --  16.6*    < > = values in this interval not displayed.     Results from last 7 days   Lab Units 05/13/25  0431 05/12/25  0411 05/11/25  0053   HEMOGLOBIN g/dL 10.1* 12.4 11.5*   HEMATOCRIT % 32.9* 41.1 38.7   PLATELETS 10*3/mm3 122* 141 141       Per protocol after aPTT less than 75, decrease previous rate by 4 units/kg/hr .   Rate to be adjusted to 8 u/kg/hr.     Recommend aPTT every 6 hours.        Date   Time   aPTT Current Rate (Unit/kg/hr) Bolus   (Units) Rate Change   (Unit/kg/hr) New Rate (Unit/kg/hr) Next   aPTT Comments  Pump Check Daily   5/10/25 4341 30.3  0 4000 +12.0 12.0 5/10 2200 d/w Emeli Thrasher RN   05/10/25 2239 >200 12 0 Hold  Hold 5/11 0100 AIDAN Rehman   5/11/25 0342 47.3 12 at time of hold 0 -4 8 0945 AIDAN Rehman   5/11/25 1149 27.7 8 3340 +3 11 5/11 1930 d/w Lida Boyer RN   5/11/25 1931 28.7 11 3340 +3 14 5/12 0400 D/W AIDAN Rehman   5/12/25 0411 28.6 14 3340 +3 17 5/12 1100 D/w AIADN Rehman   5/12/25 1512 >200 HOLD HOLD HOLD HOLD 5/12 @ 1830 D/W AIDAN Russo  Lab was unable to stick patient and had to redraw   5/12/25 1934 114.9 hold hold hold hold 5/12 @ 2200 D/W AIDAN Rehman   5/12/25 2225 52.4 hold 0 +13 13 5/13 @ 0430 D/W AIDAN Rehman   5/13/25 0430 151 hold hold hold hold 5/13 @ 0630 D/W AIDAN Rehman   5/13/25 0648 111.6 HOLD HOLD HOLD HOLD 5/13 @ 0830 D/W AIDAN Russo                                                                                                                        Pharmacy will continue to follow aPTT results and monitor for signs and symptoms of bleeding or thrombosis.    Lor Prescott, PharmD  05/13/25 08:05 EDT

## 2025-05-13 NOTE — PROGRESS NOTES
"DOS: 2025  NAME: Valarie Camara   : 1945  PCP: Lay Cox MD  Chief Complaint   Patient presents with    Altered Mental Status       Chief complaint: She was evaluated by the nephrologist and after that she is tired and does not want to cooperate with any neurological examination.  Subjective: Feels very sleepy and would like to take a nap.  However when asked what her name and date of birth were she was able to answer all those questions appropriately and does not want to participate with any other neurological examination at this point.    Objective:  Vital signs: /73   Pulse 77   Temp 98.4 °F (36.9 °C) (Oral)   Resp 12   Ht 160 cm (63\")   Wt 60.1 kg (132 lb 7.9 oz)   SpO2 99%   BMI 23.47 kg/m²    Gen: NAD, vitals reviewed  MS: Awake and alert and oriented to herself only.  She has progressive dementia.  CN: No focal deficits noted.  Motor: Moves all extremities but today does not want to cooperate with range of motion like yesterday.  Sensory: Could not be tested at this time.  Coordination: Could not participate with examination.  Gait: Not weightbearing at this time.    ROS:  General: Currently feeling sleepy and does not want to be aroused for any examination.  Neurological: Progressive dementia and currently not willing to participate with any neurological examination.    Laboratory results:  Lab Results   Component Value Date    GLUCOSE 214 (H) 2025    CALCIUM 7.9 (L) 2025     (H) 2025    K 3.7 2025    CO2 21.8 (L) 2025     (H) 2025    BUN 62 (H) 2025    CREATININE 1.75 (H) 2025    EGFRIFAFRI 42 (L) 2021    EGFRIFNONA 33 (L) 2020    BCR 35.4 (H) 2025    ANIONGAP 11.2 2025     Lab Results   Component Value Date    WBC 5.89 2025    HGB 10.1 (L) 2025    HCT 32.9 (L) 2025    MCV 95.4 2025     (L) 2025     Lab Results   Component Value Date    "  (H) 03/21/2025     (H) 05/20/2021    LDL 86 01/11/2021            Review of labs: Hemoglobin and hematocrit are low at 10.1 and 32.9 respectively with a platelet count being low at 122,000.  The EGFR is improved to 42.  The BUN is still 62.  The sodium has improved from 161 and 160 8-1 58 at this time.  The TSH is low at 0.024.     CMP:        Lab 05/13/25  0431 05/12/25  1934 05/12/25  1226 05/12/25  0805 05/12/25  0411 05/12/25  0014 05/11/25  1149 05/11/25  0342 05/10/25  1437 05/10/25  1237   SODIUM 158*  --  160* 161* 160* 158*   < > 164*   < > 168*   POTASSIUM 3.7  --  3.8 3.7 3.7 3.6   < > 4.2   < > 4.1   CHLORIDE 125*  --  127* 130* 127* 124*   < > 132*   < > 130*   CO2 21.8*  --  22.8 22.1 23.6 22.0   < > 21.9*   < > 18.5*   ANION GAP 11.2  --  10.2 8.9 9.4 12.0   < > 10.1   < > 19.5*   BUN 62*  --  73* 78* 84* 86*   < > 111*   < > 127*   CREATININE 1.75*  --  2.08* 2.16* 2.24* 2.40*   < > 2.79*   < > 3.96*   EGFR 29.2*  --  23.7* 22.6* 21.7* 20.0*   < > 16.7*   < > 10.9*   GLUCOSE 214*  --  247* 211* 215* 277*   < > 90   < > 162*   CALCIUM 7.9*  --  8.5* 8.6 8.7 9.0   < > 8.9   < > 9.8   MAGNESIUM 2.1  --   --   --  2.4  --   --  2.7*  --  3.0*   PHOSPHORUS 2.5 2.5  --   --  1.8*  --   --  4.3  --   --    TOTAL PROTEIN  --   --   --   --   --   --   --   --   --  6.3   ALBUMIN  --   --   --   --   --   --   --   --   --  3.1*   GLOBULIN  --   --   --   --   --   --   --   --   --  3.2   ALT (SGPT)  --   --   --   --   --   --   --   --   --  7   AST (SGOT)  --   --   --   --   --   --   --   --   --  10   BILIRUBIN  --   --   --   --   --   --   --   --   --  0.3   ALK PHOS  --   --   --   --   --   --   --   --   --  57    < > = values in this interval not displayed.        TSH          3/4/2025    02:44 3/20/2025    16:08 5/10/2025    12:37   TSH   TSH 0.630  0.355  0.024         Lipid Panel          3/21/2025    05:53   Lipid Panel   Total Cholesterol 215    Triglycerides 149    HDL  Cholesterol 43    VLDL Cholesterol 27    LDL Cholesterol  145    LDL/HDL Ratio 3.31         Lab Results   Component Value Date    LPTEKZMY33 252 10/07/2019         Lab Results   Component Value Date    HGBA1C 6.90 (H) 03/04/2025           Review and interpretation of imaging:  MRI BRAIN WO CONTRAST     Date of Exam: 3/22/2025 2:03 AM EDT     Indication: Stroke, follow up  Aphasia, right-sided weakness, numbness, facial droop.     Comparison: Head CT 3/21/2025     Technique:  Routine multiplanar/multisequence sequence images of the brain were obtained without contrast administration.        Findings:  No areas of diffusion restriction to suggest a recent infarct. Negative for acute intracranial hemorrhage, large mass lesion, midline shift or hydrocephalus. Mild global parenchymal volume loss for age. Mild periventricular T2/FLAIR hyperintense signal   suggesting chronic microvascular ischemic change. Mostly empty sella. Negative for cerebellar tonsillar ectopia. Normal volume corpus callosum. Major intracranial flow voids preserved. Orbits symmetric. Trace bilateral mastoid fluid. Unremarkable   appearance of the calvarium.     IMPRESSION:  Impression:  No acute MRI findings, specifically no findings to suggest a recent infarct.       CT Head Without Contrast  Result Date: 5/10/2025  HEAD CT  HISTORY: Confusion.  COMPARISON: 3-.  TECHNIQUE: Multiple axial CT images were performed from the foramen magnum to the vertex without enhancement. Individualized dose reduction techniques using automated exposure control or adjustment of the mA and/or kV according to patient size were employed.  FINDINGS: Mild to moderate diffuse cortical atrophy is present.  There is no evidence of acute hemorrhage, mass effect, or edema.  Mild mucoperiosteal thickening is noted in the right maxillary sinus. No air-fluid levels are seen.      Impression:  1. No evidence of acute intracranial abnormality.      This report was signed and  finalized on 5/10/2025 12:57 PM by Jah Kirk MD.           MRI Brain Without Contrast  Result Date: 5/11/2025  MRI BRAIN WITHOUT CONTRAST  HISTORY: Confusion  COMPARISON: 3/22/2025  TECHNIQUE: Multiplanar, multisequence images of the brain were performed without contrast.  FINDINGS: The diffusion weighted images demonstrate no restricted diffusion. There is no acute ischemia. The cervicomedullary junction is normal. There is no Chiari malformation. There is moderate atrophy. There is increased T2 signal in the periventricular white matter consistent mild microvascular change. There is no cortical edema or hemorrhage. There are new right mastoid opacities.      Impression: 1. Moderate atrophy with mild microvascular change. There is no edema or hemorrhage. 2. New right mastoid opacities.   This report was signed and finalized on 5/11/2025 8:59 AM by Yuniel Silva MD.         Workup to date: The recent EEG performed on March 25, 2025 and reviewed by Dr. Vallejo is as follows:    Procedure:   A 21 Channel digital electroencephalogram was performed in the Clinical Neurophysiology Laboratory. The 10/20 International System of electrode placement was used and both Bipolar and referential electrode montages were monitored.      EEG Description:   This EEG is continuous and symmetrical. During the awake state with eye closed, there is a posterior dominate rhythm of  -9-  Hz that is symmetrical and reacts appropriately to eye opening. Anterior to posterior amplitude frequency gradient is preserved.  With Drowsiness, there is waxing and waning of the posterior dominant rhythm with eventual replacement by a mixture of beta, alpha and theta activity.   A prolonged Lead I EKG rhythm strip revealed heart rate at --80--/min            Interpretation:   This EEG obtained in the awake and drowsy state is normal for age.     Clinical correlation:   The diagnosis of epilepsy is clinical one. A normal EEG dose not excludes this  Diagnosis. However there are no epileptiform features in this recording to suggest an underlining diagnosis of Epilepsy. Therefore, Clinical correlation is recommended.       Results for orders placed during the hospital encounter of 03/20/25    Adult Transthoracic Echo Complete W/ Cont if Necessary Per Protocol (With Agitated Saline)    Interpretation Summary    Left ventricular systolic function is normal. Calculated left ventricular EF = 67% Left ventricular ejection fraction appears to be 66 - 70%.    Left ventricular wall thickness is consistent with mild concentric hypertrophy.    Left ventricular outflow tract peak flow gradient at rest is 11 mmHg. Left ventricular outflow tract peak gradient with valsalva is 29 mmHg.    Left ventricular diastolic function is consistent with (grade I) impaired relaxation.    The mitral valve peak gradient is 9 mmHg. The mitral valve mean gradient is 4 mmHg.    Calculated right ventricular systolic pressure from tricuspid regurgitation is 21 mmHg.    There is a trivial pericardial effusion.       Results for orders placed during the hospital encounter of 03/20/25    Duplex Venous Lower Extremity - Bilateral CAR    Interpretation Summary    Normal bilateral lower extremity venous duplex scan.         Diagnoses: Patient with progressive dementia and behavioral issues      Comment: Recently was admitted with hypernatremia and acute renal failure.  These are currently being resolved by the nephrologist.    Plan:  1.  EEG is currently pending.  2.  Avoid narcotics and sedatives which depress his mental function in elderly people especially with renal insufficiency.  3.  Try to mobilize her as much as possible.  4.  Try to see if speech pathology can work with her.  5.  If she does not want to participate with any therapy programs, palliative care will also be a good option.    Discussed with the care team and she will be followed up tomorrow by my colleague Dr. Paulie Javed  from inpatient teleneurology service.    Spent a total of 30 minutes in face-to-face evaluation and management of the patient using the dedicated telemedicine device without any interruption with the help of Ms. Deana Armas, the rounding nurse with the patient located at the [facility] and myself at a remote location.    Electronically signed by Suha Hernandez MD, 05/13/25, 9:51 AM EDT.

## 2025-05-13 NOTE — PROGRESS NOTES
[facility]    Name:  Valarie Camara   Age:  79 y.o.  Sex:  female  :  1945  MRN:  [id number]   Visit Number:  [id number]  Admission Date:  5/10/2025  Date Of Service:  25  Primary Care Physician:  Lay Cox MD     LOS: 2 days :    Chief Complaint:      weakness    Subjective:    Patient resting comfortably.  Was asleep.  Kept eyes closed but told me that she was sleeping and did not want to be disturbed.  No acute events overnight.    Hospital Course:    Patient is an 79-year-old female brought to the emergency department for evaluation of altered mental status.  At the time of examination, unfortunately, patient was alone without family at bedside.  History of present illness was obtained from review of medical records, including discussion with nursing and ED physician.  Patient has a known history of dementia, however over the last 3 days, patient's daughter was concerned that the patient has developed significant confusion.  Patient has been unable to tolerate oral intake of nutrition and oral hydration.      EMS was called to patient's nursing facility, she was found to be hypotensive and tachycardic in A-fib with RVR.  They were unable to obtain IV access, subsequently unable to provide cardioversion en route to the ED.  Upon arrival to the ED, patient was emergently cardioverted, patient's rhythm with normal sinus rhythm, without any change in mentation.  Neurology was consulted, CT of the head did not show any acute CVA.  Cardiology was consulted, who feels that the elevated troponin is most likely secondary to the tachycardia and recommend troponin trending, anticoagulation.  Hospitalist services consulted for admission.     Discussion had with family and they request DNR with no chest compressions and no intubation, they approve all other interventions.     Patient had presented with encephalopathy very high sodium.   Continues to be in goals of care discussion with patient and family.  Family meeting pending.  Sodium has been reluctant to recover still running about 160 despite hypotonic fluids nephrology is addressing this.  Otherwise urine is growing ESBL has been switched to Invanz.    Review of Systems:     All systems were reviewed and negative except as mentioned in subjective, assessment and plan.    Vital Signs:    Temp:  [98.2 °F (36.8 °C)-98.6 °F (37 °C)] 98.4 °F (36.9 °C)  Heart Rate:  [74-88] 77  Resp:  [12-20] 12  BP: (107-170)/(51-74) 137/73    Intake and output:    I/O last 3 completed shifts:  In: 4046 [I.V.:3846; IV Piggyback:200]  Out: 1305 [Urine:1305]  No intake/output data recorded.    Physical Examination:    General Appearance:  Alert and cooperative. NAD   Head:  Atraumatic and normocephalic.   Eyes: Conjunctivae and sclerae normal, no icterus. No pallor.   Throat: No oral lesions, no thrush, oral mucosa moist.   Neck: Supple, trachea midline, no thyromegaly.   Lungs:   Breath sounds heard bilaterally equally.  No wheezing or crackles.    Heart:  Normal S1 and S2, no murmur, No JVD.   Abdomen:   Normal bowel sounds, Soft, nontender, nondistended, no rebound tenderness.   Extremities: Supple, no edema, no cyanosis, no clubbing.   Skin: No bleeding or rash.   Neurologic: Alert and oriented x 2. No facial asymmetry.      Laboratory results:    Results from last 7 days   Lab Units 05/13/25  0431 05/12/25  1226 05/12/25  0805 05/10/25  1437 05/10/25  1237   SODIUM mmol/L 158* 160* 161*   < > 168*   POTASSIUM mmol/L 3.7 3.8 3.7   < > 4.1   CHLORIDE mmol/L 125* 127* 130*   < > 130*   CO2 mmol/L 21.8* 22.8 22.1   < > 18.5*   BUN mg/dL 62* 73* 78*   < > 127*   CREATININE mg/dL 1.75* 2.08* 2.16*   < > 3.96*   CALCIUM mg/dL 7.9* 8.5* 8.6   < > 9.8   BILIRUBIN mg/dL  --   --   --   --  0.3   ALK PHOS U/L  --   --   --   --  57   ALT (SGPT) U/L  --   --   --   --  7   AST (SGOT) U/L  --   --   --   --  10   GLUCOSE  mg/dL 214* 247* 211*   < > 162*    < > = values in this interval not displayed.     Results from last 7 days   Lab Units 05/13/25  0431 05/12/25  0411 05/11/25  0053   WBC 10*3/mm3 5.89 6.63 9.11   HEMOGLOBIN g/dL 10.1* 12.4 11.5*   HEMATOCRIT % 32.9* 41.1 38.7   PLATELETS 10*3/mm3 122* 141 141     Results from last 7 days   Lab Units 05/10/25  1237   INR  1.25*     Results from last 7 days   Lab Units 05/10/25  1437 05/10/25  1237   HSTROP T ng/L 141* 162*     Results from last 7 days   Lab Units 05/10/25  2131 05/10/25  1315 05/10/25  1237   BLOODCX  No growth at 2 days No growth at 2 days  --    URINECX   --   --  >100,000 CFU/mL Escherichia coli ESBL*     Recent Labs     03/22/25  0610 05/12/25  0538   PHART 7.449 7.444   STZ3ZCE 32.0* 33.5*   PO2ART 78.3* 81.2   CCM2XYS 22.1 23.0   BASEEXCESS -1.2* -0.6*      I have reviewed the patient's laboratory results.    Radiology results:    No radiology results from the last 24 hrs  I have reviewed the patient's radiology reports.    Medication Review:     I have reviewed the patient's active and prn medications.     Problem List:      Hypernatremia    PAF (paroxysmal atrial fibrillation)    Acute renal failure superimposed on stage 3a chronic kidney disease      Assessment:    Severe Sepsis secondary to urinary tract infection  Urinary tract infection  Metabolic encephalopathy  Atrial fibrillation with RVR  Acute kidney injury  Hypernatremia  Dementia  COPD  Hypertension  Hyperlipidemia  Restless leg syndrome    Plan:    Severe Sepsis secondary to urinary tract infection  Urinary tract infection  Metabolic encephalopathy  Patient meets sepsis criteria with tachycardia, leukocytosis, source of infection identified as urinary tract infection.  Due to the sodium derangements, LR was bolused.  Evidence of endorgan damage noted with metabolic encephalopathy and acute kidney injury.  IV Rocephin, switched to Invanz 5/12/25 due to ESBL in the urine  MRI no stroke  Grey  placed on 5/10/25  Atrial fibrillation with RVR  Patient status post cardioversion.  Cardiology consulted, appreciate their recommendations.  Heparin drip will be started, continued  Patient is currently normal sinus rhythm, continue Coreg  Acute kidney injury  Acute hypernatremia  Sodium was normal 1 month prior.  Patient encephalopathic which may be symptoms related to hypernatremia.  Nephrology has been consulted, appreciate his recommendations.  Status post LR boluses, continue slow hypotonic fluids with dextrose  Serial sodium, if sodium is correcting greater than one half Mg per DL per hour will hold fluids  Nutrition  NGT ordered  Tube feeds  Patient complained of painful swallowing; possible thursh. Will start empiric fluconazole  She also has a history of strictures; was recently dilated. Low suspicion this is the cause  Dementia  COPD  Hypertension  Hyperlipidemia  Restless leg syndrome  Continue home medications as appropriate.          DVT Prophylaxis: Heparin  Code Status: DNR/DNI  Diet: NPO  Discharge Plan: Pending    Ronny Lara DO  05/13/25  11:13 EDT    Dictated utilizing Dragon dictation.

## 2025-05-13 NOTE — PLAN OF CARE
Goal Outcome Evaluation:  Plan of Care Reviewed With: patient, family           Outcome Evaluation: Attempted to place NG this afternoon, patient would not allow, family and MD informed and aware. Family wants to attempt again tomorrow while they are here.

## 2025-05-13 NOTE — CONSULTS
[facility]    INPATIENT PALLIATIVE CARE CONSULT      Name:  Valarie Camara   Age:  79 y.o.  Sex:  female  :  1945  MRN:  [id number]   Visit Number:  [id number]  Date of Service:  25  Date of Admission:  5/10/2025  Primary Care Physician:  Lay Cox MD    Consulting Physician:  Dr. Rader    Reason For Consult:  Introduction to Palliative services, Goals of care discussions , Support during serious illness, and Hospice information     History of Present Illness:  Valarie Camara is a 79 y.o. female with past medical history of CAD, CKD, HTN, dementia, history of PE on eliquis, PAD, left ICA stenosis, history of esophageal stricture s/p dilation 2027 with Dr. Zapata.  Had admission in mid March secondary to altered mental status.  Stroke workup was negative, she was treated for UTI.  In addition neurology assessed and followed, recommending initiation of rivastigmine, continue aspirin/statin/AC.  Patient was discharged to short-term rehabilitation at [facility], ultimately completed this and discharged home.  Patient presented to [facility] on 5/10/2025 secondary to altered mental status with associated poor oral intake over the past several days.  Per record review, patient was assessed by EMS found to be hypotensive and tachycardic, EKG revealing A-fib with RVR.  Upon arrival to [facility] ED she was emergently cardioverted, returning to normal sinus rhythm.  Unfortunately she did not have improvement to her mentation.  Laboratory evaluation revealed WBC 13.08.  Glucose 162, , creatinine 3.96, Na 168, albumin 3.1.  Urinalysis with positive nitrites, moderate leuks, 4+ bacteria.  Lactate negative.  Troponin elevated.  TSH 0.024.  Neurology consulted in the ED, CT head did not reveal acute CVA.  CT head no acute intracranial abnormality.  Chest x-ray negative.  Cardiology consulted, who felt troponin elevation likely associated with  tachycardia, recommending trending as HR better controlled.  Neurology also consulted, recommending MRI.  Patient was initiated on Rocephin for UTI and LR, admitted the primary medicine service for continued evaluation and management.  Cardiology, neurology, and nephrology followed in consultation.   MRI negative for acute stroke, awaiting EEG.  Neurology recommending SLP and PT/OT evaluation.  Nephrology following, hypernatremia and KURT improving with IV hydration.  Nursing have attempted bedside swallow eval, however patient has been refusing diet and p.o. medications.  Palliative care consulted to further review GOC in the setting of complex medical decision making.    After speaking with the primary medicine service, palliative met with patient and family at bedside.  Patient is lying in bed appearing comfortable, will awaken and answer a few questions.  She appears to be sleeping at times, however will answer appropriately.  Her daughters are able to supplement HPI and reports that prior to this acute illness patient was living with her daughter who is her primary caretaker.  She has assistance from other family and friends who also live close by.  At baseline she is able to ambulate only short amount of distance, utilizes assistive device.  She has episodic incontinence.  She sleeps on and off throughout the day and nighttime.  Appetite has been waxing and waning, approximate 26 pound weight loss in the past 2 to 3 months.  Initially had difficulty with swallowing that improved after dilation, now reports pain with swallowing.  Family describes subtle cognitive losses over the past several years, is oriented to herself and place at baseline, able to recognize family consistently.  She does require assistance with ADLs.  Her daughter notes that over the past week or so she was requiring assistance with feeding, she was no longer desiring to eat or drink.  No acute dyspnea, pain, agitation or restlessness  "noted.    Patient and family agreeable to continued palliative care follow up and discussions regarding goals of care and advance care planning.     Review of Systems   Constitutional:  Positive for activity change, appetite change and fatigue.   HENT:  Positive for trouble swallowing.    Neurological:  Positive for weakness.        Medical History:  Past Medical History:   Diagnosis Date    Acute bronchitis with bronchospasm     Anxiety     Arthritis     Asthma     B12 deficiency     Back pain     Body piercing     ears    Cataract     Cerebrovascular disease     Cervicalgia     Chronic kidney disease     stage 3b    Colonic polyp     History of colonic polyps     Constipation     COPD (chronic obstructive pulmonary disease)     Coronary artery disease     Depression     Diverticulitis     Dysphagia     Patient reported \"it won't go all the way down\" when eating solid foods first thing in the mornings    Edema     Elevated cholesterol     Esophageal reflux     Folic acid deficiency     Full dentures     Advised no adhesives DOS    Heart murmur     Herpes zoster     High cholesterol     History of kidney infection     History of recurrent urinary tract infection     HTN (hypertension)     Hypercholesterolemia     Impaired functional mobility, balance, gait, and endurance     Insomnia     Kidney infection     Malignant hypertension 04/26/2015     Accelerated essential hypertension    Measles     rubeola    Muscle spasm     Neoplasm of uncertain behavior of skin     dtr denies    Osteoporosis     Poor historian     Recurrent urinary tract infection     Rheumatoid arthritis     RLS (restless legs syndrome)     Stomach ulcer     Stroke     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    Type 2 diabetes mellitus     Vitamin D deficiency       Past Surgical History:   Procedure Laterality Date    CATARACT EXTRACTION WITH INTRAOCULAR LENS IMPLANT Left 02/11/2013    CATARACT EXTRACTION WITH " INTRAOCULAR LENS IMPLANT Right 04/15/2013    COLONOSCOPY  2012    COLONOSCOPY N/A 11/26/2019    Procedure: COLONOSCOPY;  Surgeon: Chidi Downing MD;  Location:  [facility];  Service: Gastroenterology    ENDOSCOPY N/A 11/13/2017    Procedure: ESOPHAGOGASTRODUODENOSCOPY with biopsies and esophageal balloon dilitation;  Surgeon: Wesley Stovall MD;  Location: [facility];  Service:     ENDOSCOPY N/A 11/25/2019    Procedure: ESOPHAGOGASTRODUODENOSCOPY;  Surgeon: Chidi Downing MD;  Location:  [facility];  Service: Gastroenterology    ENDOSCOPY N/A 11/29/2021    Procedure: ESOPHAGOGASTRODUODENOSCOPY with dilation and biopsies;  Surgeon: Gonzalez Zapata MD;  Location: [facility];  Service: Gastroenterology;  Laterality: N/A;    ENDOSCOPY N/A 11/1/2023    Procedure: ESOPHAGOGASTRODUODENOSCOPY WITH BIOPSY AND DILATATION;  Surgeon: Gonzalez Zapata MD;  Location: [facility];  Service: Gastroenterology;  Laterality: N/A;    ENDOSCOPY N/A 1/27/2025    Procedure: Esophagogastroduodenoscopy with dilatation and biopsies;  Surgeon: Gonzalez Zapata MD;  Location: [facility];  Service: Gastroenterology;  Laterality: N/A;    HYSTERECTOMY  1979    UPPER GASTROINTESTINAL ENDOSCOPY  12/09/2013    UPPER GASTROINTESTINAL ENDOSCOPY  11/13/2017     Family History   Problem Relation Age of Onset    Arthritis Mother     Diabetes Mother     Hypertension Mother     Arthritis Other     Arthritis Other     Diabetes Other         DM    Hypertension Other     Colon cancer Neg Hx      Allergies: Penicillins, Clonidine derivatives, Hydralazine, and Sulfa antibiotics    Hospital Scheduled Medications:    Current Facility-Administered Medications:     acetaminophen (TYLENOL) tablet 650 mg, 650 mg, Oral, Q4H PRN **OR** acetaminophen (TYLENOL) suppository 650 mg, 650 mg, Rectal, Q4H PRN, Aki Rodriguez DO    aspirin chewable tablet 81 mg, 81 mg, Oral, Daily, Aki Rodriguez DO, 81 mg at 05/12/25  1031    atorvastatin (LIPITOR) tablet 80 mg, 80 mg, Oral, Daily, Aki Rodriguez DO, 80 mg at 05/12/25 1030    sennosides-docusate (PERICOLACE) 8.6-50 MG per tablet 2 tablet, 2 tablet, Oral, BID, 2 tablet at 05/12/25 1030 **AND** polyethylene glycol (MIRALAX) packet 17 g, 17 g, Oral, Daily PRN **AND** bisacodyl (DULCOLAX) EC tablet 5 mg, 5 mg, Oral, Daily PRN **AND** bisacodyl (DULCOLAX) suppository 10 mg, 10 mg, Rectal, Daily PRN, Aki Rodriguez DO    Calcium Replacement - Follow Nurse / BPA Driven Protocol, , Not Applicable, PRN, Aki Rodriguez DO    carvedilol (COREG) tablet 12.5 mg, 12.5 mg, Oral, BID With Meals, Aki Rodriguez DO, 12.5 mg at 05/12/25 1030    cloNIDine (CATAPRES-TTS) 0.1 MG/24HR patch 1 patch, 1 patch, Transdermal, Weekly, Ortega Cabrales MD, FASN, 1 patch at 05/12/25 1031    dextrose (D5W) 5 % infusion, 100 mL/hr, Intravenous, Continuous, Ortega Cabrales MD, FASN, Last Rate: 100 mL/hr at 05/13/25 1047, 100 mL/hr at 05/13/25 1047    dextrose 5 % with KCl 20 mEq/L infusion, 50 mL/hr, Intravenous, Continuous, Ortega Cabrales MD, FASN, Last Rate: 50 mL/hr at 05/13/25 1047, 50 mL/hr at 05/13/25 1047    ertapenem (INVanz) 500 mg in sodium chloride 0.9 % 50 mL IVPB, 500 mg, Intravenous, Q24H, Wil Rader DO, Last Rate: 100 mL/hr at 05/12/25 1632, 500 mg at 05/12/25 1632    fluconazole (DIFLUCAN) IVPB 200 mg, 200 mg, Intravenous, Q24H, Isaura Lyons PA-C    glycerin 35 % liquid 35% 2 spray, 2 spray, Mouth/Throat, Q2H PRN, Aki Rodriguez DO, 2 spray at 05/12/25 2011    heparin 04188 units/250 ml (100 units/ml) in D5W, 9 Units/kg/hr (Order-Specific), Intravenous, Titrated, Ronny Lara DO, Last Rate: 6 mL/hr at 05/13/25 1123, 9 Units/kg/hr at 05/13/25 1123    hydrALAZINE (APRESOLINE) injection 10 mg, 10 mg, Intravenous, Q6H PRN, Aki Rodriguez DO, 10 mg at 05/13/25 1048    Magnesium Cardiology Dose Replacement - Follow Nurse / BPA Driven Protocol, , Not Applicable, PRN, Aki Rodriguez DO    " metoprolol tartrate (LOPRESSOR) injection 5 mg, 5 mg, Intravenous, Q4H PRN, Wil Rader DO, 5 mg at 05/12/25 0404    mupirocin (BACTROBAN) 2 % nasal ointment 1 Application, 1 Application, Each Nare, BID, Aki Rodriguez DO, 1 Application at 05/13/25 1048    nitroglycerin (NITROSTAT) SL tablet 0.4 mg, 0.4 mg, Sublingual, Q5 Min PRN, Aki Rodriguez DO    nystatin (MYCOSTATIN) 100,000 unit/mL suspension 500,000 Units, 5 mL, Oral, 4x Daily, Isaura Lyons PA-C    Pharmacy to Dose Heparin, , Not Applicable, Continuous PRN, Aki Rodriguez DO    Phosphorus Replacement - Follow Nurse / BPA Driven Protocol, , Not Applicable, PRNMichael Robert, DO    Potassium Replacement - Follow Nurse / BPA Driven Protocol, , Not Applicable, PRN, Aki Rodriguez DO    rivastigmine (EXELON) 4.6 MG/24HR patch 1 patch, 1 patch, Transdermal, Daily, Aki Rodriguez DO, 1 patch at 05/13/25 1048    sodium chloride 0.9 % flush 10 mL, 10 mL, Intravenous, PRN, Danie Tolliver DO    sodium chloride 0.9 % flush 10 mL, 10 mL, Intravenous, Q12H, Aki Rodriguez DO, 10 mL at 05/13/25 1054    sodium chloride 0.9 % flush 10 mL, 10 mL, Intravenous, PRN, Aki Rodriguez DO, 10 mL at 05/12/25 0617    [Held by provider] sodium chloride 0.9 % infusion 40 mL, 40 mL, Intravenous, PRN, Aki Rodriguez DO  I have utilized all immediate resources to obtain, update, or review the patient's current medications (including all prescription, over-the-counter products, herbals, and/or nutritional supplements).      Vitals: /62 (BP Location: Left arm, Patient Position: Lying)   Pulse 85   Temp 97.8 °F (36.6 °C) (Oral)   Resp 13   Ht 160 cm (63\")   Wt 60.1 kg (132 lb 7.9 oz)   SpO2 100%   BMI 23.47 kg/m²     Intake/Output Summary (Last 24 hours) at 5/13/2025 1555  Last data filed at 5/13/2025 0642  Gross per 24 hour   Intake 2535.4 ml   Output 605 ml   Net 1930.4 ml       Physical Exam  Vitals and nursing note reviewed.   Constitutional:       " General: She is not in acute distress.     Appearance: She is ill-appearing. She is not diaphoretic.      Comments: Frail appearing, elderly female, lying in bed, NAD   HENT:      Head: Normocephalic and atraumatic.      Comments: Temporal wasting      Mouth/Throat:      Mouth: Mucous membranes are moist.      Comments: White patches noted   Eyes:      Conjunctiva/sclera: Conjunctivae normal.      Pupils: Pupils are equal, round, and reactive to light.   Cardiovascular:      Rate and Rhythm: Normal rate and regular rhythm.   Pulmonary:      Effort: Pulmonary effort is normal. No respiratory distress.      Comments: Diminished otherwise clear  Abdominal:      General: Bowel sounds are normal. There is no distension.      Palpations: Abdomen is soft.      Tenderness: There is no abdominal tenderness.   Musculoskeletal:         General: No swelling.      Cervical back: Neck supple.      Comments: Diffusely deconditioned, decreased ROM to upper and lower extremities    Skin:     General: Skin is warm and dry.      Capillary Refill: Capillary refill takes less than 2 seconds.   Neurological:      Mental Status: She is alert.      Comments: Oriented to person/place   Psychiatric:         Mood and Affect: Mood normal.         Behavior: Behavior normal.          Diagnostics Review:  Laboratory Data:  Results from last 7 days   Lab Units 05/13/25  0431 05/12/25  1226 05/12/25  0805 05/10/25  1437 05/10/25  1237   SODIUM mmol/L 158* 160* 161*   < > 168*   POTASSIUM mmol/L 3.7 3.8 3.7   < > 4.1   CHLORIDE mmol/L 125* 127* 130*   < > 130*   CO2 mmol/L 21.8* 22.8 22.1   < > 18.5*   BUN mg/dL 62* 73* 78*   < > 127*   CREATININE mg/dL 1.75* 2.08* 2.16*   < > 3.96*   CALCIUM mg/dL 7.9* 8.5* 8.6   < > 9.8   ALBUMIN g/dL  --   --   --   --  3.1*   BILIRUBIN mg/dL  --   --   --   --  0.3   ALK PHOS U/L  --   --   --   --  57   ALT (SGPT) U/L  --   --   --   --  7   AST (SGOT) U/L  --   --   --   --  10   GLUCOSE mg/dL 214* 247* 211*    < > 162*    < > = values in this interval not displayed.     Results from last 7 days   Lab Units 05/13/25  0431 05/12/25  0411 05/11/25  0053   WBC 10*3/mm3 5.89 6.63 9.11   HEMOGLOBIN g/dL 10.1* 12.4 11.5*   HEMATOCRIT % 32.9* 41.1 38.7   PLATELETS 10*3/mm3 122* 141 141     Results from last 7 days   Lab Units 05/10/25  1237   INR  1.25*     Results from last 7 days   Lab Units 05/12/25  0538   PH, ARTERIAL pH units 7.444   PO2 ART mm Hg 81.2   PCO2, ARTERIAL mm Hg 33.5*   HCO3 ART mmol/L 23.0     Results from last 7 days   Lab Units 05/10/25  1237   COLOR UA  Yellow   GLUCOSE UA  Negative   KETONES UA  Trace*   BLOOD UA  Trace*   LEUKOCYTES UA  Moderate (2+)*   PH, URINE  <=5.0   BILIRUBIN UA  Negative   UROBILINOGEN UA  0.2 E.U./dL     Pain Management Panel  More data exists         Latest Ref Rng & Units 5/10/2025 3/20/2025   Pain Management Panel   Amphetamine, Urine Qual Negative Negative  Negative    Barbiturates Screen, Urine Negative Negative  Negative    Benzodiazepine Screen, Urine Negative Negative  Negative    Buprenorphine, Screen, Urine Negative Negative  Negative    Cocaine Screen, Urine Negative Negative  Negative    Fentanyl, Urine Negative Negative  Negative    Methadone Screen , Urine Negative Negative  Negative    Methamphetamine, Ur Negative Negative  Negative      Results from last 7 days   Lab Units 05/10/25  2131 05/10/25  1315 05/10/25  1237   BLOODCX  No growth at 2 days No growth at 3 days  --    URINECX   --   --  >100,000 CFU/mL Escherichia coli ESBL*     Nutritional Status:   Results from last 7 days   Lab Units 05/10/25  1237   ALBUMIN g/dL 3.1*     Body mass index is 23.47 kg/m².  Documented weights    05/10/25 1226 05/10/25 1727 05/11/25 0500 05/12/25 0400   Weight: 66.7 kg (147 lb) 57.4 kg (126 lb 8.7 oz) 55.5 kg (122 lb 5.7 oz) 57.1 kg (125 lb 14.1 oz)    05/13/25 0400   Weight: 60.1 kg (132 lb 7.9 oz)        Radiology:  MRI Brain Without Contrast  Result Date: 5/11/2025  MRI  BRAIN WITHOUT CONTRAST  HISTORY: Confusion  COMPARISON: 3/22/2025  TECHNIQUE: Multiplanar, multisequence images of the brain were performed without contrast.  FINDINGS: The diffusion weighted images demonstrate no restricted diffusion. There is no acute ischemia. The cervicomedullary junction is normal. There is no Chiari malformation. There is moderate atrophy. There is increased T2 signal in the periventricular white matter consistent mild microvascular change. There is no cortical edema or hemorrhage. There are new right mastoid opacities.      1. Moderate atrophy with mild microvascular change. There is no edema or hemorrhage. 2. New right mastoid opacities.   This report was signed and finalized on 5/11/2025 8:59 AM by Yuniel Silva MD.      CT Head Without Contrast  Result Date: 5/10/2025  HEAD CT  HISTORY: Confusion.  COMPARISON: 3-.  TECHNIQUE: Multiple axial CT images were performed from the foramen magnum to the vertex without enhancement. Individualized dose reduction techniques using automated exposure control or adjustment of the mA and/or kV according to patient size were employed.  FINDINGS: Mild to moderate diffuse cortical atrophy is present.  There is no evidence of acute hemorrhage, mass effect, or edema.  Mild mucoperiosteal thickening is noted in the right maxillary sinus. No air-fluid levels are seen.       1. No evidence of acute intracranial abnormality.      This report was signed and finalized on 5/10/2025 12:57 PM by Jah Kirk MD.      XR Chest 1 View  Result Date: 5/10/2025  Chest single view  COMPARISON: Chest from 3-  HISTORY: Altered mental status  FINDINGS: Cardiac silhouette is of normal size.  Lungs are underinflated, but clear.      1. No evidence of acute abnormality.  This report was signed and finalized on 5/10/2025 12:49 PM by Jah Kirk MD.          Goals of Care/Advance Care Planning:  Advance Care Planning     1. Determination of Decisional  Capacity:  Decisional capacity: YES, however would benefit from the assistance of HCS for complex medical decisions given her fatigue and deconditioned state    2. Advanced Directives:  I have personally reviewed Advance Directives on file in the form of POA and Living Will  Healthcare surrogate/legal decision maker: Bailey Camara and Cleo Camara  Relationship to individual: Daughter  Surrogate/decision maker understands role and will honor individual's decisions: YES    3. Patient & Family Meeting:  Members present at meeting Patient, Bailey Gallo Faye Flaherty, Isaura Lyons  Meeting took place at [facility] ICU patient room     4. Summary of the discussion:  After generalized introductions, introduced the role of palliative care in assisting with complex medical decision making, goals of care discussions, and symptom management/supportive care.  Patient raised her two daughters independently.  She has valued her lavon and family throughout her whole life.  She loves her two grandsons as if they were her own children.    I reviewed patient's acute and chronic illness, patient fatigued and unable to express insight in detail, her daughters at bedside with insight regarding the same.  Patient able to express she has been sick for a while, feels that her body is entering final stages of life.  Cleo reflects on patient's progressive decline over the past few months.  Daughters are understanding that their mother is likely entering the final stages of life, they would not want her to suffer.  They know she would desire to have a natural death, confirming DO NOT RESUSCITATE/DO NOT INTUBATE.  However, they want to make sure that they have done all that they could to ensure that they have done everything they could.  They are interested in pursuing a trial of artifical nutrition through NG, only if patient is able to tolerate.  Patient would not desire to pursue long term artifical nutrition through PEG placement.  In  the event she is unable to tolerate NG placement, or artificial nutrition is causing discomfort, they would desire to defer further attempts, likely transition to comfort focused approach to care.  I reviewed trajectory associated with chronic illness, which often reflects a stairstepping pattern.  I also reviewed signs and symptoms associated with patients who are nearing the final stages of life.  Both daughters are in agreement that if patient is not benefiting from interventions, specifically artificial nutrition, would desire to focus on her comfort.  CODE STATUS reviewed/confirmed: DNR / DNI   Baseline Functional Status: Palliative Performance Scale Score: Performance 40% based on the following measures: Ambulation: Mainly in bed, Activity and Evidence of Disease: Unable to do any work, extensive evidence of disease, Self-Care: Mainly assistance required,  Intake: Normal or reduced, LOC: Full, drowsy or confusion           Palliative Care Assessment:  Severe sepsis secondary to UTI  Metabolic encephalopathy  Atrial fibrillation with RVR  KURT  Dementia  Frailty  Dysphagia    Recommendations/Plan:  - CODE STATUS reviewed/confirmed: DNR / DNI   - GOC discussions yield desire to maintain plan of care as outlined per the primary medicine service, desire to pursue an attempt at NG feeding, however if patient is unable to tolerate, would desire to defer/withdrawal   - In the event patient is not tolerating artificial nutrition, seems that family are leaning towards transition to a comfort focused approach to her care, they reflect on patient's desire for for comfort and peace in the final stages of life  - Patient likely meets criteria for hospice, however at the present goals are not in alignment  - Discussed course of diflucan and nystan for oral candidiasis, primary medicine service is in agreement  - Previously patient taking PPI with history of GERD and hiatal hernia, may consider protonix IV  - Current  Functional Status: Palliative Performance Scale Score: Performance 30% based on the following measures: Ambulation: Totally bed bound, Activity and Evidence of Disease: Unable to do any work, extensive evidence of disease, Self-Care: Total care required,  Intake: Reduced, LOC: Full, drowsy or confusion  - Palliative care will continue to follow/support patient and family        I appreciate the opportunity to participate in the care of Mr./Ms. Valarie Camara.  Please do not hesitate to contact me with any questions or concerns.      I spent 90 total minutes conducting chart review for relevant information, face to face assessment, counseling patient and/or family, and coordination of care.  15 minutes spent discussing advanced care planning.  Part of this note may be an electronic transcription/translation of spoken language to printed text using the Dragon Dictation System.       Isaura Lyons PA-C  05/13/25  15:55 EDT

## 2025-05-13 NOTE — PROGRESS NOTES
Pharmacy to Dose Heparin Infusion     Valarie Camara is a  79 y.o. female receiving heparin infusion.     Therapy for (VTE/Cardiac):   Cardiac  Patient Weight: Using 66.7 kg  Initial Bolus (Y/N):   Y  Any Bolus (Y/N):   Y        Signs or Symptoms of Bleeding: N    Cardiac or Other (Not VTE)   Initial Bolus: 60 units/kg (Max 4,000 units)  Initial rate: 12 units/kg/hr (Max 1,000 units/hr)   aPTT  Rebolus Infusion Hold time Change infusion Dose (Units/kg/hr) Next Anti Xa or aPTT Level Due   <55 50 Units/kg  (4000 Units Max) None Increase by  3 Units/kg/hr 6 hours   55-62 25 Units/kg  (2000 Units Max) None Increase by  2 Units/kg/hr 6 hours   63-69 0 None Increase by  1 Units/kg/hr 6 hours   70-85 0 None No Change 6 hours (after 2 consecutive levels in range check qAM)   86-93 0 None Decrease by  1 Units/kg/hr 6 hours    0 30 Minutes Decrease by  2 Units/kg/hr 6 hours   109-123 0 60 Minutes Decrease by  3 Units/kg/hr 6 hours   >123 0 Hold  After aPTT less than 75 decrease previous rate by  4 Units/kg/hr  Every 2 hours until aPTT less than 75 then when infusion restarts in 6 hours           Recommend aPTT every 6 hours.     Results from last 7 days   Lab Units 05/13/25  0825 05/13/25  0648 05/13/25  0431 05/10/25  2131 05/10/25  1237   APTT seconds 59.6* 111.6* 151.0*   < > 30.3*   INR   --   --   --   --  1.25*   PROTIME Seconds  --   --   --   --  16.6*    < > = values in this interval not displayed.     Results from last 7 days   Lab Units 05/13/25  0431 05/12/25  0411 05/11/25  0053   HEMOGLOBIN g/dL 10.1* 12.4 11.5*   HEMATOCRIT % 32.9* 41.1 38.7   PLATELETS 10*3/mm3 122* 141 141       Per protocol after aPTT less than 75, decrease previous rate by 4 units/kg/hr .   Rate to be adjusted to 8 u/kg/hr.     Recommend aPTT every 6 hours.        Date   Time   aPTT Current Rate (Unit/kg/hr) Bolus   (Units) Rate Change   (Unit/kg/hr) New Rate (Unit/kg/hr) Next   aPTT Comments  Pump Check Daily   5/10/25 8823 30.3  0 4000 +12.0 12.0 5/10 2200 d/w Emeli Thrasher RN   05/10/25 2239 >200 12 0 Hold  Hold 5/11 0100 AIDAN Rehman   5/11/25 0342 47.3 12 at time of hold 0 -4 8 0945 AIDAN Rehman   5/11/25 1149 27.7 8 3340 +3 11 5/11 1930 d/w Lida Boyer RN   5/11/25 1931 28.7 11 3340 +3 14 5/12 0400 D/W AIDAN Rehman   5/12/25 0411 28.6 14 3340 +3 17 5/12 1100 D/w AIDAN Rehman   5/12/25 1512 >200 HOLD HOLD HOLD HOLD 5/12 @ 1830 D/W AIDAN Russo  Lab was unable to stick patient and had to redraw   5/12/25 1934 114.9 hold hold hold hold 5/12 @ 2200 D/W AIDAN Rehman   5/12/25 2225 52.4 hold 0 +13 13 5/13 @ 0430 D/W AIDAN Rehman   5/13/25 0430 151 hold hold hold hold 5/13 @ 0630 D/W AIDAN Rehman   5/13/25 0648 111.6 HOLD HOLD HOLD HOLD 5/13 @ 0830 D/W AIDAN Russo   5/13/25 0825 59.6 hold 0 +9 9 5/13 @1600 D/W AIDAN Russo                                                                                                             Pharmacy will continue to follow aPTT results and monitor for signs and symptoms of bleeding or thrombosis.    Lor Prescott, PharmD  05/13/25 09:25 EDT

## 2025-05-13 NOTE — PROGRESS NOTES
"Dietitian Follow-up    Patient Name: Valarie Camara  YOB: 1945  MRN: [id number]  Admission date: 5/10/2025    Comment:    Clinical Nutrition Follow-up   Encounter Information        Trending Narrative     5/13: Patient remains NPO - will continue to monitor for diet advancement.    5/12: Pt NPO x 2 days. EMR shows significant wt loss. Will monitor for diet advancement and add ONS.      Anthropometrics        Current Height, Weight Height: 160 cm (63\")  Weight: 60.1 kg (132 lb 7.9 oz) (05/13/25 0400)       Trending Weight Hx     This admission:              PTA:     Wt Readings from Last 30 Encounters:   05/13/25 0400 60.1 kg (132 lb 7.9 oz)   05/12/25 0400 57.1 kg (125 lb 14.1 oz)   05/11/25 0500 55.5 kg (122 lb 5.7 oz)   05/10/25 1727 57.4 kg (126 lb 8.7 oz)   05/10/25 1226 66.7 kg (147 lb)   04/17/25 0932 67.1 kg (147 lb 14.4 oz)   04/05/25 1837 66.6 kg (146 lb 12.8 oz)   03/21/25 1511 68 kg (150 lb)   03/20/25 1556 68 kg (150 lb)   03/15/25 0422 69.4 kg (153 lb)   03/04/25 0600 72.8 kg (160 lb 7.9 oz)   03/02/25 1423 71.9 kg (158 lb 8.2 oz)   03/02/25 0348 70.5 kg (155 lb 6.8 oz)   03/01/25 0415 72.3 kg (159 lb 6.3 oz)   02/28/25 1848 72.6 kg (160 lb 0.9 oz)   02/28/25 1817 77 kg (169 lb 12.1 oz)   02/28/25 1152 77 kg (169 lb 12.1 oz)   02/08/25 0115 77.1 kg (169 lb 15.6 oz)   01/24/25 1111 75.3 kg (166 lb)   01/27/25 0139 77.1 kg (170 lb)   01/10/25 1330 80.3 kg (177 lb)   01/05/25 1700 76.3 kg (168 lb 3.4 oz)   01/05/25 1102 79.4 kg (175 lb 0.7 oz)   01/05/25 0921 79.4 kg (175 lb)   11/06/24 1049 80.6 kg (177 lb 12.8 oz)   12/04/23 2059 90.7 kg (200 lb)   10/31/23 0924 90.7 kg (200 lb)   08/21/23 1541 90.7 kg (200 lb)   08/17/23 1331 91.2 kg (201 lb)   01/31/22 1433 87.5 kg (192 lb 12.8 oz)   11/24/21 1834 88.5 kg (195 lb)   11/24/21 1543 86.2 kg (190 lb)   05/25/21 0500 92.6 kg (204 lb 2.3 oz)   05/21/21 0500 89.9 kg (198 lb 3.1 oz)   05/20/21 2344 89.9 kg (198 lb 3.1 oz)   05/20/21 0807 " 88.2 kg (194 lb 6.4 oz)   05/20/21 0305 90.7 kg (200 lb)   02/03/21 1507 89.4 kg (197 lb)   12/17/20 1307 92.1 kg (203 lb)   10/28/20 1735 90.7 kg (200 lb)   09/08/20 1011 95.7 kg (211 lb)   08/17/20 1122 93.6 kg (206 lb 6.4 oz)   01/02/20 1016 90.4 kg (199 lb 6.4 oz)   12/13/19 0445 87.2 kg (192 lb 3.2 oz)   11/25/19 1335 89.4 kg (197 lb)   11/23/19 2340 89.7 kg (197 lb 12.8 oz)   11/23/19 1951 90.7 kg (200 lb)   03/19/19 1541 91.8 kg (202 lb 6.4 oz)   04/10/18 1524 90.7 kg (200 lb)   04/09/18 1342 92.5 kg (204 lb)      BMI kg/m2 Body mass index is 23.47 kg/m².     Labs        Pertinent Labs Results from last 7 days   Lab Units 05/13/25  0431 05/12/25  1226 05/12/25  0805 05/10/25  1437 05/10/25  1237   SODIUM mmol/L 158* 160* 161*   < > 168*   POTASSIUM mmol/L 3.7 3.8 3.7   < > 4.1   CHLORIDE mmol/L 125* 127* 130*   < > 130*   CO2 mmol/L 21.8* 22.8 22.1   < > 18.5*   BUN mg/dL 62* 73* 78*   < > 127*   CREATININE mg/dL 1.75* 2.08* 2.16*   < > 3.96*   CALCIUM mg/dL 7.9* 8.5* 8.6   < > 9.8   BILIRUBIN mg/dL  --   --   --   --  0.3   ALK PHOS U/L  --   --   --   --  57   ALT (SGPT) U/L  --   --   --   --  7   AST (SGOT) U/L  --   --   --   --  10   GLUCOSE mg/dL 214* 247* 211*   < > 162*    < > = values in this interval not displayed.     Results from last 7 days   Lab Units 05/13/25  0431 05/12/25  1934 05/12/25  0411 05/11/25  0342   MAGNESIUM mg/dL 2.1  --  2.4 2.7*   PHOSPHORUS mg/dL 2.5   < > 1.8* 4.3   HEMOGLOBIN g/dL 10.1*  --  12.4  --    HEMATOCRIT % 32.9*  --  41.1  --     < > = values in this interval not displayed.         Medications    Scheduled Medications aspirin, 81 mg, Oral, Daily  atorvastatin, 80 mg, Oral, Daily  carvedilol, 12.5 mg, Oral, BID With Meals  cloNIDine, 1 patch, Transdermal, Weekly  ertapenem, 500 mg, Intravenous, Q24H  mupirocin, 1 Application, Each Nare, BID  rivastigmine, 1 patch, Transdermal, Daily  senna-docusate sodium, 2 tablet, Oral, BID  sodium chloride, 10 mL, Intravenous,  Q12H        Infusions dextrose, 100 mL/hr, Last Rate: 100 mL/hr (05/13/25 1047)  dextrose 5 % with KCl 20 mEq, 50 mL/hr, Last Rate: 50 mL/hr (05/13/25 1047)  heparin, 9 Units/kg/hr (Order-Specific), Last Rate: 9 Units/kg/hr (05/13/25 1123)  Pharmacy to Dose Heparin,         PRN Medications   acetaminophen **OR** acetaminophen    senna-docusate sodium **AND** polyethylene glycol **AND** bisacodyl **AND** bisacodyl    Calcium Replacement - Follow Nurse / BPA Driven Protocol    glycerin    hydrALAZINE    Magnesium Cardiology Dose Replacement - Follow Nurse / BPA Driven Protocol    metoprolol tartrate    nitroglycerin    Pharmacy to Dose Heparin    Phosphorus Replacement - Follow Nurse / BPA Driven Protocol    Potassium Replacement - Follow Nurse / BPA Driven Protocol    sodium chloride    sodium chloride    [Held by provider] sodium chloride     Physical Findings        Trending Physical   Appearance, NFPE    --  Edema  Generalized Edema: 1+ (Trace)      Arm, Right Edema: 2+ (Mild) (in ac area.)                  Bowel Function Stool Output  Stool Unmeasured Occurrence: 1 (05/13/25 0500)  Bowel Incontinence: Yes (05/13/25 0500)  Stool Amount: Smear (05/13/25 0500)  Stool Consistency: soft (05/13/25 0500)  Perineal Care: absorbent brief/pad changed, catheter care provided, perineum cleansed, protective cream/ointment applied (05/13/25 0500)  Last Bowel Movement:  (pt having smears per 7a rn.)   Chewing/Swallowing Teeth Status:Mouth/Teeth WDL: .WDL except, teeth (upper denure present.) Teeth Symptoms: tooth/teeth missing  Chewing/Swallowing Issues: No issues identified at this time   Skin Lines, Drains, Airways, & Wounds       Active LDAs       Name Placement date Placement time Site Days Last dressing change    Peripheral IV 05/10/25 1222 20 G Anterior;Right External Jugular 05/10/25  1222  External Jugular  3 05/10/25 1231 (72.04 hrs)    Urethral Catheter 16 Fr. 05/10/25  1525  -- 2                     --  Current  Nutrition Orders & Evaluation of Intake       Oral Nutrition     Food Allergies None    Current PO Diet NPO Diet NPO Type: Strict NPO   Supplement None    PO Evaluation     Trending % PO Intake NPO       Enteral Nutrition    Current EN Order Patient isn't on Tube Feeding    Patient doesn't have any tube feeding modular orders    EN Route    EN Tolerance    EN Observation        Parenteral Nutrition    Current TPN Order    TPN Route    Lipids (mL/%/frequency)    MVI Frequency    Trace Element Frequency    Total # Days on TPN    TPN Observation      Nutrition Diagnosis         Nutrition Dx Problem 1    Underweight r/t dementia/COPD AEB BMI=22.3       Nutrition Dx Problem 2       Goal(s) PO Diet Advances When Medically Appropriate      Intervention         Intervention  Monitor for diet advancement       Diet Prescription    Supplement Prescription         Enteral Prescription         TPN Prescription         Education Provided         Monitor/Evaluation        Monitor Per Protocol, PO Intake, Pertinent Labs, Weight, Skin Status, GI Status, Symptoms, Swallow Function, and Hemodynamic Stability     RD Follow-up Encounter 1-2 days     Electronically signed by:  Mojgan Pascal RD  05/13/25 12:33 EDT

## 2025-05-13 NOTE — PLAN OF CARE
"Goal Outcome Evaluation:      Palliative Care Team--Isaura MEYER and Mireille Pedersen RN met with pt and her two daughters, Cleo Camara and Bailey Camara in pt's room.  Pt appeared to be sleeping but did awaken with daughter's prompting. Daughter, Cleo related that pt \"frequently acts like she is sleeping but is listening\".    Isaura explained what Palliative Care is and the usual type of pts that we visit--mostly chronically ill pts whether newly diagnosed or severe.   She asked pt and daughters what are things that mean quality of life for pt.  They responded her daughters, two grandsons, fishing and gardening.  Cleo reported pt loves to be outside.      Cleo confirmed pt lives with her and her sister lives across the street.  Cleo works from home.      Isaura inquired what was going on prior to this admission.  Daughters reported pt was at [facility] and was discharged to home 3 weeks ago.  Pt was not eating or drinking much.  She had been feeding herself but stopped doing that and needed to be fed,  Daughter reported pt being continent of B&B most of the time.  She does sleep on and off during the day and at night time. Both daughters related noticing a significant change in pt in the last week--she has been less talkative for example.  When asked they reported pt having the \"Dementia\" diagnosis for 2 months but both believe she was showing signs of for a couple of years.    We remained in the room for the remainder of the meeting per daughters request so pt would have the opportunity to add to the conversation.   Isaura reviewed with them pt's recent events considered as a decline and clarified typically once the pt's start the decline (stair-step fashion) they never return to the beginning level.  Daughters recognized this.      Daughters voiced concern re: pt not eating and recopnized pt also seems to have trouble swallowing.  Cleo brought up the topic of  Feeding Tube.   Her reason was " to help pt become stronger.  Isaura reviewed when FT is beneficial and when it is not.  She stressed this is a time to consider what the pt would want done and what she would NOT want done.  Daughters seem to be leaning toward placement of the FT however if pt rejects having it or cannot tolerate they would accept her decision.  Isaura informed them that she will order medication for pt's mouth and throat to ease the discomfort she reports with swallowing.      PC will continue to follow.   Isaura updated meeting with pt's primary nurse, Rafaela.    Daughter, Cleo, brought in current Living Will and Durable POA forms.  Forms were copied and faxed to [employer]  Originals were given to Cleo.   Confirmation of successful fax received.

## 2025-05-13 NOTE — PLAN OF CARE
Goal Outcome Evaluation:              Outcome Evaluation: SR on monitor/placed on 2L NC during sleep.  UOP subpar/no complaints from pt.  Does speak a little bit and will follow simple command(s).

## 2025-05-13 NOTE — PROGRESS NOTES
[facility] Progress Note  [facility]. [place]        Patient Name: Valarie Camara  : 1945  MRN: [id number]   LOS: 2 days    Patient Care Team:  Lay Cox MD as PCP - General (Family Medicine)  Corbin Herrera DO ([facility])    Chief Complaint:    Chief Complaint   Patient presents with    Altered Mental Status     Primary Care Physician:  Lay Cox MD  Date of admission: 5/10/2025    Subjective     Interval History:   Follow-up acute kidney injury  hypernatremia  Patient has minimal interaction, no significant mental status improvement noted, underlying dementia.  As per nursing staff patient is refusing all p.o. intake, palliative care has been consulted and family meeting later today.  Events noted from last 24 hours.  I reviewed the chart and other providers notes, labs and procedures done since my last note.    Review of Systems:   unobtainable.    Objective     Vitals:   Temp:  [97.9 °F (36.6 °C)-98.6 °F (37 °C)] 98.3 °F (36.8 °C)  Heart Rate:  [74-88] 74  Resp:  [14-20] 14  BP: (107-170)/(51-74) 148/64  Flow (L/min) (Oxygen Therapy):  [2] 2    Intake/Output Summary (Last 24 hours) at 2025 0650  Last data filed at 2025 0642  Gross per 24 hour   Intake 2535.4 ml   Output 605 ml   Net 1930.4 ml       Physical Exam:    General Appearance: no acute distress   Skin: warm and dry  HEENT: oral mucosa normal, nonicteric sclera  Neck: supple, no JVD  Lungs: CTA  Heart: RRR, normal S1 and S2  Abdomen: obese, soft, nontender, non distended and positive bowel sounds.  : no palpable bladder  Extremities: Trace edema, no cyanosis or clubbing  Neuro: normal speech and mental status     Scheduled Meds:     Current Facility-Administered Medications   Medication Dose Route Frequency Provider Last Rate Last Admin    acetaminophen (TYLENOL) tablet 650 mg  650 mg Oral Q4H PRN Aki Rodriguez DO        Or     acetaminophen (TYLENOL) suppository 650 mg  650 mg Rectal Q4H PRN Aki Rodriguez DO        aspirin chewable tablet 81 mg  81 mg Oral Daily Aki Rodriguez DO   81 mg at 05/12/25 1031    atorvastatin (LIPITOR) tablet 80 mg  80 mg Oral Daily Aki Rodriguez DO   80 mg at 05/12/25 1030    sennosides-docusate (PERICOLACE) 8.6-50 MG per tablet 2 tablet  2 tablet Oral BID Aki Rodriguez DO   2 tablet at 05/12/25 1030    And    polyethylene glycol (MIRALAX) packet 17 g  17 g Oral Daily PRN Aki Rodriguez DO        And    bisacodyl (DULCOLAX) EC tablet 5 mg  5 mg Oral Daily PRN kAi Rodriguez DO        And    bisacodyl (DULCOLAX) suppository 10 mg  10 mg Rectal Daily PRN Aki Rodriguez DO        Calcium Replacement - Follow Nurse / BPA Driven Protocol   Not Applicable PRN Aki Rodriguez DO        carvedilol (COREG) tablet 12.5 mg  12.5 mg Oral BID With Meals Aki Rodriguez DO   12.5 mg at 05/12/25 1030    cloNIDine (CATAPRES-TTS) 0.1 MG/24HR patch 1 patch  1 patch Transdermal Weekly Ortega Cabrales MD, FASN   1 patch at 05/12/25 1031    dextrose (D5W) 5 % infusion  100 mL/hr Intravenous Continuous Ortega Cabrales MD, FASN        dextrose 5 % with KCl 20 mEq/L infusion  50 mL/hr Intravenous Continuous Ortega Cabrales MD, FASN        ertapenem (INVanz) 500 mg in sodium chloride 0.9 % 50 mL IVPB  500 mg Intravenous Q24H Wil Rader  mL/hr at 05/12/25 1632 500 mg at 05/12/25 1632    glycerin 35 % liquid 35% 2 spray  2 spray Mouth/Throat Q2H PRN Aki Rodriguez DO   2 spray at 05/12/25 2011    heparin 53761 units/250 ml (100 units/ml) in D5W  13 Units/kg/hr (Order-Specific) Intravenous Titrated Edgardo Burnette MD        hydrALAZINE (APRESOLINE) injection 10 mg  10 mg Intravenous Q6H PRN Aki Rodriguez DO   10 mg at 05/12/25 1235    Magnesium Cardiology Dose Replacement - Follow Nurse / BPA Driven Protocol   Not Applicable PRN Aki Rodriguez DO        metoprolol tartrate (LOPRESSOR) injection 5 mg  5 mg Intravenous  Q4H PRN Wil Rader DO   5 mg at 05/12/25 0404    mupirocin (BACTROBAN) 2 % nasal ointment 1 Application  1 Application Each Nare BID Aki Rodriguez DO   1 Application at 05/12/25 2015    nitroglycerin (NITROSTAT) SL tablet 0.4 mg  0.4 mg Sublingual Q5 Min PRN Aki Rodriguez DO        Pharmacy to Dose Heparin   Not Applicable Continuous PRN Aki Rodriguez DO        Phosphorus Replacement - Follow Nurse / BPA Driven Protocol   Not Applicable PRN Aki Rodriguez DO        Potassium Replacement - Follow Nurse / BPA Driven Protocol   Not Applicable PRN Aki Rodriguez DO        rivastigmine (EXELON) 4.6 MG/24HR patch 1 patch  1 patch Transdermal Daily Aki Rodriguez DO   1 patch at 05/12/25 1232    sodium chloride 0.9 % flush 10 mL  10 mL Intravenous PRN Danie Tolliver DO        sodium chloride 0.9 % flush 10 mL  10 mL Intravenous Q12H Aki Rodriguez DO   10 mL at 05/12/25 2011    sodium chloride 0.9 % flush 10 mL  10 mL Intravenous PRN Aki Rodriguez DO   10 mL at 05/12/25 0617    [Held by provider] sodium chloride 0.9 % infusion 40 mL  40 mL Intravenous PRN Aki Rodriguez DO           aspirin, 81 mg, Oral, Daily  atorvastatin, 80 mg, Oral, Daily  carvedilol, 12.5 mg, Oral, BID With Meals  cloNIDine, 1 patch, Transdermal, Weekly  ertapenem, 500 mg, Intravenous, Q24H  mupirocin, 1 Application, Each Nare, BID  rivastigmine, 1 patch, Transdermal, Daily  senna-docusate sodium, 2 tablet, Oral, BID  sodium chloride, 10 mL, Intravenous, Q12H        IV Meds:   dextrose, 100 mL/hr  dextrose 5 % with KCl 20 mEq, 50 mL/hr  heparin, 13 Units/kg/hr (Order-Specific)  Pharmacy to Dose Heparin,         Results Reviewed:   I have personally reviewed the results from the time of this admission to 5/13/2025 06:50 EDT     Results from last 7 days   Lab Units 05/13/25  0431 05/12/25  1226 05/12/25  0805 05/10/25  1437 05/10/25  1237   SODIUM mmol/L 158* 160* 161*   < > 168*   POTASSIUM mmol/L 3.7 3.8 3.7   < > 4.1  "  CHLORIDE mmol/L 125* 127* 130*   < > 130*   CO2 mmol/L 21.8* 22.8 22.1   < > 18.5*   BUN mg/dL 62* 73* 78*   < > 127*   CREATININE mg/dL 1.75* 2.08* 2.16*   < > 3.96*   CALCIUM mg/dL 7.9* 8.5* 8.6   < > 9.8   BILIRUBIN mg/dL  --   --   --   --  0.3   ALK PHOS U/L  --   --   --   --  57   ALT (SGPT) U/L  --   --   --   --  7   AST (SGOT) U/L  --   --   --   --  10   GLUCOSE mg/dL 214* 247* 211*   < > 162*    < > = values in this interval not displayed.       Estimated Creatinine Clearance: 24.3 mL/min (A) (by C-G formula based on SCr of 1.75 mg/dL (H)).    Results from last 7 days   Lab Units 05/13/25  0431 05/12/25  1934 05/12/25  0411 05/11/25  0342   MAGNESIUM mg/dL 2.1  --  2.4 2.7*   PHOSPHORUS mg/dL 2.5 2.5 1.8* 4.3             Results from last 7 days   Lab Units 05/13/25  0431 05/12/25  0411 05/11/25  0053 05/10/25  1237   WBC 10*3/mm3 5.89 6.63 9.11 13.08*   HEMOGLOBIN g/dL 10.1* 12.4 11.5* 12.6   PLATELETS 10*3/mm3 122* 141 141 199       Results from last 7 days   Lab Units 05/10/25  1237   INR  1.25*       Brief Urine Lab Results  (Last result in the past 365 days)        Color   Clarity   Blood   Leuk Est   Nitrite   Protein   CREAT   Urine HCG        05/10/25 1237 Yellow   Cloudy   Trace   Moderate (2+)   Positive   30 mg/dL (1+)                   No results found for: \"UTPCR\"    Imaging Results (Last 24 Hours)       ** No results found for the last 24 hours. **                Assessment / Plan     ASSESSMENT:    Hypernatremia    PAF (paroxysmal atrial fibrillation)    Acute renal failure superimposed on stage 3a chronic kidney disease    Acute kidney injury: Most likely volume depletion, since she has been getting IV fluids it is starting to improve.  Bicarb and potassium appears to be stable increase serum sodium noted.  Starting to make urine.  Chronic kidney disease stage IIIb: Most likely will settle down to the baseline.  Hypernatremia: Secondary to free water losses and unable to eat and drink, " she has about 5 L water deficit.  I will adjust hypotonic solutions.  Paroxysmal atrial fibrillation: Treated as per hospitalist service and cardiology.  Sepsis: Treated as per hospitalist service.  Metabolic encephalopathy: Significant uremia as well as dementia making things worse.        PLAN:  I will go ahead and start her on D5 W with 40 of potassium at 50 cc an hour and another IV with D5W at 100 cc an hour for 2 days to see if that will help improve the serum sodium.  It has been gradually improving.  Increased blood pressure noted patient has been started on oral medicine which she has been refusing.  Will go ahead and give 1 dose of Lasix 20 mg IV to see if that will help with the blood pressure as well as sodium.  Renal function continues to improve.    Electrolyte replacement per protocol.  Details were discussed with the patient no family in the room.    Details were also discussed with the hospitalist service and or other providers as needed.  Awaiting palliative care consult and family meeting.  Continue with rest of the current treatment plan, and monitor with surveillance labs, adjust medication doses to the eGFR.  Further recommendations will depend on clinical course of the patient during the current hospitalization.   I have reviewed the copied text to this note, it was edited and the changes made as needed.  It is accurate to the point, when the note was signed today.     Thank you for involving us in the care of Valarie Camara.  Please feel free to call with any questions.    Ortega Cabrales MD, DAPHNIE  05/13/25  06:50 EDT    [facility]  [phone]      Part of this note may be an electronic transcription/translation of spoken language to printed text using the Dragon Dictation System.

## 2025-05-13 NOTE — PROGRESS NOTES
Pharmacy to Dose Heparin Infusion     Valarie Camara is a  79 y.o. female receiving heparin infusion.     Therapy for (VTE/Cardiac):   Cardiac  Patient Weight: Using 66.7 kg  Initial Bolus (Y/N):   Y  Any Bolus (Y/N):   Y        Signs or Symptoms of Bleeding: N    Cardiac or Other (Not VTE)   Initial Bolus: 60 units/kg (Max 4,000 units)  Initial rate: 12 units/kg/hr (Max 1,000 units/hr)   aPTT  Rebolus Infusion Hold time Change infusion Dose (Units/kg/hr) Next Anti Xa or aPTT Level Due   <55 50 Units/kg  (4000 Units Max) None Increase by  3 Units/kg/hr 6 hours   55-62 25 Units/kg  (2000 Units Max) None Increase by  2 Units/kg/hr 6 hours   63-69 0 None Increase by  1 Units/kg/hr 6 hours   70-85 0 None No Change 6 hours (after 2 consecutive levels in range check qAM)   86-93 0 None Decrease by  1 Units/kg/hr 6 hours    0 30 Minutes Decrease by  2 Units/kg/hr 6 hours   109-123 0 60 Minutes Decrease by  3 Units/kg/hr 6 hours   >123 0 Hold  After aPTT less than 75 decrease previous rate by  4 Units/kg/hr  Every 2 hours until aPTT less than 75 then when infusion restarts in 6 hours           Recommend aPTT every 6 hours.     Results from last 7 days   Lab Units 05/13/25  1540 05/13/25  0825 05/13/25  0648 05/10/25  2131 05/10/25  1237   APTT seconds 73.6 59.6* 111.6*   < > 30.3*   INR   --   --   --   --  1.25*   PROTIME Seconds  --   --   --   --  16.6*    < > = values in this interval not displayed.     Results from last 7 days   Lab Units 05/13/25  0431 05/12/25  0411 05/11/25  0053   HEMOGLOBIN g/dL 10.1* 12.4 11.5*   HEMATOCRIT % 32.9* 41.1 38.7   PLATELETS 10*3/mm3 122* 141 141       Per protocol after aPTT less than 75, decrease previous rate by 4 units/kg/hr .   Rate to be adjusted to 8 u/kg/hr.     Recommend aPTT every 6 hours.        Date   Time   aPTT Current Rate (Unit/kg/hr) Bolus   (Units) Rate Change   (Unit/kg/hr) New Rate (Unit/kg/hr) Next   aPTT Comments  Pump Check Daily   5/10/25 9640 30.3 0  4000 +12.0 12.0 5/10 2200 d/w Emeli Thrasher RN   05/10/25 2239 >200 12 0 Hold  Hold 5/11 0100 AIDAN Rehman   5/11/25 0342 47.3 12 at time of hold 0 -4 8 0945 Ori, AIDAN   5/11/25 1149 27.7 8 3340 +3 11 5/11 1930 d/w Lida Boyer RN   5/11/25 1931 28.7 11 3340 +3 14 5/12 0400 D/W Ori RN   5/12/25 0411 28.6 14 3340 +3 17 5/12 1100 D/w Ori RN   5/12/25 1512 >200 HOLD HOLD HOLD HOLD 5/12 @ 1830 D/W AIDAN Russo  Lab was unable to stick patient and had to redraw   5/12/25 1934 114.9 hold hold hold hold 5/12 @ 2200 D/W Ori RN   5/12/25 2225 52.4 hold 0 +13 13 5/13 @ 0430 D/W AIDAN Rehman   5/13/25 0430 151 hold hold hold hold 5/13 @ 0630 D/W Ori RN   5/13/25 0648 111.6 HOLD HOLD HOLD HOLD 5/13 @ 0830 D/W AIDAN Russo   5/13/25 0825 59.6 hold 0 +9 9 5/13 @ 1600 D/W AIDAN Russo   5/13/25 1540 73.6 9 0 0 9 5/13 @ 2200 D/W AIDAN Russo                                                                                                  Pharmacy will continue to follow aPTT results and monitor for signs and symptoms of bleeding or thrombosis.    Lor Prsecott, PharmD  05/13/25 18:43 EDT

## 2025-05-14 ENCOUNTER — APPOINTMENT (OUTPATIENT)
Dept: GENERAL RADIOLOGY | Facility: HOSPITAL | Age: 80
DRG: 871 | End: 2025-05-14
Payer: MEDICARE

## 2025-05-14 LAB
ALBUMIN SERPL-MCNC: 2.4 G/DL (ref 3.5–5.2)
ANION GAP SERPL CALCULATED.3IONS-SCNC: 9.6 MMOL/L (ref 5–15)
APTT PPP: 77.7 SECONDS (ref 70–100)
APTT PPP: 81.7 SECONDS (ref 70–100)
APTT PPP: 86.6 SECONDS (ref 70–100)
BASOPHILS # BLD AUTO: 0.02 10*3/MM3 (ref 0–0.2)
BASOPHILS NFR BLD AUTO: 0.4 % (ref 0–1.5)
BUN SERPL-MCNC: 47 MG/DL (ref 8–23)
BUN/CREAT SERPL: 30.7 (ref 7–25)
CALCIUM SPEC-SCNC: 7.9 MG/DL (ref 8.6–10.5)
CHLORIDE SERPL-SCNC: 112 MMOL/L (ref 98–107)
CO2 SERPL-SCNC: 22.4 MMOL/L (ref 22–29)
CREAT SERPL-MCNC: 1.53 MG/DL (ref 0.57–1)
DEPRECATED RDW RBC AUTO: 48 FL (ref 37–54)
EGFRCR SERPLBLD CKD-EPI 2021: 34.3 ML/MIN/1.73
EOSINOPHIL # BLD AUTO: 0.08 10*3/MM3 (ref 0–0.4)
EOSINOPHIL NFR BLD AUTO: 1.4 % (ref 0.3–6.2)
ERYTHROCYTE [DISTWIDTH] IN BLOOD BY AUTOMATED COUNT: 14.1 % (ref 12.3–15.4)
GLUCOSE BLDC GLUCOMTR-MCNC: 142 MG/DL (ref 70–130)
GLUCOSE BLDC GLUCOMTR-MCNC: 274 MG/DL (ref 70–130)
GLUCOSE BLDC GLUCOMTR-MCNC: 313 MG/DL (ref 70–130)
GLUCOSE SERPL-MCNC: 308 MG/DL (ref 65–99)
HCT VFR BLD AUTO: 37.1 % (ref 34–46.6)
HGB BLD-MCNC: 11.6 G/DL (ref 12–15.9)
IMM GRANULOCYTES # BLD AUTO: 0.09 10*3/MM3 (ref 0–0.05)
IMM GRANULOCYTES NFR BLD AUTO: 1.6 % (ref 0–0.5)
LYMPHOCYTES # BLD AUTO: 1.1 10*3/MM3 (ref 0.7–3.1)
LYMPHOCYTES NFR BLD AUTO: 19.3 % (ref 19.6–45.3)
MCH RBC QN AUTO: 29.2 PG (ref 26.6–33)
MCHC RBC AUTO-ENTMCNC: 31.3 G/DL (ref 31.5–35.7)
MCV RBC AUTO: 93.5 FL (ref 79–97)
MONOCYTES # BLD AUTO: 0.5 10*3/MM3 (ref 0.1–0.9)
MONOCYTES NFR BLD AUTO: 8.8 % (ref 5–12)
NEUTROPHILS NFR BLD AUTO: 3.91 10*3/MM3 (ref 1.7–7)
NEUTROPHILS NFR BLD AUTO: 68.5 % (ref 42.7–76)
NRBC BLD AUTO-RTO: 0 /100 WBC (ref 0–0.2)
PHOSPHATE SERPL-MCNC: 2 MG/DL (ref 2.5–4.5)
PHOSPHATE SERPL-MCNC: 3.2 MG/DL (ref 2.5–4.5)
PLATELET # BLD AUTO: 110 10*3/MM3 (ref 140–450)
PMV BLD AUTO: 12.8 FL (ref 6–12)
POTASSIUM SERPL-SCNC: 3.9 MMOL/L (ref 3.5–5.2)
RBC # BLD AUTO: 3.97 10*6/MM3 (ref 3.77–5.28)
SODIUM SERPL-SCNC: 144 MMOL/L (ref 136–145)
UFH PPP CHRO-ACNC: 1.02 IU/ML (ref 0.3–0.7)
WBC NRBC COR # BLD AUTO: 5.7 10*3/MM3 (ref 3.4–10.8)

## 2025-05-14 PROCEDURE — 80069 RENAL FUNCTION PANEL: CPT | Performed by: INTERNAL MEDICINE

## 2025-05-14 PROCEDURE — 85730 THROMBOPLASTIN TIME PARTIAL: CPT | Performed by: INTERNAL MEDICINE

## 2025-05-14 PROCEDURE — 25010000002 FLUCONAZOLE PER 200 MG: Performed by: PHYSICIAN ASSISTANT

## 2025-05-14 PROCEDURE — 85025 COMPLETE CBC W/AUTO DIFF WBC: CPT | Performed by: FAMILY MEDICINE

## 2025-05-14 PROCEDURE — 99232 SBSQ HOSP IP/OBS MODERATE 35: CPT | Performed by: PHYSICIAN ASSISTANT

## 2025-05-14 PROCEDURE — 85520 HEPARIN ASSAY: CPT | Performed by: INTERNAL MEDICINE

## 2025-05-14 PROCEDURE — 84100 ASSAY OF PHOSPHORUS: CPT | Performed by: INTERNAL MEDICINE

## 2025-05-14 PROCEDURE — 82948 REAGENT STRIP/BLOOD GLUCOSE: CPT

## 2025-05-14 PROCEDURE — 25010000002 HYDRALAZINE PER 20 MG: Performed by: INTERNAL MEDICINE

## 2025-05-14 PROCEDURE — 25810000003 SODIUM CHLORIDE 0.9 % SOLUTION 250 ML FLEX CONT: Performed by: INTERNAL MEDICINE

## 2025-05-14 PROCEDURE — 25810000003 DEXTROSE 5 % WITH KCL 20 MEQ 20 MEQ/L SOLUTION: Performed by: INTERNAL MEDICINE

## 2025-05-14 PROCEDURE — 74018 RADEX ABDOMEN 1 VIEW: CPT

## 2025-05-14 PROCEDURE — 82948 REAGENT STRIP/BLOOD GLUCOSE: CPT | Performed by: FAMILY MEDICINE

## 2025-05-14 PROCEDURE — 25010000003 DEXTROSE 5 % SOLUTION: Performed by: INTERNAL MEDICINE

## 2025-05-14 PROCEDURE — 99232 SBSQ HOSP IP/OBS MODERATE 35: CPT | Performed by: INTERNAL MEDICINE

## 2025-05-14 PROCEDURE — 25010000002 ONDANSETRON PER 1 MG

## 2025-05-14 PROCEDURE — 25010000002 ERTAPENEM PER 500 MG: Performed by: INTERNAL MEDICINE

## 2025-05-14 PROCEDURE — 63710000001 INSULIN REGULAR HUMAN PER 5 UNITS: Performed by: FAMILY MEDICINE

## 2025-05-14 RX ORDER — BISACODYL 5 MG/1
5 TABLET, DELAYED RELEASE ORAL DAILY PRN
Status: DISCONTINUED | OUTPATIENT
Start: 2025-05-14 | End: 2025-05-23 | Stop reason: HOSPADM

## 2025-05-14 RX ORDER — HYDRALAZINE HYDROCHLORIDE 20 MG/ML
20 INJECTION INTRAMUSCULAR; INTRAVENOUS EVERY 6 HOURS
Status: DISCONTINUED | OUTPATIENT
Start: 2025-05-14 | End: 2025-05-15

## 2025-05-14 RX ORDER — ASPIRIN 81 MG/1
81 TABLET, CHEWABLE ORAL DAILY
Status: DISCONTINUED | OUTPATIENT
Start: 2025-05-15 | End: 2025-05-23 | Stop reason: HOSPADM

## 2025-05-14 RX ORDER — DOCUSATE SODIUM 50 MG/5 ML
100 LIQUID (ML) ORAL 2 TIMES DAILY
Status: DISCONTINUED | OUTPATIENT
Start: 2025-05-14 | End: 2025-05-23 | Stop reason: HOSPADM

## 2025-05-14 RX ORDER — ACETAMINOPHEN 650 MG/1
650 SUPPOSITORY RECTAL EVERY 4 HOURS PRN
Status: DISCONTINUED | OUTPATIENT
Start: 2025-05-14 | End: 2025-05-23 | Stop reason: HOSPADM

## 2025-05-14 RX ORDER — ATORVASTATIN CALCIUM 80 MG/1
80 TABLET, FILM COATED ORAL DAILY
Status: DISCONTINUED | OUTPATIENT
Start: 2025-05-15 | End: 2025-05-23 | Stop reason: HOSPADM

## 2025-05-14 RX ORDER — ONDANSETRON 2 MG/ML
4 INJECTION INTRAMUSCULAR; INTRAVENOUS EVERY 6 HOURS PRN
Status: DISCONTINUED | OUTPATIENT
Start: 2025-05-14 | End: 2025-05-23 | Stop reason: HOSPADM

## 2025-05-14 RX ORDER — ONDANSETRON 2 MG/ML
INJECTION INTRAMUSCULAR; INTRAVENOUS
Status: COMPLETED
Start: 2025-05-14 | End: 2025-05-14

## 2025-05-14 RX ORDER — POLYETHYLENE GLYCOL 3350 17 G/17G
17 POWDER, FOR SOLUTION ORAL DAILY PRN
Status: DISCONTINUED | OUTPATIENT
Start: 2025-05-14 | End: 2025-05-23 | Stop reason: HOSPADM

## 2025-05-14 RX ORDER — CARVEDILOL 12.5 MG/1
12.5 TABLET ORAL 2 TIMES DAILY WITH MEALS
Status: DISCONTINUED | OUTPATIENT
Start: 2025-05-14 | End: 2025-05-16

## 2025-05-14 RX ORDER — ACETAMINOPHEN 325 MG/1
650 TABLET ORAL EVERY 4 HOURS PRN
Status: DISCONTINUED | OUTPATIENT
Start: 2025-05-14 | End: 2025-05-23 | Stop reason: HOSPADM

## 2025-05-14 RX ORDER — BISACODYL 10 MG
10 SUPPOSITORY, RECTAL RECTAL DAILY PRN
Status: DISCONTINUED | OUTPATIENT
Start: 2025-05-14 | End: 2025-05-23 | Stop reason: HOSPADM

## 2025-05-14 RX ADMIN — Medication 10 ML: at 10:07

## 2025-05-14 RX ADMIN — Medication 10 ML: at 21:02

## 2025-05-14 RX ADMIN — MUPIROCIN 1 APPLICATION: 20 OINTMENT TOPICAL at 21:02

## 2025-05-14 RX ADMIN — SODIUM CHLORIDE 500 MG: 9 INJECTION, SOLUTION INTRAVENOUS at 18:17

## 2025-05-14 RX ADMIN — INSULIN HUMAN 5 UNITS: 100 INJECTION, SOLUTION PARENTERAL at 00:13

## 2025-05-14 RX ADMIN — MUPIROCIN 1 APPLICATION: 20 OINTMENT TOPICAL at 09:03

## 2025-05-14 RX ADMIN — INSULIN HUMAN 4 UNITS: 100 INJECTION, SOLUTION PARENTERAL at 12:55

## 2025-05-14 RX ADMIN — SODIUM PHOSPHATE, MONOBASIC, MONOHYDRATE 15 MMOL: 276; 142 INJECTION, SOLUTION INTRAVENOUS at 14:06

## 2025-05-14 RX ADMIN — INSULIN HUMAN 5 UNITS: 100 INJECTION, SOLUTION PARENTERAL at 06:29

## 2025-05-14 RX ADMIN — ONDANSETRON 4 MG: 2 INJECTION INTRAMUSCULAR; INTRAVENOUS at 14:50

## 2025-05-14 RX ADMIN — HYDRALAZINE HYDROCHLORIDE 20 MG: 20 INJECTION INTRAMUSCULAR; INTRAVENOUS at 16:25

## 2025-05-14 RX ADMIN — DEXTROSE MONOHYDRATE 100 ML/HR: 50 INJECTION, SOLUTION INTRAVENOUS at 06:35

## 2025-05-14 RX ADMIN — FLUCONAZOLE 200 MG: 2 INJECTION, SOLUTION INTRAVENOUS at 18:17

## 2025-05-14 RX ADMIN — NYSTATIN 500000 UNITS: 100000 SUSPENSION ORAL at 21:02

## 2025-05-14 RX ADMIN — RIVASTIGMINE 1 PATCH: 4.6 PATCH TRANSDERMAL at 10:30

## 2025-05-14 RX ADMIN — CARVEDILOL 12.5 MG: 12.5 TABLET, FILM COATED ORAL at 18:17

## 2025-05-14 RX ADMIN — POTASSIUM CHLORIDE AND DEXTROSE MONOHYDRATE 50 ML/HR: 150; 5 INJECTION, SOLUTION INTRAVENOUS at 06:30

## 2025-05-14 RX ADMIN — HYDRALAZINE HYDROCHLORIDE 20 MG: 20 INJECTION INTRAMUSCULAR; INTRAVENOUS at 10:06

## 2025-05-14 NOTE — PAYOR COMM NOTE
"To:  GRICELDA  From: Adela Bowden RN  Phone: [phone]  Fax: [phone]  NPI: [id number]  TIN: [id number]  Member ID: [id number]   MRN: [id number]    Caitlyn Barajas (79 y.o. Female)       Date of Birth   1945    Social Security Number       Address   [address] [place] [address]    Home Phone   [phone]    MRN   [id number]       Confucianism   [facility]    Marital Status                               Admission Date   5/10/2025    Admission Type   Emergency    Admitting Provider   Aki Rodriguez DO    Attending Provider   Ronny Lara DO    Department, Room/Bed   [facility] INTENSIVE CARE, I01/1       Discharge Date       Discharge Disposition       Discharge Destination                                 Attending Provider: Ronny Lara DO    Allergies: Penicillins, Clonidine Derivatives, Hydralazine, Sulfa Antibiotics    Isolation: Contact   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), ESBL (05/12/25)   Code Status: No CPR    Ht: 160 cm (63\")   Wt: 59.7 kg (131 lb 9.8 oz)    Admission Cmt: None   Principal Problem: Hypernatremia [E87.0]                   Active Insurance as of 5/10/2025       Primary Coverage       Payor Plan Insurance Group Employer/Plan Group    ANTHEM MEDICARE REPLACEMENT ANTH MEDICARE ADVANTAGE [facility] [id number]       Payor Plan Address Payor Plan Phone Number Payor Plan Fax Number Effective Dates    [address] [phone]  4/1/2025 - None Entered    [facility] [id number]         Subscriber Name Subscriber Birth Date Member ID       CAITLYN BARAJAS 1945 [id number]                     Emergency Contacts        (Rel.) Home Phone Work Phone Mobile Phone    Cleo Barajas (Daughter) [phone] -- [phone]    ELROY BARAJAS (Daughter) [phone] -- --              Vital Signs (last day)       Date/Time Temp Temp src Pulse Resp BP Patient Position SpO2    05/14/25 1006 -- -- 74 -- 164/72 -- --    05/14/25 1000 -- -- 75 -- 164/72 " -- 100    05/14/25 0900 -- -- 75 -- 158/84 -- 100    05/14/25 0800 -- -- 74 16 189/83 Lying 100    05/14/25 0700 -- -- 73 -- 168/75 -- 100    05/14/25 0600 -- -- 75 -- 151/63 -- 100    05/14/25 0500 -- -- 76 -- 149/68 -- 100    05/14/25 0400 97.6 (36.4) Oral 78 -- 155/67 -- 100    05/14/25 0300 -- -- 79 -- 118/62 -- 100    05/14/25 0200 -- -- 76 -- 164/83 -- 100    05/14/25 0100 -- -- 81 -- 106/54 -- 100    05/14/25 0000 97.6 (36.4) Oral 80 -- 128/59 -- 94    05/13/25 2300 -- -- 88 -- 154/67 -- 99    05/13/25 2200 -- -- 79 -- 171/82 -- 100    05/13/25 2100 -- -- 78 -- 176/79 -- 100    05/13/25 2000 -- -- 80 -- 159/66 -- 100    05/13/25 1900 97.6 (36.4) Oral 79 -- 128/59 -- 98    05/13/25 1800 -- -- 82 16 131/66 Lying 88    05/13/25 1700 -- -- 84 15 158/76 Lying 100    05/13/25 1602 97.4 (36.3) Oral 84 -- -- -- 99    05/13/25 1600 -- -- 86 14 140/64 Lying 100 05/13/25 1500 -- -- 86 14 154/68 Lying 100    05/13/25 1400 -- -- 86 15 126/59 Lying 99    05/13/25 1300 -- -- 85 13 150/62 Lying 100    05/13/25 1200 -- -- 86 12 134/56 Lying 100    05/13/25 1100 97.8 (36.6) Oral 85 12 106/56 Lying 100    05/13/25 1000 -- -- 89 14 210/91 Lying 100    05/13/25 0900 -- -- 81 14 132/64 Lying 100    05/13/25 0800 -- -- 85 13 192/77 Lying 100    05/13/25 0700 98.4 (36.9) Oral 77 12 137/73 Lying 99    05/13/25 0600 -- -- 74 14 148/64 -- 100    05/13/25 0500 -- -- 81 16 138/72 -- 99    05/13/25 0400 98.3 (36.8) Oral 81 16 142/65 Lying 99    05/13/25 0338 -- -- 81 -- -- -- 100    05/13/25 0300 -- -- 82 14 107/61 -- 100    05/13/25 0200 -- -- 80 16 145/70 Lying 100    05/13/25 0100 -- -- 81 14 137/60 -- 100    05/13/25 0000 98.6 (37) Oral 82 16 138/60 Lying 97          Current Facility-Administered Medications   Medication Dose Route Frequency Provider Last Rate Last Admin    acetaminophen (TYLENOL) tablet 650 mg  650 mg Oral Q4H PRN Aki Rodriguez DO        Or    acetaminophen (TYLENOL) suppository 650 mg  650 mg Rectal Q4H PRN  Aki Rodriguez DO        aspirin chewable tablet 81 mg  81 mg Oral Daily Aki Rodriguez DO   81 mg at 05/12/25 1031    atorvastatin (LIPITOR) tablet 80 mg  80 mg Oral Daily Aki Rodriguez DO   80 mg at 05/12/25 1030    sennosides-docusate (PERICOLACE) 8.6-50 MG per tablet 2 tablet  2 tablet Oral BID Aki Rodriguez DO   2 tablet at 05/12/25 1030    And    polyethylene glycol (MIRALAX) packet 17 g  17 g Oral Daily PRN Aki Rodriguez DO        And    bisacodyl (DULCOLAX) EC tablet 5 mg  5 mg Oral Daily PRN Aki Rodriguez DO        And    bisacodyl (DULCOLAX) suppository 10 mg  10 mg Rectal Daily PRN Aki Rodriguez DO        Calcium Replacement - Follow Nurse / BPA Driven Protocol   Not Applicable PRN Aki Rodriguez DO        carvedilol (COREG) tablet 12.5 mg  12.5 mg Oral BID With Meals Aki Rodriguez DO   12.5 mg at 05/12/25 1030    cloNIDine (CATAPRES-TTS) 0.1 MG/24HR patch 1 patch  1 patch Transdermal Weekly Ortega Cabrales MD, FASTIFFANY   1 patch at 05/12/25 1031    dextrose (D50W) (25 g/50 mL) IV injection 25 g  25 g Intravenous Q15 Min PRN Aki Rodriguez DO        dextrose (D5W) 5 % infusion  100 mL/hr Intravenous Continuous Ortega Cabrales MD, FASN 100 mL/hr at 05/14/25 0635 100 mL/hr at 05/14/25 0635    dextrose (GLUTOSE) oral gel 15 g  15 g Oral Q15 Min PRN Aki Rodriguez DO        dextrose 5 % with KCl 20 mEq/L infusion  50 mL/hr Intravenous Continuous Ortega Cabrales MD FASN 50 mL/hr at 05/14/25 0630 50 mL/hr at 05/14/25 0630    ertapenem (INVanz) 500 mg in sodium chloride 0.9 % 50 mL IVPB  500 mg Intravenous Q24H Wil Rader  mL/hr at 05/13/25 1934 500 mg at 05/13/25 1934    fluconazole (DIFLUCAN) IVPB 200 mg  200 mg Intravenous Q24H Isaura Lyons PA-C 100 mL/hr at 05/13/25 1832 200 mg at 05/13/25 1832    glucagon (GLUCAGEN) injection 1 mg  1 mg Intramuscular Q15 Min PRN Aki Rodriguez DO        glycerin 35 % liquid 35% 2 spray  2 spray Mouth/Throat Q2H PRN Aki Rodriguez DO   2 spray at  05/12/25 2011    heparin 63151 units/250 ml (100 units/ml) in D5W  10 Units/kg/hr (Order-Specific) Intravenous Titrated Ronny Lara DO 6.67 mL/hr at 05/14/25 0814 10 Units/kg/hr at 05/14/25 0814    hydrALAZINE (APRESOLINE) injection 10 mg  10 mg Intravenous Q6H PRN Aki Rodriguez DO   10 mg at 05/13/25 2218    hydrALAZINE (APRESOLINE) injection 20 mg  20 mg Intravenous Q6H Ortega Cabrales MD, FASN   20 mg at 05/14/25 1006    insulin regular (humuLIN R,novoLIN R) injection 2-7 Units  2-7 Units Subcutaneous Q6H Aki Rodriguez DO   5 Units at 05/14/25 0629    Magnesium Cardiology Dose Replacement - Follow Nurse / BPA Driven Protocol   Not Applicable PRN Aki Rodriguez DO        metoprolol tartrate (LOPRESSOR) injection 5 mg  5 mg Intravenous Q4H PRN Wil Rader DO   5 mg at 05/12/25 0404    mupirocin (BACTROBAN) 2 % nasal ointment 1 Application  1 Application Each Nare BID Aki Rodriguez DO   1 Application at 05/14/25 0903    nitroglycerin (NITROSTAT) SL tablet 0.4 mg  0.4 mg Sublingual Q5 Min PRN Aki Rodriguez DO        nystatin (MYCOSTATIN) 100,000 unit/mL suspension 500,000 Units  5 mL Oral 4x Daily Isaura Lyons PA-C   500,000 Units at 05/13/25 2047    Pharmacy to Dose Heparin   Not Applicable Continuous PRN Aki Rodriguez DO        Phosphorus Replacement - Follow Nurse / BPA Driven Protocol   Not Applicable PRN Aki Rodriguez DO        Potassium Replacement - Follow Nurse / BPA Driven Protocol   Not Applicable PRN Aki Rodriguez DO        rivastigmine (EXELON) 4.6 MG/24HR patch 1 patch  1 patch Transdermal Daily Aki Rodriguez DO   1 patch at 05/14/25 1030    sodium chloride 0.9 % flush 10 mL  10 mL Intravenous PRN Danie Tolliver DO        sodium chloride 0.9 % flush 10 mL  10 mL Intravenous Q12H Aki Rodriguez DO   10 mL at 05/14/25 1007    sodium chloride 0.9 % flush 10 mL  10 mL Intravenous PRN Aki Rodriguez DO   10 mL at 05/12/25 0617    [Held by provider] sodium chloride 0.9 %  infusion 40 mL  40 mL Intravenous PRN Aki Rodriguez DO         Lab Results (last 24 hours)       Procedure Component Value Units Date/Time    aPTT [[id number]]  (Normal) Collected: 05/14/25 0657    Specimen: Blood Updated: 05/14/25 0741     PTT 86.6 seconds     Renal Function Panel [[id number]]  (Abnormal) Collected: 05/14/25 0657    Specimen: Blood Updated: 05/14/25 0730     Glucose 308 mg/dL      BUN 47 mg/dL      Creatinine 1.53 mg/dL      Sodium 144 mmol/L      Potassium 3.9 mmol/L      Chloride 112 mmol/L      CO2 22.4 mmol/L      Calcium 7.9 mg/dL      Albumin 2.4 g/dL      Phosphorus 2.0 mg/dL      Anion Gap 9.6 mmol/L      BUN/Creatinine Ratio 30.7     eGFR 34.3 mL/min/1.73     Narrative:      GFR Categories in Chronic Kidney Disease (CKD)              GFR Category          GFR (mL/min/1.73)    Interpretation  G1                    90 or greater        Normal or high (1)  G2                    60-89                Mild decrease (1)  G3a                   45-59                Mild to moderate decrease  G3b                   30-44                Moderate to severe decrease  G4                    15-29                Severe decrease  G5                    14 or less           Kidney failure    (1)In the absence of evidence of kidney disease, neither GFR category G1 or G2 fulfill the criteria for CKD.    eGFR calculation 2021 CKD-EPI creatinine equation, which does not include race as a factor    CBC & Differential [[id number]]  (Abnormal) Collected: 05/14/25 0657    Specimen: Blood Updated: 05/14/25 0714    Narrative:      The following orders were created for panel order CBC & Differential.  Procedure                               Abnormality         Status                     ---------                               -----------         ------                     CBC Auto Differential[[id number]]        Abnormal            Final result                 Please view results for these tests on the individual orders.     CBC Auto Differential [[id number]]  (Abnormal) Collected: 05/14/25 0657    Specimen: Blood Updated: 05/14/25 0714     WBC 5.70 10*3/mm3      RBC 3.97 10*6/mm3      Hemoglobin 11.6 g/dL      Hematocrit 37.1 %      MCV 93.5 fL      MCH 29.2 pg      MCHC 31.3 g/dL      RDW 14.1 %      RDW-SD 48.0 fl      MPV 12.8 fL      Platelets 110 10*3/mm3      Neutrophil % 68.5 %      Lymphocyte % 19.3 %      Monocyte % 8.8 %      Eosinophil % 1.4 %      Basophil % 0.4 %      Immature Grans % 1.6 %      Neutrophils, Absolute 3.91 10*3/mm3      Lymphocytes, Absolute 1.10 10*3/mm3      Monocytes, Absolute 0.50 10*3/mm3      Eosinophils, Absolute 0.08 10*3/mm3      Basophils, Absolute 0.02 10*3/mm3      Immature Grans, Absolute 0.09 10*3/mm3      nRBC 0.0 /100 WBC     POC Glucose Q6H [[id number]]  (Abnormal) Collected: 05/14/25 0612    Specimen: Blood Updated: 05/14/25 0613     Glucose 313 mg/dL      Comment: Serial Number: [id number]:  [id number]       POC Glucose Once [[id number]]  (Abnormal) Collected: 05/13/25 2344    Specimen: Blood Updated: 05/13/25 2353     Glucose 331 mg/dL      Comment: Serial Number: [id number]:  [id number]       aPTT [[id number]]  (Normal) Collected: 05/13/25 2151    Specimen: Blood Updated: 05/13/25 2246     PTT 78.8 seconds     Blood Culture - Blood, Arm, Right [[id number]]  (Normal) Collected: 05/10/25 2131    Specimen: Blood from Arm, Right Updated: 05/13/25 2146     Blood Culture No growth at 3 days    POC Glucose Once [[id number]]  (Abnormal) Collected: 05/13/25 2030    Specimen: Blood Updated: 05/13/25 2041     Glucose 346 mg/dL      Comment: Serial Number: [id number]:  [id number]       POC Glucose Q6H [[id number]]  (Abnormal) Collected: 05/13/25 1824    Specimen: Blood Updated: 05/13/25 1826     Glucose 330 mg/dL      Comment: Serial Number: [id number]:  [id number]       aPTT [[id number]]  (Normal) Collected: 05/13/25 1540    Specimen: Blood from Arm, Left Updated: 05/13/25 1613     PTT  73.6 seconds     Vitamin B12 [[id number]]  (Normal) Collected: 05/13/25 0431    Specimen: Blood Updated: 05/13/25 1426     Vitamin B-12 610 pg/mL     Narrative:      Results may be falsely increased if patient taking Biotin.      Folate [[id number]]  (Abnormal) Collected: 05/13/25 0431    Specimen: Blood Updated: 05/13/25 1426     Folate 2.76 ng/mL     Narrative:      Results may be falsely increased if patient taking Biotin.      Blood Culture - Blood, Arm, Left [[id number]]  (Normal) Collected: 05/10/25 1315    Specimen: Blood from Arm, Left Updated: 05/13/25 1345     Blood Culture No growth at 3 days          Imaging Results (Last 24 Hours)       ** No results found for the last 24 hours. **          Orders (last 24 hrs)        Start     Ordered    05/14/25 1415  aPTT  Timed        Comments: ~q6h aPTT while on heparin infusion      05/14/25 0821    05/14/25 1415  Heparin Anti-Xa  Timed        Comments: Draw along with aPTT      05/14/25 0822    05/14/25 1030  hydrALAZINE (APRESOLINE) injection 20 mg  Every 6 Hours         05/14/25 0931    05/14/25 0600  Renal Function Panel  Daily       05/13/25 0649    05/14/25 0600  CBC Auto Differential  PROCEDURE ONCE        Comments: Discontinue After Heparin Stopped      05/13/25 2201    05/14/25 0600  aPTT  Timed         05/13/25 2342    05/14/25 0000  insulin regular (humuLIN R,novoLIN R) injection 2-7 Units  Every 6 Hours Scheduled         05/13/25 2033    05/14/25 0000  POC Glucose Q6H  Every 6 Hours      Comments: Complete no more than 45 minutes prior to patient eating      05/13/25 2033    05/13/25 2354  POC Glucose Once  PROCEDURE ONCE        Comments: Complete no more than 45 minutes prior to patient eating      05/13/25 2344    05/13/25 2200  aPTT  Timed         05/13/25 1845    05/13/25 2130  furosemide (LASIX) injection 20 mg  Once         05/13/25 2033 05/13/25 2042  POC Glucose Once  PROCEDURE ONCE        Comments: Complete no more than 45 minutes prior to  patient eating      05/13/25 2030 05/13/25 2033  dextrose (GLUTOSE) oral gel 15 g  Every 15 Minutes PRN         05/13/25 2033 05/13/25 2033  dextrose (D50W) (25 g/50 mL) IV injection 25 g  Every 15 Minutes PRN         05/13/25 2033 05/13/25 2033  glucagon (GLUCAGEN) injection 1 mg  Every 15 Minutes PRN         05/13/25 2033 05/13/25 1800  POC Glucose Q6H  Every 6 Hours      Comments: Complete no more than 45 minutes prior to patient eating      05/13/25 1412 05/13/25 1800  nystatin (MYCOSTATIN) 100,000 unit/mL suspension 500,000 Units  4 Times Daily         05/13/25 1522 05/13/25 1615  fluconazole (DIFLUCAN) IVPB 200 mg  Every 24 Hours         05/13/25 1522 05/13/25 1600  aPTT  Timed         05/13/25 0927    05/13/25 1413  Elevate Head Of Bed 30-45 Degrees  Continuous         05/13/25 1412    05/13/25 1413  Daily Weights  Daily       05/13/25 1412    05/13/25 1413  Tube Feeding Assessment and I/O  Every Shift       05/13/25 1412 05/13/25 1413  Write Hang Time on Enteral Feeding Container  Per Order Details        Comments: Ready-to Hang, Closed System Large Volume Bags or Containers May Hang For 24 Hours.  Open System - Small Volume Cans, Bags or Cartons May Hang for 8 Hours.    05/13/25 1412 05/13/25 1413  Notify Provider - Abdominal Discomfort, Pain or Distention, No Bowel Movement in 3 Days  Continuous        Comments: Open Order Report to View Parameters Requiring Provider Notification    05/13/25 1412    05/13/25 1413  Inpatient Consult to Nutrition Services  Once        Provider:  (Not yet assigned)    05/13/25 1412    05/13/25 1413  Nasogastric Tube Insertion  Once         05/13/25 1412 05/13/25 1413  Tube Feeding: Formula: Diabetisource AC (Glucerna 1.2); Feeding Type: Continuous; Start at: 20 mL/hr; Then Advance By: 10 mL/hr; Every: 8 hours; To Goal Rate of: 60 mL/hr; Total Daily Feeding Volume (mL/day): 1320; Water Flush: Other; Other: 120; ...  Diet Effective Now          "05/13/25 1412    05/13/25 1413  NPO Diet NPO Type: Tube Feeding  Diet Effective Now         05/13/25 1412    05/13/25 1335  Nasogastric Tube Insertion  Once         05/13/25 1334    05/13/25 1335  Inpatient Nutrition Consult  Once        Provider:  (Not yet assigned)    05/13/25 1334    05/13/25 1215  heparin 11636 units/250 ml (100 units/ml) in D5W  Titrated         05/13/25 1121    05/13/25 1200  apixaban (ELIQUIS) tablet 2.5 mg  Every 12 Hours Scheduled,   Status:  Discontinued         05/13/25 1112    05/13/25 0745  dextrose (D5W) 5 % infusion  Continuous         05/13/25 0648    05/13/25 0745  dextrose 5 % with KCl 20 mEq/L infusion  Continuous         05/13/25 0649    05/13/25 0700  heparin 44412 units/250 ml (100 units/ml) in D5W  Titrated,   Status:  Discontinued         05/13/25 0606    05/12/25 1345  ertapenem (INVanz) 500 mg in sodium chloride 0.9 % 50 mL IVPB  Every 24 Hours         05/12/25 1254    05/12/25 1000  cloNIDine (CATAPRES-TTS) 0.1 MG/24HR patch 1 patch  Weekly         05/12/25 0908    05/11/25 2045  hydrALAZINE (APRESOLINE) injection 10 mg  Every 6 Hours PRN         05/11/25 2046    05/11/25 0950  metoprolol tartrate (LOPRESSOR) injection 5 mg  Every 4 Hours PRN         05/11/25 0950    05/11/25 0600  CBC & Differential  Every Third Day      Comments: Discontinue After Heparin Stopped      05/10/25 1436    05/10/25 2100  sennosides-docusate (PERICOLACE) 8.6-50 MG per tablet 2 tablet  2 Times Daily        Placed in \"And\" Linked Group    05/10/25 1435    05/10/25 2100  Silicone Border Dressing to Bony Prominences  Every Shift       05/10/25 1807    05/10/25 1802  glycerin 35 % liquid 35% 2 spray  Every 2 Hours PRN         05/10/25 1802    05/10/25 1800  carvedilol (COREG) tablet 12.5 mg  2 Times Daily With Meals         05/10/25 1607    05/10/25 1700  rivastigmine (EXELON) 4.6 MG/24HR patch 1 patch  Daily         05/10/25 1607    05/10/25 1623  atorvastatin (LIPITOR) tablet 80 mg  Daily         " "05/10/25 1607    05/10/25 1623  aspirin chewable tablet 81 mg  Daily         05/10/25 1607    05/10/25 1500  Vital Signs Every Hour and Per Hospital Policy Based on Patient Condition  Every Hour       05/10/25 1435    05/10/25 1500  Intake & Output  Every Hour       05/10/25 1435    05/10/25 1451  sodium chloride 0.9 % flush 10 mL  Every 12 Hours Scheduled         05/10/25 1435    05/10/25 1451  mupirocin (BACTROBAN) 2 % nasal ointment 1 Application  2 Times Daily         05/10/25 1435    05/10/25 1436  Pharmacy to Dose Heparin  Continuous PRN         05/10/25 1436    05/10/25 1436  Daily Weights  Daily       05/10/25 1435    05/10/25 1436  Daily CHG Bath While in Critical Care  Daily      Comments: Use for admission bath and length of critical care stay.    05/10/25 1435    05/10/25 1434  acetaminophen (TYLENOL) tablet 650 mg  Every 4 Hours PRN        Placed in \"Or\" Linked Group    05/10/25 1435    05/10/25 1434  acetaminophen (TYLENOL) suppository 650 mg  Every 4 Hours PRN        Placed in \"Or\" Linked Group    05/10/25 1435    05/10/25 1434  Potassium Replacement - Follow Nurse / BPA Driven Protocol  As Needed         05/10/25 1435    05/10/25 1434  Magnesium Cardiology Dose Replacement - Follow Nurse / BPA Driven Protocol  As Needed         05/10/25 1435    05/10/25 1434  Phosphorus Replacement - Follow Nurse / BPA Driven Protocol  As Needed         05/10/25 1435    05/10/25 1434  Calcium Replacement - Follow Nurse / BPA Driven Protocol  As Needed         05/10/25 1435    05/10/25 1421  nitroglycerin (NITROSTAT) SL tablet 0.4 mg  Every 5 Minutes PRN         05/10/25 1435    05/10/25 1421  Oral Care - Patient Not on NPPV & Not Intubated  Every Shift       05/10/25 1435    05/10/25 1421  sodium chloride 0.9 % flush 10 mL  As Needed         05/10/25 1435    05/10/25 1421  [Held by provider]  sodium chloride 0.9 % infusion 40 mL  As Needed        (On hold since Sat 5/10/2025 at 1605 until manually unheld; held by " "Aki Rodriguez DOHold Reason: Abnormal Labs)    05/10/25 1435    05/10/25 1421  polyethylene glycol (MIRALAX) packet 17 g  Daily PRN        Placed in \"And\" Linked Group    05/10/25 1435    05/10/25 1421  bisacodyl (DULCOLAX) EC tablet 5 mg  Daily PRN        Placed in \"And\" Linked Group    05/10/25 1435    05/10/25 1421  bisacodyl (DULCOLAX) suppository 10 mg  Daily PRN        Placed in \"And\" Linked Group    05/10/25 1435    05/10/25 1413  Urinary Catheter Care  Every Shift      Placed in \"And\" Linked Group    05/10/25 1413    05/10/25 1231  sodium chloride 0.9 % flush 10 mL  As Needed         05/10/25 1231    Unscheduled  Oxygen Therapy- Nasal Cannula; Titrate 1-6 LPM Per SpO2; 90 - 95%  Continuous PRN       05/10/25 1231    Unscheduled  Wound Care  As Needed       05/10/25 1807    Unscheduled  Extravasation Standing Orders - Encourage Active Range of Motion After 48 Hours  As Needed       25 1150    Unscheduled  Chidi & Document Feeding Tube Depth (in cm)  As Needed       25 1412    Unscheduled  Verify Feeding Tube Placement Upon Insertion & As Needed  As Needed       25 1412    Unscheduled  Flush Feeding Tube With 30-50mL Water As Needed  As Needed       25 1412    Unscheduled  Follow Hypoglycemia Standing Orders For Blood Glucose <70 & Notify Provider of Treatment  As Needed      Comments: Follow Hypoglycemia Orders As Outlined in Process Instructions (Open Order Report to View Full Instructions)  Notify Provider Any Time Hypoglycemia Treatment is Administered    25                     Physician Progress Notes (most recent note)        Ortega Cabrales MD, FASN at 25 0840                [facility] Progress Note  [facility]. [place]        Patient Name: Valarie Camraa  : 1945  MRN: [id number]   LOS: 3 days    Patient Care Team:  Lay Cox MD as PCP - General (Family Medicine)  Corbin Herrera DO (Long Term " Care Facility)    Chief Complaint:    Chief Complaint   Patient presents with    Altered Mental Status     Primary Care Physician:  Lay Cox MD  Date of admission: 5/10/2025    Subjective     Interval History:   Follow-up acute kidney injury  hypernatremia  Patient has minimal interaction, no significant mental status improvement noted, underlying dementia.  Patient refusing all medications, patient refusing NG tube placement yesterday.  Today it will be tried again in front of the family, it will help make them the decision about keeping her comfortable and transferring care to hospice.  Events noted from last 24 hours.  I reviewed the chart and other providers notes, labs and procedures done since my last note.    Review of Systems:   unobtainable.    Objective     Vitals:   Temp:  [97.4 °F (36.3 °C)-97.8 °F (36.6 °C)] 97.6 °F (36.4 °C)  Heart Rate:  [73-89] 73  Resp:  [12-16] 16  BP: (106-210)/(54-91) 168/75  Flow (L/min) (Oxygen Therapy):  [2] 2    Intake/Output Summary (Last 24 hours) at 5/14/2025 0840  Last data filed at 5/14/2025 0700  Gross per 24 hour   Intake 3348.76 ml   Output 1600 ml   Net 1748.76 ml       Physical Exam:    General Appearance: no acute distress   Skin: warm and dry  HEENT: oral mucosa normal, nonicteric sclera  Neck: supple, no JVD  Lungs: CTA  Heart: RRR, normal S1 and S2  Abdomen: obese, soft, nontender, non distended and positive bowel sounds.  : no palpable bladder  Extremities: Trace edema, no cyanosis or clubbing  Neuro: normal speech and mental status     Scheduled Meds:     Current Facility-Administered Medications   Medication Dose Route Frequency Provider Last Rate Last Admin    acetaminophen (TYLENOL) tablet 650 mg  650 mg Oral Q4H PRN Aki Rodriguez DO        Or    acetaminophen (TYLENOL) suppository 650 mg  650 mg Rectal Q4H PRN Aki Rodriguez DO        aspirin chewable tablet 81 mg  81 mg Oral Daily Aki Rodriguez DO   81 mg at 05/12/25 1031     atorvastatin (LIPITOR) tablet 80 mg  80 mg Oral Daily Aki Rodriguez DO   80 mg at 05/12/25 1030    sennosides-docusate (PERICOLACE) 8.6-50 MG per tablet 2 tablet  2 tablet Oral BID Aki Rodriguez DO   2 tablet at 05/12/25 1030    And    polyethylene glycol (MIRALAX) packet 17 g  17 g Oral Daily PRN Aki Rodriguez DO        And    bisacodyl (DULCOLAX) EC tablet 5 mg  5 mg Oral Daily PRN Aki Rodriguez DO        And    bisacodyl (DULCOLAX) suppository 10 mg  10 mg Rectal Daily PRN Aki Rodriguez DO        Calcium Replacement - Follow Nurse / BPA Driven Protocol   Not Applicable PRN Aki Rodriguez DO        carvedilol (COREG) tablet 12.5 mg  12.5 mg Oral BID With Meals Aki Rodriguez DO   12.5 mg at 05/12/25 1030    cloNIDine (CATAPRES-TTS) 0.1 MG/24HR patch 1 patch  1 patch Transdermal Weekly Ortega Cabrales MD, FASN   1 patch at 05/12/25 1031    dextrose (D50W) (25 g/50 mL) IV injection 25 g  25 g Intravenous Q15 Min PRN Aki Rodriguez DO        dextrose (D5W) 5 % infusion  100 mL/hr Intravenous Continuous Ortega Cabrales MD, DAPHNIE 100 mL/hr at 05/14/25 0635 100 mL/hr at 05/14/25 0635    dextrose (GLUTOSE) oral gel 15 g  15 g Oral Q15 Min PRN Aki Rodriguez DO        dextrose 5 % with KCl 20 mEq/L infusion  50 mL/hr Intravenous Continuous Ortega Cabrales MD FASN 50 mL/hr at 05/14/25 0630 50 mL/hr at 05/14/25 0630    ertapenem (INVanz) 500 mg in sodium chloride 0.9 % 50 mL IVPB  500 mg Intravenous Q24H Wil Rader  mL/hr at 05/13/25 1934 500 mg at 05/13/25 1934    fluconazole (DIFLUCAN) IVPB 200 mg  200 mg Intravenous Q24H Isaura Lyons PA-C 100 mL/hr at 05/13/25 1832 200 mg at 05/13/25 1832    glucagon (GLUCAGEN) injection 1 mg  1 mg Intramuscular Q15 Min PRN Aki Rodriguez DO        glycerin 35 % liquid 35% 2 spray  2 spray Mouth/Throat Q2H PRN Aki Rodriguez DO   2 spray at 05/12/25 2011    heparin 26015 units/250 ml (100 units/ml) in D5W  10 Units/kg/hr (Order-Specific) Intravenous Titrated  Ronny Lara DO 6.67 mL/hr at 05/14/25 0814 10 Units/kg/hr at 05/14/25 0814    hydrALAZINE (APRESOLINE) injection 10 mg  10 mg Intravenous Q6H PRN Aki Rodriguez DO   10 mg at 05/13/25 2218    insulin regular (humuLIN R,novoLIN R) injection 2-7 Units  2-7 Units Subcutaneous Q6H Aki Rodriguez DO   5 Units at 05/14/25 0629    Magnesium Cardiology Dose Replacement - Follow Nurse / BPA Driven Protocol   Not Applicable PRN Aki Rodriguez DO        metoprolol tartrate (LOPRESSOR) injection 5 mg  5 mg Intravenous Q4H PRN Wil Rader DO   5 mg at 05/12/25 0404    mupirocin (BACTROBAN) 2 % nasal ointment 1 Application  1 Application Each Nare BID Aki Rodriguez DO   1 Application at 05/13/25 2047    nitroglycerin (NITROSTAT) SL tablet 0.4 mg  0.4 mg Sublingual Q5 Min PRN Aki Rodriguez DO        nystatin (MYCOSTATIN) 100,000 unit/mL suspension 500,000 Units  5 mL Oral 4x Daily Isaura Lyons PA-C   500,000 Units at 05/13/25 2047    Pharmacy to Dose Heparin   Not Applicable Continuous PRN Aki Rodriguez DO        Phosphorus Replacement - Follow Nurse / BPA Driven Protocol   Not Applicable PRN Aki Rodriguez DO        Potassium Replacement - Follow Nurse / BPA Driven Protocol   Not Applicable PRN Aki Rodriguez DO        rivastigmine (EXELON) 4.6 MG/24HR patch 1 patch  1 patch Transdermal Daily Aki Rodriguez DO   1 patch at 05/13/25 1048    sodium chloride 0.9 % flush 10 mL  10 mL Intravenous PRN Danie Tolliver DO        sodium chloride 0.9 % flush 10 mL  10 mL Intravenous Q12H Aki Rodriguez DO   10 mL at 05/13/25 2050    sodium chloride 0.9 % flush 10 mL  10 mL Intravenous PRN Aki Rodriguez DO   10 mL at 05/12/25 0617    [Held by provider] sodium chloride 0.9 % infusion 40 mL  40 mL Intravenous PRN Aki Rodriguez DO           aspirin, 81 mg, Oral, Daily  atorvastatin, 80 mg, Oral, Daily  carvedilol, 12.5 mg, Oral, BID With Meals  cloNIDine, 1 patch, Transdermal, Weekly  ertapenem, 500 mg,  Intravenous, Q24H  fluconazole, 200 mg, Intravenous, Q24H  insulin regular, 2-7 Units, Subcutaneous, Q6H  mupirocin, 1 Application, Each Nare, BID  nystatin, 5 mL, Oral, 4x Daily  rivastigmine, 1 patch, Transdermal, Daily  senna-docusate sodium, 2 tablet, Oral, BID  sodium chloride, 10 mL, Intravenous, Q12H        IV Meds:   dextrose, 100 mL/hr, Last Rate: 100 mL/hr (05/14/25 0635)  dextrose 5 % with KCl 20 mEq, 50 mL/hr, Last Rate: 50 mL/hr (05/14/25 0630)  heparin, 10 Units/kg/hr (Order-Specific), Last Rate: 10 Units/kg/hr (05/14/25 0814)  Pharmacy to Dose Heparin,         Results Reviewed:   I have personally reviewed the results from the time of this admission to 5/14/2025 08:40 EDT     Results from last 7 days   Lab Units 05/14/25  0657 05/13/25  0431 05/12/25  1226 05/10/25  1437 05/10/25  1237   SODIUM mmol/L 144 158* 160*   < > 168*   POTASSIUM mmol/L 3.9 3.7 3.8   < > 4.1   CHLORIDE mmol/L 112* 125* 127*   < > 130*   CO2 mmol/L 22.4 21.8* 22.8   < > 18.5*   BUN mg/dL 47* 62* 73*   < > 127*   CREATININE mg/dL 1.53* 1.75* 2.08*   < > 3.96*   CALCIUM mg/dL 7.9* 7.9* 8.5*   < > 9.8   BILIRUBIN mg/dL  --   --   --   --  0.3   ALK PHOS U/L  --   --   --   --  57   ALT (SGPT) U/L  --   --   --   --  7   AST (SGOT) U/L  --   --   --   --  10   GLUCOSE mg/dL 308* 214* 247*   < > 162*    < > = values in this interval not displayed.       Estimated Creatinine Clearance: 27.6 mL/min (A) (by C-G formula based on SCr of 1.53 mg/dL (H)).    Results from last 7 days   Lab Units 05/14/25  0657 05/13/25  0431 05/12/25  1934 05/12/25  0411 05/11/25  0342   MAGNESIUM mg/dL  --  2.1  --  2.4 2.7*   PHOSPHORUS mg/dL 2.0* 2.5 2.5 1.8* 4.3             Results from last 7 days   Lab Units 05/14/25  0657 05/13/25 0431 05/12/25 0411 05/11/25  0053 05/10/25  1237   WBC 10*3/mm3 5.70 5.89 6.63 9.11 13.08*   HEMOGLOBIN g/dL 11.6* 10.1* 12.4 11.5* 12.6   PLATELETS 10*3/mm3 110* 122* 141 141 199       Results from last 7 days   Lab  "Units 05/10/25  1237   INR  1.25*       Brief Urine Lab Results  (Last result in the past 365 days)        Color   Clarity   Blood   Leuk Est   Nitrite   Protein   CREAT   Urine HCG        05/10/25 1237 Yellow   Cloudy   Trace   Moderate (2+)   Positive   30 mg/dL (1+)                   No results found for: \"UTPCR\"    Imaging Results (Last 24 Hours)       ** No results found for the last 24 hours. **                Assessment / Plan     ASSESSMENT:    Hypernatremia    PAF (paroxysmal atrial fibrillation)    Acute renal failure superimposed on stage 3a chronic kidney disease    Acute kidney injury: Most likely volume depletion, since she has been getting IV fluids it is starting to improve.  Bicarb and potassium appears to be stable increase serum sodium noted.  Starting to make urine.  Chronic kidney disease stage IIIb: Most likely will settle down to the baseline.  Hypernatremia: Secondary to free water losses and unable to eat and drink, she has about 5 L water deficit.  I will adjust hypotonic solutions.  Paroxysmal atrial fibrillation: Treated as per hospitalist service and cardiology.  Sepsis: Treated as per hospitalist service.  Metabolic encephalopathy: Significant uremia as well as dementia making things worse.        PLAN:  Serum sodium is back to normal.  Continue the IV fluids for maintenance, if NG tube gets placed and we can feed her through the NG tube we can adjust the fluids as well as medications.  Increased blood pressure noted we will go ahead and start her on hydralazine IV.  Renal function continues to improve.    Electrolyte replacement per protocol.  Details were discussed with the patient no family in the room.    Details were also discussed with the hospitalist service and or other providers as needed.  Awaiting palliative care consult and family meeting.  Continue with rest of the current treatment plan, and monitor with surveillance labs, adjust medication doses to the eGFR.  Further " recommendations will depend on clinical course of the patient during the current hospitalization.   I have reviewed the copied text to this note, it was edited and the changes made as needed.  It is accurate to the point, when the note was signed today.     Thank you for involving us in the care of Valarie Camara.  Please feel free to call with any questions.    Ortega Cabrales MD, DAPHNIE  25  08:40 EDT    [facility]  [phone]      Part of this note may be an electronic transcription/translation of spoken language to printed text using the Dragon Dictation System.                   Electronically signed by Ortega Cabrales MD, GONSALON at 25 0930          Consult Notes (most recent note)        Isaura Lyons PA-C at 25 1554        Consult Orders    1. Inpatient Palliative Care Team Consult [[id number]] ordered by Wil Rader DO at 25 1156                     [facility]    INPATIENT PALLIATIVE CARE CONSULT      Name:  Valarie Camara   Age:  79 y.o.  Sex:  female  :  1945  MRN:  [id number]   Visit Number:  [id number]  Date of Service:  25  Date of Admission:  5/10/2025  Primary Care Physician:  Lay Cox MD    Consulting Physician:  Dr. Rader    Reason For Consult:  Introduction to Palliative services, Goals of care discussions , Support during serious illness, and Hospice information     History of Present Illness:  Valarie Camara is a 79 y.o. female with past medical history of CAD, CKD, HTN, dementia, history of PE on eliquis, PAD, left ICA stenosis, history of esophageal stricture s/p dilation 2027 with Dr. Zapata.  Had admission in mid March secondary to altered mental status.  Stroke workup was negative, she was treated for UTI.  In addition neurology assessed and followed, recommending initiation of rivastigmine, continue aspirin/statin/AC.  Patient was discharged to short-term  rehabilitation at [facility], ultimately completed this and discharged home.  Patient presented to [facility] on 5/10/2025 secondary to altered mental status with associated poor oral intake over the past several days.  Per record review, patient was assessed by EMS found to be hypotensive and tachycardic, EKG revealing A-fib with RVR.  Upon arrival to [facility] ED she was emergently cardioverted, returning to normal sinus rhythm.  Unfortunately she did not have improvement to her mentation.  Laboratory evaluation revealed WBC 13.08.  Glucose 162, , creatinine 3.96, Na 168, albumin 3.1.  Urinalysis with positive nitrites, moderate leuks, 4+ bacteria.  Lactate negative.  Troponin elevated.  TSH 0.024.  Neurology consulted in the ED, CT head did not reveal acute CVA.  CT head no acute intracranial abnormality.  Chest x-ray negative.  Cardiology consulted, who felt troponin elevation likely associated with tachycardia, recommending trending as HR better controlled.  Neurology also consulted, recommending MRI.  Patient was initiated on Rocephin for UTI and LR, admitted the primary medicine service for continued evaluation and management.  Cardiology, neurology, and nephrology followed in consultation.   MRI negative for acute stroke, awaiting EEG.  Neurology recommending SLP and PT/OT evaluation.  Nephrology following, hypernatremia and KURT improving with IV hydration.  Nursing have attempted bedside swallow eval, however patient has been refusing diet and p.o. medications.  Palliative care consulted to further review GOC in the setting of complex medical decision making.    After speaking with the primary medicine service, palliative met with patient and family at bedside.  Patient is lying in bed appearing comfortable, will awaken and answer a few questions.  She appears to be sleeping at times, however will answer appropriately.  Her daughters are able to supplement HPI and reports that prior to  "this acute illness patient was living with her daughter who is her primary caretaker.  She has assistance from other family and friends who also live close by.  At baseline she is able to ambulate only short amount of distance, utilizes assistive device.  She has episodic incontinence.  She sleeps on and off throughout the day and nighttime.  Appetite has been waxing and waning, approximate 26 pound weight loss in the past 2 to 3 months.  Initially had difficulty with swallowing that improved after dilation, now reports pain with swallowing.  Family describes subtle cognitive losses over the past several years, is oriented to herself and place at baseline, able to recognize family consistently.  She does require assistance with ADLs.  Her daughter notes that over the past week or so she was requiring assistance with feeding, she was no longer desiring to eat or drink.  No acute dyspnea, pain, agitation or restlessness noted.    Patient and family agreeable to continued palliative care follow up and discussions regarding goals of care and advance care planning.     Review of Systems   Constitutional:  Positive for activity change, appetite change and fatigue.   HENT:  Positive for trouble swallowing.    Neurological:  Positive for weakness.        Medical History:  Past Medical History:   Diagnosis Date    Acute bronchitis with bronchospasm     Anxiety     Arthritis     Asthma     B12 deficiency     Back pain     Body piercing     ears    Cataract     Cerebrovascular disease     Cervicalgia     Chronic kidney disease     stage 3b    Colonic polyp     History of colonic polyps     Constipation     COPD (chronic obstructive pulmonary disease)     Coronary artery disease     Depression     Diverticulitis     Dysphagia     Patient reported \"it won't go all the way down\" when eating solid foods first thing in the mornings    Edema     Elevated cholesterol     Esophageal reflux     Folic acid deficiency     Full dentures  "    Advised no adhesives DOS    Heart murmur     Herpes zoster     High cholesterol     History of kidney infection     History of recurrent urinary tract infection     HTN (hypertension)     Hypercholesterolemia     Impaired functional mobility, balance, gait, and endurance     Insomnia     Kidney infection     Malignant hypertension 04/26/2015     Accelerated essential hypertension    Measles     rubeola    Muscle spasm     Neoplasm of uncertain behavior of skin     dtr denies    Osteoporosis     Poor historian     Recurrent urinary tract infection     Rheumatoid arthritis     RLS (restless legs syndrome)     Stomach ulcer     Stroke     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    Type 2 diabetes mellitus     Vitamin D deficiency       Past Surgical History:   Procedure Laterality Date    CATARACT EXTRACTION WITH INTRAOCULAR LENS IMPLANT Left 02/11/2013    CATARACT EXTRACTION WITH INTRAOCULAR LENS IMPLANT Right 04/15/2013    COLONOSCOPY  2012    COLONOSCOPY N/A 11/26/2019    Procedure: COLONOSCOPY;  Surgeon: Chidi Downing MD;  Location:  [facility];  Service: Gastroenterology    ENDOSCOPY N/A 11/13/2017    Procedure: ESOPHAGOGASTRODUODENOSCOPY with biopsies and esophageal balloon dilitation;  Surgeon: Wesley Stovall MD;  Location: [facility] ENDOSCOPY;  Service:     ENDOSCOPY N/A 11/25/2019    Procedure: ESOPHAGOGASTRODUODENOSCOPY;  Surgeon: Chidi Downing MD;  Location:  [facility];  Service: Gastroenterology    ENDOSCOPY N/A 11/29/2021    Procedure: ESOPHAGOGASTRODUODENOSCOPY with dilation and biopsies;  Surgeon: Gonzalez Zapata MD;  Location: [facility] ENDOSCOPY;  Service: Gastroenterology;  Laterality: N/A;    ENDOSCOPY N/A 11/1/2023    Procedure: ESOPHAGOGASTRODUODENOSCOPY WITH BIOPSY AND DILATATION;  Surgeon: Gonzalez Zapata MD;  Location: [facility] ENDOSCOPY;  Service: Gastroenterology;  Laterality: N/A;    ENDOSCOPY N/A 1/27/2025    Procedure:  Esophagogastroduodenoscopy with dilatation and biopsies;  Surgeon: Gonzalez Zapata MD;  Location: [facility] ENDOSCOPY;  Service: Gastroenterology;  Laterality: N/A;    HYSTERECTOMY  1979    UPPER GASTROINTESTINAL ENDOSCOPY  12/09/2013    UPPER GASTROINTESTINAL ENDOSCOPY  11/13/2017     Family History   Problem Relation Age of Onset    Arthritis Mother     Diabetes Mother     Hypertension Mother     Arthritis Other     Arthritis Other     Diabetes Other         DM    Hypertension Other     Colon cancer Neg Hx      Allergies: Penicillins, Clonidine derivatives, Hydralazine, and Sulfa antibiotics    Hospital Scheduled Medications:    Current Facility-Administered Medications:     acetaminophen (TYLENOL) tablet 650 mg, 650 mg, Oral, Q4H PRN **OR** acetaminophen (TYLENOL) suppository 650 mg, 650 mg, Rectal, Q4H PRN, Aki Rodriguez DO    aspirin chewable tablet 81 mg, 81 mg, Oral, Daily, Aki Rodriguez DO, 81 mg at 05/12/25 1031    atorvastatin (LIPITOR) tablet 80 mg, 80 mg, Oral, Daily, Aki Rodriguez DO, 80 mg at 05/12/25 1030    sennosides-docusate (PERICOLACE) 8.6-50 MG per tablet 2 tablet, 2 tablet, Oral, BID, 2 tablet at 05/12/25 1030 **AND** polyethylene glycol (MIRALAX) packet 17 g, 17 g, Oral, Daily PRN **AND** bisacodyl (DULCOLAX) EC tablet 5 mg, 5 mg, Oral, Daily PRN **AND** bisacodyl (DULCOLAX) suppository 10 mg, 10 mg, Rectal, Daily PRN, Aki Rodriguez DO    Calcium Replacement - Follow Nurse / BPA Driven Protocol, , Not Applicable, PRN, Aki Rodriguez DO    carvedilol (COREG) tablet 12.5 mg, 12.5 mg, Oral, BID With Meals, Aki Rodriguez DO, 12.5 mg at 05/12/25 1030    cloNIDine (CATAPRES-TTS) 0.1 MG/24HR patch 1 patch, 1 patch, Transdermal, Weekly, Ortega Cabrales MD, GONSALON, 1 patch at 05/12/25 1031    dextrose (D5W) 5 % infusion, 100 mL/hr, Intravenous, Continuous, Ortega Cabrales MD, FASN, Last Rate: 100 mL/hr at 05/13/25 1047, 100 mL/hr at 05/13/25 1047    dextrose 5 % with KCl 20 mEq/L infusion, 50  mL/hr, Intravenous, Continuous, Ortega Cabrales MD, FASN, Last Rate: 50 mL/hr at 05/13/25 1047, 50 mL/hr at 05/13/25 1047    ertapenem (INVanz) 500 mg in sodium chloride 0.9 % 50 mL IVPB, 500 mg, Intravenous, Q24H, Wil Rader DO, Last Rate: 100 mL/hr at 05/12/25 1632, 500 mg at 05/12/25 1632    fluconazole (DIFLUCAN) IVPB 200 mg, 200 mg, Intravenous, Q24H, Isaura Lyons PA-C    glycerin 35 % liquid 35% 2 spray, 2 spray, Mouth/Throat, Q2H PRN, Aki Rodriguez DO, 2 spray at 05/12/25 2011    heparin 79670 units/250 ml (100 units/ml) in D5W, 9 Units/kg/hr (Order-Specific), Intravenous, Titrated, Ronny Lara DO, Last Rate: 6 mL/hr at 05/13/25 1123, 9 Units/kg/hr at 05/13/25 1123    hydrALAZINE (APRESOLINE) injection 10 mg, 10 mg, Intravenous, Q6H PRN, Aki Rodriguez DO, 10 mg at 05/13/25 1048    Magnesium Cardiology Dose Replacement - Follow Nurse / BPA Driven Protocol, , Not Applicable, PRN, Aki Rodriguez DO    metoprolol tartrate (LOPRESSOR) injection 5 mg, 5 mg, Intravenous, Q4H PRN, Wil Rader DO, 5 mg at 05/12/25 0404    mupirocin (BACTROBAN) 2 % nasal ointment 1 Application, 1 Application, Each Nare, BID, Aki Rodriguez DO, 1 Application at 05/13/25 1048    nitroglycerin (NITROSTAT) SL tablet 0.4 mg, 0.4 mg, Sublingual, Q5 Min PRN, Aki Rodriguez DO    nystatin (MYCOSTATIN) 100,000 unit/mL suspension 500,000 Units, 5 mL, Oral, 4x Daily, Isaura Lyons PA-C    Pharmacy to Dose Heparin, , Not Applicable, Continuous PRN, Aki Rodriguez DO    Phosphorus Replacement - Follow Nurse / BPA Driven Protocol, , Not Applicable, PRN, Aki Rodriguez DO    Potassium Replacement - Follow Nurse / BPA Driven Protocol, , Not Applicable, PRN, Aki Rodriguez DO    rivastigmine (EXELON) 4.6 MG/24HR patch 1 patch, 1 patch, Transdermal, Daily, Aki Rodriguez DO, 1 patch at 05/13/25 1048    sodium chloride 0.9 % flush 10 mL, 10 mL, Intravenous, PRN, Danie Tolliver DO    sodium chloride 0.9 % flush  "10 mL, 10 mL, Intravenous, Q12H, Aki Rodriguez DO, 10 mL at 05/13/25 1054    sodium chloride 0.9 % flush 10 mL, 10 mL, Intravenous, PRN, Aki Rodriguez DO, 10 mL at 05/12/25 0617    [Held by provider] sodium chloride 0.9 % infusion 40 mL, 40 mL, Intravenous, PRN, Aki Rodriguez DO  I have utilized all immediate resources to obtain, update, or review the patient's current medications (including all prescription, over-the-counter products, herbals, and/or nutritional supplements).      Vitals: /62 (BP Location: Left arm, Patient Position: Lying)   Pulse 85   Temp 97.8 °F (36.6 °C) (Oral)   Resp 13   Ht 160 cm (63\")   Wt 60.1 kg (132 lb 7.9 oz)   SpO2 100%   BMI 23.47 kg/m²     Intake/Output Summary (Last 24 hours) at 5/13/2025 1555  Last data filed at 5/13/2025 0642  Gross per 24 hour   Intake 2535.4 ml   Output 605 ml   Net 1930.4 ml       Physical Exam  Vitals and nursing note reviewed.   Constitutional:       General: She is not in acute distress.     Appearance: She is ill-appearing. She is not diaphoretic.      Comments: Frail appearing, elderly female, lying in bed, NAD   HENT:      Head: Normocephalic and atraumatic.      Comments: Temporal wasting      Mouth/Throat:      Mouth: Mucous membranes are moist.      Comments: White patches noted   Eyes:      Conjunctiva/sclera: Conjunctivae normal.      Pupils: Pupils are equal, round, and reactive to light.   Cardiovascular:      Rate and Rhythm: Normal rate and regular rhythm.   Pulmonary:      Effort: Pulmonary effort is normal. No respiratory distress.      Comments: Diminished otherwise clear  Abdominal:      General: Bowel sounds are normal. There is no distension.      Palpations: Abdomen is soft.      Tenderness: There is no abdominal tenderness.   Musculoskeletal:         General: No swelling.      Cervical back: Neck supple.      Comments: Diffusely deconditioned, decreased ROM to upper and lower extremities    Skin:     General: Skin is warm " and dry.      Capillary Refill: Capillary refill takes less than 2 seconds.   Neurological:      Mental Status: She is alert.      Comments: Oriented to person/place   Psychiatric:         Mood and Affect: Mood normal.         Behavior: Behavior normal.          Diagnostics Review:  Laboratory Data:  Results from last 7 days   Lab Units 05/13/25  0431 05/12/25  1226 05/12/25  0805 05/10/25  1437 05/10/25  1237   SODIUM mmol/L 158* 160* 161*   < > 168*   POTASSIUM mmol/L 3.7 3.8 3.7   < > 4.1   CHLORIDE mmol/L 125* 127* 130*   < > 130*   CO2 mmol/L 21.8* 22.8 22.1   < > 18.5*   BUN mg/dL 62* 73* 78*   < > 127*   CREATININE mg/dL 1.75* 2.08* 2.16*   < > 3.96*   CALCIUM mg/dL 7.9* 8.5* 8.6   < > 9.8   ALBUMIN g/dL  --   --   --   --  3.1*   BILIRUBIN mg/dL  --   --   --   --  0.3   ALK PHOS U/L  --   --   --   --  57   ALT (SGPT) U/L  --   --   --   --  7   AST (SGOT) U/L  --   --   --   --  10   GLUCOSE mg/dL 214* 247* 211*   < > 162*    < > = values in this interval not displayed.     Results from last 7 days   Lab Units 05/13/25  0431 05/12/25  0411 05/11/25  0053   WBC 10*3/mm3 5.89 6.63 9.11   HEMOGLOBIN g/dL 10.1* 12.4 11.5*   HEMATOCRIT % 32.9* 41.1 38.7   PLATELETS 10*3/mm3 122* 141 141     Results from last 7 days   Lab Units 05/10/25  1237   INR  1.25*     Results from last 7 days   Lab Units 05/12/25  0538   PH, ARTERIAL pH units 7.444   PO2 ART mm Hg 81.2   PCO2, ARTERIAL mm Hg 33.5*   HCO3 ART mmol/L 23.0     Results from last 7 days   Lab Units 05/10/25  1237   COLOR UA  Yellow   GLUCOSE UA  Negative   KETONES UA  Trace*   BLOOD UA  Trace*   LEUKOCYTES UA  Moderate (2+)*   PH, URINE  <=5.0   BILIRUBIN UA  Negative   UROBILINOGEN UA  0.2 E.U./dL     Pain Management Panel  More data exists         Latest Ref Rng & Units 5/10/2025 3/20/2025   Pain Management Panel   Amphetamine, Urine Qual Negative Negative  Negative    Barbiturates Screen, Urine Negative Negative  Negative    Benzodiazepine Screen, Urine  Negative Negative  Negative    Buprenorphine, Screen, Urine Negative Negative  Negative    Cocaine Screen, Urine Negative Negative  Negative    Fentanyl, Urine Negative Negative  Negative    Methadone Screen , Urine Negative Negative  Negative    Methamphetamine, Ur Negative Negative  Negative      Results from last 7 days   Lab Units 05/10/25  2131 05/10/25  1315 05/10/25  1237   BLOODCX  No growth at 2 days No growth at 3 days  --    URINECX   --   --  >100,000 CFU/mL Escherichia coli ESBL*     Nutritional Status:   Results from last 7 days   Lab Units 05/10/25  1237   ALBUMIN g/dL 3.1*     Body mass index is 23.47 kg/m².  Documented weights    05/10/25 1226 05/10/25 1727 05/11/25 0500 05/12/25 0400   Weight: 66.7 kg (147 lb) 57.4 kg (126 lb 8.7 oz) 55.5 kg (122 lb 5.7 oz) 57.1 kg (125 lb 14.1 oz)    05/13/25 0400   Weight: 60.1 kg (132 lb 7.9 oz)        Radiology:  MRI Brain Without Contrast  Result Date: 5/11/2025  MRI BRAIN WITHOUT CONTRAST  HISTORY: Confusion  COMPARISON: 3/22/2025  TECHNIQUE: Multiplanar, multisequence images of the brain were performed without contrast.  FINDINGS: The diffusion weighted images demonstrate no restricted diffusion. There is no acute ischemia. The cervicomedullary junction is normal. There is no Chiari malformation. There is moderate atrophy. There is increased T2 signal in the periventricular white matter consistent mild microvascular change. There is no cortical edema or hemorrhage. There are new right mastoid opacities.      1. Moderate atrophy with mild microvascular change. There is no edema or hemorrhage. 2. New right mastoid opacities.   This report was signed and finalized on 5/11/2025 8:59 AM by Yuniel Silva MD.      CT Head Without Contrast  Result Date: 5/10/2025  HEAD CT  HISTORY: Confusion.  COMPARISON: 3-.  TECHNIQUE: Multiple axial CT images were performed from the foramen magnum to the vertex without enhancement. Individualized dose reduction techniques  using automated exposure control or adjustment of the mA and/or kV according to patient size were employed.  FINDINGS: Mild to moderate diffuse cortical atrophy is present.  There is no evidence of acute hemorrhage, mass effect, or edema.  Mild mucoperiosteal thickening is noted in the right maxillary sinus. No air-fluid levels are seen.       1. No evidence of acute intracranial abnormality.      This report was signed and finalized on 5/10/2025 12:57 PM by Jah Kirk MD.      XR Chest 1 View  Result Date: 5/10/2025  Chest single view  COMPARISON: Chest from 3-  HISTORY: Altered mental status  FINDINGS: Cardiac silhouette is of normal size.  Lungs are underinflated, but clear.      1. No evidence of acute abnormality.  This report was signed and finalized on 5/10/2025 12:49 PM by Jah Kirk MD.          Goals of Care/Advance Care Planning:  Advance Care Planning     1. Determination of Decisional Capacity:  Decisional capacity: YES, however would benefit from the assistance of HCS for complex medical decisions given her fatigue and deconditioned state    2. Advanced Directives:  I have personally reviewed Advance Directives on file in the form of POA and Living Will  Healthcare surrogate/legal decision maker: Bailey Camara and Cleo Camara  Relationship to individual: Daughter  Surrogate/decision maker understands role and will honor individual's decisions: YES    3. Patient & Family Meeting:  Members present at meeting Patient, Bailey Gallo Faye Flaherty, Lauren Puckett  Meeting took place at [facility] ICU patient room     4. Summary of the discussion:  After generalized introductions, introduced the role of palliative care in assisting with complex medical decision making, goals of care discussions, and symptom management/supportive care.  Patient raised her two daughters independently.  She has valued her lavon and family throughout her whole life.  She loves her two grandsons as if they were her  own children.    I reviewed patient's acute and chronic illness, patient fatigued and unable to express insight in detail, her daughters at bedside with insight regarding the same.  Patient able to express she has been sick for a while, feels that her body is entering final stages of life.  Cleo reflects on patient's progressive decline over the past few months.  Daughters are understanding that their mother is likely entering the final stages of life, they would not want her to suffer.  They know she would desire to have a natural death, confirming DO NOT RESUSCITATE/DO NOT INTUBATE.  However, they want to make sure that they have done all that they could to ensure that they have done everything they could.  They are interested in pursuing a trial of artifical nutrition through NG, only if patient is able to tolerate.  Patient would not desire to pursue long term artifical nutrition through PEG placement.  In the event she is unable to tolerate NG placement, or artificial nutrition is causing discomfort, they would desire to defer further attempts, likely transition to comfort focused approach to care.  I reviewed trajectory associated with chronic illness, which often reflects a stairstepping pattern.  I also reviewed signs and symptoms associated with patients who are nearing the final stages of life.  Both daughters are in agreement that if patient is not benefiting from interventions, specifically artificial nutrition, would desire to focus on her comfort.  CODE STATUS reviewed/confirmed: DNR / DNI   Baseline Functional Status: Palliative Performance Scale Score: Performance 40% based on the following measures: Ambulation: Mainly in bed, Activity and Evidence of Disease: Unable to do any work, extensive evidence of disease, Self-Care: Mainly assistance required,  Intake: Normal or reduced, LOC: Full, drowsy or confusion          Palliative Care Assessment:  Severe sepsis secondary to UTI  Metabolic  encephalopathy  Atrial fibrillation with RVR  KURT  Dementia  Frailty  Dysphagia    Recommendations/Plan:  - CODE STATUS reviewed/confirmed: DNR / DNI   - GOC discussions yield desire to maintain plan of care as outlined per the primary medicine service, desire to pursue an attempt at NG feeding, however if patient is unable to tolerate, would desire to defer/withdrawal   - In the event patient is not tolerating artificial nutrition, seems that family are leaning towards transition to a comfort focused approach to her care, they reflect on patient's desire for for comfort and peace in the final stages of life  - Patient likely meets criteria for hospice, however at the present goals are not in alignment  - Discussed course of diflucan and nystan for oral candidiasis, primary medicine service is in agreement  - Previously patient taking PPI with history of GERD and hiatal hernia, may consider protonix IV  - Current Functional Status: Palliative Performance Scale Score: Performance 30% based on the following measures: Ambulation: Totally bed bound, Activity and Evidence of Disease: Unable to do any work, extensive evidence of disease, Self-Care: Total care required,  Intake: Reduced, LOC: Full, drowsy or confusion  - Palliative care will continue to follow/support patient and family        I appreciate the opportunity to participate in the care of Mr./Ms. Valarie Camara.  Please do not hesitate to contact me with any questions or concerns.      I spent 90 total minutes conducting chart review for relevant information, face to face assessment, counseling patient and/or family, and coordination of care.  15 minutes spent discussing advanced care planning.  Part of this note may be an electronic transcription/translation of spoken language to printed text using the Dragon Dictation System.       Isaura Lyons PA-C  05/13/25  15:55 EDT        Electronically signed by Isaura Lyons PA-C at 05/13/25 8828

## 2025-05-14 NOTE — CASE MANAGEMENT/SOCIAL WORK
Case Management/Social Work    Patient Name:  Valarie Camara  YOB: 1945  MRN: [id number]  Admit Date:  5/10/2025        08:58 EDT   Discharge Plan       Row Name 05/14/25 0857       Plan    Plan [facility] 5/13- maintain care and place feed tube, goal is to return home                        Electronically signed by:  Lanny Brooks RN  05/14/25 08:58 EDT

## 2025-05-14 NOTE — PLAN OF CARE
Goal Outcome Evaluation:  Plan of Care Reviewed With: patient        Progress: no change     VSS. NSR on monitor throughout shift. NG tube placed, tube feeding started per order. Had episodes of vomiting today, prn meds given per MAR. Pt lethargic and appeared to sleep throughout day.

## 2025-05-14 NOTE — PROGRESS NOTES
Pharmacy to Dose Heparin Infusion     Valarie Camara is a  79 y.o. female receiving heparin infusion.     Therapy for (VTE/Cardiac):   Cardiac  Patient Weight: Using 66.7 kg  Initial Bolus (Y/N):   Y  Any Bolus (Y/N):   Y        Signs or Symptoms of Bleeding: N    Cardiac or Other (Not VTE)   Initial Bolus: 60 units/kg (Max 4,000 units)  Initial rate: 12 units/kg/hr (Max 1,000 units/hr)   aPTT  Rebolus Infusion Hold time Change infusion Dose (Units/kg/hr) Next Anti Xa or aPTT Level Due   <55 50 Units/kg  (4000 Units Max) None Increase by  3 Units/kg/hr 6 hours   55-62 25 Units/kg  (2000 Units Max) None Increase by  2 Units/kg/hr 6 hours   63-69 0 None Increase by  1 Units/kg/hr 6 hours   70-85 0 None No Change 6 hours (after 2 consecutive levels in range check qAM)   86-93 0 None Decrease by  1 Units/kg/hr 6 hours    0 30 Minutes Decrease by  2 Units/kg/hr 6 hours   109-123 0 60 Minutes Decrease by  3 Units/kg/hr 6 hours   >123 0 Hold  After aPTT less than 75 decrease previous rate by  4 Units/kg/hr  Every 2 hours until aPTT less than 75 then when infusion restarts in 6 hours           Recommend aPTT every 6 hours.         Lab 05/14/25  0657 05/13/25  2151 05/13/25  1540 05/13/25  0825 05/13/25  0648 05/13/25  0431 05/12/25  1512 05/12/25  1226 05/12/25  0805 05/12/25  0411 05/11/25  0342 05/11/25  0053 05/10/25  2131 05/10/25  1455 05/10/25  1437 05/10/25  1237   HEMOGLOBIN 11.6*  --   --   --   --  10.1*  --   --   --  12.4  --  11.5*  --   --   --  12.6   HEMATOCRIT 37.1  --   --   --   --  32.9*  --   --   --  41.1  --  38.7  --   --   --  42.2   PLATELETS 110*  --   --   --   --  122*  --   --   --  141  --  141  --   --   --  199   PROTIME  --   --   --   --   --   --   --   --   --   --   --   --   --   --   --  16.6*   APTT 86.6 78.8 73.6 59.6* 111.6* 151.0*   < >  --   --  28.6*   < >  --    < >  --   --  30.3*   HEPARIN ANTI-XA  --   --   --   --   --   --   --   --   --   --   --   --   --   0.82*  --   --    CREATININE 1.53*  --   --   --   --  1.75*  --  2.08* 2.16* 2.24*   < >  --    < >  --    < > 3.96*   EGFR 34.3*  --   --   --   --  29.2*  --  23.7* 22.6* 21.7*   < >  --    < >  --    < > 10.9*    < > = values in this interval not displayed.      Recommend aPTT every 6 hours.        Date   Time   aPTT Current Rate (Unit/kg/hr) Bolus   (Units) Rate Change   (Unit/kg/hr) New Rate (Unit/kg/hr) Next   aPTT Comments  Pump Check Daily   5/10/25 1530 30.3 0 4000 +12.0 12.0 5/10 2200 d/w Emeli Thrasher RN   05/10/25 2239 >200 12 0 Hold  Hold 5/11 0100 AIDAN Rehman   5/11/25 0342 47.3 12 at time of hold 0 -4 8 0945 AIDAN Rehman   5/11/25 1149 27.7 8 3340 +3 11 5/11 1930 d/w Lida Boyer RN   5/11/25 1931 28.7 11 3340 +3 14 5/12 0400 D/W AIDAN Rehman   5/12/25 0411 28.6 14 3340 +3 17 5/12 1100 D/w AIDAN Rehman   5/12/25 1512 >200 HOLD HOLD HOLD HOLD 5/12 @ 1830 D/W AIDAN Russo  Lab was unable to stick patient and had to redraw   5/12/25 1934 114.9 hold hold hold hold 5/12 @ 2200 D/W AIDAN Rehman   5/12/25 2225 52.4 hold 0 +13 13 5/13 @ 0430 D/W AIDAN Rehman   5/13/25 0430 151 hold hold hold hold 5/13 @ 0630 D/W AIDAN Rehman   5/13/25 0648 111.6 HOLD HOLD HOLD HOLD 5/13 @ 0830 D/W AIDAN Russo   5/13/25 0825 59.6 hold 0 +9 9 5/13 @ 1600 D/W AIDAN Russo   5/13/25 1540 73.6 9 0 0 9 5/13 @ 2200 D/W AIDAN Russo   5/13/25 2151 78.8 9 0 0 9 5/14 @0600 D/W AIDAN Moran   5/14/25 0814 86.6 9 0 +1 10 5/14 @1415 D/W AIDAN Russo                                                                            Pharmacy will continue to follow aPTT results and monitor for signs and symptoms of bleeding or thrombosis.    Thank you for the consult,    Rosas Dolan PharmD, BCPS   05/14/25 08:23 EDT

## 2025-05-14 NOTE — PLAN OF CARE
Goal Outcome Evaluation:   Palliative care team; Isaura Lyons PA-C and this RN met with pt at bedside. She was sleeping quietly with eyes close, respirations 16/min. Pt in no apparent distress. Per daughter pt has has conversations with her that she was hungry and was agreeable to an NG tube for nutrition. When staff attempted an NG last night she was not cooperative. The plan is for the daughter to come in, and the nurse will re-attempt the NG while she is at the bedside.    PC will continues to follow.    13:00 NG was successfully placed, nutrition consult placed and the patient will start TF.

## 2025-05-14 NOTE — PROGRESS NOTES
Pharmacy to Dose Heparin Infusion     Valarie Camara is a  79 y.o. female receiving heparin infusion.     Therapy for (VTE/Cardiac):   Cardiac  Patient Weight: Using 66.7 kg  Initial Bolus (Y/N):   Y  Any Bolus (Y/N):   Y        Signs or Symptoms of Bleeding: N    Cardiac or Other (Not VTE)   Initial Bolus: 60 units/kg (Max 4,000 units)  Initial rate: 12 units/kg/hr (Max 1,000 units/hr)   aPTT  Rebolus Infusion Hold time Change infusion Dose (Units/kg/hr) Next Anti Xa or aPTT Level Due   <55 50 Units/kg  (4000 Units Max) None Increase by  3 Units/kg/hr 6 hours   55-62 25 Units/kg  (2000 Units Max) None Increase by  2 Units/kg/hr 6 hours   63-69 0 None Increase by  1 Units/kg/hr 6 hours   70-85 0 None No Change 6 hours (after 2 consecutive levels in range check qAM)   86-93 0 None Decrease by  1 Units/kg/hr 6 hours    0 30 Minutes Decrease by  2 Units/kg/hr 6 hours   109-123 0 60 Minutes Decrease by  3 Units/kg/hr 6 hours   >123 0 Hold  After aPTT less than 75 decrease previous rate by  4 Units/kg/hr  Every 2 hours until aPTT less than 75 then when infusion restarts in 6 hours           Recommend aPTT every 6 hours.         Lab 05/14/25  1518 05/14/25  0657 05/13/25  2151 05/13/25  1540 05/13/25  0825 05/13/25  0648 05/13/25  0431 05/12/25  1512 05/12/25  1226 05/12/25  0805 05/12/25  0411 05/11/25  0342 05/11/25  0053 05/10/25  2131 05/10/25  1455 05/10/25  1437 05/10/25  1237   HEMOGLOBIN  --  11.6*  --   --   --   --  10.1*  --   --   --  12.4  --  11.5*  --   --   --  12.6   HEMATOCRIT  --  37.1  --   --   --   --  32.9*  --   --   --  41.1  --  38.7  --   --   --  42.2   PLATELETS  --  110*  --   --   --   --  122*  --   --   --  141  --  141  --   --   --  199   PROTIME  --   --   --   --   --   --   --   --   --   --   --   --   --   --   --   --  16.6*   APTT 77.7 86.6 78.8 73.6 59.6*   < > 151.0*   < >  --   --  28.6*   < >  --    < >  --   --  30.3*   HEPARIN ANTI-XA 1.02*  --   --   --   --   --    --   --   --   --   --   --   --   --  0.82*  --   --    CREATININE  --  1.53*  --   --   --   --  1.75*  --  2.08* 2.16* 2.24*   < >  --    < >  --    < > 3.96*   EGFR  --  34.3*  --   --   --   --  29.2*  --  23.7* 22.6* 21.7*   < >  --    < >  --    < > 10.9*    < > = values in this interval not displayed.      Recommend aPTT every 6 hours.        Date   Time   aPTT Current Rate (Unit/kg/hr) Bolus   (Units) Rate Change   (Unit/kg/hr) New Rate (Unit/kg/hr) Next   aPTT Comments  Pump Check Daily   5/10/25 1530 30.3 0 4000 +12.0 12.0 5/10 2200 d/w Emeli Thrasher RN   05/10/25 2239 >200 12 0 Hold  Hold 5/11 0100 AIDAN Rehman   5/11/25 0342 47.3 12 at time of hold 0 -4 8 0945 AIDAN Rehman   5/11/25 1149 27.7 8 3340 +3 11 5/11 1930 d/w Lida Boyer RN   5/11/25 1931 28.7 11 3340 +3 14 5/12 0400 D/W AIDAN Rehman   5/12/25 0411 28.6 14 3340 +3 17 5/12 1100 D/w AIDAN Rehman   5/12/25 1512 >200 HOLD HOLD HOLD HOLD 5/12 @ 1830 D/W AIDAN Russo  Lab was unable to stick patient and had to redraw   5/12/25 1934 114.9 hold hold hold hold 5/12 @ 2200 D/W AIDAN Rehman   5/12/25 2225 52.4 hold 0 +13 13 5/13 @ 0430 D/W AIDAN Rehman   5/13/25 0430 151 hold hold hold hold 5/13 @ 0630 D/W AIDAN Rehman   5/13/25 0648 111.6 HOLD HOLD HOLD HOLD 5/13 @ 0830 D/W AIDAN Russo   5/13/25 0825 59.6 hold 0 +9 9 5/13 @ 1600 D/W AIDAN Russo   5/13/25 1540 73.6 9 0 0 9 5/13 @ 2200 D/W AIDAN Russo   5/13/25 2151 78.8 9 0 0 9 5/14 @0600 D/W AIDAN Moran   5/14/25 0814 86.6 9 0 +1 10 5/14 @1415 D/W AIDAN Russo   5/14/25 1518 77.7 10 0 0 10 5/14 @2300 D/W AIDAN Russo                                                                 Pharmacy will continue to follow aPTT results and monitor for signs and symptoms of bleeding or thrombosis.    Thank you for the consult,    Rosas Dolan, SatishD, BCPS   05/14/25 17:37 EDT

## 2025-05-14 NOTE — PROGRESS NOTES
"    [facility]     PALLIATIVE CARE FOLLOW UP NOTE    Name:  Valarie Camara   Age:  79 y.o.  Sex:  female  :  1945  MRN:  [id number]   Visit Number:  [id number]  Date Of Service:  25  Primary Care Physician:  Lay Cox MD    Chief Complaint: Weakness and debility    Interval History:  Patient seen today during palliative care team rounds.  Record reviewed and case discussed with staff.  She is resting upon PC assessment, appears without any distress.  She did not tolerate placement of NG last night, however was able to tolerate placement this afternoon.  She appears comfortable.  I spoke with Cleo at bedside, we discussed long term goals for patient.  She is hopeful that if she is able to tolerate some nutrition, she would feel well enough to return home.  However, ultimately feels that her mother is entering in the final stages of life.  She would be open to returning to home with hospice services.        Review of Systems   Unable to perform ROS: Other          Pain Assessment  PAINAD Breathin-->normal  PAINAD Negative Vocalization: 0-->none  PAINAD Facial Expression: 0-->smiling or inexpressive  PAINAD Body Language: 0-->relaxed  PAINAD Consolability: 0-->no need to console  PAINAD Score: 0  Vitals: /75   Pulse 73   Temp 97.6 °F (36.4 °C) (Oral)   Resp 16   Ht 160 cm (63\")   Wt 59.7 kg (131 lb 9.8 oz)   SpO2 100%   BMI 23.31 kg/m²     Physical Exam  Vitals and nursing note reviewed.   Constitutional:       General: She is sleeping. She is not in acute distress.     Appearance: She is not diaphoretic.      Comments: Frail appearing, elderly female lying in bed, NAD   HENT:      Head: Normocephalic and atraumatic.      Nose:      Comments: NG in place  Cardiovascular:      Rate and Rhythm: Normal rate and regular rhythm.   Pulmonary:      Effort: Pulmonary effort is normal. No respiratory distress.      Breath sounds: Rhonchi present. "   Musculoskeletal:         General: No swelling.      Cervical back: Neck supple.      Comments: Appears deconditioned    Skin:     General: Skin is warm and dry.      Capillary Refill: Capillary refill takes less than 2 seconds.   Psychiatric:      Comments: Calm affect, does not appear agitated or restless          Results Reviewed:    Intake/Output Summary (Last 24 hours) at 5/14/2025 0806  Last data filed at 5/14/2025 0700  Gross per 24 hour   Intake 3348.76 ml   Output 1600 ml   Net 1748.76 ml     Results from last 7 days   Lab Units 05/14/25  0657 05/10/25  1437 05/10/25  1237   SODIUM mmol/L 144   < > 168*   POTASSIUM mmol/L 3.9   < > 4.1   CHLORIDE mmol/L 112*   < > 130*   CO2 mmol/L 22.4   < > 18.5*   BUN mg/dL 47*   < > 127*   CREATININE mg/dL 1.53*   < > 3.96*   CALCIUM mg/dL 7.9*   < > 9.8   BILIRUBIN mg/dL  --   --  0.3   ALK PHOS U/L  --   --  57   ALT (SGPT) U/L  --   --  7   AST (SGOT) U/L  --   --  10   GLUCOSE mg/dL 308*   < > 162*    < > = values in this interval not displayed.     Results from last 7 days   Lab Units 05/14/25  0657   WBC 10*3/mm3 5.70   HEMOGLOBIN g/dL 11.6*   HEMATOCRIT % 37.1   PLATELETS 10*3/mm3 110*       Medication Review:   I have reviewed the patients active and prn medications.     Palliative Care Assessment:  Severe sepsis secondary to UTI  Metabolic encephalopathy  Atrial fibrillation with RVR  KURT  Dementia  Frailty  Dysphagia    Recommendations/Plan:  - GOC discussions yield desire to maintain plan of care as outlined per the primary medicine service, with goal to optimize condition  - Family desire to pursue an attempt at NG feeding, however if patient is unable to tolerate, would desire to defer/withdrawal   - In the event patient is not tolerating artificial nutrition, seems that family are leaning towards transition to a comfort focused approach to her care, they reflect on patient's desire for for comfort and peace in the final stages of life  - Patient likely  meets criteria for hospice; family open to referral upon discharge home  - Discussed course of diflucan and nystan for oral candidiasis, primary medicine service is in agreement  - Previously patient taking PPI with history of GERD and hiatal hernia, consider restarting Protonix  - Palliative care will continue to follow/support patient and family    CODE STATUS:   Code Status and Medical Interventions: No CPR (Do Not Attempt to Resuscitate); Limited Support; No intubation (DNI)   Ordered at: 05/10/25 5146     Code Status (Patient has no pulse and is not breathing):    No CPR (Do Not Attempt to Resuscitate)     Medical Interventions (Patient has pulse or is breathing):    Limited Support     Medical Intervention Limits:    No intubation (DNI)     Level Of Support Discussed With:    Patient         I spent 40 total minutes, including face to face assessment, record review, coordination of care with staff, and counseling patient and/or family  Part of this note may be an electronic transcription/translation of spoken language to printed text using the Dragon Dictation System.    Isaura Lyons PA-C  05/14/25  08:06 EDT

## 2025-05-14 NOTE — PROGRESS NOTES
"Dietitian Follow-up    Patient Name: Valarie Camara  YOB: 1945  MRN: [id number]  Admission date: 5/10/2025    Comment:    Clinical Nutrition Follow-up   Encounter Information        Trending Narrative     5/14: NG was placed today with plans to initiate tube feeding.     5/13: RD consulted for TF recommendations.     5/13: Patient remains NPO - will continue to monitor for diet advancement.    5/12: Pt NPO x 2 days. EMR shows significant wt loss. Will monitor for diet advancement and add ONS.      Anthropometrics        Current Height, Weight Height: 160 cm (63\")  Weight: 59.7 kg (131 lb 9.8 oz) (05/14/25 0400)       Trending Weight Hx     This admission:              PTA:     Wt Readings from Last 30 Encounters:   05/14/25 0400 59.7 kg (131 lb 9.8 oz)   05/13/25 0400 60.1 kg (132 lb 7.9 oz)   05/12/25 0400 57.1 kg (125 lb 14.1 oz)   05/11/25 0500 55.5 kg (122 lb 5.7 oz)   05/10/25 1727 57.4 kg (126 lb 8.7 oz)   05/10/25 1226 66.7 kg (147 lb)   04/17/25 0932 67.1 kg (147 lb 14.4 oz)   04/05/25 1837 66.6 kg (146 lb 12.8 oz)   03/21/25 1511 68 kg (150 lb)   03/20/25 1556 68 kg (150 lb)   03/15/25 0422 69.4 kg (153 lb)   03/04/25 0600 72.8 kg (160 lb 7.9 oz)   03/02/25 1423 71.9 kg (158 lb 8.2 oz)   03/02/25 0348 70.5 kg (155 lb 6.8 oz)   03/01/25 0415 72.3 kg (159 lb 6.3 oz)   02/28/25 1848 72.6 kg (160 lb 0.9 oz)   02/28/25 1817 77 kg (169 lb 12.1 oz)   02/28/25 1152 77 kg (169 lb 12.1 oz)   02/08/25 0115 77.1 kg (169 lb 15.6 oz)   01/24/25 1111 75.3 kg (166 lb)   01/27/25 0139 77.1 kg (170 lb)   01/10/25 1330 80.3 kg (177 lb)   01/05/25 1700 76.3 kg (168 lb 3.4 oz)   01/05/25 1102 79.4 kg (175 lb 0.7 oz)   01/05/25 0921 79.4 kg (175 lb)   11/06/24 1049 80.6 kg (177 lb 12.8 oz)   12/04/23 2059 90.7 kg (200 lb)   10/31/23 0924 90.7 kg (200 lb)   08/21/23 1541 90.7 kg (200 lb)   08/17/23 1331 91.2 kg (201 lb)   01/31/22 1433 87.5 kg (192 lb 12.8 oz)   11/24/21 1834 88.5 kg (195 lb)   11/24/21 1543 " 86.2 kg (190 lb)   05/25/21 0500 92.6 kg (204 lb 2.3 oz)   05/21/21 0500 89.9 kg (198 lb 3.1 oz)   05/20/21 2344 89.9 kg (198 lb 3.1 oz)   05/20/21 0807 88.2 kg (194 lb 6.4 oz)   05/20/21 0305 90.7 kg (200 lb)   02/03/21 1507 89.4 kg (197 lb)   12/17/20 1307 92.1 kg (203 lb)   10/28/20 1735 90.7 kg (200 lb)   09/08/20 1011 95.7 kg (211 lb)   08/17/20 1122 93.6 kg (206 lb 6.4 oz)   01/02/20 1016 90.4 kg (199 lb 6.4 oz)   12/13/19 0445 87.2 kg (192 lb 3.2 oz)   11/25/19 1335 89.4 kg (197 lb)   11/23/19 2340 89.7 kg (197 lb 12.8 oz)   11/23/19 1951 90.7 kg (200 lb)   03/19/19 1541 91.8 kg (202 lb 6.4 oz)   04/10/18 1524 90.7 kg (200 lb)   04/09/18 1342 92.5 kg (204 lb)      BMI kg/m2 Body mass index is 23.31 kg/m².     Labs        Pertinent Labs Results from last 7 days   Lab Units 05/14/25  0657 05/13/25  0431 05/12/25  1226 05/10/25  1437 05/10/25  1237   SODIUM mmol/L 144 158* 160*   < > 168*   POTASSIUM mmol/L 3.9 3.7 3.8   < > 4.1   CHLORIDE mmol/L 112* 125* 127*   < > 130*   CO2 mmol/L 22.4 21.8* 22.8   < > 18.5*   BUN mg/dL 47* 62* 73*   < > 127*   CREATININE mg/dL 1.53* 1.75* 2.08*   < > 3.96*   CALCIUM mg/dL 7.9* 7.9* 8.5*   < > 9.8   BILIRUBIN mg/dL  --   --   --   --  0.3   ALK PHOS U/L  --   --   --   --  57   ALT (SGPT) U/L  --   --   --   --  7   AST (SGOT) U/L  --   --   --   --  10   GLUCOSE mg/dL 308* 214* 247*   < > 162*    < > = values in this interval not displayed.     Results from last 7 days   Lab Units 05/14/25  0657 05/13/25  0431 05/12/25  1934 05/12/25  0411 05/11/25  0342   MAGNESIUM mg/dL  --  2.1  --  2.4 2.7*   PHOSPHORUS mg/dL 2.0* 2.5   < > 1.8* 4.3   HEMOGLOBIN g/dL 11.6* 10.1*  --  12.4  --    HEMATOCRIT % 37.1 32.9*  --  41.1  --     < > = values in this interval not displayed.         Medications    Scheduled Medications [START ON 5/15/2025] aspirin, 81 mg, Nasogastric, Daily  [START ON 5/15/2025] atorvastatin, 80 mg, Nasogastric, Daily  carvedilol, 12.5 mg, Nasogastric, BID With  Meals  cloNIDine, 1 patch, Transdermal, Weekly  sennosides, 10 mL, Nasogastric, BID   And  docusate sodium, 100 mg, Nasogastric, BID  ertapenem, 500 mg, Intravenous, Q24H  fluconazole, 200 mg, Intravenous, Q24H  hydrALAZINE, 20 mg, Intravenous, Q6H  insulin regular, 2-7 Units, Subcutaneous, Q6H  mupirocin, 1 Application, Each Nare, BID  nystatin, 5 mL, Oral, 4x Daily  rivastigmine, 1 patch, Transdermal, Daily  sodium chloride, 10 mL, Intravenous, Q12H  sodium phosphate, 15 mmol, Intravenous, Once        Infusions dextrose, 100 mL/hr, Last Rate: 100 mL/hr (05/14/25 0635)  dextrose 5 % with KCl 20 mEq, 50 mL/hr, Last Rate: 50 mL/hr (05/14/25 0630)  heparin, 10 Units/kg/hr (Order-Specific), Last Rate: 10 Units/kg/hr (05/14/25 0814)  Pharmacy to Dose Heparin,         PRN Medications   acetaminophen **OR** acetaminophen    [DISCONTINUED] senna-docusate sodium **AND** polyethylene glycol **AND** bisacodyl **AND** bisacodyl    Calcium Replacement - Follow Nurse / BPA Driven Protocol    dextrose    dextrose    glucagon (human recombinant)    glycerin    hydrALAZINE    Magnesium Cardiology Dose Replacement - Follow Nurse / BPA Driven Protocol    metoprolol tartrate    nitroglycerin    Pharmacy to Dose Heparin    Phosphorus Replacement - Follow Nurse / BPA Driven Protocol    Potassium Replacement - Follow Nurse / BPA Driven Protocol    sodium chloride    sodium chloride    [Held by provider] sodium chloride     Physical Findings        Trending Physical   Appearance, NFPE    --  Edema  Generalized Edema: 1+ (Trace)      Arm, Right Edema: 2+ (Mild)                  Bowel Function Stool Output  Stool Unmeasured Occurrence: 1 (05/14/25 0900)  Bowel Incontinence: Yes (05/14/25 0900)  Stool Amount: Medium (05/14/25 0900)  Stool Consistency: soft (05/14/25 0900)  Perineal Care: absorbent brief/pad changed, catheter care provided (05/14/25 0900)  Last Bowel Movement: 05/13/25   Chewing/Swallowing Teeth Status:Mouth/Teeth WDL: .WDL  except, teeth Teeth Symptoms: tooth/teeth missing  Chewing/Swallowing Issues: No issues identified at this time   Skin Lines, Drains, Airways, & Wounds       Active LDAs       Name Placement date Placement time Site Days Last dressing change    Peripheral IV 05/10/25 1222 20 G Anterior;Right External Jugular 05/10/25  1222  External Jugular  3 05/10/25 1231 (72.04 hrs)    Urethral Catheter 16 Fr. 05/10/25  1525  -- 2                     --  Current Nutrition Orders & Evaluation of Intake       Oral Nutrition     Food Allergies None    Current PO Diet NPO Diet NPO Type: Tube Feeding   Supplement None    PO Evaluation     Trending % PO Intake NPO       Enteral Nutrition    Current EN Order Tube Feeding: Formula: Diabetisource AC (Glucerna 1.2); Feeding Type: Continuous; Start at: 20 mL/hr; Then Advance By: 10 mL/hr; Every: 8 hours; To Goal Rate of: 60 mL/hr; Total Daily Feeding Volume (mL/day): 1320; Water Flush: Other; Other: 120; ...    Patient doesn't have any tube feeding modular orders    EN Route    EN Tolerance    EN Observation        Parenteral Nutrition    Current TPN Order    TPN Route    Lipids (mL/%/frequency)    MVI Frequency    Trace Element Frequency    Total # Days on TPN    TPN Observation      Nutrition Diagnosis         Nutrition Dx Problem 1    Underweight r/t dementia/COPD AEB BMI=22.3       Nutrition Dx Problem 2       Goal(s) Initiate EN      Intervention         Intervention  Await initiation of EN       Diet Prescription    Supplement Prescription         Enteral Prescription  Rec#1: Initiate Diabetisource AC @ 20mL/hr and advance 10mL q8hrs to goal rate of 60mL/hr x 22hrs  Rec#2: Free water flush of 120mL q4hrs  Total TF regimen to provide: 1584kcals, 79g protein, and 1797mL per day.       TPN Prescription         Education Provided         Monitor/Evaluation        Monitor Per Protocol, PO Intake, Pertinent Labs, Weight, Skin Status, GI Status, Symptoms, Swallow Function, and Hemodynamic  Stability     RD Follow-up Encounter 1-2 days     Electronically signed by:  Lay Oliva RD  05/14/25 13:33 EDT

## 2025-05-14 NOTE — SIGNIFICANT NOTE
Patient seen and examined at bedside.  Patient is following commands, no gaze deviation, no twitches, no seizure activity.    EEG not completed due to patient refusal.      Ongoing discussions with palliative therapy.  Hospice/comfort care would be appropriate as patient has been declining treatment.  TeleNeurology will follow peripherally and be available as needed.    Lurdes Causey MD   05/14/25  10:57 EDT  [facility]

## 2025-05-14 NOTE — PROGRESS NOTES
[facility] Progress Note  [facility]. [place]        Patient Name: Valarie Camara  : 1945  MRN: [id number]   LOS: 3 days    Patient Care Team:  Lay Cox MD as PCP - General (Family Medicine)  Corbin Herrera DO ([facility] Facility)    Chief Complaint:    Chief Complaint   Patient presents with    Altered Mental Status     Primary Care Physician:  Lay Cox MD  Date of admission: 5/10/2025    Subjective     Interval History:   Follow-up acute kidney injury  hypernatremia  Patient has minimal interaction, no significant mental status improvement noted, underlying dementia.  Patient refusing all medications, patient refusing NG tube placement yesterday.  Today it will be tried again in front of the family, it will help make them the decision about keeping her comfortable and transferring care to hospice.  Events noted from last 24 hours.  I reviewed the chart and other providers notes, labs and procedures done since my last note.    Review of Systems:   unobtainable.    Objective     Vitals:   Temp:  [97.4 °F (36.3 °C)-97.8 °F (36.6 °C)] 97.6 °F (36.4 °C)  Heart Rate:  [73-89] 73  Resp:  [12-16] 16  BP: (106-210)/(54-91) 168/75  Flow (L/min) (Oxygen Therapy):  [2] 2    Intake/Output Summary (Last 24 hours) at 2025 0840  Last data filed at 2025 0700  Gross per 24 hour   Intake 3348.76 ml   Output 1600 ml   Net 1748.76 ml       Physical Exam:    General Appearance: no acute distress   Skin: warm and dry  HEENT: oral mucosa normal, nonicteric sclera  Neck: supple, no JVD  Lungs: CTA  Heart: RRR, normal S1 and S2  Abdomen: obese, soft, nontender, non distended and positive bowel sounds.  : no palpable bladder  Extremities: Trace edema, no cyanosis or clubbing  Neuro: normal speech and mental status     Scheduled Meds:     Current Facility-Administered Medications   Medication Dose Route Frequency Provider Last  Rate Last Admin    acetaminophen (TYLENOL) tablet 650 mg  650 mg Oral Q4H PRN Aki Rodriguez DO        Or    acetaminophen (TYLENOL) suppository 650 mg  650 mg Rectal Q4H PRN Aki Rodriguez DO        aspirin chewable tablet 81 mg  81 mg Oral Daily Aki Rodriguez DO   81 mg at 05/12/25 1031    atorvastatin (LIPITOR) tablet 80 mg  80 mg Oral Daily Aki Rodriguez DO   80 mg at 05/12/25 1030    sennosides-docusate (PERICOLACE) 8.6-50 MG per tablet 2 tablet  2 tablet Oral BID Aki Rodriguez DO   2 tablet at 05/12/25 1030    And    polyethylene glycol (MIRALAX) packet 17 g  17 g Oral Daily PRN Aik Rodriguez DO        And    bisacodyl (DULCOLAX) EC tablet 5 mg  5 mg Oral Daily PRN Aki Rodriguez DO        And    bisacodyl (DULCOLAX) suppository 10 mg  10 mg Rectal Daily PRN Aki Rodriguez DO        Calcium Replacement - Follow Nurse / BPA Driven Protocol   Not Applicable PRN Aki Rodriguez DO        carvedilol (COREG) tablet 12.5 mg  12.5 mg Oral BID With Meals Aki Rodriguez DO   12.5 mg at 05/12/25 1030    cloNIDine (CATAPRES-TTS) 0.1 MG/24HR patch 1 patch  1 patch Transdermal Weekly Ortega Cabrales MD, DAPHNIE   1 patch at 05/12/25 1031    dextrose (D50W) (25 g/50 mL) IV injection 25 g  25 g Intravenous Q15 Min PRN Aki Rodriguez DO        dextrose (D5W) 5 % infusion  100 mL/hr Intravenous Continuous Ortega Cabrales MD, FASN 100 mL/hr at 05/14/25 0635 100 mL/hr at 05/14/25 0635    dextrose (GLUTOSE) oral gel 15 g  15 g Oral Q15 Min PRN Aki Rodriguez DO        dextrose 5 % with KCl 20 mEq/L infusion  50 mL/hr Intravenous Continuous Ortega Cabrales MD, FASN 50 mL/hr at 05/14/25 0630 50 mL/hr at 05/14/25 0630    ertapenem (INVanz) 500 mg in sodium chloride 0.9 % 50 mL IVPB  500 mg Intravenous Q24H Wil Rader  mL/hr at 05/13/25 1934 500 mg at 05/13/25 1934    fluconazole (DIFLUCAN) IVPB 200 mg  200 mg Intravenous Q24H Isaura Lyons PA-C 100 mL/hr at 05/13/25 1832 200 mg at 05/13/25 1832    glucagon  (GLUCAGEN) injection 1 mg  1 mg Intramuscular Q15 Min PRN Aki Rodriguez DO        glycerin 35 % liquid 35% 2 spray  2 spray Mouth/Throat Q2H PRN Aki Rodriguez DO   2 spray at 05/12/25 2011    heparin 91659 units/250 ml (100 units/ml) in D5W  10 Units/kg/hr (Order-Specific) Intravenous Titrated Ronny Lara DO 6.67 mL/hr at 05/14/25 0814 10 Units/kg/hr at 05/14/25 0814    hydrALAZINE (APRESOLINE) injection 10 mg  10 mg Intravenous Q6H PRN Aki Rodriguez DO   10 mg at 05/13/25 2218    insulin regular (humuLIN R,novoLIN R) injection 2-7 Units  2-7 Units Subcutaneous Q6H Aki Rodriguez DO   5 Units at 05/14/25 0629    Magnesium Cardiology Dose Replacement - Follow Nurse / BPA Driven Protocol   Not Applicable PRN Aki Rodriguez DO        metoprolol tartrate (LOPRESSOR) injection 5 mg  5 mg Intravenous Q4H PRN Wil Rader DO   5 mg at 05/12/25 0404    mupirocin (BACTROBAN) 2 % nasal ointment 1 Application  1 Application Each Nare BID Aki Rodriguez DO   1 Application at 05/13/25 2047    nitroglycerin (NITROSTAT) SL tablet 0.4 mg  0.4 mg Sublingual Q5 Min PRN Aki Rodriguez DO        nystatin (MYCOSTATIN) 100,000 unit/mL suspension 500,000 Units  5 mL Oral 4x Daily Isaura Lyons PA-C   500,000 Units at 05/13/25 2047    Pharmacy to Dose Heparin   Not Applicable Continuous PRN Aki Rodriguez DO        Phosphorus Replacement - Follow Nurse / BPA Driven Protocol   Not Applicable PRN Aki Rodriguez DO        Potassium Replacement - Follow Nurse / BPA Driven Protocol   Not Applicable PRN Aki Rodriguez DO        rivastigmine (EXELON) 4.6 MG/24HR patch 1 patch  1 patch Transdermal Daily Aki Rodriguez DO   1 patch at 05/13/25 1048    sodium chloride 0.9 % flush 10 mL  10 mL Intravenous PRN Danie Tolliver DO        sodium chloride 0.9 % flush 10 mL  10 mL Intravenous Q12H Aki Rodriguez DO   10 mL at 05/13/25 2050    sodium chloride 0.9 % flush 10 mL  10 mL Intravenous PRN Aki Rodriguez DO   10 mL at  05/12/25 0617    [Held by provider] sodium chloride 0.9 % infusion 40 mL  40 mL Intravenous PRN Aki Rodriguez DO           aspirin, 81 mg, Oral, Daily  atorvastatin, 80 mg, Oral, Daily  carvedilol, 12.5 mg, Oral, BID With Meals  cloNIDine, 1 patch, Transdermal, Weekly  ertapenem, 500 mg, Intravenous, Q24H  fluconazole, 200 mg, Intravenous, Q24H  insulin regular, 2-7 Units, Subcutaneous, Q6H  mupirocin, 1 Application, Each Nare, BID  nystatin, 5 mL, Oral, 4x Daily  rivastigmine, 1 patch, Transdermal, Daily  senna-docusate sodium, 2 tablet, Oral, BID  sodium chloride, 10 mL, Intravenous, Q12H        IV Meds:   dextrose, 100 mL/hr, Last Rate: 100 mL/hr (05/14/25 0635)  dextrose 5 % with KCl 20 mEq, 50 mL/hr, Last Rate: 50 mL/hr (05/14/25 0630)  heparin, 10 Units/kg/hr (Order-Specific), Last Rate: 10 Units/kg/hr (05/14/25 0814)  Pharmacy to Dose Heparin,         Results Reviewed:   I have personally reviewed the results from the time of this admission to 5/14/2025 08:40 EDT     Results from last 7 days   Lab Units 05/14/25  0657 05/13/25  0431 05/12/25  1226 05/10/25  1437 05/10/25  1237   SODIUM mmol/L 144 158* 160*   < > 168*   POTASSIUM mmol/L 3.9 3.7 3.8   < > 4.1   CHLORIDE mmol/L 112* 125* 127*   < > 130*   CO2 mmol/L 22.4 21.8* 22.8   < > 18.5*   BUN mg/dL 47* 62* 73*   < > 127*   CREATININE mg/dL 1.53* 1.75* 2.08*   < > 3.96*   CALCIUM mg/dL 7.9* 7.9* 8.5*   < > 9.8   BILIRUBIN mg/dL  --   --   --   --  0.3   ALK PHOS U/L  --   --   --   --  57   ALT (SGPT) U/L  --   --   --   --  7   AST (SGOT) U/L  --   --   --   --  10   GLUCOSE mg/dL 308* 214* 247*   < > 162*    < > = values in this interval not displayed.       Estimated Creatinine Clearance: 27.6 mL/min (A) (by C-G formula based on SCr of 1.53 mg/dL (H)).    Results from last 7 days   Lab Units 05/14/25  0657 05/13/25  0431 05/12/25  1934 05/12/25  0411 05/11/25  0342   MAGNESIUM mg/dL  --  2.1  --  2.4 2.7*   PHOSPHORUS mg/dL 2.0* 2.5 2.5 1.8* 4.3         "     Results from last 7 days   Lab Units 05/14/25  0657 05/13/25  0431 05/12/25  0411 05/11/25  0053 05/10/25  1237   WBC 10*3/mm3 5.70 5.89 6.63 9.11 13.08*   HEMOGLOBIN g/dL 11.6* 10.1* 12.4 11.5* 12.6   PLATELETS 10*3/mm3 110* 122* 141 141 199       Results from last 7 days   Lab Units 05/10/25  1237   INR  1.25*       Brief Urine Lab Results  (Last result in the past 365 days)        Color   Clarity   Blood   Leuk Est   Nitrite   Protein   CREAT   Urine HCG        05/10/25 1237 Yellow   Cloudy   Trace   Moderate (2+)   Positive   30 mg/dL (1+)                   No results found for: \"UTPCR\"    Imaging Results (Last 24 Hours)       ** No results found for the last 24 hours. **                Assessment / Plan     ASSESSMENT:    Hypernatremia    PAF (paroxysmal atrial fibrillation)    Acute renal failure superimposed on stage 3a chronic kidney disease    Acute kidney injury: Most likely volume depletion, since she has been getting IV fluids it is starting to improve.  Bicarb and potassium appears to be stable increase serum sodium noted.  Starting to make urine.  Chronic kidney disease stage IIIb: Most likely will settle down to the baseline.  Hypernatremia: Secondary to free water losses and unable to eat and drink, she has about 5 L water deficit.  I will adjust hypotonic solutions.  Paroxysmal atrial fibrillation: Treated as per hospitalist service and cardiology.  Sepsis: Treated as per hospitalist service.  Metabolic encephalopathy: Significant uremia as well as dementia making things worse.        PLAN:  Serum sodium is back to normal.  Continue the IV fluids for maintenance, if NG tube gets placed and we can feed her through the NG tube we can adjust the fluids as well as medications.  Increased blood pressure noted we will go ahead and start her on hydralazine IV.  Renal function continues to improve.    Electrolyte replacement per protocol.  Details were discussed with the patient no family in the room.  "   Details were also discussed with the hospitalist service and or other providers as needed.  Awaiting palliative care consult and family meeting.  Continue with rest of the current treatment plan, and monitor with surveillance labs, adjust medication doses to the eGFR.  Further recommendations will depend on clinical course of the patient during the current hospitalization.   I have reviewed the copied text to this note, it was edited and the changes made as needed.  It is accurate to the point, when the note was signed today.     Thank you for involving us in the care of Valarie LOLA Camara.  Please feel free to call with any questions.    Ortega Cabrales MD, FASN  05/14/25  08:40 EDT    [facility]  [phone]      Part of this note may be an electronic transcription/translation of spoken language to printed text using the Dragon Dictation System.

## 2025-05-14 NOTE — PROGRESS NOTES
[facility] HOSPITALIST    PROGRESS NOTE    Name:  Valarie Camara   Age:  79 y.o.  Sex:  female  :  1945  MRN:  [id number]   Visit Number:  [id number]  Admission Date:  5/10/2025  Date Of Service:  25  Primary Care Physician:  Lay Cox MD     LOS: 3 days :    Chief Complaint:      weakness    Subjective:    Patient resting comfortably.  Was asleep.  Awoke easily. Complained of being tired. No other complaints. No acute events overnight    Hospital Course:    Patient is an 79-year-old female brought to the emergency department for evaluation of altered mental status.  At the time of examination, unfortunately, patient was alone without family at bedside.  History of present illness was obtained from review of medical records, including discussion with nursing and ED physician.  Patient has a known history of dementia, however over the last 3 days, patient's daughter was concerned that the patient has developed significant confusion.  Patient has been unable to tolerate oral intake of nutrition and oral hydration.      EMS was called to patient's nursing facility, she was found to be hypotensive and tachycardic in A-fib with RVR.  They were unable to obtain IV access, subsequently unable to provide cardioversion en route to the ED.  Upon arrival to the ED, patient was emergently cardioverted, patient's rhythm with normal sinus rhythm, without any change in mentation.  Neurology was consulted, CT of the head did not show any acute CVA.  Cardiology was consulted, who feels that the elevated troponin is most likely secondary to the tachycardia and recommend troponin trending, anticoagulation.  Hospitalist services consulted for admission.     Discussion had with family and they request DNR with no chest compressions and no intubation, they approve all other interventions.     Patient had presented with encephalopathy very high sodium.  Continues to be in goals of  care discussion with patient and family.  Family meeting pending.  Sodium has been reluctant to recover still running about 160 despite hypotonic fluids nephrology is addressing this.  Otherwise urine is growing ESBL has been switched to Invanz.    Review of Systems:     All systems were reviewed and negative except as mentioned in subjective, assessment and plan.    Vital Signs:    Temp:  [97.4 °F (36.3 °C)-97.6 °F (36.4 °C)] 97.6 °F (36.4 °C)  Heart Rate:  [73-88] 74  Resp:  [14-16] 16  BP: (106-189)/(54-84) 164/72    Intake and output:    I/O last 3 completed shifts:  In: 4019.2 [I.V.:4019.2]  Out: 1780 [Urine:1780]  No intake/output data recorded.    Physical Examination: Examined again 5/14    General Appearance:  Alert and cooperative. NAD   Head:  Atraumatic and normocephalic.   Eyes: Conjunctivae and sclerae normal, no icterus. No pallor.   Throat: No oral lesions, no thrush, oral mucosa moist.   Neck: Supple, trachea midline, no thyromegaly.   Lungs:   Breath sounds heard bilaterally equally.  No wheezing or crackles.    Heart:  Normal S1 and S2, no murmur, No JVD.   Abdomen:   Normal bowel sounds, Soft, nontender, nondistended, no rebound tenderness.   Extremities: Supple, no edema, no cyanosis, no clubbing.   Skin: No bleeding or rash.   Neurologic: Alert and oriented x 2. No facial asymmetry.      Laboratory results:    Results from last 7 days   Lab Units 05/14/25  0657 05/13/25  0431 05/12/25  1226 05/10/25  1437 05/10/25  1237   SODIUM mmol/L 144 158* 160*   < > 168*   POTASSIUM mmol/L 3.9 3.7 3.8   < > 4.1   CHLORIDE mmol/L 112* 125* 127*   < > 130*   CO2 mmol/L 22.4 21.8* 22.8   < > 18.5*   BUN mg/dL 47* 62* 73*   < > 127*   CREATININE mg/dL 1.53* 1.75* 2.08*   < > 3.96*   CALCIUM mg/dL 7.9* 7.9* 8.5*   < > 9.8   BILIRUBIN mg/dL  --   --   --   --  0.3   ALK PHOS U/L  --   --   --   --  57   ALT (SGPT) U/L  --   --   --   --  7   AST (SGOT) U/L  --   --   --   --  10   GLUCOSE mg/dL 308* 214* 247*    < > 162*    < > = values in this interval not displayed.     Results from last 7 days   Lab Units 05/14/25  0657 05/13/25  0431 05/12/25  0411   WBC 10*3/mm3 5.70 5.89 6.63   HEMOGLOBIN g/dL 11.6* 10.1* 12.4   HEMATOCRIT % 37.1 32.9* 41.1   PLATELETS 10*3/mm3 110* 122* 141     Results from last 7 days   Lab Units 05/10/25  1237   INR  1.25*     Results from last 7 days   Lab Units 05/10/25  1437 05/10/25  1237   HSTROP T ng/L 141* 162*     Results from last 7 days   Lab Units 05/10/25  2131 05/10/25  1315 05/10/25  1237   BLOODCX  No growth at 3 days No growth at 4 days  --    URINECX   --   --  >100,000 CFU/mL Escherichia coli ESBL*     Recent Labs     03/22/25  0610 05/12/25  0538   PHART 7.449 7.444   WNS6KAQ 32.0* 33.5*   PO2ART 78.3* 81.2   AUH9JUC 22.1 23.0   BASEEXCESS -1.2* -0.6*      I have reviewed the patient's laboratory results.    Radiology results:    XR Abdomen 1 View  Result Date: 5/14/2025  PROCEDURE: XR ABDOMEN 1 VW-  HISTORY: NG placement; E87.0-Hyperosmolality and hypernatremia; I48.91-Unspecified atrial fibrillation; E05.90-Thyrotoxicosis, unspecified without thyrotoxic crisis or storm; N17.9-Acute kidney failure, unspecified  FINDINGS: An NG tube is seen projecting in the right abdomen, presumably in distal stomach. Limited visualized bowel gas pattern is normal.      Impression: NG tube projecting in the right midabdomen, likely distal stomach or proximal duodenum.   This report was signed and finalized on 5/14/2025 12:47 PM by Cornelio You MD.      I have reviewed the patient's radiology reports.    Medication Review:     I have reviewed the patient's active and prn medications.     Problem List:      Hypernatremia    PAF (paroxysmal atrial fibrillation)    Acute renal failure superimposed on stage 3a chronic kidney disease      Assessment:    Severe Sepsis secondary to urinary tract infection  Urinary tract infection  Metabolic encephalopathy  Atrial fibrillation with RVR  Acute kidney  injury  Hypernatremia  Dementia  COPD  Hypertension  Hyperlipidemia  Restless leg syndrome    Plan:    Severe Sepsis secondary to urinary tract infection  Urinary tract infection  Metabolic encephalopathy  Patient meets sepsis criteria with tachycardia, leukocytosis, source of infection identified as urinary tract infection.  Due to the sodium derangements, LR was bolused.  Evidence of endorgan damage noted with metabolic encephalopathy and acute kidney injury.  IV Rocephin, switched to Invanz 5/12/25 due to ESBL in the urine  MRI no stroke  Grey placed on 5/10/25  Atrial fibrillation with RVR  Patient status post cardioversion.  Cardiology consulted, appreciate their recommendations.  Heparin drip will be started, continued  Patient is currently normal sinus rhythm, continue Coreg  Acute kidney injury  Acute hypernatremia  Sodium was normal 1 month prior.  Patient encephalopathic which may be symptoms related to hypernatremia.  Nephrology has been consulted, appreciate his recommendations.  Status post LR boluses, continue slow hypotonic fluids with dextrose  Serial sodium, if sodium is correcting greater than one half Mg per DL per hour will hold fluids  Nutrition  NGT placed 5/14  Tube feeds  Patient complained of painful swallowing; possible thursh. Started empiric fluconazole  She also has a history of strictures; was recently dilated. Low suspicion this is the cause  Dementia  COPD  Hypertension  Hyperlipidemia  Restless leg syndrome  Continue home medications as appropriate.     Palliative care following     DVT Prophylaxis: Heparin  Code Status: DNR/DNI  Diet: NPO; tube feeds  Discharge Plan: [place]    Ronny Lara DO  05/14/25  14:00 EDT    Dictated utilizing Dragon dictation.

## 2025-05-15 LAB
ALBUMIN SERPL-MCNC: 2.2 G/DL (ref 3.5–5.2)
ANION GAP SERPL CALCULATED.3IONS-SCNC: 8.5 MMOL/L (ref 5–15)
APTT PPP: 29.5 SECONDS (ref 70–100)
APTT PPP: 35.6 SECONDS (ref 70–100)
BACTERIA SPEC AEROBE CULT: NORMAL
BACTERIA SPEC AEROBE CULT: NORMAL
BUN SERPL-MCNC: 42 MG/DL (ref 8–23)
BUN/CREAT SERPL: 27.8 (ref 7–25)
CALCIUM SPEC-SCNC: 7.6 MG/DL (ref 8.6–10.5)
CHLORIDE SERPL-SCNC: 108 MMOL/L (ref 98–107)
CO2 SERPL-SCNC: 23.5 MMOL/L (ref 22–29)
CREAT SERPL-MCNC: 1.51 MG/DL (ref 0.57–1)
DEPRECATED RDW RBC AUTO: 44.9 FL (ref 37–54)
EGFRCR SERPLBLD CKD-EPI 2021: 34.8 ML/MIN/1.73
ERYTHROCYTE [DISTWIDTH] IN BLOOD BY AUTOMATED COUNT: 13.7 % (ref 12.3–15.4)
GLUCOSE BLDC GLUCOMTR-MCNC: 102 MG/DL (ref 70–130)
GLUCOSE BLDC GLUCOMTR-MCNC: 106 MG/DL (ref 70–130)
GLUCOSE BLDC GLUCOMTR-MCNC: 106 MG/DL (ref 70–130)
GLUCOSE BLDC GLUCOMTR-MCNC: 119 MG/DL (ref 70–130)
GLUCOSE BLDC GLUCOMTR-MCNC: 138 MG/DL (ref 70–130)
GLUCOSE SERPL-MCNC: 113 MG/DL (ref 65–99)
HCT VFR BLD AUTO: 32.8 % (ref 34–46.6)
HEMOCCULT STL QL: POSITIVE
HGB BLD-MCNC: 10.8 G/DL (ref 12–15.9)
MCH RBC QN AUTO: 29.4 PG (ref 26.6–33)
MCHC RBC AUTO-ENTMCNC: 32.9 G/DL (ref 31.5–35.7)
MCV RBC AUTO: 89.4 FL (ref 79–97)
PHOSPHATE SERPL-MCNC: 2.8 MG/DL (ref 2.5–4.5)
PLATELET # BLD AUTO: 103 10*3/MM3 (ref 140–450)
PMV BLD AUTO: 12.3 FL (ref 6–12)
POTASSIUM SERPL-SCNC: 3.4 MMOL/L (ref 3.5–5.2)
POTASSIUM SERPL-SCNC: 3.9 MMOL/L (ref 3.5–5.2)
RBC # BLD AUTO: 3.67 10*6/MM3 (ref 3.77–5.28)
SODIUM SERPL-SCNC: 140 MMOL/L (ref 136–145)
UFH PPP CHRO-ACNC: >1.1 IU/ML (ref 0.3–0.7)
WBC NRBC COR # BLD AUTO: 6.75 10*3/MM3 (ref 3.4–10.8)

## 2025-05-15 PROCEDURE — 25010000002 METOCLOPRAMIDE PER 10 MG: Performed by: INTERNAL MEDICINE

## 2025-05-15 PROCEDURE — 25010000002 ONDANSETRON PER 1 MG: Performed by: INTERNAL MEDICINE

## 2025-05-15 PROCEDURE — 25010000002 HEPARIN (PORCINE) PER 1000 UNITS: Performed by: INTERNAL MEDICINE

## 2025-05-15 PROCEDURE — 80069 RENAL FUNCTION PANEL: CPT | Performed by: INTERNAL MEDICINE

## 2025-05-15 PROCEDURE — 85520 HEPARIN ASSAY: CPT | Performed by: INTERNAL MEDICINE

## 2025-05-15 PROCEDURE — 85027 COMPLETE CBC AUTOMATED: CPT | Performed by: INTERNAL MEDICINE

## 2025-05-15 PROCEDURE — 25010000002 FLUCONAZOLE PER 200 MG: Performed by: PHYSICIAN ASSISTANT

## 2025-05-15 PROCEDURE — 85730 THROMBOPLASTIN TIME PARTIAL: CPT | Performed by: INTERNAL MEDICINE

## 2025-05-15 PROCEDURE — 85730 THROMBOPLASTIN TIME PARTIAL: CPT | Performed by: FAMILY MEDICINE

## 2025-05-15 PROCEDURE — 82272 OCCULT BLD FECES 1-3 TESTS: CPT | Performed by: INTERNAL MEDICINE

## 2025-05-15 PROCEDURE — 99232 SBSQ HOSP IP/OBS MODERATE 35: CPT | Performed by: INTERNAL MEDICINE

## 2025-05-15 PROCEDURE — 84132 ASSAY OF SERUM POTASSIUM: CPT | Performed by: INTERNAL MEDICINE

## 2025-05-15 PROCEDURE — 82948 REAGENT STRIP/BLOOD GLUCOSE: CPT

## 2025-05-15 PROCEDURE — 82948 REAGENT STRIP/BLOOD GLUCOSE: CPT | Performed by: FAMILY MEDICINE

## 2025-05-15 PROCEDURE — 99232 SBSQ HOSP IP/OBS MODERATE 35: CPT | Performed by: PHYSICIAN ASSISTANT

## 2025-05-15 PROCEDURE — 25010000002 ERTAPENEM PER 500 MG: Performed by: INTERNAL MEDICINE

## 2025-05-15 PROCEDURE — 25010000002 HYDRALAZINE PER 20 MG: Performed by: INTERNAL MEDICINE

## 2025-05-15 RX ORDER — POTASSIUM CHLORIDE 7.45 MG/ML
10 INJECTION INTRAVENOUS
Status: DISPENSED | OUTPATIENT
Start: 2025-05-15 | End: 2025-05-15

## 2025-05-15 RX ORDER — METOCLOPRAMIDE HYDROCHLORIDE 5 MG/ML
10 INJECTION INTRAMUSCULAR; INTRAVENOUS EVERY 6 HOURS
Status: DISCONTINUED | OUTPATIENT
Start: 2025-05-15 | End: 2025-05-19

## 2025-05-15 RX ORDER — HYDRALAZINE HYDROCHLORIDE 25 MG/1
25 TABLET, FILM COATED ORAL EVERY 8 HOURS SCHEDULED
Status: DISCONTINUED | OUTPATIENT
Start: 2025-05-15 | End: 2025-05-18

## 2025-05-15 RX ORDER — POTASSIUM CHLORIDE 1.5 G/1.58G
20 POWDER, FOR SOLUTION ORAL ONCE
Status: COMPLETED | OUTPATIENT
Start: 2025-05-15 | End: 2025-05-15

## 2025-05-15 RX ADMIN — METOCLOPRAMIDE 10 MG: 5 INJECTION, SOLUTION INTRAMUSCULAR; INTRAVENOUS at 15:55

## 2025-05-15 RX ADMIN — METOCLOPRAMIDE 10 MG: 5 INJECTION, SOLUTION INTRAMUSCULAR; INTRAVENOUS at 20:54

## 2025-05-15 RX ADMIN — HEPARIN SODIUM 10 UNITS/KG/HR: 10000 INJECTION, SOLUTION INTRAVENOUS at 04:21

## 2025-05-15 RX ADMIN — POTASSIUM CHLORIDE 20 MEQ: 1.5 POWDER, FOR SOLUTION ORAL at 11:42

## 2025-05-15 RX ADMIN — FLUCONAZOLE 200 MG: 2 INJECTION, SOLUTION INTRAVENOUS at 18:35

## 2025-05-15 RX ADMIN — METOCLOPRAMIDE 10 MG: 5 INJECTION, SOLUTION INTRAMUSCULAR; INTRAVENOUS at 11:19

## 2025-05-15 RX ADMIN — HYDRALAZINE HYDROCHLORIDE 25 MG: 25 TABLET ORAL at 22:10

## 2025-05-15 RX ADMIN — RIVASTIGMINE 1 PATCH: 4.6 PATCH TRANSDERMAL at 11:19

## 2025-05-15 RX ADMIN — Medication 10 ML: at 10:03

## 2025-05-15 RX ADMIN — NYSTATIN 500000 UNITS: 100000 SUSPENSION ORAL at 11:19

## 2025-05-15 RX ADMIN — APIXABAN 2.5 MG: 2.5 TABLET, FILM COATED ORAL at 20:54

## 2025-05-15 RX ADMIN — Medication 10 ML: at 20:54

## 2025-05-15 RX ADMIN — SODIUM CHLORIDE 500 MG: 9 INJECTION, SOLUTION INTRAVENOUS at 18:35

## 2025-05-15 RX ADMIN — NYSTATIN 500000 UNITS: 100000 SUSPENSION ORAL at 18:35

## 2025-05-15 RX ADMIN — NYSTATIN 500000 UNITS: 100000 SUSPENSION ORAL at 20:54

## 2025-05-15 RX ADMIN — HYDRALAZINE HYDROCHLORIDE 20 MG: 20 INJECTION INTRAMUSCULAR; INTRAVENOUS at 05:20

## 2025-05-15 RX ADMIN — ONDANSETRON 4 MG: 2 INJECTION INTRAMUSCULAR; INTRAVENOUS at 01:26

## 2025-05-15 RX ADMIN — CARVEDILOL 12.5 MG: 12.5 TABLET, FILM COATED ORAL at 18:35

## 2025-05-15 NOTE — PLAN OF CARE
Goal Outcome Evaluation:  Palliative care team; Isaura Lyons PA-C met with pt and daughter at bedside earlier. This nurse met with pt separately. Pt is out of intensive care. She tolerated NG placement yesterday, but  per staff had some projectile vomiting overnight. They were unable to start tube feedings. This morning, IV access was lost and they have been having a difficult time trying to replace the IV, even under US guidance.    Pt was resting quietly on assessment, in no apparent distress. Respirations even an unlabored O2 sat mid 90's on room air. Daughter was not present at this time.    PC team continues to follow.

## 2025-05-15 NOTE — PROGRESS NOTES
Pharmacy to Dose Heparin Infusion     Valarie Camara is a  79 y.o. female receiving heparin infusion.     Therapy for (VTE/Cardiac):   Cardiac  Patient Weight: Using 66.7 kg  Initial Bolus (Y/N):   Y  Any Bolus (Y/N):   Y        Signs or Symptoms of Bleeding: N    Cardiac or Other (Not VTE)   Initial Bolus: 60 units/kg (Max 4,000 units)  Initial rate: 12 units/kg/hr (Max 1,000 units/hr)   aPTT  Rebolus Infusion Hold time Change infusion Dose (Units/kg/hr) Next Anti Xa or aPTT Level Due   <55 50 Units/kg  (4000 Units Max) None Increase by  3 Units/kg/hr 6 hours   55-62 25 Units/kg  (2000 Units Max) None Increase by  2 Units/kg/hr 6 hours   63-69 0 None Increase by  1 Units/kg/hr 6 hours   70-85 0 None No Change 6 hours (after 2 consecutive levels in range check qAM)   86-93 0 None Decrease by  1 Units/kg/hr 6 hours    0 30 Minutes Decrease by  2 Units/kg/hr 6 hours   109-123 0 60 Minutes Decrease by  3 Units/kg/hr 6 hours   >123 0 Hold  After aPTT less than 75 decrease previous rate by  4 Units/kg/hr  Every 2 hours until aPTT less than 75 then when infusion restarts in 6 hours           Recommend aPTT every 6 hours.         Lab 05/15/25  0705 05/14/25  2257 05/14/25  1518 05/14/25  0657 05/13/25  2151 05/13/25  0648 05/13/25  0431 05/12/25  1512 05/12/25  1226 05/12/25  0805 05/12/25  0411 05/11/25  0342 05/11/25  0053 05/10/25  2131 05/10/25  1455 05/10/25  1437 05/10/25  1237   HEMOGLOBIN 10.8*  --   --  11.6*  --   --  10.1*  --   --   --  12.4  --  11.5*  --   --   --  12.6   HEMATOCRIT 32.8*  --   --  37.1  --   --  32.9*  --   --   --  41.1  --  38.7  --   --   --  42.2   PLATELETS 103*  --   --  110*  --   --  122*  --   --   --  141  --  141  --   --   --  199   PROTIME  --   --   --   --   --   --   --   --   --   --   --   --   --   --   --   --  16.6*   APTT 29.5* 81.7 77.7 86.6 78.8   < > 151.0*   < >  --   --  28.6*   < >  --    < >  --   --  30.3*   HEPARIN ANTI-XA  --   --  1.02*  --   --    --   --   --   --   --   --   --   --   --  0.82*  --   --    CREATININE 1.51*  --   --  1.53*  --   --  1.75*  --  2.08* 2.16* 2.24*   < >  --    < >  --    < > 3.96*   EGFR 34.8*  --   --  34.3*  --   --  29.2*  --  23.7* 22.6* 21.7*   < >  --    < >  --    < > 10.9*    < > = values in this interval not displayed.      Recommend aPTT every 6 hours.        Date   Time   aPTT Current Rate (Unit/kg/hr) Bolus   (Units) Rate Change   (Unit/kg/hr) New Rate (Unit/kg/hr) Next   aPTT Comments  Pump Check Daily   5/10/25 1530 30.3 0 4000 +12.0 12.0 5/10 2200 d/w Emeli Thrasher RN   05/10/25 2239 >200 12 0 Hold  Hold 5/11 0100 AIDAN Rehman   5/11/25 0342 47.3 12 at time of hold 0 -4 8 0945 AIDAN Rehman   5/11/25 1149 27.7 8 3340 +3 11 5/11 1930 d/w Lida Boyer RN   5/11/25 1931 28.7 11 3340 +3 14 5/12 0400 D/W AIDAN Rehman   5/12/25 0411 28.6 14 3340 +3 17 5/12 1100 D/w AIDAN Rehman   5/12/25 1512 >200 HOLD HOLD HOLD HOLD 5/12 @ 1830 D/W AIDAN Russo  Lab was unable to stick patient and had to redraw   5/12/25 1934 114.9 hold hold hold hold 5/12 @ 2200 D/W AIDAN Rehman   5/12/25 2225 52.4 hold 0 +13 13 5/13 @ 0430 D/W AIDAN Rehman   5/13/25 0430 151 hold hold hold hold 5/13 @ 0630 D/W AIDAN Rehman   5/13/25 0648 111.6 HOLD HOLD HOLD HOLD 5/13 @ 0830 D/W AIDAN Russo   5/13/25 0825 59.6 hold 0 +9 9 5/13 @ 1600 D/W AIDAN Russo   5/13/25 1540 73.6 9 0 0 9 5/13 @ 2200 D/W AIDAN Russo   5/13/25 2151 78.8 9 0 0 9 5/14 @0600 D/W AIDAN Moran   5/14/25 0814 86.6 9 0 +1 10 5/14 @1415 D/W AIDAN Russo   5/14/25 1518 77.7 10 0 0 10 5/14 @2300 D/W AIDAN Russo   5/14/25 2257 81.7 10 0 0 10 5/15 @0600 D/W AIDAN Moran   5/14/25 0705 29.5 10 0 +2 12 5/15 @1430 D/W AIDAN Bell                                           Pharmacy will continue to follow aPTT results and monitor for signs and symptoms of bleeding or thrombosis.    Thank you for the consult,  Lor Prescott, PharmD   05/15/25 08:34 EDT

## 2025-05-15 NOTE — PLAN OF CARE
Goal Outcome Evaluation:  Plan of Care Reviewed With: patient        Progress: no change  Outcome Evaluation: vs stable on RA. Tube feeds not tolerated. No significant events, continuing to monitor.

## 2025-05-15 NOTE — PROGRESS NOTES
"    [facility]     PALLIATIVE CARE FOLLOW UP NOTE    Name:  Valarie Camara   Age:  79 y.o.  Sex:  female  :  1945  MRN:  [id number]   Visit Number:  [id number]  Date Of Service:  05/15/25  Primary Care Physician:  Lay Cox MD    Chief Complaint: Weakness and debility    Interval History:  Patient seen today during palliative care team rounds.  Record reviewed and case discussed with staff.  Was not able to tolerate TF last date, multiple episodes of vomiting.  Primary service added Reglan along with PRN Zofran.  Upon assessment she is sleeping, awakens to verbal.  She appears edematous, no dyspnea observed.  Her daughter Cleo is at bedside.          Review of Systems   Unable to perform ROS: Other          Pain Assessment  Preferred Pain Scale: FACES (Merrill-Baker FACES Pain Rating Scale)  Nonverbal Indicators of Pain: nonverbal indicators absent  CPOT Facial Expression: 0-->relaxed, neutral  CPOT Body Movements: 0-->absence of movements  CPOT Muscle Tension: 0-->relaxed  Ventilator Compliance/Vocalization: 0-->talking in normal tone or no sound  CPOT Score: 0  PAINAD Breathin-->normal  PAINAD Negative Vocalization: 0-->none  PAINAD Facial Expression: 0-->smiling or inexpressive  PAINAD Body Language: 0-->relaxed  PAINAD Consolability: 0-->no need to console  PAINAD Score: 0  Vitals: /71 (BP Location: Left arm, Patient Position: Lying)   Pulse 87   Temp 98.3 °F (36.8 °C) (Oral)   Resp 16   Ht 160 cm (63\")   Wt 65.5 kg (144 lb 6.4 oz)   SpO2 99%   BMI 25.58 kg/m²       Physical Exam  Vitals and nursing note reviewed.   Constitutional:       General: She is sleeping. She is not in acute distress.     Appearance: She is not diaphoretic.      Comments: Frail appearing, elderly female lying in bed, NAD   HENT:      Head: Normocephalic and atraumatic.      Nose:      Comments: NG in place  Cardiovascular:      Rate and Rhythm: Normal rate and regular rhythm. "   Pulmonary:      Effort: Pulmonary effort is normal. No respiratory distress.   Musculoskeletal:         General: Swelling present.      Cervical back: Neck supple.      Comments: Appears deconditioned    Skin:     General: Skin is warm and dry.      Capillary Refill: Capillary refill takes less than 2 seconds.   Neurological:      Comments: Oriented to person/place   Psychiatric:      Comments: Calm affect, does not appear agitated or restless          Results Reviewed:    Intake/Output Summary (Last 24 hours) at 5/15/2025 1247  Last data filed at 5/14/2025 1900  Gross per 24 hour   Intake 1530 ml   Output 350 ml   Net 1180 ml     Results from last 7 days   Lab Units 05/15/25  0705 05/10/25  1437 05/10/25  1237   SODIUM mmol/L 140   < > 168*   POTASSIUM mmol/L 3.4*   < > 4.1   CHLORIDE mmol/L 108*   < > 130*   CO2 mmol/L 23.5   < > 18.5*   BUN mg/dL 42*   < > 127*   CREATININE mg/dL 1.51*   < > 3.96*   CALCIUM mg/dL 7.6*   < > 9.8   BILIRUBIN mg/dL  --   --  0.3   ALK PHOS U/L  --   --  57   ALT (SGPT) U/L  --   --  7   AST (SGOT) U/L  --   --  10   GLUCOSE mg/dL 113*   < > 162*    < > = values in this interval not displayed.     Results from last 7 days   Lab Units 05/15/25  0705   WBC 10*3/mm3 6.75   HEMOGLOBIN g/dL 10.8*   HEMATOCRIT % 32.8*   PLATELETS 10*3/mm3 103*       Medication Review:   I have reviewed the patients active and prn medications.     Palliative Care Assessment:  Severe sepsis secondary to UTI  Metabolic encephalopathy  Atrial fibrillation with RVR  KURT  Dementia  Frailty  Dysphagia    Recommendations/Plan:  - GOC discussions yield desire to maintain plan of care as outlined per the primary medicine service, with goal to optimize condition  - Family desire to pursue an attempt at NG feeding, however if patient is unable to tolerate, would desire to defer/withdrawal; I discussed with Cleo at bedside regarding last dates events, she would like to continue with plan in place over the next  24-48 hrs  - Considering transition to a comfort focused approach to her care, they reflect on patient's desire for for comfort and peace in the final stages of life  - Difficult to prognosticate given interventions in place, however it does appear patient is entering into the final stages of life   - Patient likely meets criteria for hospice; Will notify HCP regarding potential referral  - Continue diflucan and nystan for oral candidiasis, primary medicine service is in agreement  - Previously patient taking PPI with history of GERD and hiatal hernia, consider restarting Protonix  - Last BM 5/15  - Palliative care will continue to follow/support patient and family    CODE STATUS:   Code Status and Medical Interventions: No CPR (Do Not Attempt to Resuscitate); Limited Support; No intubation (DNI)   Ordered at: 05/10/25 1435     Code Status (Patient has no pulse and is not breathing):    No CPR (Do Not Attempt to Resuscitate)     Medical Interventions (Patient has pulse or is breathing):    Limited Support     Medical Intervention Limits:    No intubation (DNI)     Level Of Support Discussed With:    Patient         I spent 35 total minutes, including face to face assessment, record review, coordination of care with staff, and counseling patient and/or family  Part of this note may be an electronic transcription/translation of spoken language to printed text using the Dragon Dictation System.    Isaura Lyons PA-C  05/15/25  12:47 EDT

## 2025-05-15 NOTE — PROGRESS NOTES
"Dietitian Follow-up    Patient Name: Valarie Camara  YOB: 1945  MRN: [id number]  Admission date: 5/10/2025    Comment:    Clinical Nutrition Follow-up   Encounter Information        Trending Narrative     5/15: Pt with multiple episodes of vomiting and unable to tolerate tube feeding. MD adjusted water flushes. Will put tube feeding rate to 0 mL/hr and try initiating tube feeding again tomorrow.     5/14: NG was placed today with plans to initiate tube feeding.     5/13: RD consulted for TF recommendations.     5/13: Patient remains NPO - will continue to monitor for diet advancement.    5/12: Pt NPO x 2 days. EMR shows significant wt loss. Will monitor for diet advancement and add ONS.      Anthropometrics        Current Height, Weight Height: 160 cm (63\")  Weight: 65.5 kg (144 lb 6.4 oz) (05/15/25 0515)       Trending Weight Hx     This admission:              PTA:     Wt Readings from Last 30 Encounters:   05/15/25 0515 65.5 kg (144 lb 6.4 oz)   05/14/25 0400 59.7 kg (131 lb 9.8 oz)   05/13/25 0400 60.1 kg (132 lb 7.9 oz)   05/12/25 0400 57.1 kg (125 lb 14.1 oz)   05/11/25 0500 55.5 kg (122 lb 5.7 oz)   05/10/25 1727 57.4 kg (126 lb 8.7 oz)   05/10/25 1226 66.7 kg (147 lb)   04/17/25 0932 67.1 kg (147 lb 14.4 oz)   04/05/25 1837 66.6 kg (146 lb 12.8 oz)   03/21/25 1511 68 kg (150 lb)   03/20/25 1556 68 kg (150 lb)   03/15/25 0422 69.4 kg (153 lb)   03/04/25 0600 72.8 kg (160 lb 7.9 oz)   03/02/25 1423 71.9 kg (158 lb 8.2 oz)   03/02/25 0348 70.5 kg (155 lb 6.8 oz)   03/01/25 0415 72.3 kg (159 lb 6.3 oz)   02/28/25 1848 72.6 kg (160 lb 0.9 oz)   02/28/25 1817 77 kg (169 lb 12.1 oz)   02/28/25 1152 77 kg (169 lb 12.1 oz)   02/08/25 0115 77.1 kg (169 lb 15.6 oz)   01/24/25 1111 75.3 kg (166 lb)   01/27/25 0139 77.1 kg (170 lb)   01/10/25 1330 80.3 kg (177 lb)   01/05/25 1700 76.3 kg (168 lb 3.4 oz)   01/05/25 1102 79.4 kg (175 lb 0.7 oz)   01/05/25 0921 79.4 kg (175 lb)   11/06/24 1049 80.6 kg " (177 lb 12.8 oz)   12/04/23 2059 90.7 kg (200 lb)   10/31/23 0924 90.7 kg (200 lb)   08/21/23 1541 90.7 kg (200 lb)   08/17/23 1331 91.2 kg (201 lb)   01/31/22 1433 87.5 kg (192 lb 12.8 oz)   11/24/21 1834 88.5 kg (195 lb)   11/24/21 1543 86.2 kg (190 lb)   05/25/21 0500 92.6 kg (204 lb 2.3 oz)   05/21/21 0500 89.9 kg (198 lb 3.1 oz)   05/20/21 2344 89.9 kg (198 lb 3.1 oz)   05/20/21 0807 88.2 kg (194 lb 6.4 oz)   05/20/21 0305 90.7 kg (200 lb)   02/03/21 1507 89.4 kg (197 lb)   12/17/20 1307 92.1 kg (203 lb)   10/28/20 1735 90.7 kg (200 lb)   09/08/20 1011 95.7 kg (211 lb)   08/17/20 1122 93.6 kg (206 lb 6.4 oz)   01/02/20 1016 90.4 kg (199 lb 6.4 oz)   12/13/19 0445 87.2 kg (192 lb 3.2 oz)   11/25/19 1335 89.4 kg (197 lb)   11/23/19 2340 89.7 kg (197 lb 12.8 oz)   11/23/19 1951 90.7 kg (200 lb)   03/19/19 1541 91.8 kg (202 lb 6.4 oz)   04/10/18 1524 90.7 kg (200 lb)   04/09/18 1342 92.5 kg (204 lb)      BMI kg/m2 Body mass index is 25.58 kg/m².     Labs        Pertinent Labs Results from last 7 days   Lab Units 05/15/25  0705 05/14/25  0657 05/13/25  0431 05/10/25  1437 05/10/25  1237   SODIUM mmol/L 140 144 158*   < > 168*   POTASSIUM mmol/L 3.4* 3.9 3.7   < > 4.1   CHLORIDE mmol/L 108* 112* 125*   < > 130*   CO2 mmol/L 23.5 22.4 21.8*   < > 18.5*   BUN mg/dL 42* 47* 62*   < > 127*   CREATININE mg/dL 1.51* 1.53* 1.75*   < > 3.96*   CALCIUM mg/dL 7.6* 7.9* 7.9*   < > 9.8   BILIRUBIN mg/dL  --   --   --   --  0.3   ALK PHOS U/L  --   --   --   --  57   ALT (SGPT) U/L  --   --   --   --  7   AST (SGOT) U/L  --   --   --   --  10   GLUCOSE mg/dL 113* 308* 214*   < > 162*    < > = values in this interval not displayed.     Results from last 7 days   Lab Units 05/15/25  0705 05/14/25  0657 05/13/25  0431 05/12/25  1934 05/12/25  0411 05/11/25  0342   MAGNESIUM mg/dL  --   --  2.1  --  2.4 2.7*   PHOSPHORUS mg/dL 2.8   < > 2.5   < > 1.8* 4.3   HEMOGLOBIN g/dL 10.8*   < > 10.1*  --  12.4  --    HEMATOCRIT % 32.8*   < >  32.9*  --  41.1  --     < > = values in this interval not displayed.         Medications    Scheduled Medications aspirin, 81 mg, Nasogastric, Daily  atorvastatin, 80 mg, Nasogastric, Daily  carvedilol, 12.5 mg, Nasogastric, BID With Meals  sennosides, 10 mL, Nasogastric, BID   And  docusate sodium, 100 mg, Nasogastric, BID  ertapenem, 500 mg, Intravenous, Q24H  fluconazole, 200 mg, Intravenous, Q24H  hydrALAZINE, 25 mg, Oral, Q8H  insulin regular, 2-7 Units, Subcutaneous, Q6H  metoclopramide, 10 mg, Intravenous, Q6H  nystatin, 5 mL, Oral, 4x Daily  rivastigmine, 1 patch, Transdermal, Daily  sodium chloride, 10 mL, Intravenous, Q12H        Infusions heparin, 12 Units/kg/hr (Order-Specific), Last Rate: 12 Units/kg/hr (05/15/25 0839)  Pharmacy to Dose Heparin,         PRN Medications   acetaminophen **OR** acetaminophen    [DISCONTINUED] senna-docusate sodium **AND** polyethylene glycol **AND** bisacodyl **AND** bisacodyl    Calcium Replacement - Follow Nurse / BPA Driven Protocol    dextrose    dextrose    glucagon (human recombinant)    glycerin    hydrALAZINE    Magnesium Cardiology Dose Replacement - Follow Nurse / BPA Driven Protocol    metoprolol tartrate    nitroglycerin    ondansetron    Pharmacy to Dose Heparin    phenol    Phosphorus Replacement - Follow Nurse / BPA Driven Protocol    Potassium Replacement - Follow Nurse / BPA Driven Protocol    sodium chloride    sodium chloride    [Held by provider] sodium chloride     Physical Findings        Trending Physical   Appearance, NFPE    --  Edema  Generalized Edema: 2+ (Mild)    Arm, Left Edema: 3+ (Moderate) Arm, Right Edema: 2+ (Mild)                  Bowel Function Stool Output  Stool Unmeasured Occurrence: 1 (05/15/25 1144)  Bowel Incontinence: Yes (05/15/25 1144)  Stool Amount: Large (05/15/25 1144)  Stool Consistency: creamy (05/15/25 1144)  Perineal Care: absorbent brief/pad changed, perineum cleansed (05/15/25 1144)  Last Bowel Movement: 05/15/25    Chewing/Swallowing Teeth Status:Mouth/Teeth WDL: .WDL except, teeth Teeth Symptoms: tooth/teeth missing  Chewing/Swallowing Issues: No issues identified at this time   Skin Lines, Drains, Airways, & Wounds       Active LDAs       Name Placement date Placement time Site Days Last dressing change    Peripheral IV 05/10/25 1222 20 G Anterior;Right External Jugular 05/10/25  1222  External Jugular  3 05/10/25 1231 (72.04 hrs)    Urethral Catheter 16 Fr. 05/10/25  1525  -- 2                     --  Current Nutrition Orders & Evaluation of Intake       Oral Nutrition     Food Allergies None    Current PO Diet NPO Diet NPO Type: Tube Feeding   Supplement None    PO Evaluation     Trending % PO Intake NPO       Enteral Nutrition    Current EN Order Tube Feeding: Formula: Diabetisource AC (Glucerna 1.2); Feeding Type: Continuous; Start at: Other; Other: 0; Then Advance By: Do Not Advance; Water Flush: Other; Other: 250; Every: 6 hours; Water Bolus: None    Patient doesn't have any tube feeding modular orders    EN Route    EN Tolerance    EN Observation        Parenteral Nutrition    Current TPN Order    TPN Route    Lipids (mL/%/frequency)    MVI Frequency    Trace Element Frequency    Total # Days on TPN    TPN Observation      Nutrition Diagnosis         Nutrition Dx Problem 1    Underweight r/t dementia/COPD AEB BMI=22.3       Nutrition Dx Problem 2       Goal(s) Initiate EN      Intervention         Intervention  Await initiation of EN       Diet Prescription    Supplement Prescription         Enteral Prescription  Hold TF  FW per MD       TPN Prescription         Education Provided         Monitor/Evaluation        Monitor Per Protocol, PO Intake, Pertinent Labs, Weight, Skin Status, GI Status, Symptoms, Swallow Function, and Hemodynamic Stability     GEORGETTE Follow-up Encounter 1-2 days     Electronically signed by:  Lay Oliva RD  05/15/25 12:35 EDT

## 2025-05-15 NOTE — CASE MANAGEMENT/SOCIAL WORK
Case Management/Social Work    Patient Name:  Valarie Camara  YOB: 1945  MRN: [id number]  Admit Date:  5/10/2025        09:44 EDT   Discharge Plan       Row Name 05/15/25 0944       Plan    Plan GOC discussions ongoing. PC following.                        Electronically signed by:  Aquilino Causey RN  05/15/25 09:44 EDT

## 2025-05-15 NOTE — PROGRESS NOTES
[facility] Progress Note  [facility]. [place]        Patient Name: Valarie Camara  : 1945  MRN: [id number]   LOS: 4 days    Patient Care Team:  Lay Cox MD as PCP - General (Family Medicine)  Corbin Herrera DO ([facility])    Chief Complaint:    Chief Complaint   Patient presents with    Altered Mental Status     Primary Care Physician:  Lay Cox MD  Date of admission: 5/10/2025    Subjective     Interval History:   Follow-up acute kidney injury  hypernatremia  Patient has no interaction, patient had NG tube placed, it appears she has been gagging since she has NG tube and tube feedings were stopped.  Ongoing discussions with palliative care/hospice with the family.  Events noted from last 24 hours.  I reviewed the chart and other providers notes, labs and procedures done since my last note.    Review of Systems:   unobtainable.    Objective     Vitals:   Temp:  [97.6 °F (36.4 °C)-98.5 °F (36.9 °C)] 98.2 °F (36.8 °C)  Heart Rate:  [74-90] 87  Resp:  [14-16] 14  BP: (109-164)/(49-88) 134/55    Intake/Output Summary (Last 24 hours) at 5/15/2025 0811  Last data filed at 2025 1900  Gross per 24 hour   Intake 1530 ml   Output 350 ml   Net 1180 ml       Physical Exam:    General Appearance: no acute distress   Skin: warm and dry  HEENT: oral mucosa normal, nonicteric sclera  Neck: supple, no JVD  Lungs: CTA  Heart: RRR, normal S1 and S2  Abdomen: obese, soft, nontender, non distended and positive bowel sounds.  : no palpable bladder  Extremities: Trace edema, no cyanosis or clubbing  Neuro: normal speech and mental status     Scheduled Meds:     Current Facility-Administered Medications   Medication Dose Route Frequency Provider Last Rate Last Admin    acetaminophen (TYLENOL) tablet 650 mg  650 mg Nasogastric Q4H PRN Ronny Lara DO        Or    acetaminophen (TYLENOL) suppository 650 mg  650 mg  Rectal Q4H PRN Ronny Lara DO        aspirin chewable tablet 81 mg  81 mg Nasogastric Daily Ronny Lara DO        atorvastatin (LIPITOR) tablet 80 mg  80 mg Nasogastric Daily Ronny Lara DO        polyethylene glycol (MIRALAX) packet 17 g  17 g Nasogastric Daily PRN Ronny Lara DO        And    bisacodyl (DULCOLAX) EC tablet 5 mg  5 mg Oral Daily PRN Ronny Lara DO        And    bisacodyl (DULCOLAX) suppository 10 mg  10 mg Rectal Daily PRN Ronny Lara DO        Calcium Replacement - Follow Nurse / BPA Driven Protocol   Not Applicable PRN Aki Rodriguez DO        carvedilol (COREG) tablet 12.5 mg  12.5 mg Nasogastric BID With Meals Ronny Lara DO   12.5 mg at 05/14/25 1817    dextrose (D50W) (25 g/50 mL) IV injection 25 g  25 g Intravenous Q15 Min PRN Aki Rodriguez DO        dextrose (GLUTOSE) oral gel 15 g  15 g Oral Q15 Min PRN Aki Rodriguez DO        sennosides (SENOKOT) 8.8 MG/5ML syrup 10 mL  10 mL Nasogastric BID Aki Rodriguez DO        And    docusate sodium (COLACE) liquid 100 mg  100 mg Nasogastric BID Aki Rodriguez DO        ertapenem (INVanz) 500 mg in sodium chloride 0.9 % 50 mL IVPB  500 mg Intravenous Q24H Wil Rader  mL/hr at 05/14/25 1817 500 mg at 05/14/25 1817    fluconazole (DIFLUCAN) IVPB 200 mg  200 mg Intravenous Q24H Isaura Lyons PA-C 100 mL/hr at 05/14/25 1817 200 mg at 05/14/25 1817    glucagon (GLUCAGEN) injection 1 mg  1 mg Intramuscular Q15 Min PRN Aki Rodriguez DO        glycerin 35 % liquid 35% 2 spray  2 spray Mouth/Throat Q2H PRN Aki Rodriguez DO   2 spray at 05/12/25 2011    heparin 94313 units/250 ml (100 units/ml) in D5W  10 Units/kg/hr (Order-Specific) Intravenous Titrated Ronny Lara DO 6.67 mL/hr at 05/15/25 0421 10 Units/kg/hr at 05/15/25 0421    hydrALAZINE (APRESOLINE) injection 10 mg  10 mg Intravenous Q6H PRN Aki Rodriguez DO   10 mg at 05/13/25 2216    hydrALAZINE (APRESOLINE) tablet 25 mg  25 mg Oral Q8H  Ortega Cabrales MD, GONSALON        insulin regular (humuLIN R,novoLIN R) injection 2-7 Units  2-7 Units Subcutaneous Q6H Aki Rodriguez DO   4 Units at 05/14/25 1255    Magnesium Cardiology Dose Replacement - Follow Nurse / BPA Driven Protocol   Not Applicable PRN Aki Rodriguez DO        metoprolol tartrate (LOPRESSOR) injection 5 mg  5 mg Intravenous Q4H PRN Wil Rader DO   5 mg at 05/12/25 0404    nitroglycerin (NITROSTAT) SL tablet 0.4 mg  0.4 mg Sublingual Q5 Min PRN Aki Rodriguez DO        nystatin (MYCOSTATIN) 100,000 unit/mL suspension 500,000 Units  5 mL Oral 4x Daily Isaura Lyons PA-C   500,000 Units at 05/14/25 2102    ondansetron (ZOFRAN) injection 4 mg  4 mg Intravenous Q6H PRN Ronny Lara DO   4 mg at 05/15/25 0126    Pharmacy to Dose Heparin   Not Applicable Continuous PRN Aki Rodriguez DO        Phosphorus Replacement - Follow Nurse / BPA Driven Protocol   Not Applicable PRN Aki Rodriguez DO        potassium chloride 10 mEq in 100 mL IVPB  10 mEq Intravenous Q1H Ronny Lara DO        Potassium Replacement - Follow Nurse / BPA Driven Protocol   Not Applicable PRN Aki Rodriguez DO        rivastigmine (EXELON) 4.6 MG/24HR patch 1 patch  1 patch Transdermal Daily Aki Rodriguez DO   1 patch at 05/14/25 1030    sodium chloride 0.9 % flush 10 mL  10 mL Intravenous PRN Danie Tolliver DO        sodium chloride 0.9 % flush 10 mL  10 mL Intravenous Q12H Aki Rodriguez DO   10 mL at 05/14/25 2102    sodium chloride 0.9 % flush 10 mL  10 mL Intravenous PRN Aki Rodriguez DO   10 mL at 05/12/25 0617    [Held by provider] sodium chloride 0.9 % infusion 40 mL  40 mL Intravenous PRN Aki Rodriguez DO           aspirin, 81 mg, Nasogastric, Daily  atorvastatin, 80 mg, Nasogastric, Daily  carvedilol, 12.5 mg, Nasogastric, BID With Meals  sennosides, 10 mL, Nasogastric, BID   And  docusate sodium, 100 mg, Nasogastric, BID  ertapenem, 500 mg, Intravenous, Q24H  fluconazole, 200 mg,  Intravenous, Q24H  hydrALAZINE, 25 mg, Oral, Q8H  insulin regular, 2-7 Units, Subcutaneous, Q6H  nystatin, 5 mL, Oral, 4x Daily  potassium chloride, 10 mEq, Intravenous, Q1H  rivastigmine, 1 patch, Transdermal, Daily  sodium chloride, 10 mL, Intravenous, Q12H        IV Meds:   heparin, 10 Units/kg/hr (Order-Specific), Last Rate: 10 Units/kg/hr (05/15/25 0421)  Pharmacy to Dose Heparin,         Results Reviewed:   I have personally reviewed the results from the time of this admission to 5/15/2025 08:11 EDT     Results from last 7 days   Lab Units 05/15/25  0705 05/14/25  0657 05/13/25  0431 05/10/25  1437 05/10/25  1237   SODIUM mmol/L 140 144 158*   < > 168*   POTASSIUM mmol/L 3.4* 3.9 3.7   < > 4.1   CHLORIDE mmol/L 108* 112* 125*   < > 130*   CO2 mmol/L 23.5 22.4 21.8*   < > 18.5*   BUN mg/dL 42* 47* 62*   < > 127*   CREATININE mg/dL 1.51* 1.53* 1.75*   < > 3.96*   CALCIUM mg/dL 7.6* 7.9* 7.9*   < > 9.8   BILIRUBIN mg/dL  --   --   --   --  0.3   ALK PHOS U/L  --   --   --   --  57   ALT (SGPT) U/L  --   --   --   --  7   AST (SGOT) U/L  --   --   --   --  10   GLUCOSE mg/dL 113* 308* 214*   < > 162*    < > = values in this interval not displayed.       Estimated Creatinine Clearance: 27 mL/min (A) (by C-G formula based on SCr of 1.51 mg/dL (H)).    Results from last 7 days   Lab Units 05/15/25  0705 05/14/25 2257 05/14/25  0657 05/13/25  0431 05/12/25  1934 05/12/25  0411 05/11/25  0342   MAGNESIUM mg/dL  --   --   --  2.1  --  2.4 2.7*   PHOSPHORUS mg/dL 2.8 3.2 2.0* 2.5   < > 1.8* 4.3    < > = values in this interval not displayed.             Results from last 7 days   Lab Units 05/15/25  0705 05/14/25  0657 05/13/25  0431 05/12/25  0411 05/11/25  0053   WBC 10*3/mm3 6.75 5.70 5.89 6.63 9.11   HEMOGLOBIN g/dL 10.8* 11.6* 10.1* 12.4 11.5*   PLATELETS 10*3/mm3 103* 110* 122* 141 141       Results from last 7 days   Lab Units 05/10/25  1237   INR  1.25*       Brief Urine Lab Results  (Last result in the past 365  "days)        Color   Clarity   Blood   Leuk Est   Nitrite   Protein   CREAT   Urine HCG        05/10/25 1237 Yellow   Cloudy   Trace   Moderate (2+)   Positive   30 mg/dL (1+)                   No results found for: \"UTPCR\"    Imaging Results (Last 24 Hours)       Procedure Component Value Units Date/Time    XR Abdomen 1 View [[id number]] Collected: 05/14/25 1245     Updated: 05/14/25 1249    Narrative:      PROCEDURE: XR ABDOMEN 1 VW-     HISTORY: NG placement; E87.0-Hyperosmolality and hypernatremia;  I48.91-Unspecified atrial fibrillation; E05.90-Thyrotoxicosis,  unspecified without thyrotoxic crisis or storm; N17.9-Acute kidney  failure, unspecified     FINDINGS: An NG tube is seen projecting in the right abdomen, presumably  in distal stomach. Limited visualized bowel gas pattern is normal.       Impression:      NG tube projecting in the right midabdomen, likely distal  stomach or proximal duodenum.        This report was signed and finalized on 5/14/2025 12:47 PM by Cornelio You MD.                   Assessment / Plan     ASSESSMENT:    Hypernatremia    PAF (paroxysmal atrial fibrillation)    Acute renal failure superimposed on stage 3a chronic kidney disease    Acute kidney injury: Most likely volume depletion, since she has been getting IV fluids it is starting to improve.  Bicarb and potassium appears to be stable increase serum sodium noted.  Starting to make urine.  Chronic kidney disease stage IIIb: Most likely will settle down to the baseline.  Hypernatremia: Secondary to free water losses and unable to eat and drink, she has about 5 L water deficit.  I will adjust hypotonic solutions.  Paroxysmal atrial fibrillation: Treated as per hospitalist service and cardiology.  Sepsis: Treated as per hospitalist service.  Metabolic encephalopathy: Significant uremia as well as dementia making things worse.        PLAN:  Serum sodium is back to normal.  Unable to tolerate tube feeding.  We can try Reglan every " 6 hours and see if that will help.  Renal function continues to improve, it appears she is around her baseline.  Electrolyte replacement per protocol.  Details were discussed with the patient as well as family in the room.  Daughter in the room.  Details were also discussed with the hospitalist service and or other providers as needed.  Also discussed with the palliative care about her poor prognosis, they are in discussion with the family about switching her to comfort care and hospice.  Continue with rest of the current treatment plan, and monitor with surveillance labs, adjust medication doses to the eGFR.  Further recommendations will depend on clinical course of the patient during the current hospitalization.   I have reviewed the copied text to this note, it was edited and the changes made as needed.  It is accurate to the point, when the note was signed today.     Thank you for involving us in the care of Valarie Camara.  Please feel free to call with any questions.    Ortega Cabrales MD, FASN  05/15/25  08:11 EDT    [facility]  [phone]      Part of this note may be an electronic transcription/translation of spoken language to printed text using the Dragon Dictation System.

## 2025-05-15 NOTE — PROGRESS NOTES
[facility]    PROGRESS NOTE    Name:  Valarie Camara   Age:  79 y.o.  Sex:  female  :  1945  MRN:  [id number]   Visit Number:  [id number]  Admission Date:  5/10/2025  Date Of Service:  05/15/25  Primary Care Physician:  Lay Cox MD     LOS: 4 days :    Chief Complaint:      weakness    Subjective:    Patient resting comfortably.  Was asleep.  Daughter at bedside.  Multiple episodes of vomiting yesterday evening and overnight.  Has been unable to tolerate tube feeds.    Hospital Course:    Patient is an 79-year-old female brought to the emergency department for evaluation of altered mental status.  At the time of examination, unfortunately, patient was alone without family at bedside.  History of present illness was obtained from review of medical records, including discussion with nursing and ED physician.  Patient has a known history of dementia, however over the last 3 days, patient's daughter was concerned that the patient has developed significant confusion.  Patient has been unable to tolerate oral intake of nutrition and oral hydration.      EMS was called to patient's nursing facility, she was found to be hypotensive and tachycardic in A-fib with RVR.  They were unable to obtain IV access, subsequently unable to provide cardioversion en route to the ED.  Upon arrival to the ED, patient was emergently cardioverted, patient's rhythm with normal sinus rhythm, without any change in mentation.  Neurology was consulted, CT of the head did not show any acute CVA.  Cardiology was consulted, who feels that the elevated troponin is most likely secondary to the tachycardia and recommend troponin trending, anticoagulation.  Hospitalist services consulted for admission.     Discussion had with family and they request DNR with no chest compressions and no intubation, they approve all other interventions.     Patient had presented with encephalopathy very high  sodium.  Continues to be in goals of care discussion with patient and family.  Family meeting pending.  Sodium has been reluctant to recover still running about 160 despite hypotonic fluids nephrology is addressing this.  Otherwise urine is growing ESBL has been switched to Invanz.    Review of Systems:     All systems were reviewed and negative except as mentioned in subjective, assessment and plan.    Vital Signs:    Temp:  [97.6 °F (36.4 °C)-98.5 °F (36.9 °C)] 98.3 °F (36.8 °C)  Heart Rate:  [79-90] 87  Resp:  [14-16] 16  BP: (109-147)/(55-88) 128/71    Intake and output:    I/O last 3 completed shifts:  In: 3629.8 [I.V.:3629.8]  Out: 1750 [Urine:1750]  No intake/output data recorded.    Physical Examination: Examined again 5/15/25    General Appearance:  Alert and cooperative. NAD   Head:  Atraumatic and normocephalic.   Eyes: Conjunctivae and sclerae normal, no icterus. No pallor.   Throat: No oral lesions, no thrush, oral mucosa moist.   Neck: Supple, trachea midline, no thyromegaly.   Lungs:   Breath sounds heard bilaterally equally.  No wheezing or crackles.    Heart:  Normal S1 and S2, no murmur, No JVD.   Abdomen:   Normal bowel sounds, Soft, nontender, nondistended, no rebound tenderness.   Extremities: Supple, no edema, no cyanosis, no clubbing.   Skin: No bleeding or rash.   Neurologic: Alert and oriented x 2. No facial asymmetry.      Laboratory results:    Results from last 7 days   Lab Units 05/15/25  0705 05/14/25  0657 05/13/25  0431 05/10/25  1437 05/10/25  1237   SODIUM mmol/L 140 144 158*   < > 168*   POTASSIUM mmol/L 3.4* 3.9 3.7   < > 4.1   CHLORIDE mmol/L 108* 112* 125*   < > 130*   CO2 mmol/L 23.5 22.4 21.8*   < > 18.5*   BUN mg/dL 42* 47* 62*   < > 127*   CREATININE mg/dL 1.51* 1.53* 1.75*   < > 3.96*   CALCIUM mg/dL 7.6* 7.9* 7.9*   < > 9.8   BILIRUBIN mg/dL  --   --   --   --  0.3   ALK PHOS U/L  --   --   --   --  57   ALT (SGPT) U/L  --   --   --   --  7   AST (SGOT) U/L  --   --   --    --  10   GLUCOSE mg/dL 113* 308* 214*   < > 162*    < > = values in this interval not displayed.     Results from last 7 days   Lab Units 05/15/25  0705 05/14/25  0657 05/13/25  0431   WBC 10*3/mm3 6.75 5.70 5.89   HEMOGLOBIN g/dL 10.8* 11.6* 10.1*   HEMATOCRIT % 32.8* 37.1 32.9*   PLATELETS 10*3/mm3 103* 110* 122*     Results from last 7 days   Lab Units 05/10/25  1237   INR  1.25*     Results from last 7 days   Lab Units 05/10/25  1437 05/10/25  1237   HSTROP T ng/L 141* 162*     Results from last 7 days   Lab Units 05/10/25  2131 05/10/25  1315 05/10/25  1237   BLOODCX  No growth at 4 days No growth at 4 days  --    URINECX   --   --  >100,000 CFU/mL Escherichia coli ESBL*     Recent Labs     03/22/25  0610 05/12/25  0538   PHART 7.449 7.444   TIR8AVV 32.0* 33.5*   PO2ART 78.3* 81.2   FKE9JMW 22.1 23.0   BASEEXCESS -1.2* -0.6*      I have reviewed the patient's laboratory results.    Radiology results:    XR Abdomen 1 View  Result Date: 5/14/2025  PROCEDURE: XR ABDOMEN 1 VW-  HISTORY: NG placement; E87.0-Hyperosmolality and hypernatremia; I48.91-Unspecified atrial fibrillation; E05.90-Thyrotoxicosis, unspecified without thyrotoxic crisis or storm; N17.9-Acute kidney failure, unspecified  FINDINGS: An NG tube is seen projecting in the right abdomen, presumably in distal stomach. Limited visualized bowel gas pattern is normal.      Impression: NG tube projecting in the right midabdomen, likely distal stomach or proximal duodenum.   This report was signed and finalized on 5/14/2025 12:47 PM by Cornelio You MD.      I have reviewed the patient's radiology reports.    Medication Review:     I have reviewed the patient's active and prn medications.     Problem List:      Hypernatremia    PAF (paroxysmal atrial fibrillation)    Acute renal failure superimposed on stage 3a chronic kidney disease      Assessment:    Severe Sepsis secondary to urinary tract infection  Urinary tract infection  Metabolic  encephalopathy  Atrial fibrillation with RVR  Acute kidney injury  Hypernatremia  Dementia  COPD  Hypertension  Hyperlipidemia  Restless leg syndrome    Plan:    Severe Sepsis secondary to urinary tract infection  Urinary tract infection  Metabolic encephalopathy  Patient meets sepsis criteria with tachycardia, leukocytosis, source of infection identified as urinary tract infection.  Due to the sodium derangements, LR was bolused.  Evidence of endorgan damage noted with metabolic encephalopathy and acute kidney injury.  IV Rocephin, switched to Invanz 5/12/25 due to ESBL in the urine  MRI no stroke  Grey placed on 5/10/25  Atrial fibrillation with RVR  Patient status post cardioversion.  Cardiology consulted, appreciate their recommendations.  Heparin drip will be started, continued  Patient is currently normal sinus rhythm, continue Coreg  Acute kidney injury  Acute hypernatremia  Sodium was normal 1 month prior.  Patient encephalopathic which may be symptoms related to hypernatremia.  Nephrology has been consulted, appreciate his recommendations.  Status post LR boluses, continue slow hypotonic fluids with dextrose  Serial sodium, if sodium is correcting greater than one half Mg per DL per hour will hold fluids  Nutrition  NGT placed 5/14  Patient complained of painful swallowing; possible thursh. Started empiric fluconazole  She also has a history of strictures; was recently dilated. Low suspicion this is the cause  Patient with dry heaving and vomiting.  Has been unable to tolerate tube feeds.  Added Reglan.  Discussed with daughter the possibility of transition to comfort measures since patient is unable to tolerate tube feeding.  Daughter expressed that she wanted to think about it for a couple of days.  Dementia  COPD  Hypertension  Hyperlipidemia  Restless leg syndrome  Continue home medications as appropriate.     Palliative care following     DVT Prophylaxis: Heparin  Code Status: DNR/DNI  Diet: NPO; tube  feeds  Discharge Plan: Pending    Ronny Lara DO  05/15/25  12:25 EDT    Dictated utilizing Dragon dictation.

## 2025-05-16 ENCOUNTER — APPOINTMENT (OUTPATIENT)
Dept: GENERAL RADIOLOGY | Facility: HOSPITAL | Age: 80
DRG: 871 | End: 2025-05-16
Payer: MEDICARE

## 2025-05-16 LAB
ALBUMIN SERPL-MCNC: 2.2 G/DL (ref 3.5–5.2)
ANION GAP SERPL CALCULATED.3IONS-SCNC: 10 MMOL/L (ref 5–15)
BUN SERPL-MCNC: 41 MG/DL (ref 8–23)
BUN/CREAT SERPL: 25.3 (ref 7–25)
CALCIUM SPEC-SCNC: 7.7 MG/DL (ref 8.6–10.5)
CHLORIDE SERPL-SCNC: 111 MMOL/L (ref 98–107)
CO2 SERPL-SCNC: 21 MMOL/L (ref 22–29)
CREAT SERPL-MCNC: 1.62 MG/DL (ref 0.57–1)
DEPRECATED RDW RBC AUTO: 45.3 FL (ref 37–54)
EGFRCR SERPLBLD CKD-EPI 2021: 32 ML/MIN/1.73
ERYTHROCYTE [DISTWIDTH] IN BLOOD BY AUTOMATED COUNT: 14 % (ref 12.3–15.4)
GLUCOSE BLDC GLUCOMTR-MCNC: 109 MG/DL (ref 70–130)
GLUCOSE BLDC GLUCOMTR-MCNC: 142 MG/DL (ref 70–130)
GLUCOSE BLDC GLUCOMTR-MCNC: 165 MG/DL (ref 70–130)
GLUCOSE BLDC GLUCOMTR-MCNC: 191 MG/DL (ref 70–130)
GLUCOSE BLDC GLUCOMTR-MCNC: 84 MG/DL (ref 70–130)
GLUCOSE SERPL-MCNC: 121 MG/DL (ref 65–99)
HCT VFR BLD AUTO: 30.3 % (ref 34–46.6)
HGB BLD-MCNC: 9.9 G/DL (ref 12–15.9)
MCH RBC QN AUTO: 29.3 PG (ref 26.6–33)
MCHC RBC AUTO-ENTMCNC: 32.7 G/DL (ref 31.5–35.7)
MCV RBC AUTO: 89.6 FL (ref 79–97)
PHOSPHATE SERPL-MCNC: 3.1 MG/DL (ref 2.5–4.5)
PLATELET # BLD AUTO: 97 10*3/MM3 (ref 140–450)
PMV BLD AUTO: 13.5 FL (ref 6–12)
POTASSIUM SERPL-SCNC: 3.4 MMOL/L (ref 3.5–5.2)
POTASSIUM SERPL-SCNC: 4.1 MMOL/L (ref 3.5–5.2)
RBC # BLD AUTO: 3.38 10*6/MM3 (ref 3.77–5.28)
SODIUM SERPL-SCNC: 142 MMOL/L (ref 136–145)
WBC NRBC COR # BLD AUTO: 5.9 10*3/MM3 (ref 3.4–10.8)

## 2025-05-16 PROCEDURE — 84132 ASSAY OF SERUM POTASSIUM: CPT | Performed by: INTERNAL MEDICINE

## 2025-05-16 PROCEDURE — 25010000002 METOCLOPRAMIDE PER 10 MG: Performed by: INTERNAL MEDICINE

## 2025-05-16 PROCEDURE — 85027 COMPLETE CBC AUTOMATED: CPT | Performed by: INTERNAL MEDICINE

## 2025-05-16 PROCEDURE — 74018 RADEX ABDOMEN 1 VIEW: CPT

## 2025-05-16 PROCEDURE — 80069 RENAL FUNCTION PANEL: CPT | Performed by: INTERNAL MEDICINE

## 2025-05-16 PROCEDURE — 99232 SBSQ HOSP IP/OBS MODERATE 35: CPT | Performed by: PHYSICIAN ASSISTANT

## 2025-05-16 PROCEDURE — 25010000002 ERTAPENEM PER 500 MG: Performed by: INTERNAL MEDICINE

## 2025-05-16 PROCEDURE — 63710000001 INSULIN REGULAR HUMAN PER 5 UNITS: Performed by: FAMILY MEDICINE

## 2025-05-16 PROCEDURE — 25810000003 DEXTROSE 5 % AND SODIUM CHLORIDE 0.9 % 5-0.9 % SOLUTION: Performed by: INTERNAL MEDICINE

## 2025-05-16 PROCEDURE — 82948 REAGENT STRIP/BLOOD GLUCOSE: CPT | Performed by: FAMILY MEDICINE

## 2025-05-16 PROCEDURE — 99232 SBSQ HOSP IP/OBS MODERATE 35: CPT | Performed by: INTERNAL MEDICINE

## 2025-05-16 PROCEDURE — 82948 REAGENT STRIP/BLOOD GLUCOSE: CPT

## 2025-05-16 RX ORDER — DEXTROSE MONOHYDRATE AND SODIUM CHLORIDE 5; .9 G/100ML; G/100ML
75 INJECTION, SOLUTION INTRAVENOUS CONTINUOUS
Status: ACTIVE | OUTPATIENT
Start: 2025-05-16 | End: 2025-05-18

## 2025-05-16 RX ORDER — POTASSIUM CHLORIDE 1.5 G/1.58G
20 POWDER, FOR SOLUTION ORAL ONCE
Status: COMPLETED | OUTPATIENT
Start: 2025-05-16 | End: 2025-05-16

## 2025-05-16 RX ORDER — CARVEDILOL 12.5 MG/1
12.5 TABLET ORAL 2 TIMES DAILY WITH MEALS
Status: DISCONTINUED | OUTPATIENT
Start: 2025-05-16 | End: 2025-05-23 | Stop reason: HOSPADM

## 2025-05-16 RX ADMIN — SODIUM CHLORIDE 500 MG: 9 INJECTION, SOLUTION INTRAVENOUS at 19:29

## 2025-05-16 RX ADMIN — DEXTROSE AND SODIUM CHLORIDE 75 ML/HR: 5; 900 INJECTION, SOLUTION INTRAVENOUS at 01:02

## 2025-05-16 RX ADMIN — Medication 10 ML: at 09:13

## 2025-05-16 RX ADMIN — NYSTATIN 500000 UNITS: 100000 SUSPENSION ORAL at 09:01

## 2025-05-16 RX ADMIN — POTASSIUM CHLORIDE 20 MEQ: 1.5 POWDER, FOR SOLUTION ORAL at 09:01

## 2025-05-16 RX ADMIN — DOCUSATE SODIUM 100 MG: 50 LIQUID ORAL at 21:57

## 2025-05-16 RX ADMIN — METOCLOPRAMIDE 10 MG: 5 INJECTION, SOLUTION INTRAMUSCULAR; INTRAVENOUS at 09:01

## 2025-05-16 RX ADMIN — RIVASTIGMINE 1 PATCH: 4.6 PATCH TRANSDERMAL at 09:00

## 2025-05-16 RX ADMIN — CARVEDILOL 12.5 MG: 12.5 TABLET, FILM COATED ORAL at 09:01

## 2025-05-16 RX ADMIN — SENNOSIDES 10 ML: 8.8 LIQUID ORAL at 21:57

## 2025-05-16 RX ADMIN — METOCLOPRAMIDE 10 MG: 5 INJECTION, SOLUTION INTRAMUSCULAR; INTRAVENOUS at 15:27

## 2025-05-16 RX ADMIN — INSULIN HUMAN 2 UNITS: 100 INJECTION, SOLUTION PARENTERAL at 19:29

## 2025-05-16 RX ADMIN — HYDRALAZINE HYDROCHLORIDE 25 MG: 25 TABLET ORAL at 05:53

## 2025-05-16 RX ADMIN — ASPIRIN 81 MG CHEWABLE TABLET 81 MG: 81 TABLET CHEWABLE at 09:01

## 2025-05-16 RX ADMIN — APIXABAN 2.5 MG: 2.5 TABLET, FILM COATED ORAL at 21:58

## 2025-05-16 RX ADMIN — HYDRALAZINE HYDROCHLORIDE 25 MG: 25 TABLET ORAL at 22:51

## 2025-05-16 RX ADMIN — APIXABAN 2.5 MG: 2.5 TABLET, FILM COATED ORAL at 09:01

## 2025-05-16 RX ADMIN — CARVEDILOL 12.5 MG: 12.5 TABLET, FILM COATED ORAL at 21:58

## 2025-05-16 RX ADMIN — ACETAMINOPHEN 650 MG: 325 TABLET, FILM COATED ORAL at 09:01

## 2025-05-16 RX ADMIN — METOPROLOL TARTRATE 5 MG: 1 INJECTION, SOLUTION INTRAVENOUS at 21:59

## 2025-05-16 RX ADMIN — ATORVASTATIN CALCIUM 80 MG: 80 TABLET, FILM COATED ORAL at 09:01

## 2025-05-16 RX ADMIN — Medication 10 ML: at 21:59

## 2025-05-16 RX ADMIN — METOCLOPRAMIDE 10 MG: 5 INJECTION, SOLUTION INTRAMUSCULAR; INTRAVENOUS at 02:42

## 2025-05-16 RX ADMIN — METOCLOPRAMIDE 10 MG: 5 INJECTION, SOLUTION INTRAMUSCULAR; INTRAVENOUS at 21:59

## 2025-05-16 RX ADMIN — NYSTATIN 500000 UNITS: 100000 SUSPENSION ORAL at 15:27

## 2025-05-16 NOTE — PAYOR COMM NOTE
"To:  GRICELDA  From: Adela Bowden RN  Phone: [phone]  Fax: [phone]  NPI: [id number]  TIN: [id number]  Member ID: [id number]   MRN: [id number]    Caitlyn Barajas (79 y.o. Female)       Date of Birth   1945    Social Security Number       Address   [address] [place] [address]    Home Phone   [phone]    MRN   [id number]       [employer]on   [facility]    Marital Status                               Admission Date   5/10/2025    Admission Type   Emergency    Admitting Provider   Edgardo Burnette MD    Attending Provider   Ronny Lara DO    Department, Room/Bed   [facility] TELEMETRY 3, 328/1       Discharge Date       Discharge Disposition       Discharge Destination                                 Attending Provider: Ronny Lara DO    Allergies: Penicillins, Clonidine Derivatives, Hydralazine, Sulfa Antibiotics    Isolation: Contact   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), ESBL (05/12/25)   Code Status: No CPR    Ht: 160 cm (63\")   Wt: 65.5 kg (144 lb 6.4 oz)    Admission Cmt: None   Principal Problem: Hypernatremia [E87.0]                   Active Insurance as of 5/10/2025       Primary Coverage       Payor Plan Insurance Group Employer/Plan Group    [facility] MEDICARE REPLACEMENT ANTH MEDICARE ADVANTAGE HMO [id number]       Payor Plan Address Payor Plan Phone Number Payor Plan Fax Number Effective Dates    [address] [phone]  4/1/2025 - None Entered    [facility] [id number]         Subscriber Name Subscriber Birth Date Member ID       CAITLYN BARAJAS 1945 [id number]                     Emergency Contacts        (Rel.) Home Phone Work Phone Mobile Phone    Cleo Barajas (Daughter) [phone] -- [phone]    ELROY BARAJAS (Daughter) [phone] -- --              Vital Signs (last day)       Date/Time Temp Temp src Pulse Resp BP Patient Position SpO2    05/16/25 0901 -- -- 71 -- 162/60 -- --    05/16/25 0600 -- -- 76 -- -- -- " 96    05/16/25 0550 97.5 (36.4) Axillary 73 18 151/66 Lying 94    05/16/25 0300 97.8 (36.6) Axillary -- 18 136/57 Lying --    05/16/25 0002 98 (36.7) Axillary -- 16 131/55 Lying --    05/15/25 1929 98 (36.7) Oral -- 16 -- Sitting --    05/15/25 1835 -- -- 82 -- 151/72 -- --    05/15/25 1559 98.9 (37.2) Oral -- -- -- -- --    05/15/25 1500 -- -- -- -- 110/74 Lying --    05/15/25 1133 98.3 (36.8) Oral -- 16 128/71 Lying --    05/15/25 0728 98.2 (36.8) Axillary -- 14 134/55 Lying --    05/15/25 0515 -- -- 87 -- 140/88 -- 99    05/15/25 0348 97.6 (36.4) Axillary -- 16 134/68 Lying --    05/15/25 0000 98 (36.7) Oral -- 16 147/65 Lying --          Current Facility-Administered Medications   Medication Dose Route Frequency Provider Last Rate Last Admin    acetaminophen (TYLENOL) tablet 650 mg  650 mg Nasogastric Q4H PRN Ronny Lara DO   650 mg at 05/16/25 0901    Or    acetaminophen (TYLENOL) suppository 650 mg  650 mg Rectal Q4H PRN Ronny Lara DO        apixaban (ELIQUIS) tablet 2.5 mg  2.5 mg Nasogastric Q12H Ronny Lara DO   2.5 mg at 05/16/25 0901    aspirin chewable tablet 81 mg  81 mg Nasogastric Daily Ronny Lara DO   81 mg at 05/16/25 0901    atorvastatin (LIPITOR) tablet 80 mg  80 mg Nasogastric Daily Ronny Lara DO   80 mg at 05/16/25 0901    polyethylene glycol (MIRALAX) packet 17 g  17 g Nasogastric Daily PRN Ronny Lara DO        And    bisacodyl (DULCOLAX) EC tablet 5 mg  5 mg Oral Daily PRN Ronny Lara DO        And    bisacodyl (DULCOLAX) suppository 10 mg  10 mg Rectal Daily PRN Ronny Lara DO        Calcium Replacement - Follow Nurse / BPA Driven Protocol   Not Applicable PRN Aki Rodriguez DO        carvedilol (COREG) tablet 12.5 mg  12.5 mg Nasogastric BID With Meals Ronny Lara DO   12.5 mg at 05/16/25 0901    dextrose (D50W) (25 g/50 mL) IV injection 25 g  25 g Intravenous Q15 Min PRN Aki Rodriguez DO        dextrose (GLUTOSE) oral gel 15 g  15 g Oral Q15  Min PRN Aki Rodriguez DO        dextrose 5 % and sodium chloride 0.9 % infusion  75 mL/hr Intravenous Continuous Ezequiel August MD 75 mL/hr at 05/16/25 0102 75 mL/hr at 05/16/25 0102    sennosides (SENOKOT) 8.8 MG/5ML syrup 10 mL  10 mL Nasogastric BID Aki Rodriguez DO        And    docusate sodium (COLACE) liquid 100 mg  100 mg Nasogastric BID Aki Rodriguez DO        ertapenem (INVanz) 500 mg in sodium chloride 0.9 % 50 mL IVPB  500 mg Intravenous Q24H Wil Rader  mL/hr at 05/15/25 1835 500 mg at 05/15/25 1835    glucagon (GLUCAGEN) injection 1 mg  1 mg Intramuscular Q15 Min PRN Aki Rodriguez DO        glycerin 35 % liquid 35% 2 spray  2 spray Mouth/Throat Q2H PRN Aki Rodriguez DO   2 spray at 05/12/25 2011    hydrALAZINE (APRESOLINE) injection 10 mg  10 mg Intravenous Q6H PRN Aki Rodriguez DO   10 mg at 05/13/25 2218    hydrALAZINE (APRESOLINE) tablet 25 mg  25 mg Oral Q8H Ortega Cabrales MD, FASN   25 mg at 05/16/25 0553    insulin regular (humuLIN R,novoLIN R) injection 2-7 Units  2-7 Units Subcutaneous Q6H Aki Rodriguez DO   4 Units at 05/14/25 1255    Magnesium Cardiology Dose Replacement - Follow Nurse / BPA Driven Protocol   Not Applicable PRN Aki Rodriguez DO        metoclopramide (REGLAN) injection 10 mg  10 mg Intravenous Q6H Ronny Lara DO   10 mg at 05/16/25 0901    metoprolol tartrate (LOPRESSOR) injection 5 mg  5 mg Intravenous Q4H PRN Wil Rader DO   5 mg at 05/12/25 0404    nitroglycerin (NITROSTAT) SL tablet 0.4 mg  0.4 mg Sublingual Q5 Min PRN Aki Rodriguez DO        nystatin (MYCOSTATIN) 100,000 unit/mL suspension 500,000 Units  5 mL Oral 4x Daily Isaura Lyons PA-C   500,000 Units at 05/16/25 0901    ondansetron (ZOFRAN) injection 4 mg  4 mg Intravenous Q6H PRN Ronny Lara DO   4 mg at 05/15/25 0126    phenol (CHLORASEPTIC) 1.4 % liquid 1 spray  1 spray Mouth/Throat Q2H PRN Ronny Lara DO        Phosphorus Replacement - Follow Nurse / BPA  Driven Protocol   Not Applicable PRN Aki Rodriguez DO        Potassium Replacement - Follow Nurse / BPA Driven Protocol   Not Applicable PRN Aki Rodriguez DO        rivastigmine (EXELON) 4.6 MG/24HR patch 1 patch  1 patch Transdermal Daily Aki Rodriguez DO   1 patch at 05/16/25 0900    sodium chloride 0.9 % flush 10 mL  10 mL Intravenous PRN Danie Tolliver DO        sodium chloride 0.9 % flush 10 mL  10 mL Intravenous Q12H Aki Rodriguez DO   10 mL at 05/16/25 0913    sodium chloride 0.9 % flush 10 mL  10 mL Intravenous PRN Aki Rodriguez DO   10 mL at 05/12/25 0617    [Held by provider] sodium chloride 0.9 % infusion 40 mL  40 mL Intravenous PRN Aki Rodriguez DO         Lab Results (last 24 hours)       Procedure Component Value Units Date/Time    Renal Function Panel [[id number]]  (Abnormal) Collected: 05/16/25 0715    Specimen: Blood Updated: 05/16/25 0841     Glucose 121 mg/dL      BUN 41 mg/dL      Creatinine 1.62 mg/dL      Sodium 142 mmol/L      Potassium 3.4 mmol/L      Chloride 111 mmol/L      CO2 21.0 mmol/L      Calcium 7.7 mg/dL      Albumin 2.2 g/dL      Phosphorus 3.1 mg/dL      Anion Gap 10.0 mmol/L      BUN/Creatinine Ratio 25.3     eGFR 32.0 mL/min/1.73     Narrative:      GFR Categories in Chronic Kidney Disease (CKD)              GFR Category          GFR (mL/min/1.73)    Interpretation  G1                    90 or greater        Normal or high (1)  G2                    60-89                Mild decrease (1)  G3a                   45-59                Mild to moderate decrease  G3b                   30-44                Moderate to severe decrease  G4                    15-29                Severe decrease  G5                    14 or less           Kidney failure    (1)In the absence of evidence of kidney disease, neither GFR category G1 or G2 fulfill the criteria for CKD.    eGFR calculation 2021 CKD-EPI creatinine equation, which does not include race as a factor    CBC (No  Diff) [[id number]]  (Abnormal) Collected: 05/16/25 0715    Specimen: Blood Updated: 05/16/25 0820     WBC 5.90 10*3/mm3      RBC 3.38 10*6/mm3      Hemoglobin 9.9 g/dL      Hematocrit 30.3 %      MCV 89.6 fL      MCH 29.3 pg      MCHC 32.7 g/dL      RDW 14.0 %      RDW-SD 45.3 fl      MPV 13.5 fL      Platelets 97 10*3/mm3     POC Glucose Once [[id number]]  (Normal) Collected: 05/16/25 0546    Specimen: Blood Updated: 05/16/25 0548     Glucose 109 mg/dL      Comment: Serial Number: [id number]:  [id number]       POC Glucose Q6H [[id number]]  (Normal) Collected: 05/16/25 0026    Specimen: Blood Updated: 05/16/25 0031     Glucose 84 mg/dL      Comment: Serial Number: [id number]:  [id number]       Heparin Anti-Xa [[id number]]  (Abnormal) Collected: 05/15/25 2252    Specimen: Blood Updated: 05/15/25 2356     Heparin Anti-Xa (UFH) >1.10 IU/ml     aPTT [[id number]]  (Abnormal) Collected: 05/15/25 2252    Specimen: Blood Updated: 05/15/25 2344     PTT 35.6 seconds     Potassium [[id number]]  (Normal) Collected: 05/15/25 2252    Specimen: Blood Updated: 05/15/25 2343     Potassium 3.9 mmol/L     Blood Culture - Blood, Arm, Right [[id number]]  (Normal) Collected: 05/10/25 2131    Specimen: Blood from Arm, Right Updated: 05/15/25 2146     Blood Culture No growth at 5 days    POC Glucose Once [[id number]]  (Normal) Collected: 05/15/25 2014    Specimen: Blood Updated: 05/15/25 2016     Glucose 102 mg/dL      Comment: Serial Number: [id number]:  [id number]       POC Glucose Q6H [[id number]]  (Normal) Collected: 05/15/25 1812    Specimen: Blood Updated: 05/15/25 1820     Glucose 119 mg/dL      Comment: Serial Number: [id number]:  [id number]       Occult Blood X 1, Stool - Stool, Per Rectum [[id number]]  (Abnormal) Collected: 05/15/25 1630    Specimen: Stool from Per Rectum Updated: 05/15/25 1727     Fecal Occult Blood Positive    Blood Culture - Blood, Arm, Left [[id number]]  (Normal) Collected: 05/10/25 1315    Specimen:  Blood from Arm, Left Updated: 05/15/25 1345     Blood Culture No growth at 5 days    POC Glucose Once [[id number]]  (Abnormal) Collected: 05/15/25 1147    Specimen: Blood Updated: 05/15/25 1151     Glucose 138 mg/dL      Comment: Serial Number: [id number]:  [id number]             Imaging Results (Last 24 Hours)       ** No results found for the last 24 hours. **          Orders (last 24 hrs)        Start     Ordered    05/16/25 1446  Potassium  Timed         05/16/25 0845    05/16/25 0945  potassium chloride (KLOR-CON) packet 20 mEq  Once         05/16/25 0845    05/16/25 0600  CBC (No Diff)  Morning Draw         05/15/25 1226    05/16/25 0600  Basic Metabolic Panel  Morning Draw,   Status:  Canceled         05/15/25 1226    05/16/25 0549  POC Glucose Once  PROCEDURE ONCE        Comments: Complete no more than 45 minutes prior to patient eating      05/16/25 0546    05/16/25 0130  dextrose 5 % and sodium chloride 0.9 % infusion  Continuous         05/16/25 0036    05/15/25 2100  apixaban (ELIQUIS) tablet 2.5 mg  Every 12 Hours Scheduled         05/15/25 1738    05/15/25 2017  POC Glucose Once  PROCEDURE ONCE        Comments: Complete no more than 45 minutes prior to patient eating      05/15/25 2014    05/15/25 1610  Occult Blood X 1, Stool - Stool, Per Rectum  Once         05/15/25 1609    05/15/25 1440  Potassium  Timed         05/15/25 0749    05/15/25 1440  Heparin Anti-Xa  Timed         05/15/25 0837    05/15/25 1440  aPTT  Timed         05/15/25 0837    05/15/25 1233  Tube Feeding: Formula: Diabetisource AC (Glucerna 1.2); Feeding Type: Continuous; Start at: Other; Other: 0; Then Advance By: Do Not Advance; Water Flush: Other; Other: 250; Every: 6 hours; Water Bolus: None  Diet Effective Now         05/15/25 1234    05/15/25 1200  potassium chloride (KLOR-CON) packet 20 mEq  Once         05/15/25 1106    05/15/25 1152  POC Glucose Once  PROCEDURE ONCE        Comments: Complete no more than 45 minutes prior  "to patient eating      05/15/25 1147    05/15/25 0915  metoclopramide (REGLAN) injection 10 mg  Every 6 Hours         05/15/25 0823    05/15/25 0900  aspirin chewable tablet 81 mg  Daily         05/14/25 1149    05/15/25 0900  atorvastatin (LIPITOR) tablet 80 mg  Daily         05/14/25 1149    05/15/25 0824  phenol (CHLORASEPTIC) 1.4 % liquid 1 spray  Every 2 Hours PRN         05/15/25 0824    05/15/25 0730  hydrALAZINE (APRESOLINE) tablet 25 mg  Every 8 Hours Scheduled         05/15/25 0636    05/14/25 1800  carvedilol (COREG) tablet 12.5 mg  2 Times Daily With Meals         05/14/25 1149    05/14/25 1454  ondansetron (ZOFRAN) injection 4 mg  Every 6 Hours PRN         05/14/25 1454    05/14/25 1245  sennosides (SENOKOT) 8.8 MG/5ML syrup 10 mL  2 Times Daily        Placed in \"And\" Linked Group    05/14/25 1145    05/14/25 1245  docusate sodium (COLACE) liquid 100 mg  2 Times Daily        Placed in \"And\" Linked Group    05/14/25 1145    05/14/25 1147  polyethylene glycol (MIRALAX) packet 17 g  Daily PRN        Placed in \"And\" Linked Group    05/14/25 1149    05/14/25 1147  bisacodyl (DULCOLAX) EC tablet 5 mg  Daily PRN        Placed in \"And\" Linked Group    05/14/25 1149    05/14/25 1147  bisacodyl (DULCOLAX) suppository 10 mg  Daily PRN        Placed in \"And\" Linked Group    05/14/25 1149    05/14/25 1145  acetaminophen (TYLENOL) tablet 650 mg  Every 4 Hours PRN        Placed in \"Or\" Linked Group    05/14/25 1149    05/14/25 1145  acetaminophen (TYLENOL) suppository 650 mg  Every 4 Hours PRN        Placed in \"Or\" Linked Group    05/14/25 1149    05/14/25 0600  Renal Function Panel  Daily       05/13/25 0649    05/14/25 0000  insulin regular (humuLIN R,novoLIN R) injection 2-7 Units  Every 6 Hours Scheduled         05/13/25 2033 05/14/25 0000  POC Glucose Q6H  Every 6 Hours      Comments: Complete no more than 45 minutes prior to patient eating      05/13/25 2033 05/13/25 2033  dextrose (GLUTOSE) oral gel 15 g  " Every 15 Minutes PRN         05/13/25 2033    05/13/25 2033  dextrose (D50W) (25 g/50 mL) IV injection 25 g  Every 15 Minutes PRN         05/13/25 2033 05/13/25 2033  glucagon (GLUCAGEN) injection 1 mg  Every 15 Minutes PRN         05/13/25 2033 05/13/25 1800  POC Glucose Q6H  Every 6 Hours,   Status:  Canceled      Comments: Complete no more than 45 minutes prior to patient eating      05/13/25 1412    05/13/25 1800  nystatin (MYCOSTATIN) 100,000 unit/mL suspension 500,000 Units  4 Times Daily         05/13/25 1522    05/13/25 1615  fluconazole (DIFLUCAN) IVPB 200 mg  Every 24 Hours         05/13/25 1522    05/13/25 1413  Daily Weights  Daily       05/13/25 1412    05/13/25 1413  Tube Feeding Assessment and I/O  Every Shift       05/13/25 1412    05/13/25 1215  heparin 32534 units/250 ml (100 units/ml) in D5W  Titrated,   Status:  Discontinued         05/13/25 1121    05/12/25 1345  ertapenem (INVanz) 500 mg in sodium chloride 0.9 % 50 mL IVPB  Every 24 Hours         05/12/25 1254    05/11/25 2045  hydrALAZINE (APRESOLINE) injection 10 mg  Every 6 Hours PRN         05/11/25 2046    05/11/25 0950  metoprolol tartrate (LOPRESSOR) injection 5 mg  Every 4 Hours PRN         05/11/25 0950    05/11/25 0600  CBC & Differential  Every Third Day      Comments: Discontinue After Heparin Stopped      05/10/25 1436    05/10/25 2100  Silicone Border Dressing to Bony Prominences  Every Shift       05/10/25 1807    05/10/25 1802  glycerin 35 % liquid 35% 2 spray  Every 2 Hours PRN         05/10/25 1802    05/10/25 1700  rivastigmine (EXELON) 4.6 MG/24HR patch 1 patch  Daily         05/10/25 1607    05/10/25 1500  Vital Signs Every Hour and Per Hospital Policy Based on Patient Condition  Every Hour,   Status:  Canceled       05/10/25 1435    05/10/25 1500  Intake & Output  Every Hour       05/10/25 1435    05/10/25 1451  sodium chloride 0.9 % flush 10 mL  Every 12 Hours Scheduled         05/10/25 1435    05/10/25 1430   "Pharmacy to Dose Heparin  Continuous PRN,   Status:  Discontinued         05/10/25 1436    05/10/25 1436  Daily Weights  Daily       05/10/25 1435    05/10/25 1436  Daily CHG Bath While in Critical Care  Daily      Comments: Use for admission bath and length of critical care stay.    05/10/25 1435    05/10/25 1434  Potassium Replacement - Follow Nurse / BPA Driven Protocol  As Needed         05/10/25 1435    05/10/25 1434  Magnesium Cardiology Dose Replacement - Follow Nurse / BPA Driven Protocol  As Needed         05/10/25 1435    05/10/25 1434  Phosphorus Replacement - Follow Nurse / BPA Driven Protocol  As Needed         05/10/25 1435    05/10/25 1434  Calcium Replacement - Follow Nurse / BPA Driven Protocol  As Needed         05/10/25 1435    05/10/25 1421  nitroglycerin (NITROSTAT) SL tablet 0.4 mg  Every 5 Minutes PRN         05/10/25 1435    05/10/25 1421  Oral Care - Patient Not on NPPV & Not Intubated  Every Shift       05/10/25 1435    05/10/25 1421  sodium chloride 0.9 % flush 10 mL  As Needed         05/10/25 1435    05/10/25 1421  [Held by provider]  sodium chloride 0.9 % infusion 40 mL  As Needed        (On hold since Sat 5/10/2025 at 1605 until manually unheld; held by Aki Rodriguez DOHold Reason: Abnormal Labs)    05/10/25 1435    05/10/25 1413  Urinary Catheter Care  Every Shift      Placed in \"And\" Linked Group    05/10/25 1413    05/10/25 1231  sodium chloride 0.9 % flush 10 mL  As Needed         05/10/25 1231    Unscheduled  Oxygen Therapy- Nasal Cannula; Titrate 1-6 LPM Per SpO2; 90 - 95%  Continuous PRN       05/10/25 1231    Unscheduled  Wound Care  As Needed       05/10/25 1807    Unscheduled  Extravasation Standing Orders - Encourage Active Range of Motion After 48 Hours  As Needed       05/12/25 1150    Unscheduled  Chidi & Document Feeding Tube Depth (in cm)  As Needed       05/13/25 1412    Unscheduled  Verify Feeding Tube Placement Upon Insertion & As Needed  As Needed       05/13/25 " 1412    Unscheduled  Flush Feeding Tube With 30-50mL Water As Needed  As Needed       25 1412    Unscheduled  Follow Hypoglycemia Standing Orders For Blood Glucose <70 & Notify Provider of Treatment  As Needed      Comments: Follow Hypoglycemia Orders As Outlined in Process Instructions (Open Order Report to View Full Instructions)  Notify Provider Any Time Hypoglycemia Treatment is Administered    25                     Physician Progress Notes (most recent note)        Ortega Cabrales MD, FASN at 25 0633                [facility] Progress Note  [facility]. [place]        Patient Name: Valarie Camara  : 1945  MRN: [id number]   LOS: 5 days    Patient Care Team:  Lay Cox MD as PCP - General (Family Medicine)  Corbin Herrera DO ([facility])    Chief Complaint:    Chief Complaint   Patient presents with    Altered Mental Status     Primary Care Physician:  Lay Cox MD  Date of admission: 5/10/2025    Subjective     Interval History:   Follow-up acute kidney injury  hypernatremia  Patient has no interaction, patient had NG tube placed, it appears she has been gagging since she has NG tube and tube feedings were stopped.  She was unable to tolerate any tube feeding in the last 24 hours.  Ongoing discussions with palliative care/hospice with the family.  Events noted from last 24 hours.  I reviewed the chart and other providers notes, labs and procedures done since my last note.    Review of Systems:   unobtainable.    Objective     Vitals:   Temp:  [97.8 °F (36.6 °C)-98.9 °F (37.2 °C)] 97.8 °F (36.6 °C)  Heart Rate:  [73-82] 73  Resp:  [14-18] 18  BP: (110-151)/(55-74) 151/66    Intake/Output Summary (Last 24 hours) at 2025 0633  Last data filed at 5/15/2025 2100  Gross per 24 hour   Intake --   Output 350 ml   Net -350 ml       Physical Exam:    General Appearance: no acute distress    Skin: warm and dry  HEENT: oral mucosa normal, nonicteric sclera  Neck: supple, no JVD  Lungs: CTA  Heart: RRR, normal S1 and S2  Abdomen: obese, soft, nontender, non distended and positive bowel sounds.  : no palpable bladder  Extremities: Trace edema, no cyanosis or clubbing  Neuro: normal speech and mental status     Scheduled Meds:     Current Facility-Administered Medications   Medication Dose Route Frequency Provider Last Rate Last Admin    acetaminophen (TYLENOL) tablet 650 mg  650 mg Nasogastric Q4H PRN Ronny Lara DO        Or    acetaminophen (TYLENOL) suppository 650 mg  650 mg Rectal Q4H PRN Ronny Lara DO        apixaban (ELIQUIS) tablet 2.5 mg  2.5 mg Nasogastric Q12H Ronny Lara DO   2.5 mg at 05/15/25 2054    aspirin chewable tablet 81 mg  81 mg Nasogastric Daily Ronny Lara DO        atorvastatin (LIPITOR) tablet 80 mg  80 mg Nasogastric Daily Ronny Lara DO        polyethylene glycol (MIRALAX) packet 17 g  17 g Nasogastric Daily PRN Ronny Lara DO        And    bisacodyl (DULCOLAX) EC tablet 5 mg  5 mg Oral Daily PRN Ronny Lara DO        And    bisacodyl (DULCOLAX) suppository 10 mg  10 mg Rectal Daily PRN Ronny Lara DO        Calcium Replacement - Follow Nurse / BPA Driven Protocol   Not Applicable PRN Aki Rodriguez DO        carvedilol (COREG) tablet 12.5 mg  12.5 mg Nasogastric BID With Meals Ronny Lara DO   12.5 mg at 05/15/25 1835    dextrose (D50W) (25 g/50 mL) IV injection 25 g  25 g Intravenous Q15 Min PRN Aki Rodriguez DO        dextrose (GLUTOSE) oral gel 15 g  15 g Oral Q15 Min PRN Aki Rodriguez DO        dextrose 5 % and sodium chloride 0.9 % infusion  75 mL/hr Intravenous Continuous Ezequiel August MD 75 mL/hr at 05/16/25 0102 75 mL/hr at 05/16/25 0102    sennosides (SENOKOT) 8.8 MG/5ML syrup 10 mL  10 mL Nasogastric BID Aki Rodriguez DO        And    docusate sodium (COLACE) liquid 100 mg  100 mg Nasogastric BID Aki Rodriguez DO         ertapenem (INVanz) 500 mg in sodium chloride 0.9 % 50 mL IVPB  500 mg Intravenous Q24H Wil Rader  mL/hr at 05/15/25 1835 500 mg at 05/15/25 1835    glucagon (GLUCAGEN) injection 1 mg  1 mg Intramuscular Q15 Min PRN Aki Rodriguez DO        glycerin 35 % liquid 35% 2 spray  2 spray Mouth/Throat Q2H PRN Aki Rodriguez DO   2 spray at 05/12/25 2011    hydrALAZINE (APRESOLINE) injection 10 mg  10 mg Intravenous Q6H PRN Aki Rodriguez DO   10 mg at 05/13/25 2218    hydrALAZINE (APRESOLINE) tablet 25 mg  25 mg Oral Q8H Ortega Cabrales MD, FASN   25 mg at 05/16/25 0553    insulin regular (humuLIN R,novoLIN R) injection 2-7 Units  2-7 Units Subcutaneous Q6H kAi Rodriguez DO   4 Units at 05/14/25 1255    Magnesium Cardiology Dose Replacement - Follow Nurse / BPA Driven Protocol   Not Applicable PRN Aki Rodriguez DO        metoclopramide (REGLAN) injection 10 mg  10 mg Intravenous Q6H Ronny aLra DO   10 mg at 05/16/25 0242    metoprolol tartrate (LOPRESSOR) injection 5 mg  5 mg Intravenous Q4H PRN Wil Rader DO   5 mg at 05/12/25 0404    nitroglycerin (NITROSTAT) SL tablet 0.4 mg  0.4 mg Sublingual Q5 Min PRN Aki Rodriguez DO        nystatin (MYCOSTATIN) 100,000 unit/mL suspension 500,000 Units  5 mL Oral 4x Daily Isaura Lyons PA-C   500,000 Units at 05/15/25 2054    ondansetron (ZOFRAN) injection 4 mg  4 mg Intravenous Q6H PRN Ronny Lara DO   4 mg at 05/15/25 0126    Pharmacy to Dose Heparin   Not Applicable Continuous PRN Aki Rodriguez DO        phenol (CHLORASEPTIC) 1.4 % liquid 1 spray  1 spray Mouth/Throat Q2H PRN Ronny Lara DO        Phosphorus Replacement - Follow Nurse / BPA Driven Protocol   Not Applicable PRN Aki Rodriguez DO        Potassium Replacement - Follow Nurse / BPA Driven Protocol   Not Applicable PRN Aki Rodriguez DO        rivastigmine (EXELON) 4.6 MG/24HR patch 1 patch  1 patch Transdermal Daily Aki Rodriguez DO   1 patch at 05/15/25 2131     sodium chloride 0.9 % flush 10 mL  10 mL Intravenous PRN Danie Tolliver, DO        sodium chloride 0.9 % flush 10 mL  10 mL Intravenous Q12H Aki Rodriguez DO   10 mL at 05/15/25 2054    sodium chloride 0.9 % flush 10 mL  10 mL Intravenous PRN Aki Rodriguez DO   10 mL at 05/12/25 0617    [Held by provider] sodium chloride 0.9 % infusion 40 mL  40 mL Intravenous PRN Aki Rodriguez DO           apixaban, 2.5 mg, Nasogastric, Q12H  aspirin, 81 mg, Nasogastric, Daily  atorvastatin, 80 mg, Nasogastric, Daily  carvedilol, 12.5 mg, Nasogastric, BID With Meals  sennosides, 10 mL, Nasogastric, BID   And  docusate sodium, 100 mg, Nasogastric, BID  ertapenem, 500 mg, Intravenous, Q24H  hydrALAZINE, 25 mg, Oral, Q8H  insulin regular, 2-7 Units, Subcutaneous, Q6H  metoclopramide, 10 mg, Intravenous, Q6H  nystatin, 5 mL, Oral, 4x Daily  rivastigmine, 1 patch, Transdermal, Daily  sodium chloride, 10 mL, Intravenous, Q12H        IV Meds:   dextrose 5 % and sodium chloride 0.9 %, 75 mL/hr, Last Rate: 75 mL/hr (05/16/25 0102)  Pharmacy to Dose Heparin,         Results Reviewed:   I have personally reviewed the results from the time of this admission to 5/16/2025 06:33 EDT     Results from last 7 days   Lab Units 05/15/25  2252 05/15/25  0705 05/14/25  0657 05/13/25  0431 05/10/25  1437 05/10/25  1237   SODIUM mmol/L  --  140 144 158*   < > 168*   POTASSIUM mmol/L 3.9 3.4* 3.9 3.7   < > 4.1   CHLORIDE mmol/L  --  108* 112* 125*   < > 130*   CO2 mmol/L  --  23.5 22.4 21.8*   < > 18.5*   BUN mg/dL  --  42* 47* 62*   < > 127*   CREATININE mg/dL  --  1.51* 1.53* 1.75*   < > 3.96*   CALCIUM mg/dL  --  7.6* 7.9* 7.9*   < > 9.8   BILIRUBIN mg/dL  --   --   --   --   --  0.3   ALK PHOS U/L  --   --   --   --   --  57   ALT (SGPT) U/L  --   --   --   --   --  7   AST (SGOT) U/L  --   --   --   --   --  10   GLUCOSE mg/dL  --  113* 308* 214*   < > 162*    < > = values in this interval not displayed.       Estimated Creatinine  "Clearance: 27 mL/min (A) (by C-G formula based on SCr of 1.51 mg/dL (H)).    Results from last 7 days   Lab Units 05/15/25  0705 05/14/25  2257 05/14/25  0657 05/13/25 0431 05/12/25  1934 05/12/25 0411 05/11/25  0342   MAGNESIUM mg/dL  --   --   --  2.1  --  2.4 2.7*   PHOSPHORUS mg/dL 2.8 3.2 2.0* 2.5   < > 1.8* 4.3    < > = values in this interval not displayed.             Results from last 7 days   Lab Units 05/15/25  0705 05/14/25  0657 05/13/25 0431 05/12/25 0411 05/11/25  0053   WBC 10*3/mm3 6.75 5.70 5.89 6.63 9.11   HEMOGLOBIN g/dL 10.8* 11.6* 10.1* 12.4 11.5*   PLATELETS 10*3/mm3 103* 110* 122* 141 141       Results from last 7 days   Lab Units 05/10/25  1237   INR  1.25*       Brief Urine Lab Results  (Last result in the past 365 days)        Color   Clarity   Blood   Leuk Est   Nitrite   Protein   CREAT   Urine HCG        05/10/25 1237 Yellow   Cloudy   Trace   Moderate (2+)   Positive   30 mg/dL (1+)                   No results found for: \"UTPCR\"    Imaging Results (Last 24 Hours)       ** No results found for the last 24 hours. **                Assessment / Plan     ASSESSMENT:    Hypernatremia    PAF (paroxysmal atrial fibrillation)    Acute renal failure superimposed on stage 3a chronic kidney disease    Acute kidney injury: Most likely volume depletion, since she has been getting IV fluids it is starting to improve.  Bicarb and potassium appears to be stable increase serum sodium noted.  Starting to make urine.  Chronic kidney disease stage IIIb: Most likely will settle down to the baseline.  Hypernatremia: Secondary to free water losses and unable to eat and drink, she has about 5 L water deficit.  I will adjust hypotonic solutions.  Paroxysmal atrial fibrillation: Treated as per hospitalist service and cardiology.  Sepsis: Treated as per hospitalist service.  Metabolic encephalopathy: Significant uremia as well as dementia making things worse.        PLAN:  No significant improvement " clinically, I would recommend hospice with comfort care only.  Electrolyte replacement per protocol.  Details were also discussed with the hospitalist service and or other providers as needed.  Also discussed with the palliative care about her poor prognosis, they are in discussion with the family about switching her to comfort care and hospice.  I will sign off please call if needed.  Continue with rest of the current treatment plan, and monitor with surveillance labs, adjust medication doses to the eGFR.  Further recommendations will depend on clinical course of the patient during the current hospitalization.   I have reviewed the copied text to this note, it was edited and the changes made as needed.  It is accurate to the point, when the note was signed today.     Thank you for involving us in the care of Valarie Camara.  Please feel free to call with any questions.    Ortega Cabrales MD, DAPHNIE  25  06:33 EDT    [facility]  [phone]      Part of this note may be an electronic transcription/translation of spoken language to printed text using the Dragon Dictation System.                   Electronically signed by Ortega Cabrales MD, DAPHNIE at 25 1101          Consult Notes (most recent note)        Isaura Lyons PA-C at 25 1554        Consult Orders    1. Inpatient Palliative Care Team Consult [[id number]] ordered by Wil Rader DO at 25 1156                     [facility]    INPATIENT PALLIATIVE CARE CONSULT      Name:  Valarie Camara   Age:  79 y.o.  Sex:  female  :  1945  MRN:  [id number]   Visit Number:  [id number]  Date of Service:  25  Date of Admission:  5/10/2025  Primary Care Physician:  Lay Cox MD    Consulting Physician:  Dr. aRder    Reason For Consult:  Introduction to Palliative services, Goals of care discussions , Support during serious illness, and Hospice  information     History of Present Illness:  Valarie Camara is a 79 y.o. female with past medical history of CAD, CKD, HTN, dementia, history of PE on eliquis, PAD, left ICA stenosis, history of esophageal stricture s/p dilation 1/2027 with Dr. Zapata.  Had admission in mid March secondary to altered mental status.  Stroke workup was negative, she was treated for UTI.  In addition neurology assessed and followed, recommending initiation of rivastigmine, continue aspirin/statin/AC.  Patient was discharged to short-term rehabilitation at [facility], ultimately completed this and discharged home.  Patient presented to [facility] on 5/10/2025 secondary to altered mental status with associated poor oral intake over the past several days.  Per record review, patient was assessed by EMS found to be hypotensive and tachycardic, EKG revealing A-fib with RVR.  Upon arrival to [facility] ED she was emergently cardioverted, returning to normal sinus rhythm.  Unfortunately she did not have improvement to her mentation.  Laboratory evaluation revealed WBC 13.08.  Glucose 162, , creatinine 3.96, Na 168, albumin 3.1.  Urinalysis with positive nitrites, moderate leuks, 4+ bacteria.  Lactate negative.  Troponin elevated.  TSH 0.024.  Neurology consulted in the ED, CT head did not reveal acute CVA.  CT head no acute intracranial abnormality.  Chest x-ray negative.  Cardiology consulted, who felt troponin elevation likely associated with tachycardia, recommending trending as HR better controlled.  Neurology also consulted, recommending MRI.  Patient was initiated on Rocephin for UTI and LR, admitted the primary medicine service for continued evaluation and management.  Cardiology, neurology, and nephrology followed in consultation.   MRI negative for acute stroke, awaiting EEG.  Neurology recommending SLP and PT/OT evaluation.  Nephrology following, hypernatremia and KURT improving with IV hydration.  Nursing  have attempted bedside swallow eval, however patient has been refusing diet and p.o. medications.  Palliative care consulted to further review GOC in the setting of complex medical decision making.    After speaking with the primary medicine service, palliative met with patient and family at bedside.  Patient is lying in bed appearing comfortable, will awaken and answer a few questions.  She appears to be sleeping at times, however will answer appropriately.  Her daughters are able to supplement HPI and reports that prior to this acute illness patient was living with her daughter who is her primary caretaker.  She has assistance from other family and friends who also live close by.  At baseline she is able to ambulate only short amount of distance, utilizes assistive device.  She has episodic incontinence.  She sleeps on and off throughout the day and nighttime.  Appetite has been waxing and waning, approximate 26 pound weight loss in the past 2 to 3 months.  Initially had difficulty with swallowing that improved after dilation, now reports pain with swallowing.  Family describes subtle cognitive losses over the past several years, is oriented to herself and place at baseline, able to recognize family consistently.  She does require assistance with ADLs.  Her daughter notes that over the past week or so she was requiring assistance with feeding, she was no longer desiring to eat or drink.  No acute dyspnea, pain, agitation or restlessness noted.    Patient and family agreeable to continued palliative care follow up and discussions regarding goals of care and advance care planning.     Review of Systems   Constitutional:  Positive for activity change, appetite change and fatigue.   HENT:  Positive for trouble swallowing.    Neurological:  Positive for weakness.        Medical History:  Past Medical History:   Diagnosis Date    Acute bronchitis with bronchospasm     Anxiety     Arthritis     Asthma     B12 deficiency   "   Back pain     Body piercing     ears    Cataract     Cerebrovascular disease     Cervicalgia     Chronic kidney disease     stage 3b    Colonic polyp     History of colonic polyps     Constipation     COPD (chronic obstructive pulmonary disease)     Coronary artery disease     Depression     Diverticulitis     Dysphagia     Patient reported \"it won't go all the way down\" when eating solid foods first thing in the mornings    Edema     Elevated cholesterol     Esophageal reflux     Folic acid deficiency     Full dentures     Advised no adhesives DOS    Heart murmur     Herpes zoster     High cholesterol     History of kidney infection     History of recurrent urinary tract infection     HTN (hypertension)     Hypercholesterolemia     Impaired functional mobility, balance, gait, and endurance     Insomnia     Kidney infection     Malignant hypertension 04/26/2015     Accelerated essential hypertension    Measles     rubeola    Muscle spasm     Neoplasm of uncertain behavior of skin     dtr denies    Osteoporosis     Poor historian     Recurrent urinary tract infection     Rheumatoid arthritis     RLS (restless legs syndrome)     Stomach ulcer     Stroke     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    Type 2 diabetes mellitus     Vitamin D deficiency       Past Surgical History:   Procedure Laterality Date    CATARACT EXTRACTION WITH INTRAOCULAR LENS IMPLANT Left 02/11/2013    CATARACT EXTRACTION WITH INTRAOCULAR LENS IMPLANT Right 04/15/2013    COLONOSCOPY  2012    COLONOSCOPY N/A 11/26/2019    Procedure: COLONOSCOPY;  Surgeon: Chidi Downing MD;  Location[facility] ENDOSCOPY;  Service: Gastroenterology    ENDOSCOPY N/A 11/13/2017    Procedure: ESOPHAGOGASTRODUODENOSCOPY with biopsies and esophageal balloon dilitation;  Surgeon: Wesley Stovall MD;  Location: [facility] ENDOSCOPY;  Service:     ENDOSCOPY N/A 11/25/2019    Procedure: ESOPHAGOGASTRODUODENOSCOPY;  Surgeon: Chidi Downing" MD JULIO CESAR;  Location: [facility] ENDOSCOPY;  Service: Gastroenterology    ENDOSCOPY N/A 11/29/2021    Procedure: ESOPHAGOGASTRODUODENOSCOPY with dilation and biopsies;  Surgeon: Gonzalez Zapata MD;  Location: [facility] ENDOSCOPY;  Service: Gastroenterology;  Laterality: N/A;    ENDOSCOPY N/A 11/1/2023    Procedure: ESOPHAGOGASTRODUODENOSCOPY WITH BIOPSY AND DILATATION;  Surgeon: Gonzalez Zapata MD;  Location: [facility] ENDOSCOPY;  Service: Gastroenterology;  Laterality: N/A;    ENDOSCOPY N/A 1/27/2025    Procedure: Esophagogastroduodenoscopy with dilatation and biopsies;  Surgeon: Gonzalez Zapata MD;  Location: [facility] ENDOSCOPY;  Service: Gastroenterology;  Laterality: N/A;    HYSTERECTOMY  1979    UPPER GASTROINTESTINAL ENDOSCOPY  12/09/2013    UPPER GASTROINTESTINAL ENDOSCOPY  11/13/2017     Family History   Problem Relation Age of Onset    Arthritis Mother     Diabetes Mother     Hypertension Mother     Arthritis Other     Arthritis Other     Diabetes Other         DM    Hypertension Other     Colon cancer Neg Hx      Allergies: Penicillins, Clonidine derivatives, Hydralazine, and Sulfa antibiotics    Hospital Scheduled Medications:    Current Facility-Administered Medications:     acetaminophen (TYLENOL) tablet 650 mg, 650 mg, Oral, Q4H PRN **OR** acetaminophen (TYLENOL) suppository 650 mg, 650 mg, Rectal, Q4H PRN, Aki Rodriguez DO    aspirin chewable tablet 81 mg, 81 mg, Oral, Daily, Aki Rodriguez DO, 81 mg at 05/12/25 1031    atorvastatin (LIPITOR) tablet 80 mg, 80 mg, Oral, Daily, Aki Rodriguez DO, 80 mg at 05/12/25 1030    sennosides-docusate (PERICOLACE) 8.6-50 MG per tablet 2 tablet, 2 tablet, Oral, BID, 2 tablet at 05/12/25 1030 **AND** polyethylene glycol (MIRALAX) packet 17 g, 17 g, Oral, Daily PRN **AND** bisacodyl (DULCOLAX) EC tablet 5 mg, 5 mg, Oral, Daily PRN **AND** bisacodyl (DULCOLAX) suppository 10 mg, 10 mg, Rectal, Daily PRN, Aki Rodriguez DO    Calcium Replacement - Follow Nurse /  BPA Driven Protocol, , Not Applicable, PRN, Aki Rodriguez DO    carvedilol (COREG) tablet 12.5 mg, 12.5 mg, Oral, BID With Meals, Aki Rodriguez DO, 12.5 mg at 05/12/25 1030    cloNIDine (CATAPRES-TTS) 0.1 MG/24HR patch 1 patch, 1 patch, Transdermal, Weekly, Ortega Cabrales MD, DAPHNIE, 1 patch at 05/12/25 1031    dextrose (D5W) 5 % infusion, 100 mL/hr, Intravenous, Continuous, Ortega Cabrales MD, GONSALON, Last Rate: 100 mL/hr at 05/13/25 1047, 100 mL/hr at 05/13/25 1047    dextrose 5 % with KCl 20 mEq/L infusion, 50 mL/hr, Intravenous, Continuous, Ortega Cabrales MD, GONSALON, Last Rate: 50 mL/hr at 05/13/25 1047, 50 mL/hr at 05/13/25 1047    ertapenem (INVanz) 500 mg in sodium chloride 0.9 % 50 mL IVPB, 500 mg, Intravenous, Q24H, Wil Rader DO, Last Rate: 100 mL/hr at 05/12/25 1632, 500 mg at 05/12/25 1632    fluconazole (DIFLUCAN) IVPB 200 mg, 200 mg, Intravenous, Q24H, Isaura Lyons PA-C    glycerin 35 % liquid 35% 2 spray, 2 spray, Mouth/Throat, Q2H PRN, Aki Rodriguez DO, 2 spray at 05/12/25 2011    heparin 03802 units/250 ml (100 units/ml) in D5W, 9 Units/kg/hr (Order-Specific), Intravenous, Titrated, Ronny Lara DO, Last Rate: 6 mL/hr at 05/13/25 1123, 9 Units/kg/hr at 05/13/25 1123    hydrALAZINE (APRESOLINE) injection 10 mg, 10 mg, Intravenous, Q6H PRN, Aki Rodriguez DO, 10 mg at 05/13/25 1048    Magnesium Cardiology Dose Replacement - Follow Nurse / BPA Driven Protocol, , Not Applicable, PRN, Rodriguez, Aki, DO    metoprolol tartrate (LOPRESSOR) injection 5 mg, 5 mg, Intravenous, Q4H PRN, Wil Rader DO, 5 mg at 05/12/25 0404    mupirocin (BACTROBAN) 2 % nasal ointment 1 Application, 1 Application, Each Nare, BID, Aki Rodriguez DO, 1 Application at 05/13/25 1048    nitroglycerin (NITROSTAT) SL tablet 0.4 mg, 0.4 mg, Sublingual, Q5 Min PRN, Aki Rodriguez DO    nystatin (MYCOSTATIN) 100,000 unit/mL suspension 500,000 Units, 5 mL, Oral, 4x Daily, Isaura Lyons PA-C    Pharmacy to Dose  "Heparin, , Not Applicable, Continuous PRN, Aki Rodriguez DO    Phosphorus Replacement - Follow Nurse / BPA Driven Protocol, , Not Applicable, PRNMichael Robert, DO    Potassium Replacement - Follow Nurse / BPA Driven Protocol, , Not Applicable, PRNMichael Robert, DO    rivastigmine (EXELON) 4.6 MG/24HR patch 1 patch, 1 patch, Transdermal, Daily, Aki Rodriguez DO, 1 patch at 05/13/25 1048    sodium chloride 0.9 % flush 10 mL, 10 mL, Intravenous, PRN, Danie Tolliver DO    sodium chloride 0.9 % flush 10 mL, 10 mL, Intravenous, Q12H, Aki Rodriguez DO, 10 mL at 05/13/25 1054    sodium chloride 0.9 % flush 10 mL, 10 mL, Intravenous, PRN, Aki Rodriguez DO, 10 mL at 05/12/25 0617    [Held by provider] sodium chloride 0.9 % infusion 40 mL, 40 mL, Intravenous, PRNMichael Robert, DO  I have utilized all immediate resources to obtain, update, or review the patient's current medications (including all prescription, over-the-counter products, herbals, and/or nutritional supplements).      Vitals: /62 (BP Location: Left arm, Patient Position: Lying)   Pulse 85   Temp 97.8 °F (36.6 °C) (Oral)   Resp 13   Ht 160 cm (63\")   Wt 60.1 kg (132 lb 7.9 oz)   SpO2 100%   BMI 23.47 kg/m²     Intake/Output Summary (Last 24 hours) at 5/13/2025 1555  Last data filed at 5/13/2025 0642  Gross per 24 hour   Intake 2535.4 ml   Output 605 ml   Net 1930.4 ml       Physical Exam  Vitals and nursing note reviewed.   Constitutional:       General: She is not in acute distress.     Appearance: She is ill-appearing. She is not diaphoretic.      Comments: Frail appearing, elderly female, lying in bed, NAD   HENT:      Head: Normocephalic and atraumatic.      Comments: Temporal wasting      Mouth/Throat:      Mouth: Mucous membranes are moist.      Comments: White patches noted   Eyes:      Conjunctiva/sclera: Conjunctivae normal.      Pupils: Pupils are equal, round, and reactive to light.   Cardiovascular:      Rate and " Rhythm: Normal rate and regular rhythm.   Pulmonary:      Effort: Pulmonary effort is normal. No respiratory distress.      Comments: Diminished otherwise clear  Abdominal:      General: Bowel sounds are normal. There is no distension.      Palpations: Abdomen is soft.      Tenderness: There is no abdominal tenderness.   Musculoskeletal:         General: No swelling.      Cervical back: Neck supple.      Comments: Diffusely deconditioned, decreased ROM to upper and lower extremities    Skin:     General: Skin is warm and dry.      Capillary Refill: Capillary refill takes less than 2 seconds.   Neurological:      Mental Status: She is alert.      Comments: Oriented to person/place   Psychiatric:         Mood and Affect: Mood normal.         Behavior: Behavior normal.          Diagnostics Review:  Laboratory Data:  Results from last 7 days   Lab Units 05/13/25  0431 05/12/25  1226 05/12/25  0805 05/10/25  1437 05/10/25  1237   SODIUM mmol/L 158* 160* 161*   < > 168*   POTASSIUM mmol/L 3.7 3.8 3.7   < > 4.1   CHLORIDE mmol/L 125* 127* 130*   < > 130*   CO2 mmol/L 21.8* 22.8 22.1   < > 18.5*   BUN mg/dL 62* 73* 78*   < > 127*   CREATININE mg/dL 1.75* 2.08* 2.16*   < > 3.96*   CALCIUM mg/dL 7.9* 8.5* 8.6   < > 9.8   ALBUMIN g/dL  --   --   --   --  3.1*   BILIRUBIN mg/dL  --   --   --   --  0.3   ALK PHOS U/L  --   --   --   --  57   ALT (SGPT) U/L  --   --   --   --  7   AST (SGOT) U/L  --   --   --   --  10   GLUCOSE mg/dL 214* 247* 211*   < > 162*    < > = values in this interval not displayed.     Results from last 7 days   Lab Units 05/13/25  0431 05/12/25  0411 05/11/25  0053   WBC 10*3/mm3 5.89 6.63 9.11   HEMOGLOBIN g/dL 10.1* 12.4 11.5*   HEMATOCRIT % 32.9* 41.1 38.7   PLATELETS 10*3/mm3 122* 141 141     Results from last 7 days   Lab Units 05/10/25  1237   INR  1.25*     Results from last 7 days   Lab Units 05/12/25  0538   PH, ARTERIAL pH units 7.444   PO2 ART mm Hg 81.2   PCO2, ARTERIAL mm Hg 33.5*   HCO3 ART  mmol/L 23.0     Results from last 7 days   Lab Units 05/10/25  1237   COLOR UA  Yellow   GLUCOSE UA  Negative   KETONES UA  Trace*   BLOOD UA  Trace*   LEUKOCYTES UA  Moderate (2+)*   PH, URINE  <=5.0   BILIRUBIN UA  Negative   UROBILINOGEN UA  0.2 E.U./dL     Pain Management Panel  More data exists         Latest Ref Rng & Units 5/10/2025 3/20/2025   Pain Management Panel   Amphetamine, Urine Qual Negative Negative  Negative    Barbiturates Screen, Urine Negative Negative  Negative    Benzodiazepine Screen, Urine Negative Negative  Negative    Buprenorphine, Screen, Urine Negative Negative  Negative    Cocaine Screen, Urine Negative Negative  Negative    Fentanyl, Urine Negative Negative  Negative    Methadone Screen , Urine Negative Negative  Negative    Methamphetamine, Ur Negative Negative  Negative      Results from last 7 days   Lab Units 05/10/25  2131 05/10/25  1315 05/10/25  1237   BLOODCX  No growth at 2 days No growth at 3 days  --    URINECX   --   --  >100,000 CFU/mL Escherichia coli ESBL*     Nutritional Status:   Results from last 7 days   Lab Units 05/10/25  1237   ALBUMIN g/dL 3.1*     Body mass index is 23.47 kg/m².  Documented weights    05/10/25 1226 05/10/25 1727 05/11/25 0500 05/12/25 0400   Weight: 66.7 kg (147 lb) 57.4 kg (126 lb 8.7 oz) 55.5 kg (122 lb 5.7 oz) 57.1 kg (125 lb 14.1 oz)    05/13/25 0400   Weight: 60.1 kg (132 lb 7.9 oz)        Radiology:  MRI Brain Without Contrast  Result Date: 5/11/2025  MRI BRAIN WITHOUT CONTRAST  HISTORY: Confusion  COMPARISON: 3/22/2025  TECHNIQUE: Multiplanar, multisequence images of the brain were performed without contrast.  FINDINGS: The diffusion weighted images demonstrate no restricted diffusion. There is no acute ischemia. The cervicomedullary junction is normal. There is no Chiari malformation. There is moderate atrophy. There is increased T2 signal in the periventricular white matter consistent mild microvascular change. There is no cortical  edema or hemorrhage. There are new right mastoid opacities.      1. Moderate atrophy with mild microvascular change. There is no edema or hemorrhage. 2. New right mastoid opacities.   This report was signed and finalized on 5/11/2025 8:59 AM by Yuniel Silva MD.      CT Head Without Contrast  Result Date: 5/10/2025  HEAD CT  HISTORY: Confusion.  COMPARISON: 3-.  TECHNIQUE: Multiple axial CT images were performed from the foramen magnum to the vertex without enhancement. Individualized dose reduction techniques using automated exposure control or adjustment of the mA and/or kV according to patient size were employed.  FINDINGS: Mild to moderate diffuse cortical atrophy is present.  There is no evidence of acute hemorrhage, mass effect, or edema.  Mild mucoperiosteal thickening is noted in the right maxillary sinus. No air-fluid levels are seen.       1. No evidence of acute intracranial abnormality.      This report was signed and finalized on 5/10/2025 12:57 PM by Jah Kirk MD.      XR Chest 1 View  Result Date: 5/10/2025  Chest single view  COMPARISON: Chest from 3-  HISTORY: Altered mental status  FINDINGS: Cardiac silhouette is of normal size.  Lungs are underinflated, but clear.      1. No evidence of acute abnormality.  This report was signed and finalized on 5/10/2025 12:49 PM by Jah Kirk MD.          Goals of Care/Advance Care Planning:  Advance Care Planning     1. Determination of Decisional Capacity:  Decisional capacity: YES, however would benefit from the assistance of [facility] for complex medical decisions given her fatigue and deconditioned state    2. Advanced Directives:  I have personally reviewed Advance Directives on file in the form of POA and Living Will  Healthcare surrogate/legal decision maker: Bailey Camara and Cleo Camara  Relationship to individual: Daughter  Surrogate/decision maker understands role and will honor individual's decisions: YES    3. Patient & Family  Meeting:  Members present at meeting Patient, Bailey Gallo, Mireille TeixeiraNuvia, Isaura Lyons  Meeting took place at [facility] ICU patient room     4. Summary of the discussion:  After generalized introductions, introduced the role of palliative care in assisting with complex medical decision making, goals of care discussions, and symptom management/supportive care.  Patient raised her two daughters independently.  She has valued her lavon and family throughout her whole life.  She loves her two grandsons as if they were her own children.    I reviewed patient's acute and chronic illness, patient fatigued and unable to express insight in detail, her daughters at bedside with insight regarding the same.  Patient able to express she has been sick for a while, feels that her body is entering final stages of life.  Cleo reflects on patient's progressive decline over the past few months.  Daughters are understanding that their mother is likely entering the final stages of life, they would not want her to suffer.  They know she would desire to have a natural death, confirming DO NOT RESUSCITATE/DO NOT INTUBATE.  However, they want to make sure that they have done all that they could to ensure that they have done everything they could.  They are interested in pursuing a trial of artifical nutrition through NG, only if patient is able to tolerate.  Patient would not desire to pursue long term artifical nutrition through PEG placement.  In the event she is unable to tolerate NG placement, or artificial nutrition is causing discomfort, they would desire to defer further attempts, likely transition to comfort focused approach to care.  I reviewed trajectory associated with chronic illness, which often reflects a stairstepping pattern.  I also reviewed signs and symptoms associated with patients who are nearing the final stages of life.  Both daughters are in agreement that if patient is not benefiting from interventions, specifically  artificial nutrition, would desire to focus on her comfort.  CODE STATUS reviewed/confirmed: DNR / DNI   Baseline Functional Status: Palliative Performance Scale Score: Performance 40% based on the following measures: Ambulation: Mainly in bed, Activity and Evidence of Disease: Unable to do any work, extensive evidence of disease, Self-Care: Mainly assistance required,  Intake: Normal or reduced, LOC: Full, drowsy or confusion          Palliative Care Assessment:  Severe sepsis secondary to UTI  Metabolic encephalopathy  Atrial fibrillation with RVR  KURT  Dementia  Frailty  Dysphagia    Recommendations/Plan:  - CODE STATUS reviewed/confirmed: DNR / DNI   - GOC discussions yield desire to maintain plan of care as outlined per the primary medicine service, desire to pursue an attempt at NG feeding, however if patient is unable to tolerate, would desire to defer/withdrawal   - In the event patient is not tolerating artificial nutrition, seems that family are leaning towards transition to a comfort focused approach to her care, they reflect on patient's desire for for comfort and peace in the final stages of life  - Patient likely meets criteria for hospice, however at the present goals are not in alignment  - Discussed course of diflucan and nystan for oral candidiasis, primary medicine service is in agreement  - Previously patient taking PPI with history of GERD and hiatal hernia, may consider protonix IV  - Current Functional Status: Palliative Performance Scale Score: Performance 30% based on the following measures: Ambulation: Totally bed bound, Activity and Evidence of Disease: Unable to do any work, extensive evidence of disease, Self-Care: Total care required,  Intake: Reduced, LOC: Full, drowsy or confusion  - Palliative care will continue to follow/support patient and family        I appreciate the opportunity to participate in the care of Mr./Ms. Valarie LOLA Camara.  Please do not hesitate to contact me with  any questions or concerns.      I spent 90 total minutes conducting chart review for relevant information, face to face assessment, counseling patient and/or family, and coordination of care.  15 minutes spent discussing advanced care planning.  Part of this note may be an electronic transcription/translation of spoken language to printed text using the Dragon Dictation System.       Isaura Lyons PA-C  05/13/25  15:55 EDT        Electronically signed by Isaura Lyons PA-C at 05/13/25 7219

## 2025-05-16 NOTE — PLAN OF CARE
Goal Outcome Evaluation:  Plan of Care Reviewed With: patient        Progress: no change  Outcome Evaluation: vs stable on ra. turned q2 hr. no significant events, continuing to monitor.

## 2025-05-16 NOTE — PROGRESS NOTES
"Dietitian Follow-up    Patient Name: Valarie Camara  YOB: 1945  MRN: [id number]  Admission date: 5/10/2025    Comment:    Clinical Nutrition Follow-up   Encounter Information        Trending Narrative     5/16: patient no longer w/ vomiting but still experiencing nausea. Will switch to Peptamen AF for better GI tolerance at a low rate of 20mL/hr and do not advance.    5/15: Pt with multiple episodes of vomiting and unable to tolerate tube feeding. MD adjusted water flushes. Will put tube feeding rate to 0 mL/hr and try initiating tube feeding again tomorrow.     5/14: NG was placed today with plans to initiate tube feeding.     5/13: RD consulted for TF recommendations.     5/13: Patient remains NPO - will continue to monitor for diet advancement.    5/12: Pt NPO x 2 days. EMR shows significant wt loss. Will monitor for diet advancement and add ONS.      Anthropometrics        Current Height, Weight Height: 160 cm (63\")  Weight: 65.5 kg (144 lb 6.4 oz) (05/15/25 0515)       Trending Weight Hx     This admission:              PTA:     Wt Readings from Last 30 Encounters:   05/15/25 0515 65.5 kg (144 lb 6.4 oz)   05/14/25 0400 59.7 kg (131 lb 9.8 oz)   05/13/25 0400 60.1 kg (132 lb 7.9 oz)   05/12/25 0400 57.1 kg (125 lb 14.1 oz)   05/11/25 0500 55.5 kg (122 lb 5.7 oz)   05/10/25 1727 57.4 kg (126 lb 8.7 oz)   05/10/25 1226 66.7 kg (147 lb)   04/17/25 0932 67.1 kg (147 lb 14.4 oz)   04/05/25 1837 66.6 kg (146 lb 12.8 oz)   03/21/25 1511 68 kg (150 lb)   03/20/25 1556 68 kg (150 lb)   03/15/25 0422 69.4 kg (153 lb)   03/04/25 0600 72.8 kg (160 lb 7.9 oz)   03/02/25 1423 71.9 kg (158 lb 8.2 oz)   03/02/25 0348 70.5 kg (155 lb 6.8 oz)   03/01/25 0415 72.3 kg (159 lb 6.3 oz)   02/28/25 1848 72.6 kg (160 lb 0.9 oz)   02/28/25 1817 77 kg (169 lb 12.1 oz)   02/28/25 1152 77 kg (169 lb 12.1 oz)   02/08/25 0115 77.1 kg (169 lb 15.6 oz)   01/24/25 1111 75.3 kg (166 lb)   01/27/25 0139 77.1 kg (170 lb) "   01/10/25 1330 80.3 kg (177 lb)   01/05/25 1700 76.3 kg (168 lb 3.4 oz)   01/05/25 1102 79.4 kg (175 lb 0.7 oz)   01/05/25 0921 79.4 kg (175 lb)   11/06/24 1049 80.6 kg (177 lb 12.8 oz)   12/04/23 2059 90.7 kg (200 lb)   10/31/23 0924 90.7 kg (200 lb)   08/21/23 1541 90.7 kg (200 lb)   08/17/23 1331 91.2 kg (201 lb)   01/31/22 1433 87.5 kg (192 lb 12.8 oz)   11/24/21 1834 88.5 kg (195 lb)   11/24/21 1543 86.2 kg (190 lb)   05/25/21 0500 92.6 kg (204 lb 2.3 oz)   05/21/21 0500 89.9 kg (198 lb 3.1 oz)   05/20/21 2344 89.9 kg (198 lb 3.1 oz)   05/20/21 0807 88.2 kg (194 lb 6.4 oz)   05/20/21 0305 90.7 kg (200 lb)   02/03/21 1507 89.4 kg (197 lb)   12/17/20 1307 92.1 kg (203 lb)   10/28/20 1735 90.7 kg (200 lb)   09/08/20 1011 95.7 kg (211 lb)   08/17/20 1122 93.6 kg (206 lb 6.4 oz)   01/02/20 1016 90.4 kg (199 lb 6.4 oz)   12/13/19 0445 87.2 kg (192 lb 3.2 oz)   11/25/19 1335 89.4 kg (197 lb)   11/23/19 2340 89.7 kg (197 lb 12.8 oz)   11/23/19 1951 90.7 kg (200 lb)   03/19/19 1541 91.8 kg (202 lb 6.4 oz)   04/10/18 1524 90.7 kg (200 lb)   04/09/18 1342 92.5 kg (204 lb)      BMI kg/m2 Body mass index is 25.58 kg/m².     Labs        Pertinent Labs Results from last 7 days   Lab Units 05/16/25  0715 05/15/25  2252 05/15/25  0705 05/14/25  0657 05/10/25  1437 05/10/25  1237   SODIUM mmol/L 142  --  140 144   < > 168*   POTASSIUM mmol/L 3.4* 3.9 3.4* 3.9   < > 4.1   CHLORIDE mmol/L 111*  --  108* 112*   < > 130*   CO2 mmol/L 21.0*  --  23.5 22.4   < > 18.5*   BUN mg/dL 41*  --  42* 47*   < > 127*   CREATININE mg/dL 1.62*  --  1.51* 1.53*   < > 3.96*   CALCIUM mg/dL 7.7*  --  7.6* 7.9*   < > 9.8   BILIRUBIN mg/dL  --   --   --   --   --  0.3   ALK PHOS U/L  --   --   --   --   --  57   ALT (SGPT) U/L  --   --   --   --   --  7   AST (SGOT) U/L  --   --   --   --   --  10   GLUCOSE mg/dL 121*  --  113* 308*   < > 162*    < > = values in this interval not displayed.     Results from last 7 days   Lab Units 05/16/25 0715  05/14/25  0657 05/13/25  0431 05/12/25  1934 05/12/25  0411 05/11/25  0342   MAGNESIUM mg/dL  --   --  2.1  --  2.4 2.7*   PHOSPHORUS mg/dL 3.1   < > 2.5   < > 1.8* 4.3   HEMOGLOBIN g/dL 9.9*   < > 10.1*  --  12.4  --    HEMATOCRIT % 30.3*   < > 32.9*  --  41.1  --     < > = values in this interval not displayed.         Medications    Scheduled Medications apixaban, 2.5 mg, Nasogastric, Q12H  aspirin, 81 mg, Nasogastric, Daily  atorvastatin, 80 mg, Nasogastric, Daily  carvedilol, 12.5 mg, Nasogastric, BID With Meals  sennosides, 10 mL, Nasogastric, BID   And  docusate sodium, 100 mg, Nasogastric, BID  ertapenem, 500 mg, Intravenous, Q24H  hydrALAZINE, 25 mg, Oral, Q8H  insulin regular, 2-7 Units, Subcutaneous, Q6H  metoclopramide, 10 mg, Intravenous, Q6H  nystatin, 5 mL, Oral, 4x Daily  rivastigmine, 1 patch, Transdermal, Daily  sodium chloride, 10 mL, Intravenous, Q12H        Infusions dextrose 5 % and sodium chloride 0.9 %, 75 mL/hr, Last Rate: 75 mL/hr (05/16/25 0102)        PRN Medications   acetaminophen **OR** acetaminophen    [DISCONTINUED] senna-docusate sodium **AND** polyethylene glycol **AND** bisacodyl **AND** bisacodyl    Calcium Replacement - Follow Nurse / BPA Driven Protocol    dextrose    dextrose    glucagon (human recombinant)    glycerin    hydrALAZINE    Magnesium Cardiology Dose Replacement - Follow Nurse / BPA Driven Protocol    metoprolol tartrate    nitroglycerin    ondansetron    phenol    Phosphorus Replacement - Follow Nurse / BPA Driven Protocol    Potassium Replacement - Follow Nurse / BPA Driven Protocol    sodium chloride    sodium chloride    [Held by provider] sodium chloride     Physical Findings        Trending Physical   Appearance, NFPE    --  Edema  Generalized Edema: 2+ (Mild)    Arm, Left Edema: 3+ (Moderate) Arm, Right Edema: 2+ (Mild)                  Bowel Function Stool Output  Stool Unmeasured Occurrence: 1 (05/16/25 0600)  Bowel Incontinence: Yes (05/16/25 0600)  Stool  Amount: Large (05/16/25 0600)  Stool Consistency: loose, seedy, liquid (05/15/25 2100)  Perineal Care: perineum cleansed, absorbent brief/pad changed (05/16/25 0600)  Last Bowel Movement: 05/15/25   Chewing/Swallowing Teeth Status:Mouth/Teeth WDL: .WDL except, teeth Teeth Symptoms: tooth/teeth missing  Chewing/Swallowing Issues: No issues identified at this time   Skin Lines, Drains, Airways, & Wounds       Active LDAs       Name Placement date Placement time Site Days Last dressing change    Peripheral IV 05/10/25 1222 20 G Anterior;Right External Jugular 05/10/25  1222  External Jugular  3 05/10/25 1231 (72.04 hrs)    Urethral Catheter 16 Fr. 05/10/25  1525  -- 2                     --  Current Nutrition Orders & Evaluation of Intake       Oral Nutrition     Food Allergies None    Current PO Diet NPO Diet NPO Type: Tube Feeding   Supplement None    PO Evaluation     Trending % PO Intake NPO       Enteral Nutrition    Current EN Order Tube Feeding: Formula: Diabetisource AC (Glucerna 1.2); Feeding Type: Continuous; Start at: Other; Other: 0; Then Advance By: Do Not Advance; Water Flush: Other; Other: 250; Every: 6 hours; Water Bolus: None    Patient doesn't have any tube feeding modular orders    EN Route    EN Tolerance    EN Observation        Parenteral Nutrition    Current TPN Order    TPN Route    Lipids (mL/%/frequency)    MVI Frequency    Trace Element Frequency    Total # Days on TPN    TPN Observation      Nutrition Diagnosis         Nutrition Dx Problem 1    Underweight r/t dementia/COPD AEB BMI=22.3       Nutrition Dx Problem 2       Goal(s) Initiate EN      Intervention         Intervention  Await initiation of EN       Diet Prescription    Supplement Prescription         Enteral Prescription  Initiate Peptamen AF @ 20mL/hr and do not advance       TPN Prescription         Education Provided         Monitor/Evaluation        Monitor Per Protocol, PO Intake, Pertinent Labs, Weight, Skin Status, GI  Status, Symptoms, Swallow Function, and Hemodynamic Stability     RD Follow-up Encounter 1-2 days     Electronically signed by:  Mojgan Pascal RD  05/16/25 12:25 EDT

## 2025-05-16 NOTE — PROGRESS NOTES
[facility] HOSPITALIST    PROGRESS NOTE    Name:  Valarie Camara   Age:  79 y.o.  Sex:  female  :  1945  MRN:  [id number]   Visit Number:  [id number]  Admission Date:  5/10/2025  Date Of Service:  25  Primary Care Physician:  Lay Cox MD     LOS: 5 days :    Chief Complaint:      weakness    Subjective:    Patient resting comfortably.  Conversive today. States that she wants the tube in her nose removed. Tells me she wants to go home.   Unfortunately still unable to tolerate tube feeds.     Hospital Course:    Patient is an 79-year-old female brought to the emergency department for evaluation of altered mental status.  At the time of examination, unfortunately, patient was alone without family at bedside.  History of present illness was obtained from review of medical records, including discussion with nursing and ED physician.  Patient has a known history of dementia, however over the last 3 days, patient's daughter was concerned that the patient has developed significant confusion.  Patient has been unable to tolerate oral intake of nutrition and oral hydration.      EMS was called to patient's nursing facility, she was found to be hypotensive and tachycardic in A-fib with RVR.  They were unable to obtain IV access, subsequently unable to provide cardioversion en route to the ED.  Upon arrival to the ED, patient was emergently cardioverted, patient's rhythm with normal sinus rhythm, without any change in mentation.  Neurology was consulted, CT of the head did not show any acute CVA.  Cardiology was consulted, who feels that the elevated troponin is most likely secondary to the tachycardia and recommend troponin trending, anticoagulation.  Hospitalist services consulted for admission.     Discussion had with family and they request DNR with no chest compressions and no intubation, they approve all other interventions.     Patient had presented with  Opened in error   Pt has been removed from waitlist due to prior encounter, encephalopathy very high sodium.  Continues to be in goals of care discussion with patient and family.  Family meeting pending.  Sodium has been reluctant to recover still running about 160 despite hypotonic fluids nephrology is addressing this.  Otherwise urine is growing ESBL has been switched to Invanz.    Review of Systems:     All systems were reviewed and negative except as mentioned in subjective, assessment and plan.    Vital Signs:    Temp:  [97.3 °F (36.3 °C)-98.9 °F (37.2 °C)] 97.3 °F (36.3 °C)  Heart Rate:  [70-82] 70  Resp:  [16-18] 18  BP: (110-162)/(55-74) 131/67    Intake and output:    I/O last 3 completed shifts:  In: -   Out: 350 [Urine:350]  No intake/output data recorded.    Physical Examination: Examined again 5/16/25    General Appearance:  Alert and cooperative. NAD   Head:  Atraumatic and normocephalic.   Eyes: Conjunctivae and sclerae normal, no icterus. No pallor.   Throat: No oral lesions, no thrush, oral mucosa moist.   Neck: Supple, trachea midline, no thyromegaly.   Lungs:   Breath sounds heard bilaterally equally.  No wheezing or crackles.    Heart:  Normal S1 and S2, no murmur, No JVD.   Abdomen:   Normal bowel sounds, Soft, nontender, nondistended, no rebound tenderness.   Extremities: Supple, no edema, no cyanosis, no clubbing.   Skin: No bleeding or rash.   Neurologic: Alert and oriented x 2. No facial asymmetry.      Laboratory results:    Results from last 7 days   Lab Units 05/16/25  0715 05/15/25  2252 05/15/25  0705 05/14/25  0657 05/10/25  1437 05/10/25  1237   SODIUM mmol/L 142  --  140 144   < > 168*   POTASSIUM mmol/L 3.4* 3.9 3.4* 3.9   < > 4.1   CHLORIDE mmol/L 111*  --  108* 112*   < > 130*   CO2 mmol/L 21.0*  --  23.5 22.4   < > 18.5*   BUN mg/dL 41*  --  42* 47*   < > 127*   CREATININE mg/dL 1.62*  --  1.51* 1.53*   < > 3.96*   CALCIUM mg/dL 7.7*  --  7.6* 7.9*   < > 9.8   BILIRUBIN mg/dL  --   --   --   --   --  0.3   ALK PHOS U/L  --   --   --   --   --  57   ALT  (SGPT) U/L  --   --   --   --   --  7   AST (SGOT) U/L  --   --   --   --   --  10   GLUCOSE mg/dL 121*  --  113* 308*   < > 162*    < > = values in this interval not displayed.     Results from last 7 days   Lab Units 05/16/25  0715 05/15/25  0705 05/14/25  0657   WBC 10*3/mm3 5.90 6.75 5.70   HEMOGLOBIN g/dL 9.9* 10.8* 11.6*   HEMATOCRIT % 30.3* 32.8* 37.1   PLATELETS 10*3/mm3 97* 103* 110*     Results from last 7 days   Lab Units 05/10/25  1237   INR  1.25*     Results from last 7 days   Lab Units 05/10/25  1437 05/10/25  1237   HSTROP T ng/L 141* 162*     Results from last 7 days   Lab Units 05/10/25  2131 05/10/25  1315 05/10/25  1237   BLOODCX  No growth at 5 days No growth at 5 days  --    URINECX   --   --  >100,000 CFU/mL Escherichia coli ESBL*     Recent Labs     03/22/25  0610 05/12/25  0538   PHART 7.449 7.444   RQC0HBR 32.0* 33.5*   PO2ART 78.3* 81.2   [id number] 22.1 23.0   BASEEXCESS -1.2* -0.6*      I have reviewed the patient's laboratory results.    Radiology results:    No radiology results from the last 24 hrs    I have reviewed the patient's radiology reports.    Medication Review:     I have reviewed the patient's active and prn medications.     Problem List:      Hypernatremia    PAF (paroxysmal atrial fibrillation)    Acute renal failure superimposed on stage 3a chronic kidney disease      Assessment:    Severe Sepsis secondary to urinary tract infection  Urinary tract infection  Metabolic encephalopathy  Atrial fibrillation with RVR  Acute kidney injury  Hypernatremia  Dementia  COPD  Hypertension  Hyperlipidemia  Restless leg syndrome    Plan:    Severe Sepsis secondary to urinary tract infection  Urinary tract infection  Metabolic encephalopathy  Patient meets sepsis criteria with tachycardia, leukocytosis, source of infection identified as urinary tract infection.  Due to the sodium derangements, LR was bolused.  Evidence of endorgan damage noted with metabolic encephalopathy and acute  kidney injury.  IV Rocephin, switched to Invanz 5/12/25 due to ESBL in the urine  MRI no stroke  Grey placed on 5/10/25  Atrial fibrillation with RVR  Patient status post cardioversion.  Cardiology consulted, appreciate their recommendations.  Heparin drip will be started, continued  Patient is currently normal sinus rhythm, continue Coreg  Acute kidney injury  Acute hypernatremia  Sodium was normal 1 month prior.  Patient encephalopathic which may be symptoms related to hypernatremia.  Nephrology has been consulted, appreciate his recommendations.  Status post LR boluses, continue slow hypotonic fluids with dextrose  Serial sodium, if sodium is correcting greater than one half Mg per DL per hour will hold fluids  Nutrition  NGT placed 5/14  Patient complained of painful swallowing; possible thursh. Started empiric fluconazole  She also has a history of strictures; was recently dilated. Low suspicion this is the cause  Patient remains unable to tolerate tube feeds despite adding reglan.   Dementia  COPD  Hypertension  Hyperlipidemia  Restless leg syndrome  Continue home medications as appropriate.     Palliative care following. Daughter considering transition to comfort care.      DVT Prophylaxis: Heparin  Code Status: DNR/DNI  Diet: NPO; tube feeds  Discharge Plan: Pending    Ronny Lara DO  05/16/25  13:57 EDT    Dictated utilizing Dragon dictation.

## 2025-05-16 NOTE — PLAN OF CARE
"Goal Outcome Evaluation:      Palliative Care Team --Isaura MEYER visited pt this afternoon   Documentation revealed pt alert and interactive.  Hospice was discussed with pt which she was agreeable with.     Documentation revealed pt did not tolerated  TF yesterday and three episodes of nausea.  Isaura noted planned \"trickle feeds\" today.      Pt plans to return home with Hospice.  PC will continue to follow.                                      "

## 2025-05-16 NOTE — PROGRESS NOTES
"    [facility]     PALLIATIVE CARE FOLLOW UP NOTE    Name:  Valarie Camara   Age:  79 y.o.  Sex:  female  :  1945  MRN:  [id number]   Visit Number:  [id number]  Date Of Service:  25  Primary Care Physician:  Lay Cox MD    Chief Complaint: Weakness and debility    Interval History:  Patient seen today during palliative care team rounds.  Record reviewed and case discussed with staff, episodes of nausea x 3 yesterday.  Plan to try trickle feeds today.  Patient is alert and interactive today.  We discussed returning to home with hospice, which she is agreeable to.  At the present, continues to hope to have some interval improvements.   Patient would like to get out of bed today if she is able.        Review of Systems   Unable to perform ROS: Other          Pain Assessment  Preferred Pain Scale: FACES (Merrill-Baker FACES Pain Rating Scale)  Nonverbal Indicators of Pain: nonverbal indicators absent  CPOT Facial Expression: 0-->relaxed, neutral  CPOT Body Movements: 0-->absence of movements  CPOT Muscle Tension: 0-->relaxed  Ventilator Compliance/Vocalization: 0-->talking in normal tone or no sound  CPOT Score: 0  PAINAD Breathin-->normal  PAINAD Negative Vocalization: 1-->occasional moan/groan, low speech, negative/disapproving quality  PAINAD Facial Expression: 0-->smiling or inexpressive  PAINAD Body Language: 0-->relaxed  PAINAD Consolability: 0-->no need to console  PAINAD Score: 1  Vitals: /67 (BP Location: Left arm, Patient Position: Lying)   Pulse 70   Temp 97.3 °F (36.3 °C) (Axillary)   Resp 18   Ht 160 cm (63\")   Wt 65.5 kg (144 lb 6.4 oz)   SpO2 96%   BMI 25.58 kg/m²       Physical Exam  Vitals and nursing note reviewed.   Constitutional:       General: She is sleeping. She is not in acute distress.     Appearance: She is not diaphoretic.      Comments: Frail appearing, elderly female lying in bed, NAD   HENT:      Head: Normocephalic and " atraumatic.      Nose:      Comments: NG in place  Cardiovascular:      Rate and Rhythm: Normal rate and regular rhythm.   Pulmonary:      Effort: Pulmonary effort is normal. No respiratory distress.   Musculoskeletal:      Cervical back: Neck supple.      Comments: Appears deconditioned    Skin:     General: Skin is warm and dry.      Capillary Refill: Capillary refill takes less than 2 seconds.   Neurological:      Comments: Oriented to person/place   Psychiatric:      Comments: Calm affect, does not appear agitated or restless          Results Reviewed:    Intake/Output Summary (Last 24 hours) at 5/16/2025 1422  Last data filed at 5/15/2025 2100  Gross per 24 hour   Intake --   Output 350 ml   Net -350 ml     Results from last 7 days   Lab Units 05/16/25  0715 05/10/25  1437 05/10/25  1237   SODIUM mmol/L 142   < > 168*   POTASSIUM mmol/L 3.4*   < > 4.1   CHLORIDE mmol/L 111*   < > 130*   CO2 mmol/L 21.0*   < > 18.5*   BUN mg/dL 41*   < > 127*   CREATININE mg/dL 1.62*   < > 3.96*   CALCIUM mg/dL 7.7*   < > 9.8   BILIRUBIN mg/dL  --   --  0.3   ALK PHOS U/L  --   --  57   ALT (SGPT) U/L  --   --  7   AST (SGOT) U/L  --   --  10   GLUCOSE mg/dL 121*   < > 162*    < > = values in this interval not displayed.     Results from last 7 days   Lab Units 05/16/25  0715   WBC 10*3/mm3 5.90   HEMOGLOBIN g/dL 9.9*   HEMATOCRIT % 30.3*   PLATELETS 10*3/mm3 97*       Medication Review:   I have reviewed the patients active and prn medications.     Palliative Care Assessment:  Severe sepsis secondary to UTI  Metabolic encephalopathy  Atrial fibrillation with RVR  KURT  Dementia  Frailty  Dysphagia    Recommendations/Plan:  - GOC discussions yield desire to maintain plan of care as outlined per the primary medicine service, with goal to optimize condition  - Family desire to pursue an attempt at NG feeding, however if patient is unable to tolerate, would desire to defer/withdrawal; I discussed with Cleo and patient today, seems  that they would like to continue with plan in place and see if she can have some additional improvements with nutrition   - Considering transition to a comfort focused approach to her care, they reflect on patient's desire for for comfort and peace in the final stages of life  - Difficult to prognosticate given interventions in place, however it does appear patient is entering into the final stages of life   - Patient likely meets criteria for hospice; Will notify HCP regarding potential referral  - Continue diflucan and nystan for oral candidiasis, primary medicine service is in agreement  - Previously patient taking PPI with history of GERD and hiatal hernia, consider restarting Protonix  - Last BM 5/15  - Palliative care will continue to follow/support patient and family    CODE STATUS:   Code Status and Medical Interventions: No CPR (Do Not Attempt to Resuscitate); Limited Support; No intubation (DNI)   Ordered at: 05/10/25 1438     Code Status (Patient has no pulse and is not breathing):    No CPR (Do Not Attempt to Resuscitate)     Medical Interventions (Patient has pulse or is breathing):    Limited Support     Medical Intervention Limits:    No intubation (DNI)     Level Of Support Discussed With:    Patient         I spent 35 total minutes, including face to face assessment, record review, coordination of care with staff, and counseling patient and/or family  Part of this note may be an electronic transcription/translation of spoken language to printed text using the Dragon Dictation System.    Isaura Lyons PA-C  05/16/25  14:22 EDT

## 2025-05-16 NOTE — PROGRESS NOTES
[facility] Progress Note  [facility]. [place]        Patient Name: Valarie Camara  : 1945  MRN: [id number]   LOS: 5 days    Patient Care Team:  Lay Cox MD as PCP - General (Family Medicine)  Corbin Herrera DO ([facility])    Chief Complaint:    Chief Complaint   Patient presents with    Altered Mental Status     Primary Care Physician:  Lay Cox MD  Date of admission: 5/10/2025    Subjective     Interval History:   Follow-up acute kidney injury  hypernatremia  Patient has no interaction, patient had NG tube placed, it appears she has been gagging since she has NG tube and tube feedings were stopped.  She was unable to tolerate any tube feeding in the last 24 hours.  Ongoing discussions with palliative care/hospice with the family.  Events noted from last 24 hours.  I reviewed the chart and other providers notes, labs and procedures done since my last note.    Review of Systems:   unobtainable.    Objective     Vitals:   Temp:  [97.8 °F (36.6 °C)-98.9 °F (37.2 °C)] 97.8 °F (36.6 °C)  Heart Rate:  [73-82] 73  Resp:  [14-18] 18  BP: (110-151)/(55-74) 151/66    Intake/Output Summary (Last 24 hours) at 2025 0633  Last data filed at 5/15/2025 2100  Gross per 24 hour   Intake --   Output 350 ml   Net -350 ml       Physical Exam:    General Appearance: no acute distress   Skin: warm and dry  HEENT: oral mucosa normal, nonicteric sclera  Neck: supple, no JVD  Lungs: CTA  Heart: RRR, normal S1 and S2  Abdomen: obese, soft, nontender, non distended and positive bowel sounds.  : no palpable bladder  Extremities: Trace edema, no cyanosis or clubbing  Neuro: normal speech and mental status     Scheduled Meds:     Current Facility-Administered Medications   Medication Dose Route Frequency Provider Last Rate Last Admin    acetaminophen (TYLENOL) tablet 650 mg  650 mg Nasogastric Q4H PRN Ronny Lara DO         Or    acetaminophen (TYLENOL) suppository 650 mg  650 mg Rectal Q4H PRN Ronny Lara DO        apixaban (ELIQUIS) tablet 2.5 mg  2.5 mg Nasogastric Q12H Ronny Lara DO   2.5 mg at 05/15/25 2054    aspirin chewable tablet 81 mg  81 mg Nasogastric Daily Ronny Lara DO        atorvastatin (LIPITOR) tablet 80 mg  80 mg Nasogastric Daily Ronny Lara DO        polyethylene glycol (MIRALAX) packet 17 g  17 g Nasogastric Daily PRN Ronny Lara DO        And    bisacodyl (DULCOLAX) EC tablet 5 mg  5 mg Oral Daily PRN Ronny Laar DO        And    bisacodyl (DULCOLAX) suppository 10 mg  10 mg Rectal Daily PRN Ronny Lara DO        Calcium Replacement - Follow Nurse / BPA Driven Protocol   Not Applicable PRN Aki Rodriguez DO        carvedilol (COREG) tablet 12.5 mg  12.5 mg Nasogastric BID With Meals Ronny Lara DO   12.5 mg at 05/15/25 1835    dextrose (D50W) (25 g/50 mL) IV injection 25 g  25 g Intravenous Q15 Min PRN Aki Rodriguez DO        dextrose (GLUTOSE) oral gel 15 g  15 g Oral Q15 Min PRN Aki Rodriguez DO        dextrose 5 % and sodium chloride 0.9 % infusion  75 mL/hr Intravenous Continuous Ezequiel August MD 75 mL/hr at 05/16/25 0102 75 mL/hr at 05/16/25 0102    sennosides (SENOKOT) 8.8 MG/5ML syrup 10 mL  10 mL Nasogastric BID Aki Rodriguez DO        And    docusate sodium (COLACE) liquid 100 mg  100 mg Nasogastric BID Aki Rodriguez DO        ertapenem (INVanz) 500 mg in sodium chloride 0.9 % 50 mL IVPB  500 mg Intravenous Q24H Wil Rader  mL/hr at 05/15/25 1835 500 mg at 05/15/25 1835    glucagon (GLUCAGEN) injection 1 mg  1 mg Intramuscular Q15 Min PRN Aki Rodriguez DO        glycerin 35 % liquid 35% 2 spray  2 spray Mouth/Throat Q2H PRN Aki Rodriguez DO   2 spray at 05/12/25 2011    hydrALAZINE (APRESOLINE) injection 10 mg  10 mg Intravenous Q6H PRN RodriguezAki hancock DO   10 mg at 05/13/25 2218    hydrALAZINE (APRESOLINE) tablet 25 mg  25 mg Oral Q8H  Ortega Cabrales MD, FASN   25 mg at 05/16/25 0553    insulin regular (humuLIN R,novoLIN R) injection 2-7 Units  2-7 Units Subcutaneous Q6H Aki Rodriguez DO   4 Units at 05/14/25 1255    Magnesium Cardiology Dose Replacement - Follow Nurse / BPA Driven Protocol   Not Applicable PRN Aki Rodriguez DO        metoclopramide (REGLAN) injection 10 mg  10 mg Intravenous Q6H Ronny Lara DO   10 mg at 05/16/25 0242    metoprolol tartrate (LOPRESSOR) injection 5 mg  5 mg Intravenous Q4H PRN Wil Rader DO   5 mg at 05/12/25 0404    nitroglycerin (NITROSTAT) SL tablet 0.4 mg  0.4 mg Sublingual Q5 Min PRN Aki Rodriguez DO        nystatin (MYCOSTATIN) 100,000 unit/mL suspension 500,000 Units  5 mL Oral 4x Daily Isaura Lyons PA-C   500,000 Units at 05/15/25 2054    ondansetron (ZOFRAN) injection 4 mg  4 mg Intravenous Q6H PRN Ronny Lara DO   4 mg at 05/15/25 0126    Pharmacy to Dose Heparin   Not Applicable Continuous PRN Aki Rodriguez DO        phenol (CHLORASEPTIC) 1.4 % liquid 1 spray  1 spray Mouth/Throat Q2H PRN Ronny Lara DO        Phosphorus Replacement - Follow Nurse / BPA Driven Protocol   Not Applicable PRN Aki Rodriguez DO        Potassium Replacement - Follow Nurse / BPA Driven Protocol   Not Applicable PRN Aki Rodriguez DO        rivastigmine (EXELON) 4.6 MG/24HR patch 1 patch  1 patch Transdermal Daily Aki Rodriguez DO   1 patch at 05/15/25 1119    sodium chloride 0.9 % flush 10 mL  10 mL Intravenous PRN Danie Tolliver DO        sodium chloride 0.9 % flush 10 mL  10 mL Intravenous Q12H Aki Rodriguez DO   10 mL at 05/15/25 2054    sodium chloride 0.9 % flush 10 mL  10 mL Intravenous PRN Aki Rodriguez DO   10 mL at 05/12/25 0617    [Held by provider] sodium chloride 0.9 % infusion 40 mL  40 mL Intravenous PRN Aki Rodriguez DO           apixaban, 2.5 mg, Nasogastric, Q12H  aspirin, 81 mg, Nasogastric, Daily  atorvastatin, 80 mg, Nasogastric, Daily  carvedilol, 12.5 mg,  Nasogastric, BID With Meals  sennosides, 10 mL, Nasogastric, BID   And  docusate sodium, 100 mg, Nasogastric, BID  ertapenem, 500 mg, Intravenous, Q24H  hydrALAZINE, 25 mg, Oral, Q8H  insulin regular, 2-7 Units, Subcutaneous, Q6H  metoclopramide, 10 mg, Intravenous, Q6H  nystatin, 5 mL, Oral, 4x Daily  rivastigmine, 1 patch, Transdermal, Daily  sodium chloride, 10 mL, Intravenous, Q12H        IV Meds:   dextrose 5 % and sodium chloride 0.9 %, 75 mL/hr, Last Rate: 75 mL/hr (05/16/25 0102)  Pharmacy to Dose Heparin,         Results Reviewed:   I have personally reviewed the results from the time of this admission to 5/16/2025 06:33 EDT     Results from last 7 days   Lab Units 05/15/25  2252 05/15/25  0705 05/14/25  0657 05/13/25  0431 05/10/25  1437 05/10/25  1237   SODIUM mmol/L  --  140 144 158*   < > 168*   POTASSIUM mmol/L 3.9 3.4* 3.9 3.7   < > 4.1   CHLORIDE mmol/L  --  108* 112* 125*   < > 130*   CO2 mmol/L  --  23.5 22.4 21.8*   < > 18.5*   BUN mg/dL  --  42* 47* 62*   < > 127*   CREATININE mg/dL  --  1.51* 1.53* 1.75*   < > 3.96*   CALCIUM mg/dL  --  7.6* 7.9* 7.9*   < > 9.8   BILIRUBIN mg/dL  --   --   --   --   --  0.3   ALK PHOS U/L  --   --   --   --   --  57   ALT (SGPT) U/L  --   --   --   --   --  7   AST (SGOT) U/L  --   --   --   --   --  10   GLUCOSE mg/dL  --  113* 308* 214*   < > 162*    < > = values in this interval not displayed.       Estimated Creatinine Clearance: 27 mL/min (A) (by C-G formula based on SCr of 1.51 mg/dL (H)).    Results from last 7 days   Lab Units 05/15/25  0705 05/14/25  2257 05/14/25  0657 05/13/25 0431 05/12/25  1934 05/12/25 0411 05/11/25  0342   MAGNESIUM mg/dL  --   --   --  2.1  --  2.4 2.7*   PHOSPHORUS mg/dL 2.8 3.2 2.0* 2.5   < > 1.8* 4.3    < > = values in this interval not displayed.             Results from last 7 days   Lab Units 05/15/25  0705 05/14/25  0657 05/13/25 0431 05/12/25 0411 05/11/25  0053   WBC 10*3/mm3 6.75 5.70 5.89 6.63 9.11   HEMOGLOBIN  "g/dL 10.8* 11.6* 10.1* 12.4 11.5*   PLATELETS 10*3/mm3 103* 110* 122* 141 141       Results from last 7 days   Lab Units 05/10/25  1237   INR  1.25*       Brief Urine Lab Results  (Last result in the past 365 days)        Color   Clarity   Blood   Leuk Est   Nitrite   Protein   CREAT   Urine HCG        05/10/25 1237 Yellow   Cloudy   Trace   Moderate (2+)   Positive   30 mg/dL (1+)                   No results found for: \"UTPCR\"    Imaging Results (Last 24 Hours)       ** No results found for the last 24 hours. **                Assessment / Plan     ASSESSMENT:    Hypernatremia    PAF (paroxysmal atrial fibrillation)    Acute renal failure superimposed on stage 3a chronic kidney disease    Acute kidney injury: Most likely volume depletion, since she has been getting IV fluids it is starting to improve.  Bicarb and potassium appears to be stable increase serum sodium noted.  Starting to make urine.  Chronic kidney disease stage IIIb: Most likely will settle down to the baseline.  Hypernatremia: Secondary to free water losses and unable to eat and drink, she has about 5 L water deficit.  I will adjust hypotonic solutions.  Paroxysmal atrial fibrillation: Treated as per hospitalist service and cardiology.  Sepsis: Treated as per hospitalist service.  Metabolic encephalopathy: Significant uremia as well as dementia making things worse.        PLAN:  No significant improvement clinically, I would recommend hospice with comfort care only.  Electrolyte replacement per protocol.  Details were also discussed with the hospitalist service and or other providers as needed.  Also discussed with the palliative care about her poor prognosis, they are in discussion with the family about switching her to comfort care and hospice.  I will sign off please call if needed.  Continue with rest of the current treatment plan, and monitor with surveillance labs, adjust medication doses to the eGFR.  Further recommendations will depend on " clinical course of the patient during the current hospitalization.   I have reviewed the copied text to this note, it was edited and the changes made as needed.  It is accurate to the point, when the note was signed today.     Thank you for involving us in the care of Valarie Camara.  Please feel free to call with any questions.    Ortega Cabrales MD, FASN  05/16/25  06:33 EDT    [facility]  [phone]      Part of this note may be an electronic transcription/translation of spoken language to printed text using the Dragon Dictation System.

## 2025-05-16 NOTE — PLAN OF CARE
Goal Outcome Evaluation:                                             28 y/o M hx  Bipolar BIBA w c/o increase paranoia secondary to medication non compliance. States that he feels a warm sensation in his temporal area because the wires are all connected. States that he's being spied on by the [employer]. .  Denies SI/HI/AH/VH. Denies  falling , punching or kicking any objects. Denies pain, SOB, fever, chills, chest/ abdominal discomfort.   Denies recent use of  alcohol or illicit drugs. 28 y/o M hx PDD, Anxiety D/O BIBA w c/o increase paranoia secondary to medication non compliance. States that he feels a warm sensation in his temporal area because the wires are all connected. States that he's being spied on by the [employer]. .  Denies SI/HI/AH/VH. Denies  falling , punching or kicking any objects. Denies pain, SOB, fever, chills, chest/ abdominal discomfort.   Denies recent use of  alcohol or illicit drugs.

## 2025-05-16 NOTE — CASE MANAGEMENT/SOCIAL WORK
Case Management/Social Work    Patient Name:  Valarie Camara  YOB: 1945  MRN: [id number]  Admit Date:  5/10/2025        11:26 EDT   Discharge Plan       Row Name 05/16/25 1124       Plan    Plan DCP; Pending. Palliaitve care involved. GOC ongoing.    Plan Comments Spoke with daughter Cleo over the phone. IMM delivered at the time of this conversation.                        Electronically signed by:  Atif Zelaya RN  05/16/25 11:26 EDT

## 2025-05-17 LAB
ALBUMIN SERPL-MCNC: 2.2 G/DL (ref 3.5–5.2)
ANION GAP SERPL CALCULATED.3IONS-SCNC: 7.7 MMOL/L (ref 5–15)
BASOPHILS # BLD AUTO: 0.02 10*3/MM3 (ref 0–0.2)
BASOPHILS NFR BLD AUTO: 0.3 % (ref 0–1.5)
BUN SERPL-MCNC: 36 MG/DL (ref 8–23)
BUN/CREAT SERPL: 26.3 (ref 7–25)
CALCIUM SPEC-SCNC: 7.7 MG/DL (ref 8.6–10.5)
CHLORIDE SERPL-SCNC: 116 MMOL/L (ref 98–107)
CO2 SERPL-SCNC: 21.3 MMOL/L (ref 22–29)
CREAT SERPL-MCNC: 1.37 MG/DL (ref 0.57–1)
DEPRECATED RDW RBC AUTO: 46.4 FL (ref 37–54)
EGFRCR SERPLBLD CKD-EPI 2021: 39.1 ML/MIN/1.73
EOSINOPHIL # BLD AUTO: 0.08 10*3/MM3 (ref 0–0.4)
EOSINOPHIL NFR BLD AUTO: 1.3 % (ref 0.3–6.2)
ERYTHROCYTE [DISTWIDTH] IN BLOOD BY AUTOMATED COUNT: 14 % (ref 12.3–15.4)
GLUCOSE BLDC GLUCOMTR-MCNC: 126 MG/DL (ref 70–130)
GLUCOSE BLDC GLUCOMTR-MCNC: 141 MG/DL (ref 70–130)
GLUCOSE BLDC GLUCOMTR-MCNC: 151 MG/DL (ref 70–130)
GLUCOSE BLDC GLUCOMTR-MCNC: 155 MG/DL (ref 70–130)
GLUCOSE BLDC GLUCOMTR-MCNC: 182 MG/DL (ref 70–130)
GLUCOSE SERPL-MCNC: 133 MG/DL (ref 65–99)
HCT VFR BLD AUTO: 30.3 % (ref 34–46.6)
HGB BLD-MCNC: 9.7 G/DL (ref 12–15.9)
IMM GRANULOCYTES # BLD AUTO: 0.05 10*3/MM3 (ref 0–0.05)
IMM GRANULOCYTES NFR BLD AUTO: 0.8 % (ref 0–0.5)
LYMPHOCYTES # BLD AUTO: 1.04 10*3/MM3 (ref 0.7–3.1)
LYMPHOCYTES NFR BLD AUTO: 17 % (ref 19.6–45.3)
MCH RBC QN AUTO: 29.3 PG (ref 26.6–33)
MCHC RBC AUTO-ENTMCNC: 32 G/DL (ref 31.5–35.7)
MCV RBC AUTO: 91.5 FL (ref 79–97)
MONOCYTES # BLD AUTO: 0.61 10*3/MM3 (ref 0.1–0.9)
MONOCYTES NFR BLD AUTO: 10 % (ref 5–12)
NEUTROPHILS NFR BLD AUTO: 4.32 10*3/MM3 (ref 1.7–7)
NEUTROPHILS NFR BLD AUTO: 70.6 % (ref 42.7–76)
NRBC BLD AUTO-RTO: 0 /100 WBC (ref 0–0.2)
PHOSPHATE SERPL-MCNC: 2.3 MG/DL (ref 2.5–4.5)
PLATELET # BLD AUTO: 118 10*3/MM3 (ref 140–450)
PMV BLD AUTO: 12.5 FL (ref 6–12)
POTASSIUM SERPL-SCNC: 3.6 MMOL/L (ref 3.5–5.2)
POTASSIUM SERPL-SCNC: 3.8 MMOL/L (ref 3.5–5.2)
RBC # BLD AUTO: 3.31 10*6/MM3 (ref 3.77–5.28)
SODIUM SERPL-SCNC: 145 MMOL/L (ref 136–145)
WBC NRBC COR # BLD AUTO: 6.12 10*3/MM3 (ref 3.4–10.8)

## 2025-05-17 PROCEDURE — 63710000001 INSULIN REGULAR HUMAN PER 5 UNITS: Performed by: FAMILY MEDICINE

## 2025-05-17 PROCEDURE — 85025 COMPLETE CBC W/AUTO DIFF WBC: CPT | Performed by: FAMILY MEDICINE

## 2025-05-17 PROCEDURE — 82948 REAGENT STRIP/BLOOD GLUCOSE: CPT | Performed by: FAMILY MEDICINE

## 2025-05-17 PROCEDURE — 25010000002 ERTAPENEM PER 500 MG: Performed by: INTERNAL MEDICINE

## 2025-05-17 PROCEDURE — 82948 REAGENT STRIP/BLOOD GLUCOSE: CPT

## 2025-05-17 PROCEDURE — 80069 RENAL FUNCTION PANEL: CPT | Performed by: INTERNAL MEDICINE

## 2025-05-17 PROCEDURE — 25010000002 METOCLOPRAMIDE PER 10 MG: Performed by: INTERNAL MEDICINE

## 2025-05-17 PROCEDURE — 25810000003 DEXTROSE 5 % AND SODIUM CHLORIDE 0.9 % 5-0.9 % SOLUTION: Performed by: INTERNAL MEDICINE

## 2025-05-17 PROCEDURE — 99232 SBSQ HOSP IP/OBS MODERATE 35: CPT | Performed by: INTERNAL MEDICINE

## 2025-05-17 PROCEDURE — 97162 PT EVAL MOD COMPLEX 30 MIN: CPT

## 2025-05-17 PROCEDURE — 84132 ASSAY OF SERUM POTASSIUM: CPT | Performed by: INTERNAL MEDICINE

## 2025-05-17 RX ORDER — POTASSIUM CHLORIDE 1.5 G/1.58G
20 POWDER, FOR SOLUTION ORAL ONCE
Status: COMPLETED | OUTPATIENT
Start: 2025-05-17 | End: 2025-05-17

## 2025-05-17 RX ORDER — PANTOPRAZOLE SODIUM 40 MG/1
40 TABLET, DELAYED RELEASE ORAL
Status: DISCONTINUED | OUTPATIENT
Start: 2025-05-17 | End: 2025-05-17

## 2025-05-17 RX ORDER — LANSOPRAZOLE 30 MG/1
30 TABLET, ORALLY DISINTEGRATING, DELAYED RELEASE ORAL
Status: DISCONTINUED | OUTPATIENT
Start: 2025-05-17 | End: 2025-05-23 | Stop reason: HOSPADM

## 2025-05-17 RX ADMIN — POTASSIUM CHLORIDE 20 MEQ: 1.5 POWDER, FOR SOLUTION ORAL at 10:06

## 2025-05-17 RX ADMIN — ATORVASTATIN CALCIUM 80 MG: 80 TABLET, FILM COATED ORAL at 10:05

## 2025-05-17 RX ADMIN — METOCLOPRAMIDE 10 MG: 5 INJECTION, SOLUTION INTRAMUSCULAR; INTRAVENOUS at 14:36

## 2025-05-17 RX ADMIN — INSULIN HUMAN 2 UNITS: 100 INJECTION, SOLUTION PARENTERAL at 01:27

## 2025-05-17 RX ADMIN — SODIUM CHLORIDE 500 MG: 9 INJECTION, SOLUTION INTRAVENOUS at 19:26

## 2025-05-17 RX ADMIN — CARVEDILOL 12.5 MG: 12.5 TABLET, FILM COATED ORAL at 10:02

## 2025-05-17 RX ADMIN — LANSOPRAZOLE 30 MG: 30 TABLET, ORALLY DISINTEGRATING ORAL at 14:35

## 2025-05-17 RX ADMIN — METOCLOPRAMIDE 10 MG: 5 INJECTION, SOLUTION INTRAMUSCULAR; INTRAVENOUS at 10:06

## 2025-05-17 RX ADMIN — Medication 10 ML: at 21:42

## 2025-05-17 RX ADMIN — DEXTROSE AND SODIUM CHLORIDE 75 ML/HR: 5; 900 INJECTION, SOLUTION INTRAVENOUS at 06:41

## 2025-05-17 RX ADMIN — HYDRALAZINE HYDROCHLORIDE 25 MG: 25 TABLET ORAL at 05:16

## 2025-05-17 RX ADMIN — RIVASTIGMINE 1 PATCH: 4.6 PATCH TRANSDERMAL at 10:09

## 2025-05-17 RX ADMIN — Medication 10 ML: at 10:06

## 2025-05-17 RX ADMIN — INSULIN HUMAN 2 UNITS: 100 INJECTION, SOLUTION PARENTERAL at 23:51

## 2025-05-17 RX ADMIN — APIXABAN 2.5 MG: 2.5 TABLET, FILM COATED ORAL at 10:02

## 2025-05-17 RX ADMIN — HYDRALAZINE HYDROCHLORIDE 25 MG: 25 TABLET ORAL at 14:36

## 2025-05-17 RX ADMIN — DOCUSATE SODIUM 100 MG: 50 LIQUID ORAL at 10:04

## 2025-05-17 RX ADMIN — SENNOSIDES 10 ML: 8.8 LIQUID ORAL at 10:03

## 2025-05-17 RX ADMIN — ASPIRIN 81 MG CHEWABLE TABLET 81 MG: 81 TABLET CHEWABLE at 10:03

## 2025-05-17 RX ADMIN — METOCLOPRAMIDE 10 MG: 5 INJECTION, SOLUTION INTRAMUSCULAR; INTRAVENOUS at 04:17

## 2025-05-17 RX ADMIN — METOCLOPRAMIDE 10 MG: 5 INJECTION, SOLUTION INTRAMUSCULAR; INTRAVENOUS at 21:42

## 2025-05-17 NOTE — THERAPY EVALUATION
Patient Name: Valarie Camara  : 1945    MRN: [id number]                              Today's Date: 2025       Admit Date: 5/10/2025    Visit Dx:     ICD-10-CM ICD-9-CM   1. Hypernatremia  E87.0 276.0   2. Atrial fibrillation with rapid ventricular response  I48.91 427.31   3. Hyperthyroidism  E05.90 242.90   4. KURT (acute kidney injury)  N17.9 584.9     Patient Active Problem List   Diagnosis    Atopic rhinitis    Arthritis    Chronic neck pain    Anxiety and depression    Gastroesophageal reflux disease without esophagitis    Dyslipidemia    Vitamin D deficiency    Benign essential hypertension    Obesity (BMI 30-39.9)    History of cataract    History of osteoporosis    History of restless legs syndrome    History of rheumatoid arthritis    Elevated serum creatinine    Esophageal dysphagia    Constipation    Schatzki's ring    Gastritis without bleeding    History of Helicobacter pylori infection    Duodenitis    Right hemiparesis    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    Multiple thyroid nodules    Lacunar stroke    Positive fecal occult blood test    Left foot pain    Hypomagnesemia    Acute on chronic anemia    Reflux esophagitis    Irritable bowel syndrome with constipation    Hypertensive urgency    Pulmonary emboli    Age-related physical debility    Physical deconditioning    Complicated UTI (urinary tract infection)    Late effects of CVA (cerebrovascular accident)    Impaired mobility and ADLs    Type 2 diabetes mellitus with hyperglycemia, with long-term current use of insulin    Mild vascular dementia without behavioral disturbance, psychotic disturbance, mood disturbance, or anxiety    Hypernatremia    PAF (paroxysmal atrial fibrillation)    Acute renal failure superimposed on stage 3a chronic kidney disease     Past Medical History:   Diagnosis Date    Acute bronchitis with bronchospasm     Anxiety     Arthritis     Asthma     B12 deficiency      "Back pain     Body piercing     ears    Cataract     Cerebrovascular disease     Cervicalgia     Chronic kidney disease     stage 3b    Colonic polyp     History of colonic polyps     Constipation     COPD (chronic obstructive pulmonary disease)     Coronary artery disease     Depression     Diverticulitis     Dysphagia     Patient reported \"it won't go all the way down\" when eating solid foods first thing in the mornings    Edema     Elevated cholesterol     Esophageal reflux     Folic acid deficiency     Full dentures     Advised no adhesives DOS    Heart murmur     Herpes zoster     High cholesterol     History of kidney infection     History of recurrent urinary tract infection     HTN (hypertension)     Hypercholesterolemia     Impaired functional mobility, balance, gait, and endurance     Insomnia     Kidney infection     Malignant hypertension 04/26/2015     Accelerated essential hypertension    Measles     rubeola    Muscle spasm     Neoplasm of uncertain behavior of skin     dtr denies    Osteoporosis     Poor historian     Recurrent urinary tract infection     Rheumatoid arthritis     RLS (restless legs syndrome)     Stomach ulcer     Stroke     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    Type 2 diabetes mellitus     Vitamin D deficiency      Past Surgical History:   Procedure Laterality Date    CATARACT EXTRACTION WITH INTRAOCULAR LENS IMPLANT Left 02/11/2013    CATARACT EXTRACTION WITH INTRAOCULAR LENS IMPLANT Right 04/15/2013    COLONOSCOPY  2012    COLONOSCOPY N/A 11/26/2019    Procedure: COLONOSCOPY;  Surgeon: Chidi Downing MD;  Location: [facility] ENDOSCOPY;  Service: Gastroenterology    ENDOSCOPY N/A 11/13/2017    Procedure: ESOPHAGOGASTRODUODENOSCOPY with biopsies and esophageal balloon dilitation;  Surgeon: Wesley Stovall MD;  Location: [facility] ENDOSCOPY;  Service:     ENDOSCOPY N/A 11/25/2019    Procedure: ESOPHAGOGASTRODUODENOSCOPY;  Surgeon: Chidi Downing, " "MD;  Location: [facility] ENDOSCOPY;  Service: Gastroenterology    ENDOSCOPY N/A 11/29/2021    Procedure: ESOPHAGOGASTRODUODENOSCOPY with dilation and biopsies;  Surgeon: Gonzalez Zapata MD;  Location: [facility] ENDOSCOPY;  Service: Gastroenterology;  Laterality: N/A;    ENDOSCOPY N/A 11/1/2023    Procedure: ESOPHAGOGASTRODUODENOSCOPY WITH BIOPSY AND DILATATION;  Surgeon: Gonzalez Zapata MD;  Location: [facility] ENDOSCOPY;  Service: Gastroenterology;  Laterality: N/A;    ENDOSCOPY N/A 1/27/2025    Procedure: Esophagogastroduodenoscopy with dilatation and biopsies;  Surgeon: Gonzalez Zapata MD;  Location: [facility] ENDOSCOPY;  Service: Gastroenterology;  Laterality: N/A;    HYSTERECTOMY  1979    UPPER GASTROINTESTINAL ENDOSCOPY  12/09/2013    UPPER GASTROINTESTINAL ENDOSCOPY  11/13/2017      General Information       Row Name 05/17/25 1525          Physical Therapy Time and Intention    Document Type evaluation  -TW     Mode of Treatment physical therapy  -       Row Name 05/17/25 1525          General Information    Patient Profile Reviewed yes  -TW     Prior Level of Function --  It is difficult to understand pt's speech and no family present. Pt did walk some with rollator at home but had not been walking for awhile due to weakness. Has been sponge bathing.  -TW     Existing Precautions/Restrictions fall;cardiac  -TW     Barriers to Rehab medically complex;previous functional deficit;cognitive status  -TW       Row Name 05/17/25 1525          Living Environment    Current Living Arrangements home  -TW     People in Home child(sindy), adult  -TW       Row Name 05/17/25 1525          Home Main Entrance    Number of Stairs, Main Entrance other (see comments)  -TW       Row Name 05/17/25 1525          Stairs Within Home, Primary    Stairs, Within Home, Primary It wasn't clearly stated but pt did state, \"3\" when asked if there are steps to enter her home.  -TW     Stair Railings, Within Home, Primary none  -TW  "      Row Name 05/17/25 1525          Cognition    Orientation Status (Cognition) oriented to;person;verbal cues/prompts needed for orientation;unable/difficult to assess;place  -TW       Row Name 05/17/25 1525          Safety Issues/Impairments Affecting Functional Mobility    Safety Issues Affecting Function (Mobility) friction/shear risk;safety precaution awareness;safety precautions follow-through/compliance;insight into deficits/self-awareness;sequencing abilities;positioning of assistive device  -TW     Impairments Affecting Function (Mobility) balance;cognition;endurance/activity tolerance;pain;postural/trunk control;strength;visual/perceptual  -TW     Cognitive Impairments, Mobility Safety/Performance insight into deficits/self-awareness;safety precaution awareness;safety precaution follow-through;sequencing abilities  -TW               User Key  (r) = Recorded By, (t) = Taken By, (c) = Cosigned By      Initials Name Provider Type    TW Kim Roche, PT Physical Therapist                   Mobility       Row Name 05/17/25 1525          Bed Mobility    Bed Mobility supine-sit  -TW     Supine-Sit [place] (Bed Mobility) minimum assist (75% patient effort);verbal cues  -TW     Assistive Device (Bed Mobility) head of bed elevated  -TW     Comment, (Bed Mobility) pt did assist in getting BLE over EOB and with pushing up with LUE to come into sitting position. For first 10-15 sec pt had c/o dizziness and needed CGA for sitting on EOB but then was able to maintain sitting in midline with SBA.  -       Row Name 05/17/25 1525          Transfers    Comment, (Transfers) When coming to stand with FWW and mod assist, it was noted that pt had loose BM coming out of leg of pull up. Pt sat back on bed and change prepared. Pt had one person to assist with sit to stand with min assist with FWW and for maintaining stand while another cleaned pt and donned new brief.  -       Row Name 05/17/25 1523          Bed-Chair  "Transfer    Bed-Chair [place] (Transfers) moderate assist (50% patient effort);verbal cues  -TW     Assistive Device (Bed-Chair Transfers) walker, front-wheeled  -TW     Comment, (Bed-Chair Transfer) assist to pivot the walker and pt had a very forward standing posture but was able to progress both LEs to pivot to chair. Cues to reach back prior to sitting down. As soon as pt in chair, she shut her eyes and stated, \"It feels good to get up.\"  -       Row Name 05/17/25 1525          Sit-Stand Transfer    Sit-Stand [place] (Transfers) minimum assist (75% patient effort);moderate assist (50% patient effort);verbal cues  -TW     Assistive Device (Sit-Stand Transfers) walker, front-wheeled  -TW     Comment, (Sit-Stand Transfer) mod assist x 1 and then min assist for 2 more sit to stands  -       Row Name 05/17/25 1525          Gait/Stairs (Locomotion)    [place] Level (Gait) not tested  -TW     Patient was able to Ambulate no, other medical factors prevent ambulation  -TW     Reason Patient was unable to Ambulate Excessive Weakness;Other (Comment)  low functional endurance.  -TW               User Key  (r) = Recorded By, (t) = Taken By, (c) = Cosigned By      Initials Name Provider Type    TW Kim Roche PT Physical Therapist                   Obj/Interventions       Row Name 05/17/25 1525          Range of Motion Comprehensive    Comment, General Range of Motion BLE grossly WFLs  -[facility] Name 05/17/25 1525          Strength Comprehensive (MMT)    Comment, General Manual Muscle Testing (MMT) Assessment BLE grossly 2+/5 to 3/5. Pt is slow to follow directions for isolated BLE movement.  -       Row Name 05/17/25 1525          Motor Skills    Motor Skills posture;other (see comments)  pt sits on EOB with a kyphotic posure and head down unless cued to look up.  -       Row Name 05/17/25 1525          Balance    Balance Assessment sitting static balance;sitting dynamic balance;standing static " balance;standing dynamic balance  -TW     Static Sitting Balance standby assist;contact guard  -TW     Dynamic Sitting Balance standby assist;contact guard  -TW     Position, Sitting Balance supported;unsupported;sitting edge of bed  -TW     Static Standing Balance minimal assist  -TW     Dynamic Standing Balance moderate assist  -TW     Position/Device Used, Standing Balance supported;walker, rolling  -TW               User Key  (r) = Recorded By, (t) = Taken By, (c) = Cosigned By      Initials Name Provider Type    TW Kim Roche, PT Physical Therapist                   Goals/Plan       Row Name 05/17/25 1525          Bed Mobility Goal 1 (PT)    Activity/Assistive Device (Bed Mobility Goal 1, PT) bed mobility activities, all  -TW     [place] Level/Cues Needed (Bed Mobility Goal 1, PT) verbal cues required;contact guard required  -TW     Time Frame (Bed Mobility Goal 1, PT) long term goal (LTG);1 week  -TW     Progress/Outcomes (Bed Mobility Goal 1, PT) goal ongoing  -TW       Row Name 05/17/25 1525          Transfer Goal 1 (PT)    Activity/Assistive Device (Transfer Goal 1, PT) sit-to-stand/stand-to-sit;bed-to-chair/chair-to-bed;toilet;walker, rolling  -TW     [place] Level/Cues Needed (Transfer Goal 1, PT) contact guard required;verbal cues required  -TW     Time Frame (Transfer Goal 1, PT) long term goal (LTG);1 week  -TW     Progress/Outcome (Transfer Goal 1, PT) goal ongoing  -TW       Row Name 05/17/25 1525          Patient Education Goal (PT)    Activity (Patient Education Goal, PT) BLE ther ex 2 x 5  -TW     [place]/Cues/Accuracy (Memory Goal 2, PT) demonstrates adequately  -TW     Time Frame (Patient Education Goal, PT) long term goal (LTG);1 week  -TW     Progress/Outcome (Patient Education Goal, PT) goal ongoing  -TW               User Key  (r) = Recorded By, (t) = Taken By, (c) = Cosigned By      Initials Name Provider Type    TW Kim Roche, PT Physical Therapist                    "Clinical Impression       Row Name 25 1525          Pain    Pretreatment Pain Rating 5/10  -TW     Posttreatment Pain Rating 5/10  -TW     Pain Location extremity  -TW     Pain Side/Orientation bilateral;lower  -TW     Pre/Posttreatment Pain Comment Pt c/o pain in BLE pain. When asked to rate and 0-10 scale provided the pt replied, \"In the middle\"  -TW       Row Name 25 1525          Plan of Care Review    Plan of Care Reviewed With patient  -TW     Outcome Evaluation PT evaluation completed this date with pt presenting at 30 degree incline in bed with NG tube feeding going. Pt was oriented to herself, , but not her age and if cued she could state she was in [facility]. Pt did have c/o pain in BLE that she rated 5/10. At times it was difficult to understand pt's speech, but pt clearly expressed a desire to get OOB to the chair. Pt needed min assist and cues to come to sit on L side of bed with HOB raised. Pt initally had some dizziness and needed CGA for sitting on EOB. After 10-15 sec, pt was able to sit with close SBA only. Pt performed 3 sit to stand transfers with FWW from EOB. Pt needed mod assist for first sit to stand. For next two sit to stand transfers, pt needed min assist with cues to push up with LUE. Pt has a forward flexed posture in standing and with min assist was able to pivot to the chair. Once in the chair pt closed her eyes and expressed happiness of being OOB. Pt positioned for comfort with chair alarm activated. Pt presents with deficits in strength, balance, and endurance. Pt is currently being followed by Palliative Care and PT goals will be set to ensure pt is safe with bed mobility and transfers for d/c home with her family.  -TW       Row Name 25 1525          Therapy Assessment/Plan (PT)    Rehab Potential (PT) fair  -TW     Criteria for Skilled Interventions Met (PT) yes;meets criteria  -TW     Therapy Frequency (PT) 3 times/wk  -TW     Predicted Duration of " Therapy Intervention (PT) 1 wk  -TW       Row Name 05/17/25 1525          Vital Signs    O2 Delivery Pre Treatment room air  -TW       Row Name 05/17/25 1525          Positioning and Restraints    Pre-Treatment Position in bed  -TW     Post Treatment Position chair  -TW     In Chair notified nsg;reclined;call light within reach;encouraged to call for assist;exit alarm on  -TW               User Key  (r) = Recorded By, (t) = Taken By, (c) = Cosigned By      Initials Name Provider Type    Kim Valdes PT Physical Therapist                   Outcome Measures       Row Name 05/17/25 1525 05/17/25 0800       How much help from another person do you currently need...    Turning from your back to your side while in flat bed without using bedrails? 2  -TW 2  -KJ    Moving from lying on back to sitting on the side of a flat bed without bedrails? 2  -TW 2  -KJ    Moving to and from a bed to a chair (including a wheelchair)? 2  -TW 1  -KJ    Standing up from a chair using your arms (e.g., wheelchair, bedside chair)? 3  -TW 1  -KJ    Climbing 3-5 steps with a railing? 1  -TW 1  -KJ    To walk in hospital room? 1  -TW 1  -KJ    AM-PAC 6 Clicks Score (PT) 11  -TW 8  -KJ    Highest Level of Mobility Goal Move to Chair/Commode-4  -TW Sit at Edge of Bed-3  -KJ      Row Name 05/17/25 1525          Functional Assessment    Outcome Measure Options AM-PAC 6 Clicks Basic Mobility (PT)  -TW               User Key  (r) = Recorded By, (t) = Taken By, (c) = Cosigned By      Initials Name Provider Type    Kim Valdes, PT Physical Therapist    Karen Mann, RN Registered Nurse                                 Physical Therapy Education       Title: PT OT SLP Therapies (In Progress)       Topic: Physical Therapy (In Progress)       Point: Mobility training (Done)       Learning Progress Summary            Patient Acceptance EKARLA by MIKA at 5/17/2025 1525    Comment: Pt education for purpose of PT evaluation and pt verbalized  happiness of getting OOB.                      Point: Home exercise program (Not Started)       Learner Progress:  Not documented in this visit.              Point: Body mechanics (Done)       Learning Progress Summary            Patient Acceptance, E,TB, VU,NR by OMI at 2025 0831                      Point: Precautions (Not Started)       Learner Progress:  Not documented in this visit.                              User Key       Initials Effective Dates Name Provider Type Discipline     21 -  Kim Roche, PT Physical Therapist PT    OMI 24 -  Fide Baker, RN Registered Nurse Nurse                  PT Recommendation and Plan     Outcome Evaluation: PT evaluation completed this date with pt presenting at 30 degree incline in bed with NG tube feeding going. Pt was oriented to herself, , but not her age and if cued she could state she was in [facility]. Pt did have c/o pain in BLE that she rated 5/10. At times it was difficult to understand pt's speech, but pt clearly expressed a desire to get OOB to the chair. Pt needed min assist and cues to come to sit on L side of bed with HOB raised. Pt initally had some dizziness and needed CGA for sitting on EOB. After 10-15 sec, pt was able to sit with close SBA only. Pt performed 3 sit to stand transfers with FWW from EOB. Pt needed mod assist for first sit to stand. For next two sit to stand transfers, pt needed min assist with cues to push up with LUE. Pt has a forward flexed posture in standing and with min assist was able to pivot to the chair. Once in the chair pt closed her eyes and expressed happiness of being OOB. Pt positioned for comfort with chair alarm activated. Pt presents with deficits in strength, balance, and endurance. Pt is currently being followed by Palliative Care and PT goals will be set to ensure pt is safe with bed mobility and transfers for d/c home with her family.     Time Calculation:   PT Evaluation  Complexity  History, PT Evaluation Complexity: 3 or more personal factors and/or comorbidities  Examination of Body Systems (PT Eval Complexity): total of 4 or more elements  Clinical Presentation (PT Evaluation Complexity): evolving  Clinical Decision Making (PT Evaluation Complexity): moderate complexity  Overall Complexity (PT Evaluation Complexity): moderate complexity     PT Charges       Row Name 05/17/25 1525             Time Calculation    Stop Time 1525  -TW      PT Received On 05/17/25 -TW      PT Goal Re-Cert Due Date 05/24/25 -TW                User Key  (r) = Recorded By, (t) = Taken By, (c) = Cosigned By      Initials Name Provider Type    TW Kim Roche, PT Physical Therapist                  Therapy Charges for Today       Code Description Service Date Service Provider Modifiers Qty    84230769201 HC PT EVAL MOD COMPLEXITY 3 5/17/2025 Kim Roche PT GP 1            PT G-Codes  Outcome Measure Options: AM-PAC 6 Clicks Basic Mobility (PT)  AM-PAC 6 Clicks Score (PT): 11  PT Discharge Summary  Anticipated Discharge Disposition (PT): home with 24/7 care    Kim Roche PT  5/17/2025

## 2025-05-17 NOTE — PLAN OF CARE
Goal Outcome Evaluation:      VSS, pt maintaining sats >90% on RA, and pt NSR on telemetry att. Medications and fluids administered per orders. FSBS monitored per orders. Tube feeding administered per orders. Pt worked with PT during shift. Discharge is pending.

## 2025-05-17 NOTE — PLAN OF CARE
Goal Outcome Evaluation:  Plan of Care Reviewed With: patient           Outcome Evaluation: PT evaluation completed this date with pt presenting at 30 degree incline in bed with NG tube feeding going. Pt was oriented to herself, , but not her age and if cued she could state she was in [facility]. Pt did have c/o pain in BLE that she rated 5/10. At times it was difficult to understand pt's speech, but pt clearly expressed a desire to get OOB to the chair. Pt needed min assist and cues to come to sit on L side of bed with HOB raised. Pt initally had some dizziness and needed CGA for sitting on EOB. After 10-15 sec, pt was able to sit with close SBA only. Pt performed 3 sit to stand transfers with FWW from EOB. Pt needed mod assist for first sit to stand. For next two sit to stand transfers, pt needed min assist with cues to push up with LUE. Pt has a forward flexed posture in standing and with min assist was able to pivot to the chair. Once in the chair pt closed her eyes and expressed happiness of being OOB. Pt positioned for comfort with chair alarm activated. Pt presents with deficits in strength, balance, and endurance. Pt is currently being followed by Palliative Care and PT goals will be set to ensure pt is safe with bed mobility and transfers for d/c home with her family.    Anticipated Discharge Disposition (PT): home with 24/7 care

## 2025-05-17 NOTE — PROGRESS NOTES
[facility]    Name:  Valarie Camara   Age:  79 y.o.  Sex:  female  :  1945  MRN:  [id number]   Visit Number:  [id number]  Admission Date:  5/10/2025  Date Of Service:  25  Primary Care Physician:  Lay Cox MD     LOS: 6 days :    Chief Complaint:      weakness    Subjective:    Patient resting comfortably.  Awake. Answers questions. No family present at the time of my exam. Seemed to tolerate tube feeds overnight. Will continue as able.     Hospital Course:    Patient is an 79-year-old female brought to the emergency department for evaluation of altered mental status.  At the time of examination, unfortunately, patient was alone without family at bedside.  History of present illness was obtained from review of medical records, including discussion with nursing and ED physician.  Patient has a known history of dementia, however over the last 3 days, patient's daughter was concerned that the patient has developed significant confusion.  Patient has been unable to tolerate oral intake of nutrition and oral hydration.      EMS was called to patient's nursing facility, she was found to be hypotensive and tachycardic in A-fib with RVR.  They were unable to obtain IV access, subsequently unable to provide cardioversion en route to the ED.  Upon arrival to the ED, patient was emergently cardioverted, patient's rhythm with normal sinus rhythm, without any change in mentation.  Neurology was consulted, CT of the head did not show any acute CVA.  Cardiology was consulted, who feels that the elevated troponin is most likely secondary to the tachycardia and recommend troponin trending, anticoagulation.  Hospitalist services consulted for admission.     Discussion had with family and they request DNR with no chest compressions and no intubation, they approve all other interventions.     Patient had presented with encephalopathy very high  sodium.  Continues to be in goals of care discussion with patient and family.  Family meeting pending.  Sodium has been reluctant to recover still running about 160 despite hypotonic fluids nephrology is addressing this.  Otherwise urine is growing ESBL has been switched to Invanz.    Review of Systems:     All systems were reviewed and negative except as mentioned in subjective, assessment and plan.    Vital Signs:    Temp:  [97.3 °F (36.3 °C)-98.1 °F (36.7 °C)] 98.1 °F (36.7 °C)  Heart Rate:  [70-75] 75  Resp:  [16-18] 16  BP: (131-172)/(58-68) 150/62    Intake and output:    I/O last 3 completed shifts:  In: 80 [Other:60; NG/GT:20]  Out: 350 [Urine:350]  No intake/output data recorded.    Physical Examination: Examined again 5/17/25    General Appearance:  Alert and cooperative. NAD   Head:  Atraumatic and normocephalic.   Eyes: Conjunctivae and sclerae normal, no icterus. No pallor.   Throat: No oral lesions, no thrush, oral mucosa moist.   Neck: Supple, trachea midline, no thyromegaly.   Lungs:   Breath sounds heard bilaterally equally.  No wheezing or crackles.    Heart:  Normal S1 and S2, no murmur, No JVD.   Abdomen:   Normal bowel sounds, Soft, nontender, nondistended, no rebound tenderness.   Extremities: Supple, no edema, no cyanosis, no clubbing.   Skin: No bleeding or rash.   Neurologic: Alert and oriented x 2. No facial asymmetry.      Laboratory results:    Results from last 7 days   Lab Units 05/17/25  0613 05/16/25  1524 05/16/25  0715 05/15/25  2252 05/15/25  0705 05/10/25  1437 05/10/25  1237   SODIUM mmol/L 145  --  142  --  140   < > 168*   POTASSIUM mmol/L 3.6 4.1 3.4*   < > 3.4*   < > 4.1   CHLORIDE mmol/L 116*  --  111*  --  108*   < > 130*   CO2 mmol/L 21.3*  --  21.0*  --  23.5   < > 18.5*   BUN mg/dL 36*  --  41*  --  42*   < > 127*   CREATININE mg/dL 1.37*  --  1.62*  --  1.51*   < > 3.96*   CALCIUM mg/dL 7.7*  --  7.7*  --  7.6*   < > 9.8   BILIRUBIN mg/dL  --   --   --   --   --   --   0.3   ALK PHOS U/L  --   --   --   --   --   --  57   ALT (SGPT) U/L  --   --   --   --   --   --  7   AST (SGOT) U/L  --   --   --   --   --   --  10   GLUCOSE mg/dL 133*  --  121*  --  113*   < > 162*    < > = values in this interval not displayed.     Results from last 7 days   Lab Units 05/17/25  0613 05/16/25  0715 05/15/25  0705   WBC 10*3/mm3 6.12 5.90 6.75   HEMOGLOBIN g/dL 9.7* 9.9* 10.8*   HEMATOCRIT % 30.3* 30.3* 32.8*   PLATELETS 10*3/mm3 118* 97* 103*     Results from last 7 days   Lab Units 05/10/25  1237   INR  1.25*     Results from last 7 days   Lab Units 05/10/25  1437 05/10/25  1237   HSTROP T ng/L 141* 162*     Results from last 7 days   Lab Units 05/10/25  2131 05/10/25  1315 05/10/25  1237   BLOODCX  No growth at 5 days No growth at 5 days  --    URINECX   --   --  >100,000 CFU/mL Escherichia coli ESBL*     Recent Labs     03/22/25  0610 05/12/25  0538   PHART 7.449 7.444   IBB6JWZ 32.0* 33.5*   PO2ART 78.3* 81.2   PON4AIS 22.1 23.0   BASEEXCESS -1.2* -0.6*      I have reviewed the patient's laboratory results.    Radiology results:    XR Abdomen KUB  Result Date: 5/16/2025  FINAL REPORT TECHNIQUE: null CLINICAL HISTORY: Verification of NG tube placement COMPARISON: null FINDINGS: 1 view abdomen Comparison: CT/SR - CT ABDOMEN PELVIS WO CONTRAST - 3/15/25 05:36 EDT Findings: Tip of the NG tube is overlying the stomach. No pneumoperitoneum or pneumatosis. No abnormal calcifications. No acute fractures.     Impression: IMPRESSION: Tip of NG tube is overlying the stomach. Authenticated and Electronically Signed by Zhen Theodore on 05/16/2025 08:31:20 PM      I have reviewed the patient's radiology reports.    Medication Review:     I have reviewed the patient's active and prn medications.     Problem List:      Hypernatremia    PAF (paroxysmal atrial fibrillation)    Acute renal failure superimposed on stage 3a chronic kidney disease      Assessment:    Severe Sepsis secondary to urinary tract  infection  Urinary tract infection  Metabolic encephalopathy  Atrial fibrillation with RVR  Acute kidney injury  Hypernatremia  Dementia  COPD  Hypertension  Hyperlipidemia  Restless leg syndrome    Plan:    Severe Sepsis secondary to urinary tract infection  Urinary tract infection  Metabolic encephalopathy  Patient meets sepsis criteria with tachycardia, leukocytosis, source of infection identified as urinary tract infection.  Due to the sodium derangements, LR was bolused.  Evidence of endorgan damage noted with metabolic encephalopathy and acute kidney injury.  IV Rocephin, switched to Invanz 5/12/25 due to ESBL in the urine  MRI no stroke  Grey placed on 5/10/25  Atrial fibrillation with RVR  Patient status post cardioversion.  Cardiology consulted, appreciate their recommendations.  Heparin drip will be started, continued  Patient is currently normal sinus rhythm, continue Coreg  Acute kidney injury  Acute hypernatremia  Sodium was normal 1 month prior.  Patient encephalopathic which may be symptoms related to hypernatremia.  Nephrology has been consulted, appreciate his recommendations.  Status post LR boluses, continue slow hypotonic fluids with dextrose  Serial sodium, if sodium is correcting greater than one half Mg per DL per hour will hold fluids  Nutrition  NGT placed 5/14  Patient complained of painful swallowing; possible thursh. Started empiric fluconazole  She also has a history of strictures; was recently dilated. Low suspicion this is the cause  Continue to attempt tube feeds as tolerated  Dementia  COPD  Hypertension  Hyperlipidemia  Restless leg syndrome  Continue home medications as appropriate.     Palliative care following. Daughter considering transition to comfort care.      DVT Prophylaxis: Heparin  Code Status: DNR/DNI  Diet: NPO; tube feeds  Discharge Plan: Pending    Ronny Lara DO  05/17/25  10:41 EDT    Dictated utilizing Dragon dictation.

## 2025-05-17 NOTE — PLAN OF CARE
Goal Outcome Evaluation:  Plan of Care Reviewed With: patient        Progress: improving  Outcome Evaluation: VSS, A&O to self only, no episodes of emesis overnight some complaints of nausea still, started tube feeds back between 4045-5122 as tolerated once nausea had subsided, no complications at this time, no acute episodes of note at this time, pt still having a lot of diarrhea, will continue to follow plan of care     Pt tolerated taking meds very well through NG tube with only slight complaints of nausea after, NG tube at 57

## 2025-05-18 ENCOUNTER — APPOINTMENT (OUTPATIENT)
Dept: GENERAL RADIOLOGY | Facility: HOSPITAL | Age: 80
DRG: 871 | End: 2025-05-18
Payer: MEDICARE

## 2025-05-18 LAB
ALBUMIN SERPL-MCNC: 2.3 G/DL (ref 3.5–5.2)
ANION GAP SERPL CALCULATED.3IONS-SCNC: 7.8 MMOL/L (ref 5–15)
BUN SERPL-MCNC: 32 MG/DL (ref 8–23)
BUN/CREAT SERPL: 24.1 (ref 7–25)
CALCIUM SPEC-SCNC: 8.2 MG/DL (ref 8.6–10.5)
CHLORIDE SERPL-SCNC: 116 MMOL/L (ref 98–107)
CO2 SERPL-SCNC: 23.2 MMOL/L (ref 22–29)
CREAT SERPL-MCNC: 1.33 MG/DL (ref 0.57–1)
DEPRECATED RDW RBC AUTO: 47.7 FL (ref 37–54)
EGFRCR SERPLBLD CKD-EPI 2021: 40.5 ML/MIN/1.73
ERYTHROCYTE [DISTWIDTH] IN BLOOD BY AUTOMATED COUNT: 14 % (ref 12.3–15.4)
GLUCOSE BLDC GLUCOMTR-MCNC: 108 MG/DL (ref 70–130)
GLUCOSE BLDC GLUCOMTR-MCNC: 127 MG/DL (ref 70–130)
GLUCOSE BLDC GLUCOMTR-MCNC: 78 MG/DL (ref 70–130)
GLUCOSE BLDC GLUCOMTR-MCNC: 80 MG/DL (ref 70–130)
GLUCOSE BLDC GLUCOMTR-MCNC: 81 MG/DL (ref 70–130)
GLUCOSE BLDC GLUCOMTR-MCNC: 84 MG/DL (ref 70–130)
GLUCOSE SERPL-MCNC: 79 MG/DL (ref 65–99)
HCT VFR BLD AUTO: 31.2 % (ref 34–46.6)
HGB BLD-MCNC: 10 G/DL (ref 12–15.9)
MCH RBC QN AUTO: 29.7 PG (ref 26.6–33)
MCHC RBC AUTO-ENTMCNC: 32.1 G/DL (ref 31.5–35.7)
MCV RBC AUTO: 92.6 FL (ref 79–97)
PHOSPHATE SERPL-MCNC: 2.1 MG/DL (ref 2.5–4.5)
PHOSPHATE SERPL-MCNC: 3.2 MG/DL (ref 2.5–4.5)
PLATELET # BLD AUTO: 128 10*3/MM3 (ref 140–450)
PMV BLD AUTO: 12.6 FL (ref 6–12)
POTASSIUM SERPL-SCNC: 3.7 MMOL/L (ref 3.5–5.2)
RBC # BLD AUTO: 3.37 10*6/MM3 (ref 3.77–5.28)
SODIUM SERPL-SCNC: 147 MMOL/L (ref 136–145)
WBC NRBC COR # BLD AUTO: 6.44 10*3/MM3 (ref 3.4–10.8)

## 2025-05-18 PROCEDURE — 25810000003 SODIUM CHLORIDE 0.9 % SOLUTION 250 ML FLEX CONT: Performed by: INTERNAL MEDICINE

## 2025-05-18 PROCEDURE — 25010000002 HYALURONIDASE (HUMAN) 150 UNIT/ML SOLUTION 1 ML VIAL: Performed by: INTERNAL MEDICINE

## 2025-05-18 PROCEDURE — 99231 SBSQ HOSP IP/OBS SF/LOW 25: CPT | Performed by: INTERNAL MEDICINE

## 2025-05-18 PROCEDURE — 25010000002 METOCLOPRAMIDE PER 10 MG: Performed by: INTERNAL MEDICINE

## 2025-05-18 PROCEDURE — 74018 RADEX ABDOMEN 1 VIEW: CPT

## 2025-05-18 PROCEDURE — 82948 REAGENT STRIP/BLOOD GLUCOSE: CPT

## 2025-05-18 PROCEDURE — 84100 ASSAY OF PHOSPHORUS: CPT | Performed by: INTERNAL MEDICINE

## 2025-05-18 PROCEDURE — 80069 RENAL FUNCTION PANEL: CPT | Performed by: INTERNAL MEDICINE

## 2025-05-18 PROCEDURE — 25010000002 ERTAPENEM PER 500 MG: Performed by: INTERNAL MEDICINE

## 2025-05-18 PROCEDURE — 85027 COMPLETE CBC AUTOMATED: CPT | Performed by: INTERNAL MEDICINE

## 2025-05-18 RX ORDER — DEXTROSE MONOHYDRATE AND SODIUM CHLORIDE 5; .45 G/100ML; G/100ML
50 INJECTION, SOLUTION INTRAVENOUS CONTINUOUS
Status: ACTIVE | OUTPATIENT
Start: 2025-05-18 | End: 2025-05-19

## 2025-05-18 RX ORDER — HYDRALAZINE HYDROCHLORIDE 25 MG/1
25 TABLET, FILM COATED ORAL EVERY 8 HOURS SCHEDULED
Status: DISCONTINUED | OUTPATIENT
Start: 2025-05-18 | End: 2025-05-23 | Stop reason: HOSPADM

## 2025-05-18 RX ADMIN — HYDRALAZINE HYDROCHLORIDE 25 MG: 25 TABLET ORAL at 15:46

## 2025-05-18 RX ADMIN — METOCLOPRAMIDE 10 MG: 5 INJECTION, SOLUTION INTRAMUSCULAR; INTRAVENOUS at 22:08

## 2025-05-18 RX ADMIN — ATORVASTATIN CALCIUM 80 MG: 80 TABLET, FILM COATED ORAL at 14:07

## 2025-05-18 RX ADMIN — APIXABAN 2.5 MG: 2.5 TABLET, FILM COATED ORAL at 14:07

## 2025-05-18 RX ADMIN — METOCLOPRAMIDE 10 MG: 5 INJECTION, SOLUTION INTRAMUSCULAR; INTRAVENOUS at 11:12

## 2025-05-18 RX ADMIN — METOCLOPRAMIDE 10 MG: 5 INJECTION, SOLUTION INTRAMUSCULAR; INTRAVENOUS at 15:47

## 2025-05-18 RX ADMIN — CARVEDILOL 12.5 MG: 12.5 TABLET, FILM COATED ORAL at 17:13

## 2025-05-18 RX ADMIN — APIXABAN 2.5 MG: 2.5 TABLET, FILM COATED ORAL at 21:41

## 2025-05-18 RX ADMIN — DOCUSATE SODIUM 100 MG: 50 LIQUID ORAL at 21:41

## 2025-05-18 RX ADMIN — ERTAPENEM 1000 MG: 1 INJECTION INTRAMUSCULAR; INTRAVENOUS at 18:36

## 2025-05-18 RX ADMIN — HYALURONIDASE (HUMAN RECOMBINANT) 150 UNITS: 150 INJECTION, SOLUTION SUBCUTANEOUS at 12:05

## 2025-05-18 RX ADMIN — METOCLOPRAMIDE 10 MG: 5 INJECTION, SOLUTION INTRAMUSCULAR; INTRAVENOUS at 02:19

## 2025-05-18 RX ADMIN — DEXTROSE MONOHYDRATE AND SODIUM CHLORIDE 50 ML/HR: 5; .45 INJECTION, SOLUTION INTRAVENOUS at 13:00

## 2025-05-18 RX ADMIN — HYDRALAZINE HYDROCHLORIDE 25 MG: 25 TABLET ORAL at 21:41

## 2025-05-18 RX ADMIN — ASPIRIN 81 MG CHEWABLE TABLET 81 MG: 81 TABLET CHEWABLE at 14:08

## 2025-05-18 RX ADMIN — POTASSIUM PHOSPHATE, MONOBASIC AND POTASSIUM PHOSPHATE, DIBASIC 15 MMOL: 224; 236 INJECTION, SOLUTION INTRAVENOUS at 11:12

## 2025-05-18 RX ADMIN — SENNOSIDES 10 ML: 8.8 LIQUID ORAL at 21:40

## 2025-05-18 RX ADMIN — Medication 10 ML: at 21:41

## 2025-05-18 RX ADMIN — RIVASTIGMINE 1 PATCH: 4.6 PATCH TRANSDERMAL at 11:12

## 2025-05-18 NOTE — PLAN OF CARE
Problem: Adult Inpatient Plan of Care  Goal: Absence of Hospital-Acquired Illness or Injury  Intervention: Identify and Manage Fall Risk  Recent Flowsheet Documentation  Taken 5/18/2025 0400 by Sindy Huntley RN  Safety Promotion/Fall Prevention:   safety round/check completed   nonskid shoes/slippers when out of bed   assistive device/personal items within reach   clutter free environment maintained  Taken 5/18/2025 0200 by Sindy Huntley RN  Safety Promotion/Fall Prevention:   safety round/check completed   nonskid shoes/slippers when out of bed   assistive device/personal items within reach   clutter free environment maintained  Taken 5/18/2025 0000 by Sindy Huntley RN  Safety Promotion/Fall Prevention:   safety round/check completed   nonskid shoes/slippers when out of bed   assistive device/personal items within reach   clutter free environment maintained  Taken 5/17/2025 2200 by Sindy Huntley RN  Safety Promotion/Fall Prevention:   safety round/check completed   nonskid shoes/slippers when out of bed   assistive device/personal items within reach   clutter free environment maintained  Taken 5/17/2025 2000 by Sindy Huntley RN  Safety Promotion/Fall Prevention:   safety round/check completed   nonskid shoes/slippers when out of bed   assistive device/personal items within reach   clutter free environment maintained  Intervention: Prevent Skin Injury  Recent Flowsheet Documentation  Taken 5/18/2025 0400 by Sindy Huntley RN  Body Position:   tilted   right   head facing, right   legs elevated  Skin Protection: incontinence pads utilized  Taken 5/18/2025 0200 by Sindy Huntley RN  Body Position:   supine   legs elevated  Skin Protection: incontinence pads utilized  Taken 5/18/2025 0000 by Sindy Huntley RN  Body Position:   tilted   left   head facing, left   legs elevated  Skin Protection: incontinence pads utilized  Taken 5/17/2025 2200 by Sindy Huntley RN  Body  Position:   supine   head facing, left   legs elevated  Skin Protection: incontinence pads utilized  Taken 5/17/2025 2000 by Sindy Huntley RN  Body Position:   supine   legs elevated  Skin Protection: incontinence pads utilized  Intervention: Prevent and Manage VTE (Venous Thromboembolism) Risk  Recent Flowsheet Documentation  Taken 5/17/2025 2000 by Sindy Huntley RN  VTE Prevention/Management:   bilateral   patient refused intervention   SCDs (sequential compression devices) off  Intervention: Prevent Infection  Recent Flowsheet Documentation  Taken 5/18/2025 0400 by Sindy Huntley RN  Infection Prevention:   environmental surveillance performed   rest/sleep promoted   single patient room provided  Taken 5/18/2025 0200 by Sindy Huntley RN  Infection Prevention:   environmental surveillance performed   rest/sleep promoted   single patient room provided  Taken 5/18/2025 0000 by Sindy Huntley RN  Infection Prevention:   environmental surveillance performed   rest/sleep promoted   single patient room provided  Taken 5/17/2025 2200 by Sindy Huntley RN  Infection Prevention:   environmental surveillance performed   rest/sleep promoted   single patient room provided  Taken 5/17/2025 2000 by Sindy Huntley RN  Infection Prevention:   environmental surveillance performed   rest/sleep promoted   single patient room provided  Goal: Optimal Comfort and Wellbeing  Intervention: Provide Person-Centered Care  Recent Flowsheet Documentation  Taken 5/17/2025 2000 by Sindy Huntley RN  Trust Relationship/Rapport:   care explained   choices provided   emotional support provided     Problem: Fall Injury Risk  Goal: Absence of Fall and Fall-Related Injury  Intervention: Identify and Manage Contributors  Recent Flowsheet Documentation  Taken 5/17/2025 2000 by Sindy Huntley RN  Medication Review/Management: medications reviewed  Intervention: Promote Injury-Free Environment  Recent  Flowsheet Documentation  Taken 5/18/2025 0400 by Sindy Huntley RN  Safety Promotion/Fall Prevention:   safety round/check completed   nonskid shoes/slippers when out of bed   assistive device/personal items within reach   clutter free environment maintained  Taken 5/18/2025 0200 by Sindy Huntley RN  Safety Promotion/Fall Prevention:   safety round/check completed   nonskid shoes/slippers when out of bed   assistive device/personal items within reach   clutter free environment maintained  Taken 5/18/2025 0000 by Sindy Huntley RN  Safety Promotion/Fall Prevention:   safety round/check completed   nonskid shoes/slippers when out of bed   assistive device/personal items within reach   clutter free environment maintained  Taken 5/17/2025 2200 by Sindy Huntley RN  Safety Promotion/Fall Prevention:   safety round/check completed   nonskid shoes/slippers when out of bed   assistive device/personal items within reach   clutter free environment maintained  Taken 5/17/2025 2000 by Sindy Huntley RN  Safety Promotion/Fall Prevention:   safety round/check completed   nonskid shoes/slippers when out of bed   assistive device/personal items within reach   clutter free environment maintained     Problem: Skin Injury Risk Increased  Goal: Skin Health and Integrity  Intervention: Optimize Skin Protection  Recent Flowsheet Documentation  Taken 5/18/2025 0400 by Sindy Huntley RN  Activity Management: activity minimized  Pressure Reduction Techniques: frequent weight shift encouraged  Head of Bed (HOB) Positioning: HOB at 30-45 degrees  Pressure Reduction Devices: positioning supports utilized  Skin Protection: incontinence pads utilized  Taken 5/18/2025 0200 by Sindy Huntley RN  Activity Management: activity minimized  Pressure Reduction Techniques: frequent weight shift encouraged  Head of Bed (HOB) Positioning: HOB at 30-45 degrees  Pressure Reduction Devices: positioning supports  utilized  Skin Protection: incontinence pads utilized  Taken 5/18/2025 0000 by Sindy Huntley RN  Activity Management: activity minimized  Pressure Reduction Techniques: frequent weight shift encouraged  Head of Bed (HOB) Positioning: HOB at 30-45 degrees  Pressure Reduction Devices: positioning supports utilized  Skin Protection: incontinence pads utilized  Taken 5/17/2025 2200 by Sindy Huntley RN  Activity Management: activity minimized  Pressure Reduction Techniques: frequent weight shift encouraged  Head of Bed (HOB) Positioning: HOB at 30-45 degrees  Pressure Reduction Devices: positioning supports utilized  Skin Protection: incontinence pads utilized  Taken 5/17/2025 2000 by Sindy Huntley RN  Activity Management: activity minimized  Pressure Reduction Techniques:   frequent weight shift encouraged   positioned off wounds  Head of Bed (HOB) Positioning: HOB at 30-45 degrees  Pressure Reduction Devices: positioning supports utilized  Skin Protection: incontinence pads utilized     Problem: Sepsis/Septic Shock  Goal: Optimal Coping  Intervention: Support Patient and Family Response  Recent Flowsheet Documentation  Taken 5/17/2025 2000 by Sindy Huntley RN  Supportive Measures: active listening utilized  Family/Support System Care: support provided  Goal: Blood Glucose Level Within Target Range  Intervention: Optimize Glycemic Control  Recent Flowsheet Documentation  Taken 5/17/2025 2000 by Sindy Huntley RN  Hyperglycemia Management: blood glucose monitored  Hypoglycemia Management: blood glucose monitored  Goal: Absence of Infection Signs and Symptoms  Intervention: Initiate Sepsis Management  Recent Flowsheet Documentation  Taken 5/18/2025 0400 by Sindy Huntley RN  Infection Management: aseptic technique maintained  Infection Prevention:   environmental surveillance performed   rest/sleep promoted   single patient room provided  Isolation Precautions:   precautions maintained    contact  Taken 5/18/2025 0200 by Sindy Huntley RN  Infection Management: aseptic technique maintained  Infection Prevention:   environmental surveillance performed   rest/sleep promoted   single patient room provided  Isolation Precautions:   precautions maintained   contact  Taken 5/18/2025 0000 by Sindy Huntley RN  Infection Management: aseptic technique maintained  Infection Prevention:   environmental surveillance performed   rest/sleep promoted   single patient room provided  Taken 5/17/2025 2200 by Sindy Huntley RN  Infection Management: aseptic technique maintained  Infection Prevention:   environmental surveillance performed   rest/sleep promoted   single patient room provided  Isolation Precautions:   contact   precautions maintained  Taken 5/17/2025 2000 by Sindy Hunltey RN  Infection Management: aseptic technique maintained  Infection Prevention:   environmental surveillance performed   rest/sleep promoted   single patient room provided  Isolation Precautions:   contact   precautions maintained  Intervention: Promote Recovery  Recent Flowsheet Documentation  Taken 5/18/2025 0400 by Sindy Huntley RN  Activity Management: activity minimized  Taken 5/18/2025 0200 by Sindy Huntley RN  Activity Management: activity minimized  Taken 5/18/2025 0000 by Sindy Huntley RN  Activity Management: activity minimized  Taken 5/17/2025 2200 by Sindy Huntley RN  Activity Management: activity minimized  Taken 5/17/2025 2000 by Sindy Huntley RN  Activity Management: activity minimized     Problem: UTI (Urinary Tract Infection)  Goal: Improved Infection Symptoms  Intervention: Prevent Infection Progression  Recent Flowsheet Documentation  Taken 5/18/2025 0400 by Sindy Huntley RN  Infection Management: aseptic technique maintained  Taken 5/18/2025 0200 by Sindy Huntley RN  Infection Management: aseptic technique maintained  Taken 5/18/2025 0000 by Audi  AIDAN Callahan  Infection Management: aseptic technique maintained  Taken 5/17/2025 2200 by Sindy Huntley RN  Infection Management: aseptic technique maintained  Taken 5/17/2025 2000 by Sindy Huntley RN  Infection Management: aseptic technique maintained     Problem: Mobility Impairment  Goal: Optimal Mobility  Intervention: Optimize Mobility  Recent Flowsheet Documentation  Taken 5/18/2025 0400 by Sindy Huntley RN  Activity Management: activity minimized  Positioning/Transfer Devices:   pillows   in use  Taken 5/18/2025 0200 by Sindy Huntley RN  Activity Management: activity minimized  Positioning/Transfer Devices:   pillows   in use  Taken 5/18/2025 0000 by Sindy Huntley RN  Activity Management: activity minimized  Positioning/Transfer Devices:   pillows   in use  Taken 5/17/2025 2200 by Sindy Huntley RN  Activity Management: activity minimized  Positioning/Transfer Devices:   pillows   in use  Taken 5/17/2025 2000 by Sindy Huntley RN  Activity Management: activity minimized  Positioning/Transfer Devices:   pillows   in use     Problem: Comorbidity Management  Goal: Blood Glucose Level Within Target Range  Intervention: Monitor and Manage Glycemia  Recent Flowsheet Documentation  Taken 5/17/2025 2000 by Sindy Huntley RN  Medication Review/Management: medications reviewed  Goal: Blood Pressure in Desired Range  Intervention: Maintain Blood Pressure Management  Recent Flowsheet Documentation  Taken 5/17/2025 2000 by Sindy Huntley RN  Medication Review/Management: medications reviewed   Goal Outcome Evaluation:               Plan of Care Reviewed With: patient  Progress: no change  Outcome Evaluation: VS stable on RA.  Per hospitalist, okay to leave NG tube out overnight.  Patient still NPO.  No significant events.  POC ongoing.

## 2025-05-18 NOTE — PLAN OF CARE
Problem: Adult Inpatient Plan of Care  Goal: Plan of Care Review  Outcome: Progressing  Goal: Patient-Specific Goal (Individualized)  Outcome: Progressing  Goal: Absence of Hospital-Acquired Illness or Injury  Outcome: Progressing  Intervention: Identify and Manage Fall Risk  Recent Flowsheet Documentation  Taken 5/18/2025 1800 by Karen Patel RN  Safety Promotion/Fall Prevention:   assistive device/personal items within reach   activity supervised   clutter free environment maintained   toileting scheduled   safety round/check completed   room organization consistent   fall prevention program maintained   lighting adjusted   nonskid shoes/slippers when out of bed  Taken 5/18/2025 1600 by Karen Patel RN  Safety Promotion/Fall Prevention:   activity supervised   assistive device/personal items within reach   clutter free environment maintained   toileting scheduled   safety round/check completed   room organization consistent   fall prevention program maintained   lighting adjusted   nonskid shoes/slippers when out of bed  Taken 5/18/2025 1400 by Karen Patel RN  Safety Promotion/Fall Prevention:   activity supervised   assistive device/personal items within reach   clutter free environment maintained   toileting scheduled   safety round/check completed   room organization consistent   fall prevention program maintained   lighting adjusted   nonskid shoes/slippers when out of bed  Taken 5/18/2025 1200 by Karen Patel RN  Safety Promotion/Fall Prevention:   activity supervised   assistive device/personal items within reach   clutter free environment maintained   toileting scheduled   safety round/check completed   room organization consistent   fall prevention program maintained   lighting adjusted   nonskid shoes/slippers when out of bed  Taken 5/18/2025 1000 by Karen Patel RN  Safety Promotion/Fall Prevention:   activity supervised   assistive device/personal items within reach   clutter free  environment maintained   toileting scheduled   safety round/check completed   room organization consistent   fall prevention program maintained   lighting adjusted   nonskid shoes/slippers when out of bed  Taken 5/18/2025 0800 by Karen Patel RN  Safety Promotion/Fall Prevention:   activity supervised   assistive device/personal items within reach   clutter free environment maintained   toileting scheduled   safety round/check completed   room organization consistent   fall prevention program maintained   lighting adjusted   nonskid shoes/slippers when out of bed  Intervention: Prevent Skin Injury  Recent Flowsheet Documentation  Taken 5/18/2025 1800 by Karen Patel RN  Body Position:   turned   right  Taken 5/18/2025 1600 by Karen Patel RN  Body Position:   turned   left  Taken 5/18/2025 1400 by Karen Patel RN  Body Position:   turned   left  Taken 5/18/2025 1200 by Karen Patel RN  Body Position:   turned   left  Taken 5/18/2025 1000 by Karen Patel RN  Body Position:   turned   left  Taken 5/18/2025 0800 by Karen Patel RN  Body Position:   turned   left  Skin Protection:   incontinence pads utilized   transparent dressing maintained  Intervention: Prevent Infection  Recent Flowsheet Documentation  Taken 5/18/2025 1800 by Karen Patel RN  Infection Prevention:   single patient room provided   rest/sleep promoted   equipment surfaces disinfected   hand hygiene promoted   environmental surveillance performed  Taken 5/18/2025 1600 by Karen Patel RN  Infection Prevention:   rest/sleep promoted   single patient room provided   hand hygiene promoted   equipment surfaces disinfected   environmental surveillance performed  Taken 5/18/2025 1400 by Karen Patel RN  Infection Prevention:   rest/sleep promoted   single patient room provided   hand hygiene promoted   equipment surfaces disinfected   environmental surveillance performed  Taken 5/18/2025 1200 by Karen Patel RN  Infection Prevention:    single patient room provided   rest/sleep promoted   hand hygiene promoted   equipment surfaces disinfected   environmental surveillance performed  Taken 5/18/2025 1000 by Karen Patel RN  Infection Prevention:   rest/sleep promoted   single patient room provided   equipment surfaces disinfected   hand hygiene promoted   environmental surveillance performed  Taken 5/18/2025 0800 by Karen Patel RN  Infection Prevention:   single patient room provided   rest/sleep promoted   hand hygiene promoted   equipment surfaces disinfected   environmental surveillance performed  Goal: Optimal Comfort and Wellbeing  Outcome: Progressing  Intervention: Provide Person-Centered Care  Recent Flowsheet Documentation  Taken 5/18/2025 0800 by Karen Patel RN  Trust Relationship/Rapport:   care explained   choices provided  Goal: Readiness for Transition of Care  Outcome: Progressing     Problem: Fall Injury Risk  Goal: Absence of Fall and Fall-Related Injury  Outcome: Progressing  Intervention: Identify and Manage Contributors  Recent Flowsheet Documentation  Taken 5/18/2025 1800 by Karen Patel RN  Medication Review/Management: medications reviewed  Taken 5/18/2025 1600 by Karen Patel RN  Medication Review/Management: medications reviewed  Taken 5/18/2025 1200 by Karen Patel RN  Medication Review/Management: medications reviewed  Taken 5/18/2025 0800 by Karen Patel RN  Medication Review/Management: medications reviewed  Intervention: Promote Injury-Free Environment  Recent Flowsheet Documentation  Taken 5/18/2025 1800 by Karen Patel RN  Safety Promotion/Fall Prevention:   assistive device/personal items within reach   activity supervised   clutter free environment maintained   toileting scheduled   safety round/check completed   room organization consistent   fall prevention program maintained   lighting adjusted   nonskid shoes/slippers when out of bed  Taken 5/18/2025 1600 by Karen Patel, AIDAN  Safety  Promotion/Fall Prevention:   activity supervised   assistive device/personal items within reach   clutter free environment maintained   toileting scheduled   safety round/check completed   room organization consistent   fall prevention program maintained   lighting adjusted   nonskid shoes/slippers when out of bed  Taken 5/18/2025 1400 by Karen Patel RN  Safety Promotion/Fall Prevention:   activity supervised   assistive device/personal items within reach   clutter free environment maintained   toileting scheduled   safety round/check completed   room organization consistent   fall prevention program maintained   lighting adjusted   nonskid shoes/slippers when out of bed  Taken 5/18/2025 1200 by Karen Patel RN  Safety Promotion/Fall Prevention:   activity supervised   assistive device/personal items within reach   clutter free environment maintained   toileting scheduled   safety round/check completed   room organization consistent   fall prevention program maintained   lighting adjusted   nonskid shoes/slippers when out of bed  Taken 5/18/2025 1000 by Karen Patel RN  Safety Promotion/Fall Prevention:   activity supervised   assistive device/personal items within reach   clutter free environment maintained   toileting scheduled   safety round/check completed   room organization consistent   fall prevention program maintained   lighting adjusted   nonskid shoes/slippers when out of bed  Taken 5/18/2025 0800 by Karen Patel RN  Safety Promotion/Fall Prevention:   activity supervised   assistive device/personal items within reach   clutter free environment maintained   toileting scheduled   safety round/check completed   room organization consistent   fall prevention program maintained   lighting adjusted   nonskid shoes/slippers when out of bed     Problem: Skin Injury Risk Increased  Goal: Skin Health and Integrity  Outcome: Progressing  Intervention: Optimize Skin Protection  Recent Flowsheet  Documentation  Taken 5/18/2025 1800 by Karen Patel RN  Activity Management: activity encouraged  Head of Bed (HOB) Positioning: HOB elevated  Taken 5/18/2025 1600 by Karen Patel RN  Activity Management: activity encouraged  Head of Bed (HOB) Positioning: HOB elevated  Taken 5/18/2025 1400 by Karen Patel RN  Activity Management: activity encouraged  Head of Bed (HOB) Positioning: HOB elevated  Taken 5/18/2025 1200 by Karen Patel RN  Activity Management: activity encouraged  Head of Bed (HOB) Positioning: HOB elevated  Taken 5/18/2025 1000 by Karen Patel RN  Activity Management: activity encouraged  Head of Bed (HOB) Positioning: HOB elevated  Taken 5/18/2025 0800 by Karen Patel RN  Activity Management: activity encouraged  Pressure Reduction Techniques:   frequent weight shift encouraged   weight shift assistance provided   pressure points protected   heels elevated off bed  Head of Bed (HOB) Positioning: HOB elevated  Skin Protection:   incontinence pads utilized   transparent dressing maintained     Problem: Sepsis/Septic Shock  Goal: Optimal Coping  Outcome: Progressing  Intervention: Support Patient and Family Response  Recent Flowsheet Documentation  Taken 5/18/2025 0800 by Karen Patel RN  Family/Support System Care:   support provided   self-care encouraged  Goal: Absence of Bleeding  Outcome: Progressing  Goal: Blood Glucose Level Within Target Range  Outcome: Progressing  Intervention: Optimize Glycemic Control  Recent Flowsheet Documentation  Taken 5/18/2025 0800 by Karen Patel RN  Hyperglycemia Management: blood glucose monitored  Hypoglycemia Management: blood glucose monitored  Goal: Absence of Infection Signs and Symptoms  Outcome: Progressing  Intervention: Initiate Sepsis Management  Recent Flowsheet Documentation  Taken 5/18/2025 1800 by Karen Patel RN  Infection Prevention:   single patient room provided   rest/sleep promoted   equipment surfaces disinfected   hand hygiene  promoted   environmental surveillance performed  Taken 5/18/2025 1600 by Karen Patel RN  Infection Prevention:   rest/sleep promoted   single patient room provided   hand hygiene promoted   equipment surfaces disinfected   environmental surveillance performed  Taken 5/18/2025 1400 by Karen Patel RN  Infection Prevention:   rest/sleep promoted   single patient room provided   hand hygiene promoted   equipment surfaces disinfected   environmental surveillance performed  Taken 5/18/2025 1200 by Karen Patel RN  Infection Prevention:   single patient room provided   rest/sleep promoted   hand hygiene promoted   equipment surfaces disinfected   environmental surveillance performed  Taken 5/18/2025 1000 by Karen Patel RN  Infection Prevention:   rest/sleep promoted   single patient room provided   equipment surfaces disinfected   hand hygiene promoted   environmental surveillance performed  Isolation Precautions:   precautions maintained   contact  Taken 5/18/2025 0800 by Karen Patel RN  Infection Prevention:   single patient room provided   rest/sleep promoted   hand hygiene promoted   equipment surfaces disinfected   environmental surveillance performed  Isolation Precautions:   precautions maintained   contact  Intervention: Promote Recovery  Recent Flowsheet Documentation  Taken 5/18/2025 1800 by Karen Patel RN  Activity Management: activity encouraged  Taken 5/18/2025 1600 by Karen Patel RN  Activity Management: activity encouraged  Taken 5/18/2025 1400 by Karen Patel RN  Activity Management: activity encouraged  Taken 5/18/2025 1200 by Karen Patel RN  Activity Management: activity encouraged  Taken 5/18/2025 1000 by Karen Patel RN  Activity Management: activity encouraged  Taken 5/18/2025 0800 by Karen Patel RN  Activity Management: activity encouraged  Goal: Optimal Nutrition Delivery  Outcome: Progressing     Problem: Fluid Volume Deficit  Goal: Fluid Balance  Outcome: Progressing      Problem: UTI (Urinary Tract Infection)  Goal: Improved Infection Symptoms  Outcome: Progressing     Problem: Electrolyte Imbalance  Goal: Electrolyte Balance  Outcome: Progressing     Problem: Mobility Impairment  Goal: Optimal Mobility  Outcome: Progressing  Intervention: Optimize Mobility  Recent Flowsheet Documentation  Taken 5/18/2025 1800 by Karen Patel RN  Activity Management: activity encouraged  Positioning/Transfer Devices:   pillows   in use  Taken 5/18/2025 1600 by Karen Patel RN  Activity Management: activity encouraged  Positioning/Transfer Devices:   pillows   in use  Taken 5/18/2025 1400 by Karen Patel RN  Activity Management: activity encouraged  Positioning/Transfer Devices:   pillows   in use  Taken 5/18/2025 1200 by Karen Patel RN  Activity Management: activity encouraged  Positioning/Transfer Devices:   pillows   in use  Taken 5/18/2025 1000 by Karen Patel RN  Activity Management: activity encouraged  Positioning/Transfer Devices:   pillows   in use  Taken 5/18/2025 0800 by Karen Patel RN  Activity Management: activity encouraged  Positioning/Transfer Devices:   pillows   in use     Problem: Comorbidity Management  Goal: Blood Glucose Level Within Target Range  Outcome: Progressing  Intervention: Monitor and Manage Glycemia  Recent Flowsheet Documentation  Taken 5/18/2025 1800 by Karen Patel RN  Medication Review/Management: medications reviewed  Taken 5/18/2025 1600 by Karen Patel RN  Medication Review/Management: medications reviewed  Taken 5/18/2025 1200 by Karen Patel RN  Medication Review/Management: medications reviewed  Taken 5/18/2025 0800 by Karen Patel RN  Medication Review/Management: medications reviewed  Goal: Blood Pressure in Desired Range  Outcome: Progressing  Intervention: Maintain Blood Pressure Management  Recent Flowsheet Documentation  Taken 5/18/2025 1800 by Karen Patel RN  Medication Review/Management: medications reviewed  Taken  5/18/2025 1600 by Karen Patel, RN  Medication Review/Management: medications reviewed  Taken 5/18/2025 1200 by Karen Patel, RN  Medication Review/Management: medications reviewed  Taken 5/18/2025 0800 by Karen Patel, RN  Medication Review/Management: medications reviewed   Goal Outcome Evaluation:

## 2025-05-18 NOTE — PROGRESS NOTES
[facility] HOSPITALIST    PROGRESS NOTE    Name:  Valarie Camara   Age:  79 y.o.  Sex:  female  :  1945  MRN:  [id number]   Visit Number:  [id number]  Admission Date:  5/10/2025  Date Of Service:  25  Primary Care Physician:  Lay Cox MD     LOS: 7 days :    Chief Complaint:      weakness    Subjective:    Patient resting comfortably.  Awake. Answers questions. No family present at the time of my exam. Lost NGT overnight. Unsuccessful attempts x2 at re-insertion. Overall this continues to be traumatic for the patient with little nutritional improvement.     Hospital Course:    Patient is an 79-year-old female brought to the emergency department for evaluation of altered mental status.  At the time of examination, unfortunately, patient was alone without family at bedside.  History of present illness was obtained from review of medical records, including discussion with nursing and ED physician.  Patient has a known history of dementia, however over the last 3 days, patient's daughter was concerned that the patient has developed significant confusion.  Patient has been unable to tolerate oral intake of nutrition and oral hydration.      EMS was called to patient's nursing facility, she was found to be hypotensive and tachycardic in A-fib with RVR.  They were unable to obtain IV access, subsequently unable to provide cardioversion en route to the ED.  Upon arrival to the ED, patient was emergently cardioverted, patient's rhythm with normal sinus rhythm, without any change in mentation.  Neurology was consulted, CT of the head did not show any acute CVA.  Cardiology was consulted, who feels that the elevated troponin is most likely secondary to the tachycardia and recommend troponin trending, anticoagulation.  Hospitalist services consulted for admission.     Discussion had with family and they request DNR with no chest compressions and no intubation, they  approve all other interventions.     Patient had presented with encephalopathy very high sodium.  Continues to be in goals of care discussion with patient and family.  Family meeting pending.  Sodium has been reluctant to recover still running about 160 despite hypotonic fluids nephrology is addressing this.  Otherwise urine is growing ESBL has been switched to Invanz.    Review of Systems:     All systems were reviewed and negative except as mentioned in subjective, assessment and plan.    Vital Signs:    Temp:  [97.6 °F (36.4 °C)-98.4 °F (36.9 °C)] 97.8 °F (36.6 °C)  Heart Rate:  [63-70] 65  Resp:  [14-16] 16  BP: (124-159)/(57-71) 157/61    Intake and output:    I/O last 3 completed shifts:  In: 549 [Other:240; NG/GT:309]  Out: 425 [Urine:425]  No intake/output data recorded.    Physical Examination: Examined again 5/18/25    General Appearance:  Alert and cooperative. NAD   Head:  Atraumatic and normocephalic.   Eyes: Conjunctivae and sclerae normal, no icterus. No pallor.   Throat: No oral lesions, no thrush, oral mucosa moist.   Neck: Supple, trachea midline, no thyromegaly.   Lungs:   Breath sounds heard bilaterally equally.  No wheezing or crackles.    Heart:  Normal S1 and S2, no murmur, No JVD.   Abdomen:   Normal bowel sounds, Soft, nontender, nondistended, no rebound tenderness.   Extremities: Supple, no edema, no cyanosis, no clubbing.   Skin: No bleeding or rash.   Neurologic: Alert and oriented x 2. No facial asymmetry.      Laboratory results:    Results from last 7 days   Lab Units 05/18/25  0547 05/17/25  1359 05/17/25  0613 05/16/25  1524 05/16/25  0715   SODIUM mmol/L 147*  --  145  --  142   POTASSIUM mmol/L 3.7 3.8 3.6   < > 3.4*   CHLORIDE mmol/L 116*  --  116*  --  111*   CO2 mmol/L 23.2  --  21.3*  --  21.0*   BUN mg/dL 32*  --  36*  --  41*   CREATININE mg/dL 1.33*  --  1.37*  --  1.62*   CALCIUM mg/dL 8.2*  --  7.7*  --  7.7*   GLUCOSE mg/dL 79  --  133*  --  121*    < > = values in this  interval not displayed.     Results from last 7 days   Lab Units 05/18/25  0547 05/17/25  0613 05/16/25  0715   WBC 10*3/mm3 6.44 6.12 5.90   HEMOGLOBIN g/dL 10.0* 9.7* 9.9*   HEMATOCRIT % 31.2* 30.3* 30.3*   PLATELETS 10*3/mm3 128* 118* 97*                       Recent Labs     03/22/25  0610 05/12/25  0538   PHART 7.449 7.444   SFE6HJR 32.0* 33.5*   PO2ART 78.3* 81.2   BAB6LII 22.1 23.0   BASEEXCESS -1.2* -0.6*      I have reviewed the patient's laboratory results.    Radiology results:    XR Abdomen KUB  Result Date: 5/16/2025  FINAL REPORT TECHNIQUE: null CLINICAL HISTORY: Verification of NG tube placement COMPARISON: null FINDINGS: 1 view abdomen Comparison: CT/SR - CT ABDOMEN PELVIS WO CONTRAST - 3/15/25 05:36 EDT Findings: Tip of the NG tube is overlying the stomach. No pneumoperitoneum or pneumatosis. No abnormal calcifications. No acute fractures.     Impression: IMPRESSION: Tip of NG tube is overlying the stomach. Authenticated and Electronically Signed by Zhen Theodore on 05/16/2025 08:31:20 PM      I have reviewed the patient's radiology reports.    Medication Review:     I have reviewed the patient's active and prn medications.     Problem List:      Hypernatremia    PAF (paroxysmal atrial fibrillation)    Acute renal failure superimposed on stage 3a chronic kidney disease      Assessment:    Severe Sepsis secondary to urinary tract infection  Urinary tract infection  Metabolic encephalopathy  Atrial fibrillation with RVR  Acute kidney injury  Hypernatremia  Dementia  COPD  Hypertension  Hyperlipidemia  Restless leg syndrome    Plan:    Severe Sepsis secondary to urinary tract infection  Urinary tract infection  Metabolic encephalopathy  Patient meets sepsis criteria with tachycardia, leukocytosis, source of infection identified as urinary tract infection.  Due to the sodium derangements, LR was bolused.  Evidence of endorgan damage noted with metabolic encephalopathy and acute kidney injury.  IV  Rocephin, switched to Invanz 5/12/25 due to ESBL in the urine  MRI no stroke  Grey placed on 5/10/25  Atrial fibrillation with RVR  Patient status post cardioversion.  Cardiology consulted, appreciate their recommendations.  Heparin drip will be started, continued  Patient is currently normal sinus rhythm, continue Coreg  Acute kidney injury  Acute hypernatremia  Sodium was normal 1 month prior.  Patient encephalopathic which may be symptoms related to hypernatremia.  Nephrology has been consulted, appreciate his recommendations.  Status post LR boluses, continue slow hypotonic fluids with dextrose  Serial sodium, if sodium is correcting greater than one half Mg per DL per hour will hold fluids  Nutrition  NGT initially placed 5/14  Patient complained of painful swallowing; possible thursh. Started empiric fluconazole  She also has a history of strictures; was recently dilated. Low suspicion this is the cause  Continue to attempt tube feeds as tolerated  NGT pulled out overnight, unsuccessful attempts x2 at re-insertion, Overall advise to discontinue attempts and patient is able to articulate that she does not want it replaced.   Dementia  COPD  Hypertension  Hyperlipidemia  Restless leg syndrome  Continue home medications as appropriate.     Palliative care following. Daughter considering transition to comfort care.      DVT Prophylaxis: Heparin  Code Status: DNR/DNI  Diet: NPO; tube feeds  Discharge Plan: Pending    Ronny Lara DO  05/18/25  10:23 EDT    Dictated utilizing Dragon dictation.

## 2025-05-19 LAB
ALBUMIN SERPL-MCNC: 2.2 G/DL (ref 3.5–5.2)
ANION GAP SERPL CALCULATED.3IONS-SCNC: 8.1 MMOL/L (ref 5–15)
BUN SERPL-MCNC: 30 MG/DL (ref 8–23)
BUN/CREAT SERPL: 24.6 (ref 7–25)
CALCIUM SPEC-SCNC: 8 MG/DL (ref 8.6–10.5)
CHLORIDE SERPL-SCNC: 112 MMOL/L (ref 98–107)
CO2 SERPL-SCNC: 17.9 MMOL/L (ref 22–29)
CREAT SERPL-MCNC: 1.22 MG/DL (ref 0.57–1)
EGFRCR SERPLBLD CKD-EPI 2021: 45 ML/MIN/1.73
GLUCOSE BLDC GLUCOMTR-MCNC: 119 MG/DL (ref 70–130)
GLUCOSE BLDC GLUCOMTR-MCNC: 126 MG/DL (ref 70–130)
GLUCOSE BLDC GLUCOMTR-MCNC: 128 MG/DL (ref 70–130)
GLUCOSE BLDC GLUCOMTR-MCNC: 142 MG/DL (ref 70–130)
GLUCOSE BLDC GLUCOMTR-MCNC: 143 MG/DL (ref 70–130)
GLUCOSE BLDC GLUCOMTR-MCNC: 169 MG/DL (ref 70–130)
GLUCOSE SERPL-MCNC: 136 MG/DL (ref 65–99)
PHOSPHATE SERPL-MCNC: 3 MG/DL (ref 2.5–4.5)
POTASSIUM SERPL-SCNC: 4.3 MMOL/L (ref 3.5–5.2)
SODIUM SERPL-SCNC: 138 MMOL/L (ref 136–145)

## 2025-05-19 PROCEDURE — 80069 RENAL FUNCTION PANEL: CPT | Performed by: INTERNAL MEDICINE

## 2025-05-19 PROCEDURE — 82948 REAGENT STRIP/BLOOD GLUCOSE: CPT

## 2025-05-19 PROCEDURE — 99231 SBSQ HOSP IP/OBS SF/LOW 25: CPT | Performed by: INTERNAL MEDICINE

## 2025-05-19 PROCEDURE — 99233 SBSQ HOSP IP/OBS HIGH 50: CPT | Performed by: PHYSICIAN ASSISTANT

## 2025-05-19 PROCEDURE — 63710000001 INSULIN REGULAR HUMAN PER 5 UNITS: Performed by: FAMILY MEDICINE

## 2025-05-19 PROCEDURE — 25010000002 METOCLOPRAMIDE PER 10 MG: Performed by: INTERNAL MEDICINE

## 2025-05-19 RX ORDER — METOCLOPRAMIDE HYDROCHLORIDE 5 MG/ML
5 INJECTION INTRAMUSCULAR; INTRAVENOUS EVERY 6 HOURS
Status: DISCONTINUED | OUTPATIENT
Start: 2025-05-19 | End: 2025-05-22

## 2025-05-19 RX ADMIN — DOCUSATE SODIUM 100 MG: 50 LIQUID ORAL at 21:40

## 2025-05-19 RX ADMIN — INSULIN HUMAN 2 UNITS: 100 INJECTION, SOLUTION PARENTERAL at 11:43

## 2025-05-19 RX ADMIN — Medication 10 ML: at 21:50

## 2025-05-19 RX ADMIN — LANSOPRAZOLE 30 MG: 30 TABLET, ORALLY DISINTEGRATING ORAL at 05:29

## 2025-05-19 RX ADMIN — APIXABAN 2.5 MG: 2.5 TABLET, FILM COATED ORAL at 21:41

## 2025-05-19 RX ADMIN — HYDRALAZINE HYDROCHLORIDE 25 MG: 25 TABLET ORAL at 16:14

## 2025-05-19 RX ADMIN — HYDRALAZINE HYDROCHLORIDE 25 MG: 25 TABLET ORAL at 05:29

## 2025-05-19 RX ADMIN — METOCLOPRAMIDE 10 MG: 5 INJECTION, SOLUTION INTRAMUSCULAR; INTRAVENOUS at 04:03

## 2025-05-19 RX ADMIN — METOCLOPRAMIDE HYDROCHLORIDE 5 MG: 5 INJECTION, SOLUTION INTRAMUSCULAR; INTRAVENOUS at 16:12

## 2025-05-19 RX ADMIN — Medication 10 ML: at 08:18

## 2025-05-19 RX ADMIN — HYDRALAZINE HYDROCHLORIDE 25 MG: 25 TABLET ORAL at 21:41

## 2025-05-19 RX ADMIN — APIXABAN 2.5 MG: 2.5 TABLET, FILM COATED ORAL at 08:09

## 2025-05-19 RX ADMIN — CARVEDILOL 12.5 MG: 12.5 TABLET, FILM COATED ORAL at 16:20

## 2025-05-19 RX ADMIN — RIVASTIGMINE 1 PATCH: 4.6 PATCH TRANSDERMAL at 08:10

## 2025-05-19 RX ADMIN — CARVEDILOL 12.5 MG: 12.5 TABLET, FILM COATED ORAL at 08:09

## 2025-05-19 RX ADMIN — METOCLOPRAMIDE HYDROCHLORIDE 5 MG: 5 INJECTION, SOLUTION INTRAMUSCULAR; INTRAVENOUS at 21:41

## 2025-05-19 RX ADMIN — ASPIRIN 81 MG CHEWABLE TABLET 81 MG: 81 TABLET CHEWABLE at 08:09

## 2025-05-19 RX ADMIN — ATORVASTATIN CALCIUM 80 MG: 80 TABLET, FILM COATED ORAL at 08:09

## 2025-05-19 RX ADMIN — SENNOSIDES 10 ML: 8.8 LIQUID ORAL at 21:40

## 2025-05-19 RX ADMIN — METOCLOPRAMIDE 10 MG: 5 INJECTION, SOLUTION INTRAMUSCULAR; INTRAVENOUS at 08:17

## 2025-05-19 NOTE — PROGRESS NOTES
[facility]     PALLIATIVE CARE FOLLOW UP NOTE    Name:  Valarie Camara   Age:  79 y.o.  Sex:  female  :  1945  MRN:  [id number]   Visit Number:  [id number]  Date Of Service:  25  Primary Care Physician:  Lay Cox MD    Chief Complaint: Weakness and debility    Interval History:  Patient seen today during palliative care team rounds.  Record reviewed and case discussed with staff.  NG dislodged over the weekend and was replaced per request.  Last BM .  Upon assessment, she awakens to verbal.  She is oriented, conversant.  We discussed Hospice services, which she is open to.  She ultimately desires to be home. PT/OT were able to evaluate patient to ensure safe bed mobility and ability to transfer.    I called and spoke with her daughter by phone.  We discussed generalized trajectory regarding EOL care.  I noted the goals associated with hospice and nutrition for comfort.  Anticipating those at EOL typically have decline in appetite overtime.  She wishes to discuss feeding/nutrition specifically with her mother, sister, and PC team tomorrow.        Review of Systems   Constitutional:  Positive for activity change and fatigue.   Neurological:  Positive for weakness (generalized).          Pain Assessment  Preferred Pain Scale: FACES (Merrill-Baker FACES Pain Rating Scale)  Nonverbal Indicators of Pain: nonverbal indicators absent  CPOT Facial Expression: 0-->relaxed, neutral  CPOT Body Movements: 0-->absence of movements  CPOT Muscle Tension: 0-->relaxed  Ventilator Compliance/Vocalization: 0-->talking in normal tone or no sound  CPOT Score: 0  PAINAD Breathin-->normal  PAINAD Negative Vocalization: 0-->none  PAINAD Facial Expression: 0-->smiling or inexpressive  PAINAD Body Language: 0-->relaxed  PAINAD Consolability: 0-->no need to console  PAINAD Score: 0  Vitals: /74 (BP Location: Right arm, Patient Position: Lying)   Pulse 67   Temp 97.8 °F  "(36.6 °C) (Axillary)   Resp 16   Ht 160 cm (63\")   Wt 66.2 kg (145 lb 15.1 oz)   SpO2 96%   BMI 25.85 kg/m²       Physical Exam  Vitals and nursing note reviewed.   Constitutional:       General: She is sleeping. She is not in acute distress.     Appearance: She is not diaphoretic.      Comments: Frail appearing, elderly female lying in bed, NAD   HENT:      Head: Normocephalic and atraumatic.      Nose:      Comments: NG in place  Eyes:      Extraocular Movements: Extraocular movements intact.      Pupils: Pupils are equal, round, and reactive to light.   Cardiovascular:      Rate and Rhythm: Normal rate and regular rhythm.   Pulmonary:      Effort: Pulmonary effort is normal. No respiratory distress.      Breath sounds: Normal breath sounds.   Musculoskeletal:      Cervical back: Neck supple.      Comments: Appears deconditioned    Skin:     General: Skin is warm and dry.      Capillary Refill: Capillary refill takes less than 2 seconds.   Neurological:      Mental Status: She is oriented to person, place, and time.   Psychiatric:         Mood and Affect: Mood normal.         Behavior: Behavior normal.          Results Reviewed:    Intake/Output Summary (Last 24 hours) at 5/19/2025 1453  Last data filed at 5/19/2025 0800  Gross per 24 hour   Intake 60 ml   Output 600 ml   Net -540 ml     Results from last 7 days   Lab Units 05/19/25  0703   SODIUM mmol/L 138   POTASSIUM mmol/L 4.3   CHLORIDE mmol/L 112*   CO2 mmol/L 17.9*   BUN mg/dL 30*   CREATININE mg/dL 1.22*   CALCIUM mg/dL 8.0*   GLUCOSE mg/dL 136*     Results from last 7 days   Lab Units 05/18/25  0547   WBC 10*3/mm3 6.44   HEMOGLOBIN g/dL 10.0*   HEMATOCRIT % 31.2*   PLATELETS 10*3/mm3 128*       Medication Review:   I have reviewed the patients active and prn medications.     Palliative Care Assessment:  Severe sepsis secondary to UTI  Metabolic encephalopathy  Atrial fibrillation with RVR  KURT  Dementia  Frailty  Dysphagia    Recommendations/Plan:  - " GOC discussions yield desire to maintain plan of care as outlined per the primary medicine service, with goal to optimize condition  - Initial goal for artificial nutrition was to optimize patient's condition, family/patient would not desire to pursue PEG placement; will confirm this with patient and family tomorrow (family meeting scheduled for 5/20 @1400)  - Provided counseling regarding goal for nutrition at EOL  - Considering transition to a comfort focused approach to her care, they reflect on patient's desire for for comfort and peace in the final stages of life  - Difficult to prognosticate given interventions in place, however it does appear patient is entering into the final stages of life   - Hospice has been notified of home referral  - Continue diflucan and nystan for oral candidiasis, primary medicine service is in agreement  - Previously patient taking PPI with history of GERD and hiatal hernia, consider restarting Protonix  - Last BM 5/15  - Palliative care will continue to follow/support patient and family    CODE STATUS:   Code Status and Medical Interventions: No CPR (Do Not Attempt to Resuscitate); Limited Support; No intubation (DNI)   Ordered at: 05/10/25 6305     Code Status (Patient has no pulse and is not breathing):    No CPR (Do Not Attempt to Resuscitate)     Medical Interventions (Patient has pulse or is breathing):    Limited Support     Medical Intervention Limits:    No intubation (DNI)     Level Of Support Discussed With:    Patient         I spent 55 total minutes, including face to face assessment, record review, coordination of care with staff, and counseling patient and/or family  Part of this note may be an electronic transcription/translation of spoken language to printed text using the Dragon Dictation System.    Isaura Lyons PA-C  05/19/25  14:53 EDT

## 2025-05-19 NOTE — PROGRESS NOTES
[facility] HOSPITALIST    PROGRESS NOTE    Name:  Valarie Camara   Age:  79 y.o.  Sex:  female  :  1945  MRN:  [id number]   Visit Number:  [id number]  Admission Date:  5/10/2025  Date Of Service:  25  Primary Care Physician:  Lay Cox MD     LOS: 8 days :    Chief Complaint:      weakness    Subjective:    Patient resting comfortably.  Awake.  No family present during the time of my exam.  Patient comfortable.  NG tube in place, tube feeds going.    Hospital Course:    Patient is an 79-year-old female brought to the emergency department for evaluation of altered mental status.  At the time of examination, unfortunately, patient was alone without family at bedside.  History of present illness was obtained from review of medical records, including discussion with nursing and ED physician.  Patient has a known history of dementia, however over the last 3 days, patient's daughter was concerned that the patient has developed significant confusion.  Patient has been unable to tolerate oral intake of nutrition and oral hydration.      EMS was called to patient's nursing facility, she was found to be hypotensive and tachycardic in A-fib with RVR.  They were unable to obtain IV access, subsequently unable to provide cardioversion en route to the ED.  Upon arrival to the ED, patient was emergently cardioverted, patient's rhythm with normal sinus rhythm, without any change in mentation.  Neurology was consulted, CT of the head did not show any acute CVA.  Cardiology was consulted, who feels that the elevated troponin is most likely secondary to the tachycardia and recommend troponin trending, anticoagulation.  Hospitalist services consulted for admission.     Discussion had with family and they request DNR with no chest compressions and no intubation, they approve all other interventions.     Patient had presented with encephalopathy very high sodium.  Continues to be  in goals of care discussion with patient and family.  Family meeting pending.  Sodium has been reluctant to recover still running about 160 despite hypotonic fluids nephrology is addressing this.  Otherwise urine is growing ESBL has been switched to Invanz.    Review of Systems:     All systems were reviewed and negative except as mentioned in subjective, assessment and plan.    Vital Signs:    Temp:  [97.8 °F (36.6 °C)-98.3 °F (36.8 °C)] 97.8 °F (36.6 °C)  Heart Rate:  [67-83] 67  Resp:  [16] 16  BP: (135-181)/(62-80) 169/74    Intake and output:    I/O last 3 completed shifts:  In: 60 [Other:60]  Out: 775 [Urine:775]  I/O this shift:  In: 60 [Other:60]  Out: -     Physical Examination: Examined again 5/19/25    General Appearance:  Alert and cooperative. NAD   Head:  Atraumatic and normocephalic.   Eyes: Conjunctivae and sclerae normal, no icterus. No pallor.   Throat: No oral lesions, no thrush, oral mucosa moist.   Neck: Supple, trachea midline, no thyromegaly.   Lungs:   Breath sounds heard bilaterally equally.  No wheezing or crackles.    Heart:  Normal S1 and S2, no murmur, No JVD.   Abdomen:   Normal bowel sounds, Soft, nontender, nondistended, no rebound tenderness.   Extremities: Supple, no edema, no cyanosis, no clubbing.   Skin: No bleeding or rash.   Neurologic: Alert and oriented x 2. No facial asymmetry.      Laboratory results:    Results from last 7 days   Lab Units 05/19/25  0703 05/18/25  0547 05/17/25  1359 05/17/25  0613   SODIUM mmol/L 138 147*  --  145   POTASSIUM mmol/L 4.3 3.7 3.8 3.6   CHLORIDE mmol/L 112* 116*  --  116*   CO2 mmol/L 17.9* 23.2  --  21.3*   BUN mg/dL 30* 32*  --  36*   CREATININE mg/dL 1.22* 1.33*  --  1.37*   CALCIUM mg/dL 8.0* 8.2*  --  7.7*   GLUCOSE mg/dL 136* 79  --  133*     Results from last 7 days   Lab Units 05/18/25  0547 05/17/25  0613 05/16/25  0715   WBC 10*3/mm3 6.44 6.12 5.90   HEMOGLOBIN g/dL 10.0* 9.7* 9.9*   HEMATOCRIT % 31.2* 30.3* 30.3*   PLATELETS  10*3/mm3 128* 118* 97*                       Recent Labs     03/22/25  0610 05/12/25  0538   PHART 7.449 7.444   XVF0AXP 32.0* 33.5*   PO2ART 78.3* 81.2   MNE1AGZ 22.1 23.0   BASEEXCESS -1.2* -0.6*      I have reviewed the patient's laboratory results.    Radiology results:    XR Abdomen 1 View  Result Date: 5/18/2025  PROCEDURE: XR ABDOMEN 1 VW-  HISTORY: NG tube advanced, checking NG tube placement.; E87.0-Hyperosmolality and hypernatremia; I48.91-Unspecified atrial fibrillation; E05.90-Thyrotoxicosis, unspecified without thyrotoxic crisis or storm; N17.9-Acute kidney failure, unspecified  COMPARISON: May 18, 2025 at 1054.  FINDINGS: An AP view of the abdomen and pelvis demonstrates a nonspecific bowel gas pattern with no evidence of obstruction. No radiopaque stones are seen overlying the renal shadows. Nasogastric tube has been advanced and is within the distal stomach. Opacity at the left base, correlate for possible effusion.      Impression: Nasogastric tube has been advanced and is within the distal stomach.      This report was signed and finalized on 5/18/2025 1:17 PM by Aki Naidu DO.      XR Abdomen 1 View  Result Date: 5/18/2025  PROCEDURE: XR ABDOMEN 1 VW-  HISTORY: NG tube placement verification; E87.0-Hyperosmolality and hypernatremia; I48.91-Unspecified atrial fibrillation; E05.90-Thyrotoxicosis, unspecified without thyrotoxic crisis or storm; N17.9-Acute kidney failure, unspecified  COMPARISON: May 16, 2025.  FINDINGS: An AP view of the abdomen and pelvis demonstrates a nonspecific bowel gas pattern with no evidence of obstruction. No radiopaque stones are seen overlying the renal shadows. Nasogastric tube with the side port beneath the diaphragm.      Impression: Nasogastric tube with the side port beneath the diaphragm.      This report was signed and finalized on 5/18/2025 11:58 AM by Aik Naidu DO.        I have reviewed the patient's radiology reports.    Medication Review:     I have  reviewed the patient's active and prn medications.     Problem List:      Hypernatremia    PAF (paroxysmal atrial fibrillation)    Acute renal failure superimposed on stage 3a chronic kidney disease      Assessment:    Severe Sepsis secondary to urinary tract infection  Urinary tract infection  Metabolic encephalopathy  Atrial fibrillation with RVR  Acute kidney injury  Hypernatremia  Dementia  COPD  Hypertension  Hyperlipidemia  Restless leg syndrome    Plan:    Severe Sepsis secondary to urinary tract infection  Urinary tract infection  Metabolic encephalopathy  Patient meets sepsis criteria with tachycardia, leukocytosis, source of infection identified as urinary tract infection.  Evidence of endorgan damage noted with metabolic encephalopathy and acute kidney injury.  IV Rocephin, switched to Invanz 5/12/25 due to ESBL in the urine - completed   MRI no stroke  Grey placed on 5/10/25  Atrial fibrillation with RVR  Patient status post cardioversion.  Cardiology consulted, appreciate their recommendations.  Heparin drip will be started, continued  Patient is currently normal sinus rhythm, continue Coreg  Acute kidney injury  Acute hypernatremia  Sodium was normal 1 month prior.  Patient encephalopathic which may be symptoms related to hypernatremia.  Nephrology has been consulted, appreciate his recommendations.  Status post LR boluses, continue slow hypotonic fluids with dextrose  Serial sodium, if sodium is correcting greater than one half Mg per DL per hour will hold fluids  Nutrition  NGT initially placed 5/14  Patient complained of painful swallowing; possible thursh. Started empiric fluconazole  She also has a history of strictures; was recently dilated. Low suspicion this is the cause  Continue to attempt tube feeds as tolerated  5/18: NGT pulled out overnight, unsuccessful attempts x2 at re-insertion, Overall advise to discontinue attempts and patient is able to articulate that she does not want it  replaced.   5/19: NGT re-inserted   Dementia  COPD  Hypertension  Hyperlipidemia  Restless leg syndrome  Continue home medications as appropriate.     Palliative care following. Daughter considering transition to comfort care.   Planned family meeting with daughters, patient and palliative care for tomorrow.     DVT Prophylaxis: Heparin  Code Status: DNR/DNI  Diet: NPO; tube feeds  Discharge Plan: Pending    Ronny Lara DO  05/19/25  12:27 EDT    Dictated utilizing Dragon dictation.

## 2025-05-19 NOTE — PLAN OF CARE
Goal Outcome Evaluation:  Palliative care team; Isaura Lyons PA-C and this RN met with pt at bedside. She was resting in bed, awake and watching TV.  Pt states she feels less hungry since the tube feedings. She denied pain or SOA. O2 sat 99% on room air. Hospice services discussed, she was open to going home with hospice. She would like her daughter to be present for further conversation.    Daughter Cleo agreed to meet tomorrow afternoon at 2pm with PC team.

## 2025-05-19 NOTE — CASE MANAGEMENT/SOCIAL WORK
Case Management/Social Work    Patient Name:  Valarie Camara  YOB: 1945  MRN: [id number]  Admit Date:  5/10/2025        09:18 EDT   Discharge Plan       Row Name 05/19/25 0916       Plan    Plan Palliative care discussions ongoing for GOC.                        Electronically signed by:  Aquilino Causey RN  05/19/25 09:18 EDT

## 2025-05-19 NOTE — PLAN OF CARE
Problem: Adult Inpatient Plan of Care  Goal: Plan of Care Review  Outcome: Progressing  Goal: Patient-Specific Goal (Individualized)  Outcome: Progressing  Goal: Absence of Hospital-Acquired Illness or Injury  Outcome: Progressing  Intervention: Identify and Manage Fall Risk  Description: Perform standard risk assessment on admission using a validated tool or comprehensive approach appropriate to the patient; reassess fall risk frequently, with change in status or transfer to another level of care.Communicate risk to interprofessional healthcare team; ensure fall risk visible cue.Determine need for increased observation, equipment and environmental modification, as well as use of supportive, nonskid footwear.Adjust safety measures to individual needs and identified risk factors.Reinforce the importance of active participation with fall risk prevention, safety, and physical activity with the patient and family.Perform regular intentional rounding to assess need for position change, pain assessment and personal needs, including assistance with toileting.  Recent Flowsheet Documentation  Taken 5/19/2025 0600 by Yesi Zavala RN  Safety Promotion/Fall Prevention: safety round/check completed  Taken 5/19/2025 0400 by Yesi Zavala RN  Safety Promotion/Fall Prevention: safety round/check completed  Taken 5/19/2025 0200 by Yesi Zavala RN  Safety Promotion/Fall Prevention: safety round/check completed  Taken 5/19/2025 0000 by Yesi Zavala RN  Safety Promotion/Fall Prevention: safety round/check completed  Taken 5/18/2025 2200 by Yesi Zavala RN  Safety Promotion/Fall Prevention: safety round/check completed  Taken 5/18/2025 2000 by Yesi Zavala RN  Safety Promotion/Fall Prevention: safety round/check completed  Intervention: Prevent Skin Injury  Description: Perform a screening for skin injury risk, such as pressure or moisture-associated skin damage on admission and at regular  intervals throughout hospital stay.Keep all areas of skin (especially folds) clean and dry.Maintain adequate skin hydration.Relieve and redistribute pressure and protect bony prominences and skin at risk for injury; implement measures based on patient-specific risk factors.Match turning and repositioning schedule to clinical condition.Encourage weight shift frequently; assist with reposition if unable to complete independently.Float heels off bed; avoid pressure on the Achilles tendon.Keep skin free from extended contact with medical devices.Optimize nutrition and hydration.Encourage functional activity and mobility, as early as tolerated.Use aids (e.g., slide boards, mechanical lift) during transfer.  Recent Flowsheet Documentation  Taken 5/19/2025 0600 by Yesi Zavala RN  Body Position:   side-lying   left  Taken 5/19/2025 0400 by Yesi Zavala RN  Body Position:   turned   left  Taken 5/19/2025 0200 by Yesi Zavala RN  Body Position:   turned   right  Taken 5/19/2025 0000 by Yesi Zavala RN  Body Position: patient/family refused  Taken 5/18/2025 2200 by Yesi Zavala RN  Body Position:   tilted   right  Taken 5/18/2025 2000 by Yesi Zavala RN  Body Position: sitting up in bed  Skin Protection: incontinence pads utilized  Intervention: Prevent and Manage VTE (Venous Thromboembolism) Risk  Description: Assess for VTE (venous thromboembolism) risk.Promote early mobilization; encourage both active and passive leg exercises, if unable to ambulate.Initiate and maintain compression or other therapy, as indicated, based on identified risk in accordance with organizational protocol and provider order.Recognize the patient's individual risk for bleeding before initiating pharmacologic thromboprophylaxis.  Recent Flowsheet Documentation  Taken 5/18/2025 2000 by Yesi Zavala RN  VTE Prevention/Management: patient refused intervention  Intervention: Prevent  Infection  Description: Maintain skin and mucous membrane integrity; promote hand, oral and pulmonary hygiene.Optimize fluid balance, nutrition, sleep and glycemic control to maximize infection resistance.Identify potential sources of infection early to prevent or mitigate progression of infection (e.g., wound, lines, devices).Evaluate ongoing need for invasive devices; remove promptly when no longer indicated.Review vaccination status.  Recent Flowsheet Documentation  Taken 5/19/2025 0400 by Yesi Zavala RN  Infection Prevention: environmental surveillance performed  Taken 5/19/2025 0200 by Yesi Zavala RN  Infection Prevention: environmental surveillance performed  Taken 5/19/2025 0000 by Yesi Zavala RN  Infection Prevention: environmental surveillance performed  Taken 5/18/2025 2200 by Yesi Zavala RN  Infection Prevention: environmental surveillance performed  Taken 5/18/2025 2000 by Yesi Zavala RN  Infection Prevention: environmental surveillance performed  Goal: Optimal Comfort and Wellbeing  Outcome: Progressing  Intervention: Provide Person-Centered Care  Description: Use a family-focused approach to care; encourage support system presence and participation.Develop trust and rapport by proactively providing information, encouraging questions, addressing concerns and offering reassurance.Acknowledge emotional response to hospitalization.Recognize and utilize personal coping strategies and strengths; develop goals via shared decision-making.Honor spiritual and cultural preferences.  Recent Flowsheet Documentation  Taken 5/18/2025 2000 by Yesi Zavala RN  Trust Relationship/Rapport:   care explained   choices provided   questions answered   thoughts/feelings acknowledged  Goal: Readiness for Transition of Care  Outcome: Progressing     Problem: Fall Injury Risk  Goal: Absence of Fall and Fall-Related Injury  Outcome: Progressing  Intervention: Identify and Manage  Contributors  Description: Develop a fall prevention plan, considering patient-centered interventions and family/caregiver involvement; identify and address patient's facilitators and barriers.Provide reorientation, appropriate sensory stimulation and routines with changes in mental status to decrease risk of fall.Promote use of personal vision and auditory aids.Assess assistance level required for safe and effective self-care; provide support as needed, such as toileting and mobilization. For age 65 and older, implement timed toileting with assistance.Encourage physical activity, such as performance of mobility and self-care at highest level of patient ability, multicomponent exercise program and provision of appropriate assistive devices.If fall occurs, assess the severity of injury; implement fall injury protocol. Determine the cause and revise fall injury prevention plan.Regularly review and advocate for medication adjustment to decrease fall risk; consider administration times, polypharmacy and age.Balance adequate pain management with potential for oversedation.  Recent Flowsheet Documentation  Taken 5/19/2025 0400 by Yesi Zavala RN  Medication Review/Management: medications reviewed  Taken 5/19/2025 0000 by Yesi Zavala RN  Medication Review/Management: medications reviewed  Taken 5/18/2025 2200 by Yesi Zavala RN  Medication Review/Management: medications reviewed  Taken 5/18/2025 2000 by Yesi Zavala RN  Medication Review/Management: medications reviewed  Intervention: Promote Injury-Free Environment  Description: Provide a safe, barrier-free environment that encourages independent activity.Keep care area uncluttered and well-lighted.Determine need for increased observation or monitoring.Avoid use of devices that minimize mobility, such as restraints or indwelling urinary catheter.  Recent Flowsheet Documentation  Taken 5/19/2025 0600 by Yesi Zavala, AIDAN  Safety  Promotion/Fall Prevention: safety round/check completed  Taken 5/19/2025 0400 by Yesi Zavala RN  Safety Promotion/Fall Prevention: safety round/check completed  Taken 5/19/2025 0200 by Yesi Zavala RN  Safety Promotion/Fall Prevention: safety round/check completed  Taken 5/19/2025 0000 by Yesi Zavala RN  Safety Promotion/Fall Prevention: safety round/check completed  Taken 5/18/2025 2200 by Yesi Zavala RN  Safety Promotion/Fall Prevention: safety round/check completed  Taken 5/18/2025 2000 by Yesi Zavala RN  Safety Promotion/Fall Prevention: safety round/check completed     Problem: Skin Injury Risk Increased  Goal: Skin Health and Integrity  Outcome: Progressing  Intervention: Optimize Skin Protection  Description: Perform a full pressure injury risk assessment, as indicated by screening, upon admission to care unit.Reassess skin (full inspection and injury risk, including skin temperature, consistency and color) frequently (e.g., scheduled interval, with change in condition) to provide optimal early detection and prevention.Maintain adequate tissue perfusion (e.g., encourage fluid balance; avoid crossing legs, constrictive clothing or devices) to promote tissue oxygenation.Maintain head of bed at lowest degree of elevation tolerated, considering medical condition and other restrictions. Use positioning supports to prevent sliding and friction. Consider low friction textiles.Avoid positioning onto an area that remains reddened or on bony prominences.Minimize incontinence and moisture (e.g., toileting schedule; moisture-wicking pad, diaper or incontinence collection device; skin moisture barrier).Cleanse skin promptly and gently, when soiled, utilizing a pH-balanced cleanser.Relieve and redistribute pressure (e.g., scheduled position changes, weight shifts, use of support surface, medical device repositioning, protective dressing application, use of positioning device,  microclimate control, use of pressure-injury-monitorEncourage increased activity, such as sitting in a chair at the bedside or early mobilization, when able to tolerate. Avoid prolonged sitting.  Recent Flowsheet Documentation  Taken 5/19/2025 0600 by Yesi Zavala RN  Activity Management: activity minimized  Head of Bed (HOB) Positioning: HOB elevated  Taken 5/19/2025 0400 by Yesi Zavala RN  Activity Management: activity minimized  Head of Bed (HOB) Positioning: HOB elevated  Taken 5/19/2025 0200 by Yesi Zavala RN  Activity Management: activity encouraged  Head of Bed (HOB) Positioning: HOB elevated  Taken 5/19/2025 0000 by Yesi Zavala RN  Activity Management: activity encouraged  Head of Bed (HOB) Positioning: HOB elevated  Taken 5/18/2025 2200 by Yesi Zavala RN  Activity Management: activity encouraged  Head of Bed (HOB) Positioning: HOB elevated  Taken 5/18/2025 2000 by Yesi Zavala RN  Activity Management: activity encouraged  Pressure Reduction Techniques: weight shift assistance provided  Head of Bed (HOB) Positioning: HOB elevated  Pressure Reduction Devices: positioning supports utilized  Skin Protection: incontinence pads utilized     Problem: Sepsis/Septic Shock  Goal: Optimal Coping  Outcome: Progressing  Intervention: Support Patient and Family Response  Description: Acknowledge, normalize, validate intensity and complexity of patient and support system response to situation.Provide opportunity for expression of thoughts, feelings and concerns; respond with compassion and reassurance.Decrease stress and anxiety by providing information about patient's status and treatment.Facilitate support system presence and participation in care; consider providing a diary in intensive care situation.Support coping by recognizing current coping strategies; provide aid in developing new strategies.Assess and monitor for signs and symptoms of psychologic distress, anxiety  and depression.Utilize complementary therapy, such as music, massage or guided imagery.Engage in proactive and ongoing discussion about goals of care and facilitate shared decision-making.Connect with community resources for ongoing support, such as counseling and group support.  Recent Flowsheet Documentation  Taken 5/18/2025 2000 by Yesi Zavala RN  Family/Support System Care: support provided  Goal: Absence of Bleeding  Outcome: Progressing  Goal: Blood Glucose Level Within Target Range  Outcome: Progressing  Goal: Absence of Infection Signs and Symptoms  Outcome: Progressing  Intervention: Initiate Sepsis Management  Description: Provide fluid therapy, such as crystalloid or albumin, to increase intravascular volume, organ perfusion and oxygen delivery.Provide respiratory support, such as oxygen therapy, noninvasive or invasive positive pressure ventilation, to achieve oxygenation and ventilation goal; avoid hyperoxemia.Obtain cultures prior to initiating antimicrobial therapy when possible. Do not delay treatment for laboratory results in the presence of high suspicion or clinical indicators.Administer intravenous broad-spectrum antimicrobial therapy promptly.Implement hemodynamic monitoring to guide intravascular support based on individual targeted parameters.Determine and address underlying source of infection aggressively; implement transmission-based precautions and isolation, as indicated.  Recent Flowsheet Documentation  Taken 5/19/2025 0400 by Yesi Zavala RN  Infection Prevention: environmental surveillance performed  Isolation Precautions:   precautions maintained   contact  Taken 5/19/2025 0200 by Yesi Zvaala RN  Infection Prevention: environmental surveillance performed  Isolation Precautions:   precautions maintained   contact  Taken 5/19/2025 0000 by Yesi Zavala, RN  Infection Prevention: environmental surveillance performed  Isolation Precautions:   precautions  maintained   contact  Taken 5/18/2025 2200 by Yesi Zavala RN  Infection Prevention: environmental surveillance performed  Isolation Precautions:   precautions maintained   contact  Taken 5/18/2025 2000 by Yesi Zavala RN  Infection Prevention: environmental surveillance performed  Isolation Precautions:   precautions maintained   contact  Intervention: Promote Recovery  Description: Encourage pulmonary hygiene, such as cough-enhancement and airway-clearance techniques, that may include use of incentive spirometry, deep breathing and cough.Encourage early rehabilitation and physical activity to optimize functional ability and activity tolerance, as well as minimize delirium.Promote energy conservation; minimize oxygen demand and consumption by adjusting environment, decreasing stimulation, maintaining normothermia and treating pain.Optimize fluid balance, nutrition intake, sleep and glycemic control to maintain tissue perfusion and enhance immune response.  Recent Flowsheet Documentation  Taken 5/19/2025 0600 by Yesi Zavala RN  Activity Management: activity minimized  Taken 5/19/2025 0400 by Yesi Zavala RN  Activity Management: activity minimized  Taken 5/19/2025 0200 by Yesi Zavala RN  Activity Management: activity encouraged  Taken 5/19/2025 0000 by Yesi Zavala RN  Activity Management: activity encouraged  Taken 5/18/2025 2200 by Yesi Zavala RN  Activity Management: activity encouraged  Taken 5/18/2025 2000 by Yesi Zavala RN  Activity Management: activity encouraged  Goal: Optimal Nutrition Delivery  Outcome: Progressing     Problem: Fluid Volume Deficit  Goal: Fluid Balance  Outcome: Progressing     Problem: UTI (Urinary Tract Infection)  Goal: Improved Infection Symptoms  Outcome: Progressing     Problem: Electrolyte Imbalance  Goal: Electrolyte Balance  Outcome: Progressing     Problem: Mobility Impairment  Goal: Optimal Mobility  Outcome:  Progressing  Intervention: Optimize Mobility  Description: Assess mobility and encourage participation at maximally safe independent level.Provide level of assistance and supervision needed for safety; involve patient and family/caregiver in goal-setting and training.Ensure effective use of devices, such as cane, transfer board or orthosis.Schedule mobility activities when pain and fatigue are at a minimum; pace activity to conserve energy.Ensure proper body mechanics and positioning for optimal task performance.Identify and address impairments or safety issues affecting performance, such as balance, strength or cognition.Provide mobility and gait interventions with therapeutic interventions.  Recent Flowsheet Documentation  Taken 5/19/2025 0600 by Yesi Zavala RN  Activity Management: activity minimized  Positioning/Transfer Devices:   pillows   in use  Taken 5/19/2025 0400 by Yesi Zavala RN  Activity Management: activity minimized  Positioning/Transfer Devices:   pillows   in use  Taken 5/19/2025 0200 by Yesi Zavala RN  Activity Management: activity encouraged  Positioning/Transfer Devices:   pillows   in use  Taken 5/19/2025 0000 by Yesi Zavala RN  Activity Management: activity encouraged  Positioning/Transfer Devices:   pillows   in use  Taken 5/18/2025 2200 by Yesi Zavala RN  Activity Management: activity encouraged  Positioning/Transfer Devices:   pillows   in use  Taken 5/18/2025 2000 by Yesi Zavala RN  Activity Management: activity encouraged  Positioning/Transfer Devices:   pillows   in use     Problem: Comorbidity Management  Goal: Blood Glucose Level Within Target Range  Outcome: Progressing  Intervention: Monitor and Manage Glycemia  Description: Establish target blood glucose levels based on patient-specific factors, such as age, diabetes-related complications and illness severity.Document blood glucose levels and monitor trend.Provide antihyperglycemic  pharmacologic therapy to maintain blood glucose levels within targeted range.Advocate for patient use of home devices such as insulin pump, continuous glucose monitor; assess for safe and competent participation in care.Check blood glucose level if there is a change in mental or cognitive status.Recognize, treat and document hypoglycemia event and potential cause.Assess current lifestyle patterns; acknowledging positive patterns supporting wellbeing.Evaluate effectiveness of coping skills; encourage expression of feelings, expectations and concerns related to disease management and quality of life; reinforce education to enhance management plan and wellbeing.  Recent Flowsheet Documentation  Taken 5/19/2025 0400 by Yesi Zavala RN  Medication Review/Management: medications reviewed  Taken 5/19/2025 0000 by Yesi Zavala RN  Medication Review/Management: medications reviewed  Taken 5/18/2025 2200 by Yesi Zavala RN  Medication Review/Management: medications reviewed  Taken 5/18/2025 2000 by Yesi Zavala RN  Medication Review/Management: medications reviewed  Goal: Blood Pressure in Desired Range  Outcome: Progressing  Intervention: Maintain Blood Pressure Management  Description: Evaluate adherence to home antihypertensive regimen (e.g., exercise and activity, diet modification, medication).Provide scheduled antihypertensive medication; consider administration time and effects (e.g., avoid giving diuretic prior to bedtime).Monitor response to antihypertensive medication therapy (e.g., blood pressure, electrolyte levels, medication effects).Minimize risk of orthostatic hypotension; encourage caution with position changes, particularly if elderly.  Recent Flowsheet Documentation  Taken 5/19/2025 0400 by Yesi Zavala RN  Medication Review/Management: medications reviewed  Taken 5/19/2025 0000 by Yesi Zavala RN  Medication Review/Management: medications reviewed  Taken 5/18/2025  2200 by Yesi Zavala, RN  Medication Review/Management: medications reviewed  Taken 5/18/2025 2000 by Yesi Zavala, RN  Medication Review/Management: medications reviewed   Goal Outcome Evaluation:

## 2025-05-19 NOTE — PLAN OF CARE
Goal Outcome Evaluation:  Plan of Care Reviewed With: patient        Progress: improving  Outcome Evaluation: VSS, pt has been hypertensive today around 173/71. Scheduled BP meds administered. Pt on room air.

## 2025-05-19 NOTE — PAYOR COMM NOTE
"To:  GRICELDA  From: Adela Bowden RN  Phone: [phone]  Fax: [phone]  NPI: [id number]  TIN: [id number]  Member ID: [id number]   MRN: [id number]    Caitlyn Barajas (79 y.o. Female)       Date of Birth   1945    Social Security Number       Address   [address] [place] [address]    Home Phone   [phone]    MRN   [id number]       Restorationist   [facility]    Marital Status                               Admission Date   5/10/2025    Admission Type   Emergency    Admitting Provider   Edgardo Burnette MD    Attending Provider   Ronny Lara DO    Department, Room/Bed   [facility] TELEMETRY 3, 328/1       Discharge Date       Discharge Disposition       Discharge Destination                                 Attending Provider: Ronny Lara DO    Allergies: Penicillins, Clonidine Derivatives, Hydralazine, Sulfa Antibiotics    Isolation: Contact   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), ESBL (05/12/25)   Code Status: No CPR    Ht: 160 cm (63\")   Wt: 66.2 kg (145 lb 15.1 oz)    Admission Cmt: None   Principal Problem: Hypernatremia [E87.0]                   Active Insurance as of 5/10/2025       Primary Coverage       Payor Plan Insurance Group Employer/Plan Group    ANTH MEDICARE REPLACEMENT ANTH MEDICARE ADVANTAGE HMO [id number]       Payor Plan Address Payor Plan Phone Number Payor Plan Fax Number Effective Dates    [address] [phone]  4/1/2025 - None Entered    [facility] [id number]         Subscriber Name Subscriber Birth Date Member ID       CAITLYN BARAJAS 1945 [id number]                     Emergency Contacts        (Rel.) Home Phone Work Phone Mobile Phone    Cleo Barajas (Daughter) [phone] -- [phone]    ELROY BARAJAS (Daughter) [phone] -- --              Vital Signs (last day)       Date/Time Temp Temp src Pulse Resp BP Patient Position SpO2    05/19/25 0725 97.8 (36.6) Axillary 67 16 169/74 Lying 96    05/19/25 0529 " -- -- 74 -- 157/69 -- --    05/18/25 2141 -- -- 73 -- -- -- --    05/18/25 1927 97.9 (36.6) Oral 80 16 140/63 Lying --    05/18/25 1810 -- -- 79 -- 135/62 -- 98    05/18/25 1713 -- -- 76 -- -- -- --    05/18/25 1700 -- -- 83 -- 181/71 -- 95    05/18/25 1546 -- -- 77 -- 180/80 -- --    05/18/25 1517 98.3 (36.8) Oral -- 16 180/80 Lying --    05/18/25 1500 -- -- 73 -- -- -- 97    05/18/25 0729 97.8 (36.6) Oral 65 16 157/61 Lying 96    05/18/25 0403 98.1 (36.7) Oral 66 16 124/62 -- 96          Current Facility-Administered Medications   Medication Dose Route Frequency Provider Last Rate Last Admin    acetaminophen (TYLENOL) tablet 650 mg  650 mg Nasogastric Q4H PRN Ronny Lara DO   650 mg at 05/16/25 0901    Or    acetaminophen (TYLENOL) suppository 650 mg  650 mg Rectal Q4H PRN Ronny Lara DO        apixaban (ELIQUIS) tablet 2.5 mg  2.5 mg Nasogastric Q12H Ronny Lara DO   2.5 mg at 05/19/25 0809    aspirin chewable tablet 81 mg  81 mg Nasogastric Daily Ronny Lara DO   81 mg at 05/19/25 0809    atorvastatin (LIPITOR) tablet 80 mg  80 mg Nasogastric Daily Ronny Lara DO   80 mg at 05/19/25 0809    polyethylene glycol (MIRALAX) packet 17 g  17 g Nasogastric Daily PRN Ronny Lara DO        And    bisacodyl (DULCOLAX) EC tablet 5 mg  5 mg Oral Daily PRN Ronny Lara DO        And    bisacodyl (DULCOLAX) suppository 10 mg  10 mg Rectal Daily PRN Ronny Lara DO        Calcium Replacement - Follow Nurse / BPA Driven Protocol   Not Applicable PRN Rodriguez, Aki, DO        carvedilol (COREG) tablet 12.5 mg  12.5 mg Nasogastric BID With Meals Ronny Lara DO   12.5 mg at 05/19/25 0809    dextrose (D50W) (25 g/50 mL) IV injection 25 g  25 g Intravenous Q15 Min PRN Aki Rodriguez DO        dextrose (GLUTOSE) oral gel 15 g  15 g Oral Q15 Min PRN Aki Rodriguez DO        dextrose 5 % and sodium chloride 0.45 % infusion  50 mL/hr Intravenous Continuous Ronny Lara DO 50 mL/hr at  05/19/25 0818 50 mL/hr at 05/19/25 0818    sennosides (SENOKOT) 8.8 MG/5ML syrup 10 mL  10 mL Nasogastric BID Aki Rodriguez DO   10 mL at 05/18/25 2140    And    docusate sodium (COLACE) liquid 100 mg  100 mg Nasogastric BID Aki Rodriguez DO   100 mg at 05/18/25 2141    glucagon (GLUCAGEN) injection 1 mg  1 mg Intramuscular Q15 Min PRN Aki Rodriguez DO        glycerin 35 % liquid 35% 2 spray  2 spray Mouth/Throat Q2H PRN Aki Rodriguez DO   2 spray at 05/12/25 2011    hydrALAZINE (APRESOLINE) injection 10 mg  10 mg Intravenous Q6H PRN Aki Rodriguez DO   10 mg at 05/13/25 2218    hydrALAZINE (APRESOLINE) tablet 25 mg  25 mg Nasogastric Q8H Ronny Lara DO   25 mg at 05/19/25 0529    insulin regular (humuLIN R,novoLIN R) injection 2-7 Units  2-7 Units Subcutaneous Q6H Aki Rodriguez DO   2 Units at 05/17/25 2351    lansoprazole (PREVACID SOLUTAB) disintegrating tablet Tablet Delayed Release Dispersible 30 mg  30 mg Nasogastric Q AM Ronny Lara DO   30 mg at 05/19/25 0529    Magnesium Cardiology Dose Replacement - Follow Nurse / BPA Driven Protocol   Not Applicable PRAki Guzman DO        metoclopramide (REGLAN) injection 10 mg  10 mg Intravenous Q6H Ronny Lara DO   10 mg at 05/19/25 0817    metoprolol tartrate (LOPRESSOR) injection 5 mg  5 mg Intravenous Q4H PRN Wil Rader DO   5 mg at 05/16/25 2159    nitroglycerin (NITROSTAT) SL tablet 0.4 mg  0.4 mg Sublingual Q5 Min PRN Aki Rodriguez DO        ondansetron (ZOFRAN) injection 4 mg  4 mg Intravenous Q6H PRN Ronny Lara DO   4 mg at 05/15/25 0126    phenol (CHLORASEPTIC) 1.4 % liquid 1 spray  1 spray Mouth/Throat Q2H PRN Ronny Lara DO        Phosphorus Replacement - Follow Nurse / BPA Driven Protocol   Not Applicable PRN Aki Rodriguez DO        Potassium Replacement - Follow Nurse / BPA Driven Protocol   Not Applicable PRN Aki Rodirguez DO        rivastigmine (EXELON) 4.6 MG/24HR patch 1 patch  1 patch Transdermal Daily  RodriguezAki hancock, DO   1 patch at 05/19/25 0810    sodium chloride 0.9 % flush 10 mL  10 mL Intravenous PRN Danie Tolliver DO        sodium chloride 0.9 % flush 10 mL  10 mL Intravenous Q12H Rodriguez, Aki, DO   10 mL at 05/19/25 0818    sodium chloride 0.9 % flush 10 mL  10 mL Intravenous PRN Rodriguez, Aki, DO   10 mL at 05/12/25 0617    [Held by provider] sodium chloride 0.9 % infusion 40 mL  40 mL Intravenous PRN Rodriguez, Aki, DO         Lab Results (last 24 hours)       Procedure Component Value Units Date/Time    Renal Function Panel [[id number]]  (Abnormal) Collected: 05/19/25 0703    Specimen: Blood Updated: 05/19/25 0824     Glucose 136 mg/dL      BUN 30 mg/dL      Creatinine 1.22 mg/dL      Sodium 138 mmol/L      Potassium 4.3 mmol/L      Comment: Specimen hemolyzed.  Result may be falsely elevated.        Chloride 112 mmol/L      CO2 17.9 mmol/L      Calcium 8.0 mg/dL      Albumin 2.2 g/dL      Phosphorus 3.0 mg/dL      Anion Gap 8.1 mmol/L      BUN/Creatinine Ratio 24.6     eGFR 45.0 mL/min/1.73     Narrative:      GFR Categories in Chronic Kidney Disease (CKD)              GFR Category          GFR (mL/min/1.73)    Interpretation  G1                    90 or greater        Normal or high (1)  G2                    60-89                Mild decrease (1)  G3a                   45-59                Mild to moderate decrease  G3b                   30-44                Moderate to severe decrease  G4                    15-29                Severe decrease  G5                    14 or less           Kidney failure    (1)In the absence of evidence of kidney disease, neither GFR category G1 or G2 fulfill the criteria for CKD.    eGFR calculation 2021 CKD-EPI creatinine equation, which does not include race as a factor    POC Glucose Once [[id number]]  (Abnormal) Collected: 05/19/25 0738    Specimen: Blood Updated: 05/19/25 0745     Glucose 143 mg/dL      Comment: Serial Number: [id number]:  [id number]        POC Glucose Once [[id number]]  (Normal) Collected: 05/19/25 0518    Specimen: Blood Updated: 05/19/25 0520     Glucose 126 mg/dL      Comment: Serial Number: [id number]:  [id number]       POC Glucose Once [[id number]]  (Normal) Collected: 05/18/25 2329    Specimen: Blood Updated: 05/18/25 2331     Glucose 127 mg/dL      Comment: Serial Number: [id number]:  [id number]       Phosphorus [[id number]]  (Normal) Collected: 05/18/25 2139    Specimen: Blood Updated: 05/18/25 2207     Phosphorus 3.2 mg/dL     POC Glucose Once [[id number]]  (Normal) Collected: 05/18/25 2025    Specimen: Blood Updated: 05/18/25 2029     Glucose 108 mg/dL      Comment: Serial Number: [id number]:  [id number]       POC Glucose Once [[id number]]  (Normal) Collected: 05/18/25 1626    Specimen: Blood Updated: 05/18/25 1631     Glucose 84 mg/dL      Comment: Serial Number: [id number]:  [id number]       POC Glucose Once [[id number]]  (Normal) Collected: 05/18/25 1125    Specimen: Blood Updated: 05/18/25 1131     Glucose 80 mg/dL      Comment: Serial Number: [id number]:  [id number]             Imaging Results (Last 24 Hours)       Procedure Component Value Units Date/Time    XR Abdomen 1 View [[id number]] Collected: 05/18/25 1316     Updated: 05/18/25 1320    Narrative:      PROCEDURE: XR ABDOMEN 1 VW-     HISTORY: NG tube advanced, checking NG tube placement.;  E87.0-Hyperosmolality and hypernatremia; I48.91-Unspecified atrial  fibrillation; E05.90-Thyrotoxicosis, unspecified without thyrotoxic  crisis or storm; N17.9-Acute kidney failure, unspecified     COMPARISON: May 18, 2025 at 1054.     FINDINGS: An AP view of the abdomen and pelvis demonstrates a  nonspecific bowel gas pattern with no evidence of obstruction. No  radiopaque stones are seen overlying the renal shadows. Nasogastric tube  has been advanced and is within the distal stomach. Opacity at the left  base, correlate for possible effusion.       Impression:       Nasogastric tube has been advanced and is within the distal  stomach.                 This report was signed and finalized on 5/18/2025 1:17 PM by Aki Naidu DO.       XR Abdomen 1 View [[id number]] Collected: 05/18/25 1157     Updated: 05/18/25 1200    Narrative:      PROCEDURE: XR ABDOMEN 1 VW-     HISTORY: NG tube placement verification; E87.0-Hyperosmolality and  hypernatremia; I48.91-Unspecified atrial fibrillation;  E05.90-Thyrotoxicosis, unspecified without thyrotoxic crisis or storm;  N17.9-Acute kidney failure, unspecified     COMPARISON: May 16, 2025.     FINDINGS: An AP view of the abdomen and pelvis demonstrates a  nonspecific bowel gas pattern with no evidence of obstruction. No  radiopaque stones are seen overlying the renal shadows. Nasogastric tube  with the side port beneath the diaphragm.       Impression:      Nasogastric tube with the side port beneath the diaphragm.                 This report was signed and finalized on 5/18/2025 11:58 AM by Aki Naidu DO.             Orders (last 24 hrs)        Start     Ordered    05/19/25 0746  POC Glucose Once  PROCEDURE ONCE        Comments: Complete no more than 45 minutes prior to patient eating      05/19/25 0738    05/19/25 0521  POC Glucose Once  PROCEDURE ONCE        Comments: Complete no more than 45 minutes prior to patient eating      05/19/25 0518    05/18/25 2332  POC Glucose Once  PROCEDURE ONCE        Comments: Complete no more than 45 minutes prior to patient eating      05/18/25 2329    05/18/25 2030  POC Glucose Once  PROCEDURE ONCE        Comments: Complete no more than 45 minutes prior to patient eating      05/18/25 2025    05/18/25 2024  Phosphorus  Timed         05/18/25 1016    05/18/25 1800  ertapenem (INVanz) 1,000 mg in sodium chloride 0.9 % 100 mL IVPB-VTB  Every 24 Hours         05/18/25 0928    05/18/25 1632  POC Glucose Once  PROCEDURE ONCE        Comments: Complete no more than 45 minutes prior to patient eating       05/18/25 1626    05/18/25 1600  Vital Signs  Every 8 Hours         05/18/25 1021    05/18/25 1445  hydrALAZINE (APRESOLINE) tablet 25 mg  Every 8 Hours Scheduled         05/18/25 1355    05/18/25 1355  Dr. Lara reviewed KUB and stated NG tube is in correct position. Okay to resume tube feeding at 10mL and advance to 20mL after a few hours.  Nursing Communication  Once        Comments: Dr. Lara reviewed KUB and stated NG tube is in correct position. Okay to resume tube feeding at 10mL and advance to 20mL after a few hours.    05/18/25 1355    05/18/25 1300  Apply Cold Compress to Affected Area for 20 Minutes  4 Times Daily       05/18/25 1131    05/18/25 1230  hyaluronidase 150 units in NS 10 ml syringe  Once         05/18/25 1131    05/18/25 1220  XR Abdomen KUB  1 Time Imaging,   Status:  Canceled         05/18/25 1219    05/18/25 1220  XR Abdomen 1 View  1 Time Imaging         05/18/25 1219    05/18/25 1132  POC Glucose Once  PROCEDURE ONCE        Comments: Complete no more than 45 minutes prior to patient eating      05/18/25 1125    05/18/25 1131  Extravasation Standing Orders - IMMEDIATELY STOP INFUSION  Once         05/18/25 1131    05/18/25 1131  Extravasation Standing Orders - DO NOT REMOVE CATHETER / NEEDLE  Once         05/18/25 1131    05/18/25 1131  Extravasation Standing Orders - AVOID PRESSURE  Once         05/18/25 1131    05/18/25 1131  Extravasation Standing Orders - Disconnect the IV Administration Set  Once         05/18/25 1131    05/18/25 1131  Extravasation Standing Orders - Evaluate the Site for Extravasation of Fluid (Check for Blood Return)  Once         05/18/25 1131    05/18/25 1131  Extravasation Standing Orders - Aspirate as Much of the Medication From the Needle / Cannula As Possible Using A Small (1-5mL) Syringe - Verify Recommended Treatment - If Antidote Does Not Require Infusion Through Extravasated Line Remove Needle / Cannula After Aspiration  Once         05/18/25 1131     05/18/25 1131  Extravasation Standing Orders - Chidi Around the Area With A Pen  Once         05/18/25 1131    05/18/25 1131  Extravasation Standing Orders - Photograph the Area for Medical Record If Indicated Per Photo Policy  Once         05/18/25 1131    05/18/25 1131  Extravasation Standing Orders - Elevate Affected Extremity for 24-48 Hours  Continuous         05/18/25 1131    05/18/25 1131  Notify Provider Prior to Administration of Antidote - Extravasation Standing Orders  Continuous        Comments: Open Order Report to View Parameters Requiring Provider Notification    05/18/25 1131    05/18/25 1131  Notify Provider for Tissue Sloughing, Necrosis or Blistering at Extravasation Site  Continuous        Comments: Open Order Report to View Parameters Requiring Provider Notification    05/18/25 1131    05/18/25 1131  Indicate Extravasated Medication to Determine Antidote to Initiate  Once        Comments: Select infusion extravasated to determine antidote to initiate. (Search by generic name)    05/18/25 1131    05/18/25 1115  potassium phosphates 15 mmol in sodium chloride 0.9 % 250 mL infusion  Once         05/18/25 1016    05/18/25 1115  dextrose 5 % and sodium chloride 0.45 % infusion  Continuous         05/18/25 1022    05/17/25 1100  lansoprazole (PREVACID SOLUTAB) disintegrating tablet Tablet Delayed Release Dispersible 30 mg  Every Early Morning         05/17/25 1006    05/16/25 2000  carvedilol (COREG) tablet 12.5 mg  2 Times Daily With Meals         05/16/25 1844    05/15/25 2100  apixaban (ELIQUIS) tablet 2.5 mg  Every 12 Hours Scheduled         05/15/25 1738    05/15/25 0915  metoclopramide (REGLAN) injection 10 mg  Every 6 Hours         05/15/25 0823    05/15/25 0900  aspirin chewable tablet 81 mg  Daily         05/14/25 1149    05/15/25 0900  atorvastatin (LIPITOR) tablet 80 mg  Daily         05/14/25 1149    05/15/25 0824  phenol (CHLORASEPTIC) 1.4 % liquid 1 spray  Every 2 Hours PRN          "05/15/25 0824    05/15/25 0730  hydrALAZINE (APRESOLINE) tablet 25 mg  Every 8 Hours Scheduled,   Status:  Discontinued         05/15/25 0636    05/14/25 1454  ondansetron (ZOFRAN) injection 4 mg  Every 6 Hours PRN         05/14/25 1454    05/14/25 1245  sennosides (SENOKOT) 8.8 MG/5ML syrup 10 mL  2 Times Daily        Placed in \"And\" Linked Group    05/14/25 1145    05/14/25 1245  docusate sodium (COLACE) liquid 100 mg  2 Times Daily        Placed in \"And\" Linked Group    05/14/25 1145    05/14/25 1147  polyethylene glycol (MIRALAX) packet 17 g  Daily PRN        Placed in \"And\" Linked Group    05/14/25 1149    05/14/25 1147  bisacodyl (DULCOLAX) EC tablet 5 mg  Daily PRN        Placed in \"And\" Linked Group    05/14/25 1149    05/14/25 1147  bisacodyl (DULCOLAX) suppository 10 mg  Daily PRN        Placed in \"And\" Linked Group    05/14/25 1149    05/14/25 1145  acetaminophen (TYLENOL) tablet 650 mg  Every 4 Hours PRN        Placed in \"Or\" Linked Group    05/14/25 1149    05/14/25 1145  acetaminophen (TYLENOL) suppository 650 mg  Every 4 Hours PRN        Placed in \"Or\" Linked Group    05/14/25 1149    05/14/25 0600  Renal Function Panel  Daily       05/13/25 0649    05/14/25 0000  insulin regular (humuLIN R,novoLIN R) injection 2-7 Units  Every 6 Hours Scheduled         05/13/25 2033 05/14/25 0000  POC Glucose Q6H  Every 6 Hours      Comments: Complete no more than 45 minutes prior to patient eating      05/13/25 2033 05/13/25 2033  dextrose (GLUTOSE) oral gel 15 g  Every 15 Minutes PRN         05/13/25 2033 05/13/25 2033  dextrose (D50W) (25 g/50 mL) IV injection 25 g  Every 15 Minutes PRN         05/13/25 2033 05/13/25 2033  glucagon (GLUCAGEN) injection 1 mg  Every 15 Minutes PRN         05/13/25 2033 05/13/25 1413  Daily Weights  Daily       05/13/25 1412 05/13/25 1413  Tube Feeding Assessment and I/O  Every Shift       05/13/25 1412 05/11/25 2045  hydrALAZINE (APRESOLINE) injection 10 mg  " "Every 6 Hours PRN         05/11/25 2046    05/11/25 0950  metoprolol tartrate (LOPRESSOR) injection 5 mg  Every 4 Hours PRN         05/11/25 0950    05/10/25 2100  Silicone Border Dressing to Bony Prominences  Every Shift       05/10/25 1807    05/10/25 1802  glycerin 35 % liquid 35% 2 spray  Every 2 Hours PRN         05/10/25 1802    05/10/25 1700  rivastigmine (EXELON) 4.6 MG/24HR patch 1 patch  Daily         05/10/25 1607    05/10/25 1500  Intake & Output  Every Hour       05/10/25 1435    05/10/25 1451  sodium chloride 0.9 % flush 10 mL  Every 12 Hours Scheduled         05/10/25 1435    05/10/25 1436  Daily CHG Bath While in Critical Care  Daily      Comments: Use for admission bath and length of critical care stay.    05/10/25 1435    05/10/25 1434  Potassium Replacement - Follow Nurse / BPA Driven Protocol  As Needed         05/10/25 1435    05/10/25 1434  Magnesium Cardiology Dose Replacement - Follow Nurse / BPA Driven Protocol  As Needed         05/10/25 1435    05/10/25 1434  Phosphorus Replacement - Follow Nurse / BPA Driven Protocol  As Needed         05/10/25 1435    05/10/25 1434  Calcium Replacement - Follow Nurse / BPA Driven Protocol  As Needed         05/10/25 1435    05/10/25 1421  nitroglycerin (NITROSTAT) SL tablet 0.4 mg  Every 5 Minutes PRN         05/10/25 1435    05/10/25 1421  Oral Care - Patient Not on NPPV & Not Intubated  Every Shift       05/10/25 1435    05/10/25 1421  sodium chloride 0.9 % flush 10 mL  As Needed         05/10/25 1435    05/10/25 1421  [Held by provider]  sodium chloride 0.9 % infusion 40 mL  As Needed        (On hold since Sat 5/10/2025 at 1605 until manually unheld; held by Aki Rodriguez DOHold Reason: Abnormal Labs)    05/10/25 1435    05/10/25 1413  Urinary Catheter Care  Every Shift      Placed in \"And\" Linked Group    05/10/25 1413    05/10/25 1231  sodium chloride 0.9 % flush 10 mL  As Needed         05/10/25 1231    Unscheduled  Oxygen Therapy- Nasal Cannula; " Titrate 1-6 LPM Per SpO2; 90 - 95%  Continuous PRN       05/10/25 1231    Unscheduled  Wound Care  As Needed       05/10/25 1807    Unscheduled  Extravasation Standing Orders - Encourage Active Range of Motion After 48 Hours  As Needed       25 1150    Unscheduled  Chidi & Document Feeding Tube Depth (in cm)  As Needed       25 1412    Unscheduled  Verify Feeding Tube Placement Upon Insertion & As Needed  As Needed       25 1412    Unscheduled  Flush Feeding Tube With 30-50mL Water As Needed  As Needed       25 1412    Unscheduled  Follow Hypoglycemia Standing Orders For Blood Glucose <70 & Notify Provider of Treatment  As Needed      Comments: Follow Hypoglycemia Orders As Outlined in Process Instructions (Open Order Report to View Full Instructions)  Notify Provider Any Time Hypoglycemia Treatment is Administered    25    Unscheduled  Extravasation Standing Orders - Encourage Active Range of Motion After 48 Hours  As Needed       25 1131                     Physician Progress Notes (most recent note)        Ronny Lara DO at 25 1022              [facility]    Name:  Valarie Camara   Age:  79 y.o.  Sex:  female  :  1945  MRN:  [id number]   Visit Number:  [id number]  Admission Date:  5/10/2025  Date Of Service:  25  Primary Care Physician:  Lay Cox MD     LOS: 7 days :    Chief Complaint:      weakness    Subjective:    Patient resting comfortably.  Awake. Answers questions. No family present at the time of my exam. Lost NGT overnight. Unsuccessful attempts x2 at re-insertion. Overall this continues to be traumatic for the patient with little nutritional improvement.     Hospital Course:    Patient is an 79-year-old female brought to the emergency department for evaluation of altered mental status.  At the time of examination, unfortunately, patient was alone without family at  bedside.  History of present illness was obtained from review of medical records, including discussion with nursing and ED physician.  Patient has a known history of dementia, however over the last 3 days, patient's daughter was concerned that the patient has developed significant confusion.  Patient has been unable to tolerate oral intake of nutrition and oral hydration.      EMS was called to patient's nursing facility, she was found to be hypotensive and tachycardic in A-fib with RVR.  They were unable to obtain IV access, subsequently unable to provide cardioversion en route to the ED.  Upon arrival to the ED, patient was emergently cardioverted, patient's rhythm with normal sinus rhythm, without any change in mentation.  Neurology was consulted, CT of the head did not show any acute CVA.  Cardiology was consulted, who feels that the elevated troponin is most likely secondary to the tachycardia and recommend troponin trending, anticoagulation.  Hospitalist services consulted for admission.     Discussion had with family and they request DNR with no chest compressions and no intubation, they approve all other interventions.     Patient had presented with encephalopathy very high sodium.  Continues to be in goals of care discussion with patient and family.  Family meeting pending.  Sodium has been reluctant to recover still running about 160 despite hypotonic fluids nephrology is addressing this.  Otherwise urine is growing ESBL has been switched to Invanz.    Review of Systems:     All systems were reviewed and negative except as mentioned in subjective, assessment and plan.    Vital Signs:    Temp:  [97.6 °F (36.4 °C)-98.4 °F (36.9 °C)] 97.8 °F (36.6 °C)  Heart Rate:  [63-70] 65  Resp:  [14-16] 16  BP: (124-159)/(57-71) 157/61    Intake and output:    I/O last 3 completed shifts:  In: 549 [Other:240; NG/GT:309]  Out: 425 [Urine:425]  No intake/output data recorded.    Physical Examination: Examined again  5/18/25    General Appearance:  Alert and cooperative. NAD   Head:  Atraumatic and normocephalic.   Eyes: Conjunctivae and sclerae normal, no icterus. No pallor.   Throat: No oral lesions, no thrush, oral mucosa moist.   Neck: Supple, trachea midline, no thyromegaly.   Lungs:   Breath sounds heard bilaterally equally.  No wheezing or crackles.    Heart:  Normal S1 and S2, no murmur, No JVD.   Abdomen:   Normal bowel sounds, Soft, nontender, nondistended, no rebound tenderness.   Extremities: Supple, no edema, no cyanosis, no clubbing.   Skin: No bleeding or rash.   Neurologic: Alert and oriented x 2. No facial asymmetry.      Laboratory results:    Results from last 7 days   Lab Units 05/18/25  0547 05/17/25  1359 05/17/25  0613 05/16/25  1524 05/16/25  0715   SODIUM mmol/L 147*  --  145  --  142   POTASSIUM mmol/L 3.7 3.8 3.6   < > 3.4*   CHLORIDE mmol/L 116*  --  116*  --  111*   CO2 mmol/L 23.2  --  21.3*  --  21.0*   BUN mg/dL 32*  --  36*  --  41*   CREATININE mg/dL 1.33*  --  1.37*  --  1.62*   CALCIUM mg/dL 8.2*  --  7.7*  --  7.7*   GLUCOSE mg/dL 79  --  133*  --  121*    < > = values in this interval not displayed.     Results from last 7 days   Lab Units 05/18/25  0547 05/17/25  0613 05/16/25  0715   WBC 10*3/mm3 6.44 6.12 5.90   HEMOGLOBIN g/dL 10.0* 9.7* 9.9*   HEMATOCRIT % 31.2* 30.3* 30.3*   PLATELETS 10*3/mm3 128* 118* 97*                       Recent Labs     03/22/25  0610 05/12/25  0538   PHART 7.449 7.444   NRG4COP 32.0* 33.5*   PO2ART 78.3* 81.2   YFW9VVO 22.1 23.0   BASEEXCESS -1.2* -0.6*      I have reviewed the patient's laboratory results.    Radiology results:    XR Abdomen KUB  Result Date: 5/16/2025  FINAL REPORT TECHNIQUE: null CLINICAL HISTORY: Verification of NG tube placement COMPARISON: null FINDINGS: 1 view abdomen Comparison: CT/SR - CT ABDOMEN PELVIS WO CONTRAST - 3/15/25 05:36 EDT Findings: Tip of the NG tube is overlying the stomach. No pneumoperitoneum or pneumatosis. No  abnormal calcifications. No acute fractures.     Impression: IMPRESSION: Tip of NG tube is overlying the stomach. Authenticated and Electronically Signed by Zhen Theodore on 05/16/2025 08:31:20 PM      I have reviewed the patient's radiology reports.    Medication Review:     I have reviewed the patient's active and prn medications.     Problem List:      Hypernatremia    PAF (paroxysmal atrial fibrillation)    Acute renal failure superimposed on stage 3a chronic kidney disease      Assessment:    Severe Sepsis secondary to urinary tract infection  Urinary tract infection  Metabolic encephalopathy  Atrial fibrillation with RVR  Acute kidney injury  Hypernatremia  Dementia  COPD  Hypertension  Hyperlipidemia  Restless leg syndrome    Plan:    Severe Sepsis secondary to urinary tract infection  Urinary tract infection  Metabolic encephalopathy  Patient meets sepsis criteria with tachycardia, leukocytosis, source of infection identified as urinary tract infection.  Due to the sodium derangements, LR was bolused.  Evidence of endorgan damage noted with metabolic encephalopathy and acute kidney injury.  IV Rocephin, switched to Invanz 5/12/25 due to ESBL in the urine  MRI no stroke  Grey placed on 5/10/25  Atrial fibrillation with RVR  Patient status post cardioversion.  Cardiology consulted, appreciate their recommendations.  Heparin drip will be started, continued  Patient is currently normal sinus rhythm, continue Coreg  Acute kidney injury  Acute hypernatremia  Sodium was normal 1 month prior.  Patient encephalopathic which may be symptoms related to hypernatremia.  Nephrology has been consulted, appreciate his recommendations.  Status post LR boluses, continue slow hypotonic fluids with dextrose  Serial sodium, if sodium is correcting greater than one half Mg per DL per hour will hold fluids  Nutrition  NGT initially placed 5/14  Patient complained of painful swallowing; possible thursh. Started empiric  fluconazole  She also has a history of strictures; was recently dilated. Low suspicion this is the cause  Continue to attempt tube feeds as tolerated  NGT pulled out overnight, unsuccessful attempts x2 at re-insertion, Overall advise to discontinue attempts and patient is able to articulate that she does not want it replaced.   Dementia  COPD  Hypertension  Hyperlipidemia  Restless leg syndrome  Continue home medications as appropriate.     Palliative care following. Daughter considering transition to comfort care.      DVT Prophylaxis: Heparin  Code Status: DNR/DNI  Diet: NPO; tube feeds  Discharge Plan: Pending    Ronny Lara DO  25  10:23 EDT    Dictated utilizing Dragon dictation.        Electronically signed by Ronny Lara DO at 25 1024          Consult Notes (most recent note)        Isaura Lyons PA-C at 25 1554        Consult Orders    1. Inpatient Palliative Care Team Consult [[id number]] ordered by Wil Rader DO at 25 1156              Attestation signed by Pedro Luis Rene DO at 25 1301      I have reviewed this documentation and agree.                          [facility]    INPATIENT PALLIATIVE CARE CONSULT      Name:  Valarie Camara   Age:  79 y.o.  Sex:  female  :  1945  MRN:  [id number]   Visit Number:  [id number]  Date of Service:  25  Date of Admission:  5/10/2025  Primary Care Physician:  Lay Cox MD    Consulting Physician:  Dr. Rader    Reason For Consult:  Introduction to Palliative services, Goals of care discussions , Support during serious illness, and Hospice information     History of Present Illness:  Valarie Camara is a 79 y.o. female with past medical history of CAD, CKD, HTN, dementia, history of PE on eliquis, PAD, left ICA stenosis, history of esophageal stricture s/p dilation 2027 with Dr. Zapata.  Had admission in mid March secondary to altered mental status.  Stroke  workup was negative, she was treated for UTI.  In addition neurology assessed and followed, recommending initiation of rivastigmine, continue aspirin/statin/AC.  Patient was discharged to short-term rehabilitation at [facility], ultimately completed this and discharged home.  Patient presented to [facility] on 5/10/2025 secondary to altered mental status with associated poor oral intake over the past several days.  Per record review, patient was assessed by EMS found to be hypotensive and tachycardic, EKG revealing A-fib with RVR.  Upon arrival to [facility] ED she was emergently cardioverted, returning to normal sinus rhythm.  Unfortunately she did not have improvement to her mentation.  Laboratory evaluation revealed WBC 13.08.  Glucose 162, , creatinine 3.96, Na 168, albumin 3.1.  Urinalysis with positive nitrites, moderate leuks, 4+ bacteria.  Lactate negative.  Troponin elevated.  TSH 0.024.  Neurology consulted in the ED, CT head did not reveal acute CVA.  CT head no acute intracranial abnormality.  Chest x-ray negative.  Cardiology consulted, who felt troponin elevation likely associated with tachycardia, recommending trending as HR better controlled.  Neurology also consulted, recommending MRI.  Patient was initiated on Rocephin for UTI and LR, admitted the primary medicine service for continued evaluation and management.  Cardiology, neurology, and nephrology followed in consultation.   MRI negative for acute stroke, awaiting EEG.  Neurology recommending SLP and PT/OT evaluation.  Nephrology following, hypernatremia and KURT improving with IV hydration.  Nursing have attempted bedside swallow eval, however patient has been refusing diet and p.o. medications.  Palliative care consulted to further review GOC in the setting of complex medical decision making.    After speaking with the primary medicine service, palliative met with patient and family at bedside.  Patient is lying in bed  appearing comfortable, will awaken and answer a few questions.  She appears to be sleeping at times, however will answer appropriately.  Her daughters are able to supplement HPI and reports that prior to this acute illness patient was living with her daughter who is her primary caretaker.  She has assistance from other family and friends who also live close by.  At baseline she is able to ambulate only short amount of distance, utilizes assistive device.  She has episodic incontinence.  She sleeps on and off throughout the day and nighttime.  Appetite has been waxing and waning, approximate 26 pound weight loss in the past 2 to 3 months.  Initially had difficulty with swallowing that improved after dilation, now reports pain with swallowing.  Family describes subtle cognitive losses over the past several years, is oriented to herself and place at baseline, able to recognize family consistently.  She does require assistance with ADLs.  Her daughter notes that over the past week or so she was requiring assistance with feeding, she was no longer desiring to eat or drink.  No acute dyspnea, pain, agitation or restlessness noted.    Patient and family agreeable to continued palliative care follow up and discussions regarding goals of care and advance care planning.     Review of Systems   Constitutional:  Positive for activity change, appetite change and fatigue.   HENT:  Positive for trouble swallowing.    Neurological:  Positive for weakness.        Medical History:  Past Medical History:   Diagnosis Date    Acute bronchitis with bronchospasm     Anxiety     Arthritis     Asthma     B12 deficiency     Back pain     Body piercing     ears    Cataract     Cerebrovascular disease     Cervicalgia     Chronic kidney disease     stage 3b    Colonic polyp     History of colonic polyps     Constipation     COPD (chronic obstructive pulmonary disease)     Coronary artery disease     Depression     Diverticulitis     Dysphagia   "   Patient reported \"it won't go all the way down\" when eating solid foods first thing in the mornings    Edema     Elevated cholesterol     Esophageal reflux     Folic acid deficiency     Full dentures     Advised no adhesives DOS    Heart murmur     Herpes zoster     High cholesterol     History of kidney infection     History of recurrent urinary tract infection     HTN (hypertension)     Hypercholesterolemia     Impaired functional mobility, balance, gait, and endurance     Insomnia     Kidney infection     Malignant hypertension 04/26/2015     Accelerated essential hypertension    Measles     rubeola    Muscle spasm     Neoplasm of uncertain behavior of skin     dtr denies    Osteoporosis     Poor historian     Recurrent urinary tract infection     Rheumatoid arthritis     RLS (restless legs syndrome)     Stomach ulcer     Stroke     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    Type 2 diabetes mellitus     Vitamin D deficiency       Past Surgical History:   Procedure Laterality Date    CATARACT EXTRACTION WITH INTRAOCULAR LENS IMPLANT Left 02/11/2013    CATARACT EXTRACTION WITH INTRAOCULAR LENS IMPLANT Right 04/15/2013    COLONOSCOPY  2012    COLONOSCOPY N/A 11/26/2019    Procedure: COLONOSCOPY;  Surgeon: Chidi Downing MD;  Location:  [facility];  Service: Gastroenterology    ENDOSCOPY N/A 11/13/2017    Procedure: ESOPHAGOGASTRODUODENOSCOPY with biopsies and esophageal balloon dilitation;  Surgeon: Wesley Stovall MD;  Location:  [facility];  Service:     ENDOSCOPY N/A 11/25/2019    Procedure: ESOPHAGOGASTRODUODENOSCOPY;  Surgeon: Chidi Downing MD;  Location:  [facility];  Service: Gastroenterology    ENDOSCOPY N/A 11/29/2021    Procedure: ESOPHAGOGASTRODUODENOSCOPY with dilation and biopsies;  Surgeon: Gonzalez Zapata MD;  Location: [facility]COPY;  Service: Gastroenterology;  Laterality: N/A;    ENDOSCOPY N/A 11/1/2023    Procedure: " ESOPHAGOGASTRODUODENOSCOPY WITH BIOPSY AND DILATATION;  Surgeon: Gonzalez Zapata MD;  Location: [facility] ENDOSCOPY;  Service: Gastroenterology;  Laterality: N/A;    ENDOSCOPY N/A 1/27/2025    Procedure: Esophagogastroduodenoscopy with dilatation and biopsies;  Surgeon: Gonzalez Zapata MD;  Location: [facility] ENDOSCOPY;  Service: Gastroenterology;  Laterality: N/A;    HYSTERECTOMY  1979    UPPER GASTROINTESTINAL ENDOSCOPY  12/09/2013    UPPER GASTROINTESTINAL ENDOSCOPY  11/13/2017     Family History   Problem Relation Age of Onset    Arthritis Mother     Diabetes Mother     Hypertension Mother     Arthritis Other     Arthritis Other     Diabetes Other         DM    Hypertension Other     Colon cancer Neg Hx      Allergies: Penicillins, Clonidine derivatives, Hydralazine, and Sulfa antibiotics    Hospital Scheduled Medications:    Current Facility-Administered Medications:     acetaminophen (TYLENOL) tablet 650 mg, 650 mg, Oral, Q4H PRN **OR** acetaminophen (TYLENOL) suppository 650 mg, 650 mg, Rectal, Q4H PRN, Aki Rodriguez DO    aspirin chewable tablet 81 mg, 81 mg, Oral, Daily, Aki Rodriguez DO, 81 mg at 05/12/25 1031    atorvastatin (LIPITOR) tablet 80 mg, 80 mg, Oral, Daily, Aki Rodriguez DO, 80 mg at 05/12/25 1030    sennosides-docusate (PERICOLACE) 8.6-50 MG per tablet 2 tablet, 2 tablet, Oral, BID, 2 tablet at 05/12/25 1030 **AND** polyethylene glycol (MIRALAX) packet 17 g, 17 g, Oral, Daily PRN **AND** bisacodyl (DULCOLAX) EC tablet 5 mg, 5 mg, Oral, Daily PRN **AND** bisacodyl (DULCOLAX) suppository 10 mg, 10 mg, Rectal, Daily PRN, Aki Rodriguez DO    Calcium Replacement - Follow Nurse / BPA Driven Protocol, , Not Applicable, PRN, Aki Rodriguez DO    carvedilol (COREG) tablet 12.5 mg, 12.5 mg, Oral, BID With Meals, Aki Rodriguez DO, 12.5 mg at 05/12/25 1030    cloNIDine (CATAPRES-TTS) 0.1 MG/24HR patch 1 patch, 1 patch, Transdermal, Weekly, Ortega Cabrales MD, FASN, 1 patch at 05/12/25 4346     dextrose (D5W) 5 % infusion, 100 mL/hr, Intravenous, Continuous, Ortega Cabrales MD, FASN, Last Rate: 100 mL/hr at 05/13/25 1047, 100 mL/hr at 05/13/25 1047    dextrose 5 % with KCl 20 mEq/L infusion, 50 mL/hr, Intravenous, Continuous, Ortega Cabrales MD, FASN, Last Rate: 50 mL/hr at 05/13/25 1047, 50 mL/hr at 05/13/25 1047    ertapenem (INVanz) 500 mg in sodium chloride 0.9 % 50 mL IVPB, 500 mg, Intravenous, Q24H, Wil Rader DO, Last Rate: 100 mL/hr at 05/12/25 1632, 500 mg at 05/12/25 1632    fluconazole (DIFLUCAN) IVPB 200 mg, 200 mg, Intravenous, Q24H, Isaura Lyons PA-C    glycerin 35 % liquid 35% 2 spray, 2 spray, Mouth/Throat, Q2H PRN, Aki Rodriguez DO, 2 spray at 05/12/25 2011    heparin 33404 units/250 ml (100 units/ml) in D5W, 9 Units/kg/hr (Order-Specific), Intravenous, Titrated, Ronny Lara DO, Last Rate: 6 mL/hr at 05/13/25 1123, 9 Units/kg/hr at 05/13/25 1123    hydrALAZINE (APRESOLINE) injection 10 mg, 10 mg, Intravenous, Q6H PRN, Aki Rodriguez DO, 10 mg at 05/13/25 1048    Magnesium Cardiology Dose Replacement - Follow Nurse / BPA Driven Protocol, , Not Applicable, PRN, Aki Rodriguez DO    metoprolol tartrate (LOPRESSOR) injection 5 mg, 5 mg, Intravenous, Q4H PRN, Wil Rader DO, 5 mg at 05/12/25 0404    mupirocin (BACTROBAN) 2 % nasal ointment 1 Application, 1 Application, Each Nare, BID, Aki Rodriguez DO, 1 Application at 05/13/25 1048    nitroglycerin (NITROSTAT) SL tablet 0.4 mg, 0.4 mg, Sublingual, Q5 Min PRN, Aki Rodriguez DO    nystatin (MYCOSTATIN) 100,000 unit/mL suspension 500,000 Units, 5 mL, Oral, 4x Daily, Isaura Lyons PA-C    Pharmacy to Dose Heparin, , Not Applicable, Continuous PRNMichael Robert, DO    Phosphorus Replacement - Follow Nurse / BPA Driven Protocol, , Not Applicable, PRNMichael Robert, DO    Potassium Replacement - Follow Nurse / BPA Driven Protocol, , Not Applicable, PRN, Aki Rodriguez DO    rivastigmine (EXELON) 4.6 MG/24HR patch  "1 patch, 1 patch, Transdermal, Daily, Aki Rodriguez DO, 1 patch at 05/13/25 1048    sodium chloride 0.9 % flush 10 mL, 10 mL, Intravenous, PRN, Danie Tolliver DO    sodium chloride 0.9 % flush 10 mL, 10 mL, Intravenous, Q12H, Aki Rodriguez DO, 10 mL at 05/13/25 1054    sodium chloride 0.9 % flush 10 mL, 10 mL, Intravenous, PRN, Aki Rodriguez DO, 10 mL at 05/12/25 0617    [Held by provider] sodium chloride 0.9 % infusion 40 mL, 40 mL, Intravenous, PRN, Aki Rodriguez,   I have utilized all immediate resources to obtain, update, or review the patient's current medications (including all prescription, over-the-counter products, herbals, and/or nutritional supplements).      Vitals: /62 (BP Location: Left arm, Patient Position: Lying)   Pulse 85   Temp 97.8 °F (36.6 °C) (Oral)   Resp 13   Ht 160 cm (63\")   Wt 60.1 kg (132 lb 7.9 oz)   SpO2 100%   BMI 23.47 kg/m²     Intake/Output Summary (Last 24 hours) at 5/13/2025 1555  Last data filed at 5/13/2025 0642  Gross per 24 hour   Intake 2535.4 ml   Output 605 ml   Net 1930.4 ml       Physical Exam  Vitals and nursing note reviewed.   Constitutional:       General: She is not in acute distress.     Appearance: She is ill-appearing. She is not diaphoretic.      Comments: Frail appearing, elderly female, lying in bed, NAD   HENT:      Head: Normocephalic and atraumatic.      Comments: Temporal wasting      Mouth/Throat:      Mouth: Mucous membranes are moist.      Comments: White patches noted   Eyes:      Conjunctiva/sclera: Conjunctivae normal.      Pupils: Pupils are equal, round, and reactive to light.   Cardiovascular:      Rate and Rhythm: Normal rate and regular rhythm.   Pulmonary:      Effort: Pulmonary effort is normal. No respiratory distress.      Comments: Diminished otherwise clear  Abdominal:      General: Bowel sounds are normal. There is no distension.      Palpations: Abdomen is soft.      Tenderness: There is no abdominal " tenderness.   Musculoskeletal:         General: No swelling.      Cervical back: Neck supple.      Comments: Diffusely deconditioned, decreased ROM to upper and lower extremities    Skin:     General: Skin is warm and dry.      Capillary Refill: Capillary refill takes less than 2 seconds.   Neurological:      Mental Status: She is alert.      Comments: Oriented to person/place   Psychiatric:         Mood and Affect: Mood normal.         Behavior: Behavior normal.          Diagnostics Review:  Laboratory Data:  Results from last 7 days   Lab Units 05/13/25  0431 05/12/25  1226 05/12/25  0805 05/10/25  1437 05/10/25  1237   SODIUM mmol/L 158* 160* 161*   < > 168*   POTASSIUM mmol/L 3.7 3.8 3.7   < > 4.1   CHLORIDE mmol/L 125* 127* 130*   < > 130*   CO2 mmol/L 21.8* 22.8 22.1   < > 18.5*   BUN mg/dL 62* 73* 78*   < > 127*   CREATININE mg/dL 1.75* 2.08* 2.16*   < > 3.96*   CALCIUM mg/dL 7.9* 8.5* 8.6   < > 9.8   ALBUMIN g/dL  --   --   --   --  3.1*   BILIRUBIN mg/dL  --   --   --   --  0.3   ALK PHOS U/L  --   --   --   --  57   ALT (SGPT) U/L  --   --   --   --  7   AST (SGOT) U/L  --   --   --   --  10   GLUCOSE mg/dL 214* 247* 211*   < > 162*    < > = values in this interval not displayed.     Results from last 7 days   Lab Units 05/13/25  0431 05/12/25  0411 05/11/25  0053   WBC 10*3/mm3 5.89 6.63 9.11   HEMOGLOBIN g/dL 10.1* 12.4 11.5*   HEMATOCRIT % 32.9* 41.1 38.7   PLATELETS 10*3/mm3 122* 141 141     Results from last 7 days   Lab Units 05/10/25  1237   INR  1.25*     Results from last 7 days   Lab Units 05/12/25  0538   PH, ARTERIAL pH units 7.444   PO2 ART mm Hg 81.2   PCO2, ARTERIAL mm Hg 33.5*   HCO3 ART mmol/L 23.0     Results from last 7 days   Lab Units 05/10/25  1237   COLOR UA  Yellow   GLUCOSE UA  Negative   KETONES UA  Trace*   BLOOD UA  Trace*   LEUKOCYTES UA  Moderate (2+)*   PH, URINE  <=5.0   BILIRUBIN UA  Negative   UROBILINOGEN UA  0.2 E.U./dL     Pain Management Panel  More data exists          Latest Ref Rng & Units 5/10/2025 3/20/2025   Pain Management Panel   Amphetamine, Urine Qual Negative Negative  Negative    Barbiturates Screen, Urine Negative Negative  Negative    Benzodiazepine Screen, Urine Negative Negative  Negative    Buprenorphine, Screen, Urine Negative Negative  Negative    Cocaine Screen, Urine Negative Negative  Negative    Fentanyl, Urine Negative Negative  Negative    Methadone Screen , Urine Negative Negative  Negative    Methamphetamine, Ur Negative Negative  Negative      Results from last 7 days   Lab Units 05/10/25  2131 05/10/25  1315 05/10/25  1237   BLOODCX  No growth at 2 days No growth at 3 days  --    URINECX   --   --  >100,000 CFU/mL Escherichia coli ESBL*     Nutritional Status:   Results from last 7 days   Lab Units 05/10/25  1237   ALBUMIN g/dL 3.1*     Body mass index is 23.47 kg/m².  Documented weights    05/10/25 1226 05/10/25 1727 05/11/25 0500 05/12/25 0400   Weight: 66.7 kg (147 lb) 57.4 kg (126 lb 8.7 oz) 55.5 kg (122 lb 5.7 oz) 57.1 kg (125 lb 14.1 oz)    05/13/25 0400   Weight: 60.1 kg (132 lb 7.9 oz)        Radiology:  MRI Brain Without Contrast  Result Date: 5/11/2025  MRI BRAIN WITHOUT CONTRAST  HISTORY: Confusion  COMPARISON: 3/22/2025  TECHNIQUE: Multiplanar, multisequence images of the brain were performed without contrast.  FINDINGS: The diffusion weighted images demonstrate no restricted diffusion. There is no acute ischemia. The cervicomedullary junction is normal. There is no Chiari malformation. There is moderate atrophy. There is increased T2 signal in the periventricular white matter consistent mild microvascular change. There is no cortical edema or hemorrhage. There are new right mastoid opacities.      1. Moderate atrophy with mild microvascular change. There is no edema or hemorrhage. 2. New right mastoid opacities.   This report was signed and finalized on 5/11/2025 8:59 AM by Yuniel Silva MD.      CT Head Without Contrast  Result Date:  5/10/2025  HEAD CT  HISTORY: Confusion.  COMPARISON: 3-.  TECHNIQUE: Multiple axial CT images were performed from the foramen magnum to the vertex without enhancement. Individualized dose reduction techniques using automated exposure control or adjustment of the mA and/or kV according to patient size were employed.  FINDINGS: Mild to moderate diffuse cortical atrophy is present.  There is no evidence of acute hemorrhage, mass effect, or edema.  Mild mucoperiosteal thickening is noted in the right maxillary sinus. No air-fluid levels are seen.       1. No evidence of acute intracranial abnormality.      This report was signed and finalized on 5/10/2025 12:57 PM by Jah Kirk MD.      XR Chest 1 View  Result Date: 5/10/2025  Chest single view  COMPARISON: Chest from 3-  HISTORY: Altered mental status  FINDINGS: Cardiac silhouette is of normal size.  Lungs are underinflated, but clear.      1. No evidence of acute abnormality.  This report was signed and finalized on 5/10/2025 12:49 PM by Jah Kirk MD.          Goals of Care/Advance Care Planning:  Advance Care Planning     1. Determination of Decisional Capacity:  Decisional capacity: YES, however would benefit from the assistance of HCS for complex medical decisions given her fatigue and deconditioned state    2. Advanced Directives:  I have personally reviewed Advance Directives on file in the form of POA and Living Will  Healthcare surrogate/legal decision maker: Bailey Camara and Cleo Camara  Relationship to individual: Daughter  Surrogate/decision maker understands role and will honor individual's decisions: YES    3. Patient & Family Meeting:  Members present at meeting Salvador, Bailey Gallo Faye Flaherty, Lauren Puckett  Meeting took place at [facility] [place] ICU patient room     4. Summary of the discussion:  After generalized introductions, introduced the role of palliative care in assisting with complex medical decision making, goals of care  discussions, and symptom management/supportive care.  Patient raised her two daughters independently.  She has valued her lavon and family throughout her whole life.  She loves her two grandsons as if they were her own children.    I reviewed patient's acute and chronic illness, patient fatigued and unable to express insight in detail, her daughters at bedside with insight regarding the same.  Patient able to express she has been sick for a while, feels that her body is entering final stages of life.  Cleo reflects on patient's progressive decline over the past few months.  Daughters are understanding that their mother is likely entering the final stages of life, they would not want her to suffer.  They know she would desire to have a natural death, confirming DO NOT RESUSCITATE/DO NOT INTUBATE.  However, they want to make sure that they have done all that they could to ensure that they have done everything they could.  They are interested in pursuing a trial of artifical nutrition through NG, only if patient is able to tolerate.  Patient would not desire to pursue long term artifical nutrition through PEG placement.  In the event she is unable to tolerate NG placement, or artificial nutrition is causing discomfort, they would desire to defer further attempts, likely transition to comfort focused approach to care.  I reviewed trajectory associated with chronic illness, which often reflects a stairstepping pattern.  I also reviewed signs and symptoms associated with patients who are nearing the final stages of life.  Both daughters are in agreement that if patient is not benefiting from interventions, specifically artificial nutrition, would desire to focus on her comfort.  CODE STATUS reviewed/confirmed: DNR / DNI   Baseline Functional Status: Palliative Performance Scale Score: Performance 40% based on the following measures: Ambulation: Mainly in bed, Activity and Evidence of Disease: Unable to do any work,  extensive evidence of disease, Self-Care: Mainly assistance required,  Intake: Normal or reduced, LOC: Full, drowsy or confusion          Palliative Care Assessment:  Severe sepsis secondary to UTI  Metabolic encephalopathy  Atrial fibrillation with RVR  KURT  Dementia  Frailty  Dysphagia    Recommendations/Plan:  - CODE STATUS reviewed/confirmed: DNR / DNI   - GOC discussions yield desire to maintain plan of care as outlined per the primary medicine service, desire to pursue an attempt at NG feeding, however if patient is unable to tolerate, would desire to defer/withdrawal   - In the event patient is not tolerating artificial nutrition, seems that family are leaning towards transition to a comfort focused approach to her care, they reflect on patient's desire for for comfort and peace in the final stages of life  - Patient likely meets criteria for hospice, however at the present goals are not in alignment  - Discussed course of diflucan and nystan for oral candidiasis, primary medicine service is in agreement  - Previously patient taking PPI with history of GERD and hiatal hernia, may consider protonix IV  - Current Functional Status: Palliative Performance Scale Score: Performance 30% based on the following measures: Ambulation: Totally bed bound, Activity and Evidence of Disease: Unable to do any work, extensive evidence of disease, Self-Care: Total care required,  Intake: Reduced, LOC: Full, drowsy or confusion  - Palliative care will continue to follow/support patient and family        I appreciate the opportunity to participate in the care of Mr./Ms. Valarie Camara.  Please do not hesitate to contact me with any questions or concerns.      I spent 90 total minutes conducting chart review for relevant information, face to face assessment, counseling patient and/or family, and coordination of care.  15 minutes spent discussing advanced care planning.  Part of this note may be an electronic  transcription/translation of spoken language to printed text using the Dragon Dictation System.       Isaura Lyons PA-C  05/13/25  15:55 EDT        Electronically signed by Pedro Luis Rene DO at 05/16/25 1300

## 2025-05-19 NOTE — PROGRESS NOTES
"Dietitian Follow-up    Patient Name: Valarie Camara  YOB: 1945  MRN: [id number]  Admission date: 5/10/2025    Comment:    Clinical Nutrition Follow-up   Encounter Information        Trending Narrative     5/19: NG-tube pulled on 5/18 and re-inserted today. Currently running @ 20mL/hr. Will follow-up tomorrow.    5/16: patient no longer w/ vomiting but still experiencing nausea. Will switch to Peptamen AF for better GI tolerance at a low rate of 20mL/hr and do not advance.     5/15: Pt with multiple episodes of vomiting and unable to tolerate tube feeding. MD adjusted water flushes. Will put tube feeding rate to 0 mL/hr and try initiating tube feeding again tomorrow.      5/14: NG was placed today with plans to initiate tube feeding.      5/13: RD consulted for TF recommendations.      5/13: Patient remains NPO - will continue to monitor for diet advancement.     5/12: Pt NPO x 2 days. EMR shows significant wt loss. Will monitor for diet advancement and add ONS.      Anthropometrics        Current Height, Weight Height: 160 cm (63\")  Weight: 66.2 kg (145 lb 15.1 oz) (05/19/25 0504)       Trending Weight Hx     This admission:              PTA:     Wt Readings from Last 30 Encounters:   05/19/25 0504 66.2 kg (145 lb 15.1 oz)   05/18/25 0515 63.8 kg (140 lb 10.5 oz)   05/17/25 0518 64 kg (141 lb 1.5 oz)   05/15/25 0515 65.5 kg (144 lb 6.4 oz)   05/14/25 0400 59.7 kg (131 lb 9.8 oz)   05/13/25 0400 60.1 kg (132 lb 7.9 oz)   05/12/25 0400 57.1 kg (125 lb 14.1 oz)   05/11/25 0500 55.5 kg (122 lb 5.7 oz)   05/10/25 1727 57.4 kg (126 lb 8.7 oz)   05/10/25 1226 66.7 kg (147 lb)   04/17/25 0932 67.1 kg (147 lb 14.4 oz)   04/05/25 1837 66.6 kg (146 lb 12.8 oz)   03/21/25 1511 68 kg (150 lb)   03/20/25 1556 68 kg (150 lb)   03/15/25 0422 69.4 kg (153 lb)   03/04/25 0600 72.8 kg (160 lb 7.9 oz)   03/02/25 1423 71.9 kg (158 lb 8.2 oz)   03/02/25 0348 70.5 kg (155 lb 6.8 oz)   03/01/25 0415 72.3 kg (159 lb 6.3 " oz)   02/28/25 1848 72.6 kg (160 lb 0.9 oz)   02/28/25 1817 77 kg (169 lb 12.1 oz)   02/28/25 1152 77 kg (169 lb 12.1 oz)   02/08/25 0115 77.1 kg (169 lb 15.6 oz)   01/24/25 1111 75.3 kg (166 lb)   01/27/25 0139 77.1 kg (170 lb)   01/10/25 1330 80.3 kg (177 lb)   01/05/25 1700 76.3 kg (168 lb 3.4 oz)   01/05/25 1102 79.4 kg (175 lb 0.7 oz)   01/05/25 0921 79.4 kg (175 lb)   11/06/24 1049 80.6 kg (177 lb 12.8 oz)   12/04/23 2059 90.7 kg (200 lb)   10/31/23 0924 90.7 kg (200 lb)   08/21/23 1541 90.7 kg (200 lb)   08/17/23 1331 91.2 kg (201 lb)   01/31/22 1433 87.5 kg (192 lb 12.8 oz)   11/24/21 1834 88.5 kg (195 lb)   11/24/21 1543 86.2 kg (190 lb)   05/25/21 0500 92.6 kg (204 lb 2.3 oz)   05/21/21 0500 89.9 kg (198 lb 3.1 oz)   05/20/21 2344 89.9 kg (198 lb 3.1 oz)   05/20/21 0807 88.2 kg (194 lb 6.4 oz)   05/20/21 0305 90.7 kg (200 lb)   02/03/21 1507 89.4 kg (197 lb)   12/17/20 1307 92.1 kg (203 lb)   10/28/20 1735 90.7 kg (200 lb)   09/08/20 1011 95.7 kg (211 lb)   08/17/20 1122 93.6 kg (206 lb 6.4 oz)   01/02/20 1016 90.4 kg (199 lb 6.4 oz)   12/13/19 0445 87.2 kg (192 lb 3.2 oz)   11/25/19 1335 89.4 kg (197 lb)   11/23/19 2340 89.7 kg (197 lb 12.8 oz)   11/23/19 1951 90.7 kg (200 lb)   03/19/19 1541 91.8 kg (202 lb 6.4 oz)   04/10/18 1524 90.7 kg (200 lb)   04/09/18 1342 92.5 kg (204 lb)      BMI kg/m2 Body mass index is 25.85 kg/m².     Labs        Pertinent Labs Results from last 7 days   Lab Units 05/19/25  0703 05/18/25  0547 05/17/25  1359 05/17/25  0613   SODIUM mmol/L 138 147*  --  145   POTASSIUM mmol/L 4.3 3.7 3.8 3.6   CHLORIDE mmol/L 112* 116*  --  116*   CO2 mmol/L 17.9* 23.2  --  21.3*   BUN mg/dL 30* 32*  --  36*   CREATININE mg/dL 1.22* 1.33*  --  1.37*   CALCIUM mg/dL 8.0* 8.2*  --  7.7*   GLUCOSE mg/dL 136* 79  --  133*     Results from last 7 days   Lab Units 05/19/25  0703 05/18/25  2139 05/18/25  0547 05/14/25  0657 05/13/25  0431   MAGNESIUM mg/dL  --   --   --   --  2.1   PHOSPHORUS mg/dL  3.0   < > 2.1*   < > 2.5   HEMOGLOBIN g/dL  --   --  10.0*   < > 10.1*   HEMATOCRIT %  --   --  31.2*   < > 32.9*    < > = values in this interval not displayed.         Medications    Scheduled Medications apixaban, 2.5 mg, Nasogastric, Q12H  aspirin, 81 mg, Nasogastric, Daily  atorvastatin, 80 mg, Nasogastric, Daily  carvedilol, 12.5 mg, Nasogastric, BID With Meals  sennosides, 10 mL, Nasogastric, BID   And  docusate sodium, 100 mg, Nasogastric, BID  hydrALAZINE, 25 mg, Nasogastric, Q8H  insulin regular, 2-7 Units, Subcutaneous, Q6H  lansoprazole, 30 mg, Nasogastric, Q AM  metoclopramide, 5 mg, Intravenous, Q6H  rivastigmine, 1 patch, Transdermal, Daily  sodium chloride, 10 mL, Intravenous, Q12H        Infusions      PRN Medications   acetaminophen **OR** acetaminophen    [DISCONTINUED] senna-docusate sodium **AND** polyethylene glycol **AND** bisacodyl **AND** bisacodyl    Calcium Replacement - Follow Nurse / BPA Driven Protocol    dextrose    dextrose    glucagon (human recombinant)    glycerin    hydrALAZINE    Magnesium Cardiology Dose Replacement - Follow Nurse / BPA Driven Protocol    metoprolol tartrate    ondansetron    phenol    Phosphorus Replacement - Follow Nurse / BPA Driven Protocol    Potassium Replacement - Follow Nurse / BPA Driven Protocol    sodium chloride    sodium chloride    [Held by provider] sodium chloride     Physical Findings        Trending Physical   Appearance, NFPE    --  Edema  Generalized Edema: 2+ (Mild)    Arm, Left Edema: 2+ (Mild) Arm, Right Edema: 2+ (Mild)                  Bowel Function Stool Output  Stool Unmeasured Occurrence: 1 (05/19/25 0504)  Bowel Incontinence: Yes (05/19/25 0504)  Stool Amount: Medium (05/19/25 0504)  Stool Consistency: soft, loose (05/19/25 0504)  Perineal Care: absorbent brief/pad changed (05/19/25 1200)  Last Bowel Movement: 05/19/25   Chewing/Swallowing Teeth Status:Mouth/Teeth WDL: .WDL except, teeth Teeth Symptoms: tooth/teeth  missing  Chewing/Swallowing Issues: No issues identified at this time   Skin Lines, Drains, Airways, & Wounds       Active LDAs       Name Placement date Placement time Site Days Last dressing change    Peripheral IV 05/10/25 1222 20 G Anterior;Right External Jugular 05/10/25  1222  External Jugular  9 05/15/25 1600 (95.02 hrs)    NG/OG Tube 14 Fr Right nostril 05/18/25  1043  Right nostril  1 05/18/25 1400 (25.02 hrs)    Urethral Catheter 16 Fr. 05/10/25  1525  -- 8     Wound 05/18/25 1146 Left upper arm Other (Comments) 05/18/25  1146  -- 1                     --  Current Nutrition Orders & Evaluation of Intake       Oral Nutrition     Food Allergies None    Current PO Diet NPO Diet NPO Type: Tube Feeding   Supplement None    PO Evaluation     Trending % PO Intake None        Enteral Nutrition    Current EN Order Tube Feeding: Formula: Peptamen AF (Vital AF 1.2); Feeding Type: Continuous; Start at: 20 mL/hr; Then Advance By: Do Not Advance; Total Daily Feeding Volume (mL/day): 440; Water Flush: Other; Other: 250; Every: 6 hours; Water Bolus: None    Patient doesn't have any tube feeding modular orders    EN Route    EN Tolerance    EN Observation        Parenteral Nutrition    Current TPN Order    TPN Route    Lipids (mL/%/frequency)    MVI Frequency    Trace Element Frequency    Total # Days on TPN    TPN Observation      Nutrition Diagnosis         Nutrition Dx Problem 1    Underweight r/t dementia/COPD AEB BMI=22.3         Nutrition Dx Problem 2       Goal(s) Tolerates EN      Intervention         Intervention  Continue to monitor for plan of care and Continue with current interventions       Diet Prescription    Supplement Prescription         Enteral Prescription  Initiate Peptamen AF @ 20mL/hr and do not advance        TPN Prescription         Education Provided         Monitor/Evaluation        Monitor Per Protocol, Pertinent Labs, EN Delivery/Tolerance, Weight, Skin Status, GI Status, Symptoms, Swallow  Function, and Hemodynamic Stability     RD Follow-up Encounter 1-2 days     Electronically signed by:  Mojgan Pascal RD  05/19/25 15:01 EDT

## 2025-05-20 LAB
ALBUMIN SERPL-MCNC: 2.3 G/DL (ref 3.5–5.2)
ANION GAP SERPL CALCULATED.3IONS-SCNC: 8.7 MMOL/L (ref 5–15)
BUN SERPL-MCNC: 24 MG/DL (ref 8–23)
BUN/CREAT SERPL: 20.3 (ref 7–25)
CALCIUM SPEC-SCNC: 8 MG/DL (ref 8.6–10.5)
CHLORIDE SERPL-SCNC: 113 MMOL/L (ref 98–107)
CO2 SERPL-SCNC: 20.3 MMOL/L (ref 22–29)
CREAT SERPL-MCNC: 1.18 MG/DL (ref 0.57–1)
EGFRCR SERPLBLD CKD-EPI 2021: 46.8 ML/MIN/1.73
GLUCOSE BLDC GLUCOMTR-MCNC: 126 MG/DL (ref 70–130)
GLUCOSE BLDC GLUCOMTR-MCNC: 128 MG/DL (ref 70–130)
GLUCOSE BLDC GLUCOMTR-MCNC: 129 MG/DL (ref 70–130)
GLUCOSE BLDC GLUCOMTR-MCNC: 198 MG/DL (ref 70–130)
GLUCOSE BLDC GLUCOMTR-MCNC: 98 MG/DL (ref 70–130)
GLUCOSE SERPL-MCNC: 112 MG/DL (ref 65–99)
PHOSPHATE SERPL-MCNC: 2.3 MG/DL (ref 2.5–4.5)
POTASSIUM SERPL-SCNC: 3.9 MMOL/L (ref 3.5–5.2)
SODIUM SERPL-SCNC: 142 MMOL/L (ref 136–145)

## 2025-05-20 PROCEDURE — 99233 SBSQ HOSP IP/OBS HIGH 50: CPT | Performed by: PHYSICIAN ASSISTANT

## 2025-05-20 PROCEDURE — 99232 SBSQ HOSP IP/OBS MODERATE 35: CPT | Performed by: FAMILY MEDICINE

## 2025-05-20 PROCEDURE — 80069 RENAL FUNCTION PANEL: CPT | Performed by: INTERNAL MEDICINE

## 2025-05-20 PROCEDURE — 25010000002 METOCLOPRAMIDE PER 10 MG: Performed by: INTERNAL MEDICINE

## 2025-05-20 PROCEDURE — 82948 REAGENT STRIP/BLOOD GLUCOSE: CPT

## 2025-05-20 RX ADMIN — APIXABAN 2.5 MG: 2.5 TABLET, FILM COATED ORAL at 20:45

## 2025-05-20 RX ADMIN — METOCLOPRAMIDE HYDROCHLORIDE 5 MG: 5 INJECTION, SOLUTION INTRAMUSCULAR; INTRAVENOUS at 09:00

## 2025-05-20 RX ADMIN — DOCUSATE SODIUM 100 MG: 50 LIQUID ORAL at 20:44

## 2025-05-20 RX ADMIN — LANSOPRAZOLE 30 MG: 30 TABLET, ORALLY DISINTEGRATING ORAL at 05:39

## 2025-05-20 RX ADMIN — METOCLOPRAMIDE HYDROCHLORIDE 5 MG: 5 INJECTION, SOLUTION INTRAMUSCULAR; INTRAVENOUS at 03:53

## 2025-05-20 RX ADMIN — METOCLOPRAMIDE HYDROCHLORIDE 5 MG: 5 INJECTION, SOLUTION INTRAMUSCULAR; INTRAVENOUS at 20:44

## 2025-05-20 RX ADMIN — Medication 10 ML: at 09:03

## 2025-05-20 RX ADMIN — CARVEDILOL 12.5 MG: 12.5 TABLET, FILM COATED ORAL at 09:02

## 2025-05-20 RX ADMIN — RIVASTIGMINE 1 PATCH: 4.6 PATCH TRANSDERMAL at 08:57

## 2025-05-20 RX ADMIN — ATORVASTATIN CALCIUM 80 MG: 80 TABLET, FILM COATED ORAL at 09:02

## 2025-05-20 RX ADMIN — SENNOSIDES 10 ML: 8.8 LIQUID ORAL at 20:44

## 2025-05-20 RX ADMIN — ASPIRIN 81 MG CHEWABLE TABLET 81 MG: 81 TABLET CHEWABLE at 09:02

## 2025-05-20 RX ADMIN — CARVEDILOL 12.5 MG: 12.5 TABLET, FILM COATED ORAL at 17:07

## 2025-05-20 RX ADMIN — HYDRALAZINE HYDROCHLORIDE 25 MG: 25 TABLET ORAL at 05:39

## 2025-05-20 RX ADMIN — HYDRALAZINE HYDROCHLORIDE 25 MG: 25 TABLET ORAL at 22:02

## 2025-05-20 RX ADMIN — APIXABAN 2.5 MG: 2.5 TABLET, FILM COATED ORAL at 09:02

## 2025-05-20 RX ADMIN — METOCLOPRAMIDE HYDROCHLORIDE 5 MG: 5 INJECTION, SOLUTION INTRAMUSCULAR; INTRAVENOUS at 17:07

## 2025-05-20 RX ADMIN — Medication 10 ML: at 20:45

## 2025-05-20 RX ADMIN — HYDRALAZINE HYDROCHLORIDE 25 MG: 25 TABLET ORAL at 17:07

## 2025-05-20 NOTE — THERAPY TREATMENT NOTE
Patient is not medically appropriate to work with PT today.  PT will follow up tomorrow as appropriate.

## 2025-05-20 NOTE — PROGRESS NOTES
"    [facility]    Name:  Valarie Camara   Age:  79 y.o.  Sex:  female  :  1945  MRN:  [id number]   Visit Number:  [id number]  Admission Date:  5/10/2025  Date Of Service:  25  Primary Care Physician:  Lay Cox MD     LOS: 9 days :    Chief Complaint:      weakness    Subjective:    Patient seen this morning.  Currently no family at bedside, per palliative care supposed to have a meeting this afternoon to discuss goals of care.  Patient does have NG tube in place.  She was awake, no distress.    Hospital Course:    Per prior provider: \"Patient is an 79-year-old female brought to the emergency department for evaluation of altered mental status.  At the time of examination, unfortunately, patient was alone without family at bedside.  History of present illness was obtained from review of medical records, including discussion with nursing and ED physician.  Patient has a known history of dementia, however over the last 3 days, patient's daughter was concerned that the patient has developed significant confusion.  Patient has been unable to tolerate oral intake of nutrition and oral hydration.      EMS was called to patient's nursing facility, she was found to be hypotensive and tachycardic in A-fib with RVR.  They were unable to obtain IV access, subsequently unable to provide cardioversion en route to the ED.  Upon arrival to the ED, patient was emergently cardioverted, patient's rhythm with normal sinus rhythm, without any change in mentation.  Neurology was consulted, CT of the head did not show any acute CVA.  Cardiology was consulted, who feels that the elevated troponin is most likely secondary to the tachycardia and recommend troponin trending, anticoagulation.  Hospitalist services consulted for admission.     Discussion had with family and they request DNR with no chest compressions and no intubation, they approve all other " "interventions.     Patient had presented with encephalopathy very high sodium.  Continues to be in goals of care discussion with patient and family.  Family meeting pending.  Sodium has been reluctant to recover still running about 160 despite hypotonic fluids nephrology is addressing this.  Otherwise urine is growing ESBL has been switched to Invanz.\"    Review of Systems:     All systems were reviewed and negative except as mentioned in subjective, assessment and plan.    Vital Signs:    Temp:  [97.7 °F (36.5 °C)-98.3 °F (36.8 °C)] 98.3 °F (36.8 °C)  Heart Rate:  [61-67] 61  Resp:  [16] 16  BP: (166-185)/(67-73) 174/73    Intake and output:    I/O last 3 completed shifts:  In: 1340 [I.V.:500; Other:600; NG/GT:240]  Out: 725 [Urine:725]  No intake/output data recorded.    Physical Examination: Examined again 5/19/25    General Appearance:  Alert and cooperative. NAD   Head:  Atraumatic and normocephalic.   Eyes: Conjunctivae and sclerae normal, no icterus. No pallor.   Throat: No oral lesions, no thrush, oral mucosa moist.  NG tube in place   Neck: Supple, trachea midline, no thyromegaly.   Lungs:   Breath sounds heard bilaterally equally.  No wheezing or crackles.    Heart:  Normal S1 and S2, no murmur, No JVD.   Abdomen:   Normal bowel sounds, Soft, nontender, nondistended, no rebound tenderness.   Extremities: Supple, no edema, no cyanosis, no clubbing.   Skin: No bleeding or rash.   Neurologic: Alert and oriented x 2. No facial asymmetry.      Laboratory results:    Results from last 7 days   Lab Units 05/20/25  0859 05/19/25  0703 05/18/25  0547   SODIUM mmol/L 142 138 147*   POTASSIUM mmol/L 3.9 4.3 3.7   CHLORIDE mmol/L 113* 112* 116*   CO2 mmol/L 20.3* 17.9* 23.2   BUN mg/dL 24* 30* 32*   CREATININE mg/dL 1.18* 1.22* 1.33*   CALCIUM mg/dL 8.0* 8.0* 8.2*   GLUCOSE mg/dL 112* 136* 79     Results from last 7 days   Lab Units 05/18/25  0547 05/17/25  0613 05/16/25  0715   WBC 10*3/mm3 6.44 6.12 5.90 "   HEMOGLOBIN g/dL 10.0* 9.7* 9.9*   HEMATOCRIT % 31.2* 30.3* 30.3*   PLATELETS 10*3/mm3 128* 118* 97*                       Recent Labs     03/22/25  0610 05/12/25  0538   PHART 7.449 7.444   OFE6HCY 32.0* 33.5*   PO2ART 78.3* 81.2   WTD2WMN 22.1 23.0   BASEEXCESS -1.2* -0.6*      I have reviewed the patient's laboratory results.    Radiology results:    No radiology results from the last 24 hrs      I have reviewed the patient's radiology reports.    Medication Review:     I have reviewed the patient's active and prn medications.     Problem List:      Hypernatremia    PAF (paroxysmal atrial fibrillation)    Acute renal failure superimposed on stage 3a chronic kidney disease      Assessment:    Severe Sepsis secondary to urinary tract infection  Urinary tract infection  Metabolic encephalopathy  Atrial fibrillation with RVR  Acute kidney injury  Hypernatremia  Dementia  COPD  Hypertension  Hyperlipidemia  Restless leg syndrome    Plan:    Severe Sepsis secondary to urinary tract infection  Urinary tract infection  Metabolic encephalopathy  Patient meets sepsis criteria with tachycardia, leukocytosis, source of infection identified as urinary tract infection.  Evidence of endorgan damage noted with metabolic encephalopathy and acute kidney injury.  IV Rocephin, switched to Invanz 5/12/25 due to ESBL in the urine - completed   MRI no stroke  Grey placed on 5/10/25  Atrial fibrillation with RVR  Patient status post cardioversion.  Cardiology consulted, appreciate their recommendations.  Initially on heparin stopped now.  On apixaban.  On Coreg.  Acute kidney injury  Acute hypernatremia  Sodium was normal 1 month prior.  Patient encephalopathic which may be symptoms related to hypernatremia.  Nephrology has been consulted, appreciate his recommendations.  Status post LR boluses, continue slow hypotonic fluids with dextrose  Serial sodium, if sodium is correcting greater than one half Mg per DL per hour will hold  fluids  Nutrition  NGT initially placed 5/14  Patient complained of painful swallowing; possible thursh. Started empiric fluconazole  She also has a history of strictures; was recently dilated. Low suspicion this is the cause  Continue to attempt tube feeds as tolerated  5/18: NGT pulled out overnight, unsuccessful attempts x2 at re-insertion, Overall advise to discontinue attempts and patient is able to articulate that she does not want it replaced.   5/19: NGT re-inserted and tube feeds restarted  Dementia  COPD  Hypertension  Hyperlipidemia  Restless leg syndrome  Continue home medications as appropriate.    Prognosis is very poor.  Palliative care to follow-up today.  Suspect will likely go home with hospice after discharge, possibly tomorrow.     DVT Prophylaxis: Apixaban  Code Status: DNR/DNI  Diet: NPO; tube feeds  Discharge Plan: Pending    Denise Nice DO  05/20/25  13:47 EDT    Dictated utilizing Dragon dictation.

## 2025-05-20 NOTE — PROGRESS NOTES
"Dietitian Follow-up    Patient Name: Valarie Camara  YOB: 1945  MRN: [id number]  Admission date: 5/10/2025    Comment:    Clinical Nutrition Follow-up   Encounter Information        Trending Narrative     5/20: Peptamen AF running at 20 mL/hr. Pt is tolerating. Diet was advanced.  Will keep TF rate at 20 mL and add Magic cup BID.    5/19: NG-tube pulled on 5/18 and re-inserted today. Currently running @ 20mL/hr. Will follow-up tomorrow.    5/16: patient no longer w/ vomiting but still experiencing nausea. Will switch to Peptamen AF for better GI tolerance at a low rate of 20mL/hr and do not advance.     5/15: Pt with multiple episodes of vomiting and unable to tolerate tube feeding. MD adjusted water flushes. Will put tube feeding rate to 0 mL/hr and try initiating tube feeding again tomorrow.      5/14: NG was placed today with plans to initiate tube feeding.      5/13: RD consulted for TF recommendations.      5/13: Patient remains NPO - will continue to monitor for diet advancement.     5/12: Pt NPO x 2 days. EMR shows significant wt loss. Will monitor for diet advancement and add ONS.      Anthropometrics        Current Height, Weight Height: 160 cm (63\")  Weight: 65.6 kg (144 lb 10 oz) (05/20/25 0600)       Trending Weight Hx     This admission:              PTA:     Wt Readings from Last 30 Encounters:   05/20/25 0600 65.6 kg (144 lb 10 oz)   05/19/25 0504 66.2 kg (145 lb 15.1 oz)   05/18/25 0515 63.8 kg (140 lb 10.5 oz)   05/17/25 0518 64 kg (141 lb 1.5 oz)   05/15/25 0515 65.5 kg (144 lb 6.4 oz)   05/14/25 0400 59.7 kg (131 lb 9.8 oz)   05/13/25 0400 60.1 kg (132 lb 7.9 oz)   05/12/25 0400 57.1 kg (125 lb 14.1 oz)   05/11/25 0500 55.5 kg (122 lb 5.7 oz)   05/10/25 1727 57.4 kg (126 lb 8.7 oz)   05/10/25 1226 66.7 kg (147 lb)   04/17/25 0932 67.1 kg (147 lb 14.4 oz)   04/05/25 1837 66.6 kg (146 lb 12.8 oz)   03/21/25 1511 68 kg (150 lb)   03/20/25 1556 68 kg (150 lb)   03/15/25 0422 69.4 kg " (153 lb)   03/04/25 0600 72.8 kg (160 lb 7.9 oz)   03/02/25 1423 71.9 kg (158 lb 8.2 oz)   03/02/25 0348 70.5 kg (155 lb 6.8 oz)   03/01/25 0415 72.3 kg (159 lb 6.3 oz)   02/28/25 1848 72.6 kg (160 lb 0.9 oz)   02/28/25 1817 77 kg (169 lb 12.1 oz)   02/28/25 1152 77 kg (169 lb 12.1 oz)   02/08/25 0115 77.1 kg (169 lb 15.6 oz)   01/24/25 1111 75.3 kg (166 lb)   01/27/25 0139 77.1 kg (170 lb)   01/10/25 1330 80.3 kg (177 lb)   01/05/25 1700 76.3 kg (168 lb 3.4 oz)   01/05/25 1102 79.4 kg (175 lb 0.7 oz)   01/05/25 0921 79.4 kg (175 lb)   11/06/24 1049 80.6 kg (177 lb 12.8 oz)   12/04/23 2059 90.7 kg (200 lb)   10/31/23 0924 90.7 kg (200 lb)   08/21/23 1541 90.7 kg (200 lb)   08/17/23 1331 91.2 kg (201 lb)   01/31/22 1433 87.5 kg (192 lb 12.8 oz)   11/24/21 1834 88.5 kg (195 lb)   11/24/21 1543 86.2 kg (190 lb)   05/25/21 0500 92.6 kg (204 lb 2.3 oz)   05/21/21 0500 89.9 kg (198 lb 3.1 oz)   05/20/21 2344 89.9 kg (198 lb 3.1 oz)   05/20/21 0807 88.2 kg (194 lb 6.4 oz)   05/20/21 0305 90.7 kg (200 lb)   02/03/21 1507 89.4 kg (197 lb)   12/17/20 1307 92.1 kg (203 lb)   10/28/20 1735 90.7 kg (200 lb)   09/08/20 1011 95.7 kg (211 lb)   08/17/20 1122 93.6 kg (206 lb 6.4 oz)   01/02/20 1016 90.4 kg (199 lb 6.4 oz)   12/13/19 0445 87.2 kg (192 lb 3.2 oz)   11/25/19 1335 89.4 kg (197 lb)   11/23/19 2340 89.7 kg (197 lb 12.8 oz)   11/23/19 1951 90.7 kg (200 lb)   03/19/19 1541 91.8 kg (202 lb 6.4 oz)   04/10/18 1524 90.7 kg (200 lb)   04/09/18 1342 92.5 kg (204 lb)      BMI kg/m2 Body mass index is 25.62 kg/m².     Labs        Pertinent Labs Results from last 7 days   Lab Units 05/20/25  0859 05/19/25  0703 05/18/25  0547   SODIUM mmol/L 142 138 147*   POTASSIUM mmol/L 3.9 4.3 3.7   CHLORIDE mmol/L 113* 112* 116*   CO2 mmol/L 20.3* 17.9* 23.2   BUN mg/dL 24* 30* 32*   CREATININE mg/dL 1.18* 1.22* 1.33*   CALCIUM mg/dL 8.0* 8.0* 8.2*   GLUCOSE mg/dL 112* 136* 79     Results from last 7 days   Lab Units 05/20/25  0859  05/18/25 2139 05/18/25 0547   PHOSPHORUS mg/dL 2.3*   < > 2.1*   HEMOGLOBIN g/dL  --   --  10.0*   HEMATOCRIT %  --   --  31.2*    < > = values in this interval not displayed.         Medications    Scheduled Medications apixaban, 2.5 mg, Nasogastric, Q12H  aspirin, 81 mg, Nasogastric, Daily  atorvastatin, 80 mg, Nasogastric, Daily  carvedilol, 12.5 mg, Nasogastric, BID With Meals  sennosides, 10 mL, Nasogastric, BID   And  docusate sodium, 100 mg, Nasogastric, BID  hydrALAZINE, 25 mg, Nasogastric, Q8H  insulin regular, 2-7 Units, Subcutaneous, Q6H  lansoprazole, 30 mg, Nasogastric, Q AM  metoclopramide, 5 mg, Intravenous, Q6H  rivastigmine, 1 patch, Transdermal, Daily  sodium chloride, 10 mL, Intravenous, Q12H        Infusions      PRN Medications   acetaminophen **OR** acetaminophen    [DISCONTINUED] senna-docusate sodium **AND** polyethylene glycol **AND** bisacodyl **AND** bisacodyl    Calcium Replacement - Follow Nurse / BPA Driven Protocol    dextrose    dextrose    glucagon (human recombinant)    glycerin    hydrALAZINE    Magnesium Cardiology Dose Replacement - Follow Nurse / BPA Driven Protocol    metoprolol tartrate    ondansetron    phenol    Phosphorus Replacement - Follow Nurse / BPA Driven Protocol    Potassium Replacement - Follow Nurse / BPA Driven Protocol    sodium chloride    sodium chloride    [Held by provider] sodium chloride     Physical Findings        Trending Physical   Appearance, NFPE    --  Edema  Generalized Edema: 2+ (Mild)    Arm, Left Edema: 2+ (Mild) Arm, Right Edema: 2+ (Mild)  Leg, Left Edema: 2+ (Mild) Leg, Right Edema: 2+ (Mild)  Ankle, Left Edema: 2+ (Mild) Ankle, Right Edema: 2+ (Mild)  Foot, Left Edema: 2+ (Mild) Foot, Right Edema: 2+ (Mild)   Bowel Function Stool Output  Stool Unmeasured Occurrence: 1 (05/20/25 0949)  Bowel Incontinence: Yes (05/20/25 0949)  Stool Amount: Small (05/20/25 0949)  Stool Consistency: soft (05/20/25 0949)  Perineal Care: absorbent brief/pad  changed, perineum cleansed (05/20/25 0600)  Last Bowel Movement: 05/19/25   Chewing/Swallowing Teeth Status:Mouth/Teeth WDL: .WDL except, teeth Teeth Symptoms: tooth/teeth missing  Chewing/Swallowing Issues: No issues identified at this time   Skin Lines, Drains, Airways, & Wounds       Active LDAs       Name Placement date Placement time Site Days Last dressing change    Peripheral IV 05/10/25 1222 20 G Anterior;Right External Jugular 05/10/25  1222  External Jugular  9 05/15/25 1600 (95.02 hrs)    NG/OG Tube 14 Fr Right nostril 05/18/25  1043  Right nostril  1 05/18/25 1400 (25.02 hrs)    Urethral Catheter 16 Fr. 05/10/25  1525  -- 8     Wound 05/18/25 1146 Left upper arm Other (Comments) 05/18/25  1146  -- 1                     --  Current Nutrition Orders & Evaluation of Intake       Oral Nutrition     Food Allergies None    Current PO Diet NPO Diet NPO Type: Tube Feeding   Supplement None    PO Evaluation     Trending % PO Intake None        Enteral Nutrition    Current EN Order Tube Feeding: Formula: Peptamen AF (Vital AF 1.2); Feeding Type: Continuous; Start at: 20 mL/hr; Then Advance By: Do Not Advance; Total Daily Feeding Volume (mL/day): 440; Water Flush: Other; Other: 250; Every: 6 hours; Water Bolus: None    Patient doesn't have any tube feeding modular orders    EN Route    EN Tolerance    EN Observation        Parenteral Nutrition    Current TPN Order    TPN Route    Lipids (mL/%/frequency)    MVI Frequency    Trace Element Frequency    Total # Days on TPN    TPN Observation      Nutrition Diagnosis         Nutrition Dx Problem 1    Underweight r/t dementia/COPD AEB BMI=22.3         Nutrition Dx Problem 2       Goal(s) Tolerates EN      Intervention         Intervention  Continue to monitor for plan of care and Continue with current interventions       Diet Prescription Regular, mechanical ground, NTL   Supplement Prescription  Magic cup BID       Enteral Prescription  Recommendations:    Peptamen AF  at 20 mL/hr. Do not advanced.          TPN Prescription         Education Provided         Monitor/Evaluation        Monitor Per Protocol, Pertinent Labs, EN Delivery/Tolerance, Weight, Skin Status, GI Status, Symptoms, Swallow Function, and Hemodynamic Stability     RD Follow-up Encounter 1-2 days     Electronically signed by:  Lay Oliva RD  05/20/25 13:09 EDT

## 2025-05-20 NOTE — PLAN OF CARE
Goal Outcome Evaluation:  Plan of Care Reviewed With: patient, family        Progress: no change  Outcome Evaluation: AVSS, q2 hour and PRN turn, complete bed bath done, NG tube right nostirl infusing as ordered, no residual noted, F/C in place and drainging well

## 2025-05-20 NOTE — THERAPY EVALUATION
Patient is not medically appropriate for participation with OT eval this date. Will follow up tomorrow as appropriate.

## 2025-05-20 NOTE — PLAN OF CARE
Goal Outcome Evaluation:  Plan of Care Reviewed With: patient        Progress: improving  Outcome Evaluation: VSS. Pt tolerating oral diet well.

## 2025-05-20 NOTE — PLAN OF CARE
"Goal Outcome Evaluation:     Palliative Care Team--Isaura MEYER and Mireille Pedersen RN met with pt and daughters, Cleo and Bailey for a scheduled meeting.   Pt awake and alert.  NG feeding tube in place and pt receiving TF @ 20ml/hr.  Tolerating TF well at this time.  Last BM 5/20/2025.    Purpose of the meeting was to review and update pt's Goals of Care.   Pt has said in the past and reports now that her goal is to return home and she added \"walk by herself\" ( Isaura informed pt that walking by herself may not be realistic at this time but pt could get up to chair with assist).     Discussion followed regarding what would pt's wishes be when death approaches ( acknowledging that we will all face that time), would she want to continue with a feeding tube or, knowing she could aspirate, not have the feeding tube and eat what she felt like eating.    Pt referred a few times NOT wanting \"rice\" which made pt and daughters laugh.      The discussion did address pt being able to attempt to eat small amounts of food that she likes while having the NG in place.  Isaura explained this being referred to as \"Comfort foods\".  Discussion initiated per Cleo  included removing the NG/FT ,allowing  pt attempt to eat and if that was not successful to replace the tube.  Isaura discouraged this practice referring to the discomfort that the reinsertion would cause.  Pt and daughters were then agreeable to pt attempting to eat while the NG/FT was in place  with the knowledge pt could still aspirate.   If pt discharged to home with the NG/FT, if the tube came out, Hospice would not replace it.   It they were agreeable with the last  plan,    Isaura confirmed with pt and family her goals would be in alignment with Hospice services.    Isaura contacted Dr Nice to update on pt and family request to attempt eating while NG/FT in place.     Isaura contacted Swati RN/HCP will update and anticipate possible DC to home tomorrow with " Hospice services and with a NG/FT.  She was informed per Swati RN that she would have to have that approved.      PC will continue to follow

## 2025-05-20 NOTE — PROGRESS NOTES
[facility]     PALLIATIVE CARE FOLLOW UP NOTE    Name:  Valarie Camara   Age:  79 y.o.  Sex:  female  :  1945  MRN:  [id number]   Visit Number:  [id number]  Date Of Service:  25  Primary Care Physician:  Lay Cox MD    Chief Complaint: Weakness and debility    Interval History:  Patient seen today during palliative care team rounds.  Record reviewed and case discussed with staff.  Tolerating TF at 20mL/hr.  No report of nausea/vomiting.  BM documented .      Discussion with patient and her 2 daughters at bedside today.  All are in agreement that patient desires to be home and feel that her goal is to maximize her quality of life, feel that her goals are in alignment with Hospice services.  Patient expresses desire to eat food.  We discussed artificial nutrition in place, they desire to maintain this for now as it has improved patient's quality of life, feeling it has brought comfort to her.  However, they are understanding that once at home, if NG is dislodged, it would not be replaced.  All agreeable to allow for comfort feedings by mouth, patient expressing desire to eat a banana.        Review of Systems   Constitutional:  Positive for activity change and fatigue.   Neurological:  Positive for weakness (generalized).          Pain Assessment  Preferred Pain Scale: FACES (Merrill-Baker FACES Pain Rating Scale)  Nonverbal Indicators of Pain: nonverbal indicators absent  CPOT Facial Expression: 0-->relaxed, neutral  CPOT Body Movements: 0-->absence of movements  CPOT Muscle Tension: 0-->relaxed  Ventilator Compliance/Vocalization: 0-->talking in normal tone or no sound  CPOT Score: 0  PAINAD Breathin-->normal  PAINAD Negative Vocalization: 0-->none  PAINAD Facial Expression: 0-->smiling or inexpressive  PAINAD Body Language: 0-->relaxed  PAINAD Consolability: 0-->no need to console  PAINAD Score: 0  Vitals: /73 (BP Location: Left arm, Patient  "Position: Lying)   Pulse 61   Temp 98.3 °F (36.8 °C) (Oral)   Resp 16   Ht 160 cm (63\")   Wt 65.6 kg (144 lb 10 oz)   SpO2 95%   BMI 25.62 kg/m²       Physical Exam  Vitals and nursing note reviewed.   Constitutional:       General: She is not in acute distress.     Appearance: She is not diaphoretic.      Comments: Frail appearing, elderly female lying in bed, alert and interactive.  NAD   HENT:      Head: Normocephalic and atraumatic.      Nose:      Comments: NG in place     Mouth/Throat:      Mouth: Mucous membranes are moist.   Eyes:      Extraocular Movements: Extraocular movements intact.      Pupils: Pupils are equal, round, and reactive to light.   Cardiovascular:      Rate and Rhythm: Normal rate and regular rhythm.   Pulmonary:      Effort: Pulmonary effort is normal. No respiratory distress.      Breath sounds: Normal breath sounds.   Musculoskeletal:      Cervical back: Neck supple.      Comments: Appears deconditioned    Skin:     General: Skin is warm and dry.      Capillary Refill: Capillary refill takes less than 2 seconds.   Neurological:      Mental Status: She is alert and oriented to person, place, and time.   Psychiatric:         Mood and Affect: Mood normal.         Behavior: Behavior normal.          Results Reviewed:    Intake/Output Summary (Last 24 hours) at 5/20/2025 1158  Last data filed at 5/20/2025 0600  Gross per 24 hour   Intake 1280 ml   Output 525 ml   Net 755 ml     Results from last 7 days   Lab Units 05/20/25  0859   SODIUM mmol/L 142   POTASSIUM mmol/L 3.9   CHLORIDE mmol/L 113*   CO2 mmol/L 20.3*   BUN mg/dL 24*   CREATININE mg/dL 1.18*   CALCIUM mg/dL 8.0*   GLUCOSE mg/dL 112*     Results from last 7 days   Lab Units 05/18/25  0547   WBC 10*3/mm3 6.44   HEMOGLOBIN g/dL 10.0*   HEMATOCRIT % 31.2*   PLATELETS 10*3/mm3 128*       Medication Review:   I have reviewed the patients active and prn medications.     Palliative Care Assessment:  Severe sepsis secondary to " UTI  Metabolic encephalopathy  Atrial fibrillation with RVR  KURT  Dementia  Frailty  Dysphagia    Recommendations/Plan:  - GOC discussions yield desire to maintain plan of care as outlined per the primary medicine service, with goal to optimize condition  - Clarified goal for artificial nutrition, which is to aid in patient's comfort/quality; they desire to allow for comfort feeding in addition to low rate TF  - Understand that if discharging home with hospice services, if NG is dislodged, will not replace  - Provided counseling regarding goal for nutrition at EOL  - Difficult to prognosticate given interventions in place, however it does appear patient is entering into the final stages of life   - Hospice has been notified of home referral, they will need to arrange for TF equipment prior to discharge  - Consider restarting nystatin swish  - Previously patient taking PPI with history of GERD and hiatal hernia, consider restarting Protonix  - Last BM 5/20  - Palliative care will continue to follow/support patient and family      CODE STATUS:   Code Status and Medical Interventions: No CPR (Do Not Attempt to Resuscitate); Limited Support; No intubation (DNI)   Ordered at: 05/10/25 1435     Code Status (Patient has no pulse and is not breathing):    No CPR (Do Not Attempt to Resuscitate)     Medical Interventions (Patient has pulse or is breathing):    Limited Support     Medical Intervention Limits:    No intubation (DNI)     Level Of Support Discussed With:    Patient         I spent 60 total minutes, including face to face assessment, record review, coordination of care with staff, and counseling patient and/or family  Part of this note may be an electronic transcription/translation of spoken language to printed text using the Dragon Dictation System.    Isaura Lyons PA-C  05/20/25  11:58 EDT

## 2025-05-21 LAB
ALBUMIN SERPL-MCNC: 2 G/DL (ref 3.5–5.2)
ANION GAP SERPL CALCULATED.3IONS-SCNC: 6.9 MMOL/L (ref 5–15)
BUN SERPL-MCNC: 25 MG/DL (ref 8–23)
BUN/CREAT SERPL: 23.6 (ref 7–25)
CALCIUM SPEC-SCNC: 8 MG/DL (ref 8.6–10.5)
CHLORIDE SERPL-SCNC: 107 MMOL/L (ref 98–107)
CO2 SERPL-SCNC: 20.1 MMOL/L (ref 22–29)
CREAT SERPL-MCNC: 1.06 MG/DL (ref 0.57–1)
EGFRCR SERPLBLD CKD-EPI 2021: 53.2 ML/MIN/1.73
GLUCOSE BLDC GLUCOMTR-MCNC: 111 MG/DL (ref 70–130)
GLUCOSE BLDC GLUCOMTR-MCNC: 130 MG/DL (ref 70–130)
GLUCOSE BLDC GLUCOMTR-MCNC: 161 MG/DL (ref 70–130)
GLUCOSE BLDC GLUCOMTR-MCNC: 183 MG/DL (ref 70–130)
GLUCOSE BLDC GLUCOMTR-MCNC: 281 MG/DL (ref 70–130)
GLUCOSE SERPL-MCNC: 120 MG/DL (ref 65–99)
PHOSPHATE SERPL-MCNC: 2.3 MG/DL (ref 2.5–4.5)
POTASSIUM SERPL-SCNC: 4.3 MMOL/L (ref 3.5–5.2)
SODIUM SERPL-SCNC: 134 MMOL/L (ref 136–145)

## 2025-05-21 PROCEDURE — 82948 REAGENT STRIP/BLOOD GLUCOSE: CPT | Performed by: FAMILY MEDICINE

## 2025-05-21 PROCEDURE — 80069 RENAL FUNCTION PANEL: CPT | Performed by: INTERNAL MEDICINE

## 2025-05-21 PROCEDURE — 82948 REAGENT STRIP/BLOOD GLUCOSE: CPT

## 2025-05-21 PROCEDURE — 63710000001 INSULIN REGULAR HUMAN PER 5 UNITS: Performed by: FAMILY MEDICINE

## 2025-05-21 PROCEDURE — 99232 SBSQ HOSP IP/OBS MODERATE 35: CPT | Performed by: PHYSICIAN ASSISTANT

## 2025-05-21 PROCEDURE — 25010000002 METOCLOPRAMIDE PER 10 MG: Performed by: INTERNAL MEDICINE

## 2025-05-21 PROCEDURE — 99232 SBSQ HOSP IP/OBS MODERATE 35: CPT | Performed by: FAMILY MEDICINE

## 2025-05-21 RX ADMIN — HYDRALAZINE HYDROCHLORIDE 25 MG: 25 TABLET ORAL at 21:36

## 2025-05-21 RX ADMIN — INSULIN HUMAN 2 UNITS: 100 INJECTION, SOLUTION PARENTERAL at 17:56

## 2025-05-21 RX ADMIN — INSULIN HUMAN 2 UNITS: 100 INJECTION, SOLUTION PARENTERAL at 14:30

## 2025-05-21 RX ADMIN — METOCLOPRAMIDE HYDROCHLORIDE 5 MG: 5 INJECTION, SOLUTION INTRAMUSCULAR; INTRAVENOUS at 21:37

## 2025-05-21 RX ADMIN — HYDRALAZINE HYDROCHLORIDE 25 MG: 25 TABLET ORAL at 14:30

## 2025-05-21 RX ADMIN — DOCUSATE SODIUM 100 MG: 50 LIQUID ORAL at 21:36

## 2025-05-21 RX ADMIN — APIXABAN 2.5 MG: 2.5 TABLET, FILM COATED ORAL at 21:36

## 2025-05-21 RX ADMIN — METOCLOPRAMIDE HYDROCHLORIDE 5 MG: 5 INJECTION, SOLUTION INTRAMUSCULAR; INTRAVENOUS at 03:00

## 2025-05-21 RX ADMIN — RIVASTIGMINE 1 PATCH: 4.6 PATCH TRANSDERMAL at 08:16

## 2025-05-21 RX ADMIN — APIXABAN 2.5 MG: 2.5 TABLET, FILM COATED ORAL at 08:19

## 2025-05-21 RX ADMIN — Medication 10 ML: at 21:38

## 2025-05-21 RX ADMIN — LANSOPRAZOLE 30 MG: 30 TABLET, ORALLY DISINTEGRATING ORAL at 05:33

## 2025-05-21 RX ADMIN — CARVEDILOL 12.5 MG: 12.5 TABLET, FILM COATED ORAL at 17:57

## 2025-05-21 RX ADMIN — METOCLOPRAMIDE HYDROCHLORIDE 5 MG: 5 INJECTION, SOLUTION INTRAMUSCULAR; INTRAVENOUS at 14:29

## 2025-05-21 RX ADMIN — CARVEDILOL 12.5 MG: 12.5 TABLET, FILM COATED ORAL at 08:19

## 2025-05-21 RX ADMIN — HYDRALAZINE HYDROCHLORIDE 25 MG: 25 TABLET ORAL at 05:33

## 2025-05-21 RX ADMIN — ATORVASTATIN CALCIUM 80 MG: 80 TABLET, FILM COATED ORAL at 08:20

## 2025-05-21 RX ADMIN — SENNOSIDES 10 ML: 8.8 LIQUID ORAL at 21:36

## 2025-05-21 RX ADMIN — Medication 10 ML: at 08:25

## 2025-05-21 RX ADMIN — METOCLOPRAMIDE HYDROCHLORIDE 5 MG: 5 INJECTION, SOLUTION INTRAMUSCULAR; INTRAVENOUS at 08:14

## 2025-05-21 RX ADMIN — ASPIRIN 81 MG CHEWABLE TABLET 81 MG: 81 TABLET CHEWABLE at 08:19

## 2025-05-21 NOTE — PLAN OF CARE
Goal Outcome Evaluation:  Plan of Care Reviewed With: patient        Progress: improving  Outcome Evaluation: VSS. Grey catheter d/c'd today. PT due to void still. Nurse encouraging pt to void. PT tolerating PO diet well today.

## 2025-05-21 NOTE — PLAN OF CARE
Goal Outcome Evaluation:  Plan of Care Reviewed With: patient        Progress: no change  Outcome Evaluation: AVSS, q2 hour and prn turn, call light within reach

## 2025-05-21 NOTE — PLAN OF CARE
Goal Outcome Evaluation:  Palliative care rounds; Isaura Lyons PA-C and this RN met with pt at bedside. She was dozing off but was easily arousable. She denies pain or SOA. Pt ate 50% of breakfast.     The plan is to discharge home with hospice. Hospice is working on getting DME delivered to her home, most likely tomorrow 5/22.       I spoke with daughter Cleo to update her. Hospice has already been in contact with her about delivering DME. She did ask for a bedside commode, hospice will be updated. Staff nurse will educate daughter on tube feedings, the education will be continued with hospice.    PC continues to follow.

## 2025-05-21 NOTE — PROGRESS NOTES
"Dietitian Follow-up    Patient Name: Valarie Camara  YOB: 1945  MRN: [id number]  Admission date: 5/10/2025    Comment:    Clinical Nutrition Follow-up   Encounter Information        Trending Narrative     5/21: TF running @ 20mL/hr x 22hrs. PO intake averaging 50% x 2 meals. Magic cup ordered BID.    5/20: Peptamen AF running at 20 mL/hr. Pt is tolerating. Diet was advanced.  Will keep TF rate at 20 mL and add Magic cup BID.     5/19: NG-tube pulled on 5/18 and re-inserted today. Currently running @ 20mL/hr. Will follow-up tomorrow.     5/16: patient no longer w/ vomiting but still experiencing nausea. Will switch to Peptamen AF for better GI tolerance at a low rate of 20mL/hr and do not advance.     5/15: Pt with multiple episodes of vomiting and unable to tolerate tube feeding. MD adjusted water flushes. Will put tube feeding rate to 0 mL/hr and try initiating tube feeding again tomorrow.      5/14: NG was placed today with plans to initiate tube feeding.      5/13: RD consulted for TF recommendations.      5/13: Patient remains NPO - will continue to monitor for diet advancement.     5/12: Pt NPO x 2 days. EMR shows significant wt loss. Will monitor for diet advancement and add ONS.     Anthropometrics        Current Height, Weight Height: 160 cm (63\")  Weight: 65.2 kg (143 lb 11.8 oz) (05/21/25 0548)       Trending Weight Hx     This admission:              PTA:     Wt Readings from Last 30 Encounters:   05/21/25 0548 65.2 kg (143 lb 11.8 oz)   05/20/25 0600 65.6 kg (144 lb 10 oz)   05/19/25 0504 66.2 kg (145 lb 15.1 oz)   05/18/25 0515 63.8 kg (140 lb 10.5 oz)   05/17/25 0518 64 kg (141 lb 1.5 oz)   05/15/25 0515 65.5 kg (144 lb 6.4 oz)   05/14/25 0400 59.7 kg (131 lb 9.8 oz)   05/13/25 0400 60.1 kg (132 lb 7.9 oz)   05/12/25 0400 57.1 kg (125 lb 14.1 oz)   05/11/25 0500 55.5 kg (122 lb 5.7 oz)   05/10/25 1727 57.4 kg (126 lb 8.7 oz)   05/10/25 1226 66.7 kg (147 lb)   04/17/25 0932 67.1 kg " (147 lb 14.4 oz)   04/05/25 1837 66.6 kg (146 lb 12.8 oz)   03/21/25 1511 68 kg (150 lb)   03/20/25 1556 68 kg (150 lb)   03/15/25 0422 69.4 kg (153 lb)   03/04/25 0600 72.8 kg (160 lb 7.9 oz)   03/02/25 1423 71.9 kg (158 lb 8.2 oz)   03/02/25 0348 70.5 kg (155 lb 6.8 oz)   03/01/25 0415 72.3 kg (159 lb 6.3 oz)   02/28/25 1848 72.6 kg (160 lb 0.9 oz)   02/28/25 1817 77 kg (169 lb 12.1 oz)   02/28/25 1152 77 kg (169 lb 12.1 oz)   02/08/25 0115 77.1 kg (169 lb 15.6 oz)   01/24/25 1111 75.3 kg (166 lb)   01/27/25 0139 77.1 kg (170 lb)   01/10/25 1330 80.3 kg (177 lb)   01/05/25 1700 76.3 kg (168 lb 3.4 oz)   01/05/25 1102 79.4 kg (175 lb 0.7 oz)   01/05/25 0921 79.4 kg (175 lb)   11/06/24 1049 80.6 kg (177 lb 12.8 oz)   12/04/23 2059 90.7 kg (200 lb)   10/31/23 0924 90.7 kg (200 lb)   08/21/23 1541 90.7 kg (200 lb)   08/17/23 1331 91.2 kg (201 lb)   01/31/22 1433 87.5 kg (192 lb 12.8 oz)   11/24/21 1834 88.5 kg (195 lb)   11/24/21 1543 86.2 kg (190 lb)   05/25/21 0500 92.6 kg (204 lb 2.3 oz)   05/21/21 0500 89.9 kg (198 lb 3.1 oz)   05/20/21 2344 89.9 kg (198 lb 3.1 oz)   05/20/21 0807 88.2 kg (194 lb 6.4 oz)   05/20/21 0305 90.7 kg (200 lb)   02/03/21 1507 89.4 kg (197 lb)   12/17/20 1307 92.1 kg (203 lb)   10/28/20 1735 90.7 kg (200 lb)   09/08/20 1011 95.7 kg (211 lb)   08/17/20 1122 93.6 kg (206 lb 6.4 oz)   01/02/20 1016 90.4 kg (199 lb 6.4 oz)   12/13/19 0445 87.2 kg (192 lb 3.2 oz)   11/25/19 1335 89.4 kg (197 lb)   11/23/19 2340 89.7 kg (197 lb 12.8 oz)   11/23/19 1951 90.7 kg (200 lb)   03/19/19 1541 91.8 kg (202 lb 6.4 oz)   04/10/18 1524 90.7 kg (200 lb)   04/09/18 1342 92.5 kg (204 lb)      BMI kg/m2 Body mass index is 25.46 kg/m².     Labs        Pertinent Labs Results from last 7 days   Lab Units 05/21/25  0645 05/20/25  0859 05/19/25  0703   SODIUM mmol/L 134* 142 138   POTASSIUM mmol/L 4.3 3.9 4.3   CHLORIDE mmol/L 107 113* 112*   CO2 mmol/L 20.1* 20.3* 17.9*   BUN mg/dL 25* 24* 30*   CREATININE mg/dL  1.06* 1.18* 1.22*   CALCIUM mg/dL 8.0* 8.0* 8.0*   GLUCOSE mg/dL 120* 112* 136*     Results from last 7 days   Lab Units 05/21/25  0645 05/18/25  2139 05/18/25  0547   PHOSPHORUS mg/dL 2.3*   < > 2.1*   HEMOGLOBIN g/dL  --   --  10.0*   HEMATOCRIT %  --   --  31.2*    < > = values in this interval not displayed.         Medications    Scheduled Medications apixaban, 2.5 mg, Nasogastric, Q12H  aspirin, 81 mg, Nasogastric, Daily  atorvastatin, 80 mg, Nasogastric, Daily  carvedilol, 12.5 mg, Nasogastric, BID With Meals  sennosides, 10 mL, Nasogastric, BID   And  docusate sodium, 100 mg, Nasogastric, BID  hydrALAZINE, 25 mg, Nasogastric, Q8H  insulin regular, 2-7 Units, Subcutaneous, Q6H  lansoprazole, 30 mg, Nasogastric, Q AM  metoclopramide, 5 mg, Intravenous, Q6H  rivastigmine, 1 patch, Transdermal, Daily  sodium chloride, 10 mL, Intravenous, Q12H        Infusions      PRN Medications   acetaminophen **OR** acetaminophen    [DISCONTINUED] senna-docusate sodium **AND** polyethylene glycol **AND** bisacodyl **AND** bisacodyl    Calcium Replacement - Follow Nurse / BPA Driven Protocol    dextrose    dextrose    glucagon (human recombinant)    glycerin    hydrALAZINE    Magnesium Cardiology Dose Replacement - Follow Nurse / BPA Driven Protocol    metoprolol tartrate    ondansetron    phenol    Phosphorus Replacement - Follow Nurse / BPA Driven Protocol    Potassium Replacement - Follow Nurse / BPA Driven Protocol    sodium chloride    sodium chloride     Physical Findings        Trending Physical   Appearance, NFPE    --  Edema  Generalized Edema: 2+ (Mild)    Arm, Left Edema: 2+ (Mild) Arm, Right Edema: 2+ (Mild)  Leg, Left Edema: 2+ (Mild) Leg, Right Edema: 2+ (Mild)  Ankle, Left Edema: 2+ (Mild) Ankle, Right Edema: 2+ (Mild)  Foot, Left Edema: 2+ (Mild) Foot, Right Edema: 2+ (Mild)   Bowel Function Stool Output  Stool Unmeasured Occurrence: 1 (05/20/25 0949)  Bowel Incontinence: Yes (05/20/25 0949)  Stool Amount:  Small (05/20/25 0949)  Stool Consistency: soft (05/20/25 0949)  Perineal Care: absorbent brief/pad changed, diaper changed, perineum cleansed (05/21/25 1042)  Last Bowel Movement: 05/19/25   Chewing/Swallowing Teeth Status:Mouth/Teeth WDL: .WDL except, teeth Teeth Symptoms: plaque present, residue present, tooth/teeth missing, dental appliance present (upper and lower dentures)  Chewing/Swallowing Issues: Dysphagia   Skin Lines, Drains, Airways, & Wounds       Active LDAs       Name Placement date Placement time Site Days Last dressing change    Peripheral IV 05/10/25 1222 20 G Anterior;Right External Jugular 05/10/25  1222  External Jugular  11 05/15/25 1600 (141.24 hrs)    NG/OG Tube 14 Fr Right nostril 05/18/25  1043  Right nostril  3 05/18/25 1400 (71.24 hrs)    Wound 05/18/25 1146 Left upper arm Other (Comments) 05/18/25  1146  -- 3                     --  Current Nutrition Orders & Evaluation of Intake       Oral Nutrition     Food Allergies None    Current PO Diet Diet: Regular/House; Texture: Mechanical Ground (NDD 2); Fluid Consistency: Nectar Thick   Supplement Magic Cup BID   PO Evaluation     Trending % PO Intake 5/21: 50% x 2 meals        Enteral Nutrition    Current EN Order Tube Feeding: Formula: Peptamen AF (Vital AF 1.2); Feeding Type: Continuous; Start at: 20 mL/hr; Then Advance By: Do Not Advance; Total Daily Feeding Volume (mL/day): 440; Water Flush: Other; Other: 250; Every: 6 hours; Water Bolus: None    Patient doesn't have any tube feeding modular orders    EN Route    EN Tolerance    EN Observation        Parenteral Nutrition    Current TPN Order    TPN Route    Lipids (mL/%/frequency)    MVI Frequency    Trace Element Frequency    Total # Days on TPN    TPN Observation      Nutrition Diagnosis         Nutrition Dx Problem 1 Underweight r/t dementia/COPD AEB BMI=22.3       Nutrition Dx Problem 2       Goal(s) Maintain PO Intake , Accepts Oral Nutrition Supplement, and Maintain Weight and  Tolerates EN      Intervention         Intervention  Continue to monitor for plan of care and Continue with current interventions       Diet Prescription Regular, mechanical ground, NTL    Supplement Prescription  Magic cup BID          Enteral Prescription  Peptamen AF at 20 mL/hr. Do not advanced        TPN Prescription         Education Provided         Monitor/Evaluation        Monitor Per Protocol, PO Intake, Oral Nutrition Supplement Intake, Pertinent Labs, EN Delivery/Tolerance, Weight, Skin Status, GI Status, Symptoms, POC/GOC, Swallow Function, and Hemodynamic Stability     RD Follow-up Encounter 1-2 days     Electronically signed by:  Mojgan Pascal RD  05/21/25 13:14 EDT

## 2025-05-21 NOTE — PROGRESS NOTES
"    [facility]     PALLIATIVE CARE FOLLOW UP NOTE    Name:  Valarie Camara   Age:  79 y.o.  Sex:  female  :  1945  MRN:  [id number]   Visit Number:  [id number]  Date Of Service:  25  Primary Care Physician:  Lay Cox MD    Chief Complaint: Weakness and debility    Interval History:  Patient seen today during palliative care team rounds.  Record reviewed and case discussed with staff.  Tolerating PO diet, 50% documented at breakfast.  TF continues at 20mL/hr.  No report of nausea/vomiting.  Patient appears comfortable upon assessment.        Review of Systems   Constitutional:  Positive for activity change and fatigue.   Neurological:  Positive for weakness (generalized).          Pain Assessment  Preferred Pain Scale: FACES (Merrill-Baker FACES Pain Rating Scale)  Nonverbal Indicators of Pain: nonverbal indicators absent  CPOT Facial Expression: 0-->relaxed, neutral  CPOT Body Movements: 0-->absence of movements  CPOT Muscle Tension: 0-->relaxed  Ventilator Compliance/Vocalization: 0-->talking in normal tone or no sound  CPOT Score: 0  PAINAD Breathin-->normal  PAINAD Negative Vocalization: 0-->none  PAINAD Facial Expression: 0-->smiling or inexpressive  PAINAD Body Language: 0-->relaxed  PAINAD Consolability: 0-->no need to console  PAINAD Score: 0  Vitals: /69 (BP Location: Left arm, Patient Position: Lying)   Pulse 65   Temp 98 °F (36.7 °C) (Oral)   Resp 16   Ht 160 cm (63\")   Wt 65.2 kg (143 lb 11.8 oz)   SpO2 92%   BMI 25.46 kg/m²       Physical Exam  Vitals and nursing note reviewed.   Constitutional:       General: She is not in acute distress.     Appearance: She is not diaphoretic.      Comments: Frail appearing, elderly female lying in bed, alert and interactive.  NAD   HENT:      Head: Normocephalic and atraumatic.      Nose:      Comments: NG in place     Mouth/Throat:      Mouth: Mucous membranes are moist.   Eyes:      Extraocular " Movements: Extraocular movements intact.      Pupils: Pupils are equal, round, and reactive to light.   Cardiovascular:      Rate and Rhythm: Normal rate and regular rhythm.   Pulmonary:      Effort: Pulmonary effort is normal. No respiratory distress.      Breath sounds: Normal breath sounds.   Musculoskeletal:      Cervical back: Neck supple.      Comments: Appears deconditioned    Skin:     General: Skin is warm and dry.      Capillary Refill: Capillary refill takes less than 2 seconds.   Neurological:      Mental Status: She is alert and oriented to person, place, and time.   Psychiatric:         Mood and Affect: Mood normal.         Behavior: Behavior normal.          Results Reviewed:    Intake/Output Summary (Last 24 hours) at 5/21/2025 1445  Last data filed at 5/21/2025 1300  Gross per 24 hour   Intake 1080 ml   Output 825 ml   Net 255 ml     Results from last 7 days   Lab Units 05/21/25  0645   SODIUM mmol/L 134*   POTASSIUM mmol/L 4.3   CHLORIDE mmol/L 107   CO2 mmol/L 20.1*   BUN mg/dL 25*   CREATININE mg/dL 1.06*   CALCIUM mg/dL 8.0*   GLUCOSE mg/dL 120*     Results from last 7 days   Lab Units 05/18/25  0547   WBC 10*3/mm3 6.44   HEMOGLOBIN g/dL 10.0*   HEMATOCRIT % 31.2*   PLATELETS 10*3/mm3 128*       Medication Review:   I have reviewed the patients active and prn medications.     Palliative Care Assessment:  Severe sepsis secondary to UTI  Metabolic encephalopathy  Atrial fibrillation with RVR  KURT  Dementia  Frailty  Dysphagia    Recommendations/Plan:  - GOC discussions yield desire to maintain plan of care as outlined per the primary medicine service, with goal to optimize condition  - Clarified goal for artificial nutrition, which is to aid in patient's comfort/quality; they desire to allow for comfort feeding in addition to low rate TF  - Understand that if discharging home with hospice services, if NG is dislodged, will not replace  - Difficult to prognosticate given interventions in place,  however it does appear patient is entering into the final stages of life   - Hospice has been notified of home referral, they anticipate delivery of equipment tomorrow  - Consider restarting nystatin swish  - Previously patient taking PPI with history of GERD and hiatal hernia, consider restarting Protonix  - Recommend discharging home with the following medications for acute symptom management:  - hydrocodone 5/325mg Q4hrs PRN pain/dyspnea  - ativan 0.5mg tablet Q6 hrs PRN for agitation/restlessness   - Last BM 5/20  - Palliative care will continue to follow/support patient and family      CODE STATUS:   Code Status and Medical Interventions: No CPR (Do Not Attempt to Resuscitate); Limited Support; No intubation (DNI)   Ordered at: 05/10/25 5472     Code Status (Patient has no pulse and is not breathing):    No CPR (Do Not Attempt to Resuscitate)     Medical Interventions (Patient has pulse or is breathing):    Limited Support     Medical Intervention Limits:    No intubation (DNI)     Level Of Support Discussed With:    Patient         I spent 35 total minutes, including face to face assessment, record review, coordination of care with staff, and counseling patient and/or family  Part of this note may be an electronic transcription/translation of spoken language to printed text using the Dragon Dictation System.    Isaura Lyons PA-C  05/21/25  14:45 EDT

## 2025-05-21 NOTE — THERAPY DISCHARGE NOTE
PT order is being discharged per MD.  Patient is going to be discharged to home with Hospice servcies.

## 2025-05-21 NOTE — PROGRESS NOTES
"    [facility]    Name:  Valarie Camara   Age:  79 y.o.  Sex:  female  :  1945  MRN:  [id number]   Visit Number:  [id number]  Admission Date:  5/10/2025  Date Of Service:  25  Primary Care Physician:  Lay Cox MD     LOS: 10 days :    Chief Complaint:      weakness    Subjective:    Patient seen again today.  No family at bedside but patient is awake and alert and following commands.  She was eating prior to my arrival, no aspiration noted by RN.    Hospital Course:    Per prior provider: \"Patient is an 79-year-old female brought to the emergency department for evaluation of altered mental status.  At the time of examination, unfortunately, patient was alone without family at bedside.  History of present illness was obtained from review of medical records, including discussion with nursing and ED physician.  Patient has a known history of dementia, however over the last 3 days, patient's daughter was concerned that the patient has developed significant confusion.  Patient has been unable to tolerate oral intake of nutrition and oral hydration.      EMS was called to patient's nursing facility, she was found to be hypotensive and tachycardic in A-fib with RVR.  They were unable to obtain IV access, subsequently unable to provide cardioversion en route to the ED.  Upon arrival to the ED, patient was emergently cardioverted, patient's rhythm with normal sinus rhythm, without any change in mentation.  Neurology was consulted, CT of the head did not show any acute CVA.  Cardiology was consulted, who feels that the elevated troponin is most likely secondary to the tachycardia and recommend troponin trending, anticoagulation.  Hospitalist services consulted for admission.     Discussion had with family and they request DNR with no chest compressions and no intubation, they approve all other interventions.     Patient had presented with " "encephalopathy very high sodium.  Continues to be in goals of care discussion with patient and family.  Family meeting pending.  Sodium has been reluctant to recover still running about 160 despite hypotonic fluids nephrology is addressing this.  Otherwise urine is growing ESBL has been switched to Invanz.\"    Review of Systems:     All systems were reviewed and negative except as mentioned in subjective, assessment and plan.    Vital Signs:    Temp:  [98 °F (36.7 °C)-98.1 °F (36.7 °C)] 98 °F (36.7 °C)  Heart Rate:  [60-67] 65  Resp:  [16-18] 16  BP: (125-174)/(65-75) 154/69    Intake and output:    I/O last 3 completed shifts:  In: 2140 [P.O.:300; I.V.:500; Other:900; NG/GT:440]  Out: 825 [Urine:825]  I/O this shift:  In: 120 [P.O.:120]  Out: 300 [Urine:300]    Physical Examination: Examined again 5/19/25    General Appearance:  Alert and cooperative. NAD   Head:  Atraumatic and normocephalic.   Eyes: Conjunctivae and sclerae normal, no icterus. No pallor.   Throat: No oral lesions, no thrush, oral mucosa moist.  NG tube in place   Neck: Supple, trachea midline, no thyromegaly.   Lungs:   Breath sounds heard bilaterally equally.  No wheezing or crackles.    Heart:  Normal S1 and S2, no murmur, No JVD.   Abdomen:   Normal bowel sounds, Soft, nontender, nondistended, no rebound tenderness.   Extremities: Supple, no edema, no cyanosis, no clubbing.   Skin: No bleeding or rash.   Neurologic: Alert and oriented x 2. No facial asymmetry.  Weakness     Laboratory results:    Results from last 7 days   Lab Units 05/21/25  0645 05/20/25  0859 05/19/25  0703   SODIUM mmol/L 134* 142 138   POTASSIUM mmol/L 4.3 3.9 4.3   CHLORIDE mmol/L 107 113* 112*   CO2 mmol/L 20.1* 20.3* 17.9*   BUN mg/dL 25* 24* 30*   CREATININE mg/dL 1.06* 1.18* 1.22*   CALCIUM mg/dL 8.0* 8.0* 8.0*   GLUCOSE mg/dL 120* 112* 136*     Results from last 7 days   Lab Units 05/18/25  0547 05/17/25  0613 05/16/25  0715   WBC 10*3/mm3 6.44 6.12 5.90 "   HEMOGLOBIN g/dL 10.0* 9.7* 9.9*   HEMATOCRIT % 31.2* 30.3* 30.3*   PLATELETS 10*3/mm3 128* 118* 97*                       Recent Labs     03/22/25  0610 05/12/25  0538   PHART 7.449 7.444   [id number] 32.0* 33.5*   PO2ART 78.3* 81.2   [id number] 22.1 23.0   BASEEXCESS -1.2* -0.6*      I have reviewed the patient's laboratory results.    Radiology results:    No radiology results from the last 24 hrs      I have reviewed the patient's radiology reports.    Medication Review:     I have reviewed the patient's active and prn medications.     Problem List:      Hypernatremia    PAF (paroxysmal atrial fibrillation)    Acute renal failure superimposed on stage 3a chronic kidney disease      Assessment:    Severe Sepsis secondary to urinary tract infection  Urinary tract infection  Metabolic encephalopathy  Atrial fibrillation with RVR  Acute kidney injury  Hypernatremia  Dementia  COPD  Hypertension  Hyperlipidemia  Restless leg syndrome    Plan:    Severe Sepsis secondary to urinary tract infection  Urinary tract infection  Metabolic encephalopathy  Patient meets sepsis criteria with tachycardia, leukocytosis, source of infection identified as urinary tract infection.  Evidence of endorgan damage noted with metabolic encephalopathy and acute kidney injury.  IV Rocephin, switched to Invanz 5/12/25 due to ESBL in the urine - completed   MRI no stroke  Grey placed on 5/10/25, will DC today  Atrial fibrillation with RVR  Patient status post cardioversion.  Cardiology consulted, appreciate their recommendations.  Initially on heparin stopped now.  On apixaban.  On Coreg.  Acute kidney injury  Acute hypernatremia  Sodium was normal 1 month prior.  Patient encephalopathic which may be symptoms related to hypernatremia.  Nephrology has been consulted, appreciate his recommendations.  No further monitoring needed.  Nutrition  NGT initially placed 5/14  Patient complained of painful swallowing; possible thursh. Started empiric  fluconazole  She also has a history of strictures; was recently dilated. Low suspicion this is the cause  Continue to attempt tube feeds as tolerated  5/18: NGT pulled out overnight, unsuccessful attempts x2 at re-insertion, Overall advise to discontinue attempts and patient is able to articulate that she does not want it replaced.   5/19: NGT re-inserted and tube feeds restarted  Dementia  COPD  Hypertension  Hyperlipidemia  Restless leg syndrome  Continue home medications as appropriate.    Palliative care consulted.  Plan will be for discharge home with hospice, currently will have tube feeds with comfort feeds allowed.     DVT Prophylaxis: Apixaban  Code Status: DNR/DNI  Diet: NPO; tube feeds  Discharge Plan: Hopeful for discharge tomorrow with hospice    Denise Nice DO  05/21/25  12:34 EDT    Dictated utilizing Dragon dictation.

## 2025-05-22 ENCOUNTER — APPOINTMENT (OUTPATIENT)
Dept: GENERAL RADIOLOGY | Facility: HOSPITAL | Age: 80
DRG: 871 | End: 2025-05-22
Payer: MEDICARE

## 2025-05-22 LAB
ALBUMIN SERPL-MCNC: 2.2 G/DL (ref 3.5–5.2)
ANION GAP SERPL CALCULATED.3IONS-SCNC: 8.2 MMOL/L (ref 5–15)
BUN SERPL-MCNC: 28 MG/DL (ref 8–23)
BUN/CREAT SERPL: 23.5 (ref 7–25)
CALCIUM SPEC-SCNC: 7.8 MG/DL (ref 8.6–10.5)
CHLORIDE SERPL-SCNC: 106 MMOL/L (ref 98–107)
CO2 SERPL-SCNC: 19.8 MMOL/L (ref 22–29)
CREAT SERPL-MCNC: 1.19 MG/DL (ref 0.57–1)
EGFRCR SERPLBLD CKD-EPI 2021: 46.3 ML/MIN/1.73
GLUCOSE BLDC GLUCOMTR-MCNC: 168 MG/DL (ref 70–130)
GLUCOSE BLDC GLUCOMTR-MCNC: 180 MG/DL (ref 70–130)
GLUCOSE BLDC GLUCOMTR-MCNC: 183 MG/DL (ref 70–130)
GLUCOSE BLDC GLUCOMTR-MCNC: 184 MG/DL (ref 70–130)
GLUCOSE SERPL-MCNC: 165 MG/DL (ref 65–99)
PHOSPHATE SERPL-MCNC: 2.3 MG/DL (ref 2.5–4.5)
POTASSIUM SERPL-SCNC: 4 MMOL/L (ref 3.5–5.2)
SODIUM SERPL-SCNC: 134 MMOL/L (ref 136–145)

## 2025-05-22 PROCEDURE — 63710000001 INSULIN REGULAR HUMAN PER 5 UNITS: Performed by: FAMILY MEDICINE

## 2025-05-22 PROCEDURE — 80069 RENAL FUNCTION PANEL: CPT | Performed by: INTERNAL MEDICINE

## 2025-05-22 PROCEDURE — 74018 RADEX ABDOMEN 1 VIEW: CPT

## 2025-05-22 PROCEDURE — 25010000002 METOCLOPRAMIDE PER 10 MG: Performed by: INTERNAL MEDICINE

## 2025-05-22 PROCEDURE — 99231 SBSQ HOSP IP/OBS SF/LOW 25: CPT | Performed by: FAMILY MEDICINE

## 2025-05-22 PROCEDURE — 82948 REAGENT STRIP/BLOOD GLUCOSE: CPT

## 2025-05-22 RX ORDER — NIFEDIPINE 30 MG/1
60 TABLET, EXTENDED RELEASE ORAL
Status: DISCONTINUED | OUTPATIENT
Start: 2025-05-22 | End: 2025-05-23 | Stop reason: HOSPADM

## 2025-05-22 RX ORDER — RIVASTIGMINE 4.6 MG/24H
PATCH, EXTENDED RELEASE TRANSDERMAL
Qty: 30 PATCH | Refills: 0 | OUTPATIENT
Start: 2025-05-22

## 2025-05-22 RX ADMIN — RIVASTIGMINE 1 PATCH: 4.6 PATCH TRANSDERMAL at 09:18

## 2025-05-22 RX ADMIN — METOCLOPRAMIDE HYDROCHLORIDE 5 MG: 5 INJECTION, SOLUTION INTRAMUSCULAR; INTRAVENOUS at 05:31

## 2025-05-22 RX ADMIN — Medication 10 ML: at 09:16

## 2025-05-22 RX ADMIN — HYDRALAZINE HYDROCHLORIDE 25 MG: 25 TABLET ORAL at 14:48

## 2025-05-22 RX ADMIN — APIXABAN 2.5 MG: 2.5 TABLET, FILM COATED ORAL at 09:17

## 2025-05-22 RX ADMIN — SENNOSIDES 10 ML: 8.8 LIQUID ORAL at 09:17

## 2025-05-22 RX ADMIN — ATORVASTATIN CALCIUM 80 MG: 80 TABLET, FILM COATED ORAL at 09:17

## 2025-05-22 RX ADMIN — METOCLOPRAMIDE HYDROCHLORIDE 5 MG: 5 INJECTION, SOLUTION INTRAMUSCULAR; INTRAVENOUS at 09:17

## 2025-05-22 RX ADMIN — DOCUSATE SODIUM 100 MG: 50 LIQUID ORAL at 09:17

## 2025-05-22 RX ADMIN — INSULIN HUMAN 2 UNITS: 100 INJECTION, SOLUTION PARENTERAL at 05:37

## 2025-05-22 RX ADMIN — CARVEDILOL 12.5 MG: 12.5 TABLET, FILM COATED ORAL at 09:17

## 2025-05-22 RX ADMIN — CARVEDILOL 12.5 MG: 12.5 TABLET, FILM COATED ORAL at 18:14

## 2025-05-22 RX ADMIN — LANSOPRAZOLE 30 MG: 30 TABLET, ORALLY DISINTEGRATING ORAL at 05:31

## 2025-05-22 RX ADMIN — INSULIN HUMAN 2 UNITS: 100 INJECTION, SOLUTION PARENTERAL at 18:14

## 2025-05-22 RX ADMIN — ASPIRIN 81 MG CHEWABLE TABLET 81 MG: 81 TABLET CHEWABLE at 09:17

## 2025-05-22 RX ADMIN — APIXABAN 2.5 MG: 2.5 TABLET, FILM COATED ORAL at 20:37

## 2025-05-22 RX ADMIN — Medication 10 ML: at 20:38

## 2025-05-22 RX ADMIN — HYDRALAZINE HYDROCHLORIDE 25 MG: 25 TABLET ORAL at 05:31

## 2025-05-22 RX ADMIN — NIFEDIPINE 60 MG: 30 TABLET, FILM COATED, EXTENDED RELEASE ORAL at 14:48

## 2025-05-22 RX ADMIN — INSULIN HUMAN 2 UNITS: 100 INJECTION, SOLUTION PARENTERAL at 11:42

## 2025-05-22 NOTE — PLAN OF CARE
Problem: Adult Inpatient Plan of Care  Goal: Plan of Care Review  5/22/2025 1824 by Lakia Acevedo RN  Outcome: Not Progressing  Flowsheets (Taken 5/22/2025 1824)  Progress: no change  Plan of Care Reviewed With: patient  5/22/2025 1823 by Lakia Acevedo RN  Outcome: Progressing  Flowsheets (Taken 5/22/2025 1823)  Progress: improving  Plan of Care Reviewed With: patient  Goal: Patient-Specific Goal (Individualized)  5/22/2025 1824 by Lakia Acevedo RN  Outcome: Not Progressing  5/22/2025 1823 by Lakia Acevedo RN  Outcome: Progressing  Goal: Absence of Hospital-Acquired Illness or Injury  5/22/2025 1824 by Lakia Acevedo RN  Outcome: Not Progressing  5/22/2025 1823 by Lakia Acevedo RN  Outcome: Progressing  Intervention: Identify and Manage Fall Risk  Recent Flowsheet Documentation  Taken 5/22/2025 1600 by Lakia Acevedo RN  Safety Promotion/Fall Prevention:   safety round/check completed   room organization consistent  Taken 5/22/2025 1400 by Lakia Acevedo RN  Safety Promotion/Fall Prevention:   safety round/check completed   room organization consistent  Taken 5/22/2025 1200 by Lakia Acevedo RN  Safety Promotion/Fall Prevention:   safety round/check completed   room organization consistent  Taken 5/22/2025 1000 by Lakia Acevedo RN  Safety Promotion/Fall Prevention:   safety round/check completed   room organization consistent  Taken 5/22/2025 0800 by Lakia Acevedo RN  Safety Promotion/Fall Prevention:   safety round/check completed   room organization consistent  Intervention: Prevent Skin Injury  Recent Flowsheet Documentation  Taken 5/22/2025 1600 by Lakia Acevedo RN  Body Position:   tilted   right  Taken 5/22/2025 1400 by Lakia Acevedo RN  Body Position: sitting up in bed  Taken 5/22/2025 1200 by Lakia Acevedo, RN  Body Position: supine  Taken 5/22/2025 1000 by Lakia Acevedo, RN  Body Position:   turned    right  Taken 5/22/2025 0800 by Lakia Acevedo RN  Body Position: sitting up in bed  Intervention: Prevent Infection  Recent Flowsheet Documentation  Taken 5/22/2025 1600 by Lakia Acevedo RN  Infection Prevention:   single patient room provided   rest/sleep promoted  Taken 5/22/2025 1400 by Lakia Acevedo RN  Infection Prevention:   single patient room provided   rest/sleep promoted  Taken 5/22/2025 1200 by Lakia Acevedo RN  Infection Prevention:   single patient room provided   rest/sleep promoted  Taken 5/22/2025 1000 by Lakia Acevedo RN  Infection Prevention:   single patient room provided   rest/sleep promoted  Taken 5/22/2025 0800 by Lakia Acevedo RN  Infection Prevention:   single patient room provided   rest/sleep promoted  Goal: Optimal Comfort and Wellbeing  5/22/2025 1824 by Lakia Acevedo RN  Outcome: Not Progressing  5/22/2025 1823 by Lakia Acevedo RN  Outcome: Progressing  Intervention: Provide Person-Centered Care  Recent Flowsheet Documentation  Taken 5/22/2025 0800 by Lakia Acevedo RN  Trust Relationship/Rapport:   emotional support provided   choices provided   care explained  Goal: Readiness for Transition of Care  5/22/2025 1824 by Lakia Acevedo RN  Outcome: Not Progressing  5/22/2025 1823 by Lakia Acevedo RN  Outcome: Progressing     Problem: Fall Injury Risk  Goal: Absence of Fall and Fall-Related Injury  5/22/2025 1824 by Lakia Acevedo RN  Outcome: Not Progressing  5/22/2025 1823 by Lakia Acevedo RN  Outcome: Progressing  Intervention: Identify and Manage Contributors  Recent Flowsheet Documentation  Taken 5/22/2025 1200 by Lakia Acevedo RN  Medication Review/Management: medications reviewed  Taken 5/22/2025 0800 by Lakia Acevedo RN  Medication Review/Management: medications reviewed  Intervention: Promote Injury-Free Environment  Recent Flowsheet Documentation  Taken 5/22/2025 1600 by Kristina  Lakia Victor RN  Safety Promotion/Fall Prevention:   safety round/check completed   room organization consistent  Taken 5/22/2025 1400 by Lakia Acevedo RN  Safety Promotion/Fall Prevention:   safety round/check completed   room organization consistent  Taken 5/22/2025 1200 by aLkia Acevedo RN  Safety Promotion/Fall Prevention:   safety round/check completed   room organization consistent  Taken 5/22/2025 1000 by Lakia Acevedo RN  Safety Promotion/Fall Prevention:   safety round/check completed   room organization consistent  Taken 5/22/2025 0800 by Lakia Acevedo RN  Safety Promotion/Fall Prevention:   safety round/check completed   room organization consistent     Problem: Skin Injury Risk Increased  Goal: Skin Health and Integrity  5/22/2025 1824 by Lakia Acevedo RN  Outcome: Not Progressing  5/22/2025 1823 by Lakia Acevedo RN  Outcome: Progressing  Intervention: Optimize Skin Protection  Recent Flowsheet Documentation  Taken 5/22/2025 1600 by Lakia Acevedo RN  Activity Management: activity encouraged  Head of Bed (HOB) Positioning: HOB elevated  Taken 5/22/2025 1400 by Lakia Acevedo RN  Activity Management: activity encouraged  Head of Bed (HOB) Positioning: HOB elevated  Taken 5/22/2025 1200 by Lakia Acevedo RN  Activity Management: activity encouraged  Head of Bed (HOB) Positioning: HOB elevated  Taken 5/22/2025 1000 by Lakia Acevedo, RN  Activity Management: activity encouraged  Head of Bed (HOB) Positioning: HOB elevated  Taken 5/22/2025 0800 by Lakia Acevedo, RN  Activity Management: activity minimized  Head of Bed (HOB) Positioning: HOB elevated     Problem: Sepsis/Septic Shock  Goal: Optimal Coping  5/22/2025 1824 by Lakia Acevedo RN  Outcome: Not Progressing  5/22/2025 1823 by Lakia Acevedo RN  Outcome: Progressing  Intervention: Support Patient and Family Response  Recent Flowsheet Documentation  Taken 5/22/2025 0800  by Lakia Acevedo, RN  Family/Support System Care: support provided  Goal: Absence of Bleeding  5/22/2025 1824 by Lakia Acevedo RN  Outcome: Not Progressing  5/22/2025 1823 by Lakia Acevedo RN  Outcome: Progressing  Goal: Blood Glucose Level Within Target Range  5/22/2025 1824 by Lakia Acevedo RN  Outcome: Not Progressing  5/22/2025 1823 by Lakia Acevedo RN  Outcome: Progressing  Goal: Absence of Infection Signs and Symptoms  5/22/2025 1824 by Lakia Acevedo RN  Outcome: Not Progressing  5/22/2025 1823 by Lakia Acevedo RN  Outcome: Progressing  Intervention: Initiate Sepsis Management  Recent Flowsheet Documentation  Taken 5/22/2025 1600 by Lakia Acevedo RN  Infection Prevention:   single patient room provided   rest/sleep promoted  Isolation Precautions: precautions maintained  Taken 5/22/2025 1400 by Lakia Acevedo RN  Infection Prevention:   single patient room provided   rest/sleep promoted  Isolation Precautions: precautions maintained  Taken 5/22/2025 1200 by Lakia Acevedo RN  Infection Prevention:   single patient room provided   rest/sleep promoted  Isolation Precautions: precautions maintained  Taken 5/22/2025 1000 by Lakia Acevedo RN  Infection Prevention:   single patient room provided   rest/sleep promoted  Isolation Precautions:   precautions maintained   contact  Taken 5/22/2025 0800 by Lakia Acevedo RN  Infection Prevention:   single patient room provided   rest/sleep promoted  Isolation Precautions: precautions maintained  Intervention: Promote Recovery  Recent Flowsheet Documentation  Taken 5/22/2025 1600 by Lakia Acevedo RN  Activity Management: activity encouraged  Taken 5/22/2025 1400 by Lakia Acevedo RN  Activity Management: activity encouraged  Taken 5/22/2025 1200 by Lakia Acevedo RN  Activity Management: activity encouraged  Taken 5/22/2025 1000 by Lakia Acevedo RN  Activity Management:  activity encouraged  Taken 5/22/2025 0800 by Lakia Acevedo RN  Activity Management: activity minimized  Goal: Optimal Nutrition Delivery  5/22/2025 1824 by Lakia Acevedo RN  Outcome: Not Progressing  5/22/2025 1823 by Lakia Acevedo RN  Outcome: Progressing     Problem: Fluid Volume Deficit  Goal: Fluid Balance  5/22/2025 1824 by Lakia Acevedo RN  Outcome: Not Progressing  5/22/2025 1823 by Lakia Acevedo RN  Outcome: Progressing     Problem: UTI (Urinary Tract Infection)  Goal: Improved Infection Symptoms  5/22/2025 1824 by Lakia Acevedo RN  Outcome: Not Progressing  5/22/2025 1823 by Lakia Acevedo RN  Outcome: Progressing     Problem: Electrolyte Imbalance  Goal: Electrolyte Balance  5/22/2025 1824 by Lakia Acevedo RN  Outcome: Not Progressing  5/22/2025 1823 by Lakia Acevedo RN  Outcome: Progressing     Problem: Mobility Impairment  Goal: Optimal Mobility  5/22/2025 1824 by Lakia Acevedo RN  Outcome: Not Progressing  5/22/2025 1823 by Lakia Acevedo RN  Outcome: Progressing  Intervention: Optimize Mobility  Recent Flowsheet Documentation  Taken 5/22/2025 1600 by Lakia Acevedo, RN  Activity Management: activity encouraged  Positioning/Transfer Devices:   pillows   in use  Taken 5/22/2025 1400 by Lakia Acevedo RN  Activity Management: activity encouraged  Positioning/Transfer Devices:   pillows   in use  Taken 5/22/2025 1200 by Lakia Acevedo, RN  Activity Management: activity encouraged  Positioning/Transfer Devices:   pillows   in use  Taken 5/22/2025 1000 by Lakia Acevedo RN  Activity Management: activity encouraged  Positioning/Transfer Devices:   pillows   in use  Taken 5/22/2025 0800 by Lakia Acevedo, RN  Activity Management: activity minimized  Positioning/Transfer Devices:   pillows   in use     Problem: Comorbidity Management  Goal: Blood Glucose Level Within Target Range  5/22/2025 1824 by Kristina  Lakia Victor RN  Outcome: Not Progressing  5/22/2025 1823 by Lakia Acevedo RN  Outcome: Progressing  Intervention: Monitor and Manage Glycemia  Recent Flowsheet Documentation  Taken 5/22/2025 1200 by Lakia Acevedo RN  Medication Review/Management: medications reviewed  Taken 5/22/2025 0800 by Lakia Acevedo RN  Medication Review/Management: medications reviewed  Goal: Blood Pressure in Desired Range  5/22/2025 1824 by Lakia Acevedo RN  Outcome: Not Progressing  5/22/2025 1823 by Lakia Acevedo RN  Outcome: Progressing  Intervention: Maintain Blood Pressure Management  Recent Flowsheet Documentation  Taken 5/22/2025 1200 by Lakia Acevedo RN  Medication Review/Management: medications reviewed  Taken 5/22/2025 0800 by Lakia Acevedo RN  Medication Review/Management: medications reviewed   Goal Outcome Evaluation:  Plan of Care Reviewed With: patient        Progress: no change

## 2025-05-22 NOTE — CASE MANAGEMENT/SOCIAL WORK
Case Management/Social Work    Patient Name:  Valarie Camara  YOB: 1945  MRN: [id number]  Admit Date:  5/10/2025        08:35 EDT   Discharge Plan       Row Name 05/22/25 0835       Plan    Plan PC managing patient, likely dc home with hospice.                        Electronically signed by:  Aquilino Causey RN  05/22/25 08:35 EDT

## 2025-05-22 NOTE — NURSING NOTE
This RN completed bedside teaching about tube feeding, usage of the kangaroo pump as well as associated safety precautions as appropriate with daughter Cleo.

## 2025-05-22 NOTE — PLAN OF CARE
"Goal Outcome Evaluation:      1120  Palliative Care RN visited pt this am  Pt was sleeping but awakened when I walked to the bed.   When asked pt reported eating a \"little\" breakfast and adding it is different with the \"tube down my throat\".  Encouraged her to eat small bites of food and to take her time eating.  TF is still in progress at 20ml/hr.    No family present at time of visit.  She reported her daughter had been there earlier.   Informed her of possible DC to home today and I would be getting in touch with her daughter.      1240  Dr Nice sent \"secure chat\" asking if pt's equipment had been delivered and if so anticipated DC today.  I contacted HCP and was informed the equipment had been delivered.  I contacted pt's daughter, Cleo, to inform her of anticipated DC today and her need to come in for instructions for tube feeding.  Cleo related being able to be here at 3:30pm.    I also Informed her pt will need to transport home via EMS and will need her to sign an EMS/DNR form prior to discharge.      Dr Nice and pt's primary nurse, Lakia Victor RN, updated.    1300  Spoke with Swati RN/HCP who reported equipment had been delivered and daughter was wanting pt to DC tomorrow.  I updated Dr Nice and he was agreeable to pt discharging in am tomorrow.  He reported he would order Comfort Meds for Discharge.    1330  Updated Lakia Victor RN, Swati RN/HCP, Aquilino BERMUDEZ/CM and pt's daughter, Cleo that pt would not be discharging unitl tomorrow am.    I did request Cleo still come in to receive instructions on TF.  She had already been informed that should the FT come out, Hospice will not replace it.     PC will continue to follow                                         "

## 2025-05-22 NOTE — PLAN OF CARE
Problem: Adult Inpatient Plan of Care  Goal: Plan of Care Review  Outcome: Progressing  Flowsheets (Taken 5/22/2025 1823)  Progress: improving  Plan of Care Reviewed With: patient  Goal: Patient-Specific Goal (Individualized)  Outcome: Progressing  Goal: Absence of Hospital-Acquired Illness or Injury  Outcome: Progressing  Intervention: Identify and Manage Fall Risk  Recent Flowsheet Documentation  Taken 5/22/2025 1600 by Lakia Acevedo RN  Safety Promotion/Fall Prevention:   safety round/check completed   room organization consistent  Taken 5/22/2025 1400 by Lakia Acevedo RN  Safety Promotion/Fall Prevention:   safety round/check completed   room organization consistent  Taken 5/22/2025 1200 by Lakia Acevedo RN  Safety Promotion/Fall Prevention:   safety round/check completed   room organization consistent  Taken 5/22/2025 1000 by Lakia Acevedo RN  Safety Promotion/Fall Prevention:   safety round/check completed   room organization consistent  Taken 5/22/2025 0800 by Lakia Acevedo RN  Safety Promotion/Fall Prevention:   safety round/check completed   room organization consistent  Intervention: Prevent Skin Injury  Recent Flowsheet Documentation  Taken 5/22/2025 1600 by Lakia cAevedo RN  Body Position:   tilted   right  Taken 5/22/2025 1400 by Lakia Acevedo RN  Body Position: sitting up in bed  Taken 5/22/2025 1200 by Lakia Acevedo RN  Body Position: supine  Taken 5/22/2025 1000 by Lakia Acevedo RN  Body Position:   turned   right  Taken 5/22/2025 0800 by Lakia Acevedo RN  Body Position: sitting up in bed  Intervention: Prevent Infection  Recent Flowsheet Documentation  Taken 5/22/2025 1600 by Lakia Acevedo RN  Infection Prevention:   single patient room provided   rest/sleep promoted  Taken 5/22/2025 1400 by Lakia Acevedo RN  Infection Prevention:   single patient room provided   rest/sleep promoted  Taken 5/22/2025 1200 by  Lakia Acevedo RN  Infection Prevention:   single patient room provided   rest/sleep promoted  Taken 5/22/2025 1000 by Lakia Acevedo RN  Infection Prevention:   single patient room provided   rest/sleep promoted  Taken 5/22/2025 0800 by Lakia Acevedo RN  Infection Prevention:   single patient room provided   rest/sleep promoted  Goal: Optimal Comfort and Wellbeing  Outcome: Progressing  Intervention: Provide Person-Centered Care  Recent Flowsheet Documentation  Taken 5/22/2025 0800 by Lakia Acevedo RN  Trust Relationship/Rapport:   emotional support provided   choices provided   care explained  Goal: Readiness for Transition of Care  Outcome: Progressing     Problem: Fall Injury Risk  Goal: Absence of Fall and Fall-Related Injury  Outcome: Progressing  Intervention: Identify and Manage Contributors  Recent Flowsheet Documentation  Taken 5/22/2025 1200 by Lakia Acevedo RN  Medication Review/Management: medications reviewed  Taken 5/22/2025 0800 by Lakia Acevedo RN  Medication Review/Management: medications reviewed  Intervention: Promote Injury-Free Environment  Recent Flowsheet Documentation  Taken 5/22/2025 1600 by Lakia Acevedo RN  Safety Promotion/Fall Prevention:   safety round/check completed   room organization consistent  Taken 5/22/2025 1400 by Lakia Acevedo RN  Safety Promotion/Fall Prevention:   safety round/check completed   room organization consistent  Taken 5/22/2025 1200 by Lakia Acevedo RN  Safety Promotion/Fall Prevention:   safety round/check completed   room organization consistent  Taken 5/22/2025 1000 by Lakia Acevedo RN  Safety Promotion/Fall Prevention:   safety round/check completed   room organization consistent  Taken 5/22/2025 0800 by Lakia Acevedo RN  Safety Promotion/Fall Prevention:   safety round/check completed   room organization consistent     Problem: Skin Injury Risk Increased  Goal: Skin Health and  Integrity  Outcome: Progressing  Intervention: Optimize Skin Protection  Recent Flowsheet Documentation  Taken 5/22/2025 1600 by Lakia Acevedo RN  Activity Management: activity encouraged  Head of Bed (HOB) Positioning: HOB elevated  Taken 5/22/2025 1400 by Lakia Acevedo RN  Activity Management: activity encouraged  Head of Bed (HOB) Positioning: HOB elevated  Taken 5/22/2025 1200 by Lakia Acevedo RN  Activity Management: activity encouraged  Head of Bed (HOB) Positioning: HOB elevated  Taken 5/22/2025 1000 by Lakia Acevedo RN  Activity Management: activity encouraged  Head of Bed (HOB) Positioning: HOB elevated  Taken 5/22/2025 0800 by Lakia Acevedo RN  Activity Management: activity minimized  Head of Bed (HOB) Positioning: HOB elevated     Problem: Sepsis/Septic Shock  Goal: Optimal Coping  Outcome: Progressing  Intervention: Support Patient and Family Response  Recent Flowsheet Documentation  Taken 5/22/2025 0800 by Lakia Acevedo RN  Family/Support System Care: support provided  Goal: Absence of Bleeding  Outcome: Progressing  Goal: Blood Glucose Level Within Target Range  Outcome: Progressing  Goal: Absence of Infection Signs and Symptoms  Outcome: Progressing  Intervention: Initiate Sepsis Management  Recent Flowsheet Documentation  Taken 5/22/2025 1600 by Lakia Acevedo RN  Infection Prevention:   single patient room provided   rest/sleep promoted  Isolation Precautions: precautions maintained  Taken 5/22/2025 1400 by Lakia Acevedo RN  Infection Prevention:   single patient room provided   rest/sleep promoted  Isolation Precautions: precautions maintained  Taken 5/22/2025 1200 by Lakia Acevedo RN  Infection Prevention:   single patient room provided   rest/sleep promoted  Isolation Precautions: precautions maintained  Taken 5/22/2025 1000 by Lakia Acevedo RN  Infection Prevention:   single patient room provided   rest/sleep  promoted  Isolation Precautions:   precautions maintained   contact  Taken 5/22/2025 0800 by Lakia Acevedo, RN  Infection Prevention:   single patient room provided   rest/sleep promoted  Isolation Precautions: precautions maintained  Intervention: Promote Recovery  Recent Flowsheet Documentation  Taken 5/22/2025 1600 by Lakia Acevedo RN  Activity Management: activity encouraged  Taken 5/22/2025 1400 by Lakia Acevedo RN  Activity Management: activity encouraged  Taken 5/22/2025 1200 by Lakia Acevedo RN  Activity Management: activity encouraged  Taken 5/22/2025 1000 by Lakia Acevedo RN  Activity Management: activity encouraged  Taken 5/22/2025 0800 by Lakia Acevedo RN  Activity Management: activity minimized  Goal: Optimal Nutrition Delivery  Outcome: Progressing     Problem: Fluid Volume Deficit  Goal: Fluid Balance  Outcome: Progressing     Problem: UTI (Urinary Tract Infection)  Goal: Improved Infection Symptoms  Outcome: Progressing     Problem: Electrolyte Imbalance  Goal: Electrolyte Balance  Outcome: Progressing     Problem: Mobility Impairment  Goal: Optimal Mobility  Outcome: Progressing  Intervention: Optimize Mobility  Recent Flowsheet Documentation  Taken 5/22/2025 1600 by Lakia Acevedo RN  Activity Management: activity encouraged  Positioning/Transfer Devices:   pillows   in use  Taken 5/22/2025 1400 by Lakia Acevedo RN  Activity Management: activity encouraged  Positioning/Transfer Devices:   pillows   in use  Taken 5/22/2025 1200 by Lakia Acevedo RN  Activity Management: activity encouraged  Positioning/Transfer Devices:   pillows   in use  Taken 5/22/2025 1000 by Lakia Acevedo, RN  Activity Management: activity encouraged  Positioning/Transfer Devices:   pillows   in use  Taken 5/22/2025 0800 by Lakia Acevedo, RN  Activity Management: activity minimized  Positioning/Transfer Devices:   pillows   in use     Problem: Comorbidity  Management  Goal: Blood Glucose Level Within Target Range  Outcome: Progressing  Intervention: Monitor and Manage Glycemia  Recent Flowsheet Documentation  Taken 5/22/2025 1200 by Lakia Acevedo RN  Medication Review/Management: medications reviewed  Taken 5/22/2025 0800 by Lakia Acevedo RN  Medication Review/Management: medications reviewed  Goal: Blood Pressure in Desired Range  Outcome: Progressing  Intervention: Maintain Blood Pressure Management  Recent Flowsheet Documentation  Taken 5/22/2025 1200 by Lakia Acevedo RN  Medication Review/Management: medications reviewed  Taken 5/22/2025 0800 by Lakia Acevedo RN  Medication Review/Management: medications reviewed   Goal Outcome Evaluation:  Plan of Care Reviewed With: patient        Progress: improving

## 2025-05-22 NOTE — PLAN OF CARE
Goal Outcome Evaluation:  Plan of Care Reviewed With: patient        Progress: improving  Outcome Evaluation: AVSS, incontinence care and check and turn done q2 hours and PRN, call ight within reach, nectar thick liquids offered when awake

## 2025-05-22 NOTE — PROGRESS NOTES
"    [facility]    Name:  Valarie Camara   Age:  79 y.o.  Sex:  female  :  1945  MRN:  [id number]   Visit Number:  [id number]  Admission Date:  5/10/2025  Date Of Service:  25  Primary Care Physician:  Lay Cox MD     LOS: 11 days :    Chief Complaint:      weakness    Subjective:    Patient remains in good spirits this morning.  Discussed we will have her daughter meet with nurse to go over tube feeds this afternoon.  Per palliative, hospice has delivered equipment and feeds to the house now.  Discussed likely home in the morning if she does well overnight.    Hospital Course:    Per prior provider: \"Patient is an 79-year-old female brought to the emergency department for evaluation of altered mental status.  At the time of examination, unfortunately, patient was alone without family at bedside.  History of present illness was obtained from review of medical records, including discussion with nursing and ED physician.  Patient has a known history of dementia, however over the last 3 days, patient's daughter was concerned that the patient has developed significant confusion.  Patient has been unable to tolerate oral intake of nutrition and oral hydration.      EMS was called to patient's nursing facility, she was found to be hypotensive and tachycardic in A-fib with RVR.  They were unable to obtain IV access, subsequently unable to provide cardioversion en route to the ED.  Upon arrival to the ED, patient was emergently cardioverted, patient's rhythm with normal sinus rhythm, without any change in mentation.  Neurology was consulted, CT of the head did not show any acute CVA.  Cardiology was consulted, who feels that the elevated troponin is most likely secondary to the tachycardia and recommend troponin trending, anticoagulation.  Hospitalist services consulted for admission.     Discussion had with family and they request DNR with no " "chest compressions and no intubation, they approve all other interventions.     Patient had presented with encephalopathy very high sodium.  Continues to be in goals of care discussion with patient and family.  Family meeting pending.  Sodium has been reluctant to recover still running about 160 despite hypotonic fluids nephrology is addressing this.  Otherwise urine is growing ESBL has been switched to Invanz.\"    Review of Systems:     All systems were reviewed and negative except as mentioned in subjective, assessment and plan.    Vital Signs:    Temp:  [97.4 °F (36.3 °C)-99 °F (37.2 °C)] 98.5 °F (36.9 °C)  Heart Rate:  [] 115  Resp:  [16-18] 18  BP: (130-205)/(53-89) 178/77    Intake and output:    I/O last 3 completed shifts:  In: 1320 [P.O.:520; Other:360; NG/GT:440]  Out: 600 [Urine:600]  I/O this shift:  In: 300 [P.O.:240; Other:60]  Out: -     Physical Examination: Examined again 5/19/25    General Appearance:  Alert and cooperative. NAD   Head:  Atraumatic and normocephalic.   Eyes: Conjunctivae and sclerae normal, no icterus. No pallor.   Throat: No oral lesions, no thrush, oral mucosa moist.  NG tube in place   Neck: Supple, trachea midline, no thyromegaly.   Lungs:   Breath sounds heard bilaterally equally.  No wheezing or crackles.    Heart:  Normal S1 and S2, no murmur, No JVD.   Abdomen:   Normal bowel sounds, Soft, nontender, nondistended, no rebound tenderness.   Extremities: Supple, no edema, no cyanosis, no clubbing.   Skin: No bleeding or rash.   Neurologic: Alert and oriented x 2. No facial asymmetry.  Weakness unchanged     Laboratory results:    Results from last 7 days   Lab Units 05/22/25  0750 05/21/25  0645 05/20/25  0859   SODIUM mmol/L 134* 134* 142   POTASSIUM mmol/L 4.0 4.3 3.9   CHLORIDE mmol/L 106 107 113*   CO2 mmol/L 19.8* 20.1* 20.3*   BUN mg/dL 28* 25* 24*   CREATININE mg/dL 1.19* 1.06* 1.18*   CALCIUM mg/dL 7.8* 8.0* 8.0*   GLUCOSE mg/dL 165* 120* 112*     Results from " last 7 days   Lab Units 05/18/25  0547 05/17/25  0613 05/16/25  0715   WBC 10*3/mm3 6.44 6.12 5.90   HEMOGLOBIN g/dL 10.0* 9.7* 9.9*   HEMATOCRIT % 31.2* 30.3* 30.3*   PLATELETS 10*3/mm3 128* 118* 97*                       Recent Labs     03/22/25  0610 05/12/25  0538   PHART 7.449 7.444   QSS7NEA 32.0* 33.5*   PO2ART 78.3* 81.2   SAW0DFK 22.1 23.0   BASEEXCESS -1.2* -0.6*      I have reviewed the patient's laboratory results.    Radiology results:    No radiology results from the last 24 hrs      I have reviewed the patient's radiology reports.    Medication Review:     I have reviewed the patient's active and prn medications.     Problem List:      Hypernatremia    PAF (paroxysmal atrial fibrillation)    Acute renal failure superimposed on stage 3a chronic kidney disease      Assessment:    Severe Sepsis secondary to urinary tract infection  Urinary tract infection  Metabolic encephalopathy  Atrial fibrillation with RVR  Acute kidney injury  Hypernatremia  Dementia  COPD  Hypertension  Hyperlipidemia  Restless leg syndrome    Plan:    Severe Sepsis secondary to urinary tract infection  Urinary tract infection  Metabolic encephalopathy  Patient meets sepsis criteria with tachycardia, leukocytosis, source of infection identified as urinary tract infection.  Evidence of endorgan damage noted with metabolic encephalopathy and acute kidney injury.  IV Rocephin, switched to Invanz 5/12/25 due to ESBL in the urine - completed   MRI no stroke  Grey placed on 5/10/25, will DC today  Atrial fibrillation with RVR  Patient status post cardioversion.  Cardiology consulted, appreciate their recommendations.  Initially on heparin stopped now.  On apixaban.  On Coreg.  Acute kidney injury  Acute hypernatremia  Sodium was normal 1 month prior.  Patient encephalopathic which may be symptoms related to hypernatremia.  Nephrology has been consulted, appreciate his recommendations.  No further monitoring needed.  Nutrition  NGT  initially placed 5/14  Patient complained of painful swallowing; possible thursh. Started empiric fluconazole  She also has a history of strictures; was recently dilated. Low suspicion this is the cause  Continue to attempt tube feeds as tolerated  5/18: NGT pulled out overnight, unsuccessful attempts x2 at re-insertion, Overall advise to discontinue attempts and patient is able to articulate that she does not want it replaced.   5/19: NGT re-inserted and tube feeds restarted  Dementia  COPD  Hypertension  Hyperlipidemia  Restless leg syndrome  Continue home medications as appropriate.    Patient will be going home with hospice in the morning.  Will have NG tube upon discharge.     DVT Prophylaxis: Apixaban  Code Status: DNR/DNI  Diet: NPO; tube feeds  Discharge Plan: Home in morning    Denise Nice DO  05/22/25  13:58 EDT    Dictated utilizing Dragon dictation.

## 2025-05-22 NOTE — PAYOR COMM NOTE
"To:  GRICELDA  From: Adela Bowden RN  Phone: [phone]  Fax: [phone]  NPI: [id number]  TIN: [id number]  Member ID: [id number]   MRN: [id number]    Caitlyn Barajas (79 y.o. Female)       Date of Birth   1945    Social Security Number       Address   [address] [place] [address]    Home Phone   [phone]    MRN   [id number]       Restorationist   [facility]    Marital Status                               Admission Date   5/10/2025    Admission Type   Emergency    Admitting Provider   Edgardo Burnette MD    Attending Provider   Denise Nice DO    Department, Room/Bed   [facility] TELEMETRY 3, 328/1       Discharge Date       Discharge Disposition       Discharge Destination                                 Attending Provider: Denise Nice DO    Allergies: Penicillins, Clonidine Derivatives, Hydralazine, Sulfa Antibiotics    Isolation: Contact   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), ESBL (05/12/25)   Code Status: No CPR    Ht: 160 cm (63\")   Wt: 65.2 kg (143 lb 11.8 oz)    Admission Cmt: None   Principal Problem: Hypernatremia [E87.0]                   Active Insurance as of 5/10/2025       Primary Coverage       Payor Plan Insurance Group Employer/Plan Group    ANTH MEDICARE REPLACEMENT ANTH MEDICARE ADVANTAGE HMO [id number]       Payor Plan Address Payor Plan Phone Number Payor Plan Fax Number Effective Dates    [address] [phone]  4/1/2025 - None Entered    [facility] [id number]         Subscriber Name Subscriber Birth Date Member ID       CAITLYN BARAJAS 1945 [id number]                     Emergency Contacts        (Rel.) Home Phone Work Phone Mobile Phone    Cleo Barajas (Daughter) [phone] -- [phone]    ELROY BARAJAS (Daughter) [phone] -- --              Vital Signs (last day)       Date/Time Temp Temp src Pulse Resp BP Patient Position SpO2    05/22/25 0716 98.5 (36.9) Oral 115 18 178/77 Lying --    " 05/22/25 0400 -- -- -- -- 168/84 Lying --    05/22/25 0338 99 (37.2) Oral 75 18 205/89 Lying 100    05/21/25 1936 98.4 (36.9) Oral 67 16 131/53 Lying 100    05/21/25 1536 97.4 (36.3) Oral 70 18 130/55 Lying --    05/21/25 0716 98 (36.7) Oral 65 16 154/69 Lying 92    05/21/25 0404 98 (36.7) Oral 62 16 174/75 Lying 98          Current Facility-Administered Medications   Medication Dose Route Frequency Provider Last Rate Last Admin    acetaminophen (TYLENOL) tablet 650 mg  650 mg Nasogastric Q4H PRN Ronny Lara DO   650 mg at 05/16/25 0901    Or    acetaminophen (TYLENOL) suppository 650 mg  650 mg Rectal Q4H PRN Ronny Lara DO        apixaban (ELIQUIS) tablet 2.5 mg  2.5 mg Nasogastric Q12H Ronny Lara DO   2.5 mg at 05/22/25 0917    aspirin chewable tablet 81 mg  81 mg Nasogastric Daily Ronny Lara DO   81 mg at 05/22/25 0917    atorvastatin (LIPITOR) tablet 80 mg  80 mg Nasogastric Daily Ronny Lara DO   80 mg at 05/22/25 0917    polyethylene glycol (MIRALAX) packet 17 g  17 g Nasogastric Daily PRN Ronny Lara DO        And    bisacodyl (DULCOLAX) EC tablet 5 mg  5 mg Oral Daily PRN Ronny Lara DO        And    bisacodyl (DULCOLAX) suppository 10 mg  10 mg Rectal Daily PRN Ronny Lara DO        Calcium Replacement - Follow Nurse / BPA Driven Protocol   Not Applicable PRN Aki Rodriguez DO        carvedilol (COREG) tablet 12.5 mg  12.5 mg Nasogastric BID With Meals Ronny Lara DO   12.5 mg at 05/22/25 0917    dextrose (D50W) (25 g/50 mL) IV injection 25 g  25 g Intravenous Q15 Min PRN Aki Rodriguez DO        dextrose (GLUTOSE) oral gel 15 g  15 g Oral Q15 Min PRN Aki Rodriguez DO        sennosides (SENOKOT) 8.8 MG/5ML syrup 10 mL  10 mL Nasogastric BID Aki Rodriguez DO   10 mL at 05/22/25 0917    And    docusate sodium (COLACE) liquid 100 mg  100 mg Nasogastric BID Aki Rodriguez DO   100 mg at 05/22/25 0917    glucagon (GLUCAGEN) injection 1 mg  1 mg Intramuscular Q15  Min PRN Aki Rodriguez DO        glycerin 35 % liquid 35% 2 spray  2 spray Mouth/Throat Q2H PRN Aki Rodriguez DO   2 spray at 05/12/25 2011    hydrALAZINE (APRESOLINE) injection 10 mg  10 mg Intravenous Q6H PRN Aki Rodriguez DO   10 mg at 05/13/25 2218    hydrALAZINE (APRESOLINE) tablet 25 mg  25 mg Nasogastric Q8H Ronny Lara DO   25 mg at 05/22/25 0531    insulin regular (humuLIN R,novoLIN R) injection 2-7 Units  2-7 Units Subcutaneous Q6H Aki Rodriguez DO   2 Units at 05/22/25 1142    lansoprazole (PREVACID SOLUTAB) disintegrating tablet Tablet Delayed Release Dispersible 30 mg  30 mg Nasogastric Q AM Ronny Lara DO   30 mg at 05/22/25 0531    Magnesium Cardiology Dose Replacement - Follow Nurse / BPA Driven Protocol   Not Applicable PRN Aki Rodriguez DO        metoclopramide (REGLAN) injection 5 mg  5 mg Intravenous Q6H Ronny Lara DO   5 mg at 05/22/25 0917    metoprolol tartrate (LOPRESSOR) injection 5 mg  5 mg Intravenous Q4H PRN Wil Rader DO   5 mg at 05/16/25 2159    ondansetron (ZOFRAN) injection 4 mg  4 mg Intravenous Q6H PRN Ronny Lara DO   4 mg at 05/15/25 0126    phenol (CHLORASEPTIC) 1.4 % liquid 1 spray  1 spray Mouth/Throat Q2H PRN Ronny Lara DO        Phosphorus Replacement - Follow Nurse / BPA Driven Protocol   Not Applicable PRN Aki Rodriguez DO        Potassium Replacement - Follow Nurse / BPA Driven Protocol   Not Applicable PRN Aki Rodriguez DO        rivastigmine (EXELON) 4.6 MG/24HR patch 1 patch  1 patch Transdermal Daily Aki Rodriguez DO   1 patch at 05/22/25 0918    sodium chloride 0.9 % flush 10 mL  10 mL Intravenous PRN Danie Tolliver DO        sodium chloride 0.9 % flush 10 mL  10 mL Intravenous Q12H Aki Rodriguez DO   10 mL at 05/22/25 0916    sodium chloride 0.9 % flush 10 mL  10 mL Intravenous PRN Aki Rodriguez DO   10 mL at 05/12/25 0617     Lab Results (last 24 hours)       Procedure Component Value Units Date/Time    POC  Glucose Once [[id number]]  (Abnormal) Collected: 05/22/25 1122    Specimen: Blood Updated: 05/22/25 1129     Glucose 183 mg/dL      Comment: Serial Number: [id number]:  [id number]       Renal Function Panel [[id number]]  (Abnormal) Collected: 05/22/25 0750    Specimen: Blood Updated: 05/22/25 0843     Glucose 165 mg/dL      BUN 28 mg/dL      Creatinine 1.19 mg/dL      Sodium 134 mmol/L      Potassium 4.0 mmol/L      Chloride 106 mmol/L      CO2 19.8 mmol/L      Calcium 7.8 mg/dL      Albumin 2.2 g/dL      Phosphorus 2.3 mg/dL      Anion Gap 8.2 mmol/L      BUN/Creatinine Ratio 23.5     eGFR 46.3 mL/min/1.73     Narrative:      GFR Categories in Chronic Kidney Disease (CKD)              GFR Category          GFR (mL/min/1.73)    Interpretation  G1                    90 or greater        Normal or high (1)  G2                    60-89                Mild decrease (1)  G3a                   45-59                Mild to moderate decrease  G3b                   30-44                Moderate to severe decrease  G4                    15-29                Severe decrease  G5                    14 or less           Kidney failure    (1)In the absence of evidence of kidney disease, neither GFR category G1 or G2 fulfill the criteria for CKD.    eGFR calculation 2021 CKD-EPI creatinine equation, which does not include race as a factor    POC Glucose Once [[id number]]  (Abnormal) Collected: 05/22/25 0532    Specimen: Blood Updated: 05/22/25 0543     Glucose 168 mg/dL      Comment: Serial Number: [id number]:  [id number]       POC Glucose Once [[id number]]  (Abnormal) Collected: 05/21/25 2335    Specimen: Blood Updated: 05/21/25 2339     Glucose 281 mg/dL      Comment: Serial Number: [id number]:  [id number]       POC Glucose Once [[id number]]  (Abnormal) Collected: 05/21/25 1751    Specimen: Blood Updated: 05/21/25 1753     Glucose 183 mg/dL      Comment: Serial Number: [id number]:  [id number]       POC Glucose Q6H  [[id number]]  (Abnormal) Collected: 05/21/25 1313    Specimen: Blood Updated: 05/21/25 1315     Glucose 161 mg/dL      Comment: Serial Number: [id number]:  [id number]             Imaging Results (Last 24 Hours)       ** No results found for the last 24 hours. **          Orders (last 24 hrs)        Start     Ordered    05/22/25 1130  POC Glucose Once  PROCEDURE ONCE        Comments: Complete no more than 45 minutes prior to patient eating      05/22/25 1122    05/22/25 0544  POC Glucose Once  PROCEDURE ONCE        Comments: Complete no more than 45 minutes prior to patient eating      05/22/25 0532    05/21/25 2340  POC Glucose Once  PROCEDURE ONCE        Comments: Complete no more than 45 minutes prior to patient eating      05/21/25 2335    05/21/25 1754  POC Glucose Once  PROCEDURE ONCE        Comments: Complete no more than 45 minutes prior to patient eating      05/21/25 1751    05/20/25 1800  Dietary Nutrition Supplements Magic Cup  Daily With Lunch & Dinner       05/20/25 1554    05/19/25 1500  metoclopramide (REGLAN) injection 5 mg  Every 6 Hours         05/19/25 1327    05/18/25 1445  hydrALAZINE (APRESOLINE) tablet 25 mg  Every 8 Hours Scheduled         05/18/25 1355    05/17/25 1100  lansoprazole (PREVACID SOLUTAB) disintegrating tablet Tablet Delayed Release Dispersible 30 mg  Every Early Morning         05/17/25 1006    05/16/25 2000  carvedilol (COREG) tablet 12.5 mg  2 Times Daily With Meals         05/16/25 1844    05/15/25 2100  apixaban (ELIQUIS) tablet 2.5 mg  Every 12 Hours Scheduled         05/15/25 1738    05/15/25 0900  aspirin chewable tablet 81 mg  Daily         05/14/25 1149    05/15/25 0900  atorvastatin (LIPITOR) tablet 80 mg  Daily         05/14/25 1149    05/15/25 0824  phenol (CHLORASEPTIC) 1.4 % liquid 1 spray  Every 2 Hours PRN         05/15/25 0824    05/14/25 1454  ondansetron (ZOFRAN) injection 4 mg  Every 6 Hours PRN         05/14/25 1454    05/14/25 1245  sennosides (SENOKOT)  "8.8 MG/5ML syrup 10 mL  2 Times Daily        Placed in \"And\" Linked Group    05/14/25 1145    05/14/25 1245  docusate sodium (COLACE) liquid 100 mg  2 Times Daily        Placed in \"And\" Linked Group    05/14/25 1145    05/14/25 1147  polyethylene glycol (MIRALAX) packet 17 g  Daily PRN        Placed in \"And\" Linked Group    05/14/25 1149    05/14/25 1147  bisacodyl (DULCOLAX) EC tablet 5 mg  Daily PRN        Placed in \"And\" Linked Group    05/14/25 1149    05/14/25 1147  bisacodyl (DULCOLAX) suppository 10 mg  Daily PRN        Placed in \"And\" Linked Group    05/14/25 1149    05/14/25 1145  acetaminophen (TYLENOL) tablet 650 mg  Every 4 Hours PRN        Placed in \"Or\" Linked Group    05/14/25 1149    05/14/25 1145  acetaminophen (TYLENOL) suppository 650 mg  Every 4 Hours PRN        Placed in \"Or\" Linked Group    05/14/25 1149    05/14/25 0600  Renal Function Panel  Daily       05/13/25 0649    05/14/25 0000  insulin regular (humuLIN R,novoLIN R) injection 2-7 Units  Every 6 Hours Scheduled         05/13/25 2033 05/14/25 0000  POC Glucose Q6H  Every 6 Hours      Comments: Complete no more than 45 minutes prior to patient eating      05/13/25 2033 05/13/25 2033  dextrose (GLUTOSE) oral gel 15 g  Every 15 Minutes PRN         05/13/25 2033 05/13/25 2033  dextrose (D50W) (25 g/50 mL) IV injection 25 g  Every 15 Minutes PRN         05/13/25 2033 05/13/25 2033  glucagon (GLUCAGEN) injection 1 mg  Every 15 Minutes PRN         05/13/25 2033 05/13/25 1413  Daily Weights  Daily       05/13/25 1412    05/13/25 1413  Tube Feeding Assessment and I/O  Every Shift       05/13/25 1412    05/11/25 2045  hydrALAZINE (APRESOLINE) injection 10 mg  Every 6 Hours PRN         05/11/25 2046 05/11/25 0950  metoprolol tartrate (LOPRESSOR) injection 5 mg  Every 4 Hours PRN         05/11/25 0950    05/10/25 2100  Silicone Border Dressing to Bony Prominences  Every Shift       05/10/25 1807    05/10/25 1802  glycerin 35 % " "liquid 35% 2 spray  Every 2 Hours PRN         05/10/25 1802    05/10/25 1700  rivastigmine (EXELON) 4.6 MG/24HR patch 1 patch  Daily         05/10/25 1607    05/10/25 1500  Intake & Output  Every Hour       05/10/25 1435    05/10/25 1451  sodium chloride 0.9 % flush 10 mL  Every 12 Hours Scheduled         05/10/25 1435    05/10/25 1436  Daily CHG Bath While in Critical Care  Daily      Comments: Use for admission bath and length of critical care stay.    05/10/25 1435    05/10/25 1434  Potassium Replacement - Follow Nurse / BPA Driven Protocol  As Needed         05/10/25 1435    05/10/25 1434  Magnesium Cardiology Dose Replacement - Follow Nurse / BPA Driven Protocol  As Needed         05/10/25 1435    05/10/25 1434  Phosphorus Replacement - Follow Nurse / BPA Driven Protocol  As Needed         05/10/25 1435    05/10/25 1434  Calcium Replacement - Follow Nurse / BPA Driven Protocol  As Needed         05/10/25 1435    05/10/25 1421  Oral Care - Patient Not on NPPV & Not Intubated  Every Shift       05/10/25 1435    05/10/25 1421  sodium chloride 0.9 % flush 10 mL  As Needed         05/10/25 1435    05/10/25 1413  Urinary Catheter Care  Every Shift,   Status:  Canceled      Placed in \"And\" Linked Group    05/10/25 1413    05/10/25 1231  sodium chloride 0.9 % flush 10 mL  As Needed         05/10/25 1231    Unscheduled  Oxygen Therapy- Nasal Cannula; Titrate 1-6 LPM Per SpO2; 90 - 95%  Continuous PRN       05/10/25 1231    Unscheduled  Wound Care  As Needed       05/10/25 1807    Unscheduled  Extravasation Standing Orders - Encourage Active Range of Motion After 48 Hours  As Needed       05/12/25 1150    Unscheduled  Chidi & Document Feeding Tube Depth (in cm)  As Needed       05/13/25 1412    Unscheduled  Verify Feeding Tube Placement Upon Insertion & As Needed  As Needed       05/13/25 1412    Unscheduled  Flush Feeding Tube With 30-50mL Water As Needed  As Needed       05/13/25 1412    Unscheduled  Follow Hypoglycemia " Standing Orders For Blood Glucose <70 & Notify Provider of Treatment  As Needed      Comments: Follow Hypoglycemia Orders As Outlined in Process Instructions (Open Order Report to View Full Instructions)  Notify Provider Any Time Hypoglycemia Treatment is Administered    25    Unscheduled  Extravasation Standing Orders - Encourage Active Range of Motion After 48 Hours  As Needed       25 1131    Unscheduled  Bladder Scan if Patient Unable to Void 4-6 Hours After Catheter Removal  As Needed         25 1031    Unscheduled  If Bladder Scan Volume is Less Than 500mL & Patient is Without Symptoms of Bladder Discomfort / Distention Monitor Every 1-2 Hours for Spontaneous Void  As Needed       25 1031    Unscheduled  Straight Cath Every 4-6 Hours As Needed If Patient is Unable to Void After 4-6 Hours, Bladder Scan Volume is Greater Than 500mL & Patient Has Symptoms of Bladder Discomfort / Distention  As Needed       25 1031    Unscheduled  Schedule / Prompt Voiding For Patients With Urinary Incontinence  As Needed       25 1031                     Physician Progress Notes (most recent note)        Isaura Lyons PA-C at 25 1445              [facility]     PALLIATIVE CARE FOLLOW UP NOTE    Name:  Valarie Camara   Age:  79 y.o.  Sex:  female  :  1945  MRN:  [id number]   Visit Number:  [id number]  Date Of Service:  25  Primary Care Physician:  Lay Cox MD    Chief Complaint: Weakness and debility    Interval History:  Patient seen today during palliative care team rounds.  Record reviewed and case discussed with staff.  Tolerating PO diet, 50% documented at breakfast.  TF continues at 20mL/hr.  No report of nausea/vomiting.  Patient appears comfortable upon assessment.        Review of Systems   Constitutional:  Positive for activity change and fatigue.   Neurological:  Positive for weakness (generalized).          Pain  "Assessment  Preferred Pain Scale: FACES (Merrill-Baker FACES Pain Rating Scale)  Nonverbal Indicators of Pain: nonverbal indicators absent  CPOT Facial Expression: 0-->relaxed, neutral  CPOT Body Movements: 0-->absence of movements  CPOT Muscle Tension: 0-->relaxed  Ventilator Compliance/Vocalization: 0-->talking in normal tone or no sound  CPOT Score: 0  PAINAD Breathin-->normal  PAINAD Negative Vocalization: 0-->none  PAINAD Facial Expression: 0-->smiling or inexpressive  PAINAD Body Language: 0-->relaxed  PAINAD Consolability: 0-->no need to console  PAINAD Score: 0  Vitals: /69 (BP Location: Left arm, Patient Position: Lying)   Pulse 65   Temp 98 °F (36.7 °C) (Oral)   Resp 16   Ht 160 cm (63\")   Wt 65.2 kg (143 lb 11.8 oz)   SpO2 92%   BMI 25.46 kg/m²       Physical Exam  Vitals and nursing note reviewed.   Constitutional:       General: She is not in acute distress.     Appearance: She is not diaphoretic.      Comments: Frail appearing, elderly female lying in bed, alert and interactive.  NAD   HENT:      Head: Normocephalic and atraumatic.      Nose:      Comments: NG in place     Mouth/Throat:      Mouth: Mucous membranes are moist.   Eyes:      Extraocular Movements: Extraocular movements intact.      Pupils: Pupils are equal, round, and reactive to light.   Cardiovascular:      Rate and Rhythm: Normal rate and regular rhythm.   Pulmonary:      Effort: Pulmonary effort is normal. No respiratory distress.      Breath sounds: Normal breath sounds.   Musculoskeletal:      Cervical back: Neck supple.      Comments: Appears deconditioned    Skin:     General: Skin is warm and dry.      Capillary Refill: Capillary refill takes less than 2 seconds.   Neurological:      Mental Status: She is alert and oriented to person, place, and time.   Psychiatric:         Mood and Affect: Mood normal.         Behavior: Behavior normal.          Results Reviewed:    Intake/Output Summary (Last 24 hours) at " 5/21/2025 1445  Last data filed at 5/21/2025 1300  Gross per 24 hour   Intake 1080 ml   Output 825 ml   Net 255 ml     Results from last 7 days   Lab Units 05/21/25  0645   SODIUM mmol/L 134*   POTASSIUM mmol/L 4.3   CHLORIDE mmol/L 107   CO2 mmol/L 20.1*   BUN mg/dL 25*   CREATININE mg/dL 1.06*   CALCIUM mg/dL 8.0*   GLUCOSE mg/dL 120*     Results from last 7 days   Lab Units 05/18/25  0547   WBC 10*3/mm3 6.44   HEMOGLOBIN g/dL 10.0*   HEMATOCRIT % 31.2*   PLATELETS 10*3/mm3 128*       Medication Review:   I have reviewed the patients active and prn medications.     Palliative Care Assessment:  Severe sepsis secondary to UTI  Metabolic encephalopathy  Atrial fibrillation with RVR  KURT  Dementia  Frailty  Dysphagia    Recommendations/Plan:  - GOC discussions yield desire to maintain plan of care as outlined per the primary medicine service, with goal to optimize condition  - Clarified goal for artificial nutrition, which is to aid in patient's comfort/quality; they desire to allow for comfort feeding in addition to low rate TF  - Understand that if discharging home with hospice services, if NG is dislodged, will not replace  - Difficult to prognosticate given interventions in place, however it does appear patient is entering into the final stages of life   - Hospice has been notified of home referral, they anticipate delivery of equipment tomorrow  - Consider restarting nystatin swish  - Previously patient taking PPI with history of GERD and hiatal hernia, consider restarting Protonix  - Recommend discharging home with the following medications for acute symptom management:  - hydrocodone 5/325mg Q4hrs PRN pain/dyspnea  - ativan 0.5mg tablet Q6 hrs PRN for agitation/restlessness   - Last  5/20  - Palliative care will continue to follow/support patient and family      CODE STATUS:   Code Status and Medical Interventions: No CPR (Do Not Attempt to Resuscitate); Limited Support; No intubation (DNI)   Ordered at:  05/10/25 1435     Code Status (Patient has no pulse and is not breathing):    No CPR (Do Not Attempt to Resuscitate)     Medical Interventions (Patient has pulse or is breathing):    Limited Support     Medical Intervention Limits:    No intubation (DNI)     Level Of Support Discussed With:    Patient         I spent 35 total minutes, including face to face assessment, record review, coordination of care with staff, and counseling patient and/or family  Part of this note may be an electronic transcription/translation of spoken language to printed text using the Dragon Dictation System.    Isaura Lyons PA-C  25  14:45 EDT    Electronically signed by Isaura Lyons PA-C at 25 1452          Consult Notes (most recent note)        Isaura Lyons PA-C at 25 1554        Consult Orders    1. Inpatient Palliative Care Team Consult [[id number]] ordered by Wil Rader DO at 25 1156              Attestation signed by Pedro Luis Rene DO at 25 1301      I have reviewed this documentation and agree.                          [facility]    INPATIENT PALLIATIVE CARE CONSULT      Name:  Valarie Camara   Age:  79 y.o.  Sex:  female  :  1945  MRN:  [id number]   Visit Number:  [id number]  Date of Service:  25  Date of Admission:  5/10/2025  Primary Care Physician:  Lay Cox MD    Consulting Physician:  Dr. Rader    Reason For Consult:  Introduction to Palliative services, Goals of care discussions , Support during serious illness, and Hospice information     History of Present Illness:  Valarie Camara is a 79 y.o. female with past medical history of CAD, CKD, HTN, dementia, history of PE on eliquis, PAD, left ICA stenosis, history of esophageal stricture s/p dilation 2027 with Dr. Zapata.  Had admission in mid March secondary to altered mental status.  Stroke workup was negative, she was treated for UTI.  In addition neurology  assessed and followed, recommending initiation of rivastigmine, continue aspirin/statin/AC.  Patient was discharged to short-term rehabilitation at [facility], ultimately completed this and discharged home.  Patient presented to [facility] on 5/10/2025 secondary to altered mental status with associated poor oral intake over the past several days.  Per record review, patient was assessed by EMS found to be hypotensive and tachycardic, EKG revealing A-fib with RVR.  Upon arrival to [facility] ED she was emergently cardioverted, returning to normal sinus rhythm.  Unfortunately she did not have improvement to her mentation.  Laboratory evaluation revealed WBC 13.08.  Glucose 162, , creatinine 3.96, Na 168, albumin 3.1.  Urinalysis with positive nitrites, moderate leuks, 4+ bacteria.  Lactate negative.  Troponin elevated.  TSH 0.024.  Neurology consulted in the ED, CT head did not reveal acute CVA.  CT head no acute intracranial abnormality.  Chest x-ray negative.  Cardiology consulted, who felt troponin elevation likely associated with tachycardia, recommending trending as HR better controlled.  Neurology also consulted, recommending MRI.  Patient was initiated on Rocephin for UTI and LR, admitted the primary medicine service for continued evaluation and management.  Cardiology, neurology, and nephrology followed in consultation.   MRI negative for acute stroke, awaiting EEG.  Neurology recommending SLP and PT/OT evaluation.  Nephrology following, hypernatremia and KURT improving with IV hydration.  Nursing have attempted bedside swallow eval, however patient has been refusing diet and p.o. medications.  Palliative care consulted to further review GOC in the setting of complex medical decision making.    After speaking with the primary medicine service, palliative met with patient and family at bedside.  Patient is lying in bed appearing comfortable, will awaken and answer a few questions.  She appears  "to be sleeping at times, however will answer appropriately.  Her daughters are able to supplement HPI and reports that prior to this acute illness patient was living with her daughter who is her primary caretaker.  She has assistance from other family and friends who also live close by.  At baseline she is able to ambulate only short amount of distance, utilizes assistive device.  She has episodic incontinence.  She sleeps on and off throughout the day and nighttime.  Appetite has been waxing and waning, approximate 26 pound weight loss in the past 2 to 3 months.  Initially had difficulty with swallowing that improved after dilation, now reports pain with swallowing.  Family describes subtle cognitive losses over the past several years, is oriented to herself and place at baseline, able to recognize family consistently.  She does require assistance with ADLs.  Her daughter notes that over the past week or so she was requiring assistance with feeding, she was no longer desiring to eat or drink.  No acute dyspnea, pain, agitation or restlessness noted.    Patient and family agreeable to continued palliative care follow up and discussions regarding goals of care and advance care planning.     Review of Systems   Constitutional:  Positive for activity change, appetite change and fatigue.   HENT:  Positive for trouble swallowing.    Neurological:  Positive for weakness.        Medical History:  Past Medical History:   Diagnosis Date    Acute bronchitis with bronchospasm     Anxiety     Arthritis     Asthma     B12 deficiency     Back pain     Body piercing     ears    Cataract     Cerebrovascular disease     Cervicalgia     Chronic kidney disease     stage 3b    Colonic polyp     History of colonic polyps     Constipation     COPD (chronic obstructive pulmonary disease)     Coronary artery disease     Depression     Diverticulitis     Dysphagia     Patient reported \"it won't go all the way down\" when eating solid foods " first thing in the mornings    Edema     Elevated cholesterol     Esophageal reflux     Folic acid deficiency     Full dentures     Advised no adhesives DOS    Heart murmur     Herpes zoster     High cholesterol     History of kidney infection     History of recurrent urinary tract infection     HTN (hypertension)     Hypercholesterolemia     Impaired functional mobility, balance, gait, and endurance     Insomnia     Kidney infection     Malignant hypertension 04/26/2015     Accelerated essential hypertension    Measles     rubeola    Muscle spasm     Neoplasm of uncertain behavior of skin     dtr denies    Osteoporosis     Poor historian     Recurrent urinary tract infection     Rheumatoid arthritis     RLS (restless legs syndrome)     Stomach ulcer     Stroke     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    Type 2 diabetes mellitus     Vitamin D deficiency       Past Surgical History:   Procedure Laterality Date    CATARACT EXTRACTION WITH INTRAOCULAR LENS IMPLANT Left 02/11/2013    CATARACT EXTRACTION WITH INTRAOCULAR LENS IMPLANT Right 04/15/2013    COLONOSCOPY  2012    COLONOSCOPY N/A 11/26/2019    Procedure: COLONOSCOPY;  Surgeon: Chidi Downing MD;  Location:  [facility];  Service: Gastroenterology    ENDOSCOPY N/A 11/13/2017    Procedure: ESOPHAGOGASTRODUODENOSCOPY with biopsies and esophageal balloon dilitation;  Surgeon: Wesley Stovall MD;  Location:  [facility];  Service:     ENDOSCOPY N/A 11/25/2019    Procedure: ESOPHAGOGASTRODUODENOSCOPY;  Surgeon: Chidi Downing MD;  Location:  [facility];  Service: Gastroenterology    ENDOSCOPY N/A 11/29/2021    Procedure: ESOPHAGOGASTRODUODENOSCOPY with dilation and biopsies;  Surgeon: Gonzalez Zapata MD;  Location:  [facility];  Service: Gastroenterology;  Laterality: N/A;    ENDOSCOPY N/A 11/1/2023    Procedure: ESOPHAGOGASTRODUODENOSCOPY WITH BIOPSY AND DILATATION;  Surgeon: Gonzalez Zapata  MD;  Location: [facility] END[facility]COPY;  Service: Gastroenterology;  Laterality: N/A;    ENDOSCOPY N/A 1/27/2025    Procedure: Esophagogastroduodenoscopy with dilatation and biopsies;  Surgeon: Gonzalez Zapata MD;  Location: [facility] ENDOSCOPY;  Service: Gastroenterology;  Laterality: N/A;    HYSTERECTOMY  1979    UPPER GASTROINTESTINAL ENDOSCOPY  12/09/2013    UPPER GASTROINTESTINAL ENDOSCOPY  11/13/2017     Family History   Problem Relation Age of Onset    Arthritis Mother     Diabetes Mother     Hypertension Mother     Arthritis Other     Arthritis Other     Diabetes Other         DM    Hypertension Other     Colon cancer Neg Hx      Allergies: Penicillins, Clonidine derivatives, Hydralazine, and Sulfa antibiotics    Hospital Scheduled Medications:    Current Facility-Administered Medications:     acetaminophen (TYLENOL) tablet 650 mg, 650 mg, Oral, Q4H PRN **OR** acetaminophen (TYLENOL) suppository 650 mg, 650 mg, Rectal, Q4H PRN, Aki Rodriguez DO    aspirin chewable tablet 81 mg, 81 mg, Oral, Daily, Aki Rodriguez DO, 81 mg at 05/12/25 1031    atorvastatin (LIPITOR) tablet 80 mg, 80 mg, Oral, Daily, Aki Rodriguez DO, 80 mg at 05/12/25 1030    sennosides-docusate (PERICOLACE) 8.6-50 MG per tablet 2 tablet, 2 tablet, Oral, BID, 2 tablet at 05/12/25 1030 **AND** polyethylene glycol (MIRALAX) packet 17 g, 17 g, Oral, Daily PRN **AND** bisacodyl (DULCOLAX) EC tablet 5 mg, 5 mg, Oral, Daily PRN **AND** bisacodyl (DULCOLAX) suppository 10 mg, 10 mg, Rectal, Daily PRN, Aki Rodriguez DO    Calcium Replacement - Follow Nurse / BPA Driven Protocol, , Not Applicable, PRN, Aki Rodriguez DO    carvedilol (COREG) tablet 12.5 mg, 12.5 mg, Oral, BID With Meals, Aki Rodriguez DO, 12.5 mg at 05/12/25 1030    cloNIDine (CATAPRES-TTS) 0.1 MG/24HR patch 1 patch, 1 patch, Transdermal, Weekly, Ortega Cabrales MD, FASN, 1 patch at 05/12/25 1031    dextrose (D5W) 5 % infusion, 100 mL/hr, Intravenous, Continuous, Ortega Cabrales MD,  FASN, Last Rate: 100 mL/hr at 05/13/25 1047, 100 mL/hr at 05/13/25 1047    dextrose 5 % with KCl 20 mEq/L infusion, 50 mL/hr, Intravenous, Continuous, Ortega Cabrales MD, FASN, Last Rate: 50 mL/hr at 05/13/25 1047, 50 mL/hr at 05/13/25 1047    ertapenem (INVanz) 500 mg in sodium chloride 0.9 % 50 mL IVPB, 500 mg, Intravenous, Q24H, Wil Rader DO, Last Rate: 100 mL/hr at 05/12/25 1632, 500 mg at 05/12/25 1632    fluconazole (DIFLUCAN) IVPB 200 mg, 200 mg, Intravenous, Q24H, Isaura Lyons PA-C    glycerin 35 % liquid 35% 2 spray, 2 spray, Mouth/Throat, Q2H PRN, Aki Rodriguez DO, 2 spray at 05/12/25 2011    heparin 96011 units/250 ml (100 units/ml) in D5W, 9 Units/kg/hr (Order-Specific), Intravenous, Titrated, Ronny Lara DO, Last Rate: 6 mL/hr at 05/13/25 1123, 9 Units/kg/hr at 05/13/25 1123    hydrALAZINE (APRESOLINE) injection 10 mg, 10 mg, Intravenous, Q6H PRN, Aki Rodriguez DO, 10 mg at 05/13/25 1048    Magnesium Cardiology Dose Replacement - Follow Nurse / BPA Driven Protocol, , Not Applicable, PRN, Aki Rodriguez DO    metoprolol tartrate (LOPRESSOR) injection 5 mg, 5 mg, Intravenous, Q4H PRN, Wil Rader DO, 5 mg at 05/12/25 0404    mupirocin (BACTROBAN) 2 % nasal ointment 1 Application, 1 Application, Each Nare, BID, Aki Rodriguez DO, 1 Application at 05/13/25 1048    nitroglycerin (NITROSTAT) SL tablet 0.4 mg, 0.4 mg, Sublingual, Q5 Min PRN, Aki Rodriguez DO    nystatin (MYCOSTATIN) 100,000 unit/mL suspension 500,000 Units, 5 mL, Oral, 4x Daily, Isaura Lyons PA-C    Pharmacy to Dose Heparin, , Not Applicable, Continuous PRN, Aki Rodriguez DO    Phosphorus Replacement - Follow Nurse / BPA Driven Protocol, , Not Applicable, PRN, Aki Rodriguez DO    Potassium Replacement - Follow Nurse / BPA Driven Protocol, , Not Applicable, PRN, Aki Rodriguez DO    rivastigmine (EXELON) 4.6 MG/24HR patch 1 patch, 1 patch, Transdermal, Daily, Aki Rodriguez DO, 1 patch at 05/13/25 1044     "sodium chloride 0.9 % flush 10 mL, 10 mL, Intravenous, PRN, Danie Tolliver,     sodium chloride 0.9 % flush 10 mL, 10 mL, Intravenous, Q12H, Aki Rodriguez, DO, 10 mL at 05/13/25 1054    sodium chloride 0.9 % flush 10 mL, 10 mL, Intravenous, PRN, Rodriguez, Aki, DO, 10 mL at 05/12/25 0617    [Held by provider] sodium chloride 0.9 % infusion 40 mL, 40 mL, Intravenous, PRN, Rodriguez, Aki, DO  I have utilized all immediate resources to obtain, update, or review the patient's current medications (including all prescription, over-the-counter products, herbals, and/or nutritional supplements).      Vitals: /62 (BP Location: Left arm, Patient Position: Lying)   Pulse 85   Temp 97.8 °F (36.6 °C) (Oral)   Resp 13   Ht 160 cm (63\")   Wt 60.1 kg (132 lb 7.9 oz)   SpO2 100%   BMI 23.47 kg/m²     Intake/Output Summary (Last 24 hours) at 5/13/2025 1555  Last data filed at 5/13/2025 0642  Gross per 24 hour   Intake 2535.4 ml   Output 605 ml   Net 1930.4 ml       Physical Exam  Vitals and nursing note reviewed.   Constitutional:       General: She is not in acute distress.     Appearance: She is ill-appearing. She is not diaphoretic.      Comments: Frail appearing, elderly female, lying in bed, NAD   HENT:      Head: Normocephalic and atraumatic.      Comments: Temporal wasting      Mouth/Throat:      Mouth: Mucous membranes are moist.      Comments: White patches noted   Eyes:      Conjunctiva/sclera: Conjunctivae normal.      Pupils: Pupils are equal, round, and reactive to light.   Cardiovascular:      Rate and Rhythm: Normal rate and regular rhythm.   Pulmonary:      Effort: Pulmonary effort is normal. No respiratory distress.      Comments: Diminished otherwise clear  Abdominal:      General: Bowel sounds are normal. There is no distension.      Palpations: Abdomen is soft.      Tenderness: There is no abdominal tenderness.   Musculoskeletal:         General: No swelling.      Cervical back: Neck " supple.      Comments: Diffusely deconditioned, decreased ROM to upper and lower extremities    Skin:     General: Skin is warm and dry.      Capillary Refill: Capillary refill takes less than 2 seconds.   Neurological:      Mental Status: She is alert.      Comments: Oriented to person/place   Psychiatric:         Mood and Affect: Mood normal.         Behavior: Behavior normal.          Diagnostics Review:  Laboratory Data:  Results from last 7 days   Lab Units 05/13/25  0431 05/12/25  1226 05/12/25  0805 05/10/25  1437 05/10/25  1237   SODIUM mmol/L 158* 160* 161*   < > 168*   POTASSIUM mmol/L 3.7 3.8 3.7   < > 4.1   CHLORIDE mmol/L 125* 127* 130*   < > 130*   CO2 mmol/L 21.8* 22.8 22.1   < > 18.5*   BUN mg/dL 62* 73* 78*   < > 127*   CREATININE mg/dL 1.75* 2.08* 2.16*   < > 3.96*   CALCIUM mg/dL 7.9* 8.5* 8.6   < > 9.8   ALBUMIN g/dL  --   --   --   --  3.1*   BILIRUBIN mg/dL  --   --   --   --  0.3   ALK PHOS U/L  --   --   --   --  57   ALT (SGPT) U/L  --   --   --   --  7   AST (SGOT) U/L  --   --   --   --  10   GLUCOSE mg/dL 214* 247* 211*   < > 162*    < > = values in this interval not displayed.     Results from last 7 days   Lab Units 05/13/25  0431 05/12/25  0411 05/11/25  0053   WBC 10*3/mm3 5.89 6.63 9.11   HEMOGLOBIN g/dL 10.1* 12.4 11.5*   HEMATOCRIT % 32.9* 41.1 38.7   PLATELETS 10*3/mm3 122* 141 141     Results from last 7 days   Lab Units 05/10/25  1237   INR  1.25*     Results from last 7 days   Lab Units 05/12/25  0538   PH, ARTERIAL pH units 7.444   PO2 ART mm Hg 81.2   PCO2, ARTERIAL mm Hg 33.5*   HCO3 ART mmol/L 23.0     Results from last 7 days   Lab Units 05/10/25  1237   COLOR UA  Yellow   GLUCOSE UA  Negative   KETONES UA  Trace*   BLOOD UA  Trace*   LEUKOCYTES UA  Moderate (2+)*   PH, URINE  <=5.0   BILIRUBIN UA  Negative   UROBILINOGEN UA  0.2 E.U./dL     Pain Management Panel  More data exists         Latest Ref Rng & Units 5/10/2025 3/20/2025   Pain Management Panel   Amphetamine,  Urine Qual Negative Negative  Negative    Barbiturates Screen, Urine Negative Negative  Negative    Benzodiazepine Screen, Urine Negative Negative  Negative    Buprenorphine, Screen, Urine Negative Negative  Negative    Cocaine Screen, Urine Negative Negative  Negative    Fentanyl, Urine Negative Negative  Negative    Methadone Screen , Urine Negative Negative  Negative    Methamphetamine, Ur Negative Negative  Negative      Results from last 7 days   Lab Units 05/10/25  2131 05/10/25  1315 05/10/25  1237   BLOODCX  No growth at 2 days No growth at 3 days  --    URINECX   --   --  >100,000 CFU/mL Escherichia coli ESBL*     Nutritional Status:   Results from last 7 days   Lab Units 05/10/25  1237   ALBUMIN g/dL 3.1*     Body mass index is 23.47 kg/m².  Documented weights    05/10/25 1226 05/10/25 1727 05/11/25 0500 05/12/25 0400   Weight: 66.7 kg (147 lb) 57.4 kg (126 lb 8.7 oz) 55.5 kg (122 lb 5.7 oz) 57.1 kg (125 lb 14.1 oz)    05/13/25 0400   Weight: 60.1 kg (132 lb 7.9 oz)        Radiology:  MRI Brain Without Contrast  Result Date: 5/11/2025  MRI BRAIN WITHOUT CONTRAST  HISTORY: Confusion  COMPARISON: 3/22/2025  TECHNIQUE: Multiplanar, multisequence images of the brain were performed without contrast.  FINDINGS: The diffusion weighted images demonstrate no restricted diffusion. There is no acute ischemia. The cervicomedullary junction is normal. There is no Chiari malformation. There is moderate atrophy. There is increased T2 signal in the periventricular white matter consistent mild microvascular change. There is no cortical edema or hemorrhage. There are new right mastoid opacities.      1. Moderate atrophy with mild microvascular change. There is no edema or hemorrhage. 2. New right mastoid opacities.   This report was signed and finalized on 5/11/2025 8:59 AM by Yuniel Silva MD.      CT Head Without Contrast  Result Date: 5/10/2025  HEAD CT  HISTORY: Confusion.  COMPARISON: 3-.  TECHNIQUE: Multiple  axial CT images were performed from the foramen magnum to the vertex without enhancement. Individualized dose reduction techniques using automated exposure control or adjustment of the mA and/or kV according to patient size were employed.  FINDINGS: Mild to moderate diffuse cortical atrophy is present.  There is no evidence of acute hemorrhage, mass effect, or edema.  Mild mucoperiosteal thickening is noted in the right maxillary sinus. No air-fluid levels are seen.       1. No evidence of acute intracranial abnormality.      This report was signed and finalized on 5/10/2025 12:57 PM by Jah Kirk MD.      XR Chest 1 View  Result Date: 5/10/2025  Chest single view  COMPARISON: Chest from 3-  HISTORY: Altered mental status  FINDINGS: Cardiac silhouette is of normal size.  Lungs are underinflated, but clear.      1. No evidence of acute abnormality.  This report was signed and finalized on 5/10/2025 12:49 PM by Jah Kirk MD.          Goals of Care/Advance Care Planning:  Advance Care Planning     1. Determination of Decisional Capacity:  Decisional capacity: YES, however would benefit from the assistance of HCS for complex medical decisions given her fatigue and deconditioned state    2. Advanced Directives:  I have personally reviewed Advance Directives on file in the form of POA and Living Will  Healthcare surrogate/legal decision maker: Bailey Camara and Cleo Camara  Relationship to individual: Daughter  Surrogate/decision maker understands role and will honor individual's decisions: YES    3. Patient & Family Meeting:  Members present at meeting Salvador, Bailey Gallo Faye Flaherty, Lauren Puckett  Meeting took place at [facility] ICU patient room     4. Summary of the discussion:  After generalized introductions, introduced the role of palliative care in assisting with complex medical decision making, goals of care discussions, and symptom management/supportive care.  Patient raised her two daughters  independently.  She has valued her lavon and family throughout her whole life.  She loves her two grandsons as if they were her own children.    I reviewed patient's acute and chronic illness, patient fatigued and unable to express insight in detail, her daughters at bedside with insight regarding the same.  Patient able to express she has been sick for a while, feels that her body is entering final stages of life.  Cleo reflects on patient's progressive decline over the past few months.  Daughters are understanding that their mother is likely entering the final stages of life, they would not want her to suffer.  They know she would desire to have a natural death, confirming DO NOT RESUSCITATE/DO NOT INTUBATE.  However, they want to make sure that they have done all that they could to ensure that they have done everything they could.  They are interested in pursuing a trial of artifical nutrition through NG, only if patient is able to tolerate.  Patient would not desire to pursue long term artifical nutrition through PEG placement.  In the event she is unable to tolerate NG placement, or artificial nutrition is causing discomfort, they would desire to defer further attempts, likely transition to comfort focused approach to care.  I reviewed trajectory associated with chronic illness, which often reflects a stairstepping pattern.  I also reviewed signs and symptoms associated with patients who are nearing the final stages of life.  Both daughters are in agreement that if patient is not benefiting from interventions, specifically artificial nutrition, would desire to focus on her comfort.  CODE STATUS reviewed/confirmed: DNR / DNI   Baseline Functional Status: Palliative Performance Scale Score: Performance 40% based on the following measures: Ambulation: Mainly in bed, Activity and Evidence of Disease: Unable to do any work, extensive evidence of disease, Self-Care: Mainly assistance required,  Intake: Normal or  reduced, LOC: Full, drowsy or confusion          Palliative Care Assessment:  Severe sepsis secondary to UTI  Metabolic encephalopathy  Atrial fibrillation with RVR  KURT  Dementia  Frailty  Dysphagia    Recommendations/Plan:  - CODE STATUS reviewed/confirmed: DNR / DNI   - GOC discussions yield desire to maintain plan of care as outlined per the primary medicine service, desire to pursue an attempt at NG feeding, however if patient is unable to tolerate, would desire to defer/withdrawal   - In the event patient is not tolerating artificial nutrition, seems that family are leaning towards transition to a comfort focused approach to her care, they reflect on patient's desire for for comfort and peace in the final stages of life  - Patient likely meets criteria for hospice, however at the present goals are not in alignment  - Discussed course of diflucan and nystan for oral candidiasis, primary medicine service is in agreement  - Previously patient taking PPI with history of GERD and hiatal hernia, may consider protonix IV  - Current Functional Status: Palliative Performance Scale Score: Performance 30% based on the following measures: Ambulation: Totally bed bound, Activity and Evidence of Disease: Unable to do any work, extensive evidence of disease, Self-Care: Total care required,  Intake: Reduced, LOC: Full, drowsy or confusion  - Palliative care will continue to follow/support patient and family        I appreciate the opportunity to participate in the care of Mr./Ms. Valarie Camara.  Please do not hesitate to contact me with any questions or concerns.      I spent 90 total minutes conducting chart review for relevant information, face to face assessment, counseling patient and/or family, and coordination of care.  15 minutes spent discussing advanced care planning.  Part of this note may be an electronic transcription/translation of spoken language to printed text using the Dragon Dictation  System.       Isaura Lyons PA-C  05/13/25  15:55 EDT        Electronically signed by Pedro Luis Rene DO at 05/16/25 0874

## 2025-05-22 NOTE — PROGRESS NOTES
"Dietitian Follow-up    Patient Name: Valarie Camara  YOB: 1945  MRN: [id number]  Admission date: 5/10/2025    Comment:    Clinical Nutrition Follow-up   Encounter Information        Trending Narrative     5/22: PO intake averaging 25% x 2 meals. TF continues @ 20mL/hr. Patient likely to go home in the am per provider report    5/21: TF running @ 20mL/hr x 22hrs. PO intake averaging 50% x 2 meals. Magic cup ordered BID.     5/20: Peptamen AF running at 20 mL/hr. Pt is tolerating. Diet was advanced.  Will keep TF rate at 20 mL and add Magic cup BID.     5/19: NG-tube pulled on 5/18 and re-inserted today. Currently running @ 20mL/hr. Will follow-up tomorrow.     5/16: patient no longer w/ vomiting but still experiencing nausea. Will switch to Peptamen AF for better GI tolerance at a low rate of 20mL/hr and do not advance.     5/15: Pt with multiple episodes of vomiting and unable to tolerate tube feeding. MD adjusted water flushes. Will put tube feeding rate to 0 mL/hr and try initiating tube feeding again tomorrow.      5/14: NG was placed today with plans to initiate tube feeding.      5/13: RD consulted for TF recommendations.      5/13: Patient remains NPO - will continue to monitor for diet advancement.     5/12: Pt NPO x 2 days. EMR shows significant wt loss. Will monitor for diet advancement and add ONS.     Anthropometrics        Current Height, Weight Height: 160 cm (63\")  Weight: 65.2 kg (143 lb 11.8 oz) (05/21/25 0548)       Trending Weight Hx     This admission:              PTA:     Wt Readings from Last 30 Encounters:   05/21/25 0548 65.2 kg (143 lb 11.8 oz)   05/20/25 0600 65.6 kg (144 lb 10 oz)   05/19/25 0504 66.2 kg (145 lb 15.1 oz)   05/18/25 0515 63.8 kg (140 lb 10.5 oz)   05/17/25 0518 64 kg (141 lb 1.5 oz)   05/15/25 0515 65.5 kg (144 lb 6.4 oz)   05/14/25 0400 59.7 kg (131 lb 9.8 oz)   05/13/25 0400 60.1 kg (132 lb 7.9 oz)   05/12/25 0400 57.1 kg (125 lb 14.1 oz)   05/11/25 " 0500 55.5 kg (122 lb 5.7 oz)   05/10/25 1727 57.4 kg (126 lb 8.7 oz)   05/10/25 1226 66.7 kg (147 lb)   04/17/25 0932 67.1 kg (147 lb 14.4 oz)   04/05/25 1837 66.6 kg (146 lb 12.8 oz)   03/21/25 1511 68 kg (150 lb)   03/20/25 1556 68 kg (150 lb)   03/15/25 0422 69.4 kg (153 lb)   03/04/25 0600 72.8 kg (160 lb 7.9 oz)   03/02/25 1423 71.9 kg (158 lb 8.2 oz)   03/02/25 0348 70.5 kg (155 lb 6.8 oz)   03/01/25 0415 72.3 kg (159 lb 6.3 oz)   02/28/25 1848 72.6 kg (160 lb 0.9 oz)   02/28/25 1817 77 kg (169 lb 12.1 oz)   02/28/25 1152 77 kg (169 lb 12.1 oz)   02/08/25 0115 77.1 kg (169 lb 15.6 oz)   01/24/25 1111 75.3 kg (166 lb)   01/27/25 0139 77.1 kg (170 lb)   01/10/25 1330 80.3 kg (177 lb)   01/05/25 1700 76.3 kg (168 lb 3.4 oz)   01/05/25 1102 79.4 kg (175 lb 0.7 oz)   01/05/25 0921 79.4 kg (175 lb)   11/06/24 1049 80.6 kg (177 lb 12.8 oz)   12/04/23 2059 90.7 kg (200 lb)   10/31/23 0924 90.7 kg (200 lb)   08/21/23 1541 90.7 kg (200 lb)   08/17/23 1331 91.2 kg (201 lb)   01/31/22 1433 87.5 kg (192 lb 12.8 oz)   11/24/21 1834 88.5 kg (195 lb)   11/24/21 1543 86.2 kg (190 lb)   05/25/21 0500 92.6 kg (204 lb 2.3 oz)   05/21/21 0500 89.9 kg (198 lb 3.1 oz)   05/20/21 2344 89.9 kg (198 lb 3.1 oz)   05/20/21 0807 88.2 kg (194 lb 6.4 oz)   05/20/21 0305 90.7 kg (200 lb)   02/03/21 1507 89.4 kg (197 lb)   12/17/20 1307 92.1 kg (203 lb)   10/28/20 1735 90.7 kg (200 lb)   09/08/20 1011 95.7 kg (211 lb)   08/17/20 1122 93.6 kg (206 lb 6.4 oz)   01/02/20 1016 90.4 kg (199 lb 6.4 oz)   12/13/19 0445 87.2 kg (192 lb 3.2 oz)   11/25/19 1335 89.4 kg (197 lb)   11/23/19 2340 89.7 kg (197 lb 12.8 oz)   11/23/19 1951 90.7 kg (200 lb)   03/19/19 1541 91.8 kg (202 lb 6.4 oz)   04/10/18 1524 90.7 kg (200 lb)   04/09/18 1342 92.5 kg (204 lb)      BMI kg/m2 Body mass index is 25.46 kg/m².     Labs        Pertinent Labs Results from last 7 days   Lab Units 05/22/25  0750 05/21/25  0645 05/20/25  0859   SODIUM mmol/L 134* 134* 142   POTASSIUM  mmol/L 4.0 4.3 3.9   CHLORIDE mmol/L 106 107 113*   CO2 mmol/L 19.8* 20.1* 20.3*   BUN mg/dL 28* 25* 24*   CREATININE mg/dL 1.19* 1.06* 1.18*   CALCIUM mg/dL 7.8* 8.0* 8.0*   GLUCOSE mg/dL 165* 120* 112*     Results from last 7 days   Lab Units 05/22/25  0750 05/18/25  2139 05/18/25  0547   PHOSPHORUS mg/dL 2.3*   < > 2.1*   HEMOGLOBIN g/dL  --   --  10.0*   HEMATOCRIT %  --   --  31.2*    < > = values in this interval not displayed.         Medications    Scheduled Medications apixaban, 2.5 mg, Nasogastric, Q12H  aspirin, 81 mg, Nasogastric, Daily  atorvastatin, 80 mg, Nasogastric, Daily  carvedilol, 12.5 mg, Nasogastric, BID With Meals  sennosides, 10 mL, Nasogastric, BID   And  docusate sodium, 100 mg, Nasogastric, BID  hydrALAZINE, 25 mg, Nasogastric, Q8H  insulin regular, 2-7 Units, Subcutaneous, Q6H  lansoprazole, 30 mg, Nasogastric, Q AM  NIFEdipine XL, 60 mg, Oral, Q24H  rivastigmine, 1 patch, Transdermal, Daily  sodium chloride, 10 mL, Intravenous, Q12H        Infusions      PRN Medications   acetaminophen **OR** acetaminophen    [DISCONTINUED] senna-docusate sodium **AND** polyethylene glycol **AND** bisacodyl **AND** bisacodyl    Calcium Replacement - Follow Nurse / BPA Driven Protocol    dextrose    dextrose    glucagon (human recombinant)    glycerin    hydrALAZINE    Magnesium Cardiology Dose Replacement - Follow Nurse / BPA Driven Protocol    metoprolol tartrate    ondansetron    phenol    Phosphorus Replacement - Follow Nurse / BPA Driven Protocol    Potassium Replacement - Follow Nurse / BPA Driven Protocol    sodium chloride    sodium chloride     Physical Findings        Trending Physical   Appearance, NFPE    --  Edema  Generalized Edema: 2+ (Mild)    Arm, Left Edema: 2+ (Mild) Arm, Right Edema: 2+ (Mild)  Leg, Left Edema: 2+ (Mild) Leg, Right Edema: 2+ (Mild)  Ankle, Left Edema: 2+ (Mild) Ankle, Right Edema: 2+ (Mild)  Foot, Left Edema: 2+ (Mild) Foot, Right Edema: 2+ (Mild)   Bowel Function  Stool Output  Stool Unmeasured Occurrence: 1 (05/22/25 0514)  Bowel Incontinence: Yes (05/22/25 0514)  Stool Amount: Large (05/22/25 0514)  Stool Consistency: liquid (05/22/25 0514)  Perineal Care: absorbent brief/pad changed, perineum cleansed (05/22/25 0800)  Last Bowel Movement: 05/22/25   Chewing/Swallowing Teeth Status:Mouth/Teeth WDL: .WDL except, teeth Teeth Symptoms: dental appliance present  Chewing/Swallowing Issues: Dysphagia   Skin Lines, Drains, Airways, & Wounds       Active LDAs       Name Placement date Placement time Site Days Last dressing change    Peripheral IV 05/10/25 1222 20 G Anterior;Right External Jugular 05/10/25  1222  External Jugular  12 05/15/25 1600 (166.87 hrs)    NG/OG Tube 14 Fr Right nostril 05/18/25  1043  Right nostril  4 05/18/25 1400 (96.87 hrs)    Wound 05/18/25 1146 Left upper arm Other (Comments) 05/18/25  1146  -- 4                     --  Current Nutrition Orders & Evaluation of Intake       Oral Nutrition     Food Allergies None   Current PO Diet Diet: Regular/House; Texture: Mechanical Ground (NDD 2); Fluid Consistency: Nectar Thick   Supplement Magic Cup BID   PO Evaluation     Trending % PO Intake 5/22: 25% x 2 meals  5/21: 50% x 2 meals       Enteral Nutrition    Current EN Order Tube Feeding: Formula: Peptamen AF (Vital AF 1.2); Feeding Type: Continuous; Start at: 20 mL/hr; Then Advance By: Do Not Advance; Total Daily Feeding Volume (mL/day): 440; Water Flush: Other; Other: 250; Every: 6 hours; Water Bolus: None    Patient doesn't have any tube feeding modular orders    EN Route    EN Tolerance    EN Observation        Parenteral Nutrition    Current TPN Order    TPN Route    Lipids (mL/%/frequency)    MVI Frequency    Trace Element Frequency    Total # Days on TPN    TPN Observation      Nutrition Diagnosis         Nutrition Dx Problem 1 Underweight r/t dementia/COPD AEB BMI=22.3         Nutrition Dx Problem 2       Goal(s) Maintain PO Intake , Meets Estimated  Needs , Accepts Oral Nutrition Supplement, and Maintain Weight     Intervention         Intervention  Continue to monitor for plan of care and Continue with current interventions       Diet Prescription Regular, mechanical ground, NTL    Supplement Prescription  Magic cup BID        Enteral Prescription  Peptamen AF at 20 mL/hr. Do not advance       TPN Prescription         Education Provided         Monitor/Evaluation        Monitor Per Protocol, PO Intake, Oral Nutrition Supplement Intake, Pertinent Labs, EN Delivery/Tolerance, Weight, Skin Status, GI Status, Symptoms, and Swallow Function    RD Follow-up Encounter 1-2 days     Electronically signed by:  Mojgan Pascal RD  05/22/25 14:52 EDT

## 2025-05-23 VITALS
RESPIRATION RATE: 16 BRPM | HEIGHT: 63 IN | OXYGEN SATURATION: 98 % | SYSTOLIC BLOOD PRESSURE: 141 MMHG | HEART RATE: 72 BPM | WEIGHT: 146.16 LBS | DIASTOLIC BLOOD PRESSURE: 72 MMHG | TEMPERATURE: 97.9 F | BODY MASS INDEX: 25.9 KG/M2

## 2025-05-23 LAB
ALBUMIN SERPL-MCNC: 2.2 G/DL (ref 3.5–5.2)
ANION GAP SERPL CALCULATED.3IONS-SCNC: 8.9 MMOL/L (ref 5–15)
BUN SERPL-MCNC: 26 MG/DL (ref 8–23)
BUN/CREAT SERPL: 25 (ref 7–25)
CALCIUM SPEC-SCNC: 8.3 MG/DL (ref 8.6–10.5)
CHLORIDE SERPL-SCNC: 105 MMOL/L (ref 98–107)
CO2 SERPL-SCNC: 21.1 MMOL/L (ref 22–29)
CREAT SERPL-MCNC: 1.04 MG/DL (ref 0.57–1)
EGFRCR SERPLBLD CKD-EPI 2021: 54.4 ML/MIN/1.73
GLUCOSE BLDC GLUCOMTR-MCNC: 139 MG/DL (ref 70–130)
GLUCOSE BLDC GLUCOMTR-MCNC: 166 MG/DL (ref 70–130)
GLUCOSE SERPL-MCNC: 138 MG/DL (ref 65–99)
PHOSPHATE SERPL-MCNC: 2.3 MG/DL (ref 2.5–4.5)
POTASSIUM SERPL-SCNC: 3.7 MMOL/L (ref 3.5–5.2)
SODIUM SERPL-SCNC: 135 MMOL/L (ref 136–145)

## 2025-05-23 PROCEDURE — 82948 REAGENT STRIP/BLOOD GLUCOSE: CPT

## 2025-05-23 PROCEDURE — 80069 RENAL FUNCTION PANEL: CPT | Performed by: INTERNAL MEDICINE

## 2025-05-23 PROCEDURE — 82948 REAGENT STRIP/BLOOD GLUCOSE: CPT | Performed by: FAMILY MEDICINE

## 2025-05-23 PROCEDURE — 63710000001 INSULIN REGULAR HUMAN PER 5 UNITS: Performed by: FAMILY MEDICINE

## 2025-05-23 PROCEDURE — 99239 HOSP IP/OBS DSCHRG MGMT >30: CPT | Performed by: FAMILY MEDICINE

## 2025-05-23 RX ORDER — LANSOPRAZOLE 30 MG/1
30 CAPSULE, DELAYED RELEASE ORAL
Qty: 30 CAPSULE | Refills: 0 | Status: SHIPPED | OUTPATIENT
Start: 2025-05-24

## 2025-05-23 RX ORDER — LANSOPRAZOLE 30 MG/1
30 TABLET, ORALLY DISINTEGRATING, DELAYED RELEASE ORAL
Qty: 30 TABLET | Refills: 0 | Status: SHIPPED | OUTPATIENT
Start: 2025-05-24 | End: 2025-05-23

## 2025-05-23 RX ORDER — METOCLOPRAMIDE 5 MG/1
5 TABLET ORAL
Qty: 120 TABLET | Refills: 0 | Status: SHIPPED | OUTPATIENT
Start: 2025-05-23 | End: 2025-06-22

## 2025-05-23 RX ORDER — MORPHINE SULFATE 100 MG/5ML
5 SOLUTION ORAL EVERY 4 HOURS PRN
Qty: 30 ML | Refills: 0 | Status: SHIPPED | OUTPATIENT
Start: 2025-05-23

## 2025-05-23 RX ORDER — MORPHINE SULFATE 100 MG/5ML
5 SOLUTION ORAL EVERY 4 HOURS PRN
Qty: 30 ML | Refills: 0 | Status: SHIPPED | OUTPATIENT
Start: 2025-05-23 | End: 2025-05-23

## 2025-05-23 RX ORDER — METOCLOPRAMIDE 5 MG/1
5 TABLET ORAL
Qty: 120 TABLET | Refills: 0 | Status: SHIPPED | OUTPATIENT
Start: 2025-05-23 | End: 2025-05-23

## 2025-05-23 RX ADMIN — INSULIN HUMAN 2 UNITS: 100 INJECTION, SOLUTION PARENTERAL at 01:28

## 2025-05-23 RX ADMIN — APIXABAN 2.5 MG: 2.5 TABLET, FILM COATED ORAL at 08:23

## 2025-05-23 RX ADMIN — RIVASTIGMINE 1 PATCH: 4.6 PATCH TRANSDERMAL at 08:24

## 2025-05-23 RX ADMIN — NIFEDIPINE 60 MG: 30 TABLET, FILM COATED, EXTENDED RELEASE ORAL at 08:23

## 2025-05-23 RX ADMIN — ASPIRIN 81 MG CHEWABLE TABLET 81 MG: 81 TABLET CHEWABLE at 08:23

## 2025-05-23 RX ADMIN — Medication 10 ML: at 08:23

## 2025-05-23 RX ADMIN — ATORVASTATIN CALCIUM 80 MG: 80 TABLET, FILM COATED ORAL at 08:23

## 2025-05-23 RX ADMIN — LANSOPRAZOLE 30 MG: 30 TABLET, ORALLY DISINTEGRATING ORAL at 06:36

## 2025-05-23 RX ADMIN — CARVEDILOL 12.5 MG: 12.5 TABLET, FILM COATED ORAL at 08:23

## 2025-05-23 RX ADMIN — HYDRALAZINE HYDROCHLORIDE 25 MG: 25 TABLET ORAL at 06:36

## 2025-05-23 NOTE — PLAN OF CARE
Goal Outcome Evaluation: Patient being discharged home with hospice care via EMS

## 2025-05-23 NOTE — DISCHARGE SUMMARY
[facility] HOSPITALIST   DISCHARGE SUMMARY      Name:  Valarie Camara   Age:  79 y.o.  Sex:  female  :  1945  MRN:  [id number]   Visit Number:  [id number]    Admission Date:  5/10/2025  Date of Discharge:  2025  Primary Care Physician:  Lay Cox MD    Important issues to note:    Discharge home with hospice services  Currently NG tube in place, if has issues, dislodges, will not plan on replacement with hospice  As needed morphine if she develops any pain over the coming days to weeks    Discharge Diagnoses:     Severe Sepsis secondary to urinary tract infection  Urinary tract infection  Metabolic encephalopathy  Atrial fibrillation with RVR  Acute kidney injury  Hypernatremia  Dementia  COPD  Hypertension  Hyperlipidemia  Restless leg syndrome    Problem List:     Active Hospital Problems    Diagnosis  POA    **Hypernatremia [E87.0]  Yes    PAF (paroxysmal atrial fibrillation) [I48.0]  Unknown    Acute renal failure superimposed on stage 3a chronic kidney disease [N17.9, N18.31]  Unknown      Resolved Hospital Problems   No resolved problems to display.     Presenting Problem:    Chief Complaint   Patient presents with    Altered Mental Status      Consults:     Consulting Physician(s)         Provider   Role Specialty     Suha Hernandez MD      Consulting Physician Neurology          Procedures Performed:        History of presenting illness/Hospital Course:    Chronically ill 79-year-old female with multiple medical comorbidities including atrial fibrillation, COPD, hypertension, dementia, MDRO UTI, CKD, prior stroke, who presented from home with family due to complaints of confusion, increased from baseline dementia.  Decreased oral intake, aspiration concerns.    Patient had evidence of atrial fibrillation with RVR and was hypotensive upon arrival.  She had a head CT which was unremarkable.  She was seen by cardiology and hospitalist service upon  admission.  She did have severe hypernatremia that was treated with hypotonic fluids.  She also had urine culture with ESBL and completed a course of Invanz.  She initially did have Grey catheter in place which was removed on 5/22/2025.    Patient unfortunately able to pass any attempts with speech therapy for swallow purposes.  She had NG tube placed multiple times for feeds.  She is on low tube feed rate currently.    Palliative care had been consulted, family is now requesting patient to be discharged home with hospice.  This has been arranged over the last couple days in regards to delivery of equipment and hospice initiation.  Essentially, will keep NG tube in place if she can tolerate this after discharge, if not we will need removed and comfort feeds only by mouth.  I did send in morphine as needed if patient develops any pain moving forward.  Otherwise would limit total pill burden as much as possible.    Patient is otherwise appropriate for discharge today with home hospice services.    Vital Signs:    Temp:  [97.9 °F (36.6 °C)-99.1 °F (37.3 °C)] 97.9 °F (36.6 °C)  Heart Rate:  [71-78] 72  Resp:  [14-18] 16  BP: (141-167)/(66-84) 141/72    Physical Exam:    General Appearance:  Alert and cooperative.  Chronically ill appearing   Head:  Atraumatic and normocephalic.   Eyes: Conjunctivae and sclerae normal, no icterus. No pallor.   Ears:  Ears with no abnormalities noted.   Throat: No oral lesions, no thrush, oral mucosa moist.   Neck: Supple, trachea midline, no thyromegaly.  NG tube   Back:   No kyphoscoliosis present. No tenderness to palpation.   Lungs:   Breath sounds heard bilaterally equally.  No crackles or wheezing. No Pleural rub or bronchial breathing.   Heart:  Normal S1 and S2, no murmur, no gallop, no rub. No JVD.   Abdomen:   Normal bowel sounds, no masses, no organomegaly. Soft, nontender, nondistended, no rebound tenderness.   Extremities: Supple, no edema, no cyanosis, no clubbing.    Pulses: Pulses palpable bilaterally.   Skin: No bleeding or rash.   Neurologic: Alert and oriented x person and place.  Weakness     Pertinent Lab Results:     Results from last 7 days   Lab Units 05/22/25  0750 05/21/25  0645 05/20/25  0859   SODIUM mmol/L 134* 134* 142   POTASSIUM mmol/L 4.0 4.3 3.9   CHLORIDE mmol/L 106 107 113*   CO2 mmol/L 19.8* 20.1* 20.3*   BUN mg/dL 28* 25* 24*   CREATININE mg/dL 1.19* 1.06* 1.18*   CALCIUM mg/dL 7.8* 8.0* 8.0*   GLUCOSE mg/dL 165* 120* 112*     Results from last 7 days   Lab Units 05/18/25  0547 05/17/25  0613   WBC 10*3/mm3 6.44 6.12   HEMOGLOBIN g/dL 10.0* 9.7*   HEMATOCRIT % 31.2* 30.3*   PLATELETS 10*3/mm3 128* 118*                                   Pertinent Radiology Results:    Imaging Results (All)       Procedure Component Value Units Date/Time    XR Abdomen KUB [[id number]] Collected: 05/22/25 2305     Updated: 05/22/25 2306    Narrative:      FINAL REPORT    TECHNIQUE:  null    CLINICAL HISTORY:  diarrhea on tube feeds foul smelling residuals    COMPARISON:  null    FINDINGS:  1 view abdomen    Comparison: CR - XR ABDOMEN KUB - 5/16/25 19:40 EDT    Findings:    NG tube is in the distal stomach. Bowel-gas pattern is within normal limits. There is aortoiliac calcification.      Impression:      Impression:    NG tube is in the distal stomach.    Authenticated and Electronically Signed by Alexander Murdock MD on  05/22/2025 11:05:32 PM    XR Abdomen 1 View [[id number]] Collected: 05/18/25 1316     Updated: 05/18/25 1320    Narrative:      PROCEDURE: XR ABDOMEN 1 VW-     HISTORY: NG tube advanced, checking NG tube placement.;  E87.0-Hyperosmolality and hypernatremia; I48.91-Unspecified atrial  fibrillation; E05.90-Thyrotoxicosis, unspecified without thyrotoxic  crisis or storm; N17.9-Acute kidney failure, unspecified     COMPARISON: May 18, 2025 at 1054.     FINDINGS: An AP view of the abdomen and pelvis demonstrates a  nonspecific bowel gas pattern with no evidence of  obstruction. No  radiopaque stones are seen overlying the renal shadows. Nasogastric tube  has been advanced and is within the distal stomach. Opacity at the left  base, correlate for possible effusion.       Impression:      Nasogastric tube has been advanced and is within the distal  stomach.                 This report was signed and finalized on 5/18/2025 1:17 PM by Aki Naidu DO.       XR Abdomen 1 View [[id number]] Collected: 05/18/25 1157     Updated: 05/18/25 1200    Narrative:      PROCEDURE: XR ABDOMEN 1 VW-     HISTORY: NG tube placement verification; E87.0-Hyperosmolality and  hypernatremia; I48.91-Unspecified atrial fibrillation;  E05.90-Thyrotoxicosis, unspecified without thyrotoxic crisis or storm;  N17.9-Acute kidney failure, unspecified     COMPARISON: May 16, 2025.     FINDINGS: An AP view of the abdomen and pelvis demonstrates a  nonspecific bowel gas pattern with no evidence of obstruction. No  radiopaque stones are seen overlying the renal shadows. Nasogastric tube  with the side port beneath the diaphragm.       Impression:      Nasogastric tube with the side port beneath the diaphragm.                 This report was signed and finalized on 5/18/2025 11:58 AM by Aki Naidu DO.       XR Abdomen KUB [[id number]] Collected: 05/16/25 2031     Updated: 05/16/25 2032    Narrative:      FINAL REPORT    TECHNIQUE:  null    CLINICAL HISTORY:  Verification of NG tube placement    COMPARISON:  null    FINDINGS:  1 view abdomen    Comparison: CT/SR - CT ABDOMEN PELVIS WO CONTRAST - 3/15/25 05:36 EDT    Findings: Tip of the NG tube is overlying the stomach.    No pneumoperitoneum or pneumatosis.    No abnormal calcifications.    No acute fractures.      Impression:      IMPRESSION:    Tip of NG tube is overlying the stomach.    Authenticated and Electronically Signed by Zhen Theodore on  05/16/2025 08:31:20 PM    XR Abdomen 1 View [[id number]] Collected: 05/14/25 1245     Updated: 05/14/25 124     Narrative:      PROCEDURE: XR ABDOMEN 1 VW-     HISTORY: NG placement; E87.0-Hyperosmolality and hypernatremia;  I48.91-Unspecified atrial fibrillation; E05.90-Thyrotoxicosis,  unspecified without thyrotoxic crisis or storm; N17.9-Acute kidney  failure, unspecified     FINDINGS: An NG tube is seen projecting in the right abdomen, presumably  in distal stomach. Limited visualized bowel gas pattern is normal.       Impression:      NG tube projecting in the right midabdomen, likely distal  stomach or proximal duodenum.        This report was signed and finalized on 5/14/2025 12:47 PM by Cornelio You MD.       MRI Brain Without Contrast [[id number]] Collected: 05/11/25 0855     Updated: 05/11/25 0901    Narrative:      MRI BRAIN WITHOUT CONTRAST     HISTORY: Confusion     COMPARISON: 3/22/2025     TECHNIQUE: Multiplanar, multisequence images of the brain were performed  without contrast.     FINDINGS: The diffusion weighted images demonstrate no restricted  diffusion. There is no acute ischemia. The cervicomedullary junction is  normal. There is no Chiari malformation. There is moderate atrophy.  There is increased T2 signal in the periventricular white matter  consistent mild microvascular change. There is no cortical edema or  hemorrhage. There are new right mastoid opacities.       Impression:      1. Moderate atrophy with mild microvascular change. There is no edema or  hemorrhage.  2. New right mastoid opacities.        This report was signed and finalized on 5/11/2025 8:59 AM by Yuniel Silva MD.       CT Head Without Contrast [[id number]] Collected: 05/10/25 1256     Updated: 05/10/25 1259    Narrative:      HEAD CT     HISTORY: Confusion.     COMPARISON: 3-.     TECHNIQUE: Multiple axial CT images were performed from the foramen  magnum to the vertex without enhancement. Individualized dose reduction  techniques using automated exposure control or adjustment of the mA  and/or kV according to patient size  were employed.     FINDINGS: Mild to moderate diffuse cortical atrophy is present.     There is no evidence of acute hemorrhage, mass effect, or edema.     Mild mucoperiosteal thickening is noted in the right maxillary sinus. No  air-fluid levels are seen.       Impression:         1. No evidence of acute intracranial abnormality.                 This report was signed and finalized on 5/10/2025 12:57 PM by Jah Kirk MD.       XR Chest 1 View [[id number]] Collected: 05/10/25 1248     Updated: 05/10/25 1252    Narrative:      Chest single view     COMPARISON: Chest from 3-     HISTORY: Altered mental status     FINDINGS: Cardiac silhouette is of normal size.     Lungs are underinflated, but clear.       Impression:      1. No evidence of acute abnormality.     This report was signed and finalized on 5/10/2025 12:49 PM by Jah Kirk MD.               Echo:    Results for orders placed during the hospital encounter of 03/20/25    Adult Transthoracic Echo Complete W/ Cont if Necessary Per Protocol (With Agitated Saline)    Interpretation Summary    Left ventricular systolic function is normal. Calculated left ventricular EF = 67% Left ventricular ejection fraction appears to be 66 - 70%.    Left ventricular wall thickness is consistent with mild concentric hypertrophy.    Left ventricular outflow tract peak flow gradient at rest is 11 mmHg. Left ventricular outflow tract peak gradient with valsalva is 29 mmHg.    Left ventricular diastolic function is consistent with (grade I) impaired relaxation.    The mitral valve peak gradient is 9 mmHg. The mitral valve mean gradient is 4 mmHg.    Calculated right ventricular systolic pressure from tricuspid regurgitation is 21 mmHg.    There is a trivial pericardial effusion.    Condition on Discharge:      Stable.    Code status during the hospital stay:    Code Status and Medical Interventions: No CPR (Do Not Attempt to Resuscitate); Limited Support; No intubation  (DNI)   Ordered at: 05/10/25 1435     Code Status (Patient has no pulse and is not breathing):    No CPR (Do Not Attempt to Resuscitate)     Medical Interventions (Patient has pulse or is breathing):    Limited Support     Medical Intervention Limits:    No intubation (DNI)     Level Of Support Discussed With:    Patient     Discharge Disposition:        Discharge Medications:       Discharge Medications        New Medications        Instructions Start Date   lansoprazole 30 MG Tablet Delayed Release Dispersible disintegrating tablet  Commonly known as: Prevacid SoluTab   30 mg, Nasogastric, Every Early Morning   Start Date: May 24, 2025     metoclopramide 5 MG tablet  Commonly known as: Reglan   5 mg, Per G Tube, 4 Times Daily Before Meals & Nightly      morphine 20 MG/ML concentrated solution   5 mg, Oral, Every 4 Hours PRN             Continue These Medications        Instructions Start Date   acetaminophen 325 MG tablet  Commonly known as: TYLENOL   325 mg, Every 6 Hours PRN      aspirin 81 MG chewable tablet   81 mg, Oral, Daily      atorvastatin 80 MG tablet  Commonly known as: LIPITOR   80 mg, Oral, Daily      carvedilol 12.5 MG tablet  Commonly known as: COREG   12.5 mg, Oral, 2 Times Daily With Meals      CHOLECALCIFEROL PO   5,000 Int'l Units/day, Daily      ferrous sulfate 325 (65 FE) MG tablet   1 tablet, 3 Times Weekly      glucose blood test strip   1 each, Other, As Needed, Check sugar once a day      glucose monitor monitoring kit   1 each, Not Applicable, Daily      insulin lispro 100 UNIT/ML injection  Commonly known as: humaLOG   2 Units, 3 Times Daily PRN      Insulin Pen Needle 31G X 5 MM misc   Inject insulin three times daily      Easy Touch Pen Needles 31G X 8 MM misc  Generic drug: Insulin Pen Needle   Indications: Diabetes      Lancets 30G misc   Check sugar daily      Lidocaine 4 %   1 patch, Transdermal, Every 24 Hours Scheduled, Remove & Discard patch within 12 hours or as directed by  MD      NIFEdipine CC 60 MG 24 hr tablet  Commonly known as: ADALAT CC   60 mg, Oral, Every 24 Hours Scheduled      rivastigmine 4.6 MG/24HR patch  Commonly known as: EXELON   1 patch, Transdermal, Daily             Stop These Medications      isosorbide mononitrate 30 MG 24 hr tablet  Commonly known as: IMDUR     midodrine 5 MG tablet  Commonly known as: PROAMATINE     pantoprazole 40 MG EC tablet  Commonly known as: PROTONIX            Discharge Diet:     Diet Instructions       Diet: Tube Feeding; Continuous; Peptamen AF 20 ml/hr      Discharge Diet: Tube Feeding    Feeding Type: Continuous    Formula & Rate: Peptamen AF 20 ml/hr          Activity at Discharge:       Follow-up Appointments:      Future Appointments   Date Time Provider Department Center   3/2/2026  1:30 PM Vidal Low MD [facility]     Test Results Pending at Discharge:    Pending Results       Procedure [Order ID] Specimen - Date/Time    Renal Function Panel [[id number]] Collected: 05/23/25 0701    Specimen: Blood Updated: 05/23/25 0734                 Denise Nice DO  05/23/25  07:35 EDT    Time: I spent 34 minutes on this discharge activity which included: face-to-face encounter with the patient, reviewing the data in the system, coordination of the care with the nursing staff as well as consultants, documentation, and entering orders.     Dictated utilizing Dragon dictation.

## 2025-05-23 NOTE — CASE MANAGEMENT/SOCIAL WORK
Case Management Discharge Note      Final Note: DC per [facility] Home with Hospice.         Selected Continued Care - Discharged on 5/23/2025 Admission date: 5/10/2025 - Discharge disposition: Hospice/Home      Destination    No services have been selected for the patient.                Durable Medical Equipment    No services have been selected for the patient.                Dialysis/Infusion    No services have been selected for the patient.                Home Medical Care Coordination complete.      Service Provider Services Address Phone Fax Patient Preferred    [facility] [address] [facility] [place] [place] [address] [phone] --              Therapy    No services have been selected for the patient.                Community Resources Coordination complete.      Service Provider Services Address Phone Fax Patient Preferred    [facility] [address] [facility] [place] [address] [phone] --              Community & DME    No services have been selected for the patient.                    Selected Continued Care - Prior Encounters Includes continued care and service providers with selected services from prior encounters from 2/9/2025 to 5/23/2025      Discharged on 4/7/2025 Admission date: 3/20/2025 - Discharge disposition: Skilled Nursing Facility (DC - External)      Destination       Service Provider Services Address Phone Fax Patient Preferred    [facility] [address] [facility] [place] [place] [id number] [phone] --                      Discharged on 3/4/2025 Admission date: 2/28/2025 - Discharge disposition: Skilled Nursing Facility (DC - External)      Destination       Service Provider Services Address Phone Fax Patient Preferred    [facility] [address] [facility] [id number] [phone] --                           Transportation Services  Ambulance: [facility] Status: Accepted    Final Discharge Disposition Code: 50 - home with hospice

## 2025-05-23 NOTE — PLAN OF CARE
Goal Outcome Evaluation:  Plan of Care Reviewed With: patient        Progress: improving  Outcome Evaluation: VSS, O2 sats above 90% on RA, NG tube continuouus infusion at 20ml/HR, paused tube feed overnight for approx 3HRS d/t episodes of emesis of dark brown foul smelling liquid, resumed feeds and emesis and residual has improved, no complaints of pain, DC home today with Hospice, will continue to follow plan of care

## 2025-05-24 NOTE — PAYOR COMM NOTE
"    D/c SUMMARY  UR MANAGER; RAMYA THOMAS RN  [phone] AND [phone]     NPI:  [id number]  TAX ID:  [id number]        Valarie Barajas (79 y.o. Female)       Date of Birth   1945    Social Security Number       Address   [address] [place] [address]    Home Phone   [phone]    MRN   [id number]       Hoahaoism   Rastafarian    Marital Status                               Admission Date   5/10/2025    Admission Type   Emergency    Admitting Provider   Edgardo Burnette MD    Attending Provider       Department, Room/Bed   [facility] TELEMET[facility] 3, 328/1       Discharge Date   5/23/2025    Discharge Disposition   Hospice/Home    Discharge Destination                                 Attending Provider: (none)   Allergies: Penicillins, Clonidine Derivatives, Hydralazine, Sulfa Antibiotics    Isolation: None   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), ESBL (05/12/25)   Code Status: Prior    Ht: 160 cm (63\")   Wt: 66.3 kg (146 lb 2.6 oz)    Admission Cmt: None   Principal Problem: Hypernatremia [E87.0]                   Active Insurance as of 5/10/2025       Primary Coverage       Payor Plan Insurance Group Employer/Plan Group    ANTHEM MEDICARE REPLACEMENT ANTHEM MEDICARE ADVANTAGE HMO [id number]       Payor Plan Address Payor Plan Phone Number Payor Plan Fax Number Effective Dates    [address] [phone]  4/1/2025 - None Entered    [facility] [address]         Subscriber Name Subscriber Birth Date Member ID       VALARIE BARAJAS 1945 [id number]                     Emergency Contacts        (Rel.) Home Phone Work Phone Mobile Phone    Cleo Barajas (Daughter) [phone] -- [phone]    ELROY BARAJAS (Daughter) [phone] -- --              Physician Progress Notes (last 24 hours)  Notes from 05/23/25 0832 through 05/24/25 0832   No notes of this type exist for this encounter.          Discharge Summary        Denise Nice, DO at " 25 0735              [facility]   DISCHARGE SUMMARY      Name:  Valarie Camara   Age:  79 y.o.  Sex:  female  :  1945  MRN:  [id number]   Visit Number:  [id number]    Admission Date:  5/10/2025  Date of Discharge:  2025  Primary Care Physician:  Lay Cox MD    Important issues to note:    Discharge home with hospice services  Currently NG tube in place, if has issues, dislodges, will not plan on replacement with hospice  As needed morphine if she develops any pain over the coming days to weeks    Discharge Diagnoses:     Severe Sepsis secondary to urinary tract infection  Urinary tract infection  Metabolic encephalopathy  Atrial fibrillation with RVR  Acute kidney injury  Hypernatremia  Dementia  COPD  Hypertension  Hyperlipidemia  Restless leg syndrome    Problem List:     Active Hospital Problems    Diagnosis  POA    **Hypernatremia [E87.0]  Yes    PAF (paroxysmal atrial fibrillation) [I48.0]  Unknown    Acute renal failure superimposed on stage 3a chronic kidney disease [N17.9, N18.31]  Unknown      Resolved Hospital Problems   No resolved problems to display.     Presenting Problem:    Chief Complaint   Patient presents with    Altered Mental Status      Consults:     Consulting Physician(s)         Provider   Role Specialty     Suha Hernandez MD      Consulting Physician Neurology          Procedures Performed:        History of presenting illness/Hospital Course:    Chronically ill 79-year-old female with multiple medical comorbidities including atrial fibrillation, COPD, hypertension, dementia, MDRO UTI, CKD, prior stroke, who presented from home with family due to complaints of confusion, increased from baseline dementia.  Decreased oral intake, aspiration concerns.    Patient had evidence of atrial fibrillation with RVR and was hypotensive upon arrival.  She had a head CT which was unremarkable.  She was seen by cardiology and  hospitalist service upon admission.  She did have severe hypernatremia that was treated with hypotonic fluids.  She also had urine culture with ESBL and completed a course of Invanz.  She initially did have Grey catheter in place which was removed on 5/22/2025.    Patient unfortunately able to pass any attempts with speech therapy for swallow purposes.  She had NG tube placed multiple times for feeds.  She is on low tube feed rate currently.    Palliative care had been consulted, family is now requesting patient to be discharged home with hospice.  This has been arranged over the last couple days in regards to delivery of equipment and hospice initiation.  Essentially, will keep NG tube in place if she can tolerate this after discharge, if not we will need removed and comfort feeds only by mouth.  I did send in morphine as needed if patient develops any pain moving forward.  Otherwise would limit total pill burden as much as possible.    Patient is otherwise appropriate for discharge today with home hospice services.    Vital Signs:    Temp:  [97.9 °F (36.6 °C)-99.1 °F (37.3 °C)] 97.9 °F (36.6 °C)  Heart Rate:  [71-78] 72  Resp:  [14-18] 16  BP: (141-167)/(66-84) 141/72    Physical Exam:    General Appearance:  Alert and cooperative.  Chronically ill appearing   Head:  Atraumatic and normocephalic.   Eyes: Conjunctivae and sclerae normal, no icterus. No pallor.   Ears:  Ears with no abnormalities noted.   Throat: No oral lesions, no thrush, oral mucosa moist.   Neck: Supple, trachea midline, no thyromegaly.  NG tube   Back:   No kyphoscoliosis present. No tenderness to palpation.   Lungs:   Breath sounds heard bilaterally equally.  No crackles or wheezing. No Pleural rub or bronchial breathing.   Heart:  Normal S1 and S2, no murmur, no gallop, no rub. No JVD.   Abdomen:   Normal bowel sounds, no masses, no organomegaly. Soft, nontender, nondistended, no rebound tenderness.   Extremities: Supple, no edema, no  cyanosis, no clubbing.   Pulses: Pulses palpable bilaterally.   Skin: No bleeding or rash.   Neurologic: Alert and oriented x person and place.  Weakness     Pertinent Lab Results:     Results from last 7 days   Lab Units 05/22/25  0750 05/21/25  0645 05/20/25  0859   SODIUM mmol/L 134* 134* 142   POTASSIUM mmol/L 4.0 4.3 3.9   CHLORIDE mmol/L 106 107 113*   CO2 mmol/L 19.8* 20.1* 20.3*   BUN mg/dL 28* 25* 24*   CREATININE mg/dL 1.19* 1.06* 1.18*   CALCIUM mg/dL 7.8* 8.0* 8.0*   GLUCOSE mg/dL 165* 120* 112*     Results from last 7 days   Lab Units 05/18/25  0547 05/17/25  0613   WBC 10*3/mm3 6.44 6.12   HEMOGLOBIN g/dL 10.0* 9.7*   HEMATOCRIT % 31.2* 30.3*   PLATELETS 10*3/mm3 128* 118*                                   Pertinent Radiology Results:    Imaging Results (All)       Procedure Component Value Units Date/Time    XR Abdomen KUB [[id number]] Collected: 05/22/25 2305     Updated: 05/22/25 2306    Narrative:      FINAL REPORT    TECHNIQUE:  null    CLINICAL HISTORY:  diarrhea on tube feeds foul smelling residuals    COMPARISON:  null    FINDINGS:  1 view abdomen    Comparison: CR - XR ABDOMEN KUB - 5/16/25 19:40 EDT    Findings:    NG tube is in the distal stomach. Bowel-gas pattern is within normal limits. There is aortoiliac calcification.      Impression:      Impression:    NG tube is in the distal stomach.    Authenticated and Electronically Signed by Alexander Murdock MD on  05/22/2025 11:05:32 PM    XR Abdomen 1 View [[id number]] Collected: 05/18/25 1316     Updated: 05/18/25 1320    Narrative:      PROCEDURE: XR ABDOMEN 1 VW-     HISTORY: NG tube advanced, checking NG tube placement.;  E87.0-Hyperosmolality and hypernatremia; I48.91-Unspecified atrial  fibrillation; E05.90-Thyrotoxicosis, unspecified without thyrotoxic  crisis or storm; N17.9-Acute kidney failure, unspecified     COMPARISON: May 18, 2025 at 1054.     FINDINGS: An AP view of the abdomen and pelvis demonstrates a  nonspecific bowel gas  pattern with no evidence of obstruction. No  radiopaque stones are seen overlying the renal shadows. Nasogastric tube  has been advanced and is within the distal stomach. Opacity at the left  base, correlate for possible effusion.       Impression:      Nasogastric tube has been advanced and is within the distal  stomach.                 This report was signed and finalized on 5/18/2025 1:17 PM by Aki Naidu DO.       XR Abdomen 1 View [[id number]] Collected: 05/18/25 1157     Updated: 05/18/25 1200    Narrative:      PROCEDURE: XR ABDOMEN 1 VW-     HISTORY: NG tube placement verification; E87.0-Hyperosmolality and  hypernatremia; I48.91-Unspecified atrial fibrillation;  E05.90-Thyrotoxicosis, unspecified without thyrotoxic crisis or storm;  N17.9-Acute kidney failure, unspecified     COMPARISON: May 16, 2025.     FINDINGS: An AP view of the abdomen and pelvis demonstrates a  nonspecific bowel gas pattern with no evidence of obstruction. No  radiopaque stones are seen overlying the renal shadows. Nasogastric tube  with the side port beneath the diaphragm.       Impression:      Nasogastric tube with the side port beneath the diaphragm.                 This report was signed and finalized on 5/18/2025 11:58 AM by Aki Naidu DO.       XR Abdomen KUB [[id number]] Collected: 05/16/25 2031     Updated: 05/16/25 2032    Narrative:      FINAL REPORT    TECHNIQUE:  null    CLINICAL HISTORY:  Verification of NG tube placement    COMPARISON:  null    FINDINGS:  1 view abdomen    Comparison: CT/SR - CT ABDOMEN PELVIS WO CONTRAST - 3/15/25 05:36 EDT    Findings: Tip of the NG tube is overlying the stomach.    No pneumoperitoneum or pneumatosis.    No abnormal calcifications.    No acute fractures.      Impression:      IMPRESSION:    Tip of NG tube is overlying the stomach.    Authenticated and Electronically Signed by Zhen Theodore on  05/16/2025 08:31:20 PM    XR Abdomen 1 View [[id number]] Collected: 05/14/25 1245      Updated: 05/14/25 1249    Narrative:      PROCEDURE: XR ABDOMEN 1 VW-     HISTORY: NG placement; E87.0-Hyperosmolality and hypernatremia;  I48.91-Unspecified atrial fibrillation; E05.90-Thyrotoxicosis,  unspecified without thyrotoxic crisis or storm; N17.9-Acute kidney  failure, unspecified     FINDINGS: An NG tube is seen projecting in the right abdomen, presumably  in distal stomach. Limited visualized bowel gas pattern is normal.       Impression:      NG tube projecting in the right midabdomen, likely distal  stomach or proximal duodenum.        This report was signed and finalized on 5/14/2025 12:47 PM by Cornelio You MD.       MRI Brain Without Contrast [[id number]] Collected: 05/11/25 0855     Updated: 05/11/25 0901    Narrative:      MRI BRAIN WITHOUT CONTRAST     HISTORY: Confusion     COMPARISON: 3/22/2025     TECHNIQUE: Multiplanar, multisequence images of the brain were performed  without contrast.     FINDINGS: The diffusion weighted images demonstrate no restricted  diffusion. There is no acute ischemia. The cervicomedullary junction is  normal. There is no Chiari malformation. There is moderate atrophy.  There is increased T2 signal in the periventricular white matter  consistent mild microvascular change. There is no cortical edema or  hemorrhage. There are new right mastoid opacities.       Impression:      1. Moderate atrophy with mild microvascular change. There is no edema or  hemorrhage.  2. New right mastoid opacities.        This report was signed and finalized on 5/11/2025 8:59 AM by Yuniel Silva MD.       CT Head Without Contrast [[id number]] Collected: 05/10/25 1256     Updated: 05/10/25 1259    Narrative:      HEAD CT     HISTORY: Confusion.     COMPARISON: 3-.     TECHNIQUE: Multiple axial CT images were performed from the foramen  magnum to the vertex without enhancement. Individualized dose reduction  techniques using automated exposure control or adjustment of the mA  and/or kV  according to patient size were employed.     FINDINGS: Mild to moderate diffuse cortical atrophy is present.     There is no evidence of acute hemorrhage, mass effect, or edema.     Mild mucoperiosteal thickening is noted in the right maxillary sinus. No  air-fluid levels are seen.       Impression:         1. No evidence of acute intracranial abnormality.                 This report was signed and finalized on 5/10/2025 12:57 PM by Jah Kirk MD.       XR Chest 1 View [[id number]] Collected: 05/10/25 1248     Updated: 05/10/25 1252    Narrative:      Chest single view     COMPARISON: Chest from 3-     HISTORY: Altered mental status     FINDINGS: Cardiac silhouette is of normal size.     Lungs are underinflated, but clear.       Impression:      1. No evidence of acute abnormality.     This report was signed and finalized on 5/10/2025 12:49 PM by Jah Kirk MD.               Echo:    Results for orders placed during the hospital encounter of 03/20/25    Adult Transthoracic Echo Complete W/ Cont if Necessary Per Protocol (With Agitated Saline)    Interpretation Summary    Left ventricular systolic function is normal. Calculated left ventricular EF = 67% Left ventricular ejection fraction appears to be 66 - 70%.    Left ventricular wall thickness is consistent with mild concentric hypertrophy.    Left ventricular outflow tract peak flow gradient at rest is 11 mmHg. Left ventricular outflow tract peak gradient with valsalva is 29 mmHg.    Left ventricular diastolic function is consistent with (grade I) impaired relaxation.    The mitral valve peak gradient is 9 mmHg. The mitral valve mean gradient is 4 mmHg.    Calculated right ventricular systolic pressure from tricuspid regurgitation is 21 mmHg.    There is a trivial pericardial effusion.    Condition on Discharge:      Stable.    Code status during the hospital stay:    Code Status and Medical Interventions: No CPR (Do Not Attempt to Resuscitate);  Limited Support; No intubation (DNI)   Ordered at: 05/10/25 1435     Code Status (Patient has no pulse and is not breathing):    No CPR (Do Not Attempt to Resuscitate)     Medical Interventions (Patient has pulse or is breathing):    Limited Support     Medical Intervention Limits:    No intubation (DNI)     Level Of Support Discussed With:    Patient     Discharge Disposition:        Discharge Medications:       Discharge Medications        New Medications        Instructions Start Date   lansoprazole 30 MG Tablet Delayed Release Dispersible disintegrating tablet  Commonly known as: Prevacid SoluTab   30 mg, Nasogastric, Every Early Morning   Start Date: May 24, 2025     metoclopramide 5 MG tablet  Commonly known as: Reglan   5 mg, Per G Tube, 4 Times Daily Before Meals & Nightly      morphine 20 MG/ML concentrated solution   5 mg, Oral, Every 4 Hours PRN             Continue These Medications        Instructions Start Date   acetaminophen 325 MG tablet  Commonly known as: TYLENOL   325 mg, Every 6 Hours PRN      aspirin 81 MG chewable tablet   81 mg, Oral, Daily      atorvastatin 80 MG tablet  Commonly known as: LIPITOR   80 mg, Oral, Daily      carvedilol 12.5 MG tablet  Commonly known as: COREG   12.5 mg, Oral, 2 Times Daily With Meals      CHOLECALCIFEROL PO   5,000 Int'l Units/day, Daily      ferrous sulfate 325 (65 FE) MG tablet   1 tablet, 3 Times Weekly      glucose blood test strip   1 each, Other, As Needed, Check sugar once a day      glucose monitor monitoring kit   1 each, Not Applicable, Daily      insulin lispro 100 UNIT/ML injection  Commonly known as: humaLOG   2 Units, 3 Times Daily PRN      Insulin Pen Needle 31G X 5 MM misc   Inject insulin three times daily      Easy Touch Pen Needles 31G X 8 MM misc  Generic drug: Insulin Pen Needle   Indications: Diabetes      Lancets 30G misc   Check sugar daily      Lidocaine 4 %   1 patch, Transdermal, Every 24 Hours Scheduled, Remove & Discard patch  within 12 hours or as directed by MD      NIFEdipine CC 60 MG 24 hr tablet  Commonly known as: ADALAT CC   60 mg, Oral, Every 24 Hours Scheduled      rivastigmine 4.6 MG/24HR patch  Commonly known as: EXELON   1 patch, Transdermal, Daily             Stop These Medications      isosorbide mononitrate 30 MG 24 hr tablet  Commonly known as: IMDUR     midodrine 5 MG tablet  Commonly known as: PROAMATINE     pantoprazole 40 MG EC tablet  Commonly known as: PROTONIX            Discharge Diet:     Diet Instructions       Diet: Tube Feeding; Continuous; Peptamen AF 20 ml/hr      Discharge Diet: Tube Feeding    Feeding Type: Continuous    Formula & Rate: Peptamen AF 20 ml/hr          Activity at Discharge:       Follow-up Appointments:      Future Appointments   Date Time Provider Department Center   3/2/2026  1:30 PM Vidal Low MD [facility]     Test Results Pending at Discharge:    Pending Results       Procedure [Order ID] Specimen - Date/Time    Renal Function Panel [[id number]] Collected: 05/23/25 0701    Specimen: Blood Updated: 05/23/25 0734                 Denise Nice DO  05/23/25  07:35 EDT    Time: I spent 34 minutes on this discharge activity which included: face-to-face encounter with the patient, reviewing the data in the system, coordination of the care with the nursing staff as well as consultants, documentation, and entering orders.     Dictated utilizing Dragon dictation.      Electronically signed by Denise Nice DO at 05/23/25 0740

## (undated) DEVICE — CONMED SCOPE SAVER BITE BLOCK, 20X27 MM: Brand: SCOPE SAVER

## (undated) DEVICE — DEV INFL ALLIANCE2 SYS

## (undated) DEVICE — Device

## (undated) DEVICE — THE BITE BLOCK MAXI, LATEX FREE STRAP IS USED TO PROTECT THE ENDOSCOPE INSERTION TUBE FROM BEING BITTEN BY THE PATIENT.

## (undated) DEVICE — FRCP BX RADJAW4 NDL 2.8 240 STD OG

## (undated) DEVICE — INTRO ACCSR BLNT TP

## (undated) DEVICE — ESOPHAGEAL BALLOON DILATATION CATHETER: Brand: CRE FIXED WIRE

## (undated) DEVICE — SINGLE-USE BIOPSY FORCEPS: Brand: RADIAL JAW 4

## (undated) DEVICE — ENDOSCOPY PORT CONNECTOR FOR OLYMPUS® SCOPES: Brand: ERBE

## (undated) DEVICE — SYR LUERLOK 50ML

## (undated) DEVICE — KT BIOGUARD SXN VLV AIR/H20 4PC DISP

## (undated) DEVICE — FRCP BIOP COLD ENDOJAW ALLGTR W/NDL 2.8X2300MM BLU

## (undated) DEVICE — SPNG CVR STR 2S 4X4

## (undated) DEVICE — HYBRID TUBING/CAP SET FOR OLYMPUS® SCOPES: Brand: ERBE

## (undated) DEVICE — VLV SXN AIR/H2O ORCAPOD3 1P/U STRL

## (undated) DEVICE — CONTN GRAD MEAS TRIANG 32OZ BLK

## (undated) DEVICE — SUCTION CANISTER, 1500CC, RIGID: Brand: DEROYAL

## (undated) DEVICE — TUBING, SUCTION, 1/4" X 10', STRAIGHT: Brand: MEDLINE

## (undated) DEVICE — SYR LUER/TIP MONOJECT RGD/PK 60CC STRL

## (undated) DEVICE — MSK ENDO PORT O2 POM ELITE CURAPLEX A/

## (undated) DEVICE — SINGLE-USE POLYPECTOMY SNARE: Brand: SENSATION SHORT THROW

## (undated) DEVICE — ENDOGATOR AUXILIARY WATER JET CONNECTOR: Brand: ENDOGATOR

## (undated) DEVICE — 2000CC GUARDIAN II: Brand: GUARDIAN

## (undated) DEVICE — HYBRID CO2 TUBING/CAP SET FOR OLYMPUS® SCOPES & CO2 SOURCE: Brand: ERBE